# Patient Record
Sex: FEMALE | Race: BLACK OR AFRICAN AMERICAN | NOT HISPANIC OR LATINO | Employment: OTHER | URBAN - METROPOLITAN AREA
[De-identification: names, ages, dates, MRNs, and addresses within clinical notes are randomized per-mention and may not be internally consistent; named-entity substitution may affect disease eponyms.]

---

## 2017-01-03 ENCOUNTER — HOSPITAL ENCOUNTER (OUTPATIENT)
Dept: RADIOLOGY | Facility: HOSPITAL | Age: 62
Discharge: HOME | End: 2017-01-03
Attending: INTERNAL MEDICINE
Payer: COMMERCIAL

## 2017-01-03 VITALS
HEIGHT: 63 IN | BODY MASS INDEX: 38.27 KG/M2 | OXYGEN SATURATION: 98 % | DIASTOLIC BLOOD PRESSURE: 88 MMHG | RESPIRATION RATE: 14 BRPM | WEIGHT: 216 LBS | HEART RATE: 70 BPM | TEMPERATURE: 99 F | SYSTOLIC BLOOD PRESSURE: 135 MMHG

## 2017-01-03 DIAGNOSIS — K74.3 CHOLESTATIC CIRRHOSIS: ICD-10-CM

## 2017-01-03 DIAGNOSIS — R18.8 OTHER ASCITES: ICD-10-CM

## 2017-01-03 DIAGNOSIS — K74.60 HEPATIC CIRRHOSIS, UNSPECIFIED HEPATIC CIRRHOSIS TYPE: ICD-10-CM

## 2017-01-03 PROCEDURE — A7048 VACUUM DRAIN BOTTLE/TUBE KIT: HCPCS

## 2017-01-03 PROCEDURE — C1729 CATH, DRAINAGE: HCPCS

## 2017-01-03 NOTE — DISCHARGE INSTRUCTIONS

## 2017-01-03 NOTE — PLAN OF CARE
Problem: Patient Care Overview  Goal: Plan of Care Review  Outcome: Outcome(s) achieved Date Met:  01/03/17  Patient d/c home in stable condition via wheelchair with ride. Verbalized understanding of d/c instructions. Patient voiced no complaints at this time. Patient stood at side of bed, walked steps with no new motor deficits. Neurologically intact.

## 2017-01-03 NOTE — INTERVAL H&P NOTE
Most recent H&P has been reviewed and updated by radiology  Risks and benefits were explained to the patient prior to the procedure.  Written and informed consent was obtained.

## 2017-01-03 NOTE — IP AVS SNAPSHOT
[facility]  [id number] [place] [address] [address] [place] [address]           Patient Discharge Instructions     Our goal is to set you up for success. This packet includes information on your condition, medications, and your home care. It will help you to care for yourself so you don't get sicker and need to go back to the hospital.     Please ask your nurse if you have any questions.        There are many details to remember when preparing to leave the hospital. Here is what you will need to do:    1. Take your medicine. If you are prescribed medications, review your Medication List in the following pages. You may have new medications to  at the pharmacy and others that you'll need to stop taking. Review the instructions for how and when to take your medications. Talk with your doctor or nurses if you are unsure of what to do.     2. Go to your follow-up appointments. Specific follow-up information is listed in the following pages. Your may be contacted by a transition nurse or clinical provider about future appointments. Be sure we have all of the phone numbers to reach you, if needed. Please contact your provider's office if you are unable to make an appointment.     3. Watch for warning signs. Your doctor or nurse will give you detailed warning signs to watch for and when to call for assistance. These instructions may also include educational information about your condition. If you experience any of warning signs to your health, call your doctor.               [facility]  Unless otherwise directed by your provider, please contact [facility], our nurse care line that is available for 24/7 assistance.     [phone] (toll-free)    Registered nurses in the [facility] provide clinical advisement, health education, appointment booking, and other advisory services.                    ** Verify the list of medication(s) below is accurate and up to date. Carry this with you  in case of emergency. If your medications have changed, please notify your healthcare provider.             Medication List      TAKE these medications        Additional Info                      carvedilol 3.125 MG tablet   Commonly known as:  COREG   Quantity:  60 tablet   Refills:  11   Dose:  3.125 mg    Instructions:  Take 1 tablet (3.125 mg total) by mouth 2 (two) times daily.     Begin Date    AM    Noon    PM    Bedtime       furosemide 20 MG tablet   Commonly known as:  LASIX   Quantity:  30 tablet   Refills:  3   Dose:  60 mg    Instructions:  Take 3 tablets (60 mg total) by mouth once daily.     Begin Date    AM    Noon    PM    Bedtime       hydrocodone-acetaminophen 7.5-325mg 7.5-325 mg per tablet   Commonly known as:  NORCO   Quantity:  30 tablet   Refills:  0   Dose:  1 tablet    Instructions:  Take 1 tablet by mouth every 6 (six) hours as needed for Pain.     Begin Date    AM    Noon    PM    Bedtime       insulin glargine 100 unit/mL injection   Commonly known as:  LANTUS   Refills:  0   Dose:  25 Units   Indications:  TYPE 2 DIABETES MELLITUS    Instructions:  Inject 25 Units into the skin continuous prn.     Begin Date    AM    Noon    PM    Bedtime       insulin lispro 100 unit/mL injection   Commonly known as:  HUMALOG   Refills:  0    Instructions:  Inject into the skin as needed for High Blood Sugar (per sliding scale).     Begin Date    AM    Noon    PM    Bedtime       levothyroxine 100 MCG tablet   Commonly known as:  SYNTHROID   Refills:  0   Dose:  100 mcg    Instructions:  Take 100 mcg by mouth once daily.     Begin Date    AM    Noon    PM    Bedtime       magnesium oxide 400 mg tablet   Commonly known as:  MAG-OX   Quantity:  60 tablet   Refills:  3   Dose:  400 mg    Instructions:  Take 1 tablet (400 mg total) by mouth 2 (two) times daily.     Begin Date    AM    Noon    PM    Bedtime       midodrine 5 MG Tab   Commonly known as:  PROAMATINE   Quantity:  90 tablet   Refills:  11    Dose:  5 mg    Instructions:  Take 1 tablet (5 mg total) by mouth 3 (three) times daily.     Begin Date    AM    Noon    PM    Bedtime       ondansetron 4 MG Tbdl   Commonly known as:  ZOFRAN-ODT   Quantity:  15 tablet   Refills:  0   Dose:  4 mg    Instructions:  Take 1 tablet (4 mg total) by mouth every 8 (eight) hours as needed (prn nausea).     Begin Date    AM    Noon    PM    Bedtime       spironolactone 50 MG tablet   Commonly known as:  ALDACTONE   Quantity:  30 tablet   Refills:  11   Dose:  50 mg    Instructions:  Take 1 tablet (50 mg total) by mouth once daily.     Begin Date    AM    Noon    PM    Bedtime                  Please bring to all follow up appointments:    1. A copy of your discharge instructions.  2. All medicines you are currently taking in their original bottles.  3. Identification and insurance card.    Please arrive 15 minutes ahead of scheduled appointment time.    Please call 24 hours in advance if you must reschedule your appointment and/or time.        Your Scheduled Appointments     Jan 05, 2017 10:00 AM CST   Consult with [facility] INTERVENTIONAL RADIOLOGY   Alexis Jackson - Interventional Rad ([address] )    [address]  [facility] [address]   [phone]                  Discharge Instructions         Discharge Instructions for Paracentesis  Paracentesis is a procedure to remove extra fluid from your belly (abdomen). This fluid buildup in the abdomen is called ascites. The procedure may have been done to take a sample of the fluid. Or, it may have been done to drain the extra fluid from your abdomen and help make you more comfortable.     Ascites is buildup of excess fluid in the abdomen.   Home care  · If you have pain after the procedure, your healthcare provider can prescribe or recommend pain medicines. Take these exactly as directed. If you stopped taking other medicines before the procedure, ask your provider when you can start them again.  · Take it easy for 24  hours after the procedure. Avoid physical activity until your provider says its OK.  · You will have a small bandage over the puncture site. Stitches (sutures), surgical staples, adhesive tapes, adhesive strips, or surgical glue may be used to close the incision. They also help stop bleeding and speed healing. You may take the bandage off in 24 hours.  · Check the puncture site for the signs of infection listed below.  Follow-up care  Make a follow-up appointment with your healthcare provider as directed. During your follow-up visit, your provider will check your healing. Let your provider know how you are feeling. You can also discuss the cause of your ascites and if you need any further treatment.  When to seek medical advice  Call your healthcare provider if you have any of the following after the procedure:  · A fever of 100.4°F (38.0°C) or higher  · Trouble breathing  · Pain that doesn't go away even after taking pain medicine  · Belly pain not caused by having the skin punctured  · Bleeding from the puncture site  · More than a small amount of fluid leaking from the puncture site  · Swollen belly  · Signs of infection at the puncture site. These include increased pain, redness, or swelling, warmth, or bad-smelling drainage.  · Blood in your urine  · Feeling dizzy or lightheaded, or fainting   © 8826-1479 [employer]. [address] [place] [address], [place] [address]. All rights reserved. This information is not intended as a substitute for professional medical care. Always follow your healthcare professional's instructions.            Admission Information     Date & Time Provider Department CSN    1/3/2017  2:00 PM Liliane Baker MD [facility] -  [id number]      Care Providers     Provider Role Specialty Primary office phone    Liliane Baker MD Attending Provider Gastroenterology [phone]      Your Vitals Were     BP Pulse Temp Resp Height Weight    140/89 (BP Location: Left arm,  "Patient Position: Lying, BP Method: Automatic) 71 98.5 °F (36.9 °C) (Oral) 14 5' 3" (1.6 m) 98 kg (216 lb)    Last Period SpO2 BMI          (LMP Unknown) 98% 38.26 kg/m2        Recent Lab Values        9/2/2016 9/7/2016 11/17/2016                     5:53 AM  5:01 AM  1:34 PM         A1C 7.6 (H) 7.4 (H) 5.4         Comment for A1C at  5:53 AM on 9/2/2016:  According to ADA guidelines, hemoglobin A1C <7.0% represents  optimal control in non-pregnant diabetic patients.  Different  metrics may apply to specific populations.   Standards of Medical Care in Diabetes - 2016.  For the purpose of screening for the presence of diabetes:  <5.7%     Consistent with the absence of diabetes  5.7-6.4%  Consistent with increasing risk for diabetes   (prediabetes)  >or=6.5%  Consistent with diabetes  Currently no consensus exists for use of hemoglobin A1C  for diagnosis of diabetes for children.      Comment for A1C at  5:01 AM on 9/7/2016:  According to ADA guidelines, hemoglobin A1C <7.0% represents  optimal control in non-pregnant diabetic patients.  Different  metrics may apply to specific populations.   Standards of Medical Care in Diabetes - 2016.  For the purpose of screening for the presence of diabetes:  <5.7%     Consistent with the absence of diabetes  5.7-6.4%  Consistent with increasing risk for diabetes   (prediabetes)  >or=6.5%  Consistent with diabetes  Currently no consensus exists for use of hemoglobin A1C  for diagnosis of diabetes for children.      Comment for A1C at  1:34 PM on 11/17/2016:  According to ADA guidelines, hemoglobin A1C <7.0% represents  optimal control in non-pregnant diabetic patients.  Different  metrics may apply to specific populations.   Standards of Medical Care in Diabetes - 2016.  For the purpose of screening for the presence of diabetes:  <5.7%     Consistent with the absence of diabetes  5.7-6.4%  Consistent with increasing risk for diabetes   (prediabetes)  >or=6.5%  Consistent with " diabetes  Currently no consensus exists for use of hemoglobin A1C  for diagnosis of diabetes for children.        Allergies as of 1/3/2017        Reactions    Subsys [Fentanyl] Other (See Comments)    After administration pt unresponsive.  HR and respirations decreased.     Versed [Midazolam] Other (See Comments)    After administration pt unresponsive.  HR and respirations decreased.     Codeine Nausea And Vomiting, Nausea Only    Ampicillin Rash      Advance Directives     An advance directive is a document which, in the event you are no longer able to make decisions for yourself, tells your healthcare team what kind of treatment you do or do not want to receive, or who you would like to make those decisions for you.  If you do not currently have an advance directive, [facility] encourages you to create one.  For more information call:  [phone]), [phone] ([phone]),  or log on to [email or web address].        Language Assistance Services     ATTENTION: Language assistance services are available, free of charge. Please call [phone].      ATENCIÓN: Si habla español, tiene a rucker disposición servicios gratuitos de asistencia lingüística. Llame al [phone].     CHÚ Ý: N?u b?n nói Ti?ng Vi?t, có các d?ch v? h? tr? ngôn ng? mi?n phí dành cho b?n. G?i s? [phone].        Chronic Kindey Disease Education             Diabetes Discharge Instructions                                   [facility] Sign-Up     Activating your [facility] account is as easy as 1-2-3!     1) Visit [email or web address], select Sign Up Now, enter this activation code and your date of birth, then select Next.  [email or web address]  Expires: 2/17/2017  2:51 PM      2) Create a username and password to use when you visit [facility] in the future and select a security question in case you lose your password and select Next.    3) Enter your e-mail address and click Sign Up!    Additional Information  If you have  questions, please e-mail [email or web address] or call [phone] to talk to our [facility] staff. Remember, [facility] is NOT to be used for urgent needs. For medical emergencies, dial 911.          [facility] complies with applicable Federal civil rights laws and does not discriminate on the basis of race, color, national origin, age, disability, or sex.

## 2017-01-03 NOTE — H&P (VIEW-ONLY)
HEPATOLOGY FOLLOW UP    Diamond Das is here for follow up of Cirrhosis    HPI  60yo female with cryptogenic cirrhosis and ascites formation that returns to clinic after one month.  She is accompanied by her adult daughter.    Since last visit the patient has undergone transjugular liver biopsy with measurement of portal pressures.  Procedure complicated by sedation intolerance and patient had to be evaluated in the ED initially.  Re-attempted procedure successfully the following day.  Reports reviewed within EMR.  Patient noted to have cirrhosis with portal gradient of 6.      Patient continues to struggle with ascites.  Requires LVP approximately every 2-3 weeks.  No evidence of SBP.  She is on lasix 60mg daily and remains off of spironolactone.  Most recent renal function is normal.  No other symptoms of chronic liver disease.      Cirrhosis HCM:  Hepatitis A vaccination: immune  Hepatitis B vaccination: undergoing vaccination; received 2/3 with last dose 2017  HCC screenin2016; no suspicious lesions   Variceal screening: ERCP during initial w/u without mention of varices; update screening once ascites management addressed   Pneumococcal vaccination:   Influenza vaccination: patient not interested at this time     Outpatient Encounter Prescriptions as of 2016   Medication Sig Dispense Refill    carvedilol (COREG) 3.125 MG tablet Take 1 tablet (3.125 mg total) by mouth 2 (two) times daily. 60 tablet 11    furosemide (LASIX) 20 MG tablet Take 2 tablets (40 mg total) by mouth once daily. (Patient taking differently: Take 60 mg by mouth once daily. ) 30 tablet 3    hydrocodone-acetaminophen 7.5-325mg (NORCO) 7.5-325 mg per tablet Take 1 tablet by mouth every 6 (six) hours as needed for Pain. 30 tablet 0    insulin glargine (LANTUS) 100 unit/mL injection Inject 25 Units into the skin continuous prn.       insulin lispro (HUMALOG) 100 unit/mL injection Inject into the skin as needed for  High Blood Sugar (per sliding scale).       levothyroxine (SYNTHROID) 100 MCG tablet Take 100 mcg by mouth once daily.      magnesium oxide (MAG-OX) 400 mg tablet Take 1 tablet (400 mg total) by mouth 2 (two) times daily. 60 tablet 3    midodrine (PROAMATINE) 5 MG Tab Take 1 tablet (5 mg total) by mouth 3 (three) times daily. 90 tablet 11    ondansetron (ZOFRAN-ODT) 4 MG TbDL Take 1 tablet (4 mg total) by mouth every 8 (eight) hours as needed (prn nausea). 15 tablet 0    [DISCONTINUED] hydrocodone-acetaminophen 7.5-325mg (NORCO) 7.5-325 mg per tablet Take 1 tablet by mouth every 8 (eight) hours as needed for Pain. 15 tablet 0     No facility-administered encounter medications on file as of 12/8/2016.      Allergies   Allergen Reactions    Subsys [Fentanyl] Other (See Comments)     After administration pt unresponsive.  HR and respirations decreased.     Versed [Midazolam] Other (See Comments)     After administration pt unresponsive.  HR and respirations decreased.     Codeine Nausea And Vomiting and Nausea Only    Ampicillin Rash     Past Medical History   Diagnosis Date    Ascites     CHF (congestive heart failure)     Cirrhosis     Diabetes mellitus     Gastroparesis     GERD (gastroesophageal reflux disease)     Hypertension     Liver disease     Thyroid disease      FH: no family history of liver disease or transplant     Review of Systems   Constitutional: Positive for appetite change (decreased appetite) and fatigue. Negative for activity change, chills, fever and unexpected weight change.   HENT: Negative for hearing loss, rhinorrhea and trouble swallowing.    Eyes: Negative for visual disturbance.   Respiratory: Positive for shortness of breath.    Cardiovascular: Positive for leg swelling. Negative for chest pain.   Gastrointestinal: Positive for abdominal distention, abdominal pain and nausea. Negative for blood in stool and vomiting.   Endocrine: Negative for cold intolerance and heat  intolerance.   Genitourinary: Negative for difficulty urinating, frequency and urgency.   Musculoskeletal: Positive for gait problem (some difficulty with ambulation due to volume overload).   Skin: Negative for rash.   Neurological: Negative for weakness and headaches.   Hematological: Negative for adenopathy. Does not bruise/bleed easily.   Psychiatric/Behavioral: Negative for confusion and decreased concentration.     Vitals:    12/08/16 1552   BP: 129/74   Pulse: 79   Resp: 16   Temp: 97.7 °F (36.5 °C)       Physical Exam   Constitutional: She is oriented to person, place, and time. She appears well-developed and well-nourished. No distress.   HENT:   Head: Normocephalic and atraumatic.   Mouth/Throat: Oropharynx is clear and moist.   Eyes: EOM are normal. Pupils are equal, round, and reactive to light. No scleral icterus.   Neck: Normal range of motion. Neck supple. No thyromegaly present.   Cardiovascular: Normal rate, regular rhythm and normal heart sounds.  Exam reveals no gallop and no friction rub.    No murmur heard.  Pulmonary/Chest: Effort normal. No respiratory distress. She has no wheezes. She has no rales.   Decreased breath sounds at bilateral bases   Abdominal: Soft. Bowel sounds are normal. She exhibits distension (significant distention). There is tenderness (diffuse). There is no rebound and no guarding.   Musculoskeletal: Normal range of motion. Edema: 2+ BLE pitting edema.   Lymphadenopathy:     She has no cervical adenopathy.   Neurological: She is alert and oriented to person, place, and time. No cranial nerve deficit.   Skin: Skin is warm and dry. No rash noted.   Psychiatric: She has a normal mood and affect. Her behavior is normal.   Vitals reviewed.      Lab Results   Component Value Date    GLU 76 11/19/2016    BUN 10 11/19/2016    CREATININE 1.0 11/19/2016    CALCIUM 7.7 (L) 11/19/2016     (L) 11/19/2016    K 3.5 11/19/2016     11/19/2016    PROT 5.5 (L) 11/19/2016    CO2  24 11/19/2016    ANIONGAP 6 (L) 11/19/2016    WBC 8.46 11/19/2016    RBC 3.73 (L) 11/19/2016    HGB 10.6 (L) 11/19/2016    HCT 32.0 (L) 11/19/2016    MCV 86 11/19/2016    MCH 28.4 11/19/2016    MCHC 33.1 11/19/2016       Diagnostics: reviewed biopsy and portal pressure reports     Assessment and Plan:  Patient Active Problem List   Diagnosis    Ascites    Cirrhosis of liver with ascites    Type 2 diabetes mellitus with hypoglycemia    Bilateral lower extremity edema    Morbid obesity due to excess calories    Hypothyroidism due to acquired atrophy of thyroid    Protein-calorie malnutrition, moderate    CKD stage 3 due to type 2 diabetes mellitus    Decompensated hepatic cirrhosis    Medication reaction     60yo female with cholestatic liver disease with confirmed cirrhosis on recent biopsy.  Patient continues to have volume overload issues without ability to significant titrate diuretics without hypotension or renal insufficiency.  Most recent labs improved on low dose diuretics.      Ascites:  Although ascites has not been exactly consistent with portal hypertension, given the presence of cirrhosis on 2 biopsies; this is the most likely etiology for ascites formation.  She has had multiple samples sent for cytology which have been negative for malignancy.  No positive cultures to suggest infection, including AFB.  Will send quantiferon gold and obtain cross-sectional imaging with MRI.  If these test show no evidence of malignancy or TB; then would proceed with TIPS for management of ascites.  Portal pressures may have been underrepresentation of the degree of portal hypertension present and will discuss with IR and plan to have patient seen in IR clinic prior to performing procedure.  Discussed case with Dr. Dumont given the complexity and in agreement that based on clinical picture; cirrhosis with portal hypertension is likely etiology of ascites. Discussed risk and benefits of the procedure with  the patient and her daughter along with expected complications.  Also provided patient education literature.  They are comfortable proceeding at this time.     Doppler liver U/S also ordered to ensure patency of vascular prior to any proposed intervention.      Decompensated cirrhosis:   No other complications of liver disease present.  No indication for other liver related medications.      Patient requires work excuse as she has had to use FMLA and sick leave during illness.  Will provide this documentation to her employer and fax number provided.      [phone] (fax - Dr. Sol)  [facility]

## 2017-01-03 NOTE — PROGRESS NOTES
Procedure complete. 6.5 L of light marian fluid removed. Patient tolerated well. VS remain stable. Denies complaints. Bandaid placed to healthy puncture site, CDI, WNL

## 2017-01-03 NOTE — IP AVS SNAPSHOT
[facility]  [id number] [place] [address] [address] [place] [id number]           I have received a copy of my After Visit Summary and discharge instructions from [facility].    INSTRUCTIONS RECEIVED AND UNDERSTOOD BY:                     Patient/Patient Representative: ________________________________________________________________     Date/Time: ________________________________________________________________                     Instructions Given By: ________________________________________________________________     Date/Time: ________________________________________________________________

## 2017-01-03 NOTE — DISCHARGE SUMMARY
Procedure was performed Gus Zaman PA-C.  Sterile technique was performed in the right abdomen, lidocaine was used as a local anesthetic.  Removed 6.5 of light marian fluid.  Pt tolerated the procedure well without immediate complications.  Please see radiologist report for details. F/u with PCP and/or ordering physician.

## 2017-01-05 ENCOUNTER — INITIAL CONSULT (OUTPATIENT)
Dept: INTERVENTIONAL RADIOLOGY/VASCULAR | Facility: CLINIC | Age: 62
End: 2017-01-05
Payer: COMMERCIAL

## 2017-01-05 VITALS
HEART RATE: 67 BPM | WEIGHT: 198 LBS | SYSTOLIC BLOOD PRESSURE: 143 MMHG | DIASTOLIC BLOOD PRESSURE: 80 MMHG | HEIGHT: 63 IN | BODY MASS INDEX: 35.08 KG/M2

## 2017-01-05 DIAGNOSIS — K74.60 CIRRHOSIS OF LIVER WITHOUT ASCITES, UNSPECIFIED HEPATIC CIRRHOSIS TYPE: Primary | ICD-10-CM

## 2017-01-05 DIAGNOSIS — K76.6 PORTAL HYPERTENSION: Primary | ICD-10-CM

## 2017-01-05 DIAGNOSIS — R18.8 CIRRHOSIS OF LIVER WITH ASCITES, UNSPECIFIED HEPATIC CIRRHOSIS TYPE: ICD-10-CM

## 2017-01-05 DIAGNOSIS — K74.60 CIRRHOSIS OF LIVER WITH ASCITES, UNSPECIFIED HEPATIC CIRRHOSIS TYPE: ICD-10-CM

## 2017-01-05 PROCEDURE — 1159F MED LIST DOCD IN RCRD: CPT | Mod: S$GLB,,, | Performed by: NURSE PRACTITIONER

## 2017-01-05 PROCEDURE — 99213 OFFICE O/P EST LOW 20 MIN: CPT | Mod: S$GLB,,, | Performed by: NURSE PRACTITIONER

## 2017-01-05 PROCEDURE — 99999 PR PBB SHADOW E&M-EST. PATIENT-LVL III: CPT | Mod: PBBFAC,,,

## 2017-01-05 NOTE — PROGRESS NOTES
Subjective:       Patient ID: Diamond Das is a 61 y.o. female.    Chief Complaint: Portal Hypertension    HPI Comments: Patient in today for initial consult for a TIPS insertion for portal hypertension and ascites. She has no complaints today. She is here today with her daughter and brother.     Review of Systems   Constitutional: Positive for fatigue (occasional ). Negative for activity change, appetite change, chills, fever and unexpected weight change.   HENT: Negative.    Eyes: Negative.    Respiratory: Positive for apnea (sleep apnea, but patient does not use her CPAP machine at home). Negative for cough, chest tightness and shortness of breath.    Cardiovascular: Positive for leg swelling. Negative for chest pain and palpitations.   Gastrointestinal: Positive for constipation (Relieved by Lactulose ) and nausea (relieved by Zofran ). Negative for abdominal distention, abdominal pain, blood in stool, diarrhea and vomiting.   Endocrine:        Insulin dependent Diabetic (on a sliding scale)    Genitourinary: Negative for difficulty urinating, dysuria, frequency and urgency.   Musculoskeletal: Positive for gait problem (ambulates with a walker ). Negative for back pain, neck pain and neck stiffness.   Neurological: Positive for light-headedness (occasional in the morning ) and numbness (diabetic neuropathy (fingers and toes) ). Negative for dizziness, seizures, syncope, weakness and headaches.   Hematological: Does not bruise/bleed easily.       Objective:      Physical Exam   Constitutional: She is oriented to person, place, and time. She appears well-developed and well-nourished.   Eyes: EOM are normal. Pupils are equal, round, and reactive to light. No scleral icterus.   Neck: Normal range of motion. Neck supple.   Cardiovascular: Normal rate, regular rhythm, normal heart sounds and intact distal pulses.  Exam reveals no gallop and no friction rub.    No murmur heard.  Pulmonary/Chest: Effort normal and  breath sounds normal. She has no wheezes. She has no rales.   Abdominal: Soft. Bowel sounds are normal. She exhibits no distension and no mass. There is no tenderness. There is no rebound and no guarding.   Lymphadenopathy:     She has no cervical adenopathy.   Neurological: She is alert and oriented to person, place, and time.   Skin: Skin is warm and dry.   Psychiatric: She has a normal mood and affect. Her behavior is normal. Judgment and thought content normal.   Vitals reviewed.      Assessment:       1. Portal hypertension    2. Cirrhosis of liver with ascites, unspecified hepatic cirrhosis type        Plan:     TIPS procedure discussed in detail with the patient including risks, benefits, potential complications, usual pre and post procedure course, as well as the need for overnight hospital stay. Informed consent signed. Verbal and written pre procedure instructions provided. Although patient will have general anesthesia for this procedure, I instructed her to bring her CPAP machine and supplies with her in the event it is needed during her hospital stay. Clinic contact information provided. Procedure scheduled on 1/17/17 @11:00 AM, pt to arrive @9:30AM. All verbalized understanding of all instructions given.

## 2017-01-16 DIAGNOSIS — N18.30 CKD STAGE 3 DUE TO TYPE 2 DIABETES MELLITUS: Primary | ICD-10-CM

## 2017-01-16 DIAGNOSIS — K74.60 CIRRHOSIS OF LIVER WITHOUT ASCITES, UNSPECIFIED HEPATIC CIRRHOSIS TYPE: ICD-10-CM

## 2017-01-16 DIAGNOSIS — K76.6 PORTAL HYPERTENSION: Primary | ICD-10-CM

## 2017-01-16 DIAGNOSIS — K74.60 CIRRHOSIS OF LIVER WITH ASCITES, UNSPECIFIED HEPATIC CIRRHOSIS TYPE: Primary | ICD-10-CM

## 2017-01-16 DIAGNOSIS — R18.8 CIRRHOSIS OF LIVER WITH ASCITES, UNSPECIFIED HEPATIC CIRRHOSIS TYPE: Primary | ICD-10-CM

## 2017-01-16 DIAGNOSIS — E11.22 CKD STAGE 3 DUE TO TYPE 2 DIABETES MELLITUS: Primary | ICD-10-CM

## 2017-01-16 RX ORDER — VITAMIN E 268 MG
400 CAPSULE ORAL EVERY MORNING
COMMUNITY
End: 2020-10-09 | Stop reason: CLARIF

## 2017-01-17 ENCOUNTER — ANESTHESIA EVENT (OUTPATIENT)
Dept: ENDOSCOPY | Facility: HOSPITAL | Age: 62
End: 2017-01-17
Payer: COMMERCIAL

## 2017-01-17 ENCOUNTER — SURGERY (OUTPATIENT)
Age: 62
End: 2017-01-17

## 2017-01-17 ENCOUNTER — ANESTHESIA (OUTPATIENT)
Dept: ENDOSCOPY | Facility: HOSPITAL | Age: 62
End: 2017-01-17
Payer: COMMERCIAL

## 2017-01-17 ENCOUNTER — HOSPITAL ENCOUNTER (OUTPATIENT)
Facility: HOSPITAL | Age: 62
LOS: 1 days | Discharge: HOME | End: 2017-01-18
Attending: INTERNAL MEDICINE | Admitting: INTERNAL MEDICINE
Payer: COMMERCIAL

## 2017-01-17 DIAGNOSIS — R18.8 CIRRHOSIS OF LIVER WITH ASCITES, UNSPECIFIED HEPATIC CIRRHOSIS TYPE: ICD-10-CM

## 2017-01-17 DIAGNOSIS — K76.6 PORTAL HYPERTENSION: ICD-10-CM

## 2017-01-17 DIAGNOSIS — K74.60 CIRRHOSIS OF LIVER WITH ASCITES, UNSPECIFIED HEPATIC CIRRHOSIS TYPE: ICD-10-CM

## 2017-01-17 LAB
APPEARANCE FLD: CLEAR
BODY FLD TYPE: NORMAL
COLOR FLD: YELLOW
GRAM STN SPEC: NORMAL
LYMPHOCYTES NFR FLD MANUAL: 79 %
MONOS+MACROS NFR FLD MANUAL: 18 %
NEUTROPHILS NFR FLD MANUAL: 3 %
POCT GLUCOSE: 85 MG/DL (ref 70–110)
POCT GLUCOSE: 92 MG/DL (ref 70–110)
WBC # FLD: 275 /CU MM

## 2017-01-17 PROCEDURE — 25000003 PHARM REV CODE 250: Performed by: STUDENT IN AN ORGANIZED HEALTH CARE EDUCATION/TRAINING PROGRAM

## 2017-01-17 PROCEDURE — 12000002 HC ACUTE/MED SURGE SEMI-PRIVATE ROOM

## 2017-01-17 PROCEDURE — 99223 1ST HOSP IP/OBS HIGH 75: CPT | Mod: ,,, | Performed by: INTERNAL MEDICINE

## 2017-01-17 PROCEDURE — 63600175 PHARM REV CODE 636 W HCPCS: Performed by: NURSE ANESTHETIST, CERTIFIED REGISTERED

## 2017-01-17 PROCEDURE — 71000033 HC RECOVERY, INTIAL HOUR

## 2017-01-17 PROCEDURE — 63600175 PHARM REV CODE 636 W HCPCS

## 2017-01-17 PROCEDURE — 25000003 PHARM REV CODE 250: Performed by: NURSE PRACTITIONER

## 2017-01-17 PROCEDURE — 87075 CULTR BACTERIA EXCEPT BLOOD: CPT

## 2017-01-17 PROCEDURE — 63600175 PHARM REV CODE 636 W HCPCS: Performed by: NURSE PRACTITIONER

## 2017-01-17 PROCEDURE — 25500020 PHARM REV CODE 255: Performed by: INTERNAL MEDICINE

## 2017-01-17 PROCEDURE — D9220A PRA ANESTHESIA: Mod: ANES,,, | Performed by: ANESTHESIOLOGY

## 2017-01-17 PROCEDURE — 25000003 PHARM REV CODE 250: Performed by: NURSE ANESTHETIST, CERTIFIED REGISTERED

## 2017-01-17 PROCEDURE — D9220A PRA ANESTHESIA: Mod: CRNA,,, | Performed by: NURSE ANESTHETIST, CERTIFIED REGISTERED

## 2017-01-17 PROCEDURE — 63600175 PHARM REV CODE 636 W HCPCS: Performed by: ANESTHESIOLOGY

## 2017-01-17 PROCEDURE — 71000039 HC RECOVERY, EACH ADD'L HOUR

## 2017-01-17 PROCEDURE — 37000008 HC ANESTHESIA 1ST 15 MINUTES

## 2017-01-17 PROCEDURE — 37000009 HC ANESTHESIA EA ADD 15 MINS

## 2017-01-17 PROCEDURE — G0378 HOSPITAL OBSERVATION PER HR: HCPCS

## 2017-01-17 PROCEDURE — 87205 SMEAR GRAM STAIN: CPT

## 2017-01-17 PROCEDURE — 87070 CULTURE OTHR SPECIMN AEROBIC: CPT

## 2017-01-17 PROCEDURE — 89051 BODY FLUID CELL COUNT: CPT

## 2017-01-17 PROCEDURE — 63600175 PHARM REV CODE 636 W HCPCS: Performed by: STUDENT IN AN ORGANIZED HEALTH CARE EDUCATION/TRAINING PROGRAM

## 2017-01-17 PROCEDURE — 27000221 HC OXYGEN, UP TO 24 HOURS

## 2017-01-17 RX ORDER — HYDROMORPHONE HYDROCHLORIDE 1 MG/ML
INJECTION, SOLUTION INTRAMUSCULAR; INTRAVENOUS; SUBCUTANEOUS
Status: COMPLETED
Start: 2017-01-17 | End: 2017-01-17

## 2017-01-17 RX ORDER — LIDOCAINE HCL/PF 100 MG/5ML
SYRINGE (ML) INTRAVENOUS
Status: DISCONTINUED | OUTPATIENT
Start: 2017-01-17 | End: 2017-01-17

## 2017-01-17 RX ORDER — PROPOFOL 10 MG/ML
VIAL (ML) INTRAVENOUS
Status: DISCONTINUED | OUTPATIENT
Start: 2017-01-17 | End: 2017-01-17

## 2017-01-17 RX ORDER — PHENYLEPHRINE HYDROCHLORIDE 10 MG/ML
INJECTION INTRAVENOUS
Status: DISCONTINUED | OUTPATIENT
Start: 2017-01-17 | End: 2017-01-17

## 2017-01-17 RX ORDER — INSULIN ASPART 100 [IU]/ML
0-5 INJECTION, SOLUTION INTRAVENOUS; SUBCUTANEOUS
Status: DISCONTINUED | OUTPATIENT
Start: 2017-01-17 | End: 2017-01-18 | Stop reason: HOSPADM

## 2017-01-17 RX ORDER — SODIUM CHLORIDE 0.9 % (FLUSH) 0.9 %
3 SYRINGE (ML) INJECTION
Status: DISCONTINUED | OUTPATIENT
Start: 2017-01-17 | End: 2017-01-17 | Stop reason: HOSPADM

## 2017-01-17 RX ORDER — HYDROMORPHONE HYDROCHLORIDE 1 MG/ML
0.2 INJECTION, SOLUTION INTRAMUSCULAR; INTRAVENOUS; SUBCUTANEOUS EVERY 5 MIN PRN
Status: DISCONTINUED | OUTPATIENT
Start: 2017-01-17 | End: 2017-01-17

## 2017-01-17 RX ORDER — IBUPROFEN 200 MG
24 TABLET ORAL
Status: DISCONTINUED | OUTPATIENT
Start: 2017-01-17 | End: 2017-01-18 | Stop reason: HOSPADM

## 2017-01-17 RX ORDER — GENTAMICIN SULFATE 80 MG/100ML
80 INJECTION, SOLUTION INTRAVENOUS
Status: COMPLETED | OUTPATIENT
Start: 2017-01-17 | End: 2017-01-17

## 2017-01-17 RX ORDER — LEVOTHYROXINE SODIUM 100 UG/1
100 TABLET ORAL EVERY MORNING
Status: DISCONTINUED | OUTPATIENT
Start: 2017-01-18 | End: 2017-01-18 | Stop reason: HOSPADM

## 2017-01-17 RX ORDER — SPIRONOLACTONE 25 MG/1
50 TABLET ORAL EVERY MORNING
Status: DISCONTINUED | OUTPATIENT
Start: 2017-01-18 | End: 2017-01-18 | Stop reason: HOSPADM

## 2017-01-17 RX ORDER — FENTANYL CITRATE 50 UG/ML
25 INJECTION, SOLUTION INTRAMUSCULAR; INTRAVENOUS EVERY 5 MIN PRN
Status: DISCONTINUED | OUTPATIENT
Start: 2017-01-17 | End: 2017-01-17

## 2017-01-17 RX ORDER — HEPARIN SODIUM 5000 [USP'U]/ML
5000 INJECTION, SOLUTION INTRAVENOUS; SUBCUTANEOUS EVERY 8 HOURS
Status: DISCONTINUED | OUTPATIENT
Start: 2017-01-17 | End: 2017-01-18 | Stop reason: HOSPADM

## 2017-01-17 RX ORDER — ONDANSETRON 4 MG/1
4 TABLET, ORALLY DISINTEGRATING ORAL EVERY 8 HOURS PRN
Status: DISCONTINUED | OUTPATIENT
Start: 2017-01-17 | End: 2017-01-18 | Stop reason: HOSPADM

## 2017-01-17 RX ORDER — FENTANYL CITRATE 50 UG/ML
INJECTION, SOLUTION INTRAMUSCULAR; INTRAVENOUS
Status: DISCONTINUED | OUTPATIENT
Start: 2017-01-17 | End: 2017-01-17

## 2017-01-17 RX ORDER — SODIUM CHLORIDE 9 MG/ML
500 INJECTION, SOLUTION INTRAVENOUS CONTINUOUS
Status: DISCONTINUED | OUTPATIENT
Start: 2017-01-17 | End: 2017-01-17

## 2017-01-17 RX ORDER — GLUCAGON 1 MG
1 KIT INJECTION
Status: DISCONTINUED | OUTPATIENT
Start: 2017-01-17 | End: 2017-01-18 | Stop reason: HOSPADM

## 2017-01-17 RX ORDER — CARVEDILOL 3.12 MG/1
3.12 TABLET ORAL 2 TIMES DAILY
Status: DISCONTINUED | OUTPATIENT
Start: 2017-01-17 | End: 2017-01-18 | Stop reason: HOSPADM

## 2017-01-17 RX ORDER — ROCURONIUM BROMIDE 10 MG/ML
INJECTION, SOLUTION INTRAVENOUS
Status: DISCONTINUED | OUTPATIENT
Start: 2017-01-17 | End: 2017-01-17

## 2017-01-17 RX ORDER — ONDANSETRON 2 MG/ML
INJECTION INTRAMUSCULAR; INTRAVENOUS
Status: DISCONTINUED | OUTPATIENT
Start: 2017-01-17 | End: 2017-01-17

## 2017-01-17 RX ORDER — OXYCODONE HYDROCHLORIDE 5 MG/1
5 TABLET ORAL EVERY 6 HOURS PRN
Status: DISCONTINUED | OUTPATIENT
Start: 2017-01-17 | End: 2017-01-18 | Stop reason: HOSPADM

## 2017-01-17 RX ORDER — SUCCINYLCHOLINE CHLORIDE 20 MG/ML
INJECTION INTRAMUSCULAR; INTRAVENOUS
Status: DISCONTINUED | OUTPATIENT
Start: 2017-01-17 | End: 2017-01-17

## 2017-01-17 RX ORDER — VITAMIN E 268 MG
400 CAPSULE ORAL EVERY MORNING
Status: DISCONTINUED | OUTPATIENT
Start: 2017-01-18 | End: 2017-01-18 | Stop reason: HOSPADM

## 2017-01-17 RX ORDER — IBUPROFEN 200 MG
16 TABLET ORAL
Status: DISCONTINUED | OUTPATIENT
Start: 2017-01-17 | End: 2017-01-18 | Stop reason: HOSPADM

## 2017-01-17 RX ADMIN — FENTANYL CITRATE 100 MCG: 50 INJECTION, SOLUTION INTRAMUSCULAR; INTRAVENOUS at 12:01

## 2017-01-17 RX ADMIN — IOHEXOL 120 ML: 300 INJECTION, SOLUTION INTRAVENOUS at 03:01

## 2017-01-17 RX ADMIN — PHENYLEPHRINE HYDROCHLORIDE 100 MCG: 10 INJECTION INTRAVENOUS at 12:01

## 2017-01-17 RX ADMIN — CARVEDILOL 3.12 MG: 3.12 TABLET, FILM COATED ORAL at 08:01

## 2017-01-17 RX ADMIN — HYDROMORPHONE HYDROCHLORIDE 0.2 MG: 1 INJECTION, SOLUTION INTRAMUSCULAR; INTRAVENOUS; SUBCUTANEOUS at 03:01

## 2017-01-17 RX ADMIN — ONDANSETRON 4 MG: 4 TABLET, ORALLY DISINTEGRATING ORAL at 07:01

## 2017-01-17 RX ADMIN — LIDOCAINE HYDROCHLORIDE 50 MG: 20 INJECTION, SOLUTION INTRAVENOUS at 12:01

## 2017-01-17 RX ADMIN — ONDANSETRON 4 MG: 2 INJECTION INTRAMUSCULAR; INTRAVENOUS at 02:01

## 2017-01-17 RX ADMIN — HEPARIN SODIUM 5000 UNITS: 5000 INJECTION, SOLUTION INTRAVENOUS; SUBCUTANEOUS at 08:01

## 2017-01-17 RX ADMIN — SODIUM CHLORIDE 75 ML: 0.9 INJECTION, SOLUTION INTRAVENOUS at 10:01

## 2017-01-17 RX ADMIN — Medication 1000 MG: at 12:01

## 2017-01-17 RX ADMIN — PROMETHAZINE HYDROCHLORIDE 6.25 MG: 25 INJECTION INTRAMUSCULAR; INTRAVENOUS at 08:01

## 2017-01-17 RX ADMIN — HYDROMORPHONE HYDROCHLORIDE 0.2 MG: 1 INJECTION, SOLUTION INTRAMUSCULAR; INTRAVENOUS; SUBCUTANEOUS at 04:01

## 2017-01-17 RX ADMIN — GENTAMICIN SULFATE 80 MG: 80 INJECTION, SOLUTION INTRAVENOUS at 12:01

## 2017-01-17 RX ADMIN — PHENYLEPHRINE HYDROCHLORIDE 100 MCG: 10 INJECTION INTRAVENOUS at 02:01

## 2017-01-17 RX ADMIN — PROPOFOL 80 MG: 10 INJECTION, EMULSION INTRAVENOUS at 12:01

## 2017-01-17 RX ADMIN — PHENYLEPHRINE HYDROCHLORIDE 100 MCG: 10 INJECTION INTRAVENOUS at 01:01

## 2017-01-17 RX ADMIN — ROCURONIUM BROMIDE 5 MG: 10 INJECTION, SOLUTION INTRAVENOUS at 12:01

## 2017-01-17 RX ADMIN — OXYCODONE HYDROCHLORIDE 5 MG: 5 TABLET ORAL at 09:01

## 2017-01-17 RX ADMIN — SUCCINYLCHOLINE CHLORIDE 120 MG: 20 INJECTION, SOLUTION INTRAMUSCULAR; INTRAVENOUS at 12:01

## 2017-01-17 NOTE — TRANSFER OF CARE
"Anesthesia Transfer of Care Note    Patient: Diamond Das    Procedure(s) Performed: Procedure(s) (LRB):  TIPS (N/A)    Patient location: PACU    Anesthesia Type: general    Transport from OR: Transported from OR on 6-10 L/min O2 by face mask with adequate spontaneous ventilation    Post pain: adequate analgesia    Post assessment: no apparent anesthetic complications and tolerated procedure well    Post vital signs: stable    Level of consciousness: awake, alert and oriented    Nausea/Vomiting: no nausea/vomiting    Complications: none          Last vitals:   Visit Vitals    BP (!) 174/95 (BP Location: Left arm, Patient Position: Lying, BP Method: Automatic)    Pulse 73    Temp 36.5 °C (97.7 °F) (Oral)    Resp 18    Ht 5' 3" (1.6 m)    Wt 89.8 kg (198 lb)    LMP  (LMP Unknown)    SpO2 100%    Breastfeeding No    BMI 35.07 kg/m2     "

## 2017-01-17 NOTE — PROCEDURES
Radiology Post-Procedure Note    Pre Op Diagnosis: Ascites    Post Op Diagnosis: Ascites    Procedure: Transjugular intrahepatic portosystemic shunt (TIPS)    Performed by: Dr. Cuevas, Dr. Jasmine, Dr. Sterling    Written Informed Consent Obtained: Yes    Specimen Removed: NO    Estimated Blood Loss: Minimal    Findings: Under general anesthesia, via right internal jugular approach using ultrasound and fluoroscopic surveillance, a transhepatic portosystemic shunt was created via a right hepatic and right portal vein.  Postprocedural angiography indicates hepato-pedal flow in the right portal vein and a shunt pressure gradient of 5 cm H2O.  The catheter was removed and hemostasis achieved without hematoma.     There were no complications.  Please see Imaging report for further details.    Hayden Sterling MD  Department of Radiology  [phone]

## 2017-01-17 NOTE — PROGRESS NOTES
TIPS    Pre TIPS Pressures:    IVC: 12  Free: 14  Wedge: 20  Portal Vein: 28    Post TIPS Pressures:   Direct portal : 24  Mid shunt: 23  Hepatic Vein IVC: 18  Right atrium: 17    Direct portal 2: 26  Mid shunt 2: 27  Hepatic vein IVC 2: 22  Hepatic vein IVC End 2: 23  Right atrium 2: 21

## 2017-01-17 NOTE — IP AVS SNAPSHOT
[facility]  [address]  [facility] [address]  Phone: [phone]           Patient Discharge Instructions     Our goal is to set you up for success. This packet includes information on your condition, medications, and your home care. It will help you to care for yourself so you don't get sicker and need to go back to the hospital.     Please ask your nurse if you have any questions.        There are many details to remember when preparing to leave the hospital. Here is what you will need to do:    1. Take your medicine. If you are prescribed medications, review your Medication List in the following pages. You may have new medications to  at the pharmacy and others that you'll need to stop taking. Review the instructions for how and when to take your medications. Talk with your doctor or nurses if you are unsure of what to do.     2. Go to your follow-up appointments. Specific follow-up information is listed in the following pages. Your may be contacted by a transition nurse or clinical provider about future appointments. Be sure we have all of the phone numbers to reach you, if needed. Please contact your provider's office if you are unable to make an appointment.     3. Watch for warning signs. Your doctor or nurse will give you detailed warning signs to watch for and when to call for assistance. These instructions may also include educational information about your condition. If you experience any of warning signs to your health, call your doctor.               [facility]  Unless otherwise directed by your provider, please contact [facility], our nurse care line that is available for 24/7 assistance.     [phone] (toll-free)    Registered nurses in the [facility] provide clinical advisement, health education, appointment booking, and other advisory services.                    ** Verify the list of medication(s) below is accurate and up  to date. Carry this with you in case of emergency. If your medications have changed, please notify your healthcare provider.             Medication List      ASK your doctor about these medications        Additional Info                      carvedilol 3.125 MG tablet   Commonly known as:  COREG   Quantity:  60 tablet   Refills:  11   Dose:  3.125 mg    Last time this was given:  3.125 mg on 1/17/2017  8:48 PM   Instructions:  Take 1 tablet (3.125 mg total) by mouth 2 (two) times daily.     Begin Date    AM    Noon    PM    Bedtime       furosemide 20 MG tablet   Commonly known as:  LASIX   Quantity:  30 tablet   Refills:  3   Dose:  60 mg    Instructions:  Take 3 tablets (60 mg total) by mouth once daily.     Begin Date    AM    Noon    PM    Bedtime       hydrocodone-acetaminophen 7.5-325mg 7.5-325 mg per tablet   Commonly known as:  NORCO   Quantity:  30 tablet   Refills:  0   Dose:  1 tablet    Instructions:  Take 1 tablet by mouth every 6 (six) hours as needed for Pain.     Begin Date    AM    Noon    PM    Bedtime       insulin glargine 100 unit/mL injection   Commonly known as:  LANTUS   Refills:  0   Indications:  type 2 diabetes mellitus    Instructions:  Inject into the skin as needed.     Begin Date    AM    Noon    PM    Bedtime       levothyroxine 100 MCG tablet   Commonly known as:  SYNTHROID   Refills:  0   Dose:  100 mcg    Last time this was given:  100 mcg on 1/18/2017  9:59 AM   Instructions:  Take 100 mcg by mouth every morning.     Begin Date    AM    Noon    PM    Bedtime       midodrine 5 MG Tab   Commonly known as:  PROAMATINE   Quantity:  90 tablet   Refills:  11   Dose:  5 mg    Instructions:  Take 1 tablet (5 mg total) by mouth 3 (three) times daily.     Begin Date    AM    Noon    PM    Bedtime       ondansetron 4 MG Tbdl   Commonly known as:  ZOFRAN-ODT   Quantity:  15 tablet   Refills:  0   Dose:  4 mg    Last time this was given:  4 mg on 1/17/2017  7:00 PM   Instructions:  Take 1  tablet (4 mg total) by mouth every 8 (eight) hours as needed (prn nausea).     Begin Date    AM    Noon    PM    Bedtime       spironolactone 50 MG tablet   Commonly known as:  ALDACTONE   Quantity:  30 tablet   Refills:  11   Dose:  50 mg    Instructions:  Take 1 tablet (50 mg total) by mouth once daily.     Begin Date    AM    Noon    PM    Bedtime       vitamin E 400 UNIT capsule   Refills:  0   Dose:  400 Units    Instructions:  Take 400 Units by mouth every morning.     Begin Date    AM    Noon    PM    Bedtime                  Please bring to all follow up appointments:    1. A copy of your discharge instructions.  2. All medicines you are currently taking in their original bottles.  3. Identification and insurance card.    Please arrive 15 minutes ahead of scheduled appointment time.    Please call 24 hours in advance if you must reschedule your appointment and/or time.        Follow-up Information     Schedule an appointment as soon as possible for a visit with Liliane Baker MD.    Specialties:  Gastroenterology, Hepatology    Why:  f/u for TIPS, portal hypertension with cirrhosis    Contact information:    [address] [place] [address]  [phone]          Discharge Instructions     Future Orders    Call MD for:  increased confusion or weakness     Call MD for:  persistent dizziness, light-headedness, or visual disturbances     Call MD for:  persistent nausea and vomiting or diarrhea     Call MD for:  redness, tenderness, or signs of infection (pain, swelling, redness, odor or green/yellow discharge around incision site)     Call MD for:  temperature >100.4     Diet general     Questions:    Total calories:      Fat restriction, if any:      Protein restriction, if any:      Na restriction, if any:      Fluid restriction:      Additional restrictions:          Discharge Instructions       Please have close follow-up with hepatology. Please come to ED if       Primary Diagnosis     Your primary  "diagnosis was:  Cirrhosis Of Liver With Ascites      Admission Information     Date & Time Provider Department CSN    1/17/2017  9:25 AM Billie Mann MD [facility] [id number]      Care Providers     Provider Role Specialty Primary office phone    Billie Mann MD Attending Provider Hospitalist [phone]    Louise Surgeon Surgeon  -- Number not on file    Billie Mann MD Team Attending  Hospitalist [phone]      Your Vitals Were     BP Pulse Temp Resp Height Weight    101/55 (BP Location: Left arm, Patient Position: Lying, BP Method: Automatic) 75 98.6 °F (37 °C) (Oral) 18 5' 3" (1.6 m) 89.8 kg (198 lb)    Last Period SpO2 BMI          (LMP Unknown) 95% 35.07 kg/m2        Recent Lab Values        9/2/2016 9/7/2016 11/17/2016 1/18/2017                  5:53 AM  5:01 AM  1:34 PM  4:20 AM        A1C 7.6 (H) 7.4 (H) 5.4 5.7        Comment for A1C at  5:53 AM on 9/2/2016:  According to ADA guidelines, hemoglobin A1C <7.0% represents  optimal control in non-pregnant diabetic patients.  Different  metrics may apply to specific populations.   Standards of Medical Care in Diabetes - 2016.  For the purpose of screening for the presence of diabetes:  <5.7%     Consistent with the absence of diabetes  5.7-6.4%  Consistent with increasing risk for diabetes   (prediabetes)  >or=6.5%  Consistent with diabetes  Currently no consensus exists for use of hemoglobin A1C  for diagnosis of diabetes for children.      Comment for A1C at  5:01 AM on 9/7/2016:  According to ADA guidelines, hemoglobin A1C <7.0% represents  optimal control in non-pregnant diabetic patients.  Different  metrics may apply to specific populations.   Standards of Medical Care in Diabetes - 2016.  For the purpose of screening for the presence of diabetes:  <5.7%     Consistent with the absence of diabetes  5.7-6.4%  Consistent with increasing risk for diabetes   (prediabetes)  >or=6.5%  Consistent with " diabetes  Currently no consensus exists for use of hemoglobin A1C  for diagnosis of diabetes for children.      Comment for A1C at  1:34 PM on 11/17/2016:  According to ADA guidelines, hemoglobin A1C <7.0% represents  optimal control in non-pregnant diabetic patients.  Different  metrics may apply to specific populations.   Standards of Medical Care in Diabetes - 2016.  For the purpose of screening for the presence of diabetes:  <5.7%     Consistent with the absence of diabetes  5.7-6.4%  Consistent with increasing risk for diabetes   (prediabetes)  >or=6.5%  Consistent with diabetes  Currently no consensus exists for use of hemoglobin A1C  for diagnosis of diabetes for children.      Comment for A1C at  4:20 AM on 1/18/2017:  According to ADA guidelines, hemoglobin A1C <7.0% represents  optimal control in non-pregnant diabetic patients.  Different  metrics may apply to specific populations.   Standards of Medical Care in Diabetes - 2016.  For the purpose of screening for the presence of diabetes:  <5.7%     Consistent with the absence of diabetes  5.7-6.4%  Consistent with increasing risk for diabetes   (prediabetes)  >or=6.5%  Consistent with diabetes  Currently no consensus exists for use of hemoglobin A1C  for diagnosis of diabetes for children.        Pending Labs     Order Current Status    Aerobic culture Preliminary result    Culture, Anaerobe Preliminary result      Allergies as of 1/18/2017        Reactions    Subsys [Fentanyl] Other (See Comments)    After administration pt unresponsive.  HR and respirations decreased.     Versed [Midazolam] Other (See Comments)    After administration pt unresponsive.  HR and respirations decreased.     Ampicillin Rash    Codeine Nausea And Vomiting      Advance Directives     An advance directive is a document which, in the event you are no longer able to make decisions for yourself, tells your healthcare team what kind of treatment you do or do not want to receive, or  who you would like to make those decisions for you.  If you do not currently have an advance directive, [facility] encourages you to create one.  For more information call:  [phone]), [phone] ([phone]),  or log on to [email or web address].        Language Assistance Services     ATTENTION: Language assistance services are available, free of charge. Please call [phone].      ATENCIÓN: Si habla español, tiene a rucker disposición servicios gratuitos de asistencia lingüística. Llame al [phone].     CHÚ Ý: N?u b?n nói Ti?ng Vi?t, có các d?ch v? h? tr? ngôn ng? mi?n phí dành cho b?n. G?i s? [phone].        Chronic Kindey Disease Education             Diabetes Discharge Instructions                                   [facility] Sign-Up     Activating your [facility] account is as easy as 1-2-3!     1) Visit [email or web address], select Sign Up Now, enter this activation code and your date of birth, then select Next.  [email or web address]  Expires: 2/17/2017  2:51 PM      2) Create a username and password to use when you visit [facility] in the future and select a security question in case you lose your password and select Next.    3) Enter your e-mail address and click Sign Up!    Additional Information  If you have questions, please e-mail [email or web address] or call [phone] to talk to our [facility] staff. Remember, [facility] is NOT to be used for urgent needs. For medical emergencies, dial 911.          [facility] complies with applicable Federal civil rights laws and does not discriminate on the basis of race, color, national origin, age, disability, or sex.

## 2017-01-17 NOTE — NURSING TRANSFER
Nursing Transfer Note      1/17/2017     Transfer From: PACU    Transfer via stretcher    Transfer with NO    Transported by PCT    Medicines sent: NO    Chart send with patient: Yes    Notified: daughter    Patient reassessed at: [id number]

## 2017-01-17 NOTE — PRE-PROCEDURE INSTRUCTIONS
Spoke with Patient.  NPO, medication, and pre-op instructions reviewed.  Denies previous problems with Anesthesia.  Stated that her morning glucose readings have been .  Has not used Lantus Insulin in the past few months.  Her last blood pressure was 130/72.  Verbalized understanding of instructions.

## 2017-01-17 NOTE — IP AVS SNAPSHOT
[address]  [place] [address]  Phone: [phone]           I have received a copy of my After Visit Summary and discharge instructions from [facility].    INSTRUCTIONS RECEIVED AND UNDERSTOOD BY:                     Patient/Patient Representative: ________________________________________________________________     Date/Time: ________________________________________________________________                     Instructions Given By: ________________________________________________________________     Date/Time: ________________________________________________________________

## 2017-01-17 NOTE — PROGRESS NOTES
Pt to Interventional Radiology room 189 via stretcher for TIPS procedure. Consents and timeout complete.  Pt transferred to procedure table in the supine position. Pt remains in the care of Anesthesia team.

## 2017-01-17 NOTE — PROCEDURES
Radiology Post-Procedure Note    Pre Op Diagnosis: Ascites  Post Op Diagnosis: Same    Procedure: Paracentesis    Procedure performed by: Dr. Sterling, Dr. Cuevas    Written Informed Consent Obtained: Yes  Specimen Removed: YES, ascitic fluid  Estimated Blood Loss: Minimal    Findings:   Successful paracentesis.      Patient tolerated procedure well.    Hayden Sterling MD  Department of Radiology  [phone]

## 2017-01-17 NOTE — PROGRESS NOTES
Pt transported to PACU 9 via stretcher in direct care of anesthesia staff. Chart sent with patient

## 2017-01-17 NOTE — H&P
Radiology History & Physical      SUBJECTIVE:     Chief Complaint: cirrhosis    History of Present Illness:  Diamond Das is a 61 y.o. female with cirrhosis and recurrent ascites who presents for TIPS shunt and paracentesis.  Past Medical History   Diagnosis Date    Ascites     CHF (congestive heart failure)     Cirrhosis     Diabetes mellitus     Gastroparesis     GERD (gastroesophageal reflux disease)     Hypertension     Liver disease     Thyroid disease      Past Surgical History   Procedure Laterality Date    Cholecystectomy      Ercp      Upper gastrointestinal endoscopy      Liver biopsy         Home Meds:   Prior to Admission medications    Medication Sig Start Date End Date Taking? Authorizing Provider   carvedilol (COREG) 3.125 MG tablet Take 1 tablet (3.125 mg total) by mouth 2 (two) times daily. 9/9/16 9/9/17 Yes Raulito Salter MD   furosemide (LASIX) 20 MG tablet Take 3 tablets (60 mg total) by mouth once daily.  Patient taking differently: Take 60 mg by mouth every morning.  12/9/16  Yes Liliane Baker MD   hydrocodone-acetaminophen 7.5-325mg (NORCO) 7.5-325 mg per tablet Take 1 tablet by mouth every 6 (six) hours as needed for Pain. 11/19/16  Yes Evert Zhao MD   levothyroxine (SYNTHROID) 100 MCG tablet Take 100 mcg by mouth every morning.    Yes Historical Provider, MD   midodrine (PROAMATINE) 5 MG Tab Take 1 tablet (5 mg total) by mouth 3 (three) times daily.  Patient taking differently: Take 5 mg by mouth 3 (three) times daily as needed.  9/9/16 9/9/17 Yes Raulito Salter MD   spironolactone (ALDACTONE) 50 MG tablet Take 1 tablet (50 mg total) by mouth once daily.  Patient taking differently: Take 50 mg by mouth every morning.  12/9/16 12/9/17 Yes Liliane Baker MD   vitamin E 400 UNIT capsule Take 400 Units by mouth every morning.   Yes Historical Provider, MD   insulin glargine (LANTUS) 100 unit/mL injection Inject into the skin as needed.     Historical Provider, MD    ondansetron (ZOFRAN-ODT) 4 MG TbDL Take 1 tablet (4 mg total) by mouth every 8 (eight) hours as needed (prn nausea). 9/30/16   Solitario Huang Jr., MD     Anticoagulants/Antiplatelets: no anticoagulation    Allergies:   Review of patient's allergies indicates:   Allergen Reactions    Subsys [fentanyl] Other (See Comments)     After administration pt unresponsive.  HR and respirations decreased.     Versed [midazolam] Other (See Comments)     After administration pt unresponsive.  HR and respirations decreased.     Ampicillin Rash    Codeine Nausea And Vomiting     Sedation History: no adverse reactions    Review of Systems:   Hematological: no known coagulopathies  Respiratory: no shortness of breath  Cardiovascular: no chest pain  Gastrointestinal: no abdominal pain  Genito-Urinary: no dysuria  Musculoskeletal: negative  Neurological: no TIA or stroke symptoms         OBJECTIVE:     Vital Signs (Most Recent)  Temp: 98.2 °F (36.8 °C) (01/17/17 1010)  Pulse: 72 (01/17/17 1010)  Resp: 18 (01/17/17 1010)  BP: (!) 147/76 (01/17/17 1010)  SpO2: 99 % (01/17/17 1010)    Physical Exam:  ASA: 3  Mallampati: 3    General: no acute distress  Mental Status: alert and oriented to person, place and time  HEENT: normocephalic, atraumatic  Chest: unlabored breathing  Heart: regular heart rate  Abdomen: nondistended  Extremity: moves all extremities    Laboratory  Lab Results   Component Value Date    INR 1.0 01/17/2017       Lab Results   Component Value Date    WBC 6.01 01/17/2017    HGB 13.1 01/17/2017    HCT 39.6 01/17/2017    MCV 90 01/17/2017     01/17/2017      Lab Results   Component Value Date     01/17/2017     01/17/2017    K 4.4 01/17/2017     01/17/2017    CO2 30 (H) 01/17/2017    BUN 13 01/17/2017    CREATININE 1.3 01/17/2017    CALCIUM 8.8 01/17/2017    MG 1.3 (L) 11/19/2016    ALT 6 (L) 01/17/2017    AST 27 01/17/2017    ALBUMIN 2.5 (L) 01/17/2017    BILITOT 0.4 01/17/2017    BILIDIR 0.2  01/17/2017       ASSESSMENT/PLAN:     Sedation Plan: general anesthesia  Patient will undergo TIPS shunt placement and paracentesis.    Israel Slater  Radiology PGY-3

## 2017-01-17 NOTE — PROGRESS NOTES
TIPS complete, dressing applied to right neck puncture site, CDI. No acute events. See prior note for pressures.

## 2017-01-17 NOTE — ANESTHESIA PREPROCEDURE EVALUATION
01/17/2017  Diamond Das is a 61 y.o., female.    Pre-operative evaluation for Procedure(s) (LRB):  TIPS (N/A)    Diamond Das is a 61 y.o. female     Patient Active Problem List   Diagnosis    Ascites    Cirrhosis of liver with ascites    Type 2 diabetes mellitus with hypoglycemia    Bilateral lower extremity edema    Morbid obesity due to excess calories    Hypothyroidism due to acquired atrophy of thyroid    Protein-calorie malnutrition, moderate    CKD stage 3 due to type 2 diabetes mellitus    Decompensated hepatic cirrhosis    Medication reaction    Cirrhosis    Portal hypertension       Review of patient's allergies indicates:   Allergen Reactions    Subsys [fentanyl] Other (See Comments)     After administration pt unresponsive.  HR and respirations decreased.     Versed [midazolam] Other (See Comments)     After administration pt unresponsive.  HR and respirations decreased.     Ampicillin Rash    Codeine Nausea And Vomiting       No current facility-administered medications on file prior to encounter.      Current Outpatient Prescriptions on File Prior to Encounter   Medication Sig Dispense Refill    carvedilol (COREG) 3.125 MG tablet Take 1 tablet (3.125 mg total) by mouth 2 (two) times daily. 60 tablet 11    furosemide (LASIX) 20 MG tablet Take 3 tablets (60 mg total) by mouth once daily. (Patient taking differently: Take 60 mg by mouth every morning. ) 30 tablet 3    hydrocodone-acetaminophen 7.5-325mg (NORCO) 7.5-325 mg per tablet Take 1 tablet by mouth every 6 (six) hours as needed for Pain. 30 tablet 0    levothyroxine (SYNTHROID) 100 MCG tablet Take 100 mcg by mouth every morning.       midodrine (PROAMATINE) 5 MG Tab Take 1 tablet (5 mg total) by mouth 3 (three) times daily. (Patient taking differently: Take 5 mg by mouth 3 (three) times daily as needed. ) 90  tablet 11    spironolactone (ALDACTONE) 50 MG tablet Take 1 tablet (50 mg total) by mouth once daily. (Patient taking differently: Take 50 mg by mouth every morning. ) 30 tablet 11    insulin glargine (LANTUS) 100 unit/mL injection Inject into the skin as needed.       ondansetron (ZOFRAN-ODT) 4 MG TbDL Take 1 tablet (4 mg total) by mouth every 8 (eight) hours as needed (prn nausea). 15 tablet 0       Past Surgical History   Procedure Laterality Date    Cholecystectomy      Ercp      Upper gastrointestinal endoscopy      Liver biopsy         Social History     Social History    Marital status:      Spouse name: N/A    Number of children: N/A    Years of education: N/A     Occupational History    Not on file.     Social History Main Topics    Smoking status: Never Smoker    Smokeless tobacco: Not on file    Alcohol use No    Drug use: No    Sexual activity: No     Other Topics Concern    Not on file     Social History Narrative         Vital Signs Range (Last 24H):  Temp:  [36.8 °C (98.2 °F)]   Pulse:  [72]   Resp:  [18]   BP: (147)/(76)   SpO2:  [99 %]       CBC:   Recent Labs      17   WBC  6.01   RBC  4.40   HGB  13.1   HCT  39.6   PLT  250   MCV  90   MCH  29.8   MCHC  33.1       CMP:   Recent Labs      17   NA  143   K  4.4   CL  109   CO2  30*   BUN  13   CREATININE  1.3   GLU  100   CALCIUM  8.8   ALBUMIN  2.5*   PROT  7.4   ALKPHOS  104   ALT  6*   AST  27   BILITOT  0.4       INR  Recent Labs      17   INR  1.0           Diagnostic Studies:      [facility] Echo:      [facility] Anesthesia Evaluation    I have reviewed the Patient Summary Reports.     I have reviewed the Medications.     Review of Systems  Anesthesia Hx:  Hx of Anesthetic complications Pt states she was oversedated with versed and fentanyl     Social:  No Alcohol Use, Non-Smoker    Cardiovascular:   Hypertension ECG has been reviewed. Systolic and diastolic function normal     Renal/:   Chronic Renal Disease, CRI    Hepatic/GI:   GERD, well controlled Liver Disease,    Endocrine:   Diabetes, well controlled, using insulin Uses insulin prn - last A1C 5.4       Physical Exam  General:  Well nourished    Airway/Jaw/Neck:  Airway Findings: Mouth Opening: Normal Tongue: Normal  Mallampati: II  TM Distance: 4 - 6 cm  Jaw/Neck Findings:  Neck ROM: Normal ROM      Dental:  Dental Findings: Periodontal disease, Severe   Chest/Lungs:  Chest/Lungs Findings: Clear to auscultation, Normal Respiratory Rate     Heart/Vascular:  Heart Findings: Rate: Normal  Rhythm: Regular Rhythm        Mental Status:  Mental Status Findings:  Cooperative, Alert and Oriented         Anesthesia Plan  Type of Anesthesia, risks & benefits discussed:  Anesthesia Type:  general  Patient's Preference:   Intra-op Monitoring Plan:   Intra-op Monitoring Plan Comments:   Post Op Pain Control Plan:   Post Op Pain Control Plan Comments:   Induction:    Beta Blocker:  Patient is on a Beta-Blocker and has received one dose within the past 24 hours (No further documentation required).       Informed Consent: Patient understands risks and agrees with Anesthesia plan.  Questions answered. Anesthesia consent signed with patient.  ASA Score: 3     Day of Surgery Review of History & Physical:    H&P update referred to the surgeon.         Ready For Surgery From Anesthesia Perspective.

## 2017-01-17 NOTE — ANESTHESIA RELEASE NOTE
"Anesthesia Release from PACU Note    Patient: Diamond Das    Procedure(s) Performed: Procedure(s) (LRB):  TIPS (N/A)    Anesthesia type: general    Post pain: Adequate analgesia    Post assessment: no apparent anesthetic complications, tolerated procedure well and no evidence of recall    Last Vitals:   Visit Vitals    BP (!) 157/75 (BP Location: Left arm, Patient Position: Lying, BP Method: Automatic)    Pulse 72    Temp 36.5 °C (97.7 °F) (Oral)    Resp 17    Ht 5' 3" (1.6 m)    Wt 89.8 kg (198 lb)    LMP  (LMP Unknown)    SpO2 99%    Breastfeeding No    BMI 35.07 kg/m2       Post vital signs: stable    Level of consciousness: awake, alert  and oriented    Nausea/Vomiting: no nausea/no vomiting    Complications: none    Airway Patency: patent    Respiratory: unassisted    Cardiovascular: stable and blood pressure at baseline    Hydration: euvolemic  "

## 2017-01-18 VITALS
OXYGEN SATURATION: 95 % | TEMPERATURE: 99 F | RESPIRATION RATE: 18 BRPM | DIASTOLIC BLOOD PRESSURE: 55 MMHG | HEART RATE: 75 BPM | SYSTOLIC BLOOD PRESSURE: 101 MMHG | BODY MASS INDEX: 35.08 KG/M2 | HEIGHT: 63 IN | WEIGHT: 198 LBS

## 2017-01-18 PROBLEM — E88.09 HYPOALBUMINEMIA: Status: ACTIVE | Noted: 2017-01-18

## 2017-01-18 LAB
ALBUMIN SERPL BCP-MCNC: 1.7 G/DL
ALP SERPL-CCNC: 89 U/L
ALT SERPL W/O P-5'-P-CCNC: 11 U/L
ANION GAP SERPL CALC-SCNC: 6 MMOL/L
AST SERPL-CCNC: 46 U/L
BASOPHILS # BLD AUTO: 0 K/UL
BASOPHILS NFR BLD: 0 %
BILIRUB SERPL-MCNC: 0.4 MG/DL
BUN SERPL-MCNC: 11 MG/DL
CALCIUM SERPL-MCNC: 7.8 MG/DL
CHLORIDE SERPL-SCNC: 110 MMOL/L
CO2 SERPL-SCNC: 25 MMOL/L
CREAT SERPL-MCNC: 1 MG/DL
DIFFERENTIAL METHOD: ABNORMAL
EOSINOPHIL # BLD AUTO: 0.1 K/UL
EOSINOPHIL NFR BLD: 1.5 %
ERYTHROCYTE [DISTWIDTH] IN BLOOD BY AUTOMATED COUNT: 15.9 %
EST. GFR  (AFRICAN AMERICAN): >60 ML/MIN/1.73 M^2
EST. GFR  (NON AFRICAN AMERICAN): >60 ML/MIN/1.73 M^2
ESTIMATED AVG GLUCOSE: 117 MG/DL
GLUCOSE SERPL-MCNC: 122 MG/DL
HBA1C MFR BLD HPLC: 5.7 %
HCT VFR BLD AUTO: 33.4 %
HGB BLD-MCNC: 11.3 G/DL
INR PPP: 1.1
LYMPHOCYTES # BLD AUTO: 2.3 K/UL
LYMPHOCYTES NFR BLD: 48.7 %
MAGNESIUM SERPL-MCNC: 1.4 MG/DL
MCH RBC QN AUTO: 29.5 PG
MCHC RBC AUTO-ENTMCNC: 33.8 %
MCV RBC AUTO: 87 FL
MONOCYTES # BLD AUTO: 0.4 K/UL
MONOCYTES NFR BLD: 7.6 %
NEUTROPHILS # BLD AUTO: 2 K/UL
NEUTROPHILS NFR BLD: 42.2 %
PHOSPHATE SERPL-MCNC: 3.6 MG/DL
PLATELET # BLD AUTO: 222 K/UL
PMV BLD AUTO: 11.2 FL
POCT GLUCOSE: 107 MG/DL (ref 70–110)
POCT GLUCOSE: 110 MG/DL (ref 70–110)
POCT GLUCOSE: 128 MG/DL (ref 70–110)
POTASSIUM SERPL-SCNC: 4.5 MMOL/L
PROT SERPL-MCNC: 5.1 G/DL
PROTHROMBIN TIME: 11.5 SEC
RBC # BLD AUTO: 3.83 M/UL
SODIUM SERPL-SCNC: 141 MMOL/L
WBC # BLD AUTO: 4.76 K/UL

## 2017-01-18 PROCEDURE — G0378 HOSPITAL OBSERVATION PER HR: HCPCS

## 2017-01-18 PROCEDURE — 97161 PT EVAL LOW COMPLEX 20 MIN: CPT

## 2017-01-18 PROCEDURE — 85610 PROTHROMBIN TIME: CPT

## 2017-01-18 PROCEDURE — 25000003 PHARM REV CODE 250: Performed by: STUDENT IN AN ORGANIZED HEALTH CARE EDUCATION/TRAINING PROGRAM

## 2017-01-18 PROCEDURE — G8979 MOBILITY GOAL STATUS: HCPCS | Mod: CI

## 2017-01-18 PROCEDURE — G8978 MOBILITY CURRENT STATUS: HCPCS | Mod: CJ

## 2017-01-18 PROCEDURE — 83735 ASSAY OF MAGNESIUM: CPT

## 2017-01-18 PROCEDURE — 85025 COMPLETE CBC W/AUTO DIFF WBC: CPT

## 2017-01-18 PROCEDURE — 84100 ASSAY OF PHOSPHORUS: CPT

## 2017-01-18 PROCEDURE — 83036 HEMOGLOBIN GLYCOSYLATED A1C: CPT

## 2017-01-18 PROCEDURE — 80053 COMPREHEN METABOLIC PANEL: CPT

## 2017-01-18 PROCEDURE — 99217 PR OBSERVATION CARE DISCHARGE: CPT | Mod: ,,, | Performed by: INTERNAL MEDICINE

## 2017-01-18 PROCEDURE — 36415 COLL VENOUS BLD VENIPUNCTURE: CPT

## 2017-01-18 PROCEDURE — G8980 MOBILITY D/C STATUS: HCPCS | Mod: CJ

## 2017-01-18 RX ORDER — LANOLIN ALCOHOL/MO/W.PET/CERES
400 CREAM (GRAM) TOPICAL DAILY
Status: DISCONTINUED | OUTPATIENT
Start: 2017-01-18 | End: 2017-01-18 | Stop reason: HOSPADM

## 2017-01-18 RX ORDER — ONDANSETRON HYDROCHLORIDE 8 MG/1
8 TABLET, FILM COATED ORAL EVERY 8 HOURS PRN
Qty: 30 TABLET | Refills: 0 | Status: SHIPPED | OUTPATIENT
Start: 2017-01-18 | End: 2017-01-28

## 2017-01-18 RX ADMIN — HEPARIN SODIUM 5000 UNITS: 5000 INJECTION, SOLUTION INTRAVENOUS; SUBCUTANEOUS at 05:01

## 2017-01-18 RX ADMIN — LEVOTHYROXINE SODIUM 100 MCG: 100 TABLET ORAL at 09:01

## 2017-01-18 RX ADMIN — MAGNESIUM OXIDE TAB 400 MG (241.3 MG ELEMENTAL MG) 400 MG: 400 (241.3 MG) TAB at 11:01

## 2017-01-18 NOTE — SUBJECTIVE & OBJECTIVE
Past Medical History   Diagnosis Date    Ascites     CHF (congestive heart failure)     Cirrhosis     Diabetes mellitus     Gastroparesis     GERD (gastroesophageal reflux disease)     Hypertension     Liver disease     Thyroid disease        Past Surgical History   Procedure Laterality Date    Cholecystectomy      Ercp      Upper gastrointestinal endoscopy      Liver biopsy         Review of patient's allergies indicates:   Allergen Reactions    Subsys [fentanyl] Other (See Comments)     After administration pt unresponsive.  HR and respirations decreased.     Versed [midazolam] Other (See Comments)     After administration pt unresponsive.  HR and respirations decreased.     Ampicillin Rash    Codeine Nausea And Vomiting       No current facility-administered medications on file prior to encounter.      Current Outpatient Prescriptions on File Prior to Encounter   Medication Sig    carvedilol (COREG) 3.125 MG tablet Take 1 tablet (3.125 mg total) by mouth 2 (two) times daily.    furosemide (LASIX) 20 MG tablet Take 3 tablets (60 mg total) by mouth once daily. (Patient taking differently: Take 60 mg by mouth every morning. )    hydrocodone-acetaminophen 7.5-325mg (NORCO) 7.5-325 mg per tablet Take 1 tablet by mouth every 6 (six) hours as needed for Pain.    levothyroxine (SYNTHROID) 100 MCG tablet Take 100 mcg by mouth every morning.     midodrine (PROAMATINE) 5 MG Tab Take 1 tablet (5 mg total) by mouth 3 (three) times daily. (Patient taking differently: Take 5 mg by mouth 3 (three) times daily as needed. )    spironolactone (ALDACTONE) 50 MG tablet Take 1 tablet (50 mg total) by mouth once daily. (Patient taking differently: Take 50 mg by mouth every morning. )    insulin glargine (LANTUS) 100 unit/mL injection Inject into the skin as needed.     ondansetron (ZOFRAN-ODT) 4 MG TbDL Take 1 tablet (4 mg total) by mouth every 8 (eight) hours as needed (prn nausea).     Family History     None         Social History Main Topics    Smoking status: Never Smoker    Smokeless tobacco: Not on file    Alcohol use No    Drug use: No    Sexual activity: No     Review of Systems   Constitutional: Negative for activity change and fatigue.   HENT: Negative for congestion.    Respiratory: Positive for shortness of breath. Negative for chest tightness and wheezing.    Gastrointestinal: Positive for abdominal distention, abdominal pain and constipation. Negative for blood in stool, diarrhea, nausea and vomiting.   Genitourinary: Negative for dysuria.   Musculoskeletal: Negative for arthralgias.   Neurological: Negative for dizziness and numbness.   Hematological: Negative for adenopathy.     Objective:     Vital Signs (Most Recent):  Temp: 97.9 °F (36.6 °C) (01/17/17 1958)  Pulse: 81 (01/17/17 1958)  Resp: 18 (01/17/17 1958)  BP: (!) 198/87 (01/17/17 1958)  SpO2: 98 % (01/17/17 1958) Vital Signs (24h Range):  Temp:  [97.7 °F (36.5 °C)-98.2 °F (36.8 °C)] 97.9 °F (36.6 °C)  Pulse:  [65-81] 81  Resp:  [12-18] 18  SpO2:  [95 %-100 %] 98 %  BP: (129-198)/(65-95) 198/87     Weight: 89.8 kg (198 lb)  Body mass index is 35.07 kg/(m^2).    Physical Exam   Constitutional: She is oriented to person, place, and time. She appears well-developed and well-nourished. No distress.   Very pleasant, morbidly obese female. AAOX4, tolerated procedure without complication and in no acute distress   HENT:   Head: Normocephalic and atraumatic.   Eyes: EOM are normal. Pupils are equal, round, and reactive to light.   Neck: Normal range of motion. Neck supple. No JVD present.   Cardiovascular: Normal rate, regular rhythm, normal heart sounds and intact distal pulses.    No murmur heard.  Pulmonary/Chest: Effort normal.   Abdominal: Soft. Bowel sounds are normal. She exhibits no distension. There is no tenderness. There is no guarding.   Very large abdomen; soft    - Area of TIPS on RUQ covered with clear and intact bandage with minimally  soaked yellow gauze.    Musculoskeletal: She exhibits edema.   2+ bilateral LE edema    Lymphadenopathy:     She has no cervical adenopathy.   Neurological: She is alert and oriented to person, place, and time.   Skin: Skin is warm. No rash noted. She is not diaphoretic. No erythema.        Significant Labs:   Bilirubin:   Recent Labs  Lab 01/17/17 0913   BILIDIR 0.2   BILITOT 0.4     CBC:   Recent Labs  Lab 01/17/17 0913   WBC 6.01   HGB 13.1   HCT 39.6        CMP:   Recent Labs  Lab 01/17/17  0913      K 4.4      CO2 30*      BUN 13   CREATININE 1.3   CALCIUM 8.8   PROT 7.4   ALBUMIN 2.5*   BILITOT 0.4   ALKPHOS 104   AST 27   ALT 6*   ANIONGAP 4*   EGFRNONAA 45.0*

## 2017-01-18 NOTE — PROGRESS NOTES
Orthostatic VS obtained.  Pt denies any s/s weakness or dizziness from lying to standing.  See doc flowsheets for VS documentation.

## 2017-01-18 NOTE — PT/OT/SLP EVAL
"Physical Therapy  Evaluation    Diamond Das   MRN: [id number]   Admitting Diagnosis: Cirrhosis of liver with ascites    PT Received On: 01/18/17  PT Start Time: 0843     PT Stop Time: 0856    PT Total Time (min): 13 min       Billable Minutes:  Evaluation  13 minutes    Diagnosis: Cirrhosis of liver with ascites    Past Medical History   Diagnosis Date    Ascites     CHF (congestive heart failure)     Cirrhosis     Diabetes mellitus     Gastroparesis     GERD (gastroesophageal reflux disease)     Hypertension     Liver disease     Thyroid disease       Past Surgical History   Procedure Laterality Date    Cholecystectomy      Ercp      Upper gastrointestinal endoscopy      Liver biopsy         Referring physician: Johnathan Mann  Date referred to PT: 1/17/17    General Precautions: Standard, fall  Orthopedic Precautions: N/A   Braces: N/A       Do you have any cultural, spiritual, [employer]alism conflicts, given your current situation?: None stated     Patient History:  Lives With: child(yaw), adult  Living Arrangements: house  Home Accessibility: stairs to enter home  Number of Stairs to Enter Home: 4  Stair Railings at Home: outside, present at both sides  Transportation Available: family or friend will provide  Living Environment Comment: Per patient, pt lives with daughter and son-in-law in [facility] with 3 ARJUN and B handrail. Bathroom has walk-in shower with shower chair. PTA, Pt states "sometimes I walked by myself. Sometimes I needed the walker." as well as (I) with ADLs. Pt states daughter works but son-in-law home with patient during the day. Pt does not drive and retired. Pt was currently receiving OPPT 3x/week.    Equipment Currently Used at Home: walker, rolling, shower chair, hospital bed    Previous Level of Function:  Ambulation Skills: other (see comments) (Pt states "sometimes I walked by myself. Sometimes I needed the walker.")  Transfer Skills: independent  ADL Skills: independent  Work/Leisure " Activity: independent    Subjective:  Communicated with RN prior to session.  Pt pleasant and agreeable to therapy  Chief Complaint: weakness  Patient goals: return home     Pain Rating:  (No pain; complaints of soreness in neck region)   Pain Rating Post-Intervention:  (See comment above)    Objective:   Patient found with:  (resting supine )     Cognitive Exam:  Oriented to: Person, Place, Time and Situation    Follows Commands/attention: Follows two-step commands  Communication: clear/fluent  Safety awareness/insight to disability: intact    Physical Exam:  Postural examination/scapula alignment: Rounded shoulder and Head forward    Skin integrity: Visible skin intact  Edema: Moderate in BLE and abdominal distension     Sensation:   Intact    Lower Extremity Range of Motion:  Right Lower Extremity: WFL  Left Lower Extremity: WFL    Lower Extremity Strength:  Right Lower Extremity: WFL  Left Lower Extremity: WFL     Fine motor coordination:  Intact    Gross motor coordination: WFL    Functional Mobility:  Bed Mobility:  Supine to Sit: Modified Independent  Sit to Supine: Modified Independent    Transfers:  Sit <> Stand Assistance: Supervision  Sit <> Stand Assistive Device: No Assistive Device    Gait:   Gait Distance: 120'   Assistance 1: Stand by Assistance  Gait Assistive Device: No device  Gait Pattern: reciprocal  Gait Deviation(s): decreased kodak, decreased step length, decreased stride length, increased time in double stance    Stairs:  Pt ascended/descend 4 stair(s) with No Assistive Device with left with Contact Guard Assistance.     Balance:   Static Sit: GOOD: Takes MODERATE challenges from all directions  Dynamic Sit: GOOD-: Maintains balance through MODERATE excursions of active trunk movement,     Static Stand: FAIR+: Takes MINIMAL challenges from all directions  Dynamic stand: FAIR+: Needs CLOSE SUPERVISION during gait and is able to right self with minor LOB    Therapeutic Activities and  Exercises:  Pt educated on role of PT and POC/goals for therapy as well as safety with mobility. Pt verbalized understanding. Pt expressed no further concerns/questions.     AM-PAC 6 CLICK MOBILITY  How much help from another person does this patient currently need?   1 = Unable, Total/Dependent Assistance  2 = A lot, Maximum/Moderate Assistance  3 = A little, Minimum/Contact Guard/Supervision  4 = None, Modified [place]/Independent    Turning over in bed (including adjusting bedclothes, sheets and blankets)?: 4  Sitting down on and standing up from a chair with arms (e.g., wheelchair, bedside commode, etc.): 4  Moving from lying on back to sitting on the side of the bed?: 4  Moving to and from a bed to a chair (including a wheelchair)?: 3  Need to walk in hospital room?: 3  Climbing 3-5 steps with a railing?: 3  Total Score: 21     AM-PAC Raw Score CMS G-Code Modifier Level of Impairment Assistance   6 % Total / Unable   7 - 9 CM 80 - 100% Maximal Assist   10 - 14 CL 60 - 80% Moderate Assist   15 - 19 CK 40 - 60% Moderate Assist   20 - 22 CJ 20 - 40% Minimal Assist   23 CI 1-20% SBA / CGA   24 CH 0% Independent/ Mod I     Patient left seated EOB with all lines intact and call button in reach.    Assessment:   Diamond Das is a 61 y.o. female with a medical diagnosis of Cirrhosis of liver with ascites. PT evaluation complete and goals established. Upon initial PT evaluation, pt presents with weakness, decreased endurance, impaired functional mobility, and gait instability. Pt completed bed mobility with mod (I), transfers with supervision, ambulated 120' with SBA, and stair training with L handrail and CGA. Pt would benefit from skilled PT services to address these deficits and improve return to PLOF. Anticipate d/c to HHPT. DME of bedside commode.     Rehab identified problem list/impairments: Rehab identified problem list/impairments: weakness, impaired endurance, impaired functional mobilty, gait  instability, impaired balance, decreased lower extremity function, edema    Rehab potential is good.    Activity tolerance: Good    Discharge recommendations: Discharge Facility/Level Of Care Needs: home with home health     Barriers to discharge: Barriers to Discharge: Inaccessible home environment (4 ARJUN)    Equipment recommendations: Equipment Needed After Discharge: bedside commode     GOALS:   Physical Therapy Goals        Problem: Physical Therapy Goal    Goal Priority Disciplines Outcome Goal Variances Interventions   Physical Therapy Goal     PT/OT, PT Ongoing (interventions implemented as appropriate)     Description:  Goals to be met by: 17     Patient will increase functional independence with mobility by performin. Gait  x 200 feet with Supervision   2. Ascend/descend 4 stair with bilateral Handrails Stand-by Assistance   3. Lower extremity exercise program x 10 reps per handout, with independence                PLAN:    Patient to be seen 3 x/week to address the above listed problems via gait training, therapeutic exercises, therapeutic activities  Plan of Care expires: 17  Plan of Care reviewed with: patient          Ping Bains, PT  2017

## 2017-01-18 NOTE — H&P
"[facility] Hospital Medicine  History & Physical    Patient Name: Diamond Das  MRN: [id number]  Admission Date: 1/17/2017  Attending Physician: Billie Mann MD   Primary Care Provider: Gavin Baker MD    [facility] Medicine Team: [facility] 2 Leeroy Fox MD     Patient information was obtained from patient.     Subjective:     Principal Problem:Cirrhosis of liver with ascites    Chief Complaint:  S/p TIPS    HPI: Diamond Das is a 61 year-old female with a medical history significant for portal hypertension, liver cirrhosis with ascites, hypothyroidism, type II DM with CKD stage III, who presents to our service for observation following transjugular intrahepatic portosystemic shunt (TIPS) for treatment of refractory ascites. Per patient, for the past year, she has had recurrent need for therapeutic paracentesis (on average once per month, sometimes more). She takes furosemide 60 mg PO daily with moderate relief of lower extremity edema. Patient is seen by Dr. Liliane Baker. Per chart review, patient's cirrhosis etiology appears to be unclear, as hepatitis panel has been negative with immunity against HepA. She is due to receive 2/3 of HepB immunization this March. Otherwise, patient does not or has not had significant alcohol history and  HCC screening 9/2016 was negative and not suspicious for lesions. Recent liver biopsy on 10/2016 was significant for cirrhosis. Cytologies have been negative for malignancy without positive cultures suggestive for infection, including AFB. According to hepatology, although ascites was not consistent with portal hypertension, given the present of cirrhosis on two biopsies, this was the most likely etiology for ascites formation. Patient does endorse dyspnea on exertion after she walks "2 laps around the track." Although patient states she has congestive heart failure, recent 2d echo performed on 09/2016 is no demonstrable for systolic or " diastolic dysfunction. Patient tolerated TIPS without complication, with very mild abdominal cramping on left lower abdomen.                 Past Medical History   Diagnosis Date    Ascites     CHF (congestive heart failure)     Cirrhosis     Diabetes mellitus     Gastroparesis     GERD (gastroesophageal reflux disease)     Hypertension     Liver disease     Thyroid disease        Past Surgical History   Procedure Laterality Date    Cholecystectomy      Ercp      Upper gastrointestinal endoscopy      Liver biopsy         Review of patient's allergies indicates:   Allergen Reactions    Subsys [fentanyl] Other (See Comments)     After administration pt unresponsive.  HR and respirations decreased.     Versed [midazolam] Other (See Comments)     After administration pt unresponsive.  HR and respirations decreased.     Ampicillin Rash    Codeine Nausea And Vomiting       No current facility-administered medications on file prior to encounter.      Current Outpatient Prescriptions on File Prior to Encounter   Medication Sig    carvedilol (COREG) 3.125 MG tablet Take 1 tablet (3.125 mg total) by mouth 2 (two) times daily.    furosemide (LASIX) 20 MG tablet Take 3 tablets (60 mg total) by mouth once daily. (Patient taking differently: Take 60 mg by mouth every morning. )    hydrocodone-acetaminophen 7.5-325mg (NORCO) 7.5-325 mg per tablet Take 1 tablet by mouth every 6 (six) hours as needed for Pain.    levothyroxine (SYNTHROID) 100 MCG tablet Take 100 mcg by mouth every morning.     midodrine (PROAMATINE) 5 MG Tab Take 1 tablet (5 mg total) by mouth 3 (three) times daily. (Patient taking differently: Take 5 mg by mouth 3 (three) times daily as needed. )    spironolactone (ALDACTONE) 50 MG tablet Take 1 tablet (50 mg total) by mouth once daily. (Patient taking differently: Take 50 mg by mouth every morning. )    insulin glargine (LANTUS) 100 unit/mL injection Inject into the skin as needed.      ondansetron (ZOFRAN-ODT) 4 MG TbDL Take 1 tablet (4 mg total) by mouth every 8 (eight) hours as needed (prn nausea).     Family History     None        Social History Main Topics    Smoking status: Never Smoker    Smokeless tobacco: Not on file    Alcohol use No    Drug use: No    Sexual activity: No     Review of Systems   Constitutional: Negative for activity change and fatigue.   HENT: Negative for congestion.    Respiratory: Positive for shortness of breath. Negative for chest tightness and wheezing.    Gastrointestinal: Positive for abdominal distention, abdominal pain and constipation. Negative for blood in stool, diarrhea, nausea and vomiting.   Genitourinary: Negative for dysuria.   Musculoskeletal: Negative for arthralgias.   Neurological: Negative for dizziness and numbness.   Hematological: Negative for adenopathy.     Objective:     Vital Signs (Most Recent):  Temp: 97.9 °F (36.6 °C) (01/17/17 1958)  Pulse: 81 (01/17/17 1958)  Resp: 18 (01/17/17 1958)  BP: (!) 198/87 (01/17/17 1958)  SpO2: 98 % (01/17/17 1958) Vital Signs (24h Range):  Temp:  [97.7 °F (36.5 °C)-98.2 °F (36.8 °C)] 97.9 °F (36.6 °C)  Pulse:  [65-81] 81  Resp:  [12-18] 18  SpO2:  [95 %-100 %] 98 %  BP: (129-198)/(65-95) 198/87     Weight: 89.8 kg (198 lb)  Body mass index is 35.07 kg/(m^2).    Physical Exam   Constitutional: She is oriented to person, place, and time. She appears well-developed and well-nourished. No distress.   Very pleasant, morbidly obese female. AAOX4, tolerated procedure without complication and in no acute distress   HENT:   Head: Normocephalic and atraumatic.   Eyes: EOM are normal. Pupils are equal, round, and reactive to light.   Neck: Normal range of motion. Neck supple. No JVD present.   Cardiovascular: Normal rate, regular rhythm, normal heart sounds and intact distal pulses.    No murmur heard.  Pulmonary/Chest: Effort normal.   Abdominal: Soft. Bowel sounds are normal. She exhibits no distension. There is  no tenderness. There is no guarding.   Very large abdomen; soft    - Area of TIPS on RUQ covered with clear and intact bandage with minimally soaked yellow gauze.    Musculoskeletal: She exhibits edema.   2+ bilateral LE edema    Lymphadenopathy:     She has no cervical adenopathy.   Neurological: She is alert and oriented to person, place, and time.   Skin: Skin is warm. No rash noted. She is not diaphoretic. No erythema.        Significant Labs:   Bilirubin:   Recent Labs  Lab 01/17/17 0913   BILIDIR 0.2   BILITOT 0.4     CBC:   Recent Labs  Lab 01/17/17 0913   WBC 6.01   HGB 13.1   HCT 39.6        CMP:   Recent Labs  Lab 01/17/17 0913      K 4.4      CO2 30*      BUN 13   CREATININE 1.3   CALCIUM 8.8   PROT 7.4   ALBUMIN 2.5*   BILITOT 0.4   ALKPHOS 104   AST 27   ALT 6*   ANIONGAP 4*   EGFRNONAA 45.0*         Assessment/Plan:     * Cirrhosis of liver with ascites  - Tolerated TIPS procedure without complications.  - Monitor overnight for bleeding, arrhythmias, infection.  - F/u on ascites fluid pathology to rule out SBP.  - If patient remains stable, can be discharged tomorrow with close follow-up with hepatology.      Type 2 diabetes mellitus with hypoglycemia  - On lantus 100U daily sub q.  - Low dose SSI here with diabetic diet.        Bilateral lower extremity edema  - Continue furosemide 60 mg PO daily.  - Daily CMP, monitor for electrolyte imbalances.    Hypothyroidism due to acquired atrophy of thyroid  - Continue levothyroxine 100 mcg PO daily.     CKD stage 3 due to type 2 diabetes mellitus  - Daily CMP; avoid nephrotoxic drugs.      Portal hypertension  - Carvedilol 3.125 mg PO BID.  - With associated cirrhosis and refractory ascites, s/p TIPS.       VTE Risk Mitigation         Ordered     heparin (porcine) injection 5,000 Units  Every 8 hours     Route:  Subcutaneous        01/17/17 1649     Place sequential compression device  Until discontinued      01/17/17 1649      Medium Risk of VTE  Once      01/17/17 1649        Diet: CLD  Code: Full    Disposition: If stable tomorrow, can be discharged with close follow-up with hepatology.     Leeroy Fox MD  PGY-1 Internal Medicine  [phone]    Department of Hospital Medicine   [facility]

## 2017-01-18 NOTE — ASSESSMENT & PLAN NOTE
- Tolerated TIPS procedure without complications.  - Monitor overnight for bleeding, arrhythmias, infection.  - F/u on ascites fluid pathology to rule out SBP.  - If patient remains stable, can be discharged tomorrow with close follow-up with hepatology.

## 2017-01-18 NOTE — PLAN OF CARE
Problem: Physical Therapy Goal  Goal: Physical Therapy Goal  Goals to be met by: 17     Patient will increase functional independence with mobility by performin. Gait x 200 feet with Supervision   2. Ascend/descend 4 stair with bilateral Handrails Stand-by Assistance   3. Lower extremity exercise program x 10 reps per handout, with independence  Outcome: Ongoing (interventions implemented as appropriate)  PT evaluation complete and goals established. Upon initial PT evaluation, pt presents with weakness, decreased endurance, impaired functional mobility, and gait instability. Pt completed bed mobility with mod (I), transfers with supervision, ambulated 120' with SBA, and stair training with L handrail and CGA. Pt would benefit from skilled PT services to address these deficits and improve return to PLOF. Anticipate d/c to PT. DME of bedside commode.      Ping Bains DPT, PT  2017

## 2017-01-18 NOTE — PLAN OF CARE
Problem: Patient Care Overview  Goal: Plan of Care Review  Outcome: Outcome(s) achieved Date Met:  01/18/17  Pt being discharged home per MD orders.  VSS, pt afebrile and no c/o pain at this time.  Reviewed discharge instructions, follow-up's and meds with pt, VU.  PIV removed, gauze/tape placed to site, CDI.  All personal belongings packed and with pt at bedside.  Ride to be provided by family upon discharge and will transport per W/C.

## 2017-01-19 NOTE — SUBJECTIVE & OBJECTIVE
Interval History:  No acute events overnight. Patient states she was able to have several cups of apple juice and a carton of milk yesterday evening without any abdominal pain. She has not had a bowel movement since she has been here. Otherwise, denies nausea, vomiting, fevers, or chills. Site of TIPS without and noticeable drainage.      Review of Systems   Constitutional: Negative for activity change and fatigue.   HENT: Negative for congestion.    Respiratory: Negative for chest tightness, shortness of breath and wheezing.    Gastrointestinal: Positive for constipation. Negative for abdominal distention, abdominal pain, blood in stool, diarrhea, nausea and vomiting.   Genitourinary: Negative for dysuria.   Musculoskeletal: Negative for arthralgias.   Neurological: Negative for dizziness and numbness.   Hematological: Negative for adenopathy.     Objective:     Vital Signs (Most Recent):  Temp: 98.6 °F (37 °C) (01/18/17 1500)  Pulse: 75 (01/18/17 1500)  Resp: 18 (01/18/17 1500)  BP: (!) 101/55 (01/18/17 1500)  SpO2: 95 % (01/18/17 1500) Vital Signs (24h Range):  Temp:  [97.6 °F (36.4 °C)-98.6 °F (37 °C)] 98.6 °F (37 °C)  Pulse:  [68-77] 75  Resp:  [17-18] 18  SpO2:  [92 %-95 %] 95 %  BP: ()/(50-62) 101/55     Weight: 89.8 kg (198 lb)  Body mass index is 35.07 kg/(m^2).    Intake/Output Summary (Last 24 hours) at 01/18/17 2121  Last data filed at 01/18/17 0443   Gross per 24 hour   Intake              240 ml   Output              250 ml   Net              -10 ml      Physical Exam   Constitutional: She is oriented to person, place, and time. She appears well-developed and well-nourished. No distress.   Very pleasant, morbidly obese female. AAOX4. In no acute distress, comfortable on visit his morning   HENT:   Head: Normocephalic and atraumatic.   Eyes: EOM are normal. Pupils are equal, round, and reactive to light.   Neck: Normal range of motion. Neck supple. No JVD present.   Cardiovascular: Normal rate,  regular rhythm, normal heart sounds and intact distal pulses.    No murmur heard.  Pulmonary/Chest: Effort normal.   Abdominal: Soft. Bowel sounds are normal. She exhibits no distension. There is no tenderness. There is no guarding.   Very large abdomen; soft    - Area of TIPS on RUQ covered with clear and intact bandage with minimally soaked yellow gauze.    Musculoskeletal: She exhibits edema.   2+ bilateral LE edema    Lymphadenopathy:     She has no cervical adenopathy.   Neurological: She is alert and oriented to person, place, and time.   Skin: Skin is warm. No rash noted. She is not diaphoretic. No erythema.       Significant Labs:   Bilirubin:   Recent Labs  Lab 01/17/17  0913 01/18/17 0420   BILIDIR 0.2  --    BILITOT 0.4 0.4     CBC:   Recent Labs  Lab 01/17/17 0913 01/18/17 0420   WBC 6.01 4.76   HGB 13.1 11.3*   HCT 39.6 33.4*    222     CMP:   Recent Labs  Lab 01/17/17 0913 01/18/17 0420    141   K 4.4 4.5    110   CO2 30* 25    122*   BUN 13 11   CREATININE 1.3 1.0   CALCIUM 8.8 7.8*   PROT 7.4 5.1*   ALBUMIN 2.5* 1.7*   BILITOT 0.4 0.4   ALKPHOS 104 89   AST 27 46*   ALT 6* 11   ANIONGAP 4* 6*   EGFRNONAA 45.0* >60.0     Coagulation:   Recent Labs  Lab 01/18/17 0420   INR 1.1

## 2017-01-19 NOTE — PT/OT/SLP DISCHARGE
Physical Therapy Discharge Summary    Diamond Das  MRN: [id number]   Cirrhosis of liver with ascites   Patient Discharged from acute Physical Therapy on 17.  Please refer to prior PT noted date on 17 for functional status.     Assessment:   Patient appropriate for care in another setting.  GOALS:   Physical Therapy Goals     Not on file      Multidisciplinary Problems (Resolved)        Problem: Physical Therapy Goal    Goal Priority Disciplines Outcome Goal Variances Interventions   Physical Therapy Goal   (Resolved)     PT/OT, PT Outcome(s) achieved     Description:  Goals to be met by: 17     Patient will increase functional independence with mobility by performin. Gait  x 200 feet with Supervision   2. Ascend/descend 4 stair with bilateral Handrails Stand-by Assistance   3. Lower extremity exercise program x 10 reps per handout, with independence              Reasons for Discontinuation of Therapy Services  Transfer to alternate level of care.      Plan:  Patient Discharged to: Home with Home Health Service.    Ping Bains DPT, PT  2017

## 2017-01-19 NOTE — ASSESSMENT & PLAN NOTE
- Continue furosemide 60 mg PO daily.  - With continued baseline b/l pitting LE edema this morning.   - Daily CMP, monitor for electrolyte imbalances.

## 2017-01-19 NOTE — PROGRESS NOTES
"[facility] Hospital Medicine  Progress Note    Patient Name: Diamond Das  MRN: [id number]  Patient Class: OP- Observation   Admission Date: 1/17/2017  Length of Stay: 1 days  Attending Physician: No att. providers found  Primary Care Provider: Gavin Baker MD    [place] [facility] Medicine Team: [facility] 2 Leeroy Fox MD    Subjective:     Principal Problem:Cirrhosis of liver with ascites    HPI:  Diamond Das is a 61 year-old female with a medical history significant for portal hypertension, liver cirrhosis with ascites, hypothyroidism, type II DM with CKD stage III, who presents to our service for observation following transjugular intrahepatic portosystemic shunt (TIPS) for treatment of refractory ascites. Per patient, for the past year, she has had recurrent need for therapeutic paracentesis (on average once per month, sometimes more). She takes furosemide 60 mg PO daily with moderate relief of lower extremity edema. Patient is seen by Dr. Liliane Baker. Per chart review, patient's cirrhosis etiology appears to be unclear, as hepatitis panel has been negative with immunity against HepA. She is due to receive 2/3 of HepB immunization this March. Otherwise, patient does not or has not had significant alcohol history and  HCC screening 9/2016 was negative and not suspicious for lesions. Recent liver biopsy on 10/2016 was significant for cirrhosis. Cytologies have been negative for malignancy without positive cultures suggestive for infection, including AFB. According to hepatology, although ascites was not consistent with portal hypertension, given the present of cirrhosis on two biopsies, this was the most likely etiology for ascites formation. Patient does endorse dyspnea on exertion after she walks "2 laps around the track." Although patient states she has congestive heart failure, recent 2d echo performed on 09/2016 is no demonstrable for systolic or diastolic dysfunction. Patient " tolerated TIPS without complication, with very mild abdominal cramping on left lower abdomen.                     Interval History:  No acute events overnight. Patient states she was able to have several cups of apple juice and a carton of milk yesterday evening without any abdominal pain. She has not had a bowel movement since she has been here. Otherwise, denies nausea, vomiting, fevers, or chills. Site of TIPS without and noticeable drainage.      Review of Systems   Constitutional: Negative for activity change and fatigue.   HENT: Negative for congestion.    Respiratory: Negative for chest tightness, shortness of breath and wheezing.    Gastrointestinal: Positive for constipation. Negative for abdominal distention, abdominal pain, blood in stool, diarrhea, nausea and vomiting.   Genitourinary: Negative for dysuria.   Musculoskeletal: Negative for arthralgias.   Neurological: Negative for dizziness and numbness.   Hematological: Negative for adenopathy.     Objective:     Vital Signs (Most Recent):  Temp: 98.6 °F (37 °C) (01/18/17 1500)  Pulse: 75 (01/18/17 1500)  Resp: 18 (01/18/17 1500)  BP: (!) 101/55 (01/18/17 1500)  SpO2: 95 % (01/18/17 1500) Vital Signs (24h Range):  Temp:  [97.6 °F (36.4 °C)-98.6 °F (37 °C)] 98.6 °F (37 °C)  Pulse:  [68-77] 75  Resp:  [17-18] 18  SpO2:  [92 %-95 %] 95 %  BP: ()/(50-62) 101/55     Weight: 89.8 kg (198 lb)  Body mass index is 35.07 kg/(m^2).    Intake/Output Summary (Last 24 hours) at 01/18/17 2121  Last data filed at 01/18/17 0443   Gross per 24 hour   Intake              240 ml   Output              250 ml   Net              -10 ml      Physical Exam   Constitutional: She is oriented to person, place, and time. She appears well-developed and well-nourished. No distress.   Very pleasant, morbidly obese female. AAOX4. In no acute distress, comfortable on visit his morning   HENT:   Head: Normocephalic and atraumatic.   Eyes: EOM are normal. Pupils are equal, round, and  reactive to light.   Neck: Normal range of motion. Neck supple. No JVD present.   Cardiovascular: Normal rate, regular rhythm, normal heart sounds and intact distal pulses.    No murmur heard.  Pulmonary/Chest: Effort normal.   Abdominal: Soft. Bowel sounds are normal. She exhibits no distension. There is no tenderness. There is no guarding.   Very large abdomen; soft    - Area of TIPS on RUQ covered with clear and intact bandage with minimally soaked yellow gauze.    Musculoskeletal: She exhibits edema.   2+ bilateral LE edema    Lymphadenopathy:     She has no cervical adenopathy.   Neurological: She is alert and oriented to person, place, and time.   Skin: Skin is warm. No rash noted. She is not diaphoretic. No erythema.       Significant Labs:   Bilirubin:   Recent Labs  Lab 01/17/17 0913 01/18/17 0420   BILIDIR 0.2  --    BILITOT 0.4 0.4     CBC:   Recent Labs  Lab 01/17/17 0913 01/18/17 0420   WBC 6.01 4.76   HGB 13.1 11.3*   HCT 39.6 33.4*    222     CMP:   Recent Labs  Lab 01/17/17 0913 01/18/17 0420    141   K 4.4 4.5    110   CO2 30* 25    122*   BUN 13 11   CREATININE 1.3 1.0   CALCIUM 8.8 7.8*   PROT 7.4 5.1*   ALBUMIN 2.5* 1.7*   BILITOT 0.4 0.4   ALKPHOS 104 89   AST 27 46*   ALT 6* 11   ANIONGAP 4* 6*   EGFRNONAA 45.0* >60.0     Coagulation:   Recent Labs  Lab 01/18/17 0420   INR 1.1           Assessment/Plan:      * Cirrhosis of liver with ascites  - Ascites fluid pathology not suspicious for SBP  - Patient s/p TIPS without complications and has tolerated clear liquid diet without issue; should be stable for discharge.       - Ascites fluid pathology not suspicious for SBP  - Patient s/p TIPS without complications and has tolerated clear liquid diet without issue; should be stable for discharge.       Type 2 diabetes mellitus with hypoglycemia  - On lantus 100U daily sub q at home  - Low dose SSI here; BG have been well-controlled.        - On lantus 100U daily sub q  at home  - Low dose SSI here; BG have been well-controlled.        Bilateral lower extremity edema  - Continue furosemide 60 mg PO daily.  - With continued baseline b/l pitting LE edema this morning.   - Daily CMP, monitor for electrolyte imbalances.    - Continue furosemide 60 mg PO daily.  - With continued baseline b/l pitting LE edema this morning.   - Daily CMP, monitor for electrolyte imbalances.    Morbid obesity due to excess calories        Hypothyroidism due to acquired atrophy of thyroid  - Continue levothyroxine 100 mcg PO daily.     - Continue levothyroxine 100 mcg PO daily.     CKD stage 3 due to type 2 diabetes mellitus  - Daily CMP; avoid nephrotoxic drugs.      - Daily CMP; avoid nephrotoxic drugs.      Portal hypertension  - Carvedilol 3.125 mg PO BID.  - With associated cirrhosis and refractory ascites, s/p TIPS.       - Carvedilol 3.125 mg PO BID.  - With associated cirrhosis and refractory ascites, s/p TIPS.       Hypoalbuminemia  - Due to liver cirrhosis, exacerbated by recent paracentesis.    VTE Risk Mitigation         Ordered     Medium Risk of VTE  Once      01/17/17 0429          Code: Full  Diet: CLD    Disposition: S/p TIPS. Stable for discharge, without abdominal pain and mild nausea this afternoon. Without signs of infection or hemorrhage. Abdomen is soft. Will follow-up with hepatology.    Leeroy Fox MD  PGY-1 Internal Medicine  [phone]    Department of Hospital Medicine   [facility]

## 2017-01-19 NOTE — ASSESSMENT & PLAN NOTE
- Ascites fluid pathology not suspicious for SBP  - Patient s/p TIPS without complications and has tolerated clear liquid diet without issue; should be stable for discharge.

## 2017-01-19 NOTE — PROGRESS NOTES
"[facility] Hospital Medicine  Progress Note    Patient Name: Diamond Das  MRN: [id number]  Patient Class: OP- Observation   Admission Date: 1/17/2017  Length of Stay: 1 days  Attending Physician: No att. providers found  Primary Care Provider: Gavin Baker MD    [facility] MED 2 Leeroy Fox MD    Subjective:     Principal Problem:Cirrhosis of liver with ascites    HPI:  Diamond Das is a 61 year-old female with a medical history significant for portal hypertension, liver cirrhosis with ascites, hypothyroidism, type II DM with CKD stage III, who presents to our service for observation following transjugular intrahepatic portosystemic shunt (TIPS) for treatment of refractory ascites. Per patient, for the past year, she has had recurrent need for therapeutic paracentesis (on average once per month, sometimes more). She takes furosemide 60 mg PO daily with moderate relief of lower extremity edema. Patient is seen by Dr. Liliane Baker. Per chart review, patient's cirrhosis etiology appears to be unclear, as hepatitis panel has been negative with immunity against HepA. She is due to receive 2/3 of HepB immunization this March. Otherwise, patient does not or has not had significant alcohol history and  HCC screening 9/2016 was negative and not suspicious for lesions. Recent liver biopsy on 10/2016 was significant for cirrhosis. Cytologies have been negative for malignancy without positive cultures suggestive for infection, including AFB. According to hepatology, although ascites was not consistent with portal hypertension, given the present of cirrhosis on two biopsies, this was the most likely etiology for ascites formation. Patient does endorse dyspnea on exertion after she walks "2 laps around the track." Although patient states she has congestive heart failure, recent 2d echo performed on 09/2016 is no demonstrable for systolic or diastolic dysfunction. Patient " tolerated TIPS without complication, with very mild abdominal cramping on left lower abdomen.                     No new subjective & objective note has been filed under this hospital service since the last note was generated.    Assessment/Plan:      Hypoalbuminemia  - Due to liver cirrhosis, exacerbated by recent paracentesis.    VTE Risk Mitigation         Ordered     Medium Risk of VTE  Once      01/17/17 1649        Code: Full  Diet: CLD    Disposition: S/p TIPS. Stable for discharge, without abdominal pain and mild nausea this afternoon. Without signs of infection or hemorrhage. Abdomen is soft. Will follow-up with hepatology.    Leeroy Fox MD  PGY-1 Internal Medicine  [phone]    Department of Hospital Medicine   [facility]

## 2017-01-19 NOTE — DISCHARGE SUMMARY
DISCHARGE SUMMARY  Hospital Medicine    Team: [facility] HOSP MED 2    Patient Name: Diamond Das  YOB: 1955    Admit Date: 1/17/2017    Discharge Date: 01/18/2017    Discharge Attending Physician: SHEMAR SORIA MD    Diagnoses:  Active Hospital Problems    Diagnosis  POA    *Cirrhosis of liver with ascites [K74.60]  Yes    Hypoalbuminemia [E88.09]  Yes    Portal hypertension [K76.6]  Yes    Type 2 diabetes mellitus with hypoglycemia [E11.649]  Yes    Bilateral lower extremity edema [R60.0]  Yes    Morbid obesity due to excess calories [E66.01]  Yes    CKD stage 3 due to type 2 diabetes mellitus [E11.22, N18.3]  Yes    Hypothyroidism due to acquired atrophy of thyroid [E03.4]  Yes      Resolved Hospital Problems    Diagnosis Date Resolved POA   No resolved problems to display.       Discharged Condition: admit problems have stabilized      HOSPITAL COURSE:    Initial Presentation:  Diamond Dsa is a 61 year-old female with a medical history significant for portal hypertension, liver cirrhosis with ascites, hypothyroidism, type II DM with CKD stage III, who presents to our service for observation following transjugular intrahepatic portosystemic shunt (TIPS) for treatment of refractory ascites. Per patient, for the past year, she has had recurrent need for therapeutic paracentesis (on average once per month, sometimes more). She takes furosemide 60 mg PO daily with moderate relief of lower extremity edema. Patient is seen by Dr. Liliane Baker. Per chart review, patient's cirrhosis etiology appears to be unclear, as hepatitis panel has been negative with immunity against HepA. She is due to receive 2/3 of HepB immunization this March. Otherwise, patient does not or has not had significant alcohol history and  HCC screening 9/2016 was negative and not suspicious for lesions. Recent liver biopsy on 10/2016 was significant for cirrhosis. Cytologies have been negative for malignancy  "without positive cultures suggestive for infection, including AFB. According to hepatology, although ascites was not consistent with portal hypertension, given the present of cirrhosis on two biopsies, this was the most likely etiology for ascites formation. Patient does endorse dyspnea on exertion after she walks "2 laps around the track." Although patient states she has congestive heart failure, recent 2d echo performed on 09/2016 is no demonstrable for systolic or diastolic dysfunction. Patient tolerated TIPS without complication, with very mild abdominal cramping on left lower abdomen. The folowing day, 1/18, patient was able to tolerate clear liquid diet without complication. She remained hemodynamically stable without evidence of infection or bleeding following procedure. She was discharged in stable condition with follow-up with hepatology.      Other Medical Problems Addressed in the Hospital:  Type 2 diabetes mellitus with hypoglycemia  - On lantus 100U daily sub q at home  - Low dose SSI here; BG have been well-controlled.     Bilateral lower extremity edema  - Continue furosemide 60 mg PO daily.  - With continued baseline b/l pitting LE edema this morning.   - Daily CMP, monitor for electrolyte imbalances.     Morbid obesity due to excess calories     Hypothyroidism due to acquired atrophy of thyroid  - Continue levothyroxine 100 mcg PO daily.      CKD stage 3 due to type 2 diabetes mellitus  - Daily CMP; avoid nephrotoxic drugs.     Portal hypertension  - Carvedilol 3.125 mg PO BID.  - With associated cirrhosis and refractory ascites, s/p TIPS.      Hypoalbuminemia  - Due to liver cirrhosis, exacerbated by recent paracentesis.        Special Treatments/Procedures:  - TIPS    Disposition:  Home       Future Scheduled Appointments:  No future appointments.    Follow-up Plans from This Hospitalization:  - Patient instructed to make appointment with hepatology for s/p TIPs visit. Zofran sent to her pharmacy " to control nausea.     Last CBC/BMP/HgbA1c (if applicable):  Recent Results (from the past 336 hour(s))   CBC auto differential    Collection Time: 01/18/17  4:20 AM   Result Value Ref Range    WBC 4.76 3.90 - 12.70 K/uL    Hemoglobin 11.3 (L) 12.0 - 16.0 g/dL    Hematocrit 33.4 (L) 37.0 - 48.5 %    Platelets 222 150 - 350 K/uL   CBC auto differential    Collection Time: 01/17/17  9:13 AM   Result Value Ref Range    WBC 6.01 3.90 - 12.70 K/uL    Hemoglobin 13.1 12.0 - 16.0 g/dL    Hematocrit 39.6 37.0 - 48.5 %    Platelets 250 150 - 350 K/uL     No results found for this or any previous visit (from the past 336 hour(s)).  Lab Results   Component Value Date    HGBA1C 5.7 01/18/2017       Discharge Medication List:     Medication List      START taking these medications          ondansetron 8 MG tablet   Commonly known as:  ZOFRAN   Take 1 tablet (8 mg total) by mouth every 8 (eight) hours as needed for Nausea.         CHANGE how you take these medications          furosemide 20 MG tablet   Commonly known as:  LASIX   Take 3 tablets (60 mg total) by mouth once daily.   What changed:  when to take this       midodrine 5 MG Tab   Commonly known as:  PROAMATINE   Take 1 tablet (5 mg total) by mouth 3 (three) times daily.   What changed:    - when to take this  - reasons to take this       spironolactone 50 MG tablet   Commonly known as:  ALDACTONE   Take 1 tablet (50 mg total) by mouth once daily.   What changed:  when to take this         CONTINUE taking these medications          carvedilol 3.125 MG tablet   Commonly known as:  COREG   Take 1 tablet (3.125 mg total) by mouth 2 (two) times daily.       insulin glargine 100 unit/mL injection   Commonly known as:  LANTUS       levothyroxine 100 MCG tablet   Commonly known as:  SYNTHROID       ondansetron 4 MG Tbdl   Commonly known as:  ZOFRAN-ODT   Take 1 tablet (4 mg total) by mouth every 8 (eight) hours as needed (prn nausea).       vitamin E 400 UNIT capsule             Where to Get Your Medications      These medications were sent to [facility] - [place], [place] - [address]  [address] [address] [address]     Phone:  [phone]     ondansetron 8 MG tablet             Discharge Procedure Orders  Diet general     Call MD for:  temperature >100.4     Call MD for:  persistent nausea and vomiting or diarrhea     Call MD for:  persistent dizziness, light-headedness, or visual disturbances     Call MD for:  redness, tenderness, or signs of infection (pain, swelling, redness, odor or green/yellow discharge around incision site)     Call MD for:  increased confusion or weakness         Signing Physician:    Leeroy Fox MD  PGY-1 Internal Medicine  [phone]

## 2017-01-20 LAB — BACTERIA SPEC AEROBE CULT: NO GROWTH

## 2017-01-23 ENCOUNTER — TELEPHONE (OUTPATIENT)
Dept: HEPATOLOGY | Facility: CLINIC | Age: 62
End: 2017-01-23

## 2017-01-23 DIAGNOSIS — K74.60 CIRRHOSIS OF LIVER WITH ASCITES, UNSPECIFIED HEPATIC CIRRHOSIS TYPE: Primary | ICD-10-CM

## 2017-01-23 DIAGNOSIS — R18.8 OTHER ASCITES: Primary | ICD-10-CM

## 2017-01-23 DIAGNOSIS — R18.8 CIRRHOSIS OF LIVER WITH ASCITES, UNSPECIFIED HEPATIC CIRRHOSIS TYPE: Primary | ICD-10-CM

## 2017-01-23 DIAGNOSIS — K76.6 PORTAL HYPERTENSION: ICD-10-CM

## 2017-01-23 NOTE — TELEPHONE ENCOUNTER
MA spoke with patient, follow up appt and u/s schedule 02/01/2017 beginning at 11am. NPO 8hr prior to appt. Pt verbalized understanding.

## 2017-01-23 NOTE — TELEPHONE ENCOUNTER
----- Message from Liliane Baker MD sent at 1/23/2017 11:43 AM CST -----  Please schedule patient to see me in 1 week with ultrasound for same day.    Thanks,  Liliane

## 2017-01-24 LAB — BACTERIA SPEC ANAEROBE CULT: NORMAL

## 2017-01-26 DIAGNOSIS — K74.60 CIRRHOSIS OF LIVER WITH ASCITES, UNSPECIFIED HEPATIC CIRRHOSIS TYPE: ICD-10-CM

## 2017-01-26 DIAGNOSIS — R18.8 CIRRHOSIS OF LIVER WITH ASCITES, UNSPECIFIED HEPATIC CIRRHOSIS TYPE: ICD-10-CM

## 2017-01-26 DIAGNOSIS — K74.3 CHOLESTATIC CIRRHOSIS: ICD-10-CM

## 2017-01-26 DIAGNOSIS — K74.69 CRYPTOGENIC CIRRHOSIS: ICD-10-CM

## 2017-01-26 DIAGNOSIS — Z95.828 S/P TIPS (TRANSJUGULAR INTRAHEPATIC PORTOSYSTEMIC SHUNT): ICD-10-CM

## 2017-01-26 DIAGNOSIS — K76.6 PORTAL HYPERTENSION: Primary | ICD-10-CM

## 2017-02-01 ENCOUNTER — OFFICE VISIT (OUTPATIENT)
Dept: HEPATOLOGY | Facility: CLINIC | Age: 62
End: 2017-02-01
Payer: COMMERCIAL

## 2017-02-01 ENCOUNTER — HOSPITAL ENCOUNTER (OUTPATIENT)
Dept: RADIOLOGY | Facility: HOSPITAL | Age: 62
Discharge: HOME | End: 2017-02-01
Attending: INTERNAL MEDICINE
Payer: COMMERCIAL

## 2017-02-01 VITALS
HEART RATE: 68 BPM | HEIGHT: 63 IN | RESPIRATION RATE: 18 BRPM | TEMPERATURE: 97 F | OXYGEN SATURATION: 98 % | SYSTOLIC BLOOD PRESSURE: 148 MMHG | BODY MASS INDEX: 36.09 KG/M2 | WEIGHT: 203.69 LBS | DIASTOLIC BLOOD PRESSURE: 69 MMHG

## 2017-02-01 DIAGNOSIS — R18.8 OTHER ASCITES: ICD-10-CM

## 2017-02-01 DIAGNOSIS — K59.01 SLOW TRANSIT CONSTIPATION: ICD-10-CM

## 2017-02-01 DIAGNOSIS — K74.69 CRYPTOGENIC CIRRHOSIS: Primary | ICD-10-CM

## 2017-02-01 PROCEDURE — 99214 OFFICE O/P EST MOD 30 MIN: CPT | Mod: S$GLB,,, | Performed by: INTERNAL MEDICINE

## 2017-02-01 PROCEDURE — 93975 VASCULAR STUDY: CPT | Mod: TC

## 2017-02-01 PROCEDURE — 93975 VASCULAR STUDY: CPT | Mod: 26,,, | Performed by: RADIOLOGY

## 2017-02-01 PROCEDURE — 76705 ECHO EXAM OF ABDOMEN: CPT | Mod: 26,,, | Performed by: RADIOLOGY

## 2017-02-01 PROCEDURE — 99999 PR PBB SHADOW E&M-EST. PATIENT-LVL III: CPT | Mod: PBBFAC,,, | Performed by: INTERNAL MEDICINE

## 2017-02-01 RX ORDER — POLYETHYLENE GLYCOL 3350 17 G/17G
17 POWDER, FOR SOLUTION ORAL DAILY
Qty: 510 G | Refills: 11 | Status: SHIPPED | OUTPATIENT
Start: 2017-02-01 | End: 2017-03-03

## 2017-02-01 NOTE — PROGRESS NOTES
"HEPATOLOGY FOLLOW UP    Diamodn Das is here for follow up of TIPS placement    HPI  62yo female with cryptogenic cirrhosis and ascites formation that returns to clinic after two months.  She is accompanied by her adult daughter.    Since last office visit, patient completed echo, cross-sectional imaging and doppler ultrasound for consideration of TIPS.  Underwent procedure on 1/17/17.  Procedure note and discharge summary reviewed.  Patient with no immediate complications.  She reports significant improvement of volume overload with ongoing mild bilateral BLE edema.  Patient continues to take lasix 60mg daily.  She reports that ambulation is improved and overall weight loss with current weight of 203 and previously 215 lbs in 11/2016 and 12/2016.  The patient denies hepatic encephalopathy.  Feels much improved and is considering return to work as a  based on clinical improvement.      The patient does report issues with constipation over the last couple of months.  She is taking an OTC laxative or stool softener but is unsure of the name.  She reports after taking this medication, bowels are loose and she requires imodium.  Reports colonoscopy last year externally but is unsure of the recommended surveillance interval.  Notes that "it was okay".  She has a history of constipation but currently is having only one bowel movement per week without medication.  No other associated GI symptoms.     Cirrhosis HCM:  Hepatitis A vaccination: immune  Hepatitis B vaccination: undergoing vaccination; received 2/3 with last dose March 2017  HCC screening: abdominal MRI 12/22/2016 - no focal lesions   Variceal screening: on beta-blocker with no prior history of variceal bleeding  Pneumococcal vaccination: 2016  Influenza vaccination: patient not interested at this time   Colonoscopy: external, need documentation    Outpatient Encounter Prescriptions as of 2/1/2017   Medication Sig Dispense Refill    " carvedilol (COREG) 3.125 MG tablet Take 1 tablet (3.125 mg total) by mouth 2 (two) times daily. 60 tablet 11    furosemide (LASIX) 20 MG tablet Take 3 tablets (60 mg total) by mouth once daily. (Patient taking differently: Take 60 mg by mouth every morning. ) 30 tablet 3    insulin glargine (LANTUS) 100 unit/mL injection Inject 30 Units into the skin every morning.       levothyroxine (SYNTHROID) 100 MCG tablet Take 100 mcg by mouth every morning.       ondansetron (ZOFRAN-ODT) 4 MG TbDL Take 1 tablet (4 mg total) by mouth every 8 (eight) hours as needed (prn nausea). 15 tablet 0    spironolactone (ALDACTONE) 50 MG tablet Take 1 tablet (50 mg total) by mouth once daily. (Patient taking differently: Take 50 mg by mouth every morning. ) 30 tablet 11    vitamin E 400 UNIT capsule Take 400 Units by mouth every morning.      polyethylene glycol (GLYCOLAX) 17 gram/dose powder Take 17 g by mouth once daily. 510 g 11     No facility-administered encounter medications on file as of 2/1/2017.      Review of patient's allergies indicates:   Allergen Reactions    Subsys [fentanyl] Other (See Comments)     After administration pt unresponsive.  HR and respirations decreased.     Versed [midazolam] Other (See Comments)     After administration pt unresponsive.  HR and respirations decreased.     Ampicillin Rash    Codeine Nausea And Vomiting and Nausea Only     Past Medical History   Diagnosis Date    Ascites     CHF (congestive heart failure)     Cirrhosis     Diabetes mellitus     Gastroparesis     GERD (gastroesophageal reflux disease)     Hypertension     Liver disease     Thyroid disease      FH: no family history of liver disease or transplant     Review of Systems   Constitutional: Negative for activity change, appetite change, chills, fatigue, fever and unexpected weight change.   HENT: Negative for hearing loss, rhinorrhea and trouble swallowing.    Eyes: Negative for visual disturbance.    Respiratory: Negative for shortness of breath.    Cardiovascular: Positive for leg swelling (improved). Negative for chest pain.   Gastrointestinal: Positive for constipation. Negative for abdominal distention, abdominal pain, blood in stool, nausea and vomiting.   Endocrine: Negative for cold intolerance and heat intolerance.   Genitourinary: Negative for difficulty urinating, frequency and urgency.   Skin: Negative for rash.   Neurological: Negative for weakness and headaches.   Hematological: Negative for adenopathy. Does not bruise/bleed easily.   Psychiatric/Behavioral: Negative for confusion and decreased concentration.     Vitals:    02/01/17 1331   BP: (!) 148/69   Pulse: 68   Resp: 18   Temp: 97 °F (36.1 °C)       Physical Exam   Constitutional: She is oriented to person, place, and time. She appears well-developed and well-nourished. No distress.   Patient ambulating without assistive device   HENT:   Head: Normocephalic and atraumatic.   Mouth/Throat: Oropharynx is clear and moist.   Eyes: EOM are normal. Pupils are equal, round, and reactive to light. No scleral icterus.   Neck: Normal range of motion. Neck supple. No thyromegaly present.   Cardiovascular: Normal rate, regular rhythm and normal heart sounds.  Exam reveals no gallop and no friction rub.    No murmur heard.  Pulmonary/Chest: Effort normal. No respiratory distress. She has no wheezes. She has no rales.   Decreased breath sounds at bilateral bases   Abdominal: Soft. Bowel sounds are normal. She exhibits no distension. There is no tenderness. There is no rebound and no guarding.   Musculoskeletal: Normal range of motion. Edema: 2+ BLE pitting edema.   Lymphadenopathy:     She has no cervical adenopathy.   Neurological: She is alert and oriented to person, place, and time. No cranial nerve deficit.   Skin: Skin is warm and dry. No rash noted.   Psychiatric: She has a normal mood and affect. Her behavior is normal.   Vitals reviewed.      Lab  Results   Component Value Date     (H) 01/18/2017    BUN 11 01/18/2017    CREATININE 1.0 01/18/2017    CALCIUM 7.8 (L) 01/18/2017     01/18/2017    K 4.5 01/18/2017     01/18/2017    PROT 5.1 (L) 01/18/2017    CO2 25 01/18/2017    ANIONGAP 6 (L) 01/18/2017    WBC 6.34 02/01/2017    RBC 4.25 02/01/2017    HGB 12.5 02/01/2017    HCT 39.0 02/01/2017    MCV 92 02/01/2017    MCH 29.4 02/01/2017    MCHC 32.1 02/01/2017       Diagnostics: TIPS procedure report reviewed  US with doppler concerning for TIPS dysfunction    Assessment and Plan:  Patient Active Problem List   Diagnosis    Ascites    Cirrhosis of liver with ascites    Type 2 diabetes mellitus with hypoglycemia    Bilateral lower extremity edema    Morbid obesity due to excess calories    Hypothyroidism due to acquired atrophy of thyroid    Protein-calorie malnutrition, moderate    CKD stage 3 due to type 2 diabetes mellitus    Decompensated hepatic cirrhosis    Medication reaction    Cirrhosis    Portal hypertension    Hypoalbuminemia     62yo female with cholestatic liver disease with confirmed cirrhosis on biopsy and imaging.  Patient underwent recent TIPS placement with improvement in volume status but baseline US with doppler obtained today and concern for TIPS dysfunction.  Will discuss with radiology if revision is appropriate at this time or should we monitor until patient reports clinical change.  She is currently using furosemide 60mg daily and we will continue diuretic therapy in the setting of continued volume overload.      Cirrhosis: clinical improvement following TIPS with concern for dysfunction, will discuss with radiology for recommendation of revision.  HCM is currently up to date and due for third hepatitis B vaccine in March 2017.  Continue furosemide 60mg daily and patient will continue to monitor weights and follow low sodium diet.    Discussed adverse effects associated with TIPS dysfunction as well as  encephalopathy.  Patient expressed understanding to contact clinic if she develops clinical decline.     Patient is considering returning to work as a .  Given low MELD with improvement in volume status following TIPS, it is reasonable from a medical perspective for the patient to return to work if she continues to do well.      Constipation:  Prescribed miralax with instructions for titrating dosing for at least one bowel movement daily.  If no improvement, reasonable to follow up with local gastroenterologist     RTC in 3 months unless TIPS revision is recommended in the interim

## 2017-02-01 NOTE — PATIENT INSTRUCTIONS
Your TIPS appears to be working well.  We will check labs today to follow your kidney function and electrolytes.    Continue furosemide 60mg daily for now.  Follow your weights closely.    You are cleared to return to work.    Return to clinic in 3 months with liver doppler for same day.    Contact the clinic if you develop confusion, increasing fluid or GI bleeding as discussed.

## 2017-02-01 NOTE — MR AVS SNAPSHOT
[facility]  [address]  [place] [address]  Phone: [phone]  Fax: [phone]                  Diamond Das   2017 2:00 PM   Office Visit    Description:  Female : 1955   Provider:  Liliane Baker MD   Department:  [facility]           Reason for Visit     Follow-up           Diagnoses this Visit        Comments    Cryptogenic cirrhosis    -  Primary     Slow transit constipation                To Do List           Future Appointments        Provider Department Dept Phone    2017 2:00 PM MD [facility] [phone]      Goals (5 Years of Data)     None      Follow-Up and Disposition     Return in about 3 months (around 2017).       These Medications        Disp Refills Start End    polyethylene glycol (GLYCOLAX) 17 gram/dose powder 510 g 11 2017 3/3/2017    Take 17 g by mouth once daily. - Oral    Pharmacy: [facility] [address] [place] [address] [id number] [place] [address] #: [phone]         [facility] On Call     [facility] On Call Nurse Care Line -  Assistance  Registered nurses in the [facility] On Call Center provide clinical advisement, health education, appointment booking, and other advisory services.  Call for this free service at [phone].             Medications           Message regarding Medications     Verify the changes and/or additions to your medication regime listed below are the same as discussed with your clinician today.  If any of these changes or additions are incorrect, please notify your healthcare provider.        START taking these NEW medications        Refills    polyethylene glycol (GLYCOLAX) 17 gram/dose powder 11    Sig: Take 17 g by mouth once daily.    Class: Normal    Route: Oral           Verify that the below list of medications is an accurate representation of the medications you are currently taking.  If none reported, the list may be blank. If incorrect, please contact  "your healthcare provider. Carry this list with you in case of emergency.           Current Medications     carvedilol (COREG) 3.125 MG tablet Take 1 tablet (3.125 mg total) by mouth 2 (two) times daily.    furosemide (LASIX) 20 MG tablet Take 3 tablets (60 mg total) by mouth once daily.    insulin glargine (LANTUS) 100 unit/mL injection Inject 30 Units into the skin every morning.     levothyroxine (SYNTHROID) 100 MCG tablet Take 100 mcg by mouth every morning.     midodrine (PROAMATINE) 5 MG Tab Take 1 tablet (5 mg total) by mouth 3 (three) times daily.    ondansetron (ZOFRAN-ODT) 4 MG TbDL Take 1 tablet (4 mg total) by mouth every 8 (eight) hours as needed (prn nausea).    spironolactone (ALDACTONE) 50 MG tablet Take 1 tablet (50 mg total) by mouth once daily.    vitamin E 400 UNIT capsule Take 400 Units by mouth every morning.    polyethylene glycol (GLYCOLAX) 17 gram/dose powder Take 17 g by mouth once daily.           Clinical Reference Information           Vital Signs - Last Recorded  Most recent update: 2/1/2017  1:31 PM by Kuldip Aragon MA    BP Pulse Temp Resp Ht Wt    (!) 148/69 (BP Location: Left arm, Patient Position: Sitting) 68 97 °F (36.1 °C) (Oral) 18 5' 3" (1.6 m) 92.4 kg (203 lb 11.3 oz)    LMP SpO2 BMI          (LMP Unknown) 98% 36.08 kg/m2        Blood Pressure          Most Recent Value    BP  (!)  148/69      Allergies as of 2/1/2017     Subsys [Fentanyl]    Versed [Midazolam]    Ampicillin    Codeine      Immunizations Administered on Date of Encounter - 2/1/2017     None      Orders Placed During Today's Visit     Future Labs/Procedures Expected by Expires    CBC auto differential  2/1/2017 4/2/2018    Comprehensive metabolic panel  2/1/2017 4/2/2018    Protime-INR  2/1/2017 4/2/2018      [facility] Sign-Up     Activating your [facility] account is as easy as 1-2-3!     1) Visit [email or web address], select Sign Up Now, enter this activation code and your date of birth, then select " Next.  [email or web address]  Expires: 2/17/2017  2:51 PM      2) Create a username and password to use when you visit [facility] in the future and select a security question in case you lose your password and select Next.    3) Enter your e-mail address and click Sign Up!    Additional Information  If you have questions, please e-mail [email or web address] or call [phone] to talk to our [facility] staff. Remember, [facility] is NOT to be used for urgent needs. For medical emergencies, dial 911.         Instructions    Your TIPS appears to be working well.  We will check labs today to follow your kidney function and electrolytes.    Continue furosemide 60mg daily for now.  Follow your weights closely.    You are cleared to return to work.    Return to clinic in 3 months with liver doppler for same day.    Contact the clinic if you develop confusion, increasing fluid or GI bleeding as discussed.

## 2017-02-02 ENCOUNTER — TELEPHONE (OUTPATIENT)
Dept: HEPATOLOGY | Facility: CLINIC | Age: 62
End: 2017-02-02

## 2017-02-02 NOTE — TELEPHONE ENCOUNTER
----- Message from Liliane Baker MD sent at 2/2/2017 10:50 AM CST -----  Patient with MELD 8.  Labs overall stable.  Small rise in LFTs following TIPS and this is not uncommon.  US shows evidence of possible dysfunction and I have communicated with radiology.  They are discussing case to determine if revision or monitoring is appropriate.  Please mail labs results to patient.

## 2017-03-27 DIAGNOSIS — R18.8 CIRRHOSIS OF LIVER WITH ASCITES, UNSPECIFIED HEPATIC CIRRHOSIS TYPE: Primary | ICD-10-CM

## 2017-03-27 DIAGNOSIS — K74.69 CRYPTOGENIC CIRRHOSIS: ICD-10-CM

## 2017-03-27 DIAGNOSIS — K76.6 PORTAL HYPERTENSION: ICD-10-CM

## 2017-03-27 DIAGNOSIS — K74.60 CIRRHOSIS OF LIVER WITH ASCITES, UNSPECIFIED HEPATIC CIRRHOSIS TYPE: Primary | ICD-10-CM

## 2017-03-30 ENCOUNTER — LAB VISIT (OUTPATIENT)
Dept: LAB | Facility: HOSPITAL | Age: 62
End: 2017-03-30
Attending: INTERNAL MEDICINE
Payer: COMMERCIAL

## 2017-03-30 ENCOUNTER — OFFICE VISIT (OUTPATIENT)
Dept: HEPATOLOGY | Facility: CLINIC | Age: 62
End: 2017-03-30
Payer: COMMERCIAL

## 2017-03-30 ENCOUNTER — CLINICAL SUPPORT (OUTPATIENT)
Dept: INFECTIOUS DISEASES | Facility: CLINIC | Age: 62
End: 2017-03-30
Payer: COMMERCIAL

## 2017-03-30 VITALS
RESPIRATION RATE: 20 BRPM | TEMPERATURE: 98 F | WEIGHT: 205.69 LBS | OXYGEN SATURATION: 95 % | HEIGHT: 63 IN | DIASTOLIC BLOOD PRESSURE: 85 MMHG | HEART RATE: 69 BPM | SYSTOLIC BLOOD PRESSURE: 180 MMHG | BODY MASS INDEX: 36.45 KG/M2

## 2017-03-30 DIAGNOSIS — K74.3 CHOLESTATIC CIRRHOSIS: ICD-10-CM

## 2017-03-30 DIAGNOSIS — K76.6 PORTAL HYPERTENSION: ICD-10-CM

## 2017-03-30 DIAGNOSIS — R18.8 CIRRHOSIS OF LIVER WITH ASCITES, UNSPECIFIED HEPATIC CIRRHOSIS TYPE: ICD-10-CM

## 2017-03-30 DIAGNOSIS — R06.00 DYSPNEA, UNSPECIFIED TYPE: ICD-10-CM

## 2017-03-30 DIAGNOSIS — K74.60 CIRRHOSIS OF LIVER WITH ASCITES, UNSPECIFIED HEPATIC CIRRHOSIS TYPE: ICD-10-CM

## 2017-03-30 DIAGNOSIS — K74.69 CRYPTOGENIC CIRRHOSIS: Primary | ICD-10-CM

## 2017-03-30 DIAGNOSIS — K74.69 CRYPTOGENIC CIRRHOSIS: ICD-10-CM

## 2017-03-30 DIAGNOSIS — R18.8 OTHER ASCITES: ICD-10-CM

## 2017-03-30 DIAGNOSIS — K74.60 CIRRHOSIS OF LIVER WITHOUT ASCITES, UNSPECIFIED HEPATIC CIRRHOSIS TYPE: ICD-10-CM

## 2017-03-30 LAB
AFP SERPL-MCNC: 1.1 NG/ML
ALBUMIN SERPL BCP-MCNC: 2.5 G/DL
ALP SERPL-CCNC: 690 U/L
ALT SERPL W/O P-5'-P-CCNC: 64 U/L
ANION GAP SERPL CALC-SCNC: 7 MMOL/L
AST SERPL-CCNC: 150 U/L
BASOPHILS # BLD AUTO: 0.03 K/UL
BASOPHILS NFR BLD: 0.5 %
BILIRUB SERPL-MCNC: 1.1 MG/DL
BUN SERPL-MCNC: 14 MG/DL
CALCIUM SERPL-MCNC: 8.6 MG/DL
CHLORIDE SERPL-SCNC: 108 MMOL/L
CO2 SERPL-SCNC: 26 MMOL/L
CREAT SERPL-MCNC: 1 MG/DL
DIFFERENTIAL METHOD: ABNORMAL
EOSINOPHIL # BLD AUTO: 0.2 K/UL
EOSINOPHIL NFR BLD: 2.8 %
ERYTHROCYTE [DISTWIDTH] IN BLOOD BY AUTOMATED COUNT: 17.9 %
EST. GFR  (AFRICAN AMERICAN): >60 ML/MIN/1.73 M^2
EST. GFR  (NON AFRICAN AMERICAN): >60 ML/MIN/1.73 M^2
GLUCOSE SERPL-MCNC: 140 MG/DL
HCT VFR BLD AUTO: 33.5 %
HGB BLD-MCNC: 11.1 G/DL
INR PPP: 1.1
LYMPHOCYTES # BLD AUTO: 3.4 K/UL
LYMPHOCYTES NFR BLD: 53.1 %
MAGNESIUM SERPL-MCNC: 1.6 MG/DL
MCH RBC QN AUTO: 30.2 PG
MCHC RBC AUTO-ENTMCNC: 33.1 %
MCV RBC AUTO: 91 FL
MONOCYTES # BLD AUTO: 0.7 K/UL
MONOCYTES NFR BLD: 10.2 %
NEUTROPHILS # BLD AUTO: 2.2 K/UL
NEUTROPHILS NFR BLD: 33.2 %
PLATELET # BLD AUTO: 233 K/UL
PMV BLD AUTO: 10.8 FL
POTASSIUM SERPL-SCNC: 3.9 MMOL/L
PROT SERPL-MCNC: 8 G/DL
PROTHROMBIN TIME: 11.8 SEC
RBC # BLD AUTO: 3.67 M/UL
SODIUM SERPL-SCNC: 141 MMOL/L
WBC # BLD AUTO: 6.46 K/UL

## 2017-03-30 PROCEDURE — 83735 ASSAY OF MAGNESIUM: CPT

## 2017-03-30 PROCEDURE — 90746 HEPB VACCINE 3 DOSE ADULT IM: CPT | Mod: S$GLB,,, | Performed by: INTERNAL MEDICINE

## 2017-03-30 PROCEDURE — 85610 PROTHROMBIN TIME: CPT

## 2017-03-30 PROCEDURE — 99999 PR PBB SHADOW E&M-EST. PATIENT-LVL I: CPT | Mod: PBBFAC,,,

## 2017-03-30 PROCEDURE — 85025 COMPLETE CBC W/AUTO DIFF WBC: CPT

## 2017-03-30 PROCEDURE — 80053 COMPREHEN METABOLIC PANEL: CPT

## 2017-03-30 PROCEDURE — 90471 IMMUNIZATION ADMIN: CPT | Mod: S$GLB,,, | Performed by: INTERNAL MEDICINE

## 2017-03-30 PROCEDURE — 82105 ALPHA-FETOPROTEIN SERUM: CPT

## 2017-03-30 PROCEDURE — 1160F RVW MEDS BY RX/DR IN RCRD: CPT | Mod: S$GLB,,, | Performed by: INTERNAL MEDICINE

## 2017-03-30 PROCEDURE — 99214 OFFICE O/P EST MOD 30 MIN: CPT | Mod: S$GLB,,, | Performed by: INTERNAL MEDICINE

## 2017-03-30 PROCEDURE — 36415 COLL VENOUS BLD VENIPUNCTURE: CPT

## 2017-03-30 PROCEDURE — 99999 PR PBB SHADOW E&M-EST. PATIENT-LVL IV: CPT | Mod: PBBFAC,,, | Performed by: INTERNAL MEDICINE

## 2017-03-30 RX ORDER — FUROSEMIDE 80 MG/1
80 TABLET ORAL DAILY
Qty: 30 TABLET | Refills: 11 | Status: SHIPPED | OUTPATIENT
Start: 2017-03-30 | End: 2017-12-07 | Stop reason: SDUPTHER

## 2017-03-30 NOTE — LETTER
April 3, 2017        Gavin Baker MD  [address] [facility] [address]             [facility] - Hepatology  [address]  [place] [address]  Phone: [phone]  Fax: [phone]   Patient: Diamond Das   MR Number: [id number]   YOB: 1955   Date of Visit: 3/30/2017       Dear Dr. Baker:    Thank you for referring Diamond Das to me for evaluation. Attached you will find relevant portions of my assessment and plan of care.    If you have questions, please do not hesitate to call me. I look forward to following Diamond Das along with you.    Sincerely,      Liliane Baker MD            CC  No Recipients    Enclosure

## 2017-03-30 NOTE — MR AVS SNAPSHOT
[facility]  [address]  [facility] [address]  Phone: [phone]  Fax: [phone]                  Diamond Das   3/30/2017 9:00 AM   Office Visit    Description:  Female : 1955   Provider:  Liliane Baker MD   Department:  [facility]           Reason for Visit     Cirrhosis           Diagnoses this Visit        Comments    Cryptogenic cirrhosis    -  Primary     Other ascites         Cholestatic cirrhosis         Dyspnea, unspecified type                To Do List           Future Appointments        Provider Department Dept Phone    3/30/2017 10:00 AM [facility] [id number] [phone]      Goals (5 Years of Data)     None       These Medications        Disp Refills Start End    furosemide (LASIX) 80 MG tablet 30 tablet 11 3/30/2017 3/30/2018    Take 1 tablet (80 mg total) by mouth once daily. - Oral    Pharmacy: [facility] [address] [place] [address] [id number] [place] [address] Ph #: [phone]         [facility]     [facility] Nurse Care Line -  Assistance  Unless otherwise directed by your provider, please contact [facility], our nurse care line that is available for  assistance.     Registered nurses in the [facility] provide: appointment scheduling, clinical advisement, health education, and other advisory services.  Call: [phone] (toll free)               Medications           Message regarding Medications     Verify the changes and/or additions to your medication regime listed below are the same as discussed with your clinician today.  If any of these changes or additions are incorrect, please notify your healthcare provider.        START taking these NEW medications        Refills    furosemide (LASIX) 80 MG tablet 11    Sig: Take 1 tablet (80 mg total) by mouth once daily.    Class: Normal    Route: Oral           Verify that the below list of medications is an accurate representation  "of the medications you are currently taking.  If none reported, the list may be blank. If incorrect, please contact your healthcare provider. Carry this list with you in case of emergency.           Current Medications     carvedilol (COREG) 3.125 MG tablet Take 1 tablet (3.125 mg total) by mouth 2 (two) times daily.    levothyroxine (SYNTHROID) 100 MCG tablet Take 100 mcg by mouth every morning.     ondansetron (ZOFRAN-ODT) 4 MG TbDL Take 1 tablet (4 mg total) by mouth every 8 (eight) hours as needed (prn nausea).    spironolactone (ALDACTONE) 50 MG tablet Take 1 tablet (50 mg total) by mouth once daily.    vitamin E 400 UNIT capsule Take 400 Units by mouth every morning.    furosemide (LASIX) 80 MG tablet Take 1 tablet (80 mg total) by mouth once daily.           Clinical Reference Information           Your Vitals Were     BP Pulse Temp Resp Height Weight    180/85 (BP Location: Right arm, Patient Position: Sitting, BP Method: Automatic) 69 98.1 °F (36.7 °C) (Oral) 20 5' 3" (1.6 m) 93.3 kg (205 lb 11 oz)    Last Period SpO2 BMI          (LMP Unknown) 95% 36.44 kg/m2        Blood Pressure          Most Recent Value    BP  (!)  180/85      Allergies as of 3/30/2017     Subsys [Fentanyl]    Versed [Midazolam]    Ampicillin    Codeine      Immunizations Administered on Date of Encounter - 3/30/2017     Name Date Dose VIS Date Route    Hepatitis B, Adult  Incomplete 1 mL 7/20/2016 Intramuscular      Orders Placed During Today's Visit      Normal Orders This Visit    Hepatitis B Vaccine (Adult) (IM)     Future Labs/Procedures Expected by Expires    US Liver with Doppler  3/30/2017 3/30/2018    X-Ray Chest PA And Lateral  3/30/2017 3/30/2018      [facility] Sign-Up     Activating your [facility] account is as easy as 1-2-3!     1) Visit [email or web address], select Sign Up Now, enter this activation code and your date of birth, then select Next.  [email or web address]  Expires: 5/14/2017  9:28 AM      2) Create a username and " password to use when you visit [facility] in the future and select a security question in case you lose your password and select Next.    3) Enter your e-mail address and click Sign Up!    Additional Information  If you have questions, please e-mail [email or web address] or call [phone] to talk to our [facility] staff. Remember, [facility] is NOT to be used for urgent needs. For medical emergencies, dial 911.         Instructions    Please schedule liver ultrasound and chest x-ray in [place] within 1 week.      Hepatitis B vaccine in ID clinic today.      Increase lasix to  80mg daily.      Return to clinic in 2 months        Language Assistance Services     ATTENTION: Language assistance services are available, free of charge. Please call [phone].      ATENCIÓN: Si habla renee, tiene a rucker disposición servicios gratuitos de asistencia lingüística. Llame al [phone].     CHÚ Ý: N?u b?n nói Ti?ng Vi?t, có các d?ch v? h? tr? ngôn ng? mi?n phí dành cho b?n. G?i s? [phone].         [facility] complies with applicable Federal civil rights laws and does not discriminate on the basis of race, color, national origin, age, disability, or sex.

## 2017-03-30 NOTE — PROGRESS NOTES
HEPATOLOGY FOLLOW UP    Diamond Das is here for scheduled follow-up.      HPI  62yo female with cryptogenic cirrhosis and ascites formation that returns to clinic after two months.  She is alone.    The patient reports that since her last office visit, she has experienced orthopnea and lower abdominal discomfort.  This has been present for approximately 3 weeks.  She has gained 5-7 lbs by home scale.  The patient continues to use lasix 60mg daily and reports compliance with medication.  Although spironolactone noted in medication list, patient reports that she is not using this medication based on prior intolerance.  The patient has no other symptoms of chronic liver disease such as jaundice, GI bleeding or pruritus.      Despite these symptoms, patient reports improved mobility and quality of life since TIPS.  Walking daily and able to do ADLs, household chores.  She did require from job as  in 2/2017.     Patient notes increased belching and flatulence recently.      Cirrhosis HCM:  Hepatitis A vaccination: immune  Hepatitis B vaccination: completed series 3/2017  HCC screening: abdominal MRI 12/22/2016 - no focal lesions   Variceal screening: on beta-blocker with no prior history of variceal bleeding  Pneumococcal vaccination: 2016  Influenza vaccination: patient not interested at this time   Colonoscopy: external, need documentation    Outpatient Encounter Prescriptions as of 2/1/2017   Medication Sig Dispense Refill    carvedilol (COREG) 3.125 MG tablet Take 1 tablet (3.125 mg total) by mouth 2 (two) times daily. 60 tablet 11    furosemide (LASIX) 20 MG tablet Take 3 tablets (60 mg total) by mouth once daily. (Patient taking differently: Take 60 mg by mouth every morning. ) 30 tablet 3    insulin glargine (LANTUS) 100 unit/mL injection Inject 30 Units into the skin every morning.       levothyroxine (SYNTHROID) 100 MCG tablet Take 100 mcg by mouth every morning.       ondansetron  (ZOFRAN-ODT) 4 MG TbDL Take 1 tablet (4 mg total) by mouth every 8 (eight) hours as needed (prn nausea). 15 tablet 0    vitamin E 400 UNIT capsule Take 400 Units by mouth every morning.      polyethylene glycol (GLYCOLAX) 17 gram/dose powder Take 17 g by mouth once daily. 510 g 11     No facility-administered encounter medications on file as of 2/1/2017.      Review of patient's allergies indicates:   Allergen Reactions    Subsys [fentanyl] Other (See Comments)     After administration pt unresponsive.  HR and respirations decreased.     Versed [midazolam] Other (See Comments)     After administration pt unresponsive.  HR and respirations decreased.     Ampicillin Rash    Codeine Nausea And Vomiting and Nausea Only     Past Medical History:   Diagnosis Date    Ascites     CHF (congestive heart failure)     Cirrhosis     Diabetes mellitus     Gastroparesis     GERD (gastroesophageal reflux disease)     Hypertension     Liver disease     Thyroid disease      FH: no family history of liver disease or transplant     Review of Systems   Constitutional: Negative for activity change, appetite change, chills, fatigue, fever and unexpected weight change.   HENT: Negative for hearing loss, rhinorrhea and trouble swallowing.    Eyes: Negative for visual disturbance.   Respiratory: Negative for shortness of breath.    Cardiovascular: Positive for leg swelling (stable). Negative for chest pain.   Gastrointestinal: Negative for abdominal distention, abdominal pain, blood in stool, constipation, nausea and vomiting.        Flatulence, belching   Endocrine: Negative for cold intolerance and heat intolerance.   Genitourinary: Negative for difficulty urinating, frequency and urgency.   Skin: Negative for rash.   Neurological: Negative for weakness and headaches.   Hematological: Negative for adenopathy. Does not bruise/bleed easily.   Psychiatric/Behavioral: Negative for confusion and decreased concentration.      Vitals:    03/30/17 0906   BP: (!) 180/85   Pulse: 69   Resp: 20   Temp: 98.1 °F (36.7 °C)       Physical Exam   Constitutional: She is oriented to person, place, and time. She appears well-developed and well-nourished. No distress.   Patient ambulating without assistive device   HENT:   Head: Normocephalic and atraumatic.   Mouth/Throat: Oropharynx is clear and moist.   Eyes: EOM are normal. Pupils are equal, round, and reactive to light. No scleral icterus.   Neck: Normal range of motion. Neck supple. No thyromegaly present.   Cardiovascular: Normal rate, regular rhythm and normal heart sounds.  Exam reveals no gallop and no friction rub.    No murmur heard.  Pulmonary/Chest: Effort normal. No respiratory distress. She has no wheezes. She has no rales.   Decreased breath sounds at bilateral bases   Abdominal: Soft. Bowel sounds are normal. She exhibits no distension. There is no tenderness. There is no rebound and no guarding.   Musculoskeletal: Normal range of motion. Edema: 2+ BLE pitting edema.   Lymphadenopathy:     She has no cervical adenopathy.   Neurological: She is alert and oriented to person, place, and time. No cranial nerve deficit.   Skin: Skin is warm and dry. No rash noted.   Psychiatric: She has a normal mood and affect. Her behavior is normal.   Vitals reviewed.      Lab Results   Component Value Date     (H) 03/30/2017    BUN 14 03/30/2017    CREATININE 1.0 03/30/2017    CALCIUM 8.6 (L) 03/30/2017     03/30/2017    K 3.9 03/30/2017     03/30/2017    PROT 8.0 03/30/2017    CO2 26 03/30/2017    ANIONGAP 7 (L) 03/30/2017    WBC 6.46 03/30/2017    RBC 3.67 (L) 03/30/2017    HGB 11.1 (L) 03/30/2017    HCT 33.5 (L) 03/30/2017    MCV 91 03/30/2017    MCH 30.2 03/30/2017    MCHC 33.1 03/30/2017       Diagnostics: TIPS procedure report reviewed  US with doppler concerning for TIPS dysfunction    Assessment and Plan:  Patient Active Problem List   Diagnosis    Ascites    Cirrhosis  of liver with ascites    Type 2 diabetes mellitus with hypoglycemia    Bilateral lower extremity edema    Morbid obesity due to excess calories    Hypothyroidism due to acquired atrophy of thyroid    Protein-calorie malnutrition, moderate    CKD stage 3 due to type 2 diabetes mellitus    Decompensated hepatic cirrhosis    Medication reaction    Cirrhosis    Portal hypertension    Hypoalbuminemia     62yo female with cholestatic liver disease with confirmed cirrhosis on biopsy and imaging.  Patient underwent recent TIPS placement with improvement in volume status but baseline US with doppler obtained was concerning for dysfunction.  Based on current symptoms, concern that patient may require revision of TIPS.    CXR same day as US as patient with dry cough associated with orthopnea.     Cirrhosis: repeat US to evaluate TIPS patency.  If dysfunction remains, then referral to IR for revision.  Increased furosemide to 80mg daily and patient to monitor daily weights.  If there is no evidence of TIPS malfunction, then TTE and cardiac evaluation would be appropriate.  Patient currently denying dyspnea at rest or presence of chest pain.      HCM:  Hepatitis B vaccination series completed today other HCM as documented above      RTC in 2 months

## 2017-03-30 NOTE — PATIENT INSTRUCTIONS
Please schedule liver ultrasound and chest x-ray in [place] within 1 week.      Hepatitis B vaccine in ID clinic today.      Increase lasix to  80mg daily.      Return to clinic in 2 months

## 2017-04-04 ENCOUNTER — TELEPHONE (OUTPATIENT)
Dept: HEPATOLOGY | Facility: CLINIC | Age: 62
End: 2017-04-04

## 2017-04-04 NOTE — TELEPHONE ENCOUNTER
MA called pt. No answer. Left her a message reminding her to keep her u/s apt this Thursday 4/6. EMS

## 2017-04-06 ENCOUNTER — HOSPITAL ENCOUNTER (OUTPATIENT)
Dept: RADIOLOGY | Facility: HOSPITAL | Age: 62
Discharge: HOME | End: 2017-04-06
Attending: INTERNAL MEDICINE
Payer: COMMERCIAL

## 2017-04-06 DIAGNOSIS — K74.69 CRYPTOGENIC CIRRHOSIS: ICD-10-CM

## 2017-04-06 DIAGNOSIS — R06.00 DYSPNEA, UNSPECIFIED TYPE: ICD-10-CM

## 2017-04-06 DIAGNOSIS — R18.8 OTHER ASCITES: ICD-10-CM

## 2017-04-06 PROCEDURE — 71020 XR CHEST PA AND LATERAL: CPT | Mod: TC

## 2017-04-06 PROCEDURE — 93975 VASCULAR STUDY: CPT | Mod: TC

## 2017-04-12 ENCOUNTER — TELEPHONE (OUTPATIENT)
Dept: HEPATOLOGY | Facility: CLINIC | Age: 62
End: 2017-04-12

## 2017-04-12 NOTE — TELEPHONE ENCOUNTER
----- Message from Ariela Larios sent at 4/12/2017  9:46 AM CDT -----  Contact: pt  Said she missed a call from our office but does not know name, please call her @ # [phone].

## 2017-05-02 ENCOUNTER — TELEPHONE (OUTPATIENT)
Dept: HEPATOLOGY | Facility: CLINIC | Age: 62
End: 2017-05-02

## 2017-05-02 ENCOUNTER — HOSPITAL ENCOUNTER (OUTPATIENT)
Dept: RADIOLOGY | Facility: HOSPITAL | Age: 62
Discharge: HOME | End: 2017-05-02
Attending: PODIATRIST
Payer: COMMERCIAL

## 2017-05-02 ENCOUNTER — OFFICE VISIT (OUTPATIENT)
Dept: PODIATRY | Facility: CLINIC | Age: 62
End: 2017-05-02
Payer: COMMERCIAL

## 2017-05-02 VITALS
WEIGHT: 206.38 LBS | HEIGHT: 63 IN | HEART RATE: 70 BPM | BODY MASS INDEX: 36.57 KG/M2 | SYSTOLIC BLOOD PRESSURE: 188 MMHG | DIASTOLIC BLOOD PRESSURE: 97 MMHG

## 2017-05-02 DIAGNOSIS — R05.3 PERSISTENT DRY COUGH: Primary | ICD-10-CM

## 2017-05-02 DIAGNOSIS — L60.2 NAIL DISORDER (ONYCHOGRYPHOSIS): Primary | ICD-10-CM

## 2017-05-02 DIAGNOSIS — R06.01 ORTHOPNEA: ICD-10-CM

## 2017-05-02 DIAGNOSIS — S99.921A RIGHT FOOT INJURY, INITIAL ENCOUNTER: ICD-10-CM

## 2017-05-02 DIAGNOSIS — S99.921A RIGHT FOOT INJURY, INITIAL ENCOUNTER: Primary | ICD-10-CM

## 2017-05-02 PROCEDURE — 1160F RVW MEDS BY RX/DR IN RCRD: CPT | Mod: S$GLB,,, | Performed by: PODIATRIST

## 2017-05-02 PROCEDURE — 99999 PR PBB SHADOW E&M-EST. PATIENT-LVL III: CPT | Mod: PBBFAC,,, | Performed by: PODIATRIST

## 2017-05-02 PROCEDURE — 99203 OFFICE O/P NEW LOW 30 MIN: CPT | Mod: S$GLB,,, | Performed by: PODIATRIST

## 2017-05-02 PROCEDURE — 73630 X-RAY EXAM OF FOOT: CPT | Mod: TC,PO,RT

## 2017-05-02 PROCEDURE — 73630 X-RAY EXAM OF FOOT: CPT | Mod: 26,RT,, | Performed by: RADIOLOGY

## 2017-05-02 NOTE — PROGRESS NOTES
Called patient to discuss liver US and CXR.  There is a new nonocculsive thrombus in left portal vein that is likely associated with TIPS procedure given absence prior to procedure.  TIPS otherwise patent and only small ascites present.  Therefore liver disease does not clearly explain ongoing dry cough and orthopnea.  Will repeat echo now that TIPS in place, previously normal from 9/2016.  Also pulmonary consult for cough for a couple of months.  Patient agreeable to plan and reports that she is clinically stable.

## 2017-05-02 NOTE — PROGRESS NOTES
[facility]  PODIATRIC MEDICINE AND SURGERY  PROGRESS NOTE  5/2/2017    PODIATRY NOTE  PCP: Dr. Gavin Baker MD    CHIEF COMPLAINT   Chief Complaint   Patient presents with    Foot Problem     Right 3rd digit dark in color around distal aspect    Diabetic Foot Exam     Last visit with PCP Dr. Baker 01/2017       HPI  Diamond Das is a 62 y.o. female who has a past medical history of Ascites; CHF (congestive heart failure); Cirrhosis; Diabetes mellitus; Gastroparesis; GERD (gastroesophageal reflux disease); Hypertension; Liver disease; and Thyroid disease.   Diamond presents to clinic today complaining of darkening of right third digit.    Patient describes pain as:   Location:third digit right foot   Quality: non painful   Severity:0/10  Duration:over past 2 weeks  Modifying Factors (Aggravating): none  Modifying Factors (Alleviating): none    Pt states she noticed toenail darkening. She denies any trauma. xrays ordered today negative for fracture. She denies any pain to the site. No further pedal complaints.    Patient denies other pedal complaints at this time.      PMH  Past Medical History:   Diagnosis Date    Ascites     CHF (congestive heart failure)     Cirrhosis     Diabetes mellitus     Gastroparesis     GERD (gastroesophageal reflux disease)     Hypertension     Liver disease     Thyroid disease        PROBLEM LIST  Patient Active Problem List    Diagnosis Date Noted    Hypoalbuminemia 01/18/2017    Portal hypertension 01/17/2017    Cirrhosis 01/03/2017    Medication reaction 11/17/2016    Decompensated hepatic cirrhosis 09/06/2016    Ascites 09/02/2016    Cirrhosis of liver with ascites 09/02/2016    Type 2 diabetes mellitus with hypoglycemia 09/02/2016    Bilateral lower extremity edema 09/02/2016    Morbid obesity due to excess calories 09/02/2016    Hypothyroidism due to acquired atrophy of thyroid 09/02/2016    Protein-calorie malnutrition, moderate  09/02/2016    CKD stage 3 due to type 2 diabetes mellitus 09/02/2016       MEDS  Current Outpatient Prescriptions on File Prior to Visit   Medication Sig Dispense Refill    carvedilol (COREG) 3.125 MG tablet Take 1 tablet (3.125 mg total) by mouth 2 (two) times daily. 60 tablet 11    furosemide (LASIX) 80 MG tablet Take 1 tablet (80 mg total) by mouth once daily. 30 tablet 11    levothyroxine (SYNTHROID) 100 MCG tablet Take 100 mcg by mouth every morning.       ondansetron (ZOFRAN-ODT) 4 MG TbDL Take 1 tablet (4 mg total) by mouth every 8 (eight) hours as needed (prn nausea). 15 tablet 0    spironolactone (ALDACTONE) 50 MG tablet Take 1 tablet (50 mg total) by mouth once daily. (Patient taking differently: Take 50 mg by mouth every morning. ) 30 tablet 11    vitamin E 400 UNIT capsule Take 400 Units by mouth every morning.       No current facility-administered medications on file prior to visit.        Medication List with Changes/Refills   Current Medications    CARVEDILOL (COREG) 3.125 MG TABLET    Take 1 tablet (3.125 mg total) by mouth 2 (two) times daily.    FUROSEMIDE (LASIX) 80 MG TABLET    Take 1 tablet (80 mg total) by mouth once daily.    LEVOTHYROXINE (SYNTHROID) 100 MCG TABLET    Take 100 mcg by mouth every morning.     ONDANSETRON (ZOFRAN-ODT) 4 MG TBDL    Take 1 tablet (4 mg total) by mouth every 8 (eight) hours as needed (prn nausea).    SPIRONOLACTONE (ALDACTONE) 50 MG TABLET    Take 1 tablet (50 mg total) by mouth once daily.    VITAMIN E 400 UNIT CAPSULE    Take 400 Units by mouth every morning.       PSH     Past Surgical History:   Procedure Laterality Date    CHOLECYSTECTOMY      ERCP      LIVER BIOPSY      UPPER GASTROINTESTINAL ENDOSCOPY          ALL  Review of patient's allergies indicates:   Allergen Reactions    Subsys [fentanyl] Other (See Comments)     After administration pt unresponsive.  HR and respirations decreased.     Versed [midazolam] Other (See Comments)     After  "administration pt unresponsive.  HR and respirations decreased.     Ampicillin Rash    Codeine Nausea And Vomiting and Nausea Only       SOC     Social History   Substance Use Topics    Smoking status: Never Smoker    Smokeless tobacco: None    Alcohol use No         FAMILY HX  History reviewed. No pertinent family history.         REVIEW OF SYSTEMS  General: Denies any fever or chills  Chest: Denies shortness of breath, wheezing, coughing, or sputum production  Heart: Denies chest pain, cold extremities, orthopenia, or reduced exercise tolerance  As noted above and per history of current illness above, otherwise negative in the remainder of the 14 systems.     PHYSICAL EXAM  Vitals:    05/02/17 1120   BP: (!) 188/97   Pulse: 70   Weight: 93.6 kg (206 lb 5.6 oz)   Height: 5' 3" (1.6 m)   PainSc: 0-No pain       General: This patient is well-developed, well-nourished and appears stated age, well-oriented to person, place and time, and cooperative and pleasant on today's visit      LOWER EXTREMITY  Vascular exam:   · Dorsalis pedis and posterior tibial pulses palpable 2/4 bilaterally.   · Capillary refill time immediate to the toes.   · Feet are warm to the touch. Skin temperature warm to warm from proximally to distally   · There are no varicosities, telangiectasias noted to bilateral foot and ankle regions.   · There are no ecchymoses noted to bilateral foot and ankle regions.   · There is gross lower extremity edema.    Dermatologic exam:   · Skin moist with healthy texture and turgor.  · There are no open ulcerations, lacerations, or fissures to bilateral foot and ankle regions. There are no signs of infection as there is no erythema, no proximal-extending lymphangiitis, no fluctuance, or crepitus noted on palpation to bilateral foot and ankle regions.   · There is no interdigital maceration.   · There are hyperkeratotic lesions noted to feet. Nails are mycotic and elongated.  · RIGHT third digit without " evidence of ecchymosis or darkening, right third NAIL thickened, mycotic, discolored    Neurologic exam:  · Epicritic sensation is intact as the patient is able to sense light touch to bilateral foot and ankle regions.   · Achilles and patellar deep tendon reflexes intact  · Babinski reflex absent    Musculoskeletal/Orthopedic exam:   · No symptomatic structural abnormalities noted  · Muscle strength AT/EHL/EDL/PT: 5/5; Achilles/Gastroc/Soleus: 5/5; PB/PL: 5/5 Muscle tone is normal.  · Ankle joint ROM  B/L supple DF/PF, non-crepitus  · STJ ROM supple inv/ev, non crepitus       IMAGING   Reviewed by me and I agree with radiologist findings, 3 views of foot/ankle, reveal:  No fracture noted, mild degenerative changes throughout foot, heel spur noted, digital contractures 2-4 right foot      ASSESSMENT  Nail disorder (onychogryphosis) - Right Foot      PLAN    1. Patient was educated about clinical and imaging findings, and verbalizes understanding of above.  2. Treatment plan: Discussed fungal nail and treatment OTC recommended- Vicks Vapor Rub  After verbal consent obtained, the involved nail was trimmed with  and then filed with electric dremmel. No blood loss. Pt notes relief. There were no offending agents or signs of infection noted  Pt informed to monitor area, if darkening develops RTC  follow up/evaluation as scheduled       Future Appointments  Date Time Provider Department Center   5/24/2017 9:40 AM Liliane Baker MD [facility]       Report Electronically Signed By:  Eli Hairston DPM   Podiatric Medicine & Surgery  [facility]  5/2/2017

## 2017-05-02 NOTE — TELEPHONE ENCOUNTER
----- Message from Liliane Baker MD sent at 5/2/2017  3:15 PM CDT -----  Please schedule patient for echo and pulmonary consult for same day.

## 2017-05-02 NOTE — TELEPHONE ENCOUNTER
Next available in Pulmonary is 05/23.   2d echo schedule same day.   Dr. Baker will be out on 05/24, so follow up with Dr. Baker schedule on 05/23 with Pulmonary and 2d echo appt.     MA called pt, message given. Pt verbalized understanding. Appt letter mailed to pt.

## 2017-05-03 ENCOUNTER — TELEPHONE (OUTPATIENT)
Dept: HEPATOLOGY | Facility: CLINIC | Age: 62
End: 2017-05-03

## 2017-05-03 NOTE — TELEPHONE ENCOUNTER
MA attempted to call patient to inform her that we have to reschedule her 5/23/17 to see Dr. Samuel ARCHER will not be in clinic that day. Left her VM to please call us back DANIELITO PATINO

## 2017-05-16 ENCOUNTER — TELEPHONE (OUTPATIENT)
Dept: HEPATOLOGY | Facility: CLINIC | Age: 62
End: 2017-05-16

## 2017-05-16 NOTE — TELEPHONE ENCOUNTER
----- Message from Lissett Garcia sent at 5/16/2017  9:41 AM CDT -----  Contact: pt   ..Patient calling to speak with coordinator about an extended day for [employer] pay, please call

## 2017-05-16 NOTE — TELEPHONE ENCOUNTER
----- Message from Susan Osei sent at 5/16/2017  3:54 PM CDT -----  Patient calling to speak with someone about a letter for her job. Please call

## 2017-05-16 NOTE — TELEPHONE ENCOUNTER
MA attempted to call patient back, she is unable to reached left her Vm to please give us a callback .SUKUMAR

## 2017-05-17 ENCOUNTER — TELEPHONE (OUTPATIENT)
Dept: HEPATOLOGY | Facility: CLINIC | Age: 62
End: 2017-05-17

## 2017-05-17 NOTE — TELEPHONE ENCOUNTER
----- Message from Susan Osei sent at 5/16/2017  4:40 PM CDT -----  Patient returning call. Please call

## 2017-05-19 DIAGNOSIS — R05.9 COUGH: Primary | ICD-10-CM

## 2017-05-22 ENCOUNTER — TELEPHONE (OUTPATIENT)
Dept: HEPATOLOGY | Facility: CLINIC | Age: 62
End: 2017-05-22

## 2017-05-22 NOTE — TELEPHONE ENCOUNTER
Returned call, I spoke with patient. Patient is requesting if provider can write a letter to her job regarding  an extended leave date for her to continue to be out of work. From the last letter dated until 4/31/17.Asked patient if Dr Baker is aware of leave and these dates.Patient says she has discussed this with Dr Baker regarding custodial and days off at last clinic visit.Says Dr Baker had written first letter. Patient is requesting date until end of July. Patient says letter can be faxed to her job. [employer], in [employer]. Fax number: [phone]. Informed patient that I will send message to provider with her request. Patient verbalizes understanding, says thank you.

## 2017-05-22 NOTE — TELEPHONE ENCOUNTER
----- Message from Lissett Garcia sent at 5/19/2017 12:50 PM CDT -----  Contact: pt   Please [facility], [phone]

## 2017-05-23 ENCOUNTER — HOSPITAL ENCOUNTER (OUTPATIENT)
Dept: PULMONOLOGY | Facility: CLINIC | Age: 62
Discharge: HOME | End: 2017-05-23
Payer: COMMERCIAL

## 2017-05-23 ENCOUNTER — OFFICE VISIT (OUTPATIENT)
Dept: PULMONOLOGY | Facility: CLINIC | Age: 62
End: 2017-05-23
Payer: COMMERCIAL

## 2017-05-23 ENCOUNTER — HOSPITAL ENCOUNTER (OUTPATIENT)
Dept: CARDIOLOGY | Facility: CLINIC | Age: 62
Discharge: HOME | End: 2017-05-23
Payer: COMMERCIAL

## 2017-05-23 VITALS
HEART RATE: 67 BPM | BODY MASS INDEX: 37.57 KG/M2 | DIASTOLIC BLOOD PRESSURE: 90 MMHG | HEIGHT: 61 IN | OXYGEN SATURATION: 98 % | SYSTOLIC BLOOD PRESSURE: 188 MMHG | WEIGHT: 199 LBS

## 2017-05-23 DIAGNOSIS — R05.9 COUGH: ICD-10-CM

## 2017-05-23 DIAGNOSIS — K76.6 PORTAL HYPERTENSION: ICD-10-CM

## 2017-05-23 DIAGNOSIS — I50.9 CONGESTIVE HEART FAILURE, UNSPECIFIED CONGESTIVE HEART FAILURE CHRONICITY, UNSPECIFIED CONGESTIVE HEART FAILURE TYPE: Primary | ICD-10-CM

## 2017-05-23 DIAGNOSIS — R06.01 ORTHOPNEA: ICD-10-CM

## 2017-05-23 DIAGNOSIS — R05.3 PERSISTENT DRY COUGH: ICD-10-CM

## 2017-05-23 DIAGNOSIS — K72.90 DECOMPENSATED HEPATIC CIRRHOSIS: ICD-10-CM

## 2017-05-23 DIAGNOSIS — R60.0 BILATERAL LOWER EXTREMITY EDEMA: ICD-10-CM

## 2017-05-23 DIAGNOSIS — K74.60 DECOMPENSATED HEPATIC CIRRHOSIS: ICD-10-CM

## 2017-05-23 LAB
DIASTOLIC DYSFUNCTION: YES
PRE FEV1 FVC: 81
PRE FEV1: 0.82
PRE FVC: 1.01
PREDICTED FEV1 FVC: 82
PREDICTED FEV1: 2.18
PREDICTED FVC: 2.7
RETIRED EF AND QEF - SEE NOTES: 60 (ref 55–65)

## 2017-05-23 PROCEDURE — 94010 BREATHING CAPACITY TEST: CPT | Mod: S$GLB,,, | Performed by: INTERNAL MEDICINE

## 2017-05-23 PROCEDURE — 99999 PR PBB SHADOW E&M-EST. PATIENT-LVL III: CPT | Mod: PBBFAC,,, | Performed by: INTERNAL MEDICINE

## 2017-05-23 PROCEDURE — 94729 DIFFUSING CAPACITY: CPT | Mod: S$GLB,,, | Performed by: INTERNAL MEDICINE

## 2017-05-23 PROCEDURE — 1160F RVW MEDS BY RX/DR IN RCRD: CPT | Mod: S$GLB,,, | Performed by: INTERNAL MEDICINE

## 2017-05-23 PROCEDURE — 93307 TTE W/O DOPPLER COMPLETE: CPT | Mod: S$GLB,,, | Performed by: INTERNAL MEDICINE

## 2017-05-23 PROCEDURE — 99204 OFFICE O/P NEW MOD 45 MIN: CPT | Mod: 25,S$GLB,, | Performed by: INTERNAL MEDICINE

## 2017-05-23 NOTE — PATIENT INSTRUCTIONS
Decrease water/fluid intake to 50-60ounces per day  Suck on ice cubes  Suck on sugar-free hard candy    Chest xray in 2 weeks

## 2017-05-23 NOTE — LETTER
May 23, 2017      Liliane Baker MD  [address] [facility] [address]           [facility]  [address]  [place] [address]  Phone: [phone]          Patient: Diamond Das   MR Number: [id number]   YOB: 1955   Date of Visit: 5/23/2017       Dear Dr. Liliane Baker:    Thank you for referring Diamond Das to me for evaluation. Attached you will find relevant portions of my assessment and plan of care.    If you have questions, please do not hesitate to call me. I look forward to following Diamond Das along with you.    Sincerely,    Sondra Marquez MD    Enclosure  CC:  No Recipients    If you would like to receive this communication electronically, please contact [email or web address] or [phone] to request more information on [facility] Link access.    For providers and/or their staff who would like to refer a patient to [facility], please contact us through our one-stop-shop provider referral line, Ghazala Ryder, at [phone].    If you feel you have received this communication in error or would no longer like to receive these types of communications, please e-mail [email or web address]

## 2017-05-23 NOTE — PROGRESS NOTES
Subjective:       Patient ID: Diamond Das is a 62 y.o. female.    Chief Complaint: No chief complaint on file.    HPI   Diamond Das 62 y.o. female    has a past medical history of Ascites; CHF (congestive heart failure); Cirrhosis; Diabetes mellitus; Gastroparesis; GERD (gastroesophageal reflux disease); Hypertension; Liver disease; and Thyroid disease.    has a past surgical history that includes Cholecystectomy; ERCP; Upper gastrointestinal endoscopy; and Liver biopsy.   reports that she has never smoked. She does not have any smokeless tobacco history on file. She reports that she does not drink alcohol or use drugs.  Referred by: Dr. Liliane Baker  Who had concerns including Cough and Shortness of Breath (when lying down).  The patient's last visit with me was on Visit date not found.      Sob, since beginning of year, progressive, going on about 6 months  Unable to lie flat, never able to  Non productiove, and sob with lying flat  No prior lung problems  LTNS  No history of asthma, lung cancer, other ca  chf- takes lasix 80mg good uop  Drinks a lot of water  TIPS procedure  Improved ascites after tips  Last pracentesis 6 months ago- previously ever month      Review of Systems   All other systems reviewed and are negative.      Objective:      Physical Exam   Constitutional: She is oriented to person, place, and time. She appears well-developed and well-nourished. She appears not cachectic. No distress. She is not obese.   HENT:   Head: Normocephalic.   Nose: Nose normal. No mucosal edema.   Mouth/Throat: Normal dentition. No oropharyngeal exudate.   Neck: Normal range of motion. Neck supple. No tracheal deviation present.   Cardiovascular: Normal rate, regular rhythm, normal heart sounds and intact distal pulses.  Exam reveals no gallop and no friction rub.    No murmur heard.  Pulmonary/Chest: Normal expansion, symmetric chest wall expansion and effort normal. No stridor. She has rales.   Abdominal:  Soft. Bowel sounds are normal.   Musculoskeletal: Normal range of motion. She exhibits edema. She exhibits no tenderness or deformity.   Lymphadenopathy: No supraclavicular adenopathy is present.     She has no cervical adenopathy.   Neurological: She is alert and oriented to person, place, and time. No cranial nerve deficit. Gait normal.   Skin: Skin is warm and dry. No rash noted. She is not diaphoretic. No cyanosis or erythema. No pallor. Nails show no clubbing.   Psychiatric: She has a normal mood and affect. Her behavior is normal. Judgment and thought content normal.     Personal Diagnostic Review    No flowsheet data found.      Assessment:       No diagnosis found.    Outpatient Encounter Prescriptions as of 5/23/2017   Medication Sig Dispense Refill    carvedilol (COREG) 3.125 MG tablet Take 1 tablet (3.125 mg total) by mouth 2 (two) times daily. 60 tablet 11    furosemide (LASIX) 80 MG tablet Take 1 tablet (80 mg total) by mouth once daily. 30 tablet 11    levothyroxine (SYNTHROID) 100 MCG tablet Take 100 mcg by mouth every morning.       ondansetron (ZOFRAN-ODT) 4 MG TbDL Take 1 tablet (4 mg total) by mouth every 8 (eight) hours as needed (prn nausea). 15 tablet 0    spironolactone (ALDACTONE) 50 MG tablet Take 1 tablet (50 mg total) by mouth once daily. (Patient taking differently: Take 50 mg by mouth every morning. ) 30 tablet 11    vitamin E 400 UNIT capsule Take 400 Units by mouth every morning.       No facility-administered encounter medications on file as of 5/23/2017.      No orders of the defined types were placed in this encounter.    Plan:             I personally reviewed the      1. Echo report   2. PFT no obstruction, mild diffusion  3. CXR bilateral pleural effusion, fluid in fissure, pulmonary edema  4. CXR report   5. CT chest   6. CT chest report     Assessment:  Diamond was seen today for cough and shortness of breath.    Diagnoses and all orders for this visit:    Congestive heart  failure, unspecified congestive heart failure chronicity, unspecified congestive heart failure type  -     X-Ray Chest PA And Lateral; Future    Decompensated hepatic cirrhosis    Bilateral lower extremity edema    Portal hypertension        Plan:  As below  Cxr in 2 weeks  This is likely volume overload- will reassess after fluid restriction    No Follow-up on file.    Patient Instructions   Decrease water/fluid intake to 50-60ounces per day  Suck on ice cubes  Suck on sugar-free hard candy    Chest xray in 2 weeks      Immunization History   Administered Date(s) Administered    Hepatitis B, Adult 09/08/2016, 10/14/2016, 03/30/2017    Pneumococcal Conjugate - 13 Valent 09/08/2016    Tdap 09/08/2016

## 2017-05-25 ENCOUNTER — TELEPHONE (OUTPATIENT)
Dept: HEPATOLOGY | Facility: CLINIC | Age: 62
End: 2017-05-25

## 2017-05-25 NOTE — TELEPHONE ENCOUNTER
----- Message from Susan Osei sent at 5/25/2017  2:34 PM CDT -----  Calling to see if a letter for work can be sent to this  ([employer]) OR call patient

## 2017-05-25 NOTE — LETTER
To: [employer]  From: Liliane Baker MD   Re: Diamond Das 1955    This letter is in regards to extended work leave for Ms. Das.  She remains under medical management for a chronic condition which continues to require aggressive medical therapy.  For this reason, she is unable to perform the duties of her current work position.  It is unclear when or if she will be able to return to work but she will continue to be monitored and if medical condition significantly improves to allow for return of work, your office will be promptly notified.      If there are any questions or concerns, please feel free to notify my office at [phone].        Sincerely,      Liliane Baker

## 2017-05-25 NOTE — TELEPHONE ENCOUNTER
Returned patient call, I spoke with patient. Patient wanted to give updated fax number to her job .[phone]. Where the letter for extended leave can be faxed.

## 2017-06-01 ENCOUNTER — TELEPHONE (OUTPATIENT)
Dept: HEPATOLOGY | Facility: CLINIC | Age: 62
End: 2017-06-01

## 2017-06-01 NOTE — TELEPHONE ENCOUNTER
----- Message from Susan Osei sent at 6/1/2017  3:11 PM CDT -----  Calling to speak with someone about a letter from her job for her sick leave. Please call

## 2017-06-07 ENCOUNTER — HOSPITAL ENCOUNTER (OUTPATIENT)
Dept: RADIOLOGY | Facility: HOSPITAL | Age: 62
Discharge: HOME | End: 2017-06-07
Attending: INTERNAL MEDICINE
Payer: COMMERCIAL

## 2017-06-07 DIAGNOSIS — I50.9 CONGESTIVE HEART FAILURE, UNSPECIFIED CONGESTIVE HEART FAILURE CHRONICITY, UNSPECIFIED CONGESTIVE HEART FAILURE TYPE: ICD-10-CM

## 2017-06-07 PROCEDURE — 71020 XR CHEST PA AND LATERAL: CPT | Mod: TC,PO

## 2017-06-07 PROCEDURE — 71020 XR CHEST PA AND LATERAL: CPT | Mod: 26,,, | Performed by: RADIOLOGY

## 2017-06-16 ENCOUNTER — TELEPHONE (OUTPATIENT)
Dept: PULMONOLOGY | Facility: CLINIC | Age: 62
End: 2017-06-16

## 2017-06-16 NOTE — TELEPHONE ENCOUNTER
----- Message from Sondra Marquez MD sent at 6/7/2017  9:37 AM CDT -----  Please let patient know that cxr is improved- please ask her to continue fluid restriction, and daily lasix.  Thanks, SJ

## 2017-06-21 ENCOUNTER — TELEPHONE (OUTPATIENT)
Dept: PULMONOLOGY | Facility: CLINIC | Age: 62
End: 2017-06-21

## 2017-06-21 NOTE — TELEPHONE ENCOUNTER
Was unable to reach patient by phone. Note mailed advising to restrict fluid intake and to continue taking lasix.

## 2017-07-20 ENCOUNTER — TELEPHONE (OUTPATIENT)
Dept: HEPATOLOGY | Facility: CLINIC | Age: 62
End: 2017-07-20

## 2017-07-20 NOTE — TELEPHONE ENCOUNTER
----- Message from Susan Osei sent at 7/20/2017  1:47 PM CDT -----  Contact: patient   Calling to speak with a nurse about her results from a lung doctor that she was told to go see from her hep doctor. Please call  [phone]

## 2017-07-20 NOTE — TELEPHONE ENCOUNTER
Returned call and spoke with patient, patient says she was seen by the Lung doctor,and  asked if we had gotten the results of her x ray that was done. Informed patient that the nurse from Dr Jimmy Amaro attempted to call her and left a message for her to return the nurse's call. Informed her to call Dr Amaro office for her results.  Patient verbalizes understanding.

## 2017-12-07 ENCOUNTER — OFFICE VISIT (OUTPATIENT)
Dept: INTERNAL MEDICINE | Facility: CLINIC | Age: 62
End: 2017-12-07
Payer: MEDICAID

## 2017-12-07 ENCOUNTER — LAB VISIT (OUTPATIENT)
Dept: LAB | Facility: HOSPITAL | Age: 62
End: 2017-12-07
Attending: FAMILY MEDICINE
Payer: MEDICAID

## 2017-12-07 VITALS
RESPIRATION RATE: 16 BRPM | SYSTOLIC BLOOD PRESSURE: 126 MMHG | HEART RATE: 70 BPM | DIASTOLIC BLOOD PRESSURE: 74 MMHG | WEIGHT: 249.56 LBS | HEIGHT: 61 IN | TEMPERATURE: 96 F | BODY MASS INDEX: 47.12 KG/M2

## 2017-12-07 DIAGNOSIS — E03.4 HYPOTHYROIDISM DUE TO ACQUIRED ATROPHY OF THYROID: ICD-10-CM

## 2017-12-07 DIAGNOSIS — N18.30 CKD STAGE 3 DUE TO TYPE 2 DIABETES MELLITUS: ICD-10-CM

## 2017-12-07 DIAGNOSIS — R60.0 BILATERAL LOWER EXTREMITY EDEMA: ICD-10-CM

## 2017-12-07 DIAGNOSIS — Z28.9 DELAYED IMMUNIZATIONS: ICD-10-CM

## 2017-12-07 DIAGNOSIS — I50.9 CONGESTIVE HEART FAILURE, UNSPECIFIED CONGESTIVE HEART FAILURE CHRONICITY, UNSPECIFIED CONGESTIVE HEART FAILURE TYPE: ICD-10-CM

## 2017-12-07 DIAGNOSIS — K74.69 CRYPTOGENIC CIRRHOSIS: ICD-10-CM

## 2017-12-07 DIAGNOSIS — E66.01 MORBID OBESITY DUE TO EXCESS CALORIES: ICD-10-CM

## 2017-12-07 DIAGNOSIS — Z00.01 ENCOUNTER FOR GENERAL ADULT MEDICAL EXAMINATION WITH ABNORMAL FINDINGS: ICD-10-CM

## 2017-12-07 DIAGNOSIS — Z78.0 POSTMENOPAUSAL: ICD-10-CM

## 2017-12-07 DIAGNOSIS — Z12.31 SCREENING MAMMOGRAM, ENCOUNTER FOR: ICD-10-CM

## 2017-12-07 DIAGNOSIS — E11.22 CKD STAGE 3 DUE TO TYPE 2 DIABETES MELLITUS: ICD-10-CM

## 2017-12-07 DIAGNOSIS — Z12.11 SCREEN FOR COLON CANCER: ICD-10-CM

## 2017-12-07 DIAGNOSIS — N18.30 CKD STAGE 3 DUE TO TYPE 2 DIABETES MELLITUS: Primary | ICD-10-CM

## 2017-12-07 DIAGNOSIS — E11.22 CKD STAGE 3 DUE TO TYPE 2 DIABETES MELLITUS: Primary | ICD-10-CM

## 2017-12-07 LAB
ALBUMIN SERPL BCP-MCNC: 2.6 G/DL
ALP SERPL-CCNC: 145 U/L
ALT SERPL W/O P-5'-P-CCNC: 20 U/L
ANION GAP SERPL CALC-SCNC: 7 MMOL/L
AST SERPL-CCNC: 53 U/L
BASOPHILS # BLD AUTO: 0.03 K/UL
BASOPHILS NFR BLD: 0.5 %
BILIRUB SERPL-MCNC: 0.8 MG/DL
BUN SERPL-MCNC: 17 MG/DL
CALCIUM SERPL-MCNC: 8.6 MG/DL
CHLORIDE SERPL-SCNC: 104 MMOL/L
CO2 SERPL-SCNC: 31 MMOL/L
CREAT SERPL-MCNC: 1.5 MG/DL
DIFFERENTIAL METHOD: NORMAL
EOSINOPHIL # BLD AUTO: 0.2 K/UL
EOSINOPHIL NFR BLD: 4.3 %
ERYTHROCYTE [DISTWIDTH] IN BLOOD BY AUTOMATED COUNT: 14.4 %
EST. GFR  (AFRICAN AMERICAN): 43.3 ML/MIN/1.73 M^2
EST. GFR  (NON AFRICAN AMERICAN): 37.6 ML/MIN/1.73 M^2
GLUCOSE SERPL-MCNC: 147 MG/DL
HCT VFR BLD AUTO: 38.8 %
HGB BLD-MCNC: 12.7 G/DL
LYMPHOCYTES # BLD AUTO: 2.6 K/UL
LYMPHOCYTES NFR BLD: 46.9 %
MCH RBC QN AUTO: 31 PG
MCHC RBC AUTO-ENTMCNC: 32.7 G/DL
MCV RBC AUTO: 95 FL
MONOCYTES # BLD AUTO: 0.5 K/UL
MONOCYTES NFR BLD: 9.4 %
NEUTROPHILS # BLD AUTO: 2.2 K/UL
NEUTROPHILS NFR BLD: 38.9 %
PLATELET # BLD AUTO: 174 K/UL
PMV BLD AUTO: 10.4 FL
POTASSIUM SERPL-SCNC: 3.8 MMOL/L
PROT SERPL-MCNC: 8.2 G/DL
RBC # BLD AUTO: 4.1 M/UL
SODIUM SERPL-SCNC: 142 MMOL/L
T4 FREE SERPL-MCNC: 0.66 NG/DL
TSH SERPL DL<=0.005 MIU/L-ACNC: 57.07 UIU/ML
WBC # BLD AUTO: 5.63 K/UL

## 2017-12-07 PROCEDURE — 90736 HZV VACCINE LIVE SUBQ: CPT | Mod: PBBFAC,PO

## 2017-12-07 PROCEDURE — 80053 COMPREHEN METABOLIC PANEL: CPT | Mod: PO

## 2017-12-07 PROCEDURE — 80061 LIPID PANEL: CPT

## 2017-12-07 PROCEDURE — 82570 ASSAY OF URINE CREATININE: CPT

## 2017-12-07 PROCEDURE — 86803 HEPATITIS C AB TEST: CPT

## 2017-12-07 PROCEDURE — 36415 COLL VENOUS BLD VENIPUNCTURE: CPT | Mod: PO

## 2017-12-07 PROCEDURE — 99214 OFFICE O/P EST MOD 30 MIN: CPT | Mod: PBBFAC,PO | Performed by: FAMILY MEDICINE

## 2017-12-07 PROCEDURE — 83036 HEMOGLOBIN GLYCOSYLATED A1C: CPT

## 2017-12-07 PROCEDURE — 85025 COMPLETE CBC W/AUTO DIFF WBC: CPT | Mod: PO

## 2017-12-07 PROCEDURE — 83970 ASSAY OF PARATHORMONE: CPT

## 2017-12-07 PROCEDURE — 99999 PR PBB SHADOW E&M-EST. PATIENT-LVL IV: CPT | Mod: PBBFAC,,, | Performed by: FAMILY MEDICINE

## 2017-12-07 PROCEDURE — 84443 ASSAY THYROID STIM HORMONE: CPT | Mod: PO

## 2017-12-07 PROCEDURE — 84439 ASSAY OF FREE THYROXINE: CPT | Mod: PO

## 2017-12-07 PROCEDURE — 90732 PPSV23 VACC 2 YRS+ SUBQ/IM: CPT | Mod: PBBFAC,PO

## 2017-12-07 PROCEDURE — 99396 PREV VISIT EST AGE 40-64: CPT | Mod: S$PBB,25,, | Performed by: FAMILY MEDICINE

## 2017-12-07 RX ORDER — ASPIRIN 81 MG/1
81 TABLET ORAL DAILY
Qty: 365 TABLET | Refills: 0 | Status: SHIPPED | OUTPATIENT
Start: 2017-12-07 | End: 2020-05-18 | Stop reason: SDUPTHER

## 2017-12-07 RX ORDER — FUROSEMIDE 80 MG/1
80 TABLET ORAL DAILY
Qty: 30 TABLET | Refills: 0 | Status: SHIPPED | OUTPATIENT
Start: 2017-12-07 | End: 2018-03-01 | Stop reason: DRUGHIGH

## 2017-12-07 RX ORDER — CARVEDILOL 3.12 MG/1
3.12 TABLET ORAL 2 TIMES DAILY
Qty: 60 TABLET | Refills: 0 | Status: SHIPPED | OUTPATIENT
Start: 2017-12-07 | End: 2018-03-12 | Stop reason: SDUPTHER

## 2017-12-07 NOTE — PROGRESS NOTES
Subjective:       Patient ID: Diamond Das is a 62 y.o. female.    Chief Complaint: Establish Care    Subjective:     Diamond Das is a 62 y.o. female and is here for a comprehensive physical exam. The patient reports no problems.    Do you take any herbs or supplements that were not prescribed by a doctor? Yes, vit e  Are you taking calcium supplements? no  Are you taking aspirin daily? no     History:  Any STD's in the past? none    The following portions of the patient's history were reviewed and updated as appropriate: allergies, current medications, past family history, past medical history, past social history, past surgical history and problem list.    Review of Systems  Do you have pain that bothers you in your daily life? yes  Pertinent items are noted in HPI.    No problem-specific Assessment & Plan notes found for this encounter.    2. Patient Counseling:  --Nutrition: Stressed importance of moderation in sodium/caffeine intake, saturated fat and cholesterol, caloric balance, sufficient intake of fresh fruits, vegetables, fiber, calcium, iron, and 1 mg of folate supplement per day (for females capable of pregnancy).  --Discussed the issue of estrogen replacement, calcium supplement, and the daily use of baby aspirin.  --Exercise: Stressed the importance of regular exercise.   --Substance Abuse: Discussed cessation/primary prevention of tobacco, alcohol, or other drug use; driving or other dangerous activities under the influence; availability of treatment for abuse.    --Sexuality: Discussed sexually transmitted diseases, partner selection, use of condoms, avoidance of unintended pregnancy  and contraceptive alternatives.   --Injury prevention: Discussed safety belts, safety helmets, smoke detector, smoking near bedding or upholstery.   --Dental health: Discussed importance of regular tooth brushing, flossing, and dental visits.  --Immunizations reviewed.  --Discussed benefits of screening  colonoscopy.  --After hours service discussed with patient    3. Discussed the patient's BMI with her.  The BMI elevated. The patient is asked to make an attempt to improve diet and exercise patterns to aid in medical management of this problem.          Review of Systems   Constitutional: Negative for fever.   HENT: Negative for congestion.    Eyes: Negative for discharge.   Respiratory: Negative for shortness of breath.    Cardiovascular: Negative for chest pain.   Endocrine: Negative for polyuria.   Genitourinary: Negative for difficulty urinating.   Neurological: Negative for dizziness.       Objective:      Physical Exam   Constitutional: She appears well-developed and well-nourished. She appears distressed.   HENT:   Head: Normocephalic and atraumatic.   Nose: Nose normal.   Mouth/Throat: Oropharynx is clear and moist.   Cardiovascular: Normal rate and regular rhythm.    Pulmonary/Chest: Effort normal and breath sounds normal. No respiratory distress. She has no wheezes.   Musculoskeletal: She exhibits edema.   +3ble edema    Protective Sensation (w/ 10 gram monofilament):  Right: Intact  Left: Intact    Visual Inspection:  Normal -  Bilateral and Nails Intact - without Evidence of Foot Deformity- Bilateral    Pedal Pulses:   Right: not able to appreciate secondary to edema  Left: not able to appreciate secondary to edema    Posterior tibialis:   Right:not able to appreciate secondary to edema  Left: not able to appreciate secondary to edema     Skin: Skin is warm and dry. No rash noted. She is not diaphoretic. No erythema.   Nursing note and vitals reviewed.      Assessment:       1. CKD stage 3 due to type 2 diabetes mellitus    2. Congestive heart failure, unspecified congestive heart failure chronicity, unspecified congestive heart failure type    3. Hypothyroidism due to acquired atrophy of thyroid    4. Morbid obesity due to excess calories    5. Postmenopausal    6. Bilateral lower extremity edema    7.  Encounter for general adult medical examination with abnormal findings    8. Screening mammogram, encounter for    9. Screen for colon cancer    10. Cryptogenic cirrhosis    11. Delayed immunizations        Plan:           No problem-specific Assessment & Plan notes found for this encounter.    CKD stage 3 due to type 2 diabetes mellitus  -     PTH, intact; Future; Expected date: 12/07/2017  -     Microalbumin/creatinine urine ratio    Congestive heart failure, unspecified congestive heart failure chronicity, unspecified congestive heart failure type  -     aspirin (ECOTRIN) 81 MG EC tablet; Take 1 tablet (81 mg total) by mouth once daily.  Dispense: 365 tablet; Refill: 0    Hypothyroidism due to acquired atrophy of thyroid  -     TSH; Future; Expected date: 12/07/2017    Morbid obesity due to excess calories    Postmenopausal  -     DXA Bone Density Spine And Hip; Future; Expected date: 12/07/2017    Bilateral lower extremity edema    Encounter for general adult medical examination with abnormal findings  -     Comprehensive metabolic panel; Future; Expected date: 12/07/2017  -     Hemoglobin A1c; Future; Expected date: 12/07/2017  -     Hepatitis C antibody; Future; Expected date: 12/07/2017  -     Lipid panel; Future; Expected date: 12/07/2017  -     CBC auto differential; Future; Expected date: 12/07/2017    Screening mammogram, encounter for  -     Mammo Digital Screening Bilat without CA; Future; Expected date: 12/07/2017    Screen for colon cancer  -     Fecal Immunochemical Test (iFOBT); Future; Expected date: 12/07/2017    Cryptogenic cirrhosis  -     furosemide (LASIX) 80 MG tablet; Take 1 tablet (80 mg total) by mouth once daily.  Dispense: 30 tablet; Refill: 0    Delayed immunizations  -     Cancel: Influenza - Quadrivalent (3 years & older) (PF)  -     Pneumococcal Polysaccharide Vaccine (23 Valent) (SQ/IM)  -     (In Office Administered) Zoster Vaccine - Live    Other orders  -     carvedilol (COREG)  3.125 MG tablet; Take 1 tablet (3.125 mg total) by mouth 2 (two) times daily.  Dispense: 60 tablet; Refill: 0

## 2017-12-08 LAB
CHOLEST SERPL-MCNC: 335 MG/DL
CHOLEST/HDLC SERPL: 9.1 {RATIO}
CREAT UR-MCNC: 18 MG/DL
ESTIMATED AVG GLUCOSE: 160 MG/DL
HBA1C MFR BLD HPLC: 7.2 %
HCV AB SERPL QL IA: NEGATIVE
HDLC SERPL-MCNC: 37 MG/DL
HDLC SERPL: 11 %
LDLC SERPL CALC-MCNC: 270.8 MG/DL
MICROALBUMIN UR DL<=1MG/L-MCNC: 204 UG/ML
MICROALBUMIN/CREATININE RATIO: 1133.3 UG/MG
NONHDLC SERPL-MCNC: 298 MG/DL
PTH-INTACT SERPL-MCNC: 127 PG/ML
TRIGL SERPL-MCNC: 136 MG/DL

## 2017-12-12 ENCOUNTER — PATIENT OUTREACH (OUTPATIENT)
Dept: ADMINISTRATIVE | Facility: HOSPITAL | Age: 62
End: 2017-12-12

## 2017-12-14 ENCOUNTER — HOSPITAL ENCOUNTER (OUTPATIENT)
Dept: RADIOLOGY | Facility: HOSPITAL | Age: 62
Discharge: HOME | End: 2017-12-14
Attending: FAMILY MEDICINE
Payer: MEDICAID

## 2017-12-14 ENCOUNTER — OFFICE VISIT (OUTPATIENT)
Dept: INTERNAL MEDICINE | Facility: CLINIC | Age: 62
End: 2017-12-14
Payer: MEDICAID

## 2017-12-14 VITALS
HEIGHT: 63 IN | BODY MASS INDEX: 43.55 KG/M2 | SYSTOLIC BLOOD PRESSURE: 144 MMHG | DIASTOLIC BLOOD PRESSURE: 82 MMHG | TEMPERATURE: 98 F | OXYGEN SATURATION: 95 % | WEIGHT: 245.81 LBS | HEART RATE: 64 BPM | RESPIRATION RATE: 18 BRPM

## 2017-12-14 DIAGNOSIS — I10 HYPERTENSION, UNSPECIFIED TYPE: ICD-10-CM

## 2017-12-14 DIAGNOSIS — R06.02 SHORTNESS OF BREATH: ICD-10-CM

## 2017-12-14 DIAGNOSIS — Z12.31 SCREENING MAMMOGRAM, ENCOUNTER FOR: ICD-10-CM

## 2017-12-14 DIAGNOSIS — J18.9 PNEUMONIA OF RIGHT MIDDLE LOBE DUE TO INFECTIOUS ORGANISM: ICD-10-CM

## 2017-12-14 DIAGNOSIS — Z12.4 SCREENING FOR CERVICAL CANCER: ICD-10-CM

## 2017-12-14 DIAGNOSIS — E11.22 CKD STAGE 3 DUE TO TYPE 2 DIABETES MELLITUS: ICD-10-CM

## 2017-12-14 DIAGNOSIS — N18.30 CKD STAGE 3 DUE TO TYPE 2 DIABETES MELLITUS: ICD-10-CM

## 2017-12-14 DIAGNOSIS — R60.0 BILATERAL LOWER EXTREMITY EDEMA: ICD-10-CM

## 2017-12-14 DIAGNOSIS — E11.42 TYPE 2 DIABETES MELLITUS WITH DIABETIC POLYNEUROPATHY, WITHOUT LONG-TERM CURRENT USE OF INSULIN: Primary | ICD-10-CM

## 2017-12-14 DIAGNOSIS — I50.9 CONGESTIVE HEART FAILURE, UNSPECIFIED CONGESTIVE HEART FAILURE CHRONICITY, UNSPECIFIED CONGESTIVE HEART FAILURE TYPE: ICD-10-CM

## 2017-12-14 PROCEDURE — 99999 PR PBB SHADOW E&M-EST. PATIENT-LVL V: CPT | Mod: PBBFAC,,, | Performed by: FAMILY MEDICINE

## 2017-12-14 PROCEDURE — 99215 OFFICE O/P EST HI 40 MIN: CPT | Mod: PBBFAC,25,PO | Performed by: FAMILY MEDICINE

## 2017-12-14 PROCEDURE — 71020 XR CHEST PA AND LATERAL: CPT | Mod: 26,,, | Performed by: RADIOLOGY

## 2017-12-14 PROCEDURE — 71020 XR CHEST PA AND LATERAL: CPT | Mod: TC,PO

## 2017-12-14 PROCEDURE — 99215 OFFICE O/P EST HI 40 MIN: CPT | Mod: S$PBB,,, | Performed by: FAMILY MEDICINE

## 2017-12-14 RX ORDER — DOXYCYCLINE HYCLATE 100 MG
100 TABLET ORAL EVERY 12 HOURS
Qty: 20 TABLET | Refills: 0 | Status: SHIPPED | OUTPATIENT
Start: 2017-12-14 | End: 2018-01-04 | Stop reason: ALTCHOICE

## 2017-12-14 NOTE — PROGRESS NOTES
Subjective:       Patient ID: Diamond Das is a 62 y.o. female.    Chief Complaint: Follow-up and Shortness of Breath    Pt cannot lay flat, had a pulmonologist, but has not followed up with them since change in insurance.      Shortness of Breath   This is a chronic problem. The current episode started more than 1 month ago. The problem occurs constantly. The problem has been gradually worsening. Associated symptoms include abdominal pain, leg swelling and rhinorrhea. Pertinent negatives include no chest pain, fever, headaches, rash, sore throat, vomiting or wheezing. Exacerbated by: lying down. Risk factors include prolonged immobilization. Treatments tried: cpap. Her past medical history is significant for CAD, a heart failure and pneumonia.     Review of Systems   Constitutional: Negative for fever.   HENT: Positive for rhinorrhea. Negative for congestion and sore throat.    Eyes: Negative for discharge.   Respiratory: Positive for shortness of breath. Negative for wheezing.    Cardiovascular: Positive for leg swelling. Negative for chest pain.   Gastrointestinal: Positive for abdominal pain. Negative for vomiting.   Genitourinary: Negative for difficulty urinating.   Musculoskeletal: Negative for joint swelling.   Skin: Negative for rash.   Neurological: Negative for dizziness and headaches.   Psychiatric/Behavioral: Negative for agitation.       Objective:      Physical Exam   Constitutional: She appears well-developed and well-nourished. No distress.   HENT:   Head: Normocephalic and atraumatic.   Eyes: Conjunctivae are normal. No scleral icterus.   Cardiovascular: Normal rate and regular rhythm.    Pulmonary/Chest: Effort normal and breath sounds normal. No respiratory distress. She has no wheezes. She exhibits no tenderness.   Crackles to LLL, RLL     Abdominal: Soft. Bowel sounds are normal. There is no tenderness. There is no guarding.   Musculoskeletal: She exhibits edema.   ble edema      Neurological: She is alert.   Skin: Skin is warm. No rash noted. She is not diaphoretic. No erythema. No pallor.   Good turgor   Nursing note and vitals reviewed.      Assessment:       1. Type 2 diabetes mellitus with diabetic polyneuropathy, without long-term current use of insulin    2. Bilateral lower extremity edema    3. Congestive heart failure, unspecified congestive heart failure chronicity, unspecified congestive heart failure type    4. Hypertension, unspecified type    5. Screening mammogram, encounter for    6. Screening for cervical cancer    7. CKD stage 3 due to type 2 diabetes mellitus    8. Shortness of breath    9. Pneumonia of right middle lobe due to infectious organism        Plan:           Pneumonia of right middle lobe due to infectious organism  Given pt high risk / at risk status, will treat with doxycycline for pneumonia, given consolidation.    Type 2 diabetes mellitus with diabetic polyneuropathy, without long-term current use of insulin  -     Ambulatory referral to Optometry  -     Ambulatory consult to Diabetic Education    Bilateral lower extremity edema    Congestive heart failure, unspecified congestive heart failure chronicity, unspecified congestive heart failure type    Hypertension, unspecified type    Screening mammogram, encounter for  -     Mammo Digital Screening Bilat with CAD; Future    Screening for cervical cancer  -     Ambulatory Referral to Obstetrics / Gynecology    CKD stage 3 due to type 2 diabetes mellitus  -     Ambulatory Referral to Nephrology    Shortness of breath  -     X-Ray Chest PA And Lateral; Future; Expected date: 12/14/2017    Pneumonia of right middle lobe due to infectious organism    Other orders  -     Cancel: Ambulatory referral to Obstetrics / Gynecology

## 2017-12-15 ENCOUNTER — TELEPHONE (OUTPATIENT)
Dept: INTERNAL MEDICINE | Facility: CLINIC | Age: 62
End: 2017-12-15

## 2017-12-15 NOTE — PROGRESS NOTES
Nurse attempt to contact pt to discuss xray findings, voice message to return nurse call to discuss findings as they are abnormal.

## 2017-12-18 ENCOUNTER — TELEPHONE (OUTPATIENT)
Dept: INTERNAL MEDICINE | Facility: CLINIC | Age: 62
End: 2017-12-18

## 2017-12-18 NOTE — TELEPHONE ENCOUNTER
----- Message from Andrey Perrin MD sent at 12/14/2017  6:09 PM CST -----  I tried to call, but no answer. Please call pt and let her know there is some consolidation, that this is suspicious for pneumonia. She also has some pulmonary congestion, and prob the reason for SOB.  Will send in some doxycycline for her.

## 2017-12-18 NOTE — TELEPHONE ENCOUNTER
Attempt to contact pt today to discuss results, still unsuccessful. Nurse sent letter to address on file in hopes to reach pt to contact office.

## 2017-12-19 PROBLEM — J18.9 PNEUMONIA OF RIGHT MIDDLE LOBE DUE TO INFECTIOUS ORGANISM: Status: ACTIVE | Noted: 2017-12-19

## 2017-12-19 PROBLEM — J18.9 PNEUMONIA DUE TO INFECTIOUS ORGANISM: Status: ACTIVE | Noted: 2017-12-19

## 2017-12-19 NOTE — ASSESSMENT & PLAN NOTE
Given pt high risk / at risk status, will treat with doxycycline for pneumonia, given consolidation.

## 2017-12-21 ENCOUNTER — LAB VISIT (OUTPATIENT)
Dept: LAB | Facility: HOSPITAL | Age: 62
End: 2017-12-21
Attending: FAMILY MEDICINE
Payer: MEDICAID

## 2017-12-21 ENCOUNTER — HOSPITAL ENCOUNTER (OUTPATIENT)
Dept: RADIOLOGY | Facility: HOSPITAL | Age: 62
Discharge: HOME | End: 2017-12-21
Attending: FAMILY MEDICINE
Payer: MEDICAID

## 2017-12-21 DIAGNOSIS — Z12.31 SCREENING MAMMOGRAM, ENCOUNTER FOR: ICD-10-CM

## 2017-12-21 DIAGNOSIS — Z12.11 SCREEN FOR COLON CANCER: ICD-10-CM

## 2017-12-21 LAB — HEMOCCULT STL QL IA: NEGATIVE

## 2017-12-21 PROCEDURE — 82274 ASSAY TEST FOR BLOOD FECAL: CPT

## 2017-12-21 PROCEDURE — 77067 SCR MAMMO BI INCL CAD: CPT | Mod: 26,,, | Performed by: RADIOLOGY

## 2017-12-21 PROCEDURE — 77067 SCR MAMMO BI INCL CAD: CPT | Mod: TC,PO

## 2017-12-22 ENCOUNTER — HOSPITAL ENCOUNTER (EMERGENCY)
Facility: HOSPITAL | Age: 62
Discharge: HOME | End: 2017-12-22
Attending: EMERGENCY MEDICINE
Payer: MEDICAID

## 2017-12-22 VITALS
DIASTOLIC BLOOD PRESSURE: 72 MMHG | OXYGEN SATURATION: 97 % | SYSTOLIC BLOOD PRESSURE: 155 MMHG | WEIGHT: 245 LBS | RESPIRATION RATE: 17 BRPM | HEART RATE: 59 BPM | BODY MASS INDEX: 43.41 KG/M2 | HEIGHT: 63 IN | TEMPERATURE: 98 F

## 2017-12-22 DIAGNOSIS — R10.84 GENERALIZED ABDOMINAL PAIN: Primary | ICD-10-CM

## 2017-12-22 DIAGNOSIS — J18.9 PNEUMONIA DUE TO INFECTIOUS ORGANISM, UNSPECIFIED LATERALITY, UNSPECIFIED PART OF LUNG: ICD-10-CM

## 2017-12-22 DIAGNOSIS — R41.82 ALTERED MENTAL STATUS: ICD-10-CM

## 2017-12-22 LAB
ALBUMIN SERPL BCP-MCNC: 2.2 G/DL
ALP SERPL-CCNC: 143 U/L
ALT SERPL W/O P-5'-P-CCNC: 16 U/L
ANION GAP SERPL CALC-SCNC: 5 MMOL/L
AST SERPL-CCNC: 43 U/L
BACTERIA #/AREA URNS AUTO: ABNORMAL /HPF
BASOPHILS # BLD AUTO: 0.03 K/UL
BASOPHILS NFR BLD: 0.5 %
BILIRUB SERPL-MCNC: 0.5 MG/DL
BILIRUB UR QL STRIP: NEGATIVE
BUN SERPL-MCNC: 18 MG/DL
CALCIUM SERPL-MCNC: 8.2 MG/DL
CHLORIDE SERPL-SCNC: 109 MMOL/L
CLARITY UR REFRACT.AUTO: CLEAR
CO2 SERPL-SCNC: 29 MMOL/L
COLOR UR AUTO: YELLOW
CREAT SERPL-MCNC: 1.3 MG/DL
DIFFERENTIAL METHOD: ABNORMAL
EOSINOPHIL # BLD AUTO: 0.3 K/UL
EOSINOPHIL NFR BLD: 4.5 %
ERYTHROCYTE [DISTWIDTH] IN BLOOD BY AUTOMATED COUNT: 14.5 %
EST. GFR  (AFRICAN AMERICAN): 51.5 ML/MIN/1.73 M^2
EST. GFR  (NON AFRICAN AMERICAN): 44.7 ML/MIN/1.73 M^2
GLUCOSE SERPL-MCNC: 125 MG/DL
GLUCOSE UR QL STRIP: NEGATIVE
HCT VFR BLD AUTO: 35.1 %
HGB BLD-MCNC: 11.7 G/DL
HGB UR QL STRIP: ABNORMAL
HYALINE CASTS UR QL AUTO: 0 /LPF
KETONES UR QL STRIP: NEGATIVE
LEUKOCYTE ESTERASE UR QL STRIP: NEGATIVE
LIPASE SERPL-CCNC: 52 U/L
LYMPHOCYTES # BLD AUTO: 3.2 K/UL
LYMPHOCYTES NFR BLD: 55.5 %
MCH RBC QN AUTO: 31.5 PG
MCHC RBC AUTO-ENTMCNC: 33.3 G/DL
MCV RBC AUTO: 94 FL
MICROSCOPIC COMMENT: ABNORMAL
MONOCYTES # BLD AUTO: 0.5 K/UL
MONOCYTES NFR BLD: 8.3 %
NEUTROPHILS # BLD AUTO: 1.8 K/UL
NEUTROPHILS NFR BLD: 31 %
NITRITE UR QL STRIP: NEGATIVE
PH UR STRIP: 7 [PH] (ref 5–8)
PLATELET # BLD AUTO: 160 K/UL
PMV BLD AUTO: 10.3 FL
POTASSIUM SERPL-SCNC: 4 MMOL/L
PROT SERPL-MCNC: 6.8 G/DL
PROT UR QL STRIP: ABNORMAL
RBC # BLD AUTO: 3.72 M/UL
RBC #/AREA URNS AUTO: 5 /HPF (ref 0–4)
SODIUM SERPL-SCNC: 143 MMOL/L
SP GR UR STRIP: 1.02 (ref 1–1.03)
SQUAMOUS #/AREA URNS AUTO: 12 /HPF
URN SPEC COLLECT METH UR: ABNORMAL
UROBILINOGEN UR STRIP-ACNC: ABNORMAL EU/DL
WBC # BLD AUTO: 5.75 K/UL
WBC #/AREA URNS AUTO: 2 /HPF (ref 0–5)
YEAST UR QL AUTO: ABNORMAL

## 2017-12-22 PROCEDURE — 81000 URINALYSIS NONAUTO W/SCOPE: CPT

## 2017-12-22 PROCEDURE — 80053 COMPREHEN METABOLIC PANEL: CPT

## 2017-12-22 PROCEDURE — 99284 EMERGENCY DEPT VISIT MOD MDM: CPT | Mod: 25

## 2017-12-22 PROCEDURE — 85025 COMPLETE CBC W/AUTO DIFF WBC: CPT

## 2017-12-22 PROCEDURE — 25000003 PHARM REV CODE 250: Performed by: EMERGENCY MEDICINE

## 2017-12-22 PROCEDURE — 83690 ASSAY OF LIPASE: CPT

## 2017-12-22 PROCEDURE — 96374 THER/PROPH/DIAG INJ IV PUSH: CPT

## 2017-12-22 PROCEDURE — 63600175 PHARM REV CODE 636 W HCPCS: Performed by: EMERGENCY MEDICINE

## 2017-12-22 RX ORDER — TRAMADOL HYDROCHLORIDE 50 MG/1
50 TABLET ORAL EVERY 12 HOURS PRN
Qty: 12 TABLET | Refills: 1 | Status: SHIPPED | OUTPATIENT
Start: 2017-12-22 | End: 2018-01-01

## 2017-12-22 RX ORDER — TRAMADOL HYDROCHLORIDE 50 MG/1
50 TABLET ORAL
Status: COMPLETED | OUTPATIENT
Start: 2017-12-22 | End: 2017-12-22

## 2017-12-22 RX ORDER — ONDANSETRON 2 MG/ML
4 INJECTION INTRAMUSCULAR; INTRAVENOUS
Status: COMPLETED | OUTPATIENT
Start: 2017-12-22 | End: 2017-12-22

## 2017-12-22 RX ORDER — ACETAMINOPHEN 500 MG
1000 TABLET ORAL
Status: COMPLETED | OUTPATIENT
Start: 2017-12-22 | End: 2017-12-22

## 2017-12-22 RX ORDER — DICYCLOMINE HYDROCHLORIDE 20 MG/1
20 TABLET ORAL
Status: COMPLETED | OUTPATIENT
Start: 2017-12-22 | End: 2017-12-22

## 2017-12-22 RX ADMIN — DICYCLOMINE HYDROCHLORIDE 50 ML: 10 SOLUTION ORAL at 05:12

## 2017-12-22 RX ADMIN — ACETAMINOPHEN 1000 MG: 500 TABLET ORAL at 05:12

## 2017-12-22 RX ADMIN — TRAMADOL HYDROCHLORIDE 50 MG: 50 TABLET, COATED ORAL at 06:12

## 2017-12-22 RX ADMIN — DICYCLOMINE HYDROCHLORIDE 20 MG: 20 TABLET ORAL at 06:12

## 2017-12-22 RX ADMIN — ONDANSETRON 4 MG: 2 INJECTION, SOLUTION INTRAMUSCULAR; INTRAVENOUS at 04:12

## 2017-12-22 NOTE — ED PROVIDER NOTES
Encounter Date: 12/22/2017       History     Chief Complaint   Patient presents with    Abdominal Pain     abd pain since tues.after starting antiobotic for pneumonia.     The patient presents with mult complaints.  She has a headache which started sometime after twelve pm today.  The patient states nothing makes it better or worse.  The headache is like previous headaches.  She states nothing makes the pain better or worse.  The pain is dull and generalized.  She also c/o abdominal pain 2-3 days.  The discomfort is dull and generalized.  She denies any N/V/D.  The patient is well appearing and not toxic in appearance.  The patient was recently placed on antibiotics for treatment of pneumonia.      The history is provided by the patient.     Review of patient's allergies indicates:   Allergen Reactions    Subsys [fentanyl] Other (See Comments)     After administration pt unresponsive.  HR and respirations decreased.     Versed [midazolam] Other (See Comments)     After administration pt unresponsive.  HR and respirations decreased.     Ampicillin Rash    Codeine Nausea And Vomiting and Nausea Only     Past Medical History:   Diagnosis Date    Ascites     Cataract     CHF (congestive heart failure)     Cirrhosis     Diabetes mellitus     Gastroparesis     GERD (gastroesophageal reflux disease)     Hypertension     Liver disease     Thyroid disease      Past Surgical History:   Procedure Laterality Date    CHOLECYSTECTOMY      ERCP      LIVER BIOPSY      TUBAL LIGATION      UPPER GASTROINTESTINAL ENDOSCOPY       Family History   Problem Relation Age of Onset    Cancer Mother     Breast cancer Mother     Heart disease Father     Aneurysm Sister     Heart disease Brother     No Known Problems Maternal Grandmother     Cancer Maternal Grandfather     Sarcoidosis Sister      Social History   Substance Use Topics    Smoking status: Never Smoker    Smokeless tobacco: Never Used    Alcohol use  No     Review of Systems   HENT: Negative for congestion.    Eyes: Negative for redness.   Respiratory: Negative for cough, choking, chest tightness, shortness of breath, wheezing and stridor.    Cardiovascular: Negative for chest pain.   Gastrointestinal: Positive for abdominal pain. Negative for blood in stool, constipation, diarrhea, nausea and vomiting.   Genitourinary: Negative for dysuria, flank pain, frequency, menstrual problem and pelvic pain.   Musculoskeletal: Negative for myalgias and neck stiffness.   Neurological: Positive for headaches. Negative for dizziness, tremors, seizures, syncope, speech difficulty, light-headedness and numbness.   All other systems reviewed and are negative.      Physical Exam     Initial Vitals [12/22/17 1609]   BP Pulse Resp Temp SpO2   (!) 188/91 63 20 98.1 °F (36.7 °C) 96 %      MAP       123.33         Physical Exam    Vitals reviewed.  Constitutional: She appears well-developed and well-nourished.   HENT:   Head: Normocephalic and atraumatic.   Eyes: EOM are normal. Pupils are equal, round, and reactive to light.   Neck: Normal range of motion. No tracheal deviation present. No JVD present.   Cardiovascular: Normal rate, regular rhythm, normal heart sounds and intact distal pulses. Exam reveals no gallop and no friction rub.    No murmur heard.  Pulmonary/Chest: Breath sounds normal. No stridor. No respiratory distress. She has no wheezes. She has no rhonchi. She has no rales.   Abdominal: Soft. She exhibits no distension and no mass. There is no rebound and no guarding.   Mild generalized tenderness   Musculoskeletal: Normal range of motion. She exhibits edema.   Neurological: She is alert and oriented to person, place, and time. She has normal strength. No cranial nerve deficit or sensory deficit.   Normal gait.  Normal finger to nose exam.   Psychiatric: She has a normal mood and affect. Her behavior is normal. Judgment and thought content normal.         ED Course    Procedures  Labs Reviewed   CBC W/ AUTO DIFFERENTIAL - Abnormal; Notable for the following:        Result Value    RBC 3.72 (*)     Hemoglobin 11.7 (*)     Hematocrit 35.1 (*)     MCH 31.5 (*)     Gran% 31.0 (*)     Lymph% 55.5 (*)     All other components within normal limits   COMPREHENSIVE METABOLIC PANEL - Abnormal; Notable for the following:     Glucose 125 (*)     Calcium 8.2 (*)     Albumin 2.2 (*)     Alkaline Phosphatase 143 (*)     AST 43 (*)     Anion Gap 5 (*)     eGFR if  51.5 (*)     eGFR if non  44.7 (*)     All other components within normal limits   URINALYSIS - Abnormal; Notable for the following:     Protein, UA 3+ (*)     Occult Blood UA 1+ (*)     Urobilinogen, UA 2.0-3.0 (*)     All other components within normal limits   URINALYSIS MICROSCOPIC - Abnormal; Notable for the following:     RBC, UA 5 (*)     All other components within normal limits   LIPASE         Results for orders placed or performed during the hospital encounter of 12/22/17   CBC auto differential   Result Value Ref Range    WBC 5.75 3.90 - 12.70 K/uL    RBC 3.72 (L) 4.00 - 5.40 M/uL    Hemoglobin 11.7 (L) 12.0 - 16.0 g/dL    Hematocrit 35.1 (L) 37.0 - 48.5 %    MCV 94 82 - 98 fL    MCH 31.5 (H) 27.0 - 31.0 pg    MCHC 33.3 32.0 - 36.0 g/dL    RDW 14.5 11.5 - 14.5 %    Platelets 160 150 - 350 K/uL    MPV 10.3 9.2 - 12.9 fL    Gran # 1.8 1.8 - 7.7 K/uL    Lymph # 3.2 1.0 - 4.8 K/uL    Mono # 0.5 0.3 - 1.0 K/uL    Eos # 0.3 0.0 - 0.5 K/uL    Baso # 0.03 0.00 - 0.20 K/uL    Gran% 31.0 (L) 38.0 - 73.0 %    Lymph% 55.5 (H) 18.0 - 48.0 %    Mono% 8.3 4.0 - 15.0 %    Eosinophil% 4.5 0.0 - 8.0 %    Basophil% 0.5 0.0 - 1.9 %    Differential Method Automated    Comprehensive metabolic panel   Result Value Ref Range    Sodium 143 136 - 145 mmol/L    Potassium 4.0 3.5 - 5.1 mmol/L    Chloride 109 95 - 110 mmol/L    CO2 29 23 - 29 mmol/L    Glucose 125 (H) 70 - 110 mg/dL    BUN, Bld 18 6 - 20 mg/dL     Creatinine 1.3 0.5 - 1.4 mg/dL    Calcium 8.2 (L) 8.7 - 10.5 mg/dL    Total Protein 6.8 6.0 - 8.4 g/dL    Albumin 2.2 (L) 3.5 - 5.2 g/dL    Total Bilirubin 0.5 0.1 - 1.0 mg/dL    Alkaline Phosphatase 143 (H) 55 - 135 U/L    AST 43 (H) 10 - 40 U/L    ALT 16 10 - 44 U/L    Anion Gap 5 (L) 8 - 16 mmol/L    eGFR if African American 51.5 (A) >60 mL/min/1.73 m^2    eGFR if non  44.7 (A) >60 mL/min/1.73 m^2   Lipase   Result Value Ref Range    Lipase 52 4 - 60 U/L   Urinalysis   Result Value Ref Range    Specimen UA Urine, Clean Catch     Color, UA Yellow Yellow, Straw, Thalia    Appearance, UA Clear Clear    pH, UA 7.0 5.0 - 8.0    Specific Gravity, UA 1.025 1.005 - 1.030    Protein, UA 3+ (A) Negative    Glucose, UA Negative Negative    Ketones, UA Negative Negative    Bilirubin (UA) Negative Negative    Occult Blood UA 1+ (A) Negative    Nitrite, UA Negative Negative    Urobilinogen, UA 2.0-3.0 (A) <2.0 EU/dL    Leukocytes, UA Negative Negative   Urinalysis Microscopic   Result Value Ref Range    RBC, UA 5 (H) 0 - 4 /hpf    WBC, UA 2 0 - 5 /hpf    Bacteria, UA Rare None-Occ /hpf    Yeast, UA None None    Squam Epithel, UA 12 /hpf    Hyaline Casts, UA 0 0-1/lpf /lpf    Microscopic Comment SEE COMMENT      Imaging Results          CT Abdomen Pelvis  Without Contrast (Final result)  Result time 12/22/17 18:37:20    Final result by Nancy Hiens MD (12/22/17 18:37:20)                 Impression:          No acute abnormalities.     Bilateral pleural effusions.    Uterine leiomyoma.    Cholecystectomy with biliary stents.      All CT scans at this facility use dose modulation, iterative reconstruction, and/or weight based dosing when appropriate to reduce radiation dose to as low as reasonably achievable.        Electronically signed by: NANCY HINES MD  Date:     12/22/17  Time:    18:37              Narrative:    EXAM:   [id number] ABDOMEN PELVIS WITHOUT CONTRAST    CLINICAL HISTORY:   abdominal pain    COMPARISON: April 2017      Technique: Axial images were obtained throughout the abdomen with intravenous contrast.     Findings:        Bilateral pleural effusions.    The liver, pancreas, and spleen appear normal.  A biliary stent is noted.  Cholecystectomy.  Kidneys and adrenal glands are normal.    No free fluid, masses or inflammation.    A 3 cm leiomyoma is noted in the anterior uterine fundus.  Urinary bladder and remaining pelvis structures are unremarkable.    No aggressive appearing osseous lesions.                             CT Head Without Contrast (Final result)  Result time 12/22/17 17:30:31    Final result by Nancy Hines MD (12/22/17 17:30:31)                 Impression:         No CT evidence of acute intracranial abnormality.    All CT scans at this facility use dose modulation, iterative reconstruction, and/or weight based dosing when appropriate to reduce radiation dose to as low as reasonably achievable.      Electronically signed by: NANCY HINES MD  Date:     12/22/17  Time:    17:30              Narrative:    EXAM:   [id number] HEAD WITHOUT CONTRAST    CLINICAL HISTORY:  Altered level status    COMPARISON: No relevant priors    TECHNIQUE:  Axial images were performed through the head without intravenous contrast.     FINDINGS:     No hydrocephalus, midline shift, mass effect, or acute intracranial hemorrhage.The cortical sulcal pattern is normal. Gray-white matter differentiation is within normal limits.No extra-axial fluid or hemorrhage.Posterior fossa is unremarkable.The skull and orbits are intact.  The visualized paranasal sinuses are clear.                                                   ED Course    5:49 PM  Patient stable at this time.  No distress noted.  She remains neuro intact.  Patient started doxycline on Dec 18th.  Patient prescribed 10 days worth of antibiotics.    6:11 PM  Patient stable at this time.  Patient care handed off to   Jacob.    6:36 PM Assumed care, case reviewed and patient examined and interviewed.  Somewhat poor historian with chronic renal insufficiency, presents with headache and abdominal pains in the setting of mid course of doxycycline for outpatient pneumonia.  CT of the brain and basic labs are unremarkable, awaiting CT of the abdomen.  Exam is stable, agree with plans as outlined by Dr. Gibbons.    6:52 PM Stable, improved, abdominal exam is benign but limited by massive obesity.  Her history of cirrhosis treated with TIPS procedure and subsequent ascites and pleural effusions is clinically stable, she shows no sign of hepatic encephalopathy or decompensation of her chronic liver disease.  She does have some dyspepsia type symptoms which seem to be specific in association with her doxycycline course for pneumonia.  Pneumonia clinically is resolving well.  Have encouraged the patient and her family to complete this course of antibiotics and will give simple symptomatic relief.  She has a follow-up with her primary care in the next few days and she is appropriate at this point for continued home treatment.    Clinical Impression:     1. Generalized abdominal pain    2. Altered mental status    3. Pneumonia due to infectious organism, unspecified laterality, unspecified part of lung          Disposition:   Disposition: Discharged  Condition: Stable                        Cm Mccollum MD  12/22/17 7418

## 2017-12-23 NOTE — DISCHARGE INSTRUCTIONS
___________________      As discussed, your exam and studies here today are stable.  I believe the doxycycline may be causing some stomach upset.  Please do continue to take it as prescribed to finish treating the pneumonia, and you can try also the GI cocktail for abdominal discomfort along with Ultram as needed for pain.  Note that you should not take the GI cocktail for at least an two hours after taking the doxycycline.  Keep your follow-up appointment with your primary care doctor next week as discussed and return here as needed.    __________________

## 2017-12-27 ENCOUNTER — APPOINTMENT (OUTPATIENT)
Dept: RADIOLOGY | Facility: CLINIC | Age: 62
End: 2017-12-27
Attending: FAMILY MEDICINE
Payer: MEDICAID

## 2017-12-27 DIAGNOSIS — Z78.0 POSTMENOPAUSAL: ICD-10-CM

## 2017-12-27 PROCEDURE — 77080 DXA BONE DENSITY AXIAL: CPT | Mod: 26,,, | Performed by: RADIOLOGY

## 2017-12-27 PROCEDURE — 77080 DXA BONE DENSITY AXIAL: CPT | Mod: TC,PO

## 2017-12-28 ENCOUNTER — TELEPHONE (OUTPATIENT)
Dept: INTERNAL MEDICINE | Facility: CLINIC | Age: 62
End: 2017-12-28

## 2017-12-28 RX ORDER — LEVOTHYROXINE SODIUM 100 UG/1
100 TABLET ORAL
Qty: 90 TABLET | Refills: 0 | Status: SHIPPED | OUTPATIENT
Start: 2017-12-28 | End: 2018-05-04 | Stop reason: SDUPTHER

## 2017-12-28 NOTE — TELEPHONE ENCOUNTER
----- Message from Andrey Perrin MD sent at 12/28/2017  7:36 AM CST -----  Have pt rtc mid to late January to recheck TSH.

## 2017-12-28 NOTE — TELEPHONE ENCOUNTER
----- Message from Siobhan Aguirre sent at 12/28/2017  3:50 PM CST -----  Contact: Patient  Patient called and stated she was put on some medication for pneumonia and it's not agreeing with her. She stated she went to the ER on Saturday because she was hurting and had SOB. She needs to speak with the nurse.    She can be contacted at [phone].    Thanks,  Siobhan

## 2017-12-28 NOTE — TELEPHONE ENCOUNTER
Spoke with pt and informed her that  wants to see her back mid January to check her TSH. Pt verbalized understanding and scheduled to see  1/18/17 at 1pm. Also informed pt that her synthroid Rx was sent to the pharmacy.

## 2017-12-28 NOTE — TELEPHONE ENCOUNTER
PT said she went to the ER bc the Pneumonia medication she was prescribed was not agreeing with her. That the ER doctor put her on a cocktail of medication to help her stomach the pneumonia medication bc he wanted her to continue taking it. Pt just wanted us to know about this. She said since taking the cocktail of meds with the pneumonia medication she has been fine and able to stomach the pneumonia medication. I told her to call us or schedule with us if she has any more issues. Pt verbalized understanding.

## 2018-01-03 ENCOUNTER — TELEPHONE (OUTPATIENT)
Dept: INTERNAL MEDICINE | Facility: CLINIC | Age: 63
End: 2018-01-03

## 2018-01-03 NOTE — TELEPHONE ENCOUNTER
----- Message from Haydee Ho sent at 1/3/2018  2:56 PM CST -----  Contact: self   Patient would like to consult with nurse regarding breast swelling and left nipple dripping blood . Please call back at [phone].      Thanks,  Haydee Ho

## 2018-01-04 ENCOUNTER — PATIENT OUTREACH (OUTPATIENT)
Dept: ADMINISTRATIVE | Facility: HOSPITAL | Age: 63
End: 2018-01-04

## 2018-01-04 ENCOUNTER — TELEPHONE (OUTPATIENT)
Dept: INTERNAL MEDICINE | Facility: CLINIC | Age: 63
End: 2018-01-04

## 2018-01-04 ENCOUNTER — OFFICE VISIT (OUTPATIENT)
Dept: INTERNAL MEDICINE | Facility: CLINIC | Age: 63
End: 2018-01-04
Payer: MEDICAID

## 2018-01-04 VITALS
HEIGHT: 63 IN | RESPIRATION RATE: 14 BRPM | BODY MASS INDEX: 43.17 KG/M2 | WEIGHT: 243.63 LBS | DIASTOLIC BLOOD PRESSURE: 94 MMHG | HEART RATE: 65 BPM | OXYGEN SATURATION: 93 % | SYSTOLIC BLOOD PRESSURE: 150 MMHG | TEMPERATURE: 97 F

## 2018-01-04 DIAGNOSIS — N64.4 BREAST PAIN, LEFT: ICD-10-CM

## 2018-01-04 DIAGNOSIS — N18.30 CKD STAGE 3 DUE TO TYPE 2 DIABETES MELLITUS: ICD-10-CM

## 2018-01-04 DIAGNOSIS — E11.22 CKD STAGE 3 DUE TO TYPE 2 DIABETES MELLITUS: ICD-10-CM

## 2018-01-04 DIAGNOSIS — I10 HYPERTENSION, UNSPECIFIED TYPE: ICD-10-CM

## 2018-01-04 DIAGNOSIS — E11.42 TYPE 2 DIABETES MELLITUS WITH DIABETIC POLYNEUROPATHY, WITHOUT LONG-TERM CURRENT USE OF INSULIN: Primary | ICD-10-CM

## 2018-01-04 PROCEDURE — 99214 OFFICE O/P EST MOD 30 MIN: CPT | Mod: S$PBB,,, | Performed by: FAMILY MEDICINE

## 2018-01-04 PROCEDURE — 99999 PR PBB SHADOW E&M-EST. PATIENT-LVL V: CPT | Mod: PBBFAC,,, | Performed by: FAMILY MEDICINE

## 2018-01-04 PROCEDURE — 99215 OFFICE O/P EST HI 40 MIN: CPT | Mod: PBBFAC,PO | Performed by: FAMILY MEDICINE

## 2018-01-04 NOTE — TELEPHONE ENCOUNTER
----- Message from Thuy Radford sent at 1/4/2018  7:31 AM CST -----  Contact: pt   .Pt was returning nurse call   [phone] (evdc)

## 2018-01-04 NOTE — TELEPHONE ENCOUNTER
Called to check on patient, who call stating her breast was swollen and bleeding.Patient states when to pharmacy to  her medicine and pharmacist told her she can try a cold pack. Patient states she tried it and it help and is feeling better. Patient was offered an appointment to see doctor today at 10.

## 2018-01-04 NOTE — PROGRESS NOTES
Subjective:       Patient ID: Diamond Das is a 62 y.o. female.    Chief Complaint: Breast Pain (left)    Left breast pain    O: benito 1 week ago, s/p mammogram  L: Left breast, and nipple  D: gradually improving  C: dull  Pain does not radiate  No mitigating / exac factors  She states that breast became hardened after mammogram and she noticed a small amount of blood from the nipple after mammo          Review of Systems   Respiratory: Positive for shortness of breath.    Cardiovascular: Negative for chest pain.   Gastrointestinal: Negative for abdominal pain.       Objective:      Physical Exam   Constitutional: She appears well-developed and well-nourished. No distress.   HENT:   Head: Normocephalic and atraumatic.   Nose: Nose normal.   Mouth/Throat: Oropharynx is clear and moist.   Pulmonary/Chest: Effort normal and breath sounds normal. No respiratory distress. She has no wheezes. Right breast exhibits no inverted nipple, no nipple discharge and no skin change. Left breast exhibits no inverted nipple, no mass, no nipple discharge, no skin change and no tenderness. Breasts are symmetrical. There is no breast swelling.   Genitourinary: No breast tenderness, discharge or bleeding.   Musculoskeletal: She exhibits edema.   Skin: Skin is warm and dry. No rash noted. She is not diaphoretic. No erythema.   Nursing note and vitals reviewed.      Assessment:       1. Type 2 diabetes mellitus with diabetic polyneuropathy, without long-term current use of insulin    2. CKD stage 3 due to type 2 diabetes mellitus    3. Hypertension, unspecified type    4. Breast pain, left        Plan:     Problem List Items Addressed This Visit        Cardiac/Vascular    Hypertension    Current Assessment & Plan     Pt did not take meds this am         Relevant Orders    Ambulatory consult to Cardiology       Renal/    Breast pain, left    Current Assessment & Plan     Breast PE non-remarkable.  Pt states that the pain has gradually  resolved. She has f/u in 2 -3 weeks.   If no improvement, consider further w/u.  Screening mammo WNL, and all her issues occurred after trauma from mammo.            Endocrine    Diabetes mellitus - Primary    Relevant Orders    Ambulatory Referral to Optometry    Ambulatory consult to Cardiology    CKD stage 3 due to type 2 diabetes mellitus    Relevant Orders    Ambulatory Referral to Nephrology    Ambulatory consult to Cardiology

## 2018-01-04 NOTE — TELEPHONE ENCOUNTER
----- Message from Emiliano Avelar sent at 1/3/2018 12:04 PM CST -----  Contact: self [phone]  Would like to consult with nurse regarding breast issues.  Please call back at [phone].  Md Elliott

## 2018-01-09 ENCOUNTER — OFFICE VISIT (OUTPATIENT)
Dept: OPHTHALMOLOGY | Facility: CLINIC | Age: 63
End: 2018-01-09
Payer: MEDICAID

## 2018-01-09 DIAGNOSIS — Z13.5 GLAUCOMA SCREENING: ICD-10-CM

## 2018-01-09 DIAGNOSIS — H52.7 REFRACTIVE ERROR: ICD-10-CM

## 2018-01-09 DIAGNOSIS — E11.9 DIABETES MELLITUS TYPE 2 WITHOUT RETINOPATHY: Primary | ICD-10-CM

## 2018-01-09 DIAGNOSIS — Z96.1 PSEUDOPHAKIA OF BOTH EYES: ICD-10-CM

## 2018-01-09 PROCEDURE — 99999 PR PBB SHADOW E&M-EST. PATIENT-LVL I: CPT | Mod: PBBFAC,,, | Performed by: OPTOMETRIST

## 2018-01-09 PROCEDURE — 99211 OFF/OP EST MAY X REQ PHY/QHP: CPT | Mod: PBBFAC,PO | Performed by: OPTOMETRIST

## 2018-01-09 PROCEDURE — 92004 COMPRE OPH EXAM NEW PT 1/>: CPT | Mod: S$PBB,,, | Performed by: OPTOMETRIST

## 2018-01-09 PROCEDURE — 92015 DETERMINE REFRACTIVE STATE: CPT | Mod: ,,, | Performed by: OPTOMETRIST

## 2018-01-09 NOTE — PROGRESS NOTES
HPI     New Patient  Pseudophakia, OU (2012) [facility]  Diabetic/NIDDM  Screening for glaucoma  RE  Uses OTC Readers +3.00  Did not bring to exam    Last edited by Sav Radford MA on 1/9/2018  8:46 AM. (History)            Assessment /Plan     For exam results, see Encounter Report.    Diabetes mellitus type 2 without retinopathy    Pseudophakia of both eyes    Glaucoma screening    Refractive error      No diabetic retinopathy OU.  Stable PIOL OU.  OH OK OU.  Spec Rx given.  RTC one year.

## 2018-01-09 NOTE — LETTER
January 9, 2018      Andrey Perrin MD  [id number] [address] [address]           [facility]  [address] [facility] [address]  Phone: [phone]  Fax: [phone]          Patient: Diamond Das   MR Number: [id number]   YOB: 1955   Date of Visit: 1/9/2018       Dear Dr. Andrey Perrin:    Thank you for referring Diamond Das to me for evaluation. Attached you will find relevant portions of my assessment and plan of care.    If you have questions, please do not hesitate to call me. I look forward to following Diamond Das along with you.    Sincerely,    LILY Stratton, OD    Enclosure  CC:  No Recipients    If you would like to receive this communication electronically, please contact [email or web address] or [phone] to request more information on [employer] Link access.    For providers and/or their staff who would like to refer a patient to [facility], please contact us through our one-stop-shop provider referral line, [facility], at [phone].    If you feel you have received this communication in error or would no longer like to receive these types of communications, please e-mail [email or web address]

## 2018-01-22 ENCOUNTER — TELEPHONE (OUTPATIENT)
Dept: NEPHROLOGY | Facility: CLINIC | Age: 63
End: 2018-01-22

## 2018-01-22 NOTE — TELEPHONE ENCOUNTER
Called pt., no answer, left voice message asking pt. To return call to schedule consult with Nephrology per PCP

## 2018-01-29 ENCOUNTER — APPOINTMENT (OUTPATIENT)
Dept: LAB | Facility: HOSPITAL | Age: 63
End: 2018-01-29
Attending: FAMILY MEDICINE
Payer: MEDICAID

## 2018-01-29 ENCOUNTER — OFFICE VISIT (OUTPATIENT)
Dept: INTERNAL MEDICINE | Facility: CLINIC | Age: 63
End: 2018-01-29
Payer: MEDICAID

## 2018-01-29 ENCOUNTER — HOSPITAL ENCOUNTER (OUTPATIENT)
Dept: RADIOLOGY | Facility: HOSPITAL | Age: 63
Discharge: HOME | End: 2018-01-29
Attending: FAMILY MEDICINE
Payer: MEDICAID

## 2018-01-29 VITALS
WEIGHT: 223.13 LBS | OXYGEN SATURATION: 93 % | HEIGHT: 62 IN | HEART RATE: 68 BPM | SYSTOLIC BLOOD PRESSURE: 154 MMHG | DIASTOLIC BLOOD PRESSURE: 88 MMHG | BODY MASS INDEX: 41.06 KG/M2 | TEMPERATURE: 97 F | RESPIRATION RATE: 20 BRPM

## 2018-01-29 DIAGNOSIS — R60.9 CHRONIC EDEMA: ICD-10-CM

## 2018-01-29 DIAGNOSIS — I10 HYPERTENSION, UNSPECIFIED TYPE: ICD-10-CM

## 2018-01-29 DIAGNOSIS — Z12.4 SCREENING FOR MALIGNANT NEOPLASM OF CERVIX: ICD-10-CM

## 2018-01-29 DIAGNOSIS — I50.9 CONGESTIVE HEART FAILURE, UNSPECIFIED CONGESTIVE HEART FAILURE CHRONICITY, UNSPECIFIED CONGESTIVE HEART FAILURE TYPE: Primary | ICD-10-CM

## 2018-01-29 PROCEDURE — 99214 OFFICE O/P EST MOD 30 MIN: CPT | Mod: PBBFAC,25,PO | Performed by: FAMILY MEDICINE

## 2018-01-29 PROCEDURE — 88175 CYTOPATH C/V AUTO FLUID REDO: CPT

## 2018-01-29 PROCEDURE — 93925 LOWER EXTREMITY STUDY: CPT | Mod: TC,PO

## 2018-01-29 PROCEDURE — 99214 OFFICE O/P EST MOD 30 MIN: CPT | Mod: S$PBB,,, | Performed by: FAMILY MEDICINE

## 2018-01-29 PROCEDURE — 93925 LOWER EXTREMITY STUDY: CPT | Mod: 26,,, | Performed by: RADIOLOGY

## 2018-01-29 PROCEDURE — 3008F BODY MASS INDEX DOCD: CPT | Mod: ,,, | Performed by: FAMILY MEDICINE

## 2018-01-29 PROCEDURE — 99999 PR PBB SHADOW E&M-EST. PATIENT-LVL IV: CPT | Mod: PBBFAC,,, | Performed by: FAMILY MEDICINE

## 2018-01-29 RX ORDER — AMLODIPINE BESYLATE 10 MG/1
10 TABLET ORAL DAILY
Qty: 30 TABLET | Refills: 0 | Status: SHIPPED | OUTPATIENT
Start: 2018-01-29 | End: 2018-03-12 | Stop reason: SDUPTHER

## 2018-01-30 ENCOUNTER — TELEPHONE (OUTPATIENT)
Dept: INTERNAL MEDICINE | Facility: CLINIC | Age: 63
End: 2018-01-30

## 2018-01-30 NOTE — TELEPHONE ENCOUNTER
----- Message from Brenda Lorenzo sent at 1/30/2018 12:10 PM CST -----  Contact: Pt   Pt returned a phone call..[phone] (gezn)

## 2018-01-31 ENCOUNTER — CLINICAL SUPPORT (OUTPATIENT)
Dept: DIABETES | Facility: CLINIC | Age: 63
End: 2018-01-31
Payer: MEDICAID

## 2018-01-31 VITALS — BODY MASS INDEX: 40.08 KG/M2 | HEIGHT: 62 IN | WEIGHT: 217.81 LBS

## 2018-01-31 DIAGNOSIS — E11.42 DIABETIC POLYNEUROPATHY ASSOCIATED WITH TYPE 2 DIABETES MELLITUS: Primary | ICD-10-CM

## 2018-01-31 PROCEDURE — 99213 OFFICE O/P EST LOW 20 MIN: CPT | Mod: PBBFAC,PO | Performed by: DIETITIAN, REGISTERED

## 2018-01-31 PROCEDURE — 99999 PR PBB SHADOW E&M-EST. PATIENT-LVL III: CPT | Mod: PBBFAC,,, | Performed by: DIETITIAN, REGISTERED

## 2018-01-31 PROCEDURE — G0108 DIAB MANAGE TRN  PER INDIV: HCPCS | Mod: PBBFAC,PO | Performed by: DIETITIAN, REGISTERED

## 2018-01-31 NOTE — PROGRESS NOTES
Diabetes Education  Author: Nolvia Mcneil RD, CDE  Date: 1/31/2018    Diabetes Education Visit  Diabetes Education Record Assessment/Progress: Comprehensive/Group    Diabetes Type  Diabetes Type : Type II    Diabetes History  Diabetes Diagnosis: >10 years    Nutrition  Meal Planning:  (8820-0238 cals/d - irregular meal patterns. Eats 2x/d, rare snacks. Excess carb, fat or sodium from irregular meal patterns, refined starch and dining out, beverages.)  Meal Plan 24 Hour Recall - Breakfast: none  Meal Plan 24 Hour Recall - Lunch: 1pm bkd chix, rice, salad OR grilled cheese sandwich OR sugar smacks cereal   Meal Plan 24 Hour Recall - Dinner: leftover  Meal Plan 24 Hour Recall - Snack: aurelio: green tea, water, orange soda    Monitoring   Self Monitoring : Per recall, has meter/supplies but not testing at home.  Blood Glucose Logs: No    Exercise   Exercise Type:  (none regular)    Current Diabetes Treatment   Current Treatment: Diet, Exercise    Social History  Preferred Learning Method: Face to Face  Primary Support: Self, Daughter  Smoking Status: Never a Smoker  Alcohol Use: Rarely     Barriers to Change  Barriers to Change: None  Learning Challenges : None    Readiness to Learn   Readiness to Learn : Eager    Cultural Influences  Cultural Influences: No    Diabetes Education Assessment/Progress  Diabetes Disease Process (diabetes disease process and treatment options): Discussion, Individual Session, Demonstrates Understanding/Competency(verbalizes/demonstrates)  Nutrition (Incorporating nutritional management into one's lifestyle): Discussion, Individual Session, Demonstrates Understanding/Competency (verbalizes/demonstrates), Written Materials Provided  Physical Activity (incorporating physical activity into one's lifestyle): Discussion, Individual Session, Demonstrates Understanding/Competency (verbalizes/demonstrates)  Medications (states correct name, dose, onset, peak, duration, side effects & timing of  meds): Discussion, Individual Session, Demonstrates Understanding/Competency(verbalizes/demonstrates), Written Materials Provided  Monitoring (monitoring blood glucose/other parameters & using results): Discussion, Individual Session, Demonstrates Understanding/Competency (verbalizes/demonstrates), Written Materials Provided  Acute Complications (preventing, detecting, and treating acute complications): Discussion, Individual Session, Demonstrates Understanding/Competency (verbalizes/demonstrates), Written Materials Provided  Chronic Complications (preventing, detecting, and treating chronic complications): Discussion, Individual Session, Demonstrates Understanding/Competency (verbalizes/demonstrates)  Clinical (diabetes and other pertinent medical history): Discussion, Individual Session, Demonstrates Understanding/Competency (verbalizes/demonstrates)  Cognitive (knowledge of self-management skills, functional health literacy): Discussion, Individual Session, Demonstrates Understanding/Competency (verbalizes/demonstrates)  Psychosocial (emotional response to diabetes): Discussion, Individual Session, Demonstrates Understanding/Competency (verbalizes/demonstrates)  Diabetes Distress and Support Systems: Discussion, Individual Session, Demonstrates Understanding/Competency (verbalizes/demonstrates)  Behavioral (readiness for change, lifestyle practices, self-care behaviors): Discussion, Individual Session, Demonstrates Understanding/Competency (verbalizes/demonstrates)    Goals  Patient has selected/evaluated goals during today's session: Yes, selected  Healthy Eating: Set (use meal plan - carb portions)  Start Date: 01/31/18  Target Date: 03/14/18  Monitoring: Set (test blood sugar fasting and 2 hours after supper)  Start Date: 01/31/18  Target Date: 03/14/18    Diabetes Self-Management Support Plan  Stress Management: family  Review Status: Patient has selected and agrees to support plan.    Diabetes Care  Plan/Intervention  Education Plan/Intervention: Individual Follow-Up DSMT    Diabetes Meal Plan  Restrictions: Low Fat, Low Sodium  Calories: 1400  Carbohydrate Per Meal: 30-45g  Carbohydrate Per Snack : 7-15g    Education Units of Time   Time Spent: 60 min      Health Maintenance Topics with due status: Not Due       Topic Last Completion Date    TETANUS VACCINE 09/08/2016    Pneumococcal PPSV23 (Medium Risk) 12/07/2017    Foot Exam 12/07/2017    Lipid Panel 12/07/2017    Hemoglobin A1c 12/07/2017    Urine Microalbumin 12/07/2017    Fecal Occult Blood Test (FOBT)/[employer] 12/21/2017    Mammogram 12/29/2017    Eye Exam 01/09/2018     Health Maintenance Due   Topic Date Due    Pap Smear with HPV Cotest  06/11/2015    Influenza Vaccine  08/01/2017

## 2018-01-31 NOTE — PROGRESS NOTES
Subjective:       Patient ID: Diamond Das is a 62 y.o. female.    Chief Complaint: Follow-up (BP )    Pt looks extremely well today. She is not in her usual wheelchair, but instead walking around the clinic.      Hypertension   This is a chronic problem. The current episode started more than 1 month ago. The problem has been gradually improving since onset. The problem is uncontrolled. Associated symptoms include malaise/fatigue, orthopnea, peripheral edema and shortness of breath. Pertinent negatives include no anxiety, blurred vision, chest pain, headaches or palpitations. Risk factors for coronary artery disease include obesity, post-menopausal state and sedentary lifestyle. Past treatments include beta blockers and diuretics. The current treatment provides moderate improvement. There are no compliance problems.      Review of Systems   Constitutional: Positive for malaise/fatigue.   Eyes: Negative for blurred vision.   Respiratory: Positive for shortness of breath.    Cardiovascular: Positive for orthopnea. Negative for chest pain and palpitations.   Neurological: Negative for headaches.       Objective:      Physical Exam   Constitutional: She is oriented to person, place, and time. She appears well-developed and well-nourished. She appears distressed.   HENT:   Head: Normocephalic and atraumatic.   Nose: Nose normal.   Mouth/Throat: Oropharynx is clear and moist.   Pulmonary/Chest: Breath sounds normal. No respiratory distress. She has no wheezes.   Pt unable to take deep breaths on exam. Unable to appreciate BS fully.  decr insp flow   Genitourinary: Vagina normal. There is no rash, tenderness or lesion on the right labia. There is no rash, tenderness or lesion on the left labia. Cervix exhibits no motion tenderness, no discharge and no friability. Right adnexum displays no tenderness and no fullness. Left adnexum displays no tenderness and no fullness. No erythema, tenderness or bleeding in the vagina.  No foreign body in the vagina. No signs of injury around the vagina. No vaginal discharge found.   Musculoskeletal: She exhibits edema.   Sig ble edema.  Loss of hair to BLE   Neurological: She is alert and oriented to person, place, and time.   Skin: Skin is warm and dry. No rash noted. She is not diaphoretic. No erythema.   Psychiatric: She has a normal mood and affect.   Nursing note and vitals reviewed.      Assessment:       1. Congestive heart failure, unspecified congestive heart failure chronicity, unspecified congestive heart failure type    2. Chronic edema    3. Screening for malignant neoplasm of cervix    4. Hypertension, unspecified type        Plan:     Problem List Items Addressed This Visit        Cardiac/Vascular    CHF (congestive heart failure) - Primary    Hypertension    Relevant Medications    amLODIPine (NORVASC) 10 MG tablet       Renal/    Screening for malignant neoplasm of cervix    Relevant Orders    Liquid-based pap smear, screening       Other    Chronic edema    Relevant Orders    US Lower Extremity Arteries Bilateral (Completed)

## 2018-01-31 NOTE — LETTER
January 31, 2018        Andrey Perrin MD  [address] [address] [address]             [facility]  [address] [facility] [address]  Phone: [phone]  Fax: [phone]   Patient: Diamond Das   MR Number: [id number]   YOB: 1955   Date of Visit: 1/31/2018       Dear Dr. Perrin:    Thank you for referring Diamond Das to me for evaluation. Below are the relevant portions of my assessment and plan of care.    If you have questions, please do not hesitate to call me. I look forward to following Diamond along with you.    Sincerely,      Nolvia Mcneil, VANE, CDE           CC  No Recipients

## 2018-02-01 NOTE — PROGRESS NOTES
Results have been reviewed . All labs are within normal range.   If you have any questions please feel free to contact me.  Next pap in 3 yrs

## 2018-02-09 ENCOUNTER — LAB VISIT (OUTPATIENT)
Dept: LAB | Facility: HOSPITAL | Age: 63
End: 2018-02-09
Attending: INTERNAL MEDICINE
Payer: MEDICAID

## 2018-02-09 ENCOUNTER — OFFICE VISIT (OUTPATIENT)
Dept: CARDIOLOGY | Facility: CLINIC | Age: 63
End: 2018-02-09
Payer: MEDICAID

## 2018-02-09 ENCOUNTER — TELEPHONE (OUTPATIENT)
Dept: CARDIOLOGY | Facility: CLINIC | Age: 63
End: 2018-02-09

## 2018-02-09 VITALS
HEIGHT: 62 IN | BODY MASS INDEX: 42.8 KG/M2 | WEIGHT: 232.56 LBS | SYSTOLIC BLOOD PRESSURE: 108 MMHG | DIASTOLIC BLOOD PRESSURE: 76 MMHG | HEART RATE: 68 BPM

## 2018-02-09 DIAGNOSIS — I50.32 CHRONIC DIASTOLIC CONGESTIVE HEART FAILURE: ICD-10-CM

## 2018-02-09 DIAGNOSIS — K74.60 CIRRHOSIS OF LIVER WITH ASCITES, UNSPECIFIED HEPATIC CIRRHOSIS TYPE: ICD-10-CM

## 2018-02-09 DIAGNOSIS — R07.2 PRECORDIAL PAIN: ICD-10-CM

## 2018-02-09 DIAGNOSIS — I50.31 ACUTE DIASTOLIC CONGESTIVE HEART FAILURE: Primary | ICD-10-CM

## 2018-02-09 DIAGNOSIS — N18.30 CKD STAGE 3 DUE TO TYPE 2 DIABETES MELLITUS: ICD-10-CM

## 2018-02-09 DIAGNOSIS — I10 ESSENTIAL HYPERTENSION: ICD-10-CM

## 2018-02-09 DIAGNOSIS — R07.89 CHEST PAIN, ATYPICAL: Primary | ICD-10-CM

## 2018-02-09 DIAGNOSIS — E11.22 CKD STAGE 3 DUE TO TYPE 2 DIABETES MELLITUS: ICD-10-CM

## 2018-02-09 DIAGNOSIS — R18.8 CIRRHOSIS OF LIVER WITH ASCITES, UNSPECIFIED HEPATIC CIRRHOSIS TYPE: ICD-10-CM

## 2018-02-09 LAB — BNP SERPL-MCNC: 805 PG/ML

## 2018-02-09 PROCEDURE — 3008F BODY MASS INDEX DOCD: CPT | Mod: ,,, | Performed by: INTERNAL MEDICINE

## 2018-02-09 PROCEDURE — 93010 ELECTROCARDIOGRAM REPORT: CPT | Mod: ,,, | Performed by: INTERNAL MEDICINE

## 2018-02-09 PROCEDURE — 93005 ELECTROCARDIOGRAM TRACING: CPT | Mod: PBBFAC,PO | Performed by: INTERNAL MEDICINE

## 2018-02-09 PROCEDURE — 83880 ASSAY OF NATRIURETIC PEPTIDE: CPT | Mod: PO

## 2018-02-09 PROCEDURE — 99999 PR PBB SHADOW E&M-EST. PATIENT-LVL III: CPT | Mod: PBBFAC,,, | Performed by: INTERNAL MEDICINE

## 2018-02-09 PROCEDURE — 36415 COLL VENOUS BLD VENIPUNCTURE: CPT | Mod: PO

## 2018-02-09 PROCEDURE — 99204 OFFICE O/P NEW MOD 45 MIN: CPT | Mod: S$PBB,,, | Performed by: INTERNAL MEDICINE

## 2018-02-09 PROCEDURE — 99213 OFFICE O/P EST LOW 20 MIN: CPT | Mod: PBBFAC,PO,25 | Performed by: INTERNAL MEDICINE

## 2018-02-09 RX ORDER — FUROSEMIDE 40 MG/1
40 TABLET ORAL NIGHTLY
Qty: 30 TABLET | Refills: 11 | Status: SHIPPED | OUTPATIENT
Start: 2018-02-09 | End: 2018-02-28 | Stop reason: ALTCHOICE

## 2018-02-09 NOTE — PROGRESS NOTES
Subjective:   Patient ID:  Diamond Das is a 62 y.o. female who presents for evaluation of Establish Care      63 yo female, came in for care establish.  PMH CHF, HTN, HLD, IDDM, cryptogenic cirrhosis, s/p TIPS, ascites resolved. No smoking/drinking  Last echo in  normal EF and RVF, grade II DD.  EKG today NSR poor R progression on anterior leads  Chronic weakness and fatigue. Had PNA in   Pain on lower chest bilateral for few months, worse with exercise, resolved after resting. Deep breathing made the pain worse. No pain at sleep. Associated dyspnea, N/V, palpitation and dizziness. No radiation. ASA and tylenol not really helped the pain.        Past Medical History:   Diagnosis Date    Ascites     Cataract     CHF (congestive heart failure)     Cirrhosis     Diabetes mellitus     Gastroparesis     GERD (gastroesophageal reflux disease)     Hypertension     Liver disease     Thyroid disease        Past Surgical History:   Procedure Laterality Date    CHOLECYSTECTOMY      ERCP      LIVER BIOPSY      TUBAL LIGATION      UPPER GASTROINTESTINAL ENDOSCOPY         Social History   Substance Use Topics    Smoking status: Never Smoker    Smokeless tobacco: Never Used    Alcohol use No       Family History   Problem Relation Age of Onset    Cancer Mother     Breast cancer Mother     Heart disease Father     Aneurysm Sister     Heart disease Brother     No Known Problems Maternal Grandmother     Cancer Maternal Grandfather     Sarcoidosis Sister        Review of Systems   Constitution: Positive for weakness and malaise/fatigue. Negative for decreased appetite, diaphoresis, fever and night sweats.   HENT: Negative for nosebleeds.    Eyes: Negative for blurred vision and double vision.   Cardiovascular: Positive for chest pain and dyspnea on exertion. Negative for claudication, irregular heartbeat, leg swelling, near-syncope, orthopnea, palpitations, paroxysmal nocturnal dyspnea and  syncope.   Respiratory: Negative for cough, shortness of breath, sleep disturbances due to breathing, snoring, sputum production and wheezing.    Endocrine: Negative for cold intolerance and polyuria.   Hematologic/Lymphatic: Does not bruise/bleed easily.   Skin: Negative for rash.   Musculoskeletal: Positive for arthritis. Negative for back pain, falls, joint pain, joint swelling and neck pain.   Gastrointestinal: Negative for abdominal pain, heartburn, nausea and vomiting.   Genitourinary: Negative for dysuria, frequency and hematuria.   Neurological: Negative for difficulty with concentration, dizziness, focal weakness, headaches, light-headedness, numbness and seizures.   Psychiatric/Behavioral: Negative for depression, memory loss and substance abuse. The patient does not have insomnia.    Allergic/Immunologic: Negative for HIV exposure and hives.       Objective:   Physical Exam   Constitutional: She is oriented to person, place, and time. She appears well-nourished.   HENT:   Head: Normocephalic.   Eyes: Pupils are equal, round, and reactive to light.   Neck: Normal carotid pulses and no JVD present. Carotid bruit is not present. No thyromegaly present.   Cardiovascular: Normal rate, regular rhythm, normal heart sounds and normal pulses.   No extrasystoles are present. PMI is not displaced.  Exam reveals no gallop and no S3.    No murmur heard.  Pulmonary/Chest: Breath sounds normal. No stridor. No respiratory distress.   + tenderness on left chest wall  Rales on b/l bases   Abdominal: Soft. Bowel sounds are normal. There is no tenderness. There is no rebound.   Musculoskeletal: Normal range of motion. She exhibits edema.   2+ BLE   Neurological: She is alert and oriented to person, place, and time.   Skin: Skin is intact. No rash noted.   Psychiatric: Her behavior is normal.       Lab Results   Component Value Date    CHOL 335 (H) 12/07/2017     Lab Results   Component Value Date    HDL 37 (L) 12/07/2017      Lab Results   Component Value Date    LDLCALC 270.8 (H) 12/07/2017     Lab Results   Component Value Date    TRIG 136 12/07/2017     Lab Results   Component Value Date    CHOLHDL 11.0 (L) 12/07/2017       Chemistry        Component Value Date/Time     12/22/2017 1638    K 4.0 12/22/2017 1638     12/22/2017 1638    CO2 29 12/22/2017 1638    BUN 18 12/22/2017 1638    CREATININE 1.3 12/22/2017 1638     (H) 12/22/2017 1638        Component Value Date/Time    CALCIUM 8.2 (L) 12/22/2017 1638    ALKPHOS 143 (H) 12/22/2017 1638    AST 43 (H) 12/22/2017 1638    ALT 16 12/22/2017 1638    BILITOT 0.5 12/22/2017 1638    ESTGFRAFRICA 51.5 (A) 12/22/2017 1638    EGFRNONAA 44.7 (A) 12/22/2017 1638          Lab Results   Component Value Date    TSH 57.066 (H) 12/07/2017     Lab Results   Component Value Date    INR 1.1 03/30/2017    INR 1.1 02/01/2017    INR 1.1 01/18/2017     Lab Results   Component Value Date    WBC 5.75 12/22/2017    HGB 11.7 (L) 12/22/2017    HCT 35.1 (L) 12/22/2017    MCV 94 12/22/2017     12/22/2017     BNP  @LABRCNTIP(BNP,BNPTRIAGEBLO)@  CrCl cannot be calculated (Patient's most recent lab result is older than the maximum 7 days allowed.).     Assessment:      1. Chest pain, atypical    2. Chronic diastolic congestive heart failure    3. Essential hypertension    4. CKD stage 3 due to type 2 diabetes mellitus    5. Cirrhosis of liver with ascites, unspecified hepatic cirrhosis type      Atypical CP. With some reproducible component., Multiple CV risks, including DM, HTN, HLD and cirrhosis   due to cirrhosis, can not tolerate Statin and Repatha.  Fluid overloaded deu to CHF and cirrhosis    Plan:   BNP and nuclear stress test  Continue Lasix 80 mg daily, Coreg, amlodipine and ASA  Thyroid function control.  DASh and RTC in 6 months        INCREASE LASIX FORM 80 MG DAILY TO 80 MG IN AM AND 40 MG IN PM.

## 2018-02-09 NOTE — TELEPHONE ENCOUNTER
PLEASE CALL PT THAT INCREASE LASIX FROM 80 MG DAILY TO 80 MG AT 6 AM AND 40 MG AT 2 PM DUE TO WORSENING CHF.    REPEAT BMP AND BNP IN 2 WEEKS

## 2018-02-09 NOTE — LETTER
February 9, 2018      Andrey Perrin MD  [address]  [place] [address]           [facility]  [address]  [place] [address]  Phone: [phone]          Patient: Diamond Das   MR Number: [id number]   YOB: 1955   Date of Visit: 2/9/2018       Dear Dr. Andrey Perrin:    Thank you for referring Diamond Das to me for evaluation. Attached you will find relevant portions of my assessment and plan of care.    If you have questions, please do not hesitate to call me. I look forward to following Diamond Das along with you.    Sincerely,    Vineet Shaw MD    Enclosure  CC:  No Recipients    If you would like to receive this communication electronically, please contact [email or web address] or [phone] to request more information on [employer] access.    For providers and/or their staff who would like to refer a patient to [facility], please contact us through our one-stop-shop provider referral line, Ghazala Ryder, at [phone].    If you feel you have received this communication in error or would no longer like to receive these types of communications, please e-mail [email or web address]

## 2018-02-13 NOTE — TELEPHONE ENCOUNTER
Tried calling pt twice today and emergency contact.  Emergency contact number is no longer valid.  Pt's phone rolls straight to  and I have left messages.  I will mail the pt a letter to contact the office ASAP and to review good contact information.

## 2018-02-15 ENCOUNTER — PATIENT OUTREACH (OUTPATIENT)
Dept: ADMINISTRATIVE | Facility: HOSPITAL | Age: 63
End: 2018-02-15

## 2018-02-23 ENCOUNTER — TELEPHONE (OUTPATIENT)
Dept: PULMONOLOGY | Facility: CLINIC | Age: 63
End: 2018-02-23

## 2018-02-23 DIAGNOSIS — R06.02 SOB (SHORTNESS OF BREATH): Primary | ICD-10-CM

## 2018-02-28 ENCOUNTER — OFFICE VISIT (OUTPATIENT)
Dept: PULMONOLOGY | Facility: CLINIC | Age: 63
End: 2018-02-28
Payer: MEDICAID

## 2018-02-28 ENCOUNTER — HOSPITAL ENCOUNTER (OUTPATIENT)
Dept: RADIOLOGY | Facility: HOSPITAL | Age: 63
Discharge: HOME | End: 2018-02-28
Attending: INTERNAL MEDICINE
Payer: MEDICAID

## 2018-02-28 VITALS
DIASTOLIC BLOOD PRESSURE: 58 MMHG | OXYGEN SATURATION: 92 % | WEIGHT: 205.25 LBS | HEART RATE: 67 BPM | SYSTOLIC BLOOD PRESSURE: 124 MMHG | BODY MASS INDEX: 38.75 KG/M2 | HEIGHT: 61 IN

## 2018-02-28 DIAGNOSIS — J90 PLEURAL EFFUSION: ICD-10-CM

## 2018-02-28 DIAGNOSIS — R06.02 SOB (SHORTNESS OF BREATH): ICD-10-CM

## 2018-02-28 DIAGNOSIS — I50.1 PULMONARY EDEMA CARDIAC CAUSE: ICD-10-CM

## 2018-02-28 DIAGNOSIS — I50.32 CHRONIC DIASTOLIC HEART FAILURE: ICD-10-CM

## 2018-02-28 DIAGNOSIS — R06.02 SOB (SHORTNESS OF BREATH): Primary | ICD-10-CM

## 2018-02-28 DIAGNOSIS — R94.2 DIFFUSION CAPACITY OF LUNG (DL), DECREASED: ICD-10-CM

## 2018-02-28 DIAGNOSIS — G47.33 OSA ON CPAP: ICD-10-CM

## 2018-02-28 DIAGNOSIS — R09.02 EXERCISE HYPOXEMIA: ICD-10-CM

## 2018-02-28 DIAGNOSIS — R79.89 ELEVATED BRAIN NATRIURETIC PEPTIDE (BNP) LEVEL: ICD-10-CM

## 2018-02-28 DIAGNOSIS — J98.4 RESTRICTIVE LUNG DISEASE: ICD-10-CM

## 2018-02-28 PROCEDURE — 99214 OFFICE O/P EST MOD 30 MIN: CPT | Mod: S$PBB,,, | Performed by: INTERNAL MEDICINE

## 2018-02-28 PROCEDURE — 99213 OFFICE O/P EST LOW 20 MIN: CPT | Mod: PBBFAC,PO | Performed by: INTERNAL MEDICINE

## 2018-02-28 PROCEDURE — 99999 PR PBB SHADOW E&M-EST. PATIENT-LVL III: CPT | Mod: PBBFAC,,, | Performed by: INTERNAL MEDICINE

## 2018-02-28 PROCEDURE — 71046 X-RAY EXAM CHEST 2 VIEWS: CPT | Mod: 26,,, | Performed by: RADIOLOGY

## 2018-02-28 PROCEDURE — 71046 X-RAY EXAM CHEST 2 VIEWS: CPT | Mod: TC,FY,PO

## 2018-02-28 RX ORDER — INSULIN GLARGINE 100 [IU]/ML
20 INJECTION, SOLUTION SUBCUTANEOUS DAILY
COMMUNITY
End: 2018-03-19 | Stop reason: SDUPTHER

## 2018-02-28 RX ORDER — SIMVASTATIN 40 MG/1
40 TABLET, FILM COATED ORAL DAILY
COMMUNITY
Start: 2018-02-22 | End: 2019-03-27 | Stop reason: SDUPTHER

## 2018-02-28 RX ORDER — TRAMADOL HYDROCHLORIDE 50 MG/1
50 TABLET ORAL DAILY PRN
COMMUNITY
End: 2018-07-20 | Stop reason: ALTCHOICE

## 2018-02-28 RX ORDER — SPIRONOLACTONE 50 MG/1
100 TABLET, FILM COATED ORAL
COMMUNITY
Start: 2018-02-22 | End: 2018-03-01 | Stop reason: SDUPTHER

## 2018-02-28 NOTE — PROGRESS NOTES
Pulmonary Outpatient Follow Up Visit     Subjective:    This patient is new to me.   Patient ID: Diamond Das is a 63 y.o. female.    Chief Complaint: Shortness of Breath      HPI  63-year-old female patient presenting for evaluation of shortness of breath.  She was evaluated by pulmonary in 2017 by a colleague.  She has diastolic heart failure, cryptogenic liver cirrhosis status post paracentesis and TIPS in 2017, chronic lower extremity edema.  2 weeks ago she started complaining of worsening shortness of breath she was evaluated by cardiology Dr. Mane increased her Lasix however the patient ended up in [facility] for 5 days.  She received intravenous diuretics and she was discharged on Lasix 80 mg twice a day in addition to Aldactone 100 mg daily.  Also she had hypoxemia on exertion and she was discharged on oxygen on exertion.  The patient today states that her shortness of breath has improved she has a dry cough complains of orthopnea.  She has obstructive sleep apnea and she has been on CPAP for more than 5 years her study was done at [facility].  Review of Systems   Constitutional: Positive for weight gain. Negative for fever and chills.   HENT: Negative for trouble swallowing, voice change and congestion.         History of cataract.   Eyes: Negative for redness.   Respiratory: Positive for cough, shortness of breath and orthopnea.    Cardiovascular: Positive for leg swelling.        History of diastolic CHF.   Genitourinary:        CKD 3    Endocrine: History of diabetes.  Hypothyroid.    Musculoskeletal: Positive for arthralgias and back pain.   Skin: Negative for rash.   Gastrointestinal: Positive for abdominal distention and acid reflux.        History of liver cirrhosis, ascites status post paracentesis in the past.  Status post TIPS.  Gastroesophageal reflux disease.  Gastroparesis.   Neurological: Positive for headaches.  Negative for syncope.   Hematological: No excessive bruising.   Psychiatric/Behavioral: Negative for confusion. The patient is not nervous/anxious.         KRISTAL         Outpatient Encounter Prescriptions as of 2/28/2018   Medication Sig Dispense Refill    amLODIPine (NORVASC) 10 MG tablet Take 1 tablet (10 mg total) by mouth once daily. 30 tablet 0    aspirin (ECOTRIN) 81 MG EC tablet Take 1 tablet (81 mg total) by mouth once daily. 365 tablet 0    carvedilol (COREG) 3.125 MG tablet Take 1 tablet (3.125 mg total) by mouth 2 (two) times daily. 60 tablet 0    furosemide (LASIX) 40 MG tablet Take 1 tablet (40 mg total) by mouth every evening. 30 tablet 11    furosemide (LASIX) 80 MG tablet Take 1 tablet (80 mg total) by mouth once daily. 30 tablet 0    levothyroxine (SYNTHROID) 100 MCG tablet Take 1 tablet (100 mcg total) by mouth before breakfast. 90 tablet 0    ondansetron (ZOFRAN-ODT) 4 MG TbDL Take 1 tablet (4 mg total) by mouth every 8 (eight) hours as needed (prn nausea). 15 tablet 0    vitamin E 400 UNIT capsule Take 400 Units by mouth every morning.       No facility-administered encounter medications on file as of 2/28/2018.        Objective:          Physical Exam   Constitutional: She appears well-developed and well-nourished.   HENT:   Head: Normocephalic.   Neck: Neck supple.   Cardiovascular: Normal rate and regular rhythm.    Pulmonary/Chest: Normal expansion. No respiratory distress. She has decreased breath sounds. She has no wheezes. She has no rhonchi. She has rales.         She exhibits no tenderness.   Musculoskeletal: She exhibits edema.        Right shoulder: She exhibits swelling.        Legs:      Laboratory    Lab Results   Component Value Date    WBC 5.75 12/22/2017    HGB 11.7 (L) 12/22/2017    HCT 35.1 (L) 12/22/2017    MCV 94 12/22/2017     12/22/2017     BMP  Lab Results   Component Value Date     12/22/2017    K 4.0 12/22/2017     12/22/2017    CO2 29 12/22/2017     BUN 18 12/22/2017    CREATININE 1.3 12/22/2017    CALCIUM 8.2 (L) 12/22/2017    ANIONGAP 5 (L) 12/22/2017    ESTGFRAFRICA 51.5 (A) 12/22/2017    EGFRNONAA 44.7 (A) 12/22/2017       @LABRCNTIP(BNP,BNPTRIAGEBLO)@  Lab Results   Component Value Date    TSH 57.066 (H) 12/07/2017     ABG  @LABRCNTIP(PH,PO2,PCO2,HCO3,BE)@    Diagnostic Results:  ECG: Reviewed  X-Ray: Reviewed  Echo: Reviewed   Studies and results personnaly reviewed.  EKG showed normal sinus rhythm.  Echo showed grade 2 left ventricular dysfunction.  Chest x-ray pulmonary congestion.cardiomegaly. Left side small effusion   Spirometry from May 2017 and DLCO were reviewed.Normal airflow. Severe restriction. Diffusion capacity is mildly decreased.  Assessment/Plan:   SOB (shortness of breath)  -     X-Ray Chest PA And Lateral; Future; Expected date: 03/14/2018  -     Complete PFT with bronchodilator; Future; Expected date: 03/14/2018  -     Six Minute Walk Test to qualify for Home Oxygen; Future    Pulmonary edema cardiac cause  -     X-Ray Chest PA And Lateral; Future; Expected date: 03/14/2018    Elevated brain natriuretic peptide (BNP) level  -     X-Ray Chest PA And Lateral; Future; Expected date: 03/14/2018    KRISTAL on CPAP    Diffusion capacity of lung (dl), decreased  -     Complete PFT with bronchodilator; Future; Expected date: 03/14/2018    Restrictive lung disease  -     Complete PFT with bronchodilator; Future; Expected date: 03/14/2018    Chronic diastolic heart failure  -     X-Ray Chest PA And Lateral; Future; Expected date: 03/14/2018  -     Six Minute Walk Test to qualify for Home Oxygen; Future    Exercise hypoxemia  -     X-Ray Chest PA And Lateral; Future; Expected date: 03/14/2018  -     Complete PFT with bronchodilator; Future; Expected date: 03/14/2018  -     Six Minute Walk Test to qualify for Home Oxygen; Future    Pleural effusion  -     X-Ray Chest PA And Lateral; Future; Expected date: 03/14/2018      At the moment the  patient appears stable, continue diuresis with Lasix 80 mg twice a day, Aldactone 100 mg daily, she has a stress test ordered by cardiology and a follow-up with cardiology.  We will repeat a chest x-ray in 2 weeks.  PFT and 6 minute walking test ordered.  The patient states that her sleep study was done more than 5 years ago, we will get old records and might proceed to repeat polysomnography after next visit.    Follow-up in about 2 weeks (around 3/14/2018).    This note was prepared using voice recognition system and is likely to have sound alike errors that may have been overlooked even after proof reading.  Please call me with any questions    Discussed diagnosis, its evaluation, treatment and usual course. All questions answered.    Thank you for the courtesy of participating in the care of this patient    Angelica Hunter MD

## 2018-03-01 ENCOUNTER — LAB VISIT (OUTPATIENT)
Dept: LAB | Facility: HOSPITAL | Age: 63
End: 2018-03-01
Attending: FAMILY MEDICINE
Payer: MEDICAID

## 2018-03-01 ENCOUNTER — OFFICE VISIT (OUTPATIENT)
Dept: INTERNAL MEDICINE | Facility: CLINIC | Age: 63
End: 2018-03-01
Payer: MEDICAID

## 2018-03-01 VITALS
SYSTOLIC BLOOD PRESSURE: 118 MMHG | DIASTOLIC BLOOD PRESSURE: 60 MMHG | HEART RATE: 61 BPM | HEIGHT: 61 IN | WEIGHT: 206.38 LBS | TEMPERATURE: 97 F | OXYGEN SATURATION: 96 % | RESPIRATION RATE: 18 BRPM | BODY MASS INDEX: 38.96 KG/M2

## 2018-03-01 DIAGNOSIS — I50.31 ACUTE DIASTOLIC CONGESTIVE HEART FAILURE: ICD-10-CM

## 2018-03-01 DIAGNOSIS — E11.22 CKD STAGE 3 DUE TO TYPE 2 DIABETES MELLITUS: ICD-10-CM

## 2018-03-01 DIAGNOSIS — I50.31 ACUTE DIASTOLIC CONGESTIVE HEART FAILURE: Primary | ICD-10-CM

## 2018-03-01 DIAGNOSIS — N18.30 CKD STAGE 3 DUE TO TYPE 1 DIABETES MELLITUS: ICD-10-CM

## 2018-03-01 DIAGNOSIS — N18.30 CKD STAGE 3 DUE TO TYPE 2 DIABETES MELLITUS: ICD-10-CM

## 2018-03-01 DIAGNOSIS — R60.9 CHRONIC EDEMA: ICD-10-CM

## 2018-03-01 DIAGNOSIS — E10.22 CKD STAGE 3 DUE TO TYPE 1 DIABETES MELLITUS: ICD-10-CM

## 2018-03-01 PROBLEM — I50.9 CHF (CONGESTIVE HEART FAILURE): Status: RESOLVED | Noted: 2017-12-07 | Resolved: 2018-03-01

## 2018-03-01 LAB
ALBUMIN SERPL BCP-MCNC: 2.6 G/DL
ALP SERPL-CCNC: 122 U/L
ALT SERPL W/O P-5'-P-CCNC: 15 U/L
ANION GAP SERPL CALC-SCNC: 8 MMOL/L
AST SERPL-CCNC: 32 U/L
BILIRUB SERPL-MCNC: 0.5 MG/DL
BUN SERPL-MCNC: 19 MG/DL
CALCIUM SERPL-MCNC: 9.3 MG/DL
CHLORIDE SERPL-SCNC: 99 MMOL/L
CO2 SERPL-SCNC: 36 MMOL/L
CREAT SERPL-MCNC: 1.9 MG/DL
EST. GFR  (AFRICAN AMERICAN): 32.3 ML/MIN/1.73 M^2
EST. GFR  (NON AFRICAN AMERICAN): 28.1 ML/MIN/1.73 M^2
GLUCOSE SERPL-MCNC: 157 MG/DL
POTASSIUM SERPL-SCNC: 4.2 MMOL/L
PROT SERPL-MCNC: 8.2 G/DL
SODIUM SERPL-SCNC: 143 MMOL/L

## 2018-03-01 PROCEDURE — 36415 COLL VENOUS BLD VENIPUNCTURE: CPT | Mod: PO

## 2018-03-01 PROCEDURE — 99213 OFFICE O/P EST LOW 20 MIN: CPT | Mod: PBBFAC,PO | Performed by: FAMILY MEDICINE

## 2018-03-01 PROCEDURE — 99214 OFFICE O/P EST MOD 30 MIN: CPT | Mod: S$PBB,,, | Performed by: FAMILY MEDICINE

## 2018-03-01 PROCEDURE — 99999 PR PBB SHADOW E&M-EST. PATIENT-LVL III: CPT | Mod: PBBFAC,,, | Performed by: FAMILY MEDICINE

## 2018-03-01 PROCEDURE — 80053 COMPREHEN METABOLIC PANEL: CPT | Mod: PO

## 2018-03-01 RX ORDER — FUROSEMIDE 80 MG/1
80 TABLET ORAL 2 TIMES DAILY
Qty: 180 TABLET | Refills: 0 | Status: SHIPPED | OUTPATIENT
Start: 2018-03-01 | End: 2018-06-19 | Stop reason: SDUPTHER

## 2018-03-01 RX ORDER — SPIRONOLACTONE 50 MG/1
50 TABLET, FILM COATED ORAL 2 TIMES DAILY
Qty: 180 TABLET | Refills: 0 | Status: SHIPPED | OUTPATIENT
Start: 2018-03-01 | End: 2018-06-19

## 2018-03-01 NOTE — PROGRESS NOTES
Subjective:       Patient ID: Diamond Das is a 63 y.o. female.    Chief Complaint: Follow-up (BP )    Seen in hosp at [facility] for fluid overload. Increased lasix to 80 bid and aldactone 100 qd      Hypertension   This is a chronic problem. The current episode started more than 1 year ago. The problem has been resolved since onset. The problem is controlled. Associated symptoms include peripheral edema. Pertinent negatives include no anxiety, chest pain, headaches, orthopnea or shortness of breath. There are no associated agents to hypertension. Risk factors for coronary artery disease include diabetes mellitus, obesity, post-menopausal state and sedentary lifestyle. The current treatment provides significant improvement. There are no compliance problems.      Review of Systems   Respiratory: Negative for shortness of breath.    Cardiovascular: Negative for chest pain and orthopnea.   Gastrointestinal: Negative for abdominal pain.   Neurological: Negative for headaches.       Objective:      Physical Exam   Constitutional: She is oriented to person, place, and time. She appears well-developed and well-nourished. No distress.   HENT:   Head: Normocephalic and atraumatic.   Right Ear: External ear normal.   Left Ear: External ear normal.   Nose: Nose normal.   Pulmonary/Chest: Effort normal and breath sounds normal. No respiratory distress. She has no wheezes.   Musculoskeletal: She exhibits edema.   BLE edema. Stockings in place   Neurological: She is alert and oriented to person, place, and time.   Skin: Skin is warm and dry. No rash noted. She is not diaphoretic. No erythema.   Nursing note and vitals reviewed.      Assessment:       1. Acute diastolic congestive heart failure    2. Chronic edema    3. CKD stage 3 due to type 2 diabetes mellitus        Plan:     Problem List Items Addressed This Visit        Cardiac/Vascular    CHF (congestive heart failure) - Primary    Relevant Medications    furosemide (LASIX)  80 MG tablet    Other Relevant Orders    Comprehensive metabolic panel       Endocrine    CKD stage 3 due to type 2 diabetes mellitus    Relevant Orders    Comprehensive metabolic panel       Other    Chronic edema    Relevant Medications    furosemide (LASIX) 80 MG tablet

## 2018-03-02 ENCOUNTER — TELEPHONE (OUTPATIENT)
Dept: INTERNAL MEDICINE | Facility: CLINIC | Age: 63
End: 2018-03-02

## 2018-03-02 PROBLEM — E10.22 CKD STAGE 3 DUE TO TYPE 1 DIABETES MELLITUS: Status: ACTIVE | Noted: 2018-03-02

## 2018-03-02 PROBLEM — N18.30 CKD STAGE 3 DUE TO TYPE 1 DIABETES MELLITUS: Status: ACTIVE | Noted: 2018-03-02

## 2018-03-02 NOTE — TELEPHONE ENCOUNTER
----- Message from Elif Holland sent at 3/2/2018  1:33 PM CST -----  Contact: patient  Returning your call. Please call patient ASAP @ [phone]. Thanks, shanell

## 2018-03-02 NOTE — TELEPHONE ENCOUNTER
Patient notiifed Please call pt and schedule appt to review abnormal lab findings.   Please schedule an appt for there to be seen in [place] 2 weeks to repeat creatinine, and discuss her meds.   The new medication she is on is affecting her kidneys as I suspected. Elevated creatinine, and decreased GFR, this may stabilize, but we need to be diligent and ensure that she isnt worsening.   Ask her to decrease the lasix to 1.5 tabs per day. (60mg total)  Continue the aldactone.   Can we schedule her with nephrology telemed here?

## 2018-03-02 NOTE — PROGRESS NOTES
Please call pt and schedule appt to review abnormal lab findings.  Please schedule an appt for there to be seen in [place] 2 weeks to repeat creatinine, and discuss her meds.  The new medication she is on is affecting her kidneys as I suspected. Elevated creatinine, and decreased GFR, this may stabilize, but we need to be diligent and ensure that she isnt worsening.  Ask her to decrease the lasix to 1.5 tabs per day. (60mg total)  Continue the aldactone.  Can we schedule her with nephrology telemed here?

## 2018-03-05 ENCOUNTER — PATIENT OUTREACH (OUTPATIENT)
Dept: ADMINISTRATIVE | Facility: HOSPITAL | Age: 63
End: 2018-03-05

## 2018-03-12 ENCOUNTER — TELEPHONE (OUTPATIENT)
Dept: DIABETES | Facility: CLINIC | Age: 63
End: 2018-03-12

## 2018-03-12 DIAGNOSIS — I10 HYPERTENSION, UNSPECIFIED TYPE: ICD-10-CM

## 2018-03-12 PROBLEM — Z00.01 ENCOUNTER FOR GENERAL ADULT MEDICAL EXAMINATION WITH ABNORMAL FINDINGS: Status: RESOLVED | Noted: 2017-12-07 | Resolved: 2018-03-12

## 2018-03-12 RX ORDER — CARVEDILOL 3.12 MG/1
TABLET ORAL
Qty: 60 TABLET | Refills: 0 | Status: SHIPPED | OUTPATIENT
Start: 2018-03-12 | End: 2018-04-27 | Stop reason: SDUPTHER

## 2018-03-12 RX ORDER — AMLODIPINE BESYLATE 10 MG/1
TABLET ORAL
Qty: 30 TABLET | Refills: 0 | Status: SHIPPED | OUTPATIENT
Start: 2018-03-12 | End: 2018-04-27 | Stop reason: SDUPTHER

## 2018-03-14 ENCOUNTER — NUTRITION (OUTPATIENT)
Dept: DIABETES | Facility: CLINIC | Age: 63
End: 2018-03-14
Payer: MEDICAID

## 2018-03-14 VITALS — WEIGHT: 190.94 LBS | BODY MASS INDEX: 36.05 KG/M2 | HEIGHT: 61 IN

## 2018-03-14 DIAGNOSIS — E11.42 DIABETIC POLYNEUROPATHY ASSOCIATED WITH TYPE 2 DIABETES MELLITUS: Primary | ICD-10-CM

## 2018-03-14 PROCEDURE — G0108 DIAB MANAGE TRN  PER INDIV: HCPCS | Mod: PBBFAC,PO | Performed by: DIETITIAN, REGISTERED

## 2018-03-14 PROCEDURE — 99213 OFFICE O/P EST LOW 20 MIN: CPT | Mod: PBBFAC,PO | Performed by: DIETITIAN, REGISTERED

## 2018-03-14 PROCEDURE — 99999 PR PBB SHADOW E&M-EST. PATIENT-LVL III: CPT | Mod: PBBFAC,,, | Performed by: DIETITIAN, REGISTERED

## 2018-03-14 NOTE — PROGRESS NOTES
Diabetes Education  Author: Nolvia Mcneil RD, CDE  Date: 3/14/2018    Diabetes Education Visit  Diabetes Education Record Assessment/Progress: Post Program/Follow-up    Diabetes Type  Diabetes Type : Type II     Nutrition  Meal Planning:  (Excess carb from sugar sweeteden tea. Avoiding salt, high sodium seasonings.)  Meal Plan 24 Hour Recall - Breakfast: oats, fruit - panfilo  Meal Plan 24 Hour Recall - Lunch: pasta, meatball, salad  Meal Plan 24 Hour Recall - Dinner: potato soup  Meal Plan 24 Hour Recall - Snack: aurelio: tea (sugar)    Monitoring   Self Monitoring : Per records, [facility] bg , 211; pm 122, 206-290 (excusions from irregular insulin use, sweet tea)  Blood Glucose Logs: Yes  In the last month, how often have you had a low blood sugar reaction?: never  Can you tell when your blood sugar is too high?: no    Exercise   Exercise Type: walking  Intensity: Low  Frequency: Daily  Duration: 15 min    Current Diabetes Treatment   Current Treatment: Diet, Exercise, Insulin (lantus 30 units daily - inconsistent dosing.)    Social History  Preferred Learning Method: Face to Face  Primary Support: Self    Barriers to Change  Barriers to Change: None  Learning Challenges : None    Readiness to Learn   Readiness to Learn : Eager    Cultural Influences  Cultural Influences: No    Diabetes Education Assessment/Progress  Diabetes Disease Process (diabetes disease process and treatment options): Discussion, Individual Session, Demonstrates Understanding/Competency(verbalizes/demonstrates)  Nutrition (Incorporating nutritional management into one's lifestyle): Discussion, Individual Session, Demonstrates Understanding/Competency (verbalizes/demonstrates), Written Materials Provided  Physical Activity (incorporating physical activity into one's lifestyle): Discussion, Individual Session, Demonstrates Understanding/Competency (verbalizes/demonstrates)  Medications (states correct name, dose, onset, peak, duration, side  effects & timing of meds): Discussion, Individual Session, Demonstrates Understanding/Competency(verbalizes/demonstrates), Written Materials Provided  Monitoring (monitoring blood glucose/other parameters & using results): Discussion, Individual Session, Demonstrates Understanding/Competency (verbalizes/demonstrates), Written Materials Provided  Acute Complications (preventing, detecting, and treating acute complications): Discussion, Individual Session, Demonstrates Understanding/Competency (verbalizes/demonstrates)  Chronic Complications (preventing, detecting, and treating chronic complications): Discussion, Individual Session, Demonstrates Understanding/Competency (verbalizes/demonstrates)  Clinical (diabetes, other pertinent medical history, and relevant comorbidities reviewed during visit): Discussion, Individual Session, Demonstrates Understanding/Competency (verbalizes/demonstrates)  Cognitive (knowledge of self-management skills, functional health literacy): Discussion, Individual Session, Demonstrates Understanding/Competency (verbalizes/demonstrates)  Psychosocial (emotional response to diabetes): Discussion, Individual Session, Demonstrates Understanding/Competency (verbalizes/demonstrates)  Diabetes Distress and Support Systems: Discussion, Individual Session, Demonstrates Understanding/Competency (verbalizes/demonstrates)  Behavioral (readiness for change, lifestyle practices, self-care behaviors): Discussion, Individual Session, Demonstrates Understanding/Competency (verbalizes/demonstrates)    Goals  Patient has selected/evaluated goals during today's session: Yes, selected  Healthy Eating: In Progress (use meal plan - carb portions, avoid sweet tea)  Start Date: 03/14/18  Target Date: 03/21/18  Monitoring: % Met (test blood sugar fasting and 2 hours after supper)  Met Percentage : 50%  Start Date: 03/14/18  Target Date: 03/21/18  Medications:  (take insulin daily as direct)  Start Date:  03/14/18  Target Date: 03/21/18    Diabetes Self-Management Support Plan  Exercise/Nutrition: join a gym  Review Status: Patient has selected and agrees to support plan.    Diabetes Care Plan/Intervention  Education Plan/Intervention: Individual Follow-Up DSMT (Pt will improve consistency insulin dose to 20 units daily, stop sugar sweetened tea and call me 1 wk for bg review. )    Diabetes Meal Plan  Restrictions: Low Fat, Low Sodium  Calories: 1400  Carbohydrate Per Meal: 30-45g  Carbohydrate Per Snack : 7-15g    Education Units of Time   Time Spent: 30 min    Health Maintenance was reviewed today with patient. Discussed with patient importance of routine eye exams, foot exams/foot care, blood work (i.e.: A1c, microalbumin, and lipid), dental visits, yearly flu vaccine, and pneumonia vaccine as indicated by PCP. Patient verbalized understanding.     Health Maintenance Topics with due status: Not Due       Topic Last Completion Date    TETANUS VACCINE 09/08/2016    Pneumococcal PPSV23 (Medium Risk) 12/07/2017    Foot Exam 12/07/2017    Lipid Panel 12/07/2017    Hemoglobin A1c 12/07/2017    Urine Microalbumin 12/07/2017    Fecal Occult Blood Test (FOBT)/[employer] 12/21/2017    Mammogram 12/29/2017    Eye Exam 01/09/2018    Pap Smear with HPV Cotest 01/29/2018     There are no preventive care reminders to display for this patient.

## 2018-03-14 NOTE — LETTER
March 14, 2018      Andrey Perrin MD  [address] [address] [address]         Patient: Diamond Das   MR Number: [id number]   YOB: 1955   Date of Visit: 3/14/2018       Dear Dr. Perrin:    Thank you for referring Diamond for diabetes self-management education and support. She has completed all components of our Diabetes Management Program and her Self-Management Support Plan. Below is a summary of her clinical outcomes and goal progress.    Patient Outcomes:    A1c Status:   Lab Results   Component Value Date    HGBA1C 7.2 (H) 12/07/2017    HGBA1C 5.7 01/18/2017     Goals  Patient has selected/evaluated goals during today's session: Yes, selected  Healthy Eating: In Progress (use meal plan - carb portions, avoid sweet tea)  Start Date: 03/14/18  Target Date: 03/21/18  Monitoring: % Met (test blood sugar fasting and 2 hours after supper)  Met Percentage : 50%  Start Date: 03/14/18  Target Date: 03/21/18  Medications:  (take insulin daily as direct)  Start Date: 03/14/18  Target Date: 03/21/18    Diabetes Self-Management Support Plan  Exercise/Nutrition: join a gym  Review Status: Patient has selected and agrees to support plan.    Follow up:   · Diamond to follow diabetes support plan indicated above  · Diamond to attend medical appointments as scheduled  · Diamond to update you on her DM education progress as needed      If you have questions, please do not hesitate to call me. I look forward to providing additional education and support as needed.    Sincerely,    Nolvia Mcneil, VANE, CDE

## 2018-03-19 ENCOUNTER — OFFICE VISIT (OUTPATIENT)
Dept: INTERNAL MEDICINE | Facility: CLINIC | Age: 63
End: 2018-03-19
Payer: MEDICAID

## 2018-03-19 ENCOUNTER — LAB VISIT (OUTPATIENT)
Dept: LAB | Facility: HOSPITAL | Age: 63
End: 2018-03-19
Attending: FAMILY MEDICINE
Payer: MEDICAID

## 2018-03-19 VITALS
DIASTOLIC BLOOD PRESSURE: 62 MMHG | HEIGHT: 61 IN | SYSTOLIC BLOOD PRESSURE: 118 MMHG | OXYGEN SATURATION: 98 % | WEIGHT: 191.38 LBS | BODY MASS INDEX: 36.13 KG/M2 | TEMPERATURE: 97 F | RESPIRATION RATE: 18 BRPM | HEART RATE: 62 BPM

## 2018-03-19 DIAGNOSIS — E11.22 CKD STAGE 3 DUE TO TYPE 2 DIABETES MELLITUS: ICD-10-CM

## 2018-03-19 DIAGNOSIS — Z79.899 ENCOUNTER FOR LONG-TERM (CURRENT) USE OF MEDICATIONS: ICD-10-CM

## 2018-03-19 DIAGNOSIS — N18.30 CKD STAGE 3 DUE TO TYPE 2 DIABETES MELLITUS: ICD-10-CM

## 2018-03-19 DIAGNOSIS — E11.59 TYPE 2 DIABETES MELLITUS WITH OTHER CIRCULATORY COMPLICATION, WITHOUT LONG-TERM CURRENT USE OF INSULIN: Primary | ICD-10-CM

## 2018-03-19 DIAGNOSIS — I50.31 ACUTE DIASTOLIC CONGESTIVE HEART FAILURE: ICD-10-CM

## 2018-03-19 DIAGNOSIS — R60.9 EDEMA, UNSPECIFIED TYPE: ICD-10-CM

## 2018-03-19 PROBLEM — E78.5 HYPERLIPIDEMIA: Status: ACTIVE | Noted: 2018-03-19

## 2018-03-19 PROBLEM — Z28.9 DELAYED IMMUNIZATIONS: Status: RESOLVED | Noted: 2017-12-07 | Resolved: 2018-03-19

## 2018-03-19 PROBLEM — Z12.4 SCREENING FOR MALIGNANT NEOPLASM OF CERVIX: Status: RESOLVED | Noted: 2017-12-14 | Resolved: 2018-03-19

## 2018-03-19 LAB
ALBUMIN SERPL BCP-MCNC: 2.7 G/DL
ALP SERPL-CCNC: 183 U/L
ALT SERPL W/O P-5'-P-CCNC: 28 U/L
ANION GAP SERPL CALC-SCNC: 12 MMOL/L
ANION GAP SERPL CALC-SCNC: 12 MMOL/L
AST SERPL-CCNC: 43 U/L
BILIRUB SERPL-MCNC: 0.6 MG/DL
BNP SERPL-MCNC: 399 PG/ML
BUN SERPL-MCNC: 38 MG/DL
BUN SERPL-MCNC: 38 MG/DL
CALCIUM SERPL-MCNC: 9.6 MG/DL
CALCIUM SERPL-MCNC: 9.6 MG/DL
CHLORIDE SERPL-SCNC: 107 MMOL/L
CHLORIDE SERPL-SCNC: 107 MMOL/L
CO2 SERPL-SCNC: 25 MMOL/L
CO2 SERPL-SCNC: 25 MMOL/L
CREAT SERPL-MCNC: 1.9 MG/DL
CREAT SERPL-MCNC: 1.9 MG/DL
EST. GFR  (AFRICAN AMERICAN): 32.3 ML/MIN/1.73 M^2
EST. GFR  (AFRICAN AMERICAN): 32.3 ML/MIN/1.73 M^2
EST. GFR  (NON AFRICAN AMERICAN): 28.1 ML/MIN/1.73 M^2
EST. GFR  (NON AFRICAN AMERICAN): 28.1 ML/MIN/1.73 M^2
GLUCOSE SERPL-MCNC: 149 MG/DL
GLUCOSE SERPL-MCNC: 149 MG/DL
POTASSIUM SERPL-SCNC: 5.2 MMOL/L
POTASSIUM SERPL-SCNC: 5.2 MMOL/L
PROT SERPL-MCNC: 8.6 G/DL
SODIUM SERPL-SCNC: 144 MMOL/L
SODIUM SERPL-SCNC: 144 MMOL/L

## 2018-03-19 PROCEDURE — 99999 PR PBB SHADOW E&M-EST. PATIENT-LVL IV: CPT | Mod: PBBFAC,,, | Performed by: FAMILY MEDICINE

## 2018-03-19 PROCEDURE — 83880 ASSAY OF NATRIURETIC PEPTIDE: CPT | Mod: PO

## 2018-03-19 PROCEDURE — 99213 OFFICE O/P EST LOW 20 MIN: CPT | Mod: S$PBB,,, | Performed by: FAMILY MEDICINE

## 2018-03-19 PROCEDURE — 36415 COLL VENOUS BLD VENIPUNCTURE: CPT | Mod: PO

## 2018-03-19 PROCEDURE — 80053 COMPREHEN METABOLIC PANEL: CPT | Mod: PO

## 2018-03-19 PROCEDURE — 99214 OFFICE O/P EST MOD 30 MIN: CPT | Mod: PBBFAC,PO | Performed by: FAMILY MEDICINE

## 2018-03-19 RX ORDER — INSULIN GLARGINE 100 [IU]/ML
20 INJECTION, SOLUTION SUBCUTANEOUS DAILY
Qty: 15 ML | Refills: 3 | Status: SHIPPED | OUTPATIENT
Start: 2018-03-19 | End: 2018-06-19 | Stop reason: SDUPTHER

## 2018-03-19 NOTE — PROGRESS NOTES
Subjective:       Patient ID: Diamond Das is a 63 y.o. female.    Chief Complaint: Follow-up (kidney function)    Pt here for f/u of elevated creatinine and to f/u from medication change and edema.  Chronic Edema mitigated by increase in lasix.  Concern for worsenign renal fx.      Review of Systems   Respiratory: Negative for shortness of breath.    Cardiovascular: Negative for chest pain.   Gastrointestinal: Negative for abdominal pain.       Objective:      Physical Exam   Constitutional: She appears well-developed and well-nourished. No distress.   HENT:   Head: Normocephalic and atraumatic.   Cardiovascular: Normal rate and regular rhythm.    Pulmonary/Chest: Effort normal and breath sounds normal. No respiratory distress. She has no wheezes.   Abdominal: Soft. She exhibits no distension.   Musculoskeletal: She exhibits no tenderness.   Trace edema to ble   Skin: Skin is warm and dry. No rash noted. She is not diaphoretic. No erythema.   Psychiatric: She has a normal mood and affect.   Nursing note and vitals reviewed.      Assessment:       1. Type 2 diabetes mellitus with other circulatory complication, without long-term current use of insulin    2. Encounter for long-term (current) use of medications    3. CKD stage 3 due to type 2 diabetes mellitus    4. Edema, unspecified type        Plan:     Problem List Items Addressed This Visit        Psychiatric    Encounter for long-term (current) use of medications    Relevant Orders    Comprehensive metabolic panel (Completed)       Endocrine    Type 2 diabetes mellitus with circulatory disorder - Primary    Relevant Medications    insulin glargine (LANTUS SOLOSTAR) 100 unit/mL (3 mL) InPn pen    CKD stage 3 due to type 2 diabetes mellitus    Relevant Orders    Comprehensive metabolic panel (Completed)       Other    Edema    Current Assessment & Plan     Edema has been greatly resolved. Trace at best to ble.

## 2018-03-19 NOTE — LETTER
03/19/2018               [facility]  [address] [address] [address]  Phone: [phone]   03/19/2018    Patient: Diamond Das   YOB: 1955   Date of Visit: 3/19/2018       To Whom it May Concern:    Diamond Das was seen in my clinic on 3/19/2018. She no longer needs massage services.    If you have any questions or concerns, please don't hesitate to call.    Sincerely,   Dr. Andrey Perrin, SHELIA Gonzales LPN

## 2018-03-20 ENCOUNTER — TELEPHONE (OUTPATIENT)
Dept: CARDIOLOGY | Facility: CLINIC | Age: 63
End: 2018-03-20

## 2018-03-20 NOTE — TELEPHONE ENCOUNTER
----- Message from Vineet Shaw MD sent at 3/19/2018  6:05 PM CDT -----  Lab showed CHF improved. Continue current Rx

## 2018-03-20 NOTE — TELEPHONE ENCOUNTER
Attempted to call pt without success to give improved CHF results.  No answer.  Left VM to return call.    ----- Message from Vineet Shaw MD sent at 3/19/2018  6:05 PM CDT -----  Lab showed CHF improved. Continue current Rx

## 2018-03-27 ENCOUNTER — PROCEDURE VISIT (OUTPATIENT)
Dept: PULMONOLOGY | Facility: CLINIC | Age: 63
End: 2018-03-27
Payer: MEDICAID

## 2018-03-27 ENCOUNTER — HOSPITAL ENCOUNTER (OUTPATIENT)
Dept: RADIOLOGY | Facility: HOSPITAL | Age: 63
Discharge: HOME | End: 2018-03-27
Attending: INTERNAL MEDICINE
Payer: MEDICAID

## 2018-03-27 ENCOUNTER — OFFICE VISIT (OUTPATIENT)
Dept: PULMONOLOGY | Facility: CLINIC | Age: 63
End: 2018-03-27
Payer: MEDICAID

## 2018-03-27 VITALS
WEIGHT: 199.94 LBS | OXYGEN SATURATION: 98 % | SYSTOLIC BLOOD PRESSURE: 150 MMHG | BODY MASS INDEX: 37.76 KG/M2 | DIASTOLIC BLOOD PRESSURE: 70 MMHG | HEART RATE: 64 BPM | HEIGHT: 61 IN | RESPIRATION RATE: 21 BRPM | BODY MASS INDEX: 37.75 KG/M2 | WEIGHT: 200 LBS | HEIGHT: 61 IN

## 2018-03-27 DIAGNOSIS — R94.2 DIFFUSION CAPACITY OF LUNG (DL), DECREASED: ICD-10-CM

## 2018-03-27 DIAGNOSIS — R09.02 EXERCISE HYPOXEMIA: ICD-10-CM

## 2018-03-27 DIAGNOSIS — J98.4 RESTRICTIVE LUNG DISEASE: ICD-10-CM

## 2018-03-27 DIAGNOSIS — I50.32 CHRONIC DIASTOLIC HEART FAILURE: ICD-10-CM

## 2018-03-27 DIAGNOSIS — R79.89 ELEVATED BRAIN NATRIURETIC PEPTIDE (BNP) LEVEL: ICD-10-CM

## 2018-03-27 DIAGNOSIS — J90 PLEURAL EFFUSION: ICD-10-CM

## 2018-03-27 DIAGNOSIS — R06.02 SOB (SHORTNESS OF BREATH): ICD-10-CM

## 2018-03-27 DIAGNOSIS — J98.4 RESTRICTIVE LUNG DISEASE: Primary | ICD-10-CM

## 2018-03-27 DIAGNOSIS — I50.1 PULMONARY EDEMA CARDIAC CAUSE: ICD-10-CM

## 2018-03-27 DIAGNOSIS — G47.33 OSA ON CPAP: ICD-10-CM

## 2018-03-27 LAB
POST FEF 25 75: 2.43 L/S (ref 1.2–2.45)
POST FET 100: 11.52 S
POST FEV1 FVC: 85 %
POST FEV1: 1.59 L (ref 1.53–2.08)
POST FIF 50: 3.17 L/S
POST FVC: 1.86 L (ref 1.98–2.63)
POST PEF: 5.36 L/S (ref 4.15–5.97)
PRE DLCO: 0.64 ML/MMHG/MIN (ref 18.48–26.77)
PRE ERV: 0.74 L
PRE FEF 25 75: 1.97 L/S (ref 1.2–2.45)
PRE FET 100: 11.65 S
PRE FEV1 FVC: 82 %
PRE FEV1: 1.51 L (ref 1.53–2.08)
PRE FIF 50: 3.54 L/S
PRE FRC PL: 1.37 L (ref 1.48–2.43)
PRE FVC: 1.85 L (ref 1.98–2.63)
PRE KROGHS K: 0.37 1/MIN
PRE PEF: 5.59 L/S (ref 4.15–5.97)
PRE RV: 0.66 L (ref 1.26–1.96)
PRE SVC: 1.96 L
PRE TLC: 2.62 L (ref 3.9–4.67)
PREDICTED DLCO: 22.63 ML/MMHG/MIN (ref 18.48–26.77)
PREDICTED FEV1 FVC: 77.85 % (ref 72.95–82.74)
PREDICTED FEV1: 1.81 L (ref 1.53–2.08)
PREDICTED FRC N2: 1.96 L (ref 1.48–2.43)
PREDICTED FRC PL: 1.96 L (ref 1.48–2.43)
PREDICTED FVC: 2.31 L (ref 1.98–2.63)
PREDICTED RV: 1.61 L (ref 1.26–1.96)
PREDICTED SVC: 2.66 L
PREDICTED TLC: 4.28 L (ref 3.9–4.67)
PROVOCATION PROTOCOL: ABNORMAL

## 2018-03-27 PROCEDURE — 71046 X-RAY EXAM CHEST 2 VIEWS: CPT | Mod: TC,FY,PO

## 2018-03-27 PROCEDURE — 94060 EVALUATION OF WHEEZING: CPT | Mod: PBBFAC,PO,59

## 2018-03-27 PROCEDURE — 94060 EVALUATION OF WHEEZING: CPT | Mod: 26,59,S$PBB, | Performed by: INTERNAL MEDICINE

## 2018-03-27 PROCEDURE — 71046 X-RAY EXAM CHEST 2 VIEWS: CPT | Mod: 26,,, | Performed by: RADIOLOGY

## 2018-03-27 PROCEDURE — 99999 PR PBB SHADOW E&M-EST. PATIENT-LVL IV: CPT | Mod: PBBFAC,,, | Performed by: INTERNAL MEDICINE

## 2018-03-27 PROCEDURE — 94618 PULMONARY STRESS TESTING: CPT | Mod: PBBFAC,PO

## 2018-03-27 PROCEDURE — 94726 PLETHYSMOGRAPHY LUNG VOLUMES: CPT | Mod: PBBFAC,PO

## 2018-03-27 PROCEDURE — 94618 PULMONARY STRESS TESTING: CPT | Mod: 26,S$PBB,, | Performed by: INTERNAL MEDICINE

## 2018-03-27 PROCEDURE — 99214 OFFICE O/P EST MOD 30 MIN: CPT | Mod: PBBFAC,25,PO | Performed by: INTERNAL MEDICINE

## 2018-03-27 PROCEDURE — 94726 PLETHYSMOGRAPHY LUNG VOLUMES: CPT | Mod: 26,S$PBB,, | Performed by: INTERNAL MEDICINE

## 2018-03-27 PROCEDURE — 94729 DIFFUSING CAPACITY: CPT | Mod: 26,S$PBB,, | Performed by: INTERNAL MEDICINE

## 2018-03-27 PROCEDURE — 99214 OFFICE O/P EST MOD 30 MIN: CPT | Mod: 25,S$PBB,, | Performed by: INTERNAL MEDICINE

## 2018-03-27 PROCEDURE — 94729 DIFFUSING CAPACITY: CPT | Mod: PBBFAC,PO

## 2018-03-27 NOTE — PROGRESS NOTES
Pulmonary Outpatient Follow Up Visit     Subjective:       Patient ID: Diamond Das is a 63 y.o. female.    Chief Complaint: Shortness of Breath and Pulmonary edema      HPI  63-year-old female patient presenting for 1 month follow-up.  She initially presented for shortness of breath and lower extremity edema which have significantly improved and gradually since she was discharged from the hospital in February 2018 after admission at our [facility] for acute hypoxemia and acute pulmonary edema.    Today she does not feel short of breath, does not have cough or lower extremity edema has significantly decreased.  Does have obstructive sleep apnea she states her sleep study was done more than 5 years ago and she is compliant with her CPAP.  She wishes to have a repeat sleep test.    She has diastolic heart failure, cryptogenic liver cirrhosis status post paracentesis and TIPS in 2017, chronic lower extremity edema.  February 2018 she started complaining of worsening shortness of breath she was evaluated by cardiology Dr. Mane increased her Lasix however the patient ended up in our [facility] for 5 days.  She received intravenous diuretics and she was discharged on Lasix 80 mg twice a day in addition to Aldactone 100 mg daily.  Also she had hypoxemia on exertion and she was discharged on oxygen on exertion.      Review of Systems   Constitutional: Positive for weight gain. Negative for fever and chills.   HENT: Negative for trouble swallowing, voice change and congestion.         History of cataract.   Eyes: Negative for redness.   Respiratory: Positive for shortness of breath and orthopnea.    Cardiovascular: Negative for chest pain.        History of diastolic CHF.   Genitourinary:        CKD 3    Endocrine: History of diabetes.  Hypothyroid.    Musculoskeletal: Positive for arthralgias and back pain.   Skin: Negative for rash.   Gastrointestinal:  Positive for abdominal distention and acid reflux.        History of liver cirrhosis, ascites status post paracentesis in the past.  Status post TIPS.  Gastroesophageal reflux disease.  Gastroparesis.   Neurological: Positive for headaches. Negative for syncope.   Hematological: No excessive bruising.   Psychiatric/Behavioral: Negative for confusion. The patient is not nervous/anxious.         KRISTAL         Outpatient Encounter Prescriptions as of 3/27/2018   Medication Sig Dispense Refill    amLODIPine (NORVASC) 10 MG tablet TAKE ONE TABLET BY MOUTH ONCE DAILY 30 tablet 0    aspirin (ECOTRIN) 81 MG EC tablet Take 1 tablet (81 mg total) by mouth once daily. 365 tablet 0    carvedilol (COREG) 3.125 MG tablet TAKE ONE TABLET BY MOUTH TWICE DAILY 60 tablet 0    furosemide (LASIX) 80 MG tablet Take 1 tablet (80 mg total) by mouth 2 (two) times daily. (Patient taking differently: Take 80 mg by mouth 2 (two) times daily. ) 180 tablet 0    insulin glargine (LANTUS SOLOSTAR) 100 unit/mL (3 mL) InPn pen Inject 20 Units into the skin once daily. 15 mL 3    levothyroxine (SYNTHROID) 100 MCG tablet Take 1 tablet (100 mcg total) by mouth before breakfast. 90 tablet 0    simvastatin (ZOCOR) 40 MG tablet Take 40 mg by mouth.      spironolactone (ALDACTONE) 50 MG tablet Take 1 tablet (50 mg total) by mouth 2 (two) times daily. 180 tablet 0    vitamin E 400 UNIT capsule Take 400 Units by mouth every morning.      ondansetron (ZOFRAN-ODT) 4 MG TbDL Take 1 tablet (4 mg total) by mouth every 8 (eight) hours as needed (prn nausea). 15 tablet 0    traMADol (ULTRAM) 50 mg tablet Take 50 mg by mouth.       No facility-administered encounter medications on file as of 3/27/2018.        Objective:          Physical Exam   Constitutional: She is oriented to person, place, and time. She appears well-developed and well-nourished.   HENT:   Head: Normocephalic.   Neck: Neck supple.   Cardiovascular: Normal rate and regular rhythm.     Pulmonary/Chest: Normal expansion and effort normal. She has decreased breath sounds.   Abdominal: Soft. She exhibits no distension.   Musculoskeletal: She exhibits no edema or tenderness.   Lymphadenopathy:     She has no axillary adenopathy.   Neurological: She is alert and oriented to person, place, and time.   Skin: Skin is warm.   Psychiatric: She has a normal mood and affect.       Laboratory    Lab Results   Component Value Date    WBC 5.75 12/22/2017    HGB 11.7 (L) 12/22/2017    HCT 35.1 (L) 12/22/2017    MCV 94 12/22/2017     12/22/2017     BMP  Lab Results   Component Value Date     03/19/2018     03/19/2018    K 5.2 (H) 03/19/2018    K 5.2 (H) 03/19/2018     03/19/2018     03/19/2018    CO2 25 03/19/2018    CO2 25 03/19/2018    BUN 38 (H) 03/19/2018    BUN 38 (H) 03/19/2018    CREATININE 1.9 (H) 03/19/2018    CREATININE 1.9 (H) 03/19/2018    CALCIUM 9.6 03/19/2018    CALCIUM 9.6 03/19/2018    ANIONGAP 12 03/19/2018    ANIONGAP 12 03/19/2018    ESTGFRAFRICA 32.3 (A) 03/19/2018    ESTGFRAFRICA 32.3 (A) 03/19/2018    EGFRNONAA 28.1 (A) 03/19/2018    EGFRNONAA 28.1 (A) 03/19/2018     BNP  @LABRCNTIP(BNP,BNPTRIAGEBLO)@  Lab Results   Component Value Date    TSH 57.066 (H) 12/07/2017     ABG  @LABRCNTIP(PH,PO2,PCO2,HCO3,BE)@    Diagnostic Results:  Tests were personally reviewed  Chest x-ray done today showed significant improvement of pulmonary edema/pleural effusions.    PFT showed mild restriction with significant DLCO reduction, lung function significantly better when compared to spirometry done in May 2017    6 minute walking test showed that the patient had the lowest sat of 93% mild hypoxemia, no need for oxygen and she did not have a reduction in her exercise capacity    Assessment/Plan:   Restrictive lung disease    Pulmonary edema cardiac cause    Chronic diastolic heart failure    KRISTAL on CPAP  -     CPAP Titration (Must have dx of KRISTAL from previous sleep study.);  Future    BMI 37.0-37.9, adult    Continue  Lasix 80 mg twice a day, Aldactone 100 mg daily    Follow-up with cardiology    I will likely  put order to dc O2 by next visit  If she remains stable. Advised patient not to use O2 currently.     General weight loss/lifestyle modification strategies discussed (elicit support from others; identify saboteurs; non-food rewards).  Diet interventions: low calorie (1000 kCal/d) deficit diet      Follow-up in about 2 months (around 5/27/2018).    This note was prepared using voice recognition system and is likely to have sound alike errors that may have been overlooked even after proof reading.  Please call me with any questions    Discussed diagnosis, its evaluation, treatment and usual course. All questions answered.    Thank you for the courtesy of participating in the care of this patient    Angelica Hunter MD

## 2018-03-27 NOTE — PATIENT INSTRUCTIONS
General weight loss/lifestyle modification strategies discussed (elicit support from others; identify saboteurs; non-food rewards).  Diet interventions: low calorie (1000 kCal/d) deficit diet

## 2018-03-27 NOTE — PROCEDURES
"[facility]  Six Minute Walk     SUMMARY     Ordering Provider: Dr Hunter   Interpreting Provider: Dr Hunter  Performing nurse/tech/RT: IMER Diaz  Diagnosis:  (SOB)  Height: 5' 1" (154.9 cm)  Weight: 90.7 kg (200 lb)  BMI (Calculated): 37.9   Patient Race:             Phase Oxygen Assessment Supplemental O2 Heart   Rate Blood Pressure Salo Dyspnea Scale Rating   Resting 98 % Room Air 67 bpm 150/75 0   Exercise        Minute        1 98 % Room Air 82 bpm     2 97 % Room Air 84 bpm     3 93 % Room Air 84 bpm     4 99 % Room Air 88 bpm     5 98 % Room Air 89 bpm     6  98 % Room Air 92 bpm 158/59 7-8   Recovery        Minute        1 97 % Room Air 87 bpm     2 99 % Room Air 72 bpm     3 98 % Room Air 69 bpm     4 99 % Room Air 65 bpm 136/58 3     Six Minute Walk Summary  6MWT Status: completed without stopping  Patient Reported: No complaints     Interpretation:  Did the patient stop or pause?: No         Total Time Walked (Calculated): 360 seconds  Final Partial Lap Distance (feet): 0 feet  Total Distance Meters (Calculated): 365.76 meters  Predicted Distance Meters (Calculated): 433.51 meters  Percentage of Predicted (Calculated): 84.37  Peak VO2 (Calculated): 14.95  Mets: 4.27  Has The Patient Had a Previous Six Minute Walk Test?: No       Previous 6MWT Results  Has The Patient Had a Previous Six Minute Walk Test?: No    "

## 2018-04-19 ENCOUNTER — TELEPHONE (OUTPATIENT)
Dept: INTERNAL MEDICINE | Facility: CLINIC | Age: 63
End: 2018-04-19

## 2018-04-19 NOTE — TELEPHONE ENCOUNTER
----- Message from Dee Rosas sent at 4/19/2018  8:18 AM CDT -----  Contact: pt  She's returning a missed call, please advise [phone] (home)

## 2018-04-19 NOTE — TELEPHONE ENCOUNTER
----- Message from Paz Lowe sent at 4/19/2018 12:42 PM CDT -----  Pt at [phone]//states she is suppose to have an appt today with Dr Perrin and will not able to come in//she is having pain all over her body//please call to discuss//thanks/caitlyn

## 2018-04-20 ENCOUNTER — HOSPITAL ENCOUNTER (OUTPATIENT)
Dept: SLEEP MEDICINE | Facility: HOSPITAL | Age: 63
Discharge: HOME | End: 2018-04-20
Attending: INTERNAL MEDICINE
Payer: MEDICAID

## 2018-04-20 DIAGNOSIS — G47.01 INSOMNIA SECONDARY TO CHRONIC PAIN: ICD-10-CM

## 2018-04-20 DIAGNOSIS — F51.04 PSYCHOPHYSIOLOGICAL INSOMNIA: ICD-10-CM

## 2018-04-20 DIAGNOSIS — G47.33 OSA ON CPAP: Primary | ICD-10-CM

## 2018-04-20 DIAGNOSIS — Z72.821 INADEQUATE SLEEP HYGIENE: ICD-10-CM

## 2018-04-20 DIAGNOSIS — G89.29 INSOMNIA SECONDARY TO CHRONIC PAIN: ICD-10-CM

## 2018-04-20 PROCEDURE — 95810 POLYSOM 6/> YRS 4/> PARAM: CPT

## 2018-04-20 PROCEDURE — 95810 POLYSOM 6/> YRS 4/> PARAM: CPT | Mod: 26,,, | Performed by: PSYCHOLOGIST

## 2018-04-25 ENCOUNTER — TELEPHONE (OUTPATIENT)
Dept: PULMONOLOGY | Facility: CLINIC | Age: 63
End: 2018-04-25

## 2018-04-25 DIAGNOSIS — G47.33 OSA (OBSTRUCTIVE SLEEP APNEA): Primary | ICD-10-CM

## 2018-04-25 DIAGNOSIS — I10 HYPERTENSION, UNSPECIFIED TYPE: ICD-10-CM

## 2018-04-26 RX ORDER — AMLODIPINE BESYLATE 10 MG/1
TABLET ORAL
Qty: 30 TABLET | Refills: 0 | OUTPATIENT
Start: 2018-04-26

## 2018-04-26 NOTE — TELEPHONE ENCOUNTER
Spoke with pt and informed her that  wants to see her BC her BP was not controlled at her pulmonary appt before he refills her BP med. Pt said her lower back has been killing her too. Pt scheduled to see  tomorrow at 3:20.

## 2018-04-27 ENCOUNTER — OFFICE VISIT (OUTPATIENT)
Dept: INTERNAL MEDICINE | Facility: CLINIC | Age: 63
End: 2018-04-27
Payer: MEDICAID

## 2018-04-27 ENCOUNTER — TELEPHONE (OUTPATIENT)
Dept: INTERNAL MEDICINE | Facility: CLINIC | Age: 63
End: 2018-04-27

## 2018-04-27 VITALS
HEART RATE: 67 BPM | DIASTOLIC BLOOD PRESSURE: 58 MMHG | HEIGHT: 61 IN | SYSTOLIC BLOOD PRESSURE: 110 MMHG | WEIGHT: 209 LBS | OXYGEN SATURATION: 98 % | TEMPERATURE: 97 F | RESPIRATION RATE: 16 BRPM | BODY MASS INDEX: 39.46 KG/M2

## 2018-04-27 DIAGNOSIS — E11.59 TYPE 2 DIABETES MELLITUS WITH OTHER CIRCULATORY COMPLICATION, WITHOUT LONG-TERM CURRENT USE OF INSULIN: ICD-10-CM

## 2018-04-27 DIAGNOSIS — I10 HYPERTENSION, UNSPECIFIED TYPE: ICD-10-CM

## 2018-04-27 DIAGNOSIS — S39.012A STRAIN OF LUMBAR REGION, INITIAL ENCOUNTER: Primary | ICD-10-CM

## 2018-04-27 PROCEDURE — 99214 OFFICE O/P EST MOD 30 MIN: CPT | Mod: PBBFAC,PO | Performed by: FAMILY MEDICINE

## 2018-04-27 PROCEDURE — 99214 OFFICE O/P EST MOD 30 MIN: CPT | Mod: S$PBB,,, | Performed by: FAMILY MEDICINE

## 2018-04-27 PROCEDURE — 99999 PR PBB SHADOW E&M-EST. PATIENT-LVL IV: CPT | Mod: PBBFAC,,, | Performed by: FAMILY MEDICINE

## 2018-04-27 RX ORDER — AMLODIPINE BESYLATE 10 MG/1
10 TABLET ORAL DAILY
Qty: 90 TABLET | Refills: 1 | Status: SHIPPED | OUTPATIENT
Start: 2018-04-27 | End: 2018-11-26 | Stop reason: SDUPTHER

## 2018-04-27 RX ORDER — TIZANIDINE HYDROCHLORIDE 4 MG/1
1 CAPSULE, GELATIN COATED ORAL NIGHTLY
Qty: 30 CAPSULE | Refills: 0 | Status: SHIPPED | OUTPATIENT
Start: 2018-04-27 | End: 2018-06-09 | Stop reason: SDUPTHER

## 2018-04-27 RX ORDER — CARVEDILOL 3.12 MG/1
3.12 TABLET ORAL 2 TIMES DAILY
Qty: 180 TABLET | Refills: 1 | Status: SHIPPED | OUTPATIENT
Start: 2018-04-27 | End: 2019-01-17 | Stop reason: SDUPTHER

## 2018-04-27 NOTE — PROGRESS NOTES
Subjective:       Patient ID: Diamond Das is a 63 y.o. female.    Chief Complaint: Follow-up (bp and back pain)    Back Pain   This is a new problem. The current episode started more than 1 month ago. The problem occurs constantly. The problem has been gradually worsening since onset. The quality of the pain is described as aching and cramping. The pain does not radiate. The pain is moderate. The pain is worse during the day. The symptoms are aggravated by bending, position and standing. Stiffness is present in the morning. Pertinent negatives include no abdominal pain, chest pain or weakness.     Review of Systems   Respiratory: Negative for shortness of breath.    Cardiovascular: Negative for chest pain.   Gastrointestinal: Negative for abdominal pain.   Musculoskeletal: Positive for back pain.   Neurological: Negative for dizziness and weakness.       Objective:      Physical Exam   Constitutional: She appears well-developed and well-nourished. No distress.   HENT:   Head: Normocephalic and atraumatic.   Pulmonary/Chest: Effort normal and breath sounds normal. No respiratory distress. She has no wheezes.   Musculoskeletal: She exhibits no edema or deformity.   TTP at right lumbosacral region.  No fasciculations noted   Neurological: She is alert.   Skin: Skin is warm and dry. No rash noted. She is not diaphoretic. No erythema.   Nursing note and vitals reviewed.      Assessment:       1. Strain of lumbar region, initial encounter    2. Hypertension, unspecified type    3. Type 2 diabetes mellitus with other circulatory complication, without long-term current use of insulin        Plan:     Problem List Items Addressed This Visit        Cardiac/Vascular    Hypertension    Relevant Medications    amLODIPine (NORVASC) 10 MG tablet    carvedilol (COREG) 3.125 MG tablet       Endocrine    Type 2 diabetes mellitus with circulatory disorder    Relevant Medications    amLODIPine (NORVASC) 10 MG tablet    carvedilol  (COREG) 3.125 MG tablet       Orthopedic    Lumbar strain - Primary    Relevant Medications    tiZANidine 4 mg Cap    Other Relevant Orders    Ambulatory consult to Physical Therapy

## 2018-04-27 NOTE — TELEPHONE ENCOUNTER
Pt wants to schedule to see Dr. Contreras via telemed in [facility]. This will be a new pt to Dr. Contreras, can we do new pt's via telemed? I didn't see a new pt option

## 2018-04-30 DIAGNOSIS — N18.30 CKD (CHRONIC KIDNEY DISEASE) STAGE 3, GFR 30-59 ML/MIN: Primary | ICD-10-CM

## 2018-05-04 DIAGNOSIS — E03.4 HYPOTHYROIDISM DUE TO ACQUIRED ATROPHY OF THYROID: Primary | ICD-10-CM

## 2018-05-04 RX ORDER — LEVOTHYROXINE SODIUM 100 UG/1
TABLET ORAL
Qty: 90 TABLET | Refills: 0 | Status: SHIPPED | OUTPATIENT
Start: 2018-05-04 | End: 2018-05-24 | Stop reason: SDUPTHER

## 2018-05-04 NOTE — TELEPHONE ENCOUNTER
Please add TSH to her lab appt next week. Last TSH was out of range. Please review with her that she should be taking her sythroid on an empty stomach first thing in morning by itself and she should wait 30 mins before eating or taking other medications.

## 2018-05-08 ENCOUNTER — TELEPHONE (OUTPATIENT)
Dept: INTERNAL MEDICINE | Facility: CLINIC | Age: 63
End: 2018-05-08

## 2018-05-08 NOTE — TELEPHONE ENCOUNTER
----- Message from Brenda Lorenzo sent at 5/8/2018  3:16 PM CDT -----  Contact: Pt   Pt returned a phone call..[phone] (fsly)

## 2018-05-08 NOTE — TELEPHONE ENCOUNTER
Spoke with pt and she said that someone called her about physical therapy. I told her I did not see any phone notes from us. She said she was coming tomorrow to see  and if she did not figure out who called her by then she will be seeing us anyway tomorrow

## 2018-05-10 ENCOUNTER — LAB VISIT (OUTPATIENT)
Dept: LAB | Facility: HOSPITAL | Age: 63
End: 2018-05-10
Attending: INTERNAL MEDICINE
Payer: MEDICAID

## 2018-05-10 ENCOUNTER — OFFICE VISIT (OUTPATIENT)
Dept: INTERNAL MEDICINE | Facility: CLINIC | Age: 63
End: 2018-05-10
Payer: MEDICAID

## 2018-05-10 VITALS
BODY MASS INDEX: 39.5 KG/M2 | DIASTOLIC BLOOD PRESSURE: 60 MMHG | HEART RATE: 59 BPM | RESPIRATION RATE: 16 BRPM | SYSTOLIC BLOOD PRESSURE: 130 MMHG | HEIGHT: 61 IN | OXYGEN SATURATION: 98 % | TEMPERATURE: 97 F | WEIGHT: 209.19 LBS

## 2018-05-10 DIAGNOSIS — N18.30 CKD (CHRONIC KIDNEY DISEASE) STAGE 3, GFR 30-59 ML/MIN: ICD-10-CM

## 2018-05-10 DIAGNOSIS — E03.4 HYPOTHYROIDISM DUE TO ACQUIRED ATROPHY OF THYROID: ICD-10-CM

## 2018-05-10 DIAGNOSIS — R10.84 GENERALIZED ABDOMINAL PAIN: Primary | ICD-10-CM

## 2018-05-10 LAB
BACTERIA #/AREA URNS AUTO: ABNORMAL /HPF
BILIRUB UR QL STRIP: NEGATIVE
CLARITY UR REFRACT.AUTO: CLEAR
COLOR UR AUTO: YELLOW
CREAT UR-MCNC: 109.3 MG/DL
GLUCOSE UR QL STRIP: ABNORMAL
HGB UR QL STRIP: ABNORMAL
KETONES UR QL STRIP: NEGATIVE
LEUKOCYTE ESTERASE UR QL STRIP: NEGATIVE
MICROSCOPIC COMMENT: ABNORMAL
NITRITE UR QL STRIP: NEGATIVE
PH UR STRIP: 6 [PH] (ref 5–8)
PROT UR QL STRIP: ABNORMAL
PROT UR-MCNC: 30 MG/DL
PROT/CREAT RATIO, UR: 0.27
RBC #/AREA URNS AUTO: 1 /HPF (ref 0–4)
SP GR UR STRIP: 1.02 (ref 1–1.03)
SQUAMOUS #/AREA URNS AUTO: 1 /HPF
URN SPEC COLLECT METH UR: ABNORMAL
UROBILINOGEN UR STRIP-ACNC: NEGATIVE EU/DL
WBC #/AREA URNS AUTO: 1 /HPF (ref 0–5)
YEAST UR QL AUTO: ABNORMAL

## 2018-05-10 PROCEDURE — 81000 URINALYSIS NONAUTO W/SCOPE: CPT | Mod: PO

## 2018-05-10 PROCEDURE — 99999 PR PBB SHADOW E&M-EST. PATIENT-LVL III: CPT | Mod: PBBFAC,,, | Performed by: FAMILY MEDICINE

## 2018-05-10 PROCEDURE — 84156 ASSAY OF PROTEIN URINE: CPT | Mod: PO

## 2018-05-10 PROCEDURE — 99213 OFFICE O/P EST LOW 20 MIN: CPT | Mod: PBBFAC,PO | Performed by: FAMILY MEDICINE

## 2018-05-10 PROCEDURE — 99214 OFFICE O/P EST MOD 30 MIN: CPT | Mod: S$PBB,,, | Performed by: FAMILY MEDICINE

## 2018-05-10 RX ORDER — DICYCLOMINE HYDROCHLORIDE 20 MG/1
20 TABLET ORAL 4 TIMES DAILY
Qty: 60 TABLET | Refills: 0 | Status: SHIPPED | OUTPATIENT
Start: 2018-05-10 | End: 2018-06-15 | Stop reason: SDUPTHER

## 2018-05-10 RX ORDER — PANTOPRAZOLE SODIUM 20 MG/1
20 TABLET, DELAYED RELEASE ORAL DAILY
Qty: 30 TABLET | Refills: 0 | Status: SHIPPED | OUTPATIENT
Start: 2018-05-10 | End: 2018-06-09 | Stop reason: SDUPTHER

## 2018-05-10 RX ORDER — SYRING-NEEDL,DISP,INSUL,0.3 ML 29 G X1/2"
SYRINGE, EMPTY DISPOSABLE MISCELLANEOUS
Refills: 0 | COMMUNITY
Start: 2018-05-10 | End: 2018-06-19

## 2018-05-10 NOTE — PROGRESS NOTES
Subjective:       Patient ID: Diamond Das is a 63 y.o. female.    Chief Complaint: Follow-up (thyroid)    Abdominal Pain   This is a new problem. The current episode started more than 1 month ago. The onset quality is sudden. The problem occurs daily. The problem has been waxing and waning. The pain is located in the generalized abdominal region. The pain is severe. The quality of the pain is aching, dull, sharp and cramping. The abdominal pain does not radiate. Associated symptoms include constipation, nausea and vomiting. Pertinent negatives include no fever. The pain is aggravated by movement and certain positions. The pain is relieved by bowel movements. She has tried nothing for the symptoms. The treatment provided no relief.     Review of Systems   Constitutional: Negative for fever.   Respiratory: Negative for shortness of breath.    Cardiovascular: Negative for chest pain.   Gastrointestinal: Positive for abdominal pain, constipation, nausea and vomiting.       Objective:      Physical Exam   Constitutional: She appears well-developed and well-nourished. She appears distressed.   HENT:   Head: Normocephalic and atraumatic.   Pulmonary/Chest: Effort normal and breath sounds normal. No respiratory distress. She has no wheezes.   Abdominal: Soft. Bowel sounds are normal. She exhibits no distension. There is generalized tenderness. There is no guarding, no tenderness at McBurney's point and negative Flannery's sign.   Skin: Skin is warm and dry. No rash noted. She is not diaphoretic. No erythema.   Nursing note and vitals reviewed.      Assessment:       1. Generalized abdominal pain    2. Hypothyroidism due to acquired atrophy of thyroid        Plan:     Problem List Items Addressed This Visit        Endocrine    Hypothyroidism due to acquired atrophy of thyroid    Current Assessment & Plan     [facility] for [facility] today            GI    Generalized abdominal pain - Primary    Relevant Medications    dicyclomine  (BENTYL) 20 mg tablet    magnesium citrate solution    pantoprazole (PROTONIX) 20 MG tablet

## 2018-05-12 ENCOUNTER — HOSPITAL ENCOUNTER (EMERGENCY)
Facility: HOSPITAL | Age: 63
Discharge: HOME | End: 2018-05-13
Attending: EMERGENCY MEDICINE
Payer: MEDICAID

## 2018-05-12 DIAGNOSIS — R10.2 SUPRAPUBIC PAIN: ICD-10-CM

## 2018-05-12 DIAGNOSIS — K74.69 CRYPTOGENIC CIRRHOSIS: ICD-10-CM

## 2018-05-12 DIAGNOSIS — N30.00 ACUTE CYSTITIS WITHOUT HEMATURIA: ICD-10-CM

## 2018-05-12 DIAGNOSIS — Z79.4 TYPE 2 DIABETES MELLITUS WITH HYPERGLYCEMIA, WITH LONG-TERM CURRENT USE OF INSULIN: ICD-10-CM

## 2018-05-12 DIAGNOSIS — N18.9 CHRONIC KIDNEY DISEASE, UNSPECIFIED CKD STAGE: ICD-10-CM

## 2018-05-12 DIAGNOSIS — R10.13 EPIGASTRIC PAIN: Primary | ICD-10-CM

## 2018-05-12 DIAGNOSIS — E11.65 TYPE 2 DIABETES MELLITUS WITH HYPERGLYCEMIA, WITH LONG-TERM CURRENT USE OF INSULIN: ICD-10-CM

## 2018-05-12 DIAGNOSIS — Z95.828 S/P TIPS (TRANSJUGULAR INTRAHEPATIC PORTOSYSTEMIC SHUNT): ICD-10-CM

## 2018-05-12 DIAGNOSIS — Z86.39 HISTORY OF DIABETIC GASTROPARESIS: ICD-10-CM

## 2018-05-12 LAB
ALBUMIN SERPL BCP-MCNC: 2.8 G/DL
ALP SERPL-CCNC: 237 U/L
ALT SERPL W/O P-5'-P-CCNC: 30 U/L
AMYLASE SERPL-CCNC: 111 U/L
ANION GAP SERPL CALC-SCNC: 9 MMOL/L
AST SERPL-CCNC: 48 U/L
BACTERIA #/AREA URNS AUTO: ABNORMAL /HPF
BASOPHILS # BLD AUTO: 0.04 K/UL
BASOPHILS NFR BLD: 0.6 %
BILIRUB SERPL-MCNC: 0.5 MG/DL
BILIRUB UR QL STRIP: NEGATIVE
BUN SERPL-MCNC: 31 MG/DL
CALCIUM SERPL-MCNC: 9.2 MG/DL
CHLORIDE SERPL-SCNC: 106 MMOL/L
CLARITY UR REFRACT.AUTO: CLEAR
CO2 SERPL-SCNC: 26 MMOL/L
COLOR UR AUTO: YELLOW
CREAT SERPL-MCNC: 2 MG/DL
DIFFERENTIAL METHOD: ABNORMAL
EOSINOPHIL # BLD AUTO: 0.3 K/UL
EOSINOPHIL NFR BLD: 4.8 %
ERYTHROCYTE [DISTWIDTH] IN BLOOD BY AUTOMATED COUNT: 14.3 %
EST. GFR  (AFRICAN AMERICAN): 30.4 ML/MIN/1.73 M^2
EST. GFR  (NON AFRICAN AMERICAN): 26.4 ML/MIN/1.73 M^2
GLUCOSE SERPL-MCNC: 287 MG/DL (ref 70–110)
GLUCOSE SERPL-MCNC: 305 MG/DL
GLUCOSE UR QL STRIP: ABNORMAL
HCT VFR BLD AUTO: 36.7 %
HGB BLD-MCNC: 12.5 G/DL
HGB UR QL STRIP: ABNORMAL
HYALINE CASTS UR QL AUTO: 0 /LPF
KETONES UR QL STRIP: NEGATIVE
LEUKOCYTE ESTERASE UR QL STRIP: NEGATIVE
LIPASE SERPL-CCNC: 47 U/L
LYMPHOCYTES # BLD AUTO: 3 K/UL
LYMPHOCYTES NFR BLD: 48.1 %
MCH RBC QN AUTO: 30.7 PG
MCHC RBC AUTO-ENTMCNC: 34.1 G/DL
MCV RBC AUTO: 90 FL
MICROSCOPIC COMMENT: ABNORMAL
MONOCYTES # BLD AUTO: 0.7 K/UL
MONOCYTES NFR BLD: 11.5 %
NEUTROPHILS # BLD AUTO: 2.2 K/UL
NEUTROPHILS NFR BLD: 34.8 %
NITRITE UR QL STRIP: NEGATIVE
PH UR STRIP: 6 [PH] (ref 5–8)
PLATELET # BLD AUTO: 188 K/UL
PMV BLD AUTO: 10.6 FL
POCT GLUCOSE: 287 MG/DL (ref 70–110)
POTASSIUM SERPL-SCNC: 3.8 MMOL/L
PROT SERPL-MCNC: 7.3 G/DL
PROT UR QL STRIP: ABNORMAL
RBC # BLD AUTO: 4.07 M/UL
RBC #/AREA URNS AUTO: 1 /HPF (ref 0–4)
SODIUM SERPL-SCNC: 141 MMOL/L
SP GR UR STRIP: 1.01 (ref 1–1.03)
SQUAMOUS #/AREA URNS AUTO: 6 /HPF
URN SPEC COLLECT METH UR: ABNORMAL
UROBILINOGEN UR STRIP-ACNC: NEGATIVE EU/DL
WBC # BLD AUTO: 6.28 K/UL
WBC #/AREA URNS AUTO: 9 /HPF (ref 0–5)
YEAST UR QL AUTO: ABNORMAL

## 2018-05-12 PROCEDURE — 82150 ASSAY OF AMYLASE: CPT

## 2018-05-12 PROCEDURE — 80053 COMPREHEN METABOLIC PANEL: CPT

## 2018-05-12 PROCEDURE — 85025 COMPLETE CBC W/AUTO DIFF WBC: CPT

## 2018-05-12 PROCEDURE — 99900035 HC TECH TIME PER 15 MIN (STAT)

## 2018-05-12 PROCEDURE — 81000 URINALYSIS NONAUTO W/SCOPE: CPT

## 2018-05-12 PROCEDURE — 93005 ELECTROCARDIOGRAM TRACING: CPT

## 2018-05-12 PROCEDURE — 96374 THER/PROPH/DIAG INJ IV PUSH: CPT

## 2018-05-12 PROCEDURE — 93010 ELECTROCARDIOGRAM REPORT: CPT | Mod: ,,, | Performed by: INTERNAL MEDICINE

## 2018-05-12 PROCEDURE — 83690 ASSAY OF LIPASE: CPT

## 2018-05-12 PROCEDURE — 99284 EMERGENCY DEPT VISIT MOD MDM: CPT | Mod: 25

## 2018-05-12 PROCEDURE — 96361 HYDRATE IV INFUSION ADD-ON: CPT

## 2018-05-13 VITALS
WEIGHT: 205 LBS | RESPIRATION RATE: 13 BRPM | DIASTOLIC BLOOD PRESSURE: 67 MMHG | TEMPERATURE: 99 F | HEIGHT: 61 IN | BODY MASS INDEX: 38.71 KG/M2 | SYSTOLIC BLOOD PRESSURE: 152 MMHG | OXYGEN SATURATION: 98 % | HEART RATE: 61 BPM

## 2018-05-13 PROCEDURE — 25000003 PHARM REV CODE 250: Performed by: EMERGENCY MEDICINE

## 2018-05-13 PROCEDURE — 63600175 PHARM REV CODE 636 W HCPCS: Performed by: EMERGENCY MEDICINE

## 2018-05-13 RX ORDER — ONDANSETRON 4 MG/1
4 TABLET, ORALLY DISINTEGRATING ORAL EVERY 6 HOURS PRN
Qty: 12 TABLET | Refills: 0 | Status: SHIPPED | OUTPATIENT
Start: 2018-05-13 | End: 2019-01-24 | Stop reason: SDUPTHER

## 2018-05-13 RX ORDER — SULFAMETHOXAZOLE AND TRIMETHOPRIM 800; 160 MG/1; MG/1
1 TABLET ORAL 2 TIMES DAILY
Qty: 6 TABLET | Refills: 0 | Status: SHIPPED | OUTPATIENT
Start: 2018-05-13 | End: 2018-05-17

## 2018-05-13 RX ORDER — METOCLOPRAMIDE HYDROCHLORIDE 5 MG/ML
10 INJECTION INTRAMUSCULAR; INTRAVENOUS
Status: COMPLETED | OUTPATIENT
Start: 2018-05-13 | End: 2018-05-13

## 2018-05-13 RX ADMIN — SODIUM CHLORIDE 1000 ML: 0.9 INJECTION, SOLUTION INTRAVENOUS at 12:05

## 2018-05-13 RX ADMIN — METOCLOPRAMIDE 10 MG: 5 INJECTION, SOLUTION INTRAMUSCULAR; INTRAVENOUS at 12:05

## 2018-05-13 NOTE — ED NOTES
Pt c/o lower abdominal pain and epigastric pain since 2 Thursdays ago. Saw PCP on 5/10/2018. Sent home with bentyl, mag citrate, and protonix, did not take mag citrate yet. Last BM today with hard consistency. Hx of gastroparesis and DM. Denies vomiting since Thursday, has not taken Zofran in several days.     Level of Consciousness: Patient is awake, alert, oriented to person, place, time, and situation.    Appearance: Pt resting comfortably in stretcher, no acute distress at this time. Clothing appropriately placed and clean. Hygiene is appropriate.   Skin: Skin is warm, dry, and intact. Skin turgor is normal/elastic. Mucous membranes moist. Skin color is normal for ethnicity. No skin breakdown noted.  Musculoskeletal: Moves all extremities well. Full active ROM. No deformities noted. Denies any weakness. Gait steady, ambulates without use of assistive devices.   Respiratory: Airway open and patent. Respirations equal and unlabored. Breath sounds clear to auscultation. Denies any SOB.   Cardiac: Regular rate and rhythm. Radial and pedal pulses present and normal. Capillary refill is within normal limits. Denies chest pain.    GI: Abdomen soft,Obese. Bowel sounds present and normal in all quadrants. Tenderness to RLQ, RUQ, and LLQ. Abdomen symmetric with no distention noted. Denies any V/D. Reports nausea. Reports constipation. Reports epigastric and bilateral lower abdominal pain.   Neurological: Symmetrical expressions noted to face. No obvious neurological deficits noted.   Psychosocial: Speech spontaneous, clear, and coherent. Appropriate to situation. Family at bedside. Pt is calm and cooperative.     Pt informed of plan of care, verbalizes understanding, and denies any other questions, complaints, or concerns at this time. Bed in locked in lowest position, siderails up x2, call light within reach.  Will continue to monitor.

## 2018-05-13 NOTE — ED NOTES
Pain has improved and is at a tolerable level per patient. Denies any nausea at this time. Aware of plan of care and discharge instructions. NAD. resp e/u. No further questions or complaints at this time.

## 2018-05-13 NOTE — ED PROVIDER NOTES
Encounter Date: 5/12/2018       History     Chief Complaint   Patient presents with    Abdominal Pain     Lower suprapubic abdominal pain and epigastric pain since 2 Thursdays ago. Reports nausea. Denies diarrhea or vomitings. Denies dysuria.      Patient currently presents with complaint of abdominal pain.  Onset of this event was first noted earlier this week but has gradually worsened.  This is localized to the epigastric and suprapubic regions.  This discomfort is described as aching in nature.  There are associated changes in bowel habits with ongoing constipation.  There has not been associated emesis despite nausea.  There are not associated urinary complaints.  This is a recurring problem with a known history of DM-related gastroparesis.  Patient denies fever.            Review of patient's allergies indicates:   Allergen Reactions    Subsys [fentanyl] Other (See Comments)     After administration pt unresponsive.  HR and respirations decreased.     Versed [midazolam] Other (See Comments)     After administration pt unresponsive.  HR and respirations decreased.     Ampicillin Rash    Codeine Nausea And Vomiting and Nausea Only     Past Medical History:   Diagnosis Date    Ascites     Cataract     CHF (congestive heart failure)     Cirrhosis     Diabetes mellitus     Gastroparesis     GERD (gastroesophageal reflux disease)     Hypertension     Liver disease     Thyroid disease      Past Surgical History:   Procedure Laterality Date    CHOLECYSTECTOMY      ERCP      LIVER BIOPSY      TUBAL LIGATION      UPPER GASTROINTESTINAL ENDOSCOPY       Family History   Problem Relation Age of Onset    Cancer Mother     Breast cancer Mother     Heart disease Father     Aneurysm Sister     Heart disease Brother     No Known Problems Maternal Grandmother     Cancer Maternal Grandfather     Sarcoidosis Sister      Social History   Substance Use Topics    Smoking status: Never Smoker    Smokeless  tobacco: Never Used    Alcohol use No     Review of Systems   Constitutional: Negative for chills and fever.   HENT: Negative for congestion and rhinorrhea.    Respiratory: Negative for cough, chest tightness, shortness of breath and wheezing.    Cardiovascular: Negative for chest pain, palpitations and leg swelling.   Gastrointestinal: Positive for abdominal pain and nausea. Negative for constipation, diarrhea and vomiting.   Genitourinary: Negative for dysuria, frequency, urgency, vaginal bleeding and vaginal discharge.   Skin: Negative for color change and rash.   Allergic/Immunologic: Negative for immunocompromised state.   Neurological: Negative for dizziness, weakness and numbness.   Hematological: Negative for adenopathy. Does not bruise/bleed easily.   All other systems reviewed and are negative.      Physical Exam     Initial Vitals [05/12/18 2155]   BP Pulse Resp Temp SpO2   (!) 179/77 65 18 98.6 °F (37 °C) 98 %      MAP       111         Physical Exam    Nursing note and vitals reviewed.  Constitutional: She appears well-developed and well-nourished. She is not diaphoretic. No distress.   HENT:   Head: Normocephalic and atraumatic.   Right Ear: External ear normal.   Left Ear: External ear normal.   Nose: Nose normal.   Mouth/Throat: Oropharynx is clear and moist.   Eyes: Conjunctivae and EOM are normal. Pupils are equal, round, and reactive to light. No scleral icterus.   Neck: Neck supple. No tracheal deviation present. No JVD present.   Cardiovascular: Normal rate, regular rhythm, normal heart sounds and intact distal pulses. Exam reveals no gallop and no friction rub.    No murmur heard.  Pulmonary/Chest: Breath sounds normal. No respiratory distress. She has no wheezes. She has no rhonchi. She has no rales.   Abdominal: Soft. Bowel sounds are normal. She exhibits no distension. There is tenderness (suprapubic).   Musculoskeletal: Normal range of motion. She exhibits no edema.   Neurological: She is  alert and oriented to person, place, and time. She has normal strength. No cranial nerve deficit or sensory deficit.   Skin: Skin is warm and dry. No rash noted.   Psychiatric: She has a normal mood and affect. Her behavior is normal.       ED Course   Procedures  Labs Reviewed   URINALYSIS - Abnormal; Notable for the following:        Result Value    Protein, UA 2+ (*)     Glucose, UA 3+ (*)     Occult Blood UA Trace (*)     All other components within normal limits   AMYLASE - Abnormal; Notable for the following:     Amylase 111 (*)     All other components within normal limits   CBC W/ AUTO DIFFERENTIAL - Abnormal; Notable for the following:     Hematocrit 36.7 (*)     Gran% 34.8 (*)     Lymph% 48.1 (*)     All other components within normal limits   COMPREHENSIVE METABOLIC PANEL - Abnormal; Notable for the following:     Glucose 305 (*)     BUN, Bld 31 (*)     Creatinine 2.0 (*)     Albumin 2.8 (*)     Alkaline Phosphatase 237 (*)     AST 48 (*)     eGFR if  30.4 (*)     eGFR if non  26.4 (*)     All other components within normal limits   URINALYSIS MICROSCOPIC - Abnormal; Notable for the following:     WBC, UA 9 (*)     Bacteria, UA Moderate (*)     All other components within normal limits   POCT GLUCOSE MONITORING CONTINUOUS - Abnormal; Notable for the following:     POC Glucose 287 (*)     All other components within normal limits   POCT GLUCOSE - Abnormal; Notable for the following:     POCT Glucose 287 (*)     All other components within normal limits   LIPASE     EKG Readings: (Independently Interpreted)   Initial Reading: No STEMI. Rhythm: Normal Sinus Rhythm. Heart Rate: 65. Ectopy: No Ectopy. Conduction: Normal.          Medical Decision Making:   ED Management:  All findings were reviewed with the patient/family in detail along with the diagnoses.  Patient reports resolution of her epigastric pain and nausea.  Despite an elevated BG I see no indication of DKA or HNS. I  see no indication of an emergent process beyond that addressed during our encounter but have duly counseled the patient/family regarding the need for prompt follow-up as well as the indications that should prompt immediate return to the emergency room should new or worrisome developments occur.  The patient/family communicates understanding of all this information and all remaining questions and concerns were addressed at this time.                          Clinical Impression:   The primary encounter diagnosis was Epigastric pain. Diagnoses of Suprapubic pain, History of diabetic gastroparesis, Acute cystitis without hematuria, Cryptogenic cirrhosis, S/P TIPS (transjugular intrahepatic portosystemic shunt), Chronic kidney disease, unspecified CKD stage, and Type 2 diabetes mellitus with hyperglycemia, with long-term current use of insulin were also pertinent to this visit.                           Binu Perrin MD  05/13/18 0203

## 2018-05-13 NOTE — ED NOTES
Pt ambulatory to and from restroom, assisted by daughter. Reports pain is the same as when she arrived to hospital. Updated on plan of care. Will continue to monitor.

## 2018-05-14 ENCOUNTER — PES CALL (OUTPATIENT)
Dept: ADMINISTRATIVE | Facility: CLINIC | Age: 63
End: 2018-05-14

## 2018-05-17 ENCOUNTER — CLINICAL SUPPORT (OUTPATIENT)
Dept: NEPHROLOGY | Facility: CLINIC | Age: 63
End: 2018-05-17
Payer: MEDICAID

## 2018-05-17 ENCOUNTER — OFFICE VISIT (OUTPATIENT)
Dept: NEPHROLOGY | Facility: CLINIC | Age: 63
End: 2018-05-17
Payer: MEDICAID

## 2018-05-17 VITALS
DIASTOLIC BLOOD PRESSURE: 74 MMHG | HEART RATE: 61 BPM | TEMPERATURE: 97 F | HEIGHT: 61 IN | BODY MASS INDEX: 39.46 KG/M2 | WEIGHT: 209 LBS | SYSTOLIC BLOOD PRESSURE: 156 MMHG | RESPIRATION RATE: 18 BRPM

## 2018-05-17 DIAGNOSIS — N18.30 CKD (CHRONIC KIDNEY DISEASE) STAGE 3, GFR 30-59 ML/MIN: Primary | ICD-10-CM

## 2018-05-17 DIAGNOSIS — R80.9 PROTEINURIA, UNSPECIFIED TYPE: ICD-10-CM

## 2018-05-17 PROCEDURE — 99999 PR PBB SHADOW E&M-EST. PATIENT-LVL V: CPT | Mod: PBBFAC,,,

## 2018-05-17 PROCEDURE — 99215 OFFICE O/P EST HI 40 MIN: CPT | Mod: PBBFAC,PO

## 2018-05-17 PROCEDURE — 99203 OFFICE O/P NEW LOW 30 MIN: CPT | Mod: GT,S$PBB,, | Performed by: INTERNAL MEDICINE

## 2018-05-17 RX ORDER — ACETAMINOPHEN 500 MG
500 TABLET ORAL EVERY 6 HOURS PRN
COMMUNITY
End: 2018-09-20

## 2018-05-17 RX ORDER — NAPROXEN SODIUM 220 MG
220 TABLET ORAL
COMMUNITY
End: 2018-05-24

## 2018-05-17 NOTE — PROGRESS NOTES
[facility] Tele-Consultation from Nephrology                Consultation started: 5/17/2018 at 2.30 PM   The chief complaint leading to consultation is: renal insufficiency  This consultation was requested by: Dr. Andrey Perrin  The patient location is: [facility]   The patient arrived at: 2.13 PM      Subjective:     History of Present Illness:  63 y.o. year old female with history of hypothyroidism, liver cirrhosis, HTN, GERD, DM2, gastroparesis, CHF, obesity, sleep apnea presents to the renal clinic for evaluation of renal insufficiency.   A consultation has been requested by the patient's PMD, Dr.Jason Perrin. Patient today presents to the clinic complaining of SOB with exercise, intermittent abdominal pain and nausea, hand/foot paresthesia. She denies any headaches, congenital hearing loss, chest pain, hemoptysis, diarrhea, vomiting, hematuria, dysuria, LE swelling, rashes, nasal congestion. Patient reports occasional NSAID use in the form of Advil. Patient denies any history of renal disease in her family. Patient has a longstanding history of DM2 that was diagnosed at least 5 yrs ago. She is not aware of any associated diabetic retinopathy. Blood glucose is currently under good control with last HgA1c at 7.2 (12/7/17). She also reports long-standing history of hypertension that was diagnosed more than 20 years ago. BP is currently under poor control at 156/74. In addition, patient reports gastroparesis (with intermittent abdominal pain and nausea, currently on dicyclomine), cryptogenic liver cirrhosis (followed by hepatology), CHF (disastolic as per ECHO from 5/23/17) and sleep apnea (using CPAP at home). She denies any history of nephrolithiasis. Laboratory review revealed that the patient's renal function has been worsening with creatinine increasing from 1 on 3/30/17 to 2 on 5/12/18.         Past Medical History:   Past Medical History:   Diagnosis Date    Ascites     Cataract     CHF (congestive  heart failure)     Cirrhosis     Diabetes mellitus     Gastroparesis     GERD (gastroesophageal reflux disease)     Hypertension     Liver disease     Thyroid disease        Past Surgical History:   Past Surgical History:   Procedure Laterality Date    CHOLECYSTECTOMY      ERCP      LIVER BIOPSY      TUBAL LIGATION      UPPER GASTROINTESTINAL ENDOSCOPY         Family History:   Family History   Problem Relation Age of Onset    Cancer Mother     Breast cancer Mother     Heart disease Father     Aneurysm Sister     Heart disease Brother     No Known Problems Maternal Grandmother     Cancer Maternal Grandfather     Sarcoidosis Sister         Social History:   Social History   Substance Use Topics    Smoking status: Never Smoker    Smokeless tobacco: Never Used    Alcohol use No        Medications:   Current Outpatient Prescriptions:     amLODIPine (NORVASC) 10 MG tablet, Take 1 tablet (10 mg total) by mouth once daily., Disp: 90 tablet, Rfl: 1    aspirin (ECOTRIN) 81 MG EC tablet, Take 1 tablet (81 mg total) by mouth once daily., Disp: 365 tablet, Rfl: 0    carvedilol (COREG) 3.125 MG tablet, Take 1 tablet (3.125 mg total) by mouth 2 (two) times daily., Disp: 180 tablet, Rfl: 1    dicyclomine (BENTYL) 20 mg tablet, Take 1 tablet (20 mg total) by mouth 4 (four) times daily., Disp: 60 tablet, Rfl: 0    furosemide (LASIX) 80 MG tablet, Take 1 tablet (80 mg total) by mouth 2 (two) times daily. (Patient taking differently: Take 80 mg by mouth 2 (two) times daily. ), Disp: 180 tablet, Rfl: 0    insulin glargine (LANTUS SOLOSTAR) 100 unit/mL (3 mL) InPn pen, Inject 20 Units into the skin once daily., Disp: 15 mL, Rfl: 3    levothyroxine (SYNTHROID) 100 MCG tablet, TAKE ONE TABLET BY MOUTH ONCE DAILY BEFORE BREAKFAST, Disp: 90 tablet, Rfl: 0    magnesium citrate solution, Use as directed, Disp: , Rfl: 0    ondansetron (ZOFRAN-ODT) 4 MG TbDL, Take 1 tablet (4 mg total) by mouth every 6 (six)  hours as needed., Disp: 12 tablet, Rfl: 0    pantoprazole (PROTONIX) 20 MG tablet, Take 1 tablet (20 mg total) by mouth once daily., Disp: 30 tablet, Rfl: 0    simvastatin (ZOCOR) 40 MG tablet, Take 40 mg by mouth once daily. , Disp: , Rfl:     spironolactone (ALDACTONE) 50 MG tablet, Take 1 tablet (50 mg total) by mouth 2 (two) times daily., Disp: 180 tablet, Rfl: 0    sulfamethoxazole-trimethoprim 800-160mg (BACTRIM DS) 800-160 mg Tab, Take 1 tablet by mouth 2 (two) times daily., Disp: 6 tablet, Rfl: 0    traMADol (ULTRAM) 50 mg tablet, Take 50 mg by mouth daily as needed. , Disp: , Rfl:     vitamin E 400 UNIT capsule, Take 400 Units by mouth every morning., Disp: , Rfl:     Allergies:   Review of patient's allergies indicates:   Allergen Reactions    Subsys [fentanyl] Other (See Comments)     After administration pt unresponsive.  HR and respirations decreased.     Versed [midazolam] Other (See Comments)     After administration pt unresponsive.  HR and respirations decreased.     Ampicillin Rash    Codeine Nausea And Vomiting and Nausea Only       Review Of Systems:     1. GENERAL: patient denies any fever, weight changes, generalized weakness, dizziness.  2. HEENT: patient denies headaches, visual disturbances, swallowing problems, sinus pain, nasal congestion.  3. CARDIOVASCULAR: patient denies chest pain, palpitations.  4. PULMONARY: patient reports SOB with exercise, no coughing, hemoptysis, wheezing.  5. GASTROINTESTINAL: patient reports intermittent abdominal pain and nausea, no vomiting, diarrhea.  6. GENITOURINARY: patient denies dysuria, hematuria, hesitancy, frequency.  7. EXTREMITIES: patient denies LE edema, LE cramping.  8. DERMATOLOGY: patient denies rashes, ulcers.  9. NEURO: patient denies tremors, extremity weakness, she reports extremity numbness/tingling.  10. MUSCULOSKELETAL: patient denies joint pain, joint swelling.  11. HEMATOLOGY: patient denies rectal or gum bleeding.  12:  PSYCH: patient denies anxiety, depression.      Objective:     Vitals:     BP: 156/64, HR: 61, afebrile      Physical Exam:  General: Pleasant lady presents to clinic with non-labored breathing.  HENT: PERRL, no nasal discharge, no icterus, no oral exudates, moist mucosal membranes, no LAD  Pulmonary/Chest: CTA bilaterally, no wheezing, breathing is nonlabored.  Cardiovascular: Heart: RRR S1/S2, no rubs, good peripheral pulses.  Abdominal:  soft, nontender, not distended, bowel sounds are present, no abdominal hernia.  Skin: no rashes, skin is warm and dry.  Neurological:  A & O x 3, no obvious focal signs.  Extrem: mild + 1 pitting LE edema    Lab Results   Component Value Date    CREATININE 2.0 (H) 05/12/2018    BUN 31 (H) 05/12/2018     05/12/2018    K 3.8 05/12/2018     05/12/2018    CO2 26 05/12/2018     Lab Results   Component Value Date    .0 (H) 12/07/2017    CALCIUM 9.2 05/12/2018    PHOS 3.8 05/10/2018     Lab Results   Component Value Date    ALBUMIN 2.8 (L) 05/12/2018     Lab Results   Component Value Date    WBC 6.28 05/12/2018    HGB 12.5 05/12/2018    HCT 36.7 (L) 05/12/2018    MCV 90 05/12/2018     05/12/2018     Urinalysis from 5/12/18: + 2 protein, 1 RBC.    Abdominal US from 9/9/16: normal kidneys.     Assessment - Diagnosis - Goals:     Impression: Diamond Das 63 y.o. female with history of hypothyroidism, liver cirrhosis, HTN, GERD, DM2, gastroparesis, CHF, obesity, sleep apnea presents to the renal clinic for evaluation of renal insufficiency.     1. Renal insufficiency: Patient presents with mild renal insufficiency, consistent with CKD stage 3. Cause of her renal insufficiency is not entirely clear, but DM2, HTN and cardiorenal syndrome are in the differential. Patient's renal function will be monitored closely and she will return to the clinic in 1 month for follow up. I will order a renal panel, urinalysis, protein creatinine ratio, PTH prior to her return  visit. Patient was advised to avoid NSAID pain medications such as advil, aleve, motrin, ibuprofen, naprosyn, meloxicam, diclofenac, mobic and to hydrate with 60-65 ounces of water per day.     2. Electrolytes: Within normal limits.    3. Acid base status: No acute issues.    4. Volume: Mild LE edema. Continue Lasix/aldactone.    5. Hypertension: BP elevated. Since this is her first visit to renal clinic, no medication changes will be done today. Will re-address this upon return visit.      6. Medications: Reviewed. Agree with current medical regimen.     7. DM2: good blood glucose control. Continue insulin.    8. CHF: compensated at present.    Thank you very much for this consult. Please see my note in Epic for recommendations.    Diagnosis: CKD 3    Additional Work up Recommended: renal panel, urinalysis, protein/creatinine ratio, PTH level.     Consultation ended: 5/17/2018 at  2.10 PM    Total time spent with patient: > 30 Minutes    More than 50% of total time was spent counseling patient on chronic kidney disease causes and treatment.     Consulting clinician was informed of the encounter and consult note.

## 2018-05-17 NOTE — PATIENT INSTRUCTIONS
Avoid NSAID pain medications such as advil, aleve, motrin, ibuprofen, naprosyn, meloxicam, diclofenac, mobic.     Hydrate with 60-80 ounces of water per day.

## 2018-05-23 ENCOUNTER — TELEPHONE (OUTPATIENT)
Dept: INTERNAL MEDICINE | Facility: CLINIC | Age: 63
End: 2018-05-23

## 2018-05-23 NOTE — TELEPHONE ENCOUNTER
----- Message from Ksenia Crocker sent at 5/23/2018 10:20 AM CDT -----  Contact: Pt   States she's rtn nurses call and can be reached at [phone]//thanks/dbw

## 2018-05-24 ENCOUNTER — OFFICE VISIT (OUTPATIENT)
Dept: INTERNAL MEDICINE | Facility: CLINIC | Age: 63
End: 2018-05-24
Payer: MEDICAID

## 2018-05-24 ENCOUNTER — LAB VISIT (OUTPATIENT)
Dept: LAB | Facility: HOSPITAL | Age: 63
End: 2018-05-24
Attending: FAMILY MEDICINE
Payer: MEDICAID

## 2018-05-24 VITALS
TEMPERATURE: 96 F | DIASTOLIC BLOOD PRESSURE: 66 MMHG | SYSTOLIC BLOOD PRESSURE: 138 MMHG | HEART RATE: 72 BPM | HEIGHT: 61 IN | BODY MASS INDEX: 40.04 KG/M2 | RESPIRATION RATE: 18 BRPM | WEIGHT: 212.06 LBS

## 2018-05-24 DIAGNOSIS — E11.59 TYPE 2 DIABETES MELLITUS WITH OTHER CIRCULATORY COMPLICATION, WITHOUT LONG-TERM CURRENT USE OF INSULIN: ICD-10-CM

## 2018-05-24 DIAGNOSIS — E03.4 HYPOTHYROIDISM DUE TO ACQUIRED ATROPHY OF THYROID: ICD-10-CM

## 2018-05-24 DIAGNOSIS — E11.59 TYPE 2 DIABETES MELLITUS WITH OTHER CIRCULATORY COMPLICATION, WITHOUT LONG-TERM CURRENT USE OF INSULIN: Primary | ICD-10-CM

## 2018-05-24 DIAGNOSIS — E11.43 DIABETIC GASTROPARESIS ASSOCIATED WITH TYPE 2 DIABETES MELLITUS: ICD-10-CM

## 2018-05-24 DIAGNOSIS — K31.84 DIABETIC GASTROPARESIS ASSOCIATED WITH TYPE 2 DIABETES MELLITUS: ICD-10-CM

## 2018-05-24 DIAGNOSIS — N18.4 CKD (CHRONIC KIDNEY DISEASE) STAGE 4, GFR 15-29 ML/MIN: ICD-10-CM

## 2018-05-24 PROBLEM — N18.30 CKD (CHRONIC KIDNEY DISEASE) STAGE 3, GFR 30-59 ML/MIN: Status: RESOLVED | Noted: 2018-05-17 | Resolved: 2018-05-24

## 2018-05-24 LAB
ESTIMATED AVG GLUCOSE: 183 MG/DL
HBA1C MFR BLD HPLC: 8 %

## 2018-05-24 PROCEDURE — 99999 PR PBB SHADOW E&M-EST. PATIENT-LVL III: CPT | Mod: PBBFAC,,, | Performed by: FAMILY MEDICINE

## 2018-05-24 PROCEDURE — 83036 HEMOGLOBIN GLYCOSYLATED A1C: CPT

## 2018-05-24 PROCEDURE — 99214 OFFICE O/P EST MOD 30 MIN: CPT | Mod: S$PBB,,, | Performed by: FAMILY MEDICINE

## 2018-05-24 PROCEDURE — 99213 OFFICE O/P EST LOW 20 MIN: CPT | Mod: PBBFAC,PO | Performed by: FAMILY MEDICINE

## 2018-05-24 PROCEDURE — 36415 COLL VENOUS BLD VENIPUNCTURE: CPT | Mod: PO

## 2018-05-24 RX ORDER — LEVOTHYROXINE SODIUM 100 UG/1
100 TABLET ORAL
Qty: 180 TABLET | Refills: 0 | Status: SHIPPED | OUTPATIENT
Start: 2018-05-24 | End: 2018-06-19 | Stop reason: SDUPTHER

## 2018-05-24 NOTE — PROGRESS NOTES
Subjective:       Patient ID: Diamond Das is a 63 y.o. female.    Chief Complaint: Follow-up (abd pain)    Resolved abdominal pain:  O: a few weeks ago  L: abdomen  D: resolved.  C: aching, cramping  [place]: bentyl  Exac: DM        Review of Systems   Respiratory: Negative for shortness of breath.    Cardiovascular: Negative for chest pain.   Gastrointestinal: Negative for abdominal pain.       Objective:      Physical Exam   Constitutional: She appears well-developed and well-nourished. She appears distressed.   HENT:   Head: Normocephalic and atraumatic.   Pulmonary/Chest: Effort normal and breath sounds normal. No respiratory distress. She has no wheezes.   Abdominal: Soft. She exhibits no distension. There is no tenderness. There is no guarding.   Musculoskeletal: She exhibits edema.   Skin: Skin is warm and dry. No rash noted. She is not diaphoretic. No erythema.   Nursing note and vitals reviewed.      Assessment:       1. Type 2 diabetes mellitus with other circulatory complication, without long-term current use of insulin    2. CKD (chronic kidney disease) stage 4, GFR 15-29 ml/min    3. Diabetic gastroparesis associated with type 2 diabetes mellitus    4. Hypothyroidism due to acquired atrophy of thyroid        Plan:     Problem List Items Addressed This Visit        Renal/    CKD (chronic kidney disease) stage 4, GFR 15-29 ml/min    Overview      Ref Range & Units 2wk ago   Glucose 70 - 110 mg/dL 425     Sodium 136 - 145 mmol/L 140    Potassium 3.5 - 5.1 mmol/L 3.8    Chloride 95 - 110 mmol/L 101    CO2 23 - 29 mmol/L 30     BUN, Bld 8 - 23 mg/dL 26     Calcium 8.7 - 10.5 mg/dL 8.9    Creatinine 0.5 - 1.4 mg/dL 2.1     Albumin 3.5 - 5.2 g/dL 2.5     Phosphorus 2.7 - 4.5 mg/dL 3.8    eGFR if African American >60 mL/min/1.73 m^2 28.7     eGFR if non African American >60 mL/min/1.73 m^2 24.9     Comment: Calculation used to obtain the estimated glomerular filtration   rate (eGFR) is the CKD-EPI equation.     Anion Gap 8 - 16 mmol/L 9    Resulting Agency  [facility]      Specimen Collected: 05/10/18 11:16 Last Resulted: 05/10/18 11:46                   Current Assessment & Plan     Exp importance of diabetes to pt and control.  Offered victoza. She wishes to change diet first as her diet is extremely poor at present.  Given handout on glycemic diet  Ref to diabetic education.            Endocrine    Type 2 diabetes mellitus with circulatory disorder - Primary    Relevant Orders    Hemoglobin A1c (Completed)    Hypothyroidism due to acquired atrophy of thyroid    Relevant Medications    levothyroxine (SYNTHROID) 100 MCG tablet    Diabetic gastroparesis associated with type 2 diabetes mellitus    Current Assessment & Plan     Exp to pt the circumstance of abd pain and underlying cause of DM2..  Consider reglan in future. Pt asymptomatic today.

## 2018-05-24 NOTE — ASSESSMENT & PLAN NOTE
Exp importance of diabetes to pt and control.  Offered victoza. She wishes to change diet first as her diet is extremely poor at present.  Given handout on glycemic diet  Ref to diabetic education.

## 2018-05-24 NOTE — ASSESSMENT & PLAN NOTE
Exp to pt the circumstance of abd pain and underlying cause of DM2..  Consider reglan in future. Pt asymptomatic today.

## 2018-06-05 ENCOUNTER — PATIENT OUTREACH (OUTPATIENT)
Dept: ADMINISTRATIVE | Facility: HOSPITAL | Age: 63
End: 2018-06-05

## 2018-06-07 ENCOUNTER — HOSPITAL ENCOUNTER (EMERGENCY)
Facility: HOSPITAL | Age: 63
Discharge: ANOTHER HOSPITAL | End: 2018-06-07
Attending: EMERGENCY MEDICINE
Payer: MEDICAID

## 2018-06-07 VITALS
RESPIRATION RATE: 20 BRPM | SYSTOLIC BLOOD PRESSURE: 177 MMHG | DIASTOLIC BLOOD PRESSURE: 77 MMHG | WEIGHT: 204 LBS | TEMPERATURE: 99 F | OXYGEN SATURATION: 97 % | HEART RATE: 74 BPM | HEIGHT: 61 IN | BODY MASS INDEX: 38.51 KG/M2

## 2018-06-07 DIAGNOSIS — R11.0 NAUSEA: ICD-10-CM

## 2018-06-07 DIAGNOSIS — K74.60 HEPATIC CIRRHOSIS, UNSPECIFIED HEPATIC CIRRHOSIS TYPE, UNSPECIFIED WHETHER ASCITES PRESENT: ICD-10-CM

## 2018-06-07 DIAGNOSIS — K76.82 HEPATIC ENCEPHALOPATHY: Primary | ICD-10-CM

## 2018-06-07 LAB
ALBUMIN SERPL BCP-MCNC: 3.1 G/DL
ALP SERPL-CCNC: 170 U/L
ALT SERPL W/O P-5'-P-CCNC: 21 U/L
AMMONIA PLAS-SCNC: 160 UMOL/L
AMYLASE SERPL-CCNC: 142 U/L
ANION GAP SERPL CALC-SCNC: 13 MMOL/L
AST SERPL-CCNC: 38 U/L
B-OH-BUTYR BLD STRIP-SCNC: 0.3 MMOL/L
BACTERIA #/AREA URNS AUTO: ABNORMAL /HPF
BASOPHILS # BLD AUTO: 0.03 K/UL
BASOPHILS NFR BLD: 0.4 %
BILIRUB SERPL-MCNC: 0.6 MG/DL
BILIRUB UR QL STRIP: NEGATIVE
BUN SERPL-MCNC: 28 MG/DL
CALCIUM SERPL-MCNC: 9.9 MG/DL
CHLORIDE SERPL-SCNC: 105 MMOL/L
CLARITY UR REFRACT.AUTO: ABNORMAL
CO2 SERPL-SCNC: 26 MMOL/L
COLOR UR AUTO: YELLOW
CREAT SERPL-MCNC: 2.3 MG/DL
DIFFERENTIAL METHOD: NORMAL
EOSINOPHIL # BLD AUTO: 0.1 K/UL
EOSINOPHIL NFR BLD: 1.5 %
ERYTHROCYTE [DISTWIDTH] IN BLOOD BY AUTOMATED COUNT: 13.5 %
EST. GFR  (AFRICAN AMERICAN): 25.7 ML/MIN/1.73 M^2
EST. GFR  (NON AFRICAN AMERICAN): 22.3 ML/MIN/1.73 M^2
GLUCOSE SERPL-MCNC: 201 MG/DL (ref 70–110)
GLUCOSE SERPL-MCNC: 231 MG/DL
GLUCOSE UR QL STRIP: NEGATIVE
HCT VFR BLD AUTO: 42.1 %
HGB BLD-MCNC: 14.5 G/DL
HGB UR QL STRIP: NEGATIVE
HYALINE CASTS UR QL AUTO: 0 /LPF
KETONES UR QL STRIP: NEGATIVE
LEUKOCYTE ESTERASE UR QL STRIP: NEGATIVE
LIPASE SERPL-CCNC: 34 U/L
LYMPHOCYTES # BLD AUTO: 2.8 K/UL
LYMPHOCYTES NFR BLD: 34.9 %
MCH RBC QN AUTO: 30.7 PG
MCHC RBC AUTO-ENTMCNC: 34.4 G/DL
MCV RBC AUTO: 89 FL
MICROSCOPIC COMMENT: ABNORMAL
MONOCYTES # BLD AUTO: 0.5 K/UL
MONOCYTES NFR BLD: 6.4 %
NEUTROPHILS # BLD AUTO: 4.6 K/UL
NEUTROPHILS NFR BLD: 56.7 %
NITRITE UR QL STRIP: NEGATIVE
PH UR STRIP: 7 [PH] (ref 5–8)
PLATELET # BLD AUTO: 273 K/UL
PMV BLD AUTO: 10.6 FL
POCT GLUCOSE: 201 MG/DL (ref 70–110)
POTASSIUM SERPL-SCNC: 4.6 MMOL/L
PROT SERPL-MCNC: 8.5 G/DL
PROT UR QL STRIP: ABNORMAL
RBC # BLD AUTO: 4.72 M/UL
RBC #/AREA URNS AUTO: 2 /HPF (ref 0–4)
SODIUM SERPL-SCNC: 144 MMOL/L
SP GR UR STRIP: 1.01 (ref 1–1.03)
SQUAMOUS #/AREA URNS AUTO: 3 /HPF
URN SPEC COLLECT METH UR: ABNORMAL
UROBILINOGEN UR STRIP-ACNC: NEGATIVE EU/DL
WBC # BLD AUTO: 8.07 K/UL
WBC #/AREA URNS AUTO: 3 /HPF (ref 0–5)

## 2018-06-07 PROCEDURE — 82150 ASSAY OF AMYLASE: CPT

## 2018-06-07 PROCEDURE — 82140 ASSAY OF AMMONIA: CPT

## 2018-06-07 PROCEDURE — 25000003 PHARM REV CODE 250: Performed by: EMERGENCY MEDICINE

## 2018-06-07 PROCEDURE — 96374 THER/PROPH/DIAG INJ IV PUSH: CPT

## 2018-06-07 PROCEDURE — 80053 COMPREHEN METABOLIC PANEL: CPT

## 2018-06-07 PROCEDURE — 63600175 PHARM REV CODE 636 W HCPCS: Performed by: EMERGENCY MEDICINE

## 2018-06-07 PROCEDURE — 81000 URINALYSIS NONAUTO W/SCOPE: CPT

## 2018-06-07 PROCEDURE — 82010 KETONE BODYS QUAN: CPT

## 2018-06-07 PROCEDURE — 82962 GLUCOSE BLOOD TEST: CPT

## 2018-06-07 PROCEDURE — 83690 ASSAY OF LIPASE: CPT

## 2018-06-07 PROCEDURE — 99285 EMERGENCY DEPT VISIT HI MDM: CPT | Mod: 25

## 2018-06-07 PROCEDURE — 85025 COMPLETE CBC W/AUTO DIFF WBC: CPT

## 2018-06-07 PROCEDURE — 96361 HYDRATE IV INFUSION ADD-ON: CPT

## 2018-06-07 RX ORDER — TIZANIDINE HYDROCHLORIDE 4 MG/1
CAPSULE, GELATIN COATED ORAL DAILY PRN
COMMUNITY
End: 2018-07-20 | Stop reason: ALTCHOICE

## 2018-06-07 RX ORDER — SULFAMETHOXAZOLE AND TRIMETHOPRIM 800; 160 MG/1; MG/1
1 TABLET ORAL
COMMUNITY
End: 2018-06-19 | Stop reason: ALTCHOICE

## 2018-06-07 RX ORDER — LACTULOSE 10 G/15ML
30 SOLUTION ORAL
Status: COMPLETED | OUTPATIENT
Start: 2018-06-07 | End: 2018-06-07

## 2018-06-07 RX ORDER — METOCLOPRAMIDE HYDROCHLORIDE 5 MG/ML
10 INJECTION INTRAMUSCULAR; INTRAVENOUS
Status: COMPLETED | OUTPATIENT
Start: 2018-06-07 | End: 2018-06-07

## 2018-06-07 RX ADMIN — SODIUM CHLORIDE 1000 ML: 0.9 INJECTION, SOLUTION INTRAVENOUS at 12:06

## 2018-06-07 RX ADMIN — METOCLOPRAMIDE 10 MG: 5 INJECTION, SOLUTION INTRAMUSCULAR; INTRAVENOUS at 12:06

## 2018-06-07 RX ADMIN — LACTULOSE 30 G: 20 SOLUTION ORAL at 01:06

## 2018-06-07 NOTE — ED PROVIDER NOTES
Encounter Date: 6/7/2018       History     Chief Complaint   Patient presents with    Altered Mental Status     since this am. per daughter     The history is provided by the patient.   Abdominal Pain   The current episode started today. The problem has not changed since onset.The abdominal pain is generalized. The abdominal pain does not radiate. The other symptoms of the illness include nausea and vomiting. The other symptoms of the illness do not include fever, shortness of breath, diarrhea or dysuria.   Symptoms associated with the illness do not include back pain. Significant associated medical issues include diabetes.   Altered Mental Status   This is a recurrent problem. The current episode started 3 to 5 hours ago. The problem occurs constantly. The problem has not changed since onset.Associated symptoms include abdominal pain. Pertinent negatives include no chest pain and no shortness of breath. Relieved by: lactulose.     Review of patient's allergies indicates:   Allergen Reactions    Subsys [fentanyl] Other (See Comments)     After administration pt unresponsive.  HR and respirations decreased.     Versed [midazolam] Other (See Comments)     After administration pt unresponsive.  HR and respirations decreased.     Ampicillin Rash    Codeine Nausea And Vomiting and Nausea Only     Past Medical History:   Diagnosis Date    Ascites     Cataract     CHF (congestive heart failure)     Cirrhosis     Diabetes mellitus     Gastroparesis     GERD (gastroesophageal reflux disease)     Hypertension     Liver disease     Thyroid disease      Past Surgical History:   Procedure Laterality Date    CHOLECYSTECTOMY      ERCP      LIVER BIOPSY      TUBAL LIGATION      UPPER GASTROINTESTINAL ENDOSCOPY       Family History   Problem Relation Age of Onset    Cancer Mother     Breast cancer Mother     Heart disease Father     Aneurysm Sister     Heart disease Brother     No Known Problems Maternal  Grandmother     Cancer Maternal Grandfather     Sarcoidosis Sister      Social History   Substance Use Topics    Smoking status: Never Smoker    Smokeless tobacco: Never Used    Alcohol use No     Review of Systems   Constitutional: Negative for fever.   HENT: Negative for sore throat.    Respiratory: Negative for shortness of breath.    Cardiovascular: Negative for chest pain.   Gastrointestinal: Positive for abdominal pain, nausea and vomiting. Negative for diarrhea.   Genitourinary: Negative for dysuria.   Musculoskeletal: Negative for back pain.   Skin: Negative for rash.   Neurological: Negative for weakness.   Hematological: Does not bruise/bleed easily.       Physical Exam     Initial Vitals [06/07/18 1134]   BP Pulse Resp Temp SpO2   (!) 134/111 72 20 98.7 °F (37.1 °C) 100 %      MAP       118.67         Physical Exam    Nursing note and vitals reviewed.  Constitutional: She appears well-developed and well-nourished. She appears distressed.   HENT:   Head: Normocephalic and atraumatic.   Mouth/Throat: Oropharynx is clear and moist.   Eyes: Conjunctivae and EOM are normal. Pupils are equal, round, and reactive to light.   Neck: Normal range of motion. Neck supple.   Cardiovascular: Normal rate, regular rhythm and normal heart sounds. Exam reveals no gallop and no friction rub.    No murmur heard.  Pulmonary/Chest: Breath sounds normal. No respiratory distress. She has no wheezes. She has no rhonchi. She has no rales.   Abdominal: Soft. Bowel sounds are normal. She exhibits no distension and no mass. There is no tenderness. There is no rebound and no guarding.   Musculoskeletal: Normal range of motion. She exhibits no edema or tenderness.   Neurological: She is alert. She has normal strength. She is disoriented.   Skin: Skin is warm and dry. No rash noted.   Psychiatric: She has a normal mood and affect. Thought content normal.         ED Course   Procedures  Labs Reviewed   URINALYSIS - Abnormal; Notable  for the following:        Result Value    Appearance, UA Hazy (*)     Protein, UA 2+ (*)     All other components within normal limits   AMMONIA - Abnormal; Notable for the following:     Ammonia 160 (*)     All other components within normal limits   COMPREHENSIVE METABOLIC PANEL - Abnormal; Notable for the following:     Glucose 231 (*)     BUN, Bld 28 (*)     Creatinine 2.3 (*)     Total Protein 8.5 (*)     Albumin 3.1 (*)     Alkaline Phosphatase 170 (*)     eGFR if  25.7 (*)     eGFR if non  22.3 (*)     All other components within normal limits   AMYLASE - Abnormal; Notable for the following:     Amylase 142 (*)     All other components within normal limits   URINALYSIS MICROSCOPIC - Abnormal; Notable for the following:     Bacteria, UA Few (*)     All other components within normal limits   POCT GLUCOSE - Abnormal; Notable for the following:     POCT Glucose 201 (*)     All other components within normal limits   POCT GLUCOSE MONITORING CONTINUOUS - Abnormal; Notable for the following:     POC Glucose 201 (*)     All other components within normal limits   CBC W/ AUTO DIFFERENTIAL   BETA - HYDROXYBUTYRATE, SERUM   LIPASE     EKG Readings: (Independently Interpreted)   Rhythm: Normal Sinus Rhythm. Heart Rate: 71. Ectopy: No Ectopy. Conduction: Normal. ST Segments: Normal ST Segments. T Waves: Normal. Clinical Impression: Normal Sinus Rhythm       X-Ray Chest AP Portable   Final Result      No acute findings.         Electronically signed by: Pedro Montalvo MD   Date:    06/07/2018   Time:    12:53      X-Ray Abdomen Flat And Erect   Final Result      No acute findings.         Electronically signed by: Pedro Montalvo MD   Date:    06/07/2018   Time:    12:49      CT Head Without Contrast   Final Result      No acute abnormality.      All CT scans at this facility use dose modulation, iterative reconstruction, and/or weight based dosing when appropriate to reduce radiation dose  "to as low as reasonable achievable.         Electronically signed by: Martell Jasmine MD   Date:    06/07/2018   Time:    12:02            ED Vital Signs:  Vitals:    06/07/18 1134 06/07/18 1213   BP: (!) 134/111 (!) 165/98   Pulse: 72 69   Resp: 20 (!) 22   Temp: 98.7 °F (37.1 °C)    TempSrc: Oral    SpO2: 100% 100%   Weight: 92.5 kg (204 lb)    Height: 5' 1" (1.549 m)          Abnormal Lab Results:  Labs Reviewed   URINALYSIS - Abnormal; Notable for the following:        Result Value    Appearance, UA Hazy (*)     Protein, UA 2+ (*)     All other components within normal limits   AMMONIA - Abnormal; Notable for the following:     Ammonia 160 (*)     All other components within normal limits   COMPREHENSIVE METABOLIC PANEL - Abnormal; Notable for the following:     Glucose 231 (*)     BUN, Bld 28 (*)     Creatinine 2.3 (*)     Total Protein 8.5 (*)     Albumin 3.1 (*)     Alkaline Phosphatase 170 (*)     eGFR if  25.7 (*)     eGFR if non  22.3 (*)     All other components within normal limits   AMYLASE - Abnormal; Notable for the following:     Amylase 142 (*)     All other components within normal limits   URINALYSIS MICROSCOPIC - Abnormal; Notable for the following:     Bacteria, UA Few (*)     All other components within normal limits   POCT GLUCOSE - Abnormal; Notable for the following:     POCT Glucose 201 (*)     All other components within normal limits   POCT GLUCOSE MONITORING CONTINUOUS - Abnormal; Notable for the following:     POC Glucose 201 (*)     All other components within normal limits   CBC W/ AUTO DIFFERENTIAL   BETA - HYDROXYBUTYRATE, SERUM   LIPASE          All Lab Results:  Results for orders placed or performed during the hospital encounter of 06/07/18   CBC auto differential   Result Value Ref Range    WBC 8.07 3.90 - 12.70 K/uL    RBC 4.72 4.00 - 5.40 M/uL    Hemoglobin 14.5 12.0 - 16.0 g/dL    Hematocrit 42.1 37.0 - 48.5 %    MCV 89 82 - 98 fL    MCH 30.7 " 27.0 - 31.0 pg    MCHC 34.4 32.0 - 36.0 g/dL    RDW 13.5 11.5 - 14.5 %    Platelets 273 150 - 350 K/uL    MPV 10.6 9.2 - 12.9 fL    Gran # (ANC) 4.6 1.8 - 7.7 K/uL    Lymph # 2.8 1.0 - 4.8 K/uL    Mono # 0.5 0.3 - 1.0 K/uL    Eos # 0.1 0.0 - 0.5 K/uL    Baso # 0.03 0.00 - 0.20 K/uL    Gran% 56.7 38.0 - 73.0 %    Lymph% 34.9 18.0 - 48.0 %    Mono% 6.4 4.0 - 15.0 %    Eosinophil% 1.5 0.0 - 8.0 %    Basophil% 0.4 0.0 - 1.9 %    Differential Method Automated    Urinalysis   Result Value Ref Range    Specimen UA Urine, Clean Catch     Color, UA Yellow Yellow, Straw, Thalia    Appearance, UA Hazy (A) Clear    pH, UA 7.0 5.0 - 8.0    Specific Gravity, UA 1.010 1.005 - 1.030    Protein, UA 2+ (A) Negative    Glucose, UA Negative Negative    Ketones, UA Negative Negative    Bilirubin (UA) Negative Negative    Occult Blood UA Negative Negative    Nitrite, UA Negative Negative    Urobilinogen, UA Negative <2.0 EU/dL    Leukocytes, UA Negative Negative   Beta - Hydroxybutyrate, Serum   Result Value Ref Range    Beta-Hydroxybutyrate 0.3 0.0 - 0.5 mmol/L   Ammonia   Result Value Ref Range    Ammonia 160 (H) 10 - 50 umol/L   Comprehensive metabolic panel   Result Value Ref Range    Sodium 144 136 - 145 mmol/L    Potassium 4.6 3.5 - 5.1 mmol/L    Chloride 105 95 - 110 mmol/L    CO2 26 23 - 29 mmol/L    Glucose 231 (H) 70 - 110 mg/dL    BUN, Bld 28 (H) 8 - 23 mg/dL    Creatinine 2.3 (H) 0.5 - 1.4 mg/dL    Calcium 9.9 8.7 - 10.5 mg/dL    Total Protein 8.5 (H) 6.0 - 8.4 g/dL    Albumin 3.1 (L) 3.5 - 5.2 g/dL    Total Bilirubin 0.6 0.1 - 1.0 mg/dL    Alkaline Phosphatase 170 (H) 55 - 135 U/L    AST 38 10 - 40 U/L    ALT 21 10 - 44 U/L    Anion Gap 13 8 - 16 mmol/L    eGFR if African American 25.7 (A) >60 mL/min/1.73 m^2    eGFR if non  22.3 (A) >60 mL/min/1.73 m^2   Lipase   Result Value Ref Range    Lipase 34 4 - 60 U/L   Amylase   Result Value Ref Range    Amylase 142 (H) 20 - 110 U/L   Urinalysis Microscopic   Result  Value Ref Range    RBC, UA 2 0 - 4 /hpf    WBC, UA 3 0 - 5 /hpf    Bacteria, UA Few (A) None-Occ /hpf    Squam Epithel, UA 3 /hpf    Hyaline Casts, UA 0 0-1/lpf /lpf    Microscopic Comment SEE COMMENT    POCT glucose   Result Value Ref Range    POCT Glucose 201 (H) 70 - 110 mg/dL   POCT glucose   Result Value Ref Range    POC Glucose 201 (A) 70 - 110 MG/DL           Imaging Results:  Imaging Results          X-Ray Chest AP Portable (Final result)  Result time 06/07/18 12:53:55    Final result by Edgardo Montalvo Jr., MD (06/07/18 12:53:55)                 Impression:      No acute findings.      Electronically signed by: Edgardo Montalvo MD  Date:    06/07/2018  Time:    12:53             Narrative:    EXAMINATION:  XR CHEST AP PORTABLE    CLINICAL HISTORY:  weakness;    COMPARISON:  No comparison studies are available.    FINDINGS:  Heart size is mildly enlarged.  Somewhat shallow degree of inspiration.  Azygous lobe configuration as a developmental variant..  Lungs appear clear of active disease.  No infiltrates or effusions.  No suspicious mass. No significant change.                               X-Ray Abdomen Flat And Erect (Final result)  Result time 06/07/18 12:49:44    Final result by Edgardo Montalvo Jr., MD (06/07/18 12:49:44)                 Impression:      No acute findings.      Electronically signed by: Edgardo Montalvo MD  Date:    06/07/2018  Time:    12:49             Narrative:    EXAMINATION:  XR ABDOMEN FLAT AND ERECT    CLINICAL HISTORY:  Nausea    COMPARISON:  No comparison studies available.    FINDINGS:  No free air.  No dilated large or small bowel loops.  Nothing to indicate intestinal obstruction.  No obvious soft tissue mass or unusual calcification.    Cholecystectomy surgical clips.  Stent present in the right upper abdomen, likely tipps type shunt.  Mild to moderate amount of scattered colonic fecal material.  Lung bases appear clear.                               CT Head Without  Contrast (Final result)  Result time 06/07/18 12:02:49    Final result by Martell Jasmine MD (06/07/18 12:02:49)                 Impression:      No acute abnormality.    All CT scans at this facility use dose modulation, iterative reconstruction, and/or weight based dosing when appropriate to reduce radiation dose to as low as reasonable achievable.      Electronically signed by: Martell Jasmine MD  Date:    06/07/2018  Time:    12:02             Narrative:    EXAMINATION:  CT HEAD WITHOUT CONTRAST    CLINICAL HISTORY:  weakness;    TECHNIQUE:  Low dose axial CT images obtained throughout the head without intravenous contrast. Sagittal and coronal reconstructions were performed.    All CT scans at this facility use dose modulation, iterative reconstruction, and/or weight based dosing when appropriate to reduce radiation dose to as low as reasonable achievable.    COMPARISON:  12/22/2017    FINDINGS:  Intracranial compartment:    The brain parenchyma appears normal. No parenchymal mass, hemorrhage, edema or major vascular distribution infarct.    Ventricles and sulci are normal in size for age without evidence of hydrocephalus.    No extra-axial blood or fluid collections.    Skull/extracranial contents (limited evaluation): No fracture. Mastoid air cells and paranasal sinuses are essentially clear.                                   The Emergency Provider reviewed the vital signs and test results, which are outlined above.    ED Discussions:  1:12 PM: Re-evaluated pt. I have discussed test results, shared treatment plan, and the need for admission with patient and family at bedside. Pt and family express understanding at this time and agree with all information. All questions answered. Pt and family have no further questions or concerns at this time. Pt is ready for admit.   Family states she normally gets admitted at [facility], and is requesting admission there.       All historical, clinical, radiographic, and  laboratory findings were reviewed with the patient/family in detail along with the indications for transfer to an outside facility (rather than admission to our facility in [place]) secondary to patient preference and a need for admission and lactulose given the diagnosis of hepatic encephalopathy.  All remaining questions and concerns were addressed at that time and the patient/family communicates understanding and agrees to proceed accordingly.  Similarly all pertinent details of the encounter were discussed with Dr. Alberto at [facility] via [facility] who agrees to accept the patient in transfer based on the needs/patient preferences outlined above.  Patient will be [place] by [place] [facility] secondary to a need for ongoing cardiac monitoring and IV fluids en route.  Abram Baldwin MD  1:32 PM                           Clinical Impression:       ICD-10-CM ICD-9-CM   1. Hepatic encephalopathy K72.90 572.2   2. Nausea R11.0 787.02   3. Hepatic cirrhosis, unspecified hepatic cirrhosis type, unspecified whether ascites present K74.60 571.5           Disposition:   Disposition: Transferred  Condition: Fair                        Abram Baldwin MD  06/07/18 7824

## 2018-06-09 DIAGNOSIS — S39.012A STRAIN OF LUMBAR REGION, INITIAL ENCOUNTER: ICD-10-CM

## 2018-06-09 DIAGNOSIS — R10.84 GENERALIZED ABDOMINAL PAIN: ICD-10-CM

## 2018-06-11 RX ORDER — TIZANIDINE 4 MG/1
TABLET ORAL
Qty: 30 TABLET | Refills: 0 | Status: SHIPPED | OUTPATIENT
Start: 2018-06-11 | End: 2018-06-19 | Stop reason: SDUPTHER

## 2018-06-11 RX ORDER — PANTOPRAZOLE SODIUM 20 MG/1
TABLET, DELAYED RELEASE ORAL
Qty: 30 TABLET | Refills: 0 | Status: SHIPPED | OUTPATIENT
Start: 2018-06-11 | End: 2018-06-19 | Stop reason: SDUPTHER

## 2018-06-12 ENCOUNTER — TELEPHONE (OUTPATIENT)
Dept: HEPATOLOGY | Facility: CLINIC | Age: 63
End: 2018-06-12

## 2018-06-12 NOTE — TELEPHONE ENCOUNTER
MA called patient back, schedule her follow up visit she accepted 6/21/18 mailed appt reminder to patient.

## 2018-06-12 NOTE — TELEPHONE ENCOUNTER
----- Message from Dayami Rodriguez sent at 6/12/2018  4:44 PM CDT -----  Contact: pt  Patient Requesting Sooner Appointment.     Reason for sooner appt.: f/u visit  When is the first available appointment? n/a  Communication Preference: [phone]  Additional Information: n/a

## 2018-06-14 ENCOUNTER — NUTRITION (OUTPATIENT)
Dept: DIABETES | Facility: CLINIC | Age: 63
End: 2018-06-14
Payer: MEDICAID

## 2018-06-14 VITALS — BODY MASS INDEX: 38.96 KG/M2 | HEIGHT: 61 IN | WEIGHT: 206.38 LBS

## 2018-06-14 DIAGNOSIS — E11.42 DIABETIC POLYNEUROPATHY ASSOCIATED WITH TYPE 2 DIABETES MELLITUS: Primary | ICD-10-CM

## 2018-06-14 PROCEDURE — G0108 DIAB MANAGE TRN  PER INDIV: HCPCS | Mod: PBBFAC,PO | Performed by: DIETITIAN, REGISTERED

## 2018-06-14 PROCEDURE — 99213 OFFICE O/P EST LOW 20 MIN: CPT | Mod: PBBFAC,PO | Performed by: DIETITIAN, REGISTERED

## 2018-06-14 PROCEDURE — 99999 PR PBB SHADOW E&M-EST. PATIENT-LVL III: CPT | Mod: PBBFAC,,, | Performed by: DIETITIAN, REGISTERED

## 2018-06-14 NOTE — PROGRESS NOTES
Diabetes Education  Author: Nolvia Mcneil RD, CDE  Date: 6/14/2018    Diabetes Education Visit  Diabetes Education Record Assessment/Progress: Comprehensive/Group    Diabetes Type  Diabetes Type : Type II      Nutrition  Meal Planning:  (4325-3944 cals/d. Excess carb from juices, sugar sweetened tea and portions due to irregular meal patterns. )  Meal Plan 24 Hour Recall - Breakfast: none OR cereal   Meal Plan 24 Hour Recall - Lunch: none  Meal Plan 24 Hour Recall - Dinner: red beans, rice, sausage   Meal Plan 24 Hour Recall - Snack: aurelio: tea (sugar), juices    Monitoring   Self Monitoring : Per records, fst bg 73, 120-382, 545; pm 121, 175-359 (excusions from irregular food patterns, juices and sugar-sweetened beverages).  Blood Glucose Logs: Yes  In the last month, how often have you had a low blood sugar reaction?: never    Exercise   Exercise Type:  (recent hospital dc - looking to start PT)    Current Diabetes Treatment   Current Treatment:  (lantus 20 units daily )    Social History  Preferred Learning Method: Face to Face  Primary Support: Self      Barriers to Change  Barriers to Change: None  Learning Challenges : None    Readiness to Learn   Readiness to Learn : Eager    Cultural Influences  Cultural Influences: No    Diabetes Education Assessment/Progress  Diabetes Disease Process (diabetes disease process and treatment options): Discussion, Individual Session, Demonstrates Understanding/Competency(verbalizes/demonstrates)  Nutrition (Incorporating nutritional management into one's lifestyle): Discussion, Individual Session, Demonstrates Understanding/Competency (verbalizes/demonstrates), Written Materials Provided  Physical Activity (incorporating physical activity into one's lifestyle): Discussion, Individual Session, Demonstrates Understanding/Competency (verbalizes/demonstrates)  Medications (states correct name, dose, onset, peak, duration, side effects & timing of meds): Discussion,  Individual Session, Demonstrates Understanding/Competency(verbalizes/demonstrates), Written Materials Provided  Monitoring (monitoring blood glucose/other parameters & using results): Discussion, Individual Session, Demonstrates Understanding/Competency (verbalizes/demonstrates), Written Materials Provided  Acute Complications (preventing, detecting, and treating acute complications): Discussion, Individual Session, Demonstrates Understanding/Competency (verbalizes/demonstrates), Written Materials Provided  Chronic Complications (preventing, detecting, and treating chronic complications): Discussion, Individual Session, Demonstrates Understanding/Competency (verbalizes/demonstrates)  Clinical (diabetes, other pertinent medical history, and relevant comorbidities reviewed during visit): Discussion, Individual Session, Demonstrates Understanding/Competency (verbalizes/demonstrates)  Cognitive (knowledge of self-management skills, functional health literacy): Discussion, Individual Session, Demonstrates Understanding/Competency (verbalizes/demonstrates)  Psychosocial (emotional response to diabetes): Discussion, Individual Session, Demonstrates Understanding/Competency (verbalizes/demonstrates)  Diabetes Distress and Support Systems: Discussion, Individual Session, Demonstrates Understanding/Competency (verbalizes/demonstrates)  Behavioral (readiness for change, lifestyle practices, self-care behaviors): Discussion, Individual Session, Demonstrates Understanding/Competency (verbalizes/demonstrates)    Goals  Patient has selected/evaluated goals during today's session: Yes, evaluated  Healthy Eating: Set (use meal plan - 3meals/d (MR shake to assist), avoid juices and sugar-sweetened beverages, carb portions)  Met Percentage : 0%  Start Date: 06/14/18  Target Date: 06/27/18  Monitoring: Set (test blood sugar fasting and 2 hours after supper)  Met Percentage : 50%  Start Date: 06/14/18  Target Date:  06/27/18  Medications: Set (take insulin daily as direct and fu with diabetes ABELINO for medication mgmt)  Met Percentage : 75%  Start Date: 06/14/18  Target Date: 06/27/18    Diabetes Self-Management Support Plan  Stress Management: family  Review Status: Patient has selected and agrees to support plan.    Diabetes Care Plan/Intervention  Education Plan/Intervention: Individual Follow-Up DSMT (Due to hyperglycemia, instructed to increase Lantus 30 units daily and fu w/ diabetes ABELINO for medication mgmt support. She may need meal-time insulin to assist stability. )    Diabetes Meal Plan  Restrictions: Low Fat, Low Sodium, Low Potassium  Calories: 1400  Carbohydrate Per Meal: 30-45g  Carbohydrate Per Snack : 7-15g    Education Units of Time   Time Spent: 30 min    Health Maintenance was reviewed today with patient. Discussed with patient importance of routine eye exams, foot exams/foot care, blood work (i.e.: A1c, microalbumin, and lipid), dental visits, yearly flu vaccine, and pneumonia vaccine as indicated by PCP. Patient verbalized understanding.     Health Maintenance Topics with due status: Not Due       Topic Last Completion Date    TETANUS VACCINE 09/08/2016    Pneumococcal PPSV23 (Medium Risk) 12/07/2017    Foot Exam 12/07/2017    Lipid Panel 12/07/2017    Fecal Occult Blood Test (FOBT)/[employer] 12/21/2017    Mammogram 12/29/2017    Eye Exam 01/09/2018    Pap Smear with HPV Cotest 01/29/2018    Influenza Vaccine 03/01/2018    Hemoglobin A1c 05/24/2018     There are no preventive care reminders to display for this patient.

## 2018-06-14 NOTE — LETTER
June 14, 2018        Andrey Perrin MD  [address] [address] [address]             [facility] - Diabetes [facility]  [address] [facility] [address]  Phone: [phone]  Fax: [phone]   Patient: Diamond Das   MR Number: [id number]   YOB: 1955   Date of Visit: 6/14/2018       Dear Dr. Perrin:    Thank you for referring Diamond Das to me for evaluation. Below are the relevant portions of my assessment and plan of care.    If you have questions, please do not hesitate to call me. I look forward to following Diamond along with you.    Sincerely,      Nolvia Mcneil, VANE, CDE           CC  No Recipients

## 2018-06-15 DIAGNOSIS — R10.84 GENERALIZED ABDOMINAL PAIN: ICD-10-CM

## 2018-06-15 RX ORDER — DICYCLOMINE HYDROCHLORIDE 20 MG/1
TABLET ORAL
Qty: 60 TABLET | Refills: 0 | Status: SHIPPED | OUTPATIENT
Start: 2018-06-15 | End: 2018-07-08 | Stop reason: SDUPTHER

## 2018-06-19 ENCOUNTER — LAB VISIT (OUTPATIENT)
Dept: LAB | Facility: HOSPITAL | Age: 63
End: 2018-06-19
Attending: INTERNAL MEDICINE
Payer: MEDICAID

## 2018-06-19 ENCOUNTER — OFFICE VISIT (OUTPATIENT)
Dept: INTERNAL MEDICINE | Facility: CLINIC | Age: 63
End: 2018-06-19
Payer: MEDICAID

## 2018-06-19 ENCOUNTER — TELEPHONE (OUTPATIENT)
Dept: INTERNAL MEDICINE | Facility: CLINIC | Age: 63
End: 2018-06-19

## 2018-06-19 VITALS
OXYGEN SATURATION: 96 % | BODY MASS INDEX: 39.38 KG/M2 | HEART RATE: 63 BPM | DIASTOLIC BLOOD PRESSURE: 58 MMHG | HEIGHT: 61 IN | WEIGHT: 208.56 LBS | RESPIRATION RATE: 16 BRPM | TEMPERATURE: 96 F | SYSTOLIC BLOOD PRESSURE: 110 MMHG

## 2018-06-19 DIAGNOSIS — I50.32 CHRONIC DIASTOLIC CONGESTIVE HEART FAILURE: ICD-10-CM

## 2018-06-19 DIAGNOSIS — N18.30 CKD (CHRONIC KIDNEY DISEASE) STAGE 3, GFR 30-59 ML/MIN: ICD-10-CM

## 2018-06-19 DIAGNOSIS — R60.9 CHRONIC EDEMA: ICD-10-CM

## 2018-06-19 DIAGNOSIS — E11.59 TYPE 2 DIABETES MELLITUS WITH OTHER CIRCULATORY COMPLICATION, WITHOUT LONG-TERM CURRENT USE OF INSULIN: Primary | ICD-10-CM

## 2018-06-19 DIAGNOSIS — E03.4 HYPOTHYROIDISM DUE TO ACQUIRED ATROPHY OF THYROID: ICD-10-CM

## 2018-06-19 DIAGNOSIS — R10.84 GENERALIZED ABDOMINAL PAIN: ICD-10-CM

## 2018-06-19 DIAGNOSIS — K76.82 ENCEPHALOPATHY, HEPATIC: ICD-10-CM

## 2018-06-19 PROBLEM — G93.40 ENCEPHALOPATHY ACUTE: Status: ACTIVE | Noted: 2018-06-19

## 2018-06-19 LAB
ALBUMIN SERPL BCP-MCNC: 2.8 G/DL
ANION GAP SERPL CALC-SCNC: 7 MMOL/L
BUN SERPL-MCNC: 34 MG/DL
CALCIUM SERPL-MCNC: 9.6 MG/DL
CHLORIDE SERPL-SCNC: 111 MMOL/L
CO2 SERPL-SCNC: 29 MMOL/L
CREAT SERPL-MCNC: 2 MG/DL
EST. GFR  (AFRICAN AMERICAN): 30.4 ML/MIN/1.73 M^2
EST. GFR  (NON AFRICAN AMERICAN): 26.4 ML/MIN/1.73 M^2
GLUCOSE SERPL-MCNC: 184 MG/DL
PHOSPHATE SERPL-MCNC: 4.1 MG/DL
POTASSIUM SERPL-SCNC: 4.8 MMOL/L
SODIUM SERPL-SCNC: 147 MMOL/L

## 2018-06-19 PROCEDURE — 99215 OFFICE O/P EST HI 40 MIN: CPT | Mod: S$PBB,,, | Performed by: FAMILY MEDICINE

## 2018-06-19 PROCEDURE — 36415 COLL VENOUS BLD VENIPUNCTURE: CPT | Mod: PO

## 2018-06-19 PROCEDURE — 80069 RENAL FUNCTION PANEL: CPT | Mod: PO

## 2018-06-19 PROCEDURE — 83970 ASSAY OF PARATHORMONE: CPT

## 2018-06-19 PROCEDURE — 99213 OFFICE O/P EST LOW 20 MIN: CPT | Mod: PBBFAC,PO | Performed by: FAMILY MEDICINE

## 2018-06-19 PROCEDURE — 99999 PR PBB SHADOW E&M-EST. PATIENT-LVL III: CPT | Mod: PBBFAC,,, | Performed by: FAMILY MEDICINE

## 2018-06-19 RX ORDER — PANTOPRAZOLE SODIUM 20 MG/1
TABLET, DELAYED RELEASE ORAL
Qty: 30 TABLET | Refills: 0 | Status: SHIPPED | OUTPATIENT
Start: 2018-06-19 | End: 2018-07-09 | Stop reason: ALTCHOICE

## 2018-06-19 RX ORDER — FUROSEMIDE 80 MG/1
80 TABLET ORAL DAILY
Qty: 90 TABLET | Refills: 0 | Status: SHIPPED | OUTPATIENT
Start: 2018-06-19 | End: 2019-03-01

## 2018-06-19 RX ORDER — LEVOTHYROXINE SODIUM 125 UG/1
125 TABLET ORAL
Qty: 45 TABLET | Refills: 0 | Status: SHIPPED | OUTPATIENT
Start: 2018-06-19 | End: 2018-07-30 | Stop reason: ALTCHOICE

## 2018-06-19 RX ORDER — LACTULOSE 10 G/15ML
SOLUTION ORAL 3 TIMES DAILY
COMMUNITY
End: 2018-07-20 | Stop reason: SDUPTHER

## 2018-06-19 RX ORDER — INSULIN GLARGINE 100 [IU]/ML
30 INJECTION, SOLUTION SUBCUTANEOUS DAILY
Qty: 15 ML | Refills: 3 | Status: SHIPPED | OUTPATIENT
Start: 2018-06-19 | End: 2018-09-20 | Stop reason: SDUPTHER

## 2018-06-19 NOTE — ASSESSMENT & PLAN NOTE
Explained to patient and family the risk of multiorgan failure in her given her CKD stage 4, CHF, hepatic failure.  I sat with patient to discuss advanced directives, and make a plan with her and her daughter in planning for any future events, given that pt is of sound mind while having this discussion.  Advanced directive / living will was signed based on long discussion and explanation of advanced dir / living will.      Pt seen for greater than 45 min face to face. Greater than 50% of the time in the room was spent on counseling and coordination of care.

## 2018-06-19 NOTE — TELEPHONE ENCOUNTER
----- Message from Annika Hardin sent at 6/19/2018  8:51 AM CDT -----  Contact: Pt  Please give pt a call at ..[phone] (home) she was returning the nurse call.

## 2018-06-19 NOTE — PROGRESS NOTES
Subjective:       Patient ID: Diamond Das is a 63 y.o. female.    Chief Complaint: Follow-up (DM)    Recently seen at [facility] for hepatic encephalopathy, started on lactulose.   Pt doing much improved today.  Had a long discussion of her multi organ failure.    Reviewed old hosp records - Noted to have hepatic enc in hospital.   Seen by Dr. Gomez, started on lactulose.  Hosp stay from 6/7/18 to 6/10          Diabetes   She presents for her follow-up diabetic visit. She has type 2 diabetes mellitus. Pertinent negatives for hypoglycemia include no confusion or dizziness. Pertinent negatives for diabetes include no blurred vision, no chest pain and no fatigue.     Review of Systems   Constitutional: Negative for fatigue.   Eyes: Negative for blurred vision.   Cardiovascular: Negative for chest pain.   Neurological: Negative for dizziness.   Psychiatric/Behavioral: Negative for confusion.       Objective:      Physical Exam   Constitutional: She appears well-developed. No distress.   HENT:   Head: Normocephalic and atraumatic.   Eyes: Conjunctivae are normal. No scleral icterus.   Pulmonary/Chest: Effort normal and breath sounds normal. No respiratory distress. She has no wheezes.   Abdominal: Soft. Bowel sounds are normal. There is no tenderness. There is no guarding.   Neurological: She is alert.   Skin: Skin is warm. No rash noted. She is not diaphoretic. No erythema. No pallor.   Good turgor   Nursing note and vitals reviewed.      Assessment:       1. Type 2 diabetes mellitus with other circulatory complication, without long-term current use of insulin    2. Chronic diastolic congestive heart failure    3. Chronic edema    4. Hypothyroidism due to acquired atrophy of thyroid    5. Generalized abdominal pain    6. Encephalopathy, hepatic        Plan:     Problem List Items Addressed This Visit        Cardiac/Vascular    Chronic diastolic congestive heart failure    Relevant Medications    furosemide (LASIX) 80 MG  tablet       Endocrine    Type 2 diabetes mellitus with circulatory disorder - Primary    Relevant Medications    insulin glargine (LANTUS SOLOSTAR) 100 unit/mL (3 mL) InPn pen    Hypothyroidism due to acquired atrophy of thyroid    Current Assessment & Plan     chk tsh in 6 weeks.         Relevant Medications    levothyroxine (SYNTHROID) 125 MCG tablet       GI    Generalized abdominal pain    Relevant Medications    pantoprazole (PROTONIX) 20 MG tablet    Encephalopathy, hepatic    Overview     Pt has history of cirrhosis, seen at [facility] on 6/[facility].  Currently on lactulose.         Current Assessment & Plan     Explained to patient and family the risk of multiorgan failure in her given her CKD stage 4, CHF, hepatic failure.  I sat with patient to discuss advanced directives, and make a plan with her and her daughter in planning for any future events, given that pt is of sound mind while having this discussion.  Advanced directive / living will was signed based on long discussion and explanation of advanced dir / living will.      Pt seen for greater than 45 min face to face. Greater than 50% of the time in the room was spent on counseling and coordination of care.              Other    Chronic edema    Relevant Medications    furosemide (LASIX) 80 MG tablet

## 2018-06-20 LAB — PTH-INTACT SERPL-MCNC: 127 PG/ML

## 2018-06-21 ENCOUNTER — OFFICE VISIT (OUTPATIENT)
Dept: HEPATOLOGY | Facility: CLINIC | Age: 63
End: 2018-06-21
Payer: MEDICAID

## 2018-06-21 VITALS
TEMPERATURE: 97 F | DIASTOLIC BLOOD PRESSURE: 60 MMHG | HEIGHT: 61 IN | SYSTOLIC BLOOD PRESSURE: 126 MMHG | BODY MASS INDEX: 38.67 KG/M2 | WEIGHT: 204.81 LBS | HEART RATE: 61 BPM | RESPIRATION RATE: 18 BRPM | OXYGEN SATURATION: 96 %

## 2018-06-21 DIAGNOSIS — K76.82 HEPATIC ENCEPHALOPATHY: Primary | ICD-10-CM

## 2018-06-21 PROCEDURE — 99215 OFFICE O/P EST HI 40 MIN: CPT | Mod: S$PBB,,, | Performed by: INTERNAL MEDICINE

## 2018-06-21 PROCEDURE — 99999 PR PBB SHADOW E&M-EST. PATIENT-LVL IV: CPT | Mod: PBBFAC,,, | Performed by: INTERNAL MEDICINE

## 2018-06-21 PROCEDURE — 99214 OFFICE O/P EST MOD 30 MIN: CPT | Mod: PBBFAC | Performed by: INTERNAL MEDICINE

## 2018-06-21 NOTE — PATIENT INSTRUCTIONS
Stop lasix.  Measure weight daily.  If you gain more than 5 lbs in a week, please notify my clinic.    Decrease lactulose for the goal of 2-4 bowel movements daily.    We will send rifaximin script for patient assistance.    Schedule US within 3 weeks for TIPS assessment.  Schedule labs for same day.    Return to clinic in 4-6 weeks.

## 2018-06-21 NOTE — PROGRESS NOTES
HEPATOLOGY FOLLOW UP    Diamond Das is here for scheduled follow-up.      HPI  60yo female with cryptogenic cirrhosis and ascites formation that returns to clinic after two months.  She is accompanied by her daughter and grandson.    The patient was last seen in clinic on 3/2017.  She had previously undergone TIPS placement for management of volume overload for evidence of chronic liver disease of unclear etiology.  Patient had significant improvement in volume control with TIPS but continued on maintenance diuretic therapy.      The patient states that she was doing well until early 2018.  She initially had issues with abdominal pain and constipation.  Reports that abdominal pain was also felt to be related to gastroparesis with inadequate control of blood glucose.    The patient has also been recently hospitalized for first episode of HE in June 2018.  Reports being initiated on lactulose.  Also prescribed rifaximin but unable to obtain due to cost.    Cirrhosis HCM:  Hepatitis A vaccination: immune  Hepatitis B vaccination: completed series 3/2017  HCC screening: abdominal MRI 12/22/2016 - no focal lesions   Variceal screening: on beta-blocker with no prior history of variceal bleeding  Pneumococcal vaccination: 2016  Influenza vaccination: patient not interested at this time   Colonoscopy: external, need documentation    Outpatient Encounter Prescriptions as of 2/1/2017   Medication Sig Dispense Refill    carvedilol (COREG) 3.125 MG tablet Take 1 tablet (3.125 mg total) by mouth 2 (two) times daily. 60 tablet 11    furosemide (LASIX) 20 MG tablet Take 3 tablets (60 mg total) by mouth once daily. (Patient taking differently: Take 60 mg by mouth every morning. ) 30 tablet 3    insulin glargine (LANTUS) 100 unit/mL injection Inject 30 Units into the skin every morning.       levothyroxine (SYNTHROID) 100 MCG tablet Take 100 mcg by mouth every morning.       ondansetron (ZOFRAN-ODT) 4 MG TbDL Take 1 tablet  (4 mg total) by mouth every 8 (eight) hours as needed (prn nausea). 15 tablet 0    vitamin E 400 UNIT capsule Take 400 Units by mouth every morning.      polyethylene glycol (GLYCOLAX) 17 gram/dose powder Take 17 g by mouth once daily. 510 g 11     No facility-administered encounter medications on file as of 2/1/2017.      Review of patient's allergies indicates:   Allergen Reactions    Subsys [fentanyl] Other (See Comments)     After administration pt unresponsive.  HR and respirations decreased.     Versed [midazolam] Other (See Comments)     After administration pt unresponsive.  HR and respirations decreased.     Ampicillin Rash    Codeine Nausea And Vomiting and Nausea Only     Past Medical History:   Diagnosis Date    Ascites     Cataract     CHF (congestive heart failure)     Cirrhosis     Diabetes mellitus     Gastroparesis     GERD (gastroesophageal reflux disease)     Hypertension     Liver disease     Thyroid disease      FH: no family history of liver disease or transplant     Review of Systems   Constitutional: Negative for activity change, appetite change, chills, fatigue, fever and unexpected weight change.   HENT: Negative for hearing loss, rhinorrhea and trouble swallowing.    Eyes: Negative for visual disturbance.   Respiratory: Negative for shortness of breath.    Cardiovascular: Negative for chest pain and leg swelling (stable).   Gastrointestinal: Positive for abdominal pain and constipation. Negative for abdominal distention, blood in stool, nausea and vomiting.   Endocrine: Negative for cold intolerance and heat intolerance.   Genitourinary: Negative for difficulty urinating, frequency and urgency.   Skin: Negative for rash.   Neurological: Negative for weakness and headaches.   Hematological: Negative for adenopathy. Does not bruise/bleed easily.   Psychiatric/Behavioral: Positive for confusion. Negative for decreased concentration.     Vitals:    06/21/18 1548   BP: 126/60    Pulse: 61   Resp: 18   Temp: 97.3 °F (36.3 °C)       Physical Exam   Constitutional: She is oriented to person, place, and time. She appears well-developed and well-nourished. No distress.   Patient ambulating without assistive device   HENT:   Head: Normocephalic and atraumatic.   Mouth/Throat: Oropharynx is clear and moist.   Eyes: EOM are normal. Pupils are equal, round, and reactive to light. No scleral icterus.   Neck: Normal range of motion. Neck supple. No thyromegaly present.   Cardiovascular: Normal rate, regular rhythm and normal heart sounds.  Exam reveals no gallop and no friction rub.    No murmur heard.  Pulmonary/Chest: Effort normal. No respiratory distress. She has no wheezes. She has no rales.   Decreased breath sounds at bilateral bases   Abdominal: Soft. Bowel sounds are normal. She exhibits no distension. There is no tenderness. There is no rebound and no guarding.   Musculoskeletal: Normal range of motion. She exhibits edema.   Lymphadenopathy:     She has no cervical adenopathy.   Neurological: She is alert and oriented to person, place, and time. No cranial nerve deficit.   Skin: Skin is warm and dry. No rash noted.   Psychiatric: She has a normal mood and affect. Her behavior is normal.   Vitals reviewed.      Lab Results   Component Value Date     (H) 06/19/2018    BUN 34 (H) 06/19/2018    CREATININE 2.0 (H) 06/19/2018    CALCIUM 9.6 06/19/2018     (H) 06/19/2018    K 4.8 06/19/2018     (H) 06/19/2018    PROT 8.5 (H) 06/07/2018    CO2 29 06/19/2018    ANIONGAP 7 (L) 06/19/2018    WBC 8.07 06/07/2018    RBC 4.72 06/07/2018    HGB 14.5 06/07/2018    HCT 42.1 06/07/2018    MCV 89 06/07/2018    MCH 30.7 06/07/2018    MCHC 34.4 06/07/2018       Diagnostics: TIPS procedure report reviewed  US with doppler concerning for TIPS dysfunction    Assessment and Plan:  Patient Active Problem List   Diagnosis    Ascites    Cirrhosis of liver with ascites    Type 2 diabetes mellitus  with circulatory disorder    Bilateral lower extremity edema    Morbid obesity due to excess calories    Hypothyroidism due to acquired atrophy of thyroid    Protein-calorie malnutrition, moderate    Decompensated hepatic cirrhosis    Medication reaction    Cirrhosis    Portal hypertension    Hypoalbuminemia    Cough    Chronic diastolic congestive heart failure    Postmenopausal    Screening mammogram, encounter for    Screen for colon cancer    Hypertension    Shortness of breath    Pneumonia of right middle lobe due to infectious organism    Breast pain, left    Chronic edema    Chest pain, atypical    Hyperlipidemia    Encounter for long-term (current) use of medications    Edema    KRISTAL on CPAP    Psychophysiological insomnia    Insomnia secondary to chronic pain    Inadequate sleep hygiene    Lumbar strain    Generalized abdominal pain    CKD (chronic kidney disease) stage 4, GFR 15-29 ml/min    Diabetic gastroparesis associated with type 2 diabetes mellitus    Encephalopathy, hepatic     60yo female with cholestatic liver disease with confirmed cirrhosis on biopsy and imaging.  Patient underwent recent TIPS placement with improvement in volume status but now complicated by HE.     Cirrhosis: repeat US to evaluate TIPS patency.  Discussed titration of lactulose for 2-4 BMs daily, currently with diarrhea associated with medication.  Rifaximin ordered through [facility] pharmacy for patient assistance.      Volume status:  Hold lasix, with worsening CKD and evidence of dehydration by elevated BUN.  Patient to check daily weights off medication.      HCM:  As listed above     RTC in 4-6 weeks

## 2018-07-05 ENCOUNTER — TELEPHONE (OUTPATIENT)
Dept: RADIOLOGY | Facility: HOSPITAL | Age: 63
End: 2018-07-05

## 2018-07-05 ENCOUNTER — LAB VISIT (OUTPATIENT)
Dept: LAB | Facility: HOSPITAL | Age: 63
End: 2018-07-05
Attending: INTERNAL MEDICINE
Payer: MEDICAID

## 2018-07-05 DIAGNOSIS — K76.82 HEPATIC ENCEPHALOPATHY: ICD-10-CM

## 2018-07-05 LAB
AFP SERPL-MCNC: 1.3 NG/ML
ALBUMIN SERPL BCP-MCNC: 2.8 G/DL
ALP SERPL-CCNC: 110 U/L
ALT SERPL W/O P-5'-P-CCNC: 24 U/L
ANION GAP SERPL CALC-SCNC: 3 MMOL/L
AST SERPL-CCNC: 42 U/L
BASOPHILS # BLD AUTO: 0.03 K/UL
BASOPHILS NFR BLD: 0.5 %
BILIRUB SERPL-MCNC: 0.4 MG/DL
BUN SERPL-MCNC: 22 MG/DL
CALCIUM SERPL-MCNC: 8.9 MG/DL
CHLORIDE SERPL-SCNC: 115 MMOL/L
CO2 SERPL-SCNC: 26 MMOL/L
CREAT SERPL-MCNC: 1.5 MG/DL
DIFFERENTIAL METHOD: ABNORMAL
EOSINOPHIL # BLD AUTO: 0.3 K/UL
EOSINOPHIL NFR BLD: 5.4 %
ERYTHROCYTE [DISTWIDTH] IN BLOOD BY AUTOMATED COUNT: 13.7 %
EST. GFR  (AFRICAN AMERICAN): 43 ML/MIN/1.73 M^2
EST. GFR  (NON AFRICAN AMERICAN): 37.3 ML/MIN/1.73 M^2
GLUCOSE SERPL-MCNC: 149 MG/DL
HCT VFR BLD AUTO: 34.4 %
HGB BLD-MCNC: 11.4 G/DL
INR PPP: 1.1
LYMPHOCYTES # BLD AUTO: 2.9 K/UL
LYMPHOCYTES NFR BLD: 51.1 %
MCH RBC QN AUTO: 30.6 PG
MCHC RBC AUTO-ENTMCNC: 33.1 G/DL
MCV RBC AUTO: 93 FL
MONOCYTES # BLD AUTO: 0.6 K/UL
MONOCYTES NFR BLD: 9.7 %
NEUTROPHILS # BLD AUTO: 1.9 K/UL
NEUTROPHILS NFR BLD: 33.1 %
PLATELET # BLD AUTO: 191 K/UL
PMV BLD AUTO: 9.4 FL
POTASSIUM SERPL-SCNC: 4.1 MMOL/L
PROT SERPL-MCNC: 7 G/DL
PROTHROMBIN TIME: 11.7 SEC
RBC # BLD AUTO: 3.72 M/UL
SODIUM SERPL-SCNC: 144 MMOL/L
WBC # BLD AUTO: 5.69 K/UL

## 2018-07-05 PROCEDURE — 36415 COLL VENOUS BLD VENIPUNCTURE: CPT | Mod: PO

## 2018-07-05 PROCEDURE — 85610 PROTHROMBIN TIME: CPT | Mod: PO

## 2018-07-05 PROCEDURE — 85025 COMPLETE CBC W/AUTO DIFF WBC: CPT | Mod: PO

## 2018-07-05 PROCEDURE — 82105 ALPHA-FETOPROTEIN SERUM: CPT

## 2018-07-05 PROCEDURE — 80053 COMPREHEN METABOLIC PANEL: CPT | Mod: PO

## 2018-07-06 ENCOUNTER — HOSPITAL ENCOUNTER (OUTPATIENT)
Dept: RADIOLOGY | Facility: HOSPITAL | Age: 63
Discharge: HOME | End: 2018-07-06
Attending: INTERNAL MEDICINE
Payer: MEDICAID

## 2018-07-06 DIAGNOSIS — K76.82 HEPATIC ENCEPHALOPATHY: ICD-10-CM

## 2018-07-06 PROCEDURE — 76705 ECHO EXAM OF ABDOMEN: CPT | Mod: 26,XS,, | Performed by: RADIOLOGY

## 2018-07-06 PROCEDURE — 93975 VASCULAR STUDY: CPT | Mod: 26,,, | Performed by: RADIOLOGY

## 2018-07-06 PROCEDURE — 93975 VASCULAR STUDY: CPT | Mod: TC,PO

## 2018-07-08 DIAGNOSIS — R10.84 GENERALIZED ABDOMINAL PAIN: ICD-10-CM

## 2018-07-08 RX ORDER — DICYCLOMINE HYDROCHLORIDE 20 MG/1
TABLET ORAL
Qty: 60 TABLET | Refills: 0 | Status: SHIPPED | OUTPATIENT
Start: 2018-07-08 | End: 2018-07-20 | Stop reason: ALTCHOICE

## 2018-07-09 RX ORDER — PANTOPRAZOLE SODIUM 20 MG/1
TABLET, DELAYED RELEASE ORAL
Qty: 90 TABLET | Refills: 0 | Status: SHIPPED | OUTPATIENT
Start: 2018-07-09 | End: 2018-07-23

## 2018-07-11 ENCOUNTER — TELEPHONE (OUTPATIENT)
Dept: HEPATOLOGY | Facility: CLINIC | Age: 63
End: 2018-07-11

## 2018-07-11 NOTE — TELEPHONE ENCOUNTER
----- Message from Liliane Baker MD sent at 7/10/2018  4:55 PM CDT -----  MELD 11, please inform patient that kidney function is improved.  Continue to hold lasix if daily weights are not elevated.  If fluid pills are required, we can restart at furosemide 20mg daily

## 2018-07-13 ENCOUNTER — TELEPHONE (OUTPATIENT)
Dept: HEPATOLOGY | Facility: CLINIC | Age: 63
End: 2018-07-13

## 2018-07-13 NOTE — TELEPHONE ENCOUNTER
----- Message from Liliane Baker MD sent at 7/12/2018  6:44 PM CDT -----  Please mail results to patient.  Will discuss at clinic appointment next week.  No evidence of ascites and no concerning findings.

## 2018-07-20 ENCOUNTER — OFFICE VISIT (OUTPATIENT)
Dept: HEPATOLOGY | Facility: CLINIC | Age: 63
End: 2018-07-20
Payer: MEDICAID

## 2018-07-20 VITALS
HEIGHT: 61 IN | BODY MASS INDEX: 41.12 KG/M2 | SYSTOLIC BLOOD PRESSURE: 134 MMHG | TEMPERATURE: 99 F | RESPIRATION RATE: 16 BRPM | HEART RATE: 72 BPM | OXYGEN SATURATION: 96 % | WEIGHT: 217.81 LBS | DIASTOLIC BLOOD PRESSURE: 64 MMHG

## 2018-07-20 DIAGNOSIS — K74.69 CRYPTOGENIC CIRRHOSIS: Primary | ICD-10-CM

## 2018-07-20 PROCEDURE — 99213 OFFICE O/P EST LOW 20 MIN: CPT | Mod: PBBFAC | Performed by: INTERNAL MEDICINE

## 2018-07-20 PROCEDURE — 99999 PR PBB SHADOW E&M-EST. PATIENT-LVL III: CPT | Mod: PBBFAC,,, | Performed by: INTERNAL MEDICINE

## 2018-07-20 PROCEDURE — 99214 OFFICE O/P EST MOD 30 MIN: CPT | Mod: S$PBB,,, | Performed by: INTERNAL MEDICINE

## 2018-07-20 RX ORDER — LACTULOSE 10 G/15ML
15 SOLUTION ORAL 3 TIMES DAILY
Qty: 946 ML | Refills: 11 | Status: ON HOLD | OUTPATIENT
Start: 2018-07-20 | End: 2019-01-04 | Stop reason: HOSPADM

## 2018-07-20 NOTE — PROGRESS NOTES
HEPATOLOGY FOLLOW UP    Diamond Das is here for scheduled follow-up.      HPI  62yo female with cryptogenic cirrhosis and ascites formation that returns to clinic after one month.  She is accompanied by her daughter and grandson.    The patient was last seen in clinic on 6/2018.  She had previously undergone TIPS placement for management of volume overload for evidence of chronic liver disease of unclear etiology.  Patient had significant improvement in volume control with TIPS but continued on maintenance diuretic therapy.  She did well until early 2018 when she developed issues of abdominal pain and constipation.  Was felt to be related to gastroparesis.  During that time, also with worsening renal function and HE.  Hospitalized for HE and initiated on lactulose but could not afford rifaximin after discharge.    The patient reports that she is much improved.  She is taking lactulose every other day.  She has been able to obtain xifaxin.    Diuretic decreased to lasix 80mg every other day.  Volume remains adequately managed.  Noted to have concern for early TIPS dysfunction on recent US along with PVT.      Cirrhosis HCM:  Hepatitis A vaccination: immune  Hepatitis B vaccination: completed series 3/2017  HCC screening: US 6/2018 - no focal lesions   Variceal screening: on beta-blocker with no prior history of variceal bleeding  Pneumococcal vaccination: 2016  Influenza vaccination: patient not interested at this time   Colonoscopy: external, need documentation    Outpatient Encounter Prescriptions as of 2/1/2017   Medication Sig Dispense Refill    carvedilol (COREG) 3.125 MG tablet Take 1 tablet (3.125 mg total) by mouth 2 (two) times daily. 60 tablet 11    furosemide (LASIX) 20 MG tablet Take 3 tablets (60 mg total) by mouth once daily. (Patient taking differently: Take 60 mg by mouth every morning. ) 30 tablet 3    insulin glargine (LANTUS) 100 unit/mL injection Inject 30 Units into the skin every  morning.       levothyroxine (SYNTHROID) 100 MCG tablet Take 100 mcg by mouth every morning.       ondansetron (ZOFRAN-ODT) 4 MG TbDL Take 1 tablet (4 mg total) by mouth every 8 (eight) hours as needed (prn nausea). 15 tablet 0    vitamin E 400 UNIT capsule Take 400 Units by mouth every morning.      polyethylene glycol (GLYCOLAX) 17 gram/dose powder Take 17 g by mouth once daily. 510 g 11     No facility-administered encounter medications on file as of 2/1/2017.      Review of patient's allergies indicates:   Allergen Reactions    Subsys [fentanyl] Other (See Comments)     After administration pt unresponsive.  HR and respirations decreased.     Versed [midazolam] Other (See Comments)     After administration pt unresponsive.  HR and respirations decreased.     Ampicillin Rash    Codeine Nausea And Vomiting and Nausea Only     Past Medical History:   Diagnosis Date    Ascites     Cataract     CHF (congestive heart failure)     Cirrhosis     Diabetes mellitus     Gastroparesis     GERD (gastroesophageal reflux disease)     Hypertension     Liver disease     Thyroid disease      FH: no family history of liver disease or transplant     Review of Systems   Constitutional: Negative for activity change, appetite change, chills, fatigue, fever and unexpected weight change.   HENT: Negative for hearing loss, rhinorrhea and trouble swallowing.    Eyes: Negative for visual disturbance.   Respiratory: Negative for shortness of breath.    Cardiovascular: Negative for chest pain and leg swelling (stable).   Gastrointestinal: Positive for abdominal pain and constipation. Negative for abdominal distention, blood in stool, nausea and vomiting.   Endocrine: Negative for cold intolerance and heat intolerance.   Genitourinary: Negative for difficulty urinating, frequency and urgency.   Skin: Negative for rash.   Neurological: Negative for weakness and headaches.   Hematological: Negative for adenopathy. Does not  bruise/bleed easily.   Psychiatric/Behavioral: Positive for confusion. Negative for decreased concentration.     Vitals:    07/20/18 1655   BP: 134/64   Pulse: 72   Resp: 16   Temp: 98.8 °F (37.1 °C)       Physical Exam   Constitutional: She is oriented to person, place, and time. She appears well-developed and well-nourished. No distress.   Patient ambulating without assistive device   HENT:   Head: Normocephalic and atraumatic.   Mouth/Throat: Oropharynx is clear and moist.   Eyes: EOM are normal. Pupils are equal, round, and reactive to light. No scleral icterus.   Neck: Normal range of motion. Neck supple. No thyromegaly present.   Cardiovascular: Normal rate, regular rhythm and normal heart sounds.  Exam reveals no gallop and no friction rub.    No murmur heard.  Pulmonary/Chest: Effort normal. No respiratory distress. She has no wheezes. She has no rales.   Decreased breath sounds at bilateral bases   Abdominal: Soft. Bowel sounds are normal. She exhibits no distension. There is no tenderness. There is no rebound and no guarding.   Musculoskeletal: Normal range of motion. She exhibits edema.   Lymphadenopathy:     She has no cervical adenopathy.   Neurological: She is alert and oriented to person, place, and time. No cranial nerve deficit.   Skin: Skin is warm and dry. No rash noted.   Psychiatric: She has a normal mood and affect. Her behavior is normal.   Vitals reviewed.      Lab Results   Component Value Date     (H) 07/05/2018    BUN 22 07/05/2018    CREATININE 1.5 (H) 07/05/2018    CALCIUM 8.9 07/05/2018     07/05/2018    K 4.1 07/05/2018     (H) 07/05/2018    PROT 7.0 07/05/2018    CO2 26 07/05/2018    ANIONGAP 3 (L) 07/05/2018    WBC 5.69 07/05/2018    RBC 3.72 (L) 07/05/2018    HGB 11.4 (L) 07/05/2018    HCT 34.4 (L) 07/05/2018    MCV 93 07/05/2018    MCH 30.6 07/05/2018    MCHC 33.1 07/05/2018     MELD-Na score: 11 at 7/5/2018 10:33 AM  MELD score: 11 at 7/5/2018 10:33  AM  Calculated from:  Serum Creatinine: 1.5 mg/dL at 7/5/2018 10:33 AM  Serum Sodium: 144 mmol/L (Rounded to 137) at 7/5/2018 10:33 AM  Total Bilirubin: 0.4 mg/dL (Rounded to 1) at 7/5/2018 10:33 AM  INR(ratio): 1.1 at 7/5/2018 10:33 AM  Age: 63 years      Diagnostics: TIPS procedure report reviewed  US with doppler concerning for TIPS dysfunction    Assessment and Plan:  Patient Active Problem List   Diagnosis    Ascites    Cirrhosis of liver with ascites    Type 2 diabetes mellitus with circulatory disorder    Bilateral lower extremity edema    Morbid obesity due to excess calories    Hypothyroidism due to acquired atrophy of thyroid    Protein-calorie malnutrition, moderate    Decompensated hepatic cirrhosis    Medication reaction    Cirrhosis    Portal hypertension    Hypoalbuminemia    Cough    Chronic diastolic congestive heart failure    Postmenopausal    Screening mammogram, encounter for    Screen for colon cancer    Hypertension    Shortness of breath    Pneumonia of right middle lobe due to infectious organism    Breast pain, left    Chronic edema    Chest pain, atypical    Hyperlipidemia    Encounter for long-term (current) use of medications    Edema    KRISTAL on CPAP    Psychophysiological insomnia    Insomnia secondary to chronic pain    Inadequate sleep hygiene    Lumbar strain    Generalized abdominal pain    CKD (chronic kidney disease) stage 4, GFR 15-29 ml/min    Diabetic gastroparesis associated with type 2 diabetes mellitus    Encephalopathy, hepatic     60yo female with cholestatic liver disease with confirmed cirrhosis on biopsy and imaging.  Patient underwent TIPS placement with improvement in volume status but now complicated by HE.     Cirrhosis: Recent US suggestive of TIPS dysfunction.  Given the patient's stabilized volume status and recent HE, does not require TIPS revision at this time.  We will continue to monitor with serial imaging.      Volume  status:  Patient is using lasix every other day with adequate volume control, no change at this time.  Recent labs with improved renal function.      HE:  Continue lactulose and rifaximin    PVT:  Patient noted to have PVT on imaging.  No indication for anticoagulation at this time.      Transplant candidacy:  MELD 11, no indication for transplant evaluation at this time.  Patient expresses desire to work and no contraindications from a liver perspective currently.      HCM:  As listed above     RTC in 3 months

## 2018-07-20 NOTE — PATIENT INSTRUCTIONS
Your kidney function is improving.  You may continue furosemide 80 every other day.    Your TIPS has some mild dysfunction but is appropriate for fluid control at this time.    We will continue to monitor the blood clot.  No need for blood thinner at this time.    Labs in one month.    Return to clinic in 3 months with ultrasound

## 2018-07-20 NOTE — PROGRESS NOTES
HEPATOLOGY FOLLOW UP    Diamond Das is here for scheduled follow-up.      HPI  60yo female with cryptogenic cirrhosis and ascites formation that returns to clinic after two months.  She is accompanied by her daughter and grandson.    The patient was last seen in clinic on 3/2017.  She had previously undergone TIPS placement for management of volume overload for evidence of chronic liver disease of unclear etiology.  Patient had significant improvement in volume control with TIPS but continued on maintenance diuretic therapy.      The patient states that she was doing well until early 2018.  She initially had issues with abdominal pain and constipation.  Reports that abdominal pain was also felt to be related to gastroparesis with inadequate control of blood glucose.    The patient has also been recently hospitalized for first episode of HE in June 2018.  Reports being initiated on lactulose.  Also prescribed rifaximin but unable to obtain due to cost.    Cirrhosis HCM:  Hepatitis A vaccination: immune  Hepatitis B vaccination: completed series 3/2017  HCC screening: abdominal MRI 12/22/2016 - no focal lesions   Variceal screening: on beta-blocker with no prior history of variceal bleeding  Pneumococcal vaccination: 2016  Influenza vaccination: patient not interested at this time   Colonoscopy: external, need documentation    Outpatient Encounter Prescriptions as of 2/1/2017   Medication Sig Dispense Refill    carvedilol (COREG) 3.125 MG tablet Take 1 tablet (3.125 mg total) by mouth 2 (two) times daily. 60 tablet 11    furosemide (LASIX) 20 MG tablet Take 3 tablets (60 mg total) by mouth once daily. (Patient taking differently: Take 60 mg by mouth every morning. ) 30 tablet 3    insulin glargine (LANTUS) 100 unit/mL injection Inject 30 Units into the skin every morning.       levothyroxine (SYNTHROID) 100 MCG tablet Take 100 mcg by mouth every morning.       ondansetron (ZOFRAN-ODT) 4 MG TbDL Take 1 tablet  (4 mg total) by mouth every 8 (eight) hours as needed (prn nausea). 15 tablet 0    vitamin E 400 UNIT capsule Take 400 Units by mouth every morning.      polyethylene glycol (GLYCOLAX) 17 gram/dose powder Take 17 g by mouth once daily. 510 g 11     No facility-administered encounter medications on file as of 2/1/2017.      Review of patient's allergies indicates:   Allergen Reactions    Subsys [fentanyl] Other (See Comments)     After administration pt unresponsive.  HR and respirations decreased.     Versed [midazolam] Other (See Comments)     After administration pt unresponsive.  HR and respirations decreased.     Ampicillin Rash    Codeine Nausea And Vomiting and Nausea Only     Past Medical History:   Diagnosis Date    Ascites     Cataract     CHF (congestive heart failure)     Cirrhosis     Diabetes mellitus     Gastroparesis     GERD (gastroesophageal reflux disease)     Hypertension     Liver disease     Thyroid disease      FH: no family history of liver disease or transplant     Review of Systems   Constitutional: Negative for activity change, appetite change, chills, fatigue, fever and unexpected weight change.   HENT: Negative for hearing loss, rhinorrhea and trouble swallowing.    Eyes: Negative for visual disturbance.   Respiratory: Negative for shortness of breath.    Cardiovascular: Negative for chest pain and leg swelling (stable).   Gastrointestinal: Positive for abdominal pain and constipation. Negative for abdominal distention, blood in stool, nausea and vomiting.   Endocrine: Negative for cold intolerance and heat intolerance.   Genitourinary: Negative for difficulty urinating, frequency and urgency.   Skin: Negative for rash.   Neurological: Negative for weakness and headaches.   Hematological: Negative for adenopathy. Does not bruise/bleed easily.   Psychiatric/Behavioral: Positive for confusion. Negative for decreased concentration.     There were no vitals filed for this  visit.    Physical Exam   Constitutional: She is oriented to person, place, and time. She appears well-developed and well-nourished. No distress.   Patient ambulating without assistive device   HENT:   Head: Normocephalic and atraumatic.   Mouth/Throat: Oropharynx is clear and moist.   Eyes: EOM are normal. Pupils are equal, round, and reactive to light. No scleral icterus.   Neck: Normal range of motion. Neck supple. No thyromegaly present.   Cardiovascular: Normal rate, regular rhythm and normal heart sounds.  Exam reveals no gallop and no friction rub.    No murmur heard.  Pulmonary/Chest: Effort normal. No respiratory distress. She has no wheezes. She has no rales.   Decreased breath sounds at bilateral bases   Abdominal: Soft. Bowel sounds are normal. She exhibits no distension. There is no tenderness. There is no rebound and no guarding.   Musculoskeletal: Normal range of motion. She exhibits edema.   Lymphadenopathy:     She has no cervical adenopathy.   Neurological: She is alert and oriented to person, place, and time. No cranial nerve deficit.   Skin: Skin is warm and dry. No rash noted.   Psychiatric: She has a normal mood and affect. Her behavior is normal.   Vitals reviewed.      Lab Results   Component Value Date     (H) 07/05/2018    BUN 22 07/05/2018    CREATININE 1.5 (H) 07/05/2018    CALCIUM 8.9 07/05/2018     07/05/2018    K 4.1 07/05/2018     (H) 07/05/2018    PROT 7.0 07/05/2018    CO2 26 07/05/2018    ANIONGAP 3 (L) 07/05/2018    WBC 5.69 07/05/2018    RBC 3.72 (L) 07/05/2018    HGB 11.4 (L) 07/05/2018    HCT 34.4 (L) 07/05/2018    MCV 93 07/05/2018    MCH 30.6 07/05/2018    MCHC 33.1 07/05/2018       Diagnostics: TIPS procedure report reviewed  US with doppler concerning for TIPS dysfunction    Assessment and Plan:  Patient Active Problem List   Diagnosis    Ascites    Cirrhosis of liver with ascites    Type 2 diabetes mellitus with circulatory disorder    Bilateral  lower extremity edema    Morbid obesity due to excess calories    Hypothyroidism due to acquired atrophy of thyroid    Protein-calorie malnutrition, moderate    Decompensated hepatic cirrhosis    Medication reaction    Cirrhosis    Portal hypertension    Hypoalbuminemia    Cough    Chronic diastolic congestive heart failure    Postmenopausal    Screening mammogram, encounter for    Screen for colon cancer    Hypertension    Shortness of breath    Pneumonia of right middle lobe due to infectious organism    Breast pain, left    Chronic edema    Chest pain, atypical    Hyperlipidemia    Encounter for long-term (current) use of medications    Edema    KRISTAL on CPAP    Psychophysiological insomnia    Insomnia secondary to chronic pain    Inadequate sleep hygiene    Lumbar strain    Generalized abdominal pain    CKD (chronic kidney disease) stage 4, GFR 15-29 ml/min    Diabetic gastroparesis associated with type 2 diabetes mellitus    Encephalopathy, hepatic     62yo female with cholestatic liver disease with confirmed cirrhosis on biopsy and imaging.  Patient underwent recent TIPS placement with improvement in volume status but now complicated by HE.     Cirrhosis: repeat US to evaluate TIPS patency.  Discussed titration of lactulose for 2-4 BMs daily, currently with diarrhea associated with medication.  Rifaximin ordered through [facility] pharmacy for patient assistance.      Volume status:  Hold lasix, with worsening CKD and evidence of dehydration by elevated BUN.  Patient to check daily weights off medication.      HCM:  As listed above     RTC in 4-6 weeks

## 2018-07-21 DIAGNOSIS — R10.84 GENERALIZED ABDOMINAL PAIN: ICD-10-CM

## 2018-07-23 RX ORDER — PANTOPRAZOLE SODIUM 20 MG/1
TABLET, DELAYED RELEASE ORAL
Qty: 90 TABLET | Refills: 0 | Status: SHIPPED | OUTPATIENT
Start: 2018-07-23 | End: 2019-01-20 | Stop reason: SDUPTHER

## 2018-07-27 ENCOUNTER — TELEPHONE (OUTPATIENT)
Dept: HEPATOLOGY | Facility: CLINIC | Age: 63
End: 2018-07-27

## 2018-07-27 NOTE — TELEPHONE ENCOUNTER
----- Message from Liliane Baker MD sent at 7/26/2018  5:45 PM CDT -----  Please fax lab orders so patient may obtain labs locally in 1 month.    CB

## 2018-07-27 NOTE — TELEPHONE ENCOUNTER
Liliane Baker MD  P [facility] Non-Clinical Staff      Please fax lab orders so patient may obtain labs locally in 1 month.     CB      Call placed to the patient and message relayed to the patient from Dr Baker to get Labs done monthly - locally for the next 6 months Aug- Jan. We will mail out the Orders to you.    You need to return to see Dr Baker in 3 months in October 2018. You will need to do Labs, U/S and Office Visit.  Orders are in the mail.    Patient voiced understanding.

## 2018-07-30 ENCOUNTER — OFFICE VISIT (OUTPATIENT)
Dept: INTERNAL MEDICINE | Facility: CLINIC | Age: 63
End: 2018-07-30
Payer: MEDICAID

## 2018-07-30 ENCOUNTER — LAB VISIT (OUTPATIENT)
Dept: LAB | Facility: HOSPITAL | Age: 63
End: 2018-07-30
Attending: FAMILY MEDICINE
Payer: MEDICAID

## 2018-07-30 VITALS
WEIGHT: 211.44 LBS | OXYGEN SATURATION: 98 % | TEMPERATURE: 98 F | SYSTOLIC BLOOD PRESSURE: 138 MMHG | DIASTOLIC BLOOD PRESSURE: 70 MMHG | RESPIRATION RATE: 16 BRPM | HEART RATE: 65 BPM | HEIGHT: 61 IN | BODY MASS INDEX: 39.92 KG/M2

## 2018-07-30 DIAGNOSIS — E03.4 HYPOTHYROIDISM DUE TO ACQUIRED ATROPHY OF THYROID: ICD-10-CM

## 2018-07-30 DIAGNOSIS — E03.4 HYPOTHYROIDISM DUE TO ACQUIRED ATROPHY OF THYROID: Primary | ICD-10-CM

## 2018-07-30 DIAGNOSIS — I10 ESSENTIAL HYPERTENSION: ICD-10-CM

## 2018-07-30 LAB
T4 FREE SERPL-MCNC: 1.04 NG/DL
TSH SERPL DL<=0.005 MIU/L-ACNC: 7.17 UIU/ML

## 2018-07-30 PROCEDURE — 99213 OFFICE O/P EST LOW 20 MIN: CPT | Mod: PBBFAC,PO | Performed by: FAMILY MEDICINE

## 2018-07-30 PROCEDURE — 36415 COLL VENOUS BLD VENIPUNCTURE: CPT | Mod: PO

## 2018-07-30 PROCEDURE — 84443 ASSAY THYROID STIM HORMONE: CPT | Mod: PO

## 2018-07-30 PROCEDURE — 99213 OFFICE O/P EST LOW 20 MIN: CPT | Mod: S$PBB,,, | Performed by: FAMILY MEDICINE

## 2018-07-30 PROCEDURE — 84439 ASSAY OF FREE THYROXINE: CPT | Mod: PO

## 2018-07-30 PROCEDURE — 99999 PR PBB SHADOW E&M-EST. PATIENT-LVL III: CPT | Mod: PBBFAC,,, | Performed by: FAMILY MEDICINE

## 2018-07-30 RX ORDER — LEVOTHYROXINE SODIUM 137 UG/1
137 TABLET ORAL
Qty: 45 TABLET | Refills: 0 | Status: SHIPPED | OUTPATIENT
Start: 2018-07-30 | End: 2018-10-05 | Stop reason: SDUPTHER

## 2018-07-30 NOTE — PROGRESS NOTES
Subjective:       Patient ID: Diamond Das is a 63 y.o. female.    Chief Complaint: Follow-up (6 weeks)    Hypertension   This is a chronic problem. The current episode started more than 1 year ago. The problem has been gradually worsening since onset. The problem is uncontrolled. Pertinent negatives include no anxiety, chest pain, headaches, peripheral edema or shortness of breath.     Review of Systems   Respiratory: Negative for shortness of breath.    Cardiovascular: Negative for chest pain.   Gastrointestinal: Negative for abdominal pain.   Neurological: Negative for headaches.       Objective:      Physical Exam   Constitutional: She appears well-developed and well-nourished. No distress.   HENT:   Head: Normocephalic and atraumatic.   Pulmonary/Chest: Effort normal and breath sounds normal. No respiratory distress. She has no wheezes.   Skin: Skin is warm and dry. No rash noted. She is not diaphoretic. No erythema.   Nursing note and vitals reviewed.      Assessment:       1. Hypothyroidism due to acquired atrophy of thyroid    2. Essential hypertension        Plan:     Problem List Items Addressed This Visit        Cardiac/Vascular    Hypertension    Current Assessment & Plan     pts BP is under control. Cont meds.            Endocrine    Hypothyroidism due to acquired atrophy of thyroid - Primary    Relevant Orders    TSH (Completed)

## 2018-07-31 ENCOUNTER — OFFICE VISIT (OUTPATIENT)
Dept: PULMONOLOGY | Facility: CLINIC | Age: 63
End: 2018-07-31
Payer: MEDICAID

## 2018-07-31 ENCOUNTER — TELEPHONE (OUTPATIENT)
Dept: INTERNAL MEDICINE | Facility: CLINIC | Age: 63
End: 2018-07-31

## 2018-07-31 VITALS
BODY MASS INDEX: 39.33 KG/M2 | WEIGHT: 208.31 LBS | HEIGHT: 61 IN | DIASTOLIC BLOOD PRESSURE: 74 MMHG | HEART RATE: 61 BPM | RESPIRATION RATE: 18 BRPM | SYSTOLIC BLOOD PRESSURE: 160 MMHG | OXYGEN SATURATION: 93 %

## 2018-07-31 DIAGNOSIS — R94.2 DIFFUSION CAPACITY OF LUNG (DL), DECREASED: ICD-10-CM

## 2018-07-31 DIAGNOSIS — J98.4 RESTRICTIVE LUNG DISEASE: ICD-10-CM

## 2018-07-31 DIAGNOSIS — G47.33 OSA ON CPAP: Primary | ICD-10-CM

## 2018-07-31 DIAGNOSIS — I50.32 CHRONIC DIASTOLIC HEART FAILURE: ICD-10-CM

## 2018-07-31 DIAGNOSIS — K74.60 HEPATIC CIRRHOSIS, UNSPECIFIED HEPATIC CIRRHOSIS TYPE, UNSPECIFIED WHETHER ASCITES PRESENT: ICD-10-CM

## 2018-07-31 DIAGNOSIS — G47.33 OSA (OBSTRUCTIVE SLEEP APNEA): Primary | ICD-10-CM

## 2018-07-31 DIAGNOSIS — K76.6 PORTAL HYPERTENSION: ICD-10-CM

## 2018-07-31 DIAGNOSIS — G47.34 NOCTURNAL HYPOXEMIA: ICD-10-CM

## 2018-07-31 PROCEDURE — 99213 OFFICE O/P EST LOW 20 MIN: CPT | Mod: PBBFAC,PO | Performed by: INTERNAL MEDICINE

## 2018-07-31 PROCEDURE — 99214 OFFICE O/P EST MOD 30 MIN: CPT | Mod: S$PBB,,, | Performed by: INTERNAL MEDICINE

## 2018-07-31 PROCEDURE — 99999 PR PBB SHADOW E&M-EST. PATIENT-LVL III: CPT | Mod: PBBFAC,,, | Performed by: INTERNAL MEDICINE

## 2018-07-31 NOTE — TELEPHONE ENCOUNTER
----- Message from Emiliano Avelar sent at 7/31/2018  8:10 AM CDT -----  Contact: self [phone]  Returning call, please call back at [phone]. Md Elliott

## 2018-08-01 NOTE — PROGRESS NOTES
Pulmonary Outpatient Follow Up Visit     Subjective:       Patient ID: Diamond Das is a 63 y.o. female.    Chief Complaint: Sleep Apnea and Shortness of Breath      HPI     63 y o f pt , initially seen for hypoxemic resp failure and hospital admission , precipitated by CHF exacerbation , was on O2 24 hrs then now on O2 during sleep, 6MWT showed no Need for O2 on exertion.   Still c/o sob on exertion, orthopnea.     On CPAP for KRISTAL , awaiting new machine after her recent PSG.     Review of Systems   Constitutional: Positive for weight gain. Negative for fever and chills.   HENT: Negative for trouble swallowing, voice change and congestion.         History of cataract.   Eyes: Negative for redness.   Respiratory: Positive for apnea, snoring, shortness of breath and orthopnea.    Cardiovascular: Negative for chest pain.        History of diastolic CHF.   Genitourinary:        CKD 3    Endocrine: History of diabetes.  Hypothyroid.    Musculoskeletal: Positive for arthralgias and back pain.   Skin: Negative for rash.   Gastrointestinal: Positive for abdominal distention and acid reflux.        History of liver cirrhosis, ascites status post paracentesis in the past.  Status post TIPS.  Gastroesophageal reflux disease.  Gastroparesis.   Neurological: Positive for headaches. Negative for syncope.   Hematological: No excessive bruising.   Psychiatric/Behavioral: Negative for confusion. The patient is not nervous/anxious.         KRISTAL         Outpatient Encounter Prescriptions as of 7/31/2018   Medication Sig Dispense Refill    acetaminophen (TYLENOL) 500 MG tablet Take 500 mg by mouth every 6 (six) hours as needed for Pain.      amLODIPine (NORVASC) 10 MG tablet Take 1 tablet (10 mg total) by mouth once daily. 90 tablet 1    aspirin (ECOTRIN) 81 MG EC tablet Take 1 tablet (81 mg total) by mouth once daily. 365 tablet 0    carvedilol (COREG) 3.125 MG tablet Take 1 tablet  "(3.125 mg total) by mouth 2 (two) times daily. 180 tablet 1    furosemide (LASIX) 80 MG tablet Take 1 tablet (80 mg total) by mouth once daily. 90 tablet 0    insulin glargine (LANTUS SOLOSTAR) 100 unit/mL (3 mL) InPn pen Inject 30 Units into the skin once daily. 15 mL 3    lactulose (CHRONULAC) 20 gram/30 mL Soln Take 22.5 mLs (15 g total) by mouth 3 (three) times daily. 946 mL 11    levothyroxine (SYNTHROID) 137 MCG Tab tablet Take 1 tablet (137 mcg total) by mouth before breakfast. 45 tablet 0    ondansetron (ZOFRAN-ODT) 4 MG TbDL Take 1 tablet (4 mg total) by mouth every 6 (six) hours as needed. 12 tablet 0    pantoprazole (PROTONIX) 20 MG tablet TAKE 2 TABLETS BY MOUTH ONCE DAILY 90 tablet 0    rifAXIMin (XIFAXAN) 550 mg Tab Take 1 tablet (550 mg total) by mouth 2 (two) times daily. 60 tablet 11    simvastatin (ZOCOR) 40 MG tablet Take 40 mg by mouth once daily.       vitamin E 400 UNIT capsule Take 400 Units by mouth every morning.       No facility-administered encounter medications on file as of 7/31/2018.        Objective:     Vital Signs (Most Recent)  Vital Signs  Pulse: 61  Resp: 18  SpO2: (!) 93 %  BP: (!) 160/74 (pt have'nt taken medication)  Height and Weight  Height: 5' 1" (154.9 cm)  Weight: 94.5 kg (208 lb 5.4 oz)  BSA (Calculated - sq m): 2.02 sq meters  BMI (Calculated): 39.4  Weight in (lb) to have BMI = 25: 132]  Wt Readings from Last 3 Encounters:   07/31/18 94.5 kg (208 lb 5.4 oz)   07/30/18 95.9 kg (211 lb 6.7 oz)   07/20/18 98.8 kg (217 lb 13 oz)     Temp Readings from Last 3 Encounters:   07/30/18 98.1 °F (36.7 °C) (Oral)   07/20/18 98.8 °F (37.1 °C) (Oral)   06/21/18 97.3 °F (36.3 °C) (Oral)     Height: 5' 1" (154.9 cm)          Physical Exam   Constitutional: She is oriented to person, place, and time. She appears well-developed and well-nourished.   HENT:   Head: Normocephalic.   Neck: Neck supple.   Cardiovascular: Normal rate and regular rhythm.    Pulmonary/Chest: Normal " expansion and effort normal. She has decreased breath sounds.   Abdominal: Soft. She exhibits no distension.   Musculoskeletal: She exhibits no edema or tenderness.   Lymphadenopathy:     She has no axillary adenopathy.   Neurological: She is alert and oriented to person, place, and time.   Skin: Skin is warm.   Psychiatric: She has a normal mood and affect.     Laboratory    Lab Results   Component Value Date    WBC 5.69 07/05/2018    HGB 11.4 (L) 07/05/2018    HCT 34.4 (L) 07/05/2018    MCV 93 07/05/2018     07/05/2018     BMP  Lab Results   Component Value Date     07/05/2018    K 4.1 07/05/2018     (H) 07/05/2018    CO2 26 07/05/2018    BUN 22 07/05/2018    CREATININE 1.5 (H) 07/05/2018    CALCIUM 8.9 07/05/2018    ANIONGAP 3 (L) 07/05/2018    ESTGFRAFRICA 43.0 (A) 07/05/2018    EGFRNONAA 37.3 (A) 07/05/2018     BNP  @LABRCNTIP(BNP,BNPTRIAGEBLO)@  Lab Results   Component Value Date    TSH 7.167 (H) 07/30/2018     ABG  @LABRCNTIP(PH,PO2,PCO2,HCO3,BE)@    Diagnostic Results:  CXR 6/18 personally reviewed No acute findings     Doppler liver US :    Impression       1. Findings in keeping with cirrhosis with known left portal vein thrombosis which now appears more occlusive.  2. TIPS shunt catheter appears patent but demonstrates low internal velocities suggesting possible TIPS malfunction.  Velocities are similar to prior.   PSG 4/18   RESPIRATORY: The overall apnea-hypopnea index (AHI) was 13.3 events /hr asleep. Ms Das had 27 hypopneas, 21  obstructive sleep apneas, 0 central sleep apneas, and 0 mixed sleep apneas. Persistent loud snoring was noted. Analysis of  continuous oxygen saturations showed a mean SpO2 value of 93.6 % for the study, with a minimum oxygen saturation during  sleep of 54.0 %, and a waking baseline SpO2 = 96 %. Oxygen saturations were ? 88 % for 23.7 minutes of the time spent  asleep.      PFT 31/18:    The Forced Vital Capacity (FVC) is normal.  FEV1 is normal.  No  significant improvement after bronchodilator.  The absence of an acute response to aerosolized bronchodilator does not necessarily mean that the subject will not  respond to a clinical trial of aerosolized or oral bronchodilators.  FEV1 over FVC is 82%  Lung Volumes are consistent with mild restrictive disease.  Diffusion capacity is severely reduced - unadjusted for hemoglobin (DLCO < 40%).  Mild restriction, severe DLCO reduction.      Echo 5/17 :      1 - Normal left ventricular systolic function (EF 60-65%). Mild concentric hypertrophy.     2 - Impaired LV relaxation, elevated LAP (grade 2 diastolic dysfunction).     3 - Normal right ventricular systolic function .     4 - Severe left atrial enlargement.     Assessment/Plan:   KRISTAL on CPAP    Restrictive lung disease    Chronic diastolic heart failure    Diffusion capacity of lung (dl), decreased    Portal hypertension    Hepatic cirrhosis, unspecified hepatic cirrhosis type, unspecified whether ascites present    Nocturnal hypoxemia      Continue O2 during sleep     Auto CPAP re order in     Diuresis     Follow-up in about 2 months (around 9/30/2018).    This note was prepared using voice recognition system and is likely to have sound alike errors that may have been overlooked even after proof reading.  Please call me with any questions    Discussed diagnosis, its evaluation, treatment and usual course. All questions answered.    Thank you for the courtesy of participating in the care of this patient    Angelica Hunter MD

## 2018-08-08 ENCOUNTER — NUTRITION (OUTPATIENT)
Dept: DIABETES | Facility: CLINIC | Age: 63
End: 2018-08-08
Payer: MEDICAID

## 2018-08-08 VITALS — HEIGHT: 61 IN | BODY MASS INDEX: 39.63 KG/M2 | WEIGHT: 209.88 LBS

## 2018-08-08 DIAGNOSIS — E11.42 DIABETIC POLYNEUROPATHY ASSOCIATED WITH TYPE 2 DIABETES MELLITUS: Primary | ICD-10-CM

## 2018-08-08 PROCEDURE — 99999 PR PBB SHADOW E&M-EST. PATIENT-LVL III: CPT | Mod: PBBFAC,,, | Performed by: DIETITIAN, REGISTERED

## 2018-08-08 PROCEDURE — G0108 DIAB MANAGE TRN  PER INDIV: HCPCS | Mod: PBBFAC,PO | Performed by: DIETITIAN, REGISTERED

## 2018-08-08 PROCEDURE — 99213 OFFICE O/P EST LOW 20 MIN: CPT | Mod: PBBFAC,PO | Performed by: DIETITIAN, REGISTERED

## 2018-08-08 NOTE — LETTER
August 8, 2018        Andrey Perrin MD  [id number] [address] [place] [address]             [facility] Diabetes [facility]  [address] [facility] [address]  Phone: [phone]  Fax: [phone]   Patient: Diamond Das   MR Number: [id number]   YOB: 1955   Date of Visit: 8/8/2018       Dear Dr. Perrin:    Thank you for referring Diamond Das to me for evaluation. Below are the relevant portions of my assessment and plan of care.    If you have questions, please do not hesitate to call me. I look forward to following Diamond along with you.    Sincerely,      Nolvia Mcneil, VANE, CDE           CC  No Recipients

## 2018-08-08 NOTE — PROGRESS NOTES
Diabetes Education  Author: Nolvia Mcneil RD, CDE  Date: 8/8/2018    Diabetes Education Visit  Diabetes Education Record Assessment/Progress: Comprehensive/Group    Diabetes Type  Diabetes Type : Type II     Nutrition  Meal Planning:  (Intake reduced ~1600-1800cals/d. Pt has stopped sugar sweetened tea and juices; occs regular gatorade. She has increased meals ~3/d and using MR shake to support meal spacing. Limited preference for non-starchy vegetables. )  Meal Plan 24 Hour Recall - Breakfast: fried egg, toast w/ straw jelly - hot shelby  Meal Plan 24 Hour Recall - Lunch: chix (no skin, bkd), Brussel spouts OR red beans, rice, bkd chix - gatorade regular   Meal Plan 24 Hour Recall - Dinner: bologne sandwich - sf tea  Meal Plan 24 Hour Recall - Snack: fruit (apple, orange); aurelio: water, sf aurelio     Monitoring   Self Monitoring : Per recall, fst bg 104-142.   In the last month, how often have you had a low blood sugar reaction?: never    Exercise   Exercise Type:  (Daily chair exercises with intermittent duration)    Current Diabetes Treatment   Current Treatment: Diet, Exercise, Insulin (lantus 30 units daily)    Social History  Preferred Learning Method: Face to Face  Primary Support: Self  Smoking Status: Never a Smoker  Alcohol Use: Never    PHQ-2 Total Score: 1       DDS-2 Score  ( > 3 = SIGNIFICANT DISTRESS): 3  DDS Score  ( > 3 = SIGNIFICANT DISTRESS): 2.06  Emotional Welch Score: 2.8  Physician-Related Distress: 1  Regimen-Related Distress: 2.8  Interpersonal Distress: 1    Barriers to Change  Barriers to Change: None  Learning Challenges : None    Readiness to Learn   Readiness to Learn : Eager    Cultural Influences  Cultural Influences: No    Diabetes Education Assessment/Progress  Diabetes Disease Process (diabetes disease process and treatment options): Discussion, Individual Session, Demonstrates Understanding/Competency(verbalizes/demonstrates)  Nutrition (Incorporating nutritional management into  one's lifestyle): Discussion, Individual Session, Demonstrates Understanding/Competency (verbalizes/demonstrates)  Physical Activity (incorporating physical activity into one's lifestyle): Discussion, Individual Session, Demonstrates Understanding/Competency (verbalizes/demonstrates)  Medications (states correct name, dose, onset, peak, duration, side effects & timing of meds): Discussion, Individual Session, Demonstrates Understanding/Competency(verbalizes/demonstrates), Written Materials Provided  Monitoring (monitoring blood glucose/other parameters & using results): Discussion, Individual Session, Demonstrates Understanding/Competency (verbalizes/demonstrates)  Acute Complications (preventing, detecting, and treating acute complications): Discussion, Individual Session, Demonstrates Understanding/Competency (verbalizes/demonstrates)  Chronic Complications (preventing, detecting, and treating chronic complications): Discussion, Individual Session, Demonstrates Understanding/Competency (verbalizes/demonstrates)  Clinical (diabetes, other pertinent medical history, and relevant comorbidities reviewed during visit): Discussion, Individual Session, Demonstrates Understanding/Competency (verbalizes/demonstrates)  Cognitive (knowledge of self-management skills, functional health literacy): Discussion, Individual Session, Demonstrates Understanding/Competency (verbalizes/demonstrates)  Psychosocial (emotional response to diabetes): Discussion, Individual Session, Demonstrates Understanding/Competency (verbalizes/demonstrates)  Diabetes Distress and Support Systems: Discussion, Individual Session, Demonstrates Understanding/Competency (verbalizes/demonstrates)  Behavioral (readiness for change, lifestyle practices, self-care behaviors): Discussion, Individual Session, Demonstrates Understanding/Competency (verbalizes/demonstrates)    Goals  Patient has selected/evaluated goals during today's session: Yes,  evaluated  Healthy Eating: Set (use meal plan - 3meals/d (MR shake to assist), avoid juices and sugar-sweetened beverages, carb portions)  Met Percentage : 75%  Start Date: 08/08/18  Target Date: 09/25/18  Physical Activity: Set (150min/wk - chair based exercises)  Start Date: 08/08/18  Target Date: 09/25/18  Monitoring: Set (test blood sugar fasting and 2 hours after supper)  Met Percentage : 100%  Start Date: 08/08/18  Target Date: 09/25/18  Medications: Set (take insulin daily as direct and fu with diabetes ABELINO for medication mgmt)  Met Percentage : 50%  Start Date: 08/08/18  Target Date: 09/25/18    Diabetes Self-Management Support Plan  Stress Management: family, friends  Review Status: Patient has selected and agrees to support plan.         Diabetes Meal Plan  Restrictions: Low Fat, Low Sodium, Low Potassium (Will coord A1C update and earlier ABELINO appt time to re-eval diabetes medications. Instructed pt to bring meter/BG records to clinic.)  Calories: 1400  Carbohydrate Per Meal: 30-45g  Carbohydrate Per Snack : 7-15g    Education Units of Time   Time Spent: 30 min    Health Maintenance was reviewed today with patient. Discussed with patient importance of routine eye exams, foot exams/foot care, blood work (i.e.: A1c, microalbumin, and lipid), dental visits, yearly flu vaccine, and pneumonia vaccine as indicated by PCP. Patient verbalized understanding.     Health Maintenance Topics with due status: Not Due       Topic Last Completion Date    TETANUS VACCINE 09/08/2016    Pneumococcal PPSV23 (Medium Risk) 12/07/2017    Foot Exam 12/07/2017    Lipid Panel 12/07/2017    Fecal Occult Blood Test (FOBT)/FitKit 12/21/2017    Mammogram 12/29/2017    Eye Exam 01/09/2018    Pap Smear with HPV Cotest 01/29/2018    Urine Microalbumin 05/10/2018    Hemoglobin A1c 05/24/2018     Health Maintenance Due   Topic Date Due    Influenza Vaccine  08/01/2018

## 2018-08-13 ENCOUNTER — PATIENT OUTREACH (OUTPATIENT)
Dept: ADMINISTRATIVE | Facility: HOSPITAL | Age: 63
End: 2018-08-13

## 2018-08-27 ENCOUNTER — LAB VISIT (OUTPATIENT)
Dept: LAB | Facility: HOSPITAL | Age: 63
End: 2018-08-27
Attending: FAMILY MEDICINE
Payer: MEDICAID

## 2018-08-27 ENCOUNTER — OFFICE VISIT (OUTPATIENT)
Dept: INTERNAL MEDICINE | Facility: CLINIC | Age: 63
End: 2018-08-27
Payer: MEDICAID

## 2018-08-27 VITALS
TEMPERATURE: 97 F | DIASTOLIC BLOOD PRESSURE: 60 MMHG | SYSTOLIC BLOOD PRESSURE: 125 MMHG | BODY MASS INDEX: 39.67 KG/M2 | HEIGHT: 61 IN | HEART RATE: 68 BPM | OXYGEN SATURATION: 99 % | RESPIRATION RATE: 18 BRPM | WEIGHT: 210.13 LBS

## 2018-08-27 DIAGNOSIS — K74.60 CIRRHOSIS OF LIVER WITH ASCITES, UNSPECIFIED HEPATIC CIRRHOSIS TYPE: Primary | ICD-10-CM

## 2018-08-27 DIAGNOSIS — R18.8 CIRRHOSIS OF LIVER WITH ASCITES, UNSPECIFIED HEPATIC CIRRHOSIS TYPE: Primary | ICD-10-CM

## 2018-08-27 DIAGNOSIS — E11.59 TYPE 2 DIABETES MELLITUS WITH OTHER CIRCULATORY COMPLICATION, WITHOUT LONG-TERM CURRENT USE OF INSULIN: ICD-10-CM

## 2018-08-27 DIAGNOSIS — R18.8 CIRRHOSIS OF LIVER WITH ASCITES, UNSPECIFIED HEPATIC CIRRHOSIS TYPE: ICD-10-CM

## 2018-08-27 DIAGNOSIS — K74.60 CIRRHOSIS OF LIVER WITH ASCITES, UNSPECIFIED HEPATIC CIRRHOSIS TYPE: ICD-10-CM

## 2018-08-27 LAB
ALBUMIN SERPL BCP-MCNC: 2.7 G/DL
ALP SERPL-CCNC: 115 U/L
ALT SERPL W/O P-5'-P-CCNC: 25 U/L
ANION GAP SERPL CALC-SCNC: 6 MMOL/L
AST SERPL-CCNC: 40 U/L
BASOPHILS # BLD AUTO: 0.04 K/UL
BASOPHILS NFR BLD: 0.7 %
BILIRUB SERPL-MCNC: 0.6 MG/DL
BUN SERPL-MCNC: 17 MG/DL
CALCIUM SERPL-MCNC: 9.1 MG/DL
CHLORIDE SERPL-SCNC: 108 MMOL/L
CO2 SERPL-SCNC: 29 MMOL/L
CREAT SERPL-MCNC: 1.8 MG/DL
DIFFERENTIAL METHOD: ABNORMAL
EOSINOPHIL # BLD AUTO: 0.3 K/UL
EOSINOPHIL NFR BLD: 5.2 %
ERYTHROCYTE [DISTWIDTH] IN BLOOD BY AUTOMATED COUNT: 14.3 %
EST. GFR  (AFRICAN AMERICAN): 34.5 ML/MIN/1.73 M^2
EST. GFR  (NON AFRICAN AMERICAN): 29.9 ML/MIN/1.73 M^2
ESTIMATED AVG GLUCOSE: 180 MG/DL
GLUCOSE SERPL-MCNC: 187 MG/DL
HBA1C MFR BLD HPLC: 7.9 %
HCT VFR BLD AUTO: 36.6 %
HGB BLD-MCNC: 12.4 G/DL
INR PPP: 1.1
LYMPHOCYTES # BLD AUTO: 3.1 K/UL
LYMPHOCYTES NFR BLD: 50.3 %
MCH RBC QN AUTO: 29.8 PG
MCHC RBC AUTO-ENTMCNC: 33.9 G/DL
MCV RBC AUTO: 88 FL
MONOCYTES # BLD AUTO: 0.5 K/UL
MONOCYTES NFR BLD: 7.8 %
NEUTROPHILS # BLD AUTO: 2.2 K/UL
NEUTROPHILS NFR BLD: 36 %
PLATELET # BLD AUTO: 211 K/UL
PMV BLD AUTO: 9.7 FL
POTASSIUM SERPL-SCNC: 4.5 MMOL/L
PROT SERPL-MCNC: 7 G/DL
PROTHROMBIN TIME: 11.5 SEC
RBC # BLD AUTO: 4.16 M/UL
SODIUM SERPL-SCNC: 143 MMOL/L
WBC # BLD AUTO: 6.12 K/UL

## 2018-08-27 PROCEDURE — 99213 OFFICE O/P EST LOW 20 MIN: CPT | Mod: PBBFAC,PO | Performed by: FAMILY MEDICINE

## 2018-08-27 PROCEDURE — 83036 HEMOGLOBIN GLYCOSYLATED A1C: CPT

## 2018-08-27 PROCEDURE — 99214 OFFICE O/P EST MOD 30 MIN: CPT | Mod: S$PBB,,, | Performed by: FAMILY MEDICINE

## 2018-08-27 PROCEDURE — 80053 COMPREHEN METABOLIC PANEL: CPT | Mod: PO

## 2018-08-27 PROCEDURE — 36415 COLL VENOUS BLD VENIPUNCTURE: CPT | Mod: PO

## 2018-08-27 PROCEDURE — 85025 COMPLETE CBC W/AUTO DIFF WBC: CPT | Mod: PO

## 2018-08-27 PROCEDURE — 99999 PR PBB SHADOW E&M-EST. PATIENT-LVL III: CPT | Mod: PBBFAC,,, | Performed by: FAMILY MEDICINE

## 2018-08-27 PROCEDURE — 85610 PROTHROMBIN TIME: CPT | Mod: PO

## 2018-08-27 NOTE — PROGRESS NOTES
Subjective:       Patient ID: Diamond Das is a 63 y.o. female.    Chief Complaint: No chief complaint on file.    Diabetes   She presents for her follow-up diabetic visit. She has type 2 diabetes mellitus. Her disease course has been stable. Pertinent negatives for hypoglycemia include no confusion, dizziness or headaches. Pertinent negatives for diabetes include no blurred vision, no chest pain, no fatigue, no visual change and no weakness. Pertinent negatives for hypoglycemia complications include no blackouts. Symptoms are stable.     Review of Systems   Constitutional: Negative for fatigue.   Eyes: Negative for blurred vision.   Cardiovascular: Negative for chest pain.   Neurological: Negative for dizziness, weakness and headaches.   Psychiatric/Behavioral: Negative for confusion.       Objective:      Physical Exam   Constitutional: She appears well-developed and well-nourished. She appears distressed.   HENT:   Head: Normocephalic and atraumatic.   Cardiovascular: Normal rate, regular rhythm, normal heart sounds and intact distal pulses.   Pulmonary/Chest: Effort normal and breath sounds normal. No respiratory distress. She has no wheezes.   Skin: Skin is warm and dry. No rash noted. She is not diaphoretic. No erythema.   Nursing note and vitals reviewed.      Assessment:       1. Cirrhosis of liver with ascites, unspecified hepatic cirrhosis type    2. Type 2 diabetes mellitus with other circulatory complication, without long-term current use of insulin        Plan:     Problem List Items Addressed This Visit        Endocrine    Type 2 diabetes mellitus with circulatory disorder    Relevant Orders    Hemoglobin A1c       GI    Cirrhosis of liver with ascites - Primary    Relevant Orders    CBC auto differential    Comprehensive metabolic panel    Protime-INR

## 2018-09-06 ENCOUNTER — TELEPHONE (OUTPATIENT)
Dept: NEPHROLOGY | Facility: CLINIC | Age: 63
End: 2018-09-06

## 2018-09-06 ENCOUNTER — OFFICE VISIT (OUTPATIENT)
Dept: NEPHROLOGY | Facility: CLINIC | Age: 63
End: 2018-09-06
Payer: MEDICAID

## 2018-09-06 ENCOUNTER — CLINICAL SUPPORT (OUTPATIENT)
Dept: NEPHROLOGY | Facility: CLINIC | Age: 63
End: 2018-09-06
Payer: MEDICAID

## 2018-09-06 VITALS
RESPIRATION RATE: 18 BRPM | SYSTOLIC BLOOD PRESSURE: 148 MMHG | WEIGHT: 207.88 LBS | HEART RATE: 65 BPM | HEIGHT: 61 IN | TEMPERATURE: 97 F | BODY MASS INDEX: 39.25 KG/M2 | DIASTOLIC BLOOD PRESSURE: 68 MMHG | OXYGEN SATURATION: 99 %

## 2018-09-06 DIAGNOSIS — E11.29 PROTEINURIA DUE TO TYPE 2 DIABETES MELLITUS: ICD-10-CM

## 2018-09-06 DIAGNOSIS — N25.81 HYPERPARATHYROIDISM, SECONDARY RENAL: ICD-10-CM

## 2018-09-06 DIAGNOSIS — R80.9 PROTEINURIA DUE TO TYPE 2 DIABETES MELLITUS: ICD-10-CM

## 2018-09-06 DIAGNOSIS — N18.30 CKD (CHRONIC KIDNEY DISEASE) STAGE 3, GFR 30-59 ML/MIN: Primary | ICD-10-CM

## 2018-09-06 PROCEDURE — 99214 OFFICE O/P EST MOD 30 MIN: CPT | Mod: GT,S$PBB,, | Performed by: INTERNAL MEDICINE

## 2018-09-06 PROCEDURE — 99999 PR PBB SHADOW E&M-EST. PATIENT-LVL III: ICD-10-PCS | Mod: PBBFAC,,,

## 2018-09-06 PROCEDURE — 99999 PR PBB SHADOW E&M-EST. PATIENT-LVL III: CPT | Mod: PBBFAC,,,

## 2018-09-06 PROCEDURE — 99213 OFFICE O/P EST LOW 20 MIN: CPT | Mod: PBBFAC,PO

## 2018-09-06 NOTE — PROGRESS NOTES
[facility] Tele-Consultation from Nephrology                Consultation started: 9/6/2018 at  1.45 PM   The chief complaint leading to consultation is: renal insufficiency  This consultation was requested by: Dr. Andrey Perrin  The patient location is: [facility]   The patient arrived at: 1.18 PM      Subjective:     History of Present Illness:  63 y.o. year old female with history of hypothyroidism, liver cirrhosis, HTN, GERD, DM2, gastroparesis, CHF, obesity, sleep apnea presents to the renal clinic for evaluation of renal insufficiency.     Patient today has no complaints except for mild LE edema.               Past Medical History:   Past Medical History:   Diagnosis Date    Ascites     Cataract     CHF (congestive heart failure)     Cirrhosis     Diabetes mellitus     Gastroparesis     GERD (gastroesophageal reflux disease)     Hypertension     Liver disease     Thyroid disease        Past Surgical History:   Past Surgical History:   Procedure Laterality Date    CHOLECYSTECTOMY      ERCP      LIVER BIOPSY      TUBAL LIGATION      UPPER GASTROINTESTINAL ENDOSCOPY         Family History:   Family History   Problem Relation Age of Onset    Cancer Mother     Breast cancer Mother     Heart disease Father     Aneurysm Sister     Heart disease Brother     No Known Problems Maternal Grandmother     Cancer Maternal Grandfather     Sarcoidosis Sister         Social History:   Social History     Tobacco Use    Smoking status: Never Smoker    Smokeless tobacco: Never Used   Substance Use Topics    Alcohol use: No        Medications:   Current Outpatient Medications:     acetaminophen (TYLENOL) 500 MG tablet, Take 500 mg by mouth every 6 (six) hours as needed for Pain., Disp: , Rfl:     amLODIPine (NORVASC) 10 MG tablet, Take 1 tablet (10 mg total) by mouth once daily., Disp: 90 tablet, Rfl: 1    aspirin (ECOTRIN) 81 MG EC tablet, Take 1 tablet (81 mg total) by mouth once daily., Disp: 365  tablet, Rfl: 0    carvedilol (COREG) 3.125 MG tablet, Take 1 tablet (3.125 mg total) by mouth 2 (two) times daily., Disp: 180 tablet, Rfl: 1    furosemide (LASIX) 80 MG tablet, Take 1 tablet (80 mg total) by mouth once daily., Disp: 90 tablet, Rfl: 0    insulin glargine (LANTUS SOLOSTAR) 100 unit/mL (3 mL) InPn pen, Inject 30 Units into the skin once daily., Disp: 15 mL, Rfl: 3    lactulose (CHRONULAC) 20 gram/30 mL Soln, Take 22.5 mLs (15 g total) by mouth 3 (three) times daily., Disp: 946 mL, Rfl: 11    levothyroxine (SYNTHROID) 137 MCG Tab tablet, Take 1 tablet (137 mcg total) by mouth before breakfast., Disp: 45 tablet, Rfl: 0    ondansetron (ZOFRAN-ODT) 4 MG TbDL, Take 1 tablet (4 mg total) by mouth every 6 (six) hours as needed., Disp: 12 tablet, Rfl: 0    pantoprazole (PROTONIX) 20 MG tablet, TAKE 2 TABLETS BY MOUTH ONCE DAILY, Disp: 90 tablet, Rfl: 0    rifAXIMin (XIFAXAN) 550 mg Tab, Take 1 tablet (550 mg total) by mouth 2 (two) times daily., Disp: 60 tablet, Rfl: 11    simvastatin (ZOCOR) 40 MG tablet, Take 40 mg by mouth once daily. , Disp: , Rfl:     vitamin E 400 UNIT capsule, Take 400 Units by mouth every morning., Disp: , Rfl:     Allergies:   Review of patient's allergies indicates:   Allergen Reactions    Subsys [fentanyl] Other (See Comments)     After administration pt unresponsive.  HR and respirations decreased.     Versed [midazolam] Other (See Comments)     After administration pt unresponsive.  HR and respirations decreased.     Ampicillin Rash    Codeine Nausea And Vomiting and Nausea Only       Review Of Systems:     1. GENERAL: patient denies any fever, weight changes, generalized weakness, dizziness.  2. HEENT: patient denies headaches, visual disturbances, swallowing problems, sinus pain, nasal congestion.  3. CARDIOVASCULAR: patient denies chest pain, palpitations.  4. PULMONARY: patient denies SOB, no coughing, hemoptysis, wheezing.  5. GASTROINTESTINAL: patient denies  abdominal pain and nausea, no vomiting, diarrhea.  6. GENITOURINARY: patient denies dysuria, hematuria, hesitancy, frequency.  7. EXTREMITIES: patient reports mild LE edema, LE cramping.  8. DERMATOLOGY: patient denies rashes, ulcers.  9. NEURO: patient denies tremors, extremity weakness, she reports intermittent extremity numbness/tingling.  10. MUSCULOSKELETAL: patient denies joint pain, joint swelling.  11. HEMATOLOGY: patient denies rectal or gum bleeding.  12: PSYCH: patient denies anxiety, depression.      Objective:     Vitals:     BP: 148/68, HR: 65, weight: 94.3 kg, pOx: 99%, afebrile      Physical Exam:  General: Pleasant lady presents to clinic with non-labored breathing.  HENT: PERRL, no nasal discharge, no icterus, no oral exudates, moist mucosal membranes, no LAD  Pulmonary/Chest: CTA bilaterally, no wheezing, breathing is nonlabored.  Cardiovascular: Heart: RRR S1/S2, no rubs, good peripheral pulses.  Abdominal:  soft, nontender, not distended, bowel sounds are present, no abdominal hernia.  Skin: no rashes, skin is warm and dry.  Neurological:  A & O x 3, no obvious focal signs.  Extrem: mild + 1 pitting LE edema    Lab Results   Component Value Date    CREATININE 1.8 (H) 08/27/2018    BUN 17 08/27/2018     08/27/2018    K 4.5 08/27/2018     08/27/2018    CO2 29 08/27/2018     Lab Results   Component Value Date    .0 (H) 06/19/2018    CALCIUM 9.1 08/27/2018    PHOS 4.1 06/19/2018     Lab Results   Component Value Date    ALBUMIN 2.7 (L) 08/27/2018     Lab Results   Component Value Date    WBC 6.12 08/27/2018    HGB 12.4 08/27/2018    HCT 36.6 (L) 08/27/2018    MCV 88 08/27/2018     08/27/2018     Urinalysis from 6/7/18: + 2 protein.     Abdominal US from 9/9/16: normal kidneys.     Assessment - Diagnosis - Goals:     Impression: Diamond Das 63 y.o. female with history of hypothyroidism, liver cirrhosis, HTN, GERD, DM2, gastroparesis, CHF, obesity, sleep apnea presents to  the renal clinic for evaluation of renal insufficiency.     1. Renal insufficiency: Patient presents with mild renal insufficiency, consistent with CKD stage 3. Last creatinine was 1.8. Cause of her renal insufficiency is not entirely clear, but DM2, HTN and cardiorenal syndrome are in the differential. Patient presents with +2 protein on urinalysis which makes diabetic nephropathy likely. Will add lisinopril 2.5 mg daily to combat proteinuria.   Patient's renal function will be monitored closely and she will return to the clinic in 4 months for follow up. Patient was advised to avoid NSAID pain medications such as advil, aleve, motrin, ibuprofen, naprosyn, meloxicam, diclofenac, mobic and to hydrate with 60-65 ounces of water per day.     2. Electrolytes: Within normal limits.    3. Acid base status: No acute issues.    4. Volume: Mild LE edema. Continue Lasix.    5. Hypertension: BP elevated. Will add lisinopril for better BP control.     6. Medications: Reviewed. Lisinopril was added.     7. DM2: good blood glucose control. Continue insulin.    8. CHF: compensated at present.    Thank you very much for this consult. Please see my note in Epic for recommendations.    Diagnosis: CKD 3    Additional Work up Recommended: renal panel, urinalysis, protein/creatinine ratio, PTH level.     Consultation ended: 9/6/2018 at   2 PM    Total time spent with patient: < 30 minutes    More than 50% of total time was spent counseling patient on chronic kidney disease causes and treatment.     Consulting clinician was informed of the encounter and consult note.

## 2018-09-07 ENCOUNTER — OFFICE VISIT (OUTPATIENT)
Dept: DIABETES | Facility: CLINIC | Age: 63
End: 2018-09-07
Payer: MEDICAID

## 2018-09-07 ENCOUNTER — LAB VISIT (OUTPATIENT)
Dept: LAB | Facility: HOSPITAL | Age: 63
End: 2018-09-07
Attending: PHYSICIAN ASSISTANT
Payer: MEDICAID

## 2018-09-07 ENCOUNTER — CLINICAL SUPPORT (OUTPATIENT)
Dept: DIABETES | Facility: CLINIC | Age: 63
End: 2018-09-07
Payer: MEDICAID

## 2018-09-07 VITALS
DIASTOLIC BLOOD PRESSURE: 58 MMHG | SYSTOLIC BLOOD PRESSURE: 104 MMHG | HEIGHT: 61 IN | BODY MASS INDEX: 38.88 KG/M2 | WEIGHT: 205.94 LBS

## 2018-09-07 DIAGNOSIS — E11.59 TYPE 2 DIABETES MELLITUS WITH OTHER CIRCULATORY COMPLICATION, WITHOUT LONG-TERM CURRENT USE OF INSULIN: ICD-10-CM

## 2018-09-07 DIAGNOSIS — E11.59 TYPE 2 DIABETES MELLITUS WITH OTHER CIRCULATORY COMPLICATION, WITHOUT LONG-TERM CURRENT USE OF INSULIN: Primary | ICD-10-CM

## 2018-09-07 LAB
C PEPTIDE SERPL-MCNC: 3.53 NG/ML
GLUCOSE SERPL-MCNC: 358 MG/DL (ref 70–110)

## 2018-09-07 PROCEDURE — 95250 CONT GLUC MNTR PHYS/QHP EQP: CPT | Mod: PBBFAC,PO | Performed by: PHYSICIAN ASSISTANT

## 2018-09-07 PROCEDURE — 99999 PR PBB SHADOW E&M-EST. PATIENT-LVL III: CPT | Mod: PBBFAC,,, | Performed by: PHYSICIAN ASSISTANT

## 2018-09-07 PROCEDURE — 99213 OFFICE O/P EST LOW 20 MIN: CPT | Mod: PBBFAC,PO | Performed by: PHYSICIAN ASSISTANT

## 2018-09-07 PROCEDURE — 36415 COLL VENOUS BLD VENIPUNCTURE: CPT | Mod: PO

## 2018-09-07 PROCEDURE — 84681 ASSAY OF C-PEPTIDE: CPT

## 2018-09-07 PROCEDURE — 82948 REAGENT STRIP/BLOOD GLUCOSE: CPT | Mod: PBBFAC,PO | Performed by: PHYSICIAN ASSISTANT

## 2018-09-07 PROCEDURE — 99215 OFFICE O/P EST HI 40 MIN: CPT | Mod: S$PBB,,, | Performed by: PHYSICIAN ASSISTANT

## 2018-09-07 NOTE — PROGRESS NOTES
"Patient is here in clinic today for placement of continuous glucose monitoring system. Patient was provided a Freestyle Luis sensor.     A detailed explanation of CGMS was provided. Patient informed that this is a blind procedure and they will not actually see the blood sugar tracing in real time. Reviewed with the patient the Freestyle Luis Pro Sensor education handout, "What you need to know"  to review self-care during the study to avoid sensor loosening or removal ie...bathing, swimming, dressing, and exercising.      Instructed patient to check blood sugar using home glucometer and to record the following on provided patient log sheets: blood sugar taken at home, meals and snacks, activity, and diabetes medications taken and dosage      Sensor was inserted on back right upper arm.  [id number]  "

## 2018-09-07 NOTE — LETTER
September 9, 2018      Nolvia Mcneil RD, CDE  [address] [place] [address]           [facility]  [address]  [place] [address]  Phone: [phone]  Fax: [phone]          Patient: Diamond Das   MR Number: [id number]   YOB: 1955   Date of Visit: 9/7/2018       Dear Nolvia Mcneil:    Thank you for referring Diamond Das to me for evaluation. Attached you will find relevant portions of my assessment and plan of care.    If you have questions, please do not hesitate to call me. I look forward to following Diamond Das along with you.    Sincerely,    Russell Aguilar Jr., SARAH    Enclosure  CC:  No Recipients    If you would like to receive this communication electronically, please contact [email or web address] or [phone] to request more information on [employer] Link access.    For providers and/or their staff who would like to refer a patient to [facility], please contact us through our one-stop-shop provider referral line, [facility], at [phone].    If you feel you have received this communication in error or would no longer like to receive these types of communications, please e-mail [email or web address]

## 2018-09-09 NOTE — PROGRESS NOTES
PCP: Andrey Perrin MD    Subjective:    Patient ID: Diamond Das is a 63 y.o. female.    PCP: Andrey Perrin MD      Diamond Das is a pleasant 63 y.o. female presenting for her initial evaluation with me establish care and follow up on diabetes mellitus. She has had diabetes for 8 or more years. Since his last visit she has had moderate improvement in her glycemia. She has not been checking her SMBG. Her current concerns are glycemic control.  She is to be enrolled in diabetes education classes.     Her comorbid conditions are, Diabetes Type 2, Hypertension, Hyperlipidemia, Obesity, Hypothyroidism, Cirrhosis, CHF,      She has the following diabetic complications, with diabetic chronic kidney disease and with other circulatory complications    She denies any hospital admissions, emergency room visits, hypoglycemia, syncope, diaphoresis, chest pain, or dyspnea.    She has lost 2 pounds since last visit. Her BMI is 38.91 kg/m²    Her blood sugar in the clinic today was:   Lab Results   Component Value Date    POCGLU 358 (A) 09/07/2018       We discussed the American diabetes Association recommendations:  hemoglobin A1c below 7.0%; all diabetics should be on statins unless contraindicated; one aspirin daily unless contraindicated; fasting blood sugar between 80 and 130 mg/dL; postprandial blood sugar below 180 mg/dl; prevention of hypoglycemia, may adjust goals to higher levels if persistent; ACE or ARB therapy if not contraindicated; and maintain in an ideal body weight with BMI below 25.    Diamond is compliant most of the time with DM medications.     Diamond is noncompliant some of the time with lifestyle modifications to include activity and meal planning.       Current Outpatient Medications:     acetaminophen (TYLENOL) 500 MG tablet, Take 500 mg by mouth every 6 (six) hours as needed for Pain., Disp: , Rfl:     amLODIPine (NORVASC) 10 MG tablet, Take 1 tablet (10 mg total) by mouth once daily., Disp:  90 tablet, Rfl: 1    carvedilol (COREG) 3.125 MG tablet, Take 1 tablet (3.125 mg total) by mouth 2 (two) times daily., Disp: 180 tablet, Rfl: 1    furosemide (LASIX) 80 MG tablet, Take 1 tablet (80 mg total) by mouth once daily. (Patient taking differently: Take 80 mg by mouth daily as needed. ), Disp: 90 tablet, Rfl: 0    insulin glargine (LANTUS SOLOSTAR) 100 unit/mL (3 mL) InPn pen, Inject 30 Units into the skin once daily., Disp: 15 mL, Rfl: 3    lactulose (CHRONULAC) 20 gram/30 mL Soln, Take 22.5 mLs (15 g total) by mouth 3 (three) times daily., Disp: 946 mL, Rfl: 11    levothyroxine (SYNTHROID) 137 MCG Tab tablet, Take 1 tablet (137 mcg total) by mouth before breakfast., Disp: 45 tablet, Rfl: 0    ondansetron (ZOFRAN-ODT) 4 MG TbDL, Take 1 tablet (4 mg total) by mouth every 6 (six) hours as needed., Disp: 12 tablet, Rfl: 0    pantoprazole (PROTONIX) 20 MG tablet, TAKE 2 TABLETS BY MOUTH ONCE DAILY, Disp: 90 tablet, Rfl: 0    rifAXIMin (XIFAXAN) 550 mg Tab, Take 1 tablet (550 mg total) by mouth 2 (two) times daily., Disp: 60 tablet, Rfl: 11    vitamin E 400 UNIT capsule, Take 400 Units by mouth every morning., Disp: , Rfl:     aspirin (ECOTRIN) 81 MG EC tablet, Take 1 tablet (81 mg total) by mouth once daily., Disp: 365 tablet, Rfl: 0    simvastatin (ZOCOR) 40 MG tablet, Take 40 mg by mouth once daily. , Disp: , Rfl:     Past Medical History:   Diagnosis Date    Ascites     Cataract     CHF (congestive heart failure)     Cirrhosis     Diabetes mellitus     Gastroparesis     GERD (gastroesophageal reflux disease)     Hypertension     Liver disease     Thyroid disease        Family History   Problem Relation Age of Onset    Cancer Mother     Breast cancer Mother     Heart disease Father     Aneurysm Sister     Heart disease Brother     No Known Problems Maternal Grandmother     Cancer Maternal Grandfather     Sarcoidosis Sister          Social History     Socioeconomic History     Marital status:      Spouse name: Not on file    Number of children: Not on file    Years of education: Not on file    Highest education level: Not on file   Social Needs    Financial resource strain: Not on file    Food insecurity - worry: Not on file    Food insecurity - inability: Not on file    Transportation needs - medical: Not on file    Transportation needs - non-medical: Not on file   Occupational History    Not on file   Tobacco Use    Smoking status: Never Smoker    Smokeless tobacco: Never Used   Substance and Sexual Activity    Alcohol use: No    Drug use: No    Sexual activity: No   Other Topics Concern    Not on file   Social History Narrative    Not on file         STANDARDS OF CARE:  Eye doctor: Dr. Naheed soler, last exam 1/9/2018.  Dental exam: Recommend regular exams; denies gums bleeding.  Podiatry doctor:  ACE/ARB: No  Statin: Yes    ACTIVITY LEVEL: She exercises rarely.  BLOOD GLUCOSE TESTING: Self-monitoring with   SOCIAL HISTORY: single. Lives alone. retired no tobacco use    Health Maintenance   Topic Date Due    Influenza Vaccine  08/01/2018    Foot Exam  12/07/2018    Lipid Panel  12/07/2018    Fecal Occult Blood Test (FOBT)/[employer]  12/21/2018    Mammogram  12/29/2018    Eye Exam  01/09/2019    Hemoglobin A1c  02/27/2019    Urine Microalbumin  05/10/2019    Pap Smear with HPV Cotest  01/29/2021    Pneumococcal PPSV23 (Medium Risk) (2) 12/07/2022    TETANUS VACCINE  09/08/2026    Hepatitis C Screening  Completed    Zoster Vaccine  Completed       Diabetes Management Status    Statin: Taking  ACE/ARB: Not taking    Screening or Prevention Patient's value Goal Complete/Controlled?   HgA1C Testing and Control   Lab Results   Component Value Date    HGBA1C 7.9 (H) 08/27/2018      Annually/Less than 8% Yes   Lipid profile : 12/07/2017 Annually Yes   LDL control Lab Results   Component Value Date    LDLCALC 270.8 (H) 12/07/2017    Annually/Less than 100 mg/dl   No   Nephropathy screening Lab Results   Component Value Date    LABMICR 204.0 12/07/2017     Lab Results   Component Value Date    PROTEINUA 2+ (A) 06/07/2018    Annually Yes   Blood pressure BP Readings from Last 1 Encounters:   09/07/18 (!) 104/58    Less than 140/90 Yes   Dilated retinal exam : 01/09/2018 Annually Yes   Foot exam   : 12/07/2017 Annually Yes     The following results were reviewed with patient.    Lab Results   Component Value Date    WBC 6.12 08/27/2018    HGB 12.4 08/27/2018    HCT 36.6 (L) 08/27/2018     08/27/2018    CHOL 335 (H) 12/07/2017    TRIG 136 12/07/2017    HDL 37 (L) 12/07/2017    LDLCALC 270.8 (H) 12/07/2017    ALT 25 08/27/2018    AST 40 08/27/2018     08/27/2018    K 4.5 08/27/2018     08/27/2018    CREATININE 1.8 (H) 08/27/2018    ESTGFRAFRICA 34.5 (A) 08/27/2018    EGFRNONAA 29.9 (A) 08/27/2018    BUN 17 08/27/2018    CO2 29 08/27/2018    TSH 7.167 (H) 07/30/2018    INR 1.1 08/27/2018     (H) 08/27/2018    UTPCR 0.27 (H) 05/10/2018       Lab Results   Component Value Date    HGBA1C 7.9 (H) 08/27/2018    HGBA1C 8.0 (H) 05/24/2018    HGBA1C 7.2 (H) 12/07/2017       Lab Results   Component Value Date    CPEPTIDE 3.53 09/07/2018     Lab Results   Component Value Date    FREET4 1.04 07/30/2018     Lab Results   Component Value Date    TSH 7.167 (H) 07/30/2018     Lab Results   Component Value Date    IRON 59 10/11/2016    TIBC 210 (L) 10/11/2016    FERRITIN 452 (H) 10/11/2016     Lab Results   Component Value Date    .0 (H) 06/19/2018    CALCIUM 9.1 08/27/2018    PHOS 4.1 06/19/2018       Review of patient's allergies indicates:   Allergen Reactions    Subsys [fentanyl] Other (See Comments)     After administration pt unresponsive.  HR and respirations decreased.     Versed [midazolam] Other (See Comments)     After administration pt unresponsive.  HR and respirations decreased.     Ampicillin Rash    Codeine Nausea And Vomiting and Nausea Only        Past Medical History:   Diagnosis Date    Ascites     Cataract     CHF (congestive heart failure)     Cirrhosis     Diabetes mellitus     Gastroparesis     GERD (gastroesophageal reflux disease)     Hypertension     Liver disease     Thyroid disease        Review of Systems   Constitutional: Positive for fatigue. Negative for activity change, appetite change, chills, diaphoresis, fever and unexpected weight change.   HENT: Negative.  Negative for congestion, dental problem, drooling, ear discharge, ear pain, facial swelling, hearing loss, mouth sores, nosebleeds, postnasal drip, rhinorrhea, sinus pressure, sneezing, sore throat, tinnitus, trouble swallowing and voice change.    Eyes: Negative.  Negative for photophobia, pain, discharge, redness, itching and visual disturbance.   Respiratory: Negative.  Negative for apnea, cough, choking, chest tightness, shortness of breath, wheezing and stridor.    Cardiovascular: Positive for leg swelling. Negative for chest pain and palpitations.   Gastrointestinal: Negative.  Negative for abdominal distention, abdominal pain, anal bleeding, blood in stool, constipation, diarrhea, nausea, rectal pain and vomiting.   Endocrine: Positive for polyuria. Negative for cold intolerance, heat intolerance, polydipsia and polyphagia.   Genitourinary: Negative.  Negative for decreased urine volume, difficulty urinating, dyspareunia, dysuria, enuresis, flank pain, frequency, genital sores, hematuria, menstrual problem, pelvic pain, urgency, vaginal bleeding, vaginal discharge and vaginal pain.   Musculoskeletal: Negative.  Negative for arthralgias, back pain, gait problem, joint swelling, myalgias, neck pain and neck stiffness.   Skin: Negative.  Negative for color change, pallor, rash and wound.   Allergic/Immunologic: Negative.  Negative for environmental allergies, food allergies and immunocompromised state.   Neurological: Negative.  Negative for dizziness, tremors,  "seizures, syncope, facial asymmetry, speech difficulty, weakness, light-headedness, numbness and headaches.   Hematological: Negative.  Negative for adenopathy. Does not bruise/bleed easily.   Psychiatric/Behavioral: Negative.  Negative for agitation, behavioral problems, confusion, decreased concentration, dysphoric mood, hallucinations, self-injury, sleep disturbance and suicidal ideas. The patient is not nervous/anxious and is not hyperactive.           Objective:   Last 3 sets of Vitals:  Vitals - 1 value per visit 8/27/2018 9/6/2018 9/7/2018   SYSTOLIC 125 148 104   DIASTOLIC 60 68 58   PULSE 68 65 -   TEMPERATURE 96.9 96.5 -   RESPIRATIONS 18 18 -   SPO2 99 99 -   Weight (lb) 210.1 207.89 205.91   Weight (kg) 95.3 94.3 93.4   HEIGHT 5' 1" 5' 1" 5' 1"   BODY MASS INDEX 39.7 39.28 38.91   VISIT REPORT - - -   Pain Score  0 0 6   Some recent data might be hidden          Physical Exam   Constitutional: She is oriented to person, place, and time. She appears well-developed and well-nourished. She is cooperative.  Non-toxic appearance. She does not have a sickly appearance. She does not appear ill. No distress. She is not intubated.   HENT:   Head: Normocephalic and atraumatic. Not macrocephalic and not microcephalic. Head is without raccoon's eyes, without Covarrubias's sign, without abrasion, without contusion, without laceration, without right periorbital erythema and without left periorbital erythema. Hair is normal.   Right Ear: Hearing, tympanic membrane, external ear and ear canal normal. No lacerations. No drainage, swelling or tenderness. No foreign bodies. No mastoid tenderness. Tympanic membrane is not injected, not scarred, not perforated, not erythematous, not retracted and not bulging. Tympanic membrane mobility is normal. No middle ear effusion. No hemotympanum. No decreased hearing is noted.   Left Ear: Hearing, tympanic membrane, external ear and ear canal normal. No lacerations. No drainage, swelling " or tenderness. No foreign bodies. No mastoid tenderness. Tympanic membrane is not injected, not scarred, not perforated, not erythematous, not retracted and not bulging. Tympanic membrane mobility is normal.  No middle ear effusion. No hemotympanum. No decreased hearing is noted.   Nose: Nose normal. No mucosal edema, rhinorrhea, nose lacerations, sinus tenderness, nasal deformity, septal deviation or nasal septal hematoma. No epistaxis.  No foreign bodies. Right sinus exhibits no maxillary sinus tenderness and no frontal sinus tenderness. Left sinus exhibits no maxillary sinus tenderness and no frontal sinus tenderness.   Mouth/Throat: Oropharynx is clear and moist. No oropharyngeal exudate.   Eyes: Conjunctivae and EOM are normal. Pupils are equal, round, and reactive to light. Right eye exhibits no chemosis, no discharge and no exudate. No foreign body present in the right eye. Left eye exhibits no chemosis, no discharge, no exudate and no hordeolum. No foreign body present in the left eye. Right conjunctiva is not injected. Right conjunctiva has no hemorrhage. Left conjunctiva is not injected. Left conjunctiva has no hemorrhage. No scleral icterus. Right eye exhibits normal extraocular motion and no nystagmus. Left eye exhibits normal extraocular motion and no nystagmus. Right pupil is round and reactive. Left pupil is round and reactive. Pupils are equal.   Neck: Trachea normal, normal range of motion and full passive range of motion without pain. Neck supple. Normal carotid pulses, no hepatojugular reflux and no JVD present. No tracheal tenderness, no spinous process tenderness and no muscular tenderness present. Carotid bruit is not present. No neck rigidity. No tracheal deviation, no edema, no erythema and normal range of motion present. No thyroid mass and no thyromegaly present.   Cardiovascular: Normal rate, regular rhythm, normal heart sounds and intact distal pulses.  No extrasystoles are present. PMI  is not displaced. Exam reveals no gallop, no friction rub and no decreased pulses.   No murmur heard.  Pulses:       Dorsalis pedis pulses are 2+ on the right side, and 2+ on the left side.        Posterior tibial pulses are 2+ on the right side, and 2+ on the left side.   Pulmonary/Chest: Effort normal and breath sounds normal. No accessory muscle usage or stridor. No apnea, no tachypnea and no bradypnea. She is not intubated. No respiratory distress. She has no decreased breath sounds. She has no wheezes. She has no rhonchi. She has no rales. Chest wall is not dull to percussion. She exhibits no mass, no tenderness, no bony tenderness, no laceration, no crepitus, no edema, no deformity, no swelling and no retraction.   Abdominal: Soft. Normal appearance and bowel sounds are normal. She exhibits no shifting dullness, no distension, no pulsatile liver, no fluid wave, no abdominal bruit, no ascites, no pulsatile midline mass and no mass. There is no hepatosplenomegaly, splenomegaly or hepatomegaly. There is no tenderness. There is no rigidity, no rebound, no guarding, no CVA tenderness, no tenderness at McBurney's point and negative Flannery's sign.   Musculoskeletal: Normal range of motion. She exhibits no edema or tenderness.        Right foot: There is normal range of motion and no deformity.        Left foot: There is normal range of motion and no deformity.   Feet:   Right Foot:   Protective Sensation: 5 sites tested. 5 sites sensed.   Skin Integrity: Negative for ulcer, blister, skin breakdown, erythema, warmth, callus or dry skin.   Left Foot:   Protective Sensation: 5 sites tested. 5 sites sensed.   Skin Integrity: Negative for ulcer, blister, skin breakdown, erythema, warmth, callus or dry skin.   Lymphadenopathy:        Head (right side): No submental, no submandibular, no tonsillar, no preauricular, no posterior auricular and no occipital adenopathy present.        Head (left side): No submental, no  submandibular, no tonsillar, no preauricular, no posterior auricular and no occipital adenopathy present.     She has no cervical adenopathy.        Right cervical: No superficial cervical, no deep cervical and no posterior cervical adenopathy present.       Left cervical: No superficial cervical, no deep cervical and no posterior cervical adenopathy present.     She has no axillary adenopathy.   Neurological: She is alert and oriented to person, place, and time. She has normal reflexes. She is not disoriented. She displays no atrophy, no tremor and normal reflexes. No cranial nerve deficit or sensory deficit. She exhibits normal muscle tone. She displays no seizure activity. Coordination and gait normal.   Reflex Scores:       Bicep reflexes are 2+ on the right side and 2+ on the left side.       Brachioradialis reflexes are 2+ on the right side and 2+ on the left side.       Patellar reflexes are 2+ on the right side and 2+ on the left side.  Skin: Skin is warm and dry. No abrasion, no bruising, no burn, no ecchymosis, no laceration, no lesion, no petechiae, no purpura and no rash noted. Rash is not macular, not papular, not maculopapular, not nodular, not pustular, not vesicular and not urticarial. She is not diaphoretic. No cyanosis or erythema. No pallor. Nails show no clubbing.   Psychiatric: She has a normal mood and affect. Her behavior is normal. Judgment and thought content normal. Her mood appears not anxious. Her affect is not angry, not blunt and not labile. Her speech is not rapid and/or pressured, not delayed, not tangential and not slurred. She is not agitated, not aggressive, not hyperactive, not slowed, not withdrawn, not actively hallucinating and not combative. Thought content is not paranoid and not delusional. Cognition and memory are not impaired. She does not express impulsivity or inappropriate judgment. She does not exhibit a depressed mood. She expresses no homicidal and no suicidal  ideation. She expresses no suicidal plans and no homicidal plans. She is communicative. She exhibits normal recent memory and normal remote memory. She is attentive.   Nursing note and vitals reviewed.          Assessment:     EDUCATIONAL ASSESSMENT:  1. Impediments in learning class environment - NONE.  2. Needs improvement in self-care management skills.    1. Type 2 diabetes mellitus with other circulatory complication, without long-term current use of insulin          Plan:   Diamond Das is seen today for   1. Type 2 diabetes mellitus with other circulatory complication, without long-term current use of insulin      We have discussed the etiology and treatment options associated with the diagnosis as well as alternatives. Also pertinent to this visit in decision making was hypertension, hyperlipidemia, CHF, Cirrhosis and compliance.  She has elected the following treatments.     Type 2 diabetes mellitus with other circulatory complication, without long-term current use of insulin  -     POCT glucose  -     C-peptide; Future; Expected date: 09/07/2018  -     GLUCOSE MONITORING CONTINUOUS MIN 72 HOURS; Future  - Continue with D&E, reduce portion size, and restrict carbohydrates (no more that 45 grams ) per meal.  - In the absence of any long-term clinical trial data in fit older populations and in those with life expectancy of >10 years, an A1C goal of <7.5 percent (58.5 mmol/mol) should be considered in medication-treated patients. To achieve this goal, fasting and preprandial glucoses should be between 140 and 150 mg/dL (7.8 to 8.3 mmol/L).      1.) Patient was instructed to monitor blood glucose four times daily, fasting, post meal and ac meals or at bedtime. Reminded to bring BG records or meter to each visit for review.  2.) Reviewed pathophysiology of diabetes, complications related to the disease, importance of annual dilated eye exam and self daily foot examination.  3.) Continue medications as  prescribed Basal insulin only with Lantus. [facility] MyChart or Phone review in 1 week with BG records for adjustment of medication.  4.) Refer patient to Dietician/CDE for ongoing diabetes education, meal planning, carbohydrate counting, and diabetes support.  5.) Discussed activity, benefits, methods, and precautions. Recommended patient start/continue some form of exercise and increase as tolerated to 60 minutes per day to facilitate weight loss and aid in control of BGs. Also reminded patient of WHO recommendation of 10,000 steps daily as a goal.   6.) A1C, TSH, Lipid Panel, CMP with eGFR and Micro/Creatinine.  7.) Return to clinic in 2 weeks for follow up. Advised patient to call clinic with any questions or concerns.    A total of 60 minutes was spent in face to face time, of which 50 % was spent in counseling patient on disease process, complications, treatment, and side effects of medications.    The patient was explained the above plan and given opportunity to ask questions.  She understands, chooses and consents to this plan and accepts all the risks, which include but are not limited to the risks mentioned above.   She understands the alternative of having no testing, interventions or treatments at this time. She left content and without further questions.     Disclaimer:  This note is prepared using voice recognition software and as such is likely to have errors and has not been proof read. Please contact me for questions.

## 2018-09-12 ENCOUNTER — NURSE TRIAGE (OUTPATIENT)
Dept: ADMINISTRATIVE | Facility: CLINIC | Age: 63
End: 2018-09-12

## 2018-09-12 ENCOUNTER — TELEPHONE (OUTPATIENT)
Dept: NEPHROLOGY | Facility: CLINIC | Age: 63
End: 2018-09-12

## 2018-09-12 DIAGNOSIS — E03.4 HYPOTHYROIDISM DUE TO ACQUIRED ATROPHY OF THYROID: ICD-10-CM

## 2018-09-12 RX ORDER — LEVOTHYROXINE SODIUM 137 UG/1
TABLET ORAL
Qty: 45 TABLET | Refills: 0 | Status: SHIPPED | OUTPATIENT
Start: 2018-09-12 | End: 2018-11-27 | Stop reason: SDUPTHER

## 2018-09-12 NOTE — TELEPHONE ENCOUNTER
Spoke with patient and she wanted home delivery of her diabetic medications. Told her that she needed to get in touch with her insurance company so they could tell her which companies that she could order through. Patient said she would call her insurance company and find out.

## 2018-09-12 NOTE — TELEPHONE ENCOUNTER
----- Message from Ksenia Crocker sent at 9/12/2018  2:36 PM CDT -----  Contact: Pt   States she's calling rg wanting to know where her medicine that was called in for her was called in to / the pharm pt wanted it called in to is listed and can be reached at [phone]//chayito/adrianne /had an appt via telephone/ comp with Dr    [facility] - [place], [place] - [address]  [address] [address] [facility] [address]  Phone: [phone] Fax: [phone]

## 2018-09-12 NOTE — TELEPHONE ENCOUNTER
Pt had questions about getting prescriptions delivered to her home. Advised to contact her insurance and find out which pharmacy they use and then advised to contact PCP and add that as her preferred pharmacy. Please contact patient to further advise    Reason for Disposition   Caller has medication question only, adult not sick, and triager answers question    Protocols used: ST MEDICATION QUESTION CALL-A-AH

## 2018-09-20 ENCOUNTER — LAB VISIT (OUTPATIENT)
Dept: LAB | Facility: HOSPITAL | Age: 63
End: 2018-09-20
Attending: INTERNAL MEDICINE
Payer: MEDICAID

## 2018-09-20 ENCOUNTER — OFFICE VISIT (OUTPATIENT)
Dept: DIABETES | Facility: CLINIC | Age: 63
End: 2018-09-20
Payer: MEDICAID

## 2018-09-20 ENCOUNTER — CLINICAL SUPPORT (OUTPATIENT)
Dept: DIABETES | Facility: CLINIC | Age: 63
End: 2018-09-20
Payer: MEDICAID

## 2018-09-20 VITALS
WEIGHT: 220 LBS | HEIGHT: 61 IN | BODY MASS INDEX: 41.54 KG/M2 | DIASTOLIC BLOOD PRESSURE: 80 MMHG | SYSTOLIC BLOOD PRESSURE: 154 MMHG

## 2018-09-20 DIAGNOSIS — K74.60 CIRRHOSIS: ICD-10-CM

## 2018-09-20 DIAGNOSIS — K74.60 CIRRHOSIS: Primary | ICD-10-CM

## 2018-09-20 DIAGNOSIS — E11.59 TYPE 2 DIABETES MELLITUS WITH OTHER CIRCULATORY COMPLICATION, WITHOUT LONG-TERM CURRENT USE OF INSULIN: Primary | ICD-10-CM

## 2018-09-20 LAB
ALBUMIN SERPL BCP-MCNC: 2.6 G/DL
ALP SERPL-CCNC: 132 U/L
ALT SERPL W/O P-5'-P-CCNC: 28 U/L
ANION GAP SERPL CALC-SCNC: 8 MMOL/L
AST SERPL-CCNC: 42 U/L
BASOPHILS # BLD AUTO: 0.02 K/UL
BASOPHILS NFR BLD: 0.3 %
BILIRUB SERPL-MCNC: 0.5 MG/DL
BUN SERPL-MCNC: 18 MG/DL
CALCIUM SERPL-MCNC: 9 MG/DL
CHLORIDE SERPL-SCNC: 108 MMOL/L
CO2 SERPL-SCNC: 29 MMOL/L
CREAT SERPL-MCNC: 1.6 MG/DL
DIFFERENTIAL METHOD: ABNORMAL
EOSINOPHIL # BLD AUTO: 0.3 K/UL
EOSINOPHIL NFR BLD: 3.7 %
ERYTHROCYTE [DISTWIDTH] IN BLOOD BY AUTOMATED COUNT: 14.6 %
EST. GFR  (AFRICAN AMERICAN): 39.8 ML/MIN/1.73 M^2
EST. GFR  (NON AFRICAN AMERICAN): 34.5 ML/MIN/1.73 M^2
GLUCOSE SERPL-MCNC: 128 MG/DL (ref 70–110)
GLUCOSE SERPL-MCNC: 136 MG/DL
HCT VFR BLD AUTO: 36.8 %
HGB BLD-MCNC: 12.4 G/DL
INR PPP: 1.1
LYMPHOCYTES # BLD AUTO: 3.6 K/UL
LYMPHOCYTES NFR BLD: 51.7 %
MCH RBC QN AUTO: 30 PG
MCHC RBC AUTO-ENTMCNC: 33.7 G/DL
MCV RBC AUTO: 89 FL
MONOCYTES # BLD AUTO: 0.6 K/UL
MONOCYTES NFR BLD: 8.6 %
NEUTROPHILS # BLD AUTO: 2.5 K/UL
NEUTROPHILS NFR BLD: 35.7 %
PLATELET # BLD AUTO: 235 K/UL
PLATELET # BLD AUTO: 235 K/UL
PMV BLD AUTO: 9.5 FL
PMV BLD AUTO: 9.5 FL
POTASSIUM SERPL-SCNC: 4.1 MMOL/L
PROT SERPL-MCNC: 7.1 G/DL
PROTHROMBIN TIME: 11.3 SEC
RBC # BLD AUTO: 4.14 M/UL
SODIUM SERPL-SCNC: 145 MMOL/L
WBC # BLD AUTO: 7 K/UL

## 2018-09-20 PROCEDURE — 85610 PROTHROMBIN TIME: CPT | Mod: PO

## 2018-09-20 PROCEDURE — 99214 OFFICE O/P EST MOD 30 MIN: CPT | Mod: S$PBB,,, | Performed by: PHYSICIAN ASSISTANT

## 2018-09-20 PROCEDURE — 99213 OFFICE O/P EST LOW 20 MIN: CPT | Mod: PBBFAC,PN | Performed by: PHYSICIAN ASSISTANT

## 2018-09-20 PROCEDURE — 36415 COLL VENOUS BLD VENIPUNCTURE: CPT | Mod: PO

## 2018-09-20 PROCEDURE — 95251 CONT GLUC MNTR ANALYSIS I&R: CPT | Mod: ,,, | Performed by: PHYSICIAN ASSISTANT

## 2018-09-20 PROCEDURE — 80053 COMPREHEN METABOLIC PANEL: CPT | Mod: PO

## 2018-09-20 PROCEDURE — 82948 REAGENT STRIP/BLOOD GLUCOSE: CPT | Mod: PBBFAC,PN | Performed by: PHYSICIAN ASSISTANT

## 2018-09-20 PROCEDURE — 85025 COMPLETE CBC W/AUTO DIFF WBC: CPT | Mod: PO

## 2018-09-20 PROCEDURE — 99999 PR PBB SHADOW E&M-EST. PATIENT-LVL III: CPT | Mod: PBBFAC,,, | Performed by: PHYSICIAN ASSISTANT

## 2018-09-20 RX ORDER — INSULIN LISPRO 100 [IU]/ML
INJECTION, SOLUTION INTRAVENOUS; SUBCUTANEOUS
Qty: 15 ML | Refills: 11 | Status: SHIPPED | OUTPATIENT
Start: 2018-09-20 | End: 2018-10-11 | Stop reason: CLARIF

## 2018-09-20 RX ORDER — INSULIN GLARGINE 100 [IU]/ML
30 INJECTION, SOLUTION SUBCUTANEOUS DAILY
Qty: 15 ML | Refills: 3 | Status: SHIPPED | OUTPATIENT
Start: 2018-09-20 | End: 2018-09-26 | Stop reason: CLARIF

## 2018-09-20 NOTE — PROGRESS NOTES
Patient returned to clinic today to haveGlucose Sensor removed.     The CGMS Sensor was scanned and downloaded. All reports were imported into the patient's electronic medical record.     Endocrine PA will complete data interpretation and make recommendations at visit today.

## 2018-09-20 NOTE — PATIENT INSTRUCTIONS
" I have reviewed your results and they are still quite high. I would like you to start "Treating to Target". The treatment will be Insulin and your target will be the Fasting and 2 hour post meal blood sugar. It will work in this manner;    1. Goal for Fasting blood sugar is  mg/dl. I realize that you will need time to adjust to the new levels and presently you may feel too low if you are too aggressive now. So go slow and aim to lower your blood sugar to below 200 then 150 then 100 over several months.    2. Goal for 2 hour post meal blood sugar is below 180 mg/dl, here the same rules apply as in #1.    3. You will check your fasting blood sugar daily, if not where we would like it to be over a 3 day period then that evening we will increase the Lantus dose by 5 units. Then repeat the process over the next 3 days. Remember this is a slow process and take our time getting to goal. But, each week should be better than prior weeks. Blood sugars below 70 are unacceptable and should raise a "RED FLAG" where we may have to reduce our dose of insulin.    4. You will check your post meal glucose daily as well. However, each day you will check a different meal, (ie. Monday-breakfast; Tuesday- lunch; Wednesday- supper, then repeat). If your post meal glucose is not where we would like, increase pre-meal insulin by 2 units next time. A word of CAUTION: mealtime insulin is dependant on the size and concentration of your meal content. If not consuming a large meal do not take large dose of insulin. Use the reasonable person rule.     5. If you have any questions please do not hesitate to call.      Intensive insulin Therapy with correction factor:    You are on Intensive insulin therapy with Basal and Bolus insulin. Lantus, Levamir or NPH is your Basal insulin and will help maintain your fasting and between meal sugar. Your fast acting or rescue insulin is either Humalog, Novalog or Regular insulin and will control your " post meal sugar.     You will Take 25 units of Lantus at 9 pm each night. This will be adjusted up or down depending on your fasting blood sugar before breakfast.    Humalog will follow this pre meal schedule; Correction factor as noted below and is based on 15-45 grams of carbohydrates per meal.    If blood sugar is below 70 eat first then check your blood sugar 2 hours later and make correction.  If blood pre-meal sugar is  70 -150 take 4 units of Humalog;  If blood pre-meal sugar is 151-200 take +1 units of Humalog;  If blood pre-meal sugar is 201-250 take +3 units of Humalog;  If blood pre-meal sugar is 251-300 take +5 units of Humalog;  If blood pre-meal sugar is 301-350 take +7 units of Humalog;  If blood pre-meal sugar is 351-400+ take +10 units of Humalog;  Also increase water intake and call for appointment.

## 2018-09-20 NOTE — PROGRESS NOTES
"Continuous glucose monitoring report:     The patient's CGM was downloaded and was reviewed.  The report was technically satisfactory and there were 6 days of data reviewed. Target range was  mg/dl Hypoglycemia was below 70 and hyperglycemic was above 180 mg/dl. Patient's daily glucose summary showed a range of hypoglycemic from 0% to 33% with 2 of 6 days above 10%. Her daily above target range was from 17% to 100% 5 of 6 days above 25%.  There was not a food intake diary or exercise diary submitted with this report.     Statistics:  Patient's average glucose was 210 mg/dL, Sensor usage was 6 of 6 days.  She was in time above range (TAR) 59% of the time and above 250 mg/dl 38%, time in range(TIR) 32% of the time, and time below range (TBR) 9% of the time with 6% below 54 mg/dl.  The target range for this patient was  mg/dL, and for the purpose of overnight this would be interpreted as 10 PM to 6 AM.     Pattern insight summary:  Nighttime lows, 1 were found and the most significant pattern found them between 4 AM and 6 AM     Daytime lows, 2 were found and most significant pattern showed them to be between 6 AM- 1130 AM and 2 PM- 730 PM.     Nighttime highs omnipresent was found and the most significant pattern found them between 10 PM and 6 AM.     Daytime highs,omnipresent were found and most significant pattern showed them to be between 6 AM- 1130 AM and 12 PM- 10 PM.      Interpretation:  #1 Her hypoglycemia pattern was suspected to secondary to erratic diet, timing of insulin and .  #2 Her daytime highs pattern was suspected to secondary to erratic diet and missing bolus insulin.  #3 Recommendations were to treat-to-target as noted below.  #4 Will have her follow up in 3 months.     I have reviewed your results and they are still quite high. I would like you to start "Treating to Target". The treatment will be Insulin and your target will be the Fasting and 2 hour post meal blood sugar. It will work " "in this manner;    1. Goal for Fasting blood sugar is  mg/dl. I realize that you will need time to adjust to the new levels and presently you may feel too low if you are too aggressive now. So go slow and aim to lower your blood sugar to below 200 then 150 then 100 over several months.    2. Goal for 2 hour post meal blood sugar is below 180 mg/dl, here the same rules apply as in #1.    3. You will check your fasting blood sugar daily, if not where we would like it to be over a 3 day period then that evening we will increase the Lantus dose by 5 units. Then repeat the process over the next 3 days. Remember this is a slow process and take our time getting to goal. But, each week should be better than prior weeks. Blood sugars below 70 are unacceptable and should raise a "RED FLAG" where we may have to reduce our dose of insulin.    4. You will check your post meal glucose daily as well. However, each day you will check a different meal, (ie. Monday-breakfast; Tuesday- lunch; Wednesday- supper, then repeat). If your post meal glucose is not where we would like, increase pre-meal insulin by 2 units next time. A word of CAUTION: mealtime insulin is dependant on the size and concentration of your meal content. If not consuming a large meal do not take large dose of insulin. Use the reasonable person rule.     5. If you have any questions please do not hesitate to call.      Intensive insulin Therapy with correction factor:    You are on Intensive insulin therapy with Basal and Bolus insulin. Lantus, Levamir or NPH is your Basal insulin and will help maintain your fasting and between meal sugar. Your fast acting or rescue insulin is either Humalog, Novalog or Regular insulin and will control your post meal sugar.     You will Take 25 units of Lantus at 9 pm each night. This will be adjusted up or down depending on your fasting blood sugar before breakfast.    Humalog will follow this pre meal schedule; Correction " factor as noted below and is based on 15-45 grams of carbohydrates per meal.    If blood sugar is below 70 eat first then check your blood sugar 2 hours later and make correction.  If blood pre-meal sugar is  70 -150 take 4 units of Humalog;  If blood pre-meal sugar is 151-200 take +1 units of Humalog;  If blood pre-meal sugar is 201-250 take +3 units of Humalog;  If blood pre-meal sugar is 251-300 take +5 units of Humalog;  If blood pre-meal sugar is 301-350 take +7 units of Humalog;  If blood pre-meal sugar is 351-400+ take +10 units of Humalog;  Also increase water intake and call for appointment.    A total of 30 minutes was spent in face to face time, of which 50 % was spent in counseling patient on disease process, complications, treatment, and side effects of medications.    The patient was explained the above plan and given opportunity to ask questions. He understands, chooses and consents to this plan and accepts all the risks, which include but are not limited to the risks mentioned above. He understands the alternative of having no testing, interventions or treatments at this time. He left content and without further questions.       Russell Aguilar PA-C

## 2018-09-21 ENCOUNTER — TELEPHONE (OUTPATIENT)
Dept: DIABETES | Facility: CLINIC | Age: 63
End: 2018-09-21

## 2018-09-21 NOTE — TELEPHONE ENCOUNTER
----- Message from Juan Landis sent at 9/21/2018 10:59 AM CDT -----  Contact: pt   Pt is requesting a call back from the nurse in regards to pt getting an authorization for her med HUMALOG and LANTUS.  [phone] (home)

## 2018-09-24 ENCOUNTER — TELEPHONE (OUTPATIENT)
Dept: HEPATOLOGY | Facility: CLINIC | Age: 63
End: 2018-09-24

## 2018-09-24 ENCOUNTER — TELEPHONE (OUTPATIENT)
Dept: DIABETES | Facility: CLINIC | Age: 63
End: 2018-09-24

## 2018-09-24 NOTE — TELEPHONE ENCOUNTER
----- Message from Liliane Baker MD sent at 9/21/2018  6:15 PM CDT -----  Please inform patient that kidney and liver function remain stable.  No changes in fluid pills.  Repeat labs with return appointment

## 2018-09-24 NOTE — TELEPHONE ENCOUNTER
----- Message from Kishor Negro sent at 9/24/2018  2:32 PM CDT -----  Contact: pt   Pt calling to check on status of refills for both lantis and hemalog           ..[phone] ([place])          ..  [address] [address] [employer]  [address] [facility] [address]  Phone: [phone] Fax: [phone]

## 2018-09-25 ENCOUNTER — TELEPHONE (OUTPATIENT)
Dept: NEPHROLOGY | Facility: CLINIC | Age: 63
End: 2018-09-25

## 2018-09-25 ENCOUNTER — OFFICE VISIT (OUTPATIENT)
Dept: PULMONOLOGY | Facility: CLINIC | Age: 63
End: 2018-09-25
Payer: MEDICAID

## 2018-09-25 VITALS
BODY MASS INDEX: 41.54 KG/M2 | WEIGHT: 220 LBS | OXYGEN SATURATION: 98 % | SYSTOLIC BLOOD PRESSURE: 120 MMHG | DIASTOLIC BLOOD PRESSURE: 72 MMHG | HEIGHT: 61 IN | RESPIRATION RATE: 19 BRPM

## 2018-09-25 DIAGNOSIS — J98.4 RESTRICTIVE LUNG DISEASE: ICD-10-CM

## 2018-09-25 DIAGNOSIS — G47.34 NOCTURNAL HYPOXEMIA: ICD-10-CM

## 2018-09-25 DIAGNOSIS — I50.32 CHRONIC DIASTOLIC HEART FAILURE: ICD-10-CM

## 2018-09-25 DIAGNOSIS — G47.33 OSA ON CPAP: Primary | ICD-10-CM

## 2018-09-25 DIAGNOSIS — K76.6 PORTAL HYPERTENSION: ICD-10-CM

## 2018-09-25 PROCEDURE — 99999 PR PBB SHADOW E&M-EST. PATIENT-LVL III: CPT | Mod: PBBFAC,,, | Performed by: INTERNAL MEDICINE

## 2018-09-25 PROCEDURE — 99214 OFFICE O/P EST MOD 30 MIN: CPT | Mod: S$PBB,,, | Performed by: INTERNAL MEDICINE

## 2018-09-25 PROCEDURE — 99213 OFFICE O/P EST LOW 20 MIN: CPT | Mod: PBBFAC,PO | Performed by: INTERNAL MEDICINE

## 2018-09-25 RX ORDER — INSULIN GLARGINE 100 [IU]/ML
25 INJECTION, SOLUTION SUBCUTANEOUS DAILY
COMMUNITY
End: 2018-09-26 | Stop reason: SDUPTHER

## 2018-09-25 NOTE — PROGRESS NOTES
Pulmonary Outpatient Follow Up Visit     Subjective:       Patient ID: Diamond Das is a 63 y.o. female.    Chief Complaint: Apnea      HPI  63-year-old female patient presenting for follow-up.  Initially seen for hypoxemic respiratory failure after hospital admission precipitated by CHF exacerbation currently using oxygen during sleep with CPAP, 6 min walking test showed no need for oxygen on exertion.      She has suboptimal CPAP usage.  Seventeen days out of 30 days for the last 30 days.  With 13% above 4 hr.    Complains of shortness of breath on exertion.  Review of Systems   Constitutional: Positive for weight gain. Negative for fever and chills.   HENT: Negative for trouble swallowing, voice change and congestion.         History of cataract.   Eyes: Negative for redness.   Respiratory: Positive for apnea, snoring, shortness of breath and orthopnea.    Cardiovascular: Negative for chest pain.        History of diastolic CHF.   Genitourinary:        CKD 3    Endocrine: History of diabetes.  Hypothyroid.    Musculoskeletal: Positive for arthralgias and back pain.   Skin: Negative for rash.   Gastrointestinal: Positive for abdominal distention and acid reflux.        History of liver cirrhosis, ascites status post paracentesis in the past.  Status post TIPS.  Gastroesophageal reflux disease.  Gastroparesis.   Neurological: Positive for headaches. Negative for syncope.   Hematological: No excessive bruising.   Psychiatric/Behavioral: Negative for confusion. The patient is not nervous/anxious.         KRISTAL         Outpatient Encounter Medications as of 9/25/2018   Medication Sig Dispense Refill    amLODIPine (NORVASC) 10 MG tablet Take 1 tablet (10 mg total) by mouth once daily. 90 tablet 1    aspirin (ECOTRIN) 81 MG EC tablet Take 1 tablet (81 mg total) by mouth once daily. 365 tablet 0    carvedilol (COREG) 3.125 MG tablet Take 1 tablet (3.125 mg total) by mouth 2  "(two) times daily. 180 tablet 1    furosemide (LASIX) 80 MG tablet Take 1 tablet (80 mg total) by mouth once daily. (Patient taking differently: Take 80 mg by mouth daily as needed. ) 90 tablet 0    insulin glargine (LANTUS SOLOSTAR) 100 unit/mL (3 mL) InPn pen Inject 30 Units into the skin once daily. 15 mL 3    insulin lispro (HUMALOG KWIKPEN) 100 unit/mL InPn pen Titrate up to 14 units subcutaneously three times a day 10-15 min before meals as directed in after visit summary. 15 mL 11    lactulose (CHRONULAC) 20 gram/30 mL Soln Take 22.5 mLs (15 g total) by mouth 3 (three) times daily. 946 mL 11    levothyroxine (SYNTHROID) 137 MCG Tab tablet TAKE 1 TABLET BY MOUTH BEFORE BREAKFAST 45 tablet 0    ondansetron (ZOFRAN-ODT) 4 MG TbDL Take 1 tablet (4 mg total) by mouth every 6 (six) hours as needed. 12 tablet 0    pantoprazole (PROTONIX) 20 MG tablet TAKE 2 TABLETS BY MOUTH ONCE DAILY 90 tablet 0    rifAXIMin (XIFAXAN) 550 mg Tab Take 1 tablet (550 mg total) by mouth 2 (two) times daily. 60 tablet 11    simvastatin (ZOCOR) 40 MG tablet Take 40 mg by mouth once daily.       vitamin E 400 UNIT capsule Take 400 Units by mouth every morning.      insulin glargine (BASAGLAR KWIKPEN U-100 INSULIN) 100 unit/mL (3 mL) InPn pen Inject 25 Units into the skin once daily.      [DISCONTINUED] acetaminophen (TYLENOL) 500 MG tablet Take 500 mg by mouth every 6 (six) hours as needed for Pain.      [DISCONTINUED] insulin glargine (LANTUS SOLOSTAR) 100 unit/mL (3 mL) InPn pen Inject 30 Units into the skin once daily. 15 mL 3     No facility-administered encounter medications on file as of 9/25/2018.        Objective:     Vital Signs (Most Recent)  Vital Signs  Resp: 19  SpO2: 98 %  BP: 120/72  Height and Weight  Height: 5' 1" (154.9 cm)  Weight: 99.8 kg (220 lb 0.3 oz)  BSA (Calculated - sq m): 2.07 sq meters  BMI (Calculated): 41.7  Weight in (lb) to have BMI = 25: 132]  Wt Readings from Last 3 Encounters:   09/25/18 " "99.8 kg (220 lb 0.3 oz)   09/20/18 99.8 kg (220 lb 0.3 oz)   09/07/18 93.4 kg (205 lb 14.6 oz)     Temp Readings from Last 3 Encounters:   09/06/18 96.5 °F (35.8 °C) (Tympanic)   08/27/18 96.9 °F (36.1 °C) (Tympanic)   07/30/18 98.1 °F (36.7 °C) (Oral)     Height: 5' 1" (154.9 cm)          Physical Exam   Constitutional: She is oriented to person, place, and time. She appears well-developed and well-nourished.   HENT:   Head: Normocephalic.   Neck: Neck supple.   Cardiovascular: Normal rate and regular rhythm.   Pulmonary/Chest: Normal expansion and effort normal. She has decreased breath sounds.   Abdominal: Soft. She exhibits no distension.   Musculoskeletal: She exhibits no edema or tenderness.   Lymphadenopathy:     She has no axillary adenopathy.   Neurological: She is alert and oriented to person, place, and time.   Skin: Skin is warm.   Psychiatric: She has a normal mood and affect.       Laboratory    Lab Results   Component Value Date    WBC 7.00 09/20/2018    HGB 12.4 09/20/2018    HCT 36.8 (L) 09/20/2018    MCV 89 09/20/2018     09/20/2018     09/20/2018     BMP  Lab Results   Component Value Date     09/20/2018    K 4.1 09/20/2018     09/20/2018    CO2 29 09/20/2018    BUN 18 09/20/2018    CREATININE 1.6 (H) 09/20/2018    CALCIUM 9.0 09/20/2018    ANIONGAP 8 09/20/2018    ESTGFRAFRICA 39.8 (A) 09/20/2018    EGFRNONAA 34.5 (A) 09/20/2018     BNP  @LABRCNTIP(BNP,BNPTRIAGEBLO)@  Lab Results   Component Value Date    TSH 7.167 (H) 07/30/2018     ABG  @LABRCNTIP(PH,PO2,PCO2,HCO3,BE)@    Diagnostic Results:  CXR 6/18 personally reviewed No acute findings      Doppler liver US :     Impression         1. Findings in keeping with cirrhosis with known left portal vein thrombosis which now appears more occlusive.  2. TIPS shunt catheter appears patent but demonstrates low internal velocities suggesting possible TIPS malfunction.  Velocities are similar to prior.   PSG 4/18   RESPIRATORY: " The overall apnea-hypopnea index (AHI) was 13.3 events /hr asleep. Ms Das had 27 hypopneas, 21  obstructive sleep apneas, 0 central sleep apneas, and 0 mixed sleep apneas. Persistent loud snoring was noted. Analysis of  continuous oxygen saturations showed a mean SpO2 value of 93.6 % for the study, with a minimum oxygen saturation during  sleep of 54.0 %, and a waking baseline SpO2 = 96 %. Oxygen saturations were ? 88 % for 23.7 minutes of the time spent  asleep.        PFT 31/18:     The Forced Vital Capacity (FVC) is normal.  FEV1 is normal.  No significant improvement after bronchodilator.  The absence of an acute response to aerosolized bronchodilator does not necessarily mean that the subject will not  respond to a clinical trial of aerosolized or oral bronchodilators.  FEV1 over FVC is 82%  Lung Volumes are consistent with mild restrictive disease.  Diffusion capacity is severely reduced - unadjusted for hemoglobin (DLCO < 40%).  Mild restriction, severe DLCO reduction.        Echo 5/17 :      1 - Normal left ventricular systolic function (EF 60-65%). Mild concentric hypertrophy.     2 - Impaired LV relaxation, elevated LAP (grade 2 diastolic dysfunction).     3 - Normal right ventricular systolic function .     4 - Severe left atrial enlargement.           Assessment/Plan:   KRISTAL on CPAP    Restrictive lung disease    Chronic diastolic heart failure    Portal hypertension    Nocturnal hypoxemia    BMI 40.0-44.9, adult    Instructed patient and encouraged her to be more compliant with CPAP and use it ideally continuously during sleep with a minimum of 7 days out of 10 and 4 hr a night.    Continue oxygen with CPAP during sleep.    General weight loss/lifestyle modification strategies discussed (elicit support from others; identify saboteurs; non-food rewards).  Diet interventions: low calorie (1000 kCal/d) deficit diet    Diuresis with Lasix 80 mg daily p.o.    Follow-up in about 6 months (around  3/25/2019).    This note was prepared using voice recognition system and is likely to have sound alike errors that may have been overlooked even after proof reading.  Please call me with any questions    Discussed diagnosis, its evaluation, treatment and usual course. All questions answered.    Thank you for the courtesy of participating in the care of this patient    Angelica Hunter MD

## 2018-09-25 NOTE — TELEPHONE ENCOUNTER
Patient is here about the Lisinopril that was to be called in to her pharmacy. Please advise and sent to [facility] in [place]

## 2018-09-26 DIAGNOSIS — I10 HYPERTENSION, UNSPECIFIED TYPE: Primary | ICD-10-CM

## 2018-09-26 RX ORDER — INSULIN GLARGINE 100 [IU]/ML
30 INJECTION, SOLUTION SUBCUTANEOUS DAILY
Qty: 9 ML | Refills: 6 | Status: SHIPPED | OUTPATIENT
Start: 2018-09-26 | End: 2018-10-28

## 2018-09-26 RX ORDER — LISINOPRIL 5 MG/1
5 TABLET ORAL DAILY
Qty: 90 TABLET | Refills: 6 | Status: SHIPPED | OUTPATIENT
Start: 2018-09-26 | End: 2019-10-16 | Stop reason: SDUPTHER

## 2018-10-05 ENCOUNTER — TELEPHONE (OUTPATIENT)
Dept: INTERNAL MEDICINE | Facility: CLINIC | Age: 63
End: 2018-10-05

## 2018-10-05 DIAGNOSIS — E03.4 HYPOTHYROIDISM DUE TO ACQUIRED ATROPHY OF THYROID: ICD-10-CM

## 2018-10-05 RX ORDER — LEVOTHYROXINE SODIUM 137 UG/1
TABLET ORAL
Qty: 45 TABLET | Refills: 0 | Status: SHIPPED | OUTPATIENT
Start: 2018-10-05 | End: 2018-11-26 | Stop reason: SDUPTHER

## 2018-10-05 NOTE — TELEPHONE ENCOUNTER
Called and spoke with patient,informing her there is nothing list in her med card for memory loss. Ask patient if she can contact her pharmacy to have them sent a refill request. Patient states she had been through that with her pharmacy and they did not have a name of the medicine. Advise patient with out a name of medicine we are unable to fill it. Patient express understanding

## 2018-10-05 NOTE — TELEPHONE ENCOUNTER
----- Message from Ivelisse Castano sent at 10/5/2018 10:53 AM CDT -----  Contact: Pt  Pt stated she was on the phone with nurse and somehow phone was disconnected. Pt was retuning the call.

## 2018-10-11 RX ORDER — INSULIN LISPRO 100 [IU]/ML
INJECTION, SOLUTION INTRAVENOUS; SUBCUTANEOUS
Qty: 15 ML | Refills: 6 | Status: SHIPPED | OUTPATIENT
Start: 2018-10-11 | End: 2020-02-10 | Stop reason: SDUPTHER

## 2018-10-28 ENCOUNTER — HOSPITAL ENCOUNTER (EMERGENCY)
Facility: HOSPITAL | Age: 63
Discharge: HOME | End: 2018-10-28
Attending: EMERGENCY MEDICINE
Payer: MEDICAID

## 2018-10-28 VITALS
OXYGEN SATURATION: 99 % | BODY MASS INDEX: 41.29 KG/M2 | RESPIRATION RATE: 16 BRPM | TEMPERATURE: 99 F | SYSTOLIC BLOOD PRESSURE: 145 MMHG | DIASTOLIC BLOOD PRESSURE: 63 MMHG | HEIGHT: 61 IN | WEIGHT: 218.69 LBS | HEART RATE: 64 BPM

## 2018-10-28 DIAGNOSIS — R11.0 NAUSEA: Primary | ICD-10-CM

## 2018-10-28 DIAGNOSIS — N39.0 URINARY TRACT INFECTION WITHOUT HEMATURIA, SITE UNSPECIFIED: ICD-10-CM

## 2018-10-28 LAB
ALBUMIN SERPL BCP-MCNC: 2.8 G/DL
ALP SERPL-CCNC: 115 U/L
ALT SERPL W/O P-5'-P-CCNC: 27 U/L
ANION GAP SERPL CALC-SCNC: 9 MMOL/L
AST SERPL-CCNC: 60 U/L
BACTERIA #/AREA URNS AUTO: ABNORMAL /HPF
BASOPHILS # BLD AUTO: 0.03 K/UL
BASOPHILS NFR BLD: 0.4 %
BILIRUB SERPL-MCNC: 0.4 MG/DL
BILIRUB UR QL STRIP: NEGATIVE
BUN SERPL-MCNC: 20 MG/DL
CALCIUM SERPL-MCNC: 9.4 MG/DL
CHLORIDE SERPL-SCNC: 108 MMOL/L
CLARITY UR REFRACT.AUTO: CLEAR
CO2 SERPL-SCNC: 26 MMOL/L
COLOR UR AUTO: YELLOW
CREAT SERPL-MCNC: 1.8 MG/DL
DIFFERENTIAL METHOD: ABNORMAL
EOSINOPHIL # BLD AUTO: 0.3 K/UL
EOSINOPHIL NFR BLD: 3.6 %
ERYTHROCYTE [DISTWIDTH] IN BLOOD BY AUTOMATED COUNT: 14.4 %
EST. GFR  (AFRICAN AMERICAN): 34.5 ML/MIN/1.73 M^2
EST. GFR  (NON AFRICAN AMERICAN): 29.9 ML/MIN/1.73 M^2
GLUCOSE SERPL-MCNC: 113 MG/DL
GLUCOSE UR QL STRIP: NEGATIVE
HCT VFR BLD AUTO: 40.3 %
HGB BLD-MCNC: 13.8 G/DL
HGB UR QL STRIP: ABNORMAL
HYALINE CASTS UR QL AUTO: 1 /LPF
KETONES UR QL STRIP: NEGATIVE
LEUKOCYTE ESTERASE UR QL STRIP: ABNORMAL
LIPASE SERPL-CCNC: 27 U/L
LYMPHOCYTES # BLD AUTO: 3.6 K/UL
LYMPHOCYTES NFR BLD: 52.2 %
MCH RBC QN AUTO: 29.9 PG
MCHC RBC AUTO-ENTMCNC: 34.2 G/DL
MCV RBC AUTO: 87 FL
MICROSCOPIC COMMENT: ABNORMAL
MONOCYTES # BLD AUTO: 0.6 K/UL
MONOCYTES NFR BLD: 8 %
NEUTROPHILS # BLD AUTO: 2.5 K/UL
NEUTROPHILS NFR BLD: 35.7 %
NITRITE UR QL STRIP: NEGATIVE
PH UR STRIP: 6 [PH] (ref 5–8)
PLATELET # BLD AUTO: 260 K/UL
PMV BLD AUTO: 9.4 FL
POTASSIUM SERPL-SCNC: 4.3 MMOL/L
PROT SERPL-MCNC: 7.6 G/DL
PROT UR QL STRIP: ABNORMAL
RBC # BLD AUTO: 4.62 M/UL
RBC #/AREA URNS AUTO: 0 /HPF (ref 0–4)
SODIUM SERPL-SCNC: 143 MMOL/L
SP GR UR STRIP: 1.02 (ref 1–1.03)
TROPONIN I SERPL DL<=0.01 NG/ML-MCNC: 0.02 NG/ML
URN SPEC COLLECT METH UR: ABNORMAL
UROBILINOGEN UR STRIP-ACNC: NEGATIVE EU/DL
WBC # BLD AUTO: 6.9 K/UL
WBC #/AREA URNS AUTO: 25 /HPF (ref 0–5)
WBC CLUMPS UR QL AUTO: ABNORMAL
YEAST UR QL AUTO: ABNORMAL

## 2018-10-28 PROCEDURE — 93010 ELECTROCARDIOGRAM REPORT: CPT | Mod: ,,, | Performed by: NUCLEAR MEDICINE

## 2018-10-28 PROCEDURE — 84484 ASSAY OF TROPONIN QUANT: CPT

## 2018-10-28 PROCEDURE — 99900035 HC TECH TIME PER 15 MIN (STAT)

## 2018-10-28 PROCEDURE — 99284 EMERGENCY DEPT VISIT MOD MDM: CPT

## 2018-10-28 PROCEDURE — 83690 ASSAY OF LIPASE: CPT

## 2018-10-28 PROCEDURE — 80053 COMPREHEN METABOLIC PANEL: CPT

## 2018-10-28 PROCEDURE — 81000 URINALYSIS NONAUTO W/SCOPE: CPT

## 2018-10-28 PROCEDURE — 25000003 PHARM REV CODE 250: Performed by: EMERGENCY MEDICINE

## 2018-10-28 PROCEDURE — 93005 ELECTROCARDIOGRAM TRACING: CPT

## 2018-10-28 PROCEDURE — 85025 COMPLETE CBC W/AUTO DIFF WBC: CPT

## 2018-10-28 RX ORDER — ONDANSETRON 4 MG/1
4 TABLET, ORALLY DISINTEGRATING ORAL
Status: COMPLETED | OUTPATIENT
Start: 2018-10-28 | End: 2018-10-28

## 2018-10-28 RX ORDER — LEVOFLOXACIN 500 MG/1
500 TABLET, FILM COATED ORAL DAILY
Qty: 10 TABLET | Refills: 0 | Status: SHIPPED | OUTPATIENT
Start: 2018-10-28 | End: 2018-11-07

## 2018-10-28 RX ORDER — CIPROFLOXACIN 500 MG/1
500 TABLET ORAL
Status: COMPLETED | OUTPATIENT
Start: 2018-10-28 | End: 2018-10-28

## 2018-10-28 RX ORDER — ONDANSETRON 4 MG/1
4 TABLET, FILM COATED ORAL EVERY 8 HOURS PRN
Qty: 12 TABLET | Refills: 0 | Status: SHIPPED | OUTPATIENT
Start: 2018-10-28 | End: 2019-02-13 | Stop reason: SDUPTHER

## 2018-10-28 RX ORDER — TIZANIDINE 4 MG/1
4 TABLET ORAL NIGHTLY
COMMUNITY
End: 2019-01-17

## 2018-10-28 RX ADMIN — ONDANSETRON 4 MG: 4 TABLET, ORALLY DISINTEGRATING ORAL at 06:10

## 2018-10-28 RX ADMIN — CIPROFLOXACIN 500 MG: 500 TABLET, FILM COATED ORAL at 07:10

## 2018-10-28 NOTE — ED PROVIDER NOTES
Encounter Date: 10/28/2018       History     Chief Complaint   Patient presents with    Nausea     patient states that she was nauseated after eating today; denies vomiting     The history is provided by the patient.   Nausea   This is a new problem. The current episode started 6 to 12 hours ago. The problem occurs constantly. The problem has not changed since onset.Associated symptoms include abdominal pain. Pertinent negatives include no chest pain, no headaches and no shortness of breath. The symptoms are aggravated by eating. Nothing relieves the symptoms. Treatments tried: Phenergan. The treatment provided mild relief.     Review of patient's allergies indicates:   Allergen Reactions    Subsys [fentanyl] Other (See Comments)     After administration pt unresponsive.  HR and respirations decreased.     Versed [midazolam] Other (See Comments)     After administration pt unresponsive.  HR and respirations decreased.     Ampicillin Rash    Codeine Nausea And Vomiting and Nausea Only     Past Medical History:   Diagnosis Date    Ascites     Cataract     CHF (congestive heart failure)     Cirrhosis     Diabetes mellitus     Gastroparesis     GERD (gastroesophageal reflux disease)     Hypertension     Liver disease     Thyroid disease      Past Surgical History:   Procedure Laterality Date    CHOLECYSTECTOMY      ERCP      LIVER BIOPSY      TIPS N/A 1/17/2017    Performed by [facility] at [facility]    TRANSJUGULAR LIVER BIOPSY N/A 11/17/2016    Performed by [facility] Provider at [facility]    TUBAL LIGATION      UPPER GASTROINTESTINAL ENDOSCOPY       Family History   Problem Relation Age of Onset    Cancer Mother     Breast cancer Mother     Heart disease Father     Aneurysm Sister     Heart disease Brother     No Known Problems Maternal Grandmother     Cancer Maternal Grandfather     Sarcoidosis Sister      Social History     Tobacco Use    Smoking status: Never Smoker     Smokeless tobacco: Never Used   Substance Use Topics    Alcohol use: No     Frequency: Never    Drug use: No     Review of Systems   Respiratory: Negative for shortness of breath.    Cardiovascular: Negative for chest pain.   Gastrointestinal: Positive for abdominal pain and nausea.   Neurological: Negative for headaches.   All other systems reviewed and are negative.      Physical Exam     Initial Vitals [10/28/18 1803]   BP Pulse Resp Temp SpO2   (!) 150/71 65 20 98 °F (36.7 °C) 98 %      MAP       --         Physical Exam    Nursing note and vitals reviewed.  Constitutional: She appears well-developed and well-nourished. No distress.   HENT:   Head: Normocephalic and atraumatic.   Mouth/Throat: Oropharynx is clear and moist.   Eyes: Conjunctivae and EOM are normal. Pupils are equal, round, and reactive to light.   Neck: Normal range of motion. Neck supple.   Cardiovascular: Normal rate, regular rhythm and normal heart sounds.   Pulmonary/Chest: Breath sounds normal. No respiratory distress. She has no wheezes. She has no rales.   Abdominal: Soft. Bowel sounds are normal. She exhibits no distension. There is no tenderness. There is no rebound and no guarding.   Musculoskeletal: Normal range of motion.   Neurological: She is alert and oriented to person, place, and time. She has normal strength.   Skin: Skin is warm and dry.   Psychiatric: She has a normal mood and affect. Thought content normal.         ED Course   Procedures  Labs Reviewed   URINALYSIS - Abnormal; Notable for the following components:       Result Value    Protein, UA 2+ (*)     Occult Blood UA Trace (*)     Leukocytes, UA Trace (*)     All other components within normal limits   CBC W/ AUTO DIFFERENTIAL - Abnormal; Notable for the following components:    Gran% 35.7 (*)     Lymph% 52.2 (*)     All other components within normal limits   COMPREHENSIVE METABOLIC PANEL - Abnormal; Notable for the following components:    Glucose 113 (*)      Creatinine 1.8 (*)     Albumin 2.8 (*)     AST 60 (*)     eGFR if  34.5 (*)     eGFR if non  29.9 (*)     All other components within normal limits   URINALYSIS MICROSCOPIC - Abnormal; Notable for the following components:    WBC, UA 25 (*)     Bacteria, UA Many (*)     All other components within normal limits   TROPONIN I   LIPASE           Results for orders placed or performed during the hospital encounter of 10/28/18   Urinalysis   Result Value Ref Range    Specimen UA Urine, Clean Catch     Color, UA Yellow Yellow, Straw, Thalia    Appearance, UA Clear Clear    pH, UA 6.0 5.0 - 8.0    Specific Gravity, UA 1.020 1.005 - 1.030    Protein, UA 2+ (A) Negative    Glucose, UA Negative Negative    Ketones, UA Negative Negative    Bilirubin (UA) Negative Negative    Occult Blood UA Trace (A) Negative    Nitrite, UA Negative Negative    Urobilinogen, UA Negative <2.0 EU/dL    Leukocytes, UA Trace (A) Negative   CBC auto differential   Result Value Ref Range    WBC 6.90 3.90 - 12.70 K/uL    RBC 4.62 4.00 - 5.40 M/uL    Hemoglobin 13.8 12.0 - 16.0 g/dL    Hematocrit 40.3 37.0 - 48.5 %    MCV 87 82 - 98 fL    MCH 29.9 27.0 - 31.0 pg    MCHC 34.2 32.0 - 36.0 g/dL    RDW 14.4 11.5 - 14.5 %    Platelets 260 150 - 350 K/uL    MPV 9.4 9.2 - 12.9 fL    Gran # (ANC) 2.5 1.8 - 7.7 K/uL    Lymph # 3.6 1.0 - 4.8 K/uL    Mono # 0.6 0.3 - 1.0 K/uL    Eos # 0.3 0.0 - 0.5 K/uL    Baso # 0.03 0.00 - 0.20 K/uL    Gran% 35.7 (L) 38.0 - 73.0 %    Lymph% 52.2 (H) 18.0 - 48.0 %    Mono% 8.0 4.0 - 15.0 %    Eosinophil% 3.6 0.0 - 8.0 %    Basophil% 0.4 0.0 - 1.9 %    Differential Method Automated    Comprehensive metabolic panel   Result Value Ref Range    Sodium 143 136 - 145 mmol/L    Potassium 4.3 3.5 - 5.1 mmol/L    Chloride 108 95 - 110 mmol/L    CO2 26 23 - 29 mmol/L    Glucose 113 (H) 70 - 110 mg/dL    BUN, Bld 20 8 - 23 mg/dL    Creatinine 1.8 (H) 0.5 - 1.4 mg/dL    Calcium 9.4 8.7 - 10.5 mg/dL    Total  Protein 7.6 6.0 - 8.4 g/dL    Albumin 2.8 (L) 3.5 - 5.2 g/dL    Total Bilirubin 0.4 0.1 - 1.0 mg/dL    Alkaline Phosphatase 115 55 - 135 U/L    AST 60 (H) 10 - 40 U/L    ALT 27 10 - 44 U/L    Anion Gap 9 8 - 16 mmol/L    eGFR if  34.5 (A) >60 mL/min/1.73 m^2    eGFR if non African American 29.9 (A) >60 mL/min/1.73 m^2   Troponin I   Result Value Ref Range    Troponin I 0.019 0.000 - 0.026 ng/mL   Lipase   Result Value Ref Range    Lipase 27 4 - 60 U/L   Urinalysis Microscopic   Result Value Ref Range    RBC, UA 0 0 - 4 /hpf    WBC, UA 25 (H) 0 - 5 /hpf    WBC Clumps, UA Rare None-Rare    Bacteria, UA Many (A) None-Occ /hpf    Yeast, UA None None    Hyaline Casts, UA 1 0-1/lpf /lpf    Microscopic Comment SEE COMMENT      7:36 PM - Counseling: Spoke with the patient and discussed todays findings, in addition to providing specific details for the plan of care and counseling regarding the diagnosis and prognosis. Questions are answered at this time.                            Clinical Impression:   The primary encounter diagnosis was Nausea. A diagnosis of Urinary tract infection without hematuria, site unspecified was also pertinent to this visit.      Disposition:   Disposition: Discharged  Condition: Stable                        Bill Gilmore MD  10/28/18 1938

## 2018-10-29 NOTE — ED NOTES
MD aware of pt's vital signs & states OK for discharge home at this time. Pt reports she has not yet taken her home BP night time meds & will do so as soon as she arrives at her residence. Pt is stable, in NAD, RR even & unlabored, reports no further nausea. Pt denies any further needs, questions, concerns or complaints. Will d/c per order.

## 2018-10-31 ENCOUNTER — TELEPHONE (OUTPATIENT)
Dept: RADIOLOGY | Facility: HOSPITAL | Age: 63
End: 2018-10-31

## 2018-10-31 ENCOUNTER — LAB VISIT (OUTPATIENT)
Dept: LAB | Facility: HOSPITAL | Age: 63
End: 2018-10-31
Attending: INTERNAL MEDICINE
Payer: MEDICAID

## 2018-10-31 DIAGNOSIS — K74.60 CIRRHOSIS: Primary | ICD-10-CM

## 2018-10-31 DIAGNOSIS — K74.60 CIRRHOSIS: ICD-10-CM

## 2018-10-31 LAB
ALBUMIN SERPL BCP-MCNC: 2.4 G/DL
ALP SERPL-CCNC: 102 U/L
ALT SERPL W/O P-5'-P-CCNC: 24 U/L
ANION GAP SERPL CALC-SCNC: 7 MMOL/L
AST SERPL-CCNC: 52 U/L
BASOPHILS # BLD AUTO: 0.03 K/UL
BASOPHILS NFR BLD: 0.4 %
BILIRUB SERPL-MCNC: 0.4 MG/DL
BUN SERPL-MCNC: 17 MG/DL
CALCIUM SERPL-MCNC: 9.2 MG/DL
CHLORIDE SERPL-SCNC: 108 MMOL/L
CO2 SERPL-SCNC: 30 MMOL/L
CREAT SERPL-MCNC: 2.4 MG/DL
DIFFERENTIAL METHOD: ABNORMAL
EOSINOPHIL # BLD AUTO: 0.4 K/UL
EOSINOPHIL NFR BLD: 6.1 %
ERYTHROCYTE [DISTWIDTH] IN BLOOD BY AUTOMATED COUNT: 14.6 %
EST. GFR  (AFRICAN AMERICAN): 24.4 ML/MIN/1.73 M^2
EST. GFR  (NON AFRICAN AMERICAN): 21.2 ML/MIN/1.73 M^2
GLUCOSE SERPL-MCNC: 151 MG/DL
HCT VFR BLD AUTO: 37.1 %
HGB BLD-MCNC: 12.3 G/DL
INR PPP: 1.2
LYMPHOCYTES # BLD AUTO: 3.5 K/UL
LYMPHOCYTES NFR BLD: 49.3 %
MCH RBC QN AUTO: 29.9 PG
MCHC RBC AUTO-ENTMCNC: 33.2 G/DL
MCV RBC AUTO: 90 FL
MONOCYTES # BLD AUTO: 0.8 K/UL
MONOCYTES NFR BLD: 10.7 %
NEUTROPHILS # BLD AUTO: 2.4 K/UL
NEUTROPHILS NFR BLD: 33.4 %
PLATELET # BLD AUTO: 228 K/UL
PMV BLD AUTO: 9 FL
POTASSIUM SERPL-SCNC: 5 MMOL/L
PROT SERPL-MCNC: 6.5 G/DL
PROTHROMBIN TIME: 11.9 SEC
RBC # BLD AUTO: 4.12 M/UL
SODIUM SERPL-SCNC: 145 MMOL/L
WBC # BLD AUTO: 7.04 K/UL

## 2018-10-31 PROCEDURE — 80053 COMPREHEN METABOLIC PANEL: CPT | Mod: PO

## 2018-10-31 PROCEDURE — 85025 COMPLETE CBC W/AUTO DIFF WBC: CPT | Mod: PO

## 2018-10-31 PROCEDURE — 36415 COLL VENOUS BLD VENIPUNCTURE: CPT | Mod: PO

## 2018-10-31 PROCEDURE — 85610 PROTHROMBIN TIME: CPT | Mod: PO

## 2018-11-01 ENCOUNTER — HOSPITAL ENCOUNTER (OUTPATIENT)
Dept: RADIOLOGY | Facility: HOSPITAL | Age: 63
Discharge: HOME | End: 2018-11-01
Attending: INTERNAL MEDICINE
Payer: MEDICAID

## 2018-11-01 DIAGNOSIS — K74.60 CIRRHOSIS: ICD-10-CM

## 2018-11-01 PROCEDURE — 76700 US EXAM ABDOM COMPLETE: CPT | Mod: TC,PO

## 2018-11-01 PROCEDURE — 76700 US EXAM ABDOM COMPLETE: CPT | Mod: 26,,, | Performed by: RADIOLOGY

## 2018-11-02 ENCOUNTER — TELEPHONE (OUTPATIENT)
Dept: HEPATOLOGY | Facility: CLINIC | Age: 63
End: 2018-11-02

## 2018-11-02 DIAGNOSIS — K74.3 CHOLESTATIC CIRRHOSIS: Primary | ICD-10-CM

## 2018-11-02 NOTE — TELEPHONE ENCOUNTER
----- Message from Liliane Baker MD sent at 11/2/2018 11:28 AM CDT -----  Ordered    ----- Message -----  From: Seven Jones Jr., MA  Sent: 11/2/2018  11:17 AM  To: Liliane Baker MD     Good morning Dr. Baker can u plzs put orders in for pt. Thnxs.........  ----- Message -----  From: Liliane Baekr MD  Sent: 11/2/2018  11:02 AM  To: [facility] Non-Clinical Staff    Kidney function elevated.  Needs to hold lasix and repeat labs in 1 week.

## 2018-11-02 NOTE — TELEPHONE ENCOUNTER
Good morning Dr. Baker can u plzs put orders in for pt. Thnxs.........  ----- Message -----  From: Liliane Baker MD  Sent: 11/2/2018  11:02 AM  To: [facility] Non-Clinical Staff     Kidney function elevated.  Needs to hold lasix and repeat labs in 1 week.           Spoke w/pt gave lab results & scheduled 1 week lab  appt.   //////////////////

## 2018-11-02 NOTE — TELEPHONE ENCOUNTER
Good morning Dr. Baker can u plzs put orders in for pt. Thnxs.........  ----- Message -----  From: Liliane Baker MD  Sent: 11/2/2018  11:02 AM  To: [facility] Non-Clinical Staff     Kidney function elevated.  Needs to hold lasix and repeat labs in 1 week.        Called & lft msg. To call back for lab appt. /////////

## 2018-11-02 NOTE — TELEPHONE ENCOUNTER
----- Message from Liliane Baker MD sent at 11/2/2018  8:11 AM CDT -----  Please mail results, no concerning findings.  The ultrasound suggests that the TIPS may have some dysfunction but if fluid remains well controlled, no further studies required at this time and we will perform liver ultrasound with doppler with return visit.

## 2018-11-02 NOTE — TELEPHONE ENCOUNTER
----- Message from Liliane Baker MD sent at 11/2/2018 11:28 AM CDT -----  Ordered    ----- Message -----  From: Seven Jones Jr., MA  Sent: 11/2/2018  11:17 AM  To: Liliane Baker MD     Good morning Dr. Baker can u plzs put orders in for pt. Thnxs.........  ----- Message -----  From: Liliane Baker MD  Sent: 11/2/2018  11:02 AM  To: [facility] Hep[facility] Non-Clinical Staff    Kidney function elevated.  Needs to hold lasix and repeat labs in 1 week.

## 2018-11-02 NOTE — TELEPHONE ENCOUNTER
----- Message from Liliane Baker MD sent at 11/2/2018  8:11 AM CDT -----  Please mail results, no concerning findings.  The ultrasound suggests that the TIPS may have some dysfunction but if fluid remains well controlled, no further studies required at this time and we will perform liver ultrasound with doppler with return visit.     Mailed results as directed............

## 2018-11-07 ENCOUNTER — PATIENT OUTREACH (OUTPATIENT)
Dept: ADMINISTRATIVE | Facility: HOSPITAL | Age: 63
End: 2018-11-07

## 2018-11-09 ENCOUNTER — LAB VISIT (OUTPATIENT)
Dept: LAB | Facility: HOSPITAL | Age: 63
End: 2018-11-09
Attending: INTERNAL MEDICINE
Payer: MEDICAID

## 2018-11-09 DIAGNOSIS — K74.3 CHOLESTATIC CIRRHOSIS: ICD-10-CM

## 2018-11-09 LAB
ANION GAP SERPL CALC-SCNC: 7 MMOL/L
BUN SERPL-MCNC: 27 MG/DL
CALCIUM SERPL-MCNC: 9.3 MG/DL
CHLORIDE SERPL-SCNC: 111 MMOL/L
CO2 SERPL-SCNC: 27 MMOL/L
CREAT SERPL-MCNC: 1.9 MG/DL
EST. GFR  (AFRICAN AMERICAN): 32.3 ML/MIN/1.73 M^2
EST. GFR  (NON AFRICAN AMERICAN): 28.1 ML/MIN/1.73 M^2
GLUCOSE SERPL-MCNC: 96 MG/DL
POTASSIUM SERPL-SCNC: 4.4 MMOL/L
SODIUM SERPL-SCNC: 145 MMOL/L

## 2018-11-09 PROCEDURE — 80048 BASIC METABOLIC PNL TOTAL CA: CPT | Mod: PO

## 2018-11-09 PROCEDURE — 36415 COLL VENOUS BLD VENIPUNCTURE: CPT | Mod: PO

## 2018-11-13 ENCOUNTER — TELEPHONE (OUTPATIENT)
Dept: HEPATOLOGY | Facility: CLINIC | Age: 63
End: 2018-11-13

## 2018-11-13 DIAGNOSIS — K83.1 CHOLESTATIC LIVER DISEASE: Primary | ICD-10-CM

## 2018-11-13 NOTE — TELEPHONE ENCOUNTER
----- Message from Liliane Baker MD sent at 11/13/2018  4:15 PM CST -----  Continue to hold lasix.  Repeat labs in 1-2 weeks     Left message to continue hold lasix & to call back to schedule next lab appt...............

## 2018-11-13 NOTE — TELEPHONE ENCOUNTER
----- Message from Liliane Baker MD sent at 11/13/2018  4:15 PM CST -----  Continue to hold lasix.  Repeat labs in 1-2 weeks

## 2018-11-14 ENCOUNTER — TELEPHONE (OUTPATIENT)
Dept: HEPATOLOGY | Facility: CLINIC | Age: 63
End: 2018-11-14

## 2018-11-14 NOTE — TELEPHONE ENCOUNTER
----- Message from Susanne Willis sent at 11/14/2018 12:26 PM CST -----  Patient Returning Call from [facility]    Who Left Message for Patient: Seven JEAN  Communication Preference: [phone]  Additional Information:

## 2018-11-14 NOTE — TELEPHONE ENCOUNTER
----- Message from Seven Jones Jr., MA sent at 11/13/2018  4:25 PM CST -----      ----- Message -----  From: Liliane Baker MD  Sent: 11/13/2018   4:15 PM  To: [facility] Hep[facility] Non-Clinical Staff    Continue to hold lasix.  Repeat labs in 1-2 weeks

## 2018-11-14 NOTE — TELEPHONE ENCOUNTER
----- Message -----  From: Liliane Baker MD  Sent: 11/13/2018   4:15 PM  To: [facility] Hep[facility] Non-Clinical Staff     Continue to hold lasix.  Repeat labs in 1-2 weeks     Spoke w/pt gave directions on holding lasix & lab appt on 11/20 in [facility]................

## 2018-11-14 NOTE — TELEPHONE ENCOUNTER
----- Message from Gris Aguilar RN sent at 11/13/2018  4:53 PM CST -----  Contact: Patient      ----- Message -----  From: Stacie Braun  Sent: 11/13/2018   4:50 PM  [facility]    Needs Advice    Reason for call: Patient returning a call from Seven Jones        Communication Preference: [phone]    Additional Information:

## 2018-11-14 NOTE — TELEPHONE ENCOUNTER
----- Message from Gris Aguilar RN sent at 11/13/2018  4:53 PM CST -----  Contact: Patient        ----- Message -----  From: Stacie Braun  Sent: 11/13/2018   4:50 PM  To: [facility] Clinical Staff     Needs Advice     Reason for call: Patient returning a call from Seven Jones        Communication Preference: [phone]     Additional Information:        Called & left message to call back to schedule lab appt...............

## 2018-11-14 NOTE — TELEPHONE ENCOUNTER
----- Message from Susanne Willis sent at 11/14/2018 12:26 PM CST -----  Patient Returning Call from [facility]     Who Left Message for Patient: Seven JEAN  Communication Preference: [phone]  Additional Information:     Left msg with pt to call back.............

## 2018-11-20 ENCOUNTER — LAB VISIT (OUTPATIENT)
Dept: LAB | Facility: HOSPITAL | Age: 63
End: 2018-11-20
Attending: INTERNAL MEDICINE
Payer: MEDICAID

## 2018-11-20 ENCOUNTER — TELEPHONE (OUTPATIENT)
Dept: HEPATOLOGY | Facility: CLINIC | Age: 63
End: 2018-11-20

## 2018-11-20 DIAGNOSIS — K74.60 CIRRHOSIS: Primary | ICD-10-CM

## 2018-11-20 DIAGNOSIS — K83.1 CHOLESTATIC LIVER DISEASE: ICD-10-CM

## 2018-11-20 DIAGNOSIS — K74.60 CIRRHOSIS: ICD-10-CM

## 2018-11-20 LAB
ALBUMIN SERPL BCP-MCNC: 2.7 G/DL
ALP SERPL-CCNC: 118 U/L
ALT SERPL W/O P-5'-P-CCNC: 25 U/L
ANION GAP SERPL CALC-SCNC: 8 MMOL/L
AST SERPL-CCNC: 47 U/L
BASOPHILS # BLD AUTO: 0.03 K/UL
BASOPHILS NFR BLD: 0.5 %
BILIRUB SERPL-MCNC: 0.5 MG/DL
BUN SERPL-MCNC: 22 MG/DL
CALCIUM SERPL-MCNC: 8.8 MG/DL
CHLORIDE SERPL-SCNC: 110 MMOL/L
CO2 SERPL-SCNC: 26 MMOL/L
CREAT SERPL-MCNC: 1.6 MG/DL
DIFFERENTIAL METHOD: ABNORMAL
EOSINOPHIL # BLD AUTO: 0.2 K/UL
EOSINOPHIL NFR BLD: 3.6 %
ERYTHROCYTE [DISTWIDTH] IN BLOOD BY AUTOMATED COUNT: 14.7 %
EST. GFR  (AFRICAN AMERICAN): 39.8 ML/MIN/1.73 M^2
EST. GFR  (NON AFRICAN AMERICAN): 34.5 ML/MIN/1.73 M^2
GLUCOSE SERPL-MCNC: 159 MG/DL
HCT VFR BLD AUTO: 36.7 %
HGB BLD-MCNC: 12.1 G/DL
LYMPHOCYTES # BLD AUTO: 2.9 K/UL
LYMPHOCYTES NFR BLD: 44.9 %
MCH RBC QN AUTO: 29.9 PG
MCHC RBC AUTO-ENTMCNC: 33 G/DL
MCV RBC AUTO: 91 FL
MONOCYTES # BLD AUTO: 0.5 K/UL
MONOCYTES NFR BLD: 8 %
NEUTROPHILS # BLD AUTO: 2.7 K/UL
NEUTROPHILS NFR BLD: 42.8 %
PLATELET # BLD AUTO: 224 K/UL
PMV BLD AUTO: 9.6 FL
POTASSIUM SERPL-SCNC: 4.6 MMOL/L
PROT SERPL-MCNC: 7 G/DL
RBC # BLD AUTO: 4.05 M/UL
SODIUM SERPL-SCNC: 144 MMOL/L
WBC # BLD AUTO: 6.35 K/UL

## 2018-11-20 PROCEDURE — 36415 COLL VENOUS BLD VENIPUNCTURE: CPT | Mod: PO

## 2018-11-20 PROCEDURE — 80053 COMPREHEN METABOLIC PANEL: CPT | Mod: PO

## 2018-11-20 PROCEDURE — 85025 COMPLETE CBC W/AUTO DIFF WBC: CPT | Mod: PO

## 2018-11-20 NOTE — TELEPHONE ENCOUNTER
----- Message from Liliane Baker MD sent at 11/20/2018  1:03 PM CST -----  Called patient to discuss results.  Renal function has improved.  States that ascites is manageable.  Recommend restarting furosemide at 40mg daily.  Repeat labs in 2 weeks.

## 2018-11-26 DIAGNOSIS — I10 HYPERTENSION, UNSPECIFIED TYPE: ICD-10-CM

## 2018-11-26 DIAGNOSIS — E11.59 TYPE 2 DIABETES MELLITUS WITH OTHER CIRCULATORY COMPLICATION, WITHOUT LONG-TERM CURRENT USE OF INSULIN: ICD-10-CM

## 2018-11-26 DIAGNOSIS — E03.4 HYPOTHYROIDISM DUE TO ACQUIRED ATROPHY OF THYROID: ICD-10-CM

## 2018-11-27 RX ORDER — AMLODIPINE BESYLATE 10 MG/1
TABLET ORAL
Qty: 30 TABLET | Refills: 0 | Status: SHIPPED | OUTPATIENT
Start: 2018-11-27 | End: 2018-12-30 | Stop reason: SDUPTHER

## 2018-11-27 RX ORDER — LEVOTHYROXINE SODIUM 137 UG/1
TABLET ORAL
Qty: 30 TABLET | Refills: 0 | Status: SHIPPED | OUTPATIENT
Start: 2018-11-27 | End: 2019-06-28

## 2018-12-04 ENCOUNTER — TELEPHONE (OUTPATIENT)
Dept: HEPATOLOGY | Facility: CLINIC | Age: 63
End: 2018-12-04

## 2018-12-04 ENCOUNTER — LAB VISIT (OUTPATIENT)
Dept: LAB | Facility: HOSPITAL | Age: 63
End: 2018-12-04
Attending: INTERNAL MEDICINE
Payer: MEDICAID

## 2018-12-04 DIAGNOSIS — K74.69 CRYPTOGENIC CIRRHOSIS: ICD-10-CM

## 2018-12-04 DIAGNOSIS — K76.82 HEPATIC ENCEPHALOPATHY: ICD-10-CM

## 2018-12-04 DIAGNOSIS — K74.60 CIRRHOSIS: ICD-10-CM

## 2018-12-04 DIAGNOSIS — K74.69 CRYPTOGENIC CIRRHOSIS: Primary | ICD-10-CM

## 2018-12-04 LAB
ANION GAP SERPL CALC-SCNC: 7 MMOL/L
BUN SERPL-MCNC: 21 MG/DL
CALCIUM SERPL-MCNC: 9.1 MG/DL
CHLORIDE SERPL-SCNC: 110 MMOL/L
CO2 SERPL-SCNC: 28 MMOL/L
CREAT SERPL-MCNC: 1.6 MG/DL
EST. GFR  (AFRICAN AMERICAN): 39.8 ML/MIN/1.73 M^2
EST. GFR  (NON AFRICAN AMERICAN): 34.5 ML/MIN/1.73 M^2
GLUCOSE SERPL-MCNC: 118 MG/DL
POTASSIUM SERPL-SCNC: 4.5 MMOL/L
SODIUM SERPL-SCNC: 145 MMOL/L

## 2018-12-04 PROCEDURE — 80048 BASIC METABOLIC PNL TOTAL CA: CPT | Mod: PO

## 2018-12-04 PROCEDURE — 36415 COLL VENOUS BLD VENIPUNCTURE: CPT | Mod: PO

## 2018-12-04 NOTE — TELEPHONE ENCOUNTER
Creation Time: 12/04/2018 2:55 PM         ----- Message from Liliane Baker MD sent at 12/4/2018  1:59 PM CST -----  Renal function remains stable, continue lasix 40mg daily.  Patient is due for hepatology follow-up and should be scheduled within 2 months.  May repeat labs with appointment                Spoke w/pt & scheduled F/U & lab appts., & directions on Rxs / lab results..........

## 2018-12-04 NOTE — TELEPHONE ENCOUNTER
----- Message from Liliane Baker MD sent at 12/4/2018  1:59 PM CST -----  Renal function remains stable, continue lasix 40mg daily.  Patient is due for hepatology follow-up and should be scheduled within 2 months.  May repeat labs with appointment

## 2018-12-04 NOTE — TELEPHONE ENCOUNTER
----- Message from Liliane Baker MD sent at 12/4/2018  1:59 PM CST -----  Renal function remains stable, continue lasix 40mg daily.  Patient is due for hepatology follow-up and should be scheduled within 2 months.  May repeat labs with appointment     Called  & left message to call back for lab results & Rxs instructions..............

## 2018-12-05 ENCOUNTER — PATIENT OUTREACH (OUTPATIENT)
Dept: ADMINISTRATIVE | Facility: HOSPITAL | Age: 63
End: 2018-12-05

## 2018-12-06 ENCOUNTER — LAB VISIT (OUTPATIENT)
Dept: LAB | Facility: HOSPITAL | Age: 63
End: 2018-12-06
Attending: FAMILY MEDICINE
Payer: MEDICAID

## 2018-12-06 ENCOUNTER — OFFICE VISIT (OUTPATIENT)
Dept: INTERNAL MEDICINE | Facility: CLINIC | Age: 63
End: 2018-12-06
Payer: MEDICAID

## 2018-12-06 VITALS
RESPIRATION RATE: 16 BRPM | SYSTOLIC BLOOD PRESSURE: 172 MMHG | WEIGHT: 222 LBS | OXYGEN SATURATION: 99 % | DIASTOLIC BLOOD PRESSURE: 82 MMHG | TEMPERATURE: 96 F | HEART RATE: 66 BPM | HEIGHT: 61 IN | BODY MASS INDEX: 41.91 KG/M2

## 2018-12-06 DIAGNOSIS — E11.43 DIABETIC GASTROPARESIS ASSOCIATED WITH TYPE 2 DIABETES MELLITUS: ICD-10-CM

## 2018-12-06 DIAGNOSIS — Z12.31 SCREENING MAMMOGRAM, ENCOUNTER FOR: ICD-10-CM

## 2018-12-06 DIAGNOSIS — K31.84 DIABETIC GASTROPARESIS ASSOCIATED WITH TYPE 2 DIABETES MELLITUS: ICD-10-CM

## 2018-12-06 DIAGNOSIS — E11.59 TYPE 2 DIABETES MELLITUS WITH OTHER CIRCULATORY COMPLICATION, WITHOUT LONG-TERM CURRENT USE OF INSULIN: Primary | ICD-10-CM

## 2018-12-06 DIAGNOSIS — E11.59 TYPE 2 DIABETES MELLITUS WITH OTHER CIRCULATORY COMPLICATION, WITHOUT LONG-TERM CURRENT USE OF INSULIN: ICD-10-CM

## 2018-12-06 DIAGNOSIS — Z12.11 SCREEN FOR COLON CANCER: ICD-10-CM

## 2018-12-06 LAB
ALBUMIN SERPL BCP-MCNC: 2.7 G/DL
ALP SERPL-CCNC: 136 U/L
ALT SERPL W/O P-5'-P-CCNC: 34 U/L
ANION GAP SERPL CALC-SCNC: 8 MMOL/L
AST SERPL-CCNC: 52 U/L
BILIRUB SERPL-MCNC: 0.4 MG/DL
BUN SERPL-MCNC: 24 MG/DL
CALCIUM SERPL-MCNC: 9.5 MG/DL
CHLORIDE SERPL-SCNC: 110 MMOL/L
CHOLEST SERPL-MCNC: 205 MG/DL
CHOLEST/HDLC SERPL: 5 {RATIO}
CO2 SERPL-SCNC: 28 MMOL/L
CREAT SERPL-MCNC: 1.8 MG/DL
EST. GFR  (AFRICAN AMERICAN): 34.5 ML/MIN/1.73 M^2
EST. GFR  (NON AFRICAN AMERICAN): 29.9 ML/MIN/1.73 M^2
ESTIMATED AVG GLUCOSE: 157 MG/DL
GLUCOSE SERPL-MCNC: 144 MG/DL
HBA1C MFR BLD HPLC: 7.1 %
HDLC SERPL-MCNC: 41 MG/DL
HDLC SERPL: 20 %
LDLC SERPL CALC-MCNC: 145.8 MG/DL
NONHDLC SERPL-MCNC: 164 MG/DL
POTASSIUM SERPL-SCNC: 4.7 MMOL/L
PROT SERPL-MCNC: 7.3 G/DL
SODIUM SERPL-SCNC: 146 MMOL/L
TRIGL SERPL-MCNC: 91 MG/DL

## 2018-12-06 PROCEDURE — 36415 COLL VENOUS BLD VENIPUNCTURE: CPT | Mod: PO

## 2018-12-06 PROCEDURE — 99999 PR PBB SHADOW E&M-EST. PATIENT-LVL IV: CPT | Mod: PBBFAC,,, | Performed by: FAMILY MEDICINE

## 2018-12-06 PROCEDURE — 99214 OFFICE O/P EST MOD 30 MIN: CPT | Mod: PBBFAC,PO | Performed by: FAMILY MEDICINE

## 2018-12-06 PROCEDURE — 80053 COMPREHEN METABOLIC PANEL: CPT | Mod: PO

## 2018-12-06 PROCEDURE — 80061 LIPID PANEL: CPT

## 2018-12-06 PROCEDURE — 99214 OFFICE O/P EST MOD 30 MIN: CPT | Mod: S$PBB,,, | Performed by: FAMILY MEDICINE

## 2018-12-06 PROCEDURE — 83036 HEMOGLOBIN GLYCOSYLATED A1C: CPT

## 2018-12-06 RX ORDER — DOXAZOSIN 2 MG/1
2 TABLET ORAL NIGHTLY
Qty: 60 TABLET | Refills: 0 | Status: SHIPPED | OUTPATIENT
Start: 2018-12-06 | End: 2019-02-08 | Stop reason: SDUPTHER

## 2018-12-07 ENCOUNTER — TELEPHONE (OUTPATIENT)
Dept: INTERNAL MEDICINE | Facility: CLINIC | Age: 63
End: 2018-12-07

## 2018-12-07 NOTE — PROGRESS NOTES
Some lab values are out of range, but I feel that no further workup is required at this time.  Please feel free to contact my office with any questions.  a1c has improved to 7.1, keep up the good work.  Chol only mildly elevated.  Her kidney function continues to be an issue with her creatinine being a little more elevated, her GFR is fairly stable though -cont seeing nephrology.      Andrey Perrin MD

## 2018-12-07 NOTE — TELEPHONE ENCOUNTER
Tried to contact patient with lab results, no answer. Left a message to contact office at [phone]

## 2018-12-07 NOTE — TELEPHONE ENCOUNTER
----- Message from Andrey Perrin MD sent at 12/7/2018  7:30 AM CST -----  Some lab values are out of range, but I feel that no further workup is required at this time.  Please feel free to contact my office with any questions.  a1c has improved to 7.1, keep up the good work.  Chol only mildly elevated.  Her kidney function continues to be an issue with her creatinine being a little more elevated, her GFR is fairly stable though -cont seeing nephrology.      Andrey Perrin MD

## 2018-12-10 NOTE — TELEPHONE ENCOUNTER
Call patient with result, express understanding . Informed patient if she has any question to contact our office

## 2018-12-20 ENCOUNTER — TELEPHONE (OUTPATIENT)
Dept: HEPATOLOGY | Facility: CLINIC | Age: 63
End: 2018-12-20

## 2018-12-20 ENCOUNTER — LAB VISIT (OUTPATIENT)
Dept: LAB | Facility: HOSPITAL | Age: 63
End: 2018-12-20
Attending: INTERNAL MEDICINE
Payer: MEDICAID

## 2018-12-20 ENCOUNTER — OFFICE VISIT (OUTPATIENT)
Dept: DIABETES | Facility: CLINIC | Age: 63
End: 2018-12-20
Payer: MEDICAID

## 2018-12-20 VITALS
HEIGHT: 61 IN | SYSTOLIC BLOOD PRESSURE: 126 MMHG | DIASTOLIC BLOOD PRESSURE: 72 MMHG | BODY MASS INDEX: 42.71 KG/M2 | WEIGHT: 226.19 LBS

## 2018-12-20 DIAGNOSIS — K74.60 CIRRHOSIS: Primary | ICD-10-CM

## 2018-12-20 DIAGNOSIS — K31.84 DIABETIC GASTROPARESIS ASSOCIATED WITH TYPE 2 DIABETES MELLITUS: ICD-10-CM

## 2018-12-20 DIAGNOSIS — E03.4 HYPOTHYROIDISM DUE TO ACQUIRED ATROPHY OF THYROID: ICD-10-CM

## 2018-12-20 DIAGNOSIS — K74.60 CIRRHOSIS: ICD-10-CM

## 2018-12-20 DIAGNOSIS — E11.43 DIABETIC GASTROPARESIS ASSOCIATED WITH TYPE 2 DIABETES MELLITUS: ICD-10-CM

## 2018-12-20 DIAGNOSIS — E11.59 TYPE 2 DIABETES MELLITUS WITH OTHER CIRCULATORY COMPLICATION, WITHOUT LONG-TERM CURRENT USE OF INSULIN: Primary | ICD-10-CM

## 2018-12-20 LAB
ALBUMIN SERPL BCP-MCNC: 2.6 G/DL
ALP SERPL-CCNC: 158 U/L
ALT SERPL W/O P-5'-P-CCNC: 23 U/L
ANION GAP SERPL CALC-SCNC: 7 MMOL/L
AST SERPL-CCNC: 31 U/L
BASOPHILS # BLD AUTO: 0.02 K/UL
BASOPHILS NFR BLD: 0.3 %
BILIRUB SERPL-MCNC: 0.3 MG/DL
BUN SERPL-MCNC: 23 MG/DL
CALCIUM SERPL-MCNC: 9 MG/DL
CHLORIDE SERPL-SCNC: 111 MMOL/L
CO2 SERPL-SCNC: 26 MMOL/L
CREAT SERPL-MCNC: 1.9 MG/DL
DIFFERENTIAL METHOD: ABNORMAL
EOSINOPHIL # BLD AUTO: 0.3 K/UL
EOSINOPHIL NFR BLD: 4.4 %
ERYTHROCYTE [DISTWIDTH] IN BLOOD BY AUTOMATED COUNT: 14.1 %
EST. GFR  (AFRICAN AMERICAN): 32.3 ML/MIN/1.73 M^2
EST. GFR  (NON AFRICAN AMERICAN): 28.1 ML/MIN/1.73 M^2
GLUCOSE SERPL-MCNC: 240 MG/DL (ref 70–110)
GLUCOSE SERPL-MCNC: 241 MG/DL
HCT VFR BLD AUTO: 36.4 %
HGB BLD-MCNC: 12.3 G/DL
INR PPP: 1.1
LYMPHOCYTES # BLD AUTO: 3.1 K/UL
LYMPHOCYTES NFR BLD: 53.1 %
MCH RBC QN AUTO: 30 PG
MCHC RBC AUTO-ENTMCNC: 33.8 G/DL
MCV RBC AUTO: 89 FL
MONOCYTES # BLD AUTO: 0.5 K/UL
MONOCYTES NFR BLD: 8 %
NEUTROPHILS # BLD AUTO: 2 K/UL
NEUTROPHILS NFR BLD: 34 %
PLATELET # BLD AUTO: 199 K/UL
PLATELET # BLD AUTO: 199 K/UL
PMV BLD AUTO: 9.8 FL
PMV BLD AUTO: 9.8 FL
POTASSIUM SERPL-SCNC: 4 MMOL/L
PROT SERPL-MCNC: 7 G/DL
PROTHROMBIN TIME: 10.9 SEC
RBC # BLD AUTO: 4.1 M/UL
SODIUM SERPL-SCNC: 144 MMOL/L
WBC # BLD AUTO: 5.89 K/UL

## 2018-12-20 PROCEDURE — 99214 OFFICE O/P EST MOD 30 MIN: CPT | Mod: S$PBB,,, | Performed by: PHYSICIAN ASSISTANT

## 2018-12-20 PROCEDURE — 99214 OFFICE O/P EST MOD 30 MIN: CPT | Mod: PBBFAC,PN | Performed by: PHYSICIAN ASSISTANT

## 2018-12-20 PROCEDURE — 99999 PR PBB SHADOW E&M-EST. PATIENT-LVL IV: CPT | Mod: PBBFAC,,, | Performed by: PHYSICIAN ASSISTANT

## 2018-12-20 PROCEDURE — 36415 COLL VENOUS BLD VENIPUNCTURE: CPT | Mod: PO

## 2018-12-20 PROCEDURE — 82962 GLUCOSE BLOOD TEST: CPT | Mod: PBBFAC,PN | Performed by: PHYSICIAN ASSISTANT

## 2018-12-20 PROCEDURE — 85610 PROTHROMBIN TIME: CPT | Mod: PO

## 2018-12-20 PROCEDURE — 80053 COMPREHEN METABOLIC PANEL: CPT | Mod: PO

## 2018-12-20 PROCEDURE — 85025 COMPLETE CBC W/AUTO DIFF WBC: CPT | Mod: PO

## 2018-12-20 RX ORDER — INSULIN GLARGINE 100 [IU]/ML
25 INJECTION, SOLUTION SUBCUTANEOUS NIGHTLY
COMMUNITY
End: 2019-01-17

## 2018-12-20 NOTE — PATIENT INSTRUCTIONS
" I have reviewed your results and they are still quite high. I would like you to start "Treating to Target". The treatment will be Insulin and your target will be the Fasting and 2 hour post meal blood sugar. It will work in this manner;    1. Goal for Fasting blood sugar is  mg/dl. I realize that you will need time to adjust to the new levels and presently you may feel too low if you are too aggressive now. So go slow and aim to lower your blood sugar to below 200 then 150 then 100 over several months.    2. Goal for 2 hour post meal blood sugar is below 180 mg/dl, here the same rules apply as in #1.    3. You will check your fasting blood sugar daily, if not where we would like it to be over a 3 day period then that evening we will increase the Lantus dose by 5 units. Then repeat the process over the next 3 days. Remember this is a slow process and take our time getting to goal. But, each week should be better than prior weeks. Blood sugars below 70 are unacceptable and should raise a "RED FLAG" where we may have to reduce our dose of insulin.    4. You will check your post meal glucose daily as well. However, each day you will check a different meal, (ie. Monday-breakfast; Tuesday- lunch; Wednesday- supper, then repeat). If your post meal glucose is not where we would like, increase pre-meal insulin by 2 units next time. A word of CAUTION: mealtime insulin is dependant on the size and concentration of your meal content. If not consuming a large meal do not take large dose of insulin. Use the reasonable person rule.     5. If you have any questions please do not hesitate to call.    Intensive insulin Therapy with correction factor:    You are on Intensive insulin therapy with Basal and Bolus insulin. Lantus is your Basal insulin and will help maintain your fasting and between meal sugar. Your fast acting or rescue insulin is Admelog insulin and will control your post meal sugar.     You will Take 30 units of " "Lantus at 9 pm each night. This will be adjusted up or down depending on your fasting blood sugar before breakfast.    Admelog will follow this pre meal schedule; Correction factor of "1 units per 50 mg/dl" and is based on 15-45 grams of carbohydrates per meal.    If blood sugar is below 70 eat first then check your blood sugar 2 hours later and make correction.  If blood pre-meal sugar is  70 -150 take 10 units of Admelog;  If blood pre-meal sugar is 151-200 take +1 units of Admelog;  If blood pre-meal sugar is 201-250 take +2 units of Admelog;  If blood pre-meal sugar is 251-300 take +3 units of Admelog;  If blood pre-meal sugar is 301-350 take +4 units of Admelog;  If blood pre-meal sugar is 351-400+ take +5 units of Admelog;  Also increase water intake and call for appointment.      "

## 2018-12-20 NOTE — TELEPHONE ENCOUNTER
----- Message from Liliane Baker MD sent at 12/20/2018 11:31 AM CST -----  Small rise in creatinine to 1.9.  No change in diuretics but repeat labs in 2 weeks.

## 2018-12-20 NOTE — PROGRESS NOTES
PCP: Andrey Perrin MD    Subjective:    Patient ID: Diamond Das is a 63 y.o. female.    PCP: Andrey Perrin MD      Diamond Das is a pleasant 63 y.o. female presenting for her follow up on diabetes mellitus. She has had diabetes for 8 or more years. Her last visit was 9/20/2018 Since her last visit she has had moderate improvement in her glycemia. She has not been checking her SMBG on a regular basis. Her current concerns are glycemic control.      She denies any hospital admissions, emergency room visits, hypoglycemia, syncope, diaphoresis, chest pain, or dyspnea.    She has gained 4 pounds since last visit. Her BMI is 42.74 kg/m²    Her blood sugar in the clinic today was:   Lab Results   Component Value Date    POCGLU 240 (A) 12/20/2018       We discussed the American diabetes Association recommendations:  hemoglobin A1c below 7.0%; all diabetics should be on statins unless contraindicated; one aspirin daily unless contraindicated; fasting blood sugar between 80 and 130 mg/dL; postprandial blood sugar below 180 mg/dl; prevention of hypoglycemia, may adjust goals to higher levels if persistent; ACE or ARB therapy if not contraindicated; and maintain in an ideal body weight with BMI below 25.    Diamond is compliant most of the time with DM medications.     Diamond is noncompliant some of the time with lifestyle modifications to include activity and meal planning.       Current Outpatient Medications:     amLODIPine (NORVASC) 10 MG tablet, TAKE 1 TABLET BY MOUTH ONCE DAILY, Disp: 30 tablet, Rfl: 0    carvedilol (COREG) 3.125 MG tablet, Take 1 tablet (3.125 mg total) by mouth 2 (two) times daily., Disp: 180 tablet, Rfl: 1    doxazosin (CARDURA) 2 MG tablet, Take 1 tablet (2 mg total) by mouth every evening., Disp: 60 tablet, Rfl: 0    furosemide (LASIX) 80 MG tablet, Take 1 tablet (80 mg total) by mouth once daily. (Patient taking differently: Take 40 mg by mouth daily as needed. ), Disp: 90 tablet,  Rfl: 0    insulin (LANTUS SOLOSTAR U-100 INSULIN) glargine 100 units/mL (3mL) SubQ pen, Inject 25 Units into the skin every evening., Disp: , Rfl:     insulin lispro (ADMELOG SOLOSTAR U-100 INSULIN) 100 unit/mL InPn pen, Titrate up to 14 units subcutaneously three times a day 10-15 min before meals as directed in after visit summary, Disp: 15 mL, Rfl: 6    lactulose (CHRONULAC) 20 gram/30 mL Soln, Take 22.5 mLs (15 g total) by mouth 3 (three) times daily., Disp: 946 mL, Rfl: 11    levothyroxine (SYNTHROID) 137 MCG Tab tablet, TAKE 1 TABLET BY MOUTH BEFORE BREAKFAST, Disp: 30 tablet, Rfl: 0    lisinopril (PRINIVIL,ZESTRIL) 5 MG tablet, Take 1 tablet (5 mg total) by mouth once daily., Disp: 90 tablet, Rfl: 6    ondansetron (ZOFRAN) 4 MG tablet, Take 1 tablet (4 mg total) by mouth every 8 (eight) hours as needed., Disp: 12 tablet, Rfl: 0    ondansetron (ZOFRAN-ODT) 4 MG TbDL, Take 1 tablet (4 mg total) by mouth every 6 (six) hours as needed., Disp: 12 tablet, Rfl: 0    pantoprazole (PROTONIX) 20 MG tablet, TAKE 2 TABLETS BY MOUTH ONCE DAILY, Disp: 90 tablet, Rfl: 0    rifAXIMin (XIFAXAN) 550 mg Tab, Take 1 tablet (550 mg total) by mouth 2 (two) times daily., Disp: 60 tablet, Rfl: 11    simvastatin (ZOCOR) 40 MG tablet, Take 40 mg by mouth once daily. , Disp: , Rfl:     tiZANidine (ZANAFLEX) 4 MG tablet, Take 4 mg by mouth every evening., Disp: , Rfl:     vitamin E 400 UNIT capsule, Take 400 Units by mouth every morning., Disp: , Rfl:     aspirin (ECOTRIN) 81 MG EC tablet, Take 1 tablet (81 mg total) by mouth once daily., Disp: 365 tablet, Rfl: 0    Past Medical History:   Diagnosis Date    Ascites     Cataract     CHF (congestive heart failure)     Cirrhosis     Diabetes mellitus     Gastroparesis     GERD (gastroesophageal reflux disease)     Hypertension     Liver disease     Thyroid disease        Family History   Problem Relation Age of Onset    Cancer Mother     Breast cancer Mother      Heart disease Father     Aneurysm Sister     Heart disease Brother     No Known Problems Maternal Grandmother     Cancer Maternal Grandfather     Sarcoidosis Sister          Social History     Socioeconomic History    Marital status:      Spouse name: Not on file    Number of children: Not on file    Years of education: Not on file    Highest education level: Not on file   Social Needs    Financial resource strain: Not on file    Food insecurity - worry: Not on file    Food insecurity - inability: Not on file    Transportation needs - medical: Not on file    Transportation needs - non-medical: Not on file   Occupational History    Not on file   Tobacco Use    Smoking status: Never Smoker    Smokeless tobacco: Never Used   Substance and Sexual Activity    Alcohol use: No     Frequency: Never    Drug use: No    Sexual activity: No   Other Topics Concern    Not on file   Social History Narrative    Not on file         STANDARDS OF CARE:  Eye doctor: Dr. Naheed soler, last exam 1/9/2018.  Dental exam: Recommend regular exams; denies gums bleeding.  Podiatry doctor:  ACE/ARB: No  Statin: Yes    ACTIVITY LEVEL: She exercises rarely.  BLOOD GLUCOSE TESTING: Self-monitoring with   SOCIAL HISTORY: single. Lives alone. retired no tobacco use    Health Maintenance   Topic Date Due    Influenza Vaccine  08/01/2018    Mammogram  12/29/2018    Eye Exam  01/09/2019    Fecal Occult Blood Test (FOBT)/[employer]  12/21/2018    Hemoglobin A1c  06/06/2019    Foot Exam  09/07/2019    Lipid Panel  12/06/2019    Urine Microalbumin  12/06/2019    Pap Smear with HPV Cotest  01/29/2021    TETANUS VACCINE  09/08/2026    Hepatitis C Screening  Completed    Zoster Vaccine  Completed    Pneumococcal Vaccine (Medium Risk)  Completed       Diabetes Management Status    Statin: Taking  ACE/ARB: Not taking    Screening or Prevention Patient's value Goal Complete/Controlled?   HgA1C Testing and Control   Lab  Results   Component Value Date    HGBA1C 7.1 (H) 12/06/2018      Annually/Less than 8% Yes   Lipid profile : 12/06/2018 Annually Yes   LDL control Lab Results   Component Value Date    LDLCALC 145.8 12/06/2018    Annually/Less than 100 mg/dl  No   Nephropathy screening Lab Results   Component Value Date    LABMICR 1635.0 12/06/2018     Lab Results   Component Value Date    PROTEINUA 2+ (A) 10/28/2018    Annually Yes   Blood pressure BP Readings from Last 1 Encounters:   12/20/18 126/72    Less than 140/90 Yes   Dilated retinal exam : 01/09/2018 Annually Yes   Foot exam   : 09/07/2018 Annually Yes     The following results were reviewed with patient.    Lab Results   Component Value Date    WBC 6.35 11/20/2018    HGB 12.1 11/20/2018    HCT 36.7 (L) 11/20/2018     11/20/2018    CHOL 205 (H) 12/06/2018    TRIG 91 12/06/2018    HDL 41 12/06/2018    LDLCALC 145.8 12/06/2018    ALT 34 12/06/2018    AST 52 (H) 12/06/2018     (H) 12/06/2018    K 4.7 12/06/2018     12/06/2018    CREATININE 1.8 (H) 12/06/2018    ESTGFRAFRICA 34.5 (A) 12/06/2018    EGFRNONAA 29.9 (A) 12/06/2018    BUN 24 (H) 12/06/2018    CO2 28 12/06/2018    TSH 7.167 (H) 07/30/2018    INR 1.2 10/31/2018     (H) 12/06/2018    UTPCR 0.27 (H) 05/10/2018       Lab Results   Component Value Date    HGBA1C 7.1 (H) 12/06/2018    HGBA1C 7.9 (H) 08/27/2018    HGBA1C 8.0 (H) 05/24/2018       Lab Results   Component Value Date    CPEPTIDE 3.53 09/07/2018     Lab Results   Component Value Date    FREET4 1.04 07/30/2018     Lab Results   Component Value Date    TSH 7.167 (H) 07/30/2018     Lab Results   Component Value Date    IRON 59 10/11/2016    TIBC 210 (L) 10/11/2016    FERRITIN 452 (H) 10/11/2016     Lab Results   Component Value Date    .0 (H) 06/19/2018    CALCIUM 9.5 12/06/2018    PHOS 4.1 06/19/2018       Review of patient's allergies indicates:   Allergen Reactions    Subsys [fentanyl] Other (See Comments)     After  administration pt unresponsive.  HR and respirations decreased.     Versed [midazolam] Other (See Comments)     After administration pt unresponsive.  HR and respirations decreased.     Ampicillin Rash    Codeine Nausea And Vomiting and Nausea Only       Past Medical History:   Diagnosis Date    Ascites     Cataract     CHF (congestive heart failure)     Cirrhosis     Diabetes mellitus     Gastroparesis     GERD (gastroesophageal reflux disease)     Hypertension     Liver disease     Thyroid disease        Review of Systems   Constitutional: Positive for fatigue. Negative for activity change, appetite change, chills, diaphoresis, fever and unexpected weight change.   HENT: Negative.  Negative for congestion, dental problem, drooling, ear discharge, ear pain, facial swelling, hearing loss, mouth sores, nosebleeds, postnasal drip, rhinorrhea, sinus pressure, sneezing, sore throat, tinnitus, trouble swallowing and voice change.    Eyes: Negative.  Negative for photophobia, pain, discharge, redness, itching and visual disturbance.   Respiratory: Negative.  Negative for apnea, cough, choking, chest tightness, shortness of breath, wheezing and stridor.    Cardiovascular: Positive for leg swelling. Negative for chest pain and palpitations.   Gastrointestinal: Negative.  Negative for abdominal distention, abdominal pain, anal bleeding, blood in stool, constipation, diarrhea, nausea, rectal pain and vomiting.   Endocrine: Positive for polyuria. Negative for cold intolerance, heat intolerance, polydipsia and polyphagia.   Genitourinary: Negative.  Negative for decreased urine volume, difficulty urinating, dyspareunia, dysuria, enuresis, flank pain, frequency, genital sores, hematuria, menstrual problem, pelvic pain, urgency, vaginal bleeding, vaginal discharge and vaginal pain.   Musculoskeletal: Negative.  Negative for arthralgias, back pain, gait problem, joint swelling, myalgias, neck pain and neck stiffness.  "  Skin: Negative.  Negative for color change, pallor, rash and wound.   Allergic/Immunologic: Negative.  Negative for environmental allergies, food allergies and immunocompromised state.   Neurological: Negative.  Negative for dizziness, tremors, seizures, syncope, facial asymmetry, speech difficulty, weakness, light-headedness, numbness and headaches.   Hematological: Negative.  Negative for adenopathy. Does not bruise/bleed easily.   Psychiatric/Behavioral: Negative.  Negative for agitation, behavioral problems, confusion, decreased concentration, dysphoric mood, hallucinations, self-injury, sleep disturbance and suicidal ideas. The patient is not nervous/anxious and is not hyperactive.           Objective:   Last 3 sets of Vitals:  Vitals - 1 value per visit 10/28/2018 12/6/2018 12/20/2018   SYSTOLIC 145 172 126   DIASTOLIC 63 82 72   PULSE 64 66 -   TEMPERATURE 98.6 96.3 -   RESPIRATIONS 16 16 -   SPO2 99 99 -   Weight (lb) 218.7 222 226.19   Weight (kg) 99.2 100.7 102.6   HEIGHT 5' 1" 5' 1" 5' 1"   BODY MASS INDEX 41.32 41.95 42.74   VISIT REPORT - - -   Pain Score  - 8 0   Some recent data might be hidden          Physical Exam   Constitutional: She is oriented to person, place, and time. She appears well-developed and well-nourished. She is cooperative.  Non-toxic appearance. She does not have a sickly appearance. She does not appear ill. No distress. She is not intubated.   HENT:   Head: Normocephalic and atraumatic. Not macrocephalic and not microcephalic. Head is without raccoon's eyes, without Covarrubias's sign, without abrasion, without contusion, without laceration, without right periorbital erythema and without left periorbital erythema. Hair is normal.   Right Ear: Hearing, tympanic membrane, external ear and ear canal normal. No lacerations. No drainage, swelling or tenderness. No foreign bodies. No mastoid tenderness. Tympanic membrane is not injected, not scarred, not perforated, not erythematous, not " retracted and not bulging. Tympanic membrane mobility is normal. No middle ear effusion. No hemotympanum. No decreased hearing is noted.   Left Ear: Hearing, tympanic membrane, external ear and ear canal normal. No lacerations. No drainage, swelling or tenderness. No foreign bodies. No mastoid tenderness. Tympanic membrane is not injected, not scarred, not perforated, not erythematous, not retracted and not bulging. Tympanic membrane mobility is normal.  No middle ear effusion. No hemotympanum. No decreased hearing is noted.   Nose: Nose normal. No mucosal edema, rhinorrhea, nose lacerations, sinus tenderness, nasal deformity, septal deviation or nasal septal hematoma. No epistaxis.  No foreign bodies. Right sinus exhibits no maxillary sinus tenderness and no frontal sinus tenderness. Left sinus exhibits no maxillary sinus tenderness and no frontal sinus tenderness.   Mouth/Throat: Oropharynx is clear and moist. No oropharyngeal exudate.   Eyes: Conjunctivae and EOM are normal. Pupils are equal, round, and reactive to light. Right eye exhibits no chemosis, no discharge and no exudate. No foreign body present in the right eye. Left eye exhibits no chemosis, no discharge, no exudate and no hordeolum. No foreign body present in the left eye. Right conjunctiva is not injected. Right conjunctiva has no hemorrhage. Left conjunctiva is not injected. Left conjunctiva has no hemorrhage. No scleral icterus. Right eye exhibits normal extraocular motion and no nystagmus. Left eye exhibits normal extraocular motion and no nystagmus. Right pupil is round and reactive. Left pupil is round and reactive. Pupils are equal.   Neck: Trachea normal, normal range of motion and full passive range of motion without pain. Neck supple. Normal carotid pulses, no hepatojugular reflux and no JVD present. No tracheal tenderness, no spinous process tenderness and no muscular tenderness present. Carotid bruit is not present. No neck rigidity. No  tracheal deviation, no edema, no erythema and normal range of motion present. No thyroid mass and no thyromegaly present.   Cardiovascular: Normal rate, regular rhythm, normal heart sounds and intact distal pulses.  No extrasystoles are present. PMI is not displaced. Exam reveals no gallop, no friction rub and no decreased pulses.   No murmur heard.  Pulses:       Dorsalis pedis pulses are 2+ on the right side, and 2+ on the left side.        Posterior tibial pulses are 2+ on the right side, and 2+ on the left side.   Pulmonary/Chest: Effort normal and breath sounds normal. No accessory muscle usage or stridor. No apnea, no tachypnea and no bradypnea. She is not intubated. No respiratory distress. She has no decreased breath sounds. She has no wheezes. She has no rhonchi. She has no rales. Chest wall is not dull to percussion. She exhibits no mass, no tenderness, no bony tenderness, no laceration, no crepitus, no edema, no deformity, no swelling and no retraction.   Abdominal: Soft. Normal appearance and bowel sounds are normal. She exhibits no shifting dullness, no distension, no pulsatile liver, no fluid wave, no abdominal bruit, no ascites, no pulsatile midline mass and no mass. There is no hepatosplenomegaly, splenomegaly or hepatomegaly. There is no tenderness. There is no rigidity, no rebound, no guarding, no CVA tenderness, no tenderness at McBurney's point and negative Flannery's sign.   Musculoskeletal: Normal range of motion. She exhibits no edema or tenderness.        Right foot: There is normal range of motion and no deformity.        Left foot: There is normal range of motion and no deformity.   Feet:   Right Foot:   Protective Sensation: 5 sites tested. 5 sites sensed.   Skin Integrity: Negative for ulcer, blister, skin breakdown, erythema, warmth, callus or dry skin.   Left Foot:   Protective Sensation: 5 sites tested. 5 sites sensed.   Skin Integrity: Negative for ulcer, blister, skin breakdown,  erythema, warmth, callus or dry skin.   Lymphadenopathy:        Head (right side): No submental, no submandibular, no tonsillar, no preauricular, no posterior auricular and no occipital adenopathy present.        Head (left side): No submental, no submandibular, no tonsillar, no preauricular, no posterior auricular and no occipital adenopathy present.     She has no cervical adenopathy.        Right cervical: No superficial cervical, no deep cervical and no posterior cervical adenopathy present.       Left cervical: No superficial cervical, no deep cervical and no posterior cervical adenopathy present.     She has no axillary adenopathy.   Neurological: She is alert and oriented to person, place, and time. She has normal reflexes. She is not disoriented. She displays no atrophy, no tremor and normal reflexes. No cranial nerve deficit or sensory deficit. She exhibits normal muscle tone. She displays no seizure activity. Coordination and gait normal.   Reflex Scores:       Bicep reflexes are 2+ on the right side and 2+ on the left side.       Brachioradialis reflexes are 2+ on the right side and 2+ on the left side.       Patellar reflexes are 2+ on the right side and 2+ on the left side.  Skin: Skin is warm and dry. No abrasion, no bruising, no burn, no ecchymosis, no laceration, no lesion, no petechiae, no purpura and no rash noted. Rash is not macular, not papular, not maculopapular, not nodular, not pustular, not vesicular and not urticarial. She is not diaphoretic. No cyanosis or erythema. No pallor. Nails show no clubbing.   Psychiatric: She has a normal mood and affect. Her behavior is normal. Judgment and thought content normal. Her mood appears not anxious. Her affect is not angry, not blunt and not labile. Her speech is not rapid and/or pressured, not delayed, not tangential and not slurred. She is not agitated, not aggressive, not hyperactive, not slowed, not withdrawn, not actively hallucinating and not  combative. Thought content is not paranoid and not delusional. Cognition and memory are not impaired. She does not express impulsivity or inappropriate judgment. She does not exhibit a depressed mood. She expresses no homicidal and no suicidal ideation. She expresses no suicidal plans and no homicidal plans. She is communicative. She exhibits normal recent memory and normal remote memory. She is attentive.   Nursing note and vitals reviewed.          Assessment:     EDUCATIONAL ASSESSMENT:  1. Impediments in learning class environment - NONE.  2. Needs improvement in self-care management skills.    1. Type 2 diabetes mellitus with other circulatory complication, without long-term current use of insulin    2. Diabetic gastroparesis associated with type 2 diabetes mellitus    3. Hypothyroidism due to acquired atrophy of thyroid          Plan:     Diamond Das is seen today for   1. Type 2 diabetes mellitus with other circulatory complication, without long-term current use of insulin    2. Diabetic gastroparesis associated with type 2 diabetes mellitus    3. Hypothyroidism due to acquired atrophy of thyroid      We have discussed the etiology and treatment options associated with the diagnosis as well as alternatives. She has elected the following treatments.      - Continue with D&E, reduce portion size, and restrict carbohydrates (no more that 45 grams ) per meal.   - Improving A1c. Continue current treatment plan   - F/U in 12 weeks      Type 2 diabetes mellitus with other circulatory complication, without long-term current use of insulin  -     POCT Glucose, Hand-Held Device    Diabetic gastroparesis associated with type 2 diabetes mellitus  -     POCT Glucose, Hand-Held Device    Hypothyroidism due to acquired atrophy of thyroid  -     POCT Glucose, Hand-Held Device        1.) Patient was instructed to monitor blood glucose four times daily, fasting, post meal and ac meals or at bedtime. Reminded to bring BG  "records or meter to each visit for review.  2.) Reviewed pathophysiology of diabetes, complications related to the disease, importance of annual dilated eye exam and self daily foot examination.  3.) Continue medications as prescribed Basal insulin only with Lantus. [facility] MyChart or Phone review in 1 week with BG records for adjustment of medication.  4.) Refer patient to Dietician/CDE for ongoing diabetes education, meal planning, carbohydrate counting, and diabetes support.  5.) Discussed activity, benefits, methods, and precautions. Recommended patient start/continue some form of exercise and increase as tolerated to 60 minutes per day to facilitate weight loss and aid in control of BGs. Also reminded patient of WHO recommendation of 10,000 steps daily as a goal.   6.) A1C, TSH, Lipid Panel, CMP with eGFR and Micro/Creatinine.  7.) Return to clinic in 12 weeks for follow up. Advised patient to call clinic with any questions or concerns.    I have reviewed your results and they are still quite high. I would like you to start "Treating to Target". The treatment will be Insulin and your target will be the Fasting and 2 hour post meal blood sugar. It will work in this manner;    1. Goal for Fasting blood sugar is  mg/dl. I realize that you will need time to adjust to the new levels and presently you may feel too low if you are too aggressive now. So go slow and aim to lower your blood sugar to below 200 then 150 then 100 over several months.    2. Goal for 2 hour post meal blood sugar is below 180 mg/dl, here the same rules apply as in #1.    3. You will check your fasting blood sugar daily, if not where we would like it to be over a 3 day period then that evening we will increase the Lantus dose by 5 units. Then repeat the process over the next 3 days. Remember this is a slow process and take our time getting to goal. But, each week should be better than prior weeks. Blood sugars below 70 are unacceptable " "and should raise a "RED FLAG" where we may have to reduce our dose of insulin.    4. You will check your post meal glucose daily as well. However, each day you will check a different meal, (ie. Monday-breakfast; Tuesday- lunch; Wednesday- supper, then repeat). If your post meal glucose is not where we would like, increase pre-meal insulin by 2 units next time. A word of CAUTION: mealtime insulin is dependant on the size and concentration of your meal content. If not consuming a large meal do not take large dose of insulin. Use the reasonable person rule.     5. If you have any questions please do not hesitate to call.    Intensive insulin Therapy with correction factor:    You are on Intensive insulin therapy with Basal and Bolus insulin. Lantus is your Basal insulin and will help maintain your fasting and between meal sugar. Your fast acting or rescue insulin is Admelog insulin and will control your post meal sugar.     You will Take 30 units of Lantus at 9 pm each night. This will be adjusted up or down depending on your fasting blood sugar before breakfast.    Admelog will follow this pre meal schedule; Correction factor of "1 units per 50 mg/dl" and is based on 15-45 grams of carbohydrates per meal.    If blood sugar is below 70 eat first then check your blood sugar 2 hours later and make correction.  If blood pre-meal sugar is  70 -150 take 10 units of Admelog;  If blood pre-meal sugar is 151-200 take +1 units of Admelog;  If blood pre-meal sugar is 201-250 take +2 units of Admelog;  If blood pre-meal sugar is 251-300 take +3 units of Admelog;  If blood pre-meal sugar is 301-350 take +4 units of Admelog;  If blood pre-meal sugar is 351-400+ take +5 units of Admelog;  Also increase water intake and call for appointment.    A total of 60 minutes was spent in face to face time, of which 50 % was spent in counseling patient on disease process, complications, treatment, and side effects of medications.    The " patient was explained the above plan and given opportunity to ask questions.  She understands, chooses and consents to this plan and accepts all the risks, which include but are not limited to the risks mentioned above.   She understands the alternative of having no testing, interventions or treatments at this time. She left content and without further questions.     Disclaimer:  This note is prepared using voice recognition software and as such is likely to have errors and has not been proof read. Please contact me for questions.

## 2018-12-24 ENCOUNTER — DOCUMENTATION ONLY (OUTPATIENT)
Dept: ENDOSCOPY | Facility: HOSPITAL | Age: 63
End: 2018-12-24

## 2018-12-26 ENCOUNTER — TELEPHONE (OUTPATIENT)
Dept: HEPATOLOGY | Facility: CLINIC | Age: 63
End: 2018-12-26

## 2018-12-26 NOTE — TELEPHONE ENCOUNTER
MA called patient inform her of her results below, she understood schedule her labs 1/3/19. Mailed appt reminder to patient.

## 2018-12-30 DIAGNOSIS — I10 HYPERTENSION, UNSPECIFIED TYPE: ICD-10-CM

## 2018-12-30 DIAGNOSIS — E11.59 TYPE 2 DIABETES MELLITUS WITH OTHER CIRCULATORY COMPLICATION, WITHOUT LONG-TERM CURRENT USE OF INSULIN: ICD-10-CM

## 2018-12-31 RX ORDER — AMLODIPINE BESYLATE 10 MG/1
TABLET ORAL
Qty: 30 TABLET | Refills: 0 | Status: SHIPPED | OUTPATIENT
Start: 2018-12-31 | End: 2019-01-17

## 2019-01-02 ENCOUNTER — HOSPITAL ENCOUNTER (INPATIENT)
Facility: HOSPITAL | Age: 64
LOS: 2 days | Discharge: HOME | DRG: 442 | End: 2019-01-04
Attending: EMERGENCY MEDICINE | Admitting: HOSPITALIST
Payer: MEDICAID

## 2019-01-02 DIAGNOSIS — K76.82 HEPATIC ENCEPHALOPATHY: Primary | ICD-10-CM

## 2019-01-02 DIAGNOSIS — R79.89 INCREASED AMMONIA LEVEL: ICD-10-CM

## 2019-01-02 DIAGNOSIS — R73.9 HYPERGLYCEMIA: ICD-10-CM

## 2019-01-02 LAB
ALBUMIN SERPL BCP-MCNC: 3.1 G/DL
ALP SERPL-CCNC: 156 U/L
ALT SERPL W/O P-5'-P-CCNC: 24 U/L
AMMONIA PLAS-SCNC: 118 UMOL/L
ANION GAP SERPL CALC-SCNC: 11 MMOL/L
AST SERPL-CCNC: 39 U/L
BACTERIA #/AREA URNS AUTO: NORMAL /HPF
BASOPHILS # BLD AUTO: 0.03 K/UL
BASOPHILS NFR BLD: 0.4 %
BILIRUB SERPL-MCNC: 0.6 MG/DL
BILIRUB UR QL STRIP: NEGATIVE
BUN SERPL-MCNC: 22 MG/DL
CALCIUM SERPL-MCNC: 9.5 MG/DL
CHLORIDE SERPL-SCNC: 107 MMOL/L
CLARITY UR REFRACT.AUTO: CLEAR
CO2 SERPL-SCNC: 25 MMOL/L
COLOR UR AUTO: YELLOW
CREAT SERPL-MCNC: 2 MG/DL
DIFFERENTIAL METHOD: ABNORMAL
EOSINOPHIL # BLD AUTO: 0.3 K/UL
EOSINOPHIL NFR BLD: 3.8 %
ERYTHROCYTE [DISTWIDTH] IN BLOOD BY AUTOMATED COUNT: 14.3 %
EST. GFR  (AFRICAN AMERICAN): 30.4 ML/MIN/1.73 M^2
EST. GFR  (NON AFRICAN AMERICAN): 26.4 ML/MIN/1.73 M^2
GLUCOSE SERPL-MCNC: 236 MG/DL
GLUCOSE UR QL STRIP: NEGATIVE
HCT VFR BLD AUTO: 35.7 %
HGB BLD-MCNC: 12.2 G/DL
HGB UR QL STRIP: ABNORMAL
HYALINE CASTS UR QL AUTO: 0 /LPF
INR PPP: 1.1
KETONES UR QL STRIP: NEGATIVE
LEUKOCYTE ESTERASE UR QL STRIP: NEGATIVE
LIPASE SERPL-CCNC: 75 U/L
LYMPHOCYTES # BLD AUTO: 4 K/UL
LYMPHOCYTES NFR BLD: 53.5 %
MAGNESIUM SERPL-MCNC: 2.1 MG/DL
MCH RBC QN AUTO: 30.4 PG
MCHC RBC AUTO-ENTMCNC: 34.2 G/DL
MCV RBC AUTO: 89 FL
MICROSCOPIC COMMENT: NORMAL
MONOCYTES # BLD AUTO: 0.7 K/UL
MONOCYTES NFR BLD: 8.6 %
NEUTROPHILS # BLD AUTO: 2.5 K/UL
NEUTROPHILS NFR BLD: 33.6 %
NITRITE UR QL STRIP: NEGATIVE
PH UR STRIP: 8 [PH] (ref 5–8)
PHOSPHATE SERPL-MCNC: 4 MG/DL
PLATELET # BLD AUTO: 226 K/UL
PMV BLD AUTO: 10 FL
POCT GLUCOSE: 236 MG/DL (ref 70–110)
POTASSIUM SERPL-SCNC: 4 MMOL/L
PROT SERPL-MCNC: 7.7 G/DL
PROT UR QL STRIP: ABNORMAL
PROTHROMBIN TIME: 10.9 SEC
RBC # BLD AUTO: 4.01 M/UL
RBC #/AREA URNS AUTO: 2 /HPF (ref 0–4)
SODIUM SERPL-SCNC: 143 MMOL/L
SP GR UR STRIP: 1.01 (ref 1–1.03)
URN SPEC COLLECT METH UR: ABNORMAL
UROBILINOGEN UR STRIP-ACNC: NEGATIVE EU/DL
WBC # BLD AUTO: 7.55 K/UL
WBC #/AREA URNS AUTO: 0 /HPF (ref 0–5)

## 2019-01-02 PROCEDURE — 11000001 HC ACUTE MED/SURG PRIVATE ROOM

## 2019-01-02 PROCEDURE — 99285 EMERGENCY DEPT VISIT HI MDM: CPT | Mod: ER

## 2019-01-02 PROCEDURE — 84100 ASSAY OF PHOSPHORUS: CPT | Mod: ER

## 2019-01-02 PROCEDURE — 85025 COMPLETE CBC W/AUTO DIFF WBC: CPT | Mod: ER

## 2019-01-02 PROCEDURE — 82140 ASSAY OF AMMONIA: CPT | Mod: ER

## 2019-01-02 PROCEDURE — 85610 PROTHROMBIN TIME: CPT | Mod: ER

## 2019-01-02 PROCEDURE — 25000003 PHARM REV CODE 250: Mod: ER | Performed by: EMERGENCY MEDICINE

## 2019-01-02 PROCEDURE — 80053 COMPREHEN METABOLIC PANEL: CPT | Mod: ER

## 2019-01-02 PROCEDURE — 81000 URINALYSIS NONAUTO W/SCOPE: CPT | Mod: ER

## 2019-01-02 PROCEDURE — 83735 ASSAY OF MAGNESIUM: CPT | Mod: ER

## 2019-01-02 PROCEDURE — 83690 ASSAY OF LIPASE: CPT | Mod: ER

## 2019-01-02 RX ORDER — LACTULOSE 10 G/15ML
10 SOLUTION ORAL
Status: COMPLETED | OUTPATIENT
Start: 2019-01-02 | End: 2019-01-02

## 2019-01-02 RX ADMIN — LACTULOSE 10 G: 20 SOLUTION ORAL at 11:01

## 2019-01-03 ENCOUNTER — PATIENT OUTREACH (OUTPATIENT)
Dept: ADMINISTRATIVE | Facility: HOSPITAL | Age: 64
End: 2019-01-03

## 2019-01-03 PROBLEM — N17.9 ACUTE ON CHRONIC KIDNEY FAILURE: Status: ACTIVE | Noted: 2019-01-03

## 2019-01-03 PROBLEM — K59.00 CONSTIPATION: Status: ACTIVE | Noted: 2019-01-03

## 2019-01-03 PROBLEM — N18.9 ACUTE ON CHRONIC KIDNEY FAILURE: Status: ACTIVE | Noted: 2019-01-03

## 2019-01-03 LAB
ALBUMIN SERPL BCP-MCNC: 2.7 G/DL
ALP SERPL-CCNC: 124 U/L
ALT SERPL W/O P-5'-P-CCNC: 17 U/L
ANION GAP SERPL CALC-SCNC: 10 MMOL/L
AST SERPL-CCNC: 34 U/L
BACTERIA #/AREA URNS HPF: NORMAL /HPF
BASOPHILS # BLD AUTO: 0.02 K/UL
BASOPHILS NFR BLD: 0.4 %
BILIRUB SERPL-MCNC: 0.7 MG/DL
BILIRUB UR QL STRIP: NEGATIVE
BUN SERPL-MCNC: 20 MG/DL
CALCIUM SERPL-MCNC: 9.1 MG/DL
CHLORIDE SERPL-SCNC: 107 MMOL/L
CLARITY UR: CLEAR
CO2 SERPL-SCNC: 27 MMOL/L
COLOR UR: YELLOW
CREAT SERPL-MCNC: 1.8 MG/DL
DIFFERENTIAL METHOD: ABNORMAL
EOSINOPHIL # BLD AUTO: 0.2 K/UL
EOSINOPHIL NFR BLD: 4.1 %
ERYTHROCYTE [DISTWIDTH] IN BLOOD BY AUTOMATED COUNT: 13.7 %
EST. GFR  (AFRICAN AMERICAN): 35 ML/MIN/1.73 M^2
EST. GFR  (NON AFRICAN AMERICAN): 30 ML/MIN/1.73 M^2
GLUCOSE SERPL-MCNC: 218 MG/DL
GLUCOSE UR QL STRIP: NEGATIVE
HCT VFR BLD AUTO: 36.4 %
HGB BLD-MCNC: 12.2 G/DL
HGB UR QL STRIP: ABNORMAL
HYALINE CASTS #/AREA URNS LPF: 0 /LPF
KETONES UR QL STRIP: NEGATIVE
LEUKOCYTE ESTERASE UR QL STRIP: NEGATIVE
LYMPHOCYTES # BLD AUTO: 2.9 K/UL
LYMPHOCYTES NFR BLD: 52 %
MAGNESIUM SERPL-MCNC: 1.8 MG/DL
MCH RBC QN AUTO: 30.3 PG
MCHC RBC AUTO-ENTMCNC: 33.5 G/DL
MCV RBC AUTO: 91 FL
MICROSCOPIC COMMENT: NORMAL
MONOCYTES # BLD AUTO: 0.4 K/UL
MONOCYTES NFR BLD: 7.8 %
NEUTROPHILS # BLD AUTO: 2 K/UL
NEUTROPHILS NFR BLD: 35.7 %
NITRITE UR QL STRIP: NEGATIVE
PH UR STRIP: 7 [PH] (ref 5–8)
PHOSPHATE SERPL-MCNC: 4.1 MG/DL
PLATELET # BLD AUTO: 200 K/UL
PMV BLD AUTO: 10 FL
POCT GLUCOSE: 220 MG/DL (ref 70–110)
POCT GLUCOSE: 223 MG/DL (ref 70–110)
POCT GLUCOSE: 234 MG/DL (ref 70–110)
POCT GLUCOSE: 285 MG/DL (ref 70–110)
POTASSIUM SERPL-SCNC: 3.6 MMOL/L
PROT SERPL-MCNC: 6.7 G/DL
PROT UR QL STRIP: ABNORMAL
RBC # BLD AUTO: 4.02 M/UL
RBC #/AREA URNS HPF: 2 /HPF (ref 0–4)
SODIUM SERPL-SCNC: 144 MMOL/L
SP GR UR STRIP: 1.02 (ref 1–1.03)
TSH SERPL DL<=0.005 MIU/L-ACNC: 2.27 UIU/ML
URN SPEC COLLECT METH UR: ABNORMAL
UROBILINOGEN UR STRIP-ACNC: 1 EU/DL
WBC # BLD AUTO: 5.64 K/UL
WBC #/AREA URNS HPF: 2 /HPF (ref 0–5)

## 2019-01-03 PROCEDURE — 25000003 PHARM REV CODE 250: Performed by: INTERNAL MEDICINE

## 2019-01-03 PROCEDURE — 36415 COLL VENOUS BLD VENIPUNCTURE: CPT

## 2019-01-03 PROCEDURE — 11000001 HC ACUTE MED/SURG PRIVATE ROOM

## 2019-01-03 PROCEDURE — 83735 ASSAY OF MAGNESIUM: CPT

## 2019-01-03 PROCEDURE — 81000 URINALYSIS NONAUTO W/SCOPE: CPT

## 2019-01-03 PROCEDURE — 80053 COMPREHEN METABOLIC PANEL: CPT

## 2019-01-03 PROCEDURE — 96372 THER/PROPH/DIAG INJ SC/IM: CPT

## 2019-01-03 PROCEDURE — 25000003 PHARM REV CODE 250: Performed by: HOSPITALIST

## 2019-01-03 PROCEDURE — 63600175 PHARM REV CODE 636 W HCPCS: Performed by: HOSPITALIST

## 2019-01-03 PROCEDURE — 84100 ASSAY OF PHOSPHORUS: CPT

## 2019-01-03 PROCEDURE — 85025 COMPLETE CBC W/AUTO DIFF WBC: CPT

## 2019-01-03 PROCEDURE — S5571 INSULIN DISPOS PEN 3 ML: HCPCS | Performed by: HOSPITALIST

## 2019-01-03 PROCEDURE — 84443 ASSAY THYROID STIM HORMONE: CPT

## 2019-01-03 RX ORDER — LACTULOSE 10 G/15ML
15 SOLUTION ORAL 3 TIMES DAILY
Status: DISCONTINUED | OUTPATIENT
Start: 2019-01-03 | End: 2019-01-03

## 2019-01-03 RX ORDER — ENOXAPARIN SODIUM 100 MG/ML
40 INJECTION SUBCUTANEOUS EVERY 24 HOURS
Status: DISCONTINUED | OUTPATIENT
Start: 2019-01-03 | End: 2019-01-04 | Stop reason: HOSPADM

## 2019-01-03 RX ORDER — ONDANSETRON 2 MG/ML
4 INJECTION INTRAMUSCULAR; INTRAVENOUS EVERY 8 HOURS PRN
Status: DISCONTINUED | OUTPATIENT
Start: 2019-01-03 | End: 2019-01-04 | Stop reason: HOSPADM

## 2019-01-03 RX ORDER — LACTULOSE 10 G/15ML
20 SOLUTION ORAL 3 TIMES DAILY
Status: DISCONTINUED | OUTPATIENT
Start: 2019-01-03 | End: 2019-01-04 | Stop reason: HOSPADM

## 2019-01-03 RX ORDER — LACTULOSE 10 G/15ML
20 SOLUTION ORAL EVERY 4 HOURS PRN
Status: DISCONTINUED | OUTPATIENT
Start: 2019-01-03 | End: 2019-01-04 | Stop reason: HOSPADM

## 2019-01-03 RX ORDER — GLUCAGON 1 MG
1 KIT INJECTION
Status: DISCONTINUED | OUTPATIENT
Start: 2019-01-03 | End: 2019-01-04 | Stop reason: HOSPADM

## 2019-01-03 RX ORDER — SIMVASTATIN 20 MG/1
40 TABLET, FILM COATED ORAL NIGHTLY
Status: DISCONTINUED | OUTPATIENT
Start: 2019-01-03 | End: 2019-01-04 | Stop reason: HOSPADM

## 2019-01-03 RX ORDER — DOXAZOSIN 2 MG/1
2 TABLET ORAL NIGHTLY
Status: DISCONTINUED | OUTPATIENT
Start: 2019-01-03 | End: 2019-01-04 | Stop reason: HOSPADM

## 2019-01-03 RX ORDER — LISINOPRIL 5 MG/1
5 TABLET ORAL DAILY
Status: DISCONTINUED | OUTPATIENT
Start: 2019-01-03 | End: 2019-01-03

## 2019-01-03 RX ORDER — PANTOPRAZOLE SODIUM 40 MG/1
40 TABLET, DELAYED RELEASE ORAL DAILY
Status: DISCONTINUED | OUTPATIENT
Start: 2019-01-03 | End: 2019-01-04 | Stop reason: HOSPADM

## 2019-01-03 RX ORDER — INSULIN ASPART 100 [IU]/ML
1-10 INJECTION, SOLUTION INTRAVENOUS; SUBCUTANEOUS
Status: DISCONTINUED | OUTPATIENT
Start: 2019-01-03 | End: 2019-01-04 | Stop reason: HOSPADM

## 2019-01-03 RX ORDER — AMLODIPINE BESYLATE 10 MG/1
10 TABLET ORAL DAILY
Status: DISCONTINUED | OUTPATIENT
Start: 2019-01-03 | End: 2019-01-04 | Stop reason: HOSPADM

## 2019-01-03 RX ORDER — IBUPROFEN 200 MG
24 TABLET ORAL
Status: DISCONTINUED | OUTPATIENT
Start: 2019-01-03 | End: 2019-01-04 | Stop reason: HOSPADM

## 2019-01-03 RX ORDER — IBUPROFEN 200 MG
16 TABLET ORAL
Status: DISCONTINUED | OUTPATIENT
Start: 2019-01-03 | End: 2019-01-04 | Stop reason: HOSPADM

## 2019-01-03 RX ORDER — SODIUM CHLORIDE 0.9 % (FLUSH) 0.9 %
5 SYRINGE (ML) INJECTION
Status: DISCONTINUED | OUTPATIENT
Start: 2019-01-03 | End: 2019-01-04 | Stop reason: HOSPADM

## 2019-01-03 RX ORDER — LACTULOSE 10 G/15ML
20 SOLUTION ORAL 3 TIMES DAILY
Status: DISCONTINUED | OUTPATIENT
Start: 2019-01-03 | End: 2019-01-03

## 2019-01-03 RX ADMIN — DOXAZOSIN MESYLATE 2 MG: 2 TABLET ORAL at 03:01

## 2019-01-03 RX ADMIN — AMLODIPINE BESYLATE 10 MG: 10 TABLET ORAL at 08:01

## 2019-01-03 RX ADMIN — LACTULOSE 20 G: 20 SOLUTION ORAL at 08:01

## 2019-01-03 RX ADMIN — PANTOPRAZOLE SODIUM 40 MG: 40 TABLET, DELAYED RELEASE ORAL at 08:01

## 2019-01-03 RX ADMIN — INSULIN ASPART 4 UNITS: 100 INJECTION, SOLUTION INTRAVENOUS; SUBCUTANEOUS at 05:01

## 2019-01-03 RX ADMIN — RIFAXIMIN 550 MG: 550 TABLET ORAL at 08:01

## 2019-01-03 RX ADMIN — PSYLLIUM HUSK 6 G: 6 GRANULE ORAL at 03:01

## 2019-01-03 RX ADMIN — INSULIN ASPART 4 UNITS: 100 INJECTION, SOLUTION INTRAVENOUS; SUBCUTANEOUS at 11:01

## 2019-01-03 RX ADMIN — INSULIN DETEMIR 10 UNITS: 100 INJECTION, SOLUTION SUBCUTANEOUS at 08:01

## 2019-01-03 RX ADMIN — INSULIN ASPART 3 UNITS: 100 INJECTION, SOLUTION INTRAVENOUS; SUBCUTANEOUS at 10:01

## 2019-01-03 RX ADMIN — LACTULOSE 20 G: 20 SOLUTION ORAL at 03:01

## 2019-01-03 RX ADMIN — ENOXAPARIN SODIUM 40 MG: 100 INJECTION SUBCUTANEOUS at 05:01

## 2019-01-03 RX ADMIN — DOXAZOSIN MESYLATE 2 MG: 2 TABLET ORAL at 08:01

## 2019-01-03 RX ADMIN — SIMVASTATIN 40 MG: 20 TABLET, FILM COATED ORAL at 03:01

## 2019-01-03 RX ADMIN — SIMVASTATIN 40 MG: 20 TABLET, FILM COATED ORAL at 08:01

## 2019-01-03 RX ADMIN — INSULIN ASPART 4 UNITS: 100 INJECTION, SOLUTION INTRAVENOUS; SUBCUTANEOUS at 06:01

## 2019-01-03 RX ADMIN — LEVOTHYROXINE SODIUM 137 MCG: 25 TABLET ORAL at 06:01

## 2019-01-03 RX ADMIN — PSYLLIUM HUSK 6 G: 6 GRANULE ORAL at 08:01

## 2019-01-03 NOTE — ED NOTES
Pt and daughter aware of transfer to [facility] for admission to hospital. Consent signed. Awaiting bed assignment for transfer and report.

## 2019-01-03 NOTE — PLAN OF CARE
Met with patient and family. Patient denies any post hospital needs or services at this time.  Patient works and is usually independent. Patient lives with her daughter Paco and her family. Patient uses a CPAP with oxygen at night. Her DME company is [facility]. Transitional Care Folder, Discharge Planning Begins on Admission pamphlet, [email or web address] Bedside Delivery pamphlet, Advance Directive information given to patient along with the contact information given.Instructed patient or family to call with any questions or concerns.    Discussed accessing the [employer] via the [employer] Instant Activation. Patient agreed. Confirmed telephone number. Activation link sent.          [facility] - [place], [place] - [address]  [address]  [place] [address]  Phone: [phone] Fax: [phone]    Andrey Perrin MD  Payor: MEDICAID / Plan: [facility]) / Product Type: Managed Medicaid /           01/03/19 1013   Discharge Assessment   Assessment Type Discharge Planning Assessment   Confirmed/corrected address and phone number on facesheet? Yes   Assessment information obtained from? Patient;Caregiver;Medical Record   Expected Length of Stay (days) (tbd)   Communicated expected length of stay with patient/caregiver yes   Prior to hospitilization cognitive status: Alert/Oriented   Prior to hospitalization functional status: Assistive Equipment   Current cognitive status: Alert/Oriented   Current Functional Status: Needs Assistance   Lives With child(yaw), adult   Able to Return to Prior Arrangements yes   Is patient able to care for self after discharge? Yes   Who are your caregiver(s) and their phone number(s)? Paco Razo ( daughter ) [phone]   Patient's perception of discharge disposition home or selfcare   Readmission Within the Last 30 Days no previous admission in last 30 days   Patient currently being followed by outpatient case management? No    Patient currently receives any other outside agency services? No   Equipment Currently Used at Home oxygen;CPAP;glucometer;other (see comments)  (with [facility])   Do you have any problems affording any of your prescribed medications? No   Is the patient taking medications as prescribed? yes   Does the patient have transportation home? Yes   Transportation Anticipated family or friend will provide   Does the patient receive services at the [facility]? No   Discharge Plan A Home;Home with family   Discharge Plan B Home;Home with family;Home Health   Patient/Family in Agreement with Plan yes

## 2019-01-03 NOTE — H&P
"[facility] Hospital Medicine  History & Physical    Patient Name: Diamond Das  MRN: [id number]  Admission Date: 1/2/2019  Attending Physician: Jimmy Boogie MD   Primary Care Provider: Andrey Perrin MD         Patient information was obtained from patient, relative(s) and ER records.     Subjective:     Principal Problem:Hepatic encephalopathy    Chief Complaint:   Chief Complaint   Patient presents with    Abdominal Pain     Pt's daughter states "She's been having stomach pain for 2-3 days and she has liver issues".        HPI: 63F h/o T2DM, liver cirrhosis, HTN, and GERD presents with confusion x 2 days.  Daughter reports she noticed patient had some slurring of speech 2 days ago.  Then yesterday she noticed patient using a bottle as a remote control yesterday and .   Daughter reports patient started having abdominal pain 3 days ago.  Patient took some percocet for abdominal pain.   Daughter reports she knows percocet can cause constipation and was concerned patient was not having as regular bowel movements as she should be having to prevent HE.  Patient report she does not remember the last time she had a bowel movement.  Daughter does not know either.   Daughter reports last time patient had acute HE, she was disoriented.  She reports this time patient has confusion but is oriented x 3 and able to answer questions and follow commands appropriately.  In ER, CT brain negative for acute intracranial abnormalities.  KUB show nonspecific bowel gas patterns.  Ammonia level 116.  Patient given 15g lactulose.      Past Medical History:   Diagnosis Date    Ascites     Cataract     CHF (congestive heart failure)     Cirrhosis     Diabetes mellitus     Gastroparesis     GERD (gastroesophageal reflux disease)     Hypertension     Liver disease     Renal disorder     Thyroid disease        Past Surgical History:   Procedure Laterality Date    CHOLECYSTECTOMY      ERCP      LIVER BIOPSY "      TIPS N/A 1/17/2017    Performed by [facility] at [facility]    TRANSJUGULAR LIVER BIOPSY N/A 11/17/2016    Performed by [facility] Provider at [facility] OR [facility]    TUBAL LIGATION      UPPER GASTROINTESTINAL ENDOSCOPY         Review of patient's allergies indicates:   Allergen Reactions    Subsys [fentanyl] Other (See Comments)     After administration pt unresponsive.  HR and respirations decreased.     Versed [midazolam] Other (See Comments)     After administration pt unresponsive.  HR and respirations decreased.     Ampicillin Rash    Codeine Nausea And Vomiting and Nausea Only       No current facility-administered medications on file prior to encounter.      Current Outpatient Medications on File Prior to Encounter   Medication Sig    amLODIPine (NORVASC) 10 MG tablet TAKE 1 TABLET BY MOUTH ONCE DAILY    aspirin (ECOTRIN) 81 MG EC tablet Take 1 tablet (81 mg total) by mouth once daily.    carvedilol (COREG) 3.125 MG tablet Take 1 tablet (3.125 mg total) by mouth 2 (two) times daily.    doxazosin (CARDURA) 2 MG tablet Take 1 tablet (2 mg total) by mouth every evening.    furosemide (LASIX) 80 MG tablet Take 1 tablet (80 mg total) by mouth once daily. (Patient taking differently: Take 40 mg by mouth daily as needed. )    insulin (LANTUS SOLOSTAR U-100 INSULIN) glargine 100 units/mL (3mL) SubQ pen Inject 25 Units into the skin every evening.    insulin lispro (ADMELOG SOLOSTAR U-100 INSULIN) 100 unit/mL InPn pen Titrate up to 14 units subcutaneously three times a day 10-15 min before meals as directed in after visit summary    lactulose (CHRONULAC) 20 gram/30 mL Soln Take 22.5 mLs (15 g total) by mouth 3 (three) times daily.    levothyroxine (SYNTHROID) 137 MCG Tab tablet TAKE 1 TABLET BY MOUTH BEFORE BREAKFAST    lisinopril (PRINIVIL,ZESTRIL) 5 MG tablet Take 1 tablet (5 mg total) by mouth once daily.    ondansetron (ZOFRAN) 4 MG tablet Take 1 tablet (4 mg total) by mouth every 8  (eight) hours as needed.    ondansetron (ZOFRAN-ODT) 4 MG TbDL Take 1 tablet (4 mg total) by mouth every 6 (six) hours as needed.    pantoprazole (PROTONIX) 20 MG tablet TAKE 2 TABLETS BY MOUTH ONCE DAILY    rifAXIMin (XIFAXAN) 550 mg Tab Take 1 tablet (550 mg total) by mouth 2 (two) times daily.    simvastatin (ZOCOR) 40 MG tablet Take 40 mg by mouth once daily.     tiZANidine (ZANAFLEX) 4 MG tablet Take 4 mg by mouth every evening.    vitamin E 400 UNIT capsule Take 400 Units by mouth every morning.     Family History     Problem Relation (Age of Onset)    Aneurysm Sister    Breast cancer Mother    Cancer Mother, Maternal Grandfather    Heart disease Father, Brother    No Known Problems Maternal Grandmother    Sarcoidosis Sister        Tobacco Use    Smoking status: Never Smoker    Smokeless tobacco: Never Used   Substance and Sexual Activity    Alcohol use: No     Frequency: Never    Drug use: No    Sexual activity: No     Review of Systems   Unable to perform ROS: Mental status change   Gastrointestinal: Positive for abdominal pain and constipation.     Objective:     Vital Signs (Most Recent):  Temp: 98.5 °F (36.9 °C) (01/03/19 0239)  Pulse: 72 (01/03/19 0239)  Resp: 18 (01/03/19 0239)  BP: (!) 147/65 (01/03/19 0239)  SpO2: 98 % (01/03/19 0239) Vital Signs (24h Range):  Temp:  [98.4 °F (36.9 °C)-99.1 °F (37.3 °C)] 98.5 °F (36.9 °C)  Pulse:  [69-75] 72  Resp:  [18-28] 18  SpO2:  [98 %-100 %] 98 %  BP: (138-190)/(65-86) 147/65     Weight: 104.3 kg (229 lb 15 oz)  Body mass index is 43.45 kg/m².    Physical Exam   Constitutional: She is oriented to person, place, and time. She appears well-developed and well-nourished. No distress.   HENT:   Head: Normocephalic and atraumatic.   Mouth/Throat: Oropharynx is clear and moist.   Eyes: Conjunctivae and EOM are normal. Pupils are equal, round, and reactive to light. No scleral icterus.   Neck: Normal range of motion. Neck supple. No JVD present. No  thyromegaly present.   Cardiovascular: Normal rate, regular rhythm and intact distal pulses. Exam reveals no gallop and no friction rub.   No murmur heard.  Pulmonary/Chest: Effort normal and breath sounds normal. No respiratory distress. She has no wheezes. She has no rales. She exhibits no tenderness.   Abdominal: Soft. Bowel sounds are normal. She exhibits no distension and no mass. There is no tenderness. There is no guarding.   Musculoskeletal: Normal range of motion. She exhibits no tenderness.   Neurological: She is alert and oriented to person, place, and time. No cranial nerve deficit.   Skin: Skin is warm and dry. Capillary refill takes less than 2 seconds. No rash noted. She is not diaphoretic. No erythema.   Psychiatric: She has a normal mood and affect. Her speech is normal. Thought content normal. She is slowed. She exhibits abnormal recent memory. She is inattentive.   Nursing note and vitals reviewed.        CRANIAL NERVES     CN III, IV, VI   Pupils are equal, round, and reactive to light.  Extraocular motions are normal.        Significant Labs:   CBC:   Recent Labs   Lab 01/02/19  2233   WBC 7.55   HGB 12.2   HCT 35.7*        CMP:   Recent Labs   Lab 01/02/19  2233      K 4.0      CO2 25   *   BUN 22   CREATININE 2.0*   CALCIUM 9.5   PROT 7.7   ALBUMIN 3.1*   BILITOT 0.6   ALKPHOS 156*   AST 39   ALT 24   ANIONGAP 11   EGFRNONAA 26.4*     Magnesium:   Recent Labs   Lab 01/02/19  2233   MG 2.1     Troponin: No results for input(s): TROPONINI in the last 48 hours.  Urine Studies:   Recent Labs   Lab 01/02/19  2222   COLORU Yellow   APPEARANCEUA Clear   PHUR 8.0   SPECGRAV 1.015   PROTEINUA 2+*   GLUCUA Negative   KETONESU Negative   BILIRUBINUA Negative   OCCULTUA Trace*   NITRITE Negative   UROBILINOGEN Negative   LEUKOCYTESUR Negative   RBCUA 2   WBCUA 0   BACTERIA Rare   HYALINECASTS 0     All pertinent labs within the past 24 hours have been reviewed.    Significant  Imaging: I have reviewed all pertinent imaging results/findings within the past 24 hours.   Imaging Results          CT Head Without Contrast (Preliminary result)  Result time 01/03/19 00:32:43    ED Interpretation by Sidney Valenzuela Jr., MD (01/03/19 00:32:43, [facility], Emergency Medicine)    Per statrad  Impression: no acute findings                             X-Ray Abdomen Flat And Erect (Final result)  Result time 01/02/19 23:30:26    Final result by Donald Caro MD (01/02/19 23:30:26)                 Impression:      No acute findings.      Electronically signed by: Jd Caro MD  Date:    01/02/2019  Time:    23:30             Narrative:    EXAMINATION:  XR ABDOMEN FLAT AND ERECT    CLINICAL HISTORY:  Abdominal Pain;    TECHNIQUE:  Flat and erect AP views of the abdomen were performed.    COMPARISON:  06/07/2018    FINDINGS:  The bowel gas pattern is nonspecific.  There are clips from previous cholecystectomy.  A radiopaque stent superimposed over the liver.  No abnormal mass or suspicious calcifications are demonstrated.                                  Assessment/Plan:     * Hepatic encephalopathy    - [place] grade 1 HE, minimal HE  - start lactulose 20g tid scheduled and q4h prn to goal 2-3 BM per day  - continue rifaximin  - neuro checks q4h  - monitor I/O       Acute on chronic kidney failure    - baseline Cr 1.6  - current Cr 2.0  - will hold lasix and lisinopril  - monitor daily labs       Constipation    - start fiber packets tid with meals, in addition to lactulose for acute HE       Hypertension    - continue amlodipine, doxazosin  - hold coreg due to decompensated liver cirrhosis  - hold lisinopril and lasix due to ROD       Hypothyroidism due to acquired atrophy of thyroid    - check TSH  - continue synthroid qam     Type 2 diabetes mellitus with circulatory disorder    - hold home meds  - diabetic diet  - POC glucose qac/qhs, SSI PRN, levemir 10 units qhs          VTE Risk Mitigation (From admission, onward)        Ordered     enoxaparin injection 40 mg  Daily      01/03/19 0313     Place sequential compression device  Until discontinued      01/03/19 0313     IP VTE HIGH RISK PATIENT  Once      01/03/19 0313     Place ALLYSON hose  Until discontinued      01/03/19 0300             Jimmy Boogie MD  Department of Hospital Medicine   [facility]

## 2019-01-03 NOTE — ASSESSMENT & PLAN NOTE
- continue amlodipine, doxazosin  - hold coreg due to decompensated liver cirrhosis  - hold lisinopril and lasix due to ROD

## 2019-01-03 NOTE — PLAN OF CARE
Problem: Adult Inpatient Plan of Care  Goal: Plan of Care Review  Outcome: Ongoing (interventions implemented as appropriate)  Patient reports 3 bowel movements during the shift. Denies pain. Blood glucose monitoring in place. Safety measures maintained. No falls/adverse events. Chart check complete. Will continue to monitor.

## 2019-01-03 NOTE — ASSESSMENT & PLAN NOTE
- [place] grade 1 HE, minimal HE  - start lactulose 20g tid scheduled and q4h prn to goal 2-3 BM per day  - continue rifaximin  - neuro checks q4h  - monitor I/O

## 2019-01-03 NOTE — ED NOTES
Armband checked, patient verified. Verified by spelling and stated name on armband along with .   LOC: The patient is awake, alert and aware of environment with an appropriate affect, the patient is oriented x 3 and speaking appropriately.  APPEARANCE: Patient resting comfortably and in no acute distress, patient is clean and well groomed  SKIN: The skin is warm and dry, color consistent with ethnicity, patient has normal skin turgor and moist mucus membranes, no breakdown or bruising noted.   MUSCULOSKELETAL: Patient moving all extremities to command  RESPIRATORY: Airway is open and patent, respirations are regular, even and non labored.  CARDIAC: Patient has a normal rate, no periphreal edema noted, capillary refill < 3 seconds.  ABDOMEN: Soft and non tender to palpation.  Pt states pain to upper abd for a few days. Denies nausea or vomiting.  Daughter says pt has been confused more lately. Pt seen in  for confusion and started medication but only for a month.

## 2019-01-03 NOTE — ED PROVIDER NOTES
"Encounter Date: 1/2/2019       History     Chief Complaint   Patient presents with    Abdominal Pain     Pt's daughter states "She's been having stomach pain for 2-3 days and she has liver issues".     The history is provided by the patient.   Abdominal Pain   The current episode started several days ago (daughter states last time she was like this, her ammonia level was elevated.  daughter states today pt was attempting to change the channel of the tv with her water bottle.  no focal deficits). The onset of the illness was gradual. The problem has been gradually worsening. The abdominal pain is generalized. The abdominal pain does not radiate. Pain scale: mild. The abdominal pain is relieved by nothing. The other symptoms of the illness do not include fever, fatigue, jaundice, melena, shortness of breath, nausea, vomiting, diarrhea, dysuria, hematemesis, hematochezia, vaginal discharge or vaginal bleeding.   The patient states that she believes she is currently not pregnant. The patient has not had a change in bowel habit. Symptoms associated with the illness do not include chills, anorexia, diaphoresis, heartburn, constipation, urgency, hematuria, frequency or back pain. Significant associated medical issues include diabetes and liver disease. Significant associated medical issues do not include PUD, inflammatory bowel disease, sickle cell disease, substance abuse or HIV.     Review of patient's allergies indicates:   Allergen Reactions    Subsys [fentanyl] Other (See Comments)     After administration pt unresponsive.  HR and respirations decreased.     Versed [midazolam] Other (See Comments)     After administration pt unresponsive.  HR and respirations decreased.     Ampicillin Rash    Codeine Nausea And Vomiting and Nausea Only     Past Medical History:   Diagnosis Date    Ascites     Cataract     CHF (congestive heart failure)     Cirrhosis     Diabetes mellitus     Gastroparesis     GERD " (gastroesophageal reflux disease)     Hypertension     Liver disease     Renal disorder     Thyroid disease      Past Surgical History:   Procedure Laterality Date    CHOLECYSTECTOMY      ERCP      LIVER BIOPSY      TIPS N/A 1/17/2017    Performed by Louise Surgeon at [facility]    TRANSJUGULAR LIVER BIOPSY N/A 11/17/2016    Performed by [facility] Provider at [facility] OR 2ND FLR    TUBAL LIGATION      UPPER GASTROINTESTINAL ENDOSCOPY       Family History   Problem Relation Age of Onset    Cancer Mother     Breast cancer Mother     Heart disease Father     Aneurysm Sister     Heart disease Brother     No Known Problems Maternal Grandmother     Cancer Maternal Grandfather     Sarcoidosis Sister      Social History     Tobacco Use    Smoking status: Never Smoker    Smokeless tobacco: Never Used   Substance Use Topics    Alcohol use: No     Frequency: Never    Drug use: No     Review of Systems   Constitutional: Negative for chills, diaphoresis, fatigue and fever.   HENT: Negative for sore throat.    Respiratory: Negative for shortness of breath.    Cardiovascular: Negative for chest pain.   Gastrointestinal: Positive for abdominal pain. Negative for anorexia, constipation, diarrhea, heartburn, hematemesis, hematochezia, jaundice, melena, nausea and vomiting.   Genitourinary: Negative for dysuria, frequency, hematuria, urgency, vaginal bleeding and vaginal discharge.   Musculoskeletal: Negative for back pain.   Skin: Negative for rash.   Neurological: Negative for weakness.   Hematological: Does not bruise/bleed easily.   All other systems reviewed and are negative.      Physical Exam     Initial Vitals [01/02/19 2157]   BP Pulse Resp Temp SpO2   (!) 176/80 73 18 99.1 °F (37.3 °C) 100 %      MAP       --         Physical Exam    Nursing note and vitals reviewed.  Constitutional: She appears well-developed and well-nourished.   HENT:   Head: Normocephalic and atraumatic.   Mouth/Throat: No  oropharyngeal exudate.   Eyes: Conjunctivae and EOM are normal. Pupils are equal, round, and reactive to light.   Neck: Normal range of motion. Neck supple. No thyromegaly present.   Cardiovascular: Normal rate, regular rhythm, normal heart sounds and intact distal pulses. Exam reveals no gallop and no friction rub.    No murmur heard.  Pulmonary/Chest: Breath sounds normal. No respiratory distress. She has no wheezes. She has no rhonchi. She exhibits no tenderness.   Abdominal: Soft. Bowel sounds are normal. She exhibits no distension. There is no tenderness. There is no rebound and no guarding.   Musculoskeletal: Normal range of motion. She exhibits no edema or tenderness.   Lymphadenopathy:     She has no cervical adenopathy.   Neurological: She is alert and oriented to person, place, and time. She has normal strength. No cranial nerve deficit or sensory deficit. GCS eye subscore is 4. GCS verbal subscore is 5. GCS motor subscore is 6.   Skin: Skin is warm and dry. No rash noted.   Psychiatric: She has a normal mood and affect. Her speech is normal and behavior is normal. Judgment and thought content normal. Cognition and memory are impaired.         ED Course   Procedures  Labs Reviewed   CBC W/ AUTO DIFFERENTIAL - Abnormal; Notable for the following components:       Result Value    Hematocrit 35.7 (*)     Gran% 33.6 (*)     Lymph% 53.5 (*)     All other components within normal limits   COMPREHENSIVE METABOLIC PANEL - Abnormal; Notable for the following components:    Glucose 236 (*)     Creatinine 2.0 (*)     Albumin 3.1 (*)     Alkaline Phosphatase 156 (*)     eGFR if  30.4 (*)     eGFR if non  26.4 (*)     All other components within normal limits   LIPASE - Abnormal; Notable for the following components:    Lipase 75 (*)     All other components within normal limits   URINALYSIS, REFLEX TO URINE CULTURE - Abnormal; Notable for the following components:    Protein, UA 2+ (*)      Occult Blood UA Trace (*)     All other components within normal limits    Narrative:     Preferred Collection Type->Urine, Clean Catch   AMMONIA - Abnormal; Notable for the following components:    Ammonia 118 (*)     All other components within normal limits   POCT GLUCOSE - Abnormal; Notable for the following components:    POCT Glucose 236 (*)     All other components within normal limits   MAGNESIUM   PHOSPHORUS   URINALYSIS MICROSCOPIC    Narrative:     Preferred Collection Type->Urine, Clean Catch   PROTIME-INR   PROTIME-INR          Imaging Results          CT Head Without Contrast (Preliminary result)  Result time 01/03/19 00:32:43    ED Interpretation by Sidney Valenzuela Jr., MD (01/03/19 00:32:43, [facility], Emergency Medicine)    Per statrad  Impression: no acute findings                             X-Ray Abdomen Flat And Erect (Final result)  Result time 01/02/19 23:30:26    Final result by Donald Caro MD (01/02/19 23:30:26)                 Impression:      No acute findings.      Electronically signed by: Jd Caro MD  Date:    01/02/2019  Time:    23:30             Narrative:    EXAMINATION:  XR ABDOMEN FLAT AND ERECT    CLINICAL HISTORY:  Abdominal Pain;    TECHNIQUE:  Flat and erect AP views of the abdomen were performed.    COMPARISON:  06/07/2018    FINDINGS:  The bowel gas pattern is nonspecific.  There are clips from previous cholecystectomy.  A radiopaque stent superimposed over the liver.  No abnormal mass or suspicious calcifications are demonstrated.                                    Vitals:    01/02/19 2157 01/02/19 2206 01/02/19 2220 01/02/19 2302   BP: (!) 176/80 (!) 190/75 (!) 169/70 138/65   Pulse: 73 75 69 71   Resp: 18 18 (!) 28 19   Temp: 99.1 °F (37.3 °C)      TempSrc: Oral      SpO2: 100% 100% 100% 100%   Weight: 102 kg (224 lb 13.9 oz)      Height:        01/02/19 2328 01/03/19 0014   BP: (!) 171/86    Pulse: 75    Resp:     Temp:     TempSrc:    "  SpO2: 99%    Weight:     Height:  5' 1" (1.549 m)       Results for orders placed or performed during the hospital encounter of 01/02/19   CBC W/ AUTO DIFFERENTIAL   Result Value Ref Range    WBC 7.55 3.90 - 12.70 K/uL    RBC 4.01 4.00 - 5.40 M/uL    Hemoglobin 12.2 12.0 - 16.0 g/dL    Hematocrit 35.7 (L) 37.0 - 48.5 %    MCV 89 82 - 98 fL    MCH 30.4 27.0 - 31.0 pg    MCHC 34.2 32.0 - 36.0 g/dL    RDW 14.3 11.5 - 14.5 %    Platelets 226 150 - 350 K/uL    MPV 10.0 9.2 - 12.9 fL    Gran # (ANC) 2.5 1.8 - 7.7 K/uL    Lymph # 4.0 1.0 - 4.8 K/uL    Mono # 0.7 0.3 - 1.0 K/uL    Eos # 0.3 0.0 - 0.5 K/uL    Baso # 0.03 0.00 - 0.20 K/uL    Gran% 33.6 (L) 38.0 - 73.0 %    Lymph% 53.5 (H) 18.0 - 48.0 %    Mono% 8.6 4.0 - 15.0 %    Eosinophil% 3.8 0.0 - 8.0 %    Basophil% 0.4 0.0 - 1.9 %    Differential Method Automated    Comp. Metabolic Panel   Result Value Ref Range    Sodium 143 136 - 145 mmol/L    Potassium 4.0 3.5 - 5.1 mmol/L    Chloride 107 95 - 110 mmol/L    CO2 25 23 - 29 mmol/L    Glucose 236 (H) 70 - 110 mg/dL    BUN, Bld 22 8 - 23 mg/dL    Creatinine 2.0 (H) 0.5 - 1.4 mg/dL    Calcium 9.5 8.7 - 10.5 mg/dL    Total Protein 7.7 6.0 - 8.4 g/dL    Albumin 3.1 (L) 3.5 - 5.2 g/dL    Total Bilirubin 0.6 0.1 - 1.0 mg/dL    Alkaline Phosphatase 156 (H) 55 - 135 U/L    AST 39 10 - 40 U/L    ALT 24 10 - 44 U/L    Anion Gap 11 8 - 16 mmol/L    eGFR if African American 30.4 (A) >60 mL/min/1.73 m^2    eGFR if non  26.4 (A) >60 mL/min/1.73 m^2   Lipase   Result Value Ref Range    Lipase 75 (H) 4 - 60 U/L   Urinalysis, Reflex to Urine Culture Urine, Clean Catch   Result Value Ref Range    Specimen UA Urine, Catheterized     Color, UA Yellow Yellow, Straw, Thalia    Appearance, UA Clear Clear    pH, UA 8.0 5.0 - 8.0    Specific Gravity, UA 1.015 1.005 - 1.030    Protein, UA 2+ (A) Negative    Glucose, UA Negative Negative    Ketones, UA Negative Negative    Bilirubin (UA) Negative Negative    Occult Blood UA Trace " (A) Negative    Nitrite, UA Negative Negative    Urobilinogen, UA Negative <2.0 EU/dL    Leukocytes, UA Negative Negative   Ammonia   Result Value Ref Range    Ammonia 118 (H) 10 - 50 umol/L   Magnesium   Result Value Ref Range    Magnesium 2.1 1.6 - 2.6 mg/dL   Phosphorus   Result Value Ref Range    Phosphorus 4.0 2.7 - 4.5 mg/dL   Urinalysis Microscopic   Result Value Ref Range    RBC, UA 2 0 - 4 /hpf    WBC, UA 0 0 - 5 /hpf    Bacteria, UA Rare None-Occ /hpf    Hyaline Casts, UA 0 0-1/lpf /lpf    Microscopic Comment SEE COMMENT    Protime-INR   Result Value Ref Range    Prothrombin Time 10.9 9.0 - 12.5 sec    INR 1.1 0.8 - 1.2   POCT glucose   Result Value Ref Range    POCT Glucose 236 (H) 70 - 110 mg/dL         Imaging Results          CT Head Without Contrast (Preliminary result)  Result time 01/03/19 00:32:43    ED Interpretation by Sidney Valenzuela Jr., MD (01/03/19 00:32:43, [facility], Emergency Medicine)    Per statrad  Impression: no acute findings                             X-Ray Abdomen Flat And Erect (Final result)  Result time 01/02/19 23:30:26    Final result by Donald Caro MD (01/02/19 23:30:26)                 Impression:      No acute findings.      Electronically signed by: Jd Caro MD  Date:    01/02/2019  Time:    23:30             Narrative:    EXAMINATION:  XR ABDOMEN FLAT AND ERECT    CLINICAL HISTORY:  Abdominal Pain;    TECHNIQUE:  Flat and erect AP views of the abdomen were performed.    COMPARISON:  06/07/2018    FINDINGS:  The bowel gas pattern is nonspecific.  There are clips from previous cholecystectomy.  A radiopaque stent superimposed over the liver.  No abnormal mass or suspicious calcifications are demonstrated.                                Medications   lactulose 20 gram/30 mL solution Soln 10 g (10 g Oral Given 1/2/19 2328)         11:09 PM  - Re-evaluation:  The patient is resting comfortably and is in no acute distress. Discussed test results  and notified of pending labs. Answered questions at this time.     11:54 PM - CONSULT: Dr. Valenzuela discussed the case with Dr. Boogie. Agrees with current management. Recommends admit and will see pt in hospital room.  Admitting Service: hosp med   Admitting Physician: Dr. Boogie   Admit to in tele    11:54 PM - Re-evaluation:  Discussed test results, shared treatment plan, and the need for admission with patient and family. They understand and agree to the plan as discussed. Answered questions at this time.       All historical, clinical, radiographic, and laboratory findings were reviewed with the patient/family in detail along with the indications for transport to the facility in [place] in order to receive further evaluation and treatment for hepatic encephalopathy.  All remaining questions and concerns were addressed at this time and the patient/family communicates understanding and agrees to proceed accordingly.  Similarly, all pertinent details of the encounter were discussed with Dr. Boogie who agrees to receive the patient at [facility] for further care as outlined above.  The patient will be transferred by [facility] secondary to a need for ongoing cardiac monitoring en route.  Sidney Valenzuela MD  11:54 PM    Pre-hypertension/Hypertension: The pt has been informed that they may have pre-hypertension or hypertension based on a blood pressure reading in the ED. I recommend that the pt call the PCP listed on their discharge instructions or a physician of their choice this week to arrange f/u for further evaluation of possible pre-hypertension or hypertension.     Diamond SHAW Thiago was given a handout which discussed their disease process, precautions, and instructions for follow-up and therapy.           Medication List      ASK your doctor about these medications    amLODIPine 10 MG tablet  Commonly known as:  NORVASC  TAKE 1 TABLET BY MOUTH ONCE DAILY     aspirin 81 MG EC  tablet  Commonly known as:  ECOTRIN  Take 1 tablet (81 mg total) by mouth once daily.     carvedilol 3.125 MG tablet  Commonly known as:  COREG  Take 1 tablet (3.125 mg total) by mouth 2 (two) times daily.     doxazosin 2 MG tablet  Commonly known as:  CARDURA  Take 1 tablet (2 mg total) by mouth every evening.     furosemide 80 MG tablet  Commonly known as:  LASIX  Take 1 tablet (80 mg total) by mouth once daily.     insulin lispro 100 unit/mL pen  Commonly known as:  ADMELOG SOLOSTAR U-100 INSULIN  Titrate up to 14 units subcutaneously three times a day 10-15 min before meals as directed in after visit summary     lactulose 20 gram/30 mL Soln  Commonly known as:  CHRONULAC  Take 22.5 mLs (15 g total) by mouth 3 (three) times daily.     LANTUS SOLOSTAR U-100 INSULIN glargine 100 units/mL (3mL) SubQ pen  Generic drug:  insulin     levothyroxine 137 MCG Tab tablet  Commonly known as:  SYNTHROID  TAKE 1 TABLET BY MOUTH BEFORE BREAKFAST     lisinopril 5 MG tablet  Commonly known as:  PRINIVIL,ZESTRIL  Take 1 tablet (5 mg total) by mouth once daily.     ondansetron 4 MG tablet  Commonly known as:  ZOFRAN  Take 1 tablet (4 mg total) by mouth every 8 (eight) hours as needed.     ondansetron 4 MG Tbdl  Commonly known as:  ZOFRAN-ODT  Take 1 tablet (4 mg total) by mouth every 6 (six) hours as needed.     pantoprazole 20 MG tablet  Commonly known as:  PROTONIX  TAKE 2 TABLETS BY MOUTH ONCE DAILY     rifAXIMin 550 mg Tab  Commonly known as:  XIFAXAN  Take 1 tablet (550 mg total) by mouth 2 (two) times daily.     simvastatin 40 MG tablet  Commonly known as:  ZOCOR     tiZANidine 4 MG tablet  Commonly known as:  ZANAFLEX     vitamin E 400 UNIT capsule           Current Discharge Medication List            ED Diagnosis  1. Hepatic encephalopathy    2. Hyperglycemia    3. Increased ammonia level                             Clinical Impression:   The primary encounter diagnosis was Hepatic encephalopathy. Diagnoses of  Hyperglycemia and Increased ammonia level were also pertinent to this visit.      Disposition:   Disposition: Admitted  Condition: Stable                        Sidney Valenzuela Jr., MD  01/03/19 0033

## 2019-01-03 NOTE — SUBJECTIVE & OBJECTIVE
Past Medical History:   Diagnosis Date    Ascites     Cataract     CHF (congestive heart failure)     Cirrhosis     Diabetes mellitus     Gastroparesis     GERD (gastroesophageal reflux disease)     Hypertension     Liver disease     Renal disorder     Thyroid disease        Past Surgical History:   Procedure Laterality Date    CHOLECYSTECTOMY      ERCP      LIVER BIOPSY      TIPS N/A 1/17/2017    Performed by Louise Surgeon at [facility]    TRANSJUGULAR LIVER BIOPSY N/A 11/17/2016    Performed by [facility] Diagnostic Provider at [facility] OR 2ND FLR    TUBAL LIGATION      UPPER GASTROINTESTINAL ENDOSCOPY         Review of patient's allergies indicates:   Allergen Reactions    Subsys [fentanyl] Other (See Comments)     After administration pt unresponsive.  HR and respirations decreased.     Versed [midazolam] Other (See Comments)     After administration pt unresponsive.  HR and respirations decreased.     Ampicillin Rash    Codeine Nausea And Vomiting and Nausea Only       No current facility-administered medications on file prior to encounter.      Current Outpatient Medications on File Prior to Encounter   Medication Sig    amLODIPine (NORVASC) 10 MG tablet TAKE 1 TABLET BY MOUTH ONCE DAILY    aspirin (ECOTRIN) 81 MG EC tablet Take 1 tablet (81 mg total) by mouth once daily.    carvedilol (COREG) 3.125 MG tablet Take 1 tablet (3.125 mg total) by mouth 2 (two) times daily.    doxazosin (CARDURA) 2 MG tablet Take 1 tablet (2 mg total) by mouth every evening.    furosemide (LASIX) 80 MG tablet Take 1 tablet (80 mg total) by mouth once daily. (Patient taking differently: Take 40 mg by mouth daily as needed. )    insulin (LANTUS SOLOSTAR U-100 INSULIN) glargine 100 units/mL (3mL) SubQ pen Inject 25 Units into the skin every evening.    insulin lispro (ADMELOG SOLOSTAR U-100 INSULIN) 100 unit/mL InPn pen Titrate up to 14 units subcutaneously three times a day 10-15 min before meals as directed in after  visit summary    lactulose (CHRONULAC) 20 gram/30 mL Soln Take 22.5 mLs (15 g total) by mouth 3 (three) times daily.    levothyroxine (SYNTHROID) 137 MCG Tab tablet TAKE 1 TABLET BY MOUTH BEFORE BREAKFAST    lisinopril (PRINIVIL,ZESTRIL) 5 MG tablet Take 1 tablet (5 mg total) by mouth once daily.    ondansetron (ZOFRAN) 4 MG tablet Take 1 tablet (4 mg total) by mouth every 8 (eight) hours as needed.    ondansetron (ZOFRAN-ODT) 4 MG TbDL Take 1 tablet (4 mg total) by mouth every 6 (six) hours as needed.    pantoprazole (PROTONIX) 20 MG tablet TAKE 2 TABLETS BY MOUTH ONCE DAILY    rifAXIMin (XIFAXAN) 550 mg Tab Take 1 tablet (550 mg total) by mouth 2 (two) times daily.    simvastatin (ZOCOR) 40 MG tablet Take 40 mg by mouth once daily.     tiZANidine (ZANAFLEX) 4 MG tablet Take 4 mg by mouth every evening.    vitamin E 400 UNIT capsule Take 400 Units by mouth every morning.     Family History     Problem Relation (Age of Onset)    Aneurysm Sister    Breast cancer Mother    Cancer Mother, Maternal Grandfather    Heart disease Father, Brother    No Known Problems Maternal Grandmother    Sarcoidosis Sister        Tobacco Use    Smoking status: Never Smoker    Smokeless tobacco: Never Used   Substance and Sexual Activity    Alcohol use: No     Frequency: Never    Drug use: No    Sexual activity: No     Review of Systems   Unable to perform ROS: Mental status change   Gastrointestinal: Positive for abdominal pain and constipation.     Objective:     Vital Signs (Most Recent):  Temp: 98.5 °F (36.9 °C) (01/03/19 0239)  Pulse: 72 (01/03/19 0239)  Resp: 18 (01/03/19 0239)  BP: (!) 147/65 (01/03/19 0239)  SpO2: 98 % (01/03/19 0239) Vital Signs (24h Range):  Temp:  [98.4 °F (36.9 °C)-99.1 °F (37.3 °C)] 98.5 °F (36.9 °C)  Pulse:  [69-75] 72  Resp:  [18-28] 18  SpO2:  [98 %-100 %] 98 %  BP: (138-190)/(65-86) 147/65     Weight: 104.3 kg (229 lb 15 oz)  Body mass index is 43.45 kg/m².    Physical Exam    Constitutional: She is oriented to person, place, and time. She appears well-developed and well-nourished. No distress.   HENT:   Head: Normocephalic and atraumatic.   Mouth/Throat: Oropharynx is clear and moist.   Eyes: Conjunctivae and EOM are normal. Pupils are equal, round, and reactive to light. No scleral icterus.   Neck: Normal range of motion. Neck supple. No JVD present. No thyromegaly present.   Cardiovascular: Normal rate, regular rhythm and intact distal pulses. Exam reveals no gallop and no friction rub.   No murmur heard.  Pulmonary/Chest: Effort normal and breath sounds normal. No respiratory distress. She has no wheezes. She has no rales. She exhibits no tenderness.   Abdominal: Soft. Bowel sounds are normal. She exhibits no distension and no mass. There is no tenderness. There is no guarding.   Musculoskeletal: Normal range of motion. She exhibits no tenderness.   Neurological: She is alert and oriented to person, place, and time. No cranial nerve deficit.   Skin: Skin is warm and dry. Capillary refill takes less than 2 seconds. No rash noted. She is not diaphoretic. No erythema.   Psychiatric: She has a normal mood and affect. Her speech is normal. Thought content normal. She is slowed. She exhibits abnormal recent memory. She is inattentive.   Nursing note and vitals reviewed.        CRANIAL NERVES     CN III, IV, VI   Pupils are equal, round, and reactive to light.  Extraocular motions are normal.        Significant Labs:   CBC:   Recent Labs   Lab 01/02/19  2233   WBC 7.55   HGB 12.2   HCT 35.7*        CMP:   Recent Labs   Lab 01/02/19  2233      K 4.0      CO2 25   *   BUN 22   CREATININE 2.0*   CALCIUM 9.5   PROT 7.7   ALBUMIN 3.1*   BILITOT 0.6   ALKPHOS 156*   AST 39   ALT 24   ANIONGAP 11   EGFRNONAA 26.4*     Magnesium:   Recent Labs   Lab 01/02/19  2233   MG 2.1     Troponin: No results for input(s): TROPONINI in the last 48 hours.  Urine Studies:   Recent Labs    Lab 01/02/19  2222   COLORU Yellow   APPEARANCEUA Clear   PHUR 8.0   SPECGRAV 1.015   PROTEINUA 2+*   GLUCUA Negative   KETONESU Negative   BILIRUBINUA Negative   OCCULTUA Trace*   NITRITE Negative   UROBILINOGEN Negative   LEUKOCYTESUR Negative   RBCUA 2   WBCUA 0   BACTERIA Rare   HYALINECASTS 0     All pertinent labs within the past 24 hours have been reviewed.    Significant Imaging: I have reviewed all pertinent imaging results/findings within the past 24 hours.   Imaging Results          CT Head Without Contrast (Preliminary result)  Result time 01/03/19 00:32:43    ED Interpretation by Sidney Valenzuela Jr., MD (01/03/19 00:32:43, [facility], Emergency Medicine)    Per statrad  Impression: no acute findings                             X-Ray Abdomen Flat And Erect (Final result)  Result time 01/02/19 23:30:26    Final result by Donald Caro MD (01/02/19 23:30:26)                 Impression:      No acute findings.      Electronically signed by: Jd Caro MD  Date:    01/02/2019  Time:    23:30             Narrative:    EXAMINATION:  XR ABDOMEN FLAT AND ERECT    CLINICAL HISTORY:  Abdominal Pain;    TECHNIQUE:  Flat and erect AP views of the abdomen were performed.    COMPARISON:  06/07/2018    FINDINGS:  The bowel gas pattern is nonspecific.  There are clips from previous cholecystectomy.  A radiopaque stent superimposed over the liver.  No abnormal mass or suspicious calcifications are demonstrated.

## 2019-01-03 NOTE — PROGRESS NOTES
Pt was seen and examined at bedside . 62 y/o aaf admitted with a dx of hepatic encephalopathy  2/2 to uncompensated liver cirrhosis . The CT head did not show any acute finding . She was started on lactulose with and improvement of her sx . There is no sign of active bleeding . The CXR ad UA did not show any source of infection . Pt only take lactulose PRN at home . Cont current tx .

## 2019-01-03 NOTE — NURSING
Patient assessed for diabetes educational needs following chart review  Daughter at bedside for info  She reports was diagnosed over 5 years ago and has received diabetes education in the past  She is followed by the [facility] team at [facility]. She has a home glucose monitor and is consistent with administering her diabetes med's  she does not identify any diabetes educational needs at this time

## 2019-01-04 VITALS
SYSTOLIC BLOOD PRESSURE: 124 MMHG | HEIGHT: 61 IN | DIASTOLIC BLOOD PRESSURE: 61 MMHG | BODY MASS INDEX: 43.41 KG/M2 | HEART RATE: 76 BPM | OXYGEN SATURATION: 97 % | RESPIRATION RATE: 20 BRPM | TEMPERATURE: 99 F | WEIGHT: 229.94 LBS

## 2019-01-04 PROBLEM — K76.82 HEPATIC ENCEPHALOPATHY: Status: RESOLVED | Noted: 2018-06-19 | Resolved: 2019-01-04

## 2019-01-04 PROBLEM — K59.00 CONSTIPATION: Status: RESOLVED | Noted: 2019-01-03 | Resolved: 2019-01-04

## 2019-01-04 LAB
ALBUMIN SERPL BCP-MCNC: 2.3 G/DL
ALP SERPL-CCNC: 120 U/L
ALT SERPL W/O P-5'-P-CCNC: 18 U/L
AMMONIA PLAS-SCNC: 134 UMOL/L
ANION GAP SERPL CALC-SCNC: 8 MMOL/L
AST SERPL-CCNC: 34 U/L
BASOPHILS # BLD AUTO: 0.02 K/UL
BASOPHILS NFR BLD: 0.4 %
BILIRUB SERPL-MCNC: 0.4 MG/DL
BUN SERPL-MCNC: 21 MG/DL
CALCIUM SERPL-MCNC: 8.5 MG/DL
CHLORIDE SERPL-SCNC: 109 MMOL/L
CO2 SERPL-SCNC: 24 MMOL/L
CREAT SERPL-MCNC: 2.1 MG/DL
DIFFERENTIAL METHOD: ABNORMAL
EOSINOPHIL # BLD AUTO: 0.3 K/UL
EOSINOPHIL NFR BLD: 4.9 %
ERYTHROCYTE [DISTWIDTH] IN BLOOD BY AUTOMATED COUNT: 14.7 %
EST. GFR  (AFRICAN AMERICAN): 29 ML/MIN/1.73 M^2
EST. GFR  (NON AFRICAN AMERICAN): 25 ML/MIN/1.73 M^2
GLUCOSE SERPL-MCNC: 224 MG/DL
HCT VFR BLD AUTO: 33.3 %
HGB BLD-MCNC: 10.8 G/DL
LYMPHOCYTES # BLD AUTO: 3 K/UL
LYMPHOCYTES NFR BLD: 56.2 %
MAGNESIUM SERPL-MCNC: 1.8 MG/DL
MCH RBC QN AUTO: 29.5 PG
MCHC RBC AUTO-ENTMCNC: 32.4 G/DL
MCV RBC AUTO: 91 FL
MONOCYTES # BLD AUTO: 0.5 K/UL
MONOCYTES NFR BLD: 9.9 %
NEUTROPHILS # BLD AUTO: 1.5 K/UL
NEUTROPHILS NFR BLD: 28.6 %
PHOSPHATE SERPL-MCNC: 4.7 MG/DL
PLATELET # BLD AUTO: 175 K/UL
PMV BLD AUTO: 10.1 FL
POCT GLUCOSE: 219 MG/DL (ref 70–110)
POCT GLUCOSE: 224 MG/DL (ref 70–110)
POTASSIUM SERPL-SCNC: 4 MMOL/L
PROT SERPL-MCNC: 5.8 G/DL
RBC # BLD AUTO: 3.66 M/UL
SODIUM SERPL-SCNC: 141 MMOL/L
WBC # BLD AUTO: 5.34 K/UL

## 2019-01-04 PROCEDURE — 85025 COMPLETE CBC W/AUTO DIFF WBC: CPT

## 2019-01-04 PROCEDURE — 83735 ASSAY OF MAGNESIUM: CPT

## 2019-01-04 PROCEDURE — 36415 COLL VENOUS BLD VENIPUNCTURE: CPT

## 2019-01-04 PROCEDURE — 82140 ASSAY OF AMMONIA: CPT

## 2019-01-04 PROCEDURE — 25000003 PHARM REV CODE 250: Performed by: HOSPITALIST

## 2019-01-04 PROCEDURE — 80053 COMPREHEN METABOLIC PANEL: CPT

## 2019-01-04 PROCEDURE — 84100 ASSAY OF PHOSPHORUS: CPT

## 2019-01-04 PROCEDURE — 25000003 PHARM REV CODE 250: Performed by: INTERNAL MEDICINE

## 2019-01-04 RX ORDER — LACTULOSE 10 G/15ML
20 SOLUTION ORAL 3 TIMES DAILY PRN
Qty: 900 ML | Refills: 0 | Status: SHIPPED | OUTPATIENT
Start: 2019-01-04 | End: 2019-11-14

## 2019-01-04 RX ADMIN — PSYLLIUM HUSK 6 G: 6 GRANULE ORAL at 11:01

## 2019-01-04 RX ADMIN — PANTOPRAZOLE SODIUM 40 MG: 40 TABLET, DELAYED RELEASE ORAL at 11:01

## 2019-01-04 RX ADMIN — INSULIN ASPART 4 UNITS: 100 INJECTION, SOLUTION INTRAVENOUS; SUBCUTANEOUS at 12:01

## 2019-01-04 RX ADMIN — LEVOTHYROXINE SODIUM 137 MCG: 25 TABLET ORAL at 05:01

## 2019-01-04 RX ADMIN — LACTULOSE 20 G: 20 SOLUTION ORAL at 11:01

## 2019-01-04 RX ADMIN — RIFAXIMIN 550 MG: 550 TABLET ORAL at 11:01

## 2019-01-04 RX ADMIN — INSULIN ASPART 4 UNITS: 100 INJECTION, SOLUTION INTRAVENOUS; SUBCUTANEOUS at 05:01

## 2019-01-04 RX ADMIN — AMLODIPINE BESYLATE 10 MG: 10 TABLET ORAL at 11:01

## 2019-01-04 NOTE — PLAN OF CARE
Contacted [facility] at [phone] . They are currently working on the urgent PA for patient's rifaximin.      @ [phone] Contacted [facility] again. PA for Rifaximin has not been approved . Patient is able to discharge home and return Monday to  the medicine once PA obtained ( if okay with discharging provider) Discussed with Cedrick Caballero NP. Patient is to discharge home today. [facility] will contact the patient or her daughter once rx approved. Patient's daughter to  medicine once approved. Discussed with patient , her daughter, primary nurse and discharging provider.

## 2019-01-04 NOTE — DISCHARGE SUMMARY
[facility] Hospital Medicine  Discharge Summary      Patient Name: Diamond Das  MRN: [id number]  Admission Date: 1/2/2019  Hospital Length of Stay: 2 days  Discharge Date and Time:  01/04/2019 3:32 PM  Attending Physician: No att. providers found   Discharging Provider: Cedrick Caballero NP  Primary Care Provider: Andrey Perrin MD      HPI:   63F h/o T2DM, liver cirrhosis, HTN, and GERD presents with confusion x 2 days.  Daughter reports she noticed patient had some slurring of speech 2 days ago.  Then yesterday she noticed patient using a bottle as a remote control yesterday and .   Daughter reports patient started having abdominal pain 3 days ago.  Patient took some percocet for abdominal pain.   Daughter reports she knows percocet can cause constipation and was concerned patient was not having as regular bowel movements as she should be having to prevent HE.  Patient report she does not remember the last time she had a bowel movement.  Daughter does not know either.   Daughter reports last time patient had acute HE, she was disoriented.  She reports this time patient has confusion but is oriented x 3 and able to answer questions and follow commands appropriately.  In ER, CT brain negative for acute intracranial abnormalities.  KUB show nonspecific bowel gas patterns.  Ammonia level 116.  Patient given 15g lactulose.      * No surgery found *      Hospital Course:   1/4/19 No acute issues overnight. The patients repeat ammonia level this morning was higher at 134, however the patients confusion and slurred speech was completely resolved. The patient was seen and examined today and deemed stable for discharge. The patient reports that she has not been consistently taking the lactulose. The patient was instructed to to take the lactulose TID PRN for a goal of 2 - 4 bowel movements per day. The patient also reports that she was unable to get the rifaximin filled at her pharmacy due to needing  prior authorization. The prescription was sent to the [facility] who will obtain the prior auth. The patient will follow up with her PCP and with GI.      Consults:     No new Assessment & Plan notes have been filed under this hospital service since the last note was generated.  Service: Hospital Medicine    Final Active Diagnoses:    Diagnosis Date Noted POA    Acute on chronic kidney failure [N17.9, N18.9] 01/03/2019 Yes    Hypertension [I10] 12/14/2017 Yes    Type 2 diabetes mellitus with circulatory disorder [E11.59] 09/02/2016 Yes    Hypothyroidism due to acquired atrophy of thyroid [E03.4] 09/02/2016 Yes      Problems Resolved During this Admission:    Diagnosis Date Noted Date Resolved POA    PRINCIPAL PROBLEM:  Hepatic encephalopathy [K72.90] 06/19/2018 01/04/2019 Yes    Constipation [K59.00] 01/03/2019 01/04/2019 Yes       Discharged Condition: stable    Disposition: Home or Self Care    Follow Up:  Follow-up Information     Andrey Perrin MD. Schedule an appointment as soon as possible for a visit in 3 days.    Specialty:  Family Medicine  Contact information:  [address] [address] [address]  [phone]             Liliane Baker MD. Schedule an appointment as soon as possible for a visit in 2 weeks.    Specialties:  Transplant, Hepatology  Contact information:  [address] [facility] [id number] [phone]                 Patient Instructions:   No discharge procedures on file.    Significant Diagnostic Studies:   Imaging Results          CT Head Without Contrast (Final result)  Result time 01/03/19 07:25:20    Final result by Martell Jasmine MD (01/03/19 07:25:20)                 Impression:      Chronic microvascular ischemic changes.      Electronically signed by: Martell Jasmine MD  Date:    01/03/2019  Time:    07:25             Narrative:    EXAMINATION:  CT HEAD WITHOUT CONTRAST    CLINICAL HISTORY:  hepatic encephalopathy;    TECHNIQUE:  Low dose axial CT images  obtained throughout the head without intravenous contrast. Sagittal and coronal reconstructions were performed.  All CT scans at this facility use dose modulation, iterative reconstruction and/or weight based dosing when appropriate to reduce radiation dose to as low as reasonably achievable.    COMPARISON:  None.    FINDINGS:  Intracranial compartment:    The brain parenchyma demonstrates areas of decreased attenuation with mild to moderate periventricular white matter consistent with chronic microvascular ischemic changes..  No parenchymal mass, hemorrhage, edema or major vascular distribution infarct.  Vascular calcifications are noted.  Probable small benign choroid cyst on the right.    Mild prominence of the sulci and ventricles are consistent with age-related involutional changes.    No extra-axial blood or fluid collections.    Skull/extracranial contents (limited evaluation): No fracture.  Mild sinus mucosal thickening.  Mastoid air cells and paranasal sinuses are essentially clear.                               X-Ray Abdomen Flat And Erect (Final result)  Result time 01/02/19 23:30:26    Final result by Donald Caro MD (01/02/19 23:30:26)                 Impression:      No acute findings.      Electronically signed by: Jd Caro MD  Date:    01/02/2019  Time:    23:30             Narrative:    EXAMINATION:  XR ABDOMEN FLAT AND ERECT    CLINICAL HISTORY:  Abdominal Pain;    TECHNIQUE:  Flat and erect AP views of the abdomen were performed.    COMPARISON:  06/07/2018    FINDINGS:  The bowel gas pattern is nonspecific.  There are clips from previous cholecystectomy.  A radiopaque stent superimposed over the liver.  No abnormal mass or suspicious calcifications are demonstrated.                               RADIOLOGY REPORT (Final result)  Result time 01/03/19 14:54:28                   Pending Diagnostic Studies:     None         Medications:  Reconciled Home Medications:      Medication List       CHANGE how you take these medications    furosemide 80 MG tablet  Commonly known as:  LASIX  Take 1 tablet (80 mg total) by mouth once daily.  What changed:    · how much to take  · when to take this  · reasons to take this     lactulose 20 gram/30 mL Soln  Commonly known as:  CHRONULAC  Take 30 mLs (20 g total) by mouth 3 (three) times daily as needed (To goal bowel movement 2-3 bowel movements per day).  What changed:    · how much to take  · when to take this  · reasons to take this        CONTINUE taking these medications    amLODIPine 10 MG tablet  Commonly known as:  NORVASC  TAKE 1 TABLET BY MOUTH ONCE DAILY     aspirin 81 MG EC tablet  Commonly known as:  ECOTRIN  Take 1 tablet (81 mg total) by mouth once daily.     carvedilol 3.125 MG tablet  Commonly known as:  COREG  Take 1 tablet (3.125 mg total) by mouth 2 (two) times daily.     doxazosin 2 MG tablet  Commonly known as:  CARDURA  Take 1 tablet (2 mg total) by mouth every evening.     insulin lispro 100 unit/mL pen  Commonly known as:  ADMELOG SOLOSTAR U-100 INSULIN  Titrate up to 14 units subcutaneously three times a day 10-15 min before meals as directed in after visit summary     LANTUS SOLOSTAR U-100 INSULIN glargine 100 units/mL (3mL) SubQ pen  Generic drug:  insulin  Inject 25 Units into the skin every evening.     levothyroxine 137 MCG Tab tablet  Commonly known as:  SYNTHROID  TAKE 1 TABLET BY MOUTH BEFORE BREAKFAST     lisinopril 5 MG tablet  Commonly known as:  PRINIVIL,ZESTRIL  Take 1 tablet (5 mg total) by mouth once daily.     ondansetron 4 MG tablet  Commonly known as:  ZOFRAN  Take 1 tablet (4 mg total) by mouth every 8 (eight) hours as needed.     ondansetron 4 MG Tbdl  Commonly known as:  ZOFRAN-ODT  Take 1 tablet (4 mg total) by mouth every 6 (six) hours as needed.     pantoprazole 20 MG tablet  Commonly known as:  PROTONIX  TAKE 2 TABLETS BY MOUTH ONCE DAILY     rifAXIMin 550 mg Tab  Commonly known as:  XIFAXAN  Take 1 tablet (550 mg  total) by mouth 2 (two) times daily.     simvastatin 40 MG tablet  Commonly known as:  ZOCOR  Take 40 mg by mouth once daily.     tiZANidine 4 MG tablet  Commonly known as:  ZANAFLEX  Take 4 mg by mouth every evening.     vitamin E 400 UNIT capsule  Take 400 Units by mouth every morning.            Indwelling Lines/Drains at time of discharge:   Lines/Drains/Airways          None          Time spent on the discharge of patient: > 30 minutes  Patient was seen and examined on the date of discharge and determined to be suitable for discharge.         Cedrick Caballero NP  Department of Hospital Medicine  [facility]

## 2019-01-04 NOTE — PHYSICIAN QUERY
PT Name: Diamond Das  MR #: [id number]     PHYSICIAN QUERY -  ACUITY OF CONDITION CLARIFICATION      CDS Coty Hutton RN, BSN        Contact Information:  [phone]    [email or web address]         This form is a permanent document in the medical record.     Query Date: January 4, 2019    By submitting this query, we are merely seeking further clarification of documentation to reflect the severity of illness of your patient. Please utilize your independent clinical judgment when addressing the question(s) below.    The Medical record reflects the following:     Indicators   Supporting Clinical Findings Location in Medical Record   X   Documentation of condition Hepatic encephalopathy     H&P   X   Lab Value(s) Ammonia level   118 Lab     Radiology Findings     X   Treatment                                 Medication - start lactulose 20g tid scheduled and q4h prn to goal 2-3 BM per day  - continue rifaximin H&P   X   Other confusion x 2 days.  Daughter reports she noticed patient had some slurring of speech 2 days ago.  Then yesterday she noticed patient using a bottle as a remote control yesterday and .   Daughter reports patient started having abdominal pain 3 days ago.         63F h/o T2DM, liver cirrhosis, HTN, and GERD      H&P     Provider, please specify the acuity/chronicity of  Hepatic encephalopathy     [ x  ] Acute  Or subacute Hepatic encephalopathy without coma   [   ] Chronic hepatic encephalopathy without coma   [   ] Other, please specify    [   ]  Clinically Undetermined       Please document in your progress notes daily for the duration of treatment until resolved, and include in your discharge summary.

## 2019-01-04 NOTE — NURSING
Dc instructions reviewed with patient. Verbalized understanding. IV removed, pt tolerated well. VSS. Wheeled out with pct, NADN.

## 2019-01-04 NOTE — HOSPITAL COURSE
1/4/19 No acute issues overnight. The patients repeat ammonia level this morning was higher at 134, however the patients confusion and slurred speech was completely resolved. The patient was seen and examined today and deemed stable for discharge. The patient reports that she has not been consistently taking the lactulose. The patient was instructed to to take the lactulose TID PRN for a goal of 2 - 4 bowel movements per day. The patient also reports that she was unable to get the rifaximin filled at her pharmacy due to needing prior authorization. The prescription was sent to the [facility] who will obtain the prior auth. The patient will follow up with her PCP and with GI.

## 2019-01-04 NOTE — PHYSICIAN QUERY
PT Name: Diamond Das  MR #: [id number]  Physician Query Form - CKD Clarification     CDS Coty Hutton RN, BSN        Contact Information:  [phone]    [email or web address]         This form is a permanent document in the medical record.     Query Date: January 4, 2019    By submitting this query, we are merely seeking further clarification of documentation. Please utilize your independent clinical judgment when addressing the question(s) below.    The Medical record contains the following:     Indicators   Supporting Clinical Findings   Location in Medical Record   X   CKD or Chronic Kidney (Renal) Failure / Disease Acute on chronic kidney failure H&P   X   BUN/Creatinine                          GFR BUN:  22-->20-->21  Cr:  2.0-->1.8-->2.1  GFR:  30.4-->35-->29 Labs 1/2 -- 1/4    Dehydration      Nausea / Vomiting      Dialysis / CRRT      Medication     X   Treatment will hold lasix and lisinopril H&P   X   Other Chronic Conditions Hepatic encephalopathy  - [place] grade 1 HE, minimal HE     T2DM, liver cirrhosis, HTN, and GERD  H&P    Other       Provider, please further specify the stage of CKD.     [employer] Definitions     Stage Description eGFR (mL/min)   [ x  ]    III Moderately reduced kidney function 30-59   [   ]    IV Severely reduced kidney function 15-29   [   ] Other (please specify): ____________    [   ]  Clinically Undetermined          Please document in your progress notes daily for the duration of treatment until resolved and include in your discharge summary.

## 2019-01-07 ENCOUNTER — PATIENT OUTREACH (OUTPATIENT)
Dept: ADMINISTRATIVE | Facility: CLINIC | Age: 64
End: 2019-01-07

## 2019-01-07 NOTE — PLAN OF CARE
01/07/19 1606   Final Note   Assessment Type Final Discharge Note   Anticipated Discharge Disposition Home   Right Care Referral Info   Post Acute Recommendation No Care

## 2019-01-07 NOTE — PATIENT INSTRUCTIONS
Lactulose oral solution  What is this medicine?  LACTULOSE (LAK tyoo lose) is a laxative derived from lactose. It helps to treat chronic constipation and to treat or prevent hepatic encephalopathy or coma. These are brain disorders that result from liver disease.  How should I use this medicine?  Take this medicine mouth. Follow the directions on the prescription label. Shake well before using. Use a specially marked spoon or container to measure your medicine. Ask your pharmacist if you do not have one. Household spoons are not accurate. Take your doses at regular intervals. Do not take your medicine more often than directed.  You may be directed to take this medicine rectally. If so, you must follow specific directions from your doctor or healthcare professional. Please contact him or her.  Talk to your pediatrician regarding the use of this medicine in children. Special care may be needed.  What side effects may I notice from receiving this medicine?  Side effects that you should report to your doctor or health care professional as soon as possible:  · diarrhea  Side effects that usually do not require medical attention (report to your doctor or health care professional if they continue or are bothersome):  · belching, flatulence  · nausea or vomiting  · stomach pain or discomfort  What may interact with this medicine?  · antacids  · neomycin  · other laxatives  What if I miss a dose?  If you miss a dose, take it as soon as you can. If it is almost time for your next dose, take only that dose. Do not take double or extra doses.  Where should I keep my medicine?  Keep out of the reach of children.  This medicine may darken in color under normal storage conditions. This is because of the sugar solution and does not affect the way the medicine works. If the solution becomes extremely dark in color, contact your health care professional before use.  Store at room temperature between 15 and 30 degrees C (59 and 86  degrees F). Do not freeze. Keep container tightly closed. Throw away any unused medicine after the expiration date.  What should I tell my health care provider before I take this medicine?  They need to know if you have any of these conditions:  · need a galactose-free diet  · scheduled for surgery  · an unusual or allergic reaction to lactulose, other sugars, medicines, foods, dyes or preservatives  · pregnant or trying to get pregnant  · breast-feeding  What should I watch for while using this medicine?  This medicine may not produce any result for 24 to 48 hours. Do not take this medicine for longer than directed by your doctor or health care professional.  Drink plenty of water with each dose of this medicine.  NOTE:This sheet is a summary. It may not cover all possible information. If you have questions about this medicine, talk to your doctor, pharmacist, or health care provider. Copyright© 2017 [employer]

## 2019-01-14 ENCOUNTER — TELEPHONE (OUTPATIENT)
Dept: PHARMACY | Facility: CLINIC | Age: 64
End: 2019-01-14

## 2019-01-14 NOTE — TELEPHONE ENCOUNTER
[place] Afternoon.    The prior authorization for Ms. Das' Xifaxan prescription has been approved on 1/7/2019 and the authorization is good through 1/7/2020.    The patient has been notified and is aware of the medication approval.    Thanks.    Jacklyn Flores CPhT.  Patient Care Advocate  [facility]  [email or web address]

## 2019-01-17 ENCOUNTER — HOSPITAL ENCOUNTER (OUTPATIENT)
Dept: RADIOLOGY | Facility: HOSPITAL | Age: 64
Discharge: HOME | End: 2019-01-17
Attending: FAMILY MEDICINE
Payer: MEDICAID

## 2019-01-17 ENCOUNTER — OFFICE VISIT (OUTPATIENT)
Dept: INTERNAL MEDICINE | Facility: CLINIC | Age: 64
End: 2019-01-17
Payer: MEDICAID

## 2019-01-17 VITALS
HEART RATE: 63 BPM | OXYGEN SATURATION: 97 % | WEIGHT: 228.38 LBS | DIASTOLIC BLOOD PRESSURE: 84 MMHG | HEIGHT: 61 IN | BODY MASS INDEX: 43.12 KG/M2 | TEMPERATURE: 98 F | SYSTOLIC BLOOD PRESSURE: 134 MMHG | RESPIRATION RATE: 18 BRPM

## 2019-01-17 DIAGNOSIS — K72.90 DECOMPENSATED HEPATIC CIRRHOSIS: Primary | ICD-10-CM

## 2019-01-17 DIAGNOSIS — E11.59 TYPE 2 DIABETES MELLITUS WITH OTHER CIRCULATORY COMPLICATION, WITHOUT LONG-TERM CURRENT USE OF INSULIN: ICD-10-CM

## 2019-01-17 DIAGNOSIS — Z12.31 SCREENING MAMMOGRAM, ENCOUNTER FOR: ICD-10-CM

## 2019-01-17 DIAGNOSIS — I10 HYPERTENSION, UNSPECIFIED TYPE: ICD-10-CM

## 2019-01-17 DIAGNOSIS — S39.012A STRAIN OF LUMBAR REGION, INITIAL ENCOUNTER: ICD-10-CM

## 2019-01-17 DIAGNOSIS — K74.60 DECOMPENSATED HEPATIC CIRRHOSIS: Primary | ICD-10-CM

## 2019-01-17 DIAGNOSIS — Z12.11 SCREEN FOR COLON CANCER: ICD-10-CM

## 2019-01-17 PROCEDURE — 99495 TRANSJ CARE MGMT MOD F2F 14D: CPT | Mod: S$PBB,,, | Performed by: FAMILY MEDICINE

## 2019-01-17 PROCEDURE — 99495 TCM SERVICES (MODERATE COMPLEXITY): ICD-10-PCS | Mod: S$PBB,,, | Performed by: FAMILY MEDICINE

## 2019-01-17 PROCEDURE — 77067 SCR MAMMO BI INCL CAD: CPT | Mod: 26,,, | Performed by: RADIOLOGY

## 2019-01-17 PROCEDURE — 77067 SCR MAMMO BI INCL CAD: CPT | Mod: TC,PO,ER

## 2019-01-17 PROCEDURE — 99213 OFFICE O/P EST LOW 20 MIN: CPT | Mod: PBBFAC,PO | Performed by: FAMILY MEDICINE

## 2019-01-17 PROCEDURE — 99999 PR PBB SHADOW E&M-EST. PATIENT-LVL III: CPT | Mod: PBBFAC,,, | Performed by: FAMILY MEDICINE

## 2019-01-17 PROCEDURE — 99495 TRANSJ CARE MGMT MOD F2F 14D: CPT | Mod: PBBFAC,PO | Performed by: FAMILY MEDICINE

## 2019-01-17 PROCEDURE — 77067 MAMMO DIGITAL SCREENING BILAT WITH CAD: ICD-10-PCS | Mod: 26,,, | Performed by: RADIOLOGY

## 2019-01-17 PROCEDURE — 99999 PR PBB SHADOW E&M-EST. PATIENT-LVL III: ICD-10-PCS | Mod: PBBFAC,,, | Performed by: FAMILY MEDICINE

## 2019-01-17 RX ORDER — CARVEDILOL 3.12 MG/1
TABLET ORAL
Qty: 180 TABLET | Refills: 0 | Status: SHIPPED | OUTPATIENT
Start: 2019-01-17 | End: 2019-04-17 | Stop reason: SDUPTHER

## 2019-01-17 RX ORDER — AMLODIPINE BESYLATE 10 MG/1
TABLET ORAL
Qty: 90 TABLET | Refills: 0 | Status: SHIPPED | OUTPATIENT
Start: 2019-01-17 | End: 2019-04-17 | Stop reason: SDUPTHER

## 2019-01-17 RX ORDER — TIZANIDINE 4 MG/1
TABLET ORAL
Qty: 90 TABLET | Refills: 0 | Status: SHIPPED | OUTPATIENT
Start: 2019-01-17 | End: 2019-05-10 | Stop reason: SDUPTHER

## 2019-01-17 RX ORDER — INSULIN GLARGINE 100 [IU]/ML
30 INJECTION, SOLUTION SUBCUTANEOUS DAILY
COMMUNITY
End: 2019-05-23 | Stop reason: CLARIF

## 2019-01-17 NOTE — PROGRESS NOTES
Transitional Care Note  Subjective:       Patient ID: Diamond Das is a 63 y.o. female.  Chief Complaint: Hospital Follow Up    Family and/or Caretaker present at visit?  No.  Diagnostic tests reviewed/disposition: I have reviewed all completed as well as pending diagnostic tests at the time of discharge.  Disease/illness education: hepatic encephalopathy  Home health/community services discussion/referrals: Patient does not have home health established from hospital visit.  They do not need home health.  If needed, we will set up home health for the patient.   Establishment or re-establishment of referral orders for community resources: No other necessary community resources.   Discussion with other health care providers: No discussion with other health care providers necessary.   Pt was admitted on 01/02/2019 for hepatic encephalopathy, ammonia levels were elevated, decreased by discharge date of 01/04/2019.  Pt on lactulose now and Rifaximin. Doing well since then.      Cirrhosis:  O: chronic  L:  D: constant, improved at present  C:  [place]: lactulose, rifaximin  Exac: multifactorial with cirrhosis, Diabetes        Review of Systems   Constitutional: Negative for fever.   HENT: Negative for congestion.    Eyes: Negative for discharge.   Respiratory: Positive for shortness of breath.    Cardiovascular: Negative for chest pain.   Gastrointestinal: Negative for abdominal pain.   Genitourinary: Negative for difficulty urinating.   Musculoskeletal: Negative for joint swelling.   Neurological: Negative for dizziness.   Psychiatric/Behavioral: Negative for agitation.       Objective:      Physical Exam   Constitutional: She is oriented to person, place, and time. She appears well-developed and well-nourished. No distress.   HENT:   Head: Normocephalic and atraumatic.   Eyes: Conjunctivae are normal. No scleral icterus.   Cardiovascular: Normal rate and regular rhythm.   Pulmonary/Chest: Effort normal and breath sounds  normal. No respiratory distress. She has no wheezes.   Abdominal: Soft. Bowel sounds are normal. There is no tenderness. There is no guarding.   Neurological: She is alert and oriented to person, place, and time.   Skin: Skin is warm. No rash noted. She is not diaphoretic. No erythema. No pallor.   Good turgor   Psychiatric: She has a normal mood and affect. Her behavior is normal. Judgment and thought content normal.   Nursing note and vitals reviewed.      Assessment:       1. Decompensated hepatic cirrhosis    2. Screening mammogram, encounter for    3. Screen for colon cancer    4. Type 2 diabetes mellitus with other circulatory complication, without long-term current use of insulin        Plan:     Problem List Items Addressed This Visit        Renal/    Screening mammogram, encounter for    Relevant Orders    Mammo Digital Diagnostic Bilateral With CAD       Endocrine    Type 2 diabetes mellitus with circulatory disorder    Relevant Medications    insulin (BASAGLAR KWIKPEN U-100 INSULIN) glargine 100 units/mL (3mL) SubQ pen    Other Relevant Orders    Ambulatory Referral to Optometry       GI    Decompensated hepatic cirrhosis - Primary    Current Assessment & Plan     Doing well currently, will rechk ammonia levels and CMP.  Pt is alert, cooperative. No s/s of active encephalopathy.         Relevant Orders    Ammonia    Comprehensive metabolic panel    CBC auto differential    Screen for colon cancer    Relevant Orders    Case request GI: COLONOSCOPY (Completed)

## 2019-01-17 NOTE — PROGRESS NOTES
Subjective:       Patient ID: Diamond Das is a 63 y.o. female.    Chief Complaint: Hospital Follow Up    HPI  Review of Systems    Objective:      Physical Exam    Assessment:       No diagnosis found.    Plan:     Problem List Items Addressed This Visit     None

## 2019-01-17 NOTE — ASSESSMENT & PLAN NOTE
Doing well currently, will rechk ammonia levels and CMP.  Pt is alert, cooperative. No s/s of active encephalopathy.

## 2019-01-19 NOTE — PROGRESS NOTES
Results have been reviewed . All labs are within normal range.   If you have any questions please feel free to contact me.  Impression:  No mammographic evidence of malignancy.     BI-RADS Category 1: Negative     Recommendation:  Routine screening mammogram in 1 year is recommended.

## 2019-01-20 DIAGNOSIS — R10.84 GENERALIZED ABDOMINAL PAIN: ICD-10-CM

## 2019-01-20 RX ORDER — PANTOPRAZOLE SODIUM 20 MG/1
TABLET, DELAYED RELEASE ORAL
Qty: 90 TABLET | Refills: 0 | Status: SHIPPED | OUTPATIENT
Start: 2019-01-20 | End: 2019-03-12 | Stop reason: SDUPTHER

## 2019-01-24 ENCOUNTER — OFFICE VISIT (OUTPATIENT)
Dept: INTERNAL MEDICINE | Facility: CLINIC | Age: 64
End: 2019-01-24
Payer: MEDICAID

## 2019-01-24 ENCOUNTER — HOSPITAL ENCOUNTER (OUTPATIENT)
Dept: RADIOLOGY | Facility: HOSPITAL | Age: 64
Discharge: HOME | End: 2019-01-24
Attending: NURSE PRACTITIONER
Payer: MEDICAID

## 2019-01-24 ENCOUNTER — HOSPITAL ENCOUNTER (EMERGENCY)
Facility: HOSPITAL | Age: 64
Discharge: HOME | End: 2019-01-24
Attending: EMERGENCY MEDICINE
Payer: MEDICAID

## 2019-01-24 ENCOUNTER — TELEPHONE (OUTPATIENT)
Dept: ENDOSCOPY | Facility: HOSPITAL | Age: 64
End: 2019-01-24

## 2019-01-24 ENCOUNTER — TELEPHONE (OUTPATIENT)
Dept: INTERNAL MEDICINE | Facility: CLINIC | Age: 64
End: 2019-01-24

## 2019-01-24 VITALS
RESPIRATION RATE: 16 BRPM | TEMPERATURE: 96 F | DIASTOLIC BLOOD PRESSURE: 67 MMHG | HEART RATE: 60 BPM | WEIGHT: 220.25 LBS | BODY MASS INDEX: 41.58 KG/M2 | OXYGEN SATURATION: 98 % | HEIGHT: 61 IN | SYSTOLIC BLOOD PRESSURE: 144 MMHG

## 2019-01-24 VITALS
DIASTOLIC BLOOD PRESSURE: 74 MMHG | WEIGHT: 217.06 LBS | TEMPERATURE: 99 F | BODY MASS INDEX: 40.98 KG/M2 | HEIGHT: 61 IN | OXYGEN SATURATION: 99 % | SYSTOLIC BLOOD PRESSURE: 137 MMHG | RESPIRATION RATE: 20 BRPM | HEART RATE: 62 BPM

## 2019-01-24 DIAGNOSIS — M25.512 ACUTE PAIN OF LEFT SHOULDER: ICD-10-CM
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
## 2019-01-24 DIAGNOSIS — M25.512 ACUTE PAIN OF LEFT SHOULDER: Primary | ICD-10-CM

## 2019-01-24 PROCEDURE — 99999 PR PBB SHADOW E&M-EST. PATIENT-LVL V: ICD-10-PCS | Mod: PBBFAC,,, | Performed by: NURSE PRACTITIONER

## 2019-01-24 PROCEDURE — 73030 X-RAY EXAM OF SHOULDER: CPT | Mod: 26,LT,, | Performed by: RADIOLOGY

## 2019-01-24 PROCEDURE — 99281 EMR DPT VST MAYX REQ PHY/QHP: CPT | Mod: ER,27,25

## 2019-01-24 PROCEDURE — 99213 OFFICE O/P EST LOW 20 MIN: CPT | Mod: S$PBB,,, | Performed by: NURSE PRACTITIONER

## 2019-01-24 PROCEDURE — 99213 PR OFFICE/OUTPT VISIT, EST, LEVL III, 20-29 MIN: ICD-10-PCS | Mod: S$PBB,,, | Performed by: NURSE PRACTITIONER

## 2019-01-24 PROCEDURE — 73030 X-RAY EXAM OF SHOULDER: CPT | Mod: TC,PO,LT

## 2019-01-24 PROCEDURE — 73030 XR SHOULDER COMPLETE 2 OR MORE VIEWS LEFT: ICD-10-PCS | Mod: 26,LT,, | Performed by: RADIOLOGY

## 2019-01-24 PROCEDURE — 99999 PR PBB SHADOW E&M-EST. PATIENT-LVL V: CPT | Mod: PBBFAC,,, | Performed by: NURSE PRACTITIONER

## 2019-01-24 PROCEDURE — 99215 OFFICE O/P EST HI 40 MIN: CPT | Mod: PBBFAC,25,PO | Performed by: NURSE PRACTITIONER

## 2019-01-24 RX ORDER — TRAMADOL HYDROCHLORIDE 50 MG/1
50 TABLET ORAL EVERY 6 HOURS
Qty: 20 TABLET | Refills: 0 | Status: SHIPPED | OUTPATIENT
Start: 2019-01-24 | End: 2019-08-26

## 2019-01-24 NOTE — TELEPHONE ENCOUNTER
Spoke with pt and was able to schedule her to be seen by Autumn Kingston NP. Pt confirmed appointment.

## 2019-01-24 NOTE — TELEPHONE ENCOUNTER
----- Message from Tova De Souza sent at 1/24/2019 11:16 AM CST -----  Contact: self/[phone]  Would luke to consult with nurse regarding a work in appt. Patient states she is having arm and should pain. Please call back at [phone]. Thanks/ar

## 2019-01-24 NOTE — PROGRESS NOTES
Subjective:       Patient ID: Diamond Das is a 63 y.o. female.    Chief Complaint: Shoulder Pain ( x 5 days)  pt reports to clinic with chief complaint of left shoulder pain.  Onset 5 days ago.  Pt denies remote injury or trauma reports awakening to pain.  Pain occurs at rest and worsens with movement.  Pt has tried tylenol without relief.  Reports taking previously prescribed zanaflex which afforded relief, but medication causes drowsiness.   Shoulder Pain    The pain is present in the left shoulder. This is a new problem. The current episode started in the past 7 days. There has been no history of extremity trauma. The problem occurs constantly. The problem has been unchanged. The quality of the pain is described as aching. The pain is at a severity of 9/10. Associated symptoms include a limited range of motion and stiffness. Pertinent negatives include no inability to bear weight, numbness or tingling. The symptoms are aggravated by activity. She has tried acetaminophen for the symptoms. The treatment provided no relief. Family history includes arthritis.     Review of Systems   Constitutional: Negative.    Respiratory: Negative.    Cardiovascular: Negative.    Musculoskeletal: Positive for arthralgias, myalgias and stiffness.   Neurological: Negative for tingling and numbness.       Objective:      Physical Exam   Constitutional: She is oriented to person, place, and time. She appears well-developed and well-nourished.   HENT:   Head: Normocephalic.   Eyes: EOM are normal.   Neck: Neck supple.   Cardiovascular: Normal rate and normal heart sounds.   Musculoskeletal:        Left shoulder: She exhibits decreased range of motion and tenderness (ac joint and trapezius tenderness on palpation ). She exhibits no effusion, no crepitus and no spasm.   Neurological: She is alert and oriented to person, place, and time.   Vitals reviewed.      Assessment:       1. Acute pain of left shoulder        Plan:   Acute pain  of left shoulder  -     X-Ray Shoulder 2 or More Views Left; Future; Expected date: 01/24/2019  -     traMADol (ULTRAM) 50 mg tablet; Take 1 tablet (50 mg total) by mouth every 6 (six) hours.  Dispense: 20 tablet; Refill: 0  -given hx of cirrhosis use tylenol SPARINGLY.  Do not take ultram and zanaflex simultaneously. 6 hours in between doses.  -RICE; if no improvement follow up    No Follow-up on file.

## 2019-01-24 NOTE — PATIENT INSTRUCTIONS
Shoulder Pain with Uncertain Cause  Shoulder pain can have many causes. Pain often comes from the structures that surround the shoulder joint. These are the joint capsule, ligaments, tendons, muscles, and bursa. Pain can also come from cartilage in the joint. Cartilage can become worn out or injured. Its important to know whats causing your pain so the healthcare provider can use the correct treatment. But sometimes its difficult to find the exact cause of shoulder pain. You may need to see a specialist (orthopedist). You may also need special tests such as a CT scan or MRI. The provider may need to use special tools to look inside the joint (arthroscopy).  Shoulder pain can be treated with a sling or a device that keeps your shoulder from moving. You can take an anti-inflammatory medicine such as ibuprofen to ease pain. You may need to do special shoulder exercises. Follow up with a specialist if the pain is severe or doesnt go away after a few weeks.  Home care  Follow these tips when caring for yourself at home:  · If a sling was given to you, leave it in place for the time advised by your healthcare provider. If you arent sure how long to wear it, ask for advice. If the sling becomes loose, adjust it so that your forearm is level with the ground. Your shoulder should feel well supported.  · Put an ice pack on the injured area for 20 minutes every 1 to 2 hours the first day. You can make your own ice pack by putting ice cubes in a plastic bag. Wrap the bag in a thin towel. Continue with ice packs 3 to 4 times a day for the next 2 days. Then use the pack as needed to ease pain and swelling.  · You may use acetaminophen or ibuprofen to control pain, unless another pain medicine was prescribed. If you have chronic liver or kidney disease, talk with your healthcare provider before using these medicines. Also talk with your provider if youve ever had a stomach ulcer or GI bleeding.  · Shoulder pain may seem  worse at night, when there is less to distract you from the pain. If you sleep on your side, try to keep weight off your painful shoulder. Propping pillows behind you may stop you from rolling over onto that shoulder during sleep.   · Shoulder and elbow joints can become stiff if left in a sling for too long. You should start range of motion exercises about 7 to 10 days after the injury. Talk with your provider to find out what type of exercises to do and how soon to start.  · You can take the sling off to shower or bathe.  Follow-up care  Follow up with your healthcare provider if you dont start to get better in the next 5 days.  When to seek medical advice  Call your healthcare provider right away if any of these occur:  · Pain or swelling gets worse or continues for more than a few days  · Your hand or fingers become cold, blue, numb, or tingly  · Large amount of bruising on your shoulder or upper arm  · Difficulty moving your hand or fingers  · Weakness in your hand or fingers  · Your shoulder becomes stiff  · It feels like your shoulder is popping out  · You are less able to do your daily activities  Date Last Reviewed: 10/1/2016  © 2465-9243 [employer]. [address] [place] [address], [place] [address]. All rights reserved. This information is not intended as a substitute for professional medical care. Always follow your healthcare professional's instructions.

## 2019-01-25 NOTE — ED NOTES
"Patient verbally verified and Spelled Full Name and Date of Birth. LOC: The patient is awake, alert and aware of environment but drowsy with an appropriate affect, the patient is oriented x 3 and speaking appropriately..  Pt states she took a 4mg Zanaflex at 1430 (first time taking & was told to take 1/2 pill but took a whole one instead), has been sleeping most of afternoon.   She fell while getting off the toilet hitting her head on table edge, no lOC.  She says she was suddenly "very weak & my legs just gave out out on me".  Denies pain anywhere  APPEARANCE: Patient resting comfortably and in no acute distress, patient is clean and well groomed, patient's clothing is properly fastened.  HEENT: Brief WNL  SKIN: Brief WNL.   MUSCULOSKELETAL: Brief WNL  RESPIRATORY: Brief WNL, resp unlabored  CARDIAC: Brief WNL, denies chest pain  GASTRO: Brief WNL  : Brief WNL  Peripheral Vasc: Brief WNL  NEURO: Brief WNL, alert, speech clear, answers all questions appropriately  PSYCH: Brief WNL  "

## 2019-01-29 ENCOUNTER — TELEPHONE (OUTPATIENT)
Dept: ORTHOPEDICS | Facility: CLINIC | Age: 64
End: 2019-01-29

## 2019-01-29 DIAGNOSIS — M25.512 ACUTE PAIN OF LEFT SHOULDER: Primary | ICD-10-CM

## 2019-01-29 NOTE — TELEPHONE ENCOUNTER
Called the patient about their ortho referral for their left shoulder. I got the patient scheduled to see Melonie Hong on 2/11/19 at 3:00. Patient verbalized understanding. Patient was thankful for the call.ZEKE

## 2019-01-30 NOTE — ED PROVIDER NOTES
Encounter Date: 1/24/2019       History     Chief Complaint   Patient presents with    Fall     Patient reports she took Tizanidine 4mg and became off balance. she fell going to bathroom. hit head on left side. Daughter reports patient could not get her feet staright so she wants her checked     Patient currently presents status post same level fall.  This occurred just PTA at home.  Patient notes she did catch herself on the way down but bumped the left side of her face on the way down.  There was not LOC noted.  AMS is not noted.  Patient is not currently taking anticoagulant or antiplatelet medications.  Patient denies associated neck pain.            Review of patient's allergies indicates:   Allergen Reactions    Subsys [fentanyl] Other (See Comments)     After administration pt unresponsive.  HR and respirations decreased.     Versed [midazolam] Other (See Comments)     After administration pt unresponsive.  HR and respirations decreased.     Ampicillin Rash    Codeine Nausea And Vomiting and Nausea Only     Past Medical History:   Diagnosis Date    Ascites     Cataract     CHF (congestive heart failure)     Cirrhosis     Diabetes mellitus     Gastroparesis     GERD (gastroesophageal reflux disease)     Hypertension     Liver disease     Renal disorder     Thyroid disease      Past Surgical History:   Procedure Laterality Date    CHOLECYSTECTOMY      ERCP      LIVER BIOPSY      TIPS N/A 1/17/2017    Performed by [facility]    TRANSJUGULAR LIVER BIOPSY N/A 11/17/2016    Performed [facility]    TUBAL LIGATION      UPPER GASTROINTESTINAL ENDOSCOPY       Family History   Problem Relation Age of Onset    Cancer Mother     Breast cancer Mother     Heart disease Father     Aneurysm Sister     Heart disease Brother     No Known Problems Maternal Grandmother     Cancer Maternal Grandfather     Sarcoidosis Sister      Social History      Tobacco Use    Smoking status: Never Smoker    Smokeless tobacco: Never Used   Substance Use Topics    Alcohol use: No     Frequency: Never    Drug use: No     Review of Systems   Constitutional: Negative for chills and fever.   HENT: Negative for congestion and rhinorrhea.    Respiratory: Negative for cough, chest tightness, shortness of breath and wheezing.    Cardiovascular: Negative for chest pain, palpitations and leg swelling.   Gastrointestinal: Negative for abdominal pain, constipation, diarrhea, nausea and vomiting.   Genitourinary: Negative for dysuria, frequency, urgency, vaginal bleeding and vaginal discharge.   Skin: Negative for color change and rash.   Allergic/Immunologic: Negative for immunocompromised state.   Neurological: Negative for dizziness, weakness and numbness.   Hematological: Negative for adenopathy. Does not bruise/bleed easily.   All other systems reviewed and are negative.      Physical Exam     Initial Vitals [01/24/19 2227]   BP Pulse Resp Temp SpO2   (!) 149/68 (!) 54 18 98.7 °F (37.1 °C) 98 %      MAP       --         Physical Exam    Nursing note and vitals reviewed.  Constitutional: She appears well-developed and well-nourished. She is not diaphoretic. No distress.   HENT:   Head: Normocephalic and atraumatic. Head is without raccoon's eyes, without Covarrubias's sign, without contusion and without laceration.   Right Ear: External ear normal.   Left Ear: External ear normal.   Nose: Nose normal.   Mouth/Throat: Oropharynx is clear and moist.   Eyes: Conjunctivae and EOM are normal. Pupils are equal, round, and reactive to light. No scleral icterus.   Neck: Neck supple. No tracheal deviation present. No JVD present.   Cardiovascular: Normal rate, regular rhythm, normal heart sounds and intact distal pulses. Exam reveals no gallop and no friction rub.    No murmur heard.  Pulmonary/Chest: Breath sounds normal. No respiratory distress. She has no wheezes. She has no rhonchi.  She has no rales.   Abdominal: Soft. Bowel sounds are normal. She exhibits no distension. There is no tenderness.   Musculoskeletal: Normal range of motion. She exhibits no edema.   Neurological: She is alert and oriented to person, place, and time. She has normal strength. No cranial nerve deficit or sensory deficit. GCS score is 15. GCS eye subscore is 4. GCS verbal subscore is 5. GCS motor subscore is 6.   Skin: Skin is warm and dry. No rash noted.   Psychiatric: She has a normal mood and affect. Her behavior is normal.       ED Course   Procedures  Labs Reviewed - No data to display       Imaging Results    None          Medical Decision Making:   ED Management:  All findings were reviewed with the patient/family in detail along with the diagnosis of fall.  Patient continues to be without complaints and there are no signs of head trauma or risk factors for CHI.  It appears that impact was to the side of the face wihtin minimal force and therefore CT H will be deferred.  I see no indication of an emergent process beyond that addressed during our encounter but have duly counseled the patient/family regarding the need for prompt follow-up as well as the indications that should prompt immediate return to the emergency room should new or worrisome developments occur.  The patient/family communicates understanding of all this information and all remaining questions and concerns were addressed at this time.                          Clinical Impression:   The encounter diagnosis was Fall from ground level.                             Binu Perrin MD  01/30/19 0113

## 2019-02-04 ENCOUNTER — OFFICE VISIT (OUTPATIENT)
Dept: HEPATOLOGY | Facility: CLINIC | Age: 64
End: 2019-02-04
Payer: MEDICAID

## 2019-02-04 ENCOUNTER — LAB VISIT (OUTPATIENT)
Dept: LAB | Facility: HOSPITAL | Age: 64
End: 2019-02-04
Payer: MEDICAID

## 2019-02-04 VITALS
OXYGEN SATURATION: 96 % | DIASTOLIC BLOOD PRESSURE: 53 MMHG | BODY MASS INDEX: 41.88 KG/M2 | SYSTOLIC BLOOD PRESSURE: 106 MMHG | HEIGHT: 61 IN | RESPIRATION RATE: 18 BRPM | HEART RATE: 64 BPM | WEIGHT: 221.81 LBS

## 2019-02-04 DIAGNOSIS — K74.69 CRYPTOGENIC CIRRHOSIS: ICD-10-CM

## 2019-02-04 DIAGNOSIS — K74.3 CHOLESTATIC CIRRHOSIS: Primary | ICD-10-CM

## 2019-02-04 DIAGNOSIS — K76.82 HEPATIC ENCEPHALOPATHY: ICD-10-CM

## 2019-02-04 LAB
ALBUMIN SERPL BCP-MCNC: 2.4 G/DL
ALP SERPL-CCNC: 151 U/L
ALT SERPL W/O P-5'-P-CCNC: 39 U/L
ANION GAP SERPL CALC-SCNC: 8 MMOL/L
AST SERPL-CCNC: 49 U/L
BASOPHILS # BLD AUTO: 0.04 K/UL
BASOPHILS NFR BLD: 0.6 %
BILIRUB SERPL-MCNC: 0.4 MG/DL
BUN SERPL-MCNC: 23 MG/DL
CALCIUM SERPL-MCNC: 9.2 MG/DL
CHLORIDE SERPL-SCNC: 104 MMOL/L
CO2 SERPL-SCNC: 28 MMOL/L
CREAT SERPL-MCNC: 2 MG/DL
DIFFERENTIAL METHOD: NORMAL
EOSINOPHIL # BLD AUTO: 0.3 K/UL
EOSINOPHIL NFR BLD: 4.1 %
ERYTHROCYTE [DISTWIDTH] IN BLOOD BY AUTOMATED COUNT: 13 %
EST. GFR  (AFRICAN AMERICAN): 30.4 ML/MIN/1.73 M^2
EST. GFR  (NON AFRICAN AMERICAN): 26.4 ML/MIN/1.73 M^2
GLUCOSE SERPL-MCNC: 274 MG/DL
HCT VFR BLD AUTO: 37.5 %
HGB BLD-MCNC: 12.3 G/DL
IMM GRANULOCYTES # BLD AUTO: 0.01 K/UL
IMM GRANULOCYTES NFR BLD AUTO: 0.2 %
INR PPP: 1
LYMPHOCYTES # BLD AUTO: 3.1 K/UL
LYMPHOCYTES NFR BLD: 47.5 %
MCH RBC QN AUTO: 30.1 PG
MCHC RBC AUTO-ENTMCNC: 32.8 G/DL
MCV RBC AUTO: 92 FL
MONOCYTES # BLD AUTO: 0.6 K/UL
MONOCYTES NFR BLD: 8.3 %
NEUTROPHILS # BLD AUTO: 2.6 K/UL
NEUTROPHILS NFR BLD: 39.3 %
NRBC BLD-RTO: 0 /100 WBC
PLATELET # BLD AUTO: 202 K/UL
PMV BLD AUTO: 11.1 FL
POTASSIUM SERPL-SCNC: 4.5 MMOL/L
PROT SERPL-MCNC: 6.8 G/DL
PROTHROMBIN TIME: 10.7 SEC
RBC # BLD AUTO: 4.08 M/UL
SODIUM SERPL-SCNC: 140 MMOL/L
WBC # BLD AUTO: 6.59 K/UL

## 2019-02-04 PROCEDURE — 99215 PR OFFICE/OUTPT VISIT, EST, LEVL V, 40-54 MIN: ICD-10-PCS | Mod: S$PBB,,, | Performed by: INTERNAL MEDICINE

## 2019-02-04 PROCEDURE — 99215 OFFICE O/P EST HI 40 MIN: CPT | Mod: PBBFAC | Performed by: INTERNAL MEDICINE

## 2019-02-04 PROCEDURE — 99215 OFFICE O/P EST HI 40 MIN: CPT | Mod: S$PBB,,, | Performed by: INTERNAL MEDICINE

## 2019-02-04 PROCEDURE — 85610 PROTHROMBIN TIME: CPT

## 2019-02-04 PROCEDURE — 99999 PR PBB SHADOW E&M-EST. PATIENT-LVL V: CPT | Mod: PBBFAC,,, | Performed by: INTERNAL MEDICINE

## 2019-02-04 PROCEDURE — 80053 COMPREHEN METABOLIC PANEL: CPT

## 2019-02-04 PROCEDURE — 85025 COMPLETE CBC W/AUTO DIFF WBC: CPT

## 2019-02-04 PROCEDURE — 36415 COLL VENOUS BLD VENIPUNCTURE: CPT

## 2019-02-04 PROCEDURE — 99999 PR PBB SHADOW E&M-EST. PATIENT-LVL V: ICD-10-PCS | Mod: PBBFAC,,, | Performed by: INTERNAL MEDICINE

## 2019-02-04 NOTE — PATIENT INSTRUCTIONS
You may use tylenol up to 2000mg daily.      Continue lasix 40mg daily.  Kidney function is stable.    Your liver function and labs are stable.    Continue lactulose and rifaximin for confusion.    No change in TIPS.    Return to clinic in 4 months with labs and ultrasound

## 2019-02-04 NOTE — PROGRESS NOTES
HEPATOLOGY FOLLOW UP    Diamond Das is here for scheduled follow-up.      HPI  60yo female with cryptogenic cirrhosis and ascites formation that returns to clinic for routine follow-up after 6 months.  She is alone.     The patient was last seen in clinic on 7/20/2018.  She has previously undergone TIPS for management of volume overload with evidence of chronic liver disease of unclear etiology.  Patient had significant improvement in volume control with TIPS but continues on maintenance diuretic therapy.  The patient has had issues of HE since TIPS but relatively mild.  She has had an interval hospitalization for HE without precipitating cause identified.  She is now on rifaximin in addition to lactulose with 2 bowel movements daily; previously unable to afford.      Furosemide is 40mg daily, volume remains adequately controlled.  Creatinine remains stable at 2.0.    Elevated blood glucose and following with PCP.  New left shoulder pain with x-ray showing spur and degenerative changes.  Prescribed tramadol, denies ibuprofen use.  Scheduled for orthopedic evaluation.       Cirrhosis HCM:  Hepatitis A vaccination: immune  Hepatitis B vaccination: completed series 3/2017  HCC screening: US 11/2018 - no focal lesions   Variceal screening: on beta-blocker with no prior history of variceal bleeding  Pneumococcal vaccination: 2016  Influenza vaccination: patient not interested at this time   Colonoscopy: external, need documentation    Outpatient Encounter Prescriptions as of 2/1/2017   Medication Sig Dispense Refill    carvedilol (COREG) 3.125 MG tablet Take 1 tablet (3.125 mg total) by mouth 2 (two) times daily. 60 tablet 11    furosemide (LASIX) 20 MG tablet Take 3 tablets (60 mg total) by mouth once daily. (Patient taking differently: Take 60 mg by mouth every morning. ) 30 tablet 3    insulin glargine (LANTUS) 100 unit/mL injection Inject 30 Units into the skin every morning.       levothyroxine (SYNTHROID)  100 MCG tablet Take 100 mcg by mouth every morning.       ondansetron (ZOFRAN-ODT) 4 MG TbDL Take 1 tablet (4 mg total) by mouth every 8 (eight) hours as needed (prn nausea). 15 tablet 0    vitamin E 400 UNIT capsule Take 400 Units by mouth every morning.      polyethylene glycol (GLYCOLAX) 17 gram/dose powder Take 17 g by mouth once daily. 510 g 11     No facility-administered encounter medications on file as of 2/1/2017.      Review of patient's allergies indicates:   Allergen Reactions    Subsys [fentanyl] Other (See Comments)     After administration pt unresponsive.  HR and respirations decreased.     Versed [midazolam] Other (See Comments)     After administration pt unresponsive.  HR and respirations decreased.     Ampicillin Rash    Codeine Nausea And Vomiting and Nausea Only     Past Medical History:   Diagnosis Date    Ascites     Cataract     CHF (congestive heart failure)     Cirrhosis     Diabetes mellitus     Gastroparesis     GERD (gastroesophageal reflux disease)     Hypertension     Liver disease     Renal disorder     Thyroid disease      FH: no family history of liver disease or transplant     Review of Systems   Constitutional: Negative for activity change, appetite change, chills, fatigue, fever and unexpected weight change.   HENT: Negative for hearing loss, rhinorrhea and trouble swallowing.    Eyes: Negative for visual disturbance.   Respiratory: Negative for shortness of breath.    Cardiovascular: Negative for chest pain and leg swelling (stable).   Gastrointestinal: Negative for abdominal distention, abdominal pain, blood in stool, constipation, nausea and vomiting.   Endocrine: Negative for cold intolerance and heat intolerance.   Genitourinary: Negative for difficulty urinating, frequency and urgency.   Musculoskeletal: Positive for gait problem (L shoulder pain).   Skin: Negative for rash.   Neurological: Negative for weakness and headaches.   Hematological: Negative  for adenopathy. Does not bruise/bleed easily.   Psychiatric/Behavioral: Positive for confusion. Negative for decreased concentration.     Vitals:    02/04/19 1304   BP: (!) 106/53   Pulse: 64   Resp: 18       Physical Exam   Constitutional: She is oriented to person, place, and time. She appears well-developed and well-nourished. No distress.   Patient ambulating without assistive device   HENT:   Head: Normocephalic and atraumatic.   Mouth/Throat: Oropharynx is clear and moist. No oropharyngeal exudate.   Eyes: EOM are normal. Pupils are equal, round, and reactive to light. No scleral icterus.   Neck: Normal range of motion. Neck supple. No thyromegaly present.   Cardiovascular: Normal rate, regular rhythm and normal heart sounds. Exam reveals no gallop and no friction rub.   No murmur heard.  Pulmonary/Chest: Effort normal. No respiratory distress. She has no wheezes. She has no rales.   Decreased breath sounds at bilateral bases   Abdominal: Soft. Bowel sounds are normal. She exhibits no distension. There is no tenderness. There is no rebound and no guarding.   Musculoskeletal: Normal range of motion. She exhibits no edema.   Lymphadenopathy:     She has no cervical adenopathy.   Neurological: She is alert and oriented to person, place, and time. No cranial nerve deficit.   Skin: Skin is warm and dry. No rash noted.   Psychiatric: She has a normal mood and affect. Her behavior is normal.   Vitals reviewed.      Lab Results   Component Value Date     (H) 02/04/2019    BUN 23 02/04/2019    CREATININE 2.0 (H) 02/04/2019    CALCIUM 9.2 02/04/2019     02/04/2019    K 4.5 02/04/2019     02/04/2019    PROT 6.8 02/04/2019    CO2 28 02/04/2019    ANIONGAP 8 02/04/2019    WBC 6.59 02/04/2019    RBC 4.08 02/04/2019    HGB 12.3 02/04/2019    HCT 37.5 02/04/2019    MCV 92 02/04/2019    MCH 30.1 02/04/2019    MCHC 32.8 02/04/2019     MELD-Na score: 13 at 2/4/2019 12:20 PM  MELD score: 13 at 2/4/2019 12:20  PM  Calculated from:  Serum Creatinine: 2 mg/dL at 2/4/2019 12:20 PM  Serum Sodium: 140 mmol/L (Rounded to 137 mmol/L) at 2/4/2019 12:20 PM  Total Bilirubin: 0.4 mg/dL (Rounded to 1 mg/dL) at 2/4/2019 12:20 PM  INR(ratio): 1 at 2/4/2019 12:20 PM  Age: 63 years      Diagnostics: TIPS procedure report reviewed  US with doppler concerning for TIPS dysfunction    Assessment and Plan:  Patient Active Problem List   Diagnosis    Ascites    Cirrhosis of liver with ascites    Type 2 diabetes mellitus with circulatory disorder    Bilateral lower extremity edema    Morbid obesity due to excess calories    Hypothyroidism due to acquired atrophy of thyroid    Protein-calorie malnutrition, moderate    Decompensated hepatic cirrhosis    Medication reaction    Cirrhosis    Portal hypertension    Hypoalbuminemia    Cough    Chronic diastolic congestive heart failure    Postmenopausal    Screening mammogram, encounter for    Screen for colon cancer    Hypertension    Shortness of breath    Pneumonia of right middle lobe due to infectious organism    Breast pain, left    Chronic edema    Chest pain, atypical    Hyperlipidemia    Encounter for long-term (current) use of medications    Edema    KRISTAL on CPAP    Psychophysiological insomnia    Insomnia secondary to chronic pain    Inadequate sleep hygiene    Lumbar strain    Generalized abdominal pain    CKD (chronic kidney disease) stage 4, GFR 15-29 ml/min    Diabetic gastroparesis associated with type 2 diabetes mellitus    Acute on chronic kidney failure     60yo female with cholestatic liver disease with confirmed cirrhosis on biopsy and imaging.  Patient underwent TIPS placement with improvement in volume status but now complicated by HE.     Cirrhosis: Recent US suggestive of TIPS dysfunction.  Given the patient's stabilized volume status and recent HE, does not require TIPS revision at this time.  We will continue to monitor with serial imaging.   Will be due with next office visit.      Volume status:  Continue lasix 40mg daily, renal function stable.      HE:  Continue lactulose and rifaximin    PVT:  Patient noted to have PVT on prior imaging but no doppler performed on most recent US.  No anticoagulation at this time.        Transplant candidacy:  MELD 13, no indication for transplant evaluation at this time.      HCM:  As listed above     RTC in 4-5 months with US for same day

## 2019-02-05 ENCOUNTER — TELEPHONE (OUTPATIENT)
Dept: HEPATOLOGY | Facility: CLINIC | Age: 64
End: 2019-02-05

## 2019-02-05 NOTE — TELEPHONE ENCOUNTER
----- Message from Liliane Baker MD sent at 2/5/2019  8:15 AM CST -----  Labs reviewed in clinic.  Please mail results.  
Lab results mailed to patient.  
lives with parents, grandfather

## 2019-02-08 DIAGNOSIS — E11.59 TYPE 2 DIABETES MELLITUS WITH OTHER CIRCULATORY COMPLICATION, WITHOUT LONG-TERM CURRENT USE OF INSULIN: ICD-10-CM

## 2019-02-08 RX ORDER — DOXAZOSIN 2 MG/1
TABLET ORAL
Qty: 60 TABLET | Refills: 0 | Status: SHIPPED | OUTPATIENT
Start: 2019-02-08 | End: 2019-06-20 | Stop reason: SDUPTHER

## 2019-02-11 ENCOUNTER — OFFICE VISIT (OUTPATIENT)
Dept: ORTHOPEDICS | Facility: CLINIC | Age: 64
End: 2019-02-11
Payer: MEDICAID

## 2019-02-11 DIAGNOSIS — M19.011 DJD OF RIGHT AC (ACROMIOCLAVICULAR) JOINT: ICD-10-CM

## 2019-02-11 DIAGNOSIS — M25.512 ACUTE PAIN OF LEFT SHOULDER: Primary | ICD-10-CM

## 2019-02-11 DIAGNOSIS — M75.41 IMPINGEMENT SYNDROME OF RIGHT SHOULDER: ICD-10-CM

## 2019-02-11 PROCEDURE — 99213 OFFICE O/P EST LOW 20 MIN: CPT | Mod: PBBFAC,PN | Performed by: PHYSICIAN ASSISTANT

## 2019-02-11 PROCEDURE — 99202 OFFICE O/P NEW SF 15 MIN: CPT | Mod: S$PBB,,, | Performed by: PHYSICIAN ASSISTANT

## 2019-02-11 PROCEDURE — 99999 PR PBB SHADOW E&M-EST. PATIENT-LVL III: CPT | Mod: PBBFAC,,, | Performed by: PHYSICIAN ASSISTANT

## 2019-02-11 PROCEDURE — 99202 PR OFFICE/OUTPT VISIT, NEW, LEVL II, 15-29 MIN: ICD-10-PCS | Mod: S$PBB,,, | Performed by: PHYSICIAN ASSISTANT

## 2019-02-11 PROCEDURE — 99999 PR PBB SHADOW E&M-EST. PATIENT-LVL III: ICD-10-PCS | Mod: PBBFAC,,, | Performed by: PHYSICIAN ASSISTANT

## 2019-02-11 NOTE — LETTER
February 11, 2019      Autumn Kingsotn NP  [address]  1st Floor  [facility] [address]           [facility]  [address]  [place] [address]  Phone: [phone]  Fax: [phone]          Patient: Diamond Das   MR Number: [id number]   YOB: 1955   Date of Visit: 2/11/2019       Dear Autumn Kingston:    Thank you for referring Diamond Das to me for evaluation. Attached you will find relevant portions of my assessment and plan of care.    If you have questions, please do not hesitate to call me. I look forward to following Diamond Das along with you.    Sincerely,    Melonie Hong PA-C    Enclosure  CC:  No Recipients    If you would like to receive this communication electronically, please contact [email or web address] or [phone] to request more information on [employer] access.    For providers and/or their staff who would like to refer a patient to [facility], please contact us through our one-stop-shop provider referral line, [facility], at [phone].    If you feel you have received this communication in error or would no longer like to receive these types of communications, please e-mail [email or web address]

## 2019-02-11 NOTE — PROGRESS NOTES
Subjective:      Patient ID: Diamond Das is a 63 y.o. female.    Chief Complaint: Pain and Injury of the Left Shoulder      HPI: Diamond Das  is a 63 y.o. female who c/o Pain and Injury of the Left Shoulder   for duration of a couple of weeks.  She was seen in the ER on 01/24 9 T that would she had a fall and complained of left shoulder play.  They advised her to follow up with Orthopedics, so she comes in today for further evaluation.  Pain level presently is 0/10 in severity.  She has not had any pain since the end of January.  Quality is none. Alleviating factors include hot compresses tizanidine.  Aggravating factors include nothing.  Functionally, she tells me she is essentially back to normal.    Past Medical History:   Diagnosis Date    Ascites     Cataract     CHF (congestive heart failure)     Cirrhosis     Diabetes mellitus     Gastroparesis     GERD (gastroesophageal reflux disease)     Hypertension     Liver disease     Renal disorder     Thyroid disease      Past Surgical History:   Procedure Laterality Date    CHOLECYSTECTOMY      ERCP      LIVER BIOPSY      TIPS N/A 1/17/2017    Performed by [facility] Surgeon at [facility]    TRANSJUGULAR LIVER BIOPSY N/A 11/17/2016    Performed by [facility] at [facility] OR [facility]    TUBAL LIGATION      UPPER GASTROINTESTINAL ENDOSCOPY       Family History   Problem Relation Age of Onset    Cancer Mother     Breast cancer Mother     Heart disease Father     Aneurysm Sister     Heart disease Brother     No Known Problems Maternal Grandmother     Cancer Maternal Grandfather     Sarcoidosis Sister      Social History     Socioeconomic History    Marital status:      Spouse name: Not on file    Number of children: Not on file    Years of education: Not on file    Highest education level: Not on file   Social Needs    Financial resource strain: Not on file    Food insecurity - worry: Not on file    Food insecurity -  inability: Not on file    Transportation needs - medical: Not on file    Transportation needs - non-medical: Not on file   Occupational History    Not on file   Tobacco Use    Smoking status: Never Smoker    Smokeless tobacco: Never Used   Substance and Sexual Activity    Alcohol use: No     Frequency: Never    Drug use: No    Sexual activity: No   Other Topics Concern    Not on file   Social History Narrative    Not on file     Medication List with Changes/Refills   Current Medications    AMLODIPINE (NORVASC) 10 MG TABLET    TAKE 1 TABLET BY MOUTH ONCE DAILY    ASPIRIN (ECOTRIN) 81 MG EC TABLET    Take 1 tablet (81 mg total) by mouth once daily.    CARVEDILOL (COREG) 3.125 MG TABLET    TAKE 1 TABLET BY MOUTH TWICE DAILY    DOXAZOSIN (CARDURA) 2 MG TABLET    TAKE 1 TABLET BY MOUTH IN THE EVENING    FUROSEMIDE (LASIX) 80 MG TABLET    Take 1 tablet (80 mg total) by mouth once daily.    INSULIN (BASAGLAR KWIKPEN U-100 INSULIN) GLARGINE 100 UNITS/ML (3ML) SUBQ PEN    Inject 30 Units into the skin once daily.    INSULIN LISPRO (ADMELOG SOLOSTAR U-100 INSULIN) 100 UNIT/ML INPN PEN    Titrate up to 14 units subcutaneously three times a day 10-15 min before meals as directed in after visit summary    LACTULOSE (CHRONULAC) 20 GRAM/30 ML SOLN    Take 30 mLs (20 g total) by mouth 3 (three) times daily as needed (To goal bowel movement 2-3 bowel movements per day).    LEVOTHYROXINE (SYNTHROID) 137 MCG TAB TABLET    TAKE 1 TABLET BY MOUTH BEFORE BREAKFAST    LISINOPRIL (PRINIVIL,ZESTRIL) 5 MG TABLET    Take 1 tablet (5 mg total) by mouth once daily.    ONDANSETRON (ZOFRAN) 4 MG TABLET    Take 1 tablet (4 mg total) by mouth every 8 (eight) hours as needed.    PANTOPRAZOLE (PROTONIX) 20 MG TABLET    TAKE 2 TABLETS BY MOUTH ONCE DAILY    RIFAXIMIN (XIFAXAN) 550 MG TAB    Take 1 tablet (550 mg total) by mouth 2 (two) times daily.    SIMVASTATIN (ZOCOR) 40 MG TABLET    Take 40 mg by mouth once daily.     TIZANIDINE  (ZANAFLEX) 4 MG TABLET    TAKE 1 TABLET BY MOUTH IN THE EVENING    TRAMADOL (ULTRAM) 50 MG TABLET    Take 1 tablet (50 mg total) by mouth every 6 (six) hours.    VITAMIN E 400 UNIT CAPSULE    Take 400 Units by mouth every morning.     Review of patient's allergies indicates:   Allergen Reactions    Subsys [fentanyl] Other (See Comments)     After administration pt unresponsive.  HR and respirations decreased.     Versed [midazolam] Other (See Comments)     After administration pt unresponsive.  HR and respirations decreased.     Ampicillin Rash    Codeine Nausea And Vomiting and Nausea Only       Review of Systems   Constitution: Negative for fever.   Cardiovascular: Negative for chest pain.   Respiratory: Negative for cough and shortness of breath.    Skin: Negative for rash.   Musculoskeletal: Negative for joint pain, joint swelling and stiffness.   Gastrointestinal: Negative for heartburn.   Neurological: Negative for headaches and numbness.         Objective:        General    Nursing note and vitals reviewed.  Constitutional: She is oriented to person, place, and time. She appears well-developed and well-nourished.   HENT:   Head: Normocephalic and atraumatic.   Eyes: EOM are normal.   Cardiovascular: Normal rate and regular rhythm.    Pulmonary/Chest: Effort normal and breath sounds normal.   Abdominal: Soft.   Neurological: She is alert and oriented to person, place, and time.   Psychiatric: She has a normal mood and affect. Her behavior is normal.         Right Shoulder Exam     Inspection/Observation   Swelling: absent  Bruising: absent  Scars: absent  Deformity: absent  Scapular Dyskinesia: negative    Range of Motion   Active abduction: normal   Passive abduction: normal   Extension: normal   Forward Flexion: normal   Forward Elevation: normal  Adduction: normal  External Rotation 0 degrees: normal   Internal rotation 0 degrees: normal     Left Shoulder Exam     Inspection/Observation   Swelling:  absent  Bruising: absent  Scars: absent  Deformity: absent  Scapular Dyskinesia: negative    Range of Motion   Active abduction: normal   Passive abduction: normal   Extension: normal   Forward Flexion: normal   Forward Elevation: normal  Adduction: normal  External Rotation 0 degrees: normal   Internal rotation 0 degrees: normal     Tests & Signs   Drop arm: negative  Vyas test: negative  Impingement: negative  Active Compression test (Kleberg's Sign): negative  Speed's Test: negative    Other   Sensation: normal     Comments:  Mild TTP ac joint  O/w normal shoulder exam        Muscle Strength   Right Upper Extremity   Shoulder Abduction: 5/5   Shoulder Internal Rotation: 5/5   Shoulder External Rotation: 5/5   Subscapularis: 5/5/5   Biceps: 5/5/5   Left Upper Extremity  Shoulder Abduction: 5/5   Shoulder Internal Rotation: 5/5   Shoulder External Rotation: 5/5   Subscapularis: 5/5/5   Biceps: 5/5/5     Vascular Exam       Left Pulses      Radial:                    2+      Capillary Refill  Left Hand: normal capillary refill            Xray:   Left shoulder from 01/24/19 images and report were reviewed today.  I agree with the radiologist's interpretation.  Acromioclavicular degenerative changes present including undersurface spurring.  There is mild glenohumeral joint degenerative change more noted inferiorly.  Degenerative findings of the superolateral humeral head present.  Lung apices clear.  Correlate with MRI left shoulder as clinically indicated.    Assessment:       Encounter Diagnoses   Name Primary?    Acute pain of left shoulder Yes    DJD of right AC (acromioclavicular) joint     Impingement syndrome of right shoulder           Plan:       Diamond was seen today for pain and injury.    Diagnoses and all orders for this visit:    Acute pain of left shoulder    DJD of right AC (acromioclavicular) joint    Impingement syndrome of right shoulder        Ms. Fields is a new patient with a new problem as  above today.  At this point, she is doing very well. Functionally she is not having any limitations.  She may continue activities as tolerated follow-up Orthopedic Department and a p.r.n. basis.  I have explained that she has degenerative changes at the AC joint as well as a large spur on the undersurface of the acromion. However, neither seem to be causing her much trouble today.  The patient worsen the future, we will be happy to see her back within the dept.  She verbalizes understanding and agrees.    Follow-up if symptoms worsen or fail to improve.          The patient understands, chooses and consents to this plan and accepts all   the risks which include but are not limited to the risks mentioned above.     Disclaimer: This note was prepared using a voice recognition system and is likely to have sound alike errors within the text.

## 2019-02-13 RX ORDER — ONDANSETRON 4 MG/1
4 TABLET, FILM COATED ORAL EVERY 8 HOURS PRN
Qty: 30 TABLET | Refills: 0 | Status: SHIPPED | OUTPATIENT
Start: 2019-02-13 | End: 2019-04-08 | Stop reason: SDUPTHER

## 2019-02-13 NOTE — TELEPHONE ENCOUNTER
----- Message from Siobhan Aguirre sent at 2/13/2019  2:16 PM CST -----  Contact: Patient  Type:  RX Refill Request    Who Called: Diamond  Refill or New Rx: Refill  RX Name and Strength: Ondansetron 4 mg  How is the patient currently taking it? (ex. 1XDay): 1 tablet every 8 hours as needed  Is this a 30 day or 90 day RX: 30 day  Preferred Pharmacy with phone number:    [address] - [address] [address]  [address] [address]  [place] [address]  Phone: [phone] Fax: [phone]    Local or Mail Order: local  Ordering Provider: Andrey Perrin  Would the patient rather a call back or a response via [facility]?  Call back  Best Call Back Number: [phone]  Additional Information: n/a    Thanks,  Siobhan

## 2019-02-18 DIAGNOSIS — S39.012A STRAIN OF LUMBAR REGION, INITIAL ENCOUNTER: ICD-10-CM

## 2019-02-18 RX ORDER — TIZANIDINE 4 MG/1
4 TABLET ORAL NIGHTLY
Qty: 90 TABLET | Refills: 0 | Status: CANCELLED | OUTPATIENT
Start: 2019-02-18

## 2019-02-21 ENCOUNTER — OFFICE VISIT (OUTPATIENT)
Dept: INTERNAL MEDICINE | Facility: CLINIC | Age: 64
End: 2019-02-21
Payer: MEDICAID

## 2019-02-21 ENCOUNTER — HOSPITAL ENCOUNTER (OUTPATIENT)
Dept: CARDIOLOGY | Facility: CLINIC | Age: 64
Discharge: HOME | End: 2019-02-21
Payer: MEDICAID

## 2019-02-21 ENCOUNTER — HOSPITAL ENCOUNTER (OUTPATIENT)
Dept: RADIOLOGY | Facility: HOSPITAL | Age: 64
Discharge: HOME | End: 2019-02-21
Attending: FAMILY MEDICINE
Payer: MEDICAID

## 2019-02-21 ENCOUNTER — HOSPITAL ENCOUNTER (EMERGENCY)
Facility: HOSPITAL | Age: 64
Discharge: HOME | End: 2019-02-21
Attending: EMERGENCY MEDICINE
Payer: MEDICAID

## 2019-02-21 VITALS
HEART RATE: 55 BPM | OXYGEN SATURATION: 100 % | HEIGHT: 61 IN | BODY MASS INDEX: 42.33 KG/M2 | SYSTOLIC BLOOD PRESSURE: 98 MMHG | DIASTOLIC BLOOD PRESSURE: 53 MMHG | WEIGHT: 224.19 LBS | TEMPERATURE: 97 F | RESPIRATION RATE: 19 BRPM

## 2019-02-21 VITALS
OXYGEN SATURATION: 100 % | BODY MASS INDEX: 42.33 KG/M2 | HEART RATE: 51 BPM | RESPIRATION RATE: 22 BRPM | HEIGHT: 61 IN | WEIGHT: 224.19 LBS | DIASTOLIC BLOOD PRESSURE: 61 MMHG | SYSTOLIC BLOOD PRESSURE: 127 MMHG | TEMPERATURE: 98 F

## 2019-02-21 DIAGNOSIS — R18.8 CIRRHOSIS OF LIVER WITH ASCITES, UNSPECIFIED HEPATIC CIRRHOSIS TYPE: ICD-10-CM

## 2019-02-21 DIAGNOSIS — I95.9 HYPOTENSION, UNSPECIFIED HYPOTENSION TYPE: ICD-10-CM

## 2019-02-21 DIAGNOSIS — K74.60 CIRRHOSIS OF LIVER WITH ASCITES, UNSPECIFIED HEPATIC CIRRHOSIS TYPE: ICD-10-CM

## 2019-02-21 DIAGNOSIS — N18.4 CKD (CHRONIC KIDNEY DISEASE) STAGE 4, GFR 15-29 ML/MIN: Primary | ICD-10-CM

## 2019-02-21 DIAGNOSIS — R11.0 NAUSEA: ICD-10-CM

## 2019-02-21 DIAGNOSIS — K76.82 HEPATIC ENCEPHALOPATHY: ICD-10-CM

## 2019-02-21 DIAGNOSIS — E72.20 HYPERAMMONEMIA: Primary | ICD-10-CM

## 2019-02-21 DIAGNOSIS — E11.59 TYPE 2 DIABETES MELLITUS WITH OTHER CIRCULATORY COMPLICATION, WITHOUT LONG-TERM CURRENT USE OF INSULIN: ICD-10-CM

## 2019-02-21 DIAGNOSIS — I50.32 CHRONIC DIASTOLIC CONGESTIVE HEART FAILURE: ICD-10-CM

## 2019-02-21 DIAGNOSIS — E72.20 HYPERAMMONEMIA: ICD-10-CM

## 2019-02-21 DIAGNOSIS — Z12.11 SCREEN FOR COLON CANCER: ICD-10-CM

## 2019-02-21 DIAGNOSIS — R18.8 OTHER ASCITES: ICD-10-CM

## 2019-02-21 DIAGNOSIS — K31.84 DIABETIC GASTROPARESIS ASSOCIATED WITH TYPE 2 DIABETES MELLITUS: ICD-10-CM

## 2019-02-21 DIAGNOSIS — E11.43 DIABETIC GASTROPARESIS ASSOCIATED WITH TYPE 2 DIABETES MELLITUS: ICD-10-CM

## 2019-02-21 PROBLEM — J18.9 PNEUMONIA OF RIGHT MIDDLE LOBE DUE TO INFECTIOUS ORGANISM: Status: RESOLVED | Noted: 2017-12-19 | Resolved: 2019-02-21

## 2019-02-21 LAB
BACTERIA #/AREA URNS AUTO: ABNORMAL /HPF
BILIRUB UR QL STRIP: NEGATIVE
CLARITY UR REFRACT.AUTO: CLEAR
COLOR UR AUTO: YELLOW
GLUCOSE UR QL STRIP: NEGATIVE
HGB UR QL STRIP: NEGATIVE
HYALINE CASTS UR QL AUTO: 0 /LPF
KETONES UR QL STRIP: NEGATIVE
LEUKOCYTE ESTERASE UR QL STRIP: NEGATIVE
MICROSCOPIC COMMENT: ABNORMAL
NITRITE UR QL STRIP: NEGATIVE
PH UR STRIP: 6 [PH] (ref 5–8)
PROT UR QL STRIP: ABNORMAL
RBC #/AREA URNS AUTO: 3 /HPF (ref 0–4)
SP GR UR STRIP: 1.01 (ref 1–1.03)
SQUAMOUS #/AREA URNS AUTO: 4 /HPF
URN SPEC COLLECT METH UR: ABNORMAL
UROBILINOGEN UR STRIP-ACNC: NEGATIVE EU/DL
WBC #/AREA URNS AUTO: 2 /HPF (ref 0–5)

## 2019-02-21 PROCEDURE — 93010 EKG 12-LEAD: ICD-10-PCS | Mod: ,,, | Performed by: INTERNAL MEDICINE

## 2019-02-21 PROCEDURE — 74150 CT ABDOMEN W/O CONTRAST: CPT | Mod: 26,,, | Performed by: RADIOLOGY

## 2019-02-21 PROCEDURE — 93010 ELECTROCARDIOGRAM REPORT: CPT | Mod: ,,, | Performed by: INTERNAL MEDICINE

## 2019-02-21 PROCEDURE — 99215 PR OFFICE/OUTPT VISIT, EST, LEVL V, 40-54 MIN: ICD-10-PCS | Mod: S$PBB,,, | Performed by: FAMILY MEDICINE

## 2019-02-21 PROCEDURE — 76700 US ABDOMEN COMPLETE: ICD-10-PCS | Mod: 26,,, | Performed by: RADIOLOGY

## 2019-02-21 PROCEDURE — 74150 CT ABDOMEN WITHOUT CONTRAST: ICD-10-PCS | Mod: 26,,, | Performed by: RADIOLOGY

## 2019-02-21 PROCEDURE — 99214 OFFICE O/P EST MOD 30 MIN: CPT | Mod: PBBFAC,PO,25 | Performed by: FAMILY MEDICINE

## 2019-02-21 PROCEDURE — 74150 CT ABDOMEN W/O CONTRAST: CPT | Mod: TC,PO

## 2019-02-21 PROCEDURE — 99283 EMERGENCY DEPT VISIT LOW MDM: CPT | Mod: 27,ER

## 2019-02-21 PROCEDURE — 99215 OFFICE O/P EST HI 40 MIN: CPT | Mod: S$PBB,,, | Performed by: FAMILY MEDICINE

## 2019-02-21 PROCEDURE — 99999 PR PBB SHADOW E&M-EST. PATIENT-LVL IV: ICD-10-PCS | Mod: PBBFAC,,, | Performed by: FAMILY MEDICINE

## 2019-02-21 PROCEDURE — 76700 US EXAM ABDOM COMPLETE: CPT | Mod: TC,PO

## 2019-02-21 PROCEDURE — 25000003 PHARM REV CODE 250: Mod: ER | Performed by: EMERGENCY MEDICINE

## 2019-02-21 PROCEDURE — 99999 PR PBB SHADOW E&M-EST. PATIENT-LVL IV: CPT | Mod: PBBFAC,,, | Performed by: FAMILY MEDICINE

## 2019-02-21 PROCEDURE — 76700 US EXAM ABDOM COMPLETE: CPT | Mod: 26,,, | Performed by: RADIOLOGY

## 2019-02-21 PROCEDURE — 81000 URINALYSIS NONAUTO W/SCOPE: CPT | Mod: ER

## 2019-02-21 PROCEDURE — 93005 ELECTROCARDIOGRAM TRACING: CPT | Mod: PBBFAC | Performed by: FAMILY MEDICINE

## 2019-02-21 RX ORDER — LACTULOSE 10 G/15ML
30 SOLUTION ORAL
Status: COMPLETED | OUTPATIENT
Start: 2019-02-21 | End: 2019-02-21

## 2019-02-21 RX ADMIN — LACTULOSE 30 G: 20 SOLUTION ORAL at 02:02

## 2019-02-21 NOTE — ED NOTES
Patient reports she has nausea and an elevated ammonia level. GCS 15, AAOX4 she reports constant nausea and while talking to doctor she reports not feeling well and back pain for 2 weeks. Abd round, obese, soft and non tender to palpation. Last BM yesterday. She reports not taking her lactulose today and she reports she has decreased her dosage due to loose stools. Patient is also out of some o her medications.  Bilateral breath sounds clear, no cough or cold symptoms. MD evaluating patient will continue to monitor.

## 2019-02-21 NOTE — ASSESSMENT & PLAN NOTE
Considering low dose Reglan in this pt given renal dysfxn. She would like to wait until she sees me next time to start.  Cardiac consult, 2/2 CHF, cirrhsois. Would like input on pt on reglan given possible QT prolongation while taking reglan.    At the moment she prefers to go to ED for IV pain meds, 2/2 pain.    Will have her f/u 10d

## 2019-02-21 NOTE — ASSESSMENT & PLAN NOTE
2/2 cirrhosis.  AOx3, no tremors.  abd u/s no acute changes from baseline suggestive of cause.  Pending CT.  Inc lactulose.

## 2019-02-21 NOTE — ED PROVIDER NOTES
Encounter Date: 2/21/2019       History     Chief Complaint   Patient presents with    Abnormal labs     Patient to Er with the report of abnormal ammonia level and nausea. Last BM yesterday     Patient is a 63-year-old female with a past medical history of hypertension, diabetes, chronic kidney disease, CHF, hypothyroid, cirrhosis, cholecystectomy, tips procedure, hepatic encephalopathy, who presents today from the primary care physician's office.  Patient presented to the primary care physician's office today for complaints of generalized abdominal discomfort and nausea.  Patient had an ultrasound and CT scan performed at primary care physician's office.  Neither showed any acute findings.  Patient denies vomiting, diarrhea, constipation.  She states that she has Zofran at home that normally keeps her from vomiting. She last took it last night.  Patient had labs drawn as well.  Patient's ammonia was elevated at 180.  Patient did he get admitted for hepatic encephalopathy in the past, however patient states that she was very confused at that time.  Today patient is awake alert and oriented and answering all questions.  Patient drove to the primary care physician's office herself.  Patient states that she is recently cut back on her lactulose because it has been making her go to the bathroom too much.  She has been out of her rifaximin for weeks.  Denies any blood in the stool, fever/chills, headache, dysuria, flank pain, and all other symptoms at this time.  Patient lives with her daughter.  The daughter reportedly on the way up here.          Review of patient's allergies indicates:   Allergen Reactions    Subsys [fentanyl] Other (See Comments)     After administration pt unresponsive.  HR and respirations decreased.     Versed [midazolam] Other (See Comments)     After administration pt unresponsive.  HR and respirations decreased.     Ampicillin Rash    Codeine Nausea And Vomiting and Nausea Only     Past  Medical History:   Diagnosis Date    Ascites     Cataract     CHF (congestive heart failure)     Cirrhosis     Diabetes mellitus     Diabetes mellitus, type 2     Gastroparesis     GERD (gastroesophageal reflux disease)     Hypertension     Liver disease     Pneumonia of right middle lobe due to infectious organism 12/19/2017    Renal disorder     Thyroid disease      Past Surgical History:   Procedure Laterality Date    CHOLECYSTECTOMY      ERCP      LIVER BIOPSY      TIPS N/A 1/17/2017    Performed by [facility] Surgeon at [facility]    TRANSJUGULAR LIVER BIOPSY N/A 11/17/2016    Performed by [facility] Provider at [facility] OR [facility]    TUBAL LIGATION      UPPER GASTROINTESTINAL ENDOSCOPY       Family History   Problem Relation Age of Onset    Cancer Mother     Breast cancer Mother     Heart disease Father     Aneurysm Sister     Heart disease Brother     No Known Problems Maternal Grandmother     Cancer Maternal Grandfather     Sarcoidosis Sister      Social History     Tobacco Use    Smoking status: Never Smoker    Smokeless tobacco: Never Used   Substance Use Topics    Alcohol use: No     Frequency: Never    Drug use: No     Review of Systems   Constitutional: Negative for chills, diaphoresis and fever.   HENT: Negative for congestion, rhinorrhea and sore throat.    Eyes: Negative for pain, redness and visual disturbance.   Respiratory: Negative for cough and shortness of breath.    Cardiovascular: Negative for chest pain, palpitations and leg swelling.   Gastrointestinal: Positive for abdominal pain (generalized) and nausea. Negative for abdominal distention, blood in stool, constipation, diarrhea and vomiting.   Genitourinary: Negative for dysuria and hematuria.   Musculoskeletal: Negative for arthralgias and joint swelling.   Skin: Negative for rash and wound.   Neurological: Negative for dizziness, tremors, seizures, syncope, speech difficulty, weakness, light-headedness and  headaches.   Psychiatric/Behavioral: Negative for agitation, behavioral problems, confusion, decreased concentration, dysphoric mood, hallucinations and sleep disturbance.   All other systems reviewed and are negative.      Physical Exam     Initial Vitals [02/21/19 1416]   BP Pulse Resp Temp SpO2   (!) 146/63 (!) 50 18 97.6 °F (36.4 °C) 100 %      MAP       --         Physical Exam    Nursing note and vitals reviewed.  Constitutional: She appears well-developed and well-nourished. No distress.   HENT:   Head: Normocephalic and atraumatic.   Mouth/Throat: Oropharynx is clear and moist.   Eyes: Conjunctivae and EOM are normal. Pupils are equal, round, and reactive to light.   Neck: Neck supple. No tracheal deviation present.   Cardiovascular: Normal rate, regular rhythm, normal heart sounds and intact distal pulses.   Pulmonary/Chest: Breath sounds normal. No respiratory distress.   Abdominal: Soft. She exhibits no distension. There is tenderness (mild generalized). There is no rebound and no guarding.   Musculoskeletal: Normal range of motion. She exhibits no edema or tenderness.   Neurological: She is alert and oriented to person, place, and time. GCS score is 15. GCS eye subscore is 4. GCS verbal subscore is 5. GCS motor subscore is 6.   No focal deficits; no asterixis   Skin: Skin is warm. No rash noted. No erythema.   Psychiatric: She has a normal mood and affect. Her behavior is normal.         ED Course   Procedures  Labs Reviewed   URINALYSIS, REFLEX TO URINE CULTURE - Abnormal; Notable for the following components:       Result Value    Protein, UA 1+ (*)     All other components within normal limits    Narrative:     Preferred Collection Type->Urine, Clean Catch   URINALYSIS MICROSCOPIC - Abnormal; Notable for the following components:    Bacteria, UA Few (*)     All other components within normal limits    Narrative:     Preferred Collection Type->Urine, Clean Catch           Results for orders placed or  performed during the hospital encounter of 02/21/19   Urinalysis, Reflex to Urine Culture Urine, Clean Catch   Result Value Ref Range    Specimen UA Urine, Clean Catch     Color, UA Yellow Yellow, Straw, Thalia    Appearance, UA Clear Clear    pH, UA 6.0 5.0 - 8.0    Specific Gravity, UA 1.010 1.005 - 1.030    Protein, UA 1+ (A) Negative    Glucose, UA Negative Negative    Ketones, UA Negative Negative    Bilirubin (UA) Negative Negative    Occult Blood UA Negative Negative    Nitrite, UA Negative Negative    Urobilinogen, UA Negative <2.0 EU/dL    Leukocytes, UA Negative Negative   Urinalysis Microscopic   Result Value Ref Range    RBC, UA 3 0 - 4 /hpf    WBC, UA 2 0 - 5 /hpf    Bacteria, UA Few (A) None-Occ /hpf    Squam Epithel, UA 4 /hpf    Hyaline Casts, UA 0 0-1/lpf /lpf    Microscopic Comment SEE COMMENT           Narrative     EXAMINATION:  US ABDOMEN COMPLETE    CLINICAL HISTORY:  Unspecified cirrhosis of liver    TECHNIQUE:  Complete abdominal ultrasound (including pancreas, aorta, liver, gallbladder, common bile duct, IVC, kidneys, and spleen) was performed.    COMPARISON:  11/01/2018    FINDINGS:  Pancreas: The visualized portions of pancreas appear normal.    Aorta: No aneurysm.    Liver: Liver measures 15.9 cm in demonstrates similar coarsened echotexture.  No focal lesion demonstrated.  Main portal vein patent and normal in directional flow.  Tips stent remains patent.    Gallbladder: The gallbladder is surgically absent.    Biliary system: 3.7 mm common bile duct.  No intrahepatic ductal dilatation.    Inferior vena cava: Normal in appearance.    Right kidney: 9 cm. No hydronephrosis.    Left kidney: 10 cm. No hydronephrosis.    Spleen: 10 cm.  Normal in size with homogeneous echotexture.    Miscellaneous: No ascites.      Impression       No detrimental change.         EXAMINATION:  CT ABDOMEN WITHOUT CONTRAST    CLINICAL HISTORY:  Abd swelling, ascites suspected; Unspecified cirrhosis of  liver    TECHNIQUE:  Low dose axial images, sagittal and coronal reformations were obtained from the lung bases to the iliac crest.  IV contrast was not administered 30 mL of oral Omnipaque 350 was administered.    COMPARISON:  05/12/2018    FINDINGS:  Note that sensitivity and specificity for detection and characterization of most vascular and visceral pathology is decreased without the use of intravenous contrast.  Interpretation is offered within these confines.    Visualized Chest: Lung bases are clear    Liver: TIPS catheter remains in place.  No definite liver lesion visualized noting poor sensitivity secondary to noncontrast technique.    Gallbladder and biliary: Status post cholecystectomy.    Spleen: Within normal limits.    Pancreas: Within normal limits.    Adrenals: Within normal limits.    Kidneys: Within normal limits.  No urolithiasis demonstrated.    Bowel: There is a possible small sliding-type hiatal hernia present.  Remaining visualized segments of bowel appear within normal limits noting that the bowel is not imaged in its entirety as the pelvis was excluded from the examination.    Peritoneum: No ascites or pneumoperitoneum.    Adenopathy: None.    Vasculature: Within normal limits.    Bones: No acute findings.    Miscellaneous: None.      Impression       As above.  No acute findings.     Medications   lactulose 20 gram/30 mL solution Soln 30 g (30 g Oral Given 2/21/19 0944)          Medical Decision Making:   Patient presenting to the emergency department after a near full workup at primary care physician's office earlier today.  Urinalysis added on with complaint of generalized abdominal pain no evidence of urinary tract infection.  Patient does have elevated ammonia levels, but does not have any clinical evidence of hepatic encephalopathy at this time.  Ammonia levels neither sensitive nor specific for hepatic encephalopathy.  Patient is awake alert oriented without any confusion.  Patient  was able to drive herself here without any difficulty.  Patient does report that she is cut back her lactulose dose and has been out of her rifaximin for weeks.  Patient states that she has rifaximin called in.  Patient's daughter is at bedside stating that it is at the pharmacy and they just need to go pick it up.  Daughter states that they can go pick it up today.  Lactulose given here in the emergency department.  Rifaximin not given here in the emergency department because it is not available here.  Patient advised to take lactulose and rifaximin as prescribed and to follow back up with primary care physician in 10 days as already planned.  ER return precautions provided for any evidence of hepatic encephalopathy.  Patient and family voiced understanding and comfortable with discharge at this point.                      Clinical Impression:   Diagnosis:  Hyperammonemia.  Nausea  Disposition:  Discharged home stable condition  Prescriptions:  Advised that she  her rifaximin prescription from pharmacy and to take both her rifaximin and lactulose as prescribed  Follow-up Instructions:  Follow up in 10 days with primary care physician as scheduled.  Return to the emergency department for any new or worsening symptoms especially any signs of altered mental status or confusion                             Martell Gastelum MD  02/21/19 7168

## 2019-02-21 NOTE — PROGRESS NOTES
Subjective:       Patient ID: Diamond Das is a 63 y.o. female.    Chief Complaint: Abdominal Pain    Last BM 1 day ago - Large stool per pt.  Has had history of constipation.    Note from Dr. Baker on 2/4/19:  62yo female with cholestatic liver disease with confirmed cirrhosis on biopsy and imaging.  Patient underwent TIPS placement with improvement in volume status but now complicated by HE.       Cirrhosis: Recent US suggestive of TIPS dysfunction.  Given the patient's stabilized volume status and recent HE, does not require TIPS revision at this time.  They planned to monitor with serial imaging.  According to note, pt was stable at that time, walking according to PE.    Today she is lethargic in wheelchair.       Abdominal Pain   This is a recurrent problem. Episode onset: 2 weeks ago. The onset quality is sudden. The problem occurs constantly. The most recent episode lasted 2 weeks. The problem has been gradually worsening. The pain is located in the generalized abdominal region (most tenderness in epigastric region). The pain is severe. The quality of the pain is aching and cramping. Associated symptoms include constipation, headaches and nausea. Pertinent negatives include no diarrhea, fever or vomiting. Nothing aggravates the pain. Relieved by: zofran. She has tried nothing for the symptoms.     Review of Systems   Constitutional: Negative for fever.   HENT: Negative for congestion and trouble swallowing.    Eyes: Negative for pain.   Respiratory: Positive for shortness of breath. Negative for wheezing.    Cardiovascular: Negative for chest pain, palpitations and leg swelling.   Gastrointestinal: Positive for abdominal pain, constipation and nausea. Negative for diarrhea and vomiting.   Genitourinary: Negative for difficulty urinating.   Musculoskeletal: Negative for joint swelling.   Neurological: Positive for dizziness, weakness and headaches. Negative for seizures.   Psychiatric/Behavioral: Negative  for agitation.       Objective:      Physical Exam   Constitutional: She is oriented to person, place, and time. She appears well-developed and well-nourished. She appears distressed.   HENT:   Head: Normocephalic and atraumatic.   Eyes: Conjunctivae are normal. No scleral icterus.   Cardiovascular: Normal rate and regular rhythm.   Pulmonary/Chest: Effort normal and breath sounds normal. No respiratory distress. She has no wheezes.   Some noted decreased BS at bases of lungs   Abdominal: Soft. She exhibits distension. There is tenderness in the epigastric area. There is guarding.   Musculoskeletal: She exhibits no edema or tenderness.   Neurological: She is alert and oriented to person, place, and time.   Skin: Skin is warm. No rash noted. She is not diaphoretic. No erythema. No pallor.   Good turgor   Nursing note and vitals reviewed.      Assessment:       1. CKD (chronic kidney disease) stage 4, GFR 15-29 ml/min    2. Hepatic encephalopathy    3. Type 2 diabetes mellitus with other circulatory complication, without long-term current use of insulin    4. Cirrhosis of liver with ascites, unspecified hepatic cirrhosis type    5. Other ascites    6. Chronic diastolic congestive heart failure    7. Screen for colon cancer    8. Hypotension, unspecified hypotension type    9. Hyperammonemia    10. Diabetic gastroparesis associated with type 2 diabetes mellitus        Plan:     Problem List Items Addressed This Visit        Cardiac/Vascular    Chronic diastolic congestive heart failure    Relevant Orders    Ambulatory referral to Cardiology    Hypotension    Relevant Orders    EKG 12-lead       Renal/    CKD (chronic kidney disease) stage 4, GFR 15-29 ml/min - Primary    Overview      Ref Range & Units 2wk ago   Glucose 70 - 110 mg/dL 425     Sodium 136 - 145 mmol/L 140    Potassium 3.5 - 5.1 mmol/L 3.8    Chloride 95 - 110 mmol/L 101    CO2 23 - 29 mmol/L 30     BUN, Bld 8 - 23 mg/dL 26     Calcium 8.7 - 10.5 mg/dL  8.9    Creatinine 0.5 - 1.4 mg/dL 2.1     Albumin 3.5 - 5.2 g/dL 2.5     Phosphorus 2.7 - 4.5 mg/dL 3.8    eGFR if African American >60 mL/min/1.73 m^2 28.7     eGFR if non African American >60 mL/min/1.73 m^2 24.9     Comment: Calculation used to obtain the estimated glomerular filtration   rate (eGFR) is the CKD-EPI equation.    Anion Gap 8 - 16 mmol/L 9    Resulting Agency  [facility]      Specimen Collected: 05/10/18 11:16 Last Resulted: 05/10/18 11:46                   Relevant Orders    Ambulatory referral to Cardiology       Endocrine    Type 2 diabetes mellitus with circulatory disorder    Relevant Orders    Ambulatory referral to Cardiology    Diabetic gastroparesis associated with type 2 diabetes mellitus    Current Assessment & Plan     Considering low dose Reglan in this pt given renal dysfxn. She would like to wait until she sees me next time to start.  Cardiac consult, 2/2 CHF, cirrhsois. Would like input on pt on reglan given possible QT prolongation while taking reglan.    At the moment she prefers to go to ED for IV pain meds, 2/2 pain.    Will have her f/u 10d         Hyperammonemia    Current Assessment & Plan     2/2 cirrhosis.  AOx3, no tremors.  abd u/s no acute changes from baseline suggestive of cause.  Pending CT.  Inc lactulose.              GI    Ascites    Relevant Orders    Ambulatory referral to Cardiology    Cirrhosis of liver with ascites    Relevant Orders    Ambulatory referral to Cardiology    Ammonia (Completed)    CBC auto differential (Completed)    Comprehensive metabolic panel (Completed)    CT Abdomen Without Contrast    Lipase (Completed)    US Abdomen Complete (Completed)    Screen for colon cancer    Relevant Orders    Fecal Immunochemical Test (iFOBT)    Case request GI: COLONOSCOPY (Completed)      Other Visit Diagnoses     Hepatic encephalopathy        Relevant Medications    rifAXIMin (XIFAXAN) 550 mg Tab

## 2019-02-21 NOTE — ED NOTES
UA collected, patient medicated per MD order, patient tolerated all procedures well. Warm blankets given for comfort, call light given to patient HOB elevated. Patient in NAD Sinus Rey remains on cardiac monitor, patient also tolerating po's. Will continue to monitor.

## 2019-02-22 ENCOUNTER — TELEPHONE (OUTPATIENT)
Dept: INTERNAL MEDICINE | Facility: CLINIC | Age: 64
End: 2019-02-22

## 2019-02-22 NOTE — TELEPHONE ENCOUNTER
----- Message from Miguel A Garcia sent at 2/22/2019  4:05 PM CST -----  Contact: pt  Type:  Patient Returning Call    Who Called: Pt  Who Left Message for Patient: Unknown  Does the patient know what this is regarding?: Yes, in regards to her emergency room visit on yesterday.  Would the patient rather a call back or a response via [facility]? Call back  Best Call Back Number: [phone]  Additional Information: n/a

## 2019-02-27 ENCOUNTER — APPOINTMENT (OUTPATIENT)
Dept: LAB | Facility: HOSPITAL | Age: 64
End: 2019-02-27
Attending: FAMILY MEDICINE
Payer: MEDICAID

## 2019-03-01 ENCOUNTER — OFFICE VISIT (OUTPATIENT)
Dept: CARDIOLOGY | Facility: CLINIC | Age: 64
End: 2019-03-01
Payer: MEDICAID

## 2019-03-01 VITALS
BODY MASS INDEX: 41.54 KG/M2 | SYSTOLIC BLOOD PRESSURE: 150 MMHG | WEIGHT: 220 LBS | DIASTOLIC BLOOD PRESSURE: 74 MMHG | HEIGHT: 61 IN | HEART RATE: 64 BPM

## 2019-03-01 DIAGNOSIS — K74.60 CIRRHOSIS OF LIVER WITH ASCITES, UNSPECIFIED HEPATIC CIRRHOSIS TYPE: ICD-10-CM

## 2019-03-01 DIAGNOSIS — I50.32 CHRONIC DIASTOLIC CONGESTIVE HEART FAILURE: Primary | ICD-10-CM

## 2019-03-01 DIAGNOSIS — E78.49 OTHER HYPERLIPIDEMIA: ICD-10-CM

## 2019-03-01 DIAGNOSIS — E11.59 TYPE 2 DIABETES MELLITUS WITH OTHER CIRCULATORY COMPLICATION, WITHOUT LONG-TERM CURRENT USE OF INSULIN: ICD-10-CM

## 2019-03-01 DIAGNOSIS — I10 ESSENTIAL HYPERTENSION: ICD-10-CM

## 2019-03-01 DIAGNOSIS — R18.8 CIRRHOSIS OF LIVER WITH ASCITES, UNSPECIFIED HEPATIC CIRRHOSIS TYPE: ICD-10-CM

## 2019-03-01 PROCEDURE — 99214 PR OFFICE/OUTPT VISIT, EST, LEVL IV, 30-39 MIN: ICD-10-PCS | Mod: S$PBB,,, | Performed by: INTERNAL MEDICINE

## 2019-03-01 PROCEDURE — 99214 OFFICE O/P EST MOD 30 MIN: CPT | Mod: S$PBB,,, | Performed by: INTERNAL MEDICINE

## 2019-03-01 PROCEDURE — 99213 OFFICE O/P EST LOW 20 MIN: CPT | Mod: PBBFAC,PN | Performed by: INTERNAL MEDICINE

## 2019-03-01 PROCEDURE — 99999 PR PBB SHADOW E&M-EST. PATIENT-LVL III: CPT | Mod: PBBFAC,,, | Performed by: INTERNAL MEDICINE

## 2019-03-01 PROCEDURE — 99999 PR PBB SHADOW E&M-EST. PATIENT-LVL III: ICD-10-PCS | Mod: PBBFAC,,, | Performed by: INTERNAL MEDICINE

## 2019-03-01 RX ORDER — FUROSEMIDE 40 MG/1
40 TABLET ORAL DAILY
COMMUNITY
End: 2019-06-03 | Stop reason: SDUPTHER

## 2019-03-01 NOTE — LETTER
March 1, 2019      Andrey Perrin MD  [id number] [address] [address]           [facility]  [address] [facility] [address]  Phone: [phone]  Fax: [phone]          Patient: Diamond Das   MR Number: [id number]   YOB: 1955   Date of Visit: 3/1/2019       Dear Dr. Andrey Perrin:    Thank you for referring Diamond Das to me for evaluation. Attached you will find relevant portions of my assessment and plan of care.    If you have questions, please do not hesitate to call me. I look forward to following Diamond Das along with you.    Sincerely,    Marcos Ramos MD    Enclosure  CC:  No Recipients    If you would like to receive this communication electronically, please contact [email or web address] or [phone] to request more information on [facility] access.    For providers and/or their staff who would like to refer a patient to [facility], please contact us through our one-stop-shop provider referral line, Ghazala Ryder, at [phone].    If you feel you have received this communication in error or would no longer like to receive these types of communications, please e-mail [email or web address]

## 2019-03-01 NOTE — PATIENT INSTRUCTIONS
Low-Salt Diet  This diet removes foods that are high in salt. It also limits the amount of salt you use when cooking. It is most often used for people with high blood pressure, edema (fluid retention), and kidney, liver, or heart disease.  Table salt contains the mineral sodium. Your body needs sodium to work normally. But too much sodium can make your health problems worse. Your healthcare provider is recommending a low-salt (also called low-sodium) diet for you. Your total daily allowance of salt is 1,500 to 2,300 milligrams (mg). It is less than 1 teaspoon of table salt. This means you can have only about 500 to 700 mg of sodium at each meal. People with certain health problems should limit salt intake to the lower end of the recommended range.    When you cook, dont add much salt. If you can cook without using salt, even better. Dont add salt to your food at the table.  When shopping, read food labels. Salt is often called sodium on the label. Choose foods that are salt-free, low salt, or very low salt. Note that foods with reduced salt may not lower your salt intake enough.    Beans, potatoes, and pasta  Ok: Dry beans, split peas, lentils, potatoes, rice, macaroni, pasta, spaghetti without added salt  Avoid: Potato chips, tortilla chips, and similar products  Breads and cereals  Ok: Low-sodium breads, rolls, cereals, and cakes; low-salt crackers, matzo crackers  Avoid: Salted crackers, pretzels, popcorn, Japanese toast, pancakes, muffins  Dairy  Ok: Milk, chocolate milk, hot chocolate mix, low-salt cheeses, and yogurt  Avoid: Processed cheese and cheese spreads; Roquefort, Camembert, and cottage cheese; buttermilk, instant breakfast drink  Desserts  Ok: Ice cream, frozen yogurt, juice bars, gelatin, cookies and pies, sugar, honey, jelly, hard candy  Avoid: Most pies, cakes and cookies prepared or processed with salt; instant pudding  Drinks  Ok: Tea, coffee, fizzy (carbonated) drinks, juices  Avoid: Flavored  coffees, electrolyte replacement drinks, sports drinks  Meats  Ok: All fresh meat, fish, poultry, low-salt tuna, eggs, egg substitute  Avoid: Smoked, pickled, brine-cured, or salted meats and fish. This includes vasquez, chipped beef, corned beef, hot dogs, deli meats, ham, kosher meats, salt pork, sausage, canned tuna, salted codfish, smoked salmon, herring, sardines, or anchovies.  Seasonings and spices  Ok: Most seasonings are okay. Good substitutes for salt include: fresh herb blends, hot sauce, lemon, garlic, mendoza, vinegar, dry mustard, parsley, cilantro, horseradish, tomato paste, regular margarine, mayonnaise, unsalted butter, cream cheese, vegetable oil, cream, low-salt salad dressing and gravy.  Avoid: Regular ketchup, relishes, pickles, soy sauce, teriyaki sauce, Worcestershire sauce, BBQ sauce, tartar sauce, meat tenderizer, chili sauce, regular gravy, regular salad dressing, salted butter  Soups  Ok: Low-salt soups and broths made with allowed foods  Avoid: Bouillon cubes, soups with smoked or salted meats, regular soup and broth  Vegetables  Ok: Most vegetables are okay; also low-salt tomato and vegetable juices  Avoid: Sauerkraut and other brine-soaked vegetables; pickles and other pickled vegetables; tomato juice, olives  Date Last Reviewed: 8/1/2016 © 2000-2017 [employer]. [address] [place] [id number]. All rights reserved. This information is not intended as a substitute for professional medical care. Always follow your healthcare professional's instructions.          Established High Blood Pressure    High blood pressure (hypertension) is a chronic disease. Often, healthcare providers dont know what causes it. But it can be caused by certain health conditions and medicines.  If you have high blood pressure, you may not have any symptoms. If you do have symptoms, they may include headache, dizziness, changes in your vision, chest pain, and shortness of breath. But even  without symptoms, high blood pressure thats not treated raises your risk for heart attack and stroke. High blood pressure is a serious health risk and shouldnt be ignored.  A blood pressure reading is made up of two numbers: a higher number over a lower number. The top number is the systolic pressure. The bottom number is the diastolic pressure. A normal blood pressure is a systolic pressure of  less than 120 over a diastolic pressure of less than 80. You will see your blood pressure readings written together. For example, a person with a systolic pressure of 188 and a diastolic pressure of 78 will have 118/78 written in the medical record.  High blood pressure is when either the top number is 140 or higher, or the bottom number is 90 or higher. This must be the result when taking your blood pressure a number of times. The blood pressures between normal and high are called prehypertension.  Home care  If you have high blood pressure, you should do what is listed below to lower your blood pressure. If you are taking medicines for high blood pressure, these methods may reduce or end your need for medicines in the future.  · Begin a weight-loss program if you are overweight.  · Cut back on how much salt you get in your diet. Heres how to do this:  ¨ Dont eat foods that have a lot of salt. These include olives, pickles, smoked meats, and salted potato chips.  ¨ Dont add salt to your food at the table.  ¨ Use only small amounts of salt when cooking.  · Start an exercise program. Talk with your healthcare provider about the type of exercise program that would be best for you. It doesn't have to be hard. Even brisk walking for 20 minutes 3 times a week is a good form of exercise.  · Dont take medicines that stimulate the heart. This includes many over-the-counter cold and sinus decongestant pills and sprays, as well as diet pills. Check the warnings about hypertension on the label. Before buying any over-the-counter  medicines or supplements, always ask the pharmacist about the product's potential interaction with your high blood pressure and your high blood pressure medicines.  · Stimulants such as amphetamine or cocaine could be deadly for someone with high blood pressure. Never take these.  · Limit how much caffeine you get in your diet. Switch to caffeine-free products.  · Stop smoking. If you are a long-time smoker, this can be hard. Talk to your healthcare provider about medicines and nicotine replacement options to help you. Also, enroll in a stop-smoking program to make it more likely that you will quit for good.  · Learn how to handle stress. This is an important part of any program to lower blood pressure. Learn about relaxation methods like meditation, yoga, or biofeedback.  · If your provider prescribed medicines, take them exactly as directed. Missing doses may cause your blood pressure get out of control.  · If you miss a dose or doses, check with your healthcare provider or pharmacist about what to do.  · Consider buying an automatic blood pressure machine. Ask your provider for a recommendation. You can get one of these at most pharmacies.     The American Heart Association recommends the following guidelines for home blood pressure monitoring:  · Don't smoke or drink coffee for 30 minutes before taking your blood pressure.  · Go to the bathroom before the test.  · Relax for 5 minutes before taking the measurement.  · Sit with your back supported (don't sit on a couch or soft chair); keep your feet on the floor uncrossed. Place your arm on a solid flat surface (like a table) with the upper part of the arm at heart level. Place the middle of the cuff directly above the eye of the elbow. Check the monitor's instruction manual for an illustration.  · Take multiple readings. When you measure, take 2 to 3 readings one minute apart and record all of the results.  · Take your blood pressure at the same time every day,  or as your healthcare provider recommends.  · Record the date, time, and blood pressure reading.  · Take the record with you to your next medical appointment. If your blood pressure monitor has a built-in memory, simply take the monitor with you to your next appointment.  · Call your provider if you have several high readings. Don't be frightened by a single high blood pressure reading, but if you get several high readings, check in with your healthcare provider.  · Note: When blood pressure reaches a systolic (top number) of 180 or higher OR diastolic (bottom number) of 110 or higher, seek emergency medical treatment.  Follow-up care  You will need to see your healthcare provider regularly. This is to check your blood pressure and to make changes to your medicines. Make a follow-up appointment as directed. Bring the record of your home blood pressure readings to the appointment.  When to seek medical advice  Call your healthcare provider right away if any of these occur:  · Blood pressure reaches a systolic (upper number) of 180 or higher OR a diastolic (bottom number) of 110 or higher  · Chest pain or shortness of breath  · Severe headache  · Throbbing or rushing sound in the ears  · Nosebleed  · Sudden severe pain in your belly (abdomen)  · Extreme drowsiness, confusion, or fainting  · Dizziness or spinning sensation (vertigo)  · Weakness of an arm or leg or one side of the face  · You have problems speaking or seeing   Date Last Reviewed: 12/1/2016  © 7508-4253 [employer]. [address] [place] [address], [place] [address]. All rights reserved. This information is not intended as a substitute for professional medical care. Always follow your healthcare professional's instructions.

## 2019-03-01 NOTE — PROGRESS NOTES
Subjective:   Patient ID:  Diamond Das is a 64 y.o. female who presents for cardiac consult of Hypertension and Congestive Heart Failure      HPI  The patient came in today for cardiac consult of Hypertension and Congestive Heart Failure    Referring Physician: Andrey Perrin MD   Reason for consult: HTN, CHF    Diamond Das is a 64 y.o. female with medical conditions diastolic CHF, HTN, HLD, IDDM, cryptogenic cirrhosis, s/p TIPS, ascites resolved presents for CV follow up.     Pt of Dr. Shaw, last seen 2/9/18  No smoking/drinking  Last echo in  normal EF and RVF, grade II DD.  EKG today NSR poor R progression on anterior leads  Chronic weakness and fatigue. Had PNA in   Pain on lower chest bilateral for few months, worse with exercise, resolved after resting. Deep breathing made the pain worse. No pain at sleep. Associated dyspnea, N/V, palpitation and dizziness. No radiation. ASA and tylenol not really helped the pain.    3/1/19  She has been having more ascites and concern for cardiac etiologies. She was also started on Reglan, concern for prolonged Qtc, recent Qtc 475 ms. Has been feeling better with reglan and lactulose. Is dyspneic, chronically on oxygen at night and also has portable oxygen when she has to do a lot walking. Takes lasix 40 mg once/day now, was on 80mg daily. Active at home, dresses and bathes her self. Cant walk or stand to too long.     Patient feels no chest pain, no leg swelling, no PND, no palpitation, no dizziness, no syncope, no CNS symptoms.    Patient has dec exercise tolerance.    Patient is compliant with medications.    2D ECHO 05/23/2017   CONCLUSIONS     1 - Normal left ventricular systolic function (EF 60-65%). Mild concentric hypertrophy.     2 - Impaired LV relaxation, elevated LAP (grade 2 diastolic dysfunction).     3 - Normal right ventricular systolic function .     4 - Severe left atrial enlargement.     2/21/19 ECG  Vent. Rate : 050 BPM      Atrial Rate : 050 BPM     P-R Int : 146 ms          QRS Dur : 086 ms      QT Int : 522 ms       P-R-T Axes : 045 -03 090 degrees     QTc Int : 475 ms    Sinus bradycardia  Cannot rule out Anterior infarct (cited on or before 21-FEB-2019)  Abnormal ECG        Past Medical History:   Diagnosis Date    Ascites     Cataract     CHF (congestive heart failure)     Cirrhosis     Diabetes mellitus     Diabetes mellitus, type 2     Gastroparesis     GERD (gastroesophageal reflux disease)     Hyperlipidemia     Hypertension     Liver disease     Pneumonia of right middle lobe due to infectious organism 12/19/2017    Renal disorder     Sleep apnea     Thyroid disease        Past Surgical History:   Procedure Laterality Date    CHOLECYSTECTOMY      ERCP      LIVER BIOPSY      TIPS N/A 1/17/2017    Performed by [facility] [place]    TRANSJUGULAR LIVER BIOPSY N/A 11/17/2016    Performed by [facility] at [facility] OR [facility]    TUBAL LIGATION      UPPER GASTROINTESTINAL ENDOSCOPY         Social History     Tobacco Use    Smoking status: Never Smoker    Smokeless tobacco: Never Used   Substance Use Topics    Alcohol use: No     Frequency: Never    Drug use: No       Family History   Problem Relation Age of Onset    Cancer Mother     Breast cancer Mother     Heart disease Father     Aneurysm Sister     Heart disease Brother     No Known Problems Maternal Grandmother     Cancer Maternal Grandfather     Sarcoidosis Sister        Patient's Medications   New Prescriptions    No medications on file   Previous Medications    AMLODIPINE (NORVASC) 10 MG TABLET    TAKE 1 TABLET BY MOUTH ONCE DAILY    ASPIRIN (ECOTRIN) 81 MG EC TABLET    Take 1 tablet (81 mg total) by mouth once daily.    CARVEDILOL (COREG) 3.125 MG TABLET    TAKE 1 TABLET BY MOUTH TWICE DAILY    DOXAZOSIN (CARDURA) 2 MG TABLET    TAKE 1 TABLET BY MOUTH IN THE EVENING    FUROSEMIDE (LASIX) 40 MG TABLET    Take 40 mg by mouth  once daily.    INSULIN (BASAGLAR KWIKPEN U-100 INSULIN) GLARGINE 100 UNITS/ML (3ML) SUBQ PEN    Inject 30 Units into the skin once daily.    INSULIN LISPRO (ADMELOG SOLOSTAR U-100 INSULIN) 100 UNIT/ML INPN PEN    Titrate up to 14 units subcutaneously three times a day 10-15 min before meals as directed in after visit summary    LACTULOSE (CHRONULAC) 20 GRAM/30 ML SOLN    Take 30 mLs (20 g total) by mouth 3 (three) times daily as needed (To goal bowel movement 2-3 bowel movements per day).    LEVOTHYROXINE (SYNTHROID) 137 MCG TAB TABLET    TAKE 1 TABLET BY MOUTH BEFORE BREAKFAST    LISINOPRIL (PRINIVIL,ZESTRIL) 5 MG TABLET    Take 1 tablet (5 mg total) by mouth once daily.    ONDANSETRON (ZOFRAN) 4 MG TABLET    Take 1 tablet (4 mg total) by mouth every 8 (eight) hours as needed.    PANTOPRAZOLE (PROTONIX) 20 MG TABLET    TAKE 2 TABLETS BY MOUTH ONCE DAILY    RIFAXIMIN (XIFAXAN) 550 MG TAB    Take 1 tablet (550 mg total) by mouth 2 (two) times daily.    SIMVASTATIN (ZOCOR) 40 MG TABLET    Take 40 mg by mouth once daily.     TIZANIDINE (ZANAFLEX) 4 MG TABLET    TAKE 1 TABLET BY MOUTH IN THE EVENING    TRAMADOL (ULTRAM) 50 MG TABLET    Take 1 tablet (50 mg total) by mouth every 6 (six) hours.    VITAMIN E 400 UNIT CAPSULE    Take 400 Units by mouth every morning.   Modified Medications    No medications on file   Discontinued Medications    FUROSEMIDE (LASIX) 80 MG TABLET    Take 1 tablet (80 mg total) by mouth once daily.       Review of Systems   Constitutional: Negative.    HENT: Negative.    Eyes: Negative.    Respiratory: Positive for shortness of breath.    Cardiovascular: Negative.  Negative for chest pain and palpitations.   Gastrointestinal: Positive for nausea.   Genitourinary: Negative.    Musculoskeletal: Negative.    Skin: Negative.    Neurological: Negative.    Endo/Heme/Allergies: Negative.    Psychiatric/Behavioral: Negative.    All 12 systems otherwise negative.      Wt Readings from Last 3 Encounters:  "  03/01/19 99.8 kg (220 lb 0.3 oz)   02/21/19 101.7 kg (224 lb 3.3 oz)   02/21/19 101.7 kg (224 lb 3.3 oz)     Temp Readings from Last 3 Encounters:   02/21/19 97.5 °F (36.4 °C) (Oral)   02/21/19 97.3 °F (36.3 °C) (Tympanic)   01/24/19 98.7 °F (37.1 °C) (Oral)     BP Readings from Last 3 Encounters:   03/01/19 (!) 150/74   02/21/19 127/61   02/21/19 (!) 98/53     Pulse Readings from Last 3 Encounters:   03/01/19 64   02/21/19 (!) 51   02/21/19 (!) 55       BP (!) 150/74 (BP Method: Large (Manual))   Pulse 64   Ht 5' 1" (1.549 m)   Wt 99.8 kg (220 lb 0.3 oz)   LMP  (LMP Unknown)   BMI 41.57 kg/m²     Objective:   Physical Exam   Constitutional: She is oriented to person, place, and time. She appears well-developed and well-nourished. No distress.   HENT:   Head: Normocephalic and atraumatic.   Nose: Nose normal.   Mouth/Throat: Oropharynx is clear and moist.   Eyes: Conjunctivae and EOM are normal. No scleral icterus.   Neck: Normal range of motion. Neck supple. No JVD present. No thyromegaly present.   Cardiovascular: Normal rate, regular rhythm, S1 normal and S2 normal. Exam reveals no gallop, no S3, no S4 and no friction rub.   No murmur heard.  Pulmonary/Chest: Effort normal and breath sounds normal. No stridor. No respiratory distress. She has no wheezes. She has no rales. She exhibits no tenderness.   Abdominal: Soft. Bowel sounds are normal. She exhibits no distension and no mass. There is no tenderness. There is no rebound.   Genitourinary:   Genitourinary Comments: Deferred   Musculoskeletal: Normal range of motion. She exhibits no edema, tenderness or deformity.   Lymphadenopathy:     She has no cervical adenopathy.   Neurological: She is alert and oriented to person, place, and time. She exhibits normal muscle tone. Coordination normal.   Skin: Skin is warm and dry. No rash noted. She is not diaphoretic. No erythema. No pallor.   Psychiatric: She has a normal mood and affect. Her behavior is normal. " Judgment and thought content normal.   Nursing note and vitals reviewed.      Lab Results   Component Value Date     02/21/2019    K 4.4 02/21/2019     02/21/2019    CO2 28 02/21/2019    BUN 26 (H) 02/21/2019    CREATININE 2.2 (H) 02/21/2019    GLU 77 02/21/2019    HGBA1C 7.1 (H) 12/06/2018    MG 1.8 01/04/2019    AST 44 (H) 02/21/2019    ALT 31 02/21/2019    ALBUMIN 2.9 (L) 02/21/2019    PROT 7.4 02/21/2019    BILITOT 0.5 02/21/2019    WBC 7.29 02/21/2019    HGB 13.4 02/21/2019    HCT 40.1 02/21/2019    MCV 89 02/21/2019     02/21/2019    INR 1.0 02/04/2019    TSH 2.271 01/03/2019    CHOL 205 (H) 12/06/2018    HDL 41 12/06/2018    LDLCALC 145.8 12/06/2018    TRIG 91 12/06/2018     (H) 03/19/2018     Assessment:      1. Chronic diastolic congestive heart failure    2. Essential hypertension    3. Other hyperlipidemia    4. Type 2 diabetes mellitus with other circulatory complication, without long-term current use of insulin    5. Cirrhosis of liver with ascites, unspecified hepatic cirrhosis type        Plan:   1. Chronic diastolic HF  - pt euvolemic  - cont lasix, daily weights, low salt diet  - check 2D ECHO  - recent ECG WNL qtc     2. HTN  - cont meds    3. HLD  - cont statin    4. DM2  - cont meds per PCP    5. Cirrhosis  - cont tx per PCP/Hepatology  - not transplant candidate currently       Thank you for allowing me to participate in this patient's care. Please do not hesitate to contact me with any questions or concerns. Consult note has been forwarded to the referral physician.

## 2019-03-11 DIAGNOSIS — M25.512 ACUTE PAIN OF LEFT SHOULDER: ICD-10-CM

## 2019-03-11 RX ORDER — TRAMADOL HYDROCHLORIDE 50 MG/1
TABLET ORAL
Qty: 20 TABLET | Refills: 0 | OUTPATIENT
Start: 2019-03-11

## 2019-03-12 ENCOUNTER — TELEPHONE (OUTPATIENT)
Dept: ENDOSCOPY | Facility: HOSPITAL | Age: 64
End: 2019-03-12

## 2019-03-12 DIAGNOSIS — R10.84 GENERALIZED ABDOMINAL PAIN: ICD-10-CM

## 2019-03-12 RX ORDER — FUROSEMIDE 40 MG/1
TABLET ORAL
Qty: 30 TABLET | Refills: 11 | Status: SHIPPED | OUTPATIENT
Start: 2019-03-12 | End: 2019-10-16 | Stop reason: SDUPTHER

## 2019-03-12 RX ORDER — PANTOPRAZOLE SODIUM 20 MG/1
40 TABLET, DELAYED RELEASE ORAL DAILY
Qty: 90 TABLET | Refills: 0 | Status: SHIPPED | OUTPATIENT
Start: 2019-03-12 | End: 2019-04-17 | Stop reason: SDUPTHER

## 2019-03-21 ENCOUNTER — OFFICE VISIT (OUTPATIENT)
Dept: DIABETES | Facility: CLINIC | Age: 64
End: 2019-03-21
Payer: MEDICAID

## 2019-03-21 VITALS
SYSTOLIC BLOOD PRESSURE: 138 MMHG | HEIGHT: 61 IN | WEIGHT: 234.13 LBS | BODY MASS INDEX: 44.2 KG/M2 | DIASTOLIC BLOOD PRESSURE: 68 MMHG

## 2019-03-21 DIAGNOSIS — E11.59 TYPE 2 DIABETES MELLITUS WITH OTHER CIRCULATORY COMPLICATION, WITHOUT LONG-TERM CURRENT USE OF INSULIN: Primary | ICD-10-CM

## 2019-03-21 DIAGNOSIS — E11.43 DIABETIC GASTROPARESIS ASSOCIATED WITH TYPE 2 DIABETES MELLITUS: ICD-10-CM

## 2019-03-21 DIAGNOSIS — K31.84 DIABETIC GASTROPARESIS ASSOCIATED WITH TYPE 2 DIABETES MELLITUS: ICD-10-CM

## 2019-03-21 LAB — GLUCOSE SERPL-MCNC: 256 MG/DL (ref 70–110)

## 2019-03-21 PROCEDURE — 99214 OFFICE O/P EST MOD 30 MIN: CPT | Mod: S$PBB,,, | Performed by: PHYSICIAN ASSISTANT

## 2019-03-21 PROCEDURE — 82962 GLUCOSE BLOOD TEST: CPT | Mod: PBBFAC,PN | Performed by: PHYSICIAN ASSISTANT

## 2019-03-21 PROCEDURE — 99999 PR PBB SHADOW E&M-EST. PATIENT-LVL IV: ICD-10-PCS | Mod: PBBFAC,,, | Performed by: PHYSICIAN ASSISTANT

## 2019-03-21 PROCEDURE — 99214 OFFICE O/P EST MOD 30 MIN: CPT | Mod: PBBFAC,PN | Performed by: PHYSICIAN ASSISTANT

## 2019-03-21 PROCEDURE — 99214 PR OFFICE/OUTPT VISIT, EST, LEVL IV, 30-39 MIN: ICD-10-PCS | Mod: S$PBB,,, | Performed by: PHYSICIAN ASSISTANT

## 2019-03-21 PROCEDURE — 99999 PR PBB SHADOW E&M-EST. PATIENT-LVL IV: CPT | Mod: PBBFAC,,, | Performed by: PHYSICIAN ASSISTANT

## 2019-03-21 NOTE — PROGRESS NOTES
PCP: Andrey Perrin MD    Subjective:    Patient ID: Diamond Das is a 64 y.o. female.    PCP: Andrey Perrin MD      Diamond Das is a pleasant 64 y.o. female presenting for her follow up on diabetes mellitus. She has had diabetes for 8 or more years. Her last visit was 12/20/2018. Since that time she has been in her usual state of health without significant hyperglycemia or hypoglycemia. She has been checking her blood glucose regularly twice daily, fasting and before supper. Also, since her last visit she has had moderate improvement in her glycemia with A1c of 7.1%. She has been checking her SMBG on a regular basis. Her current concerns are glycemic control.  However, she has comorbid condition of stage 3 chronic kidney disease and also liver cirrhosis with ascites.  She was seen in the emergency department 1 month ago for increased ammonia levels and was treated and released.  She has not had any encephalopathy, GI bleeding, or hospitalization since her last visit.    I advised the patient that we need to strive for better glycemic control with her self-monitoring blood glucose.  It is currently running well over 250 on an average and she informs me that she starts to feel nervous and jittery like she has to low when her blood sugars below 250.  We had a prolonged discussion on bring it down slowly so that she does not experience these under law affects.  I advised her she needs to bring it down to 201st and then down to 180.  Today we will intensify her basal bolus therapy.    She denies any hospital admissions, emergency room visits, hypoglycemia, syncope, diaphoresis, chest pain, or dyspnea.    She has gained 4 pounds since last visit. Her BMI is 44.24 kg/m²    Her blood sugar in the clinic today was:   Lab Results   Component Value Date    POCGLU 240 (A) 12/20/2018       We discussed the American diabetes Association recommendations:  hemoglobin A1c below 7.0%; all diabetics should be on  statins unless contraindicated; one aspirin daily unless contraindicated; fasting blood sugar between 80 and 130 mg/dL; postprandial blood sugar below 180 mg/dl; prevention of hypoglycemia, may adjust goals to higher levels if persistent; ACE or ARB therapy if not contraindicated; and maintain in an ideal body weight with BMI below 25.    Diamond is compliant most of the time with DM medications.     Diamond is noncompliant some of the time with lifestyle modifications to include activity and meal planning.       Current Outpatient Medications:     amLODIPine (NORVASC) 10 MG tablet, TAKE 1 TABLET BY MOUTH ONCE DAILY, Disp: 90 tablet, Rfl: 0    aspirin (ECOTRIN) 81 MG EC tablet, Take 1 tablet (81 mg total) by mouth once daily., Disp: 365 tablet, Rfl: 0    carvedilol (COREG) 3.125 MG tablet, TAKE 1 TABLET BY MOUTH TWICE DAILY, Disp: 180 tablet, Rfl: 0    doxazosin (CARDURA) 2 MG tablet, TAKE 1 TABLET BY MOUTH IN THE EVENING, Disp: 60 tablet, Rfl: 0    furosemide (LASIX) 40 MG tablet, Take 40 mg by mouth once daily., Disp: , Rfl:     furosemide (LASIX) 40 MG tablet, TAKE 1 TABLET BY MOUTH EVERY EVENING, Disp: 30 tablet, Rfl: 11    insulin (BASAGLAR KWIKPEN U-100 INSULIN) glargine 100 units/mL (3mL) SubQ pen, Inject 30 Units into the skin once daily., Disp: , Rfl:     insulin lispro (ADMELOG SOLOSTAR U-100 INSULIN) 100 unit/mL InPn pen, Titrate up to 14 units subcutaneously three times a day 10-15 min before meals as directed in after visit summary, Disp: 15 mL, Rfl: 6    lactulose (CHRONULAC) 20 gram/30 mL Soln, Take 30 mLs (20 g total) by mouth 3 (three) times daily as needed (To goal bowel movement 2-3 bowel movements per day)., Disp: 900 mL, Rfl: 0    levothyroxine (SYNTHROID) 137 MCG Tab tablet, TAKE 1 TABLET BY MOUTH BEFORE BREAKFAST, Disp: 30 tablet, Rfl: 0    lisinopril (PRINIVIL,ZESTRIL) 5 MG tablet, Take 1 tablet (5 mg total) by mouth once daily., Disp: 90 tablet, Rfl: 6    ondansetron (ZOFRAN) 4 MG  tablet, Take 1 tablet (4 mg total) by mouth every 8 (eight) hours as needed., Disp: 30 tablet, Rfl: 0    pantoprazole (PROTONIX) 20 MG tablet, Take 2 tablets (40 mg total) by mouth once daily., Disp: 90 tablet, Rfl: 0    rifAXIMin (XIFAXAN) 550 mg Tab, Take 1 tablet (550 mg total) by mouth 2 (two) times daily., Disp: 60 tablet, Rfl: 1    simvastatin (ZOCOR) 40 MG tablet, Take 40 mg by mouth once daily. , Disp: , Rfl:     tiZANidine (ZANAFLEX) 4 MG tablet, TAKE 1 TABLET BY MOUTH IN THE EVENING, Disp: 90 tablet, Rfl: 0    traMADol (ULTRAM) 50 mg tablet, Take 1 tablet (50 mg total) by mouth every 6 (six) hours., Disp: 20 tablet, Rfl: 0    vitamin E 400 UNIT capsule, Take 400 Units by mouth every morning., Disp: , Rfl:     Past Medical History:   Diagnosis Date    Ascites     Cataract     CHF (congestive heart failure)     Cirrhosis     Diabetes mellitus     Diabetes mellitus, type 2     Gastroparesis     GERD (gastroesophageal reflux disease)     Hyperlipidemia     Hypertension     Liver disease     Pneumonia of right middle lobe due to infectious organism 12/19/2017    Renal disorder     Sleep apnea     Thyroid disease        Family History   Problem Relation Age of Onset    Cancer Mother     Breast cancer Mother     Heart disease Father     Aneurysm Sister     Heart disease Brother     No Known Problems Maternal Grandmother     Cancer Maternal Grandfather     Sarcoidosis Sister          Social History     Socioeconomic History    Marital status:      Spouse name: Not on file    Number of children: Not on file    Years of education: Not on file    Highest education level: Not on file   Social Needs    Financial resource strain: Not on file    Food insecurity - worry: Not on file    Food insecurity - inability: Not on file    Transportation needs - medical: Not on file    Transportation needs - non-medical: Not on file   Occupational History    Not on file   Tobacco Use     Smoking status: Never Smoker    Smokeless tobacco: Never Used   Substance and Sexual Activity    Alcohol use: No     Frequency: Never    Drug use: No    Sexual activity: No   Other Topics Concern    Not on file   Social History Narrative    Not on file         STANDARDS OF CARE:  Eye doctor: Dr. Naheed soler, last exam 1/9/2018.  Dental exam: Recommend regular exams; denies gums bleeding.  Podiatry doctor:  ACE/ARB: No  Statin: Yes    Meal plan reveals patient for breakfast is having hot chocolate, bread and butter, cream a week or oatmeal, an apple juice.  She goes back to bed approximately 2:00 a.m. in the day and will awaken 2-3 hours later and have her 2nd meal of the day and it is usually some leftovers like spaghetti and she will also have a blue start Glucerna shake.  She does not have a late night meal but does snack.  She is usually be back in bed about 10:00 p.m..  ACTIVITY LEVEL: She exercises rarely.  BLOOD GLUCOSE TESTING: Self-monitoring with   SOCIAL HISTORY: single. Lives alone. retired no tobacco use    Health Maintenance   Topic Date Due    Influenza Vaccine  08/01/2018    Eye Exam  01/09/2019    Hemoglobin A1c  06/06/2019    Lipid Panel  12/06/2019    Urine Microalbumin  12/06/2019    Foot Exam  12/20/2019    Mammogram  01/17/2020    Fecal Occult Blood Test (FOBT)/[employer]  02/27/2020    Pap Smear with HPV Cotest  01/29/2021    Pneumococcal Vaccine (Highest Risk) (3 of 3 - PPSV23) 12/07/2022    TETANUS VACCINE  09/08/2026    Hepatitis C Screening  Completed    Zoster Vaccine  Completed       Diabetes Management Status    Statin: Taking  ACE/ARB: Not taking    Screening or Prevention Patient's value Goal Complete/Controlled?   HgA1C Testing and Control   Lab Results   Component Value Date    HGBA1C 7.1 (H) 12/06/2018      Annually/Less than 8% Yes   Lipid profile : 12/06/2018 Annually Yes   LDL control Lab Results   Component Value Date    LDLCALC 145.8 12/06/2018    Annually/Less  than 100 mg/dl  No   Nephropathy screening Lab Results   Component Value Date    LABMICR 1635.0 12/06/2018     Lab Results   Component Value Date    PROTEINUA 1+ (A) 02/21/2019    Annually Yes   Blood pressure BP Readings from Last 1 Encounters:   03/21/19 138/68    Less than 140/90 Yes   Dilated retinal exam : 01/09/2018 Annually Yes   Foot exam   : 12/20/2018 Annually Yes     The following results were reviewed with patient.    Lab Results   Component Value Date    WBC 7.29 02/21/2019    HGB 13.4 02/21/2019    HCT 40.1 02/21/2019     02/21/2019    CHOL 205 (H) 12/06/2018    TRIG 91 12/06/2018    HDL 41 12/06/2018    LDLCALC 145.8 12/06/2018    ALT 31 02/21/2019    AST 44 (H) 02/21/2019     02/21/2019    K 4.4 02/21/2019     02/21/2019    CREATININE 2.2 (H) 02/21/2019    ESTGFRAFRICA 27.1 (A) 02/21/2019    EGFRNONAA 23.5 (A) 02/21/2019    BUN 26 (H) 02/21/2019    CO2 28 02/21/2019    TSH 2.271 01/03/2019    INR 1.0 02/04/2019    GLU 77 02/21/2019    UTPCR 0.27 (H) 05/10/2018       Lab Results   Component Value Date    HGBA1C 7.1 (H) 12/06/2018    HGBA1C 7.9 (H) 08/27/2018    HGBA1C 8.0 (H) 05/24/2018       Lab Results   Component Value Date    CPEPTIDE 3.53 09/07/2018     Lab Results   Component Value Date    FREET4 1.04 07/30/2018     Lab Results   Component Value Date    TSH 2.271 01/03/2019     Lab Results   Component Value Date    IRON 59 10/11/2016    TIBC 210 (L) 10/11/2016    FERRITIN 452 (H) 10/11/2016     Lab Results   Component Value Date    .0 (H) 06/19/2018    CALCIUM 9.9 02/21/2019    PHOS 4.7 (H) 01/04/2019       Review of patient's allergies indicates:   Allergen Reactions    Subsys [fentanyl] Other (See Comments)     After administration pt unresponsive.  HR and respirations decreased.     Versed [midazolam] Other (See Comments)     After administration pt unresponsive.  HR and respirations decreased.     Ampicillin Rash    Codeine Nausea And Vomiting and Nausea Only        Past Medical History:   Diagnosis Date    Ascites     Cataract     CHF (congestive heart failure)     Cirrhosis     Diabetes mellitus     Diabetes mellitus, type 2     Gastroparesis     GERD (gastroesophageal reflux disease)     Hyperlipidemia     Hypertension     Liver disease     Pneumonia of right middle lobe due to infectious organism 12/19/2017    Renal disorder     Sleep apnea     Thyroid disease        Review of Systems   Constitutional: Positive for fatigue. Negative for activity change, appetite change, chills, diaphoresis, fever and unexpected weight change.   HENT: Negative.  Negative for congestion, dental problem, drooling, ear discharge, ear pain, facial swelling, hearing loss, mouth sores, nosebleeds, postnasal drip, rhinorrhea, sinus pressure, sneezing, sore throat, tinnitus, trouble swallowing and voice change.    Eyes: Negative.  Negative for photophobia, pain, discharge, redness, itching and visual disturbance.   Respiratory: Negative.  Negative for apnea, cough, choking, chest tightness, shortness of breath, wheezing and stridor.    Cardiovascular: Positive for leg swelling. Negative for chest pain and palpitations.   Gastrointestinal: Negative.  Negative for abdominal distention, abdominal pain, anal bleeding, blood in stool, constipation, diarrhea, nausea, rectal pain and vomiting.   Endocrine: Positive for polyuria. Negative for cold intolerance, heat intolerance, polydipsia and polyphagia.   Genitourinary: Negative.  Negative for decreased urine volume, difficulty urinating, dyspareunia, dysuria, enuresis, flank pain, frequency, genital sores, hematuria, menstrual problem, pelvic pain, urgency, vaginal bleeding, vaginal discharge and vaginal pain.   Musculoskeletal: Negative.  Negative for arthralgias, back pain, gait problem, joint swelling, myalgias, neck pain and neck stiffness.   Skin: Negative.  Negative for color change, pallor, rash and wound.  "  Allergic/Immunologic: Negative.  Negative for environmental allergies, food allergies and immunocompromised state.   Neurological: Negative.  Negative for dizziness, tremors, seizures, syncope, facial asymmetry, speech difficulty, weakness, light-headedness, numbness and headaches.   Hematological: Negative.  Negative for adenopathy. Does not bruise/bleed easily.   Psychiatric/Behavioral: Negative.  Negative for agitation, behavioral problems, confusion, decreased concentration, dysphoric mood, hallucinations, self-injury, sleep disturbance and suicidal ideas. The patient is not nervous/anxious and is not hyperactive.           Objective:   Last 3 sets of Vitals:  Vitals - 1 value per visit 2/21/2019 3/1/2019 3/21/2019   SYSTOLIC 127 150 138   DIASTOLIC 61 74 68   PULSE 51 64 -   TEMPERATURE 97.5 - -   RESPIRATIONS 22 - -   SPO2 100 - -   Weight (lb) 224.21 220.02 234.13   Weight (kg) 101.7 99.8 106.2   HEIGHT 5' 1" 5' 1" 5' 1"   BODY MASS INDEX 42.36 41.57 44.24   VISIT REPORT - - -   Pain Score  - 0 0   Some recent data might be hidden          Physical Exam   Constitutional: She is oriented to person, place, and time. She appears well-developed and well-nourished.   HENT:   Head: Normocephalic and atraumatic.   Right Ear: External ear normal.   Left Ear: External ear normal.   Nose: Nose normal.   Mouth/Throat: Oropharynx is clear and moist. No oropharyngeal exudate.   Eyes: Conjunctivae and EOM are normal. Pupils are equal, round, and reactive to light. Right eye exhibits no discharge. Left eye exhibits no discharge. No scleral icterus.   Neck: Normal range of motion. Neck supple. No JVD present. No tracheal deviation present. No thyromegaly present.   Cardiovascular: Normal rate, regular rhythm, normal heart sounds and intact distal pulses. Exam reveals no gallop and no friction rub.   No murmur heard.  Pulmonary/Chest: Effort normal and breath sounds normal. No stridor. No respiratory distress. She has no " wheezes. She has no rales. She exhibits no tenderness.   Abdominal: Soft. Bowel sounds are normal. She exhibits no distension and no mass. There is no tenderness. There is no rebound and no guarding.   Musculoskeletal: Normal range of motion. She exhibits no edema or tenderness.   Lymphadenopathy:     She has no cervical adenopathy.   Neurological: She is alert and oriented to person, place, and time. She has normal reflexes. She displays normal reflexes. No cranial nerve deficit. She exhibits normal muscle tone. Coordination normal.   Skin: Skin is warm and dry. No rash noted. No erythema. No pallor.   Psychiatric: She has a normal mood and affect. Her behavior is normal. Judgment and thought content normal.   Nursing note and vitals reviewed.          Assessment:     EDUCATIONAL ASSESSMENT:  1. Impediments in learning class environment - NONE.  2. Needs improvement in self-care management skills.    1. Type 2 diabetes mellitus with other circulatory complication, without long-term current use of insulin    2. Diabetic gastroparesis associated with type 2 diabetes mellitus          Plan:     Diamond Das is seen today for   1. Type 2 diabetes mellitus with other circulatory complication, without long-term current use of insulin    2. Diabetic gastroparesis associated with type 2 diabetes mellitus      We have discussed the etiology and treatment options associated with the diagnosis as well as alternatives. She has elected the following treatments.      - Continue with D&E, reduce portion size, and restrict carbohydrates (no more that 45 grams ) per meal.   - Improving A1c. Continue current treatment plan   - appointment with dietician/CDE   - F/U in 6 weeks      Type 2 diabetes mellitus with other circulatory complication, without long-term current use of insulin  -     POCT Glucose, Hand-Held Device    Diabetic gastroparesis associated with type 2 diabetes mellitus  -     POCT Glucose, Hand-Held  "Device        1.) Patient was instructed to monitor blood glucose four times daily, fasting, post meal and ac meals or at bedtime. Reminded to bring BG records or meter to each visit for review.  2.) Reviewed pathophysiology of diabetes, complications related to the disease, importance of annual dilated eye exam and self daily foot examination.  3.) Continue medications as prescribed Basal insulin only with Lantus. [facility] MyChart or Phone review in 1 week with BG records for adjustment of medication.  4.) Refer patient to Dietician/CDE for ongoing diabetes education, meal planning, carbohydrate counting, and diabetes support.  5.) Discussed activity, benefits, methods, and precautions. Recommended patient start/continue some form of exercise and increase as tolerated to 60 minutes per day to facilitate weight loss and aid in control of BGs. Also reminded patient of WHO recommendation of 10,000 steps daily as a goal.   6.) A1C, TSH, Lipid Panel, CMP with eGFR and Micro/Creatinine.  7.) Return to clinic in 6 weeks for follow up. Advised patient to call clinic with any questions or concerns.    I have reviewed your results and they are still quite high. I would like you to start "Treating to Target". The treatment will be Insulin and your target will be the Fasting and 2 hour post meal blood sugar. It will work in this manner;    1. Goal for Fasting blood sugar is  mg/dl. I realize that you will need time to adjust to the new levels and presently you may feel too low if you are too aggressive now. So go slow and aim to lower your blood sugar to below 200 then 150 then 100 over several months.    2. Goal for 2 hour post meal blood sugar is below 180 mg/dl, here the same rules apply as in #1.    3. You will check your fasting blood sugar daily, if not where we would like it to be over a 3 day period then that evening we will increase the Lantus dose by 5 units. Then repeat the process over the next 3 days. " "Remember this is a slow process and take our time getting to goal. But, each week should be better than prior weeks. Blood sugars below 70 are unacceptable and should raise a "RED FLAG" where we may have to reduce our dose of insulin.    4. You will check your post meal glucose daily as well. However, each day you will check a different meal, (ie. Monday-breakfast; Tuesday- lunch; Wednesday- supper, then repeat). If your post meal glucose is not where we would like, increase pre-meal insulin by 2 units next time. A word of CAUTION: mealtime insulin is dependant on the size and concentration of your meal content. If not consuming a large meal do not take large dose of insulin. Use the reasonable person rule.     5. If you have any questions please do not hesitate to call.    Intensive insulin Therapy with correction factor:    You are on Intensive insulin therapy with Basal and Bolus insulin. Lantus is your Basal insulin and will help maintain your fasting and between meal sugar. Your fast acting or rescue insulin is Admelog insulin and will control your post meal sugar.     You will Take 35 units of Basaglar at 9 pm each night. This will be adjusted up or down depending on your fasting blood sugar before breakfast.    Admelog will follow this pre meal schedule; Correction factor of "1 units per 50 mg/dl" and is based on 15-45 grams of carbohydrates per meal.    If blood sugar is below 70 eat first then check your blood sugar 2 hours later and make correction.  If blood pre-meal sugar is  70 -150 take 12 units of Admelog;  If blood pre-meal sugar is 151-200 take +1 units of Admelog;  If blood pre-meal sugar is 201-250 take +2 units of Admelog;  If blood pre-meal sugar is 251-300 take +3 units of Admelog;  If blood pre-meal sugar is 301-350 take +4 units of Admelog;  If blood pre-meal sugar is 351-400+ take +5 units of Admelog;  Also increase water intake and call for appointment.    A total of 30 minutes was spent in " face to face time, of which 50 % was spent in counseling patient on disease process, complications, treatment, and side effects of medications.    The patient was explained the above plan and given opportunity to ask questions.  She understands, chooses and consents to this plan and accepts all the risks, which include but are not limited to the risks mentioned above.   She understands the alternative of having no testing, interventions or treatments at this time. She left content and without further questions.     Disclaimer:  This note is prepared using voice recognition software and as such is likely to have errors and has not been proof read. Please contact me for questions.

## 2019-03-21 NOTE — PATIENT INSTRUCTIONS
"I have reviewed your results and they are still quite high. I would like you to start "Treating to Target". The treatment will be Insulin and your target will be the Fasting and 2 hour post meal blood sugar. It will work in this manner;    1. Goal for Fasting blood sugar is  mg/dl. I realize that you will need time to adjust to the new levels and presently you may feel too low if you are too aggressive now. So go slow and aim to lower your blood sugar to below 200 then 150 then 100 over several months.    2. Goal for 2 hour post meal blood sugar is below 180 mg/dl, here the same rules apply as in #1.    3. You will check your fasting blood sugar daily, if not where we would like it to be over a 3 day period then that evening we will increase the Lantus dose by 5 units. Then repeat the process over the next 3 days. Remember this is a slow process and take our time getting to goal. But, each week should be better than prior weeks. Blood sugars below 70 are unacceptable and should raise a "RED FLAG" where we may have to reduce our dose of insulin.    4. You will check your post meal glucose daily as well. However, each day you will check a different meal, (ie. Monday-breakfast; Tuesday- lunch; Wednesday- supper, then repeat). If your post meal glucose is not where we would like, increase pre-meal insulin by 2 units next time. A word of CAUTION: mealtime insulin is dependant on the size and concentration of your meal content. If not consuming a large meal do not take large dose of insulin. Use the reasonable person rule.     5. If you have any questions please do not hesitate to call.    Intensive insulin Therapy with correction factor:    You are on Intensive insulin therapy with Basal and Bolus insulin. Lantus is your Basal insulin and will help maintain your fasting and between meal sugar. Your fast acting or rescue insulin is Admelog insulin and will control your post meal sugar.     You will Take 35 units of " "Basaglar at 9 pm each night. This will be adjusted up or down depending on your fasting blood sugar before breakfast.    Admelog will follow this pre meal schedule; Correction factor of "1 units per 50 mg/dl" and is based on 15-45 grams of carbohydrates per meal.    If blood sugar is below 70 eat first then check your blood sugar 2 hours later and make correction.  If blood pre-meal sugar is  70 -150 take 12 units of Admelog;  If blood pre-meal sugar is 151-200 take +1 units of Admelog;  If blood pre-meal sugar is 201-250 take +2 units of Admelog;  If blood pre-meal sugar is 251-300 take +3 units of Admelog;  If blood pre-meal sugar is 301-350 take +4 units of Admelog;  If blood pre-meal sugar is 351-400+ take +5 units of Admelog;  Also increase water intake and call for appointment.    "

## 2019-03-26 ENCOUNTER — OFFICE VISIT (OUTPATIENT)
Dept: SLEEP MEDICINE | Facility: CLINIC | Age: 64
End: 2019-03-26
Payer: MEDICAID

## 2019-03-26 VITALS
SYSTOLIC BLOOD PRESSURE: 122 MMHG | OXYGEN SATURATION: 97 % | RESPIRATION RATE: 20 BRPM | WEIGHT: 230.63 LBS | DIASTOLIC BLOOD PRESSURE: 66 MMHG | HEART RATE: 69 BPM | HEIGHT: 61 IN | BODY MASS INDEX: 43.54 KG/M2

## 2019-03-26 DIAGNOSIS — G47.33 OSA ON CPAP: ICD-10-CM

## 2019-03-26 DIAGNOSIS — G89.29 INSOMNIA SECONDARY TO CHRONIC PAIN: Primary | ICD-10-CM

## 2019-03-26 DIAGNOSIS — I50.32 CHRONIC DIASTOLIC CONGESTIVE HEART FAILURE: ICD-10-CM

## 2019-03-26 DIAGNOSIS — E66.01 MORBID OBESITY WITH BMI OF 40.0-44.9, ADULT: ICD-10-CM

## 2019-03-26 DIAGNOSIS — Z72.821 INADEQUATE SLEEP HYGIENE: ICD-10-CM

## 2019-03-26 DIAGNOSIS — G47.01 INSOMNIA SECONDARY TO CHRONIC PAIN: Primary | ICD-10-CM

## 2019-03-26 PROCEDURE — 99214 OFFICE O/P EST MOD 30 MIN: CPT | Mod: S$PBB,,, | Performed by: NURSE PRACTITIONER

## 2019-03-26 PROCEDURE — 99999 PR PBB SHADOW E&M-EST. PATIENT-LVL IV: ICD-10-PCS | Mod: PBBFAC,,, | Performed by: NURSE PRACTITIONER

## 2019-03-26 PROCEDURE — 99999 PR PBB SHADOW E&M-EST. PATIENT-LVL IV: CPT | Mod: PBBFAC,,, | Performed by: NURSE PRACTITIONER

## 2019-03-26 PROCEDURE — 99214 PR OFFICE/OUTPT VISIT, EST, LEVL IV, 30-39 MIN: ICD-10-PCS | Mod: S$PBB,,, | Performed by: NURSE PRACTITIONER

## 2019-03-26 PROCEDURE — 99214 OFFICE O/P EST MOD 30 MIN: CPT | Mod: PBBFAC,PN | Performed by: NURSE PRACTITIONER

## 2019-03-26 NOTE — PROGRESS NOTES
"Subjective:      Patient ID: Diamond Das is a 64 y.o. female.    Chief Complaint: Sleep Apnea    HPI  History of hypoxemic respiratory failure after hospital admission precipitated by CHF exacerbation currently using oxygen during sleep with CPAP. She has not been compliant with CPAP with oxygen bled in. She is here to follow up with download.  Poor sleep habits and pain effecting her sleep. She worked night shift. Now that she is retired, she still sleeps a lot during the day. In bedroom sitting in recliner. Advised to wear CPAP anytime she is sleeping.   She is up for a good bit of the night.     Patient Active Problem List   Diagnosis    Ascites    Cirrhosis of liver with ascites    Type 2 diabetes mellitus with circulatory disorder    Bilateral lower extremity edema    Morbid obesity due to excess calories    Hypothyroidism due to acquired atrophy of thyroid    Protein-calorie malnutrition, moderate    Decompensated hepatic cirrhosis    Medication reaction    Cirrhosis    Portal hypertension    Hypoalbuminemia    Cough    Chronic diastolic congestive heart failure    Postmenopausal    Screening mammogram, encounter for    Screen for colon cancer    Hypertension    Shortness of breath    Breast pain, left    Chronic edema    Chest pain, atypical    Hyperlipidemia    Encounter for long-term (current) use of medications    Edema    KRISTAL on CPAP    Psychophysiological insomnia    Insomnia secondary to chronic pain    Inadequate sleep hygiene    Lumbar strain    Generalized abdominal pain    CKD (chronic kidney disease) stage 4, GFR 15-29 ml/min    Diabetic gastroparesis associated with type 2 diabetes mellitus    Acute on chronic kidney failure    Hypotension    Hyperammonemia       /66   Pulse 69   Resp 20   Ht 5' 1" (1.549 m)   Wt 104.6 kg (230 lb 9.6 oz)   LMP  (LMP Unknown)   SpO2 97%   BMI 43.57 kg/m²   Body mass index is 43.57 kg/m².    Review of Systems "   Musculoskeletal: Positive for arthralgias.   Psychiatric/Behavioral: Positive for sleep disturbance.   All other systems reviewed and are negative.    Objective:      Physical Exam   Constitutional: She is oriented to person, place, and time. She appears well-developed and well-nourished.   Obese   HENT:   Head: Normocephalic and atraumatic.   Neck: Normal range of motion. Neck supple.   Cardiovascular: Normal rate and regular rhythm.   Pulmonary/Chest: Effort normal and breath sounds normal.   Abdominal: Soft. Bowel sounds are normal.   Musculoskeletal: Normal range of motion.   Neurological: She is alert and oriented to person, place, and time.   Skin: Skin is warm and dry.   Psychiatric: She has a normal mood and affect.     Personal Diagnostic Review    Compliance Information  Diamond Das  Device: DreamStation Auto CPAP (500X110) ([id number])  2/19/2019 - 3/20/2019  YOB: 1955  Mask:  Compliance Summary  2/19/2019 - 3/20/2019 (30 days)  Days with Device Usage 14 days  Days without Device Usage 16 days  Percent Days with Device Usage 46.7%  Cumulative Usage 3 days 23 hrs. 49 mins. 50 secs.  Maximum Usage (1 Day) 23 hrs. 59 mins. 58 secs.  Average Usage (All Days) 3 hrs. 11 mins. 39 secs.  Average Usage (Days Used) 6 hrs. 50 mins. 42 secs.  Minimum Usage (1 Day) 39 mins. 40 secs.  Percent of Days with Usage >= 4 Hours 26.7%  Percent of Days with Usage < 4 Hours 73.3%  Date Range  Average AHI 2.0  Auto-CPAP Summary  Auto-CPAP Mean Pressure 8.7 cmH2O  Auto-CPAP Peak Average Pressure 13.9 cmH2O  Average Device Pressure <= 90% of Time 11.4 cmH2O  Average Time in Large Leak Per Day 23 mins    Assessment:       1. Insomnia secondary to chronic pain    2. KRISTAL on CPAP    3. Chronic diastolic congestive heart failure    4. Inadequate sleep hygiene    5. Morbid obesity with BMI of 40.0-44.9, adult        Outpatient Encounter Medications as of 3/26/2019   Medication Sig Dispense Refill     amLODIPine (NORVASC) 10 MG tablet TAKE 1 TABLET BY MOUTH ONCE DAILY 90 tablet 0    aspirin (ECOTRIN) 81 MG EC tablet Take 1 tablet (81 mg total) by mouth once daily. 365 tablet 0    carvedilol (COREG) 3.125 MG tablet TAKE 1 TABLET BY MOUTH TWICE DAILY 180 tablet 0    doxazosin (CARDURA) 2 MG tablet TAKE 1 TABLET BY MOUTH IN THE EVENING 60 tablet 0    furosemide (LASIX) 40 MG tablet Take 40 mg by mouth once daily.      furosemide (LASIX) 40 MG tablet TAKE 1 TABLET BY MOUTH EVERY EVENING 30 tablet 11    insulin (BASAGLAR KWIKPEN U-100 INSULIN) glargine 100 units/mL (3mL) SubQ pen Inject 30 Units into the skin once daily.      insulin lispro (ADMELOG SOLOSTAR U-100 INSULIN) 100 unit/mL InPn pen Titrate up to 14 units subcutaneously three times a day 10-15 min before meals as directed in after visit summary 15 mL 6    lactulose (CHRONULAC) 20 gram/30 mL Soln Take 30 mLs (20 g total) by mouth 3 (three) times daily as needed (To goal bowel movement 2-3 bowel movements per day). 900 mL 0    levothyroxine (SYNTHROID) 137 MCG Tab tablet TAKE 1 TABLET BY MOUTH BEFORE BREAKFAST 30 tablet 0    lisinopril (PRINIVIL,ZESTRIL) 5 MG tablet Take 1 tablet (5 mg total) by mouth once daily. 90 tablet 6    ondansetron (ZOFRAN) 4 MG tablet Take 1 tablet (4 mg total) by mouth every 8 (eight) hours as needed. 30 tablet 0    pantoprazole (PROTONIX) 20 MG tablet Take 2 tablets (40 mg total) by mouth once daily. 90 tablet 0    rifAXIMin (XIFAXAN) 550 mg Tab Take 1 tablet (550 mg total) by mouth 2 (two) times daily. 60 tablet 1    tiZANidine (ZANAFLEX) 4 MG tablet TAKE 1 TABLET BY MOUTH IN THE EVENING 90 tablet 0    traMADol (ULTRAM) 50 mg tablet Take 1 tablet (50 mg total) by mouth every 6 (six) hours. 20 tablet 0    vitamin E 400 UNIT capsule Take 400 Units by mouth every morning.      simvastatin (ZOCOR) 40 MG tablet Take 40 mg by mouth once daily.        No facility-administered encounter medications on file as of  3/26/2019.      Orders Placed This Encounter   Procedures    CPAP/BIPAP SUPPLIES     Order Specific Question:   Type of mask:     Answer:   FFM     Order Specific Question:   Headgear?     Answer:   Yes     Order Specific Question:   Tubing?     Answer:   Yes     Order Specific Question:   Humidifier chamber?     Answer:   Yes     Order Specific Question:   Chin strap?     Answer:   Yes     Order Specific Question:   Filters?     Answer:   Yes     Order Specific Question:   Cushions?     Answer:   Yes     Order Specific Question:   Length of need (1-99 months):     Answer:   99     Plan:        Problem List Items Addressed This Visit        Neuro    Insomnia secondary to chronic pain - Primary       Cardiac/Vascular    Chronic diastolic congestive heart failure       Other    KRISTAL on CPAP    Overview     CPAP with oxygen bled in         Current Assessment & Plan     Increase compliance. Wear nightly. Average AHI 2.0 when on CPAP           Relevant Orders    CPAP/BIPAP SUPPLIES    Inadequate sleep hygiene      Other Visit Diagnoses     Morbid obesity with BMI of 40.0-44.9, adult          Discussed sleep scheduling and sleep hygiene. Wear PAP even if just a nap. Weight loss and exercise to improve overall health.  Monitor weight for fluid retention.   Follow up in 4 months for CPAP download

## 2019-03-27 RX ORDER — SIMVASTATIN 40 MG/1
40 TABLET, FILM COATED ORAL DAILY
Qty: 90 TABLET | Refills: 0 | Status: SHIPPED | OUTPATIENT
Start: 2019-03-27 | End: 2019-07-16 | Stop reason: SDUPTHER

## 2019-04-01 ENCOUNTER — TELEPHONE (OUTPATIENT)
Dept: PULMONOLOGY | Facility: CLINIC | Age: 64
End: 2019-04-01

## 2019-04-03 ENCOUNTER — PATIENT OUTREACH (OUTPATIENT)
Dept: ADMINISTRATIVE | Facility: HOSPITAL | Age: 64
End: 2019-04-03

## 2019-04-08 RX ORDER — ONDANSETRON 4 MG/1
TABLET, FILM COATED ORAL
Qty: 30 TABLET | Refills: 0 | Status: SHIPPED | OUTPATIENT
Start: 2019-04-08 | End: 2019-05-22 | Stop reason: SDUPTHER

## 2019-04-09 ENCOUNTER — OFFICE VISIT (OUTPATIENT)
Dept: OPHTHALMOLOGY | Facility: CLINIC | Age: 64
End: 2019-04-09
Payer: MEDICAID

## 2019-04-09 DIAGNOSIS — Z96.1 PSEUDOPHAKIA OF BOTH EYES: ICD-10-CM

## 2019-04-09 DIAGNOSIS — H52.7 REFRACTIVE ERROR: ICD-10-CM

## 2019-04-09 DIAGNOSIS — Z13.5 GLAUCOMA SCREENING: ICD-10-CM

## 2019-04-09 DIAGNOSIS — E11.3299 BACKGROUND DIABETIC RETINOPATHY: Primary | ICD-10-CM

## 2019-04-09 PROCEDURE — 92015 PR REFRACTION: ICD-10-PCS | Mod: ,,, | Performed by: OPTOMETRIST

## 2019-04-09 PROCEDURE — 99999 PR PBB SHADOW E&M-EST. PATIENT-LVL II: CPT | Mod: PBBFAC,,, | Performed by: OPTOMETRIST

## 2019-04-09 PROCEDURE — 99212 OFFICE O/P EST SF 10 MIN: CPT | Mod: PBBFAC,PN | Performed by: OPTOMETRIST

## 2019-04-09 PROCEDURE — 92014 COMPRE OPH EXAM EST PT 1/>: CPT | Mod: S$PBB,,, | Performed by: OPTOMETRIST

## 2019-04-09 PROCEDURE — 99999 PR PBB SHADOW E&M-EST. PATIENT-LVL II: ICD-10-PCS | Mod: PBBFAC,,, | Performed by: OPTOMETRIST

## 2019-04-09 PROCEDURE — 92014 PR EYE EXAM, EST PATIENT,COMPREHESV: ICD-10-PCS | Mod: S$PBB,,, | Performed by: OPTOMETRIST

## 2019-04-09 PROCEDURE — 92015 DETERMINE REFRACTIVE STATE: CPT | Mod: ,,, | Performed by: OPTOMETRIST

## 2019-04-09 NOTE — LETTER
April 9, 2019      Andrey Perrin MD  [id number] [address] [address]           [facility] - Ophthalmology  [address] [facility] [address]  Phone: [phone]  Fax: [phone]          Patient: Diamond Das   MR Number: [id number]   YOB: 1955   Date of Visit: 4/9/2019       Dear Dr. Andrey Perrin:    Thank you for referring Diamond Das to me for evaluation. Attached you will find relevant portions of my assessment and plan of care.    If you have questions, please do not hesitate to call me. I look forward to following Diamond Das along with you.    Sincerely,    LILY Stratton, OD    Enclosure  CC:  No Recipients    If you would like to receive this communication electronically, please contact [email or web address] or [phone] to request more information on [employer] access.    For providers and/or their staff who would like to refer a patient to [facility], please contact us through our one-stop-shop provider referral line, [facility], at [phone].    If you feel you have received this communication in error or would no longer like to receive these types of communications, please e-mail [email or web address]

## 2019-04-09 NOTE — PROGRESS NOTES
HPI     Last [facility] exam 01/09/2018  Diabetic/IDDM   04/09/2019  Pseudophakia, OU  Uses OTC Readers +3.00  Decreased distance vision     Last edited by Sav Radford MA on 4/9/2019  8:21 AM. (History)            Assessment /Plan     For exam results, see Encounter Report.    Diabetes mellitus type 2 without retinopathy    Pseudophakia of both eyes    Glaucoma screening    Refractive error      Mild BDR  OU will follow.  Stable PIOL OU.  OH OK OU.  Spec Rx given.  RTC one year.

## 2019-04-17 ENCOUNTER — TELEPHONE (OUTPATIENT)
Dept: INTERNAL MEDICINE | Facility: CLINIC | Age: 64
End: 2019-04-17

## 2019-04-17 ENCOUNTER — LAB VISIT (OUTPATIENT)
Dept: LAB | Facility: HOSPITAL | Age: 64
End: 2019-04-17
Attending: FAMILY MEDICINE
Payer: MEDICAID

## 2019-04-17 ENCOUNTER — OFFICE VISIT (OUTPATIENT)
Dept: INTERNAL MEDICINE | Facility: CLINIC | Age: 64
End: 2019-04-17
Payer: MEDICAID

## 2019-04-17 VITALS
SYSTOLIC BLOOD PRESSURE: 131 MMHG | WEIGHT: 227.75 LBS | HEART RATE: 56 BPM | OXYGEN SATURATION: 98 % | HEIGHT: 61 IN | DIASTOLIC BLOOD PRESSURE: 60 MMHG | BODY MASS INDEX: 43 KG/M2 | TEMPERATURE: 97 F | RESPIRATION RATE: 16 BRPM

## 2019-04-17 DIAGNOSIS — E11.59 TYPE 2 DIABETES MELLITUS WITH OTHER CIRCULATORY COMPLICATION, WITHOUT LONG-TERM CURRENT USE OF INSULIN: Primary | ICD-10-CM

## 2019-04-17 DIAGNOSIS — I10 HYPERTENSION, UNSPECIFIED TYPE: ICD-10-CM

## 2019-04-17 DIAGNOSIS — K21.9 GASTROESOPHAGEAL REFLUX DISEASE WITHOUT ESOPHAGITIS: ICD-10-CM

## 2019-04-17 DIAGNOSIS — E11.59 TYPE 2 DIABETES MELLITUS WITH OTHER CIRCULATORY COMPLICATION, WITHOUT LONG-TERM CURRENT USE OF INSULIN: ICD-10-CM

## 2019-04-17 PROCEDURE — 99999 PR PBB SHADOW E&M-EST. PATIENT-LVL III: CPT | Mod: PBBFAC,,, | Performed by: FAMILY MEDICINE

## 2019-04-17 PROCEDURE — 83036 HEMOGLOBIN GLYCOSYLATED A1C: CPT

## 2019-04-17 PROCEDURE — 36415 COLL VENOUS BLD VENIPUNCTURE: CPT | Mod: PO

## 2019-04-17 PROCEDURE — 99214 OFFICE O/P EST MOD 30 MIN: CPT | Mod: S$PBB,,, | Performed by: FAMILY MEDICINE

## 2019-04-17 PROCEDURE — 99999 PR PBB SHADOW E&M-EST. PATIENT-LVL III: ICD-10-PCS | Mod: PBBFAC,,, | Performed by: FAMILY MEDICINE

## 2019-04-17 PROCEDURE — 99214 PR OFFICE/OUTPT VISIT, EST, LEVL IV, 30-39 MIN: ICD-10-PCS | Mod: S$PBB,,, | Performed by: FAMILY MEDICINE

## 2019-04-17 PROCEDURE — 99213 OFFICE O/P EST LOW 20 MIN: CPT | Mod: PBBFAC,PO | Performed by: FAMILY MEDICINE

## 2019-04-17 RX ORDER — AMLODIPINE BESYLATE 10 MG/1
10 TABLET ORAL DAILY
Qty: 90 TABLET | Refills: 1 | Status: SHIPPED | OUTPATIENT
Start: 2019-04-17 | End: 2019-07-16 | Stop reason: SDUPTHER

## 2019-04-17 RX ORDER — CARVEDILOL 3.12 MG/1
3.12 TABLET ORAL 2 TIMES DAILY
Qty: 180 TABLET | Refills: 0 | Status: SHIPPED | OUTPATIENT
Start: 2019-04-17 | End: 2019-07-16 | Stop reason: SDUPTHER

## 2019-04-17 RX ORDER — PANTOPRAZOLE SODIUM 20 MG/1
40 TABLET, DELAYED RELEASE ORAL DAILY
Qty: 90 TABLET | Refills: 0 | Status: SHIPPED | OUTPATIENT
Start: 2019-04-17 | End: 2019-06-20

## 2019-04-17 NOTE — TELEPHONE ENCOUNTER
----- Message from Susan Carrizales sent at 4/17/2019  9:55 AM CDT -----  Patient needs call back to r/s appointment..[phone]

## 2019-04-17 NOTE — PROGRESS NOTES
Subjective:       Patient ID: Diamond Das is a 64 y.o. female.    Chief Complaint: Follow-up (3 month)    Diabetes   She presents for her follow-up diabetic visit. She has type 2 diabetes mellitus. Her disease course has been stable. Pertinent negatives for hypoglycemia include no confusion, dizziness or headaches. Pertinent negatives for diabetes include no blurred vision, no chest pain, no fatigue and no foot paresthesias. Pertinent negatives for hypoglycemia complications include no blackouts and no hospitalization. Symptoms are stable. Risk factors for coronary artery disease include diabetes mellitus, hypertension and obesity.     Review of Systems   Constitutional: Negative for fatigue.   Eyes: Negative for blurred vision.   Cardiovascular: Negative for chest pain.   Neurological: Negative for dizziness and headaches.   Psychiatric/Behavioral: Negative for confusion.       Objective:      Physical Exam   Constitutional: She appears well-developed and well-nourished. She appears distressed.   HENT:   Head: Normocephalic and atraumatic.   Nose: Nose normal.   Mouth/Throat: Oropharynx is clear and moist.   Pulmonary/Chest: Effort normal and breath sounds normal. No respiratory distress. She has no wheezes.   Musculoskeletal: She exhibits no edema.   Skin: Skin is warm and dry. No rash noted. She is not diaphoretic. No erythema.   Nursing note and vitals reviewed.      Assessment:       1. Type 2 diabetes mellitus with other circulatory complication, without long-term current use of insulin    2. Hypertension, unspecified type    3. Gastroesophageal reflux disease without esophagitis        Plan:     Problem List Items Addressed This Visit        Cardiac/Vascular    Hypertension    Relevant Medications    amLODIPine (NORVASC) 10 MG tablet    carvedilol (COREG) 3.125 MG tablet       Endocrine    Type 2 diabetes mellitus with circulatory disorder - Primary    Relevant Medications    amLODIPine (NORVASC) 10 MG  tablet    carvedilol (COREG) 3.125 MG tablet    glucagon, human recombinant, (GLUCAGON EMERGENCY KIT, HUMAN,) 1 mg SolR    Other Relevant Orders    Hemoglobin A1c    Ambulatory referral to Podiatry       GI    Gastroesophageal reflux disease without esophagitis    Relevant Medications    pantoprazole (PROTONIX) 20 MG tablet

## 2019-04-18 ENCOUNTER — TELEPHONE (OUTPATIENT)
Dept: INTERNAL MEDICINE | Facility: CLINIC | Age: 64
End: 2019-04-18

## 2019-04-18 LAB
ESTIMATED AVG GLUCOSE: 192 MG/DL (ref 68–131)
HBA1C MFR BLD HPLC: 8.3 % (ref 4–5.6)

## 2019-04-18 NOTE — PROGRESS NOTES
Please call pt with abnormal results. Pt does not need appt at this time, unless they have questions or wish to further discuss.  Let Mrs Fields know that she has fallen off the wagon again. A1c is creeping back up. Over 8 again.  Is she taking the meds?  This is her worst A1c in over a year.

## 2019-04-18 NOTE — TELEPHONE ENCOUNTER
Pt notified and verbalized understanding.   Pt states she has been taking her meds as prescribed. States her diet has been ok. She actually hasn't been eating much.

## 2019-04-18 NOTE — TELEPHONE ENCOUNTER
----- Message from Andrey Perrin MD sent at 4/18/2019  7:45 AM CDT -----  Please call pt with abnormal results. Pt does not need appt at this time, unless they have questions or wish to further discuss.  Let Mrs Fields know that she has fallen off the wagon again. A1c is creeping back up. Over 8 again.  Is she taking the meds?  This is her worst A1c in over a year.

## 2019-04-24 ENCOUNTER — TELEPHONE (OUTPATIENT)
Dept: PODIATRY | Facility: CLINIC | Age: 64
End: 2019-04-24

## 2019-05-07 ENCOUNTER — OFFICE VISIT (OUTPATIENT)
Dept: PODIATRY | Facility: CLINIC | Age: 64
End: 2019-05-07
Payer: MEDICAID

## 2019-05-07 VITALS
HEIGHT: 61 IN | WEIGHT: 230.63 LBS | BODY MASS INDEX: 43.54 KG/M2 | DIASTOLIC BLOOD PRESSURE: 76 MMHG | HEART RATE: 57 BPM | SYSTOLIC BLOOD PRESSURE: 141 MMHG

## 2019-05-07 DIAGNOSIS — E66.01 MORBID OBESITY DUE TO EXCESS CALORIES: ICD-10-CM

## 2019-05-07 DIAGNOSIS — B35.1 ONYCHOMYCOSIS: ICD-10-CM

## 2019-05-07 DIAGNOSIS — L85.3 XEROSIS CUTIS: ICD-10-CM

## 2019-05-07 PROCEDURE — 99213 OFFICE O/P EST LOW 20 MIN: CPT | Mod: S$PBB,,, | Performed by: PODIATRIST

## 2019-05-07 PROCEDURE — 99999 PR PBB SHADOW E&M-EST. PATIENT-LVL III: ICD-10-PCS | Mod: PBBFAC,,, | Performed by: PODIATRIST

## 2019-05-07 PROCEDURE — 99213 OFFICE O/P EST LOW 20 MIN: CPT | Mod: PBBFAC | Performed by: PODIATRIST

## 2019-05-07 PROCEDURE — 99999 PR PBB SHADOW E&M-EST. PATIENT-LVL III: CPT | Mod: PBBFAC,,, | Performed by: PODIATRIST

## 2019-05-07 PROCEDURE — 99213 PR OFFICE/OUTPT VISIT, EST, LEVL III, 20-29 MIN: ICD-10-PCS | Mod: S$PBB,,, | Performed by: PODIATRIST

## 2019-05-07 NOTE — PROGRESS NOTES
Subjective:       Patient ID: Diamond Das is a 64 y.o. female.    Chief Complaint: Diabetic Foot Exam (PCP: Dr. Perrin last seen on 4/17/19; pt reports no pain at present. )    HPI: Diamond Das presents to the office today, under referral by their Primary Care Provider, Andrey Perrin MD, for her annual diabetic foot assessment and risk evalution Patient is a DMII. Patient states venous insufficiency and lymphedema. This patient last saw his/her primary care provider on 4/17/2019.     Hemoglobin A1C   Date Value Ref Range Status   04/17/2019 8.3 (H) 4.0 - 5.6 % Final     Comment:     ADA Screening Guidelines:  5.7-6.4%  Consistent with prediabetes  >or=6.5%  Consistent with diabetes  High levels of fetal hemoglobin interfere with the HbA1C  assay. Heterozygous hemoglobin variants (HbS, HgC, etc)do  not significantly interfere with this assay.   However, presence of multiple variants may affect accuracy.     12/06/2018 7.1 (H) 4.0 - 5.6 % Final     Comment:     ADA Screening Guidelines:  5.7-6.4%  Consistent with prediabetes  >or=6.5%  Consistent with diabetes  High levels of fetal hemoglobin interfere with the HbA1C  assay. Heterozygous hemoglobin variants (HbS, HgC, etc)do  not significantly interfere with this assay.   However, presence of multiple variants may affect accuracy.     08/27/2018 7.9 (H) 4.0 - 5.6 % Final     Comment:     ADA Screening Guidelines:  5.7-6.4%  Consistent with prediabetes  >or=6.5%  Consistent with diabetes  High levels of fetal hemoglobin interfere with the HbA1C  assay. Heterozygous hemoglobin variants (HbS, HgC, etc)do  not significantly interfere with this assay.   However, presence of multiple variants may affect accuracy.     .    Review of patient's allergies indicates:   Allergen Reactions    Subsys [fentanyl] Other (See Comments)     After administration pt unresponsive.  HR and respirations decreased.     Versed [midazolam] Other (See Comments)     After  administration pt unresponsive.  HR and respirations decreased.     Ampicillin Rash    Codeine Nausea And Vomiting and Nausea Only       Past Medical History:   Diagnosis Date    Ascites     Cataract     CHF (congestive heart failure)     Cirrhosis     Diabetes mellitus     Diabetes mellitus, type 2     Gastroparesis     GERD (gastroesophageal reflux disease)     Hyperlipidemia     Hypertension     Liver disease     Pneumonia of right middle lobe due to infectious organism 12/19/2017    Renal disorder     Sleep apnea     Thyroid disease        Family History   Problem Relation Age of Onset    Cancer Mother     Breast cancer Mother     Heart disease Father     Aneurysm Sister     Heart disease Brother     No Known Problems Maternal Grandmother     Cancer Maternal Grandfather     Sarcoidosis Sister        Social History     Socioeconomic History    Marital status:      Spouse name: Not on file    Number of children: Not on file    Years of education: Not on file    Highest education level: Not on file   Occupational History    Not on file   Social Needs    Financial resource strain: Not on file    Food insecurity:     Worry: Not on file     Inability: Not on file    Transportation needs:     Medical: Not on file     Non-medical: Not on file   Tobacco Use    Smoking status: Never Smoker    Smokeless tobacco: Never Used   Substance and Sexual Activity    Alcohol use: No     Frequency: Never    Drug use: No    Sexual activity: Never   Lifestyle    Physical activity:     Days per week: Not on file     Minutes per session: Not on file    Stress: Not on file   Relationships    Social connections:     Talks on phone: Not on file     Gets together: Not on file     Attends Yarsani service: Not on file     Active member of club or organization: Not on file     Attends meetings of clubs or organizations: Not on file     Relationship status: Not on file   Other Topics Concern     "Not on file   Social History Narrative    Not on file       Past Surgical History:   Procedure Laterality Date    CHOLECYSTECTOMY      ERCP      LIVER BIOPSY      TIPS N/A 1/17/2017    Performed by [facility] at [facility]    TRANSJUGULAR LIVER BIOPSY N/A 11/17/2016    Performed by [facility] Diagnostic Provider at [facility] OR 2ND FLR    TUBAL LIGATION      UPPER GASTROINTESTINAL ENDOSCOPY         Review of Systems   Constitutional: Negative for chills, fatigue and fever.   HENT: Negative for hearing loss.    Eyes: Negative for photophobia and visual disturbance.   Respiratory: Negative for cough, chest tightness, shortness of breath and wheezing.    Cardiovascular: Positive for leg swelling. Negative for chest pain and palpitations.   Gastrointestinal: Negative for constipation, diarrhea, nausea and vomiting.   Endocrine: Negative for cold intolerance and heat intolerance.   Genitourinary: Negative for flank pain.   Musculoskeletal: Positive for back pain and gait problem. Negative for neck pain and neck stiffness.   Skin: Negative for wound.   Neurological: Negative for light-headedness and headaches.   Psychiatric/Behavioral: Negative for sleep disturbance.         Objective:   BP (!) 141/76 (BP Location: Right arm, Patient Position: Sitting)   Pulse (!) 57   Ht 5' 1" (1.549 m)   Wt 104.6 kg (230 lb 9.6 oz)   LMP  (LMP Unknown)   BMI 43.57 kg/m²     LOWER EXTREMITY PHYSICAL EXAMINATION  ORTHOPEDIC: Manual Muscle Testing is 5/5 in all planes on the left and right, without pains, with and without resistance. No pains to palpation of the medial or lateral ankle ligaments. No discomfort to palpation of the posterior tibial tendon, peroneal tendon, Achilles tendon or the anterior ankle tendons.  Pes planus foot type is noted. No pain upon range of motion of the midfoot or hindfoot joints. No crepitus upon range of motion and midfoot or hindfoot joints. No ankle pathology is noted. Equinus contracture is " appreciated.  Gait pattern is non-antalgic.    VASCULAR: Pulses are palpable to the B/L lower extremity. The right dorsalis pedis pulse is 2/4 and the posterior tibial pulse is 2/4. The left dorsalis pedis pulse is 2/4 and the posterior tibial pulse is 2/4. Hair growth is noted on the dorsal foot and digits. Proximal to distal, warm to warm. Capillary refill time is WNL at less than 3s.  Edema is noted to the lower extremity, nonpitting, moderate.    NEUROLOGY: Proprioception is intact. Sensation to light touch is intact. Protective sensation is intact via 5.07 Ida Primo monofilament. Straight leg raise is negative. Negative Tinel's Sign and negative Valleix Sign. No neurological sensations with compression of the area of Zheng's Nerve in the area of the Abductor Hallucis muscle belly. Upon palpation of the interspaces, there are no neurological sensations stated that radiate proximal or distal. Upon compression of the metatarsal heads from medial to lateral, no neurological sensations or symptoms are stated. Deep tendon reflexes to the lower extremity are WNL.     DERMATOLOGY: Skin is supple, dry and intact. No ecchymosis is noted. No hypertrophic skin formation. No erythema or cellulitis is noted. No tinea pedis is noted. No calluses.  No ulcerations.  On the left foot and the right foot, nails 1-5 are suggestive of onychomycotic changes. These nail plates are painful with shoe gear and ambulation, as per patient. These nail plates are painful to palpation and manipulation, are thickened, are dystrophic, chaulky in appearance and malodorous with substantial subungual debris. These nail plates are yellow to brown in appearance.  Moderate xerosis is noted to the lower extremity.    Assessment:     1. Uncontrolled type 2 diabetes mellitus with complication, with long-term current use of insulin    2. Morbid obesity due to excess calories    3. Onychomycosis    4. Xerosis cutis        Plan:     Uncontrolled  type 2 diabetes mellitus with complication, with long-term current use of insulin  I counseled the patient on his/her Diabetic Mellitus regarding today's clinical examination and objection findings. We did also discuss recent medication changes, pertinent labs and imaging evaluations and other medical consultation notes and progress notes. Greater than 50% of this visit was spent on counseling and coordination of care. Greater than 20 minutes of this appt. was spent on education about the diabetic foot, in relation to PVD and/or neuropathy, and the prevention of limb loss.     Shoe gear is inspected and wear and proper fit/type. Patient is reminded of the importance of good nutrition and blood sugar control to help prevent podiatric complications of diabetes. Patient instructed on proper foot hygeine. We discussed wearing proper shoe gear, daily foot inspections, never walking without protective shoe gear, never putting sharp instruments to feet.  Patient  will continue to monitor the areas daily, inspect feet, wear protective shoe gear when ambulatory, moisturizer to maintain skin integrity.     Patient's DMI/DMII is managed by Primary Care Provider and/or Endocrinology Advanced Practice Provider. As per the most recent glycohemoglobin level, this patient is not at goal.    Morbid obesity due to excess calories  Patient is counseled and reminded concerning the importance of good nutrition and healthy eating habits, which may include blood sugar control, to prevent and/or help podiatric foot and ankle complications.    Onychomycosis  Discussed the various treatment options concerning onychomycosis, these being over-the-counter topicals (efficacy is low in regards to cure), prescription strength topicals (better efficacy as compared to OTC variants, but only slightly so, and potentially costly), laser therapy (which is not covered by insurance companies), oral medications (patient is advised that there is a  potential, though less than 5%, for the potential of adverse health and side effects; namely liver pathology) and doing nothing (frequent debridements) as onychomycosis is a cosmetic ailment, and has no potential for long-term deleterious effects on the health.    Xerosis cutis  Patient will purchase OTC Urea 40% and use BID or TID.     Protective Sensation (w/ 10 gram monofilament):  Right: Intact  Left: Intact    Visual Inspection:  Dry Skin -  Bilateral and Onychomycosis -  Bilateral    Pedal Pulses:   Right: Present  Left: Present    Posterior tibialis:   Right:Present  Left: Present            Future Appointments   Date Time Provider Department Center   5/16/2019 11:00 AM Russell Aguilar Jr., SARAH [facility]   6/5/2019  3:00 PM Marnie Elmore MD [facility] GASTRO [facility]   6/14/2019 12:15 PM CARDIOVASCULAR, [facility]   7/16/2019 10:40 AM Andrey Perrin MD [facility] IM [facility]   7/30/2019 12:40 PM Elizabeth Lejeune, NP [facility] SLEEP [facility]   8/26/2019 10:40 AM Marcos Ramos MD [facility] CARDIO [facility]

## 2019-05-07 NOTE — LETTER
May 7, 2019      Andrey Perrin MD  [id number] [address] [address]           [facility]  [address] [place] [address]  Phone: [phone]  Fax: [phone]          Patient: Diamond Das   MR Number: [id number]   YOB: 1955   Date of Visit: 5/7/2019       Dear Dr. Andrey Perrin:    Thank you for referring Diamond Das to me for evaluation. Attached you will find relevant portions of my assessment and plan of care.    If you have questions, please do not hesitate to call me. I look forward to following Diamond Das along with you.    Sincerely,    Omid Duran DPM    Enclosure  CC:  No Recipients    If you would like to receive this communication electronically, please contact [email or web address] or [phone] to request more information on [employer] Link access.    For providers and/or their staff who would like to refer a patient to [facility], please contact us through our one-stop-shop provider referral line, Ghazala Ryder, at [phone].    If you feel you have received this communication in error or would no longer like to receive these types of communications, please e-mail [email or web address]

## 2019-05-07 NOTE — PROGRESS NOTES
"Subjective:       Patient ID: Diamond Das is a 64 y.o. female.    Chief Complaint: Diabetic Foot Exam (PCP: Dr. Perrin last seen on 4/17/19; pt reports no pain at present. )      HPI: Diamond Das presents to the office today, under referral by their Primary Care Provider, Andrey Perrin MD, for {Blank single:57995::"his","her"} annual diabetic foot assessment and risk evalution Patient is a DMII. Patient states {Blank multiple:28074::"neuropathy","peripheral arterial disease","venous insufficiency","lymphedema","impaired vision","kidney pathology","renal replacement therapy","no complications due to the disease state","***"}. This patient last saw his/her primary care provider on ***.     Hemoglobin A1C   Date Value Ref Range Status   04/17/2019 8.3 (H) 4.0 - 5.6 % Final     Comment:     ADA Screening Guidelines:  5.7-6.4%  Consistent with prediabetes  >or=6.5%  Consistent with diabetes  High levels of fetal hemoglobin interfere with the HbA1C  assay. Heterozygous hemoglobin variants (HbS, HgC, etc)do  not significantly interfere with this assay.   However, presence of multiple variants may affect accuracy.     12/06/2018 7.1 (H) 4.0 - 5.6 % Final     Comment:     ADA Screening Guidelines:  5.7-6.4%  Consistent with prediabetes  >or=6.5%  Consistent with diabetes  High levels of fetal hemoglobin interfere with the HbA1C  assay. Heterozygous hemoglobin variants (HbS, HgC, etc)do  not significantly interfere with this assay.   However, presence of multiple variants may affect accuracy.     08/27/2018 7.9 (H) 4.0 - 5.6 % Final     Comment:     ADA Screening Guidelines:  5.7-6.4%  Consistent with prediabetes  >or=6.5%  Consistent with diabetes  High levels of fetal hemoglobin interfere with the HbA1C  assay. Heterozygous hemoglobin variants (HbS, HgC, etc)do  not significantly interfere with this assay.   However, presence of multiple variants may affect accuracy.     .    Review of patient's allergies " indicates:   Allergen Reactions    Subsys [fentanyl] Other (See Comments)     After administration pt unresponsive.  HR and respirations decreased.     Versed [midazolam] Other (See Comments)     After administration pt unresponsive.  HR and respirations decreased.     Ampicillin Rash    Codeine Nausea And Vomiting and Nausea Only       Past Medical History:   Diagnosis Date    Ascites     Cataract     CHF (congestive heart failure)     Cirrhosis     Diabetes mellitus     Diabetes mellitus, type 2     Gastroparesis     GERD (gastroesophageal reflux disease)     Hyperlipidemia     Hypertension     Liver disease     Pneumonia of right middle lobe due to infectious organism 12/19/2017    Renal disorder     Sleep apnea     Thyroid disease        Family History   Problem Relation Age of Onset    Cancer Mother     Breast cancer Mother     Heart disease Father     Aneurysm Sister     Heart disease Brother     No Known Problems Maternal Grandmother     Cancer Maternal Grandfather     Sarcoidosis Sister        Social History     Socioeconomic History    Marital status:      Spouse name: Not on file    Number of children: Not on file    Years of education: Not on file    Highest education level: Not on file   Occupational History    Not on file   Social Needs    Financial resource strain: Not on file    Food insecurity:     Worry: Not on file     Inability: Not on file    Transportation needs:     Medical: Not on file     Non-medical: Not on file   Tobacco Use    Smoking status: Never Smoker    Smokeless tobacco: Never Used   Substance and Sexual Activity    Alcohol use: No     Frequency: Never    Drug use: No    Sexual activity: Never   Lifestyle    Physical activity:     Days per week: Not on file     Minutes per session: Not on file    Stress: Not on file   Relationships    Social connections:     Talks on phone: Not on file     Gets together: Not on file     Attends  "Yazidism service: Not on file     Active member of club or organization: Not on file     Attends meetings of clubs or organizations: Not on file     Relationship status: Not on file   Other Topics Concern    Not on file   Social History Narrative    Not on file       Past Surgical History:   Procedure Laterality Date    CHOLECYSTECTOMY      ERCP      LIVER BIOPSY      TIPS N/A 1/17/2017    Performed by [facility]    TRANSJUGULAR LIVER BIOPSY N/A 11/17/2016    Performed by [facility]    TUBAL LIGATION      UPPER GASTROINTESTINAL ENDOSCOPY         Review of Systems      Objective:   BP (!) 141/76 (BP Location: Right arm, Patient Position: Sitting)   Pulse (!) 57   Ht 5' 1" (1.549 m)   Wt 104.6 kg (230 lb 9.6 oz)   LMP  (LMP Unknown)   BMI 43.57 kg/m²     LOWER EXTREMITY PHYSICAL EXAMINATION    ORTHOPEDIC: *** type is noted. Equinus contracture is noted.    VASCULAR: Capillary refill time is {Blank single:19197::"less than 3s","greater than 3s","sluggish"}. Edema is {Blank multiple:72941::"brawny","non-pitting","1+ pitting","2+ pitting","3+ pitting","trace","moderate","severe"}. The left dorsalis pedis pulse is ***/4 and the right dorsalis pedis pulse is ***/4. The left posterior tibial pulse is ***/4 and the right posterior tibial pulse is ***/4. Varicosities are ***. Spider veins and telangiectasias are ***. Hair growth is {Blank single:19197::"present","sparse to absent","decreased","absent"}. Skin appearance is {Blank multiple:07146::"WNL","thin and shiny","w/ subcutaneous tissue atrophy","mottled"}. Proximal to distal ***. Elevation palor is ***. Dependent rubor is ***.    NEUROLOGY: Proprioception is *** intact. Sensation to light touch is *** intact. Sensation to pin prick is ***. Vibratory sensation is *** to the left and right lower extremity. Examination with 5.07 [place] Primo monofilament reveals that protective sensation is *** intact to the " "left and right plantar surfaces of the foot and digits, as the patient has *** sensation/detection at greater than 4 distinct points of contact.     DERMATOLOGY: Skin is {Blank multiple:63102::"supple","moist","dry","intact","mottled","xerotic","hyperpigmented"}.     Physical Exam    Assessment:     1. Morbid obesity due to excess calories        Plan:     Morbid obesity due to excess calories         Protective Sensation (w/ 10 gram monofilament):  Right: {Vqupmf-Ajanoysve-Csmalt:16570::"Intact"}  Left: {Shplcu-Uyzrupbci-Ecccbq:56627::"Intact"}    Visual Inspection:  {OHS AMB FOOT EXAM VISUAL INSPECTION:09681}    Pedal Pulses:   Right: {Trepiml-Gqpckspekw-Okzehg:06690::"Present"}  Left: {Jcssgjt-Ibijbhnfrc-Xfdmhc:43691::"Present"}    Posterior tibialis:   Right:{Uoqoxvc-Sdqtvjuzvh-Zlcoxa:61304::"Present"}  Left: {Tgylevs-Gdtbzswpqr-Rollqd:40717::"Present"}        ***        Future Appointments   Date Time Provider Department Center   5/7/2019  1:15 PM Omid Duran DPM [facility] POD [facility]   5/16/2019 11:00 AM Russell Aguilar Jr., PA-C 7BVC KEYLA [facility]   6/5/2019  3:00 PM Marnie Elmore MD [facility] GASTRO [facility]   6/14/2019 12:15 PM CARDIOVASCULAR, IBVC [facility] EKG [facility]   7/16/2019 10:40 AM Andrey Perrin MD IB[facility] IM [facility]   7/30/2019 12:40 PM Elizabeth Lejeune, NP HG[facility] SLEEP [facility]   8/26/2019 10:40 AM Marcos Ramos MD HG[facility] CARDIO [facility]     "

## 2019-05-10 DIAGNOSIS — S39.012A STRAIN OF LUMBAR REGION, INITIAL ENCOUNTER: ICD-10-CM

## 2019-05-10 DIAGNOSIS — K76.82 HEPATIC ENCEPHALOPATHY: ICD-10-CM

## 2019-05-10 RX ORDER — TIZANIDINE 4 MG/1
TABLET ORAL
Qty: 90 TABLET | Refills: 0 | Status: SHIPPED | OUTPATIENT
Start: 2019-05-10 | End: 2019-09-20

## 2019-05-10 RX ORDER — RIFAXIMIN 550 MG/1
TABLET ORAL
Qty: 60 TABLET | Refills: 1 | Status: SHIPPED | OUTPATIENT
Start: 2019-05-10 | End: 2019-08-19 | Stop reason: SDUPTHER

## 2019-05-16 ENCOUNTER — OFFICE VISIT (OUTPATIENT)
Dept: DIABETES | Facility: CLINIC | Age: 64
End: 2019-05-16
Payer: MEDICAID

## 2019-05-16 VITALS
HEIGHT: 61 IN | WEIGHT: 227.94 LBS | DIASTOLIC BLOOD PRESSURE: 66 MMHG | BODY MASS INDEX: 43.03 KG/M2 | SYSTOLIC BLOOD PRESSURE: 124 MMHG

## 2019-05-16 LAB — GLUCOSE SERPL-MCNC: 75 MG/DL (ref 70–110)

## 2019-05-16 PROCEDURE — 99214 OFFICE O/P EST MOD 30 MIN: CPT | Mod: S$PBB,,, | Performed by: PHYSICIAN ASSISTANT

## 2019-05-16 PROCEDURE — 99213 OFFICE O/P EST LOW 20 MIN: CPT | Mod: PBBFAC,PN | Performed by: PHYSICIAN ASSISTANT

## 2019-05-16 PROCEDURE — 82962 GLUCOSE BLOOD TEST: CPT | Mod: PBBFAC,PN | Performed by: PHYSICIAN ASSISTANT

## 2019-05-16 PROCEDURE — 99214 PR OFFICE/OUTPT VISIT, EST, LEVL IV, 30-39 MIN: ICD-10-PCS | Mod: S$PBB,,, | Performed by: PHYSICIAN ASSISTANT

## 2019-05-16 PROCEDURE — 99999 PR PBB SHADOW E&M-EST. PATIENT-LVL III: CPT | Mod: PBBFAC,,, | Performed by: PHYSICIAN ASSISTANT

## 2019-05-16 PROCEDURE — 99999 PR PBB SHADOW E&M-EST. PATIENT-LVL III: ICD-10-PCS | Mod: PBBFAC,,, | Performed by: PHYSICIAN ASSISTANT

## 2019-05-16 NOTE — PROGRESS NOTES
PCP: Andrey Perrin MD    Subjective:    Patient ID: Diamond Das is a 64 y.o. female.    PCP: Andrey Perrin MD      Diamond Das is a pleasant 64 y.o. female presenting for her follow up on diabetes mellitus. She has had diabetes for 8 or more years. Her last visit was 03/21/2019. Since that time she has been in her usual state of health without significant hyperglycemia or hypoglycemia. She has been checking her blood glucose regularly twice daily, fasting and before supper. Also, since her last visit she has had moderate improvement in her glycemia with A1c of 8.3%. She has not been checking her SMBG on a regular basis. Her current concerns are glycemic control.      She did bring her glucometer but was unable to download however her blood sugar average was 164 over last 7 days and last 30 days was 168 with 11 readings, and she has been monitoring 0.3 times per day.    She denies any hospital admissions, emergency room visits, hypoglycemia, syncope, diaphoresis, chest pain, or dyspnea.    She has gained 4 pounds since last visit. Her BMI is 43.07 kg/m²    Her blood sugar in the clinic today was:   Lab Results   Component Value Date    POCGLU 256 (A) 03/21/2019         Diamond is compliant most of the time with DM medications.     Diamond is noncompliant some of the time with lifestyle modifications to include activity and meal planning.     Diabetes Management Status    Statin: Taking  ACE/ARB: Not taking    Screening or Prevention Patient's value Goal Complete/Controlled?   HgA1C Testing and Control   Lab Results   Component Value Date    HGBA1C 8.3 (H) 04/17/2019      Annually/Less than 8% Yes   Lipid profile : 12/06/2018 Annually Yes   LDL control Lab Results   Component Value Date    LDLCALC 145.8 12/06/2018    Annually/Less than 100 mg/dl  No   Nephropathy screening Lab Results   Component Value Date    LABMICR 1635.0 12/06/2018     Lab Results   Component Value Date    PROTEINUA 1+ (A) 02/21/2019  "   Annually Yes   Blood pressure BP Readings from Last 1 Encounters:   05/16/19 124/66    Less than 140/90 Yes   Dilated retinal exam : 04/09/2019 Annually Yes   Foot exam   : 05/07/2019 Annually Yes         Lab Results   Component Value Date    HGBA1C 8.3 (H) 04/17/2019    HGBA1C 7.1 (H) 12/06/2018    HGBA1C 7.9 (H) 08/27/2018         Review of Systems   Constitutional: Negative for activity change and unexpected weight change.   Eyes: Negative for visual disturbance.   Respiratory: Negative for chest tightness and shortness of breath.    Cardiovascular: Negative for chest pain.   Gastrointestinal: Negative for constipation and diarrhea.   Endocrine: Negative for cold intolerance, heat intolerance, polydipsia, polyphagia and polyuria.   Genitourinary: Negative for frequency.   Skin: Negative for wound.   Neurological: Negative for numbness.   Psychiatric/Behavioral: Negative for confusion.          Objective:   /66   Ht 5' 1" (1.549 m)   Wt 103.4 kg (227 lb 15.3 oz)   LMP  (LMP Unknown)   BMI 43.07 kg/m²        Physical Exam   Constitutional: She is oriented to person, place, and time. She appears well-developed and well-nourished. No distress.   Neck: Normal range of motion. Neck supple. No tracheal deviation present. No thyromegaly present.   Cardiovascular: Normal rate, regular rhythm and normal heart sounds.   Pulmonary/Chest: Effort normal and breath sounds normal.   Abdominal: Soft. Bowel sounds are normal.   Musculoskeletal: She exhibits no edema.   Lymphadenopathy:     She has no cervical adenopathy.   Neurological: She is alert and oriented to person, place, and time.   Skin: She is not diaphoretic.   Psychiatric: She has a normal mood and affect.   Nursing note and vitals reviewed.          Assessment:     1. Diabetes mellitus type 2, uncontrolled, with complications        Plan:   Diamond Das is seen today for   1. Diabetes mellitus type 2, uncontrolled, with complications  " "         - Continue with D&E, reduce portion size, and restrict carbohydrates (no more that 45 grams ) per meal.   - Improving A1c. Continue current treatment plan   - appointment with dietician/CDE   - F/U in 12 weeks      Diabetes mellitus type 2, uncontrolled, with complications  -     POCT Glucose, Hand-Held Device        I have reviewed your results and they are still quite high. I would like you to start "Treating to Target". The treatment will be Insulin and your target will be the Fasting and 2 hour post meal blood sugar. It will work in this manner;    1. Goal for Fasting blood sugar is  mg/dl. I realize that you will need time to adjust to the new levels and presently you may feel too low if you are too aggressive now. So go slow and aim to lower your blood sugar to below 200 then 150 then 100 over several months.    2. Goal for 2 hour post meal blood sugar is below 180 mg/dl, here the same rules apply as in #1.    3. You will check your fasting blood sugar daily, if not where we would like it to be over a 3 day period then that evening we will increase the Lantus dose by 5 units. Then repeat the process over the next 3 days. Remember this is a slow process and take our time getting to goal. But, each week should be better than prior weeks. Blood sugars below 70 are unacceptable and should raise a "RED FLAG" where we may have to reduce our dose of insulin.    4. You will check your post meal glucose daily as well. However, each day you will check a different meal, (ie. Monday-breakfast; Tuesday- lunch; Wednesday- supper, then repeat). If your post meal glucose is not where we would like, increase pre-meal insulin by 2 units next time. A word of CAUTION: mealtime insulin is dependant on the size and concentration of your meal content. If not consuming a large meal do not take large dose of insulin. Use the reasonable person rule.     5. If you have any questions please do not hesitate to " "call.    Intensive insulin Therapy with correction factor:    You are on Intensive insulin therapy with Basal and Bolus insulin. Lantus is your Basal insulin and will help maintain your fasting and between meal sugar. Your fast acting or rescue insulin is Admelog insulin and will control your post meal sugar.     You will Take 35 units of Basaglar at 9 pm each night. This will be adjusted up or down depending on your fasting blood sugar before breakfast.    Admelog will follow this pre meal schedule; Correction factor of "1 units per 50 mg/dl" and is based on 15-45 grams of carbohydrates per meal.    If blood sugar is below 70 eat first then check your blood sugar 2 hours later and make correction.  If blood pre-meal sugar is  70 -150 take 12 units of Admelog;  If blood pre-meal sugar is 151-200 take +1 units of Admelog;  If blood pre-meal sugar is 201-250 take +2 units of Admelog;  If blood pre-meal sugar is 251-300 take +3 units of Admelog;  If blood pre-meal sugar is 301-350 take +4 units of Admelog;  If blood pre-meal sugar is 351-400+ take +5 units of Admelog;  Also increase water intake and call for appointment.    A total of 30 minutes was spent in face to face time, of which 50 % was spent in counseling patient on disease process, complications, treatment, and side effects of medications.    The patient was explained the above plan and given opportunity to ask questions.  She understands, chooses and consents to this plan and accepts all the risks, which include but are not limited to the risks mentioned above.   She understands the alternative of having no testing, interventions or treatments at this time. She left content and without further questions.     Disclaimer:  This note is prepared using voice recognition software and as such is likely to have errors and has not been proof read. Please contact me for questions.                       "

## 2019-05-21 ENCOUNTER — TELEPHONE (OUTPATIENT)
Dept: INTERNAL MEDICINE | Facility: CLINIC | Age: 64
End: 2019-05-21

## 2019-05-21 NOTE — TELEPHONE ENCOUNTER
Clarified with [employer] that a PA was not received. [employer] informed me that a fax will be sent over.

## 2019-05-21 NOTE — TELEPHONE ENCOUNTER
----- Message from Arlene Sierra sent at 5/21/2019  9:37 AM CDT -----  Contact: [facility] [employer]  Type:  Needs Medical Advice    Who Called: Ms Dahl  Symptoms (please be specific):   How long has patient had these symptoms:    Pharmacy name and phone #:    Would the patient rather a call back or a response via [facility]? call  Best Call Back Number: [phone] ref# [id number]  Additional Information: Ms Dahl is checking on status of a prior auth on Xifaxan.

## 2019-05-22 RX ORDER — ONDANSETRON 4 MG/1
TABLET, FILM COATED ORAL
Qty: 30 TABLET | Refills: 0 | Status: SHIPPED | OUTPATIENT
Start: 2019-05-22 | End: 2020-01-04 | Stop reason: SDUPTHER

## 2019-05-22 NOTE — TELEPHONE ENCOUNTER
Spoke with pt about PA for medication. Advised pt that PA has been done today. Waiting to here something back from insurance company. Pt requesting Zofran

## 2019-05-22 NOTE — TELEPHONE ENCOUNTER
----- Message from Amita Montoya sent at 5/22/2019  3:27 PM CDT -----  Contact: self  Pt is calling in regards to authorization for medication, Xifaxan 550 mg. Pt is also calling in regards to insuline, insurance will no longer cover insulin. Please call pt back at [phone].    Pt uses:    [facility] [address] [place] [address], [place] - [address] [address]  [id number]  [facility] [address]  Phone: [phone] Fax: [phone]        Thanks,   Amita Montoya

## 2019-05-23 RX ORDER — INSULIN GLARGINE 100 [IU]/ML
40 INJECTION, SOLUTION SUBCUTANEOUS NIGHTLY
Qty: 15 ML | Refills: 11 | Status: SHIPPED | OUTPATIENT
Start: 2019-05-23 | End: 2019-06-25 | Stop reason: SDUPTHER

## 2019-05-30 ENCOUNTER — NUTRITION (OUTPATIENT)
Dept: DIABETES | Facility: CLINIC | Age: 64
End: 2019-05-30
Payer: MEDICAID

## 2019-05-30 VITALS — WEIGHT: 224.88 LBS | HEIGHT: 61 IN | BODY MASS INDEX: 42.46 KG/M2

## 2019-05-30 DIAGNOSIS — E11.42 DIABETIC POLYNEUROPATHY ASSOCIATED WITH TYPE 2 DIABETES MELLITUS: Primary | ICD-10-CM

## 2019-05-30 PROCEDURE — 99999 PR PBB SHADOW E&M-EST. PATIENT-LVL III: CPT | Mod: PBBFAC,,, | Performed by: DIETITIAN, REGISTERED

## 2019-05-30 PROCEDURE — G0108 DIAB MANAGE TRN  PER INDIV: HCPCS | Mod: PBBFAC,PN | Performed by: DIETITIAN, REGISTERED

## 2019-05-30 PROCEDURE — 99213 OFFICE O/P EST LOW 20 MIN: CPT | Mod: PBBFAC,PN | Performed by: DIETITIAN, REGISTERED

## 2019-05-30 PROCEDURE — 99999 PR PBB SHADOW E&M-EST. PATIENT-LVL III: ICD-10-PCS | Mod: PBBFAC,,, | Performed by: DIETITIAN, REGISTERED

## 2019-05-30 NOTE — LETTER
May 30, 2019        Andrey Perrin MD  [address] [address] [address]             [facility]  [address] [facility] [address]  Phone: [phone]  Fax: [phone]   Patient: Diamond Das   MR Number: [id number]   YOB: 1955   Date of Visit: 5/30/2019       Dear Dr. Perrin:    Thank you for referring Diamond Das to me for evaluation. Below are the relevant portions of my assessment and plan of care.     If you have questions, please do not hesitate to call me. I look forward to following Diamond along with you.    Sincerely,      Nolvia Mcneil, RD, CDE           CC  Russell Aguilar Jr., SARAH

## 2019-05-30 NOTE — PROGRESS NOTES
Diabetes Education  Author: Nolvia Mcneil RD, CDE  Date: 5/30/2019    Diabetes Care Management Summary  Diabetes Education Record Assessment/Progress: Comprehensive/Group  Current Diabetes Risk Level: Moderate     Last A1c:   Lab Results   Component Value Date    HGBA1C 8.3 (H) 04/17/2019     Last visit with Diabetes Educator: : 08/08/2018     Diabetes Type  Diabetes Type : Type II    Diabetes History  Diabetes Diagnosis: 5-10 years  Current Treatment: Diet, Exercise, Insulin(admelog ac 10units am only, ran out Lantus ~2wks ago. )  Reviewed Problem List with Patient: Yes    Health Maintenance was reviewed today with patient. Discussed with patient importance of routine eye exams, foot exams/foot care, blood work (i.e.: A1c, microalbumin, and lipid), dental visits, yearly flu vaccine, and pneumonia vaccine as indicated by PCP. Patient verbalized understanding.     Health Maintenance Topics with due status: Not Due       Topic Last Completion Date    TETANUS VACCINE 09/08/2016    Pneumococcal Vaccine (Highest Risk) 12/07/2017    Pap Smear with HPV Cotest 01/29/2018    Influenza Vaccine 03/01/2018    Lipid Panel 12/06/2018    Urine Microalbumin 12/06/2018    Mammogram 01/17/2019    Fecal Occult Blood Test (FOBT)/[employer] 02/27/2019    Eye Exam 04/09/2019    Hemoglobin A1c 04/17/2019    Foot Exam 05/07/2019     There are no preventive care reminders to display for this patient.    Nutrition  Meal Planning: (Intake ~2000 cals/d - excess carb (sugary aurelio). Higher fat and sodium from canned soup, lunch meat. )  Meal Plan 24 Hour Recall - Breakfast: none - water   Meal Plan 24 Hour Recall - Lunch: bologne sandwich (white) OR canned soup - melissa aid   Meal Plan 24 Hour Recall - Dinner: scrm egg sandwich (white) - melissa aid   Meal Plan 24 Hour Recall - Snack: fruit (apple, orange); aurelio: water, sf aurelio     Monitoring   Self Monitoring : Per glucometer, Avg BG 7d 339, 28d 219. Since out of Lantus, BG running 200-400.   Blood  Glucose Logs: Yes  Can you tell when your blood sugar is too high?: yes(polyphagia)    Exercise   Frequency: Never    Current Diabetes Treatment   Current Treatment: Diet, Exercise, Insulin(admelog ac 10units am only, ran out Lantus ~2wks ago. )    Social History  Preferred Learning Method: Face to Face  Primary Support: Self  Smoking Status: Never a Smoker  Alcohol Use: Never    PHQ-2 Total Score: 0       DDS-2 Score  ( > 3 = SIGNIFICANT DISTRESS): 2               Barriers to Change  Barriers to Change: None  Learning Challenges : None    Readiness to Learn   Readiness to Learn : Eager    Cultural Influences  Cultural Influences: No    Diabetes Education Assessment/Progress  Diabetes Disease Process (diabetes disease process and treatment options): Discussion, Individual Session, Demonstrates Understanding/Competency(verbalizes/demonstrates), Written Materials Provided  Nutrition (Incorporating nutritional management into one's lifestyle): Discussion, Individual Session, Demonstrates Understanding/Competency (verbalizes/demonstrates), Written Materials Provided  Physical Activity (incorporating physical activity into one's lifestyle): Discussion, Individual Session, Demonstrates Understanding/Competency (verbalizes/demonstrates), Written Materials Provided  Medications (states correct name, dose, onset, peak, duration, side effects & timing of meds): Discussion, Individual Session, Demonstrates Understanding/Competency(verbalizes/demonstrates), Written Materials Provided  Monitoring (monitoring blood glucose/other parameters & using results): Discussion, Individual Session, Demonstrates Understanding/Competency (verbalizes/demonstrates), Written Materials Provided  Acute Complications (preventing, detecting, and treating acute complications): Discussion, Individual Session, Demonstrates Understanding/Competency (verbalizes/demonstrates), Written Materials Provided  Chronic Complications (preventing, detecting, and  treating chronic complications): Discussion, Individual Session, Demonstrates Understanding/Competency (verbalizes/demonstrates), Written Materials Provided  Clinical (diabetes, other pertinent medical history, and relevant comorbidities reviewed during visit): Discussion, Individual Session, Demonstrates Understanding/Competency (verbalizes/demonstrates)  Cognitive (knowledge of self-management skills, functional health literacy): Discussion, Individual Session, Demonstrates Understanding/Competency (verbalizes/demonstrates)  Psychosocial (emotional response to diabetes): Discussion, Individual Session, Demonstrates Understanding/Competency (verbalizes/demonstrates)  Diabetes Distress and Support Systems: Discussion, Individual Session, Demonstrates Understanding/Competency (verbalizes/demonstrates)  Behavioral (readiness for change, lifestyle practices, self-care behaviors): Discussion, Individual Session, Demonstrates Understanding/Competency (verbalizes/demonstrates)    Goals  Patient has selected/evaluated goals during today's session: Yes, selected  Healthy Eating: Set(use meal plan - avoid juices and sugar-sweetened beverages, carb portions)  Start Date: 05/30/19  Target Date: 06/27/19  Physical Activity: Set(150min/wk - chair based exercises)  Start Date: 05/30/19  Target Date: 06/27/19  Monitoring: Set(test blood sugar 4x/d - fasting, ac, bed; bring meter to clinic)  Start Date: 05/30/19  Target Date: 06/27/19  Medications: Set(take insulin daily as direct and fu with diabetes ABELINO for medication mgmt)  Start Date: 05/30/19  Target Date: 06/27/19     Diabetes Care Plan/Intervention  Education Plan/Intervention: Individual Follow-Up DSMT(Due to hyperglycemia and being out of Lantus, instructed to take Admelog ac 10units three times daily. Will message Mr Wells to assist with basal insulin Rx since insurance not covering Lantus. )    Diabetes Meal Plan  Restrictions: Low Fat, Low Sodium, Low Potassium(Will coord  earlier ABELINO appt time to re-eval diabetes medications.  )  Calories: 1400  Carbohydrate Per Meal: 30-45g  Carbohydrate Per Snack : 7-15g    Today's Self-Management Care Plan was developed with the patient's input and is based on barriers identified during today's assessment.    The long and short-term goals in the care plan were written with the patient/caregiver's input. The patient has agreed to work toward these goals to improve her overall diabetes control.      The patient received a copy of today's self-management plan and verbalized understanding of the care plan, goals, and all of today's instructions.      The patient was encouraged to communicate with her physician and care team regarding her condition(s) and treatment.  I provided the patient with my contact information today and encouraged her to contact me via phone or patient portal as needed.     Education Units of Time   Time Spent: 30 min

## 2019-06-03 ENCOUNTER — OFFICE VISIT (OUTPATIENT)
Dept: GASTROENTEROLOGY | Facility: CLINIC | Age: 64
End: 2019-06-03
Payer: MEDICAID

## 2019-06-03 VITALS
DIASTOLIC BLOOD PRESSURE: 70 MMHG | HEART RATE: 58 BPM | HEIGHT: 61 IN | SYSTOLIC BLOOD PRESSURE: 142 MMHG | BODY MASS INDEX: 42.46 KG/M2 | WEIGHT: 224.88 LBS

## 2019-06-03 DIAGNOSIS — R19.7 DIARRHEA, UNSPECIFIED TYPE: Primary | ICD-10-CM

## 2019-06-03 DIAGNOSIS — K74.60 HEPATIC CIRRHOSIS, UNSPECIFIED HEPATIC CIRRHOSIS TYPE, UNSPECIFIED WHETHER ASCITES PRESENT: ICD-10-CM

## 2019-06-03 PROCEDURE — 99215 OFFICE O/P EST HI 40 MIN: CPT | Mod: PBBFAC | Performed by: INTERNAL MEDICINE

## 2019-06-03 PROCEDURE — 99999 PR PBB SHADOW E&M-EST. PATIENT-LVL V: ICD-10-PCS | Mod: PBBFAC,,, | Performed by: INTERNAL MEDICINE

## 2019-06-03 PROCEDURE — 99999 PR PBB SHADOW E&M-EST. PATIENT-LVL V: CPT | Mod: PBBFAC,,, | Performed by: INTERNAL MEDICINE

## 2019-06-03 PROCEDURE — 99213 OFFICE O/P EST LOW 20 MIN: CPT | Mod: S$PBB,,, | Performed by: INTERNAL MEDICINE

## 2019-06-03 PROCEDURE — 99213 PR OFFICE/OUTPT VISIT, EST, LEVL III, 20-29 MIN: ICD-10-PCS | Mod: S$PBB,,, | Performed by: INTERNAL MEDICINE

## 2019-06-03 NOTE — PROGRESS NOTES
"Clinic Consult:  [facility] Consultation Note    Reason for Consult:  The primary encounter diagnosis was Diarrhea, unspecified type. A diagnosis of Hepatic cirrhosis, unspecified hepatic cirrhosis type, unspecified whether ascites present was also pertinent to this visit.    PCP: Andrey Perrin   [address] [facility] [address] [facility] [address]    HPI:  This is a 64 y.o. female here for evaluation of nausea, vomiting, diarrhea.   History is difficult to obtain from patient, she seems confused by details of her symptoms and says "I'm sorry, I just feel so unprepared for this visit".  She states she had a rough night last night but can't give details as to why.  Of note, pt has h/o cirrhosis with TIPS but has not followed up with hepatology in over 2 years.   She does endorse diarrhea which has been going for what sounds like several months.  Denies weight loss, hematochezia, melena.  Does endorse nausea and vomiting.      GI history:  CRC Screening: unsure of last one,   Family history: negative     ROS:  CONSTITUTIONAL: Denies weight change,  fatigue, fevers, chills, night sweats.  EYES: No changes in vision.   ENT: No oral lesions or sore throat.  HEMATOLOGICAL/Lymph: Denies bleeding tendency, bruising tendency. No swellings or enlarged lymph nodes.  CARDIOVASCULAR: Denies chest pain, shortness of breath, orthopnea and edema.  RESPIRATORY: Denies cough, hemoptysis, dyspnea, and wheezing.  GI: See HPI.  : Denies dysuria and hematuria  MUSCULOSKELETAL: Denies joint pain or swelling, back pain and muscle pain.  SKIN: Denies rashes.  NEUROLOGIC: Denies headaches, seizures and numbness.  PSYCHIATRIC: Denies depression or anxiety.  ENDOCRINE: Denies heat or cold intolerance and excessive thirst or urination.    Medical History:   Past Medical History:   Diagnosis Date    Ascites     Cataract     CHF (congestive heart failure)     Cirrhosis     Diabetes mellitus     Diabetes mellitus, type 2     " Gastroparesis     GERD (gastroesophageal reflux disease)     Hyperlipidemia     Hypertension     Liver disease     Pneumonia of right middle lobe due to infectious organism 12/19/2017    Renal disorder     Sleep apnea     Thyroid disease        Surgical History:  Past Surgical History:   Procedure Laterality Date    CHOLECYSTECTOMY      ERCP      LIVER BIOPSY      TIPS N/A 1/17/2017    [facility]formed [facility]    TRANSJUGULAR LIVER BIOPSY N/A 11/17/2016    Performed by [facility] at [facility] OR 2ND FLR    TUBAL LIGATION      UPPER GASTROINTESTINAL ENDOSCOPY         Family History:   Family History   Problem Relation Age of Onset    Cancer Mother     Breast cancer Mother     Heart disease Father     Aneurysm Sister     Heart disease Brother     No Known Problems Maternal Grandmother     Cancer Maternal Grandfather     Sarcoidosis Sister        Social History:   Social History     Tobacco Use    Smoking status: Never Smoker    Smokeless tobacco: Never Used   Substance Use Topics    Alcohol use: No     Frequency: Never    Drug use: No       Allergies: Reviewed    Home Medications:   Medication List with Changes/Refills   Current Medications    AMLODIPINE (NORVASC) 10 MG TABLET    Take 1 tablet (10 mg total) by mouth once daily.    ASPIRIN (ECOTRIN) 81 MG EC TABLET    Take 1 tablet (81 mg total) by mouth once daily.    CARVEDILOL (COREG) 3.125 MG TABLET    Take 1 tablet (3.125 mg total) by mouth 2 (two) times daily.    DOXAZOSIN (CARDURA) 2 MG TABLET    TAKE 1 TABLET BY MOUTH IN THE EVENING    FUROSEMIDE (LASIX) 40 MG TABLET    TAKE 1 TABLET BY MOUTH EVERY EVENING    GLUCAGON, HUMAN RECOMBINANT, (GLUCAGON EMERGENCY KIT, HUMAN,) 1 MG SOLR    Inject 1 mg into the muscle as needed.    INSULIN (LANTUS SOLOSTAR U-100 INSULIN) GLARGINE 100 UNITS/ML (3ML) SUBQ PEN    Inject 40 Units into the skin every evening. titrate up to 30 units nightly as directed on instruction sheet     "INSULIN LISPRO (ADMELOG SOLOSTAR U-100 INSULIN) 100 UNIT/ML INPN PEN    Titrate up to 14 units subcutaneously three times a day 10-15 min before meals as directed in after visit summary    LACTULOSE (CHRONULAC) 20 GRAM/30 ML SOLN    Take 30 mLs (20 g total) by mouth 3 (three) times daily as needed (To goal bowel movement 2-3 bowel movements per day).    LEVOTHYROXINE (SYNTHROID) 137 MCG TAB TABLET    TAKE 1 TABLET BY MOUTH BEFORE BREAKFAST    LISINOPRIL (PRINIVIL,ZESTRIL) 5 MG TABLET    Take 1 tablet (5 mg total) by mouth once daily.    ONDANSETRON (ZOFRAN) 4 MG TABLET    TAKE 1 TABLET BY MOUTH EVERY 8 HOURS AS NEEDED    PANTOPRAZOLE (PROTONIX) 20 MG TABLET    Take 2 tablets (40 mg total) by mouth once daily.    SIMVASTATIN (ZOCOR) 40 MG TABLET    Take 1 tablet (40 mg total) by mouth once daily.    TIZANIDINE (ZANAFLEX) 4 MG TABLET    TAKE 1 TABLET BY MOUTH IN THE EVENING    TRAMADOL (ULTRAM) 50 MG TABLET    Take 1 tablet (50 mg total) by mouth every 6 (six) hours.    VITAMIN E 400 UNIT CAPSULE    Take 400 Units by mouth every morning.    XIFAXAN 550 MG TAB    TAKE 1 TABLET BY MOUTH TWICE DAILY   Discontinued Medications    FUROSEMIDE (LASIX) 40 MG TABLET    Take 40 mg by mouth once daily.         Physical Exam:  Vital Signs:  BP (!) 142/70   Pulse (!) 58   Ht 5' 1" (1.549 m)   Wt 102 kg (224 lb 13.9 oz)   LMP  (LMP Unknown)   BMI 42.49 kg/m²   Body mass index is 42.49 kg/m².      GENERAL: No acute distress, A&Ox3  EYES: Anicteric, no pallor noted.  ENT: OP clear  NECK: Supple, no masses, no thyromegally.  CHEST: Equal breath sounds bilaterally, no wheezing.  CARDIOVASCULAR: Regular rate and rhythm. Murmurs, rubs and gallops absent.  ABDOMEN: soft, non-tender, non-distended, normal bowel sounds, no hepatosplenomegaly   EXTREMITIES: No clubbing, cyanosis or edema.  SKIN: Without lesion or erythema.  LYMPH: No cervical, axillary lymphadenopathy palpable.   NEUROLOGICAL: Grossly normal, no asterixis " present.    Labs: Pertinent labs reviewed.    Assessment and Plan:  Diarrhea, unspecified type  -     Clostridium difficile EIA; Future; Expected date: 06/03/2019  -     Stool culture; Future; Expected date: 06/03/2019  -     Cancel: Case request GI: COLONOSCOPY  -     Case request GI: COLONOSCOPY    Hepatic cirrhosis, unspecified hepatic cirrhosis type, unspecified whether ascites present  - will send back to her primary hepatologist in [place] or have her seen in liver clinic here in .        Thank you so much for allowing me to participate in the care of Diamond Elmore MD

## 2019-06-14 ENCOUNTER — TELEPHONE (OUTPATIENT)
Dept: DIABETES | Facility: CLINIC | Age: 64
End: 2019-06-14

## 2019-06-14 ENCOUNTER — HOSPITAL ENCOUNTER (OUTPATIENT)
Dept: CARDIOLOGY | Facility: CLINIC | Age: 64
Discharge: HOME | End: 2019-06-14
Attending: INTERNAL MEDICINE
Payer: MEDICAID

## 2019-06-14 DIAGNOSIS — I50.32 CHRONIC DIASTOLIC CONGESTIVE HEART FAILURE: ICD-10-CM

## 2019-06-14 DIAGNOSIS — K74.60 CIRRHOSIS OF LIVER WITH ASCITES, UNSPECIFIED HEPATIC CIRRHOSIS TYPE: ICD-10-CM

## 2019-06-14 DIAGNOSIS — R18.8 CIRRHOSIS OF LIVER WITH ASCITES, UNSPECIFIED HEPATIC CIRRHOSIS TYPE: ICD-10-CM

## 2019-06-14 LAB
DIASTOLIC DYSFUNCTION: YES
GLOBAL PERICARDIAL EFFUSION: ABNORMAL
MITRAL VALVE REGURGITATION: ABNORMAL
RETIRED EF AND QEF - SEE NOTES: 60 (ref 55–65)
TRICUSPID VALVE REGURGITATION: ABNORMAL

## 2019-06-14 PROCEDURE — 93306 TTE W/DOPPLER COMPLETE: CPT | Mod: PBBFAC,PO | Performed by: INTERNAL MEDICINE

## 2019-06-14 PROCEDURE — 93306 2D ECHO WITH COLOR FLOW DOPPLER: ICD-10-PCS | Mod: 26,S$PBB,, | Performed by: INTERNAL MEDICINE

## 2019-06-14 NOTE — TELEPHONE ENCOUNTER
----- Message from Abbie Ahmadi sent at 6/14/2019 12:47 PM CDT -----  Contact: self  Patient requesting a call back regarding insulin. She would like to know if there has been a decision about it. Please call patient back at [phone]

## 2019-06-17 ENCOUNTER — TELEPHONE (OUTPATIENT)
Dept: DIABETES | Facility: CLINIC | Age: 64
End: 2019-06-17

## 2019-06-17 ENCOUNTER — TELEPHONE (OUTPATIENT)
Dept: CARDIOLOGY | Facility: CLINIC | Age: 64
End: 2019-06-17

## 2019-06-17 NOTE — TELEPHONE ENCOUNTER
Called to give patient results of echo--left voice message to call our office back at [phone].

## 2019-06-17 NOTE — TELEPHONE ENCOUNTER
----- Message from Marcos Ramos MD sent at 6/14/2019  7:17 PM CDT -----  Please call patient regarding normal result of overall heart function with mild stiffness, small amount of fluid around the heart which we will continue to monitor. If he/she has any questions please let me know or have him/her schedule an appt to see me soon to discuss. Thank you

## 2019-06-17 NOTE — TELEPHONE ENCOUNTER
----- Message from Yuly Barton LPN sent at 6/14/2019  2:48 PM CDT -----  Contact: Patient      ----- Message -----  From: Siobhan Aguirre  Sent: 6/14/2019  12:53 PM  To: Marychuy Balderas Staff    Patient called and stated that Russell Aguilar is her diabetic provider; he is out on vacation. She is out of her Basaglar KwikPen and her insurance isn't going to cover it any longer. She needs another long acting insulin called in.    [facility] - [place], [place] - [address]  [address] [facility] [address]  Phone: [phone] Fax: [phone]    She can be contacted at [phone].    Thanks,  Siobhan

## 2019-06-20 ENCOUNTER — TELEPHONE (OUTPATIENT)
Dept: CARDIOLOGY | Facility: CLINIC | Age: 64
End: 2019-06-20

## 2019-06-20 DIAGNOSIS — R10.84 GENERALIZED ABDOMINAL PAIN: ICD-10-CM

## 2019-06-20 DIAGNOSIS — E11.59 TYPE 2 DIABETES MELLITUS WITH OTHER CIRCULATORY COMPLICATION, WITHOUT LONG-TERM CURRENT USE OF INSULIN: ICD-10-CM

## 2019-06-20 DIAGNOSIS — E03.4 HYPOTHYROIDISM DUE TO ACQUIRED ATROPHY OF THYROID: ICD-10-CM

## 2019-06-20 RX ORDER — LEVOTHYROXINE SODIUM 100 UG/1
TABLET ORAL
Qty: 30 TABLET | Refills: 5 | OUTPATIENT
Start: 2019-06-20

## 2019-06-20 RX ORDER — DOXAZOSIN 2 MG/1
TABLET ORAL
Qty: 90 TABLET | Refills: 0 | Status: SHIPPED | OUTPATIENT
Start: 2019-06-20 | End: 2019-07-16 | Stop reason: SDUPTHER

## 2019-06-20 RX ORDER — PANTOPRAZOLE SODIUM 20 MG/1
TABLET, DELAYED RELEASE ORAL
Qty: 90 TABLET | Refills: 0 | Status: SHIPPED | OUTPATIENT
Start: 2019-06-20 | End: 2019-08-16 | Stop reason: SDUPTHER

## 2019-06-20 NOTE — TELEPHONE ENCOUNTER
Called to patient to give results of echo--left voice message to call our office back at [phone].

## 2019-06-25 RX ORDER — INSULIN GLARGINE 100 [IU]/ML
40 INJECTION, SOLUTION SUBCUTANEOUS NIGHTLY
Qty: 15 ML | Refills: 0 | Status: SHIPPED | OUTPATIENT
Start: 2019-06-25 | End: 2019-10-05 | Stop reason: SDUPTHER

## 2019-06-25 NOTE — TELEPHONE ENCOUNTER
----- Message from Karen Garcia sent at 6/25/2019 11:46 AM CDT -----  Contact: pt daughter Ms Antonio  Type:  RX Refill Request    Who Called: pt   Refill or New Rx: new   RX Name and Strength:   How is the patient currently taking it? (ex. 1XDay):  Is this a 30 day or 90 day RX:  Preferred Pharmacy with phone number: listed below   Local or Mail Order: local   Ordering Provider:  Would the patient rather a call back or a response via [facility]? Call back  Best Call Back Number: [phone]  Additional Information:  Stated she calling about insulin for the pt was told the doctor was going to give a new prescription, pt is feeling ill       [facility] - [place] - [address] [address]  [address]  [facility] [address]  Phone: [phone] Fax: [phone]

## 2019-06-26 ENCOUNTER — TELEPHONE (OUTPATIENT)
Dept: CARDIOLOGY | Facility: CLINIC | Age: 64
End: 2019-06-26

## 2019-06-26 NOTE — TELEPHONE ENCOUNTER
Called to patient to give results of echo--left message with patient's daughter Paco to give our office a call back

## 2019-06-28 ENCOUNTER — TELEPHONE (OUTPATIENT)
Dept: CARDIOLOGY | Facility: CLINIC | Age: 64
End: 2019-06-28

## 2019-06-28 DIAGNOSIS — E03.4 HYPOTHYROIDISM DUE TO ACQUIRED ATROPHY OF THYROID: ICD-10-CM

## 2019-06-28 RX ORDER — LEVOTHYROXINE SODIUM 137 UG/1
TABLET ORAL
Qty: 90 TABLET | Refills: 0 | Status: SHIPPED | OUTPATIENT
Start: 2019-06-28 | End: 2019-11-13 | Stop reason: SDUPTHER

## 2019-06-28 NOTE — TELEPHONE ENCOUNTER
Spoke with patient's daughter, gave results of Echo. Answered all questions. Verbalized understanding.

## 2019-07-16 ENCOUNTER — LAB VISIT (OUTPATIENT)
Dept: LAB | Facility: HOSPITAL | Age: 64
End: 2019-07-16
Attending: FAMILY MEDICINE
Payer: MEDICAID

## 2019-07-16 ENCOUNTER — OFFICE VISIT (OUTPATIENT)
Dept: INTERNAL MEDICINE | Facility: CLINIC | Age: 64
End: 2019-07-16
Payer: MEDICAID

## 2019-07-16 VITALS
TEMPERATURE: 96 F | WEIGHT: 232.56 LBS | RESPIRATION RATE: 16 BRPM | OXYGEN SATURATION: 100 % | DIASTOLIC BLOOD PRESSURE: 88 MMHG | SYSTOLIC BLOOD PRESSURE: 136 MMHG | HEIGHT: 61 IN | BODY MASS INDEX: 43.91 KG/M2 | HEART RATE: 56 BPM

## 2019-07-16 DIAGNOSIS — E11.59 TYPE 2 DIABETES MELLITUS WITH OTHER CIRCULATORY COMPLICATION, WITHOUT LONG-TERM CURRENT USE OF INSULIN: ICD-10-CM

## 2019-07-16 DIAGNOSIS — I10 HYPERTENSION, UNSPECIFIED TYPE: ICD-10-CM

## 2019-07-16 DIAGNOSIS — E72.20 HYPERAMMONEMIA: ICD-10-CM

## 2019-07-16 DIAGNOSIS — E11.59 TYPE 2 DIABETES MELLITUS WITH OTHER CIRCULATORY COMPLICATION, WITHOUT LONG-TERM CURRENT USE OF INSULIN: Primary | ICD-10-CM

## 2019-07-16 DIAGNOSIS — I10 ESSENTIAL HYPERTENSION: ICD-10-CM

## 2019-07-16 DIAGNOSIS — N18.4 CKD (CHRONIC KIDNEY DISEASE) STAGE 4, GFR 15-29 ML/MIN: ICD-10-CM

## 2019-07-16 PROBLEM — I95.9 HYPOTENSION: Status: RESOLVED | Noted: 2019-02-21 | Resolved: 2019-07-16

## 2019-07-16 PROBLEM — N64.4 BREAST PAIN, LEFT: Status: RESOLVED | Noted: 2018-01-04 | Resolved: 2019-07-16

## 2019-07-16 LAB
ALBUMIN SERPL BCP-MCNC: 2.7 G/DL (ref 3.5–5.2)
ALP SERPL-CCNC: 101 U/L (ref 55–135)
ALT SERPL W/O P-5'-P-CCNC: 28 U/L (ref 10–44)
AMMONIA PLAS-SCNC: 79 UMOL/L (ref 10–50)
ANION GAP SERPL CALC-SCNC: 7 MMOL/L (ref 8–16)
AST SERPL-CCNC: 39 U/L (ref 10–40)
BILIRUB SERPL-MCNC: 0.5 MG/DL (ref 0.1–1)
BUN SERPL-MCNC: 23 MG/DL (ref 8–23)
CALCIUM SERPL-MCNC: 8.5 MG/DL (ref 8.7–10.5)
CHLORIDE SERPL-SCNC: 109 MMOL/L (ref 95–110)
CO2 SERPL-SCNC: 29 MMOL/L (ref 23–29)
CREAT SERPL-MCNC: 1.7 MG/DL (ref 0.5–1.4)
EST. GFR  (AFRICAN AMERICAN): 36.7 ML/MIN/1.73 M^2
EST. GFR  (NON AFRICAN AMERICAN): 31.9 ML/MIN/1.73 M^2
ESTIMATED AVG GLUCOSE: 174 MG/DL (ref 68–131)
GLUCOSE SERPL-MCNC: 61 MG/DL (ref 70–110)
HBA1C MFR BLD HPLC: 7.7 % (ref 4–5.6)
POTASSIUM SERPL-SCNC: 3.7 MMOL/L (ref 3.5–5.1)
PROT SERPL-MCNC: 6.6 G/DL (ref 6–8.4)
SODIUM SERPL-SCNC: 145 MMOL/L (ref 136–145)

## 2019-07-16 PROCEDURE — 82140 ASSAY OF AMMONIA: CPT | Mod: PO

## 2019-07-16 PROCEDURE — 36415 COLL VENOUS BLD VENIPUNCTURE: CPT | Mod: PO

## 2019-07-16 PROCEDURE — 83036 HEMOGLOBIN GLYCOSYLATED A1C: CPT

## 2019-07-16 PROCEDURE — 99214 OFFICE O/P EST MOD 30 MIN: CPT | Mod: S$PBB,,, | Performed by: FAMILY MEDICINE

## 2019-07-16 PROCEDURE — 99214 OFFICE O/P EST MOD 30 MIN: CPT | Mod: PBBFAC,PO | Performed by: FAMILY MEDICINE

## 2019-07-16 PROCEDURE — 99999 PR PBB SHADOW E&M-EST. PATIENT-LVL IV: ICD-10-PCS | Mod: PBBFAC,,, | Performed by: FAMILY MEDICINE

## 2019-07-16 PROCEDURE — 99214 PR OFFICE/OUTPT VISIT, EST, LEVL IV, 30-39 MIN: ICD-10-PCS | Mod: S$PBB,,, | Performed by: FAMILY MEDICINE

## 2019-07-16 PROCEDURE — 99999 PR PBB SHADOW E&M-EST. PATIENT-LVL IV: CPT | Mod: PBBFAC,,, | Performed by: FAMILY MEDICINE

## 2019-07-16 PROCEDURE — 80053 COMPREHEN METABOLIC PANEL: CPT | Mod: PO

## 2019-07-16 RX ORDER — CARVEDILOL 3.12 MG/1
3.12 TABLET ORAL 2 TIMES DAILY
Qty: 180 TABLET | Refills: 0 | Status: SHIPPED | OUTPATIENT
Start: 2019-07-16 | End: 2019-11-18

## 2019-07-16 RX ORDER — AMLODIPINE BESYLATE 10 MG/1
10 TABLET ORAL DAILY
Qty: 90 TABLET | Refills: 0 | Status: SHIPPED | OUTPATIENT
Start: 2019-07-16 | End: 2019-11-18

## 2019-07-16 RX ORDER — DOXAZOSIN 2 MG/1
2 TABLET ORAL NIGHTLY
Qty: 90 TABLET | Refills: 0 | Status: ON HOLD | OUTPATIENT
Start: 2019-07-16 | End: 2020-01-20 | Stop reason: ALTCHOICE

## 2019-07-16 RX ORDER — SIMVASTATIN 40 MG/1
40 TABLET, FILM COATED ORAL DAILY
Qty: 90 TABLET | Refills: 0 | Status: SHIPPED | OUTPATIENT
Start: 2019-07-16 | End: 2019-10-16 | Stop reason: SDUPTHER

## 2019-07-16 NOTE — PROGRESS NOTES
Subjective:       Patient ID: Diamond Das is a 64 y.o. female.    Chief Complaint: Follow-up (3 month)    Diabetes   She presents for her follow-up diabetic visit. She has type 2 diabetes mellitus. Her disease course has been stable. Pertinent negatives for hypoglycemia include no confusion, dizziness, headaches or hunger. Pertinent negatives for diabetes include no blurred vision, no chest pain, no fatigue and no weakness. Pertinent negatives for hypoglycemia complications include no blackouts and no hospitalization. Symptoms are stable. Risk factors for coronary artery disease include diabetes mellitus, hypertension and obesity. She is compliant with treatment all of the time. Her weight is stable. An ACE inhibitor/angiotensin II receptor blocker is being taken.     Review of Systems   Constitutional: Negative for fatigue.   Eyes: Negative for blurred vision.   Cardiovascular: Negative for chest pain.   Neurological: Negative for dizziness, weakness and headaches.   Psychiatric/Behavioral: Negative for confusion.       Objective:      Physical Exam   Constitutional: She appears well-developed and well-nourished. No distress.   HENT:   Head: Normocephalic and atraumatic.   Pulmonary/Chest: Effort normal and breath sounds normal. No respiratory distress. She has no wheezes.   Abdominal: Soft. She exhibits no distension. There is no tenderness. There is no guarding.   Skin: Skin is warm and dry. No rash noted. She is not diaphoretic. No erythema.   Nursing note and vitals reviewed.      Assessment:       1. Type 2 diabetes mellitus with other circulatory complication, without long-term current use of insulin    2. Essential hypertension    3. Hypertension, unspecified type    4. Hyperammonemia    5. CKD (chronic kidney disease) stage 4, GFR 15-29 ml/min        Plan:     Problem List Items Addressed This Visit        Cardiac/Vascular    Hypertension    Relevant Medications    carvedilol (COREG) 3.125 MG tablet     amLODIPine (NORVASC) 10 MG tablet       Renal/    CKD (chronic kidney disease) stage 4, GFR 15-29 ml/min    Overview      Ref Range & Units 2wk ago   Glucose 70 - 110 mg/dL 425     Sodium 136 - 145 mmol/L 140    Potassium 3.5 - 5.1 mmol/L 3.8    Chloride 95 - 110 mmol/L 101    CO2 23 - 29 mmol/L 30     BUN, Bld 8 - 23 mg/dL 26     Calcium 8.7 - 10.5 mg/dL 8.9    Creatinine 0.5 - 1.4 mg/dL 2.1     Albumin 3.5 - 5.2 g/dL 2.5     Phosphorus 2.7 - 4.5 mg/dL 3.8    eGFR if African American >60 mL/min/1.73 m^2 28.7     eGFR if non African American >60 mL/min/1.73 m^2 24.9     Comment: Calculation used to obtain the estimated glomerular filtration   rate (eGFR) is the CKD-EPI equation.    Anion Gap 8 - 16 mmol/L 9    Resulting Agency  [facility]      Specimen Collected: 05/10/18 11:16 Last Resulted: 05/10/18 11:46                      Endocrine    Type 2 diabetes mellitus with circulatory disorder - Primary    Relevant Medications    simvastatin (ZOCOR) 40 MG tablet    carvedilol (COREG) 3.125 MG tablet    doxazosin (CARDURA) 2 MG tablet    amLODIPine (NORVASC) 10 MG tablet    Other Relevant Orders    Hemoglobin A1c    Comprehensive metabolic panel    Hyperammonemia    Relevant Orders    Ammonia

## 2019-07-17 ENCOUNTER — PATIENT OUTREACH (OUTPATIENT)
Dept: ADMINISTRATIVE | Facility: HOSPITAL | Age: 64
End: 2019-07-17

## 2019-07-17 NOTE — PROGRESS NOTES
Please call pt with abnormal results. Pt does not need appt at this time, unless they have questions or wish to further discuss.  a1c still elevated, although improved. Keep up the good work

## 2019-07-18 ENCOUNTER — TELEPHONE (OUTPATIENT)
Dept: INTERNAL MEDICINE | Facility: CLINIC | Age: 64
End: 2019-07-18

## 2019-07-18 NOTE — TELEPHONE ENCOUNTER
----- Message from Ivelisse Castano sent at 7/18/2019  3:32 PM CDT -----  Contact: Pt  Pt states she had an unexpected missed call from nurse, pt asked that nurse please return her call at ([phone])

## 2019-07-30 ENCOUNTER — TELEPHONE (OUTPATIENT)
Dept: ENDOSCOPY | Facility: HOSPITAL | Age: 64
End: 2019-07-30

## 2019-08-16 DIAGNOSIS — R10.84 GENERALIZED ABDOMINAL PAIN: ICD-10-CM

## 2019-08-16 RX ORDER — PANTOPRAZOLE SODIUM 20 MG/1
TABLET, DELAYED RELEASE ORAL
Qty: 90 TABLET | Refills: 0 | Status: SHIPPED | OUTPATIENT
Start: 2019-08-16 | End: 2019-09-23 | Stop reason: SDUPTHER

## 2019-08-19 DIAGNOSIS — K76.82 HEPATIC ENCEPHALOPATHY: ICD-10-CM

## 2019-08-19 RX ORDER — RIFAXIMIN 550 MG/1
TABLET ORAL
Qty: 60 TABLET | Refills: 1 | Status: SHIPPED | OUTPATIENT
Start: 2019-08-19 | End: 2019-12-10 | Stop reason: SDUPTHER

## 2019-08-22 NOTE — PRE-PROCEDURE INSTRUCTIONS
Reviewed with pt and her dtr the Miralax prep. 2nd dose to begin at 0330. Clear liq diet 9/3. Someone from [facility] will call afternoon prior to procedure with arrival time.

## 2019-08-26 ENCOUNTER — TELEPHONE (OUTPATIENT)
Dept: HEPATOLOGY | Facility: CLINIC | Age: 64
End: 2019-08-26

## 2019-08-26 ENCOUNTER — OFFICE VISIT (OUTPATIENT)
Dept: CARDIOLOGY | Facility: CLINIC | Age: 64
End: 2019-08-26
Payer: MEDICAID

## 2019-08-26 VITALS
DIASTOLIC BLOOD PRESSURE: 68 MMHG | BODY MASS INDEX: 44.42 KG/M2 | SYSTOLIC BLOOD PRESSURE: 140 MMHG | HEART RATE: 77 BPM | HEIGHT: 61 IN | WEIGHT: 235.25 LBS

## 2019-08-26 DIAGNOSIS — R18.8 CIRRHOSIS OF LIVER WITH ASCITES, UNSPECIFIED HEPATIC CIRRHOSIS TYPE: ICD-10-CM

## 2019-08-26 DIAGNOSIS — I50.32 CHRONIC DIASTOLIC CONGESTIVE HEART FAILURE: Primary | ICD-10-CM

## 2019-08-26 DIAGNOSIS — E11.59 TYPE 2 DIABETES MELLITUS WITH OTHER CIRCULATORY COMPLICATION, WITHOUT LONG-TERM CURRENT USE OF INSULIN: ICD-10-CM

## 2019-08-26 DIAGNOSIS — E03.4 HYPOTHYROIDISM DUE TO ACQUIRED ATROPHY OF THYROID: ICD-10-CM

## 2019-08-26 DIAGNOSIS — E66.01 MORBID OBESITY DUE TO EXCESS CALORIES: ICD-10-CM

## 2019-08-26 DIAGNOSIS — E78.49 OTHER HYPERLIPIDEMIA: ICD-10-CM

## 2019-08-26 DIAGNOSIS — I31.39 PERICARDIAL EFFUSION: ICD-10-CM

## 2019-08-26 DIAGNOSIS — K74.60 CIRRHOSIS OF LIVER WITH ASCITES, UNSPECIFIED HEPATIC CIRRHOSIS TYPE: ICD-10-CM

## 2019-08-26 DIAGNOSIS — I10 ESSENTIAL HYPERTENSION: ICD-10-CM

## 2019-08-26 PROCEDURE — 99999 PR PBB SHADOW E&M-EST. PATIENT-LVL III: CPT | Mod: PBBFAC,,, | Performed by: INTERNAL MEDICINE

## 2019-08-26 PROCEDURE — 99214 PR OFFICE/OUTPT VISIT, EST, LEVL IV, 30-39 MIN: ICD-10-PCS | Mod: S$PBB,,, | Performed by: INTERNAL MEDICINE

## 2019-08-26 PROCEDURE — 99999 PR PBB SHADOW E&M-EST. PATIENT-LVL III: ICD-10-PCS | Mod: PBBFAC,,, | Performed by: INTERNAL MEDICINE

## 2019-08-26 PROCEDURE — 99214 OFFICE O/P EST MOD 30 MIN: CPT | Mod: S$PBB,,, | Performed by: INTERNAL MEDICINE

## 2019-08-26 PROCEDURE — 99213 OFFICE O/P EST LOW 20 MIN: CPT | Mod: PBBFAC | Performed by: INTERNAL MEDICINE

## 2019-08-26 NOTE — TELEPHONE ENCOUNTER
----- Message from Estephanie Corado sent at 8/26/2019 12:32 PM CDT -----  Contact: Pt  Schedule an appt    Contact: [phone]

## 2019-08-26 NOTE — PROGRESS NOTES
Subjective:   Patient ID:  Diamond Das is a 64 y.o. female who presents for cardiac consult of Congestive Heart Failure (6 mo f/u) and Hypertension      HPI  The patient came in today for cardiac consult of Congestive Heart Failure (6 mo f/u) and Hypertension    Referring Physician: Andrey Perrin MD   Reason for consult: HTN, CHF    Diamond Das is a 64 y.o. female with medical conditions diastolic CHF, pericardial effusion, HTN, HLD, IDDM, cryptogenic cirrhosis, s/p TIPS, ascites resolved presents for CV follow up.     Pt of Dr. Shaw, last seen 2/9/18  No smoking/drinking  Last echo in  normal EF and RVF, grade II DD.  EKG today NSR poor R progression on anterior leads  Chronic weakness and fatigue. Had PNA in   Pain on lower chest bilateral for few months, worse with exercise, resolved after resting. Deep breathing made the pain worse. No pain at sleep. Associated dyspnea, N/V, palpitation and dizziness. No radiation. ASA and tylenol not really helped the pain.    3/1/19  She has been having more ascites and concern for cardiac etiologies. She was also started on Reglan, concern for prolonged Qtc, recent Qtc 475 ms. Has been feeling better with reglan and lactulose. Is dyspneic, chronically on oxygen at night and also has portable oxygen when she has to do a lot walking. Takes lasix 40 mg once/day now, was on 80mg daily. Active at home, dresses and bathes her self. Cant walk or stand to too long.     8/26/19  2D ECHO with grade 1 DD, normal Bi V function, Small pericardial effusion without tamponade. She has mild SOB at times with edema but improved lately. She will see hepatology as well. No Chest pain.   Has been taking lasix extra doses PRN and improved symptoms.     Patient feels no chest pain, no PND, no palpitation, no dizziness, no syncope, no CNS symptoms.    Patient has dec exercise tolerance.    Patient is compliant with medications.    2D ECHO 06/14/2019     CONCLUSIONS     1  - Normal left ventricular systolic function (EF 60-65%).     2 - Impaired LV relaxation, normal LAP (grade 1 diastolic dysfunction).     3 - Normal right ventricular systolic function .     4 - No wall motion abnormalities.     5 - Concentric hypertrophy.     6 - Trivial mitral regurgitation.     7 - Trivial tricuspid regurgitation.     8 - Small pericardial effusion.     9 - No echo evidence of tamponade.       2/21/19 ECG  Vent. Rate : 050 BPM     Atrial Rate : 050 BPM     P-R Int : 146 ms          QRS Dur : 086 ms      QT Int : 522 ms       P-R-T Axes : 045 -03 090 degrees     QTc Int : 475 ms    Sinus bradycardia  Cannot rule out Anterior infarct (cited on or before 21-FEB-2019)  Abnormal ECG        Past Medical History:   Diagnosis Date    Ascites     Breast pain, left 1/4/2018    Cataract     CHF (congestive heart failure)     Cirrhosis     Cough     Diabetes mellitus     Diabetes mellitus, type 2     Gastroparesis     GERD (gastroesophageal reflux disease)     Hyperlipidemia     Hypertension     Hypotension 2/21/2019    Liver disease     Pneumonia of right middle lobe due to infectious organism 12/19/2017    Renal disorder     Sleep apnea     Thyroid disease        Past Surgical History:   Procedure Laterality Date    CHOLECYSTECTOMY      ERCP      LIVER BIOPSY      TIPS N/A 1/17/2017    Performed by [facility] at [facility]    TRANSJUGULAR LIVER BIOPSY N/A 11/17/2016    Performed by [facility] Provider at [facility] OR [facility]    TUBAL LIGATION      UPPER GASTROINTESTINAL ENDOSCOPY         Social History     Tobacco Use    Smoking status: Never Smoker    Smokeless tobacco: Never Used   Substance Use Topics    Alcohol use: No     Frequency: Never    Drug use: No       Family History   Problem Relation Age of Onset    Cancer Mother     Breast cancer Mother     Heart disease Father     Aneurysm Sister     Heart disease Brother     No Known Problems Maternal Grandmother      Cancer Maternal Grandfather     Sarcoidosis Sister        Patient's Medications   New Prescriptions    No medications on file   Previous Medications    AMLODIPINE (NORVASC) 10 MG TABLET    Take 1 tablet (10 mg total) by mouth once daily.    ASPIRIN (ECOTRIN) 81 MG EC TABLET    Take 1 tablet (81 mg total) by mouth once daily.    CARVEDILOL (COREG) 3.125 MG TABLET    Take 1 tablet (3.125 mg total) by mouth 2 (two) times daily.    DOXAZOSIN (CARDURA) 2 MG TABLET    Take 1 tablet (2 mg total) by mouth every evening.    FUROSEMIDE (LASIX) 40 MG TABLET    TAKE 1 TABLET BY MOUTH EVERY EVENING    GLUCAGON, HUMAN RECOMBINANT, (GLUCAGON EMERGENCY KIT, HUMAN,) 1 MG SOLR    Inject 1 mg into the muscle as needed.    INSULIN (LANTUS SOLOSTAR U-100 INSULIN) GLARGINE 100 UNITS/ML (3ML) SUBQ PEN    Inject 40 Units into the skin every evening. titrate up to 30 units nightly as directed on instruction sheet    INSULIN LISPRO (ADMELOG SOLOSTAR U-100 INSULIN) 100 UNIT/ML INPN PEN    Titrate up to 14 units subcutaneously three times a day 10-15 min before meals as directed in after visit summary    LACTULOSE (CHRONULAC) 20 GRAM/30 ML SOLN    Take 30 mLs (20 g total) by mouth 3 (three) times daily as needed (To goal bowel movement 2-3 bowel movements per day).    LEVOTHYROXINE (SYNTHROID) 137 MCG TAB TABLET    TAKE 1 TABLET BY MOUTH BEFORE BREAKFAST    LISINOPRIL (PRINIVIL,ZESTRIL) 5 MG TABLET    Take 1 tablet (5 mg total) by mouth once daily.    ONDANSETRON (ZOFRAN) 4 MG TABLET    TAKE 1 TABLET BY MOUTH EVERY 8 HOURS AS NEEDED    PANTOPRAZOLE (PROTONIX) 20 MG TABLET    TAKE 2 TABLETS BY MOUTH ONCE DAILY    SIMVASTATIN (ZOCOR) 40 MG TABLET    Take 1 tablet (40 mg total) by mouth once daily.    TIZANIDINE (ZANAFLEX) 4 MG TABLET    TAKE 1 TABLET BY MOUTH IN THE EVENING    VITAMIN E 400 UNIT CAPSULE    Take 400 Units by mouth every morning.    XIFAXAN 550 MG TAB    TAKE 1 TABLET BY MOUTH TWICE DAILY   Modified Medications    No medications  "on file   Discontinued Medications    TRAMADOL (ULTRAM) 50 MG TABLET    Take 1 tablet (50 mg total) by mouth every 6 (six) hours.       Review of Systems   Constitutional: Negative.    HENT: Negative.    Eyes: Negative.    Respiratory: Positive for shortness of breath.    Cardiovascular: Positive for leg swelling. Negative for chest pain and palpitations.   Gastrointestinal: Negative for nausea.   Genitourinary: Negative.    Musculoskeletal: Negative.    Skin: Negative.    Neurological: Negative.    Endo/Heme/Allergies: Negative.    Psychiatric/Behavioral: Negative.    All 12 systems otherwise negative.      Wt Readings from Last 3 Encounters:   08/26/19 106.7 kg (235 lb 3.7 oz)   07/16/19 105.5 kg (232 lb 9.4 oz)   06/03/19 102 kg (224 lb 13.9 oz)     Temp Readings from Last 3 Encounters:   07/16/19 96.2 °F (35.7 °C) (Tympanic)   04/17/19 96.9 °F (36.1 °C) (Tympanic)   02/21/19 97.5 °F (36.4 °C) (Oral)     BP Readings from Last 3 Encounters:   08/26/19 (!) 140/68   07/16/19 136/88   06/03/19 (!) 142/70     Pulse Readings from Last 3 Encounters:   08/26/19 77   07/16/19 (!) 56   06/03/19 (!) 58       BP (!) 140/68 (BP Method: Small (Manual))   Pulse 77   Ht 5' 1" (1.549 m)   Wt 106.7 kg (235 lb 3.7 oz)   LMP  (LMP Unknown)   BMI 44.45 kg/m²     Objective:   Physical Exam   Constitutional: She is oriented to person, place, and time. She appears well-developed and well-nourished. No distress.   HENT:   Head: Normocephalic and atraumatic.   Nose: Nose normal.   Mouth/Throat: Oropharynx is clear and moist.   Eyes: Conjunctivae and EOM are normal. No scleral icterus.   Neck: Normal range of motion. Neck supple. No JVD present. No thyromegaly present.   Cardiovascular: Normal rate, regular rhythm, S1 normal and S2 normal. Exam reveals no gallop, no S3, no S4 and no friction rub.   Murmur heard.  Pulmonary/Chest: Effort normal and breath sounds normal. No stridor. No respiratory distress. She has no wheezes. She has " no rales. She exhibits no tenderness.   Abdominal: Soft. Bowel sounds are normal. She exhibits no distension and no mass. There is no tenderness. There is no rebound.   Genitourinary:   Genitourinary Comments: Deferred   Musculoskeletal: Normal range of motion. She exhibits edema (mild). She exhibits no tenderness or deformity.   Lymphadenopathy:     She has no cervical adenopathy.   Neurological: She is alert and oriented to person, place, and time. She exhibits normal muscle tone. Coordination normal.   Skin: Skin is warm and dry. No rash noted. She is not diaphoretic. No erythema. No pallor.   Psychiatric: She has a normal mood and affect. Her behavior is normal. Judgment and thought content normal.   Nursing note and vitals reviewed.      Lab Results   Component Value Date     07/16/2019    K 3.7 07/16/2019     07/16/2019    CO2 29 07/16/2019    BUN 23 07/16/2019    CREATININE 1.7 (H) 07/16/2019    GLU 61 (L) 07/16/2019    HGBA1C 7.7 (H) 07/16/2019    MG 1.8 01/04/2019    AST 39 07/16/2019    ALT 28 07/16/2019    ALBUMIN 2.7 (L) 07/16/2019    PROT 6.6 07/16/2019    BILITOT 0.5 07/16/2019    WBC 7.29 02/21/2019    HGB 13.4 02/21/2019    HCT 40.1 02/21/2019    MCV 89 02/21/2019     02/21/2019    INR 1.0 02/04/2019    TSH 2.271 01/03/2019    CHOL 205 (H) 12/06/2018    HDL 41 12/06/2018    LDLCALC 145.8 12/06/2018    TRIG 91 12/06/2018     (H) 03/19/2018     Assessment:      1. Chronic diastolic congestive heart failure    2. Essential hypertension    3. Other hyperlipidemia    4. Pericardial effusion    5. Type 2 diabetes mellitus with other circulatory complication, without long-term current use of insulin    6. Hypothyroidism due to acquired atrophy of thyroid    7. Morbid obesity due to excess calories    8. Cirrhosis of liver with ascites, unspecified hepatic cirrhosis type        Plan:   1. Chronic diastolic HF  - pt euvolemic  - lasix, daily weights, low salt diet  - recent ECG WNL  qtc   - mild dyspnea, stress test if unable to improve with diuretics to r/o ischemia     2. HTN  - cont meds    3. HLD  - cont statin    4. DM2  - cont meds per PCP    5. Cirrhosis  - cont tx per PCP/Hepatology  - not transplant candidate currently     6. KRISTAL  - needs CPAP, was not using it before     7. Obesity  - rec weight loss with diet/exercise     8. Pericardial effusion  - small, sec to cirrhosis/CHF  - no tamponade clinically       Thank you for allowing me to participate in this patient's care. Please do not hesitate to contact me with any questions or concerns. Consult note has been forwarded to the referral physician.

## 2019-08-29 ENCOUNTER — TELEPHONE (OUTPATIENT)
Dept: INTERNAL MEDICINE | Facility: CLINIC | Age: 64
End: 2019-08-29

## 2019-08-29 NOTE — TELEPHONE ENCOUNTER
----- Message from An Pabon sent at 8/29/2019  1:31 PM CDT -----  ..Type:  Patient Returning Call    Who Called:L[address] Tech   Who Left Message for Patient:  Does the patient know what this is regarding?: f/u on orders   Would the patient rather a call back or a response via [facility]? Call back   Best Call Back Number: [phone] [facility]  or [phone] Fax   Additional Information: Lois Gonzalez [employer] is requesting a call from nurse to f/u on order and use of oxygen.

## 2019-09-03 ENCOUNTER — ANESTHESIA EVENT (OUTPATIENT)
Dept: ENDOSCOPY | Facility: HOSPITAL | Age: 64
End: 2019-09-03
Payer: MEDICAID

## 2019-09-03 NOTE — PRE-PROCEDURE INSTRUCTIONS
Left voicemail for patient confirming arrival time as 0700 & be sure to take second dose of prep at 0330

## 2019-09-03 NOTE — ANESTHESIA PREPROCEDURE EVALUATION
09/03/2019  Diamond Das is a 64 y.o., female.    Anesthesia Evaluation    I have reviewed the Patient Summary Reports.    I have reviewed the Nursing Notes.   I have reviewed the Medications.     Review of Systems  Anesthesia Hx:  No problems with previous Anesthesia  Denies Family Hx of Anesthesia complications.   Denies Personal Hx of Anesthesia complications.   Social:  No Alcohol Use, Non-Smoker    Hematology/Oncology:  Hematology Normal        Cardiovascular:   Hypertension CHF ECG has been reviewed. Echo: LA, diastolic dysfunction with preserved EF 60-65%.  No hospital admits for CHF for several years.   Pulmonary:   Shortness of breath Sleep Apnea, CPAP SOB with minimal activity, on O2 at night.   Very deconditioned.   Renal/:   Chronic Renal Disease, CRI Stage 4.    Hepatic/GI:   Liver Disease, Cirrohsis. H/O ascites, pericardial effusion.  S/P TIPS 3 years ago and no problems since.   Musculoskeletal:  Spine Disorders: lumbar    Neurological:  Neurology Normal    Endocrine:   Diabetes, type 2    Psych:  Psychiatric Normal           Physical Exam  General:  Obesity    Airway/Jaw/Neck:  Airway Findings: Mouth Opening: Normal Tongue: Normal  General Airway Assessment: Adult  Mallampati: II  TM Distance: Normal, at least 6 cm  Jaw/Neck Findings:  Neck ROM: Normal ROM  Neck Findings:     Eyes/Ears/Nose:  Eyes/Ears/Nose Findings:    Dental:  Dental Findings: In tact    Chest/Lungs:  Chest/Lungs Findings: Clear to auscultation, Normal Respiratory Rate     Heart/Vascular:  Heart Findings: Rate: Normal  Rhythm: Regular Rhythm  Sounds: Normal  Heart murmur: negative Vascular Findings: Normal    Abdomen:  Abdomen Findings: Normal    Musculoskeletal:  Musculoskeletal Findings: Normal   Skin:  Skin Findings: Normal    Mental Status:  Mental Status Findings:  Alert and Oriented         Anesthesia Plan  Type  of Anesthesia, risks & benefits discussed:  Anesthesia Type:  general  Patient's Preference:   Intra-op Monitoring Plan:   Intra-op Monitoring Plan Comments:   Post Op Pain Control Plan: per primary service following discharge from PACU  Post Op Pain Control Plan Comments:   Induction:   IV  Beta Blocker:  Patient is on a Beta-Blocker and has received one dose within the past 24 hours (No further documentation required).       Informed Consent: Patient understands risks and agrees with Anesthesia plan.  Questions answered. Anesthesia consent signed with patient.  ASA Score: 3     Day of Surgery Review of History & Physical:    H&P update referred to the surgeon.         Ready For Surgery From Anesthesia Perspective.

## 2019-09-04 ENCOUNTER — HOSPITAL ENCOUNTER (OUTPATIENT)
Facility: HOSPITAL | Age: 64
Discharge: HOME | End: 2019-09-04
Attending: INTERNAL MEDICINE | Admitting: INTERNAL MEDICINE
Payer: MEDICAID

## 2019-09-04 ENCOUNTER — ANESTHESIA (OUTPATIENT)
Dept: ENDOSCOPY | Facility: HOSPITAL | Age: 64
End: 2019-09-04
Payer: MEDICAID

## 2019-09-04 VITALS
RESPIRATION RATE: 16 BRPM | HEIGHT: 61 IN | OXYGEN SATURATION: 99 % | TEMPERATURE: 98 F | HEART RATE: 69 BPM | DIASTOLIC BLOOD PRESSURE: 77 MMHG | WEIGHT: 225.63 LBS | SYSTOLIC BLOOD PRESSURE: 172 MMHG | BODY MASS INDEX: 42.6 KG/M2

## 2019-09-04 DIAGNOSIS — R19.7 DIARRHEA: Primary | ICD-10-CM

## 2019-09-04 DIAGNOSIS — Z12.11 COLON CANCER SCREENING: Primary | ICD-10-CM

## 2019-09-04 LAB — POCT GLUCOSE: 126 MG/DL (ref 70–110)

## 2019-09-04 PROCEDURE — 37000009 HC ANESTHESIA EA ADD 15 MINS: Performed by: INTERNAL MEDICINE

## 2019-09-04 PROCEDURE — 00812 ANES LWR INTST SCR COLSC: CPT | Performed by: INTERNAL MEDICINE

## 2019-09-04 PROCEDURE — D9220A PRA ANESTHESIA: Mod: CRNA,,, | Performed by: NURSE ANESTHETIST, CERTIFIED REGISTERED

## 2019-09-04 PROCEDURE — G0121 COLON CA SCRN NOT HI RSK IND: HCPCS | Performed by: INTERNAL MEDICINE

## 2019-09-04 PROCEDURE — D9220A PRA ANESTHESIA: ICD-10-PCS | Mod: ANES,,, | Performed by: ANESTHESIOLOGY

## 2019-09-04 PROCEDURE — D9220A PRA ANESTHESIA: ICD-10-PCS | Mod: CRNA,,, | Performed by: NURSE ANESTHETIST, CERTIFIED REGISTERED

## 2019-09-04 PROCEDURE — 37000008 HC ANESTHESIA 1ST 15 MINUTES: Performed by: INTERNAL MEDICINE

## 2019-09-04 PROCEDURE — 63600175 PHARM REV CODE 636 W HCPCS: Performed by: NURSE ANESTHETIST, CERTIFIED REGISTERED

## 2019-09-04 PROCEDURE — 45378 PR COLONOSCOPY,DIAGNOSTIC: ICD-10-PCS | Mod: 52,,, | Performed by: INTERNAL MEDICINE

## 2019-09-04 PROCEDURE — D9220A PRA ANESTHESIA: Mod: ANES,,, | Performed by: ANESTHESIOLOGY

## 2019-09-04 PROCEDURE — 45378 DIAGNOSTIC COLONOSCOPY: CPT | Mod: 52,,, | Performed by: INTERNAL MEDICINE

## 2019-09-04 PROCEDURE — 82962 GLUCOSE BLOOD TEST: CPT | Performed by: INTERNAL MEDICINE

## 2019-09-04 PROCEDURE — 63600175 PHARM REV CODE 636 W HCPCS: Performed by: ANESTHESIOLOGY

## 2019-09-04 RX ORDER — PROPOFOL 10 MG/ML
VIAL (ML) INTRAVENOUS
Status: DISCONTINUED | OUTPATIENT
Start: 2019-09-04 | End: 2019-09-04

## 2019-09-04 RX ORDER — LIDOCAINE HYDROCHLORIDE 10 MG/ML
1 INJECTION, SOLUTION EPIDURAL; INFILTRATION; INTRACAUDAL; PERINEURAL ONCE
Status: DISCONTINUED | OUTPATIENT
Start: 2019-09-04 | End: 2019-09-04 | Stop reason: HOSPADM

## 2019-09-04 RX ORDER — ONDANSETRON 2 MG/ML
4 INJECTION INTRAMUSCULAR; INTRAVENOUS DAILY PRN
Status: DISCONTINUED | OUTPATIENT
Start: 2019-09-04 | End: 2019-09-04 | Stop reason: HOSPADM

## 2019-09-04 RX ORDER — LIDOCAINE HCL/PF 100 MG/5ML
SYRINGE (ML) INTRAVENOUS
Status: DISCONTINUED | OUTPATIENT
Start: 2019-09-04 | End: 2019-09-04

## 2019-09-04 RX ORDER — SODIUM CHLORIDE, SODIUM LACTATE, POTASSIUM CHLORIDE, CALCIUM CHLORIDE 600; 310; 30; 20 MG/100ML; MG/100ML; MG/100ML; MG/100ML
INJECTION, SOLUTION INTRAVENOUS CONTINUOUS
Status: DISCONTINUED | OUTPATIENT
Start: 2019-09-04 | End: 2019-09-04 | Stop reason: HOSPADM

## 2019-09-04 RX ADMIN — SODIUM CHLORIDE, SODIUM LACTATE, POTASSIUM CHLORIDE, AND CALCIUM CHLORIDE: 600; 310; 30; 20 INJECTION, SOLUTION INTRAVENOUS at 09:09

## 2019-09-04 RX ADMIN — LIDOCAINE HYDROCHLORIDE 100 MG: 20 INJECTION, SOLUTION INTRAVENOUS at 09:09

## 2019-09-04 RX ADMIN — LIDOCAINE HYDROCHLORIDE 30 MG: 20 INJECTION, SOLUTION INTRAVENOUS at 09:09

## 2019-09-04 RX ADMIN — PROPOFOL 80 MG: 10 INJECTION, EMULSION INTRAVENOUS at 09:09

## 2019-09-04 NOTE — PLAN OF CARE
Reviewed and completed all discharge orders. Printed AVS and educated patient and family member of its entirety, including physician's orders, follow-up appt, medications, when to call, and when to report to the emergency room. Reviewed prescriptions, pharmacy information, and made sure there were no conflicts preventing the patient from obtaining the newly prescribed medications. I encouraged questions, answered them thoroughly, and evaluated my instructions via teach-back method. Patient has met all hospital discharge criteria at this point.

## 2019-09-04 NOTE — OR NURSING
Anesthesia continues at bedside. Pt alert, awake. O2 sat % on 5L NC. Pt coughing and oral secretions suctioned w/o difficulty. No distress noted.

## 2019-09-04 NOTE — OR NURSING
Colonoscopy aborted. Pt desaturated. Pt noted vomiting following ventilation with ambu bag. Anesthesia, Dr Moura, at bedside.

## 2019-09-04 NOTE — DISCHARGE INSTRUCTIONS
What to expect during recovery    Pain  · You will experience some level of pain after surgery.  Pain medication should help with the pain, but may not be able to eliminate it entirely.  Pain will decrease with time, and most pain will be gone by 4 to 6 weeks after surgery.  · Ice packs may help with pain and can reduce swelling.  · Your prescription pain medication may contain acetaminophen (Tylenol).  If so, you should not take additional acetaminophen (Tylenol) at the same time as your pain medication.   · Do not drive, operate machinery or power tools, or sign legal papers for 24 hours or as long as you are on your postoperative pain medication.   · Prescription pain medication should be taken only as directed.  We are not able to replace pain medication that has been lost or stolen.    Nausea/vomiting  · You may experience nausea or vomiting as a result of anesthesia or pain medication.  · If you experience severe nausea or you are unable to keep fluids down, contact your doctor.    Bleeding  · A small amount of clear or reddish drainage from the incision is normal after surgery.  · For mild bleeding from the incision, apply pressure for five minutes.  · If bleeding is severe or does not stop with pressure, contact your doctor.    Signs of infection  · Notify your doctor if you have the following signs of infection:  · Fever over 101 degrees  · Worsening redness around incsion  · Thick drainage from incision  · Foul smell from incision  · You may experience low fever/chills, this is normal after surgery.    Other post-operative symptoms  · It is safe to take over-the-counter medications for constipation, heartburn, sleep, or itching if needed.    · You may experience light-headedness, dizziness, and sleepiness following surgery. Please do not stay alone. A responsible adult should be with you for this 24 hour period.     Activity  · Try to rest and avoid strenuous activity, but also get out of bed regularly  unless your doctor has ordered strict bedrest.  · Several times every hour while you are awake, pump and flex your feet 5-6 times and do foot circles. This will help prevent blood clots.  · Several times every hour while you are awake, take 2 to 3 deep breaths and cough. If you had stomach surgery, hold a pillow or rolled towel firmly against your stomach before you cough. This will help with any pain the cough might cause.  · Do not smoke after surgery, it decreases your ability to heal and increases the risk of infection and pneumonia.    Nozin: Nasal   · Nozin reduces nasal germs to help decrease the risk of infection after surgery.  · Continue Nozin provided at discharge twice daily for 7 days or until the incision is healed.    · Place 4 drops to cotton swab tip and swab both nostril rims 6 times in each direction.  · See pamphlet for more information.     Diet  · Drink lots of fluids after surgery, unless otherwise instructed.  · You might not have much appetite at first.  Progress slowly to a normal diet unless given other specific diet instructions by your doctor.  Begin with liquids, then soup and crackers, working up to solid foods.  · Do not drink alcoholic beverages including beer for 24 hours or as long as you are on post-operative pain medication.    Follow-up after surgery  · You can contact your doctor through the patient portal using the [employer] [facility] or at [email or web address].  · You can also contact your doctor at any time by calling [phone] for the [facility] on [facility], or [phone] for the [facility] on [facility].  · A nurse will be calling you sometime after surgery. Do not be alarmed. This is our way of finding out how you are doing.

## 2019-09-04 NOTE — ANESTHESIA POSTPROCEDURE EVALUATION
Anesthesia Post Evaluation    Patient: Diamond Das    Procedure(s) Performed: Procedure(s) (LRB):  COLONOSCOPY (N/A)    Final Anesthesia Type: general  Patient location during evaluation: PACU  Patient participation: Yes- Able to Participate  Level of consciousness: awake and alert and oriented  Post-procedure vital signs: reviewed and stable  Pain management: adequate  Airway patency: patent  PONV status at discharge: No PONV  Anesthetic complications: yes  Perioperative Events: other periop events (see comments)  Lu-operative Events Comments: Pt. vomited during procedure. Procedure stopped.  No further problems.        Cardiovascular status: blood pressure returned to baseline, stable and hemodynamically stable  Respiratory status: unassisted  Hydration status: euvolemic  Follow-up not needed.          Vitals Value Taken Time   /79 9/4/2019 10:22 AM   Temp 36.6 °C (97.8 °F) 9/4/2019  7:15 AM   Pulse 71 9/4/2019 10:22 AM   Resp 14 9/4/2019 10:22 AM   SpO2 99 % 9/4/2019 10:22 AM   Vitals shown include unvalidated device data.      No case tracking events are documented in the log.      Pain/Frantz Score: No data recorded

## 2019-09-04 NOTE — H&P
PRE PROCEDURE H&P    Patient Name: Diamond Das  MRN: [id number]  : 1955  Date of Procedure:  2019  Referring Physician: Marnie Elmore MD  Primary Physician: Andrey Perrin MD  Procedure Physician: Marnie Elmore MD       Planned Procedure: Colonoscopy  Diagnosis: screening for colon cancer  Chief Complaint: Same as above    HPI: Patient is an 64 y.o. female is here for the above.     Last colonoscopy: unknown  Family history: negative   Anticoagulation: none     Past Medical History:   Past Medical History:   Diagnosis Date    Ascites     Breast pain, left 2018    Cataract     CHF (congestive heart failure)     Cirrhosis     Cough     Diabetes mellitus     Diabetes mellitus, type 2     Gastroparesis     GERD (gastroesophageal reflux disease)     Hyperlipidemia     Hypertension     Hypotension 2019    Liver disease     Pneumonia of right middle lobe due to infectious organism 2017    Renal disorder     Sleep apnea     Thyroid disease         Past Surgical History:  Past Surgical History:   Procedure Laterality Date    CHOLECYSTECTOMY      ERCP      LIVER BIOPSY      TIPS N/A 2017    Performed by [facility]    TRANSJUGULAR LIVER BIOPSY N/A 2016    Performed by [facility] OR 2ND FLR    TUBAL LIGATION      UPPER GASTROINTESTINAL ENDOSCOPY          Home Medications:  Prior to Admission medications    Medication Sig Start Date End Date Taking? Authorizing Provider   amLODIPine (NORVASC) 10 MG tablet Take 1 tablet (10 mg total) by mouth once daily. 19  Yes Andrey Perrin MD   aspirin (ECOTRIN) 81 MG EC tablet Take 1 tablet (81 mg total) by mouth once daily. 17 Yes Andrey Perrin MD   carvedilol (COREG) 3.125 MG tablet Take 1 tablet (3.125 mg total) by mouth 2 (two) times daily. 19  Yes Andrey Perrin MD   doxazosin (CARDURA) 2 MG tablet Take 1 tablet (2 mg total) by mouth every  evening. 7/16/19  Yes Andrey Perrin MD   furosemide (LASIX) 40 MG tablet TAKE 1 TABLET BY MOUTH EVERY EVENING 3/12/19  Yes Vineet Shaw MD   insulin (LANTUS SOLOSTAR U-100 INSULIN) glargine 100 units/mL (3mL) SubQ pen Inject 40 Units into the skin every evening. titrate up to 30 units nightly as directed on instruction sheet 6/25/19 6/24/20 Yes Eufemia Hay NP   insulin lispro (ADMELOG SOLOSTAR U-100 INSULIN) 100 unit/mL InPn pen Titrate up to 14 units subcutaneously three times a day 10-15 min before meals as directed in after visit summary 10/11/18  Yes Russell Aguilar Jr., SARAH   lactulose (CHRONULAC) 20 gram/30 mL Soln Take 30 mLs (20 g total) by mouth 3 (three) times daily as needed (To goal bowel movement 2-3 bowel movements per day). 1/4/19 11/14/19 Yes Cedrick Caballero NP   levothyroxine (SYNTHROID) 137 MCG Tab tablet TAKE 1 TABLET BY MOUTH BEFORE BREAKFAST 6/28/19  Yes Andrey Perrin MD   lisinopril (PRINIVIL,ZESTRIL) 5 MG tablet Take 1 tablet (5 mg total) by mouth once daily. 9/26/18 9/26/19 Yes Cesar Contreras MD   pantoprazole (PROTONIX) 20 MG tablet TAKE 2 TABLETS BY MOUTH ONCE DAILY 8/16/19  Yes Andrey Perrin MD   simvastatin (ZOCOR) 40 MG tablet Take 1 tablet (40 mg total) by mouth once daily. 7/16/19 10/14/19 Yes Andrey Perrin MD   vitamin E 400 UNIT capsule Take 400 Units by mouth every morning.   Yes Julia Henson MD   XIFAXAN 550 mg Tab TAKE 1 TABLET BY MOUTH TWICE DAILY 8/19/19  Yes Andrey Perrin MD   glucagon, human recombinant, (GLUCAGON EMERGENCY KIT, HUMAN,) 1 mg SolR Inject 1 mg into the muscle as needed. 4/17/19 4/16/20  Andrey Prerin MD   ondansetron (ZOFRAN) 4 MG tablet TAKE 1 TABLET BY MOUTH EVERY 8 HOURS AS NEEDED 5/22/19   Andrey Perrin MD   tiZANidine (ZANAFLEX) 4 MG tablet TAKE 1 TABLET BY MOUTH IN THE EVENING 5/10/19   Andrey Perrin MD        Allergies:  Review of patient's allergies indicates:   Allergen Reactions    Subsys [fentanyl] Other  (See Comments)     After administration pt unresponsive.  HR and respirations decreased.     Versed [midazolam] Other (See Comments)     After administration pt unresponsive.  HR and respirations decreased.     Ampicillin Rash    Codeine Nausea And Vomiting and Nausea Only        Social History:   Social History     Socioeconomic History    Marital status:      Spouse name: Not on file    Number of children: Not on file    Years of education: Not on file    Highest education level: Not on file   Occupational History    Not on file   Social Needs    Financial resource strain: Not on file    Food insecurity:     Worry: Not on file     Inability: Not on file    Transportation needs:     Medical: Not on file     Non-medical: Not on file   Tobacco Use    Smoking status: Never Smoker    Smokeless tobacco: Never Used   Substance and Sexual Activity    Alcohol use: No     Frequency: Never    Drug use: No    Sexual activity: Never   Lifestyle    Physical activity:     Days per week: Not on file     Minutes per session: Not on file    Stress: Not on file   Relationships    Social connections:     Talks on phone: Not on file     Gets together: Not on file     Attends [employer] service: Not on file     Active member of club or organization: Not on file     Attends meetings of clubs or organizations: Not on file     Relationship status: Not on file   Other Topics Concern    Not on file   Social History Narrative    Not on file       Family History:  Family History   Problem Relation Age of Onset    Cancer Mother     Breast cancer Mother     Heart disease Father     Aneurysm Sister     Heart disease Brother     No Known Problems Maternal Grandmother     Cancer Maternal Grandfather     Sarcoidosis Sister        ROS: No acute cardiac events, no acute respiratory complaints.     Physical Exam (all patients):    BP (!) 175/77 (BP Location: Right arm, Patient Position: Sitting)   Pulse 67   Temp  "97.8 °F (36.6 °C) (Oral)   Resp 16   Ht 5' 1" (1.549 m)   Wt 102.3 kg (225 lb 10.3 oz)   LMP  (LMP Unknown)   SpO2 99%   Breastfeeding? No   BMI 42.63 kg/m²   Lungs: Clear to auscultation bilaterally, respirations unlabored  Heart: Regular rate and rhythm, S1 and S2 normal, no obvious murmurs  Abdomen:         Soft, non-tender, bowel sounds normal, no masses, no organomegaly    Lab Results   Component Value Date    WBC 7.29 02/21/2019    MCV 89 02/21/2019    RDW 13.5 02/21/2019     02/21/2019    INR 1.0 02/04/2019    GLU 61 (L) 07/16/2019    HGBA1C 7.7 (H) 07/16/2019    BUN 23 07/16/2019     07/16/2019    K 3.7 07/16/2019     07/16/2019        SEDATION PLAN: per anesthesia      History reviewed, vital signs satisfactory, cardiopulmonary status satisfactory, sedation options, risks and plans have been discussed with the patient  All their questions were answered and the patient agrees to the sedation procedures as planned and the patient is deemed an appropriate candidate for the sedation as planned.    Procedure explained to patient, informed consent obtained and placed in chart.    Marnie Elmore  9/4/2019  9:33 AM   "

## 2019-09-04 NOTE — TRANSFER OF CARE
"Anesthesia Transfer of Care Note    Patient: Diamond Das    Procedure(s) Performed: Procedure(s) (LRB):  COLONOSCOPY (N/A)    Patient location: PACU    Anesthesia Type: MAC    Transport from OR: Transported from OR on room air with adequate spontaneous ventilation    Post pain: adequate analgesia    Post vital signs: stable    Level of consciousness: awake, alert and oriented    Nausea/Vomiting: no nausea/vomiting    Complications: respiratory complications, other (see comments), see q note in record    Transfer of care protocol was followed      Last vitals:   Visit Vitals  BP (!) 175/77 (BP Location: Right arm, Patient Position: Sitting)   Pulse 67   Temp 36.6 °C (97.8 °F) (Oral)   Resp 16   Ht 5' 1" (1.549 m)   Wt 102.3 kg (225 lb 10.3 oz)   LMP  (LMP Unknown)   SpO2 99%   Breastfeeding? No   BMI 42.63 kg/m²     "

## 2019-09-04 NOTE — DISCHARGE SUMMARY
Endoscopy Discharge Summary      Admit Date: 9/4/2019    Discharge Date and Time:  9/4/2019 10:07 AM    Attending Physician: Marnie Elmore MD     Discharge Physician: Marnie Elmore MD     Principal Admitting Diagnoses: Diarrhea         Discharge Diagnosis: The encounter diagnosis was Diarrhea.     Discharged Condition: Good    Indication for Admission: Diarrhea     Hospital Course: Patient was admitted for an inpatient procedure and tolerated the procedure well with no complications.    Significant Diagnostic Studies: Colonoscopy , aborted.     Pathology (if any):  Specimen (12h ago, onward)    None          Estimated Blood Loss: 0 ml.    Discussed with: patient.    Disposition: Home.    Follow Up/Patient Instructions:   Current Discharge Medication List      CONTINUE these medications which have NOT CHANGED    Details   amLODIPine (NORVASC) 10 MG tablet Take 1 tablet (10 mg total) by mouth once daily.  Qty: 90 tablet, Refills: 0    Comments: Please consider 90 day supplies to promote better adherence  Associated Diagnoses: Hypertension, unspecified type; Type 2 diabetes mellitus with other circulatory complication, without long-term current use of insulin      aspirin (ECOTRIN) 81 MG EC tablet Take 1 tablet (81 mg total) by mouth once daily.  Qty: 365 tablet, Refills: 0    Associated Diagnoses: Congestive heart failure, unspecified congestive heart failure chronicity, unspecified congestive heart failure type      carvedilol (COREG) 3.125 MG tablet Take 1 tablet (3.125 mg total) by mouth 2 (two) times daily.  Qty: 180 tablet, Refills: 0    Associated Diagnoses: Hypertension, unspecified type; Type 2 diabetes mellitus with other circulatory complication, without long-term current use of insulin      doxazosin (CARDURA) 2 MG tablet Take 1 tablet (2 mg total) by mouth every evening.  Qty: 90 tablet, Refills: 0    Comments: Please consider 90 day supplies to promote better adherence  Associated Diagnoses: Type 2  diabetes mellitus with other circulatory complication, without long-term current use of insulin      furosemide (LASIX) 40 MG tablet TAKE 1 TABLET BY MOUTH EVERY EVENING  Qty: 30 tablet, Refills: 11    Comments: Please consider 90 day supplies to promote better adherence      insulin (LANTUS SOLOSTAR U-100 INSULIN) glargine 100 units/mL (3mL) SubQ pen Inject 40 Units into the skin every evening. titrate up to 30 units nightly as directed on instruction sheet  Qty: 15 mL, Refills: 0    Comments: 1 box is 15 ml      insulin lispro (ADMELOG SOLOSTAR U-100 INSULIN) 100 unit/mL InPn pen Titrate up to 14 units subcutaneously three times a day 10-15 min before meals as directed in after visit summary  Qty: 15 mL, Refills: 6      lactulose (CHRONULAC) 20 gram/30 mL Soln Take 30 mLs (20 g total) by mouth 3 (three) times daily as needed (To goal bowel movement 2-3 bowel movements per day).  Qty: 900 mL, Refills: 0      levothyroxine (SYNTHROID) 137 MCG Tab tablet TAKE 1 TABLET BY MOUTH BEFORE BREAKFAST  Qty: 90 tablet, Refills: 0    Comments: Please consider 90 day supplies to promote better adherence  Associated Diagnoses: Hypothyroidism due to acquired atrophy of thyroid      lisinopril (PRINIVIL,ZESTRIL) 5 MG tablet Take 1 tablet (5 mg total) by mouth once daily.  Qty: 90 tablet, Refills: 6    Associated Diagnoses: Hypertension, unspecified type      pantoprazole (PROTONIX) 20 MG tablet TAKE 2 TABLETS BY MOUTH ONCE DAILY  Qty: 90 tablet, Refills: 0    Comments: Please consider 90 day supplies to promote better adherence  Associated Diagnoses: Generalized abdominal pain      simvastatin (ZOCOR) 40 MG tablet Take 1 tablet (40 mg total) by mouth once daily.  Qty: 90 tablet, Refills: 0    Associated Diagnoses: Type 2 diabetes mellitus with other circulatory complication, without long-term current use of insulin      vitamin E 400 UNIT capsule Take 400 Units by mouth every morning.      XIFAXAN 550 mg Tab TAKE 1 TABLET BY MOUTH  TWICE DAILY  Qty: 60 tablet, Refills: 1    Comments: Please consider 90 day supplies to promote better adherence  Associated Diagnoses: Hepatic encephalopathy      glucagon, human recombinant, (GLUCAGON EMERGENCY KIT, HUMAN,) 1 mg SolR Inject 1 mg into the muscle as needed.  Qty: 1 each, Refills: 5    Associated Diagnoses: Type 2 diabetes mellitus with other circulatory complication, without long-term current use of insulin      ondansetron (ZOFRAN) 4 MG tablet TAKE 1 TABLET BY MOUTH EVERY 8 HOURS AS NEEDED  Qty: 30 tablet, Refills: 0    Comments: Please consider 90 day supplies to promote better adherence      tiZANidine (ZANAFLEX) 4 MG tablet TAKE 1 TABLET BY MOUTH IN THE EVENING  Qty: 90 tablet, Refills: 0    Associated Diagnoses: Strain of lumbar region, initial encounter             Discharge Procedure Orders   Diet general     Call MD for:  temperature >100.4     Call MD for:  persistent nausea and vomiting     Call MD for:  severe uncontrolled pain     Call MD for:  difficulty breathing, headache or visual disturbances     Activity as tolerated

## 2019-09-04 NOTE — [employer] PATIENT INSTRUCTIONS
Discharge Summary/Instructions after an Endoscopic Procedure  Patient Name: Diamond Das  Patient MRN: [id number]  Patient YOB: 1955 Wednesday, September 04, 2019  Marnie Elmore MD  RESTRICTIONS:  During your procedure today, you received medications for sedation.  These   medications may affect your judgment, balance and coordination.  Therefore,   for 24 hours, you have the following restrictions:   - DO NOT drive a car, operate machinery, make legal/financial decisions,   sign important papers or drink alcohol.    ACTIVITY:  Today: no heavy lifting, straining or running due to procedural   sedation/anesthesia.  The following day: return to full activity including work.  DIET:  Eat and drink normally unless instructed otherwise.     TREATMENT FOR COMMON SIDE EFFECTS:  - Mild abdominal pain, nausea, belching, bloating or excessive gas:  rest,   eat lightly and use a heating pad.  - Sore Throat: treat with throat lozenges and/or gargle with warm salt   water.  - Because air was used during the procedure, expelling large amounts of air   from your rectum or belching is normal.  - If a bowel prep was taken, you may not have a bowel movement for 1-3 days.    This is normal.  SYMPTOMS TO WATCH FOR AND REPORT TO YOUR PHYSICIAN:  1. Abdominal pain or bloating, other than gas cramps.  2. Chest pain.  3. Back pain.  4. Signs of infection such as: chills or fever occurring within 24 hours   after the procedure.  5. Rectal bleeding, which would show as bright red, maroon, or black stools.   (A tablespoon of blood from the rectum is not serious, especially if   hemorrhoids are present.)  6. Vomiting.  7. Weakness or dizziness.  GO DIRECTLY TO THE NEAREST EMERGENCY ROOM IF YOU HAVE ANY OF THE FOLLOWING:      Difficulty breathing              Chills and/or fever over 101 F   Persistent vomiting and/or vomiting blood   Severe abdominal pain   Severe chest pain   Black, tarry stools   Bleeding- more than one  tablespoon   Any other symptom or condition that you feel may need urgent attention  Your doctor recommends these additional instructions:  If any biopsies were taken, your doctors clinic will contact you in 1 to 2   weeks with any results.  - Patient has a contact number available for emergencies.  The signs and   symptoms of potential delayed complications were discussed with the   patient.  Return to normal activities tomorrow.  Written discharge   instructions were provided to the patient.   - Discharge patient to home (via wheelchair).   - Repeat colonoscopy in 1 month for screening purposes.   - Put patient on a clear liquid diet starting today.   - Continue present medications.  For questions, problems or results please call your physician Marnie Elmore MD at Work:  [phone]  If you have any questions about the above instructions, call the GI   department at [phone] or call the endoscopy unit at [phone]   from 7am until 3 pm.  [facility] [place], EMERGENCY ROOM PHONE NUMBER:   [phone]  IF A COMPLICATION OR EMERGENCY SITUATION ARISES AND YOU ARE UNABLE TO REACH   YOUR PHYSICIAN - GO DIRECTLY TO THE EMERGENCY ROOM.  I have read or have had read to me these discharge instructions for my   procedure and have received a written copy.  I understand these   instructions and will follow-up with my physician if I have any questions.     __________________________________       _____________________________________  Nurse Signature                                          Patient/Designated   Responsible Party Signature  MD Marnie Mejia MD  9/4/2019 10:05:56 AM  This report has been verified and signed electronically.  [employer]

## 2019-09-05 ENCOUNTER — TELEPHONE (OUTPATIENT)
Dept: ENDOSCOPY | Facility: HOSPITAL | Age: 64
End: 2019-09-05

## 2019-09-17 ENCOUNTER — TELEPHONE (OUTPATIENT)
Dept: RADIOLOGY | Facility: HOSPITAL | Age: 64
End: 2019-09-17

## 2019-09-18 ENCOUNTER — HOSPITAL ENCOUNTER (OUTPATIENT)
Dept: RADIOLOGY | Facility: HOSPITAL | Age: 64
Discharge: HOME | End: 2019-09-18
Attending: INTERNAL MEDICINE
Payer: MEDICAID

## 2019-09-18 DIAGNOSIS — K74.3 CHOLESTATIC CIRRHOSIS: ICD-10-CM

## 2019-09-18 PROCEDURE — 76705 ECHO EXAM OF ABDOMEN: CPT | Mod: TC

## 2019-09-18 PROCEDURE — 93975 VASCULAR STUDY: CPT | Mod: 26,,, | Performed by: RADIOLOGY

## 2019-09-18 PROCEDURE — 76705 US LIVER WITH DOPPLER: ICD-10-PCS | Mod: 26,59,, | Performed by: RADIOLOGY

## 2019-09-18 PROCEDURE — 93975 VASCULAR STUDY: CPT | Mod: TC

## 2019-09-18 PROCEDURE — 76705 ECHO EXAM OF ABDOMEN: CPT | Mod: 26,59,, | Performed by: RADIOLOGY

## 2019-09-18 PROCEDURE — 93975 US LIVER WITH DOPPLER: ICD-10-PCS | Mod: 26,,, | Performed by: RADIOLOGY

## 2019-09-20 ENCOUNTER — OFFICE VISIT (OUTPATIENT)
Dept: HEPATOLOGY | Facility: CLINIC | Age: 64
End: 2019-09-20
Payer: MEDICAID

## 2019-09-20 VITALS
WEIGHT: 233.44 LBS | OXYGEN SATURATION: 97 % | HEIGHT: 61 IN | HEART RATE: 80 BPM | BODY MASS INDEX: 44.07 KG/M2 | DIASTOLIC BLOOD PRESSURE: 62 MMHG | SYSTOLIC BLOOD PRESSURE: 137 MMHG | RESPIRATION RATE: 18 BRPM

## 2019-09-20 DIAGNOSIS — K74.3 CHOLESTATIC CIRRHOSIS: Primary | ICD-10-CM

## 2019-09-20 PROCEDURE — 99215 OFFICE O/P EST HI 40 MIN: CPT | Mod: PBBFAC | Performed by: INTERNAL MEDICINE

## 2019-09-20 PROCEDURE — 99999 PR PBB SHADOW E&M-EST. PATIENT-LVL V: CPT | Mod: PBBFAC,,, | Performed by: INTERNAL MEDICINE

## 2019-09-20 PROCEDURE — 99214 OFFICE O/P EST MOD 30 MIN: CPT | Mod: S$PBB,,, | Performed by: INTERNAL MEDICINE

## 2019-09-20 PROCEDURE — 99214 PR OFFICE/OUTPT VISIT, EST, LEVL IV, 30-39 MIN: ICD-10-PCS | Mod: S$PBB,,, | Performed by: INTERNAL MEDICINE

## 2019-09-20 PROCEDURE — 99999 PR PBB SHADOW E&M-EST. PATIENT-LVL V: ICD-10-PCS | Mod: PBBFAC,,, | Performed by: INTERNAL MEDICINE

## 2019-09-20 NOTE — PATIENT INSTRUCTIONS
Your liver function is stable and TIPS is functioning.    No changes in lasix dosing.    Recommend seeing heart and lung doctor regarding shortness of breath.  This is important.    Labs and ultrasound with return appointment in 6 months

## 2019-09-20 NOTE — PROGRESS NOTES
HEPATOLOGY FOLLOW UP    Diamond Das is here for scheduled follow-up.       HPI  60yo female with cryptogenic cirrhosis and ascites formation that returns to clinic for routine follow-up after 6 months.  She is alone.     The patient was last seen in clinic on 2/4/2019.  She has previously undergone TIPS for management of volume overload with evidence of chronic liver disease of unclear etiology.  Patient had significant improvement in volume control with TIPS but continues on maintenance diuretic therapy.  The patient has had issues of HE since TIPS but relatively mild. She had had one hospitalization associated with HE but well controlled on lactulose and rifaximin.      Furosemide is 40mg daily, volume remains adequately controlled.  Creatinine remains stable at 1.7.   She has achieved good control of blood glucose by report.   Reports dyspnea with exertion for a couple of months.      Cirrhosis HCM:  Hepatitis A vaccination: immune  Hepatitis B vaccination: completed series 3/2017  HCC screening: US 9/18/2019 - no focal lesions   Variceal screening: on beta-blocker with no prior history of variceal bleeding  Pneumococcal vaccination: 2016, 2017  Influenza vaccination: patient not interested at this time   Colonoscopy: external, need documentation    Outpatient Encounter Prescriptions as of 2/1/2017   Medication Sig Dispense Refill    carvedilol (COREG) 3.125 MG tablet Take 1 tablet (3.125 mg total) by mouth 2 (two) times daily. 60 tablet 11    furosemide (LASIX) 20 MG tablet Take 3 tablets (60 mg total) by mouth once daily. (Patient taking differently: Take 60 mg by mouth every morning. ) 30 tablet 3    insulin glargine (LANTUS) 100 unit/mL injection Inject 30 Units into the skin every morning.       levothyroxine (SYNTHROID) 100 MCG tablet Take 100 mcg by mouth every morning.       ondansetron (ZOFRAN-ODT) 4 MG TbDL Take 1 tablet (4 mg total) by mouth every 8 (eight) hours as needed (prn nausea). 15  tablet 0    vitamin E 400 UNIT capsule Take 400 Units by mouth every morning.      polyethylene glycol (GLYCOLAX) 17 gram/dose powder Take 17 g by mouth once daily. 510 g 11     No facility-administered encounter medications on file as of 2/1/2017.      Review of patient's allergies indicates:   Allergen Reactions    Subsys [fentanyl] Other (See Comments)     After administration pt unresponsive.  HR and respirations decreased.     Versed [midazolam] Other (See Comments)     After administration pt unresponsive.  HR and respirations decreased.     Ampicillin Rash    Codeine Nausea And Vomiting and Nausea Only     Past Medical History:   Diagnosis Date    Ascites     Breast pain, left 1/4/2018    Cataract     CHF (congestive heart failure)     Cirrhosis     Cough     Diabetes mellitus     Diabetes mellitus, type 2     Gastroparesis     GERD (gastroesophageal reflux disease)     Hyperlipidemia     Hypertension     Hypotension 2/21/2019    Liver disease     Pneumonia of right middle lobe due to infectious organism 12/19/2017    Renal disorder     Sleep apnea     Thyroid disease      FH: no family history of liver disease or transplant     Review of Systems   Constitutional: Negative for activity change, appetite change, chills, fatigue, fever and unexpected weight change.   HENT: Negative for hearing loss, rhinorrhea and trouble swallowing.    Eyes: Negative for visual disturbance.   Respiratory: Positive for shortness of breath.    Cardiovascular: Negative for chest pain and leg swelling (stable).   Gastrointestinal: Negative for abdominal distention, abdominal pain, blood in stool, constipation, nausea and vomiting.   Endocrine: Negative for cold intolerance and heat intolerance.   Genitourinary: Negative for difficulty urinating, frequency and urgency.   Musculoskeletal: Negative for gait problem.   Skin: Negative for rash.   Neurological: Negative for weakness and headaches.   Hematological:  Negative for adenopathy. Does not bruise/bleed easily.   Psychiatric/Behavioral: Negative for confusion and decreased concentration.     Vitals:    09/20/19 1000   BP: 137/62   Pulse: 80   Resp: 18       Physical Exam   Constitutional: She is oriented to person, place, and time. She appears well-developed and well-nourished. No distress.   Patient ambulating without assistive device   HENT:   Head: Normocephalic and atraumatic.   Mouth/Throat: Oropharynx is clear and moist. No oropharyngeal exudate.   Eyes: Pupils are equal, round, and reactive to light. EOM are normal. No scleral icterus.   Neck: Normal range of motion. Neck supple. No thyromegaly present.   Cardiovascular: Normal rate, regular rhythm and normal heart sounds. Exam reveals no gallop and no friction rub.   No murmur heard.  Pulmonary/Chest: Effort normal. No respiratory distress. She has no wheezes. She has no rales.   Decreased breath sounds at bilateral bases   Abdominal: Soft. Bowel sounds are normal. She exhibits no distension. There is no tenderness. There is no rebound and no guarding.   Musculoskeletal: Normal range of motion. She exhibits no edema.   Lymphadenopathy:     She has no cervical adenopathy.   Neurological: She is alert and oriented to person, place, and time. No cranial nerve deficit.   Skin: Skin is warm and dry. No rash noted.   Psychiatric: She has a normal mood and affect. Her behavior is normal.   Vitals reviewed.      Lab Results   Component Value Date    GLU 61 (L) 07/16/2019    BUN 23 07/16/2019    CREATININE 1.7 (H) 07/16/2019    CALCIUM 8.5 (L) 07/16/2019     07/16/2019    K 3.7 07/16/2019     07/16/2019    PROT 6.6 07/16/2019    CO2 29 07/16/2019    ANIONGAP 7 (L) 07/16/2019    WBC 6.67 09/18/2019    RBC 4.12 09/18/2019    HGB 12.1 09/18/2019    HCT 38.2 09/18/2019    MCV 93 09/18/2019    MCH 29.4 09/18/2019    MCHC 31.7 (L) 09/18/2019     MELD-Na score: 13 at 2/4/2019 12:20 PM  MELD score: 13 at 2/4/2019  12:20 PM  Calculated from:  Serum Creatinine: 2.0 mg/dL at 2/4/2019 12:20 PM  Serum Sodium: 140 mmol/L (Rounded to 137 mmol/L) at 2/4/2019 12:20 PM  Total Bilirubin: 0.4 mg/dL (Rounded to 1 mg/dL) at 2/4/2019 12:20 PM  INR(ratio): 1.0 at 2/4/2019 12:20 PM  Age: 63 years     Diagnostics:   US from 2/2019 reviewed    Assessment and Plan:  Patient Active Problem List   Diagnosis    Ascites    Cirrhosis of liver with ascites    Type 2 diabetes mellitus with circulatory disorder    Bilateral lower extremity edema    Morbid obesity due to excess calories    Hypothyroidism due to acquired atrophy of thyroid    Protein-calorie malnutrition, moderate    Decompensated hepatic cirrhosis    Medication reaction    Cirrhosis    Portal hypertension    Hypoalbuminemia    Chronic diastolic congestive heart failure    Postmenopausal    Screening mammogram, encounter for    Screen for colon cancer    Hypertension    Shortness of breath    Chronic edema    Chest pain, atypical    Hyperlipidemia    Encounter for long-term (current) use of medications    Edema    KRISTAL on CPAP    Psychophysiological insomnia    Insomnia secondary to chronic pain    Inadequate sleep hygiene    Lumbar strain    Generalized abdominal pain    CKD (chronic kidney disease) stage 4, GFR 15-29 ml/min    Diabetic gastroparesis associated with type 2 diabetes mellitus    Acute on chronic kidney failure    Hyperammonemia    Gastroesophageal reflux disease without esophagitis    Diabetes mellitus type 2, uncontrolled, with complications    Pericardial effusion    Diarrhea     62yo female with cholestatic liver disease with confirmed cirrhosis on biopsy and imaging.  Patient underwent TIPS placement with improvement in volume status but complicated by HE. She is clinically stable at this time.      Cirrhosis: Recent US suggestive of TIPS dysfunction.  Given the patient's stabilized volume status and recent HE, does not require TIPS  revision at this time.  We will continue to monitor with serial imaging.      Volume status:  Continue lasix 40mg daily, renal function stable.      HE:  Continue lactulose and rifaximin    PVT:  Noted previously on US but improved at this time.  No indication for anticoagulation       Transplant candidacy:  MELD 13, no indication for transplant evaluation at this time.      NICHOLE:  Given risk factors, patient at risk for cardiopulmonary etiology for NICHOLE.  She will f/u with cardiology and pulmonary for assessment.      HCM:  As listed above     RTC in 6 months with US for same day

## 2019-09-20 NOTE — Clinical Note
Good morning, I saw this mutual patient about a week ago.  She is having NICHOLE and based on her risk factors there is a concern for cardiac etiology.  I discussed her following up with you and establishing with pulmonary.  She is currently scheduled to see you 12/11 but I wanted to make you aware since she may benefit from a stress test.Thanks,Liliane

## 2019-09-23 ENCOUNTER — NUTRITION (OUTPATIENT)
Dept: DIABETES | Facility: CLINIC | Age: 64
End: 2019-09-23
Payer: MEDICAID

## 2019-09-23 VITALS — WEIGHT: 234.81 LBS | BODY MASS INDEX: 44.33 KG/M2 | HEIGHT: 61 IN

## 2019-09-23 DIAGNOSIS — R10.84 GENERALIZED ABDOMINAL PAIN: ICD-10-CM

## 2019-09-23 DIAGNOSIS — E11.42 DIABETIC POLYNEUROPATHY ASSOCIATED WITH TYPE 2 DIABETES MELLITUS: Primary | ICD-10-CM

## 2019-09-23 PROCEDURE — G0108 DIAB MANAGE TRN  PER INDIV: HCPCS | Mod: PBBFAC | Performed by: DIETITIAN, REGISTERED

## 2019-09-23 PROCEDURE — 99213 OFFICE O/P EST LOW 20 MIN: CPT | Mod: PBBFAC | Performed by: DIETITIAN, REGISTERED

## 2019-09-23 PROCEDURE — 99999 PR PBB SHADOW E&M-EST. PATIENT-LVL III: CPT | Mod: PBBFAC,,, | Performed by: DIETITIAN, REGISTERED

## 2019-09-23 PROCEDURE — 99999 PR PBB SHADOW E&M-EST. PATIENT-LVL III: ICD-10-PCS | Mod: PBBFAC,,, | Performed by: DIETITIAN, REGISTERED

## 2019-09-23 RX ORDER — PANTOPRAZOLE SODIUM 20 MG/1
40 TABLET, DELAYED RELEASE ORAL DAILY
Qty: 90 TABLET | Refills: 0 | Status: SHIPPED | OUTPATIENT
Start: 2019-09-23 | End: 2020-02-10 | Stop reason: SDUPTHER

## 2019-09-23 NOTE — LETTER
September 23, 2019        Andrey Perrin MD  [id number] [address] [address]             [facility]  [address] [facility] [address]  Phone: [phone]  Fax: [phone]   Patient: Diamond Das   MR Number: [id number]   YOB: 1955   Date of Visit: 9/23/2019       Dear Dr. Perrin:    Thank you for referring Diamond Das to me for evaluation. Below are the relevant portions of my assessment and plan of care.     If you have questions, please do not hesitate to call me. I look forward to following Diamond along with you.    Sincerely,      Nolvia Mcneil, VANE, CDE           CC  No Recipients

## 2019-09-23 NOTE — PROGRESS NOTES
Diabetes Education  Author: Nolvia Mcneil RD, CDE  Date: 9/23/2019    Diabetes Care Management Summary  Diabetes Education Record Assessment/Progress: Initial  Current Diabetes Risk Level: Moderate     Last A1c:   Lab Results   Component Value Date    HGBA1C 7.7 (H) 07/16/2019     Last visit with Diabetes Educator: 5/30/19 [place]    Diabetes Type  Diabetes Type : Type II    Diabetes History  Diabetes Diagnosis: 5-10 years  Current Treatment: Diet, Exercise, Insulin(lantus 40units, admelog ac 14 units ac)  Reviewed Problem List with Patient: Yes   Pt currently taking lantus 35units, admelog ac 10units    Health Maintenance was reviewed today with patient. Discussed with patient importance of routine eye exams, foot exams/foot care, blood work (i.e.: A1c, microalbumin, and lipid), dental visits, yearly flu vaccine, and pneumonia vaccine as indicated by PCP. Patient verbalized understanding.     Health Maintenance Topics with due status: Not Due       Topic Last Completion Date    TETANUS VACCINE 09/08/2016    Pneumococcal Vaccine (Highest Risk) 12/07/2017    Pap Smear with HPV Cotest 01/29/2018    Lipid Panel 12/06/2018    Urine Microalbumin 12/06/2018    Mammogram 01/17/2019    Fecal Occult Blood Test (FOBT)/[facility] 02/27/2019    Eye Exam 04/09/2019    Foot Exam 05/07/2019    Hemoglobin A1c 07/16/2019     There are no preventive care reminders to display for this patient.    Nutrition  Meal Planning: (2334-5111 cals/d. Excess carb from starch portions, irregular meals. Pt working to reduce fat, sodium.)  Meal Plan 24 Hour Recall - Breakfast: mostly none OR oat/grits- water   Meal Plan 24 Hour Recall - Lunch: yesterday bkd chix, pasta w/ cream sauce, broccoli   Meal Plan 24 Hour Recall - Dinner: mostly none  Meal Plan 24 Hour Recall - Snack: fruit (apple, orange); aurelio: water, regular aurelio     Monitoring   Self Monitoring : Per recall, fst -175, ppd 220s.   Blood Glucose Logs: No  In the last month, how  often have you had a low blood sugar reaction?: never    Exercise   Frequency: Never    Current Diabetes Treatment   Current Treatment: Diet, Exercise, Insulin(lantus 40units, admelog ac 14 units ac)    Social History  Preferred Learning Method: Face to Face  Primary Support: Self  Smoking Status: Never a Smoker  Alcohol Use: Never     DDS-2 Score  ( > 3 = SIGNIFICANT DISTRESS): 1       Barriers to Change  Barriers to Change: None  Learning Challenges : None    Readiness to Learn   Readiness to Learn : Acceptance    Cultural Influences  Cultural Influences: No    Diabetes Education Assessment/Progress  Diabetes Disease Process (diabetes disease process and treatment options): Discussion, Individual Session, Demonstrates Understanding/Competency(verbalizes/demonstrates), Written Materials Provided  Nutrition (Incorporating nutritional management into one's lifestyle): Discussion, Individual Session, Demonstrates Understanding/Competency (verbalizes/demonstrates), Written Materials Provided  Physical Activity (incorporating physical activity into one's lifestyle): Discussion, Individual Session, Demonstrates Understanding/Competency (verbalizes/demonstrates), Written Materials Provided  Medications (states correct name, dose, onset, peak, duration, side effects & timing of meds): Discussion, Individual Session, Demonstrates Understanding/Competency(verbalizes/demonstrates), Written Materials Provided  Monitoring (monitoring blood glucose/other parameters & using results): Discussion, Individual Session, Demonstrates Understanding/Competency (verbalizes/demonstrates), Written Materials Provided  Acute Complications (preventing, detecting, and treating acute complications): Discussion, Individual Session, Demonstrates Understanding/Competency (verbalizes/demonstrates), Written Materials Provided  Chronic Complications (preventing, detecting, and treating chronic complications): Demonstrates Understanding/Competency  (verbalizes/demonstrates)  Clinical (diabetes, other pertinent medical history, and relevant comorbidities reviewed during visit): Demonstrates Understanding/Competency (verbalizes/demonstrates)  Cognitive (knowledge of self-management skills, functional health literacy): Demonstrates Understanding/Competency (verbalizes/demonstrates)  Psychosocial (emotional response to diabetes): Discussion, Individual Session, Demonstrates Understanding/Competency (verbalizes/demonstrates)  Diabetes Distress and Support Systems: Discussion, Individual Session, Demonstrates Understanding/Competency (verbalizes/demonstrates)  Behavioral (readiness for change, lifestyle practices, self-care behaviors): Discussion, Individual Session, Demonstrates Understanding/Competency (verbalizes/demonstrates)    Goals  Patient has selected/evaluated goals during today's session: Yes, selected  Healthy Eating: Set(use meal plan - avoid juices and sugar-sweetened beverages, carb portions)  Met Percentage : 0%  Start Date: 09/23/19  Target Date: 10/21/19  Physical Activity: Set(150min/wk - chair based exercises, indoor walking)  Met Percentage : 25%  Start Date: 09/23/19  Target Date: 10/21/19  Monitoring: Set(test blood sugar 4x/d - fasting, ac, bed; bring meter to clinic)  Met Percentage : 25%  Start Date: 09/23/19  Target Date: 10/21/19  Medications: Set(take insulin daily as direct and fu with diabetes ABELINO for medication mgmt)  Met Percentage : 50%  Start Date: 09/23/19  Target Date: 10/21/19     Diabetes Care Plan/Intervention  Education Plan/Intervention: Individual Follow-Up DSMT(Will coord fu w/ diabetes ABELINO. Provided written update of medication doses. ) Emphasis on meal plan and medication timing, consistency.     Diabetes Meal Plan  Restrictions: Low Fat, Low Sodium, Low Potassium(Will coord earlier ABELINO appt time to re-eval diabetes medications.  )  Calories: 1400  Carbohydrate Per Meal: 30-45g  Carbohydrate Per Snack : 7-15g    Today's  Self-Management Care Plan was developed with the patient's input and is based on barriers identified during today's assessment.    The long and short-term goals in the care plan were written with the patient/caregiver's input. The patient has agreed to work toward these goals to improve her overall diabetes control.      The patient received a copy of today's self-management plan and verbalized understanding of the care plan, goals, and all of today's instructions.      The patient was encouraged to communicate with her physician and care team regarding her condition(s) and treatment.  I provided the patient with my contact information today and encouraged her to contact me via phone or patient portal as needed.     Education Units of Time   Time Spent: 30 min

## 2019-09-25 ENCOUNTER — TELEPHONE (OUTPATIENT)
Dept: PULMONOLOGY | Facility: CLINIC | Age: 64
End: 2019-09-25

## 2019-09-25 NOTE — TELEPHONE ENCOUNTER
Tried returning pt call to schedule appt. No answer and she had a vm that has not been set up yet.

## 2019-09-25 NOTE — TELEPHONE ENCOUNTER
----- Message from Susan Carrizales sent at 9/25/2019  1:48 PM CDT -----  ..Type:  Sooner Apoointment Request    Caller is requesting a sooner appointment.  Caller declined first available appointment listed below.  Caller will not accept being placed on the waitlist and is requesting a message be sent to doctor.  Name of Caller:patient  When is the first available appointment?01/08  Symptoms: shortness of breath  Would the patient rather a call back or a response via [facility]?  Best Call Back Number:..[phone] (home)     Additional Information:

## 2019-10-07 ENCOUNTER — TELEPHONE (OUTPATIENT)
Dept: DIABETES | Facility: CLINIC | Age: 64
End: 2019-10-07

## 2019-10-07 RX ORDER — INSULIN GLARGINE 100 [IU]/ML
INJECTION, SOLUTION SUBCUTANEOUS
Qty: 15 ML | Refills: 6 | Status: SHIPPED | OUTPATIENT
Start: 2019-10-07 | End: 2020-02-10 | Stop reason: SDUPTHER

## 2019-10-07 NOTE — TELEPHONE ENCOUNTER
----- Message from Vidya Das sent at 10/7/2019  1:03 PM CDT -----  Contact: pt  The pt request a call pt have her 10/09/2019 appt time changed, no additional info given and can be reached at [phone]///[id number]

## 2019-10-09 ENCOUNTER — OFFICE VISIT (OUTPATIENT)
Dept: DIABETES | Facility: CLINIC | Age: 64
End: 2019-10-09
Payer: MEDICAID

## 2019-10-09 ENCOUNTER — LAB VISIT (OUTPATIENT)
Dept: LAB | Facility: HOSPITAL | Age: 64
End: 2019-10-09
Attending: PHYSICIAN ASSISTANT
Payer: MEDICAID

## 2019-10-09 VITALS
BODY MASS INDEX: 43.95 KG/M2 | DIASTOLIC BLOOD PRESSURE: 62 MMHG | SYSTOLIC BLOOD PRESSURE: 124 MMHG | HEIGHT: 61 IN | WEIGHT: 232.81 LBS

## 2019-10-09 LAB — GLUCOSE SERPL-MCNC: 91 MG/DL (ref 70–110)

## 2019-10-09 PROCEDURE — 99213 OFFICE O/P EST LOW 20 MIN: CPT | Mod: PBBFAC | Performed by: PHYSICIAN ASSISTANT

## 2019-10-09 PROCEDURE — 99214 PR OFFICE/OUTPT VISIT, EST, LEVL IV, 30-39 MIN: ICD-10-PCS | Mod: S$PBB,,, | Performed by: PHYSICIAN ASSISTANT

## 2019-10-09 PROCEDURE — 82962 GLUCOSE BLOOD TEST: CPT | Mod: PBBFAC | Performed by: PHYSICIAN ASSISTANT

## 2019-10-09 PROCEDURE — 99999 PR PBB SHADOW E&M-EST. PATIENT-LVL III: CPT | Mod: PBBFAC,,, | Performed by: PHYSICIAN ASSISTANT

## 2019-10-09 PROCEDURE — 83036 HEMOGLOBIN GLYCOSYLATED A1C: CPT

## 2019-10-09 PROCEDURE — 99214 OFFICE O/P EST MOD 30 MIN: CPT | Mod: S$PBB,,, | Performed by: PHYSICIAN ASSISTANT

## 2019-10-09 PROCEDURE — 99999 PR PBB SHADOW E&M-EST. PATIENT-LVL III: ICD-10-PCS | Mod: PBBFAC,,, | Performed by: PHYSICIAN ASSISTANT

## 2019-10-09 PROCEDURE — 36415 COLL VENOUS BLD VENIPUNCTURE: CPT

## 2019-10-09 NOTE — PROGRESS NOTES
PCP: Andrey Perrin MD    Subjective:    Patient ID: Diamond Das is a 64 y.o. female.    PCP: Andrey Perrin MD      Diamond Das is a pleasant 64 y.o. female presenting to follow up on Type 2 diabetes mellitus.  Also, pertinent to this visit in decision making is hypertension, hyperlipidemia, and adherence.  She has had diabetes for 8 or more years.  Her last visit in Diabetes Management was 5/16/2019 .   Since that time she has been in her usual state of health without significant hyperglycemia or hypoglycemia. She has been checking her blood glucose regularly four times daily, before meals and HS.  Since that time she has had moderate improvement in her glycemia with A1c of 7.7%.   She is currently on MDI with Admelog, and Basaglar.  Her current concerns are glycemic control. Also c/o shortness of breath for last 2 months and a cough. Can not sleep flat has to sleep in chair. In the process of finding a pulmonologist.    She denies any hospital admissions, emergency room visits, hypoglycemia, syncope, diaphoresis, chest pain, or dyspnea.    She has gained 4 pounds since last visit. Her BMI is 43.99 kg/m²    Her blood sugar in the clinic today was:   Lab Results   Component Value Date    POCGLU 91 10/09/2019     Diamond is compliant most of the time with DM medications.     Diamond is noncompliant some of the time with lifestyle modifications to include activity and meal planning.     Diabetes Management Status    Statin: Taking  ACE/ARB: Not taking    Screening or Prevention Patient's value Goal Complete/Controlled?   HgA1C Testing and Control   Lab Results   Component Value Date    HGBA1C 7.7 (H) 07/16/2019      Annually/Less than 8% Yes   Lipid profile : 12/06/2018 Annually Yes   LDL control Lab Results   Component Value Date    LDLCALC 145.8 12/06/2018    Annually/Less than 100 mg/dl  No   Nephropathy screening Lab Results   Component Value Date    LABMICR 1635.0 12/06/2018     Lab Results   Component  "Value Date    PROTEINUA 1+ (A) 02/21/2019    Annually Yes   Blood pressure BP Readings from Last 1 Encounters:   10/09/19 124/62    Less than 140/90 Yes   Dilated retinal exam : 04/09/2019 Annually Yes   Foot exam   : 05/07/2019 Annually Yes         Lab Results   Component Value Date    HGBA1C 7.7 (H) 07/16/2019    HGBA1C 8.3 (H) 04/17/2019    HGBA1C 7.1 (H) 12/06/2018         Review of Systems   Constitutional: Positive for fatigue. Negative for activity change and unexpected weight change.   Eyes: Negative for visual disturbance.   Respiratory: Positive for cough, chest tightness and shortness of breath.    Cardiovascular: Negative for chest pain.   Gastrointestinal: Negative for constipation and diarrhea.   Endocrine: Negative for cold intolerance, heat intolerance, polydipsia, polyphagia and polyuria.   Genitourinary: Negative for frequency.   Skin: Negative for wound.   Neurological: Negative for numbness.   Psychiatric/Behavioral: Negative for confusion.          Objective:   /62   Ht 5' 1" (1.549 m)   Wt 105.6 kg (232 lb 12.9 oz)   LMP  (LMP Unknown)   BMI 43.99 kg/m²        Physical Exam   Constitutional: She is oriented to person, place, and time. She appears well-developed and well-nourished. No distress.   Neck: Normal range of motion. Neck supple. No tracheal deviation present. No thyromegaly present.   Cardiovascular: Normal rate, regular rhythm and normal heart sounds.   Pulmonary/Chest: Effort normal. She has decreased breath sounds in the right lower field and the left lower field. She has wheezes in the right lower field. She has rales in the right lower field and the left lower field.   Abdominal: Soft. Bowel sounds are normal.   Musculoskeletal: She exhibits edema.   Lymphadenopathy:     She has no cervical adenopathy.   Neurological: She is alert and oriented to person, place, and time.   Skin: She is not diaphoretic.   Psychiatric: She has a normal mood and affect.   Nursing note and " "vitals reviewed.          Assessment:     1. Diabetes mellitus type 2, uncontrolled, with complications        Plan:   Diamond Das is seen today for   1. Diabetes mellitus type 2, uncontrolled, with complications           - Continue with D&E, reduce portion size, and restrict carbohydrates (no more that 45 grams ) per meal.   - Improving A1c. Continue current treatment plan   - appointment with dietician/CDE   - F/U in 12 weeks for diabetes   - Suggest patient be seen in the ER for CHF.  Then have routine follow-up with her cardiologist.      Diabetes mellitus type 2, uncontrolled, with complications  -     POCT Glucose, Hand-Held Device        I have reviewed your results and they are still quite high. I would like you to start "Treating to Target". The treatment will be Insulin and your target will be the Fasting and 2 hour post meal blood sugar. It will work in this manner;    1. Goal for Fasting blood sugar is  mg/dl. I realize that you will need time to adjust to the new levels and presently you may feel too low if you are too aggressive now. So go slow and aim to lower your blood sugar to below 200 then 150 then 100 over several months.    2. Goal for 2 hour post meal blood sugar is below 180 mg/dl, here the same rules apply as in #1.    3. You will check your fasting blood sugar daily, if not where we would like it to be over a 3 day period then that evening we will increase the Lantus dose by 5 units. Then repeat the process over the next 3 days. Remember this is a slow process and take our time getting to goal. But, each week should be better than prior weeks. Blood sugars below 70 are unacceptable and should raise a "RED FLAG" where we may have to reduce our dose of insulin.    4. You will check your post meal glucose daily as well. However, each day you will check a different meal, (ie. Monday-breakfast; Tuesday- lunch; Wednesday- supper, then repeat). If your post meal glucose is not where we " "would like, increase pre-meal insulin by 2 units next time. A word of CAUTION: mealtime insulin is dependant on the size and concentration of your meal content. If not consuming a large meal do not take large dose of insulin. Use the reasonable person rule.     5. If you have any questions please do not hesitate to call.    Intensive insulin Therapy with correction factor:    You are on Intensive insulin therapy with Basal and Bolus insulin. Lantus is your Basal insulin and will help maintain your fasting and between meal sugar. Your fast acting or rescue insulin is Admelog insulin and will control your post meal sugar.     You will Take 40 units of Basaglar at 9 pm each night. This will be adjusted up or down depending on your fasting blood sugar before breakfast.    Admelog will follow this pre meal schedule; Correction factor of "1 units per 50 mg/dl" and is based on 15-45 grams of carbohydrates per meal.    If blood sugar is below 70 eat first then check your blood sugar 2 hours later and make correction.  If blood pre-meal sugar is  70 -150 take 12 units of Admelog;  If blood pre-meal sugar is 151-200 take +1 units of Admelog;  If blood pre-meal sugar is 201-250 take +2 units of Admelog;  If blood pre-meal sugar is 251-300 take +3 units of Admelog;  If blood pre-meal sugar is 301-350 take +4 units of Admelog;  If blood pre-meal sugar is 351-400+ take +5 units of Admelog;  Also increase water intake and call for appointment.    A total of 30 minutes was spent in face to face time, of which 50 % was spent in counseling patient on disease process, complications, treatment, and side effects of medications.    The patient was explained the above plan and given opportunity to ask questions.  She understands, chooses and consents to this plan and accepts all the risks, which include but are not limited to the risks mentioned above.   She understands the alternative of having no testing, interventions or treatments at " this time. She left content and without further questions.     Disclaimer:  This note is prepared using voice recognition software and as such is likely to have errors and has not been proof read. Please contact me for questions.

## 2019-10-10 ENCOUNTER — TELEPHONE (OUTPATIENT)
Dept: DIABETES | Facility: CLINIC | Age: 64
End: 2019-10-10

## 2019-10-10 LAB
ESTIMATED AVG GLUCOSE: 157 MG/DL (ref 68–131)
HBA1C MFR BLD HPLC: 7.1 % (ref 4–5.6)

## 2019-10-10 NOTE — TELEPHONE ENCOUNTER
----- Message from Federica Nicolas sent at 10/10/2019  1:08 PM CDT -----  Contact: pt  Type:  Patient Returning Call    Who Called:Patient  Who Left Message for Patient:nurse  Does the patient know what this is regarding?:na  Would the patient rather a call back or a response via [employer]? Call back  Best Call Back Number:[phone]  Additional Information: na

## 2019-10-16 ENCOUNTER — TELEPHONE (OUTPATIENT)
Dept: INTERNAL MEDICINE | Facility: CLINIC | Age: 64
End: 2019-10-16

## 2019-10-16 ENCOUNTER — OFFICE VISIT (OUTPATIENT)
Dept: CARDIOLOGY | Facility: CLINIC | Age: 64
End: 2019-10-16
Payer: MEDICAID

## 2019-10-16 ENCOUNTER — OFFICE VISIT (OUTPATIENT)
Dept: INTERNAL MEDICINE | Facility: CLINIC | Age: 64
End: 2019-10-16
Payer: MEDICAID

## 2019-10-16 ENCOUNTER — LAB VISIT (OUTPATIENT)
Dept: LAB | Facility: HOSPITAL | Age: 64
End: 2019-10-16
Attending: FAMILY MEDICINE
Payer: MEDICAID

## 2019-10-16 VITALS
DIASTOLIC BLOOD PRESSURE: 58 MMHG | SYSTOLIC BLOOD PRESSURE: 133 MMHG | OXYGEN SATURATION: 98 % | TEMPERATURE: 96 F | WEIGHT: 242.75 LBS | BODY MASS INDEX: 45.83 KG/M2 | HEART RATE: 68 BPM | HEIGHT: 61 IN | RESPIRATION RATE: 19 BRPM

## 2019-10-16 VITALS
DIASTOLIC BLOOD PRESSURE: 70 MMHG | SYSTOLIC BLOOD PRESSURE: 138 MMHG | HEART RATE: 66 BPM | WEIGHT: 241.19 LBS | BODY MASS INDEX: 45.57 KG/M2

## 2019-10-16 DIAGNOSIS — N18.4 CKD (CHRONIC KIDNEY DISEASE) STAGE 4, GFR 15-29 ML/MIN: ICD-10-CM

## 2019-10-16 DIAGNOSIS — G47.33 OSA ON CPAP: ICD-10-CM

## 2019-10-16 DIAGNOSIS — K74.60 HEPATIC CIRRHOSIS, UNSPECIFIED HEPATIC CIRRHOSIS TYPE, UNSPECIFIED WHETHER ASCITES PRESENT: ICD-10-CM

## 2019-10-16 DIAGNOSIS — I10 ESSENTIAL HYPERTENSION: ICD-10-CM

## 2019-10-16 DIAGNOSIS — I50.32 CHRONIC DIASTOLIC CONGESTIVE HEART FAILURE: ICD-10-CM

## 2019-10-16 DIAGNOSIS — E78.49 OTHER HYPERLIPIDEMIA: ICD-10-CM

## 2019-10-16 DIAGNOSIS — E11.59 TYPE 2 DIABETES MELLITUS WITH OTHER CIRCULATORY COMPLICATION, WITHOUT LONG-TERM CURRENT USE OF INSULIN: ICD-10-CM

## 2019-10-16 DIAGNOSIS — I10 HYPERTENSION, UNSPECIFIED TYPE: ICD-10-CM

## 2019-10-16 DIAGNOSIS — I50.32 CHRONIC DIASTOLIC CONGESTIVE HEART FAILURE: Primary | ICD-10-CM

## 2019-10-16 DIAGNOSIS — E66.01 MORBID OBESITY DUE TO EXCESS CALORIES: ICD-10-CM

## 2019-10-16 DIAGNOSIS — R60.9 CHRONIC EDEMA: ICD-10-CM

## 2019-10-16 DIAGNOSIS — I31.39 PERICARDIAL EFFUSION: ICD-10-CM

## 2019-10-16 LAB
ALBUMIN SERPL BCP-MCNC: 2.3 G/DL (ref 3.5–5.2)
ALP SERPL-CCNC: 115 U/L (ref 55–135)
ALT SERPL W/O P-5'-P-CCNC: 21 U/L (ref 10–44)
ANION GAP SERPL CALC-SCNC: 9 MMOL/L (ref 8–16)
AST SERPL-CCNC: 40 U/L (ref 10–40)
BILIRUB SERPL-MCNC: 0.3 MG/DL (ref 0.1–1)
BNP SERPL-MCNC: 453 PG/ML (ref 0–99)
BUN SERPL-MCNC: 20 MG/DL (ref 8–23)
CALCIUM SERPL-MCNC: 8.4 MG/DL (ref 8.7–10.5)
CHLORIDE SERPL-SCNC: 113 MMOL/L (ref 95–110)
CO2 SERPL-SCNC: 24 MMOL/L (ref 23–29)
CREAT SERPL-MCNC: 1.6 MG/DL (ref 0.5–1.4)
EST. GFR  (AFRICAN AMERICAN): 39.5 ML/MIN/1.73 M^2
EST. GFR  (NON AFRICAN AMERICAN): 34.3 ML/MIN/1.73 M^2
ESTIMATED AVG GLUCOSE: 154 MG/DL (ref 68–131)
GLUCOSE SERPL-MCNC: 118 MG/DL (ref 70–110)
HBA1C MFR BLD HPLC: 7 % (ref 4–5.6)
POTASSIUM SERPL-SCNC: 4.2 MMOL/L (ref 3.5–5.1)
PROT SERPL-MCNC: 6 G/DL (ref 6–8.4)
SODIUM SERPL-SCNC: 146 MMOL/L (ref 136–145)

## 2019-10-16 PROCEDURE — 36415 COLL VENOUS BLD VENIPUNCTURE: CPT | Mod: PO

## 2019-10-16 PROCEDURE — 99999 PR PBB SHADOW E&M-EST. PATIENT-LVL III: CPT | Mod: PBBFAC,,, | Performed by: INTERNAL MEDICINE

## 2019-10-16 PROCEDURE — 99213 OFFICE O/P EST LOW 20 MIN: CPT | Mod: PBBFAC,27,PO | Performed by: FAMILY MEDICINE

## 2019-10-16 PROCEDURE — 99214 OFFICE O/P EST MOD 30 MIN: CPT | Mod: S$PBB,,, | Performed by: FAMILY MEDICINE

## 2019-10-16 PROCEDURE — 99214 OFFICE O/P EST MOD 30 MIN: CPT | Mod: S$PBB,,, | Performed by: INTERNAL MEDICINE

## 2019-10-16 PROCEDURE — 99213 OFFICE O/P EST LOW 20 MIN: CPT | Mod: PBBFAC | Performed by: INTERNAL MEDICINE

## 2019-10-16 PROCEDURE — 99999 PR PBB SHADOW E&M-EST. PATIENT-LVL III: ICD-10-PCS | Mod: PBBFAC,,, | Performed by: INTERNAL MEDICINE

## 2019-10-16 PROCEDURE — 83880 ASSAY OF NATRIURETIC PEPTIDE: CPT | Mod: PO

## 2019-10-16 PROCEDURE — 80053 COMPREHEN METABOLIC PANEL: CPT | Mod: PO

## 2019-10-16 PROCEDURE — 83036 HEMOGLOBIN GLYCOSYLATED A1C: CPT

## 2019-10-16 PROCEDURE — 99999 PR PBB SHADOW E&M-EST. PATIENT-LVL III: ICD-10-PCS | Mod: PBBFAC,,, | Performed by: FAMILY MEDICINE

## 2019-10-16 PROCEDURE — 99214 PR OFFICE/OUTPT VISIT, EST, LEVL IV, 30-39 MIN: ICD-10-PCS | Mod: S$PBB,,, | Performed by: FAMILY MEDICINE

## 2019-10-16 PROCEDURE — 99214 PR OFFICE/OUTPT VISIT, EST, LEVL IV, 30-39 MIN: ICD-10-PCS | Mod: S$PBB,,, | Performed by: INTERNAL MEDICINE

## 2019-10-16 PROCEDURE — 99999 PR PBB SHADOW E&M-EST. PATIENT-LVL III: CPT | Mod: PBBFAC,,, | Performed by: FAMILY MEDICINE

## 2019-10-16 RX ORDER — LISINOPRIL 5 MG/1
5 TABLET ORAL DAILY
Qty: 90 TABLET | Refills: 1 | Status: SHIPPED | OUTPATIENT
Start: 2019-10-16 | End: 2019-11-18 | Stop reason: SDUPTHER

## 2019-10-16 RX ORDER — TIZANIDINE 4 MG/1
4 TABLET ORAL EVERY 6 HOURS PRN
Qty: 90 TABLET | Refills: 0 | Status: SHIPPED | OUTPATIENT
Start: 2019-10-16 | End: 2019-11-08

## 2019-10-16 RX ORDER — FUROSEMIDE 40 MG/1
40 TABLET ORAL NIGHTLY
Qty: 90 TABLET | Refills: 1 | Status: SHIPPED | OUTPATIENT
Start: 2019-10-16 | End: 2020-01-06

## 2019-10-16 RX ORDER — SIMVASTATIN 40 MG/1
40 TABLET, FILM COATED ORAL DAILY
Qty: 90 TABLET | Refills: 0 | Status: ON HOLD | OUTPATIENT
Start: 2019-10-16 | End: 2020-01-20 | Stop reason: ALTCHOICE

## 2019-10-16 NOTE — TELEPHONE ENCOUNTER
Good morning.  would like pt to be seen today by  at 1pm at the [facility] location. This has been communicated between both doctors. Pt is in route to 's office.

## 2019-10-16 NOTE — ASSESSMENT & PLAN NOTE
[facility] 1110a[facility] Discussed recent case and admission at [facility].

## 2019-10-16 NOTE — PROGRESS NOTES
Subjective:   Patient ID:  Diamond Das is a 64 y.o. female who presents for cardiac consult of Shortness of Breath (f/u)      Shortness of Breath   Associated symptoms include leg swelling. Pertinent negatives include no chest pain.     The patient came in today for cardiac consult of Shortness of Breath (f/u)    Referring Physician: Andrey Perrin MD   Reason for consult: HTN, CHF    Diamond Das is a 64 y.o. female with medical conditions diastolic CHF, pericardial effusion, HTN, HLD, IDDM, cryptogenic cirrhosis, s/p TIPS, ascites resolved presents for CV follow up.     Pt of Dr. Shaw, last seen 2/9/18  No smoking/drinking  Last echo in  normal EF and RVF, grade II DD.  EKG today NSR poor R progression on anterior leads  Chronic weakness and fatigue. Had PNA in   Pain on lower chest bilateral for few months, worse with exercise, resolved after resting. Deep breathing made the pain worse. No pain at sleep. Associated dyspnea, N/V, palpitation and dizziness. No radiation. ASA and tylenol not really helped the pain.    3/1/19  She has been having more ascites and concern for cardiac etiologies. She was also started on Reglan, concern for prolonged Qtc, recent Qtc 475 ms. Has been feeling better with reglan and lactulose. Is dyspneic, chronically on oxygen at night and also has portable oxygen when she has to do a lot walking. Takes lasix 40 mg once/day now, was on 80mg daily. Active at home, dresses and bathes her self. Cant walk or stand to too long.     8/26/19  2D ECHO with grade 1 DD, normal Bi V function, Small pericardial effusion without tamponade. She has mild SOB at times with edema but improved lately. She will see hepatology as well. No Chest pain.   Has been taking lasix extra doses PRN and improved symptoms.     10/16/19  [facility] follow up  - Non toxic appearing elderly female here for worsening NICHOLE and shortness of breath. Known h/o CHF, f/b cardiologist in [facility]  system. Work up is c/w acute on chronic chf exacerbation. She, unfortunately, does not follow her weights so not clear if significant change recently. CXR with likely signs of volume overload. She was given diuresis and had [place] score of 0. Felt improved and prefers discharge with close OP f/u with her cardiologist. She was ambulated and did not significantly desat and tolerated it well.    No she is on lasix, feels better. BNP today is 453, needs to double lasix next 3-4 days for further diuresis.       Patient feels no chest pain, no PND, no palpitation, no dizziness, no syncope, no CNS symptoms.    Patient has dec exercise tolerance.    Patient is compliant with medications.    2D ECHO 06/14/2019     CONCLUSIONS     1 - Normal left ventricular systolic function (EF 60-65%).     2 - Impaired LV relaxation, normal LAP (grade 1 diastolic dysfunction).     3 - Normal right ventricular systolic function .     4 - No wall motion abnormalities.     5 - Concentric hypertrophy.     6 - Trivial mitral regurgitation.     7 - Trivial tricuspid regurgitation.     8 - Small pericardial effusion.     9 - No echo evidence of tamponade.       2/21/19 ECG  Vent. Rate : 050 BPM     Atrial Rate : 050 BPM     P-R Int : 146 ms          QRS Dur : 086 ms      QT Int : 522 ms       P-R-T Axes : 045 -03 090 degrees     QTc Int : 475 ms    Sinus bradycardia  Cannot rule out Anterior infarct (cited on or before 21-FEB-2019)  Abnormal ECG        Past Medical History:   Diagnosis Date    Ascites     Breast pain, left 1/4/2018    Cataract     CHF (congestive heart failure)     Cirrhosis     Cough     Diabetes mellitus     Diabetes mellitus, type 2     Gastroparesis     GERD (gastroesophageal reflux disease)     Hyperlipidemia     Hypertension     Hypotension 2/21/2019    Liver disease     Pneumonia of right middle lobe due to infectious organism 12/19/2017    Renal disorder     Sleep apnea     Thyroid disease        Past  Surgical History:   Procedure Laterality Date    CHOLECYSTECTOMY      COLONOSCOPY N/A 9/4/2019    Procedure: COLONOSCOPY;  Surgeon: Marnie Elmore MD;  [facility]: [facility];  Service: Endoscopy;  Laterality: N/A;    ERCP      LIVER BIOPSY      TUBAL LIGATION      UPPER GASTROINTESTINAL ENDOSCOPY         Social History     Tobacco Use    Smoking status: Never Smoker    Smokeless tobacco: Never Used   Substance Use Topics    Alcohol use: No     Frequency: Never    Drug use: No       Family History   Problem Relation Age of Onset    Cancer Mother     Breast cancer Mother     Heart disease Father     Aneurysm Sister     Heart disease Brother     No Known Problems Maternal Grandmother     Cancer Maternal Grandfather     Sarcoidosis Sister        Patient's Medications   New Prescriptions    No medications on file   Previous Medications    AMLODIPINE (NORVASC) 10 MG TABLET    Take 1 tablet (10 mg total) by mouth once daily.    ASPIRIN (ECOTRIN) 81 MG EC TABLET    Take 1 tablet (81 mg total) by mouth once daily.    CARVEDILOL (COREG) 3.125 MG TABLET    Take 1 tablet (3.125 mg total) by mouth 2 (two) times daily.    DOXAZOSIN (CARDURA) 2 MG TABLET    Take 1 tablet (2 mg total) by mouth every evening.    FUROSEMIDE (LASIX) 40 MG TABLET    TAKE 1 TABLET BY MOUTH EVERY EVENING    GLUCAGON, HUMAN RECOMBINANT, (GLUCAGON EMERGENCY KIT, HUMAN,) 1 MG SOLR    Inject 1 mg into the muscle as needed.    INSULIN LISPRO (ADMELOG SOLOSTAR U-100 INSULIN) 100 UNIT/ML INPN PEN    Titrate up to 14 units subcutaneously three times a day 10-15 min before meals as directed in after visit summary    LACTULOSE (CHRONULAC) 20 GRAM/30 ML SOLN    Take 30 mLs (20 g total) by mouth 3 (three) times daily as needed (To goal bowel movement 2-3 bowel movements per day).    LANTUS SOLOSTAR U-100 INSULIN GLARGINE 100 UNITS/ML (3ML) SUBQ PEN    INJECT 40 UNITS INTO THE SKIN EVERY EVENING.  TITRATE UP TO 30 UNITS NIGHTLY AS DIRECTED ON  INSTRUCTION SHEET    LEVOTHYROXINE (SYNTHROID) 137 MCG TAB TABLET    TAKE 1 TABLET BY MOUTH BEFORE BREAKFAST    LISINOPRIL (PRINIVIL,ZESTRIL) 5 MG TABLET    Take 1 tablet (5 mg total) by mouth once daily.    ONDANSETRON (ZOFRAN) 4 MG TABLET    TAKE 1 TABLET BY MOUTH EVERY 8 HOURS AS NEEDED    PANTOPRAZOLE (PROTONIX) 20 MG TABLET    Take 2 tablets (40 mg total) by mouth once daily.    SIMVASTATIN (ZOCOR) 40 MG TABLET    Take 1 tablet (40 mg total) by mouth once daily.    TIZANIDINE (ZANAFLEX) 4 MG TABLET    Take 1 tablet (4 mg total) by mouth every 6 (six) hours as needed.    VITAMIN E 400 UNIT CAPSULE    Take 400 Units by mouth every morning.    XIFAXAN 550 MG TAB    TAKE 1 TABLET BY MOUTH TWICE DAILY   Modified Medications    No medications on file   Discontinued Medications    No medications on file       Review of Systems   Constitutional: Negative.    HENT: Negative.    Eyes: Negative.    Respiratory: Positive for shortness of breath.    Cardiovascular: Positive for leg swelling. Negative for chest pain and palpitations.   Gastrointestinal: Negative for nausea.   Genitourinary: Negative.    Musculoskeletal: Negative.    Skin: Negative.    Neurological: Negative.    Endo/Heme/Allergies: Negative.    Psychiatric/Behavioral: Negative.    All 12 systems otherwise negative.      Wt Readings from Last 3 Encounters:   10/16/19 109.4 kg (241 lb 2.9 oz)   10/16/19 110.1 kg (242 lb 11.6 oz)   10/09/19 105.6 kg (232 lb 12.9 oz)     Temp Readings from Last 3 Encounters:   10/16/19 96.4 °F (35.8 °C) (Tympanic)   09/04/19 98.1 °F (36.7 °C) (Temporal)   07/16/19 96.2 °F (35.7 °C) (Tympanic)     BP Readings from Last 3 Encounters:   10/16/19 138/70   10/16/19 (!) 133/58   10/09/19 124/62     Pulse Readings from Last 3 Encounters:   10/16/19 66   10/16/19 68   09/20/19 80       /70 (BP Location: Left arm, Patient Position: Sitting, BP Method: Medium (Manual))   Pulse 66   Wt 109.4 kg (241 lb 2.9 oz)   LMP  (LMP Unknown)    BMI 45.57 kg/m²     Objective:   Physical Exam   Constitutional: She is oriented to person, place, and time. She appears well-developed and well-nourished. No distress.   HENT:   Head: Normocephalic and atraumatic.   Nose: Nose normal.   Mouth/Throat: Oropharynx is clear and moist.   Eyes: Conjunctivae and EOM are normal. No scleral icterus.   Neck: Normal range of motion. Neck supple. No JVD present. No thyromegaly present.   Cardiovascular: Normal rate, regular rhythm, S1 normal and S2 normal. Exam reveals no gallop, no S3, no S4 and no friction rub.   Murmur heard.  Pulmonary/Chest: Effort normal and breath sounds normal. No stridor. No respiratory distress. She has no wheezes. She has no rales. She exhibits no tenderness.   Abdominal: Soft. Bowel sounds are normal. She exhibits no distension and no mass. There is no tenderness. There is no rebound.   Genitourinary:   Genitourinary Comments: Deferred   Musculoskeletal: Normal range of motion. She exhibits edema (mild). She exhibits no tenderness or deformity.   Lymphadenopathy:     She has no cervical adenopathy.   Neurological: She is alert and oriented to person, place, and time. She exhibits normal muscle tone. Coordination normal.   Skin: Skin is warm and dry. No rash noted. She is not diaphoretic. No erythema. No pallor.   Psychiatric: She has a normal mood and affect. Her behavior is normal. Judgment and thought content normal.   Nursing note and vitals reviewed.      Lab Results   Component Value Date     (H) 10/16/2019    K 4.2 10/16/2019     (H) 10/16/2019    CO2 24 10/16/2019    BUN 20 10/16/2019    CREATININE 1.6 (H) 10/16/2019     (H) 10/16/2019    HGBA1C 7.1 (H) 10/09/2019    MG 1.8 01/04/2019    AST 40 10/16/2019    ALT 21 10/16/2019    ALBUMIN 2.3 (L) 10/16/2019    PROT 6.0 10/16/2019    BILITOT 0.3 10/16/2019    WBC 6.67 09/18/2019    HGB 12.1 09/18/2019    HCT 38.2 09/18/2019    MCV 93 09/18/2019     09/18/2019    INR  1.1 09/18/2019    TSH 2.271 01/03/2019    CHOL 205 (H) 12/06/2018    HDL 41 12/06/2018    LDLCALC 145.8 12/06/2018    TRIG 91 12/06/2018     (H) 10/16/2019     Assessment:      1. Chronic diastolic congestive heart failure    2. Essential hypertension    3. Other hyperlipidemia    4. Pericardial effusion    5. CKD (chronic kidney disease) stage 4, GFR 15-29 ml/min    6. Type 2 diabetes mellitus with other circulatory complication, without long-term current use of insulin    7. Morbid obesity due to excess calories    8. Hepatic cirrhosis, unspecified hepatic cirrhosis type, unspecified whether ascites present    9. KRISTAL on CPAP    10. Chronic edema        Plan:   1. Chronic diastolic HF, recent exac  - double lasix to 80 mg daily for next 4 days, labs on Monday, repeat BNP  - lasix, daily weights, low salt diet  - recent ECG WNL qtc   - stress once euvolemic    2. HTN  - cont meds    3. HLD  - cont statin    4. DM2  - cont meds per PCP    5. Cirrhosis  - cont tx per PCP/Hepatology  - not transplant candidate currently     6. KRISTAL  - needs CPAP, was not using it before     7. Obesity  - rec weight loss with diet/exercise     8. Pericardial effusion  - small, sec to cirrhosis/CHF  - no tamponade clinically       Thank you for allowing me to participate in this patient's care. Please do not hesitate to contact me with any questions or concerns. Consult note has been forwarded to the referral physician.

## 2019-10-16 NOTE — PROGRESS NOTES
Subjective:       Patient ID: Diamond Das is a 64 y.o. female.    Chief Complaint: Diabetes (3 month)    Note by Angie Roberts NP, Danny CABRERA, and Junior Mcclure MD reviewed.  Pt. Treated at [facility] ED on 10/9/2019 for SOB increasing over 3 months. Her physical exam was positive for cough, SOB, Leg swelling, and abdominal pain. EKG was Normal, BNP abnormal 255, Troponin normal. She was treated with a furosemide injection 80 mg and a GI cocktail.  Diagnosed with acute on chronic congestive heart failure and discharged to follow up with Dr. Perrin and Dr. Ramos.                     Diabetes   She presents for her follow-up diabetic visit. She has type 2 diabetes mellitus. Her disease course has been improving. Pertinent negatives for hypoglycemia include no confusion, dizziness, headaches, nervousness/anxiousness, speech difficulty, sweats or tremors. Pertinent negatives for diabetes include no blurred vision, no chest pain, no fatigue, no foot paresthesias, no foot ulcerations, no polydipsia, no polyphagia, no polyuria and no visual change. Pertinent negatives for hypoglycemia complications include no hospitalization. Risk factors for coronary artery disease include diabetes mellitus, dyslipidemia and obesity. Current diabetic treatment includes diet and insulin injections. She is compliant with treatment all of the time. Her weight is fluctuating minimally. She has had a previous visit with a dietitian. Her home blood glucose trend is decreasing steadily. Her breakfast blood glucose is taken between 9-10 am. Her breakfast blood glucose range is generally  mg/dl. Her dinner blood glucose is taken between 3-4 pm. Her dinner blood glucose range is generally >200 mg/dl.     Review of Systems   Constitutional: Negative for fatigue.   Eyes: Negative for blurred vision.   Respiratory: Negative.  Negative for chest tightness and shortness of breath.    Cardiovascular: Negative for chest pain.   Gastrointestinal:  Negative.  Negative for abdominal pain.   Endocrine: Negative for polydipsia, polyphagia and polyuria.   Genitourinary: Negative.  Negative for dysuria.   Musculoskeletal: Negative for joint swelling.   Skin: Negative.  Negative for rash.   Neurological: Negative for dizziness, tremors, speech difficulty and headaches.   Psychiatric/Behavioral: Negative for confusion. The patient is not nervous/anxious.        Objective:      Physical Exam   Constitutional: She appears well-developed and well-nourished. No distress.   HENT:   Head: Normocephalic and atraumatic.   Cardiovascular: Normal rate, regular rhythm, normal heart sounds and intact distal pulses.   No murmur heard.  Pulmonary/Chest: Effort normal and breath sounds normal. She has no wheezes.   Abdominal: Soft. Bowel sounds are normal. She exhibits no distension. There is no tenderness.   Musculoskeletal: She exhibits edema.   Skin: Skin is warm and dry.   Psychiatric: She has a normal mood and affect. Her behavior is normal. Judgment and thought content normal.       Assessment:       1. Chronic diastolic congestive heart failure    2. Type 2 diabetes mellitus with other circulatory complication, without long-term current use of insulin    3. Hypertension, unspecified type        Plan:     Problem List Items Addressed This Visit        Cardiac/Vascular    Chronic diastolic congestive heart failure - Primary    Current Assessment & Plan     Spoke with Dr. Ramos at 1110a, [facility] to see pt. Discussed recent case and admission at [facility].         Relevant Orders    Brain natriuretic peptide (Completed)    Hypertension    Relevant Medications    lisinopril (PRINIVIL,ZESTRIL) 5 MG tablet       Endocrine    Type 2 diabetes mellitus with circulatory disorder    Relevant Medications    simvastatin (ZOCOR) 40 MG tablet    Other Relevant Orders    Hemoglobin A1c (Completed)    Comprehensive metabolic panel (Completed)

## 2019-10-17 NOTE — PROGRESS NOTES
Some lab values are out of range, but I feel that no further workup is required at this time.  Please feel free to contact my office with any questions.  Labs are out of range but remain stable. Keep up the Good Work!!    Andrey Perrin MD

## 2019-10-22 ENCOUNTER — NUTRITION (OUTPATIENT)
Dept: DIABETES | Facility: CLINIC | Age: 64
End: 2019-10-22
Payer: MEDICAID

## 2019-10-22 VITALS — BODY MASS INDEX: 44.04 KG/M2 | WEIGHT: 233.25 LBS | HEIGHT: 61 IN

## 2019-10-22 DIAGNOSIS — E11.42 DIABETIC POLYNEUROPATHY ASSOCIATED WITH TYPE 2 DIABETES MELLITUS: Primary | ICD-10-CM

## 2019-10-22 PROCEDURE — G0108 DIAB MANAGE TRN  PER INDIV: HCPCS | Mod: PBBFAC | Performed by: DIETITIAN, REGISTERED

## 2019-10-22 PROCEDURE — 99999 PR PBB SHADOW E&M-EST. PATIENT-LVL II: ICD-10-PCS | Mod: PBBFAC,,, | Performed by: DIETITIAN, REGISTERED

## 2019-10-22 PROCEDURE — 99212 OFFICE O/P EST SF 10 MIN: CPT | Mod: PBBFAC | Performed by: DIETITIAN, REGISTERED

## 2019-10-22 PROCEDURE — 99999 PR PBB SHADOW E&M-EST. PATIENT-LVL II: CPT | Mod: PBBFAC,,, | Performed by: DIETITIAN, REGISTERED

## 2019-10-22 NOTE — LETTER
October 22, 2019    Andrey Perrin MD  [id number] [address] [address]       Patient: Diamond Das   MR Number: [id number]   YOB: 1955   Date of Visit: 10/22/2019     Dear Dr. Perrin:    Thank you for referring Diamond for diabetes self-management education and support. She has completed all components of our Diabetes Management Program and her Self-Management Support Plan. Below is a summary of her clinical outcomes and goal progress.    Patient Outcomes:    A1c Status:   Lab Results   Component Value Date    HGBA1C 7.0 (H) 10/16/2019    HGBA1C 7.1 (H) 10/09/2019       HGBA1C                                           8.3                              4/17/2019    Goals  Patient has selected/evaluated goals during today's session: Yes, evaluated  Healthy Eating: % Met(use meal plan - avoid juices and sugar-sweetened beverages, carb portions)  Met Percentage : 75%  Physical Activity: % Met(150min/wk - chair based exercises, indoor walking)  Met Percentage : 50%  Monitoring: % Met(test blood sugar 4x/d - fasting, ac, bed; bring meter to clinic)  Met Percentage : 50%  Medications: % Met(take insulin daily as direct and fu with diabetes ABELINO for medication mgmt)  Met Percentage : 100%    Diabetes Self-Management Support Plan  Exercise/Nutrition: other  Other exercise/nutrition: walking in mall/stores  Review Status: Patient has selected and agrees to support plan.    Follow up:   · Diamond to follow diabetes support plan indicated above  · Diamond to attend medical appointments as scheduled  · Diamond to update you on her DM education progress as needed      If you have questions, please do not hesitate to call me. I look forward to providing additional education and support 3mos, as needed or as referred.    Sincerely,    Nolvia Mcneil RD, CDE     
Alert and oriented to person, place, time/situation. normal mood and affect. no apparent risk to self or others.

## 2019-10-22 NOTE — PROGRESS NOTES
Diabetes Education  Author: Nolvia Mcneil RD, CDE  Date: 10/22/2019    Diabetes Care Management Summary  Diabetes Education Record Assessment/Progress: Post Program/Follow-up  Current Diabetes Risk Level: Moderate     Last A1c:   Lab Results   Component Value Date    HGBA1C 7.0 (H) 10/16/2019     Last visit with Diabetes Educator: Initial 9/23/19 DAYDAYSolomon w/ A1C 7.7 (7/16/19)    Diabetes Type  Diabetes Type : Type II    Diabetes History  Diabetes Diagnosis: 5-10 years  Current Treatment: Diet, Exercise, Insulin(lantus 40units, admelog ac 10-14 units ac)  Reviewed Problem List with Patient: Yes    Health Maintenance was reviewed today with patient. Discussed with patient importance of routine eye exams, foot exams/foot care, blood work (i.e.: A1c, microalbumin, and lipid), dental visits, yearly flu vaccine, and pneumonia vaccine as indicated by PCP. Patient verbalized understanding.     Health Maintenance Topics with due status: Not Due       Topic Last Completion Date    TETANUS VACCINE 09/08/2016    Pneumococcal Vaccine (Highest Risk) 12/07/2017    Pap Smear with HPV Cotest 01/29/2018    Lipid Panel 12/06/2018    Mammogram 01/17/2019    Fecal Occult Blood Test (FOBT)/[employer] 02/27/2019    Eye Exam 04/09/2019    Foot Exam 05/07/2019    Hemoglobin A1c 10/16/2019     Health Maintenance Due   Topic Date Due    Urine Microalbumin  12/06/2019     Results for GIACOMO BAEZA (MRN [id number]) as of 10/22/2019 12:53   Ref. Range 10/16/2019 11:41   eGFR if African American Latest Ref Range: >60 mL/min/1.73 m^2 39.5 (A)   Noted improvement in GFR as A1C is improving.       Nutrition  Meal Planning: (7299-3944 cals/d. Pt working to reduce starch portions. She continues with irregular meal patterns. No excess fat, sodium noted from food freq, 24hr recall. )  Meal Plan 24 Hour Recall - Breakfast: none  Meal Plan 24 Hour Recall - Lunch: grilled/bkd chix w/ creamed corn, green peas or green beans (rinsing canned  items)  Meal Plan 24 Hour Recall - Dinner: pb sandwich OR crackers (unsalted), cheese  Meal Plan 24 Hour Recall - Snack: grapes; aurelio: tea, lemon, honey, water    Monitoring   Self Monitoring : Per glucometer, 28d avg  with range 64, 76, mostly 100-170, 184, 215, 442.  Blood Glucose Logs: No  In the last month, how often have you had a low blood sugar reaction?: once    Exercise   Exercise Type: walking  Intensity: Low  Frequency: (2-3)  Duration: 30 min    Current Diabetes Treatment   Current Treatment: Diet, Exercise, Insulin(lantus 40units, admelog ac 10-14 units ac)    Social History  Preferred Learning Method: Face to Face  Primary Support: Self  Smoking Status: Never a Smoker  Alcohol Use: Never     DDS-2 Score  ( > 3 = SIGNIFICANT DISTRESS): 1       Barriers to Change  Barriers to Change: None  Learning Challenges : None    Readiness to Learn   Readiness to Learn : Eager    Cultural Influences  Cultural Influences: No    Diabetes Education Assessment/Progress  Diabetes Disease Process (diabetes disease process and treatment options): Demonstrates Understanding/Competency(verbalizes/demonstrates)  Nutrition (Incorporating nutritional management into one's lifestyle): Discussion, Individual Session, Demonstrates Understanding/Competency (verbalizes/demonstrates)  Physical Activity (incorporating physical activity into one's lifestyle): Discussion, Individual Session, Demonstrates Understanding/Competency (verbalizes/demonstrates)  Medications (states correct name, dose, onset, peak, duration, side effects & timing of meds): Discussion, Individual Session, Demonstrates Understanding/Competency(verbalizes/demonstrates), Written Materials Provided  Monitoring (monitoring blood glucose/other parameters & using results): Discussion, Individual Session, Demonstrates Understanding/Competency (verbalizes/demonstrates)  Acute Complications (preventing, detecting, and treating acute complications): Discussion,  Individual Session, Demonstrates Understanding/Competency (verbalizes/demonstrates)  Chronic Complications (preventing, detecting, and treating chronic complications): Demonstrates Understanding/Competency (verbalizes/demonstrates)  Clinical (diabetes, other pertinent medical history, and relevant comorbidities reviewed during visit): Demonstrates Understanding/Competency (verbalizes/demonstrates)  Cognitive (knowledge of self-management skills, functional health literacy): Demonstrates Understanding/Competency (verbalizes/demonstrates)  Psychosocial (emotional response to diabetes): Discussion, Individual Session, Demonstrates Understanding/Competency (verbalizes/demonstrates)  Diabetes Distress and Support Systems: Discussion, Individual Session, Demonstrates Understanding/Competency (verbalizes/demonstrates)  Behavioral (readiness for change, lifestyle practices, self-care behaviors): Discussion, Individual Session, Demonstrates Understanding/Competency (verbalizes/demonstrates)    Goals  Patient has selected/evaluated goals during today's session: Yes, evaluated  Healthy Eating: % Met(use meal plan - avoid juices and sugar-sweetened beverages, carb portions)  Met Percentage : 75%  Physical Activity: % Met(150min/wk - chair based exercises, indoor walking)  Met Percentage : 50%  Monitoring: % Met(test blood sugar 4x/d - fasting, ac, bed; bring meter to clinic)  Met Percentage : 50%  Medications: % Met(take insulin daily as direct and fu with diabetes ABELINO for medication mgmt)  Met Percentage : 100%    Diabetes Self-Management Support Plan  Exercise/Nutrition: other  Other exercise/nutrition: walking in mall/stores  Review Status: Patient has selected and agrees to support plan.    Diabetes Care Plan/Intervention  Education Plan/Intervention: Individual Follow-Up DSMT(Maintain visits w/ Liliana for medical mgmt.) Encouraged overall progress.     Diabetes Meal Plan  Restrictions: Low Fat, Low Sodium, Low  Potassium  Calories: 1400  Carbohydrate Per Meal: 30-45g  Carbohydrate Per Snack : 7-15g   Protein: ~60 grams/d    Today's Self-Management Care Plan was developed with the patient's input and is based on barriers identified during today's assessment.    The long and short-term goals in the care plan were written with the patient/caregiver's input. The patient has agreed to work toward these goals to improve her overall diabetes control.      The patient received a copy of today's self-management plan and verbalized understanding of the care plan, goals, and all of today's instructions.      The patient was encouraged to communicate with her physician and care team regarding her condition(s) and treatment.  I provided the patient with my contact information today and encouraged her to contact me via phone or patient portal as needed.     Education Units of Time   Time Spent: 30 min

## 2019-10-25 ENCOUNTER — LAB VISIT (OUTPATIENT)
Dept: LAB | Facility: HOSPITAL | Age: 64
End: 2019-10-25
Attending: INTERNAL MEDICINE
Payer: MEDICAID

## 2019-10-25 DIAGNOSIS — N18.4 CKD (CHRONIC KIDNEY DISEASE) STAGE 4, GFR 15-29 ML/MIN: ICD-10-CM

## 2019-10-25 DIAGNOSIS — I50.32 CHRONIC DIASTOLIC CONGESTIVE HEART FAILURE: ICD-10-CM

## 2019-10-25 LAB
ANION GAP SERPL CALC-SCNC: 3 MMOL/L (ref 8–16)
BNP SERPL-MCNC: 291 PG/ML (ref 0–99)
BUN SERPL-MCNC: 17 MG/DL (ref 8–23)
CALCIUM SERPL-MCNC: 8.3 MG/DL (ref 8.7–10.5)
CHLORIDE SERPL-SCNC: 109 MMOL/L (ref 95–110)
CO2 SERPL-SCNC: 32 MMOL/L (ref 23–29)
CREAT SERPL-MCNC: 1.8 MG/DL (ref 0.5–1.4)
EST. GFR  (AFRICAN AMERICAN): 34.3 ML/MIN/1.73 M^2
EST. GFR  (NON AFRICAN AMERICAN): 29.7 ML/MIN/1.73 M^2
GLUCOSE SERPL-MCNC: 57 MG/DL (ref 70–110)
MAGNESIUM SERPL-MCNC: 1.9 MG/DL (ref 1.6–2.6)
POTASSIUM SERPL-SCNC: 4.3 MMOL/L (ref 3.5–5.1)
SODIUM SERPL-SCNC: 144 MMOL/L (ref 136–145)

## 2019-10-25 PROCEDURE — 83735 ASSAY OF MAGNESIUM: CPT

## 2019-10-25 PROCEDURE — 80048 BASIC METABOLIC PNL TOTAL CA: CPT

## 2019-10-25 PROCEDURE — 36415 COLL VENOUS BLD VENIPUNCTURE: CPT

## 2019-10-25 PROCEDURE — 83880 ASSAY OF NATRIURETIC PEPTIDE: CPT

## 2019-11-04 ENCOUNTER — TELEPHONE (OUTPATIENT)
Dept: CARDIOLOGY | Facility: CLINIC | Age: 64
End: 2019-11-04

## 2019-11-13 DIAGNOSIS — E03.4 HYPOTHYROIDISM DUE TO ACQUIRED ATROPHY OF THYROID: ICD-10-CM

## 2019-11-13 RX ORDER — LEVOTHYROXINE SODIUM 137 UG/1
TABLET ORAL
Qty: 90 TABLET | Refills: 0 | Status: SHIPPED | OUTPATIENT
Start: 2019-11-13 | End: 2020-02-10 | Stop reason: SDUPTHER

## 2019-11-18 ENCOUNTER — LAB VISIT (OUTPATIENT)
Dept: LAB | Facility: HOSPITAL | Age: 64
End: 2019-11-18
Attending: INTERNAL MEDICINE
Payer: MEDICAID

## 2019-11-18 ENCOUNTER — OFFICE VISIT (OUTPATIENT)
Dept: CARDIOLOGY | Facility: CLINIC | Age: 64
End: 2019-11-18
Payer: MEDICAID

## 2019-11-18 VITALS
HEIGHT: 61 IN | BODY MASS INDEX: 43.33 KG/M2 | DIASTOLIC BLOOD PRESSURE: 76 MMHG | WEIGHT: 229.5 LBS | SYSTOLIC BLOOD PRESSURE: 138 MMHG | HEART RATE: 64 BPM

## 2019-11-18 DIAGNOSIS — I10 ESSENTIAL HYPERTENSION: ICD-10-CM

## 2019-11-18 DIAGNOSIS — I10 HYPERTENSION, UNSPECIFIED TYPE: ICD-10-CM

## 2019-11-18 DIAGNOSIS — E78.49 OTHER HYPERLIPIDEMIA: ICD-10-CM

## 2019-11-18 DIAGNOSIS — N18.4 CKD (CHRONIC KIDNEY DISEASE) STAGE 4, GFR 15-29 ML/MIN: ICD-10-CM

## 2019-11-18 DIAGNOSIS — R06.09 DOE (DYSPNEA ON EXERTION): ICD-10-CM

## 2019-11-18 DIAGNOSIS — I50.32 CHRONIC DIASTOLIC CONGESTIVE HEART FAILURE: Primary | ICD-10-CM

## 2019-11-18 DIAGNOSIS — I31.39 PERICARDIAL EFFUSION: ICD-10-CM

## 2019-11-18 DIAGNOSIS — I50.32 CHRONIC DIASTOLIC CONGESTIVE HEART FAILURE: ICD-10-CM

## 2019-11-18 LAB
ANION GAP SERPL CALC-SCNC: 7 MMOL/L (ref 8–16)
BUN SERPL-MCNC: 46 MG/DL (ref 8–23)
CALCIUM SERPL-MCNC: 8.5 MG/DL (ref 8.7–10.5)
CHLORIDE SERPL-SCNC: 106 MMOL/L (ref 95–110)
CO2 SERPL-SCNC: 30 MMOL/L (ref 23–29)
CREAT SERPL-MCNC: 2.6 MG/DL (ref 0.5–1.4)
EST. GFR  (AFRICAN AMERICAN): 22 ML/MIN/1.73 M^2
EST. GFR  (NON AFRICAN AMERICAN): 19.1 ML/MIN/1.73 M^2
GLUCOSE SERPL-MCNC: 139 MG/DL (ref 70–110)
MAGNESIUM SERPL-MCNC: 2.1 MG/DL (ref 1.6–2.6)
POTASSIUM SERPL-SCNC: 4.8 MMOL/L (ref 3.5–5.1)
SODIUM SERPL-SCNC: 143 MMOL/L (ref 136–145)

## 2019-11-18 PROCEDURE — 99999 PR PBB SHADOW E&M-EST. PATIENT-LVL III: ICD-10-PCS | Mod: PBBFAC,,, | Performed by: INTERNAL MEDICINE

## 2019-11-18 PROCEDURE — 99213 OFFICE O/P EST LOW 20 MIN: CPT | Mod: PBBFAC | Performed by: INTERNAL MEDICINE

## 2019-11-18 PROCEDURE — 99999 PR PBB SHADOW E&M-EST. PATIENT-LVL III: CPT | Mod: PBBFAC,,, | Performed by: INTERNAL MEDICINE

## 2019-11-18 PROCEDURE — 99214 PR OFFICE/OUTPT VISIT, EST, LEVL IV, 30-39 MIN: ICD-10-PCS | Mod: S$PBB,,, | Performed by: INTERNAL MEDICINE

## 2019-11-18 PROCEDURE — 36415 COLL VENOUS BLD VENIPUNCTURE: CPT

## 2019-11-18 PROCEDURE — 83735 ASSAY OF MAGNESIUM: CPT

## 2019-11-18 PROCEDURE — 80048 BASIC METABOLIC PNL TOTAL CA: CPT

## 2019-11-18 PROCEDURE — 99214 OFFICE O/P EST MOD 30 MIN: CPT | Mod: S$PBB,,, | Performed by: INTERNAL MEDICINE

## 2019-11-18 PROCEDURE — 83880 ASSAY OF NATRIURETIC PEPTIDE: CPT

## 2019-11-18 RX ORDER — LISINOPRIL 5 MG/1
5 TABLET ORAL DAILY
Qty: 90 TABLET | Refills: 1 | Status: ON HOLD | OUTPATIENT
Start: 2019-11-18 | End: 2020-01-20 | Stop reason: ALTCHOICE

## 2019-11-18 NOTE — PROGRESS NOTES
Subjective:   Patient ID:  Diamond Das is a 64 y.o. female who presents for cardiac consult of Follow-up (4 wk)      Shortness of Breath   Associated symptoms include leg swelling. Pertinent negatives include no chest pain.   Follow-up   Pertinent negatives include no chest pain or nausea.     The patient came in today for cardiac consult of Follow-up (4 wk)    Referring Physician: Andrey Perrin MD   Reason for consult: HTN, CHF    Diamond Das is a 64 y.o. female with medical conditions diastolic CHF, pericardial effusion, HTN, HLD, IDDM, cryptogenic cirrhosis, s/p TIPS, ascites resolved presents for CV follow up.     Pt of Dr. Shaw, last seen 2/9/18  No smoking/drinking  Last echo in  normal EF and RVF, grade II DD.  EKG today NSR poor R progression on anterior leads  Chronic weakness and fatigue. Had PNA in   Pain on lower chest bilateral for few months, worse with exercise, resolved after resting. Deep breathing made the pain worse. No pain at sleep. Associated dyspnea, N/V, palpitation and dizziness. No radiation. ASA and tylenol not really helped the pain.    3/1/19  She has been having more ascites and concern for cardiac etiologies. She was also started on Reglan, concern for prolonged Qtc, recent Qtc 475 ms. Has been feeling better with reglan and lactulose. Is dyspneic, chronically on oxygen at night and also has portable oxygen when she has to do a lot walking. Takes lasix 40 mg once/day now, was on 80mg daily. Active at home, dresses and bathes her self. Cant walk or stand to too long.     8/26/19  2D ECHO with grade 1 DD, normal Bi V function, Small pericardial effusion without tamponade. She has mild SOB at times with edema but improved lately. She will see hepatology as well. No Chest pain.   Has been taking lasix extra doses PRN and improved symptoms.     10/16/19  [facility] follow up  - Non toxic appearing elderly female here for worsening NICHOLE and shortness of breath.  Known h/o CHF, f/b cardiologist in [facility]. Work up is c/w acute on chronic chf exacerbation. She, unfortunately, does not follow her weights so not clear if significant change recently. CXR with likely signs of volume overload. She was given diuresis and had Lummi score of 0. Felt improved and prefers discharge with close OP f/u with her cardiologist. She was ambulated and did not significantly desat and tolerated it well.  No she is on lasix, feels better. BNP today is 453, needs to double lasix next 3-4 days for further diuresis.     11/18/19   Increased diuretics last visit. She felt better then and had weight loss as well. Still has NICHOLE but improved. She went to [facility] ago - presented to the ED for fall secondary to hypoglycemic episode. Pt has had 3 previous episodes of hypoglycemia requiring hospitalizations in the past with sxs of lightheadedness. CT head was unremarkable, CXR revealed improvement in CHF compared to previous study. Due to timeline of fall with insulin administration and Hx of cryptogenic cirrhosis, it is likely that pt could not compensate after receiving her insulin dosing.    BP meds were stopped, will get labs and restart lisinopril.       Patient feels no chest pain, no PND, no palpitation, no dizziness, no syncope, no CNS symptoms.    Patient has dec exercise tolerance.    Patient is compliant with medications.    2D ECHO 06/14/2019     CONCLUSIONS     1 - Normal left ventricular systolic function (EF 60-65%).     2 - Impaired LV relaxation, normal LAP (grade 1 diastolic dysfunction).     3 - Normal right ventricular systolic function .     4 - No wall motion abnormalities.     5 - Concentric hypertrophy.     6 - Trivial mitral regurgitation.     7 - Trivial tricuspid regurgitation.     8 - Small pericardial effusion.     9 - No echo evidence of tamponade.       2/21/19 ECG  Vent. Rate : 050 BPM     Atrial Rate : 050 BPM     P-R Int : 146 ms          QRS Dur : 086 ms       QT Int : 522 ms       P-R-T Axes : 045 -03 090 degrees     QTc Int : 475 ms    Sinus bradycardia  Cannot rule out Anterior infarct (cited on or before 21-FEB-2019)  Abnormal ECG        Past Medical History:   Diagnosis Date    Ascites     Breast pain, left 1/4/2018    Cataract     CHF (congestive heart failure)     Cirrhosis     Cough     Diabetes mellitus     Diabetes mellitus, type 2     Gastroparesis     GERD (gastroesophageal reflux disease)     Hyperlipidemia     Hypertension     Hypotension 2/21/2019    Liver disease     Pneumonia of right middle lobe due to infectious organism 12/19/2017    Renal disorder     Sleep apnea     Thyroid disease        Past Surgical History:   Procedure Laterality Date    CHOLECYSTECTOMY      COLONOSCOPY N/A 9/4/2019    Procedure: COLONOSCOPY;  Surgeon: Marnie Elmore MD;  Location: [facility];  Service: Endoscopy;  Laterality: N/A;    ERCP      LIVER BIOPSY      TUBAL LIGATION      UPPER GASTROINTESTINAL ENDOSCOPY         Social History     Tobacco Use    Smoking status: Never Smoker    Smokeless tobacco: Never Used   Substance Use Topics    Alcohol use: No     Frequency: Never    Drug use: No       Family History   Problem Relation Age of Onset    Cancer Mother     Breast cancer Mother     Heart disease Father     Aneurysm Sister     Heart disease Brother     No Known Problems Maternal Grandmother     Cancer Maternal Grandfather     Sarcoidosis Sister        Patient's Medications   New Prescriptions    No medications on file   Previous Medications    ASPIRIN (ECOTRIN) 81 MG EC TABLET    Take 1 tablet (81 mg total) by mouth once daily.    DOXAZOSIN (CARDURA) 2 MG TABLET    Take 1 tablet (2 mg total) by mouth every evening.    FUROSEMIDE (LASIX) 40 MG TABLET    Take 1 tablet (40 mg total) by mouth every evening. Take extra dose for leg swelling,weight gain 3 lbs, worsening breathing    GLUCAGON, HUMAN RECOMBINANT, (GLUCAGON EMERGENCY KIT,  HUMAN,) 1 MG SOLR    Inject 1 mg into the muscle as needed.    INSULIN LISPRO (ADMELOG SOLOSTAR U-100 INSULIN) 100 UNIT/ML INPN PEN    Titrate up to 14 units subcutaneously three times a day 10-15 min before meals as directed in after visit summary    LANTUS SOLOSTAR U-100 INSULIN GLARGINE 100 UNITS/ML (3ML) SUBQ PEN    INJECT 40 UNITS INTO THE SKIN EVERY EVENING.  TITRATE UP TO 30 UNITS NIGHTLY AS DIRECTED ON INSTRUCTION SHEET    LEVOTHYROXINE (SYNTHROID) 137 MCG TAB TABLET    TAKE 1 TABLET BY MOUTH BEFORE BREAKFAST    ONDANSETRON (ZOFRAN) 4 MG TABLET    TAKE 1 TABLET BY MOUTH EVERY 8 HOURS AS NEEDED    PANTOPRAZOLE (PROTONIX) 20 MG TABLET    Take 2 tablets (40 mg total) by mouth once daily.    SIMVASTATIN (ZOCOR) 40 MG TABLET    Take 1 tablet (40 mg total) by mouth once daily.    VITAMIN E 400 UNIT CAPSULE    Take 400 Units by mouth every morning.    XIFAXAN 550 MG TAB    TAKE 1 TABLET BY MOUTH TWICE DAILY   Modified Medications    Modified Medication Previous Medication    LISINOPRIL (PRINIVIL,ZESTRIL) 5 MG TABLET lisinopril (PRINIVIL,ZESTRIL) 5 MG tablet       Take 1 tablet (5 mg total) by mouth once daily.    Take 1 tablet (5 mg total) by mouth once daily.   Discontinued Medications    AMLODIPINE (NORVASC) 10 MG TABLET    Take 1 tablet (10 mg total) by mouth once daily.    CARVEDILOL (COREG) 3.125 MG TABLET    Take 1 tablet (3.125 mg total) by mouth 2 (two) times daily.       Review of Systems   Constitutional: Negative.    HENT: Negative.    Eyes: Negative.    Respiratory: Positive for shortness of breath.    Cardiovascular: Positive for leg swelling. Negative for chest pain and palpitations.   Gastrointestinal: Negative for nausea.   Genitourinary: Negative.    Musculoskeletal: Negative.    Skin: Negative.    Neurological: Negative.    Endo/Heme/Allergies: Negative.    Psychiatric/Behavioral: Negative.    All 12 systems otherwise negative.      Wt Readings from Last 3 Encounters:   11/18/19 104.1 kg (229 lb 8  "oz)   10/22/19 105.8 kg (233 lb 4 oz)   10/16/19 109.4 kg (241 lb 2.9 oz)     Temp Readings from Last 3 Encounters:   10/16/19 96.4 °F (35.8 °C) (Tympanic)   09/04/19 98.1 °F (36.7 °C) (Temporal)   07/16/19 96.2 °F (35.7 °C) (Tympanic)     BP Readings from Last 3 Encounters:   11/18/19 138/76   10/16/19 138/70   10/16/19 (!) 133/58     Pulse Readings from Last 3 Encounters:   11/18/19 64   10/16/19 66   10/16/19 68       /76 (BP Location: Left arm)   Pulse 64   Ht 5' 1" (1.549 m)   Wt 104.1 kg (229 lb 8 oz)   LMP  (LMP Unknown)   BMI 43.36 kg/m²     Objective:   Physical Exam   Constitutional: She is oriented to person, place, and time. She appears well-developed and well-nourished. No distress.   HENT:   Head: Normocephalic and atraumatic.   Nose: Nose normal.   Mouth/Throat: Oropharynx is clear and moist.   Eyes: Conjunctivae and EOM are normal. No scleral icterus.   Neck: Normal range of motion. Neck supple. No JVD present. No thyromegaly present.   Cardiovascular: Normal rate, regular rhythm, S1 normal and S2 normal. Exam reveals no gallop, no S3, no S4 and no friction rub.   Murmur heard.  Pulmonary/Chest: Effort normal and breath sounds normal. No stridor. No respiratory distress. She has no wheezes. She has no rales. She exhibits no tenderness.   Abdominal: Soft. Bowel sounds are normal. She exhibits no distension and no mass. There is no tenderness. There is no rebound.   Genitourinary:   Genitourinary Comments: Deferred   Musculoskeletal: Normal range of motion. She exhibits edema (mild). She exhibits no tenderness or deformity.   Lymphadenopathy:     She has no cervical adenopathy.   Neurological: She is alert and oriented to person, place, and time. She exhibits normal muscle tone. Coordination normal.   Skin: Skin is warm and dry. No rash noted. She is not diaphoretic. No erythema. No pallor.   Psychiatric: She has a normal mood and affect. Her behavior is normal. Judgment and thought content " normal.   Nursing note and vitals reviewed.      Lab Results   Component Value Date     10/25/2019    K 4.3 10/25/2019     10/25/2019    CO2 32 (H) 10/25/2019    BUN 17 10/25/2019    CREATININE 1.8 (H) 10/25/2019    GLU 57 (L) 10/25/2019    HGBA1C 7.0 (H) 10/16/2019    MG 1.9 10/25/2019    AST 40 10/16/2019    ALT 21 10/16/2019    ALBUMIN 2.3 (L) 10/16/2019    PROT 6.0 10/16/2019    BILITOT 0.3 10/16/2019    WBC 6.67 09/18/2019    HGB 12.1 09/18/2019    HCT 38.2 09/18/2019    MCV 93 09/18/2019     09/18/2019    INR 1.1 09/18/2019    TSH 2.271 01/03/2019    CHOL 205 (H) 12/06/2018    HDL 41 12/06/2018    LDLCALC 145.8 12/06/2018    TRIG 91 12/06/2018     (H) 10/25/2019     Assessment:      1. Chronic diastolic congestive heart failure    2. Essential hypertension    3. Other hyperlipidemia    4. Pericardial effusion    5. CKD (chronic kidney disease) stage 4, GFR 15-29 ml/min    6. Hypertension, unspecified type    7. NICHOLE (dyspnea on exertion)        Plan:   1. Chronic diastolic HF, recent exac  - lasix, daily weights, low salt diet  - recent ECG WNL qtc   - repeat BNP, Mg, BMP    2. HTN  - cont meds - only Lisinopril and lasix     3. HLD  - cont statin    4. DM2  - cont meds per PCP  - sugars stable now 90s-110    5. Cirrhosis  - cont tx per PCP/Hepatology  - not transplant candidate currently     6. KRISTAL  - needs CPAP, was not using it before     7. Obesity  - rec weight loss with diet/exercise     8. Pericardial effusion  - small, sec to cirrhosis/CHF  - no tamponade clinically       Thank you for allowing me to participate in this patient's care. Please do not hesitate to contact me with any questions or concerns. Consult note has been forwarded to the referral physician.

## 2019-11-19 ENCOUNTER — TELEPHONE (OUTPATIENT)
Dept: CARDIOLOGY | Facility: CLINIC | Age: 64
End: 2019-11-19

## 2019-11-19 LAB — BNP SERPL-MCNC: 793 PG/ML (ref 0–99)

## 2019-11-19 NOTE — TELEPHONE ENCOUNTER
----- Message from Marcso Ramos MD sent at 11/19/2019  9:57 AM CST -----  Please call the patient regarding her abnormal result. Worse BNP due to extra fluid, need to double lasix for 3 days and monitor BP and weights. Kidney function is worse too, stop taking Lisinopril that we ordered yesterday. Follow up next week in clinic to repeat labs/vitals if she feels worse needs to go to ER for IV diuretics and labs and possible admission.

## 2019-11-19 NOTE — TELEPHONE ENCOUNTER
Spoke with patient regarding her abnormal result. Worse BNP due to extra fluid, need to double lasix for 3 days and monitor BP and weights. Kidney function is worse too, stop taking Lisinopril that we ordered yesterday. Follow up next week in clinic to repeat labs/vitals if she feels worse needs to go to ER for IV diuretics and labs and possible admission. Patient states understanding.

## 2019-12-10 DIAGNOSIS — K76.82 HEPATIC ENCEPHALOPATHY: ICD-10-CM

## 2019-12-10 RX ORDER — RIFAXIMIN 550 MG/1
TABLET ORAL
Qty: 60 TABLET | Refills: 1 | Status: ON HOLD | OUTPATIENT
Start: 2019-12-10 | End: 2020-01-20 | Stop reason: ALTCHOICE

## 2019-12-10 NOTE — TELEPHONE ENCOUNTER
----- Message from Byron Arango sent at 12/10/2019  3:23 PM CST -----  Contact: Pt  Please call Diamond regarding her medication at [phone] (home).

## 2019-12-27 DIAGNOSIS — E11.9 TYPE 2 DIABETES MELLITUS WITHOUT COMPLICATION: ICD-10-CM

## 2020-01-03 DIAGNOSIS — I50.9 CHRONIC CONGESTIVE HEART FAILURE, UNSPECIFIED HEART FAILURE TYPE: Primary | ICD-10-CM

## 2020-01-03 DIAGNOSIS — R09.02 EXERCISE HYPOXEMIA: ICD-10-CM

## 2020-01-04 RX ORDER — ONDANSETRON 4 MG/1
TABLET, FILM COATED ORAL
Qty: 30 TABLET | Refills: 0 | Status: SHIPPED | OUTPATIENT
Start: 2020-01-04 | End: 2020-04-27

## 2020-01-06 ENCOUNTER — OFFICE VISIT (OUTPATIENT)
Dept: CARDIOLOGY | Facility: CLINIC | Age: 65
End: 2020-01-06
Payer: MEDICAID

## 2020-01-06 ENCOUNTER — CLINICAL SUPPORT (OUTPATIENT)
Dept: CARDIOLOGY | Facility: CLINIC | Age: 65
End: 2020-01-06
Payer: MEDICAID

## 2020-01-06 VITALS
BODY MASS INDEX: 47.07 KG/M2 | HEART RATE: 77 BPM | WEIGHT: 249.13 LBS | SYSTOLIC BLOOD PRESSURE: 140 MMHG | DIASTOLIC BLOOD PRESSURE: 70 MMHG

## 2020-01-06 DIAGNOSIS — E78.49 OTHER HYPERLIPIDEMIA: ICD-10-CM

## 2020-01-06 DIAGNOSIS — R60.0 BILATERAL LOWER EXTREMITY EDEMA: ICD-10-CM

## 2020-01-06 DIAGNOSIS — I50.32 CHRONIC DIASTOLIC CONGESTIVE HEART FAILURE: Primary | ICD-10-CM

## 2020-01-06 DIAGNOSIS — G47.33 OSA ON CPAP: ICD-10-CM

## 2020-01-06 DIAGNOSIS — I50.9 CHRONIC CONGESTIVE HEART FAILURE, UNSPECIFIED HEART FAILURE TYPE: ICD-10-CM

## 2020-01-06 DIAGNOSIS — E11.59 TYPE 2 DIABETES MELLITUS WITH OTHER CIRCULATORY COMPLICATION, WITHOUT LONG-TERM CURRENT USE OF INSULIN: ICD-10-CM

## 2020-01-06 DIAGNOSIS — Z78.0 POSTMENOPAUSAL: ICD-10-CM

## 2020-01-06 DIAGNOSIS — E03.4 HYPOTHYROIDISM DUE TO ACQUIRED ATROPHY OF THYROID: ICD-10-CM

## 2020-01-06 DIAGNOSIS — N18.4 CKD (CHRONIC KIDNEY DISEASE) STAGE 4, GFR 15-29 ML/MIN: ICD-10-CM

## 2020-01-06 DIAGNOSIS — I10 ESSENTIAL HYPERTENSION: ICD-10-CM

## 2020-01-06 PROCEDURE — 93010 ELECTROCARDIOGRAM REPORT: CPT | Mod: S$PBB,,, | Performed by: INTERNAL MEDICINE

## 2020-01-06 PROCEDURE — 99213 OFFICE O/P EST LOW 20 MIN: CPT | Mod: PBBFAC,25 | Performed by: INTERNAL MEDICINE

## 2020-01-06 PROCEDURE — 99999 PR PBB SHADOW E&M-EST. PATIENT-LVL III: CPT | Mod: PBBFAC,,, | Performed by: INTERNAL MEDICINE

## 2020-01-06 PROCEDURE — 93005 ELECTROCARDIOGRAM TRACING: CPT | Mod: PBBFAC | Performed by: INTERNAL MEDICINE

## 2020-01-06 PROCEDURE — 99214 PR OFFICE/OUTPT VISIT, EST, LEVL IV, 30-39 MIN: ICD-10-PCS | Mod: S$PBB,25,, | Performed by: INTERNAL MEDICINE

## 2020-01-06 PROCEDURE — 99999 PR PBB SHADOW E&M-EST. PATIENT-LVL III: ICD-10-PCS | Mod: PBBFAC,,, | Performed by: INTERNAL MEDICINE

## 2020-01-06 PROCEDURE — 93010 EKG 12-LEAD: ICD-10-PCS | Mod: S$PBB,,, | Performed by: INTERNAL MEDICINE

## 2020-01-06 PROCEDURE — 99214 OFFICE O/P EST MOD 30 MIN: CPT | Mod: S$PBB,25,, | Performed by: INTERNAL MEDICINE

## 2020-01-06 RX ORDER — FUROSEMIDE 40 MG/1
40 TABLET ORAL DAILY
Qty: 90 TABLET | Refills: 1 | Status: SHIPPED | OUTPATIENT
Start: 2020-01-06 | End: 2020-02-10 | Stop reason: SDUPTHER

## 2020-01-06 NOTE — PROGRESS NOTES
Subjective:   Patient ID:  Diamond Das is a 64 y.o. female who presents for cardiac consult of Shortness of Breath      Follow-up   Pertinent negatives include no chest pain or nausea.   Shortness of Breath   Associated symptoms include leg swelling. Pertinent negatives include no chest pain.     The patient came in today for cardiac consult of Shortness of Breath    Referring Physician: Andrey Perrin MD   Reason for consult: HTN, CHF    Diamond Das is a 64 y.o. female with medical conditions diastolic CHF, pericardial effusion, HTN, HLD, IDDM, cryptogenic cirrhosis, s/p TIPS, ascites resolved presents for CV follow up.     Pt of Dr. Shaw, last seen 2/9/18  No smoking/drinking  Last echo in  normal EF and RVF, grade II DD.  EKG today NSR poor R progression on anterior leads  Chronic weakness and fatigue. Had PNA in   Pain on lower chest bilateral for few months, worse with exercise, resolved after resting. Deep breathing made the pain worse. No pain at sleep. Associated dyspnea, N/V, palpitation and dizziness. No radiation. ASA and tylenol not really helped the pain.    3/1/19  She has been having more ascites and concern for cardiac etiologies. She was also started on Reglan, concern for prolonged Qtc, recent Qtc 475 ms. Has been feeling better with reglan and lactulose. Is dyspneic, chronically on oxygen at night and also has portable oxygen when she has to do a lot walking. Takes lasix 40 mg once/day now, was on 80mg daily. Active at home, dresses and bathes her self. Cant walk or stand to too long.     8/26/19  2D ECHO with grade 1 DD, normal Bi V function, Small pericardial effusion without tamponade. She has mild SOB at times with edema but improved lately. She will see hepatology as well. No Chest pain.   Has been taking lasix extra doses PRN and improved symptoms.     10/16/19  [facility] ER follow up  - Non toxic appearing elderly female here for worsening NICHOLE and shortness of  breath. Known h/o CHF, f/b cardiologist in [facility]. Work up is c/w acute on chronic chf exacerbation. She, unfortunately, does not follow her weights so not clear if significant change recently. CXR with likely signs of volume overload. She was given diuresis and had [place] score of 0. Felt improved and prefers discharge with close OP f/u with her cardiologist. She was ambulated and did not significantly desat and tolerated it well.  No she is on lasix, feels better. BNP today is 453, needs to double lasix next 3-4 days for further diuresis.     11/18/19   Increased diuretics last visit. She felt better then and had weight loss as well. Still has NICHOLE but improved. She went to [facility] 2 weeks ago - presented to the ED for fall secondary to hypoglycemic episode. Pt has had 3 previous episodes of hypoglycemia requiring hospitalizations in the past with sxs of lightheadedness. CT head was unremarkable, CXR revealed improvement in CHF compared to previous study. Due to timeline of fall with insulin administration and Hx of cryptogenic cirrhosis, it is likely that pt could not compensate after receiving her insulin dosing.  BP meds were stopped, will get labs and restart lisinopril.     1/6/20  Breathing has been stable with LE edema. Last visit she doubled lasix for 3 days with min improvement. Has not had much relief lately.   ECG - NSR, poor RWP, lateral TWI      Patient feels no chest pain, no PND, no palpitation, no dizziness, no syncope, no CNS symptoms.    Patient has dec exercise tolerance.    Patient is compliant with medications.    2D ECHO 06/14/2019     CONCLUSIONS     1 - Normal left ventricular systolic function (EF 60-65%).     2 - Impaired LV relaxation, normal LAP (grade 1 diastolic dysfunction).     3 - Normal right ventricular systolic function .     4 - No wall motion abnormalities.     5 - Concentric hypertrophy.     6 - Trivial mitral regurgitation.     7 - Trivial tricuspid regurgitation.     8  - Small pericardial effusion.     9 - No echo evidence of tamponade.             Past Medical History:   Diagnosis Date    Ascites     Breast pain, left 1/4/2018    Cataract     CHF (congestive heart failure)     Cirrhosis     Cough     Diabetes mellitus     Diabetes mellitus, type 2     Gastroparesis     GERD (gastroesophageal reflux disease)     Hyperlipidemia     Hypertension     Hypotension 2/21/2019    Liver disease     Pneumonia of right middle lobe due to infectious organism 12/19/2017    Renal disorder     Sleep apnea     Thyroid disease        Past Surgical History:   Procedure Laterality Date    CHOLECYSTECTOMY      COLONOSCOPY N/A 9/4/2019    Procedure: COLONOSCOPY;  Surgeon: Marnie Elmore MD;  Location: [facility];  Service: Endoscopy;  Laterality: N/A;    ERCP      LIVER BIOPSY      TUBAL LIGATION      UPPER GASTROINTESTINAL ENDOSCOPY         Social History     Tobacco Use    Smoking status: Never Smoker    Smokeless tobacco: Never Used   Substance Use Topics    Alcohol use: No     Frequency: Never    Drug use: No       Family History   Problem Relation Age of Onset    Cancer Mother     Breast cancer Mother     Heart disease Father     Aneurysm Sister     Heart disease Brother     No Known Problems Maternal Grandmother     Cancer Maternal Grandfather     Sarcoidosis Sister        Patient's Medications   New Prescriptions    No medications on file   Previous Medications    ASPIRIN (ECOTRIN) 81 MG EC TABLET    Take 1 tablet (81 mg total) by mouth once daily.    DOXAZOSIN (CARDURA) 2 MG TABLET    Take 1 tablet (2 mg total) by mouth every evening.    GLUCAGON, HUMAN RECOMBINANT, (GLUCAGON EMERGENCY KIT, HUMAN,) 1 MG SOLR    Inject 1 mg into the muscle as needed.    INSULIN LISPRO (ADMELOG SOLOSTAR U-100 INSULIN) 100 UNIT/ML INPN PEN    Titrate up to 14 units subcutaneously three times a day 10-15 min before meals as directed in after visit summary    MONI GAGE  U-100 INSULIN GLARGINE 100 UNITS/ML (3ML) SUBQ PEN    INJECT 40 UNITS INTO THE SKIN EVERY EVENING.  TITRATE UP TO 30 UNITS NIGHTLY AS DIRECTED ON INSTRUCTION SHEET    LEVOTHYROXINE (SYNTHROID) 137 MCG TAB TABLET    TAKE 1 TABLET BY MOUTH BEFORE BREAKFAST    LISINOPRIL (PRINIVIL,ZESTRIL) 5 MG TABLET    Take 1 tablet (5 mg total) by mouth once daily.    ONDANSETRON (ZOFRAN) 4 MG TABLET    TAKE 1 TABLET BY MOUTH EVERY 8 HOURS AS NEEDED    PANTOPRAZOLE (PROTONIX) 20 MG TABLET    Take 2 tablets (40 mg total) by mouth once daily.    SIMVASTATIN (ZOCOR) 40 MG TABLET    Take 1 tablet (40 mg total) by mouth once daily.    VITAMIN E 400 UNIT CAPSULE    Take 400 Units by mouth every morning.    XIFAXAN 550 MG TAB    TAKE 1 TABLET BY MOUTH TWICE DAILY   Modified Medications    Modified Medication Previous Medication    FUROSEMIDE (LASIX) 40 MG TABLET furosemide (LASIX) 40 MG tablet       Take 1 tablet (40 mg total) by mouth once daily. Take 80 mg next 3 days    Take 1 tablet (40 mg total) by mouth every evening. Take extra dose for leg swelling,weight gain 3 lbs, worsening breathing   Discontinued Medications    No medications on file       Review of Systems   Constitutional: Negative.    HENT: Negative.    Eyes: Negative.    Respiratory: Positive for shortness of breath.    Cardiovascular: Positive for leg swelling. Negative for chest pain and palpitations.   Gastrointestinal: Negative for nausea.   Genitourinary: Negative.    Musculoskeletal: Negative.    Skin: Negative.    Neurological: Negative.    Endo/Heme/Allergies: Negative.    Psychiatric/Behavioral: Negative.    All 12 systems otherwise negative.      Wt Readings from Last 3 Encounters:   01/06/20 113 kg (249 lb 1.9 oz)   11/18/19 104.1 kg (229 lb 8 oz)   10/22/19 105.8 kg (233 lb 4 oz)     Temp Readings from Last 3 Encounters:   10/16/19 96.4 °F (35.8 °C) (Tympanic)   09/04/19 98.1 °F (36.7 °C) (Temporal)   07/16/19 96.2 °F (35.7 °C) (Tympanic)     BP Readings from  Last 3 Encounters:   01/06/20 (!) 140/70   11/18/19 138/76   10/16/19 138/70     Pulse Readings from Last 3 Encounters:   01/06/20 77   11/18/19 64   10/16/19 66       BP (!) 140/70 (BP Location: Left arm, Patient Position: Sitting, BP Method: Medium (Manual))   Pulse 77   Wt 113 kg (249 lb 1.9 oz)   LMP  (LMP Unknown)   BMI 47.07 kg/m²     Objective:   Physical Exam   Constitutional: She is oriented to person, place, and time. She appears well-developed and well-nourished. No distress.   HENT:   Head: Normocephalic and atraumatic.   Nose: Nose normal.   Mouth/Throat: Oropharynx is clear and moist.   Eyes: Conjunctivae and EOM are normal. No scleral icterus.   Neck: Normal range of motion. Neck supple. No JVD present. No thyromegaly present.   Cardiovascular: Normal rate, regular rhythm, S1 normal and S2 normal. Exam reveals no gallop, no S3, no S4 and no friction rub.   Murmur heard.  Pulmonary/Chest: Effort normal and breath sounds normal. No stridor. No respiratory distress. She has no wheezes. She has no rales. She exhibits no tenderness.   Abdominal: Soft. Bowel sounds are normal. She exhibits no distension and no mass. There is no tenderness. There is no rebound.   Genitourinary:   Genitourinary Comments: Deferred   Musculoskeletal: Normal range of motion. She exhibits edema (mild). She exhibits no tenderness or deformity.   Lymphadenopathy:     She has no cervical adenopathy.   Neurological: She is alert and oriented to person, place, and time. She exhibits normal muscle tone. Coordination normal.   Skin: Skin is warm and dry. No rash noted. She is not diaphoretic. No erythema. No pallor.   Psychiatric: She has a normal mood and affect. Her behavior is normal. Judgment and thought content normal.   Nursing note and vitals reviewed.      Lab Results   Component Value Date     11/18/2019    K 4.8 11/18/2019     11/18/2019    CO2 30 (H) 11/18/2019    BUN 46 (H) 11/18/2019    CREATININE 2.6 (H)  11/18/2019     (H) 11/18/2019    HGBA1C 7.0 (H) 10/16/2019    MG 2.1 11/18/2019    AST 40 10/16/2019    ALT 21 10/16/2019    ALBUMIN 2.3 (L) 10/16/2019    PROT 6.0 10/16/2019    BILITOT 0.3 10/16/2019    WBC 6.67 09/18/2019    HGB 12.1 09/18/2019    HCT 38.2 09/18/2019    MCV 93 09/18/2019     09/18/2019    INR 1.1 09/18/2019    TSH 2.271 01/03/2019    CHOL 205 (H) 12/06/2018    HDL 41 12/06/2018    LDLCALC 145.8 12/06/2018    TRIG 91 12/06/2018     (H) 11/18/2019     Assessment:      1. Chronic diastolic congestive heart failure    2. Essential hypertension    3. Other hyperlipidemia    4. Postmenopausal    5. CKD (chronic kidney disease) stage 4, GFR 15-29 ml/min    6. Type 2 diabetes mellitus with other circulatory complication, without long-term current use of insulin    7. Hypothyroidism due to acquired atrophy of thyroid    8. Bilateral lower extremity edema    9. KRISTAL on CPAP        Plan:   1. Chronic diastolic HF  - lasix, daily weights, low salt diet - take 80 mg lasix next 3 days  - recent ECG WNL qtc   - repeat BNP, Mg, BMP - Friday  - daily compression stockings    2. HTN  - cont meds - only Lisinopril and lasix     3. HLD  - cont statin    4. DM2  - cont meds per PCP  - sugars stable now 90s-110    5. Cirrhosis  - cont tx per PCP/Hepatology  - not transplant candidate currently     6. KRISTAL  - needs CPAP, was not using it before     7. Obesity  - rec weight loss with diet/exercise     8. Pericardial effusion  - small, sec to cirrhosis/CHF  - no tamponade clinically       Thank you for allowing me to participate in this patient's care. Please do not hesitate to contact me with any questions or concerns. Consult note has been forwarded to the referral physician.

## 2020-01-07 DIAGNOSIS — E11.59 TYPE 2 DIABETES MELLITUS WITH OTHER CIRCULATORY COMPLICATION, WITHOUT LONG-TERM CURRENT USE OF INSULIN: Primary | ICD-10-CM

## 2020-01-08 ENCOUNTER — TELEPHONE (OUTPATIENT)
Dept: PULMONOLOGY | Facility: CLINIC | Age: 65
End: 2020-01-08

## 2020-01-08 ENCOUNTER — TELEPHONE (OUTPATIENT)
Dept: HEPATOLOGY | Facility: CLINIC | Age: 65
End: 2020-01-08

## 2020-01-08 NOTE — TELEPHONE ENCOUNTER
Contacted patient and attempted to schedule follow up with our clinic. Patient stated that she would like to see Doctor Samuel and her only, I informed patient that she is no longer with [facility] and the patient stated she only want her to see her and follow her care. I informed patient again that Doctor Samuel is no longer with [facility] and we have other physicians that can and are willing to follow her care but she disagreed and stated thank you and hung up.

## 2020-01-08 NOTE — TELEPHONE ENCOUNTER
----- Message from Susan Carrizales sent at 1/8/2020  2:02 PM CST -----  ..Type:  Sooner Apoointment Request    Caller is requesting a sooner appointment.  Caller declined first available appointment listed below.  Caller will not accept being placed on the waitlist and is requesting a message be sent to doctor.  Name of Caller:patient   When is the first available appointment?02/07  Symptoms:shortness of breath  Would the patient rather a call back or a response via [facility]?   Best Call Back Number:.. [phone]    Additional Information:

## 2020-01-10 ENCOUNTER — LAB VISIT (OUTPATIENT)
Dept: LAB | Facility: HOSPITAL | Age: 65
End: 2020-01-10
Attending: INTERNAL MEDICINE
Payer: MEDICAID

## 2020-01-10 DIAGNOSIS — I50.32 CHRONIC DIASTOLIC CONGESTIVE HEART FAILURE: ICD-10-CM

## 2020-01-10 LAB
ANION GAP SERPL CALC-SCNC: 7 MMOL/L (ref 8–16)
BNP SERPL-MCNC: 1010 PG/ML (ref 0–99)
BUN SERPL-MCNC: 28 MG/DL (ref 8–23)
CALCIUM SERPL-MCNC: 8.5 MG/DL (ref 8.7–10.5)
CHLORIDE SERPL-SCNC: 110 MMOL/L (ref 95–110)
CO2 SERPL-SCNC: 27 MMOL/L (ref 23–29)
CREAT SERPL-MCNC: 1.8 MG/DL (ref 0.5–1.4)
EST. GFR  (AFRICAN AMERICAN): 34.3 ML/MIN/1.73 M^2
EST. GFR  (NON AFRICAN AMERICAN): 29.7 ML/MIN/1.73 M^2
GLUCOSE SERPL-MCNC: 136 MG/DL (ref 70–110)
MAGNESIUM SERPL-MCNC: 2 MG/DL (ref 1.6–2.6)
POTASSIUM SERPL-SCNC: 4 MMOL/L (ref 3.5–5.1)
SODIUM SERPL-SCNC: 144 MMOL/L (ref 136–145)

## 2020-01-10 PROCEDURE — 36415 COLL VENOUS BLD VENIPUNCTURE: CPT | Mod: PO

## 2020-01-10 PROCEDURE — 83880 ASSAY OF NATRIURETIC PEPTIDE: CPT | Mod: PO

## 2020-01-10 PROCEDURE — 80048 BASIC METABOLIC PNL TOTAL CA: CPT | Mod: PO

## 2020-01-10 PROCEDURE — 83735 ASSAY OF MAGNESIUM: CPT | Mod: PO

## 2020-01-10 RX ORDER — METOLAZONE 2.5 MG/1
2.5 TABLET ORAL EVERY OTHER DAY
Qty: 30 TABLET | Refills: 3 | Status: ON HOLD | OUTPATIENT
Start: 2020-01-10 | End: 2020-01-20 | Stop reason: ALTCHOICE

## 2020-01-13 ENCOUNTER — HOSPITAL ENCOUNTER (OUTPATIENT)
Dept: RADIOLOGY | Facility: HOSPITAL | Age: 65
Discharge: HOME | End: 2020-01-13
Attending: INTERNAL MEDICINE
Payer: MEDICAID

## 2020-01-13 ENCOUNTER — TELEPHONE (OUTPATIENT)
Dept: CARDIOLOGY | Facility: CLINIC | Age: 65
End: 2020-01-13

## 2020-01-13 ENCOUNTER — OFFICE VISIT (OUTPATIENT)
Dept: PULMONOLOGY | Facility: CLINIC | Age: 65
End: 2020-01-13
Payer: MEDICAID

## 2020-01-13 ENCOUNTER — CLINICAL SUPPORT (OUTPATIENT)
Dept: PULMONOLOGY | Facility: CLINIC | Age: 65
End: 2020-01-13
Payer: MEDICAID

## 2020-01-13 VITALS
DIASTOLIC BLOOD PRESSURE: 74 MMHG | HEART RATE: 72 BPM | WEIGHT: 245.06 LBS | SYSTOLIC BLOOD PRESSURE: 142 MMHG | RESPIRATION RATE: 18 BRPM | OXYGEN SATURATION: 94 % | HEIGHT: 61 IN | BODY MASS INDEX: 46.27 KG/M2

## 2020-01-13 VITALS — WEIGHT: 246.5 LBS | BODY MASS INDEX: 46.54 KG/M2 | HEIGHT: 61 IN

## 2020-01-13 DIAGNOSIS — G47.34 NOCTURNAL HYPOXEMIA: ICD-10-CM

## 2020-01-13 DIAGNOSIS — G47.33 OSA ON CPAP: ICD-10-CM

## 2020-01-13 DIAGNOSIS — I50.32 CHRONIC DIASTOLIC HEART FAILURE: ICD-10-CM

## 2020-01-13 DIAGNOSIS — K74.60 HEPATIC CIRRHOSIS, UNSPECIFIED HEPATIC CIRRHOSIS TYPE, UNSPECIFIED WHETHER ASCITES PRESENT: ICD-10-CM

## 2020-01-13 DIAGNOSIS — Z23 NEED FOR INFLUENZA VACCINATION: ICD-10-CM

## 2020-01-13 DIAGNOSIS — R79.89 ELEVATED BRAIN NATRIURETIC PEPTIDE (BNP) LEVEL: ICD-10-CM

## 2020-01-13 DIAGNOSIS — I50.32 CHRONIC DIASTOLIC HEART FAILURE: Primary | ICD-10-CM

## 2020-01-13 PROCEDURE — 99211 OFF/OP EST MAY X REQ PHY/QHP: CPT | Mod: PBBFAC,25,27

## 2020-01-13 PROCEDURE — 71046 XR CHEST PA AND LATERAL: ICD-10-PCS | Mod: 26,,, | Performed by: RADIOLOGY

## 2020-01-13 PROCEDURE — 94618 PULMONARY STRESS TESTING: CPT | Mod: 26,S$PBB,, | Performed by: INTERNAL MEDICINE

## 2020-01-13 PROCEDURE — 99999 PR PBB SHADOW E&M-EST. PATIENT-LVL I: CPT | Mod: PBBFAC,,,

## 2020-01-13 PROCEDURE — 99215 PR OFFICE/OUTPT VISIT, EST, LEVL V, 40-54 MIN: ICD-10-PCS | Mod: 25,S$PBB,, | Performed by: INTERNAL MEDICINE

## 2020-01-13 PROCEDURE — 99999 PR PBB SHADOW E&M-EST. PATIENT-LVL I: ICD-10-PCS | Mod: PBBFAC,,,

## 2020-01-13 PROCEDURE — 94618 PULMONARY STRESS TESTING: CPT | Mod: PBBFAC

## 2020-01-13 PROCEDURE — 99999 PR PBB SHADOW E&M-EST. PATIENT-LVL III: CPT | Mod: PBBFAC,,, | Performed by: INTERNAL MEDICINE

## 2020-01-13 PROCEDURE — 99213 OFFICE O/P EST LOW 20 MIN: CPT | Mod: PBBFAC | Performed by: INTERNAL MEDICINE

## 2020-01-13 PROCEDURE — 94618 PULMONARY STRESS TESTING: ICD-10-PCS | Mod: 26,S$PBB,, | Performed by: INTERNAL MEDICINE

## 2020-01-13 PROCEDURE — 71046 X-RAY EXAM CHEST 2 VIEWS: CPT | Mod: 26,,, | Performed by: RADIOLOGY

## 2020-01-13 PROCEDURE — 99999 PR PBB SHADOW E&M-EST. PATIENT-LVL III: ICD-10-PCS | Mod: PBBFAC,,, | Performed by: INTERNAL MEDICINE

## 2020-01-13 PROCEDURE — 90471 IMMUNIZATION ADMIN: CPT | Mod: PBBFAC,VFC

## 2020-01-13 PROCEDURE — 71046 X-RAY EXAM CHEST 2 VIEWS: CPT | Mod: TC

## 2020-01-13 PROCEDURE — 99215 OFFICE O/P EST HI 40 MIN: CPT | Mod: 25,S$PBB,, | Performed by: INTERNAL MEDICINE

## 2020-01-13 NOTE — H&P (VIEW-ONLY)
Pulmonary Outpatient Follow Up Visit     Subjective:       Patient ID: Diamond Das is a 64 y.o. female.    Chief Complaint: Congestive Heart Failure      HPI          Initially February 2018 seen for hypoxemic respiratory failure after hospital admission precipitated by CHF exacerbation currently using oxygen during sleep with CPAP, 6 min walking test showed no need for oxygen on exertion in 2018.       On CPAP on O2 during sleep suboptimal CPAP usage.    She has obstructive sleep apnea and she has been on CPAP for more than 5 years her study was done at [facility].    Known with diastolic heart failure, cryptogenic liver cirrhosis status post paracentesis and TIPS in 2017, chronic lower extremity edema.      Currently on 80 mg Lasix daily, metolazone 2.5 mg daily.  Following with Dr. Ramos cardiology.  Worsening shortness of breath since Thanksgiving.    Gained about 20 lb since last visit in September 2018 with me.    Requesting portable O2 concentrator.  Feeling short of breath on exertion worsening.  O2 sat today 93% on room air.      Review of Systems   Constitutional: Positive for weight gain. Negative for fever and chills.   HENT: Negative for trouble swallowing, voice change and congestion.         History of cataract.   Eyes: Negative for redness.   Respiratory: Positive for apnea, snoring, cough, shortness of breath, orthopnea and dyspnea on extertion.    Cardiovascular: Positive for leg swelling. Negative for chest pain.        History of diastolic CHF.   Genitourinary:        CKD 3    Endocrine: History of diabetes.  Hypothyroid.    Musculoskeletal: Positive for arthralgias and back pain.   Skin: Negative for rash.   Gastrointestinal: Positive for abdominal distention and acid reflux.        History of liver cirrhosis, ascites status post paracentesis in the past.  Status post TIPS.  Gastroesophageal reflux disease.  Gastroparesis.    Neurological: Positive for headaches. Negative for syncope.   Hematological: No excessive bruising.   Psychiatric/Behavioral: Positive for sleep disturbance. Negative for confusion. The patient is not nervous/anxious.         KRISTAL       Outpatient Encounter Medications as of 1/13/2020   Medication Sig Dispense Refill    aspirin (ECOTRIN) 81 MG EC tablet Take 1 tablet (81 mg total) by mouth once daily. 365 tablet 0    doxazosin (CARDURA) 2 MG tablet Take 1 tablet (2 mg total) by mouth every evening. 90 tablet 0    furosemide (LASIX) 40 MG tablet Take 1 tablet (40 mg total) by mouth once daily. Take 80 mg next 3 days 90 tablet 1    glucagon, human recombinant, (GLUCAGON EMERGENCY KIT, HUMAN,) 1 mg SolR Inject 1 mg into the muscle as needed. 1 each 5    insulin lispro (ADMELOG SOLOSTAR U-100 INSULIN) 100 unit/mL InPn pen Titrate up to 14 units subcutaneously three times a day 10-15 min before meals as directed in after visit summary 15 mL 6    LANTUS SOLOSTAR U-100 INSULIN glargine 100 units/mL (3mL) SubQ pen INJECT 40 UNITS INTO THE SKIN EVERY EVENING.  TITRATE UP TO 30 UNITS NIGHTLY AS DIRECTED ON INSTRUCTION SHEET 15 mL 6    levothyroxine (SYNTHROID) 137 MCG Tab tablet TAKE 1 TABLET BY MOUTH BEFORE BREAKFAST 90 tablet 0    lisinopril (PRINIVIL,ZESTRIL) 5 MG tablet Take 1 tablet (5 mg total) by mouth once daily. 90 tablet 1    metOLazone (ZAROXOLYN) 2.5 MG tablet Take 1 tablet (2.5 mg total) by mouth every other day. Take 30 min before lasix 30 tablet 3    ondansetron (ZOFRAN) 4 MG tablet TAKE 1 TABLET BY MOUTH EVERY 8 HOURS AS NEEDED 30 tablet 0    pantoprazole (PROTONIX) 20 MG tablet Take 2 tablets (40 mg total) by mouth once daily. 90 tablet 0    simvastatin (ZOCOR) 40 MG tablet Take 1 tablet (40 mg total) by mouth once daily. 90 tablet 0    vitamin E 400 UNIT capsule Take 400 Units by mouth every morning.      XIFAXAN 550 mg Tab TAKE 1 TABLET BY MOUTH TWICE DAILY 60 tablet 1     No  "facility-administered encounter medications on file as of 1/13/2020.        Objective:     Vital Signs (Most Recent)  Vital Signs  Pulse: 72  Resp: 18  SpO2: (!) 94 %  BP: (!) 142/74  Height and Weight  Height: 5' 1" (154.9 cm)  Weight: 111.1 kg (245 lb 0.7 oz)  BSA (Calculated - sq m): 2.19 sq meters  BMI (Calculated): 46.3  Weight in (lb) to have BMI = 25: 132]  Wt Readings from Last 2 Encounters:   01/13/20 111.1 kg (245 lb 0.7 oz)   01/06/20 113 kg (249 lb 1.9 oz)       Physical Exam   Constitutional: She is oriented to person, place, and time. She appears well-developed and well-nourished.   HENT:   Head: Normocephalic.   Neck: Neck supple.   Cardiovascular: Normal rate and regular rhythm.   Pulmonary/Chest: Normal expansion and effort normal. No respiratory distress. She has decreased breath sounds. She has no rhonchi. She has rales.   Abdominal: Soft. She exhibits no distension.   Musculoskeletal: She exhibits edema. She exhibits no tenderness.   Bilateral lower extremity pitting edema   Lymphadenopathy:     She has no axillary adenopathy.   Neurological: She is alert and oriented to person, place, and time.   Skin: Skin is warm.   Psychiatric: She has a normal mood and affect.       Laboratory  Lab Results   Component Value Date    WBC 6.67 09/18/2019    RBC 4.12 09/18/2019    HGB 12.1 09/18/2019    HCT 38.2 09/18/2019    MCV 93 09/18/2019    MCH 29.4 09/18/2019    MCHC 31.7 (L) 09/18/2019    RDW 14.5 09/18/2019     09/18/2019    MPV 10.8 09/18/2019    GRAN 2.5 09/18/2019    GRAN 38.1 09/18/2019    LYMPH 3.4 09/18/2019    LYMPH 50.2 (H) 09/18/2019    MONO 0.5 09/18/2019    MONO 7.3 09/18/2019    EOS 0.3 09/18/2019    BASO 0.04 09/18/2019    EOSINOPHIL 3.7 09/18/2019    BASOPHIL 0.6 09/18/2019       BMP  Lab Results   Component Value Date     01/10/2020    K 4.0 01/10/2020     01/10/2020    CO2 27 01/10/2020    BUN 28 (H) 01/10/2020    CREATININE 1.8 (H) 01/10/2020    CALCIUM 8.5 (L) " 01/10/2020    ANIONGAP 7 (L) 01/10/2020    ESTGFRAFRICA 34.3 (A) 01/10/2020    EGFRNONAA 29.7 (A) 01/10/2020    AST 40 10/16/2019    ALT 21 10/16/2019    PROT 6.0 10/16/2019       Lab Results   Component Value Date    BNP 1,010 (H) 01/10/2020     (H) 11/18/2019     (H) 10/25/2019     (H) 10/16/2019     (H) 03/19/2018     (H) 02/09/2018       Lab Results   Component Value Date    TSH 2.271 01/03/2019       No results found for: SEDRATE    No results found for: CRP  No results found for: IGE     No results found for: ASPERGILLUS  No results found for: AFUMIGATUSCL     Lab Results   Component Value Date    ACE 67 (H) 10/11/2016        Diagnostic Results:  I have personally reviewed today the following studies:    CXR 6/18 personally reviewed No acute findings      Doppler liver US :     Impression         1. Findings in keeping with cirrhosis with known left portal vein thrombosis which now appears more occlusive.  2. TIPS shunt catheter appears patent but demonstrates low internal velocities suggesting possible TIPS malfunction.  Velocities are similar to prior.   PSG 4/18   RESPIRATORY: The overall apnea-hypopnea index (AHI) was 13.3 events /hr asleep. Ms Das had 27 hypopneas, 21  obstructive sleep apneas, 0 central sleep apneas, and 0 mixed sleep apneas. Persistent loud snoring was noted. Analysis of  continuous oxygen saturations showed a mean SpO2 value of 93.6 % for the study, with a minimum oxygen saturation during  sleep of 54.0 %, and a waking baseline SpO2 = 96 %. Oxygen saturations were ? 88 % for 23.7 minutes of the time spent  asleep.        PFT 31/18:     The Forced Vital Capacity (FVC) is normal.  FEV1 is normal.  No significant improvement after bronchodilator.  The absence of an acute response to aerosolized bronchodilator does not necessarily mean that the subject will not  respond to a clinical trial of aerosolized or oral bronchodilators.  FEV1 over FVC is  82%  Lung Volumes are consistent with mild restrictive disease.  Diffusion capacity is severely reduced - unadjusted for hemoglobin (DLCO < 40%).  Mild restriction, severe DLCO reduction.       Echo June 2019      1 - Normal left ventricular systolic function (EF 60-65%).     2 - Impaired LV relaxation, normal LAP (grade 1 diastolic dysfunction).     3 - Normal right ventricular systolic function .     4 - No wall motion abnormalities.     5 - Concentric hypertrophy.     6 - Trivial mitral regurgitation.     7 - Trivial tricuspid regurgitation.     8 - Small pericardial effusion.     9 - No echo evidence of tamponade.    Echo 5/17 :      1 - Normal left ventricular systolic function (EF 60-65%). Mild concentric hypertrophy.     2 - Impaired LV relaxation, elevated LAP (grade 2 diastolic dysfunction).     3 - Normal right ventricular systolic function .     4 - Severe left atrial enlargement.      Compliance Summary  2/19/2019 - 3/20/2019 (30 days)  Days with Device Usage 14 days  Days without Device Usage 16 days  Percent Days with Device Usage 46.7%  Cumulative Usage 3 days 23 hrs. 49 mins. 50 secs.  Maximum Usage (1 Day) 23 hrs. 59 mins. 58 secs.  Average Usage (All Days) 3 hrs. 11 mins. 39 secs.  Average Usage (Days Used) 6 hrs. 50 mins. 42 secs.  Minimum Usage (1 Day) 39 mins. 40 secs.  Percent of Days with Usage >= 4 Hours 26.7%  Percent of Days with Usage < 4 Hours 73.3%  Date Range  Average AHI 2.0  Auto-CPAP Summary  Auto-CPAP Mean Pressure 8.7 cmH2O  Auto-CPAP Peak Average Pressure 13.9 cmH2O  Average Device Pressure <= 90% of Time 11.4 cmH2O  Average Time in Large Leak Per Day 23 mins.    Assessment/Plan:   Chronic diastolic heart failure  -     X-Ray Chest PA And Lateral; Future; Expected date: 01/13/2020  -     Stress test, pulmonary; Future    Elevated brain natriuretic peptide (BNP) level  -     X-Ray Chest PA And Lateral; Future; Expected date: 01/13/2020    KRISTAL on CPAP    Nocturnal  hypoxemia    Hepatic cirrhosis, unspecified hepatic cirrhosis type, unspecified whether ascites present    Need for influenza vaccination  -     Influenza - Quadrivalent (PF)      Maximize cardiac therapy.    Heart healthy diet  Limit fluid intake 50-60 oz   Daily weights and to notify clinic if weight increases by more than 3 lbs in 1 day or 5 lbs in 1 week.     Follow-up with cards.    Check chest x-ray today to rule out pleural effusion.    Repeat 6 min walking test to assess for call if occasion for O2 on exertion.  Patient requesting portable O2 concentrator.    Up-to-date on pneumococcal vaccines.      Influenza vaccination today.      Follow up in about 3 months (around 4/13/2020).    This note was prepared using voice recognition system and is likely to have sound alike errors that may have been overlooked even after proof reading.  Please call me with any questions    Discussed diagnosis, its evaluation, treatment and usual course. All questions answered.      Angelica Hunter MD

## 2020-01-13 NOTE — PROCEDURES
"[facility]  Six Minute Walk     SUMMARY     Ordering Provider: Dr. Hunter   Interpreting Provider: Dr. Hunter  Performing nurse/tech/RT: CHECO Hicks  Diagnosis: (Chronic diastolic heart failure )  Height: 5' 1" (154.9 cm)  Weight: 111.8 kg (246 lb 7.6 oz)  BMI (Calculated): 46.6   Patient Race:             Phase Oxygen Assessment Supplemental O2 Heart   Rate Blood Pressure Salo Dyspnea Scale Rating   Resting 92 % Room Air 83 bpm 171/73 5-6   Exercise        Minute        1 90 % Room Air 103 bpm     2 90 % Room Air 99 bpm     3 85 % Room Air 97 bpm     4 96 % 2 L/M 103 bpm     5 95 % 2 L/M 107 bpm     6  95 % 2 L/M 109 bpm 185/70 5-6   Recovery        Minute        1 97 % 2 L/M 95 bpm     2 97 % 2 L/M 92 bpm     3 98 % 2 L/M 89 bpm     4 98 % 2 L/M 89 bpm 162/80 3     Six Minute Walk Summary  6MWT Status: completed with stops  Patient Reported: Dyspnea     Interpretation:  Did the patient stop or pause?: Yes  How many times did the patient stop or pause?: 1  Stop Time 1: 100  Restart Time 1: 212  Pause Time 1: 112 seconds                             Total Time Walked (Calculated): 248 seconds  Final Partial Lap Distance (feet): 50 feet  Total Distance Meters (Calculated): 76.2 meters  Predicted Distance Meters (Calculated): 379.46 meters  Percentage of Predicted (Calculated): 20.08  Peak VO2 (Calculated): 6.27  Mets: 1.79  Has The Patient Had a Previous Six Minute Walk Test?: Yes       Previous 6MWT Results  Has The Patient Had a Previous Six Minute Walk Test?: Yes  Date of Previous Test: 03/27/18  Total Time Walked: 360 seconds  Total Distance (meters): 365.76  Predicted Distance (meters): 433.51 meters  Percentage of Predicted: 84.37  Percent Change (Calculated): 0.79    "

## 2020-01-13 NOTE — TELEPHONE ENCOUNTER
----- Message from Marcos Ramos MD sent at 1/10/2020 12:04 PM CST -----  Please call the patient regarding her abnormal result. BNP is elevated due to extra fluid, double lasix next 3 days, I added metolazone start this as well, take 30 min every other day prior to lasix dose. Follow up soon if symptoms do not improve or go to ER if not having any fluid/weight loss.

## 2020-01-13 NOTE — PROGRESS NOTES
Pulmonary Outpatient Follow Up Visit     Subjective:       Patient ID: Diamond Das is a 64 y.o. female.    Chief Complaint: Congestive Heart Failure      HPI          Initially February 2018 seen for hypoxemic respiratory failure after hospital admission precipitated by CHF exacerbation currently using oxygen during sleep with CPAP, 6 min walking test showed no need for oxygen on exertion in 2018.       On CPAP on O2 during sleep suboptimal CPAP usage.    She has obstructive sleep apnea and she has been on CPAP for more than 5 years her study was done at [facility].    Known with diastolic heart failure, cryptogenic liver cirrhosis status post paracentesis and TIPS in 2017, chronic lower extremity edema.      Currently on 80 mg Lasix daily, metolazone 2.5 mg daily.  Following with Dr. Ramos cardiology.  Worsening shortness of breath since Thanksgiving.    Gained about 20 lb since last visit in September 2018 with me.    Requesting portable O2 concentrator.  Feeling short of breath on exertion worsening.  O2 sat today 93% on room air.      Review of Systems   Constitutional: Positive for weight gain. Negative for fever and chills.   HENT: Negative for trouble swallowing, voice change and congestion.         History of cataract.   Eyes: Negative for redness.   Respiratory: Positive for apnea, snoring, cough, shortness of breath, orthopnea and dyspnea on extertion.    Cardiovascular: Positive for leg swelling. Negative for chest pain.        History of diastolic CHF.   Genitourinary:        CKD 3    Endocrine: History of diabetes.  Hypothyroid.    Musculoskeletal: Positive for arthralgias and back pain.   Skin: Negative for rash.   Gastrointestinal: Positive for abdominal distention and acid reflux.        History of liver cirrhosis, ascites status post paracentesis in the past.  Status post TIPS.  Gastroesophageal reflux disease.  Gastroparesis.    Neurological: Positive for headaches. Negative for syncope.   Hematological: No excessive bruising.   Psychiatric/Behavioral: Positive for sleep disturbance. Negative for confusion. The patient is not nervous/anxious.         KRISTAL       Outpatient Encounter Medications as of 1/13/2020   Medication Sig Dispense Refill    aspirin (ECOTRIN) 81 MG EC tablet Take 1 tablet (81 mg total) by mouth once daily. 365 tablet 0    doxazosin (CARDURA) 2 MG tablet Take 1 tablet (2 mg total) by mouth every evening. 90 tablet 0    furosemide (LASIX) 40 MG tablet Take 1 tablet (40 mg total) by mouth once daily. Take 80 mg next 3 days 90 tablet 1    glucagon, human recombinant, (GLUCAGON EMERGENCY KIT, HUMAN,) 1 mg SolR Inject 1 mg into the muscle as needed. 1 each 5    insulin lispro (ADMELOG SOLOSTAR U-100 INSULIN) 100 unit/mL InPn pen Titrate up to 14 units subcutaneously three times a day 10-15 min before meals as directed in after visit summary 15 mL 6    LANTUS SOLOSTAR U-100 INSULIN glargine 100 units/mL (3mL) SubQ pen INJECT 40 UNITS INTO THE SKIN EVERY EVENING.  TITRATE UP TO 30 UNITS NIGHTLY AS DIRECTED ON INSTRUCTION SHEET 15 mL 6    levothyroxine (SYNTHROID) 137 MCG Tab tablet TAKE 1 TABLET BY MOUTH BEFORE BREAKFAST 90 tablet 0    lisinopril (PRINIVIL,ZESTRIL) 5 MG tablet Take 1 tablet (5 mg total) by mouth once daily. 90 tablet 1    metOLazone (ZAROXOLYN) 2.5 MG tablet Take 1 tablet (2.5 mg total) by mouth every other day. Take 30 min before lasix 30 tablet 3    ondansetron (ZOFRAN) 4 MG tablet TAKE 1 TABLET BY MOUTH EVERY 8 HOURS AS NEEDED 30 tablet 0    pantoprazole (PROTONIX) 20 MG tablet Take 2 tablets (40 mg total) by mouth once daily. 90 tablet 0    simvastatin (ZOCOR) 40 MG tablet Take 1 tablet (40 mg total) by mouth once daily. 90 tablet 0    vitamin E 400 UNIT capsule Take 400 Units by mouth every morning.      XIFAXAN 550 mg Tab TAKE 1 TABLET BY MOUTH TWICE DAILY 60 tablet 1     No  "facility-administered encounter medications on file as of 1/13/2020.        Objective:     Vital Signs (Most Recent)  Vital Signs  Pulse: 72  Resp: 18  SpO2: (!) 94 %  BP: (!) 142/74  Height and Weight  Height: 5' 1" (154.9 cm)  Weight: 111.1 kg (245 lb 0.7 oz)  BSA (Calculated - sq m): 2.19 sq meters  BMI (Calculated): 46.3  Weight in (lb) to have BMI = 25: 132]  Wt Readings from Last 2 Encounters:   01/13/20 111.1 kg (245 lb 0.7 oz)   01/06/20 113 kg (249 lb 1.9 oz)       Physical Exam   Constitutional: She is oriented to person, place, and time. She appears well-developed and well-nourished.   HENT:   Head: Normocephalic.   Neck: Neck supple.   Cardiovascular: Normal rate and regular rhythm.   Pulmonary/Chest: Normal expansion and effort normal. No respiratory distress. She has decreased breath sounds. She has no rhonchi. She has rales.   Abdominal: Soft. She exhibits no distension.   Musculoskeletal: She exhibits edema. She exhibits no tenderness.   Bilateral lower extremity pitting edema   Lymphadenopathy:     She has no axillary adenopathy.   Neurological: She is alert and oriented to person, place, and time.   Skin: Skin is warm.   Psychiatric: She has a normal mood and affect.       Laboratory  Lab Results   Component Value Date    WBC 6.67 09/18/2019    RBC 4.12 09/18/2019    HGB 12.1 09/18/2019    HCT 38.2 09/18/2019    MCV 93 09/18/2019    MCH 29.4 09/18/2019    MCHC 31.7 (L) 09/18/2019    RDW 14.5 09/18/2019     09/18/2019    MPV 10.8 09/18/2019    GRAN 2.5 09/18/2019    GRAN 38.1 09/18/2019    LYMPH 3.4 09/18/2019    LYMPH 50.2 (H) 09/18/2019    MONO 0.5 09/18/2019    MONO 7.3 09/18/2019    EOS 0.3 09/18/2019    BASO 0.04 09/18/2019    EOSINOPHIL 3.7 09/18/2019    BASOPHIL 0.6 09/18/2019       BMP  Lab Results   Component Value Date     01/10/2020    K 4.0 01/10/2020     01/10/2020    CO2 27 01/10/2020    BUN 28 (H) 01/10/2020    CREATININE 1.8 (H) 01/10/2020    CALCIUM 8.5 (L) " 01/10/2020    ANIONGAP 7 (L) 01/10/2020    ESTGFRAFRICA 34.3 (A) 01/10/2020    EGFRNONAA 29.7 (A) 01/10/2020    AST 40 10/16/2019    ALT 21 10/16/2019    PROT 6.0 10/16/2019       Lab Results   Component Value Date    BNP 1,010 (H) 01/10/2020     (H) 11/18/2019     (H) 10/25/2019     (H) 10/16/2019     (H) 03/19/2018     (H) 02/09/2018       Lab Results   Component Value Date    TSH 2.271 01/03/2019       No results found for: SEDRATE    No results found for: CRP  No results found for: IGE     No results found for: ASPERGILLUS  No results found for: AFUMIGATUSCL     Lab Results   Component Value Date    ACE 67 (H) 10/11/2016        Diagnostic Results:  I have personally reviewed today the following studies:    CXR 6/18 personally reviewed No acute findings      Doppler liver US :     Impression         1. Findings in keeping with cirrhosis with known left portal vein thrombosis which now appears more occlusive.  2. TIPS shunt catheter appears patent but demonstrates low internal velocities suggesting possible TIPS malfunction.  Velocities are similar to prior.   PSG 4/18   RESPIRATORY: The overall apnea-hypopnea index (AHI) was 13.3 events /hr asleep. Ms Das had 27 hypopneas, 21  obstructive sleep apneas, 0 central sleep apneas, and 0 mixed sleep apneas. Persistent loud snoring was noted. Analysis of  continuous oxygen saturations showed a mean SpO2 value of 93.6 % for the study, with a minimum oxygen saturation during  sleep of 54.0 %, and a waking baseline SpO2 = 96 %. Oxygen saturations were ? 88 % for 23.7 minutes of the time spent  asleep.        PFT 31/18:     The Forced Vital Capacity (FVC) is normal.  FEV1 is normal.  No significant improvement after bronchodilator.  The absence of an acute response to aerosolized bronchodilator does not necessarily mean that the subject will not  respond to a clinical trial of aerosolized or oral bronchodilators.  FEV1 over FVC is  82%  Lung Volumes are consistent with mild restrictive disease.  Diffusion capacity is severely reduced - unadjusted for hemoglobin (DLCO < 40%).  Mild restriction, severe DLCO reduction.       Echo June 2019      1 - Normal left ventricular systolic function (EF 60-65%).     2 - Impaired LV relaxation, normal LAP (grade 1 diastolic dysfunction).     3 - Normal right ventricular systolic function .     4 - No wall motion abnormalities.     5 - Concentric hypertrophy.     6 - Trivial mitral regurgitation.     7 - Trivial tricuspid regurgitation.     8 - Small pericardial effusion.     9 - No echo evidence of tamponade.    Echo 5/17 :      1 - Normal left ventricular systolic function (EF 60-65%). Mild concentric hypertrophy.     2 - Impaired LV relaxation, elevated LAP (grade 2 diastolic dysfunction).     3 - Normal right ventricular systolic function .     4 - Severe left atrial enlargement.      Compliance Summary  2/19/2019 - 3/20/2019 (30 days)  Days with Device Usage 14 days  Days without Device Usage 16 days  Percent Days with Device Usage 46.7%  Cumulative Usage 3 days 23 hrs. 49 mins. 50 secs.  Maximum Usage (1 Day) 23 hrs. 59 mins. 58 secs.  Average Usage (All Days) 3 hrs. 11 mins. 39 secs.  Average Usage (Days Used) 6 hrs. 50 mins. 42 secs.  Minimum Usage (1 Day) 39 mins. 40 secs.  Percent of Days with Usage >= 4 Hours 26.7%  Percent of Days with Usage < 4 Hours 73.3%  Date Range  Average AHI 2.0  Auto-CPAP Summary  Auto-CPAP Mean Pressure 8.7 cmH2O  Auto-CPAP Peak Average Pressure 13.9 cmH2O  Average Device Pressure <= 90% of Time 11.4 cmH2O  Average Time in Large Leak Per Day 23 mins.    Assessment/Plan:   Chronic diastolic heart failure  -     X-Ray Chest PA And Lateral; Future; Expected date: 01/13/2020  -     Stress test, pulmonary; Future    Elevated brain natriuretic peptide (BNP) level  -     X-Ray Chest PA And Lateral; Future; Expected date: 01/13/2020    KRISTAL on CPAP    Nocturnal  hypoxemia    Hepatic cirrhosis, unspecified hepatic cirrhosis type, unspecified whether ascites present    Need for influenza vaccination  -     Influenza - Quadrivalent (PF)      Maximize cardiac therapy.    Heart healthy diet  Limit fluid intake 50-60 oz   Daily weights and to notify clinic if weight increases by more than 3 lbs in 1 day or 5 lbs in 1 week.     Follow-up with cards.    Check chest x-ray today to rule out pleural effusion.    Repeat 6 min walking test to assess for call if occasion for O2 on exertion.  Patient requesting portable O2 concentrator.    Up-to-date on pneumococcal vaccines.      Influenza vaccination today.      Follow up in about 3 months (around 4/13/2020).    This note was prepared using voice recognition system and is likely to have sound alike errors that may have been overlooked even after proof reading.  Please call me with any questions    Discussed diagnosis, its evaluation, treatment and usual course. All questions answered.      Angelica Hunter MD

## 2020-01-13 NOTE — PATIENT INSTRUCTIONS
Heart healthy diet  Limit fluid intake 50-60 oz   Daily weights and to notify clinic if weight increases by more than 3 lbs in 1 day or 5 lbs in 1 week.

## 2020-01-14 ENCOUNTER — PATIENT OUTREACH (OUTPATIENT)
Dept: ADMINISTRATIVE | Facility: HOSPITAL | Age: 65
End: 2020-01-14

## 2020-01-14 DIAGNOSIS — Z12.31 ENCOUNTER FOR SCREENING MAMMOGRAM FOR MALIGNANT NEOPLASM OF BREAST: Primary | ICD-10-CM

## 2020-01-15 ENCOUNTER — TELEPHONE (OUTPATIENT)
Dept: PULMONOLOGY | Facility: CLINIC | Age: 65
End: 2020-01-15

## 2020-01-15 DIAGNOSIS — J90 PLEURAL EFFUSION, LEFT: Primary | ICD-10-CM

## 2020-01-15 RX ORDER — LIDOCAINE HYDROCHLORIDE 10 MG/ML
1 INJECTION, SOLUTION EPIDURAL; INFILTRATION; INTRACAUDAL; PERINEURAL ONCE
Status: CANCELLED | OUTPATIENT
Start: 2020-01-15 | End: 2020-01-15

## 2020-01-15 NOTE — TELEPHONE ENCOUNTER
Called patient daughter informed about chest x-ray results.  Thoracentesis to be scheduled next Tuesday.

## 2020-01-20 ENCOUNTER — HOSPITAL ENCOUNTER (OUTPATIENT)
Facility: HOSPITAL | Age: 65
Discharge: HOME | End: 2020-01-20
Attending: INTERNAL MEDICINE | Admitting: INTERNAL MEDICINE
Payer: MEDICAID

## 2020-01-20 VITALS
HEIGHT: 61 IN | HEART RATE: 55 BPM | DIASTOLIC BLOOD PRESSURE: 77 MMHG | BODY MASS INDEX: 46.07 KG/M2 | OXYGEN SATURATION: 97 % | WEIGHT: 244 LBS | RESPIRATION RATE: 18 BRPM | TEMPERATURE: 98 F | SYSTOLIC BLOOD PRESSURE: 170 MMHG

## 2020-01-20 DIAGNOSIS — J90 PLEURAL EFFUSION, LEFT: ICD-10-CM

## 2020-01-20 LAB
APPEARANCE FLD: NORMAL
BODY FLD TYPE: NORMAL
COLOR FLD: COLORLESS
KOH PREP SPEC: NORMAL
LYMPHOCYTES NFR FLD MANUAL: 89 %
MONOS+MACROS NFR FLD MANUAL: 8 %
NEUTROPHILS NFR FLD MANUAL: 3 %
PATH INTERP FLD-IMP: NORMAL
POCT GLUCOSE: 122 MG/DL (ref 70–110)
WBC # FLD: 1437 /CU MM

## 2020-01-20 PROCEDURE — 89051 BODY FLUID CELL COUNT: CPT

## 2020-01-20 PROCEDURE — 88112 PR  CYTOPATH, CELL ENHANCE TECH: ICD-10-PCS | Mod: 26,,, | Performed by: PATHOLOGY

## 2020-01-20 PROCEDURE — 87070 CULTURE OTHR SPECIMN AEROBIC: CPT

## 2020-01-20 PROCEDURE — 32555 ASPIRATE PLEURA W/ IMAGING: CPT | Mod: LT,,, | Performed by: INTERNAL MEDICINE

## 2020-01-20 PROCEDURE — 88112 CYTOPATH CELL ENHANCE TECH: CPT | Mod: 26,,, | Performed by: PATHOLOGY

## 2020-01-20 PROCEDURE — 88305 TISSUE EXAM BY PATHOLOGIST: CPT | Performed by: PATHOLOGY

## 2020-01-20 PROCEDURE — 87102 FUNGUS ISOLATION CULTURE: CPT

## 2020-01-20 PROCEDURE — 87116 MYCOBACTERIA CULTURE: CPT

## 2020-01-20 PROCEDURE — 87205 SMEAR GRAM STAIN: CPT

## 2020-01-20 PROCEDURE — 88112 CYTOPATH CELL ENHANCE TECH: CPT | Performed by: PATHOLOGY

## 2020-01-20 PROCEDURE — 88305 TISSUE EXAM BY PATHOLOGIST: CPT | Mod: 26,,, | Performed by: PATHOLOGY

## 2020-01-20 PROCEDURE — 32555 PR THORACEN W/IMAG GUIDANCE: ICD-10-PCS | Mod: LT,,, | Performed by: INTERNAL MEDICINE

## 2020-01-20 PROCEDURE — 83615 LACTATE (LD) (LDH) ENZYME: CPT

## 2020-01-20 PROCEDURE — 82945 GLUCOSE OTHER FLUID: CPT

## 2020-01-20 PROCEDURE — 88305 TISSUE EXAM BY PATHOLOGIST: ICD-10-PCS | Mod: 26,,, | Performed by: PATHOLOGY

## 2020-01-20 PROCEDURE — 32555 ASPIRATE PLEURA W/ IMAGING: CPT | Mod: LT | Performed by: INTERNAL MEDICINE

## 2020-01-20 PROCEDURE — 87210 SMEAR WET MOUNT SALINE/INK: CPT

## 2020-01-20 PROCEDURE — 87206 SMEAR FLUORESCENT/ACID STAI: CPT

## 2020-01-20 PROCEDURE — 84157 ASSAY OF PROTEIN OTHER: CPT

## 2020-01-20 RX ORDER — LIDOCAINE HYDROCHLORIDE 10 MG/ML
1 INJECTION, SOLUTION EPIDURAL; INFILTRATION; INTRACAUDAL; PERINEURAL ONCE
Status: DISCONTINUED | OUTPATIENT
Start: 2020-01-20 | End: 2020-01-20 | Stop reason: HOSPADM

## 2020-01-20 NOTE — DISCHARGE INSTRUCTIONS
Discharge Instructions for Thoracentesis  Thoracentesis is a procedure that removes extra fluid (pleural effusion) from the pleural space. This space is between the outside surface of the lungs (pleura) and the chest wall. The procedure may be done to take a sample of the fluid for testing to help find the cause. Or it may be done to drain the extra fluid if you are having trouble breathing.  Home care  · You may have some pain after the procedure. Your doctor can prescribe or recommend pain medicine for you to take at home, if needed. Take these exactly as directed. If you stopped taking other medicines before the procedure, ask your doctor when you can start them again.  · Take it easy for 48 hours after the procedure. Don't do anything active until your doctor says its OK.  · Don't do strenuous activities, such as lifting, until your doctor says its OK.  · You will have a small bandage over the puncture site. You may remove the bandage in 24 hours.  · Check the puncture site for the signs of infection listed below.  Follow-up  Make a follow-up appointment with your doctor as directed. During your follow-up visit, your doctor will check your healing. Be sure to let your doctor know how you are feeling.  When to call your doctor  Call your doctor right away if you have any of the following:  · Coughing up blood  · Chest pain. If chest pain suddenly gets worse, it may be an emergency.  · Shortness of breath  · Fever of 100.4°F (38°C) or higher, or as directed by your healthcare provider  · Pain that doesn't get better after taking pain medicine  · Signs of infection at the puncture site. These include increased pain, redness, swelling, or warmth.  · Fluid draining from the puncture site   Date Last Reviewed: 10/1/2016  © 9188-8891 [employer]. [address] [place] [address], [place] [address]. All rights reserved. This information is not intended as a substitute for professional medical care. Always follow  your healthcare professional's instructions.

## 2020-01-20 NOTE — OP NOTE
Date of Procedure: 01/20/2020     Procedure: Thoracentesis    Indications: Therapeutic    Pre-Operative Diagnosis:  Left pleural effusion    Post-Operative Diagnosis: same    Anesthesia: local    Technical Procedures Used:  Over the needle catheter with ultrasound-guided    Description of the Findings of the Procedure:  Small pleural effusion on the left noted    Consent: Informed consent was obtained. Risks of the procedure were discussed including: infection, bleeding, pain, pneumothorax.    Under sterile conditions the patient was positioned. Chloraprep solution and sterile drapes were utilized. 5 cc 1% plain lidocaine was used to anesthetize between the rib space after localized under ultrasound. Fluid was obtained after catheter inserted without  difficulty and suction applied with minimal blood loss.  A dressing was applied to the wound and wound care instructions were provided.     185 ml of cloudy pleural fluid was obtained. A sample was sent to Pathology for cytogenetics,and cell counts, as well as for infection analysis.    Plan:    A follow up chest x-ray was ordered. Yes  Tylenol 650 mg. for pain.    Significant Surgical Tasks Conducted by the Assistant(s), if Applicable:    Complications: None;patienttoleratedtheprocedurewell.    Estimated Blood Loss (EBL): None    Attestation: I performed the procedure.     Angelica Hunter M.D

## 2020-01-20 NOTE — DISCHARGE SUMMARY
[facility] -   Pulmonology  Discharge Summary      Patient Name: Diamond Das  MRN: [id number]  Admission Date: 1/20/2020  Hospital Length of Stay: 0 days  Discharge Date and Time:  01/20/2020 10:46 AM  Attending Physician: Angelica Hunter MD   Discharging Provider: Angelica Hunter MD  Primary Care Provider: Andrey Perrin MD    HPI:  LEFT PLEURAL EFFUSION  Procedure(s) (LRB):  Thoracentesis (Left)    Indwelling Lines/Drains at Time of Discharge:   Lines/Drains/Airways     None                 Hospital Course:  STATUS POST LEFT PLEURAL EFFUSION THORACENTESIS.       Significant Imaging:  ULTRASOUND SHOWED SMALL LEFT PLEURAL EFFUSION STATUS POST THORACENTESIS.  IMPROVED EFFUSION.    Pending Diagnostic Studies:     Procedure Component Value Units Date/Time    Cytology, Pulmonary [[id number]] Collected:  01/20/20 1044    Order Status:  Sent Lab Status:  In process Updated:  01/20/20 1044    Glucose, Body Fluid (Reference [facility] or [facility]) [facility]; Pleural Fluid, Left [[id number]] Collected:  01/20/20 1043    Order Status:  Sent Lab Status:  In process Updated:  01/20/20 1044    Specimen:  Body Fluid     LDH, Body Fluid ([facility] or [facility]) [facility]; Pleural Fluid, Left [[id number]] Collected:  01/20/20 1043    Order Status:  Sent Lab Status:  In process Updated:  01/20/20 1044    Specimen:  Body Fluid     Protein, Body Fluid ([facility] or [facility]) [facility]; Pleural Fluid, Left [[id number]] Collected:  01/20/20 1043    Order Status:  Sent Lab Status:  In process Updated:  01/20/20 1044    Specimen:  Body Fluid     US Chest Mediastinum [[id number]]     Order Status:  Sent Lab Status:  No result     WBC & Diff,Body Fluid Pleural Fluid, Left [[id number]] Collected:  01/20/20 1043    Order Status:  Sent Lab Status:  In process Updated:  01/20/20 1044    Specimen:  Other (Specify)     X-Ray Chest AP Portable [[id number]] Resulted:  01/20/20 1045    Order Status:  Sent Lab Status:  In process  Updated:  01/20/20 1043        Final Active Diagnoses:    Diagnosis Date Noted POA    Pleural effusion, left [J90] 01/20/2020 Yes      Problems Resolved During this Admission:       Discharged Condition: good    Disposition:   HOME  Follow Up:   MAY 2020  Patient Instructions:   No discharge procedures on file.  Medications:  Reconciled Home Medications:      Medication List      CHANGE how you take these medications    Lantus Solostar U-100 Insulin glargine 100 units/mL (3mL) SubQ pen  Generic drug:  insulin  INJECT 40 UNITS INTO THE SKIN EVERY EVENING.  TITRATE UP TO 30 UNITS NIGHTLY AS DIRECTED ON INSTRUCTION SHEET  What changed:  See the new instructions.        CONTINUE taking these medications    aspirin 81 MG EC tablet  Commonly known as:  ECOTRIN  Take 1 tablet (81 mg total) by mouth once daily.     furosemide 40 MG tablet  Commonly known as:  LASIX  Take 1 tablet (40 mg total) by mouth once daily. Take 80 mg next 3 days     glucagon (human recombinant) 1 mg Solr  Commonly known as:  Glucagon Emergency Kit (human)  Inject 1 mg into the muscle as needed.     insulin lispro 100 unit/mL pen  Commonly known as:  Admelog SoloStar U-100 Insulin  Titrate up to 14 units subcutaneously three times a day 10-15 min before meals as directed in after visit summary     levothyroxine 137 MCG Tab tablet  Commonly known as:  SYNTHROID  TAKE 1 TABLET BY MOUTH BEFORE BREAKFAST     ondansetron 4 MG tablet  Commonly known as:  ZOFRAN  TAKE 1 TABLET BY MOUTH EVERY 8 HOURS AS NEEDED     pantoprazole 20 MG tablet  Commonly known as:  PROTONIX  Take 2 tablets (40 mg total) by mouth once daily.     vitamin E 400 UNIT capsule  Take 400 Units by mouth every morning.            Angelica Hunter MD  Pulmonology  [facility] -

## 2020-01-20 NOTE — BRIEF OP NOTE
[facility] -   Thoracentesis  Procedure Note    SUMMARY     Date of Procedure: 01/20/2020     Procedure: Thoracentesis    Indications: Therapeutic    Pre-Operative Diagnosis:  Left pleural effusion    Post-Operative Diagnosis: same    Anesthesia: local    Technical Procedures Used:  Over the needle catheter with ultrasound-guided    Description of the Findings of the Procedure:  Small pleural effusion on the left noted    Consent: Informed consent was obtained. Risks of the procedure were discussed including: infection, bleeding, pain, pneumothorax.    Under sterile conditions the patient was positioned. Chloraprep solution and sterile drapes were utilized. 5 cc 1% plain lidocaine was used to anesthetize between the rib space after localized under ultrasound. Fluid was obtained after catheter inserted without  difficulty and suction applied with minimal blood loss.  A dressing was applied to the wound and wound care instructions were provided.     185 ml of cloudy pleural fluid was obtained. A sample was sent to Pathology for cytogenetics,and cell counts, as well as for infection analysis.    Plan:    A follow up chest x-ray was ordered. Yes  Tylenol 650 mg. for pain.    Significant Surgical Tasks Conducted by the Assistant(s), if Applicable:    Complications: None;patienttoleratedtheprocedurewell.    Estimated Blood Loss (EBL): None    Attestation: I performed the procedure.     Angelica Hunter M.D

## 2020-01-23 LAB
FINAL PATHOLOGIC DIAGNOSIS: NORMAL
GLUCOSE FLD-MCNC: 162 MG/DL
LDH FLD L TO P-CCNC: 69 U/L
PROT FLD-MCNC: 0.8 G/DL
SPECIMEN SOURCE: NORMAL

## 2020-01-24 LAB
BACTERIA FLD AEROBE CULT: NO GROWTH
GRAM STN SPEC: NORMAL
GRAM STN SPEC: NORMAL

## 2020-02-10 ENCOUNTER — LAB VISIT (OUTPATIENT)
Dept: LAB | Facility: HOSPITAL | Age: 65
End: 2020-02-10
Attending: FAMILY MEDICINE
Payer: MEDICAID

## 2020-02-10 ENCOUNTER — OFFICE VISIT (OUTPATIENT)
Dept: INTERNAL MEDICINE | Facility: CLINIC | Age: 65
End: 2020-02-10
Payer: MEDICAID

## 2020-02-10 VITALS
WEIGHT: 247.13 LBS | HEART RATE: 80 BPM | DIASTOLIC BLOOD PRESSURE: 84 MMHG | SYSTOLIC BLOOD PRESSURE: 146 MMHG | BODY MASS INDEX: 46.66 KG/M2 | TEMPERATURE: 99 F | HEIGHT: 61 IN

## 2020-02-10 DIAGNOSIS — K72.90 DECOMPENSATED HEPATIC CIRRHOSIS: ICD-10-CM

## 2020-02-10 DIAGNOSIS — R74.8 ELEVATED LIPASE: ICD-10-CM

## 2020-02-10 DIAGNOSIS — R18.8 CIRRHOSIS OF LIVER WITH ASCITES, UNSPECIFIED HEPATIC CIRRHOSIS TYPE: ICD-10-CM

## 2020-02-10 DIAGNOSIS — N18.4 CKD (CHRONIC KIDNEY DISEASE) STAGE 4, GFR 15-29 ML/MIN: ICD-10-CM

## 2020-02-10 DIAGNOSIS — I50.32 CHRONIC DIASTOLIC CONGESTIVE HEART FAILURE: ICD-10-CM

## 2020-02-10 DIAGNOSIS — R10.84 GENERALIZED ABDOMINAL PAIN: ICD-10-CM

## 2020-02-10 DIAGNOSIS — R10.13 EPIGASTRIC PAIN: ICD-10-CM

## 2020-02-10 DIAGNOSIS — Z28.39 IMMUNIZATION DEFICIENCY: ICD-10-CM

## 2020-02-10 DIAGNOSIS — K74.60 CIRRHOSIS OF LIVER WITH ASCITES, UNSPECIFIED HEPATIC CIRRHOSIS TYPE: ICD-10-CM

## 2020-02-10 DIAGNOSIS — K74.60 DECOMPENSATED HEPATIC CIRRHOSIS: ICD-10-CM

## 2020-02-10 DIAGNOSIS — E03.4 HYPOTHYROIDISM DUE TO ACQUIRED ATROPHY OF THYROID: ICD-10-CM

## 2020-02-10 DIAGNOSIS — I10 ESSENTIAL HYPERTENSION: ICD-10-CM

## 2020-02-10 LAB
ALBUMIN SERPL BCP-MCNC: 1.7 G/DL (ref 3.5–5.2)
ALP SERPL-CCNC: 139 U/L (ref 55–135)
ALT SERPL W/O P-5'-P-CCNC: 24 U/L (ref 10–44)
ANION GAP SERPL CALC-SCNC: 5 MMOL/L (ref 8–16)
AST SERPL-CCNC: 51 U/L (ref 10–40)
BILIRUB SERPL-MCNC: 0.4 MG/DL (ref 0.1–1)
BUN SERPL-MCNC: 28 MG/DL (ref 8–23)
CALCIUM SERPL-MCNC: 8.3 MG/DL (ref 8.7–10.5)
CHLORIDE SERPL-SCNC: 109 MMOL/L (ref 95–110)
CO2 SERPL-SCNC: 28 MMOL/L (ref 23–29)
CREAT SERPL-MCNC: 2 MG/DL (ref 0.5–1.4)
EST. GFR  (AFRICAN AMERICAN): 30.2 ML/MIN/1.73 M^2
EST. GFR  (NON AFRICAN AMERICAN): 26.2 ML/MIN/1.73 M^2
GLUCOSE SERPL-MCNC: 157 MG/DL (ref 70–110)
LIPASE SERPL-CCNC: 430 U/L (ref 4–60)
POTASSIUM SERPL-SCNC: 4.4 MMOL/L (ref 3.5–5.1)
PROT SERPL-MCNC: 6.1 G/DL (ref 6–8.4)
SODIUM SERPL-SCNC: 142 MMOL/L (ref 136–145)

## 2020-02-10 PROCEDURE — 36415 COLL VENOUS BLD VENIPUNCTURE: CPT | Mod: PO

## 2020-02-10 PROCEDURE — 99215 PR OFFICE/OUTPT VISIT, EST, LEVL V, 40-54 MIN: ICD-10-PCS | Mod: S$PBB,,, | Performed by: FAMILY MEDICINE

## 2020-02-10 PROCEDURE — 99215 OFFICE O/P EST HI 40 MIN: CPT | Mod: S$PBB,,, | Performed by: FAMILY MEDICINE

## 2020-02-10 PROCEDURE — 90750 HZV VACC RECOMBINANT IM: CPT | Mod: PBBFAC,PO

## 2020-02-10 PROCEDURE — 83690 ASSAY OF LIPASE: CPT

## 2020-02-10 PROCEDURE — 83880 ASSAY OF NATRIURETIC PEPTIDE: CPT

## 2020-02-10 PROCEDURE — 83036 HEMOGLOBIN GLYCOSYLATED A1C: CPT

## 2020-02-10 PROCEDURE — 80053 COMPREHEN METABOLIC PANEL: CPT

## 2020-02-10 PROCEDURE — 99999 PR PBB SHADOW E&M-EST. PATIENT-LVL IV: CPT | Mod: PBBFAC,,, | Performed by: FAMILY MEDICINE

## 2020-02-10 PROCEDURE — 99999 PR PBB SHADOW E&M-EST. PATIENT-LVL IV: ICD-10-PCS | Mod: PBBFAC,,, | Performed by: FAMILY MEDICINE

## 2020-02-10 PROCEDURE — 99214 OFFICE O/P EST MOD 30 MIN: CPT | Mod: PBBFAC,PO,25 | Performed by: FAMILY MEDICINE

## 2020-02-10 RX ORDER — DOXAZOSIN 2 MG/1
2 TABLET ORAL NIGHTLY
Qty: 90 TABLET | Refills: 0 | Status: SHIPPED | OUTPATIENT
Start: 2020-02-10 | End: 2020-02-17 | Stop reason: SDUPTHER

## 2020-02-10 RX ORDER — PANTOPRAZOLE SODIUM 20 MG/1
40 TABLET, DELAYED RELEASE ORAL DAILY
Qty: 90 TABLET | Refills: 0 | Status: SHIPPED | OUTPATIENT
Start: 2020-02-10 | End: 2020-03-13

## 2020-02-10 RX ORDER — AMLODIPINE BESYLATE 10 MG/1
10 TABLET ORAL DAILY
Qty: 90 TABLET | Refills: 3 | Status: SHIPPED | OUTPATIENT
Start: 2020-02-10 | End: 2020-10-09 | Stop reason: CLARIF

## 2020-02-10 RX ORDER — FUROSEMIDE 40 MG/1
40 TABLET ORAL DAILY
Qty: 90 TABLET | Refills: 1 | Status: ON HOLD | OUTPATIENT
Start: 2020-02-10 | End: 2020-10-14 | Stop reason: HOSPADM

## 2020-02-10 RX ORDER — INSULIN GLARGINE 100 [IU]/ML
INJECTION, SOLUTION SUBCUTANEOUS
Qty: 15 ML | Refills: 6 | Status: SHIPPED | OUTPATIENT
Start: 2020-02-10 | End: 2020-05-18 | Stop reason: SDUPTHER

## 2020-02-10 RX ORDER — INSULIN LISPRO 100 [IU]/ML
INJECTION, SOLUTION INTRAVENOUS; SUBCUTANEOUS
Qty: 15 ML | Refills: 6 | Status: SHIPPED | OUTPATIENT
Start: 2020-02-10 | End: 2020-05-18 | Stop reason: SDUPTHER

## 2020-02-10 RX ORDER — LEVOTHYROXINE SODIUM 137 UG/1
137 TABLET ORAL
Qty: 90 TABLET | Refills: 1 | Status: SHIPPED | OUTPATIENT
Start: 2020-02-10 | End: 2020-11-28 | Stop reason: SDUPTHER

## 2020-02-10 NOTE — PROGRESS NOTES
Subjective:       Patient ID: Diamond Das is a 64 y.o. female.    Chief Complaint: Follow-up (pt states that her levels have been good at home - is having some SOB but is following up with Pulm about that)    Diabetes   She presents for her follow-up diabetic visit. She has type 2 diabetes mellitus. Her disease course has been stable. Pertinent negatives for hypoglycemia include no confusion, dizziness, headaches or hunger. Pertinent negatives for diabetes include no blurred vision, no chest pain, no foot paresthesias, no visual change and no weakness. Pertinent negatives for hypoglycemia complications include no blackouts and no hospitalization. Symptoms are stable. There are no known risk factors for coronary artery disease. When asked about current treatments, none were reported. She is compliant with treatment all of the time. She is following a diabetic diet. When asked about meal planning, she reported none. She has had a previous visit with a dietitian. She participates in exercise daily. An ACE inhibitor/angiotensin II receptor blocker is being taken.     Review of Systems   Constitutional: Negative for fever.   HENT: Negative for congestion.    Eyes: Negative for blurred vision and discharge.   Respiratory: Positive for shortness of breath.    Cardiovascular: Negative for chest pain.   Gastrointestinal: Positive for abdominal pain.   Genitourinary: Negative for difficulty urinating.   Musculoskeletal: Negative for joint swelling.   Skin: Negative for rash.   Neurological: Negative for dizziness, weakness and headaches.   Psychiatric/Behavioral: Negative for agitation and confusion.       Objective:      Physical Exam   Constitutional: She appears well-developed and well-nourished. She appears distressed.   HENT:   Head: Normocephalic and atraumatic.   Eyes: Conjunctivae are normal. No scleral icterus.   Cardiovascular: Normal rate and regular rhythm.   Pulmonary/Chest: Effort normal and breath sounds  normal. No respiratory distress. She has no wheezes.   Abdominal: Soft. Bowel sounds are normal. She exhibits distension. There is tenderness in the epigastric area. There is no guarding.   Musculoskeletal: She exhibits edema.   Neurological: She is alert.   Skin: Skin is warm and dry. No rash noted. She is not diaphoretic. No erythema. No pallor.   Good turgor   Nursing note and vitals reviewed.      Assessment:       1. Diabetes mellitus type 2, uncontrolled, with complications    2. Immunization deficiency    3. Generalized abdominal pain    4. Hypothyroidism due to acquired atrophy of thyroid    5. Chronic diastolic congestive heart failure    6. CKD (chronic kidney disease) stage 4, GFR 15-29 ml/min    7. Decompensated hepatic cirrhosis    8. Cirrhosis of liver with ascites, unspecified hepatic cirrhosis type    9. Essential hypertension    10. Epigastric pain    11. Elevated lipase        Plan:     Problem List Items Addressed This Visit        Cardiac/Vascular    Chronic diastolic congestive heart failure    Relevant Medications    furosemide (LASIX) 40 MG tablet    Hypertension    Relevant Medications    amLODIPine (NORVASC) 10 MG tablet    doxazosin (CARDURA) 2 MG tablet       Renal/    CKD (chronic kidney disease) stage 4, GFR 15-29 ml/min    Overview      Ref Range & Units 2wk ago   Glucose 70 - 110 mg/dL 425     Sodium 136 - 145 mmol/L 140    Potassium 3.5 - 5.1 mmol/L 3.8    Chloride 95 - 110 mmol/L 101    CO2 23 - 29 mmol/L 30     BUN, Bld 8 - 23 mg/dL 26     Calcium 8.7 - 10.5 mg/dL 8.9    Creatinine 0.5 - 1.4 mg/dL 2.1     Albumin 3.5 - 5.2 g/dL 2.5     Phosphorus 2.7 - 4.5 mg/dL 3.8    eGFR if African American >60 mL/min/1.73 m^2 28.7     eGFR if non African American >60 mL/min/1.73 m^2 24.9     Comment: Calculation used to obtain the estimated glomerular filtration   rate (eGFR) is the CKD-EPI equation.    Anion Gap 8 - 16 mmol/L 9    [facility]  [facility]       Specimen Collected: 05/10/18 11:16 Last Resulted: 05/10/18 11:46                   Relevant Orders    Brain natriuretic peptide (Completed)       ID    Immunization deficiency    Relevant Orders    (In Office Administered) Zoster Recombinant Vaccine (Completed)       Endocrine    Hypothyroidism due to acquired atrophy of thyroid    Relevant Medications    levothyroxine (SYNTHROID) 137 MCG Tab tablet    Diabetes mellitus type 2, uncontrolled, with complications - Primary    Relevant Medications    insulin (LANTUS SOLOSTAR U-100 INSULIN) glargine 100 units/mL (3mL) SubQ pen    insulin lispro (ADMELOG SOLOSTAR U-100 INSULIN) 100 unit/mL pen    liraglutide 0.6 mg/0.1 mL, 18 mg/3 mL, subq PNIJ (VICTOZA 2-SHAYY) 0.6 mg/0.1 mL (18 mg/3 mL) PnIj    Other Relevant Orders    Comprehensive metabolic panel (Completed)    Hemoglobin A1c (Completed)       GI    Cirrhosis of liver with ascites    Relevant Orders    Ambulatory referral/consult to Gastroenterology    Lipase (Completed)    CT Abdomen Without Contrast    Decompensated hepatic cirrhosis    Relevant Orders    Ambulatory referral/consult to Gastroenterology    Generalized abdominal pain    Relevant Medications    pantoprazole (PROTONIX) 20 MG tablet    Epigastric pain    Current Assessment & Plan     MEDICAL DECISION MAKING: Moderate to high complexity.  Overall, the multiple problems listed are of moderate to high severity that may impact quality of life and activities of daily living. Side effects of medications, treatment plan as well as options and alternatives reviewed and discussed with patient. There was counseling of patient concerning these issues.           Relevant Orders    CT Abdomen Without Contrast       Other    Elevated lipase    Relevant Orders    CT Abdomen Without Contrast

## 2020-02-11 PROBLEM — R74.8 ELEVATED LIPASE: Status: ACTIVE | Noted: 2020-02-11

## 2020-02-11 PROBLEM — R10.13 EPIGASTRIC PAIN: Status: ACTIVE | Noted: 2020-02-11

## 2020-02-11 LAB
BNP SERPL-MCNC: 863 PG/ML (ref 0–99)
ESTIMATED AVG GLUCOSE: 151 MG/DL (ref 68–131)
HBA1C MFR BLD HPLC: 6.9 % (ref 4–5.6)

## 2020-02-11 NOTE — PROGRESS NOTES
Please call pt with abnormal results. Pt does not need appt at this time, unless they have questions or wish to further discuss.  BNP still very high - likely from fluid overload - cont lasix - Keep appt with Dr. Ramos  A1c stable.  Liver enzymes are elevated as well as lipase - concerned for possible pancreas issues.  Ask her to hold victoza for now, as I would like to order a CT of her abdomen - she can do this is plaquemine.

## 2020-02-11 NOTE — ASSESSMENT & PLAN NOTE
MEDICAL DECISION MAKING: Moderate to high complexity.  Overall, the multiple problems listed are of moderate to high severity that may impact quality of life and activities of daily living. Side effects of medications, treatment plan as well as options and alternatives reviewed and discussed with patient. There was counseling of patient concerning these issues.

## 2020-02-17 ENCOUNTER — OFFICE VISIT (OUTPATIENT)
Dept: CARDIOLOGY | Facility: CLINIC | Age: 65
End: 2020-02-17
Payer: MEDICAID

## 2020-02-17 ENCOUNTER — OFFICE VISIT (OUTPATIENT)
Dept: INTERNAL MEDICINE | Facility: CLINIC | Age: 65
End: 2020-02-17
Payer: MEDICAID

## 2020-02-17 ENCOUNTER — TELEPHONE (OUTPATIENT)
Dept: INTERNAL MEDICINE | Facility: CLINIC | Age: 65
End: 2020-02-17

## 2020-02-17 VITALS
BODY MASS INDEX: 48.62 KG/M2 | OXYGEN SATURATION: 94 % | HEART RATE: 83 BPM | SYSTOLIC BLOOD PRESSURE: 142 MMHG | DIASTOLIC BLOOD PRESSURE: 72 MMHG | HEIGHT: 61 IN | WEIGHT: 257.5 LBS

## 2020-02-17 VITALS
DIASTOLIC BLOOD PRESSURE: 86 MMHG | WEIGHT: 256.81 LBS | BODY MASS INDEX: 48.49 KG/M2 | SYSTOLIC BLOOD PRESSURE: 124 MMHG | TEMPERATURE: 99 F | HEART RATE: 76 BPM | HEIGHT: 61 IN

## 2020-02-17 DIAGNOSIS — S39.012A STRAIN OF LUMBAR REGION, INITIAL ENCOUNTER: ICD-10-CM

## 2020-02-17 DIAGNOSIS — K74.60 CIRRHOSIS OF LIVER WITH ASCITES, UNSPECIFIED HEPATIC CIRRHOSIS TYPE: ICD-10-CM

## 2020-02-17 DIAGNOSIS — E78.49 OTHER HYPERLIPIDEMIA: ICD-10-CM

## 2020-02-17 DIAGNOSIS — E03.4 HYPOTHYROIDISM DUE TO ACQUIRED ATROPHY OF THYROID: ICD-10-CM

## 2020-02-17 DIAGNOSIS — E11.59 TYPE 2 DIABETES MELLITUS WITH OTHER CIRCULATORY COMPLICATION, WITHOUT LONG-TERM CURRENT USE OF INSULIN: Primary | ICD-10-CM

## 2020-02-17 DIAGNOSIS — R18.8 CIRRHOSIS OF LIVER WITH ASCITES, UNSPECIFIED HEPATIC CIRRHOSIS TYPE: ICD-10-CM

## 2020-02-17 DIAGNOSIS — I50.32 CHRONIC DIASTOLIC CONGESTIVE HEART FAILURE: Primary | ICD-10-CM

## 2020-02-17 DIAGNOSIS — K74.60 HEPATIC CIRRHOSIS, UNSPECIFIED HEPATIC CIRRHOSIS TYPE, UNSPECIFIED WHETHER ASCITES PRESENT: ICD-10-CM

## 2020-02-17 DIAGNOSIS — Z78.0 POSTMENOPAUSAL: ICD-10-CM

## 2020-02-17 DIAGNOSIS — N18.4 CKD (CHRONIC KIDNEY DISEASE) STAGE 4, GFR 15-29 ML/MIN: ICD-10-CM

## 2020-02-17 DIAGNOSIS — E66.01 MORBID OBESITY DUE TO EXCESS CALORIES: ICD-10-CM

## 2020-02-17 DIAGNOSIS — I10 ESSENTIAL HYPERTENSION: ICD-10-CM

## 2020-02-17 DIAGNOSIS — G47.33 OSA ON CPAP: ICD-10-CM

## 2020-02-17 DIAGNOSIS — S39.012S LUMBAR STRAIN, SEQUELA: ICD-10-CM

## 2020-02-17 DIAGNOSIS — R60.0 BILATERAL LOWER EXTREMITY EDEMA: ICD-10-CM

## 2020-02-17 PROCEDURE — 99213 OFFICE O/P EST LOW 20 MIN: CPT | Mod: PBBFAC | Performed by: INTERNAL MEDICINE

## 2020-02-17 PROCEDURE — 99999 PR PBB SHADOW E&M-EST. PATIENT-LVL III: CPT | Mod: PBBFAC,,, | Performed by: INTERNAL MEDICINE

## 2020-02-17 PROCEDURE — 99999 PR PBB SHADOW E&M-EST. PATIENT-LVL III: ICD-10-PCS | Mod: PBBFAC,,, | Performed by: INTERNAL MEDICINE

## 2020-02-17 PROCEDURE — 99214 PR OFFICE/OUTPT VISIT, EST, LEVL IV, 30-39 MIN: ICD-10-PCS | Mod: S$PBB,,, | Performed by: INTERNAL MEDICINE

## 2020-02-17 PROCEDURE — 99214 PR OFFICE/OUTPT VISIT, EST, LEVL IV, 30-39 MIN: ICD-10-PCS | Mod: S$PBB,,, | Performed by: FAMILY MEDICINE

## 2020-02-17 PROCEDURE — 99999 PR PBB SHADOW E&M-EST. PATIENT-LVL III: ICD-10-PCS | Mod: PBBFAC,,, | Performed by: FAMILY MEDICINE

## 2020-02-17 PROCEDURE — 99999 PR PBB SHADOW E&M-EST. PATIENT-LVL III: CPT | Mod: PBBFAC,,, | Performed by: FAMILY MEDICINE

## 2020-02-17 PROCEDURE — 99213 OFFICE O/P EST LOW 20 MIN: CPT | Mod: PBBFAC,27,PO | Performed by: FAMILY MEDICINE

## 2020-02-17 PROCEDURE — 99214 OFFICE O/P EST MOD 30 MIN: CPT | Mod: S$PBB,,, | Performed by: FAMILY MEDICINE

## 2020-02-17 PROCEDURE — 99214 OFFICE O/P EST MOD 30 MIN: CPT | Mod: S$PBB,,, | Performed by: INTERNAL MEDICINE

## 2020-02-17 RX ORDER — DOXAZOSIN 2 MG/1
2 TABLET ORAL NIGHTLY
Qty: 90 TABLET | Refills: 2 | Status: SHIPPED | OUTPATIENT
Start: 2020-02-17 | End: 2020-05-18 | Stop reason: SDUPTHER

## 2020-02-17 RX ORDER — METOLAZONE 2.5 MG/1
2.5 TABLET ORAL EVERY OTHER DAY
Qty: 15 TABLET | Refills: 3 | Status: SHIPPED | OUTPATIENT
Start: 2020-02-17 | End: 2020-05-18 | Stop reason: SDUPTHER

## 2020-02-17 RX ORDER — TIZANIDINE 4 MG/1
4 TABLET ORAL EVERY 8 HOURS
Qty: 60 TABLET | Refills: 0 | Status: SHIPPED | OUTPATIENT
Start: 2020-02-17 | End: 2020-05-25

## 2020-02-17 NOTE — PROGRESS NOTES
Subjective:   Patient ID:  Diamond Das is a 64 y.o. female who presents for cardiac consult of Shortness of Breath and Leg Swelling      Shortness of Breath   Associated symptoms include leg swelling. Pertinent negatives include no chest pain.   Follow-up   Pertinent negatives include no chest pain or nausea.     The patient came in today for cardiac consult of Shortness of Breath and Leg Swelling    Referring Physician: Andrey Perrin MD   Reason for consult: HTN, CHF    Diamond Das is a 64 y.o. female with medical conditions diastolic CHF, pericardial effusion, HTN, HLD, IDDM, cryptogenic cirrhosis, s/p TIPS, ascites resolved presents for CV follow up.     Pt of Dr. Shaw, last seen 2/9/18  No smoking/drinking  Last echo in  normal EF and RVF, grade II DD.  EKG today NSR poor R progression on anterior leads  Chronic weakness and fatigue. Had PNA in   Pain on lower chest bilateral for few months, worse with exercise, resolved after resting. Deep breathing made the pain worse. No pain at sleep. Associated dyspnea, N/V, palpitation and dizziness. No radiation. ASA and tylenol not really helped the pain.    3/1/19  She has been having more ascites and concern for cardiac etiologies. She was also started on Reglan, concern for prolonged Qtc, recent Qtc 475 ms. Has been feeling better with reglan and lactulose. Is dyspneic, chronically on oxygen at night and also has portable oxygen when she has to do a lot walking. Takes lasix 40 mg once/day now, was on 80mg daily. Active at home, dresses and bathes her self. Cant walk or stand to too long.     8/26/19  2D ECHO with grade 1 DD, normal Bi V function, Small pericardial effusion without tamponade. She has mild SOB at times with edema but improved lately. She will see hepatology as well. No Chest pain.   Has been taking lasix extra doses PRN and improved symptoms.     10/16/19  [facility] ER follow up  - Non toxic appearing elderly female here for  worsening NICHOLE and shortness of breath. Known h/o CHF, f/b cardiologist in [facility]. Work up is c/w acute on chronic chf exacerbation. She, unfortunately, does not follow her weights so not clear if significant change recently. CXR with likely signs of volume overload. She was given diuresis and had Inyo score of 0. Felt improved and prefers discharge with close OP f/u with her cardiologist. She was ambulated and did not significantly desat and tolerated it well.  No she is on lasix, feels better. BNP today is 453, needs to double lasix next 3-4 days for further diuresis.     11/18/19   Increased diuretics last visit. She felt better then and had weight loss as well. Still has NICHOLE but improved. She went to [facility] 2 weeks ago - presented to the ED for fall secondary to hypoglycemic episode. Pt has had 3 previous episodes of hypoglycemia requiring hospitalizations in the past with sxs of lightheadedness. CT head was unremarkable, CXR revealed improvement in CHF compared to previous study. Due to timeline of fall with insulin administration and Hx of cryptogenic cirrhosis, it is likely that pt could not compensate after receiving her insulin dosing.  BP meds were stopped, will get labs and restart lisinopril.     1/6/20  Breathing has been stable with LE edema. Last visit she doubled lasix for 3 days with min improvement. Has not had much relief lately.   ECG - NSR, poor RWP, lateral TWI    2/17/20  BNP was 863. She had recent thoracentesis as well. She has also been referred to hepatology as concern for TIPS not working?. She remains SOB will add metolazone. Discussed if kidneys and liver are worse will be difficult to manage volume status.     Patient feels no chest pain, no PND, no palpitation, no dizziness, no syncope, no CNS symptoms.    Patient has dec exercise tolerance.    Patient is compliant with medications.    2D ECHO 06/14/2019     CONCLUSIONS     1 - Normal left ventricular systolic function (EF  60-65%).     2 - Impaired LV relaxation, normal LAP (grade 1 diastolic dysfunction).     3 - Normal right ventricular systolic function .     4 - No wall motion abnormalities.     5 - Concentric hypertrophy.     6 - Trivial mitral regurgitation.     7 - Trivial tricuspid regurgitation.     8 - Small pericardial effusion.     9 - No echo evidence of tamponade.           Past Medical History:   Diagnosis Date    Ascites     Breast pain, left 1/4/2018    Cataract     CHF (congestive heart failure)     Cirrhosis     Cough     Diabetes mellitus     Diabetes mellitus, type 2     Gastroparesis     GERD (gastroesophageal reflux disease)     Hyperlipidemia     Hypertension     Hypotension 2/21/2019    Liver disease     Pneumonia of right middle lobe due to infectious organism 12/19/2017    Renal disorder     Sleep apnea     Thyroid disease        Past Surgical History:   Procedure Laterality Date    CHOLECYSTECTOMY      COLONOSCOPY N/A 9/4/2019    Procedure: COLONOSCOPY;  Surgeon: Marnie Elmore MD;  Location: [facility];  Service: Endoscopy;  Laterality: N/A;    ERCP      LIVER BIOPSY      THORACENTESIS Left 1/20/2020    Procedure: Thoracentesis;  Surgeon: Angelica Hunter MD;  Location: [facility];  Service: Pulmonary;  Laterality: Left;    TUBAL LIGATION      UPPER GASTROINTESTINAL ENDOSCOPY         Social History     Tobacco Use    Smoking status: Never Smoker    Smokeless tobacco: Never Used   Substance Use Topics    Alcohol use: No     Frequency: Never    Drug use: No       Family History   Problem Relation Age of Onset    Cancer Mother     Breast cancer Mother     Heart disease Father     Aneurysm Sister     Heart disease Brother     No Known Problems Maternal Grandmother     Cancer Maternal Grandfather     Sarcoidosis Sister        Patient's Medications   New Prescriptions    METOLAZONE (ZAROXOLYN) 2.5 MG TABLET    Take 1 tablet (2.5 mg total) by mouth every other day.    Previous Medications    AMLODIPINE (NORVASC) 10 MG TABLET    Take 1 tablet (10 mg total) by mouth once daily.    ASPIRIN (ECOTRIN) 81 MG EC TABLET    Take 1 tablet (81 mg total) by mouth once daily.    DOXAZOSIN (CARDURA) 2 MG TABLET    Take 1 tablet (2 mg total) by mouth every evening.    FUROSEMIDE (LASIX) 40 MG TABLET    Take 1 tablet (40 mg total) by mouth once daily. Take 80 mg next 3 days    GLUCAGON, HUMAN RECOMBINANT, (GLUCAGON EMERGENCY KIT, HUMAN,) 1 MG SOLR    Inject 1 mg into the muscle as needed.    INSULIN (LANTUS SOLOSTAR U-100 INSULIN) GLARGINE 100 UNITS/ML (3ML) SUBQ PEN    INJECT 20 UNITS INTO THE SKIN EVERY EVENING.  TITRATE UP TO 30 UNITS NIGHTLY AS DIRECTED ON INSTRUCTION SHEET    INSULIN LISPRO (ADMELOG SOLOSTAR U-100 INSULIN) 100 UNIT/ML PEN    Titrate up to 14 units subcutaneously three times a day 10-15 min before meals as directed in after visit summary    LEVOTHYROXINE (SYNTHROID) 137 MCG TAB TABLET    Take 1 tablet (137 mcg total) by mouth before breakfast.    LIRAGLUTIDE 0.6 MG/0.1 ML, 18 MG/3 ML, SUBQ PNIJ (VICTOZA 2-SHAYY) 0.6 MG/0.1 ML (18 MG/3 ML) PNIJ    Inject 0.6 mg into the skin once daily for 7 days, THEN 1.2 mg once daily for 7 days, THEN 1.2 mg once daily for 7 days, THEN 1.8 mg once daily.    ONDANSETRON (ZOFRAN) 4 MG TABLET    TAKE 1 TABLET BY MOUTH EVERY 8 HOURS AS NEEDED    PANTOPRAZOLE (PROTONIX) 20 MG TABLET    Take 2 tablets (40 mg total) by mouth once daily.    VITAMIN E 400 UNIT CAPSULE    Take 400 Units by mouth every morning.   Modified Medications    No medications on file   Discontinued Medications    No medications on file       Review of Systems   Constitutional: Negative.    HENT: Negative.    Eyes: Negative.    Respiratory: Positive for shortness of breath.    Cardiovascular: Positive for leg swelling. Negative for chest pain and palpitations.   Gastrointestinal: Negative for nausea.   Genitourinary: Negative.    Musculoskeletal: Negative.    Skin: Negative.   "  Neurological: Negative.    Endo/Heme/Allergies: Negative.    Psychiatric/Behavioral: Negative.    All 12 systems otherwise negative.      Wt Readings from Last 3 Encounters:   02/17/20 116.8 kg (257 lb 8 oz)   02/10/20 112.1 kg (247 lb 2.2 oz)   01/20/20 110.7 kg (244 lb)     Temp Readings from Last 3 Encounters:   02/10/20 98.6 °F (37 °C) (Oral)   01/20/20 97.5 °F (36.4 °C) (Temporal)   10/16/19 96.4 °F (35.8 °C) (Tympanic)     BP Readings from Last 3 Encounters:   02/17/20 (!) 142/72   02/10/20 (!) 146/84   01/20/20 (!) 170/77     Pulse Readings from Last 3 Encounters:   02/17/20 83   02/10/20 80   01/20/20 (!) 55       BP (!) 142/72 (BP Location: Left arm, Patient Position: Sitting, BP Method: Medium (Manual))   Pulse 83   Ht 5' 1" (1.549 m)   Wt 116.8 kg (257 lb 8 oz)   LMP  (LMP Unknown)   SpO2 (!) 94%   BMI 48.65 kg/m²     Objective:   Physical Exam   Constitutional: She is oriented to person, place, and time. She appears well-developed and well-nourished. No distress.   HENT:   Head: Normocephalic and atraumatic.   Nose: Nose normal.   Mouth/Throat: Oropharynx is clear and moist.   Eyes: Conjunctivae and EOM are normal. No scleral icterus.   Neck: Normal range of motion. Neck supple. No JVD present. No thyromegaly present.   Cardiovascular: Normal rate, regular rhythm, S1 normal and S2 normal. Exam reveals no gallop, no S3, no S4 and no friction rub.   Murmur heard.  Pulmonary/Chest: Effort normal and breath sounds normal. No stridor. No respiratory distress. She has no wheezes. She has no rales. She exhibits no tenderness.   Abdominal: Soft. Bowel sounds are normal. She exhibits no distension and no mass. There is no tenderness. There is no rebound.   Genitourinary:   Genitourinary Comments: Deferred   Musculoskeletal: Normal range of motion. She exhibits edema. She exhibits no tenderness or deformity.   Lymphadenopathy:     She has no cervical adenopathy.   Neurological: She is alert and oriented to " person, place, and time. She exhibits normal muscle tone. Coordination normal.   Skin: Skin is warm and dry. No rash noted. She is not diaphoretic. No erythema. No pallor.   Psychiatric: She has a normal mood and affect. Her behavior is normal. Judgment and thought content normal.   Nursing note and vitals reviewed.      Lab Results   Component Value Date     02/10/2020    K 4.4 02/10/2020     02/10/2020    CO2 28 02/10/2020    BUN 28 (H) 02/10/2020    CREATININE 2.0 (H) 02/10/2020     (H) 02/10/2020    HGBA1C 6.9 (H) 02/10/2020    MG 2.0 01/10/2020    AST 51 (H) 02/10/2020    ALT 24 02/10/2020    ALBUMIN 1.7 (L) 02/10/2020    PROT 6.1 02/10/2020    BILITOT 0.4 02/10/2020    WBC 6.67 09/18/2019    HGB 12.1 09/18/2019    HCT 38.2 09/18/2019    MCV 93 09/18/2019     09/18/2019    INR 1.1 09/18/2019    TSH 2.271 01/03/2019    CHOL 205 (H) 12/06/2018    HDL 41 12/06/2018    LDLCALC 145.8 12/06/2018    TRIG 91 12/06/2018     (H) 02/10/2020     Assessment:      1. Chronic diastolic congestive heart failure    2. Essential hypertension    3. Other hyperlipidemia    4. Postmenopausal    5. CKD (chronic kidney disease) stage 4, GFR 15-29 ml/min    6. Diabetes mellitus type 2, uncontrolled, with complications    7. Hypothyroidism due to acquired atrophy of thyroid    8. Morbid obesity due to excess calories    9. Hepatic cirrhosis, unspecified hepatic cirrhosis type, unspecified whether ascites present    10. Cirrhosis of liver with ascites, unspecified hepatic cirrhosis type    11. Bilateral lower extremity edema    12. KRISTAL on CPAP        Plan:   1. Chronic diastolic HF - recent BNP elevatded  - lasix, daily weights, low salt diet - take 80 mg lasix next 3 days + add metolazone  - recent ECG WNL qtc   - daily compression stockings    2. HTN  - cont meds - only Lisinopril and lasix     3. HLD  - cont statin    4. DM2  - cont meds per PCP  - sugars stable now 90s-110    5. Cirrhosis s/p TIPS  -  cont tx per PCP/Hepatology  - not transplant candidate currently     6. KRISTAL  - needs CPAP, was not using it before     7. Obesity  - rec weight loss with diet/exercise     8. Pericardial effusion  - small, sec to cirrhosis/CHF  - no tamponade clinically     If symptoms do not improve with lasix/metolazone discussed needs ER eval for IV diuresis/admission and close monitoring as kidney function/liver function is poor.     Thank you for allowing me to participate in this patient's care. Please do not hesitate to contact me with any questions or concerns. Consult note has been forwarded to the referral physician.

## 2020-02-17 NOTE — PROGRESS NOTES
Subjective:       Patient ID: Diamond Das is a 64 y.o. female.    Chief Complaint: Follow-up (Pt not getting better - still having issues with fluid)    Hypertension   This is a chronic problem. The current episode started more than 1 year ago. The problem has been resolved since onset. The problem is controlled. Pertinent negatives include no anxiety, blurred vision, chest pain, headaches or shortness of breath. Agents associated with hypertension include thyroid hormones. Risk factors for coronary artery disease include diabetes mellitus, obesity, post-menopausal state and dyslipidemia. Past treatments include calcium channel blockers, diuretics and lifestyle changes. The current treatment provides significant improvement. There are no compliance problems.      Review of Systems   Eyes: Negative for blurred vision.   Respiratory: Negative for shortness of breath.    Cardiovascular: Negative for chest pain.   Gastrointestinal: Positive for abdominal pain.   Neurological: Negative for headaches.       Objective:      Physical Exam   Constitutional: She appears well-developed and well-nourished. No distress.   HENT:   Head: Normocephalic and atraumatic.   Pulmonary/Chest: Effort normal and breath sounds normal. No respiratory distress. She has no wheezes.   Abdominal: Soft. She exhibits distension. There is tenderness. There is no guarding.   Musculoskeletal: She exhibits edema.   Skin: Skin is warm and dry. No rash noted. She is not diaphoretic. No erythema.   Nursing note and vitals reviewed.      Assessment:       1. Type 2 diabetes mellitus with other circulatory complication, without long-term current use of insulin    2. Essential hypertension    3. Strain of lumbar region, initial encounter    4. Lumbar strain, sequela        Plan:     Problem List Items Addressed This Visit        Cardiac/Vascular    Hypertension    Current Assessment & Plan     Cont meds         Relevant Medications    doxazosin  (CARDURA) 2 MG tablet       Endocrine    Type 2 diabetes mellitus with circulatory disorder - Primary    Current Assessment & Plan     Hold Victoza until abd scan has been completed, in case pt has pancreatitis            Orthopedic    RESOLVED: Lumbar strain       Other    Lumbar strain, sequela    Relevant Medications    tiZANidine (ZANAFLEX) 4 MG tablet

## 2020-02-18 ENCOUNTER — HOSPITAL ENCOUNTER (OUTPATIENT)
Dept: RADIOLOGY | Facility: HOSPITAL | Age: 65
Discharge: HOME | End: 2020-02-18
Attending: FAMILY MEDICINE | Admitting: INTERNAL MEDICINE
Payer: MEDICAID

## 2020-02-18 DIAGNOSIS — R18.8 CIRRHOSIS OF LIVER WITH ASCITES, UNSPECIFIED HEPATIC CIRRHOSIS TYPE: ICD-10-CM

## 2020-02-18 DIAGNOSIS — R10.13 EPIGASTRIC PAIN: ICD-10-CM

## 2020-02-18 DIAGNOSIS — K74.60 CIRRHOSIS OF LIVER WITH ASCITES, UNSPECIFIED HEPATIC CIRRHOSIS TYPE: ICD-10-CM

## 2020-02-18 DIAGNOSIS — R74.8 ELEVATED LIPASE: ICD-10-CM

## 2020-02-18 PROCEDURE — 74150 CT ABDOMEN WITHOUT CONTRAST: ICD-10-PCS | Mod: 26,,, | Performed by: RADIOLOGY

## 2020-02-18 PROCEDURE — 74150 CT ABDOMEN W/O CONTRAST: CPT | Mod: TC,PO

## 2020-02-18 PROCEDURE — 25500020 PHARM REV CODE 255: Mod: PO | Performed by: FAMILY MEDICINE

## 2020-02-18 PROCEDURE — 74150 CT ABDOMEN W/O CONTRAST: CPT | Mod: 26,,, | Performed by: RADIOLOGY

## 2020-02-18 RX ADMIN — IOHEXOL 15 ML: 350 INJECTION, SOLUTION INTRAVENOUS at 03:02

## 2020-02-18 NOTE — PROGRESS NOTES
Please call pt with abnormal results. Pt does not need appt at this time, unless they have questions or wish to further discuss.  No pancreatitis on CT.  Some pleural effusions which she has had chronically.  Keep appt with Dr. Ramos, ensure that she is taking lasix in order to get fluid off.

## 2020-02-20 LAB — FUNGUS SPEC CULT: NORMAL

## 2020-02-28 ENCOUNTER — PATIENT OUTREACH (OUTPATIENT)
Dept: ADMINISTRATIVE | Facility: OTHER | Age: 65
End: 2020-02-28

## 2020-03-10 ENCOUNTER — TELEPHONE (OUTPATIENT)
Dept: INTERNAL MEDICINE | Facility: CLINIC | Age: 65
End: 2020-03-10

## 2020-03-10 NOTE — TELEPHONE ENCOUNTER
----- Message from Federica Nicolas sent at 3/10/2020  2:29 PM CDT -----  Contact: pt  Type:  Sooner Apoointment Request    Caller is requesting a sooner appointment.  Caller declined first available appointment listed below.  Caller will not accept being placed on the waitlist and is requesting a message be sent to doctor.  Name of Caller:Patient  When is the first available appointment?4/30  Symptoms:serve leg pain  Would the patient rather a call back or a response via [facility]? Call back  Best Call Back Number:[phone]  Additional Information: pt medicaid, no available appt.

## 2020-03-10 NOTE — TELEPHONE ENCOUNTER
Everyone is gone today who has auth to do this. I will forward to them to do tomorrow as I am off.

## 2020-03-12 ENCOUNTER — TELEPHONE (OUTPATIENT)
Dept: INTERNAL MEDICINE | Facility: CLINIC | Age: 65
End: 2020-03-12

## 2020-03-12 NOTE — TELEPHONE ENCOUNTER
S/w patient - she states she wasn't feeling well the other day but is feeling a lot better now and doesn't believe she needs an appt at this time. Will call back if that changes.

## 2020-03-12 NOTE — TELEPHONE ENCOUNTER
----- Message from Emiliano Avelar sent at 3/12/2020  4:36 PM CDT -----  Contact: self [phone]  .Type:  Patient Returning Call    Who Called:Diamond Das  Who Left Message for Patient:  Does the patient know what this is regarding?:no  Would the patient rather a call back or a response via [facility]? Call back  Best Call Back Number:[phone]  Additional Information: pt states she missed call

## 2020-03-13 DIAGNOSIS — R10.84 GENERALIZED ABDOMINAL PAIN: ICD-10-CM

## 2020-03-13 RX ORDER — PANTOPRAZOLE SODIUM 20 MG/1
TABLET, DELAYED RELEASE ORAL
Qty: 180 TABLET | Refills: 1 | Status: SHIPPED | OUTPATIENT
Start: 2020-03-13 | End: 2020-05-18 | Stop reason: SDUPTHER

## 2020-03-23 LAB
ACID FAST MOD KINY STN SPEC: NORMAL
MYCOBACTERIUM SPEC QL CULT: NORMAL

## 2020-03-24 DIAGNOSIS — K76.82 HEPATIC ENCEPHALOPATHY: ICD-10-CM

## 2020-03-24 RX ORDER — RIFAXIMIN 550 MG/1
TABLET ORAL
Qty: 90 TABLET | Refills: 1 | Status: SHIPPED | OUTPATIENT
Start: 2020-03-24 | End: 2020-09-28 | Stop reason: SDUPTHER

## 2020-03-24 NOTE — TELEPHONE ENCOUNTER
----- Message from Andrey Perrin MD sent at 3/24/2020  8:51 AM CDT -----  Call pt to see how she is doing. Pt able to make a telemed visit if she needs anything

## 2020-04-03 ENCOUNTER — PATIENT MESSAGE (OUTPATIENT)
Dept: GASTROENTEROLOGY | Facility: CLINIC | Age: 65
End: 2020-04-03

## 2020-04-06 ENCOUNTER — TELEPHONE (OUTPATIENT)
Dept: GASTROENTEROLOGY | Facility: CLINIC | Age: 65
End: 2020-04-06

## 2020-04-06 NOTE — TELEPHONE ENCOUNTER
Attempted to contact to move patient's appointment sooner and convert to virtual visit.  No answer, no vm set up.  SERGIO Berger

## 2020-04-16 ENCOUNTER — TELEPHONE (OUTPATIENT)
Dept: GASTROENTEROLOGY | Facility: CLINIC | Age: 65
End: 2020-04-16

## 2020-04-24 ENCOUNTER — PATIENT OUTREACH (OUTPATIENT)
Dept: ADMINISTRATIVE | Facility: OTHER | Age: 65
End: 2020-04-24

## 2020-04-27 RX ORDER — ONDANSETRON 4 MG/1
TABLET, FILM COATED ORAL
Qty: 30 TABLET | Refills: 0 | Status: SHIPPED | OUTPATIENT
Start: 2020-04-27 | End: 2022-05-02 | Stop reason: SDUPTHER

## 2020-04-28 ENCOUNTER — OFFICE VISIT (OUTPATIENT)
Dept: GASTROENTEROLOGY | Facility: CLINIC | Age: 65
End: 2020-04-28
Payer: MEDICAID

## 2020-04-28 DIAGNOSIS — Z12.11 COLON CANCER SCREENING: ICD-10-CM

## 2020-04-28 DIAGNOSIS — R18.8 CIRRHOSIS OF LIVER WITH ASCITES, UNSPECIFIED HEPATIC CIRRHOSIS TYPE: ICD-10-CM

## 2020-04-28 DIAGNOSIS — Z95.828 S/P TIPS (TRANSJUGULAR INTRAHEPATIC PORTOSYSTEMIC SHUNT): ICD-10-CM

## 2020-04-28 DIAGNOSIS — K74.60 CIRRHOSIS OF LIVER WITH ASCITES, UNSPECIFIED HEPATIC CIRRHOSIS TYPE: ICD-10-CM

## 2020-04-28 DIAGNOSIS — K74.60 DECOMPENSATED HEPATIC CIRRHOSIS: Primary | ICD-10-CM

## 2020-04-28 DIAGNOSIS — K72.90 DECOMPENSATED HEPATIC CIRRHOSIS: Primary | ICD-10-CM

## 2020-04-28 PROCEDURE — 99213 PR OFFICE/OUTPT VISIT, EST, LEVL III, 20-29 MIN: ICD-10-PCS | Mod: 95,,, | Performed by: INTERNAL MEDICINE

## 2020-04-28 PROCEDURE — 99213 OFFICE O/P EST LOW 20 MIN: CPT | Mod: 95,,, | Performed by: INTERNAL MEDICINE

## 2020-04-28 NOTE — PROGRESS NOTES
"Clinic Consult:  [facility] Consultation Note    Reason for Consult:  Diagnoses of Decompensated hepatic cirrhosis and Cirrhosis of liver with ascites, unspecified hepatic cirrhosis type were pertinent to this visit.    PCP: Andrey Perrin   [address] [address]     The patient location is: [place], [place]  The chief complaint leading to consultation is: re-establish care, "TIPS needs to be checked"   Visit type: audiovisual  Total time spent with patient: 20 min   Each patient to whom he or she provides medical services by telemedicine is:  (1) informed of the relationship between the physician and patient and the respective role of any other health care provider with respect to management of the patient; and (2) notified that he or she may decline to receive medical services by telemedicine and may withdraw from such care at any time.    Notes:       HPI:  This is a 65 y.o. female here for evaluation of cirrhosis, s/p TIPS.  Pt was previously followed by Dr. Baker, hepatologist in [place].  She has not been seen over the past year.  She states that she needs follow up to check her TIPS because Dr. Baker did this every 6 months.      #fluid/ascites:  Says fluid on legs and abdomen is up and down, some good days and some not.  On lasix 40 mg daily.  Metolazone was added by Dr. Ramos in February of this year.      #SOB  Using CPAP at night.  States SOB is better but does get SOB with a lot of walking.  Sees Dr. Ramos in cardiology for heart failure.  Last BNP was 863.  She saw Dr. Ramos after this lab and he added metolazone.      #HE  Denies confusion or sleep reversal.  Is taking rifaximin BID    #varices   No history of variceal bleeding.  Not on a beta blocker. Is s/p TIPS.  Denies melena or hematemesis.      #GI symptoms  Has BM every other day.  Denies nausea, vomiting, diarrhea, melena, hematochezia.    Denies abdominal pain.     Med review: taking lasix 40 mg daily for " volume (not on spironolactone); taking xifaxan bid (not on lactulose)    GI history:  Endoscopy:  ?  Colonoscopy: 9/2019, incomplete due to respiratory compromise  Family history:     ROS:  CONSTITUTIONAL: Denies weight change,  fatigue, fevers, chills, night sweats.  EYES: No changes in vision.   ENT: No oral lesions or sore throat.  HEMATOLOGICAL/Lymph: Denies bleeding tendency, bruising tendency. No swellings or enlarged lymph nodes.  CARDIOVASCULAR: Denies chest pain, shortness of breath, orthopnea and edema.  RESPIRATORY: Denies cough, hemoptysis, dyspnea, and wheezing.  GI: See HPI.  : Denies dysuria and hematuria  MUSCULOSKELETAL: Denies joint pain or swelling, back pain and muscle pain.  SKIN: Denies rashes.  NEUROLOGIC: Denies headaches, seizures and numbness.  PSYCHIATRIC: Denies depression or anxiety.  ENDOCRINE: Denies heat or cold intolerance and excessive thirst or urination.    Medical History:   Past Medical History:   Diagnosis Date    Ascites     Breast pain, left 1/4/2018    Cataract     CHF (congestive heart failure)     Cirrhosis     Cough     Diabetes mellitus     Diabetes mellitus, type 2     Gastroparesis     GERD (gastroesophageal reflux disease)     Hyperlipidemia     Hypertension     Hypotension 2/21/2019    Liver disease     Lumbar strain 4/27/2018    Pneumonia of right middle lobe due to infectious organism 12/19/2017    Renal disorder     Sleep apnea     Thyroid disease        Surgical History:  Past Surgical History:   Procedure Laterality Date    CHOLECYSTECTOMY      COLONOSCOPY N/A 9/4/2019    Procedure: COLONOSCOPY;  Surgeon: Marnie Elmore MD;  Location: [facility];  Service: Endoscopy;  Laterality: N/A;    ERCP      LIVER BIOPSY      THORACENTESIS Left 1/20/2020    Procedure: Thoracentesis;  Surgeon: Angelica Hunter MD;  Location: [facility];  Service: Pulmonary;  Laterality: Left;    TUBAL LIGATION      UPPER GASTROINTESTINAL ENDOSCOPY          Family History:   Family History   Problem Relation Age of Onset    Cancer Mother     Breast cancer Mother     Heart disease Father     Aneurysm Sister     Heart disease Brother     No Known Problems Maternal Grandmother     Cancer Maternal Grandfather     Sarcoidosis Sister        Social History:   Social History     Tobacco Use    Smoking status: Never Smoker    Smokeless tobacco: Never Used   Substance Use Topics    Alcohol use: No     Frequency: Never    Drug use: No       Allergies: Reviewed    Home Medications:   Medication List with Changes/Refills   Current Medications    AMLODIPINE (NORVASC) 10 MG TABLET    Take 1 tablet (10 mg total) by mouth once daily.    ASPIRIN (ECOTRIN) 81 MG EC TABLET    Take 1 tablet (81 mg total) by mouth once daily.    DOXAZOSIN (CARDURA) 2 MG TABLET    Take 1 tablet (2 mg total) by mouth every evening.    FUROSEMIDE (LASIX) 40 MG TABLET    Take 1 tablet (40 mg total) by mouth once daily. Take 80 mg next 3 days    GLUCAGON, HUMAN RECOMBINANT, (GLUCAGON EMERGENCY KIT, HUMAN,) 1 MG SOLR    Inject 1 mg into the muscle as needed.    INSULIN (LANTUS SOLOSTAR U-100 INSULIN) GLARGINE 100 UNITS/ML (3ML) SUBQ PEN    INJECT 20 UNITS INTO THE SKIN EVERY EVENING.&nbsp;&nbsp;TITRATE UP TO 30 UNITS NIGHTLY AS DIRECTED ON INSTRUCTION SHEET    INSULIN LISPRO (ADMELOG SOLOSTAR U-100 INSULIN) 100 UNIT/ML PEN    Titrate up to 14 units subcutaneously three times a day 10-15 min before meals as directed in after visit summary    LEVOTHYROXINE (SYNTHROID) 137 MCG TAB TABLET    Take 1 tablet (137 mcg total) by mouth before breakfast.    LIRAGLUTIDE 0.6 MG/0.1 ML, 18 MG/3 ML, SUBQ PNIJ (VICTOZA 2-SHAYY) 0.6 MG/0.1 ML (18 MG/3 ML) PNIJ    Inject 0.6 mg into the skin once daily for 7 days, THEN 1.2 mg once daily for 7 days, THEN 1.2 mg once daily for 7 days, THEN 1.8 mg once daily.    METOLAZONE (ZAROXOLYN) 2.5 MG TABLET    Take 1 tablet (2.5 mg total) by mouth every other day.     ONDANSETRON (ZOFRAN) 4 MG TABLET    TAKE 1 TABLET BY MOUTH EVERY 8 HOURS AS NEEDED    PANTOPRAZOLE (PROTONIX) 20 MG TABLET    Take 2 tablets by mouth once daily    RIFAXIMIN (XIFAXAN) 550 MG TAB    Take 550 mg by mouth 2 (two) times daily.    VITAMIN E 400 UNIT CAPSULE    Take 400 Units by mouth every morning.    XIFAXAN 550 MG TAB    Take 1 tablet by mouth twice daily         Physical Exam:  Vital Signs:  LMP  (LMP Unknown)   There is no height or weight on file to calculate BMI.      GENERAL: No acute distress, A&Ox3  LE: + edema from video view but unable to quantify pitting (see image captured in video visit)  Labs: Pertinent labs reviewed.    Assessment and Plan:  Cirrhosis of liver with ascites, unspecified hepatic cirrhosis type  Unable to calculate MELD as not all labs available over the past year.    - ascites/LE edema:  S/p TIPS.  On lasix daily.  Continue for now.  Check bmp to assess renal function.  Consider adding spironolactone.    - TIPS: will get doppler ultrasound to evaluate integrity of TIPS.  Last done Sept 2019 and min reversal   - varices: unsure of varices status.  She is s/p TIPS, so in theory should be protected, however would like to get an EGD to confirm.   - HCC: check AFP and get ultrasound, then will need screening every 6 months  - HE: has been admitted in the past for HE but seems controlled on rifaximin.  Not clear why lactulose was stopped.  Consider adding back lactulose      Colon cancer screening   Overdue for screening in average risk person.  Will order colonoscopy to be done once restrictions for COVID-19 are lifted        Thank you so much for allowing me to participate in the care of Diamond Elmore MD

## 2020-04-28 NOTE — LETTER
April 28, 2020      Andrey Perrin MD  [address] [address] [address]           [facility]  [address] [address] [facility] [address]  Phone: [phone]  Fax: [phone]          Patient: Diamond Das   MR Number: [id number]   YOB: 1955   Date of Visit: 4/28/2020       Dear Dr. Andrey Perrin:    Thank you for referring Diamond Das to me for evaluation. Attached you will find relevant portions of my assessment and plan of care.    If you have questions, please do not hesitate to call me. I look forward to following Diamond Das along with you.    Sincerely,    Marnie Elmore MD    Enclosure  CC:  No Recipients    If you would like to receive this communication electronically, please contact [email or web address] or [phone] to request more information on [employer] access.    For providers and/or their staff who would like to refer a patient to [facility], please contact us through our one-stop-shop provider referral line, [facility], at [phone].    If you feel you have received this communication in error or would no longer like to receive these types of communications, please e-mail [email or web address]

## 2020-05-05 ENCOUNTER — TELEPHONE (OUTPATIENT)
Dept: PULMONOLOGY | Facility: CLINIC | Age: 65
End: 2020-05-05

## 2020-05-15 ENCOUNTER — PATIENT OUTREACH (OUTPATIENT)
Dept: ADMINISTRATIVE | Facility: OTHER | Age: 65
End: 2020-05-15

## 2020-05-18 ENCOUNTER — OFFICE VISIT (OUTPATIENT)
Dept: CARDIOLOGY | Facility: CLINIC | Age: 65
End: 2020-05-18
Payer: MEDICAID

## 2020-05-18 ENCOUNTER — TELEPHONE (OUTPATIENT)
Dept: INTERNAL MEDICINE | Facility: CLINIC | Age: 65
End: 2020-05-18

## 2020-05-18 ENCOUNTER — OFFICE VISIT (OUTPATIENT)
Dept: INTERNAL MEDICINE | Facility: CLINIC | Age: 65
End: 2020-05-18
Payer: MEDICAID

## 2020-05-18 DIAGNOSIS — R60.0 BILATERAL LOWER EXTREMITY EDEMA: ICD-10-CM

## 2020-05-18 DIAGNOSIS — I50.32 CHRONIC DIASTOLIC CONGESTIVE HEART FAILURE: Primary | ICD-10-CM

## 2020-05-18 DIAGNOSIS — I31.39 PERICARDIAL EFFUSION: ICD-10-CM

## 2020-05-18 DIAGNOSIS — N18.4 CKD (CHRONIC KIDNEY DISEASE) STAGE 4, GFR 15-29 ML/MIN: ICD-10-CM

## 2020-05-18 DIAGNOSIS — E11.59 TYPE 2 DIABETES MELLITUS WITH OTHER CIRCULATORY COMPLICATION, WITHOUT LONG-TERM CURRENT USE OF INSULIN: ICD-10-CM

## 2020-05-18 DIAGNOSIS — I50.9 CONGESTIVE HEART FAILURE, UNSPECIFIED HF CHRONICITY, UNSPECIFIED HEART FAILURE TYPE: ICD-10-CM

## 2020-05-18 DIAGNOSIS — I10 ESSENTIAL HYPERTENSION: ICD-10-CM

## 2020-05-18 DIAGNOSIS — R10.84 GENERALIZED ABDOMINAL PAIN: ICD-10-CM

## 2020-05-18 DIAGNOSIS — R60.0 LOCALIZED EDEMA: ICD-10-CM

## 2020-05-18 DIAGNOSIS — K74.60 DECOMPENSATED HEPATIC CIRRHOSIS: ICD-10-CM

## 2020-05-18 DIAGNOSIS — G47.33 OSA ON CPAP: ICD-10-CM

## 2020-05-18 DIAGNOSIS — R60.9 CHRONIC EDEMA: ICD-10-CM

## 2020-05-18 DIAGNOSIS — K72.90 DECOMPENSATED HEPATIC CIRRHOSIS: ICD-10-CM

## 2020-05-18 DIAGNOSIS — E78.49 OTHER HYPERLIPIDEMIA: ICD-10-CM

## 2020-05-18 PROCEDURE — 99214 PR OFFICE/OUTPT VISIT, EST, LEVL IV, 30-39 MIN: ICD-10-PCS | Mod: 95,,, | Performed by: INTERNAL MEDICINE

## 2020-05-18 PROCEDURE — 99214 PR OFFICE/OUTPT VISIT, EST, LEVL IV, 30-39 MIN: ICD-10-PCS | Mod: 95,,, | Performed by: FAMILY MEDICINE

## 2020-05-18 PROCEDURE — 99214 OFFICE O/P EST MOD 30 MIN: CPT | Mod: 95,,, | Performed by: INTERNAL MEDICINE

## 2020-05-18 PROCEDURE — 99214 OFFICE O/P EST MOD 30 MIN: CPT | Mod: 95,,, | Performed by: FAMILY MEDICINE

## 2020-05-18 RX ORDER — INSULIN LISPRO 100 [IU]/ML
INJECTION, SOLUTION INTRAVENOUS; SUBCUTANEOUS
Qty: 15 ML | Refills: 6 | Status: ON HOLD | OUTPATIENT
Start: 2020-05-18 | End: 2020-07-24

## 2020-05-18 RX ORDER — LANOLIN ALCOHOL/MO/W.PET/CERES
400 CREAM (GRAM) TOPICAL DAILY
Refills: 0 | COMMUNITY
Start: 2020-05-18 | End: 2020-10-19 | Stop reason: SDUPTHER

## 2020-05-18 RX ORDER — METOLAZONE 5 MG/1
5 TABLET ORAL EVERY OTHER DAY
Qty: 15 TABLET | Refills: 3 | Status: SHIPPED | OUTPATIENT
Start: 2020-05-18 | End: 2020-09-28 | Stop reason: SDUPTHER

## 2020-05-18 RX ORDER — PANTOPRAZOLE SODIUM 20 MG/1
40 TABLET, DELAYED RELEASE ORAL DAILY
Qty: 180 TABLET | Refills: 1 | Status: ON HOLD | OUTPATIENT
Start: 2020-05-18 | End: 2020-10-14 | Stop reason: HOSPADM

## 2020-05-18 RX ORDER — DOXAZOSIN 2 MG/1
2 TABLET ORAL NIGHTLY
Qty: 90 TABLET | Refills: 1 | Status: SHIPPED | OUTPATIENT
Start: 2020-05-18 | End: 2021-03-25 | Stop reason: SDUPTHER

## 2020-05-18 RX ORDER — POTASSIUM CHLORIDE 20 MEQ/1
20 TABLET, EXTENDED RELEASE ORAL DAILY
Qty: 30 TABLET | Refills: 3 | Status: SHIPPED | OUTPATIENT
Start: 2020-05-18 | End: 2020-11-23 | Stop reason: SDUPTHER

## 2020-05-18 RX ORDER — ASPIRIN 81 MG/1
81 TABLET ORAL DAILY
Qty: 365 TABLET | Refills: 0 | Status: SHIPPED | OUTPATIENT
Start: 2020-05-18 | End: 2021-08-23

## 2020-05-18 RX ORDER — INSULIN GLARGINE 100 [IU]/ML
INJECTION, SOLUTION SUBCUTANEOUS
Qty: 15 ML | Refills: 6 | Status: SHIPPED | OUTPATIENT
Start: 2020-05-18 | End: 2021-06-23

## 2020-05-18 NOTE — PROGRESS NOTES
Subjective:       Patient ID: Diamond Das is a 65 y.o. female.    Chief Complaint: No chief complaint on file.    Diabetes   She presents for her follow-up diabetic visit. She has type 2 diabetes mellitus. Her disease course has been stable. Pertinent negatives for hypoglycemia include no confusion or headaches. Pertinent negatives for diabetes include no chest pain, no polydipsia, no polyuria and no weakness. Pertinent negatives for hypoglycemia complications include no blackouts and no hospitalization. Current diabetic treatment includes insulin injections (victoza). She is compliant with treatment all of the time. Her weight is stable.     Review of Systems   Constitutional: Positive for unexpected weight change. Negative for activity change.   HENT: Negative for hearing loss, rhinorrhea and trouble swallowing.    Eyes: Negative for discharge and visual disturbance.   Respiratory: Negative for chest tightness and wheezing.    Cardiovascular: Negative for chest pain and palpitations.   Gastrointestinal: Negative for blood in stool, constipation, diarrhea and vomiting.   Endocrine: Negative for polydipsia and polyuria.   Genitourinary: Negative for difficulty urinating, dysuria, hematuria and menstrual problem.   Musculoskeletal: Negative for arthralgias, joint swelling and neck pain.   Skin: Negative for rash.   Neurological: Negative for weakness and headaches.   Psychiatric/Behavioral: Negative for confusion and dysphoric mood.       Objective:      Physical Exam   Constitutional: She appears well-developed and well-nourished. No distress.   HENT:   Head: Normocephalic and atraumatic.   Pulmonary/Chest: Effort normal. No respiratory distress.   Musculoskeletal: She exhibits edema.   Neurological: She is alert.   Skin: No rash noted. She is not diaphoretic. No erythema.   Psychiatric: She has a normal mood and affect. Her behavior is normal. Judgment and thought content normal.   Nursing note and vitals  reviewed.      Assessment:       1. Diabetes mellitus type 2, uncontrolled, with complications    2. Congestive heart failure    3. Essential hypertension    4. Type 2 diabetes mellitus with other circulatory complication, without long-term current use of insulin    5. Generalized abdominal pain        Plan:     Problem List Items Addressed This Visit        Cardiac/Vascular    Hypertension    Relevant Medications    doxazosin (CARDURA) 2 MG tablet    Congestive heart failure    Relevant Medications    aspirin (ECOTRIN) 81 MG EC tablet       Endocrine    Type 2 diabetes mellitus with circulatory disorder    Relevant Medications    insulin (LANTUS SOLOSTAR U-100 INSULIN) glargine 100 units/mL (3mL) SubQ pen    insulin lispro (ADMELOG SOLOSTAR U-100 INSULIN) 100 unit/mL pen    liraglutide 0.6 mg/0.1 mL, 18 mg/3 mL, subq PNIJ (VICTOZA 2-SHAYY) 0.6 mg/0.1 mL (18 mg/3 mL) PnIj    Diabetes mellitus type 2, uncontrolled, with complications - Primary    Relevant Medications    insulin (LANTUS SOLOSTAR U-100 INSULIN) glargine 100 units/mL (3mL) SubQ pen    insulin lispro (ADMELOG SOLOSTAR U-100 INSULIN) 100 unit/mL pen    liraglutide 0.6 mg/0.1 mL, 18 mg/3 mL, subq PNIJ (VICTOZA 2-SHAYY) 0.6 mg/0.1 mL (18 mg/3 mL) PnIj    Other Relevant Orders    Ambulatory referral/consult to Optometry    Hemoglobin A1C    Comprehensive metabolic panel       GI    Generalized abdominal pain    Relevant Medications    pantoprazole (PROTONIX) 20 MG tablet    Other Relevant Orders    Lipase

## 2020-05-18 NOTE — PROGRESS NOTES
Subjective:   Patient ID:  Diamond Das is a 65 y.o. female who presents for cardiac consult of Follow-up    The patient location is: home  The chief complaint leading to consultation is: CV follow up    Visit type: audiovisual    Face to Face time with patient: 10 min  20 minutes of total time spent on the encounter, which includes face to face time and non-face to face time preparing to see the patient (eg, review of tests), Obtaining and/or reviewing separately obtained history, Documenting clinical information in the electronic or other health record, Independently interpreting results (not separately reported) and communicating results to the patient/family/caregiver, or Care coordination (not separately reported).       Each patient to whom he or she provides medical services by telemedicine is:  (1) informed of the relationship between the physician and patient and the respective role of any other health care provider with respect to management of the patient; and (2) notified that he or she may decline to receive medical services by telemedicine and may withdraw from such care at any time.    Notes:     Shortness of Breath   Associated symptoms include leg swelling. Pertinent negatives include no chest pain.   Follow-up   Pertinent negatives include no chest pain or nausea.     The patient came in today for cardiac consult of Follow-up    Referring Physician: Andrey Perrin MD   Reason for consult: HTN, CHF    Diamond Das is a 65 y.o. female with medical conditions diastolic CHF, pericardial effusion, HTN, HLD, IDDM, cryptogenic cirrhosis, s/p TIPS, ascites resolved presents for CV follow up.     Pt of Dr. Shaw, last seen 2/9/18  No smoking/drinking  Last echo in  normal EF and RVF, grade II DD.  EKG today NSR poor R progression on anterior leads  Chronic weakness and fatigue. Had PNA in   Pain on lower chest bilateral for few months, worse with exercise, resolved after resting.  Deep breathing made the pain worse. No pain at sleep. Associated dyspnea, N/V, palpitation and dizziness. No radiation. ASA and tylenol not really helped the pain.    3/1/19  She has been having more ascites and concern for cardiac etiologies. She was also started on Reglan, concern for prolonged Qtc, recent Qtc 475 ms. Has been feeling better with reglan and lactulose. Is dyspneic, chronically on oxygen at night and also has portable oxygen when she has to do a lot walking. Takes lasix 40 mg once/day now, was on 80mg daily. Active at home, dresses and bathes her self. Cant walk or stand to too long.     8/26/19  2D ECHO with grade 1 DD, normal Bi V function, Small pericardial effusion without tamponade. She has mild SOB at times with edema but improved lately. She will see hepatology as well. No Chest pain.   Has been taking lasix extra doses PRN and improved symptoms.     10/16/19  [facility] ER follow up  - Non toxic appearing elderly female here for worsening NICHOLE and shortness of breath. Known h/o CHF, f/b cardiologist in [facility]. Work up is c/w acute on chronic chf exacerbation. She, unfortunately, does not follow her weights so not clear if significant change recently. CXR with likely signs of volume overload. She was given diuresis and had Traill score of 0. Felt improved and prefers discharge with close OP f/u with her cardiologist. She was ambulated and did not significantly desat and tolerated it well.  No she is on lasix, feels better. BNP today is 453, needs to double lasix next 3-4 days for further diuresis.     11/18/19   Increased diuretics last visit. She felt better then and had weight loss as well. Still has NICHOLE but improved. She went to [facility] 2 weeks ago - presented to the ED for fall secondary to hypoglycemic episode. Pt has had 3 previous episodes of hypoglycemia requiring hospitalizations in the past with sxs of lightheadedness. CT head was unremarkable, CXR revealed improvement in CHF  compared to previous study. Due to timeline of fall with insulin administration and Hx of cryptogenic cirrhosis, it is likely that pt could not compensate after receiving her insulin dosing.  BP meds were stopped, will get labs and restart lisinopril.     1/6/20  Breathing has been stable with LE edema. Last visit she doubled lasix for 3 days with min improvement. Has not had much relief lately.   ECG - NSR, poor RWP, lateral TWI    2/17/20  BNP was 863. She had recent thoracentesis as well. She has also been referred to hepatology as concern for TIPS not working?. She remains SOB will add metolazone. Discussed if kidneys and liver are worse will be difficult to manage volume status.     5/18/20  She has been stable but still has SOB with leg swelling. Symptoms have gotten a bit worse a few weeks ago. She has been taking lasix extra dose some times. Last BNP elevated 863, will need to increase lasix and metolazone and repeat labs this week. Will add K and Mg supplements.    Patient feels no chest pain, no PND, no palpitation, no dizziness, no syncope, no CNS symptoms.    Patient has dec exercise tolerance.    Patient is compliant with medications.    2D ECHO 06/14/2019     CONCLUSIONS     1 - Normal left ventricular systolic function (EF 60-65%).     2 - Impaired LV relaxation, normal LAP (grade 1 diastolic dysfunction).     3 - Normal right ventricular systolic function .     4 - No wall motion abnormalities.     5 - Concentric hypertrophy.     6 - Trivial mitral regurgitation.     7 - Trivial tricuspid regurgitation.     8 - Small pericardial effusion.     9 - No echo evidence of tamponade.           Past Medical History:   Diagnosis Date    Ascites     Breast pain, left 1/4/2018    Cataract     CHF (congestive heart failure)     Cirrhosis     Cough     Diabetes mellitus     Diabetes mellitus, type 2     Gastroparesis     GERD (gastroesophageal reflux disease)     Hyperlipidemia     Hypertension      Hypotension 2/21/2019    Liver disease     Lumbar strain 4/27/2018    Pneumonia of right middle lobe due to infectious organism 12/19/2017    Renal disorder     Sleep apnea     Thyroid disease        Past Surgical History:   Procedure Laterality Date    CHOLECYSTECTOMY      COLONOSCOPY N/A 9/4/2019    Procedure: COLONOSCOPY;  Surgeon: Marnie Elmore MD;  Location[facility] ENDO;  Service: Endoscopy;  Laterality: N/A;    ERCP      LIVER BIOPSY      THORACENTESIS Left 1/20/2020    Procedure: Thoracentesis;  Surgeon: Angelica Hunter MD;  Location: [facility] ENDO;  Service: Pulmonary;  Laterality: Left;    TUBAL LIGATION      UPPER GASTROINTESTINAL ENDOSCOPY         Social History     Tobacco Use    Smoking status: Never Smoker    Smokeless tobacco: Never Used   Substance Use Topics    Alcohol use: No     Frequency: Never    Drug use: No       Family History   Problem Relation Age of Onset    Cancer Mother     Breast cancer Mother     Heart disease Father     Aneurysm Sister     Heart disease Brother     No Known Problems Maternal Grandmother     Cancer Maternal Grandfather     Sarcoidosis Sister        Patient's Medications   New Prescriptions    MAGNESIUM OXIDE (MAG-OX) 400 MG (241.3 MG MAGNESIUM) TABLET    Take 1 tablet (400 mg total) by mouth once daily.    POTASSIUM CHLORIDE SA (K-DUR,KLOR-CON) 20 MEQ TABLET    Take 1 tablet (20 mEq total) by mouth once daily.   Previous Medications    AMLODIPINE (NORVASC) 10 MG TABLET    Take 1 tablet (10 mg total) by mouth once daily.    ASPIRIN (ECOTRIN) 81 MG EC TABLET    Take 1 tablet (81 mg total) by mouth once daily.    DOXAZOSIN (CARDURA) 2 MG TABLET    Take 1 tablet (2 mg total) by mouth every evening.    FUROSEMIDE (LASIX) 40 MG TABLET    Take 1 tablet (40 mg total) by mouth once daily. Take 80 mg next 3 days    GLUCAGON, HUMAN RECOMBINANT, (GLUCAGON EMERGENCY KIT, HUMAN,) 1 MG SOLR    Inject 1 mg into the muscle as needed.    INSULIN (LANTUS SOLOSTAR  U-100 INSULIN) GLARGINE 100 UNITS/ML (3ML) SUBQ PEN    INJECT 20 UNITS INTO THE SKIN EVERY EVENING.  TITRATE UP TO 30 UNITS NIGHTLY AS DIRECTED ON INSTRUCTION SHEET    INSULIN LISPRO (ADMELOG SOLOSTAR U-100 INSULIN) 100 UNIT/ML PEN    Titrate up to 14 units subcutaneously three times a day 10-15 min before meals as directed in after visit summary    LEVOTHYROXINE (SYNTHROID) 137 MCG TAB TABLET    Take 1 tablet (137 mcg total) by mouth before breakfast.    LIRAGLUTIDE 0.6 MG/0.1 ML, 18 MG/3 ML, SUBQ PNIJ (VICTOZA 2-SHAYY) 0.6 MG/0.1 ML (18 MG/3 ML) PNIJ    Inject 0.6 mg into the skin once daily for 7 days, THEN 1.2 mg once daily for 7 days, THEN 1.2 mg once daily for 7 days, THEN 1.8 mg once daily.    ONDANSETRON (ZOFRAN) 4 MG TABLET    TAKE 1 TABLET BY MOUTH EVERY 8 HOURS AS NEEDED    PANTOPRAZOLE (PROTONIX) 20 MG TABLET    Take 2 tablets by mouth once daily    RIFAXIMIN (XIFAXAN) 550 MG TAB    Take 550 mg by mouth 2 (two) times daily.    VITAMIN E 400 UNIT CAPSULE    Take 400 Units by mouth every morning.    XIFAXAN 550 MG TAB    Take 1 tablet by mouth twice daily   Modified Medications    Modified Medication Previous Medication    METOLAZONE (ZAROXOLYN) 5 MG TABLET metOLazone (ZAROXOLYN) 2.5 MG tablet       Take 1 tablet (5 mg total) by mouth every other day.    Take 1 tablet (2.5 mg total) by mouth every other day.   Discontinued Medications    No medications on file       Review of Systems   Constitutional: Negative.    HENT: Negative.    Eyes: Negative.    Respiratory: Positive for shortness of breath.    Cardiovascular: Positive for leg swelling. Negative for chest pain and palpitations.   Gastrointestinal: Negative for nausea.   Genitourinary: Negative.    Musculoskeletal: Negative.    Skin: Negative.    Neurological: Negative.    Endo/Heme/Allergies: Negative.    Psychiatric/Behavioral: Negative.    All 12 systems otherwise negative.      Wt Readings from Last 3 Encounters:   02/17/20 116.5 kg (256 lb 13.4 oz)    02/17/20 116.8 kg (257 lb 8 oz)   02/10/20 112.1 kg (247 lb 2.2 oz)     Temp Readings from Last 3 Encounters:   02/17/20 98.9 °F (37.2 °C) (Oral)   02/10/20 98.6 °F (37 °C) (Oral)   01/20/20 97.5 °F (36.4 °C) (Temporal)     BP Readings from Last 3 Encounters:   02/17/20 124/86   02/17/20 (!) 142/72   02/10/20 (!) 146/84     Pulse Readings from Last 3 Encounters:   02/17/20 76   02/17/20 83   02/10/20 80       LMP  (LMP Unknown)     Objective:   Physical Exam   Constitutional: She is oriented to person, place, and time. She appears well-developed and well-nourished. No distress.   HENT:   Head: Normocephalic and atraumatic.   Mouth/Throat: No oropharyngeal exudate.   Eyes: Right eye exhibits no discharge. Left eye exhibits no discharge. No scleral icterus.   Pulmonary/Chest: Effort normal. No respiratory distress.   Neurological: She is alert and oriented to person, place, and time.   Skin: No rash noted. She is not diaphoretic. No erythema. No pallor.   Psychiatric: She has a normal mood and affect. Her behavior is normal. Judgment and thought content normal.       Lab Results   Component Value Date     02/10/2020    K 4.4 02/10/2020     02/10/2020    CO2 28 02/10/2020    BUN 28 (H) 02/10/2020    CREATININE 2.0 (H) 02/10/2020     (H) 02/10/2020    HGBA1C 6.9 (H) 02/10/2020    MG 2.0 01/10/2020    AST 51 (H) 02/10/2020    ALT 24 02/10/2020    ALBUMIN 1.7 (L) 02/10/2020    PROT 6.1 02/10/2020    BILITOT 0.4 02/10/2020    WBC 6.67 09/18/2019    HGB 12.1 09/18/2019    HCT 38.2 09/18/2019    MCV 93 09/18/2019     09/18/2019    INR 1.1 09/18/2019    TSH 2.271 01/03/2019    CHOL 205 (H) 12/06/2018    HDL 41 12/06/2018    LDLCALC 145.8 12/06/2018    TRIG 91 12/06/2018     (H) 02/10/2020     Assessment:      1. Chronic diastolic congestive heart failure    2. Essential hypertension    3. Other hyperlipidemia    4. Pericardial effusion    5. CKD (chronic kidney disease) stage 4, GFR 15-29  ml/min    6. Type 2 diabetes mellitus with other circulatory complication, without long-term current use of insulin    7. Decompensated hepatic cirrhosis    8. Localized edema    9. KRISTAL on CPAP    10. Chronic edema    11. Bilateral lower extremity edema        Plan:   1. Chronic diastolic HF - last BNP elevated  - increase lasix, daily weights, low salt diet - take 80 mg lasix next 3 days, double metolazone 5 every other day, with K and Mg  - daily compression stockings  - labs ordered - Thurs bnp, bmp, mg    2. HTN  - cont meds     3. HLD  - cont statin    4. DM2  - cont meds per PCP  - sugars stable now 90s-110    5. Cirrhosis s/p TIPS  - cont tx per PCP/Hepatology  - not transplant candidate currently     6. KRISTAL  - needs CPAP, was not using it before     7. Obesity  - rec weight loss with diet/exercise     8. Pericardial effusion  - small, sec to cirrhosis/CHF  - no tamponade clinically     If symptoms do not improve with lasix/metolazone discussed needs ER eval for IV diuresis/admission and close monitoring as kidney function/liver function is poor.     Thank you for allowing me to participate in this patient's care. Please do not hesitate to contact me with any questions or concerns. Consult note has been forwarded to the referral physician.

## 2020-05-21 ENCOUNTER — LAB VISIT (OUTPATIENT)
Dept: LAB | Facility: HOSPITAL | Age: 65
End: 2020-05-21
Attending: INTERNAL MEDICINE

## 2020-05-21 ENCOUNTER — LAB VISIT (OUTPATIENT)
Dept: LAB | Facility: HOSPITAL | Age: 65
End: 2020-05-21
Attending: FAMILY MEDICINE
Payer: MEDICAID

## 2020-05-21 DIAGNOSIS — R10.84 GENERALIZED ABDOMINAL PAIN: ICD-10-CM

## 2020-05-21 DIAGNOSIS — I50.32 CHRONIC DIASTOLIC CONGESTIVE HEART FAILURE: ICD-10-CM

## 2020-05-21 LAB
ALBUMIN SERPL BCP-MCNC: 1.3 G/DL (ref 3.5–5.2)
ALP SERPL-CCNC: 78 U/L (ref 55–135)
ALT SERPL W/O P-5'-P-CCNC: 14 U/L (ref 10–44)
ANION GAP SERPL CALC-SCNC: 7 MMOL/L (ref 8–16)
AST SERPL-CCNC: 31 U/L (ref 10–40)
BILIRUB SERPL-MCNC: 0.3 MG/DL (ref 0.1–1)
BUN SERPL-MCNC: 29 MG/DL (ref 8–23)
CALCIUM SERPL-MCNC: 7.6 MG/DL (ref 8.7–10.5)
CHLORIDE SERPL-SCNC: 107 MMOL/L (ref 95–110)
CO2 SERPL-SCNC: 27 MMOL/L (ref 23–29)
CREAT SERPL-MCNC: 2.4 MG/DL (ref 0.5–1.4)
EST. GFR  (AFRICAN AMERICAN): 24 ML/MIN/1.73 M^2
EST. GFR  (NON AFRICAN AMERICAN): 20.9 ML/MIN/1.73 M^2
GLUCOSE SERPL-MCNC: 175 MG/DL (ref 70–110)
LIPASE SERPL-CCNC: 14 U/L (ref 4–60)
POTASSIUM SERPL-SCNC: 4 MMOL/L (ref 3.5–5.1)
PROT SERPL-MCNC: 5.5 G/DL (ref 6–8.4)
SODIUM SERPL-SCNC: 141 MMOL/L (ref 136–145)

## 2020-05-21 PROCEDURE — 83880 ASSAY OF NATRIURETIC PEPTIDE: CPT | Mod: PO

## 2020-05-21 PROCEDURE — 36415 COLL VENOUS BLD VENIPUNCTURE: CPT

## 2020-05-21 PROCEDURE — 83690 ASSAY OF LIPASE: CPT | Mod: PO

## 2020-05-21 PROCEDURE — 83036 HEMOGLOBIN GLYCOSYLATED A1C: CPT

## 2020-05-21 PROCEDURE — 83735 ASSAY OF MAGNESIUM: CPT | Mod: PO

## 2020-05-21 PROCEDURE — 80048 BASIC METABOLIC PNL TOTAL CA: CPT | Mod: PO

## 2020-05-21 PROCEDURE — 80053 COMPREHEN METABOLIC PANEL: CPT | Mod: PO

## 2020-05-22 LAB
ANION GAP SERPL CALC-SCNC: 9 MMOL/L (ref 8–16)
BNP SERPL-MCNC: 413 PG/ML (ref 0–99)
BUN SERPL-MCNC: 28 MG/DL (ref 8–23)
CALCIUM SERPL-MCNC: 7.7 MG/DL (ref 8.7–10.5)
CHLORIDE SERPL-SCNC: 108 MMOL/L (ref 95–110)
CO2 SERPL-SCNC: 25 MMOL/L (ref 23–29)
CREAT SERPL-MCNC: 2.4 MG/DL (ref 0.5–1.4)
EST. GFR  (AFRICAN AMERICAN): 24 ML/MIN/1.73 M^2
EST. GFR  (NON AFRICAN AMERICAN): 20.9 ML/MIN/1.73 M^2
ESTIMATED AVG GLUCOSE: 148 MG/DL (ref 68–131)
GLUCOSE SERPL-MCNC: 165 MG/DL (ref 70–110)
HBA1C MFR BLD HPLC: 6.8 % (ref 4–5.6)
MAGNESIUM SERPL-MCNC: 1.7 MG/DL (ref 1.6–2.6)
POTASSIUM SERPL-SCNC: 4.3 MMOL/L (ref 3.5–5.1)
SODIUM SERPL-SCNC: 142 MMOL/L (ref 136–145)

## 2020-05-22 NOTE — PROGRESS NOTES
Please call pt with abnormal results. Pt does not need appt at this time, unless they have questions or wish to further discuss.  a1c improving  CMP elevated but stable, cont seeing nephrology to determine if and when she will need further eval for renal issues.  And have a great memorial weekend

## 2020-05-24 DIAGNOSIS — S39.012S LUMBAR STRAIN, SEQUELA: ICD-10-CM

## 2020-05-25 RX ORDER — TIZANIDINE 4 MG/1
TABLET ORAL
Qty: 60 TABLET | Refills: 0 | Status: SHIPPED | OUTPATIENT
Start: 2020-05-25 | End: 2020-09-02 | Stop reason: SDUPTHER

## 2020-06-08 ENCOUNTER — TELEPHONE (OUTPATIENT)
Dept: RADIOLOGY | Facility: HOSPITAL | Age: 65
End: 2020-06-08

## 2020-06-09 ENCOUNTER — HOSPITAL ENCOUNTER (OUTPATIENT)
Dept: RADIOLOGY | Facility: HOSPITAL | Age: 65
Discharge: HOME | End: 2020-06-09
Attending: INTERNAL MEDICINE
Payer: MEDICAID

## 2020-06-09 DIAGNOSIS — Z95.828 S/P TIPS (TRANSJUGULAR INTRAHEPATIC PORTOSYSTEMIC SHUNT): ICD-10-CM

## 2020-06-09 DIAGNOSIS — K74.60 DECOMPENSATED HEPATIC CIRRHOSIS: ICD-10-CM

## 2020-06-09 DIAGNOSIS — K72.90 DECOMPENSATED HEPATIC CIRRHOSIS: ICD-10-CM

## 2020-06-09 PROCEDURE — 76705 US LIVER WITH DOPPLER: ICD-10-PCS | Mod: 26,XS,, | Performed by: RADIOLOGY

## 2020-06-09 PROCEDURE — 93975 VASCULAR STUDY: CPT | Mod: 26,,, | Performed by: RADIOLOGY

## 2020-06-09 PROCEDURE — 76705 ECHO EXAM OF ABDOMEN: CPT | Mod: 59,TC,PO

## 2020-06-09 PROCEDURE — 93975 VASCULAR STUDY: CPT | Mod: TC,PO

## 2020-06-09 PROCEDURE — 76705 ECHO EXAM OF ABDOMEN: CPT | Mod: 26,XS,, | Performed by: RADIOLOGY

## 2020-06-09 PROCEDURE — 93975 US LIVER WITH DOPPLER: ICD-10-PCS | Mod: 26,,, | Performed by: RADIOLOGY

## 2020-06-17 DIAGNOSIS — K74.60 HEPATIC CIRRHOSIS, UNSPECIFIED HEPATIC CIRRHOSIS TYPE, UNSPECIFIED WHETHER ASCITES PRESENT: Primary | ICD-10-CM

## 2020-06-17 DIAGNOSIS — Z95.828 S/P TIPS (TRANSJUGULAR INTRAHEPATIC PORTOSYSTEMIC SHUNT): Primary | ICD-10-CM

## 2020-06-17 NOTE — PROGRESS NOTES
Pt s/p TIPS with worsening ascites in addition to LE edema.  Does have heart failure and fluid retention likely worsened by heart failure.  Ultrasound shows decreased velocities.  Will send to IR for further evaluation and dilation if needed.        Marnie Elmore MD  Gastroenterology

## 2020-06-18 ENCOUNTER — TELEPHONE (OUTPATIENT)
Dept: RADIOLOGY | Facility: HOSPITAL | Age: 65
End: 2020-06-18

## 2020-06-18 NOTE — TELEPHONE ENCOUNTER
Interventional Radiology:  Called patient at both numbers and left a message to call me to discuss TIPS revision and get it scheduled

## 2020-06-19 ENCOUNTER — PATIENT OUTREACH (OUTPATIENT)
Dept: ADMINISTRATIVE | Facility: OTHER | Age: 65
End: 2020-06-19

## 2020-06-19 ENCOUNTER — TELEPHONE (OUTPATIENT)
Dept: GASTROENTEROLOGY | Facility: CLINIC | Age: 65
End: 2020-06-19

## 2020-06-22 ENCOUNTER — HOSPITAL ENCOUNTER (OUTPATIENT)
Dept: RADIOLOGY | Facility: HOSPITAL | Age: 65
Discharge: HOME | End: 2020-06-22
Attending: INTERNAL MEDICINE
Payer: MEDICAID

## 2020-06-22 ENCOUNTER — OFFICE VISIT (OUTPATIENT)
Dept: PULMONOLOGY | Facility: CLINIC | Age: 65
End: 2020-06-22
Payer: MEDICAID

## 2020-06-22 VITALS
SYSTOLIC BLOOD PRESSURE: 132 MMHG | RESPIRATION RATE: 18 BRPM | WEIGHT: 228.06 LBS | TEMPERATURE: 99 F | HEIGHT: 61 IN | BODY MASS INDEX: 43.06 KG/M2 | DIASTOLIC BLOOD PRESSURE: 78 MMHG | OXYGEN SATURATION: 98 % | HEART RATE: 89 BPM

## 2020-06-22 DIAGNOSIS — K74.60 HEPATIC CIRRHOSIS, UNSPECIFIED HEPATIC CIRRHOSIS TYPE, UNSPECIFIED WHETHER ASCITES PRESENT: ICD-10-CM

## 2020-06-22 DIAGNOSIS — G47.33 OSA ON CPAP: ICD-10-CM

## 2020-06-22 DIAGNOSIS — I50.32 CHRONIC DIASTOLIC HEART FAILURE: ICD-10-CM

## 2020-06-22 DIAGNOSIS — R79.89 ELEVATED BRAIN NATRIURETIC PEPTIDE (BNP) LEVEL: ICD-10-CM

## 2020-06-22 DIAGNOSIS — J90 PLEURAL EFFUSION, LEFT: ICD-10-CM

## 2020-06-22 DIAGNOSIS — J90 PLEURAL EFFUSION, LEFT: Primary | ICD-10-CM

## 2020-06-22 DIAGNOSIS — G47.34 NOCTURNAL HYPOXEMIA: Primary | ICD-10-CM

## 2020-06-22 PROCEDURE — 99999 PR PBB SHADOW E&M-EST. PATIENT-LVL IV: ICD-10-PCS | Mod: PBBFAC,,, | Performed by: INTERNAL MEDICINE

## 2020-06-22 PROCEDURE — 99999 PR PBB SHADOW E&M-EST. PATIENT-LVL IV: CPT | Mod: PBBFAC,,, | Performed by: INTERNAL MEDICINE

## 2020-06-22 PROCEDURE — 71046 X-RAY EXAM CHEST 2 VIEWS: CPT | Mod: 26,,, | Performed by: RADIOLOGY

## 2020-06-22 PROCEDURE — 99214 OFFICE O/P EST MOD 30 MIN: CPT | Mod: PBBFAC | Performed by: INTERNAL MEDICINE

## 2020-06-22 PROCEDURE — 99214 OFFICE O/P EST MOD 30 MIN: CPT | Mod: S$PBB,,, | Performed by: INTERNAL MEDICINE

## 2020-06-22 PROCEDURE — 71046 X-RAY EXAM CHEST 2 VIEWS: CPT | Mod: TC

## 2020-06-22 PROCEDURE — 99214 PR OFFICE/OUTPT VISIT, EST, LEVL IV, 30-39 MIN: ICD-10-PCS | Mod: S$PBB,,, | Performed by: INTERNAL MEDICINE

## 2020-06-22 PROCEDURE — 71046 XR CHEST PA AND LATERAL: ICD-10-PCS | Mod: 26,,, | Performed by: RADIOLOGY

## 2020-06-22 NOTE — PROGRESS NOTES
Pulmonary Outpatient Follow Up Visit     Subjective:       Patient ID: Diamond Das is a 65 y.o. female.    Chief Complaint: clement on cpap      HPI      65-year-old female patient presenting for six-month follow-up.    I have last perform the thoracentesis with 185 mL of cloudy fluid drained on the left side in January 2020.  Not malignant.      Initially  evaluated February 2018 seen for hypoxemic respiratory failure after hospital admission precipitated by CHF exacerbation currently using oxygen during sleep with CPAP, 6 min walking test showed no need for oxygen on exertion in 2018.       On CPAP on O2 during sleep suboptimal CPAP usage.  She states that her new CPAP was taken back from her and she is currently using her old hold machine.  Also requesting tubing for home O2.       She has obstructive sleep apnea and she has been on CPAP for more than 5 years her study was done at [facility].     Known with diastolic heart failure, cryptogenic liver cirrhosis status post paracentesis and TIPS in 2017, chronic lower extremity edema.       Currently on 40 mg Lasix daily, metolazone 5 mg daily.  Following with Dr. Ramos cardiology.      Recently evaluated by GI, tips still working however there is a decrease flow.           Review of Systems   Constitutional: Positive for weight gain. Negative for fever and chills.   HENT: Negative for trouble swallowing, voice change and congestion.         History of cataract.   Eyes: Negative for redness.   Respiratory: Positive for apnea, snoring, cough, shortness of breath, orthopnea and dyspnea on extertion.    Cardiovascular: Positive for leg swelling. Negative for chest pain.        History of diastolic CHF.   Genitourinary:        CKD 3    Endocrine: History of diabetes.  Hypothyroid.    Musculoskeletal: Positive for arthralgias and back pain.   Skin: Negative for rash.   Gastrointestinal: Positive for abdominal  distention and acid reflux.        History of liver cirrhosis, ascites status post paracentesis in the past.  Status post TIPS.  Gastroesophageal reflux disease.  Gastroparesis.   Neurological: Positive for headaches. Negative for syncope.   Hematological: No excessive bruising.   Psychiatric/Behavioral: Positive for sleep disturbance. Negative for confusion. The patient is not nervous/anxious.         KRISTAL       Outpatient Encounter Medications as of 6/22/2020   Medication Sig Dispense Refill    amLODIPine (NORVASC) 10 MG tablet Take 1 tablet (10 mg total) by mouth once daily. 90 tablet 3    aspirin (ECOTRIN) 81 MG EC tablet Take 1 tablet (81 mg total) by mouth once daily. 365 tablet 0    doxazosin (CARDURA) 2 MG tablet Take 1 tablet (2 mg total) by mouth every evening. 90 tablet 1    furosemide (LASIX) 40 MG tablet Take 1 tablet (40 mg total) by mouth once daily. Take 80 mg next 3 days 90 tablet 1    insulin (LANTUS SOLOSTAR U-100 INSULIN) glargine 100 units/mL (3mL) SubQ pen INJECT 20 UNITS INTO THE SKIN EVERY EVENING.  TITRATE UP TO 30 UNITS NIGHTLY AS DIRECTED ON INSTRUCTION SHEET 15 mL 6    insulin lispro (ADMELOG SOLOSTAR U-100 INSULIN) 100 unit/mL pen Titrate up to 14 units subcutaneously three times a day 10-15 min before meals as directed in after visit summary 15 mL 6    levothyroxine (SYNTHROID) 137 MCG Tab tablet Take 1 tablet (137 mcg total) by mouth before breakfast. 90 tablet 1    liraglutide 0.6 mg/0.1 mL, 18 mg/3 mL, subq PNIJ (VICTOZA 2-SHAYY) 0.6 mg/0.1 mL (18 mg/3 mL) PnIj Inject 1.8 mg into the skin once daily. 30 mL 0    magnesium oxide (MAG-OX) 400 mg (241.3 mg magnesium) tablet Take 1 tablet (400 mg total) by mouth once daily.  0    metOLazone (ZAROXOLYN) 5 MG tablet Take 1 tablet (5 mg total) by mouth every other day. 15 tablet 3    ondansetron (ZOFRAN) 4 MG tablet TAKE 1 TABLET BY MOUTH EVERY 8 HOURS AS NEEDED 30 tablet 0    pantoprazole (PROTONIX) 20 MG tablet Take 2 tablets (40  "mg total) by mouth once daily. 180 tablet 1    potassium chloride SA (K-DUR,KLOR-CON) 20 MEQ tablet Take 1 tablet (20 mEq total) by mouth once daily. 30 tablet 3    ranitidine (ZANTAC) 150 MG tablet Zantac 150 mg tablet   Take 1 tablet twice a day by oral route.      rifAXIMin (XIFAXAN) 550 mg Tab Take 550 mg by mouth 2 (two) times daily.      tiZANidine (ZANAFLEX) 4 MG tablet TAKE 1 TABLET BY MOUTH EVERY 8 HOURS FOR  20  DAYS 60 tablet 0    vitamin E 400 UNIT capsule Take 400 Units by mouth every morning.      XIFAXAN 550 mg Tab Take 1 tablet by mouth twice daily 90 tablet 1    glucagon, human recombinant, (GLUCAGON EMERGENCY KIT, HUMAN,) 1 mg SolR Inject 1 mg into the muscle as needed. 1 each 5     No facility-administered encounter medications on file as of 6/22/2020.        Objective:     Vital Signs (Most Recent)  Vital Signs  Temp: 98.7 °F (37.1 °C)  Temp src: Oral  Pulse: 89  Resp: 18  SpO2: 98 %  BP: 132/78  Height and Weight  Height: 5' 1" (154.9 cm)  Weight: 103.4 kg (228 lb 1.1 oz)  BSA (Calculated - sq m): 2.11 sq meters  BMI (Calculated): 43.1  Weight in (lb) to have BMI = 25: 132]  Wt Readings from Last 2 Encounters:   06/22/20 103.4 kg (228 lb 1.1 oz)   02/17/20 116.5 kg (256 lb 13.4 oz)       Physical Exam   Constitutional: She is oriented to person, place, and time. She appears well-developed and well-nourished.   HENT:   Head: Normocephalic.   Neck: Neck supple.   Cardiovascular: Normal rate and regular rhythm.   Pulmonary/Chest: Normal expansion and effort normal. No respiratory distress. She has decreased breath sounds. She has no rhonchi. She has rales.   Breath sounds decreased right more than left.   Abdominal: Soft. She exhibits no distension.   Musculoskeletal:         General: Edema present. No tenderness.      Comments: Bilateral lower extremity pitting edema   Lymphadenopathy:     She has no axillary adenopathy.   Neurological: She is alert and oriented to person, place, and time. "   Skin: Skin is warm.   Psychiatric: She has a normal mood and affect.       Laboratory  Lab Results   Component Value Date    WBC 6.67 09/18/2019    RBC 4.12 09/18/2019    HGB 12.1 09/18/2019    HCT 38.2 09/18/2019    MCV 93 09/18/2019    MCH 29.4 09/18/2019    MCHC 31.7 (L) 09/18/2019    RDW 14.5 09/18/2019     09/18/2019    MPV 10.8 09/18/2019    GRAN 2.5 09/18/2019    GRAN 38.1 09/18/2019    LYMPH 3.4 09/18/2019    LYMPH 50.2 (H) 09/18/2019    MONO 0.5 09/18/2019    MONO 7.3 09/18/2019    EOS 0.3 09/18/2019    BASO 0.04 09/18/2019    EOSINOPHIL 3.7 09/18/2019    BASOPHIL 0.6 09/18/2019       BMP  Lab Results   Component Value Date     05/21/2020     05/21/2020    K 4.0 05/21/2020    K 4.3 05/21/2020     05/21/2020     05/21/2020    CO2 27 05/21/2020    CO2 25 05/21/2020    BUN 29 (H) 05/21/2020    BUN 28 (H) 05/21/2020    CREATININE 2.4 (H) 05/21/2020    CREATININE 2.4 (H) 05/21/2020    CALCIUM 7.6 (L) 05/21/2020    CALCIUM 7.7 (L) 05/21/2020    ANIONGAP 7 (L) 05/21/2020    ANIONGAP 9 05/21/2020    ESTGFRAFRICA 24.0 (A) 05/21/2020    ESTGFRAFRICA 24.0 (A) 05/21/2020    EGFRNONAA 20.9 (A) 05/21/2020    EGFRNONAA 20.9 (A) 05/21/2020    AST 31 05/21/2020    ALT 14 05/21/2020    PROT 5.5 (L) 05/21/2020       Lab Results   Component Value Date     (H) 05/21/2020     (H) 02/10/2020    BNP 1,010 (H) 01/10/2020     (H) 11/18/2019     (H) 10/25/2019     (H) 10/16/2019       Lab Results   Component Value Date    TSH 2.271 01/03/2019     No malignancy on pleural effusion fluid.      No results found for: SEDRATE    No results found for: CRP  No results found for: IGE     No results found for: ASPERGILLUS  No results found for: AFUMIGATUSCL     Lab Results   Component Value Date    ACE 67 (H) 10/11/2016        Diagnostic Results:  I have personally reviewed today the following studies:    CXR 6/18 personally reviewed No acute findings      Doppler liver US  :     Impression         1. Findings in keeping with cirrhosis with known left portal vein thrombosis which now appears more occlusive.  2. TIPS shunt catheter appears patent but demonstrates low internal velocities suggesting possible TIPS malfunction.  Velocities are similar to prior.   PSG 4/18   RESPIRATORY: The overall apnea-hypopnea index (AHI) was 13.3 events /hr asleep. Ms Das had 27 hypopneas, 21  obstructive sleep apneas, 0 central sleep apneas, and 0 mixed sleep apneas. Persistent loud snoring was noted. Analysis of  continuous oxygen saturations showed a mean SpO2 value of 93.6 % for the study, with a minimum oxygen saturation during  sleep of 54.0 %, and a waking baseline SpO2 = 96 %. Oxygen saturations were ? 88 % for 23.7 minutes of the time spent  asleep.        PFT 31/18:     The Forced Vital Capacity (FVC) is normal.  FEV1 is normal.  No significant improvement after bronchodilator.  The absence of an acute response to aerosolized bronchodilator does not necessarily mean that the subject will not  respond to a clinical trial of aerosolized or oral bronchodilators.  FEV1 over FVC is 82%  Lung Volumes are consistent with mild restrictive disease.  Diffusion capacity is severely reduced - unadjusted for hemoglobin (DLCO < 40%).  Mild restriction, severe DLCO reduction.        Echo June 2019       1 - Normal left ventricular systolic function (EF 60-65%).     2 - Impaired LV relaxation, normal LAP (grade 1 diastolic dysfunction).     3 - Normal right ventricular systolic function .     4 - No wall motion abnormalities.     5 - Concentric hypertrophy.     6 - Trivial mitral regurgitation.     7 - Trivial tricuspid regurgitation.     8 - Small pericardial effusion.     9 - No echo evidence of tamponade.     Echo 5/17 :      1 - Normal left ventricular systolic function (EF 60-65%). Mild concentric hypertrophy.     2 - Impaired LV relaxation, elevated LAP (grade 2 diastolic dysfunction).     3 -  Normal right ventricular systolic function .     4 - Severe left atrial enlargement.      Compliance Summary  2/19/2019 - 3/20/2019 (30 days)  Days with Device Usage 14 days  Days without Device Usage 16 days  Percent Days with Device Usage 46.7%  Cumulative Usage 3 days 23 hrs. 49 mins. 50 secs.  Maximum Usage (1 Day) 23 hrs. 59 mins. 58 secs.  Average Usage (All Days) 3 hrs. 11 mins. 39 secs.  Average Usage (Days Used) 6 hrs. 50 mins. 42 secs.  Minimum Usage (1 Day) 39 mins. 40 secs.  Percent of Days with Usage >= 4 Hours 26.7%  Percent of Days with Usage < 4 Hours 73.3%  Date Range  Average AHI 2.0  Auto-CPAP Summary  Auto-CPAP Mean Pressure 8.7 cmH2O  Auto-CPAP Peak Average Pressure 13.9 cmH2O  Average Device Pressure <= 90% of Time 11.4 cmH2O  Average Time in Large Leak Per Day 23 mins.    Assessment/Plan:   Pleural effusion, left  -     X-Ray Chest PA And Lateral; Future; Expected date: 06/22/2020  -     Stress test, pulmonary; Future    Chronic diastolic heart failure    Elevated brain natriuretic peptide (BNP) level    KRISTAL on CPAP    Hepatic cirrhosis, unspecified hepatic cirrhosis type, unspecified whether ascites present    BMI 40.0-44.9, adult     Continue diuretics.  Repeat chest x-ray.  Repeat 6 min walking test.    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    Patient suboptimally compliant with CPAP and using her old machine she has lost her new machine due to noncompliance.    Heart healthy diet  Limit fluid intake 50-60 oz   Daily weights and to notify clinic if weight increases by more than 3 lbs in 1 day or 5 lbs in 1 week.     With [facility] personnel regarding providing the patient with her tubing supplies.    Follow up in about 3 months (around 9/22/2020).    This note was prepared using voice recognition system and is likely to have sound alike errors that may have been overlooked even after proof reading.  Please call me with any questions    Discussed  diagnosis, its evaluation, treatment and usual course. All questions answered.      Angelica Hunter MD

## 2020-07-02 ENCOUNTER — TELEPHONE (OUTPATIENT)
Dept: RADIOLOGY | Facility: HOSPITAL | Age: 65
End: 2020-07-02

## 2020-07-02 NOTE — TELEPHONE ENCOUNTER
Interventional Radiology:  Spoke with pt so to schedule TIPS revision.  Pt agreed to schedule procedure on 7/24 at 11am.  Pt told that she will receive a call the day before the procedure to confirm arrival time.  Will need to fast for procedure and expect an overnight stay.  All questions answered, pt verbalized understanding of instructions.

## 2020-07-07 ENCOUNTER — TELEPHONE (OUTPATIENT)
Dept: INTERNAL MEDICINE | Facility: CLINIC | Age: 65
End: 2020-07-07

## 2020-07-07 NOTE — TELEPHONE ENCOUNTER
----- Message from Pamela Zuniga sent at 7/7/2020 12:14 PM CDT -----  Regarding: Patient  Type:  Patient Returning Call    Who Called:PT  Who Left Message for Patient:NURSE  Does the patient know what this is regarding?:NO  Would the patient rather a call back or a response via [facility]? CALL BACK   Best Call Back Number:[phone] ([place])   Additional Information: N/A

## 2020-07-08 ENCOUNTER — TELEPHONE (OUTPATIENT)
Dept: INTERNAL MEDICINE | Facility: CLINIC | Age: 65
End: 2020-07-08

## 2020-07-08 NOTE — TELEPHONE ENCOUNTER
2nd attempt to call to patient regarding message left requesting return call on yesterday. Will send msg to patient via portal.

## 2020-07-08 NOTE — TELEPHONE ENCOUNTER
----- Message from Pamela Zuniga sent at 7/7/2020 12:14 PM CDT -----  Regarding: Patient  Type:  Patient Returning Call    Who Called:PT  Who Left Message for Patient:NURSE  Does the patient know what this is regarding?:NO  Would the patient rather a call back or a response via [facility]? CALL BACK   Best Call Back Number:[phone] ([place])   Additional Information: N/A

## 2020-07-14 ENCOUNTER — TELEPHONE (OUTPATIENT)
Dept: PULMONOLOGY | Facility: CLINIC | Age: 65
End: 2020-07-14

## 2020-07-24 ENCOUNTER — ANESTHESIA EVENT (OUTPATIENT)
Dept: CARDIOLOGY | Facility: HOSPITAL | Age: 65
End: 2020-07-24
Payer: MEDICAID

## 2020-07-24 ENCOUNTER — HOSPITAL ENCOUNTER (OUTPATIENT)
Facility: HOSPITAL | Age: 65
Discharge: HOME | End: 2020-07-24
Attending: RADIOLOGY | Admitting: RADIOLOGY
Payer: MEDICAID

## 2020-07-24 ENCOUNTER — TELEPHONE (OUTPATIENT)
Dept: GASTROENTEROLOGY | Facility: CLINIC | Age: 65
End: 2020-07-24

## 2020-07-24 ENCOUNTER — ANESTHESIA (OUTPATIENT)
Dept: CARDIOLOGY | Facility: HOSPITAL | Age: 65
End: 2020-07-24
Payer: MEDICAID

## 2020-07-24 VITALS
HEIGHT: 61 IN | DIASTOLIC BLOOD PRESSURE: 52 MMHG | OXYGEN SATURATION: 100 % | BODY MASS INDEX: 43.05 KG/M2 | SYSTOLIC BLOOD PRESSURE: 121 MMHG | RESPIRATION RATE: 20 BRPM | WEIGHT: 228 LBS | HEART RATE: 77 BPM | TEMPERATURE: 98 F

## 2020-07-24 DIAGNOSIS — K74.60 HEPATIC CIRRHOSIS, UNSPECIFIED HEPATIC CIRRHOSIS TYPE, UNSPECIFIED WHETHER ASCITES PRESENT: ICD-10-CM

## 2020-07-24 LAB
APTT BLDCRRT: 26.8 SEC (ref 21–32)
BASOPHILS # BLD AUTO: 0.06 K/UL (ref 0–0.2)
BASOPHILS NFR BLD: 0.8 % (ref 0–1.9)
DIFFERENTIAL METHOD: ABNORMAL
EOSINOPHIL # BLD AUTO: 0.4 K/UL (ref 0–0.5)
EOSINOPHIL NFR BLD: 5.4 % (ref 0–8)
ERYTHROCYTE [DISTWIDTH] IN BLOOD BY AUTOMATED COUNT: 13.8 % (ref 11.5–14.5)
HCT VFR BLD AUTO: 36.7 % (ref 37–48.5)
HGB BLD-MCNC: 11.8 G/DL (ref 12–16)
IMM GRANULOCYTES # BLD AUTO: 0.03 K/UL (ref 0–0.04)
IMM GRANULOCYTES NFR BLD AUTO: 0.4 % (ref 0–0.5)
INR PPP: 1 (ref 0.8–1.2)
LYMPHOCYTES # BLD AUTO: 3.9 K/UL (ref 1–4.8)
LYMPHOCYTES NFR BLD: 50.9 % (ref 18–48)
MCH RBC QN AUTO: 28.8 PG (ref 27–31)
MCHC RBC AUTO-ENTMCNC: 32.2 G/DL (ref 32–36)
MCV RBC AUTO: 90 FL (ref 82–98)
MONOCYTES # BLD AUTO: 0.4 K/UL (ref 0.3–1)
MONOCYTES NFR BLD: 5.8 % (ref 4–15)
NEUTROPHILS # BLD AUTO: 2.8 K/UL (ref 1.8–7.7)
NEUTROPHILS NFR BLD: 36.7 % (ref 38–73)
NRBC BLD-RTO: 0 /100 WBC
PLATELET # BLD AUTO: 248 K/UL (ref 150–350)
PMV BLD AUTO: 10 FL (ref 9.2–12.9)
PROTHROMBIN TIME: 10.8 SEC (ref 9–12.5)
RBC # BLD AUTO: 4.1 M/UL (ref 4–5.4)
SARS-COV-2 RDRP RESP QL NAA+PROBE: NEGATIVE
WBC # BLD AUTO: 7.57 K/UL (ref 3.9–12.7)

## 2020-07-24 PROCEDURE — C1894 INTRO/SHEATH, NON-LASER: HCPCS | Performed by: RADIOLOGY

## 2020-07-24 PROCEDURE — C1874 STENT, COATED/COV W/DEL SYS: HCPCS | Performed by: RADIOLOGY

## 2020-07-24 PROCEDURE — 85730 THROMBOPLASTIN TIME PARTIAL: CPT

## 2020-07-24 PROCEDURE — 27201423 OPTIME MED/SURG SUP & DEVICES STERILE SUPPLY: Performed by: RADIOLOGY

## 2020-07-24 PROCEDURE — 85610 PROTHROMBIN TIME: CPT

## 2020-07-24 PROCEDURE — C1725 CATH, TRANSLUMIN NON-LASER: HCPCS | Performed by: RADIOLOGY

## 2020-07-24 PROCEDURE — 25000003 PHARM REV CODE 250: Performed by: NURSE ANESTHETIST, CERTIFIED REGISTERED

## 2020-07-24 PROCEDURE — A4216 STERILE WATER/SALINE, 10 ML: HCPCS | Performed by: NURSE ANESTHETIST, CERTIFIED REGISTERED

## 2020-07-24 PROCEDURE — C1769 GUIDE WIRE: HCPCS | Performed by: RADIOLOGY

## 2020-07-24 PROCEDURE — 85025 COMPLETE CBC W/AUTO DIFF WBC: CPT

## 2020-07-24 PROCEDURE — U0002 COVID-19 LAB TEST NON-CDC: HCPCS

## 2020-07-24 PROCEDURE — C1887 CATHETER, GUIDING: HCPCS | Performed by: RADIOLOGY

## 2020-07-24 PROCEDURE — 25000003 PHARM REV CODE 250: Performed by: RADIOLOGY

## 2020-07-24 PROCEDURE — 63600175 PHARM REV CODE 636 W HCPCS: Performed by: NURSE ANESTHETIST, CERTIFIED REGISTERED

## 2020-07-24 DEVICE — VIATORR TIPS ENDO CX 8-10MMX8CM/2CM 10FR
Type: IMPLANTABLE DEVICE | Site: VEIN | Status: FUNCTIONAL
Brand: GORE VIATORR TIPS ENDOPROSTHESIS

## 2020-07-24 RX ORDER — SODIUM CHLORIDE 9 MG/ML
INJECTION, SOLUTION INTRAVENOUS CONTINUOUS
Status: DISCONTINUED | OUTPATIENT
Start: 2020-07-24 | End: 2020-07-24 | Stop reason: HOSPADM

## 2020-07-24 RX ORDER — ACETAMINOPHEN 325 MG/1
325 TABLET ORAL ONCE
Status: COMPLETED | OUTPATIENT
Start: 2020-07-24 | End: 2020-07-24

## 2020-07-24 RX ORDER — CEFAZOLIN SODIUM 1 G/3ML
INJECTION, POWDER, FOR SOLUTION INTRAMUSCULAR; INTRAVENOUS
Status: DISCONTINUED | OUTPATIENT
Start: 2020-07-24 | End: 2020-07-24

## 2020-07-24 RX ORDER — FENTANYL CITRATE 50 UG/ML
INJECTION, SOLUTION INTRAMUSCULAR; INTRAVENOUS
Status: DISCONTINUED | OUTPATIENT
Start: 2020-07-24 | End: 2020-07-24

## 2020-07-24 RX ORDER — SODIUM CHLORIDE 9 MG/ML
INJECTION, SOLUTION INTRAMUSCULAR; INTRAVENOUS; SUBCUTANEOUS
Status: DISCONTINUED | OUTPATIENT
Start: 2020-07-24 | End: 2020-07-24

## 2020-07-24 RX ORDER — MIDAZOLAM HYDROCHLORIDE 1 MG/ML
INJECTION, SOLUTION INTRAMUSCULAR; INTRAVENOUS
Status: DISCONTINUED | OUTPATIENT
Start: 2020-07-24 | End: 2020-07-24

## 2020-07-24 RX ORDER — HYDRALAZINE HYDROCHLORIDE 20 MG/ML
INJECTION INTRAMUSCULAR; INTRAVENOUS
Status: DISCONTINUED | OUTPATIENT
Start: 2020-07-24 | End: 2020-07-24

## 2020-07-24 RX ADMIN — MIDAZOLAM HYDROCHLORIDE 1 MG: 1 INJECTION, SOLUTION INTRAMUSCULAR; INTRAVENOUS at 01:07

## 2020-07-24 RX ADMIN — CEFAZOLIN 2 G: 1 INJECTION, POWDER, FOR SOLUTION INTRAMUSCULAR; INTRAVENOUS at 02:07

## 2020-07-24 RX ADMIN — Medication 200 ML: at 01:07

## 2020-07-24 RX ADMIN — FENTANYL CITRATE 25 MCG: 50 INJECTION, SOLUTION INTRAMUSCULAR; INTRAVENOUS at 01:07

## 2020-07-24 RX ADMIN — HYDRALAZINE HYDROCHLORIDE 10 MG: 20 INJECTION INTRAMUSCULAR; INTRAVENOUS at 02:07

## 2020-07-24 RX ADMIN — ACETAMINOPHEN 325 MG: 325 TABLET ORAL at 03:07

## 2020-07-24 NOTE — TELEPHONE ENCOUNTER
----- Message from Marnie Elmore MD sent at 6/16/2020  2:19 PM CDT -----  Pt did not get labs done from April.  Please contact pt to get these done and schedule her for a follow up with Ping in mid-late July.  Thanks.

## 2020-07-24 NOTE — ANESTHESIA PREPROCEDURE EVALUATION
07/24/2020  Diamond Das is a 65 y.o., female.    Anesthesia Evaluation    I have reviewed the Patient Summary Reports.    I have reviewed the Nursing Notes.    I have reviewed the Medications.     Review of Systems  Anesthesia Hx:  No problems with previous Anesthesia  History of prior surgery of interest to airway management or planning: Previous anesthesia: General, MAC Denies Family Hx of Anesthesia complications.   Denies Personal Hx of Anesthesia complications.   Social:  Non-Smoker, No Alcohol Use    Hematology/Oncology:  Hematology Normal   Oncology Normal     EENT/Dental:EENT/Dental Normal   Cardiovascular:   Exercise tolerance: good Denies Pacemaker.  Denies Hypertension.  CHF NYHA Classification I ECG has been reviewed.    Pulmonary:   Pneumonia Shortness of breath Sleep Apnea    Renal/:   Chronic Renal Disease    Hepatic/GI:   GERD Liver Disease,    Musculoskeletal:  Musculoskeletal Normal    Neurological:   Neuromuscular Disease,    Endocrine:   Diabetes    Psych:  Psychiatric Normal           Physical Exam  General:  Morbid Obesity    Airway/Jaw/Neck:  Airway Findings: Mouth Opening: Normal Tongue: Normal  Mallampati: I  Improves to II with phonation.  TM Distance: Normal, at least 6 cm     Eyes/Ears/Nose:  EYES/EARS/NOSE FINDINGS: Normal   Dental:  DENTAL FINDINGS: Normal   Chest/Lungs:  Chest/Lungs Findings: Clear to auscultation, Normal Respiratory Rate     Heart/Vascular:  Heart Findings: Rate: Normal  Rhythm: Regular Rhythm  Sounds: Normal     Abdomen:  Abdomen Findings:  Normal, Soft     Musculoskeletal:  Musculoskeletal Findings: Normal   Skin:  Skin Findings: Normal    Mental Status:  Mental Status Findings: Normal       Patient Active Problem List   Diagnosis    Ascites    Cirrhosis of liver with ascites    Type 2 diabetes mellitus with circulatory disorder    Bilateral lower  extremity edema    Morbid obesity due to excess calories    Hypothyroidism due to acquired atrophy of thyroid    Protein-calorie malnutrition, moderate    Decompensated hepatic cirrhosis    Medication reaction    Cirrhosis    Portal hypertension    Hypoalbuminemia    Chronic diastolic congestive heart failure    Postmenopausal    Screening mammogram, encounter for    Screen for colon cancer    Hypertension    Shortness of breath    Chronic edema    Chest pain, atypical    Hyperlipidemia    Encounter for long-term (current) use of medications    Edema    KRISTAL on CPAP    Psychophysiological insomnia    Insomnia secondary to chronic pain    Inadequate sleep hygiene    Generalized abdominal pain    CKD (chronic kidney disease) stage 4, GFR 15-29 ml/min    Diabetic gastroparesis associated with type 2 diabetes mellitus    Acute on chronic kidney failure    Hyperammonemia    Gastroesophageal reflux disease without esophagitis    Diabetes mellitus type 2, uncontrolled, with complications    Pericardial effusion    Diarrhea    Pleural effusion, left    Immunization deficiency    Epigastric pain    Elevated lipase    Lumbar strain, sequela    Congestive heart failure       Anesthesia Plan  Type of Anesthesia, risks & benefits discussed:  Anesthesia Type:  MAC  Patient's Preference:   Intra-op Monitoring Plan: standard ASA monitors  Intra-op Monitoring Plan Comments:   Post Op Pain Control Plan: multimodal analgesia  Post Op Pain Control Plan Comments:   Induction:   IV  Beta Blocker:         Informed Consent: Patient understands risks and agrees with Anesthesia plan.  Questions answered. Anesthesia consent signed with patient.  ASA Score: 3     Day of Surgery Review of History & Physical: I have interviewed and examined the patient. I have reviewed the patient's H&P dated:  There are no significant changes.          Ready For Surgery From Anesthesia Perspective.

## 2020-07-24 NOTE — PLAN OF CARE
1530 DISCHARGE INST. REVIEWED WITH PATIENT AND DAUGHTER AND COPY GIVEN.  1600 SITTING ON SIDE OF BED W/O DIZZINESS/NAUSEA.  1605 IV REMOVED.  1615 DISCHARGED HOME.  TO EXIT VIA W/C

## 2020-07-24 NOTE — PROCEDURES
Radiology Post-Procedure Note    Pre Op Diagnosis: portal htn    Post Op Diagnosis: same    Procedure: Transjugular intrahepatic portosystemic shunt (TIPS)    Performed by: Dr Martell Jasmine    Written Informed Consent Obtained: Yes    Specimen Removed: NO    Estimated Blood Loss: Minimal    Findings: Under deep sedaton, via right internal jugular approach using ultrasound and fluoroscopic surveillance, a transhepatic portosystemic shunt revision was created via a right hepatic and right portal vein.  Postprocedural angiography indicates hepato-pedal flow in the right portal vein and a shunt pressure gradient of 8 mL H2O.  The catheter was removed and hemostasis achieved without hematoma.     There were no complications.  Please see Imaging report for further details.    Martell Jasmine MD  Staff Radiologist  Department of Radiology  Pager: [phone]

## 2020-07-24 NOTE — TRANSFER OF CARE
"Anesthesia Transfer of Care Note    Patient: Diamond Das    Procedure(s) Performed: Procedure(s) (LRB):  TIPS revision (N/A)    Patient location: PACU    Anesthesia Type: MAC    Transport from OR: Transported from OR on room air with adequate spontaneous ventilation    Post pain: adequate analgesia    Post assessment: no apparent anesthetic complications and tolerated procedure well    Post vital signs: stable    Level of consciousness: awake, alert and oriented    Nausea/Vomiting: no nausea/vomiting    Complications: none    Transfer of care protocol was followed      Last vitals:   Visit Vitals  BP (!) 190/87   Pulse 77   Temp 97.5 °F (36.4 °C) (Temporal)   Resp (!) 22   Ht 5' 1" (1.549 m)   Wt 103.4 kg (228 lb)   LMP  (LMP Unknown)   SpO2 99%   Breastfeeding No   BMI 43.08 kg/m²     "

## 2020-07-24 NOTE — ANESTHESIA POSTPROCEDURE EVALUATION
Anesthesia Post Evaluation    Patient: Diamond Das    Procedure(s) Performed: Procedure(s) (LRB):  TIPS revision (N/A)    Final Anesthesia Type: MAC    Patient location during evaluation: PACU  Patient participation: Yes- Able to Participate  Level of consciousness: awake and alert and oriented  Post-procedure vital signs: reviewed and stable  Pain management: adequate  Airway patency: patent    PONV status at discharge: No PONV  Anesthetic complications: no      Cardiovascular status: hypertensive  Respiratory status: room air and spontaneous ventilation  Hydration status: euvolemic  Follow-up not needed.          Vitals Value Taken Time   BP 1/5/82 07/24/20 1422   Temp 98.0 07/24/20 1422   Pulse 72 07/24/20 1422   Resp 16 07/24/20 1422   SpO2 100 07/24/20 1422         No case tracking events are documented in the log.      Pain/Frantz Score: No data recorded

## 2020-07-24 NOTE — H&P
Radiology History & Physical      SUBJECTIVE:     Chief Complaint: portal htn    History of Present Illness:  Diamond Das is a 65 y.o. female who presents for tips revision  Past Medical History:   Diagnosis Date    Ascites     Breast pain, left 1/4/2018    Cataract     CHF (congestive heart failure)     Cirrhosis     Cough     Diabetes mellitus     Diabetes mellitus, type 2     Gastroparesis     GERD (gastroesophageal reflux disease)     Hyperlipidemia     Hypertension     Hypotension 2/21/2019    Liver disease     Lumbar strain 4/27/2018    Pneumonia of right middle lobe due to infectious organism 12/19/2017    Renal disorder     Retinopathy due to secondary diabetes mellitus     Sleep apnea     Thyroid disease      Past Surgical History:   Procedure Laterality Date    CATARACT EXTRACTION      CHOLECYSTECTOMY      COLONOSCOPY N/A 9/4/2019    Procedure: COLONOSCOPY;  Surgeon: Marnie Elmore MD;  Location: [facility] ENDO;  Service: Endoscopy;  Laterality: N/A;    ERCP      LIVER BIOPSY      THORACENTESIS Left 1/20/2020    Procedure: Thoracentesis;  Surgeon: Angelica Hunter MD;  Location: [facility] ENDO;  Service: Pulmonary;  Laterality: Left;    TUBAL LIGATION      UPPER GASTROINTESTINAL ENDOSCOPY         Home Meds:   Prior to Admission medications    Medication Sig Start Date End Date Taking? Authorizing Provider   amLODIPine (NORVASC) 10 MG tablet Take 1 tablet (10 mg total) by mouth once daily. 2/10/20  Yes Andrey Perrin MD   aspirin (ECOTRIN) 81 MG EC tablet Take 1 tablet (81 mg total) by mouth once daily. 5/18/20 5/18/21 Yes Andrey Perrin MD   doxazosin (CARDURA) 2 MG tablet Take 1 tablet (2 mg total) by mouth every evening. 5/18/20 11/14/20 Yes Andrey Perrin MD   furosemide (LASIX) 40 MG tablet Take 1 tablet (40 mg total) by mouth once daily. Take 80 mg next 3 days 2/10/20  Yes Andrey Perrin MD   insulin (LANTUS SOLOSTAR U-100 INSULIN) glargine 100 units/mL (3mL)  SubQ pen INJECT 20 UNITS INTO THE SKIN EVERY EVENING.&nbsp;&nbsp;TITRATE UP TO 30 UNITS NIGHTLY AS DIRECTED ON INSTRUCTION SHEET 5/18/20  Yes Andrey Perrin MD   levothyroxine (SYNTHROID) 137 MCG Tab tablet Take 1 tablet (137 mcg total) by mouth before breakfast. 2/10/20  Yes Andrey Perrin MD   liraglutide 0.6 mg/0.1 mL, 18 mg/3 mL, subq PNIJ (VICTOZA 2-SHAYY) 0.6 mg/0.1 mL (18 mg/3 mL) PnIj Inject 1.8 mg into the skin once daily. 5/18/20 8/26/20 Yes Andrey Perrin MD   magnesium oxide (MAG-OX) 400 mg (241.3 mg magnesium) tablet Take 1 tablet (400 mg total) by mouth once daily. 5/18/20  Yes Marcos Ramos MD   metOLazone (ZAROXOLYN) 5 MG tablet Take 1 tablet (5 mg total) by mouth every other day. 5/18/20 5/18/21 Yes Marcos Ramos MD   ondansetron (ZOFRAN) 4 MG tablet TAKE 1 TABLET BY MOUTH EVERY 8 HOURS AS NEEDED 4/27/20  Yes Andrey Perrin MD   potassium chloride SA (K-DUR,KLOR-CON) 20 MEQ tablet Take 1 tablet (20 mEq total) by mouth once daily. 5/18/20  Yes Marcos Ramos MD   rifAXIMin (XIFAXAN) 550 mg Tab Take 550 mg by mouth 2 (two) times daily.   Yes Historical Provider, MD   tiZANidine (ZANAFLEX) 4 MG tablet TAKE 1 TABLET BY MOUTH EVERY 8 HOURS FOR  20  DAYS 5/25/20  Yes Andrey Perrin MD   vitamin E 400 UNIT capsule Take 400 Units by mouth every morning.   Yes Historical Provider, MD   XIFAXAN 550 mg Tab Take 1 tablet by mouth twice daily 3/24/20  Yes Andrey Perrin MD   insulin lispro (ADMELOG SOLOSTAR U-100 INSULIN) 100 unit/mL pen Titrate up to 14 units subcutaneously three times a day 10-15 min before meals as directed in after visit summary 5/18/20 7/24/20 Yes Andrey Perrin MD   glucagon, human recombinant, (GLUCAGON EMERGENCY KIT, HUMAN,) 1 mg SolR Inject 1 mg into the muscle as needed. 4/17/19 4/16/20  Andrey Perrin MD   pantoprazole (PROTONIX) 20 MG tablet Take 2 tablets (40 mg total) by mouth once daily. 5/18/20   Andrey Perrin MD   ranitidine (ZANTAC) 150 MG tablet Zantac  "150 mg tablet   Take 1 tablet twice a day by oral route.  7/24/20  Historical Provider, MD     Anticoagulants/Antiplatelets: no anticoagulation    Allergies:   Review of patient's allergies indicates:   Allergen Reactions    Subsys [fentanyl] Other (See Comments)     After administration pt unresponsive.  HR and respirations decreased.     Versed [midazolam] Other (See Comments)     After administration pt unresponsive.  HR and respirations decreased.     Ampicillin Rash    Codeine Nausea And Vomiting and Nausea Only     Sedation History:  have not been any systemic reactions  chris symptoms"}         OBJECTIVE:     Vital Signs (Most Recent)  Temp: 97.5 °F (36.4 °C) (07/24/20 1009)  Pulse: 77 (07/24/20 1009)  Resp: (!) 22 (07/24/20 1009)  BP: (!) 190/87 (07/24/20 1010)  SpO2: 99 % (07/24/20 1009)    Physical Exam:  ASA: 2  Mallampati: 2    General: no acute distress  Mental Status: alert and oriented to person, place and time  HEENT: normocephalic, atraumatic  Chest: unlabored breathing  Heart: regular heart rate  Abdomen: nondistended  Extremity: moves all extremities    Laboratory  Lab Results   Component Value Date    INR 1.0 07/24/2020       Lab Results   Component Value Date    WBC 7.57 07/24/2020    HGB 11.8 (L) 07/24/2020    HCT 36.7 (L) 07/24/2020    MCV 90 07/24/2020     07/24/2020      Lab Results   Component Value Date     (H) 05/21/2020     (H) 05/21/2020     05/21/2020     05/21/2020    K 4.0 05/21/2020    K 4.3 05/21/2020     05/21/2020     05/21/2020    CO2 27 05/21/2020    CO2 25 05/21/2020    BUN 29 (H) 05/21/2020    BUN 28 (H) 05/21/2020    CREATININE 2.4 (H) 05/21/2020    CREATININE 2.4 (H) 05/21/2020    CALCIUM 7.6 (L) 05/21/2020    CALCIUM 7.7 (L) 05/21/2020    MG 1.7 05/21/2020    ALT 14 05/21/2020    AST 31 05/21/2020    ALBUMIN 1.3 (L) 05/21/2020    BILITOT 0.3 05/21/2020    BILIDIR 0.2 01/17/2017       ASSESSMENT/PLAN:     Sedation Plan: " moderate  Patient will undergo tips revision.    Martell Jasmine MD  Staff Radiologist  Department of Radiology  Pager: [phone]

## 2020-07-24 NOTE — DISCHARGE SUMMARY
Radiology Discharge Summary      Hospital Course: No complications    Admit Date: 7/24/2020  Discharge Date: 07/24/2020     Instructions Given to Patient: Yes  Diet: regular diet and Resume prior diet  Activity: activity as tolerated and no driving for today    Description of Condition on Discharge: Stable  Vital Signs (Most Recent): Temp: 97.5 °F (36.4 °C) (07/24/20 1009)  Pulse: 77 (07/24/20 1009)  Resp: (!) 22 (07/24/20 1009)  BP: (!) 190/87 (07/24/20 1010)  SpO2: 99 % (07/24/20 1009)    Discharge Disposition: Home    Discharge Diagnosis: portal htn     Follow-up: doppler ultrasound in 2 weeks.     Martell Jasmine MD  Staff Radiologist  Department of Radiology  Pager: [phone]

## 2020-07-24 NOTE — DISCHARGE INSTRUCTIONS
POST PROCEDURE INSTRUCTIONS    · ACTIVITY/ SAFETY: BECAUSE OF THE AFTER EFFECT OF THE SEDATION, WE ADVISE YOU TO REFRAIN FROM THE FOLLOWING ACTIVITIES FOR AT LEAST 12-16 HOURS:    A. DO NOT DRIVE A CAR OR OPERATE MACHINERY. YOUR  REFLEXES AND COORDINATION ARE ALTERED.    B. HAVE STANDBY ASSISTANCE ON STAIRWAYS.    C. DO NOT RETURN TO WORK.    D. DO NOT OPERATE APPLIANCES IF ALONE (I.E.STOVE,IRON,)    E. DO NOT SIGN IMPORTANT PAPERS OR MAKE IMPORTANT DECISIONS.    F.  A RESPONSIBLE PERSON MUST STAY WITH THE PATIENT FOR 12   HOURS POST PROCEDURE.    G. YOU MAY SHOWER TOMORROW AND APPLY BANDAID TO PUNCTURE SITE AFTER SHOWER.  AVOID TUB BATHING, SWIMMING AND HOT TUBS FOR 5 DAYS.      · MEDICATIONS: 1.) RESUME TAKING YOUR USUAL MEDICINES AS SOON AS YOU START TO EAT. TAKE ONLY THE MEDICINES THAT YOUR DOCTORS PRESCRIBED OR APPROVED. 2.) THERE IS ONLY MINOR PAIN AFTER. IF YOUR DOCTOR SAYS IT IS OK FOR YOU TO TAKE ACETAMINOPHEN (TYLENOL), THIS SHOULD EASE ANY DISCOMFORT YOU HAVE. IF YOUR DOCTOR EXPECTS YOU TO HAVE MORE SEVERE PAIN, YOU WILL RECEIVE A PRESCRIPTION FOR A STRONGER PAIN MEDICINE.    · WHEN TO CALL: CALL US RIGHT AWAY IF YOU HAVE : 1. BLEEDING IN YOUR NECK WHERE THE CATHETER WAS INSERTED. 2. ABDOMINAL PAIN.  3. DIZZINESS. 4. FEVER > 101 OR IF SITE DEVELOPS SWELLING, REDNESS, DRAINAGE OR OTHER SIGNS OF INFECTION.

## 2020-07-28 ENCOUNTER — TELEPHONE (OUTPATIENT)
Dept: GASTROENTEROLOGY | Facility: CLINIC | Age: 65
End: 2020-07-28

## 2020-07-30 ENCOUNTER — TELEPHONE (OUTPATIENT)
Dept: GASTROENTEROLOGY | Facility: CLINIC | Age: 65
End: 2020-07-30

## 2020-07-30 NOTE — TELEPHONE ENCOUNTER
----- Message from Salas Mcgowan MA sent at 7/30/2020 11:09 AM CDT -----    ----- Message -----  From: Paz Lowe  Sent: 7/30/2020  10:28 AM CDT  To: Marychuy Balderas Staff    Type:  Needs Medical Advice    Who Called: miguel a Fields   Symptoms (please be specific):   States she had fluid removed from her stomach last week//she still has bloating in her stomach//she needs to know the name of the Dr so she can have it done again//procedure was done at the [facility]   How long has patient had these symptoms:     Pharmacy name and phone #:     Would the patient rather a call back or a response via [facility]?    Call back   Best Call Back Number:  [phone]  Additional Information:  please call to discuss//chayito/caitlyn

## 2020-07-30 NOTE — TELEPHONE ENCOUNTER
Spoke with patient, informed her I spoke with radiology and they stated that patient needed to schedule a follow up appointment. Spoke with patient and scheduled her for an appointment with Ping Caballero on 8/6/20 @1:00pm.

## 2020-08-03 ENCOUNTER — TELEPHONE (OUTPATIENT)
Dept: GASTROENTEROLOGY | Facility: CLINIC | Age: 65
End: 2020-08-03

## 2020-08-03 NOTE — TELEPHONE ENCOUNTER
Returned call to patients daughter. She wanted to be worked in sooner. I informed patient that nothing was available except for the appt she already had. She verbalized understanding and informed me she would keep appointment.

## 2020-08-03 NOTE — TELEPHONE ENCOUNTER
----- Message from Miguel A Aleman sent at 8/3/2020 10:33 AM CDT -----  Regarding: sooner appt  Type:  Sooner Apoointment Request    Caller is requesting a sooner appointment.  Caller declined first available appointment listed below.  Caller will not accept being placed on the waitlist and is requesting a message be sent to doctor.  Name of Caller: Pt   When is the first available appointment? 0806/2020  Symptoms: Cirrhosis  Would the patient rather a call back or a response via [facility]? Call back   Best Call Back Number: [phone]  Additional Information: The patient is requesting to be fit into the schedule for tomorrow 08/04/2020 if possible if not will keep the same date, please advise.

## 2020-08-04 ENCOUNTER — TELEPHONE (OUTPATIENT)
Dept: PULMONOLOGY | Facility: CLINIC | Age: 65
End: 2020-08-04

## 2020-08-04 ENCOUNTER — TELEPHONE (OUTPATIENT)
Dept: RADIOLOGY | Facility: HOSPITAL | Age: 65
End: 2020-08-04

## 2020-08-04 NOTE — TELEPHONE ENCOUNTER
Chronic Disease Management  Called patient to schedule initial Pulmonary Disease Management appointment.   No answer.

## 2020-08-05 ENCOUNTER — PATIENT OUTREACH (OUTPATIENT)
Dept: ADMINISTRATIVE | Facility: OTHER | Age: 65
End: 2020-08-05

## 2020-08-06 ENCOUNTER — OFFICE VISIT (OUTPATIENT)
Dept: GASTROENTEROLOGY | Facility: CLINIC | Age: 65
End: 2020-08-06
Payer: MEDICAID

## 2020-08-06 ENCOUNTER — TELEPHONE (OUTPATIENT)
Dept: GASTROENTEROLOGY | Facility: CLINIC | Age: 65
End: 2020-08-06

## 2020-08-06 VITALS
DIASTOLIC BLOOD PRESSURE: 90 MMHG | HEIGHT: 61 IN | SYSTOLIC BLOOD PRESSURE: 166 MMHG | BODY MASS INDEX: 47.28 KG/M2 | WEIGHT: 250.44 LBS | HEART RATE: 76 BPM

## 2020-08-06 DIAGNOSIS — K72.90 DECOMPENSATED HEPATIC CIRRHOSIS: ICD-10-CM

## 2020-08-06 DIAGNOSIS — K74.60 DECOMPENSATED HEPATIC CIRRHOSIS: ICD-10-CM

## 2020-08-06 DIAGNOSIS — Z95.828 S/P TIPS (TRANSJUGULAR INTRAHEPATIC PORTOSYSTEMIC SHUNT): Primary | ICD-10-CM

## 2020-08-06 DIAGNOSIS — K76.82 HEPATIC ENCEPHALOPATHY: ICD-10-CM

## 2020-08-06 PROCEDURE — 99999 PR PBB SHADOW E&M-EST. PATIENT-LVL IV: CPT | Mod: PBBFAC,,, | Performed by: NURSE PRACTITIONER

## 2020-08-06 PROCEDURE — 99214 OFFICE O/P EST MOD 30 MIN: CPT | Mod: S$PBB,,, | Performed by: NURSE PRACTITIONER

## 2020-08-06 PROCEDURE — 99999 PR PBB SHADOW E&M-EST. PATIENT-LVL IV: ICD-10-PCS | Mod: PBBFAC,,, | Performed by: NURSE PRACTITIONER

## 2020-08-06 PROCEDURE — 99214 PR OFFICE/OUTPT VISIT, EST, LEVL IV, 30-39 MIN: ICD-10-PCS | Mod: S$PBB,,, | Performed by: NURSE PRACTITIONER

## 2020-08-06 PROCEDURE — 99214 OFFICE O/P EST MOD 30 MIN: CPT | Mod: PBBFAC | Performed by: NURSE PRACTITIONER

## 2020-08-06 NOTE — TELEPHONE ENCOUNTER
----- Message from Yuly Goodson NP sent at 8/4/2020 11:04 AM CDT -----  Can you handle this for Dr. Elmore??  ----- Message -----  From: Aylin Black RN  Sent: 8/4/2020  10:18 AM CDT  To: MD Dr. Catarina Lott,  Patient had her TIPS revision 7/24, Dr. Jasmine recommends U/S with doppler week of Aug 10.  Would you like us to order and schedule?    Thanks    ----- Message -----  From: Marnie Elmore MD  Sent: 6/17/2020   8:52 AM CDT  To: SERGIO Hawley,    I have placed a referral for this pt (eval of TIPS and possible dilation).  Please let me know if you need anything else.      Thanks,   Marnie Elmore

## 2020-08-06 NOTE — PROGRESS NOTES
Clinic Follow Up:  [facility] Follow Up Note    Reason for Follow Up:  The primary encounter diagnosis was S/P TIPS (transjugular intrahepatic portosystemic shunt). Diagnoses of Decompensated hepatic cirrhosis and Hepatic encephalopathy were also pertinent to this visit.    PCP: Andrey Perrin       HPI:  This is a 65 y.o. female here for follow up of the above  Pt previously managed at [facility] by Dr. Baker.  She has a hx of MCQUEEN cirrhosis with decompensation with ascites and hepatic encepahlaopty  Asictes has been well managed with previously placed TIPS.  Recnelty had a TIPS revision due to decreased velocity.  Rpeeat US next week is pending.   She states that she has been feeling overall well without any worsening asictes or confusion  Has some BLE that is being managed by her cardiology team.   No previous upper GI bleeding.   Denies any abdominal pain.  No nausea or vomiting.  No change in bowel pattern.  No melena or hematochezia. No weight loss.      Review of Systems   Constitutional: Negative for chills, fever, malaise/fatigue and weight loss.   Respiratory: Negative for cough.    Cardiovascular: Negative for chest pain.   Gastrointestinal:        Per HPI   Musculoskeletal: Negative for myalgias.   Skin: Negative for itching and rash.   Neurological: Negative for headaches.   Psychiatric/Behavioral: The patient is not nervous/anxious.        Medical History:  Past Medical History:   Diagnosis Date    Ascites     Breast pain, left 1/4/2018    Cataract     CHF (congestive heart failure)     Cirrhosis     Cough     Diabetes mellitus     Diabetes mellitus, type 2     Gastroparesis     GERD (gastroesophageal reflux disease)     Hyperlipidemia     Hypertension     Hypotension 2/21/2019    Liver disease     Lumbar strain 4/27/2018    Pneumonia of right middle lobe due to infectious organism 12/19/2017    Renal disorder     Retinopathy due to secondary diabetes mellitus      Sleep apnea     Thyroid disease        Surgical History:   Past Surgical History:   Procedure Laterality Date    CATARACT EXTRACTION      CHOLECYSTECTOMY      COLONOSCOPY N/A 9/4/2019    Procedure: COLONOSCOPY;  Surgeon: Marnie Elmore MD;  Location: [facility] ENDO;  Service: Endoscopy;  Laterality: N/A;    ERCP      FLUOROSCOPY N/A 7/24/2020    Procedure: TIPS revision;  Surgeon: Martell Jasmine MD;  Location: [facility] CATH LAB;  Service: General;  Laterality: N/A;    LIVER BIOPSY      THORACENTESIS Left 1/20/2020    Procedure: Thoracentesis;  Surgeon: Angelica Hunter MD;  Location: [facility] ENDO;  Service: Pulmonary;  Laterality: Left;    TUBAL LIGATION      UPPER GASTROINTESTINAL ENDOSCOPY         Family History:   Family History   Problem Relation Age of Onset    Cancer Mother     Breast cancer Mother     Heart disease Father     Aneurysm Sister     Heart disease Brother     No Known Problems Maternal Grandmother     Cancer Maternal Grandfather     Sarcoidosis Sister        Social History:   Social History     Tobacco Use    Smoking status: Never Smoker    Smokeless tobacco: Never Used   Substance Use Topics    Alcohol use: No     Frequency: Never    Drug use: No       Allergies: Reviewed    Home Medications:  Current Outpatient Medications on File Prior to Visit   Medication Sig Dispense Refill    amLODIPine (NORVASC) 10 MG tablet Take 1 tablet (10 mg total) by mouth once daily. 90 tablet 3    aspirin (ECOTRIN) 81 MG EC tablet Take 1 tablet (81 mg total) by mouth once daily. 365 tablet 0    doxazosin (CARDURA) 2 MG tablet Take 1 tablet (2 mg total) by mouth every evening. 90 tablet 1    furosemide (LASIX) 40 MG tablet Take 1 tablet (40 mg total) by mouth once daily. Take 80 mg next 3 days 90 tablet 1    insulin (LANTUS SOLOSTAR U-100 INSULIN) glargine 100 units/mL (3mL) SubQ pen INJECT 20 UNITS INTO THE SKIN EVERY EVENING.  TITRATE UP TO 30 UNITS NIGHTLY AS DIRECTED ON INSTRUCTION SHEET 15  "mL 6    levothyroxine (SYNTHROID) 137 MCG Tab tablet Take 1 tablet (137 mcg total) by mouth before breakfast. 90 tablet 1    liraglutide 0.6 mg/0.1 mL, 18 mg/3 mL, subq PNIJ (VICTOZA 2-SHAYY) 0.6 mg/0.1 mL (18 mg/3 mL) PnIj Inject 1.8 mg into the skin once daily. 30 mL 0    metOLazone (ZAROXOLYN) 5 MG tablet Take 1 tablet (5 mg total) by mouth every other day. (Patient taking differently: Take 2.5 mg by mouth every other day. ) 15 tablet 3    ondansetron (ZOFRAN) 4 MG tablet TAKE 1 TABLET BY MOUTH EVERY 8 HOURS AS NEEDED 30 tablet 0    pantoprazole (PROTONIX) 20 MG tablet Take 2 tablets (40 mg total) by mouth once daily. 180 tablet 1    potassium chloride SA (K-DUR,KLOR-CON) 20 MEQ tablet Take 1 tablet (20 mEq total) by mouth once daily. 30 tablet 3    rifAXIMin (XIFAXAN) 550 mg Tab Take 550 mg by mouth 2 (two) times daily.      tiZANidine (ZANAFLEX) 4 MG tablet TAKE 1 TABLET BY MOUTH EVERY 8 HOURS FOR  20  DAYS 60 tablet 0    XIFAXAN 550 mg Tab Take 1 tablet by mouth twice daily 90 tablet 1    glucagon, human recombinant, (GLUCAGON EMERGENCY KIT, HUMAN,) 1 mg SolR Inject 1 mg into the muscle as needed. 1 each 5    magnesium oxide (MAG-OX) 400 mg (241.3 mg magnesium) tablet Take 1 tablet (400 mg total) by mouth once daily. (Patient not taking: Reported on 8/6/2020)  0    vitamin E 400 UNIT capsule Take 400 Units by mouth every morning.       No current facility-administered medications on file prior to visit.        Physical Exam:  Vital Signs:  BP (!) 166/90   Pulse 76   Ht 5' 1" (1.549 m)   Wt 113.6 kg (250 lb 7.1 oz)   LMP  (LMP Unknown)   BMI 47.32 kg/m²   Body mass index is 47.32 kg/m².  Physical Exam   Constitutional: She is oriented to person, place, and time. She appears well-developed and well-nourished.   HENT:   Head: Normocephalic.   Eyes: No scleral icterus.   Neck: Normal range of motion.   Cardiovascular: Normal rate.   Pulmonary/Chest: Effort normal.   Abdominal: She exhibits no " distension.   Musculoskeletal: Normal range of motion.         General: Edema (BLE) present.   Neurological: She is alert and oriented to person, place, and time.   Skin: Skin is dry.   Psychiatric: She has a normal mood and affect.   Vitals reviewed.      Labs: Pertinent labs reviewed.  Computed MELD-Na score unavailable. Necessary lab results were not found in the last year.  Computed MELD score unavailable. Necessary lab results were not found in the last year.      Assessment:  1. S/P TIPS (transjugular intrahepatic portosystemic shunt)    2. Decompensated hepatic cirrhosis    3. Hepatic encephalopathy        Recommendations:  Stable without any new complaints  Continue with POC for dopplar US next to re-evaluate the TIPS.   - due for MELD labs now and every 6 months  - continue with HCC surveillance every 6 months.   Due for EGD for screening EV.  Was previously ordered but unable to complete due to COVID 19 restrictions.       Return to Clinic:    6 months with pre-clinic labs and imaging.

## 2020-08-13 ENCOUNTER — HOSPITAL ENCOUNTER (OUTPATIENT)
Dept: RADIOLOGY | Facility: HOSPITAL | Age: 65
Discharge: HOME | End: 2020-08-13
Attending: NURSE PRACTITIONER

## 2020-08-14 ENCOUNTER — HOSPITAL ENCOUNTER (OUTPATIENT)
Dept: RADIOLOGY | Facility: HOSPITAL | Age: 65
Discharge: HOME | End: 2020-08-14
Attending: NURSE PRACTITIONER
Payer: MEDICAID

## 2020-08-14 DIAGNOSIS — Z95.828 S/P TIPS (TRANSJUGULAR INTRAHEPATIC PORTOSYSTEMIC SHUNT): ICD-10-CM

## 2020-08-14 DIAGNOSIS — K74.60 DECOMPENSATED HEPATIC CIRRHOSIS: ICD-10-CM

## 2020-08-14 DIAGNOSIS — K72.90 DECOMPENSATED HEPATIC CIRRHOSIS: ICD-10-CM

## 2020-08-14 PROCEDURE — 93975 VASCULAR STUDY: CPT | Mod: 26,,, | Performed by: RADIOLOGY

## 2020-08-14 PROCEDURE — 93975 VASCULAR STUDY: CPT | Mod: TC,PO

## 2020-08-14 PROCEDURE — 93975 US ABDOMEN LIMITED WITH DOPPLER (XPD): ICD-10-PCS | Mod: 26,,, | Performed by: RADIOLOGY

## 2020-08-27 DIAGNOSIS — K74.60 CIRRHOSIS OF LIVER WITH ASCITES, UNSPECIFIED HEPATIC CIRRHOSIS TYPE: ICD-10-CM

## 2020-08-27 DIAGNOSIS — K74.60 DECOMPENSATED HEPATIC CIRRHOSIS: ICD-10-CM

## 2020-08-27 DIAGNOSIS — K72.90 DECOMPENSATED HEPATIC CIRRHOSIS: ICD-10-CM

## 2020-08-27 DIAGNOSIS — K76.82 HEPATIC ENCEPHALOPATHY: ICD-10-CM

## 2020-08-27 DIAGNOSIS — Z95.828 S/P TIPS (TRANSJUGULAR INTRAHEPATIC PORTOSYSTEMIC SHUNT): Primary | ICD-10-CM

## 2020-08-27 DIAGNOSIS — R18.8 CIRRHOSIS OF LIVER WITH ASCITES, UNSPECIFIED HEPATIC CIRRHOSIS TYPE: ICD-10-CM

## 2020-09-02 DIAGNOSIS — S39.012S LUMBAR STRAIN, SEQUELA: ICD-10-CM

## 2020-09-02 RX ORDER — TIZANIDINE 4 MG/1
TABLET ORAL
Qty: 60 TABLET | Refills: 0 | Status: SHIPPED | OUTPATIENT
Start: 2020-09-02 | End: 2020-09-24 | Stop reason: SDUPTHER

## 2020-09-02 NOTE — TELEPHONE ENCOUNTER
"Spoke to pt. Needs to know if a "form" is needed to get Xifaxan filled through insurance company to help with welch. Stated Dr. Perrin knows what she is talking about. Infomred her that he is out of clinic today but his staff will ask him tomorrow. Voiced understanding.   "

## 2020-09-02 NOTE — TELEPHONE ENCOUNTER
----- Message from Vidya Thiago sent at 9/2/2020 12:40 PM CDT -----  Regarding: refill  Contact: pt  1. What is the name of the medication you are requesting? Xifaxan, Tizanidine  2. What is the dose? N/a  3. How do you take the medication? Orally, topically, etc? N/a  4. How often do you take this medication? N/a  5. Do you need a 30 day or 90 day supply? N/a  6. How many refills are you requesting? n/a  7. What is your preferred pharmacy and location of the pharmacy? [facility]   8. Who can we contact with further questions? The pt at [phone]

## 2020-09-24 ENCOUNTER — HOSPITAL ENCOUNTER (OUTPATIENT)
Dept: RADIOLOGY | Facility: HOSPITAL | Age: 65
Discharge: HOME | End: 2020-09-24
Attending: INTERNAL MEDICINE
Payer: MEDICAID

## 2020-09-24 ENCOUNTER — CLINICAL SUPPORT (OUTPATIENT)
Dept: PULMONOLOGY | Facility: CLINIC | Age: 65
End: 2020-09-24
Payer: MEDICAID

## 2020-09-24 ENCOUNTER — OFFICE VISIT (OUTPATIENT)
Dept: PULMONOLOGY | Facility: CLINIC | Age: 65
End: 2020-09-24
Payer: MEDICAID

## 2020-09-24 VITALS
TEMPERATURE: 98 F | DIASTOLIC BLOOD PRESSURE: 76 MMHG | BODY MASS INDEX: 46.99 KG/M2 | SYSTOLIC BLOOD PRESSURE: 168 MMHG | OXYGEN SATURATION: 96 % | HEIGHT: 61 IN | RESPIRATION RATE: 18 BRPM | HEART RATE: 75 BPM | WEIGHT: 248.88 LBS

## 2020-09-24 VITALS — HEIGHT: 61 IN | BODY MASS INDEX: 47.01 KG/M2 | WEIGHT: 249 LBS

## 2020-09-24 DIAGNOSIS — K74.60 HEPATIC CIRRHOSIS, UNSPECIFIED HEPATIC CIRRHOSIS TYPE, UNSPECIFIED WHETHER ASCITES PRESENT: ICD-10-CM

## 2020-09-24 DIAGNOSIS — Z87.09 HISTORY OF PLEURAL EFFUSION: ICD-10-CM

## 2020-09-24 DIAGNOSIS — I50.32 CHRONIC DIASTOLIC HEART FAILURE: ICD-10-CM

## 2020-09-24 DIAGNOSIS — E11.22 CKD STAGE 4 DUE TO TYPE 2 DIABETES MELLITUS: ICD-10-CM

## 2020-09-24 DIAGNOSIS — J90 PLEURAL EFFUSION, LEFT: ICD-10-CM

## 2020-09-24 DIAGNOSIS — I50.32 CHRONIC DIASTOLIC HEART FAILURE: Primary | ICD-10-CM

## 2020-09-24 DIAGNOSIS — N18.4 CKD STAGE 4 DUE TO TYPE 2 DIABETES MELLITUS: ICD-10-CM

## 2020-09-24 DIAGNOSIS — G47.33 OSA ON CPAP: ICD-10-CM

## 2020-09-24 DIAGNOSIS — G47.34 NOCTURNAL HYPOXEMIA: ICD-10-CM

## 2020-09-24 DIAGNOSIS — S39.012S LUMBAR STRAIN, SEQUELA: ICD-10-CM

## 2020-09-24 PROCEDURE — 99999 PR PBB SHADOW E&M-EST. PATIENT-LVL V: CPT | Mod: PBBFAC,,, | Performed by: INTERNAL MEDICINE

## 2020-09-24 PROCEDURE — 99999 PR PBB SHADOW E&M-EST. PATIENT-LVL V: ICD-10-PCS | Mod: PBBFAC,,, | Performed by: INTERNAL MEDICINE

## 2020-09-24 PROCEDURE — 71046 X-RAY EXAM CHEST 2 VIEWS: CPT | Mod: TC

## 2020-09-24 PROCEDURE — 71046 X-RAY EXAM CHEST 2 VIEWS: CPT | Mod: 26,,, | Performed by: RADIOLOGY

## 2020-09-24 PROCEDURE — 71046 XR CHEST PA AND LATERAL: ICD-10-PCS | Mod: 26,,, | Performed by: RADIOLOGY

## 2020-09-24 PROCEDURE — 94618 PULMONARY STRESS TESTING: CPT | Mod: PBBFAC

## 2020-09-24 PROCEDURE — 94762 N-INVAS EAR/PLS OXIMTRY CONT: CPT | Mod: PBBFAC

## 2020-09-24 PROCEDURE — 94618 PULMONARY STRESS TESTING: CPT | Mod: 26,S$PBB,, | Performed by: INTERNAL MEDICINE

## 2020-09-24 PROCEDURE — 99215 PR OFFICE/OUTPT VISIT, EST, LEVL V, 40-54 MIN: ICD-10-PCS | Mod: S$PBB,,, | Performed by: INTERNAL MEDICINE

## 2020-09-24 PROCEDURE — 94618 PULMONARY STRESS TESTING: ICD-10-PCS | Mod: 26,S$PBB,, | Performed by: INTERNAL MEDICINE

## 2020-09-24 PROCEDURE — 99215 OFFICE O/P EST HI 40 MIN: CPT | Mod: PBBFAC,25 | Performed by: INTERNAL MEDICINE

## 2020-09-24 PROCEDURE — 99215 OFFICE O/P EST HI 40 MIN: CPT | Mod: S$PBB,,, | Performed by: INTERNAL MEDICINE

## 2020-09-24 RX ORDER — TIZANIDINE 4 MG/1
4 TABLET ORAL EVERY 8 HOURS
Qty: 60 TABLET | Refills: 0 | Status: ON HOLD | OUTPATIENT
Start: 2020-09-24 | End: 2020-10-14 | Stop reason: HOSPADM

## 2020-09-24 NOTE — TELEPHONE ENCOUNTER
----- Message from Ping Membreno sent at 9/24/2020  2:24 PM CDT -----  Patient called in regards to speak to an nurse in regards to her medication , please call back at [phone].      Thanks,  Ping Membreno

## 2020-09-24 NOTE — PROCEDURES
"[facility]  Six Minute Walk     SUMMARY     Ordering Provider: Dr Hunter   Interpreting Provider: Dr Hunter  Performing nurse/tech/RT: REESE Mccarty CRT  Diagnosis: (Left pleural effusion)  Height: 5' 1" (154.9 cm)  Weight: 112.9 kg (249 lb 0.2 oz)  BMI (Calculated): 47.1   Patient Race:             Phase Oxygen Assessment Supplemental O2 Heart   Rate Blood Pressure Salo Dyspnea Scale Rating   Resting 97 % Room Air 83 bpm 168/76 3   Exercise        Minute        1 97 % Room Air 102 bpm     2 98 % Room Air 106 bpm     3 98 % Room Air 107 bpm     4 98 % Room Air 105 bpm     5 99 % Room Air 106 bpm     6  98 % Room Air 105 bpm 140/66 5-6   Recovery        Minute        1 100 % Room Air 101 bpm     2 100 % Room Air 87 bpm     3 100 % Room Air 84 bpm     4 100 % Room Air 83 bpm 136/70 2     Six Minute Walk Summary  6MWT Status: completed with stops  Patient Reported: Dyspnea     Interpretation:  Did the patient stop or pause?: Yes  How many times did the patient stop or pause?: 1  Stop Time 1: 125  Restart Time 1: 145  Pause Time 1: 20 seconds                             Total Time Walked (Calculated): 340 seconds  Final Partial Lap Distance (feet): 50 feet  Total Distance Meters (Calculated): 76.2 meters  Predicted Distance Meters (Calculated): 371.04 meters  Percentage of Predicted (Calculated): 20.54  Peak VO2 (Calculated): 6.27  Mets: 1.79  Has The Patient Had a Previous Six Minute Walk Test?: Yes       Previous 6MWT Results  Has The Patient Had a Previous Six Minute Walk Test?: Yes  Date of Previous Test: 01/13/20  Total Time Walked: 248 seconds  Total Distance (meters): 76.2  Predicted Distance (meters): 379.46 meters  Percentage of Predicted: 20.08  Percent Change (Calculated): 0    "

## 2020-09-24 NOTE — TELEPHONE ENCOUNTER
Spoke with patient regarding Tizanidine Rx. States that pharmacy never received it. Wants to know can Rx be sent back to pharmacy. Rx pended for review and approval.

## 2020-09-24 NOTE — PROGRESS NOTES
Pulmonary Outpatient Follow Up Visit     Subjective:       Patient ID: Diamond Das is a 65 y.o. female.    Chief Complaint: Pleural Effusion, Shortness of Breath, and Sleep Apnea      HPI          65-year-old female patient presenting for 3 months follow-up.    I have last perform the thoracentesis with 185 mL of cloudy fluid drained on the left side in January 2020.  Not malignant.      Initially  evaluated February 2018 seen for hypoxemic respiratory failure after hospital admission precipitated by CHF exacerbation currently using oxygen during sleep with CPAP, 6 min walking test showed no need for oxygen on exertion in 2018.       On CPAP on O2 during sleep suboptimal CPAP usage.  Lost her new machine from [facility] due to suboptimal compliance however she did have an old machine that she is using however oxygen is being used via nasal cannula and the CPAP mask on top of it.           She has obstructive sleep apnea and she has been on CPAP for more than 5 years her study was done at [facility].     Known with diastolic heart failure, cryptogenic liver cirrhosis status post paracentesis and TIPS in 2017, chronic lower extremity edema.       Currently on 40 mg Lasix daily, metolazone 5 mg daily.  Following with Dr. Ramos cardiology.      Evaluated by GI, tips still working however there is a decrease flow, IR performed tips evaluation again 07/24/2020              Review of Systems   Constitutional: Positive for weight gain. Negative for fever and chills.   HENT: Negative for trouble swallowing, voice change and congestion.         History of cataract.   Eyes: Negative for redness.   Respiratory: Positive for apnea, snoring, cough, shortness of breath, orthopnea and dyspnea on extertion.    Cardiovascular: Positive for leg swelling. Negative for chest pain.        History of diastolic CHF.   Genitourinary:        CKD 3    Endocrine: History of  diabetes.  Hypothyroid.    Musculoskeletal: Positive for arthralgias and back pain.   Skin: Negative for rash.   Gastrointestinal: Positive for abdominal distention and acid reflux.        History of liver cirrhosis, ascites status post paracentesis in the past.  Status post TIPS.  Gastroesophageal reflux disease.  Gastroparesis.   Neurological: Positive for headaches. Negative for syncope.   Hematological: No excessive bruising.   Psychiatric/Behavioral: Positive for sleep disturbance. Negative for confusion. The patient is not nervous/anxious.         KRISTAL       Outpatient Encounter Medications as of 9/24/2020   Medication Sig Dispense Refill    amLODIPine (NORVASC) 10 MG tablet Take 1 tablet (10 mg total) by mouth once daily. 90 tablet 3    aspirin (ECOTRIN) 81 MG EC tablet Take 1 tablet (81 mg total) by mouth once daily. 365 tablet 0    doxazosin (CARDURA) 2 MG tablet Take 1 tablet (2 mg total) by mouth every evening. 90 tablet 1    furosemide (LASIX) 40 MG tablet Take 1 tablet (40 mg total) by mouth once daily. Take 80 mg next 3 days 90 tablet 1    insulin (LANTUS SOLOSTAR U-100 INSULIN) glargine 100 units/mL (3mL) SubQ pen INJECT 20 UNITS INTO THE SKIN EVERY EVENING.  TITRATE UP TO 30 UNITS NIGHTLY AS DIRECTED ON INSTRUCTION SHEET 15 mL 6    levothyroxine (SYNTHROID) 137 MCG Tab tablet Take 1 tablet (137 mcg total) by mouth before breakfast. 90 tablet 1    magnesium oxide (MAG-OX) 400 mg (241.3 mg magnesium) tablet Take 1 tablet (400 mg total) by mouth once daily.  0    metOLazone (ZAROXOLYN) 5 MG tablet Take 1 tablet (5 mg total) by mouth every other day. (Patient taking differently: Take 2.5 mg by mouth every other day. ) 15 tablet 3    ondansetron (ZOFRAN) 4 MG tablet TAKE 1 TABLET BY MOUTH EVERY 8 HOURS AS NEEDED 30 tablet 0    pantoprazole (PROTONIX) 20 MG tablet Take 2 tablets (40 mg total) by mouth once daily. 180 tablet 1    potassium chloride SA (K-DUR,KLOR-CON) 20 MEQ tablet Take 1 tablet  "(20 mEq total) by mouth once daily. 30 tablet 3    rifAXIMin (XIFAXAN) 550 mg Tab Take 550 mg by mouth 2 (two) times daily.      tiZANidine (ZANAFLEX) 4 MG tablet TAKE 1 TABLET BY MOUTH EVERY 8 HOURS FOR 20 DAYS 60 tablet 0    vitamin E 400 UNIT capsule Take 400 Units by mouth every morning.      XIFAXAN 550 mg Tab Take 1 tablet by mouth twice daily 90 tablet 1    glucagon, human recombinant, (GLUCAGON EMERGENCY KIT, HUMAN,) 1 mg SolR Inject 1 mg into the muscle as needed. 1 each 5    liraglutide 0.6 mg/0.1 mL, 18 mg/3 mL, subq PNIJ (VICTOZA 2-SHAYY) 0.6 mg/0.1 mL (18 mg/3 mL) PnIj Inject 1.8 mg into the skin once daily. 30 mL 0    [DISCONTINUED] tiZANidine (ZANAFLEX) 4 MG tablet TAKE 1 TABLET BY MOUTH EVERY 8 HOURS FOR  20  DAYS 60 tablet 0     No facility-administered encounter medications on file as of 9/24/2020.        Objective:     Vital Signs (Most Recent)  Vital Signs  Temp: 97.5 °F (36.4 °C)  Temp src: Temporal  Pulse: 75  Resp: 18  SpO2: 96 %  BP: (!) 168/76  Height and Weight  Height: 5' 1" (154.9 cm)  Weight: 112.9 kg (248 lb 14.4 oz)  BSA (Calculated - sq m): 2.2 sq meters  BMI (Calculated): 47.1  Weight in (lb) to have BMI = 25: 132]  Wt Readings from Last 2 Encounters:   09/24/20 112.9 kg (248 lb 14.4 oz)   09/24/20 112.9 kg (249 lb 0.2 oz)       Physical Exam   Constitutional: She is oriented to person, place, and time. She appears well-developed and well-nourished.   HENT:   Head: Normocephalic.   Neck: Neck supple.   Cardiovascular: Normal rate and regular rhythm.   Pulmonary/Chest: Normal expansion and effort normal. No respiratory distress. She has decreased breath sounds. She has no rhonchi. She has rales.   Breath sounds decreased right more than left.   Abdominal: Soft. She exhibits no distension.   Musculoskeletal:         General: Edema present. No tenderness.      Comments: Bilateral lower extremity pitting edema   Lymphadenopathy:     She has no axillary adenopathy.   Neurological: " She is alert and oriented to person, place, and time.   Skin: Skin is warm.   Psychiatric: She has a normal mood and affect.       Laboratory  Lab Results   Component Value Date    WBC 6.72 08/14/2020    RBC 3.91 (L) 08/14/2020    HGB 11.4 (L) 08/14/2020    HCT 35.3 (L) 08/14/2020    MCV 90 08/14/2020    MCH 29.2 08/14/2020    MCHC 32.3 08/14/2020    RDW 14.7 (H) 08/14/2020     08/14/2020    MPV 9.5 08/14/2020    GRAN 3.0 08/14/2020    GRAN 44.2 08/14/2020    LYMPH 2.9 08/14/2020    LYMPH 42.6 08/14/2020    MONO 0.5 08/14/2020    MONO 7.6 08/14/2020    EOS 0.3 08/14/2020    BASO 0.04 08/14/2020    EOSINOPHIL 4.9 08/14/2020    BASOPHIL 0.6 08/14/2020       BMP  Lab Results   Component Value Date     08/14/2020    K 3.9 08/14/2020     (H) 08/14/2020    CO2 26 08/14/2020    BUN 27 (H) 08/14/2020    CREATININE 2.3 (H) 08/14/2020    CALCIUM 7.9 (L) 08/14/2020    ANIONGAP 6 (L) 08/14/2020    ESTGFRAFRICA 25.3 (A) 08/14/2020    EGFRNONAA 22.0 (A) 08/14/2020    AST 45 (H) 08/14/2020    ALT 14 08/14/2020    PROT 5.6 (L) 08/14/2020       Lab Results   Component Value Date     (H) 05/21/2020     (H) 02/10/2020    BNP 1,010 (H) 01/10/2020     (H) 11/18/2019     (H) 10/25/2019     (H) 10/16/2019       Lab Results   Component Value Date    TSH 2.271 01/03/2019     No malignancy on pleural effusion fluid.      No results found for: SEDRATE    No results found for: CRP  No results found for: IGE     No results found for: ASPERGILLUS  No results found for: AFUMIGATUSCL     Lab Results   Component Value Date    ACE 67 (H) 10/11/2016        Diagnostic Results:  I have personally reviewed today the following studies:      Chest x-ray June 22, 2020    Cardiomegaly and prominence central pulmonary vasculature suggesting pulmonary venous congestion.     Chronic findings left base suggesting effusion with underlying infiltrate/consolidation not excluded.     6 min walking test  09/24/2020 lowest O2 sat 97% severe reduction of exercise capacity.    Doppler liver US :     Impression         1. Findings in keeping with cirrhosis with known left portal vein thrombosis which now appears more occlusive.  2. TIPS shunt catheter appears patent but demonstrates low internal velocities suggesting possible TIPS malfunction.  Velocities are similar to prior.         PSG 4/18   RESPIRATORY: The overall apnea-hypopnea index (AHI) was 13.3 events /hr asleep. Ms Das had 27 hypopneas, 21  obstructive sleep apneas, 0 central sleep apneas, and 0 mixed sleep apneas. Persistent loud snoring was noted. Analysis of  continuous oxygen saturations showed a mean SpO2 value of 93.6 % for the study, with a minimum oxygen saturation during  sleep of 54.0 %, and a waking baseline SpO2 = 96 %. Oxygen saturations were ? 88 % for 23.7 minutes of the time spent  asleep.        PFT 31/18:     The Forced Vital Capacity (FVC) is normal.  FEV1 is normal.  No significant improvement after bronchodilator.  The absence of an acute response to aerosolized bronchodilator does not necessarily mean that the subject will not  respond to a clinical trial of aerosolized or oral bronchodilators.  FEV1 over FVC is 82%  Lung Volumes are consistent with mild restrictive disease.  Diffusion capacity is severely reduced - unadjusted for hemoglobin (DLCO < 40%).  Mild restriction, severe DLCO reduction.        Echo June 2019       1 - Normal left ventricular systolic function (EF 60-65%).     2 - Impaired LV relaxation, normal LAP (grade 1 diastolic dysfunction).     3 - Normal right ventricular systolic function .     4 - No wall motion abnormalities.     5 - Concentric hypertrophy.     6 - Trivial mitral regurgitation.     7 - Trivial tricuspid regurgitation.     8 - Small pericardial effusion.     9 - No echo evidence of tamponade.     Echo 5/17 :      1 - Normal left ventricular systolic function (EF 60-65%). Mild concentric  hypertrophy.     2 - Impaired LV relaxation, elevated LAP (grade 2 diastolic dysfunction).     3 - Normal right ventricular systolic function .     4 - Severe left atrial enlargement.       Assessment/Plan:   Chronic diastolic heart failure  -     X-Ray Chest PA And Lateral; Future; Expected date: 09/24/2020  -     PULSE OXIMETRY OVERNIGHT; Future    CKD stage 4 due to type 2 diabetes mellitus  -     Ambulatory referral/consult to Nephrology; Future; Expected date: 10/01/2020    History of pleural effusion  -     X-Ray Chest PA And Lateral; Future; Expected date: 09/24/2020    KRISTAL on CPAP  -     PULSE OXIMETRY OVERNIGHT; Future    Nocturnal hypoxemia  -     PULSE OXIMETRY OVERNIGHT; Future    Hepatic cirrhosis, unspecified hepatic cirrhosis type, unspecified whether ascites present             No need for O2 on exertion.  Check overnight oximetry on CPAP.    Continue Lasix and metolazone.    Nephrology evaluation for CKD stage 4.    Follow-up with GI.  Continue Xifaxan and rifaximin.          Continue diuretics. Repeat chest x-ray.    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      Heart healthy diet  Limit fluid intake 50-60 oz   Daily weights and to notify clinic if weight increases by more than 3 lbs in 1 day or 5 lbs in 1 week.     Up-to-date on pneumococcal vaccines.  Yearly influenza vaccination.  Follow up in about 3 months (around 12/24/2020).    This note was prepared using voice recognition system and is likely to have sound alike errors that may have been overlooked even after proof reading.  Please call me with any questions    Discussed diagnosis, its evaluation, treatment and usual course. All questions answered.      Angelica Hunter MD

## 2020-09-25 DIAGNOSIS — K76.82 HEPATIC ENCEPHALOPATHY: ICD-10-CM

## 2020-09-25 NOTE — TELEPHONE ENCOUNTER
"----- Message from Beena Cole sent at 9/25/2020  3:57 PM CDT -----  Regarding: med refill  .Type:  RX Refill Request    Who Called: Tronda   Refill or New Rx: refill   RX Name and Strength:  starts with an "X"  How is the patient currently taking it? (ex. 1XDay):   Is this a 30 day or 90 day RX:   Preferred Pharmacy with phone number:     [facility]note 3[facility] - [place] - [address]  [address] [address]  Phone: [phone] Fax: [phone]    Local or Mail Order: local   Ordering Provider:   Would the patient rather a call back or a response via [facility]? Call back   Best Call Back Number:[phone]  Additional Information:  pt has no medication need refill today, has been trying for 1 weeks       "

## 2020-09-28 ENCOUNTER — OFFICE VISIT (OUTPATIENT)
Dept: CARDIOLOGY | Facility: CLINIC | Age: 65
End: 2020-09-28
Payer: MEDICAID

## 2020-09-28 ENCOUNTER — TELEPHONE (OUTPATIENT)
Dept: INTERNAL MEDICINE | Facility: CLINIC | Age: 65
End: 2020-09-28

## 2020-09-28 ENCOUNTER — OFFICE VISIT (OUTPATIENT)
Dept: INTERNAL MEDICINE | Facility: CLINIC | Age: 65
End: 2020-09-28
Payer: MEDICAID

## 2020-09-28 VITALS
SYSTOLIC BLOOD PRESSURE: 134 MMHG | WEIGHT: 248.25 LBS | DIASTOLIC BLOOD PRESSURE: 82 MMHG | DIASTOLIC BLOOD PRESSURE: 80 MMHG | BODY MASS INDEX: 46.49 KG/M2 | WEIGHT: 246.25 LBS | BODY MASS INDEX: 46.87 KG/M2 | HEIGHT: 61 IN | TEMPERATURE: 99 F | HEART RATE: 73 BPM | RESPIRATION RATE: 18 BRPM | OXYGEN SATURATION: 98 % | SYSTOLIC BLOOD PRESSURE: 160 MMHG | HEIGHT: 61 IN | HEART RATE: 70 BPM

## 2020-09-28 DIAGNOSIS — K76.82 HEPATIC ENCEPHALOPATHY: Primary | ICD-10-CM

## 2020-09-28 DIAGNOSIS — G47.33 OSA ON CPAP: ICD-10-CM

## 2020-09-28 DIAGNOSIS — Z12.31 SCREENING MAMMOGRAM, ENCOUNTER FOR: ICD-10-CM

## 2020-09-28 DIAGNOSIS — I50.32 CHRONIC DIASTOLIC CONGESTIVE HEART FAILURE: ICD-10-CM

## 2020-09-28 DIAGNOSIS — E08.59 DIABETES DUE TO UNDERLYING CONDITION W OTH CIRCULATORY COMP: ICD-10-CM

## 2020-09-28 DIAGNOSIS — Z28.39 IMMUNIZATION DEFICIENCY: ICD-10-CM

## 2020-09-28 DIAGNOSIS — E78.49 OTHER HYPERLIPIDEMIA: ICD-10-CM

## 2020-09-28 DIAGNOSIS — I10 ESSENTIAL HYPERTENSION: ICD-10-CM

## 2020-09-28 DIAGNOSIS — E03.4 HYPOTHYROIDISM DUE TO ACQUIRED ATROPHY OF THYROID: ICD-10-CM

## 2020-09-28 DIAGNOSIS — R60.0 BILATERAL LOWER EXTREMITY EDEMA: ICD-10-CM

## 2020-09-28 DIAGNOSIS — N18.4 CKD (CHRONIC KIDNEY DISEASE) STAGE 4, GFR 15-29 ML/MIN: ICD-10-CM

## 2020-09-28 DIAGNOSIS — I31.39 PERICARDIAL EFFUSION: ICD-10-CM

## 2020-09-28 DIAGNOSIS — J90 PLEURAL EFFUSION, LEFT: ICD-10-CM

## 2020-09-28 DIAGNOSIS — E66.01 MORBID OBESITY DUE TO EXCESS CALORIES: ICD-10-CM

## 2020-09-28 DIAGNOSIS — E11.59 TYPE 2 DIABETES MELLITUS WITH OTHER CIRCULATORY COMPLICATION, WITHOUT LONG-TERM CURRENT USE OF INSULIN: ICD-10-CM

## 2020-09-28 DIAGNOSIS — I50.32 CHRONIC DIASTOLIC CONGESTIVE HEART FAILURE: Primary | ICD-10-CM

## 2020-09-28 PROBLEM — E03.8 TSH (THYROID-STIMULATING HORMONE DEFICIENCY): Status: ACTIVE | Noted: 2020-09-28

## 2020-09-28 PROBLEM — I50.9 CONGESTIVE HEART FAILURE: Status: RESOLVED | Noted: 2020-05-18 | Resolved: 2020-09-28

## 2020-09-28 PROCEDURE — 99999 PR PBB SHADOW E&M-EST. PATIENT-LVL IV: CPT | Mod: PBBFAC,,, | Performed by: INTERNAL MEDICINE

## 2020-09-28 PROCEDURE — 99215 PR OFFICE/OUTPT VISIT, EST, LEVL V, 40-54 MIN: ICD-10-PCS | Mod: S$PBB,,, | Performed by: FAMILY MEDICINE

## 2020-09-28 PROCEDURE — 99999 PR PBB SHADOW E&M-EST. PATIENT-LVL V: ICD-10-PCS | Mod: PBBFAC,,, | Performed by: FAMILY MEDICINE

## 2020-09-28 PROCEDURE — 99214 OFFICE O/P EST MOD 30 MIN: CPT | Mod: S$PBB,,, | Performed by: INTERNAL MEDICINE

## 2020-09-28 PROCEDURE — 99214 PR OFFICE/OUTPT VISIT, EST, LEVL IV, 30-39 MIN: ICD-10-PCS | Mod: S$PBB,,, | Performed by: INTERNAL MEDICINE

## 2020-09-28 PROCEDURE — 99999 PR PBB SHADOW E&M-EST. PATIENT-LVL V: CPT | Mod: PBBFAC,,, | Performed by: FAMILY MEDICINE

## 2020-09-28 PROCEDURE — 99215 OFFICE O/P EST HI 40 MIN: CPT | Mod: S$PBB,,, | Performed by: FAMILY MEDICINE

## 2020-09-28 PROCEDURE — 99214 OFFICE O/P EST MOD 30 MIN: CPT | Mod: PBBFAC,PO | Performed by: INTERNAL MEDICINE

## 2020-09-28 PROCEDURE — 99999 PR PBB SHADOW E&M-EST. PATIENT-LVL IV: ICD-10-PCS | Mod: PBBFAC,,, | Performed by: INTERNAL MEDICINE

## 2020-09-28 PROCEDURE — 99215 OFFICE O/P EST HI 40 MIN: CPT | Mod: PBBFAC,27,PO | Performed by: FAMILY MEDICINE

## 2020-09-28 RX ORDER — METOLAZONE 5 MG/1
5 TABLET ORAL DAILY
Qty: 30 TABLET | Refills: 3 | Status: SHIPPED | OUTPATIENT
Start: 2020-09-28 | End: 2021-05-04 | Stop reason: SDUPTHER

## 2020-09-28 NOTE — PROCEDURES
[facility]  [address] * [place] [address]  Telephone: [phone]  Test date: 20 Start: 20 20:45:50 Diamond Das  Doctor: Duane End: 20 06:29:30 [id number]  Oximetry: Summary Report  Comments: ra  Recording time: 09:43:40 Highest pulse: 93 Highest SpO2: 98%  Excluded samplin:00:12 Lowest pulse: 49 Lowest SpO2: 84%  Total valid samplin:43:28 Mean pulse: 59 Mean SpO2: 93.5%  1 S.D.: 6.0 1 S.D.: 1.3  Time with SpO2<90: 0:00:12, 0.0%  Time with SpO2<80: 0:00:00, 0.0%  Time with SpO2<70: 0:00:00, 0.0%  Time with SpO2<60: 0:00:00, 0.0%  Time with SpO2<89: 0:00:12, 0.0%  Time with SpO2 =>90: 9:43:16, 100.0%  Time with SpO2=>80 & <90: 0:00:12, 0.0%  Time with SpO2=>70 & <80: 0:00:00, 0.0%  Time with SpO2=>60 & <70: 0:00:00, 0.0%  The longest continuous time with saturation <=88 was 00:00:12, which started at  20 06:29:18.  A desaturation event was defined as a decrease of saturation by 4 or more.  No events were excluded due to artifact.  There were 9 desaturation events over 3 minutes duration.  There were 3 desaturation events of less than 3 minutes duration during which:  The mean high was 96.3%. The mean low was 90.7%.  The number of these events that were:  > 0 & <10 seconds: 0 > 0 seconds: 3  =>10 & <20 seconds: 0 =>10 seconds: 3  =>20 & <30 seconds: 1 =>20 seconds: 3  =>30 & <40 seconds: 0 =>30 seconds: 2  =>40 & <50 seconds: 0 =>40 seconds: 2  =>50 & <60 seconds: 1 =>50 seconds: 2  =>60 seconds: 1 =>60 seconds: 1  The mean length of desaturation events that were >=10 sec & <=3 mins was: 50.7 sec.  Desaturation event index (events >=10 sec per sampled hour): 0.3  Desaturation event index (events >= 0 sec per sampled hour): 0.3   [employer] Oximetry version Standard UX1588.  Oximeter: [employer] 920M Plus memory, 4 second resolution.

## 2020-09-28 NOTE — PROGRESS NOTES
Subjective:   Patient ID:  Diamond Das is a 65 y.o. female who presents for cardiac consult of Leg Swelling      Shortness of Breath  Associated symptoms include leg swelling. Pertinent negatives include no chest pain.   Follow-up  Pertinent negatives include no chest pain or nausea.     The patient came in today for cardiac consult of Leg Swelling    Referring Physician: Andrey Perrin MD   Reason for consult: HTN, CHF    Diamond Das is a 65 y.o. female with medical conditions diastolic CHF, pericardial effusion, HTN, HLD, IDDM, cryptogenic cirrhosis, s/p TIPS, ascites presents for CV follow up.     Pt of Dr. Shaw, last seen 2/9/18  No smoking/drinking  Last echo in  normal EF and RVF, grade II DD.  EKG today NSR poor R progression on anterior leads  Chronic weakness and fatigue. Had PNA in   Pain on lower chest bilateral for few months, worse with exercise, resolved after resting. Deep breathing made the pain worse. No pain at sleep. Associated dyspnea, N/V, palpitation and dizziness. No radiation. ASA and tylenol not really helped the pain.    3/1/19  She has been having more ascites and concern for cardiac etiologies. She was also started on Reglan, concern for prolonged Qtc, recent Qtc 475 ms. Has been feeling better with reglan and lactulose. Is dyspneic, chronically on oxygen at night and also has portable oxygen when she has to do a lot walking. Takes lasix 40 mg once/day now, was on 80mg daily. Active at home, dresses and bathes her self. Cant walk or stand to too long.     8/26/19  2D ECHO with grade 1 DD, normal Bi V function, Small pericardial effusion without tamponade. She has mild SOB at times with edema but improved lately. She will see hepatology as well. No Chest pain.   Has been taking lasix extra doses PRN and improved symptoms.     10/16/19  [facility] hosp ER follow up  - Non toxic appearing elderly female here for worsening NICHOLE and shortness of breath. Known h/o CHF, f/b  cardiologist in [facility]. Work up is c/w acute on chronic chf exacerbation. She, unfortunately, does not follow her weights so not clear if significant change recently. CXR with likely signs of volume overload. She was given diuresis and had [place] score of 0. Felt improved and prefers discharge with close OP f/u with her cardiologist. She was ambulated and did not significantly desat and tolerated it well.  No she is on lasix, feels better. BNP today is 453, needs to double lasix next 3-4 days for further diuresis.     11/18/19   Increased diuretics last visit. She felt better then and had weight loss as well. Still has NICHOLE but improved. She went to [facility] 2 weeks ago - presented to the ED for fall secondary to hypoglycemic episode. Pt has had 3 previous episodes of hypoglycemia requiring hospitalizations in the past with sxs of lightheadedness. CT head was unremarkable, CXR revealed improvement in CHF compared to previous study. Due to timeline of fall with insulin administration and Hx of cryptogenic cirrhosis, it is likely that pt could not compensate after receiving her insulin dosing.  BP meds were stopped, will get labs and restart lisinopril.     1/6/20  Breathing has been stable with LE edema. Last visit she doubled lasix for 3 days with min improvement. Has not had much relief lately.   ECG - NSR, poor RWP, lateral TWI    2/17/20  BNP was 863. She had recent thoracentesis as well. She has also been referred to hepatology as concern for TIPS not working?. She remains SOB will add metolazone. Discussed if kidneys and liver are worse will be difficult to manage volume status.     5/18/20  She has been stable but still has SOB with leg swelling. Symptoms have gotten a bit worse a few weeks ago. She has been taking lasix extra dose some times. Last BNP elevated 863, will need to increase lasix and metolazone and repeat labs this week. Will add K and Mg supplements.    9/28/20  BNP was 428 at last visit,  which is improved from 7 months prior. She saw PCP today with worsening NICHOLE and edema. Increased lasix and metolazone last visit.      Ref Range & Units 4mo ago  (5/21/20) 7mo ago  (2/10/20) 8mo ago  (1/10/20) 10mo ago  (11/18/19) 11mo ago  (10/25/19) 11mo ago  (10/16/19)   BNP 0 - 99 pg/mL 413High   863High  CM  1,010High  CM  793High  CM  291High  CM  453High  CM        Patient feels no chest pain, no PND, no palpitation, no dizziness, no syncope, no CNS symptoms.    Patient has dec exercise tolerance.    Patient is compliant with medications.    2D ECHO 06/14/2019     CONCLUSIONS     1 - Normal left ventricular systolic function (EF 60-65%).     2 - Impaired LV relaxation, normal LAP (grade 1 diastolic dysfunction).     3 - Normal right ventricular systolic function .     4 - No wall motion abnormalities.     5 - Concentric hypertrophy.     6 - Trivial mitral regurgitation.     7 - Trivial tricuspid regurgitation.     8 - Small pericardial effusion.     9 - No echo evidence of tamponade.           Past Medical History:   Diagnosis Date    Ascites     Breast pain, left 1/4/2018    Cataract     CHF (congestive heart failure)     Cirrhosis     Cough     Diabetes mellitus     Diabetes mellitus, type 2     Gastroparesis     GERD (gastroesophageal reflux disease)     Hyperlipidemia     Hypertension     Hypotension 2/21/2019    Liver disease     Lumbar strain 4/27/2018    Pneumonia of right middle lobe due to infectious organism 12/19/2017    Renal disorder     Retinopathy due to secondary diabetes mellitus     Sleep apnea     Thyroid disease        Past Surgical History:   Procedure Laterality Date    CATARACT EXTRACTION      CHOLECYSTECTOMY      COLONOSCOPY N/A 9/4/2019    Procedure: COLONOSCOPY;  Surgeon: Marnie Elmore MD;  Location: [facility];  Service: Endoscopy;  Laterality: N/A;    ERCP      FLUOROSCOPY N/A 7/24/2020    Procedure: TIPS revision;  Surgeon: Martell Jasmine MD;   Location: [facility] CATH LAB;  Service: General;  Laterality: N/A;    LIVER BIOPSY      THORACENTESIS Left 1/20/2020    Procedure: Thoracentesis;  Surgeon: Angelica Hunter MD;  Location: [facility] ENDO;  Service: Pulmonary;  Laterality: Left;    TUBAL LIGATION      UPPER GASTROINTESTINAL ENDOSCOPY         Social History     Tobacco Use    Smoking status: Never Smoker    Smokeless tobacco: Never Used   Substance Use Topics    Alcohol use: No     Frequency: Never    Drug use: No       Family History   Problem Relation Age of Onset    Cancer Mother     Breast cancer Mother     Heart disease Father     Aneurysm Sister     Heart disease Brother     No Known Problems Maternal Grandmother     Cancer Maternal Grandfather     Sarcoidosis Sister        Patient's Medications   New Prescriptions    No medications on file   Previous Medications    AMLODIPINE (NORVASC) 10 MG TABLET    Take 1 tablet (10 mg total) by mouth once daily.    ASPIRIN (ECOTRIN) 81 MG EC TABLET    Take 1 tablet (81 mg total) by mouth once daily.    DOXAZOSIN (CARDURA) 2 MG TABLET    Take 1 tablet (2 mg total) by mouth every evening.    FUROSEMIDE (LASIX) 40 MG TABLET    Take 1 tablet (40 mg total) by mouth once daily. Take 80 mg next 3 days    GLUCAGON, HUMAN RECOMBINANT, (GLUCAGON EMERGENCY KIT, HUMAN,) 1 MG SOLR    Inject 1 mg into the muscle as needed.    INSULIN (LANTUS SOLOSTAR U-100 INSULIN) GLARGINE 100 UNITS/ML (3ML) SUBQ PEN    INJECT 20 UNITS INTO THE SKIN EVERY EVENING.  TITRATE UP TO 30 UNITS NIGHTLY AS DIRECTED ON INSTRUCTION SHEET    LEVOTHYROXINE (SYNTHROID) 137 MCG TAB TABLET    Take 1 tablet (137 mcg total) by mouth before breakfast.    LIRAGLUTIDE 0.6 MG/0.1 ML, 18 MG/3 ML, SUBQ PNIJ (VICTOZA 2-SHAYY) 0.6 MG/0.1 ML (18 MG/3 ML) PNIJ    Inject 1.8 mg into the skin once daily.    MAGNESIUM OXIDE (MAG-OX) 400 MG (241.3 MG MAGNESIUM) TABLET    Take 1 tablet (400 mg total) by mouth once daily.    ONDANSETRON (ZOFRAN) 4 MG TABLET     TAKE 1 TABLET BY MOUTH EVERY 8 HOURS AS NEEDED    PANTOPRAZOLE (PROTONIX) 20 MG TABLET    Take 2 tablets (40 mg total) by mouth once daily.    POTASSIUM CHLORIDE SA (K-DUR,KLOR-CON) 20 MEQ TABLET    Take 1 tablet (20 mEq total) by mouth once daily.    RIFAXIMIN (XIFAXAN) 550 MG TAB    Take 550 mg by mouth 2 (two) times daily.    RIFAXIMIN (XIFAXAN) 550 MG TAB    Take 1 tablet (550 mg total) by mouth 2 (two) times daily.    TIZANIDINE (ZANAFLEX) 4 MG TABLET    Take 1 tablet (4 mg total) by mouth every 8 (eight) hours.    VITAMIN E 400 UNIT CAPSULE    Take 400 Units by mouth every morning.   Modified Medications    Modified Medication Previous Medication    METOLAZONE (ZAROXOLYN) 5 MG TABLET metOLazone (ZAROXOLYN) 5 MG tablet       Take 1 tablet (5 mg total) by mouth once daily. Take daily as needed and then every other day for swelling, 30 min before lasix    Take 1 tablet (5 mg total) by mouth every other day.   Discontinued Medications    No medications on file       Review of Systems   Constitutional: Negative.    HENT: Negative.    Eyes: Negative.    Respiratory: Positive for shortness of breath.    Cardiovascular: Positive for leg swelling. Negative for chest pain and palpitations.   Gastrointestinal: Negative for nausea.   Genitourinary: Negative.    Musculoskeletal: Negative.    Skin: Negative.    Neurological: Negative.    Endo/Heme/Allergies: Negative.    Psychiatric/Behavioral: Negative.    All 12 systems otherwise negative.      Wt Readings from Last 3 Encounters:   09/28/20 112.6 kg (248 lb 3.8 oz)   09/28/20 111.7 kg (246 lb 4.1 oz)   09/24/20 112.9 kg (248 lb 14.4 oz)     Temp Readings from Last 3 Encounters:   09/28/20 98.6 °F (37 °C) (Temporal)   09/24/20 97.5 °F (36.4 °C) (Temporal)   07/24/20 97.9 °F (36.6 °C) (Temporal)     BP Readings from Last 3 Encounters:   09/28/20 (!) 160/80   09/28/20 134/82   09/24/20 (!) 168/76     Pulse Readings from Last 3 Encounters:   09/28/20 73   09/28/20 70  "  09/24/20 75       BP (!) 160/80 (BP Location: Right arm, Patient Position: Sitting, BP Method: Medium (Manual))   Pulse 73   Resp 18   Ht 5' 1" (1.549 m)   Wt 112.6 kg (248 lb 3.8 oz)   LMP  (LMP Unknown)   SpO2 98%   BMI 46.90 kg/m²     Objective:   Physical Exam   Constitutional: She is oriented to person, place, and time. She appears well-developed and well-nourished. No distress.   HENT:   Head: Normocephalic and atraumatic.   Nose: Nose normal.   Mouth/Throat: Oropharynx is clear and moist. No oropharyngeal exudate.   Eyes: Conjunctivae and EOM are normal. Right eye exhibits no discharge. Left eye exhibits no discharge. No scleral icterus.   Neck: Normal range of motion. Neck supple. No JVD present. No thyromegaly present.   Cardiovascular: Normal rate, regular rhythm, S1 normal and S2 normal. Exam reveals no gallop, no S3, no S4 and no friction rub.   Murmur heard.  Pulmonary/Chest: Effort normal and breath sounds normal. No stridor. No respiratory distress. She has no wheezes. She has no rales. She exhibits no tenderness.   Abdominal: Soft. Bowel sounds are normal. She exhibits no distension and no mass. There is no abdominal tenderness. There is no rebound.   Genitourinary:    Genitourinary Comments: Deferred     Musculoskeletal: Normal range of motion.         General: Edema present. No tenderness or deformity.   Lymphadenopathy:     She has no cervical adenopathy.   Neurological: She is alert and oriented to person, place, and time. She exhibits normal muscle tone. Coordination normal.   Skin: Skin is warm and dry. No rash noted. She is not diaphoretic. No erythema. No pallor.   Psychiatric: She has a normal mood and affect. Her behavior is normal. Judgment and thought content normal.   Nursing note and vitals reviewed.      Lab Results   Component Value Date     08/14/2020    K 3.9 08/14/2020     (H) 08/14/2020    CO2 26 08/14/2020    BUN 27 (H) 08/14/2020    CREATININE 2.3 (H) " 08/14/2020     (H) 08/14/2020    HGBA1C 6.8 (H) 05/21/2020    MG 1.7 05/21/2020    AST 45 (H) 08/14/2020    ALT 14 08/14/2020    ALBUMIN 1.4 (L) 08/14/2020    PROT 5.6 (L) 08/14/2020    BILITOT 0.2 08/14/2020    WBC 6.72 08/14/2020    HGB 11.4 (L) 08/14/2020    HCT 35.3 (L) 08/14/2020    MCV 90 08/14/2020     08/14/2020    INR 1.1 08/14/2020    TSH 2.271 01/03/2019    CHOL 205 (H) 12/06/2018    HDL 41 12/06/2018    LDLCALC 145.8 12/06/2018    TRIG 91 12/06/2018     (H) 05/21/2020     Assessment:      1. Chronic diastolic congestive heart failure    2. Other hyperlipidemia    3. Essential hypertension    4. Pericardial effusion    5. CKD (chronic kidney disease) stage 4, GFR 15-29 ml/min    6. Type 2 diabetes mellitus with other circulatory complication, without long-term current use of insulin    7. Morbid obesity due to excess calories    8. KRISTAL on CPAP    9. Bilateral lower extremity edema        Plan:   1. Chronic diastolic HF   - increase lasix, daily weights, low salt diet - lasix 80mg daily with metolazone daily and then every other day with extra dose PRN  - daily compression stockings  - BNP, BMP, mg ordered next week  - order ECHO    2. HTN - elevated now, normal earlier   - cont meds     3. HLD  - cont statin    4. DM2  - cont meds per PCP  - sugars stable now 90s-110    5. Cirrhosis s/p TIPS with edema, low albumin  - cont tx per PCP/Hepatology  - not transplant candidate currently     6. KRISTAL  - needs CPAP, was not using it before     7. Obesity  - rec weight loss with diet/exercise     8. Pericardial effusion  - small, sec to cirrhosis/CHF  - no tamponade clinically   - order ECHO    If symptoms do not improve with lasix/metolazone discussed needs ER eval for IV diuresis/admission and close monitoring as kidney function/liver function is poor.     Thank you for allowing me to participate in this patient's care. Please do not hesitate to contact me with any questions or concerns.  Consult note has been forwarded to the referral physician.

## 2020-09-28 NOTE — PATIENT INSTRUCTIONS
Take lasix 80 mg daily with metolazone daily (30 min prior to lasix) and then once you are dryer reduce to metolazone every other day with lasix 40mg daily.

## 2020-09-28 NOTE — PROGRESS NOTES
Subjective:       Patient ID: Diamond Das is a 65 y.o. female.    Chief Complaint: Diabetes (3 month follow up )    History taken from pt and daughter    Diabetes  She presents for her follow-up diabetic visit. She has type 2 diabetes mellitus. Her disease course has been stable. Pertinent negatives for hypoglycemia include no confusion, dizziness, headaches or hunger. Associated symptoms include weakness. Pertinent negatives for diabetes include no chest pain and no visual change. Pertinent negatives for hypoglycemia complications include no blackouts and no hospitalization. Symptoms are stable. Risk factors for coronary artery disease include diabetes mellitus and hypertension. Current diabetic treatment includes diet (victoza). She is compliant with treatment all of the time. Her weight is fluctuating dramatically. She is following a generally unhealthy diet. When asked about meal planning, she reported none. An ACE inhibitor/angiotensin II receptor blocker is not being taken.     Review of Systems   Constitutional: Negative for fever.   HENT: Negative for congestion.    Eyes: Negative for discharge.   Respiratory: Positive for shortness of breath. Negative for chest tightness.    Cardiovascular: Positive for leg swelling. Negative for chest pain and palpitations.   Gastrointestinal: Positive for abdominal pain.   Genitourinary: Negative for difficulty urinating.   Musculoskeletal: Negative for joint swelling.   Neurological: Positive for weakness. Negative for dizziness and headaches.   Psychiatric/Behavioral: Negative for agitation and confusion.       Objective:      Physical Exam  Vitals signs and nursing note reviewed.   Constitutional:       General: She is not in acute distress.     Appearance: Normal appearance. She is well-developed. She is obese. She is not diaphoretic.   HENT:      Head: Normocephalic and atraumatic.      Nose: Nose normal.   Eyes:      General: No scleral icterus.      Conjunctiva/sclera: Conjunctivae normal.   Cardiovascular:      Rate and Rhythm: Normal rate and regular rhythm.   Pulmonary:      Effort: Pulmonary effort is normal. No respiratory distress.      Breath sounds: Normal breath sounds. No wheezing.   Abdominal:      General: Bowel sounds are normal. There is distension.      Palpations: Abdomen is soft.      Tenderness: There is abdominal tenderness. There is no guarding.   Musculoskeletal:         General: Swelling present.      Right lower leg: Edema present.      Left lower leg: Edema present.   Skin:     General: Skin is warm and dry.      Coloration: Skin is not pale.      Findings: No erythema or rash.      Comments: Good turgor   Neurological:      Mental Status: She is alert.   Psychiatric:         Mood and Affect: Mood normal.         Behavior: Behavior normal.         Thought Content: Thought content normal.         Judgment: Judgment normal.         Assessment:       1. Hepatic encephalopathy    2. Immunization deficiency    3. Diabetes due to underlying condition w oth circulatory comp    4. Chronic diastolic congestive heart failure    5. Hypothyroidism due to acquired atrophy of thyroid    6. Screening mammogram, encounter for    7. Pleural effusion, left        Plan:     Problem List Items Addressed This Visit        Pulmonary    Pleural effusion, left    Current Assessment & Plan     Persistent left sided pleural effusion.  Spoke with Dr. Ramos regarding pts case and who agrees to see pt            Cardiac/Vascular    Chronic diastolic congestive heart failure    Relevant Orders    Brain Natriuretic Peptide       Renal/    Screening mammogram, encounter for    Relevant Orders    Mammo Digital Screening Bilat       ID    Immunization deficiency    Relevant Orders    (In Office Administered) Zoster Recombinant Vaccine       Endocrine    Hypothyroidism due to acquired atrophy of thyroid    Relevant Orders    TSH    Diabetes due to underlying condition w  oth circulatory comp    Relevant Orders    Ambulatory referral/consult to Optometry    Lipid Panel    Hemoglobin A1C    Comprehensive metabolic panel    CBC auto differential       GI    Hepatic encephalopathy - Primary    Overview     Pt has history of cirrhosis, seen at [facility] on 6/7/18.  Currently on lactulose.         Relevant Medications    rifAXIMin (XIFAXAN) 550 mg Tab

## 2020-09-28 NOTE — TELEPHONE ENCOUNTER
Great!! Thank yall!!      Hey!  wants to know can you get this patient scheduled with ? Thanks a bunch!

## 2020-09-28 NOTE — ASSESSMENT & PLAN NOTE
Persistent left sided pleural effusion.  Spoke with Dr. Ramos regarding pts case and who agrees to see pt

## 2020-09-29 ENCOUNTER — TELEPHONE (OUTPATIENT)
Dept: INTERNAL MEDICINE | Facility: CLINIC | Age: 65
End: 2020-09-29

## 2020-09-29 ENCOUNTER — HOSPITAL ENCOUNTER (OUTPATIENT)
Dept: RADIOLOGY | Facility: HOSPITAL | Age: 65
Discharge: HOME | End: 2020-09-29
Attending: FAMILY MEDICINE
Payer: MEDICAID

## 2020-09-29 DIAGNOSIS — Z12.31 SCREENING MAMMOGRAM, ENCOUNTER FOR: ICD-10-CM

## 2020-09-29 PROCEDURE — 77067 SCR MAMMO BI INCL CAD: CPT | Mod: 26,,, | Performed by: RADIOLOGY

## 2020-09-29 PROCEDURE — 77067 SCR MAMMO BI INCL CAD: CPT | Mod: TC,PO

## 2020-09-29 PROCEDURE — 77067 MAMMO DIGITAL SCREENING BILAT: ICD-10-PCS | Mod: 26,,, | Performed by: RADIOLOGY

## 2020-09-29 NOTE — TELEPHONE ENCOUNTER
----- Message from Racquel Bourne sent at 9/29/2020  3:58 PM CDT -----  Contact: DIAMOND BAEZA [[id number]]  Type:  Patient Requesting Call    Who Called: Diamond Patiño Call Back Number: [phone]  Additional Information:  [facility] has not  received rx for rifAXIMin (XIFAXAN) 550 mg Tab, please send again

## 2020-10-01 NOTE — PROGRESS NOTES
Results have been reviewed . All labs are within normal range.   If you have any questions please feel free to contact me.    Impression:  Bilateral  There is no mammographic evidence of malignancy.     BI-RADS Category:   Overall: 2 - Benign    Recommendation:  Routine screening mammogram in 1 year is recommended.

## 2020-10-02 ENCOUNTER — PATIENT MESSAGE (OUTPATIENT)
Dept: INTERNAL MEDICINE | Facility: CLINIC | Age: 65
End: 2020-10-02

## 2020-10-02 ENCOUNTER — TELEPHONE (OUTPATIENT)
Dept: INTERNAL MEDICINE | Facility: CLINIC | Age: 65
End: 2020-10-02

## 2020-10-02 NOTE — TELEPHONE ENCOUNTER
----- Message from Cris Rodriguez sent at 10/1/2020  4:40 PM CDT -----  Regarding: missed call  Type:  Patient Returning Call    Who Called:pt  Who Left Message for Patient:staff  Does the patient know what this is regarding?:n/a  Would the patient rather a call back or a response via [facility]? Call back  Best Call Back Number:[phone]  Additional Information: Please call back.Thank

## 2020-10-05 ENCOUNTER — LAB VISIT (OUTPATIENT)
Dept: LAB | Facility: HOSPITAL | Age: 65
End: 2020-10-05
Attending: INTERNAL MEDICINE
Payer: MEDICAID

## 2020-10-05 DIAGNOSIS — I50.32 CHRONIC DIASTOLIC CONGESTIVE HEART FAILURE: ICD-10-CM

## 2020-10-05 LAB
ANION GAP SERPL CALC-SCNC: 6 MMOL/L (ref 8–16)
BNP SERPL-MCNC: 349 PG/ML (ref 0–99)
BUN SERPL-MCNC: 33 MG/DL (ref 8–23)
CALCIUM SERPL-MCNC: 7.9 MG/DL (ref 8.7–10.5)
CHLORIDE SERPL-SCNC: 110 MMOL/L (ref 95–110)
CO2 SERPL-SCNC: 29 MMOL/L (ref 23–29)
CREAT SERPL-MCNC: 2.6 MG/DL (ref 0.5–1.4)
EST. GFR  (AFRICAN AMERICAN): 21.8 ML/MIN/1.73 M^2
EST. GFR  (NON AFRICAN AMERICAN): 18.9 ML/MIN/1.73 M^2
GLUCOSE SERPL-MCNC: 88 MG/DL (ref 70–110)
MAGNESIUM SERPL-MCNC: 1.7 MG/DL (ref 1.6–2.6)
POTASSIUM SERPL-SCNC: 4.1 MMOL/L (ref 3.5–5.1)
SODIUM SERPL-SCNC: 145 MMOL/L (ref 136–145)

## 2020-10-05 PROCEDURE — 83735 ASSAY OF MAGNESIUM: CPT | Mod: PO

## 2020-10-05 PROCEDURE — 80048 BASIC METABOLIC PNL TOTAL CA: CPT | Mod: PO

## 2020-10-05 PROCEDURE — 36415 COLL VENOUS BLD VENIPUNCTURE: CPT | Mod: PO

## 2020-10-05 PROCEDURE — 83880 ASSAY OF NATRIURETIC PEPTIDE: CPT | Mod: PO

## 2020-10-06 ENCOUNTER — PATIENT MESSAGE (OUTPATIENT)
Dept: ADMINISTRATIVE | Facility: HOSPITAL | Age: 65
End: 2020-10-06

## 2020-10-06 ENCOUNTER — TELEPHONE (OUTPATIENT)
Dept: CARDIOLOGY | Facility: CLINIC | Age: 65
End: 2020-10-06

## 2020-10-06 NOTE — TELEPHONE ENCOUNTER
----- Message from Marcos Ramos MD sent at 10/5/2020  4:44 PM CDT -----  Please call the patient regarding her abnormal result. BNP is improved from prior lab 4 months ago due to less fluid but continue taking lasix and metolazone at the higher doses discusssed - lasix 80 mg daily with metolazone 30 min prior to lasix every other day. If symptoms are worse I recommend going to the ER for IV lasix and admission.

## 2020-10-06 NOTE — TELEPHONE ENCOUNTER
"Pt was contacted about BNP results:     "BNP is improved from prior lab 4 months ago due to less fluid but continue taking lasix and metolazone at the higher doses discusssed - lasix 80 mg daily with metolazone 30 min prior to lasix every other day. If symptoms are worse I recommend going to the ER for IV lasix and admission."     Pt daughter verbalized understanding about medication dosage change regarding Lasix & Metolazone with no questions or concerns. Also, Instructed daughter to seek medical attention at the ER if her mother's symptoms worsen.   "

## 2020-10-09 ENCOUNTER — HOSPITAL ENCOUNTER (INPATIENT)
Facility: HOSPITAL | Age: 65
LOS: 5 days | Discharge: HOME | DRG: 291 | End: 2020-10-14
Attending: EMERGENCY MEDICINE | Admitting: EMERGENCY MEDICINE
Payer: MEDICAID

## 2020-10-09 DIAGNOSIS — K75.81 LIVER CIRRHOSIS SECONDARY TO NASH: ICD-10-CM

## 2020-10-09 DIAGNOSIS — R06.02 SOB (SHORTNESS OF BREATH): ICD-10-CM

## 2020-10-09 DIAGNOSIS — I50.9 CHF (CONGESTIVE HEART FAILURE): ICD-10-CM

## 2020-10-09 DIAGNOSIS — K74.60 LIVER CIRRHOSIS SECONDARY TO NASH: ICD-10-CM

## 2020-10-09 DIAGNOSIS — R07.9 CHEST PAIN: ICD-10-CM

## 2020-10-09 DIAGNOSIS — R80.9 NEPHROTIC RANGE PROTEINURIA: ICD-10-CM

## 2020-10-09 DIAGNOSIS — I50.9 CHF EXACERBATION: ICD-10-CM

## 2020-10-09 DIAGNOSIS — R60.1 ANASARCA: Primary | ICD-10-CM

## 2020-10-09 DIAGNOSIS — E11.59 TYPE 2 DIABETES MELLITUS WITH OTHER CIRCULATORY COMPLICATION, WITHOUT LONG-TERM CURRENT USE OF INSULIN: ICD-10-CM

## 2020-10-09 DIAGNOSIS — I50.9 ACUTE ON CHRONIC HEART FAILURE: ICD-10-CM

## 2020-10-09 DIAGNOSIS — K74.60 CIRRHOSIS OF LIVER WITHOUT ASCITES, UNSPECIFIED HEPATIC CIRRHOSIS TYPE: ICD-10-CM

## 2020-10-09 DIAGNOSIS — I50.9 ACUTE ON CHRONIC HEART FAILURE, UNSPECIFIED HEART FAILURE TYPE: ICD-10-CM

## 2020-10-09 LAB
ALBUMIN SERPL BCP-MCNC: 1.4 G/DL (ref 3.5–5.2)
ALP SERPL-CCNC: 107 U/L (ref 55–135)
ALT SERPL W/O P-5'-P-CCNC: 14 U/L (ref 10–44)
ANION GAP SERPL CALC-SCNC: 7 MMOL/L (ref 8–16)
AORTIC ROOT ANNULUS: 3.38 CM
ASCENDING AORTA: 3.08 CM
AST SERPL-CCNC: 54 U/L (ref 10–40)
AV INDEX (PROSTH): 0.65
AV MEAN GRADIENT: 8 MMHG
AV PEAK GRADIENT: 16 MMHG
AV VALVE AREA: 2 CM2
AV VELOCITY RATIO: 0.64
BASOPHILS # BLD AUTO: 0.04 K/UL (ref 0–0.2)
BASOPHILS NFR BLD: 0.7 % (ref 0–1.9)
BILIRUB SERPL-MCNC: 0.1 MG/DL (ref 0.1–1)
BNP SERPL-MCNC: 351 PG/ML (ref 0–99)
BSA FOR ECHO PROCEDURE: 2.19 M2
BUN SERPL-MCNC: 33 MG/DL (ref 8–23)
CALCIUM SERPL-MCNC: 7.6 MG/DL (ref 8.7–10.5)
CHLORIDE SERPL-SCNC: 110 MMOL/L (ref 95–110)
CO2 SERPL-SCNC: 27 MMOL/L (ref 23–29)
CREAT SERPL-MCNC: 2.7 MG/DL (ref 0.5–1.4)
CV ECHO LV RWT: 1 CM
DIFFERENTIAL METHOD: ABNORMAL
DOP CALC AO PEAK VEL: 1.97 M/S
DOP CALC AO VTI: 40.85 CM
DOP CALC LVOT AREA: 3.1 CM2
DOP CALC LVOT DIAMETER: 1.98 CM
DOP CALC LVOT PEAK VEL: 1.27 M/S
DOP CALC LVOT STROKE VOLUME: 81.55 CM3
DOP CALC RVOT PEAK VEL: 0.99 M/S
DOP CALC RVOT VTI: 20.88 CM
DOP CALCLVOT PEAK VEL VTI: 26.5 CM
E WAVE DECELERATION TIME: 272.77 MSEC
E/A RATIO: 0.93
E/E' RATIO: 12 M/S
ECHO LV POSTERIOR WALL: 1.57 CM (ref 0.6–1.1)
EOSINOPHIL # BLD AUTO: 0.3 K/UL (ref 0–0.5)
EOSINOPHIL NFR BLD: 5.5 % (ref 0–8)
ERYTHROCYTE [DISTWIDTH] IN BLOOD BY AUTOMATED COUNT: 13.9 % (ref 11.5–14.5)
EST. GFR  (AFRICAN AMERICAN): 21 ML/MIN/1.73 M^2
EST. GFR  (NON AFRICAN AMERICAN): 18 ML/MIN/1.73 M^2
FRACTIONAL SHORTENING: 28 % (ref 28–44)
GLUCOSE SERPL-MCNC: 165 MG/DL (ref 70–110)
HCT VFR BLD AUTO: 34.3 % (ref 37–48.5)
HGB BLD-MCNC: 10.7 G/DL (ref 12–16)
IMM GRANULOCYTES # BLD AUTO: 0.01 K/UL (ref 0–0.04)
IMM GRANULOCYTES NFR BLD AUTO: 0.2 % (ref 0–0.5)
INR PPP: 1 (ref 0.8–1.2)
INTERVENTRICULAR SEPTUM: 1.83 CM (ref 0.6–1.1)
IVRT: 88.49 MSEC
LA MAJOR: 4.53 CM
LA MINOR: 3.23 CM
LA WIDTH: 3.99 CM
LEFT ATRIUM SIZE: 3.34 CM
LEFT ATRIUM VOLUME INDEX: 20.7 ML/M2
LEFT ATRIUM VOLUME: 42.72 CM3
LEFT INTERNAL DIMENSION IN SYSTOLE: 2.27 CM (ref 2.1–4)
LEFT VENTRICLE DIASTOLIC VOLUME INDEX: 19.08 ML/M2
LEFT VENTRICLE DIASTOLIC VOLUME: 39.35 ML
LEFT VENTRICLE MASS INDEX: 101 G/M2
LEFT VENTRICLE SYSTOLIC VOLUME INDEX: 8.5 ML/M2
LEFT VENTRICLE SYSTOLIC VOLUME: 17.5 ML
LEFT VENTRICULAR INTERNAL DIMENSION IN DIASTOLE: 3.15 CM (ref 3.5–6)
LEFT VENTRICULAR MASS: 208.4 G
LV LATERAL E/E' RATIO: 12.86 M/S
LV SEPTAL E/E' RATIO: 11.25 M/S
LYMPHOCYTES # BLD AUTO: 2.2 K/UL (ref 1–4.8)
LYMPHOCYTES NFR BLD: 38.1 % (ref 18–48)
MCH RBC QN AUTO: 28.7 PG (ref 27–31)
MCHC RBC AUTO-ENTMCNC: 31.2 G/DL (ref 32–36)
MCV RBC AUTO: 92 FL (ref 82–98)
MONOCYTES # BLD AUTO: 0.6 K/UL (ref 0.3–1)
MONOCYTES NFR BLD: 9.7 % (ref 4–15)
MV PEAK A VEL: 0.97 M/S
MV PEAK E VEL: 0.9 M/S
MV STENOSIS PRESSURE HALF TIME: 79.1 MS
MV VALVE AREA P 1/2 METHOD: 2.78 CM2
NEUTROPHILS # BLD AUTO: 2.7 K/UL (ref 1.8–7.7)
NEUTROPHILS NFR BLD: 45.8 % (ref 38–73)
NRBC BLD-RTO: 0 /100 WBC
PISA TR MAX VEL: 2.9 M/S
PLATELET # BLD AUTO: 248 K/UL (ref 150–350)
PMV BLD AUTO: 10 FL (ref 9.2–12.9)
POCT GLUCOSE: 127 MG/DL (ref 70–110)
POTASSIUM SERPL-SCNC: 4.3 MMOL/L (ref 3.5–5.1)
PROT SERPL-MCNC: 5.8 G/DL (ref 6–8.4)
PROTHROMBIN TIME: 10.6 SEC (ref 9–12.5)
PV MEAN GRADIENT: 2 MMHG
RA MAJOR: 4.21 CM
RA PRESSURE: 3 MMHG
RBC # BLD AUTO: 3.73 M/UL (ref 4–5.4)
SARS-COV-2 RDRP RESP QL NAA+PROBE: NEGATIVE
SINUS: 2.74 CM
SODIUM SERPL-SCNC: 144 MMOL/L (ref 136–145)
STJ: 2.63 CM
TDI LATERAL: 0.07 M/S
TDI SEPTAL: 0.08 M/S
TDI: 0.08 M/S
TR MAX PG: 34 MMHG
TRICUSPID ANNULAR PLANE SYSTOLIC EXCURSION: 1.51 CM
TROPONIN I SERPL DL<=0.01 NG/ML-MCNC: 0.02 NG/ML (ref 0–0.03)
TSH SERPL DL<=0.005 MIU/L-ACNC: 3.95 UIU/ML (ref 0.4–4)
TV REST PULMONARY ARTERY PRESSURE: 37 MMHG
WBC # BLD AUTO: 5.78 K/UL (ref 3.9–12.7)

## 2020-10-09 PROCEDURE — C9399 UNCLASSIFIED DRUGS OR BIOLOG: HCPCS | Performed by: NURSE PRACTITIONER

## 2020-10-09 PROCEDURE — 96372 THER/PROPH/DIAG INJ SC/IM: CPT | Mod: 59 | Performed by: EMERGENCY MEDICINE

## 2020-10-09 PROCEDURE — 99900035 HC TECH TIME PER 15 MIN (STAT)

## 2020-10-09 PROCEDURE — 94761 N-INVAS EAR/PLS OXIMETRY MLT: CPT

## 2020-10-09 PROCEDURE — 99222 PR INITIAL HOSPITAL CARE,LEVL II: ICD-10-PCS | Mod: 25,,, | Performed by: INTERNAL MEDICINE

## 2020-10-09 PROCEDURE — G0378 HOSPITAL OBSERVATION PER HR: HCPCS

## 2020-10-09 PROCEDURE — 85610 PROTHROMBIN TIME: CPT

## 2020-10-09 PROCEDURE — 21400001 HC TELEMETRY ROOM

## 2020-10-09 PROCEDURE — 99285 EMERGENCY DEPT VISIT HI MDM: CPT | Mod: 25

## 2020-10-09 PROCEDURE — 97802 MEDICAL NUTRITION INDIV IN: CPT

## 2020-10-09 PROCEDURE — 93010 EKG 12-LEAD: ICD-10-PCS | Mod: ,,, | Performed by: INTERNAL MEDICINE

## 2020-10-09 PROCEDURE — 99222 1ST HOSP IP/OBS MODERATE 55: CPT | Mod: 25,,, | Performed by: INTERNAL MEDICINE

## 2020-10-09 PROCEDURE — 93005 ELECTROCARDIOGRAM TRACING: CPT

## 2020-10-09 PROCEDURE — 84443 ASSAY THYROID STIM HORMONE: CPT

## 2020-10-09 PROCEDURE — 83880 ASSAY OF NATRIURETIC PEPTIDE: CPT

## 2020-10-09 PROCEDURE — 80053 COMPREHEN METABOLIC PANEL: CPT

## 2020-10-09 PROCEDURE — 85025 COMPLETE CBC W/AUTO DIFF WBC: CPT

## 2020-10-09 PROCEDURE — 84484 ASSAY OF TROPONIN QUANT: CPT

## 2020-10-09 PROCEDURE — U0002 COVID-19 LAB TEST NON-CDC: HCPCS

## 2020-10-09 PROCEDURE — 36415 COLL VENOUS BLD VENIPUNCTURE: CPT

## 2020-10-09 PROCEDURE — 96374 THER/PROPH/DIAG INJ IV PUSH: CPT

## 2020-10-09 PROCEDURE — 25000003 PHARM REV CODE 250: Performed by: PHYSICIAN ASSISTANT

## 2020-10-09 PROCEDURE — 96376 TX/PRO/DX INJ SAME DRUG ADON: CPT | Performed by: EMERGENCY MEDICINE

## 2020-10-09 PROCEDURE — 25000003 PHARM REV CODE 250: Performed by: NURSE PRACTITIONER

## 2020-10-09 PROCEDURE — 63600175 PHARM REV CODE 636 W HCPCS: Performed by: INTERNAL MEDICINE

## 2020-10-09 PROCEDURE — 63600175 PHARM REV CODE 636 W HCPCS: Performed by: EMERGENCY MEDICINE

## 2020-10-09 PROCEDURE — 93010 ELECTROCARDIOGRAM REPORT: CPT | Mod: ,,, | Performed by: INTERNAL MEDICINE

## 2020-10-09 PROCEDURE — 25000003 PHARM REV CODE 250: Performed by: EMERGENCY MEDICINE

## 2020-10-09 RX ORDER — FUROSEMIDE 10 MG/ML
60 INJECTION INTRAMUSCULAR; INTRAVENOUS
Status: COMPLETED | OUTPATIENT
Start: 2020-10-09 | End: 2020-10-09

## 2020-10-09 RX ORDER — GLUCAGON 1 MG
1 KIT INJECTION
Status: DISCONTINUED | OUTPATIENT
Start: 2020-10-09 | End: 2020-10-14 | Stop reason: HOSPADM

## 2020-10-09 RX ORDER — IBUPROFEN 200 MG
24 TABLET ORAL
Status: DISCONTINUED | OUTPATIENT
Start: 2020-10-09 | End: 2020-10-14 | Stop reason: HOSPADM

## 2020-10-09 RX ORDER — FAMOTIDINE 20 MG/1
20 TABLET, FILM COATED ORAL 2 TIMES DAILY
Status: DISCONTINUED | OUTPATIENT
Start: 2020-10-09 | End: 2020-10-09

## 2020-10-09 RX ORDER — POLYETHYLENE GLYCOL 3350 17 G/17G
17 POWDER, FOR SOLUTION ORAL DAILY
Status: DISCONTINUED | OUTPATIENT
Start: 2020-10-09 | End: 2020-10-14 | Stop reason: HOSPADM

## 2020-10-09 RX ORDER — METOLAZONE 5 MG/1
5 TABLET ORAL DAILY
Status: DISCONTINUED | OUTPATIENT
Start: 2020-10-09 | End: 2020-10-14 | Stop reason: HOSPADM

## 2020-10-09 RX ORDER — ISOSORBIDE MONONITRATE 30 MG/1
30 TABLET, EXTENDED RELEASE ORAL DAILY
Status: DISCONTINUED | OUTPATIENT
Start: 2020-10-09 | End: 2020-10-14 | Stop reason: HOSPADM

## 2020-10-09 RX ORDER — IBUPROFEN 200 MG
16 TABLET ORAL
Status: DISCONTINUED | OUTPATIENT
Start: 2020-10-09 | End: 2020-10-14 | Stop reason: HOSPADM

## 2020-10-09 RX ORDER — ASPIRIN 81 MG/1
81 TABLET ORAL DAILY
Status: DISCONTINUED | OUTPATIENT
Start: 2020-10-09 | End: 2020-10-14 | Stop reason: HOSPADM

## 2020-10-09 RX ORDER — INSULIN ASPART 100 [IU]/ML
0-5 INJECTION, SOLUTION INTRAVENOUS; SUBCUTANEOUS
Status: DISCONTINUED | OUTPATIENT
Start: 2020-10-09 | End: 2020-10-14 | Stop reason: HOSPADM

## 2020-10-09 RX ORDER — FUROSEMIDE 10 MG/ML
20 INJECTION INTRAMUSCULAR; INTRAVENOUS
Status: COMPLETED | OUTPATIENT
Start: 2020-10-09 | End: 2020-10-09

## 2020-10-09 RX ORDER — AMLODIPINE BESYLATE 10 MG/1
10 TABLET ORAL DAILY
Status: DISCONTINUED | OUTPATIENT
Start: 2020-10-09 | End: 2020-10-14 | Stop reason: HOSPADM

## 2020-10-09 RX ORDER — FUROSEMIDE 10 MG/ML
80 INJECTION INTRAMUSCULAR; INTRAVENOUS
Status: DISCONTINUED | OUTPATIENT
Start: 2020-10-09 | End: 2020-10-13

## 2020-10-09 RX ORDER — FAMOTIDINE 20 MG/1
20 TABLET, FILM COATED ORAL DAILY
Status: DISCONTINUED | OUTPATIENT
Start: 2020-10-09 | End: 2020-10-14 | Stop reason: HOSPADM

## 2020-10-09 RX ORDER — SODIUM CHLORIDE 0.9 % (FLUSH) 0.9 %
10 SYRINGE (ML) INJECTION
Status: DISCONTINUED | OUTPATIENT
Start: 2020-10-09 | End: 2020-10-14 | Stop reason: HOSPADM

## 2020-10-09 RX ORDER — DOXAZOSIN 2 MG/1
2 TABLET ORAL NIGHTLY
Status: DISCONTINUED | OUTPATIENT
Start: 2020-10-09 | End: 2020-10-14 | Stop reason: HOSPADM

## 2020-10-09 RX ORDER — ONDANSETRON 4 MG/1
4 TABLET, ORALLY DISINTEGRATING ORAL EVERY 8 HOURS PRN
Status: DISCONTINUED | OUTPATIENT
Start: 2020-10-09 | End: 2020-10-14 | Stop reason: HOSPADM

## 2020-10-09 RX ORDER — IPRATROPIUM BROMIDE AND ALBUTEROL SULFATE 2.5; .5 MG/3ML; MG/3ML
3 SOLUTION RESPIRATORY (INHALATION) EVERY 4 HOURS PRN
Status: DISCONTINUED | OUTPATIENT
Start: 2020-10-09 | End: 2020-10-14 | Stop reason: HOSPADM

## 2020-10-09 RX ORDER — FUROSEMIDE 10 MG/ML
60 INJECTION INTRAMUSCULAR; INTRAVENOUS
Status: DISCONTINUED | OUTPATIENT
Start: 2020-10-09 | End: 2020-10-09

## 2020-10-09 RX ADMIN — FAMOTIDINE 20 MG: 20 TABLET ORAL at 10:10

## 2020-10-09 RX ADMIN — AMLODIPINE BESYLATE 10 MG: 10 TABLET ORAL at 10:10

## 2020-10-09 RX ADMIN — ASPIRIN 81 MG: 81 TABLET, COATED ORAL at 10:10

## 2020-10-09 RX ADMIN — FUROSEMIDE 80 MG: 10 INJECTION, SOLUTION INTRAMUSCULAR; INTRAVENOUS at 05:10

## 2020-10-09 RX ADMIN — ISOSORBIDE MONONITRATE 30 MG: 30 TABLET, EXTENDED RELEASE ORAL at 10:10

## 2020-10-09 RX ADMIN — DOXAZOSIN MESYLATE 2 MG: 2 TABLET ORAL at 09:10

## 2020-10-09 RX ADMIN — INSULIN DETEMIR 10 UNITS: 100 INJECTION, SOLUTION SUBCUTANEOUS at 09:10

## 2020-10-09 RX ADMIN — LEVOTHYROXINE SODIUM 137 MCG: 25 TABLET ORAL at 10:10

## 2020-10-09 RX ADMIN — METOLAZONE 5 MG: 5 TABLET ORAL at 10:10

## 2020-10-09 RX ADMIN — FUROSEMIDE 60 MG: 10 INJECTION, SOLUTION INTRAMUSCULAR; INTRAVENOUS at 05:10

## 2020-10-09 RX ADMIN — FUROSEMIDE 20 MG: 10 INJECTION, SOLUTION INTRAMUSCULAR; INTRAVENOUS at 05:10

## 2020-10-09 RX ADMIN — POLYETHYLENE GLYCOL 3350 17 G: 17 POWDER, FOR SOLUTION ORAL at 10:10

## 2020-10-09 NOTE — ASSESSMENT & PLAN NOTE
-Continue home Doxazosin 2mg nightly  -Metolazone 5mg daily  -Isosorbide mononitrate 30mg daily  -Amlodipine 10mg daily  -Cardiology following

## 2020-10-09 NOTE — HOSPITAL COURSE
Ms Das is a 64 yo F with Diastolic CHF, Pericardial Effusion, HTN, KRISTAL, GERD, HLP, IDDM, and cryptogenic cirrhosis s/p TIPS, presented to ED with increased SOB which began this morning associated with BLE edema and generalized weakness.  States she is compliant with her medication and diet. BUN/Creat- 33/2.7, AST/ALT-54/14, Troponin-0.017, BNP-351, CXR-negative. In the ED she received Lasix 80mg IV.   10/10- feels better, SOB, leg swelling improving, wanted to know if she can go home but has massive anasarca, up to lower part of chest. Will continue aggressive Diuresis and daily weight charting plus add PT/OT. Bun/Cr 34/2.7/ Alb 1.4.  10/11- appears same, pleasant and comfortable, still has massive anasarca, 3-4 +++ leg edema, all the way up to lower chest, getting IV lasix and Zaroxolyn. Echo showed mild DD with EF 60%. Weight down to 111 kg- 244 lbs, Cr still 2.7. did well with PT/OT.  10/12- looks and feels the same, no significant SOB, but no significant diuresis either or weight change. Cr remains 2.3. she also feels constipated x 2 weeks. D/w Hepatology- her MELD is 16 but she may still be a Liver Tx candidate despite her TIPS and obesity. Hepatology ordered US liver w doppler to assess her TIPS patency.   10/13- appreciate all- pt feels better, swelling reducing a little, she can feels her hands, arms, abd wall less swollen, still does not have any robust urine output. She has 4 gm protein in her urine. Now getting oral bumex 2 mg bid PO. Started on IV albumin by GI. US abd shows patent TIPS. Had a small BM since yesterday.   10/14- looks and feels better, anasarca has much improved misti from her torso and arms and her leg swellings have also improved. She is now down to 234 lbs from 246 lbs on admission. She has been continued on oral Bumex and Zaroxolyn for the same. Dr. RENETTA Hernandez is considering Renal Anasarca sec to her severe Proteinuria- hence has scheduled her for Renal Biopsy on 10/29/2. Pt and her  daughter are agreeable. They will be seen by Hepatology as well for consideration of Liver Transplant, although that seems less likely needed. She is eating drinking well, walking around well. She was seen and examined and deemed stable for discharge home today.

## 2020-10-09 NOTE — SUBJECTIVE & OBJECTIVE
Past Medical History:   Diagnosis Date    Ascites     Breast pain, left 1/4/2018    Cataract     CHF (congestive heart failure)     Cirrhosis     Cough     Diabetes mellitus     Diabetes mellitus, type 2     Gastroparesis     GERD (gastroesophageal reflux disease)     Hyperlipidemia     Hypertension     Hypotension 2/21/2019    Liver disease     Lumbar strain 4/27/2018    Pneumonia of right middle lobe due to infectious organism 12/19/2017    Renal disorder     Retinopathy due to secondary diabetes mellitus     Sleep apnea     Thyroid disease        Past Surgical History:   Procedure Laterality Date    CATARACT EXTRACTION      CHOLECYSTECTOMY      COLONOSCOPY N/A 9/4/2019    Procedure: COLONOSCOPY;  Surgeon: Marnie Elmore MD;  Location: [facility] ENDO;  Service: Endoscopy;  Laterality: N/A;    ERCP      FLUOROSCOPY N/A 7/24/2020    Procedure: TIPS revision;  Surgeon: Martell Jasmine MD;  Location: [facility] CATH LAB;  Service: General;  Laterality: N/A;    LIVER BIOPSY      THORACENTESIS Left 1/20/2020    Procedure: Thoracentesis;  Surgeon: Angelica Hunter MD;  Location: [facility] ENDO;  Service: Pulmonary;  Laterality: Left;    TUBAL LIGATION      UPPER GASTROINTESTINAL ENDOSCOPY         Review of patient's allergies indicates:   Allergen Reactions    Subsys [fentanyl] Other (See Comments)     After administration pt unresponsive.  HR and respirations decreased.     Versed [midazolam] Other (See Comments)     After administration pt unresponsive.  HR and respirations decreased.     Ampicillin Rash    Codeine Nausea And Vomiting and Nausea Only       No current facility-administered medications on file prior to encounter.      Current Outpatient Medications on File Prior to Encounter   Medication Sig    aspirin (ECOTRIN) 81 MG EC tablet Take 1 tablet (81 mg total) by mouth once daily.    doxazosin (CARDURA) 2 MG tablet Take 1 tablet (2 mg total) by mouth every evening.    furosemide  (LASIX) 40 MG tablet Take 1 tablet (40 mg total) by mouth once daily. Take 80 mg next 3 days    insulin (LANTUS SOLOSTAR U-100 INSULIN) glargine 100 units/mL (3mL) SubQ pen INJECT 20 UNITS INTO THE SKIN EVERY EVENING.&nbsp;&nbsp;TITRATE UP TO 30 UNITS NIGHTLY AS DIRECTED ON INSTRUCTION SHEET    levothyroxine (SYNTHROID) 137 MCG Tab tablet Take 1 tablet (137 mcg total) by mouth before breakfast.    metOLazone (ZAROXOLYN) 5 MG tablet Take 1 tablet (5 mg total) by mouth once daily. Take daily as needed and then every other day for swelling, 30 min before lasix    ondansetron (ZOFRAN) 4 MG tablet TAKE 1 TABLET BY MOUTH EVERY 8 HOURS AS NEEDED    pantoprazole (PROTONIX) 20 MG tablet Take 2 tablets (40 mg total) by mouth once daily.    potassium chloride SA (K-DUR,KLOR-CON) 20 MEQ tablet Take 1 tablet (20 mEq total) by mouth once daily.    rifAXIMin (XIFAXAN) 550 mg Tab Take 1 tablet (550 mg total) by mouth 2 (two) times daily.    tiZANidine (ZANAFLEX) 4 MG tablet Take 1 tablet (4 mg total) by mouth every 8 (eight) hours.    [DISCONTINUED] rifAXIMin (XIFAXAN) 550 mg Tab Take 550 mg by mouth 2 (two) times daily.    glucagon, human recombinant, (GLUCAGON EMERGENCY KIT, HUMAN,) 1 mg SolR Inject 1 mg into the muscle as needed.    liraglutide 0.6 mg/0.1 mL, 18 mg/3 mL, subq PNIJ (VICTOZA 2-SHAYY) 0.6 mg/0.1 mL (18 mg/3 mL) PnIj Inject 1.8 mg into the skin once daily.    magnesium oxide (MAG-OX) 400 mg (241.3 mg magnesium) tablet Take 1 tablet (400 mg total) by mouth once daily.    [DISCONTINUED] amLODIPine (NORVASC) 10 MG tablet Take 1 tablet (10 mg total) by mouth once daily.    [DISCONTINUED] tiZANidine (ZANAFLEX) 4 MG tablet TAKE 1 TABLET BY MOUTH EVERY 8 HOURS FOR  20  DAYS    [DISCONTINUED] vitamin E 400 UNIT capsule Take 400 Units by mouth every morning.     Family History     Problem Relation (Age of Onset)    Aneurysm Sister    Breast cancer Mother    Cancer Mother, Maternal Grandfather    Heart disease  Father, Brother    No Known Problems Maternal Grandmother    Sarcoidosis Sister        Tobacco Use    Smoking status: Never Smoker    Smokeless tobacco: Never Used   Substance and Sexual Activity    Alcohol use: No     Frequency: Never    Drug use: No    Sexual activity: Never     Review of Systems   Constitution: Positive for malaise/fatigue.   HENT: Negative.    Eyes: Negative.    Cardiovascular: Positive for dyspnea on exertion and leg swelling.   Respiratory: Positive for shortness of breath.    Endocrine: Negative.    Hematologic/Lymphatic: Negative.    Skin: Negative.    Musculoskeletal: Negative.    Gastrointestinal: Negative.    Genitourinary: Negative.    Neurological: Negative.    Psychiatric/Behavioral: Negative.    Allergic/Immunologic: Negative.      Objective:     Vital Signs (Most Recent):  Temp: 98.7 °F (37.1 °C) (10/09/20 0810)  Pulse: 88 (10/09/20 0810)  Resp: 20 (10/09/20 0810)  BP: (!) 181/88 (10/09/20 0810)  SpO2: 99 % (10/09/20 0810) Vital Signs (24h Range):  Temp:  [98.3 °F (36.8 °C)-98.7 °F (37.1 °C)] 98.7 °F (37.1 °C)  Pulse:  [70-88] 88  Resp:  [16-20] 20  SpO2:  [95 %-100 %] 99 %  BP: (159-181)/(72-88) 181/88     Weight: 111.6 kg (246 lb)  Body mass index is 46.48 kg/m².    SpO2: 99 %  O2 Device (Oxygen Therapy): nasal cannula      Intake/Output Summary (Last 24 hours) at 10/9/2020 1103  Last data filed at 10/9/2020 0627  Gross per 24 hour   Intake --   Output 250 ml   Net -250 ml       Lines/Drains/Airways     Peripheral Intravenous Line                 Peripheral IV - Single Lumen 10/09/20 0443 20 G Right Antecubital less than 1 day                Physical Exam   Constitutional: She is oriented to person, place, and time. She appears well-developed and well-nourished. No distress.   On supplemental o2   HENT:   Head: Normocephalic and atraumatic.   Eyes: Pupils are equal, round, and reactive to light. Right eye exhibits no discharge. Left eye exhibits no discharge.   Neck: Neck  supple. No JVD present.   Cardiovascular: Normal rate, regular rhythm, S1 normal, S2 normal and normal heart sounds.   No murmur heard.  Pulmonary/Chest: Effort normal and breath sounds normal. No respiratory distress. She has no wheezes. She has no rales.   Abdominal: Soft. She exhibits no distension.   Obese   Musculoskeletal:         General: Edema (BLE) present.   Neurological: She is alert and oriented to person, place, and time.   Skin: Skin is warm and dry. She is not diaphoretic. No erythema.   Psychiatric: She has a normal mood and affect. Her behavior is normal. Thought content normal.   Nursing note and vitals reviewed.      Significant Labs:   CMP   Recent Labs   Lab 10/09/20  0442      K 4.3      CO2 27   *   BUN 33*   CREATININE 2.7*   CALCIUM 7.6*   PROT 5.8*   ALBUMIN 1.4*   BILITOT 0.1   ALKPHOS 107   AST 54*   ALT 14   ANIONGAP 7*   ESTGFRAFRICA 21*   EGFRNONAA 18*   , CBC   Recent Labs   Lab 10/09/20  0442   WBC 5.78   HGB 10.7*   HCT 34.3*      , Troponin   Recent Labs   Lab 10/09/20  0442   TROPONINI 0.017    and All pertinent lab results from the last 24 hours have been reviewed.    Significant Imaging: Echocardiogram:   Transthoracic echo (TTE) complete (Cupid Only):   Results for orders placed or performed during the hospital encounter of 10/09/20   Echo Color Flow Doppler? Yes   Result Value Ref Range    BSA 2.19 m2    TDI SEPTAL 0.08 m/s    LV LATERAL E/E' RATIO 12.86 m/s    LV SEPTAL E/E' RATIO 11.25 m/s    LA WIDTH 3.99 cm    TDI LATERAL 0.07 m/s    LVIDd 3.15 (A) 3.5 - 6.0 cm    IVS 1.83 (A) 0.6 - 1.1 cm    Posterior Wall 1.57 (A) 0.6 - 1.1 cm    Ao root annulus 3.38 cm    LVIDs 2.27 2.1 - 4.0 cm    FS 28 28 - 44 %    LA volume 42.72 cm3    Sinus 2.74 cm    STJ 2.63 cm    Ascending aorta 3.08 cm    LV mass 208.40 g    LA size 3.34 cm    TAPSE 1.51 cm    Left Ventricle Relative Wall Thickness 1.00 cm    AV mean gradient 8 mmHg    AV valve area 2.00 cm2    AV  Velocity Ratio 0.64     AV index (prosthetic) 0.65     MV valve area p 1/2 method 2.78 cm2    PV peak gradient 3.94 mmHg    E/A ratio 0.93     Mean e' 0.08 m/s    E wave decelartion time 272.77 msec    IVRT 88.49 msec    LVOT diameter 1.98 cm    LVOT area 3.1 cm2    LVOT peak bart 1.27 m/s    LVOT peak VTI 26.50 cm    Ao peak bart 1.97 m/s    Ao VTI 40.85 cm    RVOT peak bart 0.99 m/s    RVOT peak VTI 20.88 cm    LVOT stroke volume 81.55 cm3    AV peak gradient 16 mmHg    PV mean gradient 2.00 mmHg    E/E' ratio 12.00 m/s    MV Peak E Bart 0.90 m/s    TR Max Bart 2.90 m/s    MV stenosis pressure 1/2 time 79.10 ms    MV Peak A Bart 0.97 m/s    LV Systolic Volume 17.50 mL    LV Systolic Volume Index 8.5 mL/m2    LV Diastolic Volume 39.35 mL    LV Diastolic Volume Index 19.08 mL/m2    LA Volume Index 20.7 mL/m2    LV Mass Index 101 g/m2    RA Major Axis 4.21 cm    Left Atrium Minor Axis 3.23 cm    Left Atrium Major Axis 4.53 cm    Triscuspid Valve Regurgitation Peak Gradient 34 mmHg   , EKG: Reviewed and X-Ray: CXR: X-Ray Chest 1 View (CXR):   Results for orders placed or performed during the hospital encounter of 10/09/20   X-Ray Chest 1 View    Narrative    EXAMINATION:  XR CHEST 1 VIEW    CLINICAL HISTORY:  Shortness of breath    FINDINGS:  Single view of the chest.  Comparison 09/24/2020    Cardiac silhouette remains enlarged.  Again persistent hazy opacity in the left lung base likely representing combination of small left-sided pleural effusion and mild left basilar atelectasis.  Right lung remains clear.  No acute osseous findings demonstrated.      Impression    No acute process seen.      Electronically signed by: Martell Jasmine MD  Date:    10/09/2020  Time:    06:42    and X-Ray Chest PA and Lateral (CXR): No results found for this visit on 10/09/20.

## 2020-10-09 NOTE — ASSESSMENT & PLAN NOTE
-Presents with decompensated diastolic CHF in part due to running out of metolazone and dietary non-compliance  -Continue IV diuresis  -Continue amlodipine, Imdur  -No ACEi/ARB given kidney function  -Strict I's/O's  -Check echo  -Dietary restrictions re-discussed

## 2020-10-09 NOTE — ASSESSMENT & PLAN NOTE
-Hx of   -s/p TIPS procedure   -Albumin of 1.4 and AST 54 noted   -Repeat labs pending   -CMP AM draw

## 2020-10-09 NOTE — CONSULTS
Food & Nutrition  Education    Diet Education: CHF diet  Time Spent: 10 minutes  Learners: Pt      Nutrition Education provided with handouts: Handouts on Limiting Salt and Fluid      Comments: Pt was family with the CHF diet and reports that she usually follows a low salt and low fluid diet. She said that she drinks a few cups of water in the morning and at night, and that she has been eating less salt. She is currently on a salt and fluid restricted diet at the hospital and said that it is similar to how she eats at home. RD encouraged questions, but she did not have any.     All questions and concerns answered.      Please Re-consult as needed    Thanks!

## 2020-10-09 NOTE — ED NOTES
Assumed care of pt at this time. Pt lying in bed comfortably. AAO x 4. Resp even and unlabored with equal chest rise and fall. Skin warm and dry. 20G PIV noted to Right AC. Flushes well, drg CDI. Side rails up x 2. Call light within reach. No distress noted. Pt denies any needs or assist at this time.

## 2020-10-09 NOTE — CONSULTS
[facility]  Cardiology  Consult Note    Patient Name: Diamond Das  MRN: [id number]  Admission Date: 10/9/2020  Hospital Length of Stay: 0 days  Code Status: Full Code   Attending Provider: Mateo Floyd MD   Consulting Provider: Samantha Osei PA-C  Primary Care Physician: Andrey Perrin MD  Principal Problem:Acute on chronic heart failure    Patient information was obtained from patient, past medical records and ER records.     Inpatient consult to Cardiology  Consult performed by: Samantha Osei PA-C  Consult ordered by: Coy Hudson NP        Subjective:     Chief Complaint:  SOB     HPI:   Ms. Das is a 65 year old female patient whose current medical conditions include diastolic CHF, pericardial effusion, HTN, hyperlipidemia, IDDM, and cryptogenic cirrhosis s/p TIPS who presented to [facility] ED yesterday with a complaint of worsening SOB over the past 1-2 days. Associated symptoms included bilateral lower extremity edema and weakness. Patient denied any associated morena chest pain, fever, chills, orthopnea, PND, palpitations, near syncope, or syncope. Initial workup in ED revealed creatinine of 2.6 and BNP > 300 and patient was subsequently admitted for further evaluation and treatment. Cardiology consulted to assist with management. Patient seen and examined today, sitting up in bed. Feeling better since admission, states SOB is improving. Denies any chest pain. She reports compliance with her medications but admits she ran out of her metolazone earlier this week. She has also been eating more salt. Chart reviewed. Troponin negative. Albumin 1.4. Echo pending.       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     Sleep apnea     Thyroid disease        Past Surgical History:   Procedure Laterality Date    CATARACT EXTRACTION      CHOLECYSTECTOMY      COLONOSCOPY N/A 9/4/2019    Procedure: COLONOSCOPY;  Surgeon: Marnie Elmore MD;  Location: [facility] ENDO;  Service: Endoscopy;  Laterality: N/A;    ERCP      FLUOROSCOPY N/A 7/24/2020    Procedure: TIPS revision;  Surgeon: Martell Jasmine MD;  Location: [facility] CATH LAB;  Service: General;  Laterality: N/A;    LIVER BIOPSY      THORACENTESIS Left 1/20/2020    Procedure: Thoracentesis;  Surgeon: Angelica Hunter MD;  Location: [facility] ENDO;  Service: Pulmonary;  Laterality: Left;    TUBAL LIGATION      UPPER GASTROINTESTINAL ENDOSCOPY         Review of patient's allergies indicates:   Allergen Reactions    Subsys [fentanyl] Other (See Comments)     After administration pt unresponsive.  HR and respirations decreased.     Versed [midazolam] Other (See Comments)     After administration pt unresponsive.  HR and respirations decreased.     Ampicillin Rash    Codeine Nausea And Vomiting and Nausea Only       No current facility-administered medications on file prior to encounter.      Current Outpatient Medications on File Prior to Encounter   Medication Sig    aspirin (ECOTRIN) 81 MG EC tablet Take 1 tablet (81 mg total) by mouth once daily.    doxazosin (CARDURA) 2 MG tablet Take 1 tablet (2 mg total) by mouth every evening.    furosemide (LASIX) 40 MG tablet Take 1 tablet (40 mg total) by mouth once daily. Take 80 mg next 3 days    insulin (LANTUS SOLOSTAR U-100 INSULIN) glargine 100 units/mL (3mL) SubQ pen INJECT 20 UNITS INTO THE SKIN EVERY EVENING.&nbsp;&nbsp;TITRATE UP TO 30 UNITS NIGHTLY AS DIRECTED ON INSTRUCTION SHEET    levothyroxine  (SYNTHROID) 137 MCG Tab tablet Take 1 tablet (137 mcg total) by mouth before breakfast.    metOLazone (ZAROXOLYN) 5 MG tablet Take 1 tablet (5 mg total) by mouth once daily. Take daily as needed and then every other day for swelling, 30 min before lasix    ondansetron (ZOFRAN) 4 MG tablet TAKE 1 TABLET BY MOUTH EVERY 8 HOURS AS NEEDED    pantoprazole (PROTONIX) 20 MG tablet Take 2 tablets (40 mg total) by mouth once daily.    potassium chloride SA (K-DUR,KLOR-CON) 20 MEQ tablet Take 1 tablet (20 mEq total) by mouth once daily.    rifAXIMin (XIFAXAN) 550 mg Tab Take 1 tablet (550 mg total) by mouth 2 (two) times daily.    tiZANidine (ZANAFLEX) 4 MG tablet Take 1 tablet (4 mg total) by mouth every 8 (eight) hours.    [DISCONTINUED] rifAXIMin (XIFAXAN) 550 mg Tab Take 550 mg by mouth 2 (two) times daily.    glucagon, human recombinant, (GLUCAGON EMERGENCY KIT, HUMAN,) 1 mg SolR Inject 1 mg into the muscle as needed.    liraglutide 0.6 mg/0.1 mL, 18 mg/3 mL, subq PNIJ (VICTOZA 2-SHAYY) 0.6 mg/0.1 mL (18 mg/3 mL) PnIj Inject 1.8 mg into the skin once daily.    magnesium oxide (MAG-OX) 400 mg (241.3 mg magnesium) tablet Take 1 tablet (400 mg total) by mouth once daily.    [DISCONTINUED] amLODIPine (NORVASC) 10 MG tablet Take 1 tablet (10 mg total) by mouth once daily.    [DISCONTINUED] tiZANidine (ZANAFLEX) 4 MG tablet TAKE 1 TABLET BY MOUTH EVERY 8 HOURS FOR  20  DAYS    [DISCONTINUED] vitamin E 400 UNIT capsule Take 400 Units by mouth every morning.     Family History     Problem Relation (Age of Onset)    Aneurysm Sister    Breast cancer Mother    Cancer Mother, Maternal Grandfather    Heart disease Father, Brother    No Known Problems Maternal Grandmother    Sarcoidosis Sister        Tobacco Use    Smoking status: Never Smoker    Smokeless tobacco: Never Used   Substance and Sexual Activity    Alcohol use: No     Frequency: Never    Drug use: No    Sexual activity: Never     Review of Systems    Constitution: Positive for malaise/fatigue.   HENT: Negative.    Eyes: Negative.    Cardiovascular: Positive for dyspnea on exertion and leg swelling.   Respiratory: Positive for shortness of breath.    Endocrine: Negative.    Hematologic/Lymphatic: Negative.    Skin: Negative.    Musculoskeletal: Negative.    Gastrointestinal: Negative.    Genitourinary: Negative.    Neurological: Negative.    Psychiatric/Behavioral: Negative.    Allergic/Immunologic: Negative.      Objective:     Vital Signs (Most Recent):  Temp: 98.7 °F (37.1 °C) (10/09/20 0810)  Pulse: 88 (10/09/20 0810)  Resp: 20 (10/09/20 0810)  BP: (!) 181/88 (10/09/20 0810)  SpO2: 99 % (10/09/20 0810) Vital Signs (24h Range):  Temp:  [98.3 °F (36.8 °C)-98.7 °F (37.1 °C)] 98.7 °F (37.1 °C)  Pulse:  [70-88] 88  Resp:  [16-20] 20  SpO2:  [95 %-100 %] 99 %  BP: (159-181)/(72-88) 181/88     Weight: 111.6 kg (246 lb)  Body mass index is 46.48 kg/m².    SpO2: 99 %  O2 Device (Oxygen Therapy): nasal cannula      Intake/Output Summary (Last 24 hours) at 10/9/2020 1103  Last data filed at 10/9/2020 0627  Gross per 24 hour   Intake --   Output 250 ml   Net -250 ml       Lines/Drains/Airways     Peripheral Intravenous Line                 Peripheral IV - Single Lumen 10/09/20 0443 20 G Right Antecubital less than 1 day                Physical Exam   Constitutional: She is oriented to person, place, and time. She appears well-developed and well-nourished. No distress.   On supplemental o2   HENT:   Head: Normocephalic and atraumatic.   Eyes: Pupils are equal, round, and reactive to light. Right eye exhibits no discharge. Left eye exhibits no discharge.   Neck: Neck supple. No JVD present.   Cardiovascular: Normal rate, regular rhythm, S1 normal, S2 normal and normal heart sounds.   No murmur heard.  Pulmonary/Chest: Effort normal and breath sounds normal. No respiratory distress. She has no wheezes. She has no rales.   Abdominal: Soft. She exhibits no distension.    Obese   Musculoskeletal:         General: Edema (BLE) present.   Neurological: She is alert and oriented to person, place, and time.   Skin: Skin is warm and dry. She is not diaphoretic. No erythema.   Psychiatric: She has a normal mood and affect. Her behavior is normal. Thought content normal.   Nursing note and vitals reviewed.      Significant Labs:   CMP   Recent Labs   Lab 10/09/20  0442      K 4.3      CO2 27   *   BUN 33*   CREATININE 2.7*   CALCIUM 7.6*   PROT 5.8*   ALBUMIN 1.4*   BILITOT 0.1   ALKPHOS 107   AST 54*   ALT 14   ANIONGAP 7*   ESTGFRAFRICA 21*   EGFRNONAA 18*   , CBC   Recent Labs   Lab 10/09/20  0442   WBC 5.78   HGB 10.7*   HCT 34.3*      , Troponin   Recent Labs   Lab 10/09/20  0442   TROPONINI 0.017    and All pertinent lab results from the last 24 hours have been reviewed.    Significant Imaging: Echocardiogram:   Transthoracic echo (TTE) complete (Cupid Only):   Results for orders placed or performed during the hospital encounter of 10/09/20   Echo Color Flow Doppler? Yes   Result Value Ref Range    BSA 2.19 m2    TDI SEPTAL 0.08 m/s    LV LATERAL E/E' RATIO 12.86 m/s    LV SEPTAL E/E' RATIO 11.25 m/s    LA WIDTH 3.99 cm    TDI LATERAL 0.07 m/s    LVIDd 3.15 (A) 3.5 - 6.0 cm    IVS 1.83 (A) 0.6 - 1.1 cm    Posterior Wall 1.57 (A) 0.6 - 1.1 cm    Ao root annulus 3.38 cm    LVIDs 2.27 2.1 - 4.0 cm    FS 28 28 - 44 %    LA volume 42.72 cm3    Sinus 2.74 cm    STJ 2.63 cm    Ascending aorta 3.08 cm    LV mass 208.40 g    LA size 3.34 cm    TAPSE 1.51 cm    Left Ventricle Relative Wall Thickness 1.00 cm    AV mean gradient 8 mmHg    AV valve area 2.00 cm2    AV Velocity Ratio 0.64     AV index (prosthetic) 0.65     MV valve area p 1/2 method 2.78 cm2    PV peak gradient 3.94 mmHg    E/A ratio 0.93     Mean e' 0.08 m/s    E wave decelartion time 272.77 msec    IVRT 88.49 msec    LVOT diameter 1.98 cm    LVOT area 3.1 cm2    LVOT peak kelin 1.27 m/s    LVOT peak VTI  26.50 cm    Ao peak bart 1.97 m/s    Ao VTI 40.85 cm    RVOT peak bart 0.99 m/s    RVOT peak VTI 20.88 cm    LVOT stroke volume 81.55 cm3    AV peak gradient 16 mmHg    PV mean gradient 2.00 mmHg    E/E' ratio 12.00 m/s    MV Peak E Bart 0.90 m/s    TR Max Bart 2.90 m/s    MV stenosis pressure 1/2 time 79.10 ms    MV Peak A Bart 0.97 m/s    LV Systolic Volume 17.50 mL    LV Systolic Volume Index 8.5 mL/m2    LV Diastolic Volume 39.35 mL    LV Diastolic Volume Index 19.08 mL/m2    LA Volume Index 20.7 mL/m2    LV Mass Index 101 g/m2    RA Major Axis 4.21 cm    Left Atrium Minor Axis 3.23 cm    Left Atrium Major Axis 4.53 cm    Triscuspid Valve Regurgitation Peak Gradient 34 mmHg   , EKG: Reviewed and X-Ray: CXR: X-Ray Chest 1 View (CXR):   Results for orders placed or performed during the hospital encounter of 10/09/20   X-Ray Chest 1 View    Narrative    EXAMINATION:  XR CHEST 1 VIEW    CLINICAL HISTORY:  Shortness of breath    FINDINGS:  Single view of the chest.  Comparison 09/24/2020    Cardiac silhouette remains enlarged.  Again persistent hazy opacity in the left lung base likely representing combination of small left-sided pleural effusion and mild left basilar atelectasis.  Right lung remains clear.  No acute osseous findings demonstrated.      Impression    No acute process seen.      Electronically signed by: Martell Jasmine MD  Date:    10/09/2020  Time:    06:42    and X-Ray Chest PA and Lateral (CXR): No results found for this visit on 10/09/20.    Assessment and Plan:   Patient who presents with decompensated diastolic CHF in part due to dietary non-compliance. Assess response to diuresis. Imdur added for BP control. Check echo.    * Acute on chronic heart failure  -Presents with decompensated diastolic CHF in part due to running out of metolazone and dietary non-compliance  -Continue IV diuresis  -Continue amlodipine, Imdur  -No ACEi/ARB given kidney function  -Strict I's/O's  -Check echo  -Dietary  restrictions re-discussed    CHF exacerbation  -Patient presents with decompensated CHF  -Assess response to diuresis      Hypertension  -Continue amlodipine  -Imdur 30 mg daily added  -Assess response    Cirrhosis  -Contributing to fluid status  -Mgmt as per primary team    Type 2 diabetes mellitus with circulatory disorder  -Mgmt as per primary team        VTE Risk Mitigation (From admission, onward)         Ordered     IP VTE HIGH RISK PATIENT  Once      10/09/20 0912     Place sequential compression device  Until discontinued      10/09/20 0912                Thank you for your consult. I will follow-up with patient. Please contact us if you have any additional questions.    Samantha Osei PA-C  Cardiology   [facility]

## 2020-10-09 NOTE — HPI
Ms Das is a 64 yo F with pmhx of CHf, HTN, sleep apnea, Gerd, HLD presented to ED with increased SOB which began this morning. States she ran out of her metolazone on Monday and that she has not noticed any improvement since addition of the medication. Associated symptoms include: bilateral lower extremity edema and generalized weakness.  States she is compliant with her medication otherwise and recently started following a restricted sodium diet inclusive of frozen chicken and fish. She is scheduled for an ECHO on Oct 16. Pt denies chest pain, headache, palpitations, fever, N/V, abdominal pain, and additional comp[liants. Pt followed outpatient by Dr Ramos (Cardiology) with medications recently adjusted and no significant symptom improvement.  Pt is a full code and daughter is the surrogate decision maker.  ER course: BUN/Creat- 33/2.7, AST/ALT-54/14, Troponin-0.017, BNP-351, CXR-negative. In the ED she received Lasix 80mg IV. ER discussed case with cardiology who reccommended admission HM contacted for pt placement in OBS for further evaluation.

## 2020-10-09 NOTE — ASSESSMENT & PLAN NOTE
-Admit to OBS  -IV Lasix 40mg BID  -Supplemental O2 PRN  -Low Na diet  -Strict I& O  -Daily weights  -Troponins trended with initial result negative   -ECHO pending   -Cardiology following- will await further recommendations

## 2020-10-09 NOTE — H&P
[facility] Hospital Medicine  History & Physical    Patient Name: Diamond Das  MRN: [id number]  Admission Date: 10/9/2020  Attending Physician: Mateo Floyd MD   Primary Care Provider: Andrey Perrin MD         Patient information was obtained from patient and ER records.     Subjective:     Principal Problem:Acute on chronic heart failure    Chief Complaint:   Chief Complaint   Patient presents with    Shortness of Breath     Pt states she gets SOB when she walks, pt reports fluid in her abdomen and bilateral leg swelling        HPI: Ms Das is a 64 yo F with pmhx of CHf, HTN, sleep apnea, Gerd, HLD presented to ED with increased SOB which began this morning. States she ran out of her metolazone on Monday and that she has not noticed any improvement since addition of the medication. Associated symptoms include: bilateral lower extremity edema and generalized weakness.  States she is compliant with her medication otherwise and recently started following a restricted sodium diet inclusive of frozen chicken and fish. She is scheduled for an ECHO on Oct 16. Pt denies chest pain, headache, palpitations, fever, N/V, abdominal pain, and additional comp[liants. Pt followed outpatient by Dr Ramos (Cardiology) with medications recently adjusted and no significant symptom improvement.  Pt is a full code and daughter is the surrogate decision maker.  ER course: BUN/Creat- 33/2.7, AST/ALT-54/14, Troponin-0.017, BNP-351, CXR-negative. In the ED she received Lasix 80mg IV. ER discussed case with cardiology who reccommended admission HM contacted for pt placement in OBS for further evaluation.     Past Medical History:   Diagnosis Date    Ascites     Breast pain, left 1/4/2018    Cataract     CHF (congestive heart failure)     Cirrhosis     Cough     Diabetes mellitus     Diabetes mellitus, type 2     Gastroparesis     GERD (gastroesophageal reflux disease)     Hyperlipidemia      Hypertension     Hypotension 2/21/2019    Liver disease     Lumbar strain 4/27/2018    Pneumonia of right middle lobe due to infectious organism 12/19/2017    Renal disorder     Retinopathy due to secondary diabetes mellitus     Sleep apnea     Thyroid disease        Past Surgical History:   Procedure Laterality Date    CATARACT EXTRACTION      CHOLECYSTECTOMY      COLONOSCOPY N/A 9/4/2019    Procedure: COLONOSCOPY;  Surgeon: Marnie Elmore MD;  Location: [facility] ENDO;  Service: Endoscopy;  Laterality: N/A;    ERCP      FLUOROSCOPY N/A 7/24/2020    Procedure: TIPS revision;  Surgeon: Martell Jasmine MD;  Location: [facility] CATH LAB;  Service: General;  Laterality: N/A;    LIVER BIOPSY      THORACENTESIS Left 1/20/2020    Procedure: Thoracentesis;  Surgeon: Angelica Hunter MD;  Location: [facility] ENDO;  Service: Pulmonary;  Laterality: Left;    TUBAL LIGATION      UPPER GASTROINTESTINAL ENDOSCOPY         Review of patient's allergies indicates:   Allergen Reactions    Subsys [fentanyl] Other (See Comments)     After administration pt unresponsive.  HR and respirations decreased.     Versed [midazolam] Other (See Comments)     After administration pt unresponsive.  HR and respirations decreased.     Ampicillin Rash    Codeine Nausea And Vomiting and Nausea Only       No current facility-administered medications on file prior to encounter.      Current Outpatient Medications on File Prior to Encounter   Medication Sig    aspirin (ECOTRIN) 81 MG EC tablet Take 1 tablet (81 mg total) by mouth once daily.    doxazosin (CARDURA) 2 MG tablet Take 1 tablet (2 mg total) by mouth every evening.    furosemide (LASIX) 40 MG tablet Take 1 tablet (40 mg total) by mouth once daily. Take 80 mg next 3 days    insulin (LANTUS SOLOSTAR U-100 INSULIN) glargine 100 units/mL (3mL) SubQ pen INJECT 20 UNITS INTO THE SKIN EVERY EVENING.&nbsp;&nbsp;TITRATE UP TO 30 UNITS NIGHTLY AS DIRECTED ON INSTRUCTION SHEET     levothyroxine (SYNTHROID) 137 MCG Tab tablet Take 1 tablet (137 mcg total) by mouth before breakfast.    metOLazone (ZAROXOLYN) 5 MG tablet Take 1 tablet (5 mg total) by mouth once daily. Take daily as needed and then every other day for swelling, 30 min before lasix    ondansetron (ZOFRAN) 4 MG tablet TAKE 1 TABLET BY MOUTH EVERY 8 HOURS AS NEEDED    pantoprazole (PROTONIX) 20 MG tablet Take 2 tablets (40 mg total) by mouth once daily.    potassium chloride SA (K-DUR,KLOR-CON) 20 MEQ tablet Take 1 tablet (20 mEq total) by mouth once daily.    rifAXIMin (XIFAXAN) 550 mg Tab Take 1 tablet (550 mg total) by mouth 2 (two) times daily.    tiZANidine (ZANAFLEX) 4 MG tablet Take 1 tablet (4 mg total) by mouth every 8 (eight) hours.    [DISCONTINUED] rifAXIMin (XIFAXAN) 550 mg Tab Take 550 mg by mouth 2 (two) times daily.    glucagon, human recombinant, (GLUCAGON EMERGENCY KIT, HUMAN,) 1 mg SolR Inject 1 mg into the muscle as needed.    liraglutide 0.6 mg/0.1 mL, 18 mg/3 mL, subq PNIJ (VICTOZA 2-SHAYY) 0.6 mg/0.1 mL (18 mg/3 mL) PnIj Inject 1.8 mg into the skin once daily.    magnesium oxide (MAG-OX) 400 mg (241.3 mg magnesium) tablet Take 1 tablet (400 mg total) by mouth once daily.    [DISCONTINUED] amLODIPine (NORVASC) 10 MG tablet Take 1 tablet (10 mg total) by mouth once daily.    [DISCONTINUED] tiZANidine (ZANAFLEX) 4 MG tablet TAKE 1 TABLET BY MOUTH EVERY 8 HOURS FOR  20  DAYS    [DISCONTINUED] vitamin E 400 UNIT capsule Take 400 Units by mouth every morning.     Family History     Problem Relation (Age of Onset)    Aneurysm Sister    Breast cancer Mother    Cancer Mother, Maternal Grandfather    Heart disease Father, Brother    No Known Problems Maternal Grandmother    Sarcoidosis Sister        Tobacco Use    Smoking status: Never Smoker    Smokeless tobacco: Never Used   Substance and Sexual Activity    Alcohol use: No     Frequency: Never    Drug use: No    Sexual activity: Never     Review of  Systems   Constitutional: Positive for activity change. Negative for chills, diaphoresis and fever.   HENT: Negative for congestion, sneezing, sore throat and trouble swallowing.    Eyes: Negative for photophobia, redness, itching and visual disturbance.   Respiratory: Positive for shortness of breath. Negative for choking, chest tightness, wheezing and stridor.    Cardiovascular: Positive for leg swelling. Negative for chest pain.   Gastrointestinal: Negative for abdominal distention, abdominal pain, constipation and diarrhea.   Endocrine: Negative for cold intolerance, polyphagia and polyuria.   Genitourinary: Negative for flank pain, frequency and urgency.   Musculoskeletal: Negative for arthralgias, back pain, joint swelling, neck pain and neck stiffness.   Allergic/Immunologic: Negative for immunocompromised state.   Neurological: Positive for weakness (Generalized weaknes). Negative for dizziness, seizures, facial asymmetry and speech difficulty.   Psychiatric/Behavioral: Negative for agitation, behavioral problems and confusion.     Objective:     Vital Signs (Most Recent):  Temp: 98.3 °F (36.8 °C) (10/09/20 0324)  Pulse: 75 (10/09/20 0739)  Resp: 18 (10/09/20 0701)  BP: (!) 170/79 (10/09/20 0701)  SpO2: 99 % (10/09/20 0701) Vital Signs (24h Range):  Temp:  [98.3 °F (36.8 °C)] 98.3 °F (36.8 °C)  Pulse:  [70-84] 75  Resp:  [16-20] 18  SpO2:  [95 %-100 %] 99 %  BP: (159-172)/(72-79) 170/79     Weight: 111.6 kg (246 lb 0.5 oz)  Body mass index is 46.49 kg/m².    Physical Exam  Constitutional:       Appearance: Normal appearance.   HENT:      Head: Normocephalic and atraumatic.   Eyes:      General: No scleral icterus.     Extraocular Movements: Extraocular movements intact.      Pupils: Pupils are equal, round, and reactive to light.   Neck:      Musculoskeletal: No neck rigidity.      Vascular: No carotid bruit.   Cardiovascular:      Rate and Rhythm: Normal rate and regular rhythm.   Pulmonary:      Effort: No  respiratory distress.      Breath sounds: No stridor. No wheezing.      Comments: Shallow breath sounds in x4 quadrants  Abdominal:      General: There is no distension.      Palpations: Abdomen is soft.      Tenderness: There is no abdominal tenderness.   Musculoskeletal:      Right lower leg: Edema present.      Left lower leg: Edema present.      Comments: +3 bilateral pitting edema   Skin:     General: Skin is warm and dry.      Capillary Refill: Capillary refill takes less than 2 seconds.      Findings: No erythema or rash.   Neurological:      Mental Status: She is alert and oriented to person, place, and time.      Cranial Nerves: No cranial nerve deficit.      Coordination: Coordination normal.      Gait: Gait normal.   Psychiatric:         Mood and Affect: Mood normal.         Behavior: Behavior normal.           CRANIAL NERVES     CN III, IV, VI   Pupils are equal, round, and reactive to light.       Significant Labs:   CBC:   Recent Labs   Lab 10/09/20  0442   WBC 5.78   HGB 10.7*   HCT 34.3*        CMP:   Recent Labs   Lab 10/09/20  0442      K 4.3      CO2 27   *   BUN 33*   CREATININE 2.7*   CALCIUM 7.6*   PROT 5.8*   ALBUMIN 1.4*   BILITOT 0.1   ALKPHOS 107   AST 54*   ALT 14   ANIONGAP 7*   EGFRNONAA 18*     Cardiac Markers:   Recent Labs   Lab 10/09/20  0442   *     Lipase: No results for input(s): LIPASE in the last 48 hours.  Troponin:   Recent Labs   Lab 10/09/20  0442   TROPONINI 0.017     All pertinent labs within the past 24 hours have been reviewed.    Significant Imaging: I have reviewed all pertinent imaging results/findings within the past 24 hours.  I have reviewed and interpreted all pertinent imaging results/findings within the past 24 hours.    Assessment/Plan:     * Acute on chronic heart failure  -Admit to OBS  -IV Lasix 40mg BID  -Supplemental O2 PRN  -Low Na diet  -Strict I& O  -Daily weights  -Troponins trended with initial result negative   -ECHO  pending   -Cardiology following- will await further recommendations        TSH (thyroid-stimulating hormone deficiency)  -Continue home med Synthroid   -TSH pending      KRISTAL on CPAP  -CPAP nightly    Hypertension  -Continue home Doxazosin 2mg nightly  -Metolazone 5mg daily  -Isosorbide mononitrate 30mg daily  -Amlodipine 10mg daily  -Cardiology following      Cirrhosis  -Hx of   -s/p TIPS procedure   -Albumin of 1.4 and AST 54 noted   -Repeat labs pending   -CMP AM draw    Type 2 diabetes mellitus with circulatory disorder  -Accuchecks and SSI   -HgbA1c pending  -Levemir initiated at 10units will adjust per patient needs        VTE Risk Mitigation (From admission, onward)         Ordered     IP VTE HIGH RISK PATIENT  Once      10/09/20 0912     Place sequential compression device  Until discontinued      10/09/20 0912                   Coy Hudson NP  Department of Hospital Medicine   [facility]

## 2020-10-09 NOTE — PROGRESS NOTES
Pharmacist Renal Dose Adjustment Note    Diamond Das is a 65 y.o. female being treated with the medication famotidine     Patient Data:    Vital Signs (Most Recent):  Temp: 98.7 °F (37.1 °C) (10/09/20 0810)  Pulse: 88 (10/09/20 0810)  Resp: 20 (10/09/20 0810)  BP: (!) 181/88 (10/09/20 0810)  SpO2: 99 % (10/09/20 0810)   Vital Signs (72h Range):  Temp:  [98.3 °F (36.8 °C)-98.7 °F (37.1 °C)]   Pulse:  [70-88]   Resp:  [16-20]   BP: (159-181)/(72-88)   SpO2:  [95 %-100 %]      Recent Labs   Lab 10/05/20  1513 10/09/20  0442   CREATININE 2.6* 2.7*     Serum creatinine: 2.7 mg/dL (H) 10/09/20 0442  Estimated creatinine clearance: 24.7 mL/min (A)    Per protocol for CrCl < 50 ml/min, dose will be reduced from 20 mg PO twice daily to 20 mg PO once daily.     Pharmacist's Name: Katherine E Mcardle  Pharmacist's Extension: [phone]

## 2020-10-09 NOTE — ASSESSMENT & PLAN NOTE
-Accuchecks and SSI   -HgbA1c pending  -Levemir initiated at 10units will adjust per patient needs

## 2020-10-09 NOTE — PLAN OF CARE
Swer met with pt at bedside for initial assessment. Pt lives with daughter Paco and was independent with adls prior to admission. Paco will provide transportation at discharge. Pt denied having home health in the past. Pt uses a RW, C-PAP machine and oxygen. Pt does not have a problem obtaining medication and daughter provides transportation to medical appointments. Pt does have an advanced directive at this time. SWer provided a transitional care folder, information on advanced directives, information on pharmacy bedside delivery, and discharge planning begins on admission with contact information for any needs/questions. Pt denied any post discharge needs at this time.    PCP; Andrey Perrin MD  Pharm;   [facility] - [place], [place] - [address] [address]  [address] [address]  Phone: [phone] Fax: [phone]    My Chart; Active  Bedside Delivery; Agreed      10/09/20 1010   Discharge Assessment   Assessment Type Discharge Planning Assessment   Confirmed/corrected address and phone number on facesheet? Yes   Assessment information obtained from? Patient   Communicated expected length of stay with patient/caregiver yes   Prior to hospitilization cognitive status: Alert/Oriented   Prior to hospitalization functional status: Independent   Current cognitive status: Alert/Oriented   Current Functional Status: Independent   Facility Arrived From: home   Lives With child(yaw), adult   Able to Return to Prior Arrangements yes   Is patient able to care for self after discharge? Yes   Who are your caregiver(s) and their phone number(s)? Paco Razo (daughter) [phone]   Patient's perception of discharge disposition home or selfcare   Readmission Within the Last 30 Days no previous admission in last 30 days   Patient currently being followed by outpatient case management? No   Patient currently receives any other outside agency services? No   Equipment Currently Used at Home  walker, rolling;CPAP;oxygen   Do you have any problems affording any of your prescribed medications? No   Is the patient taking medications as prescribed? yes   Does the patient have transportation home? Yes   Transportation Anticipated family or friend will provide   Does the patient receive services at the [facility]? No   Discharge Plan A Home with family   DME Needed Upon Discharge  none   Patient/Family in Agreement with Plan yes

## 2020-10-09 NOTE — HPI
Ms. Das is a 65 year old female patient whose current medical conditions include diastolic CHF, pericardial effusion, HTN, hyperlipidemia, IDDM, and cryptogenic cirrhosis s/p TIPS who presented to [facility] ED yesterday with a complaint of worsening SOB over the past 1-2 days. Associated symptoms included bilateral lower extremity edema and weakness. Patient denied any associated morena chest pain, fever, chills, orthopnea, PND, palpitations, near syncope, or syncope. Initial workup in ED revealed creatinine of 2.6 and BNP > 300 and patient was subsequently admitted for further evaluation and treatment. Cardiology consulted to assist with management. Patient seen and examined today, sitting up in bed. Feeling better since admission, states SOB is improving. Denies any chest pain. She reports compliance with her medications but admits she ran out of her metolazone earlier this week. She has also been eating more salt. Chart reviewed. Troponin negative. Albumin 1.4. Echo pending.

## 2020-10-09 NOTE — ED PROVIDER NOTES
SCRIBE #1 NOTE: I, Shyann Jose, am scribing for, and in the presence of, Antonio Hartman Do, MD. I have scribed the entire note.     SCRIBE #2 NOTE: I, Geovanna Woodard, am scribing for, and in the presence of,  Sidney Valenzuela Jr., MD. I have scribed the remaining portions of the note not scribed by Scribe #1.     History      Chief Complaint   Patient presents with    Shortness of Breath     Pt states she gets SOB when she walks, pt reports fluid in her abdomen and bilateral leg swelling       Review of patient's allergies indicates:   Allergen Reactions    Subsys [fentanyl] Other (See Comments)     After administration pt unresponsive.  HR and respirations decreased.     Versed [midazolam] Other (See Comments)     After administration pt unresponsive.  HR and respirations decreased.     Ampicillin Rash    Codeine Nausea And Vomiting and Nausea Only        HPI   HPI    10/9/2020, 5:54 AM   History obtained from the patient      History of Present Illness: Diamond Das is a 65 y.o. female patient with a PMHx of CHF, DM, HTN, and sleep apnea who presents to the Emergency Department for evaluation of shortness of breath. Symptoms are constant and moderate in severity. Patient reports typically having no issues walking a block before she exerts herself, but states lately she has only been able to walk a few steps before she becomes SOB. Dr. Ramos recently increased her Lasix from 40 mg to 80 mg a day. Additionally, she was started on Metolazone 5 mg on 9/29, to be taken 30 minutes before Lasix dosage. Despite medications, patient denies any improvement in her sxs. Patient notes bilateral leg swelling. Patient denies any fever, N/V, diarrhea, chest pain, palpitations, cough, and all other sxs at this time. Patient has addressed this issue with a nurse of Dr. Ramos, who advised her to present to the ED for possible IV lasix and admission if sxs worsened. No further complaints or concerns at this time.       Arrival  mode: Personal vehicle     PCP: Andrey Perrin MD       Past Medical History:  Past Medical History:   Diagnosis Date    Ascites     Breast pain, left 1/4/2018    Cataract     CHF (congestive heart failure)     Cirrhosis     Cough     Diabetes mellitus     Diabetes mellitus, type 2     Gastroparesis     GERD (gastroesophageal reflux disease)     Hyperlipidemia     Hypertension     Hypotension 2/21/2019    Liver disease     Lumbar strain 4/27/2018    Pneumonia of right middle lobe due to infectious organism 12/19/2017    Renal disorder     Retinopathy due to secondary diabetes mellitus     Sleep apnea     Thyroid disease        Past Surgical History:  Past Surgical History:   Procedure Laterality Date    CATARACT EXTRACTION      CHOLECYSTECTOMY      COLONOSCOPY N/A 9/4/2019    Procedure: COLONOSCOPY;  Surgeon: Marnie Elmore MD;  Location: [facility] ENDO;  Service: Endoscopy;  Laterality: N/A;    ERCP      FLUOROSCOPY N/A 7/24/2020    Procedure: TIPS revision;  Surgeon: Martell Jasmine MD;  Location: [facility] CATH LAB;  Service: General;  Laterality: N/A;    LIVER BIOPSY      THORACENTESIS Left 1/20/2020    Procedure: Thoracentesis;  Surgeon: Angelica Hunter MD;  Location: [facility] ENDO;  Service: Pulmonary;  Laterality: Left;    TUBAL LIGATION      UPPER GASTROINTESTINAL ENDOSCOPY           Family History:  Family History   Problem Relation Age of Onset    Cancer Mother     Breast cancer Mother     Heart disease Father     Aneurysm Sister     Heart disease Brother     No Known Problems Maternal Grandmother     Cancer Maternal Grandfather     Sarcoidosis Sister        Social History:  Social History     Tobacco Use    Smoking status: Never Smoker    Smokeless tobacco: Never Used   Substance and Sexual Activity    Alcohol use: No     Frequency: Never    Drug use: No    Sexual activity: Never       ROS   Review of Systems   Constitutional: Negative for fever.   HENT: Negative for  "sore throat.    Respiratory: Positive for shortness of breath. Negative for cough.    Cardiovascular: Positive for leg swelling. Negative for chest pain and palpitations.   Gastrointestinal: Negative for nausea and vomiting.   Genitourinary: Negative for dysuria.   Musculoskeletal: Negative for back pain.   Skin: Negative for rash.   Neurological: Negative for weakness.   Hematological: Does not bruise/bleed easily.   All other systems reviewed and are negative.    Physical Exam      Initial Vitals [10/09/20 0324]   BP Pulse Resp Temp SpO2   (!) 164/72 72 18 98.3 °F (36.8 °C) 95 %      MAP       --          Physical Exam  Nursing Notes and Vital Signs Reviewed.  Constitutional: Patient is in no acute distress. Patient is obese.  Head: Atraumatic. Normocephalic.  Eyes: PERRL. EOM intact. Conjunctivae are not pale. No scleral icterus.  ENT: Mucous membranes are moist.    Neck: Supple. Full ROM.   Cardiovascular: Regular rate. Regular rhythm. No murmurs, rubs, or gallops. Distal pulses are 2+ and symmetric.  Pulmonary/Chest: No respiratory distress. Talking in full sentences. Clear to auscultation bilaterally. No wheezing or rales.  Abdominal: Soft and non-distended.  There is no tenderness.  No rebound, guarding, or rigidity.   Musculoskeletal: Moves all extremities. No obvious deformities. 2+ pitting edema to the BLE. No calf tenderness.  Skin: Warm and dry.  Neurological:  Alert, awake, and appropriate.  Normal speech.  No acute focal neurological deficits are appreciated.  Psychiatric: Normal affect. Good eye contact. Appropriate in content.    ED Course    Procedures  ED Vital Signs:  Vitals:    10/09/20 0324 10/09/20 0334 10/09/20 0342 10/09/20 0401   BP: (!) 164/72  (!) 161/72 (!) 159/74   Pulse: 72 82 82 71   Resp: 18  18 20   Temp: 98.3 °F (36.8 °C)      TempSrc: Oral      SpO2: 95%  99% 98%   Weight:  111.6 kg (246 lb 0.5 oz)     Height: 5' 1" (1.549 m)       10/09/20 0431 10/09/20 0501 10/09/20 0601 10/09/20 " 0701   BP: (!) 169/79 (!) 172/79 (!) 166/78 (!) 170/79   Pulse: 84 74 75 70   Resp: 18 17 16 18   Temp:       TempSrc:       SpO2: 99% 100% 100% 99%   Weight:       Height:        10/09/20 0739 10/09/20 0810   BP:  (!) 181/88   Pulse: 75 88   Resp:  20   Temp:  98.7 °F (37.1 °C)   TempSrc:     SpO2:  99%   Weight:     Height:         Abnormal Lab Results:  Labs Reviewed   COMPREHENSIVE METABOLIC PANEL - Abnormal; Notable for the following components:       Result Value    Glucose 165 (*)     BUN, Bld 33 (*)     Creatinine 2.7 (*)     Calcium 7.6 (*)     Total Protein 5.8 (*)     Albumin 1.4 (*)     AST 54 (*)     Anion Gap 7 (*)     eGFR if  21 (*)     eGFR if non  18 (*)     All other components within normal limits   CBC W/ AUTO DIFFERENTIAL - Abnormal; Notable for the following components:    RBC 3.73 (*)     Hemoglobin 10.7 (*)     Hematocrit 34.3 (*)     Mean Corpuscular Hemoglobin Conc 31.2 (*)     All other components within normal limits   B-TYPE NATRIURETIC PEPTIDE - Abnormal; Notable for the following components:     (*)     All other components within normal limits   PROTIME-INR   TROPONIN I   SARS-COV-2 RNA AMPLIFICATION, QUAL        All Lab Results:  Results for orders placed or performed during the hospital encounter of 10/09/20   Comprehensive metabolic panel   Result Value Ref Range    Sodium 144 136 - 145 mmol/L    Potassium 4.3 3.5 - 5.1 mmol/L    Chloride 110 95 - 110 mmol/L    CO2 27 23 - 29 mmol/L    Glucose 165 (H) 70 - 110 mg/dL    BUN, Bld 33 (H) 8 - 23 mg/dL    Creatinine 2.7 (H) 0.5 - 1.4 mg/dL    Calcium 7.6 (L) 8.7 - 10.5 mg/dL    Total Protein 5.8 (L) 6.0 - 8.4 g/dL    Albumin 1.4 (L) 3.5 - 5.2 g/dL    Total Bilirubin 0.1 0.1 - 1.0 mg/dL    Alkaline Phosphatase 107 55 - 135 U/L    AST 54 (H) 10 - 40 U/L    ALT 14 10 - 44 U/L    Anion Gap 7 (L) 8 - 16 mmol/L    eGFR if African American 21 (A) >60 mL/min/1.73 m^2    eGFR if non  18 (A)  >60 mL/min/1.73 m^2   CBC auto differential   Result Value Ref Range    WBC 5.78 3.90 - 12.70 K/uL    RBC 3.73 (L) 4.00 - 5.40 M/uL    Hemoglobin 10.7 (L) 12.0 - 16.0 g/dL    Hematocrit 34.3 (L) 37.0 - 48.5 %    Mean Corpuscular Volume 92 82 - 98 fL    Mean Corpuscular Hemoglobin 28.7 27.0 - 31.0 pg    Mean Corpuscular Hemoglobin Conc 31.2 (L) 32.0 - 36.0 g/dL    RDW 13.9 11.5 - 14.5 %    Platelets 248 150 - 350 K/uL    MPV 10.0 9.2 - 12.9 fL    Immature Granulocytes 0.2 0.0 - 0.5 %    Gran # (ANC) 2.7 1.8 - 7.7 K/uL    Immature Grans (Abs) 0.01 0.00 - 0.04 K/uL    Lymph # 2.2 1.0 - 4.8 K/uL    Mono # 0.6 0.3 - 1.0 K/uL    Eos # 0.3 0.0 - 0.5 K/uL    Baso # 0.04 0.00 - 0.20 K/uL    nRBC 0 0 /100 WBC    Gran% 45.8 38.0 - 73.0 %    Lymph% 38.1 18.0 - 48.0 %    Mono% 9.7 4.0 - 15.0 %    Eosinophil% 5.5 0.0 - 8.0 %    Basophil% 0.7 0.0 - 1.9 %    Differential Method Automated    Protime-INR   Result Value Ref Range    Prothrombin Time 10.6 9.0 - 12.5 sec    INR 1.0 0.8 - 1.2   Troponin I   Result Value Ref Range    Troponin I 0.017 0.000 - 0.026 ng/mL   Brain natriuretic peptide   Result Value Ref Range     (H) 0 - 99 pg/mL   COVID-19 Rapid Screening   Result Value Ref Range    SARS-CoV-2 RNA, Amplification, Qual Negative Negative         Imaging Results:  Imaging Results          X-Ray Chest 1 View (Final result)  Result time 10/09/20 06:42:54    Final result by Martell Jasmine MD (10/09/20 06:42:54)                 Impression:      No acute process seen.      Electronically signed by: Martell Jasmine MD  Date:    10/09/2020  Time:    06:42             Narrative:    EXAMINATION:  XR CHEST 1 VIEW    CLINICAL HISTORY:  Shortness of breath    FINDINGS:  Single view of the chest.  Comparison 09/24/2020    Cardiac silhouette remains enlarged.  Again persistent hazy opacity in the left lung base likely representing combination of small left-sided pleural effusion and mild left basilar atelectasis.  Right lung remains  clear.  No acute osseous findings demonstrated.                                    The EKG was ordered, reviewed, and independently interpreted by the ED provider.  Interpretation time: 4:29  Rate: 73 BPM  Rhythm: normal sinus rhythm  Interpretation: Possible Anterolateral infarct. No STEMI.      The Emergency Provider reviewed the vital signs and test results, which are outlined above.    ED Discussion     6:10 AM: Dr. Palumbo transfers care of patient to Dr. Valenzuela pending imaging results.    6:38 AM: Evaluated pt and agree with Dr. Palumbo's assessment. Pt is resting comfortably and is in no acute distress. On re-examination of lungs, do not appreciate any wheezing or crackles. There is 2+ pitting edema to BLE.     7:18 AM: Discussed pt's case with Dr. Ramos (cardiology) who recommends keep for observation to diurese today and then discharge tomorrow if pt is willing to stay.    7:23 AM: Re-evaluated pt. I have discussed test results, shared treatment plan, and the need for admission with patient at bedside. Pt expresses understanding at this time and agrees with all information. All questions answered. Pt has no further questions or concerns at this time. Pt is ready for admit.    7:46 AM: Discussed case with Marleny Morel NP (Hospital Medicine). Dr. Floyd agrees with current care and management of pt and accepts admission.   Admitting Service: hospital medicine  Admitting Physician: Dr. Floyd  Admit to: observation       ED Medication(s):  Medications   isosorbide mononitrate 24 hr tablet 30 mg (has no administration in time range)   furosemide injection 60 mg (60 mg Intravenous Given 10/9/20 0529)   furosemide injection 20 mg (20 mg Intravenous Given 10/9/20 0544)             Medical Decision Making    Medical Decision Making:   Clinical Tests:   Lab Tests: Ordered and Reviewed  Radiological Study: Ordered and Reviewed  Medical Tests: Ordered and Reviewed           Scribe Attestation:   Scribe #1: I performed the  above scribed service and the documentation accurately describes the services I performed. I attest to the accuracy of the note.    Attending:   Physician Attestation Statement for Scribe #1: I, Antonio Hartman Do, MD, personally performed the services described in this documentation, as scribed by Shyann Garcia, in my presence, and it is both accurate and complete.       Scribe Attestation:   Scribe #2: I performed the above scribed service and the documentation accurately describes the services I performed. I attest to the accuracy of the note.    Attending Attestation:           Physician Attestation for Scribe:    Physician Attestation Statement for Scribe #2: I, Sidney Valenzuela Jr., MD, reviewed documentation, as scribed by Geovanna Woodard in my presence, and it is both accurate and complete. I also acknowledge and confirm the content of the note done by Scribe #1.          Clinical Impression       ICD-10-CM ICD-9-CM   1. Chest pain  R07.9 786.50   2. SOB (shortness of breath)  R06.02 786.05   3. CHF exacerbation  I50.9 428.0       Disposition:   Disposition: Placed in Observation  Condition: Castillo Valenzuela Jr., MD  10/09/20 0859

## 2020-10-09 NOTE — HOSPITAL COURSE
10/10/2020-Patient seen and examined today, resting in bed. Feeling better. Less SOB. Still has some BLE edema. Labs reviewed. Albumin 1.2. Creatinine 2.7.    10/11/2020-Patient seen and examined today, lying in bed. Feels better. SOB improved. BNP trending down. Creatinine stable at 2.7.

## 2020-10-09 NOTE — SUBJECTIVE & OBJECTIVE
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 ENDO;  Service: Endoscopy;  Laterality: N/A;    ERCP      FLUOROSCOPY N/A 7/24/2020    Procedure: TIPS revision;  Surgeon: Martell Jasmine MD;  Location: [facility] CATH LAB;  Service: General;  Laterality: N/A;    LIVER BIOPSY      THORACENTESIS Left 1/20/2020    Procedure: Thoracentesis;  Surgeon: Angelica Hunter MD;  Location: [facility] ENDO;  Service: Pulmonary;  Laterality: Left;    TUBAL LIGATION      UPPER GASTROINTESTINAL ENDOSCOPY         Review of patient's allergies indicates:   Allergen Reactions    Subsys [fentanyl] Other (See Comments)     After administration pt unresponsive.  HR and respirations decreased.     Versed [midazolam] Other (See Comments)     After administration pt unresponsive.  HR and respirations decreased.     Ampicillin Rash    Codeine Nausea And Vomiting and Nausea Only       No current facility-administered medications on file prior to encounter.      Current Outpatient Medications on File Prior to Encounter   Medication Sig    aspirin (ECOTRIN) 81 MG EC tablet Take 1 tablet (81 mg total) by mouth once daily.    doxazosin (CARDURA) 2 MG tablet Take 1 tablet (2 mg total) by mouth every evening.    furosemide  (LASIX) 40 MG tablet Take 1 tablet (40 mg total) by mouth once daily. Take 80 mg next 3 days    insulin (LANTUS SOLOSTAR U-100 INSULIN) glargine 100 units/mL (3mL) SubQ pen INJECT 20 UNITS INTO THE SKIN EVERY EVENING.&nbsp;&nbsp;TITRATE UP TO 30 UNITS NIGHTLY AS DIRECTED ON INSTRUCTION SHEET    levothyroxine (SYNTHROID) 137 MCG Tab tablet Take 1 tablet (137 mcg total) by mouth before breakfast.    metOLazone (ZAROXOLYN) 5 MG tablet Take 1 tablet (5 mg total) by mouth once daily. Take daily as needed and then every other day for swelling, 30 min before lasix    ondansetron (ZOFRAN) 4 MG tablet TAKE 1 TABLET BY MOUTH EVERY 8 HOURS AS NEEDED    pantoprazole (PROTONIX) 20 MG tablet Take 2 tablets (40 mg total) by mouth once daily.    potassium chloride SA (K-DUR,KLOR-CON) 20 MEQ tablet Take 1 tablet (20 mEq total) by mouth once daily.    rifAXIMin (XIFAXAN) 550 mg Tab Take 1 tablet (550 mg total) by mouth 2 (two) times daily.    tiZANidine (ZANAFLEX) 4 MG tablet Take 1 tablet (4 mg total) by mouth every 8 (eight) hours.    [DISCONTINUED] rifAXIMin (XIFAXAN) 550 mg Tab Take 550 mg by mouth 2 (two) times daily.    glucagon, human recombinant, (GLUCAGON EMERGENCY KIT, HUMAN,) 1 mg SolR Inject 1 mg into the muscle as needed.    liraglutide 0.6 mg/0.1 mL, 18 mg/3 mL, subq PNIJ (VICTOZA 2-SHAYY) 0.6 mg/0.1 mL (18 mg/3 mL) PnIj Inject 1.8 mg into the skin once daily.    magnesium oxide (MAG-OX) 400 mg (241.3 mg magnesium) tablet Take 1 tablet (400 mg total) by mouth once daily.    [DISCONTINUED] amLODIPine (NORVASC) 10 MG tablet Take 1 tablet (10 mg total) by mouth once daily.    [DISCONTINUED] tiZANidine (ZANAFLEX) 4 MG tablet TAKE 1 TABLET BY MOUTH EVERY 8 HOURS FOR  20  DAYS    [DISCONTINUED] vitamin E 400 UNIT capsule Take 400 Units by mouth every morning.     Family History     Problem Relation (Age of Onset)    Aneurysm Sister    Breast cancer Mother    Cancer Mother, Maternal Grandfather    Heart disease  Father, Brother    No Known Problems Maternal Grandmother    Sarcoidosis Sister        Tobacco Use    Smoking status: Never Smoker    Smokeless tobacco: Never Used   Substance and Sexual Activity    Alcohol use: No     Frequency: Never    Drug use: No    Sexual activity: Never     Review of Systems   Constitutional: Positive for activity change. Negative for chills, diaphoresis and fever.   HENT: Negative for congestion, sneezing, sore throat and trouble swallowing.    Eyes: Negative for photophobia, redness, itching and visual disturbance.   Respiratory: Positive for shortness of breath. Negative for choking, chest tightness, wheezing and stridor.    Cardiovascular: Positive for leg swelling. Negative for chest pain.   Gastrointestinal: Negative for abdominal distention, abdominal pain, constipation and diarrhea.   Endocrine: Negative for cold intolerance, polyphagia and polyuria.   Genitourinary: Negative for flank pain, frequency and urgency.   Musculoskeletal: Negative for arthralgias, back pain, joint swelling, neck pain and neck stiffness.   Allergic/Immunologic: Negative for immunocompromised state.   Neurological: Positive for weakness (Generalized weaknes). Negative for dizziness, seizures, facial asymmetry and speech difficulty.   Psychiatric/Behavioral: Negative for agitation, behavioral problems and confusion.     Objective:     Vital Signs (Most Recent):  Temp: 98.3 °F (36.8 °C) (10/09/20 0324)  Pulse: 75 (10/09/20 0739)  Resp: 18 (10/09/20 0701)  BP: (!) 170/79 (10/09/20 0701)  SpO2: 99 % (10/09/20 0701) Vital Signs (24h Range):  Temp:  [98.3 °F (36.8 °C)] 98.3 °F (36.8 °C)  Pulse:  [70-84] 75  Resp:  [16-20] 18  SpO2:  [95 %-100 %] 99 %  BP: (159-172)/(72-79) 170/79     Weight: 111.6 kg (246 lb 0.5 oz)  Body mass index is 46.49 kg/m².    Physical Exam  Constitutional:       Appearance: Normal appearance.   HENT:      Head: Normocephalic and atraumatic.   Eyes:      General: No scleral icterus.      Extraocular Movements: Extraocular movements intact.      Pupils: Pupils are equal, round, and reactive to light.   Neck:      Musculoskeletal: No neck rigidity.      Vascular: No carotid bruit.   Cardiovascular:      Rate and Rhythm: Normal rate and regular rhythm.   Pulmonary:      Effort: No respiratory distress.      Breath sounds: No stridor. No wheezing.      Comments: Shallow breath sounds in x4 quadrants  Abdominal:      General: There is no distension.      Palpations: Abdomen is soft.      Tenderness: There is no abdominal tenderness.   Musculoskeletal:      Right lower leg: Edema present.      Left lower leg: Edema present.      Comments: +3 bilateral pitting edema   Skin:     General: Skin is warm and dry.      Capillary Refill: Capillary refill takes less than 2 seconds.      Findings: No erythema or rash.   Neurological:      Mental Status: She is alert and oriented to person, place, and time.      Cranial Nerves: No cranial nerve deficit.      Coordination: Coordination normal.      Gait: Gait normal.   Psychiatric:         Mood and Affect: Mood normal.         Behavior: Behavior normal.           CRANIAL NERVES     CN III, IV, VI   Pupils are equal, round, and reactive to light.       Significant Labs:   CBC:   Recent Labs   Lab 10/09/20  0442   WBC 5.78   HGB 10.7*   HCT 34.3*        CMP:   Recent Labs   Lab 10/09/20  0442      K 4.3      CO2 27   *   BUN 33*   CREATININE 2.7*   CALCIUM 7.6*   PROT 5.8*   ALBUMIN 1.4*   BILITOT 0.1   ALKPHOS 107   AST 54*   ALT 14   ANIONGAP 7*   EGFRNONAA 18*     Cardiac Markers:   Recent Labs   Lab 10/09/20  0442   *     Lipase: No results for input(s): LIPASE in the last 48 hours.  Troponin:   Recent Labs   Lab 10/09/20  0442   TROPONINI 0.017     All pertinent labs within the past 24 hours have been reviewed.    Significant Imaging: I have reviewed all pertinent imaging results/findings within the past 24 hours.  I have reviewed  and interpreted all pertinent imaging results/findings within the past 24 hours.

## 2020-10-10 PROBLEM — E03.9 ACQUIRED HYPOTHYROIDISM: Status: ACTIVE | Noted: 2020-09-28

## 2020-10-10 PROBLEM — E11.65 TYPE 2 DIABETES MELLITUS WITH HYPERGLYCEMIA: Status: ACTIVE | Noted: 2020-10-10

## 2020-10-10 LAB
ALBUMIN SERPL BCP-MCNC: 1.2 G/DL (ref 3.5–5.2)
ANION GAP SERPL CALC-SCNC: 8 MMOL/L (ref 8–16)
BUN SERPL-MCNC: 34 MG/DL (ref 8–23)
CALCIUM SERPL-MCNC: 7.5 MG/DL (ref 8.7–10.5)
CHLORIDE SERPL-SCNC: 108 MMOL/L (ref 95–110)
CO2 SERPL-SCNC: 29 MMOL/L (ref 23–29)
CREAT SERPL-MCNC: 2.7 MG/DL (ref 0.5–1.4)
EST. GFR  (AFRICAN AMERICAN): 21 ML/MIN/1.73 M^2
EST. GFR  (NON AFRICAN AMERICAN): 18 ML/MIN/1.73 M^2
ESTIMATED AVG GLUCOSE: 123 MG/DL (ref 68–131)
GLUCOSE SERPL-MCNC: 83 MG/DL (ref 70–110)
HBA1C MFR BLD HPLC: 5.9 % (ref 4–5.6)
MAGNESIUM SERPL-MCNC: 1.6 MG/DL (ref 1.6–2.6)
POCT GLUCOSE: 106 MG/DL (ref 70–110)
POCT GLUCOSE: 114 MG/DL (ref 70–110)
POTASSIUM SERPL-SCNC: 4.4 MMOL/L (ref 3.5–5.1)
SODIUM SERPL-SCNC: 145 MMOL/L (ref 136–145)

## 2020-10-10 PROCEDURE — 21400001 HC TELEMETRY ROOM

## 2020-10-10 PROCEDURE — G0378 HOSPITAL OBSERVATION PER HR: HCPCS

## 2020-10-10 PROCEDURE — 96372 THER/PROPH/DIAG INJ SC/IM: CPT | Mod: 59 | Performed by: EMERGENCY MEDICINE

## 2020-10-10 PROCEDURE — 63600175 PHARM REV CODE 636 W HCPCS: Performed by: INTERNAL MEDICINE

## 2020-10-10 PROCEDURE — 25000003 PHARM REV CODE 250: Performed by: NURSE PRACTITIONER

## 2020-10-10 PROCEDURE — 99232 PR SUBSEQUENT HOSPITAL CARE,LEVL II: ICD-10-PCS | Mod: ,,, | Performed by: INTERNAL MEDICINE

## 2020-10-10 PROCEDURE — 94761 N-INVAS EAR/PLS OXIMETRY MLT: CPT

## 2020-10-10 PROCEDURE — 25000003 PHARM REV CODE 250: Performed by: PHYSICIAN ASSISTANT

## 2020-10-10 PROCEDURE — 25000003 PHARM REV CODE 250: Performed by: EMERGENCY MEDICINE

## 2020-10-10 PROCEDURE — 83036 HEMOGLOBIN GLYCOSYLATED A1C: CPT

## 2020-10-10 PROCEDURE — 83735 ASSAY OF MAGNESIUM: CPT

## 2020-10-10 PROCEDURE — 96376 TX/PRO/DX INJ SAME DRUG ADON: CPT | Performed by: EMERGENCY MEDICINE

## 2020-10-10 PROCEDURE — 27000190 HC CPAP FULL FACE MASK W/VALVE

## 2020-10-10 PROCEDURE — 99900035 HC TECH TIME PER 15 MIN (STAT)

## 2020-10-10 PROCEDURE — 99232 SBSQ HOSP IP/OBS MODERATE 35: CPT | Mod: ,,, | Performed by: INTERNAL MEDICINE

## 2020-10-10 PROCEDURE — 82040 ASSAY OF SERUM ALBUMIN: CPT

## 2020-10-10 PROCEDURE — 36415 COLL VENOUS BLD VENIPUNCTURE: CPT

## 2020-10-10 PROCEDURE — 94660 CPAP INITIATION&MGMT: CPT

## 2020-10-10 PROCEDURE — 80048 BASIC METABOLIC PNL TOTAL CA: CPT

## 2020-10-10 RX ADMIN — ASPIRIN 81 MG: 81 TABLET, COATED ORAL at 07:10

## 2020-10-10 RX ADMIN — FUROSEMIDE 80 MG: 10 INJECTION, SOLUTION INTRAMUSCULAR; INTRAVENOUS at 05:10

## 2020-10-10 RX ADMIN — AMLODIPINE BESYLATE 10 MG: 10 TABLET ORAL at 07:10

## 2020-10-10 RX ADMIN — LEVOTHYROXINE SODIUM 137 MCG: 25 TABLET ORAL at 05:10

## 2020-10-10 RX ADMIN — ISOSORBIDE MONONITRATE 30 MG: 30 TABLET, EXTENDED RELEASE ORAL at 07:10

## 2020-10-10 RX ADMIN — INSULIN DETEMIR 10 UNITS: 100 INJECTION, SOLUTION SUBCUTANEOUS at 09:10

## 2020-10-10 RX ADMIN — DOXAZOSIN MESYLATE 2 MG: 2 TABLET ORAL at 09:10

## 2020-10-10 RX ADMIN — POLYETHYLENE GLYCOL 3350 17 G: 17 POWDER, FOR SOLUTION ORAL at 07:10

## 2020-10-10 RX ADMIN — METOLAZONE 5 MG: 5 TABLET ORAL at 07:10

## 2020-10-10 RX ADMIN — FAMOTIDINE 20 MG: 20 TABLET ORAL at 07:10

## 2020-10-10 NOTE — ASSESSMENT & PLAN NOTE
-Hx of   -s/p TIPS procedure   -Albumin of 1.4 and AST 54 noted   -Repeat labs pending   -CMP AM draw    Continue aggressive diuresis with increase protein intake

## 2020-10-10 NOTE — ASSESSMENT & PLAN NOTE
-Presents with decompensated diastolic CHF in part due to running out of metolazone and dietary non-compliance  -Continue IV diuresis  -Continue amlodipine, Imdur  -No ACEi/ARB given kidney function  -Strict I's/O's  -Check echo  -Dietary restrictions re-discussed    10/10/2020  -Clinically improved  -Continue current CV meds/mgmt  -Counseled on low salt diet  -Follow-up in clinic

## 2020-10-10 NOTE — ASSESSMENT & PLAN NOTE
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

## 2020-10-10 NOTE — SUBJECTIVE & OBJECTIVE
Interval History: feels better, SOB, leg swelling improving, wanted to know if she can go home but has massive anasarca, up to lower part of chest. Will continue aggressive Diuresis and daily weight charting plus add PT/OT.     Review of Systems   Constitutional: Positive for activity change and unexpected weight change. Negative for chills, diaphoresis and fever.   HENT: Negative for congestion, sneezing, sore throat and trouble swallowing.    Eyes: Negative for photophobia, redness, itching and visual disturbance.   Respiratory: Positive for shortness of breath. Negative for choking, chest tightness, wheezing and stridor.    Cardiovascular: Positive for leg swelling. Negative for chest pain.   Gastrointestinal: Positive for abdominal distention. Negative for abdominal pain, constipation and diarrhea.   Endocrine: Negative for cold intolerance, polyphagia and polyuria.   Genitourinary: Negative for flank pain, frequency and urgency.   Musculoskeletal: Negative for arthralgias, back pain, joint swelling, neck pain and neck stiffness.   Allergic/Immunologic: Negative for immunocompromised state.   Neurological: Positive for weakness (Generalized weaknes). Negative for dizziness, seizures, facial asymmetry and speech difficulty.   Psychiatric/Behavioral: Negative for agitation, behavioral problems and confusion.     Objective:     Vital Signs (Most Recent):  Temp: 98.2 °F (36.8 °C) (10/10/20 1543)  Pulse: 75 (10/10/20 1648)  Resp: 18 (10/10/20 1543)  BP: 125/60 (10/10/20 1543)  SpO2: 97 % (10/10/20 1543) Vital Signs (24h Range):  Temp:  [98 °F (36.7 °C)-98.9 °F (37.2 °C)] 98.2 °F (36.8 °C)  Pulse:  [71-80] 75  Resp:  [18-20] 18  SpO2:  [97 %-100 %] 97 %  BP: (121-133)/(57-70) 125/60     Weight: 113.5 kg (250 lb 3.6 oz)  Body mass index is 47.28 kg/m².    Intake/Output Summary (Last 24 hours) at 10/10/2020 1715  Last data filed at 10/10/2020 1600  Gross per 24 hour   Intake 800 ml   Output --   Net 800 ml      Physical  Exam  Vitals signs and nursing note reviewed.   Constitutional:       Appearance: Normal appearance.   HENT:      Head: Normocephalic and atraumatic.   Eyes:      General: No scleral icterus.     Extraocular Movements: Extraocular movements intact.      Pupils: Pupils are equal, round, and reactive to light.   Neck:      Musculoskeletal: No neck rigidity.      Vascular: No carotid bruit.   Cardiovascular:      Rate and Rhythm: Normal rate and regular rhythm.   Pulmonary:      Effort: No respiratory distress.      Breath sounds: No stridor. No wheezing.      Comments: Shallow breath sounds in x4 quadrants  Abdominal:      General: There is no distension.      Palpations: Abdomen is soft.      Tenderness: There is no abdominal tenderness.   Musculoskeletal:      Right lower leg: Edema present.      Left lower leg: Edema present.      Comments: +3 bilateral pitting edema   Skin:     General: Skin is warm and dry.      Capillary Refill: Capillary refill takes less than 2 seconds.      Findings: No erythema or rash.   Neurological:      Mental Status: She is alert and oriented to person, place, and time.      Cranial Nerves: No cranial nerve deficit.      Coordination: Coordination normal.      Gait: Gait normal.   Psychiatric:         Mood and Affect: Mood normal.         Behavior: Behavior normal.       Results for orders placed or performed during the hospital encounter of 10/09/20   Comprehensive metabolic panel   Result Value Ref Range    Sodium 144 136 - 145 mmol/L    Potassium 4.3 3.5 - 5.1 mmol/L    Chloride 110 95 - 110 mmol/L    CO2 27 23 - 29 mmol/L    Glucose 165 (H) 70 - 110 mg/dL    BUN, Bld 33 (H) 8 - 23 mg/dL    Creatinine 2.7 (H) 0.5 - 1.4 mg/dL    Calcium 7.6 (L) 8.7 - 10.5 mg/dL    Total Protein 5.8 (L) 6.0 - 8.4 g/dL    Albumin 1.4 (L) 3.5 - 5.2 g/dL    Total Bilirubin 0.1 0.1 - 1.0 mg/dL    Alkaline Phosphatase 107 55 - 135 U/L    AST 54 (H) 10 - 40 U/L    ALT 14 10 - 44 U/L    Anion Gap 7 (L) 8 - 16  mmol/L    eGFR if African American 21 (A) >60 mL/min/1.73 m^2    eGFR if non African American 18 (A) >60 mL/min/1.73 m^2   CBC auto differential   Result Value Ref Range    WBC 5.78 3.90 - 12.70 K/uL    RBC 3.73 (L) 4.00 - 5.40 M/uL    Hemoglobin 10.7 (L) 12.0 - 16.0 g/dL    Hematocrit 34.3 (L) 37.0 - 48.5 %    Mean Corpuscular Volume 92 82 - 98 fL    Mean Corpuscular Hemoglobin 28.7 27.0 - 31.0 pg    Mean Corpuscular Hemoglobin Conc 31.2 (L) 32.0 - 36.0 g/dL    RDW 13.9 11.5 - 14.5 %    Platelets 248 150 - 350 K/uL    MPV 10.0 9.2 - 12.9 fL    Immature Granulocytes 0.2 0.0 - 0.5 %    Gran # (ANC) 2.7 1.8 - 7.7 K/uL    Immature Grans (Abs) 0.01 0.00 - 0.04 K/uL    Lymph # 2.2 1.0 - 4.8 K/uL    Mono # 0.6 0.3 - 1.0 K/uL    Eos # 0.3 0.0 - 0.5 K/uL    Baso # 0.04 0.00 - 0.20 K/uL    nRBC 0 0 /100 WBC    Gran% 45.8 38.0 - 73.0 %    Lymph% 38.1 18.0 - 48.0 %    Mono% 9.7 4.0 - 15.0 %    Eosinophil% 5.5 0.0 - 8.0 %    Basophil% 0.7 0.0 - 1.9 %    Differential Method Automated    Protime-INR   Result Value Ref Range    Prothrombin Time 10.6 9.0 - 12.5 sec    INR 1.0 0.8 - 1.2   Troponin I   Result Value Ref Range    Troponin I 0.017 0.000 - 0.026 ng/mL   Brain natriuretic peptide   Result Value Ref Range     (H) 0 - 99 pg/mL   COVID-19 Rapid Screening   Result Value Ref Range    SARS-CoV-2 RNA, Amplification, Qual Negative Negative   TSH   Result Value Ref Range    TSH 3.952 0.400 - 4.000 uIU/mL   Basic metabolic panel   Result Value Ref Range    Sodium 145 136 - 145 mmol/L    Potassium 4.4 3.5 - 5.1 mmol/L    Chloride 108 95 - 110 mmol/L    CO2 29 23 - 29 mmol/L    Glucose 83 70 - 110 mg/dL    BUN, Bld 34 (H) 8 - 23 mg/dL    Creatinine 2.7 (H) 0.5 - 1.4 mg/dL    Calcium 7.5 (L) 8.7 - 10.5 mg/dL    Anion Gap 8 8 - 16 mmol/L    eGFR if African American 21 (A) >60 mL/min/1.73 m^2    eGFR if non African American 18 (A) >60 mL/min/1.73 m^2   Hemoglobin A1c   Result Value Ref Range    Hemoglobin A1C 5.9 (H) 4.0 - 5.6 %     Estimated Avg Glucose 123 68 - 131 mg/dL   Magnesium   Result Value Ref Range    Magnesium 1.6 1.6 - 2.6 mg/dL   Albumin   Result Value Ref Range    Albumin 1.2 (L) 3.5 - 5.2 g/dL   Echo Color Flow Doppler? Yes   Result Value Ref Range    BSA 2.19 m2    TDI SEPTAL 0.08 m/s    LV LATERAL E/E' RATIO 12.86 m/s    LV SEPTAL E/E' RATIO 11.25 m/s    LA WIDTH 3.99 cm    TDI LATERAL 0.07 m/s    LVIDd 3.15 (A) 3.5 - 6.0 cm    IVS 1.83 (A) 0.6 - 1.1 cm    Posterior Wall 1.57 (A) 0.6 - 1.1 cm    Ao root annulus 3.38 cm    LVIDs 2.27 2.1 - 4.0 cm    FS 28 28 - 44 %    LA volume 42.72 cm3    Sinus 2.74 cm    STJ 2.63 cm    Ascending aorta 3.08 cm    LV mass 208.40 g    LA size 3.34 cm    TAPSE 1.51 cm    Left Ventricle Relative Wall Thickness 1.00 cm    AV mean gradient 8 mmHg    AV valve area 2.00 cm2    AV Velocity Ratio 0.64     AV index (prosthetic) 0.65     MV valve area p 1/2 method 2.78 cm2    E/A ratio 0.93     Mean e' 0.08 m/s    E wave decelartion time 272.77 msec    IVRT 88.49 msec    LVOT diameter 1.98 cm    LVOT area 3.1 cm2    LVOT peak bart 1.27 m/s    LVOT peak VTI 26.50 cm    Ao peak bart 1.97 m/s    Ao VTI 40.85 cm    RVOT peak bart 0.99 m/s    RVOT peak VTI 20.88 cm    LVOT stroke volume 81.55 cm3    AV peak gradient 16 mmHg    PV mean gradient 2.00 mmHg    E/E' ratio 12.00 m/s    MV Peak E Bart 0.90 m/s    TR Max Bart 2.90 m/s    MV stenosis pressure 1/2 time 79.10 ms    MV Peak A Bart 0.97 m/s    LV Systolic Volume 17.50 mL    LV Systolic Volume Index 8.5 mL/m2    LV Diastolic Volume 39.35 mL    LV Diastolic Volume Index 19.08 mL/m2    LA Volume Index 20.7 mL/m2    LV Mass Index 101 g/m2    RA Major Axis 4.21 cm    Left Atrium Minor Axis 3.23 cm    Left Atrium Major Axis 4.53 cm    Triscuspid Valve Regurgitation Peak Gradient 34 mmHg    Right Atrial Pressure (from IVC) 3 mmHg    TV rest pulmonary artery pressure 37 mmHg   POCT glucose   Result Value Ref Range    POCT Glucose 127 (H) 70 - 110 mg/dL   POCT  glucose   Result Value Ref Range    POCT Glucose 106 70 - 110 mg/dL     Significant Labs: All pertinent labs within the past 24 hours have been reviewed.  Imaging Results          X-Ray Chest 1 View (Final result)  Result time 10/09/20 06:42:54    Final result by Martell Jasmine MD (10/09/20 06:42:54)                 Impression:      No acute process seen.      Electronically signed by: Martell Jasmine MD  Date:    10/09/2020  Time:    06:42             Narrative:    EXAMINATION:  XR CHEST 1 VIEW    CLINICAL HISTORY:  Shortness of breath    FINDINGS:  Single view of the chest.  Comparison 09/24/2020    Cardiac silhouette remains enlarged.  Again persistent hazy opacity in the left lung base likely representing combination of small left-sided pleural effusion and mild left basilar atelectasis.  Right lung remains clear.  No acute osseous findings demonstrated.                              Significant Imaging: I have reviewed all pertinent imaging results/findings within the past 24 hours.

## 2020-10-10 NOTE — PROGRESS NOTES
[facility] Hospital Medicine  Progress Note    Patient Name: Diamond Das  MRN: [id number]  Patient Class: OP- Observation   Admission Date: 10/9/2020  Length of Stay: 0 days  Attending Physician: Mateo Floyd MD  Primary Care Provider: Andrey Perrin MD        Subjective:     Principal Problem:Acute on chronic heart failure        HPI:  Ms Das is a 64 yo F with pmhx of CHf, HTN, sleep apnea, Gerd, HLD presented to ED with increased SOB which began this morning. States she ran out of her metolazone on Monday and that she has not noticed any improvement since addition of the medication. Associated symptoms include: bilateral lower extremity edema and generalized weakness.  States she is compliant with her medication otherwise and recently started following a restricted sodium diet inclusive of frozen chicken and fish. She is scheduled for an ECHO on Oct 16. Pt denies chest pain, headache, palpitations, fever, N/V, abdominal pain, and additional comp[liants. Pt followed outpatient by Dr Ramos (Cardiology) with medications recently adjusted and no significant symptom improvement.  Pt is a full code and daughter is the surrogate decision maker.  ER course: BUN/Creat- 33/2.7, AST/ALT-54/14, Troponin-0.017, BNP-351, CXR-negative. In the ED she received Lasix 80mg IV. ER discussed case with cardiology who reccommended admission HM contacted for pt placement in OBS for further evaluation.     Overview/Hospital Course:  Ms Das is a 64 yo F with Diastolic CHF, Pericardial Effusion, HTN, KRISTAL, GERD, HLP, IDDM, and cryptogenic cirrhosis s/p TIPS, presented to ED with increased SOB which began this morning associated with BLE edema and generalized weakness.  States she is compliant with her medication and diet. BUN/Creat- 33/2.7, AST/ALT-54/14, Troponin-0.017, BNP-351, CXR-negative. In the ED she received Lasix 80mg IV.   10/10- feels better, SOB, leg swelling improving, wanted to know if she  can go home but has massive anasarca, up to lower part of chest. Will continue aggressive Diuresis and daily weight charting plus add PT/OT. Bun/cr 34/2.7/ Alb 1.4.    Interval History: feels better, SOB, leg swelling improving, wanted to know if she can go home but has massive anasarca, up to lower part of chest. Will continue aggressive Diuresis and daily weight charting plus add PT/OT.     Review of Systems   Constitutional: Positive for activity change and unexpected weight change. Negative for chills, diaphoresis and fever.   HENT: Negative for congestion, sneezing, sore throat and trouble swallowing.    Eyes: Negative for photophobia, redness, itching and visual disturbance.   Respiratory: Positive for shortness of breath. Negative for choking, chest tightness, wheezing and stridor.    Cardiovascular: Positive for leg swelling. Negative for chest pain.   Gastrointestinal: Positive for abdominal distention. Negative for abdominal pain, constipation and diarrhea.   Endocrine: Negative for cold intolerance, polyphagia and polyuria.   Genitourinary: Negative for flank pain, frequency and urgency.   Musculoskeletal: Negative for arthralgias, back pain, joint swelling, neck pain and neck stiffness.   Allergic/Immunologic: Negative for immunocompromised state.   Neurological: Positive for weakness (Generalized weaknes). Negative for dizziness, seizures, facial asymmetry and speech difficulty.   Psychiatric/Behavioral: Negative for agitation, behavioral problems and confusion.     Objective:     Vital Signs (Most Recent):  Temp: 98.2 °F (36.8 °C) (10/10/20 1543)  Pulse: 75 (10/10/20 1648)  Resp: 18 (10/10/20 1543)  BP: 125/60 (10/10/20 1543)  SpO2: 97 % (10/10/20 1543) Vital Signs (24h Range):  Temp:  [98 °F (36.7 °C)-98.9 °F (37.2 °C)] 98.2 °F (36.8 °C)  Pulse:  [71-80] 75  Resp:  [18-20] 18  SpO2:  [97 %-100 %] 97 %  BP: (121-133)/(57-70) 125/60     Weight: 113.5 kg (250 lb 3.6 oz)  Body mass index is 47.28  kg/m².    Intake/Output Summary (Last 24 hours) at 10/10/2020 1715  Last data filed at 10/10/2020 1600  Gross per 24 hour   Intake 800 ml   Output --   Net 800 ml      Physical Exam  Vitals signs and nursing note reviewed.   Constitutional:       Appearance: Normal appearance.   HENT:      Head: Normocephalic and atraumatic.   Eyes:      General: No scleral icterus.     Extraocular Movements: Extraocular movements intact.      Pupils: Pupils are equal, round, and reactive to light.   Neck:      Musculoskeletal: No neck rigidity.      Vascular: No carotid bruit.   Cardiovascular:      Rate and Rhythm: Normal rate and regular rhythm.   Pulmonary:      Effort: No respiratory distress.      Breath sounds: No stridor. No wheezing.      Comments: Shallow breath sounds in x4 quadrants  Abdominal:      General: There is no distension.      Palpations: Abdomen is soft.      Tenderness: There is no abdominal tenderness.   Musculoskeletal:      Right lower leg: Edema present.      Left lower leg: Edema present.      Comments: +3 bilateral pitting edema   Skin:     General: Skin is warm and dry.      Capillary Refill: Capillary refill takes less than 2 seconds.      Findings: No erythema or rash.   Neurological:      Mental Status: She is alert and oriented to person, place, and time.      Cranial Nerves: No cranial nerve deficit.      Coordination: Coordination normal.      Gait: Gait normal.   Psychiatric:         Mood and Affect: Mood normal.         Behavior: Behavior normal.       Results for orders placed or performed during the hospital encounter of 10/09/20   Comprehensive metabolic panel   Result Value Ref Range    Sodium 144 136 - 145 mmol/L    Potassium 4.3 3.5 - 5.1 mmol/L    Chloride 110 95 - 110 mmol/L    CO2 27 23 - 29 mmol/L    Glucose 165 (H) 70 - 110 mg/dL    BUN, Bld 33 (H) 8 - 23 mg/dL    Creatinine 2.7 (H) 0.5 - 1.4 mg/dL    Calcium 7.6 (L) 8.7 - 10.5 mg/dL    Total Protein 5.8 (L) 6.0 - 8.4 g/dL    Albumin  1.4 (L) 3.5 - 5.2 g/dL    Total Bilirubin 0.1 0.1 - 1.0 mg/dL    Alkaline Phosphatase 107 55 - 135 U/L    AST 54 (H) 10 - 40 U/L    ALT 14 10 - 44 U/L    Anion Gap 7 (L) 8 - 16 mmol/L    eGFR if African American 21 (A) >60 mL/min/1.73 m^2    eGFR if non African American 18 (A) >60 mL/min/1.73 m^2   CBC auto differential   Result Value Ref Range    WBC 5.78 3.90 - 12.70 K/uL    RBC 3.73 (L) 4.00 - 5.40 M/uL    Hemoglobin 10.7 (L) 12.0 - 16.0 g/dL    Hematocrit 34.3 (L) 37.0 - 48.5 %    Mean Corpuscular Volume 92 82 - 98 fL    Mean Corpuscular Hemoglobin 28.7 27.0 - 31.0 pg    Mean Corpuscular Hemoglobin Conc 31.2 (L) 32.0 - 36.0 g/dL    RDW 13.9 11.5 - 14.5 %    Platelets 248 150 - 350 K/uL    MPV 10.0 9.2 - 12.9 fL    Immature Granulocytes 0.2 0.0 - 0.5 %    Gran # (ANC) 2.7 1.8 - 7.7 K/uL    Immature Grans (Abs) 0.01 0.00 - 0.04 K/uL    Lymph # 2.2 1.0 - 4.8 K/uL    Mono # 0.6 0.3 - 1.0 K/uL    Eos # 0.3 0.0 - 0.5 K/uL    Baso # 0.04 0.00 - 0.20 K/uL    nRBC 0 0 /100 WBC    Gran% 45.8 38.0 - 73.0 %    Lymph% 38.1 18.0 - 48.0 %    Mono% 9.7 4.0 - 15.0 %    Eosinophil% 5.5 0.0 - 8.0 %    Basophil% 0.7 0.0 - 1.9 %    Differential Method Automated    Protime-INR   Result Value Ref Range    Prothrombin Time 10.6 9.0 - 12.5 sec    INR 1.0 0.8 - 1.2   Troponin I   Result Value Ref Range    Troponin I 0.017 0.000 - 0.026 ng/mL   Brain natriuretic peptide   Result Value Ref Range     (H) 0 - 99 pg/mL   COVID-19 Rapid Screening   Result Value Ref Range    SARS-CoV-2 RNA, Amplification, Qual Negative Negative   TSH   Result Value Ref Range    TSH 3.952 0.400 - 4.000 uIU/mL   Basic metabolic panel   Result Value Ref Range    Sodium 145 136 - 145 mmol/L    Potassium 4.4 3.5 - 5.1 mmol/L    Chloride 108 95 - 110 mmol/L    CO2 29 23 - 29 mmol/L    Glucose 83 70 - 110 mg/dL    BUN, Bld 34 (H) 8 - 23 mg/dL    Creatinine 2.7 (H) 0.5 - 1.4 mg/dL    Calcium 7.5 (L) 8.7 - 10.5 mg/dL    Anion Gap 8 8 - 16 mmol/L    eGFR if   21 (A) >60 mL/min/1.73 m^2    eGFR if non African American 18 (A) >60 mL/min/1.73 m^2   Hemoglobin A1c   Result Value Ref Range    Hemoglobin A1C 5.9 (H) 4.0 - 5.6 %    Estimated Avg Glucose 123 68 - 131 mg/dL   Magnesium   Result Value Ref Range    Magnesium 1.6 1.6 - 2.6 mg/dL   Albumin   Result Value Ref Range    Albumin 1.2 (L) 3.5 - 5.2 g/dL   Echo Color Flow Doppler? Yes   Result Value Ref Range    BSA 2.19 m2    TDI SEPTAL 0.08 m/s    LV LATERAL E/E' RATIO 12.86 m/s    LV SEPTAL E/E' RATIO 11.25 m/s    LA WIDTH 3.99 cm    TDI LATERAL 0.07 m/s    LVIDd 3.15 (A) 3.5 - 6.0 cm    IVS 1.83 (A) 0.6 - 1.1 cm    Posterior Wall 1.57 (A) 0.6 - 1.1 cm    Ao root annulus 3.38 cm    LVIDs 2.27 2.1 - 4.0 cm    FS 28 28 - 44 %    LA volume 42.72 cm3    Sinus 2.74 cm    STJ 2.63 cm    Ascending aorta 3.08 cm    LV mass 208.40 g    LA size 3.34 cm    TAPSE 1.51 cm    Left Ventricle Relative Wall Thickness 1.00 cm    AV mean gradient 8 mmHg    AV valve area 2.00 cm2    AV Velocity Ratio 0.64     AV index (prosthetic) 0.65     MV valve area p 1/2 method 2.78 cm2    E/A ratio 0.93     Mean e' 0.08 m/s    E wave decelartion time 272.77 msec    IVRT 88.49 msec    LVOT diameter 1.98 cm    LVOT area 3.1 cm2    LVOT peak bart 1.27 m/s    LVOT peak VTI 26.50 cm    Ao peak bart 1.97 m/s    Ao VTI 40.85 cm    RVOT peak bart 0.99 m/s    RVOT peak VTI 20.88 cm    LVOT stroke volume 81.55 cm3    AV peak gradient 16 mmHg    PV mean gradient 2.00 mmHg    E/E' ratio 12.00 m/s    MV Peak E Bart 0.90 m/s    TR Max Bart 2.90 m/s    MV stenosis pressure 1/2 time 79.10 ms    MV Peak A Bart 0.97 m/s    LV Systolic Volume 17.50 mL    LV Systolic Volume Index 8.5 mL/m2    LV Diastolic Volume 39.35 mL    LV Diastolic Volume Index 19.08 mL/m2    LA Volume Index 20.7 mL/m2    LV Mass Index 101 g/m2    RA Major Axis 4.21 cm    Left Atrium Minor Axis 3.23 cm    Left Atrium Major Axis 4.53 cm    Triscuspid Valve Regurgitation Peak Gradient 34  mmHg    Right Atrial Pressure (from IVC) 3 mmHg    TV rest pulmonary artery pressure 37 mmHg   POCT glucose   Result Value Ref Range    POCT Glucose 127 (H) 70 - 110 mg/dL   POCT glucose   Result Value Ref Range    POCT Glucose 106 70 - 110 mg/dL     Significant Labs: All pertinent labs within the past 24 hours have been reviewed.  Imaging Results          X-Ray Chest 1 View (Final result)  Result time 10/09/20 06:42:54    Final result by Martell Jasmine MD (10/09/20 06:42:54)                 Impression:      No acute process seen.      Electronically signed by: Martell Jasmine MD  Date:    10/09/2020  Time:    06:42             Narrative:    EXAMINATION:  XR CHEST 1 VIEW    CLINICAL HISTORY:  Shortness of breath    FINDINGS:  Single view of the chest.  Comparison 09/24/2020    Cardiac silhouette remains enlarged.  Again persistent hazy opacity in the left lung base likely representing combination of small left-sided pleural effusion and mild left basilar atelectasis.  Right lung remains clear.  No acute osseous findings demonstrated.                              Significant Imaging: I have reviewed all pertinent imaging results/findings within the past 24 hours.      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  Must restrict fluid and salt intake and aggressive diuresis    Cirrhosis  -Hx of   -s/p TIPS procedure   -Albumin of 1.4 and AST 54 noted   -Repeat labs pending   -CMP AM draw    Continue aggressive diuresis with increase protein intake    KRISTAL on CPAP  -CPAP nightly    Severe KRISTAL likely contributing to the massive Anasarca sec to Cor Pulmonale as well  Start CPAP    Acquired  hypothyroidism  -Continue home med Synthroid   -TSH pending    TSH normal    Type 2 diabetes mellitus with hyperglycemia  BS under control      Hypertension  -Continue home Doxazosin 2mg nightly  -Metolazone 5mg daily  -Isosorbide mononitrate 30mg daily  -Amlodipine 10mg daily  -Cardiology following        VTE Risk Mitigation (From admission, onward)         Ordered     IP VTE HIGH RISK PATIENT  Once      10/09/20 0912     Place sequential compression device  Until discontinued      10/09/20 0912                Discharge Planning   GILLIAN:      Code Status: Full Code   Is the patient medically ready for discharge?:     Reason for patient still in hospital (select all that apply): Patient trending condition, Laboratory test and Treatment  Discharge Plan A: Home with family                  Mateo Floyd MD  Department of Hospital Medicine   [facility]

## 2020-10-10 NOTE — ASSESSMENT & PLAN NOTE
-CPAP nightly    Severe KRISTAL likely contributing to the massive Anasarca sec to Cor Pulmonale as well  Start CPAP

## 2020-10-10 NOTE — PROGRESS NOTES
[facility] -   Cardiology  Progress Note    Patient Name: Diamond Das  MRN: [id number]  Admission Date: 10/9/2020  Hospital Length of Stay: 0 days  Code Status: Full Code   Attending Physician: Mateo Floyd MD   Primary Care Physician: Andrey Perrin MD  Expected Discharge Date:   Principal Problem:Acute on chronic heart failure    Subjective:   HPI:  Ms. Das is a 65 year old female patient whose current medical conditions include diastolic CHF, pericardial effusion, HTN, hyperlipidemia, IDDM, and cryptogenic cirrhosis s/p TIPS who presented to [facility] ED yesterday with a complaint of worsening SOB over the past 1-2 days. Associated symptoms included bilateral lower extremity edema and weakness. Patient denied any associated morena chest pain, fever, chills, orthopnea, PND, palpitations, near syncope, or syncope. Initial workup in ED revealed creatinine of 2.6 and BNP > 300 and patient was subsequently admitted for further evaluation and treatment. Cardiology consulted to assist with management. Patient seen and examined today, sitting up in bed. Feeling better since admission, states SOB is improving. Denies any chest pain. She reports compliance with her medications but admits she ran out of her metolazone earlier this week. She has also been eating more salt. Chart reviewed. Troponin negative. Albumin 1.4. Echo pending.     Hospital Course:   10/10/2020-Patient seen and examined today, resting in bed. Feeling better. Less SOB. Still has some BLE edema. Labs reviewed. Albumin 1.2. Creatinine 2.7.        Review of Systems   Constitution: Positive for malaise/fatigue.   HENT: Negative.    Eyes: Negative.    Cardiovascular: Positive for dyspnea on exertion (imrpoved) and leg swelling.   Respiratory: Negative.    Endocrine: Negative.    Hematologic/Lymphatic: Negative.    Skin: Negative.    Musculoskeletal: Negative.    Gastrointestinal: Negative.    Genitourinary: Negative.    Neurological:  Negative.    Psychiatric/Behavioral: Negative.    Allergic/Immunologic: Negative.      Objective:     Vital Signs (Most Recent):  Temp: 98.4 °F (36.9 °C) (10/10/20 0718)  Pulse: 73 (10/10/20 0718)  Resp: 20 (10/10/20 0718)  BP: 128/70 (10/10/20 0718)  SpO2: 98 % (10/10/20 0718) Vital Signs (24h Range):  Temp:  [96.3 °F (35.7 °C)-98.9 °F (37.2 °C)] 98.4 °F (36.9 °C)  Pulse:  [71-80] 73  Resp:  [18-20] 20  SpO2:  [97 %-100 %] 98 %  BP: (121-142)/(60-70) 128/70     Weight: 113.5 kg (250 lb 3.6 oz)  Body mass index is 47.28 kg/m².     SpO2: 98 %  O2 Device (Oxygen Therapy): nasal cannula      Intake/Output Summary (Last 24 hours) at 10/10/2020 1114  Last data filed at 10/9/2020 1600  Gross per 24 hour   Intake 460 ml   Output --   Net 460 ml       Lines/Drains/Airways     Peripheral Intravenous Line                 Peripheral IV - Single Lumen 10/09/20 0443 20 G Right Antecubital 1 day                Physical Exam   Constitutional: She is oriented to person, place, and time. She appears well-developed and well-nourished. No distress.   On supplemental O2   HENT:   Head: Normocephalic and atraumatic.   Eyes: Pupils are equal, round, and reactive to light. Right eye exhibits no discharge. Left eye exhibits no discharge.   Neck: Neck supple. No JVD present.   Cardiovascular: Normal rate, regular rhythm, S1 normal, S2 normal and normal heart sounds.   No murmur heard.  Pulmonary/Chest: Effort normal and breath sounds normal. No respiratory distress. She has no wheezes. She has no rales.   Abdominal: Soft. She exhibits no distension.   Musculoskeletal:         General: Edema (BLE) present.   Neurological: She is alert and oriented to person, place, and time.   Skin: Skin is warm and dry. She is not diaphoretic. No erythema.   Psychiatric: She has a normal mood and affect. Her behavior is normal. Thought content normal.   Nursing note and vitals reviewed.      Significant Labs:   CMP   Recent Labs   Lab 10/09/20  1255  10/10/20  0750    145   K 4.3 4.4    108   CO2 27 29   * 83   BUN 33* 34*   CREATININE 2.7* 2.7*   CALCIUM 7.6* 7.5*   PROT 5.8*  --    ALBUMIN 1.4* 1.2*   BILITOT 0.1  --    ALKPHOS 107  --    AST 54*  --    ALT 14  --    ANIONGAP 7* 8   ESTGFRAFRICA 21* 21*   EGFRNONAA 18* 18*   , CBC   Recent Labs   Lab 10/09/20  0442   WBC 5.78   HGB 10.7*   HCT 34.3*      , Troponin   Recent Labs   Lab 10/09/20  0442   TROPONINI 0.017    and All pertinent lab results from the last 24 hours have been reviewed.    Significant Imaging: Echocardiogram:   Transthoracic echo (TTE) complete (Cupid Only):   Results for orders placed or performed during the hospital encounter of 10/09/20   Echo Color Flow Doppler? Yes   Result Value Ref Range    BSA 2.19 m2    TDI SEPTAL 0.08 m/s    LV LATERAL E/E' RATIO 12.86 m/s    LV SEPTAL E/E' RATIO 11.25 m/s    LA WIDTH 3.99 cm    TDI LATERAL 0.07 m/s    LVIDd 3.15 (A) 3.5 - 6.0 cm    IVS 1.83 (A) 0.6 - 1.1 cm    Posterior Wall 1.57 (A) 0.6 - 1.1 cm    Ao root annulus 3.38 cm    LVIDs 2.27 2.1 - 4.0 cm    FS 28 28 - 44 %    LA volume 42.72 cm3    Sinus 2.74 cm    STJ 2.63 cm    Ascending aorta 3.08 cm    LV mass 208.40 g    LA size 3.34 cm    TAPSE 1.51 cm    Left Ventricle Relative Wall Thickness 1.00 cm    AV mean gradient 8 mmHg    AV valve area 2.00 cm2    AV Velocity Ratio 0.64     AV index (prosthetic) 0.65     MV valve area p 1/2 method 2.78 cm2    E/A ratio 0.93     Mean e' 0.08 m/s    E wave decelartion time 272.77 msec    IVRT 88.49 msec    LVOT diameter 1.98 cm    LVOT area 3.1 cm2    LVOT peak bart 1.27 m/s    LVOT peak VTI 26.50 cm    Ao peak bart 1.97 m/s    Ao VTI 40.85 cm    RVOT peak bart 0.99 m/s    RVOT peak VTI 20.88 cm    LVOT stroke volume 81.55 cm3    AV peak gradient 16 mmHg    PV mean gradient 2.00 mmHg    E/E' ratio 12.00 m/s    MV Peak E Bart 0.90 m/s    TR Max Bart 2.90 m/s    MV stenosis pressure 1/2 time 79.10 ms    MV Peak A Bart 0.97 m/s    LV  Systolic Volume 17.50 mL    LV Systolic Volume Index 8.5 mL/m2    LV Diastolic Volume 39.35 mL    LV Diastolic Volume Index 19.08 mL/m2    LA Volume Index 20.7 mL/m2    LV Mass Index 101 g/m2    RA Major Axis 4.21 cm    Left Atrium Minor Axis 3.23 cm    Left Atrium Major Axis 4.53 cm    Triscuspid Valve Regurgitation Peak Gradient 34 mmHg    Right Atrial Pressure (from IVC) 3 mmHg    TV rest pulmonary artery pressure 37 mmHg    Narrative    · There is moderate left ventricular concentric hypertrophy.  · The left ventricle is small with normal systolic function. The estimated   ejection fraction is 60%.  · Grade I diastolic dysfunction.  · Normal right ventricular systolic function.  · Mild tricuspid regurgitation.  · Normal central venous pressure (3 mmHg).  · The estimated PA systolic pressure is 37 mmHg.  · Small pericardial effusion.  · There is a moderate left pleural effusion.      , EKG: Reviewed and X-Ray: CXR: X-Ray Chest 1 View (CXR):   Results for orders placed or performed during the hospital encounter of 10/09/20   X-Ray Chest 1 View    Narrative    EXAMINATION:  XR CHEST 1 VIEW    CLINICAL HISTORY:  Shortness of breath    FINDINGS:  Single view of the chest.  Comparison 09/24/2020    Cardiac silhouette remains enlarged.  Again persistent hazy opacity in the left lung base likely representing combination of small left-sided pleural effusion and mild left basilar atelectasis.  Right lung remains clear.  No acute osseous findings demonstrated.      Impression    No acute process seen.      Electronically signed by: Martell Jasmine MD  Date:    10/09/2020  Time:    06:42    and X-Ray Chest PA and Lateral (CXR): No results found for this visit on 10/09/20.    Assessment and Plan:   Patient who presents with volume overload in setting of cirrhosis and decompensated diastolic CHF. Improved with diuresis. Continue same meds/mgmt. Follow-up in clinic.    * Acute on chronic heart failure  -Presents with  decompensated diastolic CHF in part due to running out of metolazone and dietary non-compliance  -Continue IV diuresis  -Continue amlodipine, Imdur  -No ACEi/ARB given kidney function  -Strict I's/O's  -Check echo  -Dietary restrictions re-discussed    10/10/2020  -Clinically improved  -Continue current CV meds/mgmt  -Counseled on low salt diet  -Follow-up in clinic    CHF exacerbation  -Patient presents with decompensated CHF  -Assess response to diuresis      Hypertension  -Continue amlodipine  -Imdur 30 mg daily added  -Assess response    Cirrhosis  -Contributing to fluid status  -Mgmt as per primary team    Type 2 diabetes mellitus with circulatory disorder  -Mgmt as per primary team        VTE Risk Mitigation (From admission, onward)         Ordered     IP VTE HIGH RISK PATIENT  Once      10/09/20 0912     Place sequential compression device  Until discontinued      10/09/20 0912                Samantha Osei PA-C  Cardiology  [facility]

## 2020-10-10 NOTE — SUBJECTIVE & OBJECTIVE
Review of Systems   Constitution: Positive for malaise/fatigue.   HENT: Negative.    Eyes: Negative.    Cardiovascular: Positive for dyspnea on exertion (imrpoved) and leg swelling.   Respiratory: Negative.    Endocrine: Negative.    Hematologic/Lymphatic: Negative.    Skin: Negative.    Musculoskeletal: Negative.    Gastrointestinal: Negative.    Genitourinary: Negative.    Neurological: Negative.    Psychiatric/Behavioral: Negative.    Allergic/Immunologic: Negative.      Objective:     Vital Signs (Most Recent):  Temp: 98.4 °F (36.9 °C) (10/10/20 0718)  Pulse: 73 (10/10/20 0718)  Resp: 20 (10/10/20 0718)  BP: 128/70 (10/10/20 0718)  SpO2: 98 % (10/10/20 0718) Vital Signs (24h Range):  Temp:  [96.3 °F (35.7 °C)-98.9 °F (37.2 °C)] 98.4 °F (36.9 °C)  Pulse:  [71-80] 73  Resp:  [18-20] 20  SpO2:  [97 %-100 %] 98 %  BP: (121-142)/(60-70) 128/70     Weight: 113.5 kg (250 lb 3.6 oz)  Body mass index is 47.28 kg/m².     SpO2: 98 %  O2 Device (Oxygen Therapy): nasal cannula      Intake/Output Summary (Last 24 hours) at 10/10/2020 1114  Last data filed at 10/9/2020 1600  Gross per 24 hour   Intake 460 ml   Output --   Net 460 ml       Lines/Drains/Airways     Peripheral Intravenous Line                 Peripheral IV - Single Lumen 10/09/20 0443 20 G Right Antecubital 1 day                Physical Exam   Constitutional: She is oriented to person, place, and time. She appears well-developed and well-nourished. No distress.   On supplemental O2   HENT:   Head: Normocephalic and atraumatic.   Eyes: Pupils are equal, round, and reactive to light. Right eye exhibits no discharge. Left eye exhibits no discharge.   Neck: Neck supple. No JVD present.   Cardiovascular: Normal rate, regular rhythm, S1 normal, S2 normal and normal heart sounds.   No murmur heard.  Pulmonary/Chest: Effort normal and breath sounds normal. No respiratory distress. She has no wheezes. She has no rales.   Abdominal: Soft. She exhibits no distension.    Musculoskeletal:         General: Edema (BLE) present.   Neurological: She is alert and oriented to person, place, and time.   Skin: Skin is warm and dry. She is not diaphoretic. No erythema.   Psychiatric: She has a normal mood and affect. Her behavior is normal. Thought content normal.   Nursing note and vitals reviewed.      Significant Labs:   CMP   Recent Labs   Lab 10/09/20  0442 10/10/20  0750    145   K 4.3 4.4    108   CO2 27 29   * 83   BUN 33* 34*   CREATININE 2.7* 2.7*   CALCIUM 7.6* 7.5*   PROT 5.8*  --    ALBUMIN 1.4* 1.2*   BILITOT 0.1  --    ALKPHOS 107  --    AST 54*  --    ALT 14  --    ANIONGAP 7* 8   ESTGFRAFRICA 21* 21*   EGFRNONAA 18* 18*   , CBC   Recent Labs   Lab 10/09/20  0442   WBC 5.78   HGB 10.7*   HCT 34.3*      , Troponin   Recent Labs   Lab 10/09/20  0442   TROPONINI 0.017    and All pertinent lab results from the last 24 hours have been reviewed.    Significant Imaging: Echocardiogram:   Transthoracic echo (TTE) complete (Cupid Only):   Results for orders placed or performed during the hospital encounter of 10/09/20   Echo Color Flow Doppler? Yes   Result Value Ref Range    BSA 2.19 m2    TDI SEPTAL 0.08 m/s    LV LATERAL E/E' RATIO 12.86 m/s    LV SEPTAL E/E' RATIO 11.25 m/s    LA WIDTH 3.99 cm    TDI LATERAL 0.07 m/s    LVIDd 3.15 (A) 3.5 - 6.0 cm    IVS 1.83 (A) 0.6 - 1.1 cm    Posterior Wall 1.57 (A) 0.6 - 1.1 cm    Ao root annulus 3.38 cm    LVIDs 2.27 2.1 - 4.0 cm    FS 28 28 - 44 %    LA volume 42.72 cm3    Sinus 2.74 cm    STJ 2.63 cm    Ascending aorta 3.08 cm    LV mass 208.40 g    LA size 3.34 cm    TAPSE 1.51 cm    Left Ventricle Relative Wall Thickness 1.00 cm    AV mean gradient 8 mmHg    AV valve area 2.00 cm2    AV Velocity Ratio 0.64     AV index (prosthetic) 0.65     MV valve area p 1/2 method 2.78 cm2    E/A ratio 0.93     Mean e' 0.08 m/s    E wave decelartion time 272.77 msec    IVRT 88.49 msec    LVOT diameter 1.98 cm    LVOT area 3.1  cm2    LVOT peak bart 1.27 m/s    LVOT peak VTI 26.50 cm    Ao peak bart 1.97 m/s    Ao VTI 40.85 cm    RVOT peak bart 0.99 m/s    RVOT peak VTI 20.88 cm    LVOT stroke volume 81.55 cm3    AV peak gradient 16 mmHg    PV mean gradient 2.00 mmHg    E/E' ratio 12.00 m/s    MV Peak E Bart 0.90 m/s    TR Max Bart 2.90 m/s    MV stenosis pressure 1/2 time 79.10 ms    MV Peak A Bart 0.97 m/s    LV Systolic Volume 17.50 mL    LV Systolic Volume Index 8.5 mL/m2    LV Diastolic Volume 39.35 mL    LV Diastolic Volume Index 19.08 mL/m2    LA Volume Index 20.7 mL/m2    LV Mass Index 101 g/m2    RA Major Axis 4.21 cm    Left Atrium Minor Axis 3.23 cm    Left Atrium Major Axis 4.53 cm    Triscuspid Valve Regurgitation Peak Gradient 34 mmHg    Right Atrial Pressure (from IVC) 3 mmHg    TV rest pulmonary artery pressure 37 mmHg    Narrative    · There is moderate left ventricular concentric hypertrophy.  · The left ventricle is small with normal systolic function. The estimated   ejection fraction is 60%.  · Grade I diastolic dysfunction.  · Normal right ventricular systolic function.  · Mild tricuspid regurgitation.  · Normal central venous pressure (3 mmHg).  · The estimated PA systolic pressure is 37 mmHg.  · Small pericardial effusion.  · There is a moderate left pleural effusion.      , EKG: Reviewed and X-Ray: CXR: X-Ray Chest 1 View (CXR):   Results for orders placed or performed during the hospital encounter of 10/09/20   X-Ray Chest 1 View    Narrative    EXAMINATION:  XR CHEST 1 VIEW    CLINICAL HISTORY:  Shortness of breath    FINDINGS:  Single view of the chest.  Comparison 09/24/2020    Cardiac silhouette remains enlarged.  Again persistent hazy opacity in the left lung base likely representing combination of small left-sided pleural effusion and mild left basilar atelectasis.  Right lung remains clear.  No acute osseous findings demonstrated.      Impression    No acute process seen.      Electronically signed by: Martell  MD Mitali  Date:    10/09/2020  Time:    06:42    and X-Ray Chest PA and Lateral (CXR): No results found for this visit on 10/09/20.

## 2020-10-11 PROBLEM — J96.01 ACUTE HYPOXEMIC RESPIRATORY FAILURE: Status: ACTIVE | Noted: 2020-10-11

## 2020-10-11 LAB
ANION GAP SERPL CALC-SCNC: 4 MMOL/L (ref 8–16)
BACTERIA #/AREA URNS HPF: NORMAL /HPF
BILIRUB UR QL STRIP: NEGATIVE
BNP SERPL-MCNC: 129 PG/ML (ref 0–99)
BUN SERPL-MCNC: 37 MG/DL (ref 8–23)
CALCIUM SERPL-MCNC: 7.2 MG/DL (ref 8.7–10.5)
CHLORIDE SERPL-SCNC: 107 MMOL/L (ref 95–110)
CLARITY UR: CLEAR
CO2 SERPL-SCNC: 31 MMOL/L (ref 23–29)
COLOR UR: YELLOW
CREAT SERPL-MCNC: 2.7 MG/DL (ref 0.5–1.4)
CREAT UR-MCNC: 42.8 MG/DL (ref 15–325)
CRP SERPL-MCNC: 34.3 MG/L (ref 0–8.2)
ERYTHROCYTE [SEDIMENTATION RATE] IN BLOOD BY WESTERGREN METHOD: 108 MM/HR (ref 0–20)
EST. GFR  (AFRICAN AMERICAN): 21 ML/MIN/1.73 M^2
EST. GFR  (NON AFRICAN AMERICAN): 18 ML/MIN/1.73 M^2
GLUCOSE SERPL-MCNC: 179 MG/DL (ref 70–110)
GLUCOSE UR QL STRIP: ABNORMAL
HGB UR QL STRIP: ABNORMAL
HYALINE CASTS #/AREA URNS LPF: 0 /LPF
KETONES UR QL STRIP: NEGATIVE
LEUKOCYTE ESTERASE UR QL STRIP: NEGATIVE
MICROSCOPIC COMMENT: NORMAL
NITRITE UR QL STRIP: NEGATIVE
PH UR STRIP: 6 [PH] (ref 5–8)
POCT GLUCOSE: 141 MG/DL (ref 70–110)
POCT GLUCOSE: 47 MG/DL (ref 70–110)
POCT GLUCOSE: 82 MG/DL (ref 70–110)
POTASSIUM SERPL-SCNC: 3.8 MMOL/L (ref 3.5–5.1)
PROT UR QL STRIP: ABNORMAL
PROT UR-MCNC: 210 MG/DL (ref 0–15)
PROT UR-MCNC: 210 MG/DL (ref 0–15)
PROT/CREAT UR: 4.91 MG/G{CREAT} (ref 0–0.2)
RBC #/AREA URNS HPF: 0 /HPF (ref 0–4)
SODIUM SERPL-SCNC: 142 MMOL/L (ref 136–145)
SP GR UR STRIP: 1.01 (ref 1–1.03)
SQUAMOUS #/AREA URNS HPF: 1 /HPF
URN SPEC COLLECT METH UR: ABNORMAL
UROBILINOGEN UR STRIP-ACNC: NEGATIVE EU/DL
WBC #/AREA URNS HPF: 1 /HPF (ref 0–5)
YEAST URNS QL MICRO: NORMAL

## 2020-10-11 PROCEDURE — 81000 URINALYSIS NONAUTO W/SCOPE: CPT

## 2020-10-11 PROCEDURE — 63600175 PHARM REV CODE 636 W HCPCS: Performed by: INTERNAL MEDICINE

## 2020-10-11 PROCEDURE — 25000003 PHARM REV CODE 250: Performed by: EMERGENCY MEDICINE

## 2020-10-11 PROCEDURE — G0378 HOSPITAL OBSERVATION PER HR: HCPCS

## 2020-10-11 PROCEDURE — 99232 PR SUBSEQUENT HOSPITAL CARE,LEVL II: ICD-10-PCS | Mod: ,,, | Performed by: INTERNAL MEDICINE

## 2020-10-11 PROCEDURE — 86039 ANTINUCLEAR ANTIBODIES (ANA): CPT

## 2020-10-11 PROCEDURE — 86038 ANTINUCLEAR ANTIBODIES: CPT

## 2020-10-11 PROCEDURE — 99233 PR SUBSEQUENT HOSPITAL CARE,LEVL III: ICD-10-PCS | Mod: ,,, | Performed by: INTERNAL MEDICINE

## 2020-10-11 PROCEDURE — 21400001 HC TELEMETRY ROOM

## 2020-10-11 PROCEDURE — 97110 THERAPEUTIC EXERCISES: CPT

## 2020-10-11 PROCEDURE — 94761 N-INVAS EAR/PLS OXIMETRY MLT: CPT

## 2020-10-11 PROCEDURE — 83880 ASSAY OF NATRIURETIC PEPTIDE: CPT

## 2020-10-11 PROCEDURE — 96375 TX/PRO/DX INJ NEW DRUG ADDON: CPT | Performed by: EMERGENCY MEDICINE

## 2020-10-11 PROCEDURE — 99233 SBSQ HOSP IP/OBS HIGH 50: CPT | Mod: ,,, | Performed by: INTERNAL MEDICINE

## 2020-10-11 PROCEDURE — 99232 SBSQ HOSP IP/OBS MODERATE 35: CPT | Mod: ,,, | Performed by: INTERNAL MEDICINE

## 2020-10-11 PROCEDURE — 86140 C-REACTIVE PROTEIN: CPT

## 2020-10-11 PROCEDURE — 25000003 PHARM REV CODE 250: Performed by: NURSE PRACTITIONER

## 2020-10-11 PROCEDURE — 99900035 HC TECH TIME PER 15 MIN (STAT)

## 2020-10-11 PROCEDURE — 25000003 PHARM REV CODE 250: Performed by: PHYSICIAN ASSISTANT

## 2020-10-11 PROCEDURE — 97161 PT EVAL LOW COMPLEX 20 MIN: CPT

## 2020-10-11 PROCEDURE — 82164 ANGIOTENSIN I ENZYME TEST: CPT

## 2020-10-11 PROCEDURE — 97530 THERAPEUTIC ACTIVITIES: CPT

## 2020-10-11 PROCEDURE — 85651 RBC SED RATE NONAUTOMATED: CPT

## 2020-10-11 PROCEDURE — 86431 RHEUMATOID FACTOR QUANT: CPT

## 2020-10-11 PROCEDURE — 94660 CPAP INITIATION&MGMT: CPT

## 2020-10-11 PROCEDURE — 96376 TX/PRO/DX INJ SAME DRUG ADON: CPT | Performed by: EMERGENCY MEDICINE

## 2020-10-11 PROCEDURE — 80048 BASIC METABOLIC PNL TOTAL CA: CPT

## 2020-10-11 PROCEDURE — 36415 COLL VENOUS BLD VENIPUNCTURE: CPT

## 2020-10-11 PROCEDURE — 96372 THER/PROPH/DIAG INJ SC/IM: CPT

## 2020-10-11 PROCEDURE — 84156 ASSAY OF PROTEIN URINE: CPT

## 2020-10-11 PROCEDURE — 94799 UNLISTED PULMONARY SVC/PX: CPT

## 2020-10-11 PROCEDURE — 97165 OT EVAL LOW COMPLEX 30 MIN: CPT

## 2020-10-11 PROCEDURE — 86235 NUCLEAR ANTIGEN ANTIBODY: CPT | Mod: 59

## 2020-10-11 RX ADMIN — DEXTROSE MONOHYDRATE 25 G: 25 INJECTION, SOLUTION INTRAVENOUS at 06:10

## 2020-10-11 RX ADMIN — ASPIRIN 81 MG: 81 TABLET, COATED ORAL at 08:10

## 2020-10-11 RX ADMIN — ISOSORBIDE MONONITRATE 30 MG: 30 TABLET, EXTENDED RELEASE ORAL at 08:10

## 2020-10-11 RX ADMIN — DOXAZOSIN MESYLATE 2 MG: 2 TABLET ORAL at 09:10

## 2020-10-11 RX ADMIN — FUROSEMIDE 80 MG: 10 INJECTION, SOLUTION INTRAMUSCULAR; INTRAVENOUS at 06:10

## 2020-10-11 RX ADMIN — AMLODIPINE BESYLATE 10 MG: 10 TABLET ORAL at 08:10

## 2020-10-11 RX ADMIN — LEVOTHYROXINE SODIUM 137 MCG: 25 TABLET ORAL at 06:10

## 2020-10-11 RX ADMIN — METOLAZONE 5 MG: 5 TABLET ORAL at 08:10

## 2020-10-11 RX ADMIN — POLYETHYLENE GLYCOL 3350 17 G: 17 POWDER, FOR SOLUTION ORAL at 08:10

## 2020-10-11 RX ADMIN — FAMOTIDINE 20 MG: 20 TABLET ORAL at 08:10

## 2020-10-11 NOTE — SUBJECTIVE & OBJECTIVE
Interval History: appears same, pleasant and comfortable, still has massive anasarca, 3-4 +++ leg edema, all the way up to lower chest, getting IV lasix and Zaroxolyn. Echo showed mild DD with EF 60%. Weight down to 111 kg- 244 lbs, Cr still 2.7. did well with PT/OT.    Review of Systems   Constitutional: Positive for activity change and unexpected weight change. Negative for chills, diaphoresis and fever.   HENT: Negative for congestion, sneezing, sore throat and trouble swallowing.    Eyes: Negative for photophobia, redness, itching and visual disturbance.   Respiratory: Positive for shortness of breath. Negative for choking, chest tightness, wheezing and stridor.    Cardiovascular: Positive for leg swelling. Negative for chest pain.   Gastrointestinal: Positive for abdominal distention. Negative for abdominal pain, constipation and diarrhea.   Endocrine: Negative for cold intolerance, polyphagia and polyuria.   Genitourinary: Negative for flank pain, frequency and urgency.   Musculoskeletal: Negative for arthralgias, back pain, joint swelling, neck pain and neck stiffness.   Allergic/Immunologic: Negative for immunocompromised state.   Neurological: Positive for weakness (Generalized weaknes). Negative for dizziness, seizures, facial asymmetry and speech difficulty.   Psychiatric/Behavioral: Negative for agitation, behavioral problems and confusion.     Objective:     Vital Signs (Most Recent):  Temp: 97.3 °F (36.3 °C) (10/11/20 0727)  Pulse: 75 (10/11/20 0900)  Resp: 20 (10/11/20 0727)  BP: 120/67 (10/11/20 0727)  SpO2: 97 % (10/11/20 0727) Vital Signs (24h Range):  Temp:  [97.1 °F (36.2 °C)-99.7 °F (37.6 °C)] 97.3 °F (36.3 °C)  Pulse:  [66-93] 75  Resp:  [18-20] 20  SpO2:  [97 %-99 %] 97 %  BP: (120-134)/(57-71) 120/67     Weight: 111 kg (244 lb 11.4 oz)  Body mass index is 46.24 kg/m².    Intake/Output Summary (Last 24 hours) at 10/11/2020 1045  Last data filed at 10/11/2020 0500  Gross per 24 hour   Intake  920 ml   Output --   Net 920 ml      Physical Exam  Vitals signs and nursing note reviewed.   Constitutional:       Appearance: Normal appearance.   HENT:      Head: Normocephalic and atraumatic.   Eyes:      General: No scleral icterus.     Extraocular Movements: Extraocular movements intact.      Pupils: Pupils are equal, round, and reactive to light.   Neck:      Musculoskeletal: No neck rigidity.      Vascular: No carotid bruit.   Cardiovascular:      Rate and Rhythm: Normal rate and regular rhythm.   Pulmonary:      Effort: No respiratory distress.      Breath sounds: No stridor. No wheezing.      Comments: Shallow breath sounds in x4 quadrants  Abdominal:      General: There is no distension.      Palpations: Abdomen is soft.      Tenderness: There is no abdominal tenderness.   Musculoskeletal:      Right lower leg: Edema present.      Left lower leg: Edema present.      Comments: +3 bilateral pitting edema   Skin:     General: Skin is warm and dry.      Capillary Refill: Capillary refill takes less than 2 seconds.      Findings: No erythema or rash.   Neurological:      Mental Status: She is alert and oriented to person, place, and time.      Cranial Nerves: No cranial nerve deficit.      Coordination: Coordination normal.      Gait: Gait normal.   Psychiatric:         Mood and Affect: Mood normal.         Behavior: Behavior normal.         Significant Labs:   BMP:   Recent Labs   Lab 10/10/20  0750 10/11/20  0628   GLU 83 179*    142   K 4.4 3.8    107   CO2 29 31*   BUN 34* 37*   CREATININE 2.7* 2.7*   CALCIUM 7.5* 7.2*   MG 1.6  --      CBC: No results for input(s): WBC, HGB, HCT, PLT in the last 48 hours.  All pertinent labs within the past 24 hours have been reviewed.    Significant Imaging: I have reviewed all pertinent imaging results/findings within the past 24 hours.

## 2020-10-11 NOTE — PLAN OF CARE
Bed Mobility sba; Transfers cg/sba no ad; Amb cga x 30ft in room on O2; rec HHPT and use of RW for safety with gt

## 2020-10-11 NOTE — PROGRESS NOTES
[facility] Hospital Medicine  Progress Note    Patient Name: Diamond Das  MRN: [id number]  Patient Class: OP- Observation   Admission Date: 10/9/2020  Length of Stay: 0 days  Attending Physician: Mateo Floyd MD  Primary Care Provider: Andrey Perrin MD        Subjective:     Principal Problem:Acute on chronic heart failure        HPI:  Ms Das is a 64 yo F with pmhx of CHf, HTN, sleep apnea, Gerd, HLD presented to ED with increased SOB which began this morning. States she ran out of her metolazone on Monday and that she has not noticed any improvement since addition of the medication. Associated symptoms include: bilateral lower extremity edema and generalized weakness.  States she is compliant with her medication otherwise and recently started following a restricted sodium diet inclusive of frozen chicken and fish. She is scheduled for an ECHO on Oct 16. Pt denies chest pain, headache, palpitations, fever, N/V, abdominal pain, and additional comp[liants. Pt followed outpatient by Dr Ramos (Cardiology) with medications recently adjusted and no significant symptom improvement.  Pt is a full code and daughter is the surrogate decision maker.  ER course: BUN/Creat- 33/2.7, AST/ALT-54/14, Troponin-0.017, BNP-351, CXR-negative. In the ED she received Lasix 80mg IV. ER discussed case with cardiology who reccommended admission HM contacted for pt placement in OBS for further evaluation.     Overview/Hospital Course:  Ms Das is a 64 yo F with Diastolic CHF, Pericardial Effusion, HTN, KRISTAL, GERD, HLP, IDDM, and cryptogenic cirrhosis s/p TIPS, presented to ED with increased SOB which began this morning associated with BLE edema and generalized weakness.  States she is compliant with her medication and diet. BUN/Creat- 33/2.7, AST/ALT-54/14, Troponin-0.017, BNP-351, CXR-negative. In the ED she received Lasix 80mg IV.   10/10- feels better, SOB, leg swelling improving, wanted to know if she  can go home but has massive anasarca, up to lower part of chest. Will continue aggressive Diuresis and daily weight charting plus add PT/OT. Bun/Cr 34/2.7/ Alb 1.4.  10/11- appears same, pleasant and comfortable, still has massive anasarca, 3-4 +++ leg edema, all the way up to lower chest, getting IV lasix and Zaroxolyn. Echo showed mild DD with EF 60%. Weight down to 111 kg- 244 lbs, Cr still 2.7. did well with PT/OT.    Interval History: appears same, pleasant and comfortable, still has massive anasarca, 3-4 +++ leg edema, all the way up to lower chest, getting IV lasix and Zaroxolyn. Echo showed mild DD with EF 60%. Weight down to 111 kg- 244 lbs, Cr still 2.7. did well with PT/OT.    Review of Systems   Constitutional: Positive for activity change and unexpected weight change. Negative for chills, diaphoresis and fever.   HENT: Negative for congestion, sneezing, sore throat and trouble swallowing.    Eyes: Negative for photophobia, redness, itching and visual disturbance.   Respiratory: Positive for shortness of breath. Negative for choking, chest tightness, wheezing and stridor.    Cardiovascular: Positive for leg swelling. Negative for chest pain.   Gastrointestinal: Positive for abdominal distention. Negative for abdominal pain, constipation and diarrhea.   Endocrine: Negative for cold intolerance, polyphagia and polyuria.   Genitourinary: Negative for flank pain, frequency and urgency.   Musculoskeletal: Negative for arthralgias, back pain, joint swelling, neck pain and neck stiffness.   Allergic/Immunologic: Negative for immunocompromised state.   Neurological: Positive for weakness (Generalized weaknes). Negative for dizziness, seizures, facial asymmetry and speech difficulty.   Psychiatric/Behavioral: Negative for agitation, behavioral problems and confusion.     Objective:     Vital Signs (Most Recent):  Temp: 97.3 °F (36.3 °C) (10/11/20 0727)  Pulse: 75 (10/11/20 0900)  Resp: 20 (10/11/20 0727)  BP:  120/67 (10/11/20 0727)  SpO2: 97 % (10/11/20 0727) Vital Signs (24h Range):  Temp:  [97.1 °F (36.2 °C)-99.7 °F (37.6 °C)] 97.3 °F (36.3 °C)  Pulse:  [66-93] 75  Resp:  [18-20] 20  SpO2:  [97 %-99 %] 97 %  BP: (120-134)/(57-71) 120/67     Weight: 111 kg (244 lb 11.4 oz)  Body mass index is 46.24 kg/m².    Intake/Output Summary (Last 24 hours) at 10/11/2020 1045  Last data filed at 10/11/2020 0500  Gross per 24 hour   Intake 920 ml   Output --   Net 920 ml      Physical Exam  Vitals signs and nursing note reviewed.   Constitutional:       Appearance: Normal appearance.   HENT:      Head: Normocephalic and atraumatic.   Eyes:      General: No scleral icterus.     Extraocular Movements: Extraocular movements intact.      Pupils: Pupils are equal, round, and reactive to light.   Neck:      Musculoskeletal: No neck rigidity.      Vascular: No carotid bruit.   Cardiovascular:      Rate and Rhythm: Normal rate and regular rhythm.   Pulmonary:      Effort: No respiratory distress.      Breath sounds: No stridor. No wheezing.      Comments: Shallow breath sounds in x4 quadrants  Abdominal:      General: There is no distension.      Palpations: Abdomen is soft.      Tenderness: There is no abdominal tenderness.   Musculoskeletal:      Right lower leg: Edema present.      Left lower leg: Edema present.      Comments: +3 bilateral pitting edema   Skin:     General: Skin is warm and dry.      Capillary Refill: Capillary refill takes less than 2 seconds.      Findings: No erythema or rash.   Neurological:      Mental Status: She is alert and oriented to person, place, and time.      Cranial Nerves: No cranial nerve deficit.      Coordination: Coordination normal.      Gait: Gait normal.   Psychiatric:         Mood and Affect: Mood normal.         Behavior: Behavior normal.         Significant Labs:   BMP:   Recent Labs   Lab 10/10/20  0750 10/11/20  0628   GLU 83 179*    142   K 4.4 3.8    107   CO2 29 31*   BUN 34*  37*   CREATININE 2.7* 2.7*   CALCIUM 7.5* 7.2*   MG 1.6  --      CBC: No results for input(s): WBC, HGB, HCT, PLT in the last 48 hours.  All pertinent labs within the past 24 hours have been reviewed.    Significant Imaging: I have reviewed all pertinent imaging results/findings within the past 24 hours.      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  Must restrict fluid and salt intake and aggressive diuresis    Very slow improvement- continue  May need Bumex instead of Lasix- will change    Cirrhosis  -Hx of   -s/p TIPS procedure   -Albumin of 1.4 and AST 54 noted   -Repeat labs pending   -CMP AM draw    Continue aggressive diuresis with increase protein intake    KRISTAL on CPAP  -CPAP nightly    Severe KRISTAL likely contributing to the massive Anasarca sec to Cor Pulmonale as well  Start CPAP    Pt used CPAP last night    Acquired hypothyroidism  -Continue home med Synthroid   -TSH pending    TSH normal    Type 2 diabetes mellitus with hyperglycemia  BS under control      Hypertension  -Continue home Doxazosin 2mg nightly  -Metolazone 5mg daily  -Isosorbide mononitrate 30mg daily  -Amlodipine 10mg daily  -Cardiology following      Recurrent pleural effusion on left  Expect it to resolve with CHF        VTE Risk Mitigation (From admission, onward)         Ordered     IP VTE HIGH RISK PATIENT  Once      10/09/20 0912     Place sequential compression device  Until discontinued      10/09/20 0912                Discharge Planning   GILLIAN:      Code Status: Full Code   Is the patient medically ready for discharge?:     Reason for patient still in hospital (select all that apply): Patient trending  condition, Treatment and Consult recommendations  Discharge Plan A: Home with family                  Mateo Floyd MD  Department of Hospital Medicine   [facility]

## 2020-10-11 NOTE — HPI
Patient is a 65-year-old female with history of type 2 diabetes hypertension and morbid obesity.  Patient also has history of chronic kidney disease with baseline creatinine ranging between 1.8 in 2.0.  Patient was last seen in the nephrology clinic by Dr. Contrersa in 2018.  Patient also has stage IV cirrhosis of liver biopsy proven.  Patient also underwent TIPS procedure 3 years ago.  Since then patient has been off and on deteriorating.  Patient has a last echo done 2 days ago which showed EF of 65%.  Patient has had multiple episodes of worsening creatinine.  Patient currently is in the hospital with an albumin of 1.2 with generalized anasarca and failure to diurese on IV diuretics.  Patient seen and examined at the bedside.  Do lengthy discussion with the patient at the bedside.  Patient has hepatorenal syndrome as well as cardiorenal syndrome.  Prognosis seems to be very poor explained to the patient in detail.  Patient opted to be not on in life support.  From Nephrology standpoint she is not a candidate for renal replacement therapy at this point due to poor prognosis as well as very low albumin.  After lengthy discussion with the patient it was decided the patient would be a better candidate for hospice management at home.  Case and findings discussed with .    10/13/2020 nephrotic range proteinuria.  Workup for autoimmune disorder.  May require kidney biopsy.  IV albumin PICC line plans noted.  Tips is still patent.    10/14/2020 autoimmune workup ordered.  Kidney biopsy scheduled for next week.  Renal anasarca entertained.  Follow-up planned

## 2020-10-11 NOTE — ASSESSMENT & PLAN NOTE
-Admit to OBS  -IV Lasix 40mg BID  -Supplemental O2 PRN  -Low Na diet  -Strict I& O  -Daily weights  -Troponins trended with initial result negative   -ECHO pending   -Cardiology following- will await further recommendations    Massive Anasarca sec to Cirrhosis with severe Hypoalbuminemia and likely Cardiorenal Synd  Must restrict fluid and salt intake and aggressive diuresis    Very slow improvement- continue  May need Bumex instead of Lasix- will change

## 2020-10-11 NOTE — PROGRESS NOTES
[facility]   Cardiology  Progress Note    Patient Name: Diamond Das  MRN: [id number]  Admission Date: 10/9/2020  Hospital Length of Stay: 0 days  Code Status: Full Code   Attending Physician: Mateo Floyd MD   Primary Care Physician: Andrey Perrin MD  Expected Discharge Date:   Principal Problem:Acute on chronic heart failure    Subjective:   HPI:  Ms. Das is a 65 year old female patient whose current medical conditions include diastolic CHF, pericardial effusion, HTN, hyperlipidemia, IDDM, and cryptogenic cirrhosis s/p TIPS who presented to [facility] ED yesterday with a complaint of worsening SOB over the past 1-2 days. Associated symptoms included bilateral lower extremity edema and weakness. Patient denied any associated morena chest pain, fever, chills, orthopnea, PND, palpitations, near syncope, or syncope. Initial workup in ED revealed creatinine of 2.6 and BNP > 300 and patient was subsequently admitted for further evaluation and treatment. Cardiology consulted to assist with management. Patient seen and examined today, sitting up in bed. Feeling better since admission, states SOB is improving. Denies any chest pain. She reports compliance with her medications but admits she ran out of her metolazone earlier this week. She has also been eating more salt. Chart reviewed. Troponin negative. Albumin 1.4. Echo pending.    Hospital Course:   10/10/2020-Patient seen and examined today, resting in bed. Feeling better. Less SOB. Still has some BLE edema. Labs reviewed. Albumin 1.2. Creatinine 2.7.    10/11/2020-Patient seen and examined today, lying in bed. Feels better. SOB improved. BNP trending down. Creatinine stable at 2.7.        Review of Systems   Constitution: Positive for malaise/fatigue.   HENT: Negative.    Eyes: Negative.    Cardiovascular: Positive for dyspnea on exertion (improved) and leg swelling.   Respiratory: Positive for shortness of breath.    Endocrine: Negative.     Hematologic/Lymphatic: Negative.    Skin: Negative.    Musculoskeletal: Negative.    Gastrointestinal: Negative.    Genitourinary: Negative.    Neurological: Negative.    Psychiatric/Behavioral: Negative.    Allergic/Immunologic: Negative.      Objective:     Vital Signs (Most Recent):  Temp: 98.4 °F (36.9 °C) (10/11/20 1134)  Pulse: 78 (10/11/20 1134)  Resp: 19 (10/11/20 1134)  BP: (!) 155/72 (10/11/20 1134)  SpO2: 100 % (10/11/20 1134) Vital Signs (24h Range):  Temp:  [97.1 °F (36.2 °C)-99.7 °F (37.6 °C)] 98.4 °F (36.9 °C)  Pulse:  [66-93] 78  Resp:  [18-20] 19  SpO2:  [97 %-100 %] 100 %  BP: (120-155)/(60-72) 155/72     Weight: 111 kg (244 lb 11.4 oz)  Body mass index is 46.24 kg/m².     SpO2: 100 %  O2 Device (Oxygen Therapy): CPAP      Intake/Output Summary (Last 24 hours) at 10/11/2020 1143  Last data filed at 10/11/2020 0500  Gross per 24 hour   Intake 920 ml   Output --   Net 920 ml       Lines/Drains/Airways     Peripheral Intravenous Line                 Peripheral IV - Single Lumen 10/09/20 0443 20 G Right Antecubital 2 days                Physical Exam   Constitutional: She is oriented to person, place, and time. She appears well-developed and well-nourished. No distress.   On supplemental O2   HENT:   Head: Normocephalic and atraumatic.   Eyes: Pupils are equal, round, and reactive to light. Right eye exhibits no discharge. Left eye exhibits no discharge.   Neck: Neck supple. No JVD present.   Cardiovascular: Normal rate, regular rhythm, S1 normal, S2 normal and normal heart sounds.   No murmur heard.  Pulmonary/Chest: Effort normal. No respiratory distress. She has no wheezes.   Diminished BS at bases   Abdominal: Soft. She exhibits distension. There is no abdominal tenderness.   Musculoskeletal:         General: Edema (BLE) present.   Neurological: She is alert and oriented to person, place, and time.   Skin: Skin is warm and dry. She is not diaphoretic. No erythema.   Psychiatric: She has a normal  mood and affect. Her behavior is normal. Thought content normal.   Nursing note and vitals reviewed.      Significant Labs:   CMP   Recent Labs   Lab 10/10/20  0750 10/11/20  0628    142   K 4.4 3.8    107   CO2 29 31*   GLU 83 179*   BUN 34* 37*   CREATININE 2.7* 2.7*   CALCIUM 7.5* 7.2*   ALBUMIN 1.2*  --    ANIONGAP 8 4*   ESTGFRAFRICA 21* 21*   EGFRNONAA 18* 18*   , CBC No results for input(s): WBC, HGB, HCT, PLT in the last 48 hours., Troponin No results for input(s): TROPONINI in the last 48 hours. and All pertinent lab results from the last 24 hours have been reviewed.    Significant Imaging: Echocardiogram:   Transthoracic echo (TTE) complete (Cupid Only):   Results for orders placed or performed during the hospital encounter of 10/09/20   Echo Color Flow Doppler? Yes   Result Value Ref Range    BSA 2.19 m2    TDI SEPTAL 0.08 m/s    LV LATERAL E/E' RATIO 12.86 m/s    LV SEPTAL E/E' RATIO 11.25 m/s    LA WIDTH 3.99 cm    TDI LATERAL 0.07 m/s    LVIDd 3.15 (A) 3.5 - 6.0 cm    IVS 1.83 (A) 0.6 - 1.1 cm    Posterior Wall 1.57 (A) 0.6 - 1.1 cm    Ao root annulus 3.38 cm    LVIDs 2.27 2.1 - 4.0 cm    FS 28 28 - 44 %    LA volume 42.72 cm3    Sinus 2.74 cm    STJ 2.63 cm    Ascending aorta 3.08 cm    LV mass 208.40 g    LA size 3.34 cm    TAPSE 1.51 cm    Left Ventricle Relative Wall Thickness 1.00 cm    AV mean gradient 8 mmHg    AV valve area 2.00 cm2    AV Velocity Ratio 0.64     AV index (prosthetic) 0.65     MV valve area p 1/2 method 2.78 cm2    E/A ratio 0.93     Mean e' 0.08 m/s    E wave decelartion time 272.77 msec    IVRT 88.49 msec    LVOT diameter 1.98 cm    LVOT area 3.1 cm2    LVOT peak bart 1.27 m/s    LVOT peak VTI 26.50 cm    Ao peak bart 1.97 m/s    Ao VTI 40.85 cm    RVOT peak bart 0.99 m/s    RVOT peak VTI 20.88 cm    LVOT stroke volume 81.55 cm3    AV peak gradient 16 mmHg    PV mean gradient 2.00 mmHg    E/E' ratio 12.00 m/s    MV Peak E Bart 0.90 m/s    TR Max Bart 2.90 m/s    MV  stenosis pressure 1/2 time 79.10 ms    MV Peak A Bart 0.97 m/s    LV Systolic Volume 17.50 mL    LV Systolic Volume Index 8.5 mL/m2    LV Diastolic Volume 39.35 mL    LV Diastolic Volume Index 19.08 mL/m2    LA Volume Index 20.7 mL/m2    LV Mass Index 101 g/m2    RA Major Axis 4.21 cm    Left Atrium Minor Axis 3.23 cm    Left Atrium Major Axis 4.53 cm    Triscuspid Valve Regurgitation Peak Gradient 34 mmHg    Right Atrial Pressure (from IVC) 3 mmHg    TV rest pulmonary artery pressure 37 mmHg    Narrative    · There is moderate left ventricular concentric hypertrophy.  · The left ventricle is small with normal systolic function. The estimated   ejection fraction is 60%.  · Grade I diastolic dysfunction.  · Normal right ventricular systolic function.  · Mild tricuspid regurgitation.  · Normal central venous pressure (3 mmHg).  · The estimated PA systolic pressure is 37 mmHg.  · Small pericardial effusion.  · There is a moderate left pleural effusion.      , EKG: Reviewed and X-Ray: CXR: X-Ray Chest 1 View (CXR):   Results for orders placed or performed during the hospital encounter of 10/09/20   X-Ray Chest 1 View    Narrative    EXAMINATION:  XR CHEST 1 VIEW    CLINICAL HISTORY:  Shortness of breath    FINDINGS:  Single view of the chest.  Comparison 09/24/2020    Cardiac silhouette remains enlarged.  Again persistent hazy opacity in the left lung base likely representing combination of small left-sided pleural effusion and mild left basilar atelectasis.  Right lung remains clear.  No acute osseous findings demonstrated.      Impression    No acute process seen.      Electronically signed by: Martell Jasmine MD  Date:    10/09/2020  Time:    06:42    and X-Ray Chest PA and Lateral (CXR): No results found for this visit on 10/09/20.    Assessment and Plan:   Patient who presents with volume overload in setting of CHF and cirrhosis. BNP trending down. Diurese as needed. Continue other CV meds.     * Acute on chronic  heart failure  -Presents with decompensated diastolic CHF in part due to running out of metolazone and dietary non-compliance  -Continue IV diuresis  -Continue amlodipine, Imdur  -No ACEi/ARB given kidney function  -Strict I's/O's  -Check echo  -Dietary restrictions re-discussed    10/10/2020  -Clinically improved  -Continue current CV meds/mgmt  -Counseled on low salt diet  -Follow-up in clinic    10/11/2020  -BNP trending down  -Combination of CHF/fluid overload from cirrhosis  -Diurese as needed      CHF exacerbation  -Patient presents with decompensated CHF  -Assess response to diuresis      Recurrent pleural effusion on left  -Mgmt as per pulmonary   -Assess response to diuresis     Acute on chronic kidney failure  -Creatinine 2.7  -Monitor with diuresis, nephrology consulted    Hypertension  -Continue amlodipine  -Imdur 30 mg daily added  -Assess response    Cirrhosis  -Contributing to fluid status  -Mgmt as per primary team    Cirrhosis of liver with ascites  -Mgmt as per primary team        VTE Risk Mitigation (From admission, onward)         Ordered     IP VTE HIGH RISK PATIENT  Once      10/09/20 0912     Place sequential compression device  Until discontinued      10/09/20 0912                Samantha Osei PA-C  Cardiology  [facility]

## 2020-10-11 NOTE — PLAN OF CARE
Pt remains fall free this shift.  Pt AAOx4, verbal, clear speech.  Skin warm and dry. No new skin issues.  Telemonitoring in progress, 70s-90s SR  2L O2 via NC  Up to bathroom  standby with transfers.  CBG AC/ HS with PRN SS insulin.   Bed low, side rails up x 2, wheels locked, call light in reach.   Bed alarm maintained for safety.   Patient instructed to call for assistance.   Hourly rounding completed.   24 hour chart check completed  POC updated and reviewed with pt. Will continue POC.

## 2020-10-11 NOTE — HOSPITAL COURSE
10/11: seen and examined    10/12:  Assumed care for patient on 10/12/2020: Seen and examined at bedside. Hospital chart reviewed. No acute interval detrimental events noted. She reports that she  has significantly improved    10/13: Seen and examined at bedside. Hospital chart reviewed. No acute interval detrimental events noted. She reports that she  has significantly improved

## 2020-10-11 NOTE — ASSESSMENT & PLAN NOTE
Patient has hepatorenal syndrome as well as cardiorenal syndrome.  Patient has failure to thrive and has had TIPS procedure for cirrhosis of liver 3 years ago.  Patient now has completely decompensated cirrhosis of liver.  Patient is not a candidate for liver transplantation.  At this point patient has minimal urine output.  Lengthy discussion with the patient of temporary dialysis as a trial basis.  Patient is not in a good candidate for outpatient dialysis anyway.  With this low albumin of 1.2 and multiorgan failure hospice is the likely option.  Patient wants to be at home around the family in a more comfortable situation and does not want to be on life support.  All these findings are discussed in detail with [facility] Medicine-Dr. Floyd

## 2020-10-11 NOTE — PT/OT/SLP EVAL
Physical Therapy Evaluation    Patient Name:  Diamond Das   MRN:  [id number]    Recommendations:     Discharge Recommendations:  home health PT, home(with dtr)   Discharge Equipment Recommendations: none   Barriers to discharge: None    Assessment:     Diamond Das is a 65 y.o. female admitted with a medical diagnosis of Acute on chronic heart failure.  She presents with the following impairments/functional limitations:  weakness, gait instability, decreased lower extremity function, impaired cardiopulmonary response to activity, impaired balance, impaired endurance, decreased safety awareness, impaired self care skills, impaired functional mobilty, edema.    Rehab Prognosis: Good; patient would benefit from acute skilled PT services to address these deficits and reach maximum level of function.    Recent Surgery: * No surgery found *      Plan:     During this hospitalization, patient to be seen 5 x/week to address the identified rehab impairments via gait training, therapeutic activities, therapeutic exercises and progress toward the following goals:    · Plan of Care Expires:  10/18/20    Subjective     Chief Complaint: min weakness; min sob  Patient/Family Comments/goals: to go home at of  Pain/Comfort:  · Pain Rating 1: 0/10    Patients cultural, spiritual, L[employer] day conflicts given the current situation:      Living Environment:  Lives with dtr; dtr works in the daytime; 4 steps with rails to enter home  Prior to admission, patients level of function was mod indep.  Equipment used at home: walker, rolling, rollator, CPAP, oxygen, shower chair, wheelchair, cane, straight, bedside commode.  DME owned (not currently used): use dme prn.  Upon discharge, patient will have assistance from dtr/home health.    Objective:     Communicated with RN prior to session.  Patient found HOB elevated with telemetry, oxygen  upon PT entry to room.    General Precautions: Standard, fall   Orthopedic Precautions:N/A    Braces: N/A     Exams:  · B LE rom wfl and strength grossly 4/5    Functional Mobility:  · Bed Mobility - supine to/from sit sba with bed rail and hOB elevated  · Transfers - sit to/from stand with handrail for support cg/sba for safety  · Gt - Amb 30ft in room no AD cga for balance/safety r/t unsteady gt pattern, especially with turns; min inc sob after gt    Therapeutic Activities and Exercises:   PT educated patient on POC, B LE TE to prep mobility & dec stiffness, breathing exs/techs to help manage SOB and safety/fall precautions with mobility; Rec to use RW to inc safety with gt.    AM-PAC 6 CLICK MOBILITY  Total Score:19     Patient left HOB elevated with all lines intact, call button in reach and RN notified.    GOALS:   Multidisciplinary Problems     Physical Therapy Goals        Problem: Physical Therapy Goal    Goal Priority Disciplines Outcome Goal Variances Interventions   Physical Therapy Goal     PT, PT/OT      Description: 1. Patient will perform supine to/from sit indep  2. Patient will perform sit to/from stand mod indep  3. Patient will ambulate >150ft RW spv no LOB with rest prn on O2 prn                   History:     Past Medical History:   Diagnosis Date    Ascites     Breast pain, left 1/4/2018    Cataract     CHF (congestive heart failure)     Cirrhosis     Cough     Diabetes mellitus     Diabetes mellitus, type 2     Gastroparesis     GERD (gastroesophageal reflux disease)     Hyperlipidemia     Hypertension     Hypotension 2/21/2019    Liver disease     Lumbar strain 4/27/2018    Pneumonia of right middle lobe due to infectious organism 12/19/2017    Renal disorder     Retinopathy due to secondary diabetes mellitus     Sleep apnea     Thyroid disease        Past Surgical History:   Procedure Laterality Date    CATARACT EXTRACTION      CHOLECYSTECTOMY      COLONOSCOPY N/A 9/4/2019    Procedure: COLONOSCOPY;  Surgeon: Marnie Elmore MD;  Location: [facility];   Service: Endoscopy;  Laterality: N/A;    ERCP      FLUOROSCOPY N/A 7/24/2020    Procedure: TIPS revision;  Surgeon: Martell Jasmine MD;  Location: [facility] CATH LAB;  Service: General;  Laterality: N/A;    LIVER BIOPSY      THORACENTESIS Left 1/20/2020    Procedure: Thoracentesis;  Surgeon: Angelica Hunter MD;  Location: [facility] ENDO;  Service: Pulmonary;  Laterality: Left;    TUBAL LIGATION      UPPER GASTROINTESTINAL ENDOSCOPY         Time Tracking:     PT Received On: 10/11/20  PT Start Time: 1015     PT Stop Time: 1055  PT Total Time (min): 40 min     Billable Minutes: Evaluation 15, Therapeutic Activity 15 and Therapeutic Exercise 10      Andrey Alford, PT  10/11/2020

## 2020-10-11 NOTE — ASSESSMENT & PLAN NOTE
-CPAP nightly    Severe KRISTAL likely contributing to the massive Anasarca sec to Cor Pulmonale as well  Start CPAP    Pt used CPAP last night

## 2020-10-11 NOTE — CONSULTS
[facility] -   Nephrology  Consult Note        Patient Name: Diamond Das  MRN: [id number]  Admission Date: 10/9/2020  Hospital Length of Stay: 0 days  Attending Provider: Mateo Floyd MD   Primary Care Physician: Andrey Perrin MD  Principal Problem:Acute on chronic heart failure    Consults  Subjective:     HPI: Patient is a 65-year-old female with history of type 2 diabetes hypertension and morbid obesity.  Patient also has history of chronic kidney disease with baseline creatinine ranging between 1.8 in 2.0.  Patient was last seen in the nephrology clinic by Dr. Contreras in 2018.  Patient also has stage IV cirrhosis of liver biopsy proven.  Patient also underwent TIPS procedure 3 years ago.  Since then patient has been off and on deteriorating.  Patient has a last echo done 2 days ago which showed EF of 65%.  Patient has had multiple episodes of worsening creatinine.  Patient currently is in the hospital with an albumin of 1.2 with generalized anasarca and failure to diurese on IV diuretics.  Patient seen and examined at the bedside.  Do lengthy discussion with the patient at the bedside.  Patient has hepatorenal syndrome as well as cardiorenal syndrome.  Prognosis seems to be very poor explained to the patient in detail.  Patient opted to be not on in life support.  From Nephrology standpoint she is not a candidate for renal replacement therapy at this point due to poor prognosis as well as very low albumin.  After lengthy discussion with the patient it was decided the patient would be a better candidate for hospice management at home.  Case and findings discussed with .    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     Sleep apnea     Thyroid disease        Past Surgical History:   Procedure Laterality Date    CATARACT EXTRACTION      CHOLECYSTECTOMY      COLONOSCOPY N/A 9/4/2019    Procedure: COLONOSCOPY;  Surgeon: Marnie Elmore MD;  Location: [facility] ENDO;  Service: Endoscopy;  Laterality: N/A;    ERCP      FLUOROSCOPY N/A 7/24/2020    Procedure: TIPS revision;  Surgeon: Martell Jasmine MD;  Location: [facility] CATH LAB;  Service: General;  Laterality: N/A;    LIVER BIOPSY      THORACENTESIS Left 1/20/2020    Procedure: Thoracentesis;  Surgeon: Angelica Hunter MD;  Location: [facility] ENDO;  Service: Pulmonary;  Laterality: Left;    TUBAL LIGATION      UPPER GASTROINTESTINAL ENDOSCOPY         Review of patient's allergies indicates:   Allergen Reactions    Subsys [fentanyl] Other (See Comments)     After administration pt unresponsive.  HR and respirations decreased.     Versed [midazolam] Other (See Comments)     After administration pt unresponsive.  HR and respirations decreased.     Ampicillin Rash    Codeine Nausea And Vomiting and Nausea Only     Current Facility-Administered Medications   Medication Frequency    albuterol-ipratropium 2.5 mg-0.5 mg/3 mL nebulizer solution 3 mL Q4H PRN    amLODIPine tablet 10 mg Daily    aspirin EC tablet 81 mg Daily    dextrose 50% injection 12.5 g PRN    dextrose 50% injection 25 g PRN    doxazosin tablet 2 mg QHS    famotidine tablet 20 mg Daily    furosemide injection 80 mg Q12H    glucagon (human recombinant) injection 1 mg PRN    glucose chewable tablet 16 g PRN    glucose chewable tablet 24 g PRN    insulin aspart U-100 pen 0-5 Units QID (AC + HS) PRN    insulin detemir U-100 pen 10 Units QHS    isosorbide mononitrate 24 hr tablet 30 mg Daily     levothyroxine tablet 137 mcg Before breakfast    metOLazone tablet 5 mg Daily    ondansetron disintegrating tablet 4 mg Q8H PRN    polyethylene glycol packet 17 g Daily    sodium chloride 0.9% flush 10 mL PRN     Family History     Problem Relation (Age of Onset)    Aneurysm Sister    Breast cancer Mother    Cancer Mother, Maternal Grandfather    Heart disease Father, Brother    No Known Problems Maternal Grandmother    Sarcoidosis Sister        Tobacco Use    Smoking status: Never Smoker    Smokeless tobacco: Never Used   Substance and Sexual Activity    Alcohol use: No     Frequency: Never    Drug use: No    Sexual activity: Never     Review of Systems   Constitutional: Positive for activity change, appetite change, diaphoresis, fatigue and unexpected weight change. Negative for chills and fever.   HENT: Negative for congestion, dental problem, drooling, postnasal drip, rhinorrhea and voice change.    Eyes: Negative for discharge.   Respiratory: Positive for shortness of breath. Negative for apnea, cough, choking, chest tightness, wheezing and stridor.    Cardiovascular: Positive for leg swelling. Negative for chest pain and palpitations.   Gastrointestinal: Negative for abdominal distention, blood in stool, constipation, diarrhea, nausea, rectal pain and vomiting.   Endocrine: Negative for cold intolerance, heat intolerance, polydipsia and polyuria.   Genitourinary: Positive for decreased urine volume. Negative for difficulty urinating, dysuria, enuresis, flank pain, frequency, hematuria and urgency.   Musculoskeletal: Positive for back pain, gait problem and myalgias. Negative for arthralgias and joint swelling.   Skin: Negative for rash.   Allergic/Immunologic: Negative for food allergies and immunocompromised state.   Neurological: Negative for dizziness, tremors, syncope, numbness and headaches.   Hematological: Does not bruise/bleed easily.   Psychiatric/Behavioral: Negative for agitation,  behavioral problems and self-injury. The patient is not nervous/anxious and is not hyperactive.    All other systems reviewed and are negative.    Objective:     Vital Signs (Most Recent):  Temp: 98.4 °F (36.9 °C) (10/11/20 1134)  Pulse: 78 (10/11/20 1134)  Resp: 19 (10/11/20 1134)  BP: (!) 155/72 (10/11/20 1134)  SpO2: 100 % (10/11/20 1134)  O2 Device (Oxygen Therapy): CPAP (10/11/20 0404) Vital Signs (24h Range):  Temp:  [97.1 °F (36.2 °C)-99.7 °F (37.6 °C)] 98.4 °F (36.9 °C)  Pulse:  [66-93] 78  Resp:  [18-20] 19  SpO2:  [97 %-100 %] 100 %  BP: (120-155)/(60-72) 155/72     Weight: 111 kg (244 lb 11.4 oz) (10/11/20 0500)  Body mass index is 46.24 kg/m².  Body surface area is 2.19 meters squared.    I/O last 3 completed shifts:  In: 920 [P.O.:920]  Out: -     Physical Exam  Vitals signs and nursing note reviewed.   Constitutional:       Appearance: She is well-developed.   HENT:      Head: Normocephalic and atraumatic.   Eyes:      Conjunctiva/sclera: Conjunctivae normal.      Pupils: Pupils are equal, round, and reactive to light.   Neck:      Musculoskeletal: Full passive range of motion without pain, normal range of motion and neck supple. No edema.      Thyroid: No thyroid mass or thyromegaly.      Vascular: No carotid bruit.   Cardiovascular:      Rate and Rhythm: Normal rate and regular rhythm.  No extrasystoles are present.     Chest Wall: PMI is displaced.      Pulses: Normal pulses.      Heart sounds: S1 normal and S2 normal. Murmur present. Systolic murmur present with a grade of 2/6. No friction rub.      Comments: RV heave loud P2   Pulmonary:      Effort: Pulmonary effort is normal. No accessory muscle usage or respiratory distress.      Breath sounds: No wheezing or rales.   Chest:      Chest wall: No tenderness.   Abdominal:      General: Bowel sounds are normal. There is no distension.      Palpations: Abdomen is soft. There is no mass.      Tenderness: There is no abdominal tenderness. There is no  rebound.      Hernia: No hernia is present.   Musculoskeletal: Normal range of motion.         General: No tenderness.      Comments: Severe edema anasarca.  Abdominal distension and findings of severe cirrhosis of liver with complete decompensation   Skin:     General: Skin is warm and dry.      Coloration: Skin is not pale.      Findings: No bruising, ecchymosis, erythema or rash.      Nails: There is no clubbing.     Neurological:      Mental Status: She is alert and oriented to person, place, and time.      Cranial Nerves: No cranial nerve deficit.      Sensory: No sensory deficit.      Motor: No abnormal muscle tone.      Coordination: Coordination normal.      Deep Tendon Reflexes: Reflexes are normal and symmetric.   Psychiatric:         Speech: Speech normal.         Behavior: Behavior normal.         Thought Content: Thought content normal.         Judgment: Judgment normal.         Significant Labs:  CMP:   Recent Labs   Lab 10/09/20  0442 10/10/20  0750 10/11/20  0628   * 83 179*   CALCIUM 7.6* 7.5* 7.2*   ALBUMIN 1.4* 1.2*  --    PROT 5.8*  --   --     145 142   K 4.3 4.4 3.8   CO2 27 29 31*    108 107   BUN 33* 34* 37*   CREATININE 2.7* 2.7* 2.7*   ALKPHOS 107  --   --    ALT 14  --   --    AST 54*  --   --    BILITOT 0.1  --   --      All labs within the past 24 hours have been reviewed.    Significant Imaging:  Labs: Reviewed  X-Ray: Reviewed  US: Reviewed  Echo: Reviewed    Assessment/Plan:     Acute on chronic kidney failure  Patient has hepatorenal syndrome as well as cardiorenal syndrome.  Patient has failure to thrive and has had TIPS procedure for cirrhosis of liver 3 years ago.  Patient now has completely decompensated cirrhosis of liver.  Patient is not a candidate for liver transplantation.  At this point patient has minimal urine output.  Lengthy discussion with the patient of temporary dialysis as a trial basis.  Patient is not in a good candidate for outpatient dialysis  anyway.  With this low albumin of 1.2 and multiorgan failure hospice is the likely option.  Patient wants to be at home around the family in a more comfortable situation and does not want to be on life support.  All these findings are discussed in detail with Hospital Medicine-Dr. Floyd        Thank you for your consult.     Jonathan Hernandez MD   Nephrology  [facility]

## 2020-10-11 NOTE — SUBJECTIVE & OBJECTIVE
Review of Systems   Constitution: Positive for malaise/fatigue.   HENT: Negative.    Eyes: Negative.    Cardiovascular: Positive for dyspnea on exertion (improved) and leg swelling.   Respiratory: Positive for shortness of breath.    Endocrine: Negative.    Hematologic/Lymphatic: Negative.    Skin: Negative.    Musculoskeletal: Negative.    Gastrointestinal: Negative.    Genitourinary: Negative.    Neurological: Negative.    Psychiatric/Behavioral: Negative.    Allergic/Immunologic: Negative.      Objective:     Vital Signs (Most Recent):  Temp: 98.4 °F (36.9 °C) (10/11/20 1134)  Pulse: 78 (10/11/20 1134)  Resp: 19 (10/11/20 1134)  BP: (!) 155/72 (10/11/20 1134)  SpO2: 100 % (10/11/20 1134) Vital Signs (24h Range):  Temp:  [97.1 °F (36.2 °C)-99.7 °F (37.6 °C)] 98.4 °F (36.9 °C)  Pulse:  [66-93] 78  Resp:  [18-20] 19  SpO2:  [97 %-100 %] 100 %  BP: (120-155)/(60-72) 155/72     Weight: 111 kg (244 lb 11.4 oz)  Body mass index is 46.24 kg/m².     SpO2: 100 %  O2 Device (Oxygen Therapy): CPAP      Intake/Output Summary (Last 24 hours) at 10/11/2020 1143  Last data filed at 10/11/2020 0500  Gross per 24 hour   Intake 920 ml   Output --   Net 920 ml       Lines/Drains/Airways     Peripheral Intravenous Line                 Peripheral IV - Single Lumen 10/09/20 0443 20 G Right Antecubital 2 days                Physical Exam   Constitutional: She is oriented to person, place, and time. She appears well-developed and well-nourished. No distress.   On supplemental O2   HENT:   Head: Normocephalic and atraumatic.   Eyes: Pupils are equal, round, and reactive to light. Right eye exhibits no discharge. Left eye exhibits no discharge.   Neck: Neck supple. No JVD present.   Cardiovascular: Normal rate, regular rhythm, S1 normal, S2 normal and normal heart sounds.   No murmur heard.  Pulmonary/Chest: Effort normal. No respiratory distress. She has no wheezes.   Diminished BS at bases   Abdominal: Soft. She exhibits distension.  There is no abdominal tenderness.   Musculoskeletal:         General: Edema (BLE) present.   Neurological: She is alert and oriented to person, place, and time.   Skin: Skin is warm and dry. She is not diaphoretic. No erythema.   Psychiatric: She has a normal mood and affect. Her behavior is normal. Thought content normal.   Nursing note and vitals reviewed.      Significant Labs:   CMP   Recent Labs   Lab 10/10/20  0750 10/11/20  0628    142   K 4.4 3.8    107   CO2 29 31*   GLU 83 179*   BUN 34* 37*   CREATININE 2.7* 2.7*   CALCIUM 7.5* 7.2*   ALBUMIN 1.2*  --    ANIONGAP 8 4*   ESTGFRAFRICA 21* 21*   EGFRNONAA 18* 18*   , CBC No results for input(s): WBC, HGB, HCT, PLT in the last 48 hours., Troponin No results for input(s): TROPONINI in the last 48 hours. and All pertinent lab results from the last 24 hours have been reviewed.    Significant Imaging: Echocardiogram:   Transthoracic echo (TTE) complete (Cupid Only):   Results for orders placed or performed during the hospital encounter of 10/09/20   Echo Color Flow Doppler? Yes   Result Value Ref Range    BSA 2.19 m2    TDI SEPTAL 0.08 m/s    LV LATERAL E/E' RATIO 12.86 m/s    LV SEPTAL E/E' RATIO 11.25 m/s    LA WIDTH 3.99 cm    TDI LATERAL 0.07 m/s    LVIDd 3.15 (A) 3.5 - 6.0 cm    IVS 1.83 (A) 0.6 - 1.1 cm    Posterior Wall 1.57 (A) 0.6 - 1.1 cm    Ao root annulus 3.38 cm    LVIDs 2.27 2.1 - 4.0 cm    FS 28 28 - 44 %    LA volume 42.72 cm3    Sinus 2.74 cm    STJ 2.63 cm    Ascending aorta 3.08 cm    LV mass 208.40 g    LA size 3.34 cm    TAPSE 1.51 cm    Left Ventricle Relative Wall Thickness 1.00 cm    AV mean gradient 8 mmHg    AV valve area 2.00 cm2    AV Velocity Ratio 0.64     AV index (prosthetic) 0.65     MV valve area p 1/2 method 2.78 cm2    E/A ratio 0.93     Mean e' 0.08 m/s    E wave decelartion time 272.77 msec    IVRT 88.49 msec    LVOT diameter 1.98 cm    LVOT area 3.1 cm2    LVOT peak kelin 1.27 m/s    LVOT peak VTI 26.50 cm    Ao  peak bart 1.97 m/s    Ao VTI 40.85 cm    RVOT peak bart 0.99 m/s    RVOT peak VTI 20.88 cm    LVOT stroke volume 81.55 cm3    AV peak gradient 16 mmHg    PV mean gradient 2.00 mmHg    E/E' ratio 12.00 m/s    MV Peak E Bart 0.90 m/s    TR Max Bart 2.90 m/s    MV stenosis pressure 1/2 time 79.10 ms    MV Peak A Bart 0.97 m/s    LV Systolic Volume 17.50 mL    LV Systolic Volume Index 8.5 mL/m2    LV Diastolic Volume 39.35 mL    LV Diastolic Volume Index 19.08 mL/m2    LA Volume Index 20.7 mL/m2    LV Mass Index 101 g/m2    RA Major Axis 4.21 cm    Left Atrium Minor Axis 3.23 cm    Left Atrium Major Axis 4.53 cm    Triscuspid Valve Regurgitation Peak Gradient 34 mmHg    Right Atrial Pressure (from IVC) 3 mmHg    TV rest pulmonary artery pressure 37 mmHg    Narrative    · There is moderate left ventricular concentric hypertrophy.  · The left ventricle is small with normal systolic function. The estimated   ejection fraction is 60%.  · Grade I diastolic dysfunction.  · Normal right ventricular systolic function.  · Mild tricuspid regurgitation.  · Normal central venous pressure (3 mmHg).  · The estimated PA systolic pressure is 37 mmHg.  · Small pericardial effusion.  · There is a moderate left pleural effusion.      , EKG: Reviewed and X-Ray: CXR: X-Ray Chest 1 View (CXR):   Results for orders placed or performed during the hospital encounter of 10/09/20   X-Ray Chest 1 View    Narrative    EXAMINATION:  XR CHEST 1 VIEW    CLINICAL HISTORY:  Shortness of breath    FINDINGS:  Single view of the chest.  Comparison 09/24/2020    Cardiac silhouette remains enlarged.  Again persistent hazy opacity in the left lung base likely representing combination of small left-sided pleural effusion and mild left basilar atelectasis.  Right lung remains clear.  No acute osseous findings demonstrated.      Impression    No acute process seen.      Electronically signed by: Martell Jasmine MD  Date:    10/09/2020  Time:    06:42    and X-Ray Chest  PA and Lateral (CXR): No results found for this visit on 10/09/20.

## 2020-10-11 NOTE — HPI
Diamond Das is 65 y.o.  Asked to see: CXR Pleural effusion and also seen on Echo  Known CHF EF: 60%, grade 1 DD  KRISTAL on CPAP 14 cm,   Worsening anasarca inspite adherence to therapies, Lasix and metalazone  Creatinine 2.7  She had Thora 01/2020: Lymphycytic.   Transjugular liver biopsy 2018: Variable involvement, advanced stage fibrosis: Cirrhosis (stage 4 of 4)   SP TIPS: 07/2020, recent US: reduced velocities

## 2020-10-11 NOTE — ASSESSMENT & PLAN NOTE
Left lymphocytic effusion  If significant fliud consider Thora and send for flow cytometry  Optimize underlying CHF/ Chr Liver Disease  Incentive spirometry

## 2020-10-11 NOTE — PT/OT/SLP EVAL
Occupational Therapy   Evaluation and Discharge Note    Name: Diamond Das  MRN: [id number]  Admitting Diagnosis:  Acute on chronic heart failure      Recommendations:     Discharge Recommendations: home health OT  Discharge Equipment Recommendations:  none  Barriers to discharge:  None    Assessment:     Diamond Dsa is a 65 y.o. female with a medical diagnosis of Acute on chronic heart failure. At this time, patient is functioning at their prior level of function and does not require further acute OT services.     Plan:     During this hospitalization, patient does not require further acute OT services.  Please re-consult if situation changes.    · Plan of Care Reviewed with: patient    Subjective     Chief Complaint: NONE  Patient/Family Comments/goals: RETURN HOME    Occupational Profile:  Living Environment: LIVES WITH DAUGHTER IN ON STORY HOUSE, 4 STEPS TO ENTER WITH 2 HANDRAILS. PT'S DAUGHTER WORKS DURING THE DAY  Previous level of function: INDEPENDENT- MOD I WITH ADLS AND FUNCTIONAL MOBILITY (RW PRN, ROLLATOR IN COMMUNITY)  Roles and Routines: MINIMAL  Equipment Used at home:  walker, rolling, rollator, CPAP, oxygen, shower chair, wheelchair, cane, straight, bedside commode  Assistance upon Discharge: FAMILY    Pain/Comfort:  · Pain Rating 1: 0/10  · Pain Rating Post-Intervention 1: 0/10    Patients cultural, spiritual, Roman Catholic conflicts given the current situation:      Objective:     Communicated with: NURSE prior to session.  Patient found supine with peripheral IV, telemetry, oxygen upon OT entry to room.    General Precautions: Standard,     Orthopedic Precautions:N/A   Braces: N/A     Occupational Performance:    Bed Mobility:    · Patient completed Rolling/Turning to Left with  supervision  · Patient completed Scooting/Bridging with supervision  · Patient completed Supine to Sit with supervision  · Patient completed Sit to Supine with supervision    Functional Mobility/Transfers:  · Patient  completed Sit <> Stand Transfer with stand by assistance  with  no assistive device   · Functional Mobility: AMBULATED SBA WITHOUT AD X25 FEET     Cognitive/Visual Perceptual:  Cognitive/Psychosocial Skills:     -       Oriented to: Person, Place, Time and Situation   -       Follows Commands/attention:Follows multistep  commands  -       Memory: No Deficits noted  -       Safety awareness/insight to disability: intact     Physical Exam:  Balance:    -       GOOD  Upper Extremity Range of Motion:     -       Right Upper Extremity: WFL  -       Left Upper Extremity: WFL  Upper Extremity Strength:    -       Right Upper Extremity: WFL  -       Left Upper Extremity: WFL    AMPAC 6 Click ADL:  AMPAC Total Score: 24    Treatment & Education:  PT PARTICIPATED IN OT EVALUATION. PT TO BE PLACED ONTO PEOPLE MOVERS TO MAINTAIN MOBILITY UNTIL DISCHARGE.  Education:    Patient left SEATED EOB with all lines intact, call button in reach and NURSING notified    GOALS:   Multidisciplinary Problems     Occupational Therapy Goals     Not on file                History:     Past Medical History:   Diagnosis Date    Ascites     Breast pain, left 1/4/2018    Cataract     CHF (congestive heart failure)     Cirrhosis     Cough     Diabetes mellitus     Diabetes mellitus, type 2     Gastroparesis     GERD (gastroesophageal reflux disease)     Hyperlipidemia     Hypertension     Hypotension 2/21/2019    Liver disease     Lumbar strain 4/27/2018    Pneumonia of right middle lobe due to infectious organism 12/19/2017    Renal disorder     Retinopathy due to secondary diabetes mellitus     Sleep apnea     Thyroid disease        Past Surgical History:   Procedure Laterality Date    CATARACT EXTRACTION      CHOLECYSTECTOMY      COLONOSCOPY N/A 9/4/2019    Procedure: COLONOSCOPY;  Surgeon: Marnie Elmore MD;  [facility]: [facility];  Service: Endoscopy;  Laterality: N/A;    ERCP      FLUOROSCOPY N/A 7/24/2020     Procedure: TIPS revision;  Surgeon: Martell Jasmine MD;  Location: [facility] CATH LAB;  Service: General;  Laterality: N/A;    LIVER BIOPSY      THORACENTESIS Left 1/20/2020    Procedure: Thoracentesis;  Surgeon: Angelica Hunter MD;  Location: [facility] ENDO;  Service: Pulmonary;  Laterality: Left;    TUBAL LIGATION      UPPER GASTROINTESTINAL ENDOSCOPY         Time Tracking:     OT Date of Treatment: 10/11/20  OT Start Time: 1020  OT Stop Time: 1035  OT Total Time (min): 15 min    Billable Minutes:Evaluation 15    Kelin Negro OT  10/11/2020

## 2020-10-11 NOTE — SUBJECTIVE & OBJECTIVE
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 CATH LAB;  Service: General;  Laterality: N/A;    LIVER BIOPSY      THORACENTESIS Left 1/20/2020    Procedure: Thoracentesis;  Surgeon: Angelica Hunter MD;  Location: [facility] ENDO;  Service: Pulmonary;  Laterality: Left;    TUBAL LIGATION      UPPER GASTROINTESTINAL ENDOSCOPY         Review of patient's allergies indicates:   Allergen Reactions    Subsys [fentanyl] Other (See Comments)     After administration pt unresponsive.  HR and respirations decreased.     Versed [midazolam] Other (See Comments)     After administration pt unresponsive.  HR and respirations decreased.     Ampicillin Rash    Codeine Nausea And Vomiting and Nausea Only     Current Facility-Administered Medications   Medication Frequency    albuterol-ipratropium 2.5 mg-0.5 mg/3 mL nebulizer solution 3 mL Q4H PRN    amLODIPine tablet 10 mg Daily    aspirin EC tablet 81 mg Daily    dextrose 50% injection 12.5 g PRN    dextrose 50% injection 25 g PRN    doxazosin tablet 2 mg QHS    famotidine tablet 20 mg Daily     furosemide injection 80 mg Q12H    glucagon (human recombinant) injection 1 mg PRN    glucose chewable tablet 16 g PRN    glucose chewable tablet 24 g PRN    insulin aspart U-100 pen 0-5 Units QID (AC + HS) PRN    insulin detemir U-100 pen 10 Units QHS    isosorbide mononitrate 24 hr tablet 30 mg Daily    levothyroxine tablet 137 mcg Before breakfast    metOLazone tablet 5 mg Daily    ondansetron disintegrating tablet 4 mg Q8H PRN    polyethylene glycol packet 17 g Daily    sodium chloride 0.9% flush 10 mL PRN     Family History     Problem Relation (Age of Onset)    Aneurysm Sister    Breast cancer Mother    Cancer Mother, Maternal Grandfather    Heart disease Father, Brother    No Known Problems Maternal Grandmother    Sarcoidosis Sister        Tobacco Use    Smoking status: Never Smoker    Smokeless tobacco: Never Used   Substance and Sexual Activity    Alcohol use: No     Frequency: Never    Drug use: No    Sexual activity: Never     Review of Systems   Constitutional: Positive for activity change, appetite change, diaphoresis, fatigue and unexpected weight change. Negative for chills and fever.   HENT: Negative for congestion, dental problem, drooling, postnasal drip, rhinorrhea and voice change.    Eyes: Negative for discharge.   Respiratory: Positive for shortness of breath. Negative for apnea, cough, choking, chest tightness, wheezing and stridor.    Cardiovascular: Positive for leg swelling. Negative for chest pain and palpitations.   Gastrointestinal: Negative for abdominal distention, blood in stool, constipation, diarrhea, nausea, rectal pain and vomiting.   Endocrine: Negative for cold intolerance, heat intolerance, polydipsia and polyuria.   Genitourinary: Positive for decreased urine volume. Negative for difficulty urinating, dysuria, enuresis, flank pain, frequency, hematuria and urgency.   Musculoskeletal: Positive for back pain, gait problem and myalgias. Negative for  arthralgias and joint swelling.   Skin: Negative for rash.   Allergic/Immunologic: Negative for food allergies and immunocompromised state.   Neurological: Negative for dizziness, tremors, syncope, numbness and headaches.   Hematological: Does not bruise/bleed easily.   Psychiatric/Behavioral: Negative for agitation, behavioral problems and self-injury. The patient is not nervous/anxious and is not hyperactive.    All other systems reviewed and are negative.    Objective:     Vital Signs (Most Recent):  Temp: 98.4 °F (36.9 °C) (10/11/20 1134)  Pulse: 78 (10/11/20 1134)  Resp: 19 (10/11/20 1134)  BP: (!) 155/72 (10/11/20 1134)  SpO2: 100 % (10/11/20 1134)  O2 Device (Oxygen Therapy): CPAP (10/11/20 0404) Vital Signs (24h Range):  Temp:  [97.1 °F (36.2 °C)-99.7 °F (37.6 °C)] 98.4 °F (36.9 °C)  Pulse:  [66-93] 78  Resp:  [18-20] 19  SpO2:  [97 %-100 %] 100 %  BP: (120-155)/(60-72) 155/72     Weight: 111 kg (244 lb 11.4 oz) (10/11/20 0500)  Body mass index is 46.24 kg/m².  Body surface area is 2.19 meters squared.    I/O last 3 completed shifts:  In: 920 [P.O.:920]  Out: -     Physical Exam  Vitals signs and nursing note reviewed.   Constitutional:       Appearance: She is well-developed.   HENT:      Head: Normocephalic and atraumatic.   Eyes:      Conjunctiva/sclera: Conjunctivae normal.      Pupils: Pupils are equal, round, and reactive to light.   Neck:      Musculoskeletal: Full passive range of motion without pain, normal range of motion and neck supple. No edema.      Thyroid: No thyroid mass or thyromegaly.      Vascular: No carotid bruit.   Cardiovascular:      Rate and Rhythm: Normal rate and regular rhythm.  No extrasystoles are present.     Chest Wall: PMI is displaced.      Pulses: Normal pulses.      Heart sounds: S1 normal and S2 normal. Murmur present. Systolic murmur present with a grade of 2/6. No friction rub.      Comments: RV heave loud P2   Pulmonary:      Effort: Pulmonary effort is normal. No  accessory muscle usage or respiratory distress.      Breath sounds: No wheezing or rales.   Chest:      Chest wall: No tenderness.   Abdominal:      General: Bowel sounds are normal. There is no distension.      Palpations: Abdomen is soft. There is no mass.      Tenderness: There is no abdominal tenderness. There is no rebound.      Hernia: No hernia is present.   Musculoskeletal: Normal range of motion.         General: No tenderness.      Comments: Severe edema anasarca.  Abdominal distension and findings of severe cirrhosis of liver with complete decompensation   Skin:     General: Skin is warm and dry.      Coloration: Skin is not pale.      Findings: No bruising, ecchymosis, erythema or rash.      Nails: There is no clubbing.     Neurological:      Mental Status: She is alert and oriented to person, place, and time.      Cranial Nerves: No cranial nerve deficit.      Sensory: No sensory deficit.      Motor: No abnormal muscle tone.      Coordination: Coordination normal.      Deep Tendon Reflexes: Reflexes are normal and symmetric.   Psychiatric:         Speech: Speech normal.         Behavior: Behavior normal.         Thought Content: Thought content normal.         Judgment: Judgment normal.         Significant Labs:  CMP:   Recent Labs   Lab 10/09/20  0442 10/10/20  0750 10/11/20  0628   * 83 179*   CALCIUM 7.6* 7.5* 7.2*   ALBUMIN 1.4* 1.2*  --    PROT 5.8*  --   --     145 142   K 4.3 4.4 3.8   CO2 27 29 31*    108 107   BUN 33* 34* 37*   CREATININE 2.7* 2.7* 2.7*   ALKPHOS 107  --   --    ALT 14  --   --    AST 54*  --   --    BILITOT 0.1  --   --      All labs within the past 24 hours have been reviewed.    Significant Imaging:  Labs: Reviewed  X-Ray: Reviewed  US: Reviewed  Echo: Reviewed

## 2020-10-11 NOTE — ASSESSMENT & PLAN NOTE
-Presents with decompensated diastolic CHF in part due to running out of metolazone and dietary non-compliance  -Continue IV diuresis  -Continue amlodipine, Imdur  -No ACEi/ARB given kidney function  -Strict I's/O's  -Check echo  -Dietary restrictions re-discussed    10/10/2020  -Clinically improved  -Continue current CV meds/mgmt  -Counseled on low salt diet  -Follow-up in clinic    10/11/2020  -BNP trending down  -Combination of CHF/fluid overload from cirrhosis  -Diurese as needed

## 2020-10-11 NOTE — PROGRESS NOTES
Appreciate Dr. Ortega and Dr. RENETTA Hernandez. Pt is a pleasant morbidly obese lady who has hx of cryptogenic cirrhosis s/p TIPS three years ago when she was evaluated by Liver Transplant team in [facility] and was turned down as she was not sick enough.     During this admit, she is on high dose diuretics for the massive anasarca but not diuresing much, most likely sec to severe Hypoalbuminemia- her Albumin is only 1.2- hence Dr. Ortega recommended nephrology for CRRT for fluid removal but Dr. Hernandez does not consider her a candidate for CRRT on account of her severe Hypoalbuminemia and ES Cirrhosis. Hence recommends hospice and he discussed it with the pt and informed her so. I d/w pt and her daughter Paco who is asking about a Liver Tx- hence GI- Dr. Elmore consulted. Her prognosis is poor.

## 2020-10-11 NOTE — SUBJECTIVE & OBJECTIVE
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 CATH LAB;  Service: General;  Laterality: N/A;    LIVER BIOPSY      THORACENTESIS Left 1/20/2020    Procedure: Thoracentesis;  Surgeon: Angelica Hunter MD;  Location: [facility] ENDO;  Service: Pulmonary;  Laterality: Left;    TUBAL LIGATION      UPPER GASTROINTESTINAL ENDOSCOPY         Review of patient's allergies indicates:   Allergen Reactions    Subsys [fentanyl] Other (See Comments)     After administration pt unresponsive.  HR and respirations decreased.     Versed [midazolam] Other (See Comments)     After administration pt unresponsive.  HR and respirations decreased.     Ampicillin Rash    Codeine Nausea And Vomiting and Nausea Only       Family History     Problem Relation (Age of Onset)    Aneurysm Sister    Breast cancer Mother    Cancer Mother, Maternal Grandfather    Heart disease Father, Brother    No Known Problems Maternal Grandmother    Sarcoidosis Sister        Tobacco Use    Smoking status: Never Smoker    Smokeless tobacco: Never Used   Substance and Sexual Activity     Alcohol use: No     Frequency: Never    Drug use: No    Sexual activity: Never         Review of Systems   Constitutional: Positive for fatigue and unexpected weight change.   Respiratory: Positive for apnea, chest tightness and shortness of breath.    Cardiovascular: Positive for leg swelling.   Gastrointestinal: Positive for abdominal distention.   All other systems reviewed and are negative.    Objective:     Vital Signs (Most Recent):  Temp: 97.3 °F (36.3 °C) (10/11/20 0727)  Pulse: 75 (10/11/20 0900)  Resp: 20 (10/11/20 0727)  BP: 120/67 (10/11/20 0727)  SpO2: 97 % (10/11/20 0727) Vital Signs (24h Range):  Temp:  [97.1 °F (36.2 °C)-99.7 °F (37.6 °C)] 97.3 °F (36.3 °C)  Pulse:  [66-93] 75  Resp:  [18-20] 20  SpO2:  [97 %-99 %] 97 %  BP: (120-134)/(57-71) 120/67     Weight: 111 kg (244 lb 11.4 oz)  Body mass index is 46.24 kg/m².      Intake/Output Summary (Last 24 hours) at 10/11/2020 1054  Last data filed at 10/11/2020 0500  Gross per 24 hour   Intake 920 ml   Output --   Net 920 ml       Physical Exam  Vitals signs and nursing note reviewed.   Constitutional:       Appearance: She is well-developed.   HENT:      Head: Normocephalic and atraumatic.      Nose: Nose normal.      Mouth/Throat:      Pharynx: No oropharyngeal exudate.   Eyes:      General: No scleral icterus.     Conjunctiva/sclera: Conjunctivae normal.      Pupils: Pupils are equal, round, and reactive to light.   Neck:      Musculoskeletal: Normal range of motion and neck supple.      Thyroid: No thyromegaly.      Vascular: No JVD.      Trachea: No tracheal deviation.   Cardiovascular:      Rate and Rhythm: Normal rate and regular rhythm.      Heart sounds: Normal heart sounds, S1 normal and S2 normal. No murmur.   Pulmonary:      Effort: Pulmonary effort is normal. No tachypnea, accessory muscle usage or respiratory distress.      Breath sounds: Normal breath sounds. Decreased air movement present. No stridor.   Abdominal:      General:  Bowel sounds are normal. There is distension.      Palpations: Abdomen is soft. There is shifting dullness. There is no hepatomegaly, splenomegaly or mass.      Tenderness: There is no abdominal tenderness. There is no guarding or rebound.   Musculoskeletal: Normal range of motion.         General: Swelling present. No tenderness.   Lymphadenopathy:      Upper Body:      Right upper body: No supraclavicular adenopathy.      Left upper body: No supraclavicular adenopathy.   Skin:     General: Skin is warm and dry.      Findings: No rash.      Nails: There is no clubbing.     Neurological:      Mental Status: She is alert and oriented to person, place, and time.      Coordination: Coordination normal.      Gait: Gait normal.      Deep Tendon Reflexes: Reflexes are normal and symmetric.         Vents:  Oxygen Concentration (%): 28 (10/10/20 2148)    Lines/Drains/Airways     Peripheral Intravenous Line                 Peripheral IV - Single Lumen 10/09/20 0443 20 G Right Antecubital 2 days                Significant Labs:    CBC/Anemia Profile:  No results for input(s): WBC, HGB, HCT, PLT, MCV, RDW, IRON, FERRITIN, RETIC, FOLATE, OSWYKYAN25, OCCULTBLOOD in the last 48 hours.     Chemistries:  Recent Labs   Lab 10/10/20  0750 10/11/20  0628    142   K 4.4 3.8    107   CO2 29 31*   BUN 34* 37*   CREATININE 2.7* 2.7*   CALCIUM 7.5* 7.2*   ALBUMIN 1.2*  --    MG 1.6  --        PATH  FINAL PATHOLOGIC DIAGNOSIS  LIVER, RANDOM (TRANSJUGULAR BIOPSY):  Variable involvement, advanced stage fibrosis: Cirrhosis (stage 4 of 4)  Mild predominantly lymphocytic inflammation within the fibrous septae  No significant plasma cell population  No steatosis  Minimal (1+) hepatocyte iron, focal moderate macrophage iron  Iron and trichrome stains with appropriate controls        Pleural fluid  Lymphocytes present.  1. Left pleural fluid, for cytology (1 ThinPrep, 1 cell block):  - Negative for malignant cells  - Reactive  lymphocytes and macrophages present    ABGs: No results for input(s): PH, PCO2, HCO3, POCSATURATED, BE in the last 48 hours.  Blood Culture: No results for input(s): LABBLOO in the last 48 hours.  Cardiac Markers: No results for input(s): CKMB, TROPONINT, MYOGLOBIN in the last 48 hours.  Lactic Acid: No results for input(s): LACTATE in the last 48 hours.  Pathology Results  (Last 10 years)               09/09/16 0000  Tissue Specimen to Pathology Final result    09/09/16 0000  Tissue Specimen to Pathology Final result        POCT Glucose:   Recent Labs   Lab 10/10/20  2111 10/11/20  0610 10/11/20  0718   POCTGLUCOSE 114* 47* 82     Respiratory Culture: No results for input(s): GSRESP, RESPIRATORYC in the last 48 hours.  Urine Studies: No results for input(s): COLORU, APPEARANCEUA, PHUR, SPECGRAV, PROTEINUA, GLUCUA, KETONESU, BILIRUBINUA, OCCULTUA, NITRITE, UROBILINOGEN, LEUKOCYTESUR, RBCUA, WBCUA, BACTERIA, SQUAMEPITHEL, HYALINECASTS in the last 48 hours.    Invalid input(s): WRIGHTSUR  All pertinent labs within the past 24 hours have been reviewed.    Significant Imaging:   I have reviewed all pertinent imaging results/findings within the past 24 hours.     ECHO  · There is moderate left ventricular concentric hypertrophy.  · The left ventricle is small with normal systolic function. The estimated ejection fraction is 60%.  · Grade I diastolic dysfunction.  · Normal right ventricular systolic function.  · Mild tricuspid regurgitation.  · Normal central venous pressure (3 mmHg).  · The estimated PA systolic pressure is 37 mmHg.  · Small pericardial effusion.  · There is a moderate left pleural effusion.    CXR  EXAMINATION:  XR CHEST 1 VIEW     CLINICAL HISTORY:  Shortness of breath     FINDINGS:  Single view of the chest.  Comparison 09/24/2020     Cardiac silhouette remains enlarged.  Again persistent hazy opacity in the left lung base likely representing combination of small left-sided pleural effusion and mild  left basilar atelectasis.  Right lung remains clear.  No acute osseous findings demonstrated.     Impression:     No acute process seen.     Component      Latest Ref Rng & Units 1/20/2020 1/20/2020          10:43 AM 10:43 AM   Body Fluid Type        Pleural Fluid, Left   Fluid Appearance        Hazy   Fluid Color        Colorless   WBC, Body Fluid      /cu mm  1437   Segs, Fluid      %  3   Lymphs, Fluid      %  89   Monocytes/Macrophages, Fluid      %  8   Body Fluid Source , Frozen        Pleural fluid   Protein, Fluid      g/dL  0.8   Body Fluid Source, Refrigerated       Pleural fluid Pleural fluid   LD, Fluid      U/L  69   Glucose, Fluid      mg/dL 162    Pathologist Review, Body Fluid        REVIEWED

## 2020-10-11 NOTE — CONSULTS
[facility] -   Pulmonology  Consult Note    Patient Name: Diamond Das  MRN: [id number]  Admission Date: 10/9/2020  Hospital Length of Stay: 0 days  Code Status: Full Code  Attending Physician: Mateo Floyd MD  Primary Care Provider: Andrey Perrin MD   Principal Problem: Acute on chronic heart failure      Subjective:     HPI:  Diamond Das is 65 y.o.  Asked to see: CXR Pleural effusion and also seen on Echo  Known CHF EF: 60%, grade 1 DD  KRISTAL on CPAP 14 cm,   Worsening anasarca inspite adherence to therapies, Lasix and metalazone  Creatinine 2.7  She had Thora 01/2020: Lymphycytic.   Transjugular liver biopsy 2018: Variable involvement, advanced stage fibrosis: Cirrhosis (stage 4 of 4)  SP TIPS: 07/2020, recent US: reduced velocities    Past Medical History:   Diagnosis Date    Ascites     Breast pain, left 1/4/2018    Cataract     CHF (congestive heart failure)     Cirrhosis     Cough     Diabetes mellitus     Diabetes mellitus, type 2     Gastroparesis     GERD (gastroesophageal reflux disease)     Hyperlipidemia     Hypertension     Hypotension 2/21/2019    Liver disease     Lumbar strain 4/27/2018    Pneumonia of right middle lobe due to infectious organism 12/19/2017    Renal disorder     Retinopathy due to secondary diabetes mellitus     Sleep apnea     Thyroid disease        Past Surgical History:   Procedure Laterality Date    CATARACT EXTRACTION      CHOLECYSTECTOMY      COLONOSCOPY N/A 9/4/2019    Procedure: COLONOSCOPY;  Surgeon: Marnie Elmore MD;  [facility];  Service: Endoscopy;  Laterality: N/A;    ERCP      FLUOROSCOPY N/A 7/24/2020    Procedure: TIPS revision;  Surgeon: Martell Jasmine MD;  Location: [facility] CATH LAB;  Service: General;  Laterality: N/A;    LIVER BIOPSY      THORACENTESIS Left 1/20/2020    Procedure: Thoracentesis;  Surgeon: Angelica Hunter MD;  Location: [facility] ENDO;  Service: Pulmonary;  Laterality: Left;    TUBAL  LIGATION      UPPER GASTROINTESTINAL ENDOSCOPY         Review of patient's allergies indicates:   Allergen Reactions    Subsys [fentanyl] Other (See Comments)     After administration pt unresponsive.  HR and respirations decreased.     Versed [midazolam] Other (See Comments)     After administration pt unresponsive.  HR and respirations decreased.     Ampicillin Rash    Codeine Nausea And Vomiting and Nausea Only       Family History     Problem Relation (Age of Onset)    Aneurysm Sister    Breast cancer Mother    Cancer Mother, Maternal Grandfather    Heart disease Father, Brother    No Known Problems Maternal Grandmother    Sarcoidosis Sister        Tobacco Use    Smoking status: Never Smoker    Smokeless tobacco: Never Used   Substance and Sexual Activity    Alcohol use: No     Frequency: Never    Drug use: No    Sexual activity: Never         Review of Systems   Constitutional: Positive for fatigue and unexpected weight change.   Respiratory: Positive for apnea, chest tightness and shortness of breath.    Cardiovascular: Positive for leg swelling.   Gastrointestinal: Positive for abdominal distention.   All other systems reviewed and are negative.    Objective:     Vital Signs (Most Recent):  Temp: 97.3 °F (36.3 °C) (10/11/20 0727)  Pulse: 75 (10/11/20 0900)  Resp: 20 (10/11/20 0727)  BP: 120/67 (10/11/20 0727)  SpO2: 97 % (10/11/20 0727) Vital Signs (24h Range):  Temp:  [97.1 °F (36.2 °C)-99.7 °F (37.6 °C)] 97.3 °F (36.3 °C)  Pulse:  [66-93] 75  Resp:  [18-20] 20  SpO2:  [97 %-99 %] 97 %  BP: (120-134)/(57-71) 120/67     Weight: 111 kg (244 lb 11.4 oz)  Body mass index is 46.24 kg/m².      Intake/Output Summary (Last 24 hours) at 10/11/2020 1054  Last data filed at 10/11/2020 0500  Gross per 24 hour   Intake 920 ml   Output --   Net 920 ml       Physical Exam  Vitals signs and nursing note reviewed.   Constitutional:       Appearance: She is well-developed.   HENT:      Head: Normocephalic and  atraumatic.      Nose: Nose normal.      Mouth/Throat:      Pharynx: No oropharyngeal exudate.   Eyes:      General: No scleral icterus.     Conjunctiva/sclera: Conjunctivae normal.      Pupils: Pupils are equal, round, and reactive to light.   Neck:      Musculoskeletal: Normal range of motion and neck supple.      Thyroid: No thyromegaly.      Vascular: No JVD.      Trachea: No tracheal deviation.   Cardiovascular:      Rate and Rhythm: Normal rate and regular rhythm.      Heart sounds: Normal heart sounds, S1 normal and S2 normal. No murmur.   Pulmonary:      Effort: Pulmonary effort is normal. No tachypnea, accessory muscle usage or respiratory distress.      Breath sounds: Normal breath sounds. Decreased air movement present. No stridor.   Abdominal:      General: Bowel sounds are normal. There is distension.      Palpations: Abdomen is soft. There is shifting dullness. There is no hepatomegaly, splenomegaly or mass.      Tenderness: There is no abdominal tenderness. There is no guarding or rebound.   Musculoskeletal: Normal range of motion.         General: Swelling present. No tenderness.   Lymphadenopathy:      Upper Body:      Right upper body: No supraclavicular adenopathy.      Left upper body: No supraclavicular adenopathy.   Skin:     General: Skin is warm and dry.      Findings: No rash.      Nails: There is no clubbing.     Neurological:      Mental Status: She is alert and oriented to person, place, and time.      Coordination: Coordination normal.      Gait: Gait normal.      Deep Tendon Reflexes: Reflexes are normal and symmetric.         Vents:  Oxygen Concentration (%): 28 (10/10/20 2148)    Lines/Drains/Airways     Peripheral Intravenous Line                 Peripheral IV - Single Lumen 10/09/20 0443 20 G Right Antecubital 2 days                Significant Labs:    CBC/Anemia Profile:  No results for input(s): WBC, HGB, HCT, PLT, MCV, RDW, IRON, FERRITIN, RETIC, FOLATE, HIHIELHV94, OCCULTBLOOD  in the last 48 hours.     Chemistries:  Recent Labs   Lab 10/10/20  0750 10/11/20  0628    142   K 4.4 3.8    107   CO2 29 31*   BUN 34* 37*   CREATININE 2.7* 2.7*   CALCIUM 7.5* 7.2*   ALBUMIN 1.2*  --    MG 1.6  --        PATH  FINAL PATHOLOGIC DIAGNOSIS  LIVER, RANDOM (TRANSJUGULAR BIOPSY):  Variable involvement, advanced stage fibrosis: Cirrhosis (stage 4 of 4)  Mild predominantly lymphocytic inflammation within the fibrous septae  No significant plasma cell population  No steatosis  Minimal (1+) hepatocyte iron, focal moderate macrophage iron  Iron and trichrome stains with appropriate controls        Pleural fluid  Lymphocytes present.  1. Left pleural fluid, for cytology (1 ThinPrep, 1 cell block):  - Negative for malignant cells  - Reactive lymphocytes and macrophages present    ABGs: No results for input(s): PH, PCO2, HCO3, POCSATURATED, BE in the last 48 hours.  Blood Culture: No results for input(s): LABBLOO in the last 48 hours.  Cardiac Markers: No results for input(s): CKMB, TROPONINT, MYOGLOBIN in the last 48 hours.  Lactic Acid: No results for input(s): LACTATE in the last 48 hours.  Pathology Results  (Last 10 years)               09/09/16 0000  Tissue Specimen to Pathology Final result    09/09/16 0000  Tissue Specimen to Pathology Final result        POCT Glucose:   Recent Labs   Lab 10/10/20  2111 10/11/20  0610 10/11/20  0718   POCTGLUCOSE 114* 47* 82     Respiratory Culture: No results for input(s): GSRESP, RESPIRATORYC in the last 48 hours.  Urine Studies: No results for input(s): COLORU, APPEARANCEUA, PHUR, SPECGRAV, PROTEINUA, GLUCUA, KETONESU, BILIRUBINUA, OCCULTUA, NITRITE, UROBILINOGEN, LEUKOCYTESUR, RBCUA, WBCUA, BACTERIA, SQUAMEPITHEL, HYALINECASTS in the last 48 hours.    Invalid input(s): WRIGHTSUR  All pertinent labs within the past 24 hours have been reviewed.    Significant Imaging:   I have reviewed all pertinent imaging results/findings within the past 24 hours.      ECHO  · There is moderate left ventricular concentric hypertrophy.  · The left ventricle is small with normal systolic function. The estimated ejection fraction is 60%.  · Grade I diastolic dysfunction.  · Normal right ventricular systolic function.  · Mild tricuspid regurgitation.  · Normal central venous pressure (3 mmHg).  · The estimated PA systolic pressure is 37 mmHg.  · Small pericardial effusion.  · There is a moderate left pleural effusion.    CXR  EXAMINATION:  XR CHEST 1 VIEW     CLINICAL HISTORY:  Shortness of breath     FINDINGS:  Single view of the chest.  Comparison 09/24/2020     Cardiac silhouette remains enlarged.  Again persistent hazy opacity in the left lung base likely representing combination of small left-sided pleural effusion and mild left basilar atelectasis.  Right lung remains clear.  No acute osseous findings demonstrated.     Impression:     No acute process seen.     Component      Latest Ref Rng & Units 1/20/2020 1/20/2020          10:43 AM 10:43 AM   Body Fluid Type        Pleural Fluid, Left   Fluid Appearance        Hazy   Fluid Color        Colorless   WBC, Body Fluid      /cu mm  1437   Segs, Fluid      %  3   Lymphs, Fluid      %  89   Monocytes/Macrophages, Fluid      %  8   Body Fluid Source , Frozen        Pleural fluid   Protein, Fluid      g/dL  0.8   Body Fluid Source, Refrigerated       Pleural fluid Pleural fluid   LD, Fluid      U/L  69   Glucose, Fluid      mg/dL 162    Pathologist Review, Body Fluid        REVIEWED       ABG  No results for input(s): PH, PO2, PCO2, HCO3, BE in the last 168 hours.  Assessment/Plan:     Acute hypoxemic respiratory failure  Supplementary O2  Keep SPO2> 90%    Recurrent pleural effusion on left  Left lymphocytic effusion  If significant fliud consider Thora and send for flow cytometry  Optimize underlying CHF/ Chr Liver Disease  Incentive spirometry    Acute on chronic kidney failure  Strict I/O  Consult nephrology    KRISTAL on  CPAP  Continue CPAP    Cirrhosis of liver with ascites  Hx of TIPS  Would get input from hepatology          Thank you for your consult. I will follow-up with patient. Please contact us if you have any additional questions.     Jose Ortega MD  Pulmonology  [facility]

## 2020-10-12 ENCOUNTER — TELEPHONE (OUTPATIENT)
Dept: CARDIOLOGY | Facility: CLINIC | Age: 65
End: 2020-10-12

## 2020-10-12 ENCOUNTER — TELEPHONE (OUTPATIENT)
Dept: INTERNAL MEDICINE | Facility: CLINIC | Age: 65
End: 2020-10-12

## 2020-10-12 LAB
ANA PATTERN 1: NORMAL
ANA PATTERN 2: NORMAL
ANA SER QL IF: POSITIVE
ANA TITER 2: NORMAL
ANA TITR SER IF: NORMAL {TITER}
ANION GAP SERPL CALC-SCNC: 8 MMOL/L (ref 8–16)
BUN SERPL-MCNC: 39 MG/DL (ref 8–23)
CALCIUM SERPL-MCNC: 7.3 MG/DL (ref 8.7–10.5)
CHLORIDE SERPL-SCNC: 105 MMOL/L (ref 95–110)
CO2 SERPL-SCNC: 28 MMOL/L (ref 23–29)
CREAT SERPL-MCNC: 2.6 MG/DL (ref 0.5–1.4)
EST. GFR  (AFRICAN AMERICAN): 22 ML/MIN/1.73 M^2
EST. GFR  (NON AFRICAN AMERICAN): 19 ML/MIN/1.73 M^2
GLUCOSE SERPL-MCNC: 97 MG/DL (ref 70–110)
POCT GLUCOSE: 102 MG/DL (ref 70–110)
POTASSIUM SERPL-SCNC: 4.1 MMOL/L (ref 3.5–5.1)
RHEUMATOID FACT SERPL-ACNC: <10 IU/ML (ref 0–15)
SODIUM SERPL-SCNC: 141 MMOL/L (ref 136–145)

## 2020-10-12 PROCEDURE — 63600175 PHARM REV CODE 636 W HCPCS: Performed by: INTERNAL MEDICINE

## 2020-10-12 PROCEDURE — 94799 UNLISTED PULMONARY SVC/PX: CPT

## 2020-10-12 PROCEDURE — 99232 SBSQ HOSP IP/OBS MODERATE 35: CPT | Mod: ,,, | Performed by: INTERNAL MEDICINE

## 2020-10-12 PROCEDURE — 99233 PR SUBSEQUENT HOSPITAL CARE,LEVL III: ICD-10-PCS | Mod: ,,, | Performed by: INTERNAL MEDICINE

## 2020-10-12 PROCEDURE — 97116 GAIT TRAINING THERAPY: CPT

## 2020-10-12 PROCEDURE — 99215 OFFICE O/P EST HI 40 MIN: CPT | Mod: ,,, | Performed by: INTERNAL MEDICINE

## 2020-10-12 PROCEDURE — 94660 CPAP INITIATION&MGMT: CPT

## 2020-10-12 PROCEDURE — 21400001 HC TELEMETRY ROOM

## 2020-10-12 PROCEDURE — 86480 TB TEST CELL IMMUN MEASURE: CPT

## 2020-10-12 PROCEDURE — 99900035 HC TECH TIME PER 15 MIN (STAT)

## 2020-10-12 PROCEDURE — 99233 SBSQ HOSP IP/OBS HIGH 50: CPT | Mod: ,,, | Performed by: INTERNAL MEDICINE

## 2020-10-12 PROCEDURE — 25000003 PHARM REV CODE 250: Performed by: EMERGENCY MEDICINE

## 2020-10-12 PROCEDURE — 25000003 PHARM REV CODE 250: Performed by: PHYSICIAN ASSISTANT

## 2020-10-12 PROCEDURE — 99232 PR SUBSEQUENT HOSPITAL CARE,LEVL II: ICD-10-PCS | Mod: ,,, | Performed by: INTERNAL MEDICINE

## 2020-10-12 PROCEDURE — 97530 THERAPEUTIC ACTIVITIES: CPT

## 2020-10-12 PROCEDURE — 25000003 PHARM REV CODE 250: Performed by: NURSE PRACTITIONER

## 2020-10-12 PROCEDURE — 96376 TX/PRO/DX INJ SAME DRUG ADON: CPT | Performed by: EMERGENCY MEDICINE

## 2020-10-12 PROCEDURE — 80048 BASIC METABOLIC PNL TOTAL CA: CPT

## 2020-10-12 PROCEDURE — 36415 COLL VENOUS BLD VENIPUNCTURE: CPT

## 2020-10-12 PROCEDURE — G0378 HOSPITAL OBSERVATION PER HR: HCPCS

## 2020-10-12 PROCEDURE — 94760 N-INVAS EAR/PLS OXIMETRY 1: CPT

## 2020-10-12 PROCEDURE — 99215 PR OFFICE/OUTPT VISIT, EST, LEVL V, 40-54 MIN: ICD-10-PCS | Mod: ,,, | Performed by: INTERNAL MEDICINE

## 2020-10-12 RX ORDER — BISACODYL 5 MG
10 TABLET, DELAYED RELEASE (ENTERIC COATED) ORAL ONCE
Status: COMPLETED | OUTPATIENT
Start: 2020-10-12 | End: 2020-10-12

## 2020-10-12 RX ORDER — AMOXICILLIN 250 MG
2 CAPSULE ORAL DAILY
Status: DISCONTINUED | OUTPATIENT
Start: 2020-10-12 | End: 2020-10-14 | Stop reason: HOSPADM

## 2020-10-12 RX ADMIN — BISACODYL 10 MG: 5 TABLET, COATED ORAL at 08:10

## 2020-10-12 RX ADMIN — FUROSEMIDE 80 MG: 10 INJECTION, SOLUTION INTRAMUSCULAR; INTRAVENOUS at 05:10

## 2020-10-12 RX ADMIN — METOLAZONE 5 MG: 5 TABLET ORAL at 08:10

## 2020-10-12 RX ADMIN — STANDARDIZED SENNA CONCENTRATE AND DOCUSATE SODIUM 2 TABLET: 8.6; 5 TABLET ORAL at 08:10

## 2020-10-12 RX ADMIN — AMLODIPINE BESYLATE 10 MG: 10 TABLET ORAL at 08:10

## 2020-10-12 RX ADMIN — ISOSORBIDE MONONITRATE 30 MG: 30 TABLET, EXTENDED RELEASE ORAL at 08:10

## 2020-10-12 RX ADMIN — LEVOTHYROXINE SODIUM 137 MCG: 25 TABLET ORAL at 05:10

## 2020-10-12 RX ADMIN — FAMOTIDINE 20 MG: 20 TABLET ORAL at 08:10

## 2020-10-12 RX ADMIN — ASPIRIN 81 MG: 81 TABLET, COATED ORAL at 08:10

## 2020-10-12 RX ADMIN — POLYETHYLENE GLYCOL 3350 17 G: 17 POWDER, FOR SOLUTION ORAL at 08:10

## 2020-10-12 RX ADMIN — DOXAZOSIN MESYLATE 2 MG: 2 TABLET ORAL at 08:10

## 2020-10-12 NOTE — SUBJECTIVE & OBJECTIVE
Interval History:  Worsening anasarca due to liver failure and hepatorenal syndrome.    Review of patient's allergies indicates:   Allergen Reactions    Subsys [fentanyl] Other (See Comments)     After administration pt unresponsive.  HR and respirations decreased.     Versed [midazolam] Other (See Comments)     After administration pt unresponsive.  HR and respirations decreased.     Ampicillin Rash    Codeine Nausea And Vomiting and Nausea Only     Current Facility-Administered Medications   Medication Frequency    albuterol-ipratropium 2.5 mg-0.5 mg/3 mL nebulizer solution 3 mL Q4H PRN    amLODIPine tablet 10 mg Daily    aspirin EC tablet 81 mg Daily    dextrose 50% injection 12.5 g PRN    dextrose 50% injection 25 g PRN    doxazosin tablet 2 mg QHS    famotidine tablet 20 mg Daily    furosemide injection 80 mg Q12H    glucagon (human recombinant) injection 1 mg PRN    glucose chewable tablet 16 g PRN    glucose chewable tablet 24 g PRN    insulin aspart U-100 pen 0-5 Units QID (AC + HS) PRN    insulin detemir U-100 pen 10 Units QHS    isosorbide mononitrate 24 hr tablet 30 mg Daily    levothyroxine tablet 137 mcg Before breakfast    metOLazone tablet 5 mg Daily    ondansetron disintegrating tablet 4 mg Q8H PRN    polyethylene glycol packet 17 g Daily    sodium chloride 0.9% flush 10 mL PRN       Objective:     Vital Signs (Most Recent):  Temp: 99 °F (37.2 °C) (10/12/20 0710)  Pulse: 71 (10/12/20 0710)  Resp: 20 (10/12/20 0710)  BP: 132/68 (10/12/20 0710)  SpO2: 97 % (10/12/20 0908)  O2 Device (Oxygen Therapy): room air (10/12/20 0908) Vital Signs (24h Range):  Temp:  [98.3 °F (36.8 °C)-99.3 °F (37.4 °C)] 99 °F (37.2 °C)  Pulse:  [71-80] 71  Resp:  [18-20] 20  SpO2:  [95 %-100 %] 97 %  BP: (122-155)/(57-72) 132/68     Weight: 111.3 kg (245 lb 6 oz) (10/12/20 0353)  Body mass index is 46.36 kg/m².  Body surface area is 2.19 meters squared.    I/O last 3 completed shifts:  In: 390  [P.O.:390]  Out: -     Physical Exam  Vitals signs and nursing note reviewed. Exam conducted with a chaperone present.   Constitutional:       Appearance: She is well-developed.   HENT:      Head: Normocephalic and atraumatic.   Eyes:      Conjunctiva/sclera: Conjunctivae normal.      Pupils: Pupils are equal, round, and reactive to light.   Neck:      Musculoskeletal: Full passive range of motion without pain, normal range of motion and neck supple. No edema.      Thyroid: No thyroid mass or thyromegaly.      Vascular: No carotid bruit.   Cardiovascular:      Rate and Rhythm: Normal rate and regular rhythm.  No extrasystoles are present.     Chest Wall: PMI is displaced.      Pulses: Normal pulses.      Heart sounds: S1 normal and S2 normal. Murmur present. Systolic murmur present with a grade of 2/6. No friction rub.      Comments: RV heave loud P2   Pulmonary:      Effort: Pulmonary effort is normal. No accessory muscle usage or respiratory distress.      Breath sounds: No wheezing or rales.   Chest:      Chest wall: No tenderness.   Abdominal:      General: Bowel sounds are normal. There is no distension.      Palpations: Abdomen is soft. There is no mass.      Tenderness: There is no abdominal tenderness. There is no rebound.      Hernia: No hernia is present.   Musculoskeletal: Normal range of motion.         General: No tenderness.      Comments: Severe edema anasarca.  Abdominal distension and findings of severe cirrhosis of liver with complete decompensation   Skin:     General: Skin is warm and dry.      Coloration: Skin is not pale.      Findings: No bruising, ecchymosis, erythema or rash.      Nails: There is no clubbing.     Neurological:      Mental Status: She is alert and oriented to person, place, and time.      Cranial Nerves: No cranial nerve deficit.      Sensory: No sensory deficit.      Motor: No abnormal muscle tone.      Coordination: Coordination normal.      Deep Tendon Reflexes: Reflexes  are normal and symmetric.   Psychiatric:         Speech: Speech normal.         Behavior: Behavior normal.         Thought Content: Thought content normal.         Judgment: Judgment normal.         Significant Labs:  All labs within the past 24 hours have been reviewed.     Significant Imaging:

## 2020-10-12 NOTE — ASSESSMENT & PLAN NOTE
-MELD at 16.  -Discussed patient with Dr. Davila who recommends evaluation for liver transplant outpatient.  -US with dopplar today to evaluate TIPS.  -Begin Lactulose BID, as patient has not had BM in 2 weeks.  -Continue to monitor labs.  -Discussed with HM and nephrology.

## 2020-10-12 NOTE — CONSULTS
[facility] -   Gastroenterology  Consult Note    Patient Name: Diamond Das  MRN: [id number]  Admission Date: 10/9/2020  Hospital Length of Stay: 0 days  Code Status: Full Code   Attending Provider: Mateo Floyd MD   Consulting Provider: Gloria Snider PA-C  Primary Care Physician: Andrey Perrin MD  Principal Problem:Acute on chronic heart failure    Inpatient consult to Gastroenterology  Consult performed by: Gloria Snider PA-C  Consult ordered by: Mateo Floyd MD  Reason for consult: Cirrhosis, hepatorenal syndrome        Subjective:     HPI:  MELD-Na score: 16 at 10/11/2020  6:28 AM  MELD score: 16 at 10/11/2020  6:28 AM  Calculated from:  Serum Creatinine: 2.7 mg/dL at 10/11/2020  6:28 AM  Serum Sodium: 142 mmol/L (Rounded to 137 mmol/L) at 10/11/2020  6:28 AM  Total Bilirubin: 0.1 mg/dL (Rounded to 1 mg/dL) at 10/9/2020  4:42 AM  INR(ratio): 1.0 at 10/9/2020  4:42 AM  Age: 65 years 7 months    Patient is a 64yo female with history of CHF, HTN, MCQUEEN Cirrhosis s/p TIPS and TIPS revision. Patient is followed by hepatology. MELD 16. She is currently admitted for hepatorenal and cardiorenal syndrome. She reports swelling mostly to lower extremeties and not as much in her abdomen. Denies abdominal pain, shortness of breath, sign of blood loss. Last bowel movement approximately 2 weeks ago. Does not take Lactulose currently, although has taken it in the past for hepatic encephalopathy with resolution of symptoms. She and daughter deny any recent or current confusion. States she was evaluated for liver transplant a few years ago by [facility] who denied her because her MELD was low. TIPS was completed. Swelling improved but returned. TIPS revision completed with no severe swelling since then. Labs currently BUN 39, Cr 2.6, GFR 22. Previous echo shows EF at 65%. Daughter inquiring about possibility of transplant.    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     Sleep apnea     Thyroid disease        Past Surgical History:   Procedure Laterality Date    CATARACT EXTRACTION      CHOLECYSTECTOMY      COLONOSCOPY N/A 9/4/2019    Procedure: COLONOSCOPY;  Surgeon: Marnie Elmore MD;  Location: [facility] ENDO;  Service: Endoscopy;  Laterality: N/A;    ERCP      FLUOROSCOPY N/A 7/24/2020    Procedure: TIPS revision;  Surgeon: Martell Jasmine MD;  Location: [facility] CATH LAB;  Service: General;  Laterality: N/A;    LIVER BIOPSY      THORACENTESIS Left 1/20/2020    Procedure: Thoracentesis;  Surgeon: Angelica Hunter MD;  Location: [facility] ENDO;  Service: Pulmonary;  Laterality: Left;    TUBAL LIGATION      UPPER GASTROINTESTINAL ENDOSCOPY         Review of patient's allergies indicates:   Allergen Reactions    Subsys [fentanyl] Other (See Comments)     After administration pt unresponsive.  HR and respirations decreased.     Versed [midazolam] Other (See Comments)     After administration pt unresponsive.  HR and respirations decreased.     Ampicillin Rash    Codeine Nausea And Vomiting and Nausea Only     Family History     Problem Relation (Age of Onset)    Aneurysm Sister    Breast cancer Mother    Cancer Mother, Maternal Grandfather    Heart disease Father, Brother    No Known Problems Maternal Grandmother    Sarcoidosis Sister        Tobacco Use    Smoking status: Never Smoker    Smokeless tobacco: Never Used   Substance and Sexual Activity    Alcohol use: No     Frequency: Never    Drug use: No    Sexual  activity: Never     Review of Systems   Constitutional: Negative for activity change, appetite change, chills, diaphoresis, fatigue and fever.   Respiratory: Negative for cough and shortness of breath.    Cardiovascular: Negative for chest pain.   Gastrointestinal: Positive for constipation. Negative for abdominal distention, abdominal pain, blood in stool, diarrhea and nausea.   Genitourinary: Negative for dysuria.        Still urinating, per patient, but decreased.   Skin: Negative for color change.   Neurological: Negative for dizziness, weakness and light-headedness.   Psychiatric/Behavioral: Negative for confusion and dysphoric mood. The patient is not nervous/anxious.      Objective:     Vital Signs (Most Recent):  Temp: 99 °F (37.2 °C) (10/12/20 0710)  Pulse: 71 (10/12/20 0710)  Resp: 20 (10/12/20 0710)  BP: 132/68 (10/12/20 0710)  SpO2: 97 % (10/12/20 0908) Vital Signs (24h Range):  Temp:  [98.3 °F (36.8 °C)-99.3 °F (37.4 °C)] 99 °F (37.2 °C)  Pulse:  [71-80] 71  Resp:  [18-20] 20  SpO2:  [95 %-100 %] 97 %  BP: (122-155)/(57-72) 132/68     Weight: 111.3 kg (245 lb 6 oz) (10/12/20 0353)  Body mass index is 46.36 kg/m².      Intake/Output Summary (Last 24 hours) at 10/12/2020 1129  Last data filed at 10/12/2020 0353  Gross per 24 hour   Intake 270 ml   Output --   Net 270 ml       Lines/Drains/Airways     Peripheral Intravenous Line                 Peripheral IV - Single Lumen 10/09/20 0443 20 G Right Antecubital 3 days                Physical Exam  Constitutional:       General: She is not in acute distress.     Appearance: Normal appearance. She is obese. She is not ill-appearing, toxic-appearing or diaphoretic.   HENT:      Head: Normocephalic and atraumatic.   Eyes:      General: No scleral icterus.     Extraocular Movements: Extraocular movements intact.   Neck:      Musculoskeletal: Normal range of motion.   Cardiovascular:      Rate and Rhythm: Normal rate and regular rhythm.   Pulmonary:      Effort:  Pulmonary effort is normal. No respiratory distress.      Breath sounds: Normal breath sounds. No stridor. No wheezing.   Abdominal:      General: Bowel sounds are normal. There is no distension.      Palpations: Abdomen is soft. There is no mass.      Tenderness: There is no abdominal tenderness. There is no guarding or rebound.      Comments: Obese abdomen. No obvious ascites, fluid wave, distention.   Musculoskeletal:         General: Swelling present.      Right lower leg: Edema present.      Left lower leg: Edema present.   Skin:     General: Skin is warm and dry.      Coloration: Skin is not jaundiced or pale.      Findings: No erythema or rash.   Neurological:      General: No focal deficit present.      Mental Status: She is alert and oriented to person, place, and time.   Psychiatric:         Mood and Affect: Mood normal.         Behavior: Behavior normal.         Thought Content: Thought content normal.         Judgment: Judgment normal.         Significant Labs:  CBC: No results for input(s): WBC, HGB, HCT, PLT in the last 48 hours.  CMP:   Recent Labs   Lab 10/12/20  0625   GLU 97   CALCIUM 7.3*      K 4.1   CO2 28      BUN 39*   CREATININE 2.6*     Coagulation: No results for input(s): PT, INR, APTT in the last 48 hours.    Significant Imaging:  Imaging results within the past 24 hours have been reviewed.    Assessment/Plan:     Cirrhosis of liver with ascites  -MELD at 16.  -Discussed patient with Dr. Davila who recommends evaluation for liver transplant outpatient.  -US with dopplar today to evaluate TIPS.  -Begin Lactulose BID, as patient has not had BM in 2 weeks.  -Continue to monitor labs.  -Discussed with HM and nephrology.        Thank you for your consult. I will follow-up with patient. Please contact us if you have any additional questions.    Gloria Snider PA-C  Gastroenterology  [facility]

## 2020-10-12 NOTE — PLAN OF CARE
Patient resting with daughter at bedside. Cpap on with no complaints of SOB, pain or discomfort. IV diuresis active. Bed locked and lowered and call light left within reach. Patient able to ambulate independently in room. VS stable. Will continue to monitor.   Problem: Adult Inpatient Plan of Care  Goal: Plan of Care Review  Outcome: Ongoing, Progressing  Goal: Patient-Specific Goal (Individualization)  Outcome: Ongoing, Progressing  Goal: Absence of Hospital-Acquired Illness or Injury  Outcome: Ongoing, Progressing  Goal: Optimal Comfort and Wellbeing  Outcome: Ongoing, Progressing  Goal: Readiness for Transition of Care  Outcome: Ongoing, Progressing  Goal: Rounds/Family Conference  Outcome: Ongoing, Progressing     Problem: Bariatric Environmental Safety  Goal: Safety Maintained with Care  Outcome: Ongoing, Progressing     Problem: Diabetes Comorbidity  Goal: Blood Glucose Level Within Desired Range  Outcome: Ongoing, Progressing     Problem: Electrolyte Imbalance (Acute Kidney Injury/Impairment)  Goal: Serum Electrolyte Balance  Outcome: Ongoing, Progressing     Problem: Fluid Imbalance (Acute Kidney Injury/Impairment)  Goal: Optimal Fluid Balance  Outcome: Ongoing, Progressing     Problem: Hematologic Alteration (Acute Kidney Injury/Impairment)  Goal: Hemoglobin, Hematocrit and Platelets Within Normal Range  Outcome: Ongoing, Progressing     Problem: Oral Intake Inadequate (Acute Kidney Injury/Impairment)  Goal: Optimal Nutrition Intake  Outcome: Ongoing, Progressing     Problem: Renal Function Impairment (Acute Kidney Injury/Impairment)  Goal: Effective Renal Function  Outcome: Ongoing, Progressing     Problem: Fall Injury Risk  Goal: Absence of Fall and Fall-Related Injury  Outcome: Ongoing, Progressing

## 2020-10-12 NOTE — HPI
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 Patient is followed by hepatology. MELD 16. She is currently admitted for hepatorenal and cardiorenal syndrome. She reports swelling mostly to lower extremeties and not as much in her abdomen. Denies abdominal pain, shortness of breath, sign of blood loss. Last bowel movement approximately 2 weeks ago. Does not take Lactulose currently, although has taken it in the past for hepatic encephalopathy with resolution of symptoms. She and daughter deny any recent or current confusion. States she was evaluated for liver transplant a few years ago by [facility] who denied her because her MELD was low. TIPS was completed. Swelling improved but returned. TIPS revision completed with no severe swelling since then. Labs currently BUN 39, Cr 2.6, GFR 22. Previous echo shows EF at 65%. Daughter inquiring about possibility of transplant.

## 2020-10-12 NOTE — PT/OT/SLP PROGRESS
Physical Therapy  Treatment    Diamond Das   MRN: [id number]   Admitting Diagnosis: Acute on chronic heart failure    PT Received On: 10/12/20  PT Start Time: 0930     PT Stop Time: 1000    PT Total Time (min): 30 min       Billable Minutes:  Gait Training 10 and Therapeutic Activity 20    Treatment Type: Treatment  PT/PTA: PT     PTA Visit Number: 0       General Precautions: Standard  Orthopedic Precautions: N/A   Braces: N/A    Subjective:  Communicated with NURSE JIMENEZ prior to session.  Pain/Comfort  Pain Rating 1: 0/10    Objective:   Patient found with: telemetry, peripheral IV, oxygen    Functional Mobility:  Therapeutic Activities and Exercises:  PT FOUND SUPINE IN BED UPON ARRIVAL, AGREEABLE TO TX., SUP>SIT WITH SPV, SEATED SCOOT TO EOB WITH SPV, REVIEW RW USE AND SAFETY DURING TF'S AND GAIT, SIT>STAND WITH SBA, PT AMB 90' WITH RW AND SBA, NO LOB OR SOB ON ROOM AIR, GOOD DYNAMIC BALANCE, PT RETURN TO ROOM, TF TO TOILET IN BATHROOM WITH SBA, NO ASSIST FOR CLEANING, PT STOOD AT SINK TO WASH/DRY HANDS WITH SBA, PT STOOD AT SINK TO WASH FACE AND BRUSH TEETH WITH SBA, PT RETURN TO BEDSIDE CHAIR WITH SBA, PT EDUCATED IN AND PERFORMED BLE THEREX X 15 REPS AROM WITH REST: HIP FLEX/EXT, LAQ, AP'S    AM-PAC 6 CLICK MOBILITY  How much help from another person does this patient currently need?   1 = Unable, Total/Dependent Assistance  2 = A lot, Maximum/Moderate Assistance  3 = A little, Minimum/Contact Guard/Supervision  4 = None, Modified [place]/Independent    Turning over in bed (including adjusting bedclothes, sheets and blankets)?: 4  Sitting down on and standing up from a chair with arms (e.g., wheelchair, bedside commode, etc.): 4  Moving from lying on back to sitting on the side of the bed?: 4  Moving to and from a bed to a chair (including a wheelchair)?: 4  Need to walk in hospital room?: 4  Climbing 3-5 steps with a railing?: 1  Basic Mobility Total Score: 21    AM-PAC Raw Score CMS G-Code  Modifier Level of Impairment Assistance   6 % Total / Unable   7 - 9 CM 80 - 100% Maximal Assist   10 - 14 CL 60 - 80% Moderate Assist   15 - 19 CK 40 - 60% Moderate Assist   20 - 22 CJ 20 - 40% Minimal Assist   23 CI 1-20% SBA / CGA   24 CH 0% Independent/ Mod I     Patient left up in chair with all lines intact, call button in reach, chair alarm on, NURSE notified and DAUGHTER present.    Assessment:  Diamond Das is a 65 y.o. female with a medical diagnosis of Acute on chronic heart failure   PT IS NO LONGER A CANDIDATE FOR INPATIENT SKILLED P.T. DUE TO HIGH LEVEL OF FUNCTION, PT WILL BENEFIT FROM [facility] 'S PROGRAM FOR CONT. GAIT/TE TO TOLERANCE    Rehab identified problem list/impairments:     Rehab potential is .    Activity tolerance:     Discharge recommendations: Discharge Facility/Level of Care Needs: home health PT     Barriers to discharge:      Equipment recommendations: Equipment Needed After Discharge: none     GOALS:   Multidisciplinary Problems     Physical Therapy Goals        Problem: Physical Therapy Goal    Goal Priority Disciplines Outcome Goal Variances Interventions   Physical Therapy Goal     PT, PT/OT Ongoing, Progressing     Description: 1. Patient will perform supine to/from sit indep  2. Patient will perform sit to/from stand mod indep  3. Patient will ambulate >150ft RW spv no LOB with rest prn on O2 prn                   PLAN:    D/C PT FROM P.T. SERVICES TO [facility] PROGRAM    Ariela Ayala, PT  10/12/2020

## 2020-10-12 NOTE — SUBJECTIVE & OBJECTIVE
Past Medical History:   Diagnosis Date    Ascites     Breast pain, left 1/4/2018    Cataract     CHF (congestive heart failure)     Cirrhosis     Cough     Diabetes mellitus     Diabetes mellitus, type 2     Gastroparesis     GERD (gastroesophageal reflux disease)     Hyperlipidemia     Hypertension     Hypotension 2/21/2019    Liver disease     Lumbar strain 4/27/2018    Pneumonia of right middle lobe due to infectious organism 12/19/2017    Renal disorder     Retinopathy due to secondary diabetes mellitus     Sleep apnea     Thyroid disease        Past Surgical History:   Procedure Laterality Date    CATARACT EXTRACTION      CHOLECYSTECTOMY      COLONOSCOPY N/A 9/4/2019    Procedure: COLONOSCOPY;  Surgeon: Marnie Elmore MD;  Location[facility] ENDO;  Service: Endoscopy;  Laterality: N/A;    ERCP      FLUOROSCOPY N/A 7/24/2020    Procedure: TIPS revision;  Surgeon: Martell Jasmine MD;  Location: [facility] CATH LAB;  Service: General;  Laterality: N/A;    LIVER BIOPSY      THORACENTESIS Left 1/20/2020    Procedure: Thoracentesis;  Surgeon: Angelica Hunter MD;  Location: [facility] ENDO;  Service: Pulmonary;  Laterality: Left;    TUBAL LIGATION      UPPER GASTROINTESTINAL ENDOSCOPY         Review of patient's allergies indicates:   Allergen Reactions    Subsys [fentanyl] Other (See Comments)     After administration pt unresponsive.  HR and respirations decreased.     Versed [midazolam] Other (See Comments)     After administration pt unresponsive.  HR and respirations decreased.     Ampicillin Rash    Codeine Nausea And Vomiting and Nausea Only     Family History     Problem Relation (Age of Onset)    Aneurysm Sister    Breast cancer Mother    Cancer Mother, Maternal Grandfather    Heart disease Father, Brother    No Known Problems Maternal Grandmother    Sarcoidosis Sister        Tobacco Use    Smoking status: Never Smoker    Smokeless tobacco: Never Used   Substance and Sexual Activity     Alcohol use: No     Frequency: Never    Drug use: No    Sexual activity: Never     Review of Systems   Constitutional: Negative for activity change, appetite change, chills, diaphoresis, fatigue and fever.   Respiratory: Negative for cough and shortness of breath.    Cardiovascular: Negative for chest pain.   Gastrointestinal: Positive for constipation. Negative for abdominal distention, abdominal pain, blood in stool, diarrhea and nausea.   Genitourinary: Negative for dysuria.        Still urinating, per patient, but decreased.   Skin: Negative for color change.   Neurological: Negative for dizziness, weakness and light-headedness.   Psychiatric/Behavioral: Negative for confusion and dysphoric mood. The patient is not nervous/anxious.      Objective:     Vital Signs (Most Recent):  Temp: 99 °F (37.2 °C) (10/12/20 0710)  Pulse: 71 (10/12/20 0710)  Resp: 20 (10/12/20 0710)  BP: 132/68 (10/12/20 0710)  SpO2: 97 % (10/12/20 0908) Vital Signs (24h Range):  Temp:  [98.3 °F (36.8 °C)-99.3 °F (37.4 °C)] 99 °F (37.2 °C)  Pulse:  [71-80] 71  Resp:  [18-20] 20  SpO2:  [95 %-100 %] 97 %  BP: (122-155)/(57-72) 132/68     Weight: 111.3 kg (245 lb 6 oz) (10/12/20 0353)  Body mass index is 46.36 kg/m².      Intake/Output Summary (Last 24 hours) at 10/12/2020 1129  Last data filed at 10/12/2020 0353  Gross per 24 hour   Intake 270 ml   Output --   Net 270 ml       Lines/Drains/Airways     Peripheral Intravenous Line                 Peripheral IV - Single Lumen 10/09/20 0443 20 G Right Antecubital 3 days                Physical Exam  Constitutional:       General: She is not in acute distress.     Appearance: Normal appearance. She is obese. She is not ill-appearing, toxic-appearing or diaphoretic.   HENT:      Head: Normocephalic and atraumatic.   Eyes:      General: No scleral icterus.     Extraocular Movements: Extraocular movements intact.   Neck:      Musculoskeletal: Normal range of motion.   Cardiovascular:      Rate  and Rhythm: Normal rate and regular rhythm.   Pulmonary:      Effort: Pulmonary effort is normal. No respiratory distress.      Breath sounds: Normal breath sounds. No stridor. No wheezing.   Abdominal:      General: Bowel sounds are normal. There is no distension.      Palpations: Abdomen is soft. There is no mass.      Tenderness: There is no abdominal tenderness. There is no guarding or rebound.      Comments: Obese abdomen. No obvious ascites, fluid wave, distention.   Musculoskeletal:         General: Swelling present.      Right lower leg: Edema present.      Left lower leg: Edema present.   Skin:     General: Skin is warm and dry.      Coloration: Skin is not jaundiced or pale.      Findings: No erythema or rash.   Neurological:      General: No focal deficit present.      Mental Status: She is alert and oriented to person, place, and time.   Psychiatric:         Mood and Affect: Mood normal.         Behavior: Behavior normal.         Thought Content: Thought content normal.         Judgment: Judgment normal.         Significant Labs:  CBC: No results for input(s): WBC, HGB, HCT, PLT in the last 48 hours.  CMP:   Recent Labs   Lab 10/12/20  0625   GLU 97   CALCIUM 7.3*      K 4.1   CO2 28      BUN 39*   CREATININE 2.6*     Coagulation: No results for input(s): PT, INR, APTT in the last 48 hours.    Significant Imaging:  Imaging results within the past 24 hours have been reviewed.

## 2020-10-12 NOTE — PROGRESS NOTES
[facility] -   Nephrology  Progress Note    Patient Name: Diamond Das  MRN: [id number]  Admission Date: 10/9/2020  Hospital Length of Stay: 0 days  Attending Provider: Mateo Floyd MD   Primary Care Physician: Andrey Perrin MD  Principal Problem:Acute on chronic heart failure    Subjective:     HPI: Patient is a 65-year-old female with history of type 2 diabetes hypertension and morbid obesity.  Patient also has history of chronic kidney disease with baseline creatinine ranging between 1.8 in 2.0.  Patient was last seen in the nephrology clinic by Dr. Contreras in 2018.  Patient also has stage IV cirrhosis of liver biopsy proven.  Patient also underwent TIPS procedure 3 years ago.  Since then patient has been off and on deteriorating.  Patient has a last echo done 2 days ago which showed EF of 65%.  Patient has had multiple episodes of worsening creatinine.  Patient currently is in the hospital with an albumin of 1.2 with generalized anasarca and failure to diurese on IV diuretics.  Patient seen and examined at the bedside.  Do lengthy discussion with the patient at the bedside.  Patient has hepatorenal syndrome as well as cardiorenal syndrome.  Prognosis seems to be very poor explained to the patient in detail.  Patient opted to be not on in life support.  From Nephrology standpoint she is not a candidate for renal replacement therapy at this point due to poor prognosis as well as very low albumin.  After lengthy discussion with the patient it was decided the patient would be a better candidate for hospice management at home.  Case and findings discussed with .    Interval History:  Worsening anasarca due to liver failure and hepatorenal syndrome.    Review of patient's allergies indicates:   Allergen Reactions    Subsys [fentanyl] Other (See Comments)     After administration pt unresponsive.  HR and respirations decreased.     Versed [midazolam] Other (See Comments)     After  administration pt unresponsive.  HR and respirations decreased.     Ampicillin Rash    Codeine Nausea And Vomiting and Nausea Only     Current Facility-Administered Medications   Medication Frequency    albuterol-ipratropium 2.5 mg-0.5 mg/3 mL nebulizer solution 3 mL Q4H PRN    amLODIPine tablet 10 mg Daily    aspirin EC tablet 81 mg Daily    dextrose 50% injection 12.5 g PRN    dextrose 50% injection 25 g PRN    doxazosin tablet 2 mg QHS    famotidine tablet 20 mg Daily    furosemide injection 80 mg Q12H    glucagon (human recombinant) injection 1 mg PRN    glucose chewable tablet 16 g PRN    glucose chewable tablet 24 g PRN    insulin aspart U-100 pen 0-5 Units QID (AC + HS) PRN    insulin detemir U-100 pen 10 Units QHS    isosorbide mononitrate 24 hr tablet 30 mg Daily    levothyroxine tablet 137 mcg Before breakfast    metOLazone tablet 5 mg Daily    ondansetron disintegrating tablet 4 mg Q8H PRN    polyethylene glycol packet 17 g Daily    sodium chloride 0.9% flush 10 mL PRN       Objective:     Vital Signs (Most Recent):  Temp: 99 °F (37.2 °C) (10/12/20 0710)  Pulse: 71 (10/12/20 0710)  Resp: 20 (10/12/20 0710)  BP: 132/68 (10/12/20 0710)  SpO2: 97 % (10/12/20 0908)  O2 Device (Oxygen Therapy): room air (10/12/20 0908) Vital Signs (24h Range):  Temp:  [98.3 °F (36.8 °C)-99.3 °F (37.4 °C)] 99 °F (37.2 °C)  Pulse:  [71-80] 71  Resp:  [18-20] 20  SpO2:  [95 %-100 %] 97 %  BP: (122-155)/(57-72) 132/68     Weight: 111.3 kg (245 lb 6 oz) (10/12/20 0353)  Body mass index is 46.36 kg/m².  Body surface area is 2.19 meters squared.    I/O last 3 completed shifts:  In: 390 [P.O.:390]  Out: -     Physical Exam  Vitals signs and nursing note reviewed. Exam conducted with a chaperone present.   Constitutional:       Appearance: She is well-developed.   HENT:      Head: Normocephalic and atraumatic.   Eyes:      Conjunctiva/sclera: Conjunctivae normal.      Pupils: Pupils are equal, round, and reactive  to light.   Neck:      Musculoskeletal: Full passive range of motion without pain, normal range of motion and neck supple. No edema.      Thyroid: No thyroid mass or thyromegaly.      Vascular: No carotid bruit.   Cardiovascular:      Rate and Rhythm: Normal rate and regular rhythm.  No extrasystoles are present.     Chest Wall: PMI is displaced.      Pulses: Normal pulses.      Heart sounds: S1 normal and S2 normal. Murmur present. Systolic murmur present with a grade of 2/6. No friction rub.      Comments: RV heave loud P2   Pulmonary:      Effort: Pulmonary effort is normal. No accessory muscle usage or respiratory distress.      Breath sounds: No wheezing or rales.   Chest:      Chest wall: No tenderness.   Abdominal:      General: Bowel sounds are normal. There is no distension.      Palpations: Abdomen is soft. There is no mass.      Tenderness: There is no abdominal tenderness. There is no rebound.      Hernia: No hernia is present.   Musculoskeletal: Normal range of motion.         General: No tenderness.      Comments: Severe edema anasarca.  Abdominal distension and findings of severe cirrhosis of liver with complete decompensation   Skin:     General: Skin is warm and dry.      Coloration: Skin is not pale.      Findings: No bruising, ecchymosis, erythema or rash.      Nails: There is no clubbing.     Neurological:      Mental Status: She is alert and oriented to person, place, and time.      Cranial Nerves: No cranial nerve deficit.      Sensory: No sensory deficit.      Motor: No abnormal muscle tone.      Coordination: Coordination normal.      Deep Tendon Reflexes: Reflexes are normal and symmetric.   Psychiatric:         Speech: Speech normal.         Behavior: Behavior normal.         Thought Content: Thought content normal.         Judgment: Judgment normal.         Significant Labs:  All labs within the past 24 hours have been reviewed.     Significant Imaging:      Assessment/Plan:     Acute on  chronic kidney failure  Patient has hepatorenal syndrome as well as cardiorenal syndrome.  Patient has failure to thrive and has had TIPS procedure for cirrhosis of liver 3 years ago.  Patient now has completely decompensated cirrhosis of liver.  Patient is not a candidate for liver transplantation.  At this point patient has minimal urine output.  Lengthy discussion with the patient and her daughter today.  Hepatology/Gastroenterology consultation is pending at this time.  In my opinion as a nephrologist at this point patient has no further options.  Dialysis will not be of any value long-term.  I will be happy to provide ultrafiltration if recommended by Cardiology or Gastroenterology our [facility] as  discussed in detail with [facility] Medicine-Dr. Floyd        Thank you for your consult.     Jonathan Hernandez MD  Nephrology  [facility]

## 2020-10-12 NOTE — NURSING
Chart reviewed for diabetes  Patient has been seen by this nurse on previous admit  No further action unless diabetes educational needs are identified

## 2020-10-12 NOTE — PLAN OF CARE
GOOD PARTICIPATION, SBA FOR ALL MOBILITY, D/C PT FROM P.T. SERVICES TO [facility] PROGRAM FOR CONT. GAIT/TE TO TOLERANCE

## 2020-10-12 NOTE — PROGRESS NOTES
[facility] -   Pulmonology  Progress Note    Patient Name: Diamond Das  MRN: [id number]  Admission Date: 10/9/2020  Hospital Length of Stay: 0 days  Code Status: Full Code  Attending Provider: Mateo Floyd MD  Primary Care Provider: Andrey Perrin MD   Principal Problem: Acute on chronic heart failure    Subjective:     Interval History: Seen and examined at bedside. Hospital chart reviewed. No acute interval detrimental events noted. She reports that she  has significantly improved        Review of Systems   Constitutional: Positive for fatigue and unexpected weight change.   Respiratory: Positive for apnea, chest tightness and shortness of breath.    Cardiovascular: Positive for leg swelling.   Gastrointestinal: Positive for abdominal distention.   All other systems reviewed and are negative.    Objective:     Vital Signs (Most Recent):  Temp: 98.8 °F (37.1 °C) (10/12/20 1201)  Pulse: 73 (10/12/20 1636)  Resp: 18 (10/12/20 1636)  BP: (!) 122/58 (10/12/20 1636)  SpO2: 96 % (10/12/20 1636) Vital Signs (24h Range):  Temp:  [98.3 °F (36.8 °C)-99.3 °F (37.4 °C)] 98.8 °F (37.1 °C)  Pulse:  [70-80] 73  Resp:  [18-20] 18  SpO2:  [95 %-98 %] 96 %  BP: (122-138)/(57-68) 122/58     Weight: 111.3 kg (245 lb 6 oz)  Body mass index is 46.36 kg/m².      Intake/Output Summary (Last 24 hours) at 10/12/2020 1726  Last data filed at 10/12/2020 0353  Gross per 24 hour   Intake 270 ml   Output --   Net 270 ml       Physical Exam  Vitals signs and nursing note reviewed.   Constitutional:       Appearance: She is well-developed.   HENT:      Head: Normocephalic and atraumatic.      Nose: Nose normal.      Mouth/Throat:      Pharynx: No oropharyngeal exudate.   Eyes:      General: No scleral icterus.     Conjunctiva/sclera: Conjunctivae normal.      Pupils: Pupils are equal, round, and reactive to light.   Neck:      Musculoskeletal: Normal range of motion and neck supple.      Thyroid: No thyromegaly.       Vascular: No JVD.      Trachea: No tracheal deviation.   Cardiovascular:      Rate and Rhythm: Normal rate and regular rhythm.      Heart sounds: Normal heart sounds, S1 normal and S2 normal. No murmur.   Pulmonary:      Effort: Pulmonary effort is normal. No tachypnea, accessory muscle usage or respiratory distress.      Breath sounds: Normal breath sounds. Decreased air movement present. No stridor.   Abdominal:      General: Bowel sounds are normal. There is distension.      Palpations: Abdomen is soft. There is shifting dullness. There is no hepatomegaly, splenomegaly or mass.      Tenderness: There is no abdominal tenderness. There is no guarding or rebound.   Musculoskeletal: Normal range of motion.         General: Swelling present. No tenderness.   Lymphadenopathy:      Upper Body:      Right upper body: No supraclavicular adenopathy.      Left upper body: No supraclavicular adenopathy.   Skin:     General: Skin is warm and dry.      Findings: No rash.      Nails: There is no clubbing.     Neurological:      Mental Status: She is alert and oriented to person, place, and time.      Coordination: Coordination normal.      Gait: Gait normal.      Deep Tendon Reflexes: Reflexes are normal and symmetric.         Vents:  Oxygen Concentration (%): 28 (10/12/20 0442)    Lines/Drains/Airways     Peripheral Intravenous Line                 Peripheral IV - Single Lumen 10/09/20 0443 20 G Right Antecubital 3 days                Significant Labs:     Laboratory Data:  Reviewed recent labs. Appear stable other than abnormalities addressed in plan.     Radiology:  Reviewed recent imaging studies. Appear stable other than abnormalities addressed in plan.         Assessment/Plan:     Acute hypoxemic respiratory failure  Supplementary O2  Keep SPO2> 90%    Recurrent pleural effusion on left  Thoracentesis 01/20/20:   TRANSUDATE    Pleural fluid Protein 0.8   Pleural fluid LDH 69         Optimize underlying CHF/ Chr Liver  Disease  May need therapeutic thoracentesis if symptomatic.       Acute on chronic kidney failure  Nephrology consulted and assisting with management.   Appreciate input.       Strict I/O      KRISTAL on CPAP  Continue nocturnal CPAP    Cirrhosis of liver with ascites  Gastroenterology consulted and assisting with management.                    Lucas Kennedy MD  Pulmonology  [facility]

## 2020-10-12 NOTE — SUBJECTIVE & OBJECTIVE
Interval History: Seen and examined at bedside. Hospital chart reviewed. No acute interval detrimental events noted. She reports that she  has significantly improved        Review of Systems   Constitutional: Positive for fatigue and unexpected weight change.   Respiratory: Positive for apnea, chest tightness and shortness of breath.    Cardiovascular: Positive for leg swelling.   Gastrointestinal: Positive for abdominal distention.   All other systems reviewed and are negative.    Objective:     Vital Signs (Most Recent):  Temp: 98.8 °F (37.1 °C) (10/12/20 1201)  Pulse: 73 (10/12/20 1636)  Resp: 18 (10/12/20 1636)  BP: (!) 122/58 (10/12/20 1636)  SpO2: 96 % (10/12/20 1636) Vital Signs (24h Range):  Temp:  [98.3 °F (36.8 °C)-99.3 °F (37.4 °C)] 98.8 °F (37.1 °C)  Pulse:  [70-80] 73  Resp:  [18-20] 18  SpO2:  [95 %-98 %] 96 %  BP: (122-138)/(57-68) 122/58     Weight: 111.3 kg (245 lb 6 oz)  Body mass index is 46.36 kg/m².      Intake/Output Summary (Last 24 hours) at 10/12/2020 1726  Last data filed at 10/12/2020 0353  Gross per 24 hour   Intake 270 ml   Output --   Net 270 ml       Physical Exam  Vitals signs and nursing note reviewed.   Constitutional:       Appearance: She is well-developed.   HENT:      Head: Normocephalic and atraumatic.      Nose: Nose normal.      Mouth/Throat:      Pharynx: No oropharyngeal exudate.   Eyes:      General: No scleral icterus.     Conjunctiva/sclera: Conjunctivae normal.      Pupils: Pupils are equal, round, and reactive to light.   Neck:      Musculoskeletal: Normal range of motion and neck supple.      Thyroid: No thyromegaly.      Vascular: No JVD.      Trachea: No tracheal deviation.   Cardiovascular:      Rate and Rhythm: Normal rate and regular rhythm.      Heart sounds: Normal heart sounds, S1 normal and S2 normal. No murmur.   Pulmonary:      Effort: Pulmonary effort is normal. No tachypnea, accessory muscle usage or respiratory distress.      Breath sounds: Normal breath  sounds. Decreased air movement present. No stridor.   Abdominal:      General: Bowel sounds are normal. There is distension.      Palpations: Abdomen is soft. There is shifting dullness. There is no hepatomegaly, splenomegaly or mass.      Tenderness: There is no abdominal tenderness. There is no guarding or rebound.   Musculoskeletal: Normal range of motion.         General: Swelling present. No tenderness.   Lymphadenopathy:      Upper Body:      Right upper body: No supraclavicular adenopathy.      Left upper body: No supraclavicular adenopathy.   Skin:     General: Skin is warm and dry.      Findings: No rash.      Nails: There is no clubbing.     Neurological:      Mental Status: She is alert and oriented to person, place, and time.      Coordination: Coordination normal.      Gait: Gait normal.      Deep Tendon Reflexes: Reflexes are normal and symmetric.         Vents:  Oxygen Concentration (%): 28 (10/12/20 0442)    Lines/Drains/Airways     Peripheral Intravenous Line                 Peripheral IV - Single Lumen 10/09/20 0443 20 G Right Antecubital 3 days                Significant Labs:     Laboratory Data:  Reviewed recent labs. Appear stable other than abnormalities addressed in plan.     Radiology:  Reviewed recent imaging studies. Appear stable other than abnormalities addressed in plan.

## 2020-10-12 NOTE — ASSESSMENT & PLAN NOTE
Thoracentesis 01/20/20:   TRANSUDATE    Pleural fluid Protein 0.8   Pleural fluid LDH 69         Optimize underlying CHF/ Chr Liver Disease  May need therapeutic thoracentesis if symptomatic.

## 2020-10-12 NOTE — ASSESSMENT & PLAN NOTE
Patient has hepatorenal syndrome as well as cardiorenal syndrome.  Patient has failure to thrive and has had TIPS procedure for cirrhosis of liver 3 years ago.  Patient now has completely decompensated cirrhosis of liver.  Patient is not a candidate for liver transplantation.  At this point patient has minimal urine output.  Lengthy discussion with the patient and her daughter today.  Hepatology/Gastroenterology consultation is pending at this time.  In my opinion as a nephrologist at this point patient has no further options.  Dialysis will not be of any value long-term.  I will be happy to provide ultrafiltration if recommended by Cardiology or Gastroenterology our hospital Medicine as  discussed in detail with [facility] Medicine-Dr. Floyd

## 2020-10-12 NOTE — TELEPHONE ENCOUNTER
Left information to schedule appt. Unable to schedule due to override access.     ----- Message from Andrey Perrin MD sent at 10/12/2020 12:37 PM CDT -----  Erich hilliard pt an appt with me in 1 month.

## 2020-10-13 PROBLEM — R60.1 ANASARCA: Status: ACTIVE | Noted: 2020-10-13

## 2020-10-13 PROBLEM — R60.1 ANASARCA: Status: RESOLVED | Noted: 2020-10-13 | Resolved: 2020-10-13

## 2020-10-13 PROBLEM — J96.01 ACUTE HYPOXEMIC RESPIRATORY FAILURE: Status: RESOLVED | Noted: 2020-10-11 | Resolved: 2020-10-13

## 2020-10-13 PROBLEM — R80.9 NEPHROTIC RANGE PROTEINURIA: Status: ACTIVE | Noted: 2020-10-13

## 2020-10-13 LAB
ACE SERPL-CCNC: 70 U/L (ref 16–85)
ALBUMIN SERPL BCP-MCNC: 1.2 G/DL (ref 3.5–5.2)
ALP SERPL-CCNC: 92 U/L (ref 55–135)
ALT SERPL W/O P-5'-P-CCNC: 11 U/L (ref 10–44)
ANION GAP SERPL CALC-SCNC: 6 MMOL/L (ref 8–16)
AST SERPL-CCNC: 32 U/L (ref 10–40)
BILIRUB DIRECT SERPL-MCNC: 0.1 MG/DL (ref 0.1–0.3)
BILIRUB SERPL-MCNC: 0.2 MG/DL (ref 0.1–1)
BUN SERPL-MCNC: 43 MG/DL (ref 8–23)
C3 SERPL-MCNC: 154 MG/DL (ref 50–180)
C4 SERPL-MCNC: 29 MG/DL (ref 11–44)
CALCIUM SERPL-MCNC: 7.7 MG/DL (ref 8.7–10.5)
CHLORIDE SERPL-SCNC: 105 MMOL/L (ref 95–110)
CO2 SERPL-SCNC: 29 MMOL/L (ref 23–29)
CREAT SERPL-MCNC: 2.6 MG/DL (ref 0.5–1.4)
EST. GFR  (AFRICAN AMERICAN): 22 ML/MIN/1.73 M^2
EST. GFR  (NON AFRICAN AMERICAN): 19 ML/MIN/1.73 M^2
GAMMA INTERFERON BACKGROUND BLD IA-ACNC: 0.02 IU/ML
GLUCOSE SERPL-MCNC: 108 MG/DL (ref 70–110)
INR PPP: 1 (ref 0.8–1.2)
M TB IFN-G CD4+ BCKGRND COR BLD-ACNC: 0.01 IU/ML
MITOGEN IGNF BCKGRD COR BLD-ACNC: 7.82 IU/ML
POCT GLUCOSE: 100 MG/DL (ref 70–110)
POCT GLUCOSE: 137 MG/DL (ref 70–110)
POCT GLUCOSE: 149 MG/DL (ref 70–110)
POCT GLUCOSE: 155 MG/DL (ref 70–110)
POTASSIUM SERPL-SCNC: 4.1 MMOL/L (ref 3.5–5.1)
PROT SERPL-MCNC: 4.8 G/DL (ref 6–8.4)
PROTHROMBIN TIME: 11 SEC (ref 9–12.5)
SODIUM SERPL-SCNC: 140 MMOL/L (ref 136–145)
TB GOLD PLUS: NEGATIVE
TB2 - NIL: 0.01 IU/ML

## 2020-10-13 PROCEDURE — 99232 SBSQ HOSP IP/OBS MODERATE 35: CPT | Mod: ,,, | Performed by: PHYSICIAN ASSISTANT

## 2020-10-13 PROCEDURE — 21400001 HC TELEMETRY ROOM

## 2020-10-13 PROCEDURE — 99232 SBSQ HOSP IP/OBS MODERATE 35: CPT | Mod: ,,, | Performed by: INTERNAL MEDICINE

## 2020-10-13 PROCEDURE — 80048 BASIC METABOLIC PNL TOTAL CA: CPT

## 2020-10-13 PROCEDURE — 36415 COLL VENOUS BLD VENIPUNCTURE: CPT

## 2020-10-13 PROCEDURE — 25000003 PHARM REV CODE 250: Performed by: NURSE PRACTITIONER

## 2020-10-13 PROCEDURE — P9047 ALBUMIN (HUMAN), 25%, 50ML: HCPCS | Mod: JG | Performed by: PHYSICIAN ASSISTANT

## 2020-10-13 PROCEDURE — 25000003 PHARM REV CODE 250: Performed by: PHYSICIAN ASSISTANT

## 2020-10-13 PROCEDURE — 85610 PROTHROMBIN TIME: CPT

## 2020-10-13 PROCEDURE — 99900035 HC TECH TIME PER 15 MIN (STAT)

## 2020-10-13 PROCEDURE — 86160 COMPLEMENT ANTIGEN: CPT | Mod: 59

## 2020-10-13 PROCEDURE — 36410 VNPNXR 3YR/> PHY/QHP DX/THER: CPT

## 2020-10-13 PROCEDURE — 80076 HEPATIC FUNCTION PANEL: CPT

## 2020-10-13 PROCEDURE — 63600175 PHARM REV CODE 636 W HCPCS: Mod: JG | Performed by: PHYSICIAN ASSISTANT

## 2020-10-13 PROCEDURE — 86160 COMPLEMENT ANTIGEN: CPT

## 2020-10-13 PROCEDURE — 63600175 PHARM REV CODE 636 W HCPCS: Performed by: INTERNAL MEDICINE

## 2020-10-13 PROCEDURE — 25000003 PHARM REV CODE 250: Performed by: EMERGENCY MEDICINE

## 2020-10-13 PROCEDURE — 99232 PR SUBSEQUENT HOSPITAL CARE,LEVL II: ICD-10-PCS | Mod: ,,, | Performed by: PHYSICIAN ASSISTANT

## 2020-10-13 PROCEDURE — 99233 SBSQ HOSP IP/OBS HIGH 50: CPT | Mod: ,,, | Performed by: INTERNAL MEDICINE

## 2020-10-13 PROCEDURE — 94660 CPAP INITIATION&MGMT: CPT

## 2020-10-13 PROCEDURE — 99232 PR SUBSEQUENT HOSPITAL CARE,LEVL II: ICD-10-PCS | Mod: ,,, | Performed by: INTERNAL MEDICINE

## 2020-10-13 PROCEDURE — 99233 PR SUBSEQUENT HOSPITAL CARE,LEVL III: ICD-10-PCS | Mod: ,,, | Performed by: INTERNAL MEDICINE

## 2020-10-13 RX ORDER — BISACODYL 5 MG
10 TABLET, DELAYED RELEASE (ENTERIC COATED) ORAL ONCE
Status: COMPLETED | OUTPATIENT
Start: 2020-10-13 | End: 2020-10-13

## 2020-10-13 RX ORDER — BUMETANIDE 1 MG/1
2 TABLET ORAL 2 TIMES DAILY
Status: DISCONTINUED | OUTPATIENT
Start: 2020-10-13 | End: 2020-10-14 | Stop reason: HOSPADM

## 2020-10-13 RX ORDER — ALBUMIN HUMAN 250 G/1000ML
25 SOLUTION INTRAVENOUS EVERY 8 HOURS
Status: DISCONTINUED | OUTPATIENT
Start: 2020-10-13 | End: 2020-10-14 | Stop reason: HOSPADM

## 2020-10-13 RX ORDER — SIMETHICONE 80 MG
1 TABLET,CHEWABLE ORAL 3 TIMES DAILY PRN
Status: DISCONTINUED | OUTPATIENT
Start: 2020-10-13 | End: 2020-10-14 | Stop reason: HOSPADM

## 2020-10-13 RX ADMIN — METOLAZONE 5 MG: 5 TABLET ORAL at 08:10

## 2020-10-13 RX ADMIN — FAMOTIDINE 20 MG: 20 TABLET ORAL at 08:10

## 2020-10-13 RX ADMIN — LEVOTHYROXINE SODIUM 137 MCG: 25 TABLET ORAL at 06:10

## 2020-10-13 RX ADMIN — AMLODIPINE BESYLATE 10 MG: 10 TABLET ORAL at 08:10

## 2020-10-13 RX ADMIN — BISACODYL 10 MG: 5 TABLET, COATED ORAL at 06:10

## 2020-10-13 RX ADMIN — POLYETHYLENE GLYCOL 3350 17 G: 17 POWDER, FOR SOLUTION ORAL at 08:10

## 2020-10-13 RX ADMIN — ALBUMIN (HUMAN) 25 G: 12.5 SOLUTION INTRAVENOUS at 04:10

## 2020-10-13 RX ADMIN — ISOSORBIDE MONONITRATE 30 MG: 30 TABLET, EXTENDED RELEASE ORAL at 08:10

## 2020-10-13 RX ADMIN — ASPIRIN 81 MG: 81 TABLET, COATED ORAL at 08:10

## 2020-10-13 RX ADMIN — SIMETHICONE CHEW TAB 80 MG 80 MG: 80 TABLET ORAL at 06:10

## 2020-10-13 RX ADMIN — ALBUMIN (HUMAN) 25 G: 12.5 SOLUTION INTRAVENOUS at 10:10

## 2020-10-13 RX ADMIN — STANDARDIZED SENNA CONCENTRATE AND DOCUSATE SODIUM 2 TABLET: 8.6; 5 TABLET ORAL at 08:10

## 2020-10-13 RX ADMIN — BUMETANIDE 2 MG: 1 TABLET ORAL at 08:10

## 2020-10-13 RX ADMIN — ONDANSETRON 4 MG: 4 TABLET, ORALLY DISINTEGRATING ORAL at 04:10

## 2020-10-13 RX ADMIN — BUMETANIDE 2 MG: 1 TABLET ORAL at 10:10

## 2020-10-13 RX ADMIN — DOXAZOSIN MESYLATE 2 MG: 2 TABLET ORAL at 10:10

## 2020-10-13 NOTE — PROGRESS NOTES
[facility] -   Nephrology  Progress Note    Patient Name: Diamond Das  MRN: [id number]  Admission Date: 10/9/2020  Hospital Length of Stay: 0 days  Attending Provider: Mateo Floyd MD   Primary Care Physician: Andrey Perrin MD  Principal Problem:Acute on chronic heart failure    Subjective:     HPI: Patient is a 65-year-old female with history of type 2 diabetes hypertension and morbid obesity.  Patient also has history of chronic kidney disease with baseline creatinine ranging between 1.8 in 2.0.  Patient was last seen in the nephrology clinic by Dr. Contreras in 2018.  Patient also has stage IV cirrhosis of liver biopsy proven.  Patient also underwent TIPS procedure 3 years ago.  Since then patient has been off and on deteriorating.  Patient has a last echo done 2 days ago which showed EF of 65%.  Patient has had multiple episodes of worsening creatinine.  Patient currently is in the hospital with an albumin of 1.2 with generalized anasarca and failure to diurese on IV diuretics.  Patient seen and examined at the bedside.  Do lengthy discussion with the patient at the bedside.  Patient has hepatorenal syndrome as well as cardiorenal syndrome.  Prognosis seems to be very poor explained to the patient in detail.  Patient opted to be not on in life support.  From Nephrology standpoint she is not a candidate for renal replacement therapy at this point due to poor prognosis as well as very low albumin.  After lengthy discussion with the patient it was decided the patient would be a better candidate for hospice management at home.  Case and findings discussed with .    10/13/2020 nephrotic range proteinuria.  Workup for autoimmune disorder.  May require kidney biopsy.  IV albumin PICC line plans noted.  Tips is still patent.    Interval History: 10/13/2020 nephrotic range proteinuria.  Workup for autoimmune disorder.  May require kidney biopsy.  IV albumin PICC line plans noted.  Tips is  still patent.    Review of patient's allergies indicates:   Allergen Reactions    Subsys [fentanyl] Other (See Comments)     After administration pt unresponsive.  HR and respirations decreased.     Versed [midazolam] Other (See Comments)     After administration pt unresponsive.  HR and respirations decreased.     Ampicillin Rash    Codeine Nausea And Vomiting and Nausea Only     Current Facility-Administered Medications   Medication Frequency    albumin human 25% bottle 25 g Q8H    albuterol-ipratropium 2.5 mg-0.5 mg/3 mL nebulizer solution 3 mL Q4H PRN    amLODIPine tablet 10 mg Daily    aspirin EC tablet 81 mg Daily    bumetanide tablet 2 mg BID    dextrose 50% injection 12.5 g PRN    dextrose 50% injection 25 g PRN    doxazosin tablet 2 mg QHS    famotidine tablet 20 mg Daily    glucagon (human recombinant) injection 1 mg PRN    glucose chewable tablet 16 g PRN    glucose chewable tablet 24 g PRN    insulin aspart U-100 pen 0-5 Units QID (AC + HS) PRN    insulin detemir U-100 pen 10 Units QHS    isosorbide mononitrate 24 hr tablet 30 mg Daily    levothyroxine tablet 137 mcg Before breakfast    metOLazone tablet 5 mg Daily    ondansetron disintegrating tablet 4 mg Q8H PRN    polyethylene glycol packet 17 g Daily    senna-docusate 8.6-50 mg per tablet 2 tablet Daily    sodium chloride 0.9% flush 10 mL PRN       Objective:     Vital Signs (Most Recent):  Temp: 97 °F (36.1 °C) (10/13/20 1109)  Pulse: 71 (10/13/20 1300)  Resp: 19 (10/13/20 1109)  BP: (!) 115/53 (10/13/20 1109)  SpO2: 95 % (10/13/20 1109)  O2 Device (Oxygen Therapy): CPAP (10/13/20 0307) Vital Signs (24h Range):  Temp:  [97 °F (36.1 °C)-98.9 °F (37.2 °C)] 97 °F (36.1 °C)  Pulse:  [70-88] 71  Resp:  [15-20] 19  SpO2:  [94 %-97 %] 95 %  BP: (115-150)/(53-69) 115/53     Weight: 111.3 kg (245 lb 6 oz) (10/12/20 0353)  Body mass index is 46.36 kg/m².  Body surface area is 2.19 meters squared.    I/O last 3 completed shifts:  In:  630 [P.O.:630]  Out: 300 [Urine:300]    Physical Exam  Vitals signs and nursing note reviewed. Exam conducted with a chaperone present.   Constitutional:       Appearance: She is well-developed.   HENT:      Head: Normocephalic and atraumatic.   Eyes:      Conjunctiva/sclera: Conjunctivae normal.      Pupils: Pupils are equal, round, and reactive to light.   Neck:      Musculoskeletal: Full passive range of motion without pain, normal range of motion and neck supple. No edema.      Thyroid: No thyroid mass or thyromegaly.      Vascular: No carotid bruit.   Cardiovascular:      Rate and Rhythm: Normal rate and regular rhythm.  No extrasystoles are present.     Chest Wall: PMI is displaced.      Pulses: Normal pulses.      Heart sounds: S1 normal and S2 normal. Murmur present. Systolic murmur present with a grade of 2/6. No friction rub.      Comments: RV heave loud P2   Pulmonary:      Effort: Pulmonary effort is normal. No accessory muscle usage or respiratory distress.      Breath sounds: No wheezing or rales.   Chest:      Chest wall: No tenderness.   Abdominal:      General: Bowel sounds are normal. There is no distension.      Palpations: Abdomen is soft. There is no mass.      Tenderness: There is no abdominal tenderness. There is no rebound.      Hernia: No hernia is present.   Musculoskeletal: Normal range of motion.         General: No tenderness.      Comments: Severe edema anasarca.  Abdominal distension and findings of severe cirrhosis of liver with complete decompensation   Skin:     General: Skin is warm and dry.      Coloration: Skin is not pale.      Findings: No bruising, ecchymosis, erythema or rash.      Nails: There is no clubbing.     Neurological:      Mental Status: She is alert and oriented to person, place, and time.      Cranial Nerves: No cranial nerve deficit.      Sensory: No sensory deficit.      Motor: No abnormal muscle tone.      Coordination: Coordination normal.      Deep Tendon  Reflexes: Reflexes are normal and symmetric.   Psychiatric:         Speech: Speech normal.         Behavior: Behavior normal.         Thought Content: Thought content normal.         Judgment: Judgment normal.         Significant Labs:  CMP:   Recent Labs   Lab 10/13/20  0617 10/13/20  1013     --    CALCIUM 7.7*  --    ALBUMIN  --  1.2*   PROT  --  4.8*     --    K 4.1  --    CO2 29  --      --    BUN 43*  --    CREATININE 2.6*  --    ALKPHOS  --  92   ALT  --  11   AST  --  32   BILITOT  --  0.2     All labs within the past 24 hours have been reviewed.     Significant Imaging:  Labs: Reviewed  X-Ray: Reviewed  US: Reviewed    Assessment/Plan:     Nephrotic range proteinuria  10/13/2020 nephrotic range proteinuria.  Workup for autoimmune disorder.  May require kidney biopsy.  IV albumin PICC line plans noted.  Tips is still patent.    Anasarca  Discussed with hepatology/Gastroenterology.  Recommend IV Lasix and IV albumin okay to proceed with placement of a PICC line.    Acute on chronic kidney failure  Patient has hepatorenal syndrome as well as cardiorenal syndrome.    I will be happy to provide ultrafiltration if recommended by Cardiology or Gastroenterology our [facility] Medicine as  discussed in detail with Hospital Medicine-Dr. Floyd  10/13/2020 nephrotic range proteinuria.  Workup for autoimmune disorder.  May require kidney biopsy.  IV albumin PICC line plans noted.  Tips is still patent.        Thank you for your consult.     Jonathan Hernandez MD  Nephrology  [facility] -

## 2020-10-13 NOTE — ASSESSMENT & PLAN NOTE
-Admit to OBS  -IV Lasix 40mg BID  -Supplemental O2 PRN  -Low Na diet  -Strict I& O  -Daily weights  -Troponins trended with initial result negative   -ECHO pending   -Cardiology following- will await further recommendations    Massive Anasarca sec to Cirrhosis with severe Hypoalbuminemia and likely Cardiorenal Synd  Must restrict fluid and salt intake and aggressive diuresis    Very slow improvement- continue  May need Bumex instead of Lasix- will change    Massive Anasarca sec to severe Hypoalbuminemia from Cirrhosis of Liver  Await US Liver to assess TIPS patency and Liver Tx eval as out pt.

## 2020-10-13 NOTE — ASSESSMENT & PLAN NOTE
-Admit to OBS  -IV Lasix 40mg BID  -Supplemental O2 PRN  -Low Na diet  -Strict I& O  -Daily weights  -Troponins trended with initial result negative   -ECHO pending   -Cardiology following- will await further recommendations    Massive Anasarca sec to Cirrhosis with severe Hypoalbuminemia and likely Cardiorenal Synd  Must restrict fluid and salt intake and aggressive diuresis    Very slow improvement- continue  May need Bumex instead of Lasix- will change    Massive Anasarca sec to severe Hypoalbuminemia from Cirrhosis of Liver  Await US Liver to assess TIPS patency and Liver Tx eval as out pt.    TIPS patent by US  Getting IV Albumin for possible HRS

## 2020-10-13 NOTE — PROGRESS NOTES
[facility]  Gastroenterology  Progress Note    Patient Name: Diamond Das  MRN: [id number]  Admission Date: 10/9/2020  Hospital Length of Stay: 0 days  Code Status: Full Code   Attending Provider: Mateo Floyd MD  Consulting Provider: Dario Rangel PA-C  Primary Care Physician: Andrey Perrin MD  Principal Problem: Acute on chronic heart failure      Subjective:     Interval History:   No acute events overnight. Patient without complaints. Tolerating diet. Had one small BM. US done, waiting results.     Review of Systems   Constitutional:        See Interval History for daily ROS.      Objective:     Vital Signs (Most Recent):  Temp: 98.7 °F (37.1 °C) (10/13/20 0743)  Pulse: 88 (10/13/20 0743)  Resp: 20 (10/13/20 0743)  BP: (!) 144/69 (10/13/20 0743)  SpO2: (!) 94 % (10/13/20 0743) Vital Signs (24h Range):  Temp:  [98.5 °F (36.9 °C)-98.9 °F (37.2 °C)] 98.7 °F (37.1 °C)  Pulse:  [68-88] 88  Resp:  [15-20] 20  SpO2:  [94 %-97 %] 94 %  BP: (122-150)/(57-69) 144/69     Weight: 111.3 kg (245 lb 6 oz) (10/12/20 0353)  Body mass index is 46.36 kg/m².      Intake/Output Summary (Last 24 hours) at 10/13/2020 0843  Last data filed at 10/12/2020 1700  Gross per 24 hour   Intake 360 ml   Output 300 ml   Net 60 ml       Lines/Drains/Airways     Peripheral Intravenous Line                 Peripheral IV - Single Lumen 10/09/20 0443 20 G Right Antecubital 4 days                Physical Exam  Constitutional:       General: She is not in acute distress.     Appearance: She is well-developed. She is not diaphoretic.   HENT:      Head: Normocephalic and atraumatic.   Eyes:      Extraocular Movements: Extraocular movements intact.   Cardiovascular:      Rate and Rhythm: Normal rate and regular rhythm.   Pulmonary:      Effort: Pulmonary effort is normal. No respiratory distress.      Breath sounds: Normal breath sounds. No wheezing.   Abdominal:      General: Bowel sounds are normal. There is no distension.       Palpations: Abdomen is soft.      Tenderness: There is no abdominal tenderness.   Musculoskeletal:      Right lower leg: Edema (1+) present.      Left lower leg: Edema (1+) present.   Neurological:      Mental Status: She is alert and oriented to person, place, and time.      Cranial Nerves: No cranial nerve deficit.   Psychiatric:         Behavior: Behavior normal.         Significant Labs:  CBC: No results for input(s): WBC, HGB, HCT, PLT in the last 48 hours.  CMP:   Recent Labs   Lab 10/13/20  0617      CALCIUM 7.7*      K 4.1   CO2 29      BUN 43*   CREATININE 2.6*     Coagulation: No results for input(s): PT, INR, APTT in the last 48 hours.      Significant Imaging:  Imaging results within the past 24 hours have been reviewed.    Assessment/Plan:     * Acute on chronic heart failure/ Massive Anasarca  Management per  team.     Acute on chronic kidney failure  Nephrology following. Patient had diagnosis of HRS and serum albumin of 1.2. Will start her on IV albumin.   Continue to monitor renal function.     Cirrhosis of liver without ascites  Monitor MELD daily with INR and CMP.   Await results of US to see status of TIPS.   No HE. Continue Lactulose BID for constipation.   No signs of GI bleed.             Thank you for your consult. I will follow-up with patient. Please contact us if you have any additional questions.    Dario Rangel PA-C  Gastroenterology  [facility]

## 2020-10-13 NOTE — ASSESSMENT & PLAN NOTE
10/13/2020 nephrotic range proteinuria.  Workup for autoimmune disorder.  May require kidney biopsy.  IV albumin PICC line plans noted.  Tips is still patent.

## 2020-10-13 NOTE — SUBJECTIVE & OBJECTIVE
Subjective:     Interval History:   No acute events overnight. Patient without complaints. Tolerating diet. Had one small BM. US done, waiting results.     Review of Systems   Constitutional:        See Interval History for daily ROS.      Objective:     Vital Signs (Most Recent):  Temp: 98.7 °F (37.1 °C) (10/13/20 0743)  Pulse: 88 (10/13/20 0743)  Resp: 20 (10/13/20 0743)  BP: (!) 144/69 (10/13/20 0743)  SpO2: (!) 94 % (10/13/20 0743) Vital Signs (24h Range):  Temp:  [98.5 °F (36.9 °C)-98.9 °F (37.2 °C)] 98.7 °F (37.1 °C)  Pulse:  [68-88] 88  Resp:  [15-20] 20  SpO2:  [94 %-97 %] 94 %  BP: (122-150)/(57-69) 144/69     Weight: 111.3 kg (245 lb 6 oz) (10/12/20 0353)  Body mass index is 46.36 kg/m².      Intake/Output Summary (Last 24 hours) at 10/13/2020 0843  Last data filed at 10/12/2020 1700  Gross per 24 hour   Intake 360 ml   Output 300 ml   Net 60 ml       Lines/Drains/Airways     Peripheral Intravenous Line                 Peripheral IV - Single Lumen 10/09/20 0443 20 G Right Antecubital 4 days                Physical Exam  Constitutional:       General: She is not in acute distress.     Appearance: She is well-developed. She is not diaphoretic.   HENT:      Head: Normocephalic and atraumatic.   Eyes:      Extraocular Movements: Extraocular movements intact.   Cardiovascular:      Rate and Rhythm: Normal rate and regular rhythm.   Pulmonary:      Effort: Pulmonary effort is normal. No respiratory distress.      Breath sounds: Normal breath sounds. No wheezing.   Abdominal:      General: Bowel sounds are normal. There is no distension.      Palpations: Abdomen is soft.      Tenderness: There is no abdominal tenderness.   Musculoskeletal:      Right lower leg: Edema (1+) present.      Left lower leg: Edema (1+) present.   Neurological:      Mental Status: She is alert and oriented to person, place, and time.      Cranial Nerves: No cranial nerve deficit.   Psychiatric:         Behavior: Behavior normal.          Significant Labs:  CBC: No results for input(s): WBC, HGB, HCT, PLT in the last 48 hours.  CMP:   Recent Labs   Lab 10/13/20  0617      CALCIUM 7.7*      K 4.1   CO2 29      BUN 43*   CREATININE 2.6*     Coagulation: No results for input(s): PT, INR, APTT in the last 48 hours.      Significant Imaging:  Imaging results within the past 24 hours have been reviewed.

## 2020-10-13 NOTE — ASSESSMENT & PLAN NOTE
Monitor MELD daily with INR and CMP.   Await results of US to see status of TIPS.   No HE. Continue Lactulose BID for constipation.   No signs of GI bleed.

## 2020-10-13 NOTE — PLAN OF CARE
Patient resting with daughter at bedside. No complaints of pain with some complaints of SOB on exertion. Edema increased. Liver US scheduled for today, pt NPO. VS stable. Blood glucose monitoring. Bed locked, lowered and call light within reach. Free of fall or injury. Will continue to monitor  Problem: Adult Inpatient Plan of Care  Goal: Plan of Care Review  Outcome: Ongoing, Progressing  Goal: Patient-Specific Goal (Individualization)  Outcome: Ongoing, Progressing  Goal: Absence of Hospital-Acquired Illness or Injury  Outcome: Ongoing, Progressing  Goal: Optimal Comfort and Wellbeing  Outcome: Ongoing, Progressing  Goal: Readiness for Transition of Care  Outcome: Ongoing, Progressing  Goal: Rounds/Family Conference  Outcome: Ongoing, Progressing     Problem: Bariatric Environmental Safety  Goal: Safety Maintained with Care  Outcome: Ongoing, Progressing     Problem: Diabetes Comorbidity  Goal: Blood Glucose Level Within Desired Range  Outcome: Ongoing, Progressing     Problem: Electrolyte Imbalance (Acute Kidney Injury/Impairment)  Goal: Serum Electrolyte Balance  Outcome: Ongoing, Progressing     Problem: Fluid Imbalance (Acute Kidney Injury/Impairment)  Goal: Optimal Fluid Balance  Outcome: Ongoing, Progressing     Problem: Hematologic Alteration (Acute Kidney Injury/Impairment)  Goal: Hemoglobin, Hematocrit and Platelets Within Normal Range  Outcome: Ongoing, Progressing     Problem: Oral Intake Inadequate (Acute Kidney Injury/Impairment)  Goal: Optimal Nutrition Intake  Outcome: Ongoing, Progressing     Problem: Renal Function Impairment (Acute Kidney Injury/Impairment)  Goal: Effective Renal Function  Outcome: Ongoing, Progressing     Problem: Fall Injury Risk  Goal: Absence of Fall and Fall-Related Injury  Outcome: Ongoing, Progressing

## 2020-10-13 NOTE — SUBJECTIVE & OBJECTIVE
Interval History: Seen and examined at bedside. Hospital chart reviewed. No acute interval detrimental events noted. She reports that she  has significantly improved        Review of Systems   Constitutional: Positive for fatigue and unexpected weight change.   Respiratory: Positive for apnea, chest tightness and shortness of breath.    Cardiovascular: Positive for leg swelling.   Gastrointestinal: Positive for abdominal distention.   All other systems reviewed and are negative.    Objective:     Vital Signs (Most Recent):  Temp: 98 °F (36.7 °C) (10/13/20 1516)  Pulse: 80 (10/13/20 1516)  Resp: 19 (10/13/20 1516)  BP: (!) 127/58 (10/13/20 1516)  SpO2: 95 % (10/13/20 1516) Vital Signs (24h Range):  Temp:  [97 °F (36.1 °C)-98.9 °F (37.2 °C)] 98 °F (36.7 °C)  Pulse:  [70-88] 80  Resp:  [15-20] 19  SpO2:  [94 %-97 %] 95 %  BP: (115-150)/(53-69) 127/58     Weight: 111.3 kg (245 lb 6 oz)  Body mass index is 46.36 kg/m².    No intake or output data in the 24 hours ending 10/13/20 1820    Physical Exam  Vitals signs and nursing note reviewed.   Constitutional:       Appearance: She is well-developed.   HENT:      Head: Normocephalic and atraumatic.      Nose: Nose normal.      Mouth/Throat:      Pharynx: No oropharyngeal exudate.   Eyes:      General: No scleral icterus.     Conjunctiva/sclera: Conjunctivae normal.      Pupils: Pupils are equal, round, and reactive to light.   Neck:      Musculoskeletal: Normal range of motion and neck supple.      Thyroid: No thyromegaly.      Vascular: No JVD.      Trachea: No tracheal deviation.   Cardiovascular:      Rate and Rhythm: Normal rate and regular rhythm.      Heart sounds: Normal heart sounds, S1 normal and S2 normal. No murmur.   Pulmonary:      Effort: Pulmonary effort is normal. No tachypnea, accessory muscle usage or respiratory distress.      Breath sounds: Normal breath sounds. Decreased air movement present. No stridor.   Abdominal:      General: Bowel sounds are  normal. There is distension.      Palpations: Abdomen is soft. There is shifting dullness. There is no hepatomegaly, splenomegaly or mass.      Tenderness: There is no abdominal tenderness. There is no guarding or rebound.   Musculoskeletal: Normal range of motion.         General: Swelling present. No tenderness.   Lymphadenopathy:      Upper Body:      Right upper body: No supraclavicular adenopathy.      Left upper body: No supraclavicular adenopathy.   Skin:     General: Skin is warm and dry.      Findings: No rash.      Nails: There is no clubbing.     Neurological:      Mental Status: She is alert and oriented to person, place, and time.      Coordination: Coordination normal.      Gait: Gait normal.      Deep Tendon Reflexes: Reflexes are normal and symmetric.         Vents:  Oxygen Concentration (%): 21 (10/13/20 0307)    Lines/Drains/Airways     Peripheral Intravenous Line                 Peripheral IV - Single Lumen 10/09/20 0443 20 G Right Antecubital 4 days                Significant Labs:     Laboratory Data:  Reviewed recent labs. Appear stable other than abnormalities addressed in plan.     Radiology:  Reviewed recent imaging studies. Appear stable other than abnormalities addressed in plan.

## 2020-10-13 NOTE — PROGRESS NOTES
[facility] Hospital Medicine  Progress Note    Patient Name: Diamond Das  MRN: [id number]  Patient Class: OP- Observation   Admission Date: 10/9/2020  Length of Stay: 0 days  Attending Physician: Mateo Floyd MD  Primary Care Provider: Andrey Perrin MD        Subjective:     Principal Problem:Acute on chronic heart failure        HPI:  Ms Das is a 64 yo F with pmhx of CHf, HTN, sleep apnea, Gerd, HLD presented to ED with increased SOB which began this morning. States she ran out of her metolazone on Monday and that she has not noticed any improvement since addition of the medication. Associated symptoms include: bilateral lower extremity edema and generalized weakness.  States she is compliant with her medication otherwise and recently started following a restricted sodium diet inclusive of frozen chicken and fish. She is scheduled for an ECHO on Oct 16. Pt denies chest pain, headache, palpitations, fever, N/V, abdominal pain, and additional comp[liants. Pt followed outpatient by Dr Ramos (Cardiology) with medications recently adjusted and no significant symptom improvement.  Pt is a full code and daughter is the surrogate decision maker.  ER course: BUN/Creat- 33/2.7, AST/ALT-54/14, Troponin-0.017, BNP-351, CXR-negative. In the ED she received Lasix 80mg IV. ER discussed case with cardiology who reccommended admission HM contacted for pt placement in OBS for further evaluation.     Overview/Hospital Course:  Ms Das is a 64 yo F with Diastolic CHF, Pericardial Effusion, HTN, KRISTAL, GERD, HLP, IDDM, and cryptogenic cirrhosis s/p TIPS, presented to ED with increased SOB which began this morning associated with BLE edema and generalized weakness.  States she is compliant with her medication and diet. BUN/Creat- 33/2.7, AST/ALT-54/14, Troponin-0.017, BNP-351, CXR-negative. In the ED she received Lasix 80mg IV.   10/10- feels better, SOB, leg swelling improving, wanted to know if she  can go home but has massive anasarca, up to lower part of chest. Will continue aggressive Diuresis and daily weight charting plus add PT/OT. Bun/Cr 34/2.7/ Alb 1.4.  10/11- appears same, pleasant and comfortable, still has massive anasarca, 3-4 +++ leg edema, all the way up to lower chest, getting IV lasix and Zaroxolyn. Echo showed mild DD with EF 60%. Weight down to 111 kg- 244 lbs, Cr still 2.7. did well with PT/OT.  10/12- looks and feels the same, no significant SOB, but no significant diuresis either or weight change. Cr remains 2.3. she also feels constipated x 2 weeks. D/w Hepatology- her MELD is 16 but she may still be a Liver Tx candidate despite her TIPS and obesity. Hepatology ordered US liver w doppler to assess her TIPS patency.     Interval History: looks and feels the same, no significant SOB, but no significant diuresis either or weight change. Cr remains 2.3. she also feels constipated x 2 weeks. D/w Hepatology- her MELD is 16 but she may still be a Liver Tx candidate despite her TIPS and obesity. Hepatology ordered US liver w doppler to assess her TIPS patency.    Review of Systems   Constitutional: Positive for fatigue and unexpected weight change.   Respiratory: Positive for apnea, chest tightness and shortness of breath.    Cardiovascular: Positive for leg swelling.   Gastrointestinal: Positive for abdominal distention.   All other systems reviewed and are negative.    Objective:     Vital Signs (Most Recent):  Temp: 98.8 °F (37.1 °C) (10/12/20 1201)  Pulse: 77 (10/12/20 1700)  Resp: 18 (10/12/20 1636)  BP: (!) 122/58 (10/12/20 1636)  SpO2: 96 % (10/12/20 1636) Vital Signs (24h Range):  Temp:  [98.3 °F (36.8 °C)-99.3 °F (37.4 °C)] 98.8 °F (37.1 °C)  Pulse:  [68-77] 77  Resp:  [18-20] 18  SpO2:  [95 %-98 %] 96 %  BP: (122-138)/(57-68) 122/58     Weight: 111.3 kg (245 lb 6 oz)  Body mass index is 46.36 kg/m².    Intake/Output Summary (Last 24 hours) at 10/12/2020 1937  Last data filed at  10/12/2020 1700  Gross per 24 hour   Intake 630 ml   Output 300 ml   Net 330 ml      Physical Exam  Vitals signs and nursing note reviewed.   Constitutional:       Appearance: She is well-developed.   HENT:      Head: Normocephalic and atraumatic.      Nose: Nose normal.      Mouth/Throat:      Pharynx: No oropharyngeal exudate.   Eyes:      General: No scleral icterus.     Conjunctiva/sclera: Conjunctivae normal.      Pupils: Pupils are equal, round, and reactive to light.   Neck:      Musculoskeletal: Normal range of motion and neck supple.      Thyroid: No thyromegaly.      Vascular: No JVD.      Trachea: No tracheal deviation.   Cardiovascular:      Rate and Rhythm: Normal rate and regular rhythm.      Heart sounds: Normal heart sounds, S1 normal and S2 normal. No murmur.   Pulmonary:      Effort: Pulmonary effort is normal. No tachypnea, accessory muscle usage or respiratory distress.      Breath sounds: Normal breath sounds. Decreased air movement present. No stridor.   Abdominal:      General: Bowel sounds are normal. There is distension.      Palpations: Abdomen is soft. There is shifting dullness. There is no hepatomegaly, splenomegaly or mass.      Tenderness: There is no abdominal tenderness. There is no guarding or rebound.   Musculoskeletal: Normal range of motion.         General: Swelling present. No tenderness.   Lymphadenopathy:      Upper Body:      Right upper body: No supraclavicular adenopathy.      Left upper body: No supraclavicular adenopathy.   Skin:     General: Skin is warm and dry.      Findings: No rash.      Nails: There is no clubbing.     Neurological:      Mental Status: She is alert and oriented to person, place, and time.      Coordination: Coordination normal.      Gait: Gait normal.      Deep Tendon Reflexes: Reflexes are normal and symmetric.         Significant Labs:   BMP:   Recent Labs   Lab 10/12/20  0625   GLU 97      K 4.1      CO2 28   BUN 39*   CREATININE  2.6*   CALCIUM 7.3*     CBC: No results for input(s): WBC, HGB, HCT, PLT in the last 48 hours.  CMP:   Recent Labs   Lab 10/11/20  0628 10/12/20  0625    141   K 3.8 4.1    105   CO2 31* 28   * 97   BUN 37* 39*   CREATININE 2.7* 2.6*   CALCIUM 7.2* 7.3*   ANIONGAP 4* 8   EGFRNONAA 18* 19*     Coagulation:   Magnesium:   All pertinent labs within the past 24 hours have been reviewed.    Significant Imaging: I have reviewed all pertinent imaging results/findings within the past 24 hours.      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    Cirrhosis  -Hx of   -s/p TIPS procedure   -Albumin of 1.4 and AST 54 noted   -Repeat labs pending   -CMP AM draw    Continue aggressive diuresis with increase protein intake    See above Anasarca    KRISTAL on CPAP  -CPAP nightly    Severe KRISTAL likely contributing to the massive Anasarca sec to Cor Pulmonale as well  Start CPAP    Pt used CPAP last night    Cont CPAP    Acquired hypothyroidism  -Continue home med Synthroid   -TSH pending    TSH normal    Type 2 diabetes mellitus with hyperglycemia  BS under control      Hypertension  -Continue home Doxazosin 2mg nightly  -Metolazone 5mg daily  -Isosorbide mononitrate 30mg daily  -Amlodipine 10mg daily  -Cardiology following      Acute hypoxemic respiratory failure        Recurrent pleural effusion on  left  Expect it to resolve with CHF      Acute on chronic kidney failure    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      VTE Risk Mitigation (From admission, onward)         Ordered     IP VTE HIGH RISK PATIENT  Once      10/09/20 0912     Place sequential compression device  Until discontinued      10/09/20 0912                Discharge Planning   GILLIAN:      Code Status: Full Code   Is the patient medically ready for discharge?:     Reason for patient still in hospital (select all that apply): Patient new problem, Patient trending condition, Treatment and Consult recommendations  Discharge Plan A: Home with family                  Mateo Floyd MD  Department of Hospital Medicine   [facility]

## 2020-10-13 NOTE — SUBJECTIVE & OBJECTIVE
Interval History: looks and feels the same, no significant SOB, but no significant diuresis either or weight change. Cr remains 2.3. she also feels constipated x 2 weeks. D/w Hepatology- her MELD is 16 but she may still be a Liver Tx candidate despite her TIPS and obesity. Hepatology ordered US liver w doppler to assess her TIPS patency.    Review of Systems   Constitutional: Positive for fatigue and unexpected weight change.   Respiratory: Positive for apnea, chest tightness and shortness of breath.    Cardiovascular: Positive for leg swelling.   Gastrointestinal: Positive for abdominal distention.   All other systems reviewed and are negative.    Objective:     Vital Signs (Most Recent):  Temp: 98.8 °F (37.1 °C) (10/12/20 1201)  Pulse: 77 (10/12/20 1700)  Resp: 18 (10/12/20 1636)  BP: (!) 122/58 (10/12/20 1636)  SpO2: 96 % (10/12/20 1636) Vital Signs (24h Range):  Temp:  [98.3 °F (36.8 °C)-99.3 °F (37.4 °C)] 98.8 °F (37.1 °C)  Pulse:  [68-77] 77  Resp:  [18-20] 18  SpO2:  [95 %-98 %] 96 %  BP: (122-138)/(57-68) 122/58     Weight: 111.3 kg (245 lb 6 oz)  Body mass index is 46.36 kg/m².    Intake/Output Summary (Last 24 hours) at 10/12/2020 1937  Last data filed at 10/12/2020 1700  Gross per 24 hour   Intake 630 ml   Output 300 ml   Net 330 ml      Physical Exam  Vitals signs and nursing note reviewed.   Constitutional:       Appearance: She is well-developed.   HENT:      Head: Normocephalic and atraumatic.      Nose: Nose normal.      Mouth/Throat:      Pharynx: No oropharyngeal exudate.   Eyes:      General: No scleral icterus.     Conjunctiva/sclera: Conjunctivae normal.      Pupils: Pupils are equal, round, and reactive to light.   Neck:      Musculoskeletal: Normal range of motion and neck supple.      Thyroid: No thyromegaly.      Vascular: No JVD.      Trachea: No tracheal deviation.   Cardiovascular:      Rate and Rhythm: Normal rate and regular rhythm.      Heart sounds: Normal heart sounds, S1 normal and  S2 normal. No murmur.   Pulmonary:      Effort: Pulmonary effort is normal. No tachypnea, accessory muscle usage or respiratory distress.      Breath sounds: Normal breath sounds. Decreased air movement present. No stridor.   Abdominal:      General: Bowel sounds are normal. There is distension.      Palpations: Abdomen is soft. There is shifting dullness. There is no hepatomegaly, splenomegaly or mass.      Tenderness: There is no abdominal tenderness. There is no guarding or rebound.   Musculoskeletal: Normal range of motion.         General: Swelling present. No tenderness.   Lymphadenopathy:      Upper Body:      Right upper body: No supraclavicular adenopathy.      Left upper body: No supraclavicular adenopathy.   Skin:     General: Skin is warm and dry.      Findings: No rash.      Nails: There is no clubbing.     Neurological:      Mental Status: She is alert and oriented to person, place, and time.      Coordination: Coordination normal.      Gait: Gait normal.      Deep Tendon Reflexes: Reflexes are normal and symmetric.         Significant Labs:   BMP:   Recent Labs   Lab 10/12/20  0625   GLU 97      K 4.1      CO2 28   BUN 39*   CREATININE 2.6*   CALCIUM 7.3*     CBC: No results for input(s): WBC, HGB, HCT, PLT in the last 48 hours.  CMP:   Recent Labs   Lab 10/11/20  0628 10/12/20  0625    141   K 3.8 4.1    105   CO2 31* 28   * 97   BUN 37* 39*   CREATININE 2.7* 2.6*   CALCIUM 7.2* 7.3*   ANIONGAP 4* 8   EGFRNONAA 18* 19*     Coagulation:   Magnesium:   All pertinent labs within the past 24 hours have been reviewed.    Significant Imaging: I have reviewed all pertinent imaging results/findings within the past 24 hours.

## 2020-10-13 NOTE — ASSESSMENT & PLAN NOTE
-Hx of   -s/p TIPS procedure   -Albumin of 1.4 and AST 54 noted   -Repeat labs pending   -CMP AM draw    Continue aggressive diuresis with increase protein intake    See above Anasarca

## 2020-10-13 NOTE — ASSESSMENT & PLAN NOTE
-CPAP nightly    Severe KRISTAL likely contributing to the massive Anasarca sec to Cor Pulmonale as well  Start CPAP    Pt used CPAP last night    Cont CPAP

## 2020-10-13 NOTE — ASSESSMENT & PLAN NOTE
Discussed with hepatology/Gastroenterology.  Recommend IV Lasix and IV albumin okay to proceed with placement of a PICC line.

## 2020-10-13 NOTE — ASSESSMENT & PLAN NOTE
Nephrology following. Patient had diagnosis of HRS and serum albumin of 1.2. Will start her on IV albumin.   Continue to monitor renal function.

## 2020-10-13 NOTE — PROGRESS NOTES
[facility] Hospital Medicine  Progress Note    Patient Name: Diamond Das  MRN: [id number]  Patient Class: IP- Inpatient   Admission Date: 10/9/2020  Length of Stay: 0 days  Attending Physician: Mateo Floyd MD  Primary Care Provider: Andrey Perrin MD        Subjective:     Principal Problem:Acute on chronic heart failure        HPI:  Ms Das is a 66 yo F with pmhx of CHf, HTN, sleep apnea, Gerd, HLD presented to ED with increased SOB which began this morning. States she ran out of her metolazone on Monday and that she has not noticed any improvement since addition of the medication. Associated symptoms include: bilateral lower extremity edema and generalized weakness.  States she is compliant with her medication otherwise and recently started following a restricted sodium diet inclusive of frozen chicken and fish. She is scheduled for an ECHO on Oct 16. Pt denies chest pain, headache, palpitations, fever, N/V, abdominal pain, and additional comp[liants. Pt followed outpatient by Dr Ramos (Cardiology) with medications recently adjusted and no significant symptom improvement.  Pt is a full code and daughter is the surrogate decision maker.  ER course: BUN/Creat- 33/2.7, AST/ALT-54/14, Troponin-0.017, BNP-351, CXR-negative. In the ED she received Lasix 80mg IV. ER discussed case with cardiology who reccommended admission HM contacted for pt placement in OBS for further evaluation.     Overview/Hospital Course:  Ms Das is a 66 yo F with Diastolic CHF, Pericardial Effusion, HTN, KRISTAL, GERD, HLP, IDDM, and cryptogenic cirrhosis s/p TIPS, presented to ED with increased SOB which began this morning associated with BLE edema and generalized weakness.  States she is compliant with her medication and diet. BUN/Creat- 33/2.7, AST/ALT-54/14, Troponin-0.017, BNP-351, CXR-negative. In the ED she received Lasix 80mg IV.   10/10- feels better, SOB, leg swelling improving, wanted to know if she can  go home but has massive anasarca, up to lower part of chest. Will continue aggressive Diuresis and daily weight charting plus add PT/OT. Bun/Cr 34/2.7/ Alb 1.4.  10/11- appears same, pleasant and comfortable, still has massive anasarca, 3-4 +++ leg edema, all the way up to lower chest, getting IV lasix and Zaroxolyn. Echo showed mild DD with EF 60%. Weight down to 111 kg- 244 lbs, Cr still 2.7. did well with PT/OT.  10/12- looks and feels the same, no significant SOB, but no significant diuresis either or weight change. Cr remains 2.3. she also feels constipated x 2 weeks. D/w Hepatology- her MELD is 16 but she may still be a Liver Tx candidate despite her TIPS and obesity. Hepatology ordered US liver w doppler to assess her TIPS patency.   10/13- appreciate all- pt feels better, swelling reducing a little, she can feels her hands, arms, abd wall less swollen, still does not have any robust urine output. She has 4 gm protein in her urine. Now getting oral bumex 2 mg bid PO. Started on IV albumin by GI. US abd shows patent TIPS. Had a small BM since yesterday.     Interval History: pt feels better, swelling reducing a little, she can feels her hands, arms, abd wall less swollen, still does not have any robust urine output. She has 4 gm protein in her urine. Now getting oral bumex 2 mg bid PO. Started on IV albumin by GI. US abd shows patent TIPS. Had a small BM since yesterday.       Review of Systems   Constitutional: Positive for fatigue and unexpected weight change.   Respiratory: Positive for apnea, chest tightness and shortness of breath.    Cardiovascular: Positive for leg swelling.   Gastrointestinal: Positive for abdominal distention.   All other systems reviewed and are negative.    Objective:     Vital Signs (Most Recent):  Temp: 98 °F (36.7 °C) (10/13/20 1516)  Pulse: 80 (10/13/20 1516)  Resp: 19 (10/13/20 1516)  BP: (!) 127/58 (10/13/20 1516)  SpO2: 95 % (10/13/20 1516) Vital Signs (24h Range):  Temp:   [97 °F (36.1 °C)-98.9 °F (37.2 °C)] 98 °F (36.7 °C)  Pulse:  [70-88] 80  Resp:  [15-20] 19  SpO2:  [94 %-97 %] 95 %  BP: (115-150)/(53-69) 127/58     Weight: 111.3 kg (245 lb 6 oz)  Body mass index is 46.36 kg/m².  No intake or output data in the 24 hours ending 10/13/20 1829   Physical Exam  Vitals signs and nursing note reviewed.   Constitutional:       Appearance: She is well-developed.   HENT:      Head: Normocephalic and atraumatic.      Nose: Nose normal.      Mouth/Throat:      Pharynx: No oropharyngeal exudate.   Eyes:      General: No scleral icterus.     Conjunctiva/sclera: Conjunctivae normal.      Pupils: Pupils are equal, round, and reactive to light.   Neck:      Musculoskeletal: Normal range of motion and neck supple.      Thyroid: No thyromegaly.      Vascular: No JVD.      Trachea: No tracheal deviation.   Cardiovascular:      Rate and Rhythm: Normal rate and regular rhythm.      Heart sounds: Normal heart sounds, S1 normal and S2 normal. No murmur.   Pulmonary:      Effort: Pulmonary effort is normal. No tachypnea, accessory muscle usage or respiratory distress.      Breath sounds: Normal breath sounds. Decreased air movement present. No stridor.   Abdominal:      General: Bowel sounds are normal. There is distension.      Palpations: Abdomen is soft. There is shifting dullness. There is no hepatomegaly, splenomegaly or mass.      Tenderness: There is no abdominal tenderness. There is no guarding or rebound.   Musculoskeletal: Normal range of motion.         General: Swelling present. No tenderness.   Lymphadenopathy:      Upper Body:      Right upper body: No supraclavicular adenopathy.      Left upper body: No supraclavicular adenopathy.   Skin:     General: Skin is warm and dry.      Findings: No rash.      Nails: There is no clubbing.     Neurological:      Mental Status: She is alert and oriented to person, place, and time.      Coordination: Coordination normal.      Gait: Gait normal.       Deep Tendon Reflexes: Reflexes are normal and symmetric.         Significant Labs:   CBC: No results for input(s): WBC, HGB, HCT, PLT in the last 48 hours.  CMP:   Recent Labs   Lab 10/12/20  0625 10/13/20  0617 10/13/20  1013    140  --    K 4.1 4.1  --     105  --    CO2 28 29  --    GLU 97 108  --    BUN 39* 43*  --    CREATININE 2.6* 2.6*  --    CALCIUM 7.3* 7.7*  --    PROT  --   --  4.8*   ALBUMIN  --   --  1.2*   BILITOT  --   --  0.2   ALKPHOS  --   --  92   AST  --   --  32   ALT  --   --  11   ANIONGAP 8 6*  --    EGFRNONAA 19* 19*  --      All pertinent labs within the past 24 hours have been reviewed.    Significant Imaging: I have reviewed all pertinent imaging results/findings within the past 24 hours.      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    TIPS patent by US  Getting IV Albumin for possible HRS    Cirrhosis  -Hx of   -s/p TIPS procedure   -Albumin of 1.4 and AST 54 noted   -Repeat labs pending   -CMP AM draw    Continue aggressive diuresis with increase protein intake    See above Anasarca      KRISTAL on CPAP  -CPAP nightly    Severe KRISTAL likely contributing to the massive Anasarca sec to Cor Pulmonale as well  Start CPAP    Pt used CPAP last night    Cont CPAP    Acquired hypothyroidism  -Continue home med Synthroid   -TSH pending    TSH normal    Type 2  diabetes mellitus with hyperglycemia  BS under control      Hypertension  -Continue home Doxazosin 2mg nightly  -Metolazone 5mg daily  -Isosorbide mononitrate 30mg daily  -Amlodipine 10mg daily  -Cardiology following      Recurrent pleural effusion on left  Expect it to resolve with CHF      Acute on chronic kidney failure    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    Chronic and stable Cr 2.7      VTE Risk Mitigation (From admission, onward)         Ordered     IP VTE HIGH RISK PATIENT  Once      10/09/20 0912     Place sequential compression device  Until discontinued      10/09/20 0912                Discharge Planning   GILLIAN:      Code Status: Full Code   Is the patient medically ready for discharge?:     Reason for patient still in hospital (select all that apply): Patient trending condition, Laboratory test, Treatment, Imaging and Consult recommendations  Discharge Plan A: Home with family                  Mateo Floyd MD  Department of Hospital Medicine   [facility]

## 2020-10-13 NOTE — PROGRESS NOTES
[facility]   Pulmonology  Progress Note    Patient Name: Diamond Das  MRN: [id number]  Admission Date: 10/9/2020  Hospital Length of Stay: 0 days  Code Status: Full Code  Attending Provider: Mateo Floyd MD  Primary Care Provider: Andrey Perrin MD   Principal Problem: Acute on chronic heart failure    Subjective:     Interval History: Seen and examined at bedside. Hospital chart reviewed. No acute interval detrimental events noted. She reports that she  has significantly improved        Review of Systems   Constitutional: Positive for fatigue and unexpected weight change.   Respiratory: Positive for apnea, chest tightness and shortness of breath.    Cardiovascular: Positive for leg swelling.   Gastrointestinal: Positive for abdominal distention.   All other systems reviewed and are negative.    Objective:     Vital Signs (Most Recent):  Temp: 98 °F (36.7 °C) (10/13/20 1516)  Pulse: 80 (10/13/20 1516)  Resp: 19 (10/13/20 1516)  BP: (!) 127/58 (10/13/20 1516)  SpO2: 95 % (10/13/20 1516) Vital Signs (24h Range):  Temp:  [97 °F (36.1 °C)-98.9 °F (37.2 °C)] 98 °F (36.7 °C)  Pulse:  [70-88] 80  Resp:  [15-20] 19  SpO2:  [94 %-97 %] 95 %  BP: (115-150)/(53-69) 127/58     Weight: 111.3 kg (245 lb 6 oz)  Body mass index is 46.36 kg/m².    No intake or output data in the 24 hours ending 10/13/20 1820    Physical Exam  Vitals signs and nursing note reviewed.   Constitutional:       Appearance: She is well-developed.   HENT:      Head: Normocephalic and atraumatic.      Nose: Nose normal.      Mouth/Throat:      Pharynx: No oropharyngeal exudate.   Eyes:      General: No scleral icterus.     Conjunctiva/sclera: Conjunctivae normal.      Pupils: Pupils are equal, round, and reactive to light.   Neck:      Musculoskeletal: Normal range of motion and neck supple.      Thyroid: No thyromegaly.      Vascular: No JVD.      Trachea: No tracheal deviation.   Cardiovascular:      Rate and Rhythm: Normal rate and  regular rhythm.      Heart sounds: Normal heart sounds, S1 normal and S2 normal. No murmur.   Pulmonary:      Effort: Pulmonary effort is normal. No tachypnea, accessory muscle usage or respiratory distress.      Breath sounds: Normal breath sounds. Decreased air movement present. No stridor.   Abdominal:      General: Bowel sounds are normal. There is distension.      Palpations: Abdomen is soft. There is shifting dullness. There is no hepatomegaly, splenomegaly or mass.      Tenderness: There is no abdominal tenderness. There is no guarding or rebound.   Musculoskeletal: Normal range of motion.         General: Swelling present. No tenderness.   Lymphadenopathy:      Upper Body:      Right upper body: No supraclavicular adenopathy.      Left upper body: No supraclavicular adenopathy.   Skin:     General: Skin is warm and dry.      Findings: No rash.      Nails: There is no clubbing.     Neurological:      Mental Status: She is alert and oriented to person, place, and time.      Coordination: Coordination normal.      Gait: Gait normal.      Deep Tendon Reflexes: Reflexes are normal and symmetric.         Vents:  Oxygen Concentration (%): 21 (10/13/20 0307)    Lines/Drains/Airways     Peripheral Intravenous Line                 Peripheral IV - Single Lumen 10/09/20 0443 20 G Right Antecubital 4 days                Significant Labs:     Laboratory Data:  Reviewed recent labs. Appear stable other than abnormalities addressed in plan.     Radiology:  Reviewed recent imaging studies. Appear stable other than abnormalities addressed in plan.         Assessment/Plan:     Acute hypoxemic respiratory failure  Supplemental O2  Keep SPO2> 90%    Recurrent pleural effusion on left  Thoracentesis 01/20/20:   TRANSUDATE    Pleural fluid Protein 0.8   Pleural fluid LDH 69         Optimize underlying CHF/ Chr Liver Disease  May need therapeutic thoracentesis if symptomatic.     KRISTAL on CPAP  Continue nocturnal CPAP         No  further recommendation from pulmonary stand point.  Will sign off for now. Please feel free to re consult if further pulmonary issues arise.     Lucas Kennedy MD  Pulmonology  [facility]

## 2020-10-13 NOTE — SUBJECTIVE & OBJECTIVE
Interval History: 10/13/2020 nephrotic range proteinuria.  Workup for autoimmune disorder.  May require kidney biopsy.  IV albumin PICC line plans noted.  Tips is still patent.    Review of patient's allergies indicates:   Allergen Reactions    Subsys [fentanyl] Other (See Comments)     After administration pt unresponsive.  HR and respirations decreased.     Versed [midazolam] Other (See Comments)     After administration pt unresponsive.  HR and respirations decreased.     Ampicillin Rash    Codeine Nausea And Vomiting and Nausea Only     Current Facility-Administered Medications   Medication Frequency    albumin human 25% bottle 25 g Q8H    albuterol-ipratropium 2.5 mg-0.5 mg/3 mL nebulizer solution 3 mL Q4H PRN    amLODIPine tablet 10 mg Daily    aspirin EC tablet 81 mg Daily    bumetanide tablet 2 mg BID    dextrose 50% injection 12.5 g PRN    dextrose 50% injection 25 g PRN    doxazosin tablet 2 mg QHS    famotidine tablet 20 mg Daily    glucagon (human recombinant) injection 1 mg PRN    glucose chewable tablet 16 g PRN    glucose chewable tablet 24 g PRN    insulin aspart U-100 pen 0-5 Units QID (AC + HS) PRN    insulin detemir U-100 pen 10 Units QHS    isosorbide mononitrate 24 hr tablet 30 mg Daily    levothyroxine tablet 137 mcg Before breakfast    metOLazone tablet 5 mg Daily    ondansetron disintegrating tablet 4 mg Q8H PRN    polyethylene glycol packet 17 g Daily    senna-docusate 8.6-50 mg per tablet 2 tablet Daily    sodium chloride 0.9% flush 10 mL PRN       Objective:     Vital Signs (Most Recent):  Temp: 97 °F (36.1 °C) (10/13/20 1109)  Pulse: 71 (10/13/20 1300)  Resp: 19 (10/13/20 1109)  BP: (!) 115/53 (10/13/20 1109)  SpO2: 95 % (10/13/20 1109)  O2 Device (Oxygen Therapy): CPAP (10/13/20 0307) Vital Signs (24h Range):  Temp:  [97 °F (36.1 °C)-98.9 °F (37.2 °C)] 97 °F (36.1 °C)  Pulse:  [70-88] 71  Resp:  [15-20] 19  SpO2:  [94 %-97 %] 95 %  BP: (115-150)/(53-69) 115/53      Weight: 111.3 kg (245 lb 6 oz) (10/12/20 0353)  Body mass index is 46.36 kg/m².  Body surface area is 2.19 meters squared.    I/O last 3 completed shifts:  In: 630 [P.O.:630]  Out: 300 [Urine:300]    Physical Exam  Vitals signs and nursing note reviewed. Exam conducted with a chaperone present.   Constitutional:       Appearance: She is well-developed.   HENT:      Head: Normocephalic and atraumatic.   Eyes:      Conjunctiva/sclera: Conjunctivae normal.      Pupils: Pupils are equal, round, and reactive to light.   Neck:      Musculoskeletal: Full passive range of motion without pain, normal range of motion and neck supple. No edema.      Thyroid: No thyroid mass or thyromegaly.      Vascular: No carotid bruit.   Cardiovascular:      Rate and Rhythm: Normal rate and regular rhythm.  No extrasystoles are present.     Chest Wall: PMI is displaced.      Pulses: Normal pulses.      Heart sounds: S1 normal and S2 normal. Murmur present. Systolic murmur present with a grade of 2/6. No friction rub.      Comments: RV heave loud P2   Pulmonary:      Effort: Pulmonary effort is normal. No accessory muscle usage or respiratory distress.      Breath sounds: No wheezing or rales.   Chest:      Chest wall: No tenderness.   Abdominal:      General: Bowel sounds are normal. There is no distension.      Palpations: Abdomen is soft. There is no mass.      Tenderness: There is no abdominal tenderness. There is no rebound.      Hernia: No hernia is present.   Musculoskeletal: Normal range of motion.         General: No tenderness.      Comments: Severe edema anasarca.  Abdominal distension and findings of severe cirrhosis of liver with complete decompensation   Skin:     General: Skin is warm and dry.      Coloration: Skin is not pale.      Findings: No bruising, ecchymosis, erythema or rash.      Nails: There is no clubbing.     Neurological:      Mental Status: She is alert and oriented to person, place, and time.      Cranial  Nerves: No cranial nerve deficit.      Sensory: No sensory deficit.      Motor: No abnormal muscle tone.      Coordination: Coordination normal.      Deep Tendon Reflexes: Reflexes are normal and symmetric.   Psychiatric:         Speech: Speech normal.         Behavior: Behavior normal.         Thought Content: Thought content normal.         Judgment: Judgment normal.         Significant Labs:  CMP:   Recent Labs   Lab 10/13/20  0617 10/13/20  1013     --    CALCIUM 7.7*  --    ALBUMIN  --  1.2*   PROT  --  4.8*     --    K 4.1  --    CO2 29  --      --    BUN 43*  --    CREATININE 2.6*  --    ALKPHOS  --  92   ALT  --  11   AST  --  32   BILITOT  --  0.2     All labs within the past 24 hours have been reviewed.     Significant Imaging:  Labs: Reviewed  X-Ray: Reviewed  US: Reviewed

## 2020-10-13 NOTE — SUBJECTIVE & OBJECTIVE
Interval History: pt feels better, swelling reducing a little, she can feels her hands, arms, abd wall less swollen, still does not have any robust urine output. She has 4 gm protein in her urine. Now getting oral bumex 2 mg bid PO. Started on IV albumin by GI. US abd shows patent TIPS. Had a small BM since yesterday.       Review of Systems   Constitutional: Positive for fatigue and unexpected weight change.   Respiratory: Positive for apnea, chest tightness and shortness of breath.    Cardiovascular: Positive for leg swelling.   Gastrointestinal: Positive for abdominal distention.   All other systems reviewed and are negative.    Objective:     Vital Signs (Most Recent):  Temp: 98 °F (36.7 °C) (10/13/20 1516)  Pulse: 80 (10/13/20 1516)  Resp: 19 (10/13/20 1516)  BP: (!) 127/58 (10/13/20 1516)  SpO2: 95 % (10/13/20 1516) Vital Signs (24h Range):  Temp:  [97 °F (36.1 °C)-98.9 °F (37.2 °C)] 98 °F (36.7 °C)  Pulse:  [70-88] 80  Resp:  [15-20] 19  SpO2:  [94 %-97 %] 95 %  BP: (115-150)/(53-69) 127/58     Weight: 111.3 kg (245 lb 6 oz)  Body mass index is 46.36 kg/m².  No intake or output data in the 24 hours ending 10/13/20 1829   Physical Exam  Vitals signs and nursing note reviewed.   Constitutional:       Appearance: She is well-developed.   HENT:      Head: Normocephalic and atraumatic.      Nose: Nose normal.      Mouth/Throat:      Pharynx: No oropharyngeal exudate.   Eyes:      General: No scleral icterus.     Conjunctiva/sclera: Conjunctivae normal.      Pupils: Pupils are equal, round, and reactive to light.   Neck:      Musculoskeletal: Normal range of motion and neck supple.      Thyroid: No thyromegaly.      Vascular: No JVD.      Trachea: No tracheal deviation.   Cardiovascular:      Rate and Rhythm: Normal rate and regular rhythm.      Heart sounds: Normal heart sounds, S1 normal and S2 normal. No murmur.   Pulmonary:      Effort: Pulmonary effort is normal. No tachypnea, accessory muscle usage or  respiratory distress.      Breath sounds: Normal breath sounds. Decreased air movement present. No stridor.   Abdominal:      General: Bowel sounds are normal. There is distension.      Palpations: Abdomen is soft. There is shifting dullness. There is no hepatomegaly, splenomegaly or mass.      Tenderness: There is no abdominal tenderness. There is no guarding or rebound.   Musculoskeletal: Normal range of motion.         General: Swelling present. No tenderness.   Lymphadenopathy:      Upper Body:      Right upper body: No supraclavicular adenopathy.      Left upper body: No supraclavicular adenopathy.   Skin:     General: Skin is warm and dry.      Findings: No rash.      Nails: There is no clubbing.     Neurological:      Mental Status: She is alert and oriented to person, place, and time.      Coordination: Coordination normal.      Gait: Gait normal.      Deep Tendon Reflexes: Reflexes are normal and symmetric.         Significant Labs:   CBC: No results for input(s): WBC, HGB, HCT, PLT in the last 48 hours.  CMP:   Recent Labs   Lab 10/12/20  0625 10/13/20  0617 10/13/20  1013    140  --    K 4.1 4.1  --     105  --    CO2 28 29  --    GLU 97 108  --    BUN 39* 43*  --    CREATININE 2.6* 2.6*  --    CALCIUM 7.3* 7.7*  --    PROT  --   --  4.8*   ALBUMIN  --   --  1.2*   BILITOT  --   --  0.2   ALKPHOS  --   --  92   AST  --   --  32   ALT  --   --  11   ANIONGAP 8 6*  --    EGFRNONAA 19* 19*  --      All pertinent labs within the past 24 hours have been reviewed.    Significant Imaging: I have reviewed all pertinent imaging results/findings within the past 24 hours.

## 2020-10-13 NOTE — ASSESSMENT & PLAN NOTE
Patient has hepatorenal syndrome as well as cardiorenal syndrome.    I will be happy to provide ultrafiltration if recommended by Cardiology or Gastroenterology our [facility] Medicine as  discussed in detail with [facility] Medicine-Dr. Floyd  10/13/2020 nephrotic range proteinuria.  Workup for autoimmune disorder.  May require kidney biopsy.  IV albumin PICC line plans noted.  Tips is still patent.

## 2020-10-14 ENCOUNTER — TELEPHONE (OUTPATIENT)
Dept: NEPHROLOGY | Facility: CLINIC | Age: 65
End: 2020-10-14

## 2020-10-14 ENCOUNTER — DOCUMENTATION ONLY (OUTPATIENT)
Dept: CARDIOLOGY | Facility: CLINIC | Age: 65
End: 2020-10-14

## 2020-10-14 VITALS
OXYGEN SATURATION: 97 % | HEART RATE: 84 BPM | SYSTOLIC BLOOD PRESSURE: 118 MMHG | RESPIRATION RATE: 20 BRPM | DIASTOLIC BLOOD PRESSURE: 61 MMHG | TEMPERATURE: 97 F | HEIGHT: 61 IN | WEIGHT: 234.81 LBS | BODY MASS INDEX: 44.33 KG/M2

## 2020-10-14 PROBLEM — N17.9 ACUTE ON CHRONIC KIDNEY FAILURE: Status: RESOLVED | Noted: 2019-01-03 | Resolved: 2020-10-14

## 2020-10-14 PROBLEM — N18.9 ACUTE ON CHRONIC KIDNEY FAILURE: Status: RESOLVED | Noted: 2019-01-03 | Resolved: 2020-10-14

## 2020-10-14 PROBLEM — I50.9 ACUTE ON CHRONIC HEART FAILURE: Status: RESOLVED | Noted: 2020-10-09 | Resolved: 2020-10-14

## 2020-10-14 LAB
ANION GAP SERPL CALC-SCNC: 9 MMOL/L (ref 8–16)
ANTI SM ANTIBODY: 0.08 RATIO (ref 0–0.99)
ANTI SM/RNP ANTIBODY: 0.15 RATIO (ref 0–0.99)
ANTI-SM INTERPRETATION: NEGATIVE
ANTI-SM/RNP INTERPRETATION: NEGATIVE
ANTI-SSA ANTIBODY: 0.08 RATIO (ref 0–0.99)
ANTI-SSA INTERPRETATION: NEGATIVE
ANTI-SSB ANTIBODY: 0.06 RATIO (ref 0–0.99)
ANTI-SSB INTERPRETATION: NEGATIVE
BUN SERPL-MCNC: 42 MG/DL (ref 8–23)
CALCIUM SERPL-MCNC: 7.5 MG/DL (ref 8.7–10.5)
CHLORIDE SERPL-SCNC: 106 MMOL/L (ref 95–110)
CO2 SERPL-SCNC: 29 MMOL/L (ref 23–29)
CREAT SERPL-MCNC: 2.7 MG/DL (ref 0.5–1.4)
DSDNA AB SER-ACNC: NORMAL [IU]/ML
EST. GFR  (AFRICAN AMERICAN): 21 ML/MIN/1.73 M^2
EST. GFR  (NON AFRICAN AMERICAN): 18 ML/MIN/1.73 M^2
GLUCOSE SERPL-MCNC: 111 MG/DL (ref 70–110)
POCT GLUCOSE: 116 MG/DL (ref 70–110)
POCT GLUCOSE: 87 MG/DL (ref 70–110)
POTASSIUM SERPL-SCNC: 3.7 MMOL/L (ref 3.5–5.1)
SODIUM SERPL-SCNC: 144 MMOL/L (ref 136–145)

## 2020-10-14 PROCEDURE — 86334 PATHOLOGIST INTERPRETATION IFE: ICD-10-PCS | Mod: 26,,, | Performed by: PATHOLOGY

## 2020-10-14 PROCEDURE — P9047 ALBUMIN (HUMAN), 25%, 50ML: HCPCS | Mod: JG | Performed by: PHYSICIAN ASSISTANT

## 2020-10-14 PROCEDURE — 84165 PROTEIN E-PHORESIS SERUM: CPT

## 2020-10-14 PROCEDURE — 80048 BASIC METABOLIC PNL TOTAL CA: CPT

## 2020-10-14 PROCEDURE — 99232 SBSQ HOSP IP/OBS MODERATE 35: CPT | Mod: ,,, | Performed by: PHYSICIAN ASSISTANT

## 2020-10-14 PROCEDURE — 84165 PATHOLOGIST INTERPRETATION SPE: ICD-10-PCS | Mod: 26,,, | Performed by: PATHOLOGY

## 2020-10-14 PROCEDURE — 99232 PR SUBSEQUENT HOSPITAL CARE,LEVL II: ICD-10-PCS | Mod: ,,, | Performed by: PHYSICIAN ASSISTANT

## 2020-10-14 PROCEDURE — 99233 SBSQ HOSP IP/OBS HIGH 50: CPT | Mod: ,,, | Performed by: INTERNAL MEDICINE

## 2020-10-14 PROCEDURE — 99900035 HC TECH TIME PER 15 MIN (STAT)

## 2020-10-14 PROCEDURE — 84165 PROTEIN E-PHORESIS SERUM: CPT | Mod: 26,,, | Performed by: PATHOLOGY

## 2020-10-14 PROCEDURE — 25000003 PHARM REV CODE 250: Performed by: PHYSICIAN ASSISTANT

## 2020-10-14 PROCEDURE — 83520 IMMUNOASSAY QUANT NOS NONAB: CPT | Mod: 59

## 2020-10-14 PROCEDURE — 99233 PR SUBSEQUENT HOSPITAL CARE,LEVL III: ICD-10-PCS | Mod: ,,, | Performed by: INTERNAL MEDICINE

## 2020-10-14 PROCEDURE — 86334 IMMUNOFIX E-PHORESIS SERUM: CPT | Mod: 26,,, | Performed by: PATHOLOGY

## 2020-10-14 PROCEDURE — 63600175 PHARM REV CODE 636 W HCPCS: Mod: JG | Performed by: PHYSICIAN ASSISTANT

## 2020-10-14 PROCEDURE — 86255 FLUORESCENT ANTIBODY SCREEN: CPT | Mod: 91

## 2020-10-14 PROCEDURE — 86334 IMMUNOFIX E-PHORESIS SERUM: CPT

## 2020-10-14 PROCEDURE — 25000003 PHARM REV CODE 250: Performed by: NURSE PRACTITIONER

## 2020-10-14 PROCEDURE — 94660 CPAP INITIATION&MGMT: CPT

## 2020-10-14 PROCEDURE — 86235 NUCLEAR ANTIGEN ANTIBODY: CPT

## 2020-10-14 PROCEDURE — 25000003 PHARM REV CODE 250: Performed by: EMERGENCY MEDICINE

## 2020-10-14 RX ORDER — ONDANSETRON 4 MG/1
4 TABLET, ORALLY DISINTEGRATING ORAL EVERY 8 HOURS PRN
Qty: 30 TABLET | Refills: 1 | Status: SHIPPED | OUTPATIENT
Start: 2020-10-14 | End: 2021-06-22 | Stop reason: SDUPTHER

## 2020-10-14 RX ORDER — AMOXICILLIN 250 MG
2 CAPSULE ORAL DAILY
Qty: 60 TABLET | Refills: 1 | Status: SHIPPED | OUTPATIENT
Start: 2020-10-15 | End: 2021-06-23

## 2020-10-14 RX ORDER — ISOSORBIDE MONONITRATE 30 MG/1
30 TABLET, EXTENDED RELEASE ORAL DAILY
Qty: 30 TABLET | Refills: 11 | Status: SHIPPED | OUTPATIENT
Start: 2020-10-15 | End: 2021-07-08 | Stop reason: SDUPTHER

## 2020-10-14 RX ORDER — BISACODYL 5 MG
10 TABLET, DELAYED RELEASE (ENTERIC COATED) ORAL ONCE
Status: COMPLETED | OUTPATIENT
Start: 2020-10-14 | End: 2020-10-14

## 2020-10-14 RX ORDER — BUMETANIDE 2 MG/1
2 TABLET ORAL 2 TIMES DAILY
Qty: 60 TABLET | Refills: 11 | Status: SHIPPED | OUTPATIENT
Start: 2020-10-14 | End: 2021-05-04 | Stop reason: SDUPTHER

## 2020-10-14 RX ORDER — FAMOTIDINE 20 MG/1
20 TABLET, FILM COATED ORAL DAILY
Qty: 30 TABLET | Refills: 11 | Status: SHIPPED | OUTPATIENT
Start: 2020-10-15 | End: 2021-12-20 | Stop reason: SDUPTHER

## 2020-10-14 RX ADMIN — ISOSORBIDE MONONITRATE 30 MG: 30 TABLET, EXTENDED RELEASE ORAL at 08:10

## 2020-10-14 RX ADMIN — BISACODYL 10 MG: 5 TABLET, COATED ORAL at 08:10

## 2020-10-14 RX ADMIN — ALBUMIN (HUMAN) 25 G: 12.5 SOLUTION INTRAVENOUS at 06:10

## 2020-10-14 RX ADMIN — ASPIRIN 81 MG: 81 TABLET, COATED ORAL at 08:10

## 2020-10-14 RX ADMIN — POLYETHYLENE GLYCOL 3350 17 G: 17 POWDER, FOR SOLUTION ORAL at 08:10

## 2020-10-14 RX ADMIN — LEVOTHYROXINE SODIUM 137 MCG: 25 TABLET ORAL at 05:10

## 2020-10-14 RX ADMIN — FAMOTIDINE 20 MG: 20 TABLET ORAL at 08:10

## 2020-10-14 RX ADMIN — BUMETANIDE 2 MG: 1 TABLET ORAL at 08:10

## 2020-10-14 RX ADMIN — AMLODIPINE BESYLATE 10 MG: 10 TABLET ORAL at 08:10

## 2020-10-14 RX ADMIN — STANDARDIZED SENNA CONCENTRATE AND DOCUSATE SODIUM 2 TABLET: 8.6; 5 TABLET ORAL at 08:10

## 2020-10-14 RX ADMIN — METOLAZONE 5 MG: 5 TABLET ORAL at 08:10

## 2020-10-14 NOTE — TELEPHONE ENCOUNTER
----- Message from Jonathan Hernandez MD sent at 10/14/2020 11:27 AM CDT -----  Please make sure patient is scheduled for a kidney biopsy next week and please allow her to follow up with me in the next 1-2 weeks with routine labs

## 2020-10-14 NOTE — ASSESSMENT & PLAN NOTE
10/13/2020 nephrotic range proteinuria.  Workup for autoimmune disorder.  May require kidney biopsy.  IV albumin PICC line plans noted.  Tips is still patent.    10/14/2020 outpatient kidney biopsy scheduled and ordered.  [facility] pathology papers scanned and with radiology department

## 2020-10-14 NOTE — PROGRESS NOTES
[facility]  Gastroenterology  Progress Note    Patient Name: Diamond Das  MRN: [id number]  Admission Date: 10/9/2020  Hospital Length of Stay: 1 days  Code Status: Full Code   Attending Provider: Mateo Floyd MD  Consulting Provider: Dario Rangel PA-C  Primary Care Physician: Andrey Perrin MD  Principal Problem: Acute on chronic heart failure      Subjective:     Interval History:   No acute events overnight. Urine test show proteinuria. Dr. Hernandez considering kidney biopsy. TIPs was patent. Reports 3-4 BMs yesterday and none so far today.     Review of Systems   Constitutional:        See Interval History for daily ROS.      Objective:     Vital Signs (Most Recent):  Temp: 97.2 °F (36.2 °C) (10/14/20 0732)  Pulse: 85 (10/14/20 0732)  Resp: 20 (10/14/20 0732)  BP: 118/61 (10/14/20 0732)  SpO2: 97 % (10/14/20 0732) Vital Signs (24h Range):  Temp:  [96.9 °F (36.1 °C)-98.9 °F (37.2 °C)] 97.2 °F (36.2 °C)  Pulse:  [67-85] 85  Resp:  [18-20] 20  SpO2:  [94 %-97 %] 97 %  BP: (115-140)/(53-65) 118/61     Weight: 111.7 kg (246 lb 4.1 oz) (10/13/20 2316)  Body mass index is 46.53 kg/m².      Intake/Output Summary (Last 24 hours) at 10/14/2020 0937  Last data filed at 10/14/2020 0600  Gross per 24 hour   Intake 480 ml   Output --   Net 480 ml       Lines/Drains/Airways     Peripheral Intravenous Line                 Midline Catheter Insertion/Assessment  - Single Lumen 10/13/20 1600 Right cephalic vein (lateral side of arm) 20g x 10cm less than 1 day                Physical Exam  Constitutional:       General: She is not in acute distress.     Appearance: She is well-developed. She is not diaphoretic.   HENT:      Head: Normocephalic and atraumatic.   Eyes:      Extraocular Movements: Extraocular movements intact.   Cardiovascular:      Rate and Rhythm: Normal rate and regular rhythm.   Pulmonary:      Effort: Pulmonary effort is normal. No respiratory distress.      Breath sounds: Normal breath  sounds. No wheezing.   Abdominal:      General: Bowel sounds are normal. There is no distension.      Palpations: Abdomen is soft.      Tenderness: There is no abdominal tenderness.   Musculoskeletal:      Right lower leg: Edema (minimal) present.      Left lower leg: Edema (minimal) present.   Neurological:      Mental Status: She is oriented to person, place, and time.      Cranial Nerves: No cranial nerve deficit.      Comments: Lethargic, but wakes and answers questions appropriately. Patient oriented. No asterixis.    Psychiatric:         Behavior: Behavior normal.         Significant Labs:  CBC: No results for input(s): WBC, HGB, HCT, PLT in the last 48 hours.  CMP:   Recent Labs   Lab 10/13/20  1013 10/14/20  0542   GLU  --  111*   CALCIUM  --  7.5*   ALBUMIN 1.2*  --    PROT 4.8*  --    NA  --  144   K  --  3.7   CO2  --  29   CL  --  106   BUN  --  42*   CREATININE  --  2.7*   ALKPHOS 92  --    ALT 11  --    AST 32  --    BILITOT 0.2  --      Coagulation:   Recent Labs   Lab 10/13/20  1014   INR 1.0         Significant Imaging:  Imaging results within the past 24 hours have been reviewed.    Assessment/Plan:     * Acute on chronic heart failure/ Massive Anasarca  Management per HM team.     Nephrotic range proteinuria  Management per Nephrology. Kidney biopsy are being considered.     Acute on chronic kidney failure  Nephrology following. Continue IV albumin. Discussed with Dr. Hernandez.      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 No new recommendations. Discussed with Dr. Floyd. Will need outpatient Hepatology following.   Continue to monitor MELD daily with INR and CMP.   TIPS patent.   Lethargic today, but no asterixis. Continue Lactulose BID with goal of 3-4 BMs daily.   No signs of GI bleed.     MELD-Na score: 16 at 10/14/2020  5:42 AM  MELD score: 16 at 10/14/2020  5:42 AM  Calculated from:  Serum Creatinine: 2.7 mg/dL at 10/14/2020  5:42 AM  Serum Sodium: 144 mmol/L (Rounded to 137 mmol/L) at  10/14/2020  5:42 AM  Total Bilirubin: 0.2 mg/dL (Rounded to 1 mg/dL) at 10/13/2020 10:13 AM  INR(ratio): 1.0 at 10/13/2020 10:14 AM  Age: 65 years 7 months            Thank you for your consult. I will sign off. Please contact us if you have any additional questions.    Dario Rangel PA-C  Gastroenterology  [facility]

## 2020-10-14 NOTE — ASSESSMENT & PLAN NOTE
MELD stable. No new recommendations. Discussed with Dr. Floyd. Will need outpatient Hepatology following.   Continue to monitor MELD daily with INR and CMP.   TIPS patent.   Lethargic today, but no asterixis. Continue Lactulose BID with goal of 3-4 BMs daily.   No signs of GI bleed.     MELD-Na score: 16 at 10/14/2020  5:42 AM  MELD score: 16 at 10/14/2020  5:42 AM  Calculated from:  Serum Creatinine: 2.7 mg/dL at 10/14/2020  5:42 AM  Serum Sodium: 144 mmol/L (Rounded to 137 mmol/L) at 10/14/2020  5:42 AM  Total Bilirubin: 0.2 mg/dL (Rounded to 1 mg/dL) at 10/13/2020 10:13 AM  INR(ratio): 1.0 at 10/13/2020 10:14 AM  Age: 65 years 7 months

## 2020-10-14 NOTE — PROGRESS NOTES
Discussed case with  for heart failure disease management program. Pt will follow up after hospital discharge with Dr. Ramos, regular cardiologist and have chf 48 hour discharge call scheduled.

## 2020-10-14 NOTE — PLAN OF CARE
Pt received discharge instructions and verbalized understanding. Pt was stable. IV and monitor was removed.Pt is ready for discharge.

## 2020-10-14 NOTE — SUBJECTIVE & OBJECTIVE
Interval History: ?  Renal anasarca    Review of patient's allergies indicates:   Allergen Reactions    Subsys [fentanyl] Other (See Comments)     After administration pt unresponsive.  HR and respirations decreased.     Versed [midazolam] Other (See Comments)     After administration pt unresponsive.  HR and respirations decreased.     Ampicillin Rash    Codeine Nausea And Vomiting and Nausea Only     Current Facility-Administered Medications   Medication Frequency    albumin human 25% bottle 25 g Q8H    albuterol-ipratropium 2.5 mg-0.5 mg/3 mL nebulizer solution 3 mL Q4H PRN    amLODIPine tablet 10 mg Daily    aspirin EC tablet 81 mg Daily    bumetanide tablet 2 mg BID    dextrose 50% injection 12.5 g PRN    dextrose 50% injection 25 g PRN    doxazosin tablet 2 mg QHS    famotidine tablet 20 mg Daily    glucagon (human recombinant) injection 1 mg PRN    glucose chewable tablet 16 g PRN    glucose chewable tablet 24 g PRN    insulin aspart U-100 pen 0-5 Units QID (AC + HS) PRN    insulin detemir U-100 pen 10 Units QHS    isosorbide mononitrate 24 hr tablet 30 mg Daily    levothyroxine tablet 137 mcg Before breakfast    metOLazone tablet 5 mg Daily    ondansetron disintegrating tablet 4 mg Q8H PRN    polyethylene glycol packet 17 g Daily    senna-docusate 8.6-50 mg per tablet 2 tablet Daily    simethicone chewable tablet 80 mg TID PRN    sodium chloride 0.9% flush 10 mL PRN       Objective:     Vital Signs (Most Recent):  Temp: 97.2 °F (36.2 °C) (10/14/20 0732)  Pulse: 79 (10/14/20 0900)  Resp: 20 (10/14/20 0732)  BP: 118/61 (10/14/20 0732)  SpO2: 97 % (10/14/20 0732)  O2 Device (Oxygen Therapy): CPAP (10/13/20 0307) Vital Signs (24h Range):  Temp:  [96.9 °F (36.1 °C)-98.9 °F (37.2 °C)] 97.2 °F (36.2 °C)  Pulse:  [67-85] 79  Resp:  [18-20] 20  SpO2:  [94 %-97 %] 97 %  BP: (118-140)/(58-65) 118/61     Weight: 111.7 kg (246 lb 4.1 oz) (10/13/20 2316)  Body mass index is 46.53 kg/m².  Body  surface area is 2.19 meters squared.    I/O last 3 completed shifts:  In: 480 [P.O.:480]  Out: -     Physical Exam  Vitals signs and nursing note reviewed.   Constitutional:       Appearance: She is well-developed.   HENT:      Head: Normocephalic and atraumatic.      Nose: Nose normal.      Mouth/Throat:      Pharynx: No oropharyngeal exudate.   Eyes:      General: No scleral icterus.     Conjunctiva/sclera: Conjunctivae normal.      Pupils: Pupils are equal, round, and reactive to light.   Neck:      Musculoskeletal: Normal range of motion and neck supple.      Thyroid: No thyromegaly.      Vascular: No JVD.      Trachea: No tracheal deviation.   Cardiovascular:      Rate and Rhythm: Normal rate and regular rhythm.      Heart sounds: Normal heart sounds, S1 normal and S2 normal. No murmur.   Pulmonary:      Effort: Pulmonary effort is normal. No tachypnea, accessory muscle usage or respiratory distress.      Breath sounds: Normal breath sounds. Decreased air movement present. No stridor.   Abdominal:      General: Bowel sounds are normal. There is distension.      Palpations: Abdomen is soft. There is shifting dullness. There is no hepatomegaly, splenomegaly or mass.      Tenderness: There is no abdominal tenderness. There is no guarding or rebound.   Musculoskeletal: Normal range of motion.         General: Swelling present. No tenderness.   Lymphadenopathy:      Upper Body:      Right upper body: No supraclavicular adenopathy.      Left upper body: No supraclavicular adenopathy.   Skin:     General: Skin is warm and dry.      Findings: No rash.      Nails: There is no clubbing.     Neurological:      Mental Status: She is alert and oriented to person, place, and time.      Coordination: Coordination normal.      Gait: Gait normal.      Deep Tendon Reflexes: Reflexes are normal and symmetric.         Significant Labs:  All labs within the past 24 hours have been reviewed.     Significant Imaging:  Labs:  Reviewed  X-Ray: Reviewed  US: Reviewed

## 2020-10-14 NOTE — SUBJECTIVE & OBJECTIVE
Subjective:     Interval History:   No acute events overnight. Urine test show proteinuria. Dr. Hernandez considering kidney biopsy. TIPs was patent. Reports 3-4 BMs yesterday and none so far today.     Review of Systems   Constitutional:        See Interval History for daily ROS.      Objective:     Vital Signs (Most Recent):  Temp: 97.2 °F (36.2 °C) (10/14/20 0732)  Pulse: 85 (10/14/20 0732)  Resp: 20 (10/14/20 0732)  BP: 118/61 (10/14/20 0732)  SpO2: 97 % (10/14/20 0732) Vital Signs (24h Range):  Temp:  [96.9 °F (36.1 °C)-98.9 °F (37.2 °C)] 97.2 °F (36.2 °C)  Pulse:  [67-85] 85  Resp:  [18-20] 20  SpO2:  [94 %-97 %] 97 %  BP: (115-140)/(53-65) 118/61     Weight: 111.7 kg (246 lb 4.1 oz) (10/13/20 2316)  Body mass index is 46.53 kg/m².      Intake/Output Summary (Last 24 hours) at 10/14/2020 0937  Last data filed at 10/14/2020 0600  Gross per 24 hour   Intake 480 ml   Output --   Net 480 ml       Lines/Drains/Airways     Peripheral Intravenous Line                 Midline Catheter Insertion/Assessment  - Single Lumen 10/13/20 1600 Right cephalic vein (lateral side of arm) 20g x 10cm less than 1 day                Physical Exam  Constitutional:       General: She is not in acute distress.     Appearance: She is well-developed. She is not diaphoretic.   HENT:      Head: Normocephalic and atraumatic.   Eyes:      Extraocular Movements: Extraocular movements intact.   Cardiovascular:      Rate and Rhythm: Normal rate and regular rhythm.   Pulmonary:      Effort: Pulmonary effort is normal. No respiratory distress.      Breath sounds: Normal breath sounds. No wheezing.   Abdominal:      General: Bowel sounds are normal. There is no distension.      Palpations: Abdomen is soft.      Tenderness: There is no abdominal tenderness.   Musculoskeletal:      Right lower leg: Edema (minimal) present.      Left lower leg: Edema (minimal) present.   Neurological:      Mental Status: She is oriented to person, place, and time.       Cranial Nerves: No cranial nerve deficit.      Comments: Lethargic, but wakes and answers questions appropriately. Patient oriented. No asterixis.    Psychiatric:         Behavior: Behavior normal.         Significant Labs:  CBC: No results for input(s): WBC, HGB, HCT, PLT in the last 48 hours.  CMP:   Recent Labs   Lab 10/13/20  1013 10/14/20  0542   GLU  --  111*   CALCIUM  --  7.5*   ALBUMIN 1.2*  --    PROT 4.8*  --    NA  --  144   K  --  3.7   CO2  --  29   CL  --  106   BUN  --  42*   CREATININE  --  2.7*   ALKPHOS 92  --    ALT 11  --    AST 32  --    BILITOT 0.2  --      Coagulation:   Recent Labs   Lab 10/13/20  1014   INR 1.0         Significant Imaging:  Imaging results within the past 24 hours have been reviewed.

## 2020-10-14 NOTE — CONSULTS
Chart reviewed and patient appears to be on aspirin.  Due to the risk of bleeding the radiologist recommends d/c aspirin and discuss an appropriate time frame for which the procedure can safely be done.  Thank you for the consult.

## 2020-10-14 NOTE — PROGRESS NOTES
[facility]   Nephrology  Progress Note    Patient Name: Diamond Das  MRN: [id number]  Admission Date: 10/9/2020  Hospital Length of Stay: 1 days  Attending Provider: Mateo Floyd MD   Primary Care Physician: Andrey Perrin MD  Principal Problem:Acute on chronic heart failure    Subjective:     HPI: Patient is a 65-year-old female with history of type 2 diabetes hypertension and morbid obesity.  Patient also has history of chronic kidney disease with baseline creatinine ranging between 1.8 in 2.0.  Patient was last seen in the nephrology clinic by Dr. Contreras in 2018.  Patient also has stage IV cirrhosis of liver biopsy proven.  Patient also underwent TIPS procedure 3 years ago.  Since then patient has been off and on deteriorating.  Patient has a last echo done 2 days ago which showed EF of 65%.  Patient has had multiple episodes of worsening creatinine.  Patient currently is in the hospital with an albumin of 1.2 with generalized anasarca and failure to diurese on IV diuretics.  Patient seen and examined at the bedside.  Do lengthy discussion with the patient at the bedside.  Patient has hepatorenal syndrome as well as cardiorenal syndrome.  Prognosis seems to be very poor explained to the patient in detail.  Patient opted to be not on in life support.  From Nephrology standpoint she is not a candidate for renal replacement therapy at this point due to poor prognosis as well as very low albumin.  After lengthy discussion with the patient it was decided the patient would be a better candidate for hospice management at home.  Case and findings discussed with .    10/13/2020 nephrotic range proteinuria.  Workup for autoimmune disorder.  May require kidney biopsy.  IV albumin PICC line plans noted.  Tips is still patent.    10/14/2020 autoimmune workup ordered.  Kidney biopsy scheduled for next week.  Renal anasarca entertained.  Follow-up planned    Interval History: ?  Renal  anasarca    Review of patient's allergies indicates:   Allergen Reactions    Subsys [fentanyl] Other (See Comments)     After administration pt unresponsive.  HR and respirations decreased.     Versed [midazolam] Other (See Comments)     After administration pt unresponsive.  HR and respirations decreased.     Ampicillin Rash    Codeine Nausea And Vomiting and Nausea Only     Current Facility-Administered Medications   Medication Frequency    albumin human 25% bottle 25 g Q8H    albuterol-ipratropium 2.5 mg-0.5 mg/3 mL nebulizer solution 3 mL Q4H PRN    amLODIPine tablet 10 mg Daily    aspirin EC tablet 81 mg Daily    bumetanide tablet 2 mg BID    dextrose 50% injection 12.5 g PRN    dextrose 50% injection 25 g PRN    doxazosin tablet 2 mg QHS    famotidine tablet 20 mg Daily    glucagon (human recombinant) injection 1 mg PRN    glucose chewable tablet 16 g PRN    glucose chewable tablet 24 g PRN    insulin aspart U-100 pen 0-5 Units QID (AC + HS) PRN    insulin detemir U-100 pen 10 Units QHS    isosorbide mononitrate 24 hr tablet 30 mg Daily    levothyroxine tablet 137 mcg Before breakfast    metOLazone tablet 5 mg Daily    ondansetron disintegrating tablet 4 mg Q8H PRN    polyethylene glycol packet 17 g Daily    senna-docusate 8.6-50 mg per tablet 2 tablet Daily    simethicone chewable tablet 80 mg TID PRN    sodium chloride 0.9% flush 10 mL PRN       Objective:     Vital Signs (Most Recent):  Temp: 97.2 °F (36.2 °C) (10/14/20 0732)  Pulse: 79 (10/14/20 0900)  Resp: 20 (10/14/20 0732)  BP: 118/61 (10/14/20 0732)  SpO2: 97 % (10/14/20 0732)  O2 Device (Oxygen Therapy): CPAP (10/13/20 0307) Vital Signs (24h Range):  Temp:  [96.9 °F (36.1 °C)-98.9 °F (37.2 °C)] 97.2 °F (36.2 °C)  Pulse:  [67-85] 79  Resp:  [18-20] 20  SpO2:  [94 %-97 %] 97 %  BP: (118-140)/(58-65) 118/61     Weight: 111.7 kg (246 lb 4.1 oz) (10/13/20 3817)  Body mass index is 46.53 kg/m².  Body surface area is 2.19 meters  squared.    I/O last 3 completed shifts:  In: 480 [P.O.:480]  Out: -     Physical Exam  Vitals signs and nursing note reviewed.   Constitutional:       Appearance: She is well-developed.   HENT:      Head: Normocephalic and atraumatic.      Nose: Nose normal.      Mouth/Throat:      Pharynx: No oropharyngeal exudate.   Eyes:      General: No scleral icterus.     Conjunctiva/sclera: Conjunctivae normal.      Pupils: Pupils are equal, round, and reactive to light.   Neck:      Musculoskeletal: Normal range of motion and neck supple.      Thyroid: No thyromegaly.      Vascular: No JVD.      Trachea: No tracheal deviation.   Cardiovascular:      Rate and Rhythm: Normal rate and regular rhythm.      Heart sounds: Normal heart sounds, S1 normal and S2 normal. No murmur.   Pulmonary:      Effort: Pulmonary effort is normal. No tachypnea, accessory muscle usage or respiratory distress.      Breath sounds: Normal breath sounds. Decreased air movement present. No stridor.   Abdominal:      General: Bowel sounds are normal. There is distension.      Palpations: Abdomen is soft. There is shifting dullness. There is no hepatomegaly, splenomegaly or mass.      Tenderness: There is no abdominal tenderness. There is no guarding or rebound.   Musculoskeletal: Normal range of motion.         General: Swelling present. No tenderness.   Lymphadenopathy:      Upper Body:      Right upper body: No supraclavicular adenopathy.      Left upper body: No supraclavicular adenopathy.   Skin:     General: Skin is warm and dry.      Findings: No rash.      Nails: There is no clubbing.     Neurological:      Mental Status: She is alert and oriented to person, place, and time.      Coordination: Coordination normal.      Gait: Gait normal.      Deep Tendon Reflexes: Reflexes are normal and symmetric.         Significant Labs:  All labs within the past 24 hours have been reviewed.     Significant Imaging:  Labs: Reviewed  X-Ray: Reviewed  US:  Reviewed    Assessment/Plan:     Nephrotic range proteinuria  10/13/2020 nephrotic range proteinuria.  Workup for autoimmune disorder.  May require kidney biopsy.  IV albumin PICC line plans noted.  Tips is still patent.    10/14/2020 outpatient kidney biopsy scheduled and ordered.  [place] pathology papers scanned and with radiology department    Acute on chronic kidney failure  Patient has hepatorenal syndrome as well as cardiorenal syndrome.    I will be happy to provide ultrafiltration if recommended by Cardiology or Gastroenterology our \[facility] [place] [facility]\"" Medicine as  discussed in detail with Hospital Medicine-Dr. Floyd  10/13/2020 nephrotic range proteinuria.  Workup for autoimmune disorder.  May require kidney biopsy.  IV albumin PICC line plans noted.  Tips is still patent.    10/14/2020 kidney function is stable.  Kidney biopsy planned        Thank you for your consult.     Jonathan Hernandez MD  Nephrology  [facility]

## 2020-10-14 NOTE — ASSESSMENT & PLAN NOTE
Patient has hepatorenal syndrome as well as cardiorenal syndrome.    I will be happy to provide ultrafiltration if recommended by Cardiology or Gastroenterology our [facility] as  discussed in detail with [facility]-Dr. Floyd  10/13/2020 nephrotic range proteinuria.  Workup for autoimmune disorder.  May require kidney biopsy.  IV albumin PICC line plans noted.  Tips is still patent.    10/14/2020 kidney function is stable.  Kidney biopsy planned

## 2020-10-14 NOTE — PLAN OF CARE
Pt AAOx4. VSS. Pt remained free of falls this shift. Family at the bedside. No complaints of pain or discomfort. Medications administered as ordered. Pt CPAP HS. Blood glucose monitoring. Administered albumin as ordered. Pt is normal sinus on monitor. Hourly rounding completed. Pt instructed to call for assistance. POC reviewed. Pt verbalized understanding. Will continue to monitor.

## 2020-10-15 ENCOUNTER — TELEPHONE (OUTPATIENT)
Dept: GASTROENTEROLOGY | Facility: HOSPITAL | Age: 65
End: 2020-10-15

## 2020-10-15 LAB
ALBUMIN SERPL ELPH-MCNC: 2.53 G/DL (ref 3.35–5.55)
ALPHA1 GLOB SERPL ELPH-MCNC: 0.29 G/DL (ref 0.17–0.41)
ALPHA2 GLOB SERPL ELPH-MCNC: 0.77 G/DL (ref 0.43–0.99)
B-GLOBULIN SERPL ELPH-MCNC: 0.69 G/DL (ref 0.5–1.1)
GAMMA GLOB SERPL ELPH-MCNC: 0.73 G/DL (ref 0.67–1.58)
KAPPA LC SER QL IA: 10.69 MG/DL (ref 0.33–1.94)
KAPPA LC/LAMBDA SER IA: 2.17 (ref 0.26–1.65)
LAMBDA LC SER QL IA: 4.92 MG/DL (ref 0.57–2.63)
PATHOLOGIST INTERPRETATION SPE: NORMAL
PROT SERPL-MCNC: 5 G/DL (ref 6–8.4)

## 2020-10-15 NOTE — TELEPHONE ENCOUNTER
Please schedule the patient a hospital follow up in Hepatology clinic in 2-3 weeks. Dx: cirrhosis.   Thanks,  Dario Rangel PA-C

## 2020-10-16 ENCOUNTER — TELEPHONE (OUTPATIENT)
Dept: CARDIOLOGY | Facility: CLINIC | Age: 65
End: 2020-10-16

## 2020-10-16 LAB
ANTI SM ANTIBODY: 0.07 RATIO (ref 0–0.99)
ANTI-SM INTERPRETATION: NEGATIVE
BM IGG SER-ACNC: <0.2 U
INTERPRETATION SERPL IFE-IMP: NORMAL
PATHOLOGIST INTERPRETATION IFE: NORMAL

## 2020-10-16 RX ORDER — AMLODIPINE BESYLATE 10 MG/1
10 TABLET ORAL DAILY
COMMUNITY
End: 2021-03-01

## 2020-10-16 NOTE — DISCHARGE SUMMARY
[facility] Hospital Medicine  Discharge Summary      Patient Name: Diamond Das  MRN: [id number]  Admission Date: 10/9/2020  Hospital Length of Stay: 5 days  Discharge Date and Time:  10/15/2020 11:26 PM  Attending Physician: No att. providers found   Discharging Provider: Mateo Floyd MD  Primary Care Provider: Andrey Perrin MD      HPI:   Ms Das is a 64 yo F with pmhx of CHf, HTN, sleep apnea, Gerd, HLD presented to ED with increased SOB which began this morning. States she ran out of her metolazone on Monday and that she has not noticed any improvement since addition of the medication. Associated symptoms include: bilateral lower extremity edema and generalized weakness.  States she is compliant with her medication otherwise and recently started following a restricted sodium diet inclusive of frozen chicken and fish. She is scheduled for an ECHO on Oct 16. Pt denies chest pain, headache, palpitations, fever, N/V, abdominal pain, and additional comp[liants. Pt followed outpatient by Dr Ramos (Cardiology) with medications recently adjusted and no significant symptom improvement.  Pt is a full code and daughter is the surrogate decision maker.  ER course: BUN/Creat- 33/2.7, AST/ALT-54/14, Troponin-0.017, BNP-351, CXR-negative. In the ED she received Lasix 80mg IV. ER discussed case with cardiology who reccommended admission HM contacted for pt placement in OBS for further evaluation.     * No surgery found *      Hospital Course:   Ms Das is a 64 yo F with Diastolic CHF, Pericardial Effusion, HTN, KRISTAL, GERD, HLP, IDDM, and cryptogenic cirrhosis s/p TIPS, presented to ED with increased SOB which began this morning associated with BLE edema and generalized weakness.  States she is compliant with her medication and diet. BUN/Creat- 33/2.7, AST/ALT-54/14, Troponin-0.017, BNP-351, CXR-negative. In the ED she received Lasix 80mg IV.   10/10- feels better, SOB, leg swelling improving,  wanted to know if she can go home but has massive anasarca, up to lower part of chest. Will continue aggressive Diuresis and daily weight charting plus add PT/OT. Bun/Cr 34/2.7/ Alb 1.4.  10/11- appears same, pleasant and comfortable, still has massive anasarca, 3-4 +++ leg edema, all the way up to lower chest, getting IV lasix and Zaroxolyn. Echo showed mild DD with EF 60%. Weight down to 111 kg- 244 lbs, Cr still 2.7. did well with PT/OT.  10/12- looks and feels the same, no significant SOB, but no significant diuresis either or weight change. Cr remains 2.3. she also feels constipated x 2 weeks. D/w Hepatology- her MELD is 16 but she may still be a Liver Tx candidate despite her TIPS and obesity. Hepatology ordered US liver w doppler to assess her TIPS patency.   10/13- appreciate all- pt feels better, swelling reducing a little, she can feels her hands, arms, abd wall less swollen, still does not have any robust urine output. She has 4 gm protein in her urine. Now getting oral bumex 2 mg bid PO. Started on IV albumin by GI. US abd shows patent TIPS. Had a small BM since yesterday.   10/14- looks and feels better, anasarca has much improved misti from her torso and arms and her leg swellings have also improved. She is now down to 234 lbs from 246 lbs on admission. She has been continued on oral Bumex and Zaroxolyn for the same. Dr. RENETTA Hernandez is considering Renal Anasarca sec to her severe Proteinuria- hence has scheduled her for Renal Biopsy on 10/29/2. Pt and her daughter are agreeable. They will be seen by Hepatology as well for consideration of Liver Transplant, although that seems less likely needed. She is eating drinking well, walking around well. She was seen and examined and deemed stable for discharge home today.      Consults:   Consults (From admission, onward)        Status Ordering Provider     Inpatient consult to Cardiology  Once     Provider:  Marcos Ramos MD    Completed QUIRINO ACHARYA     Inpatient  consult to Gastroenterology  Once     Provider:  Marnie Elmore MD    Completed ANIYA BRYAN     Inpatient consult to Interventional Radiology  Once     Provider:  Martell Jasmine MD    Completed DONALD ROB     Inpatient consult to Pulmonology  Once     Provider:  Jose Ortega MD    Completed ANIYA BRYAN     Inpatient consult to Registered Dietitian/Nutritionist  Once     Provider:  (Not yet assigned)    Completed QUIRINO ACHARYA     Inpatient consult to Social Work/Case Management  Once     Provider:  (Not yet assigned)    Completed QUIRINO ACHARYA          No new Assessment & Plan notes have been filed under this hospital service since the last note was generated.  Service: Hospital Medicine    Final Active Diagnoses:    Diagnosis Date Noted POA    Cirrhosis [K74.60] 01/03/2017 Yes    KRISTAL on CPAP [G47.33, Z99.89]  Not Applicable    Acquired hypothyroidism [E03.9] 09/28/2020 Yes    Type 2 diabetes mellitus with hyperglycemia [E11.65] 10/10/2020 Yes    Hypertension [I10] 12/14/2017 Yes    Nephrotic range proteinuria [R80.9] 10/13/2020 Yes    Recurrent pleural effusion on left [J90] 01/20/2020 Yes    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 [K75.81, K74.60] 09/02/2016 Yes      Problems Resolved During this Admission:    Diagnosis Date Noted Date Resolved POA    PRINCIPAL PROBLEM:  Acute on chronic heart failure/ Massive Anasarca [I50.9] 10/09/2020 10/14/2020 Yes    Anasarca [R60.1] 10/13/2020 10/13/2020 Yes    Acute hypoxemic respiratory failure [J96.01] 10/11/2020 10/13/2020 Yes    Acute on chronic kidney failure [N17.9, N18.9] 01/03/2019 10/14/2020 Yes    Type 2 diabetes mellitus with circulatory disorder [E11.59] 09/02/2016 10/10/2020 Yes       Discharged Condition: stable    Disposition: Home or Self Care    Follow Up:  Follow-up Information     Andrey Perrin MD. Schedule an appointment as soon as possible for a visit in 3 days.    Specialty: Family Medicine  Why: Hospital  follow up  Contact information:  [address] [place] [address] [phone]             Jonathan Hernandez MD. Schedule an appointment as soon as possible for a visit in 3 weeks.    Specialty: Nephrology  Why: Hospital follow up  Contact information:  [address] [facility] [place] [address]  [phone]             ANASTASIA Olvera In 1 week.    Specialty: Gastroenterology  Why: Hospital follow up for Liver transplant eval  Contact information:  [address] [facility]  [place] [address]  [phone]                 Patient Instructions:      Diet diabetic     Diet Cardiac     Activity as tolerated       Significant Diagnostic Studies: Labs:   BMP:   Recent Labs   Lab 10/14/20  0542   *      K 3.7      CO2 29   BUN 42*   CREATININE 2.7*   CALCIUM 7.5*   , CMP   Recent Labs   Lab 10/14/20  0542      K 3.7      CO2 29   *   BUN 42*   CREATININE 2.7*   CALCIUM 7.5*   ANIONGAP 9   ESTGFRAFRICA 21*   EGFRNONAA 18*   , CBC No results for input(s): WBC, HGB, HCT, PLT in the last 48 hours. and All labs within the past 24 hours have been reviewed  Radiology:   Imaging Results          X-Ray Chest 1 View (Final result)  Result time 10/09/20 06:42:54    Final result by Martell Jasmine MD (10/09/20 06:42:54)                 Impression:      No acute process seen.      Electronically signed by: Martell Jasmine MD  Date:    10/09/2020  Time:    06:42             Narrative:    EXAMINATION:  XR CHEST 1 VIEW    CLINICAL HISTORY:  Shortness of breath    FINDINGS:  Single view of the chest.  Comparison 09/24/2020    Cardiac silhouette remains enlarged.  Again persistent hazy opacity in the left lung base likely representing combination of small left-sided pleural effusion and mild left basilar atelectasis.  Right lung remains clear.  No acute osseous findings demonstrated.                                Pending Diagnostic Studies:     Procedure Component Value Units Date/Time     Anti-Hyatt antibody [[id number]] Collected: 10/14/20 1141    Order Status: Sent Lab Status: In process Updated: 10/14/20 2109    Specimen: Blood     Anti-neutrophilic cytoplasmic antibody [[id number]] Collected: 10/14/20 1141    Order Status: Sent Lab Status: In process Updated: 10/15/20 1003    Specimen: Blood     CT Biopsy Kidney [[id number]]     Order Status: Sent Lab Status: No result     Glomerular basement membrane antibodies [[id number]] Collected: 10/14/20 1141    Order Status: Sent Lab Status: In process Updated: 10/14/20 2109    Specimen: Blood          Medications:  Reconciled Home Medications:      Medication List      START taking these medications    bumetanide 2 MG tablet  Commonly known as: BUMEX  Take 1 tablet (2 mg total) by mouth 2 (two) times daily.     famotidine 20 MG tablet  Commonly known as: PEPCID  Take 1 tablet (20 mg total) by mouth once daily.     isosorbide mononitrate 30 MG 24 hr tablet  Commonly known as: IMDUR  Take 1 tablet (30 mg total) by mouth once daily.     lactulose 20 gram Pack  Commonly known as: CEPHULAC  Take 1 packet (20 g total) by mouth 2 (two) times daily.     ondansetron 4 MG Tbdl  Commonly known as: ZOFRAN-ODT  Take 1 tablet (4 mg total) by mouth every 8 (eight) hours as needed.     senna-docusate 8.6-50 mg 8.6-50 mg per tablet  Commonly known as: PERICOLACE  Take 2 tablets by mouth once daily.        CONTINUE taking these medications    aspirin 81 MG EC tablet  Commonly known as: ECOTRIN  Take 1 tablet (81 mg total) by mouth once daily.     doxazosin 2 MG tablet  Commonly known as: CARDURA  Take 1 tablet (2 mg total) by mouth every evening.     glucagon (human recombinant) 1 mg Solr  Commonly known as: GLUCAGON EMERGENCY KIT (HUMAN)  Inject 1 mg into the muscle as needed.     insulin glargine 100 units/mL (3mL) SubQ pen  Commonly known as: LANTUS SOLOSTAR U-100 INSULIN  INJECT 20 UNITS INTO THE SKIN EVERY EVENING.&nbsp;&nbsp;TITRATE UP TO 30 UNITS NIGHTLY AS DIRECTED ON  INSTRUCTION SHEET     levothyroxine 137 MCG Tab tablet  Commonly known as: SYNTHROID  Take 1 tablet (137 mcg total) by mouth before breakfast.     liraglutide 0.6 mg/0.1 mL (18 mg/3 mL) subq PNIJ 0.6 mg/0.1 mL (18 mg/3 mL) Pnij pen  Commonly known as: VICTOZA 2-SHAYY  Inject 1.8 mg into the skin once daily.     magnesium oxide 400 mg (241.3 mg magnesium) tablet  Commonly known as: MAG-OX  Take 1 tablet (400 mg total) by mouth once daily.     metOLazone 5 MG tablet  Commonly known as: ZAROXOLYN  Take 1 tablet (5 mg total) by mouth once daily. Take daily as needed and then every other day for swelling, 30 min before lasix     ondansetron 4 MG tablet  Commonly known as: ZOFRAN  TAKE 1 TABLET BY MOUTH EVERY 8 HOURS AS NEEDED     potassium chloride SA 20 MEQ tablet  Commonly known as: K-DUR,KLOR-CON  Take 1 tablet (20 mEq total) by mouth once daily.     rifAXIMin 550 mg Tab  Commonly known as: XIFAXAN  Take 1 tablet (550 mg total) by mouth 2 (two) times daily.        STOP taking these medications    furosemide 40 MG tablet  Commonly known as: LASIX     pantoprazole 20 MG tablet  Commonly known as: PROTONIX     tiZANidine 4 MG tablet  Commonly known as: ZANAFLEX            Indwelling Lines/Drains at time of discharge:   Lines/Drains/Airways     None                 Time spent on the discharge of patient: 53 minutes  Patient was seen and examined on the date of discharge and determined to be suitable for discharge.         Mateo Floyd MD  Department of Hospital Medicine  [facility]

## 2020-10-16 NOTE — TELEPHONE ENCOUNTER
CHF 48 hour call    Called and spoke with pt. Pt informed feeling good except for a little stomach ache. Denies CP, SOB, orthopnea, PND, or edema. Reviewed medications per MAR and reconciled. Pt taking all medications and informed taking norvasc 10 mg daily also, added to MAR  Recommend 2 gram sodium restriction and 1500cc fluid restriction.    Reviewed Signs and Symptoms  If any of these occur, contact your doctor immediately:   Increase in shortness of breath with movement   Increase in swelling in your legs and ankles   Weight gain of more than 2 pounds in a night or 5 pounds in a week   Difficulty breathing when you are lying down   Worsening fatigue or tiredness   Stomach bloating, a full feeling or a loss of appetite   Increased coughing--especially when you are lying down    Call 911 if any of the followin: Worsening chest tightness or chest pain  2: Cough with pink, frothy sputum      Reviewed hospital f/u appt on Monday, 10-19-20 at 3:40 with Dr. Ramos.   Pt verbalized understanding of above information and all questions answered.

## 2020-10-16 NOTE — TELEPHONE ENCOUNTER
----- Message from Briana Shook RN sent at 10/14/2020  3:49 PM CDT -----  Regarding: chf 48 hour call

## 2020-10-19 ENCOUNTER — OFFICE VISIT (OUTPATIENT)
Dept: CARDIOLOGY | Facility: CLINIC | Age: 65
End: 2020-10-19
Payer: MEDICAID

## 2020-10-19 ENCOUNTER — OFFICE VISIT (OUTPATIENT)
Dept: INTERNAL MEDICINE | Facility: CLINIC | Age: 65
End: 2020-10-19
Payer: MEDICAID

## 2020-10-19 ENCOUNTER — PATIENT OUTREACH (OUTPATIENT)
Dept: ADMINISTRATIVE | Facility: OTHER | Age: 65
End: 2020-10-19

## 2020-10-19 VITALS
SYSTOLIC BLOOD PRESSURE: 112 MMHG | DIASTOLIC BLOOD PRESSURE: 64 MMHG | HEART RATE: 64 BPM | WEIGHT: 229.25 LBS | HEIGHT: 61 IN | TEMPERATURE: 99 F | BODY MASS INDEX: 43.28 KG/M2

## 2020-10-19 VITALS
SYSTOLIC BLOOD PRESSURE: 124 MMHG | BODY MASS INDEX: 43.38 KG/M2 | DIASTOLIC BLOOD PRESSURE: 62 MMHG | WEIGHT: 229.75 LBS | HEIGHT: 61 IN | OXYGEN SATURATION: 97 % | HEART RATE: 72 BPM | RESPIRATION RATE: 16 BRPM

## 2020-10-19 DIAGNOSIS — I31.39 PERICARDIAL EFFUSION: ICD-10-CM

## 2020-10-19 DIAGNOSIS — Z28.39 IMMUNIZATION DEFICIENCY: Primary | ICD-10-CM

## 2020-10-19 DIAGNOSIS — K74.60 LIVER CIRRHOSIS SECONDARY TO NASH: ICD-10-CM

## 2020-10-19 DIAGNOSIS — E08.59 DIABETES DUE TO UNDERLYING CONDITION W OTH CIRCULATORY COMP: ICD-10-CM

## 2020-10-19 DIAGNOSIS — E78.49 OTHER HYPERLIPIDEMIA: ICD-10-CM

## 2020-10-19 DIAGNOSIS — E88.09 HYPOALBUMINEMIA: ICD-10-CM

## 2020-10-19 DIAGNOSIS — K75.81 LIVER CIRRHOSIS SECONDARY TO NASH: ICD-10-CM

## 2020-10-19 DIAGNOSIS — N18.4 CKD (CHRONIC KIDNEY DISEASE) STAGE 4, GFR 15-29 ML/MIN: ICD-10-CM

## 2020-10-19 DIAGNOSIS — I50.32 CHRONIC DIASTOLIC CONGESTIVE HEART FAILURE: Primary | ICD-10-CM

## 2020-10-19 DIAGNOSIS — I10 ESSENTIAL HYPERTENSION: ICD-10-CM

## 2020-10-19 DIAGNOSIS — E03.9 ACQUIRED HYPOTHYROIDISM: ICD-10-CM

## 2020-10-19 DIAGNOSIS — I50.33 ACUTE ON CHRONIC DIASTOLIC CONGESTIVE HEART FAILURE: ICD-10-CM

## 2020-10-19 DIAGNOSIS — R60.9 CHRONIC EDEMA: ICD-10-CM

## 2020-10-19 DIAGNOSIS — R80.9 NEPHROTIC RANGE PROTEINURIA: ICD-10-CM

## 2020-10-19 LAB
ANCA AB TITR SER IF: NORMAL TITER
P-ANCA TITR SER IF: NORMAL TITER

## 2020-10-19 PROCEDURE — 99999 PR PBB SHADOW E&M-EST. PATIENT-LVL IV: ICD-10-PCS | Mod: PBBFAC,,, | Performed by: FAMILY MEDICINE

## 2020-10-19 PROCEDURE — 99214 PR OFFICE/OUTPT VISIT, EST, LEVL IV, 30-39 MIN: ICD-10-PCS | Mod: S$PBB,,, | Performed by: INTERNAL MEDICINE

## 2020-10-19 PROCEDURE — 99214 OFFICE O/P EST MOD 30 MIN: CPT | Mod: S$PBB,,, | Performed by: INTERNAL MEDICINE

## 2020-10-19 PROCEDURE — 99999 PR PBB SHADOW E&M-EST. PATIENT-LVL IV: ICD-10-PCS | Mod: PBBFAC,,, | Performed by: INTERNAL MEDICINE

## 2020-10-19 PROCEDURE — 99214 OFFICE O/P EST MOD 30 MIN: CPT | Mod: PBBFAC,27,PO | Performed by: FAMILY MEDICINE

## 2020-10-19 PROCEDURE — 99999 PR PBB SHADOW E&M-EST. PATIENT-LVL IV: CPT | Mod: PBBFAC,,, | Performed by: INTERNAL MEDICINE

## 2020-10-19 PROCEDURE — 99214 PR OFFICE/OUTPT VISIT, EST, LEVL IV, 30-39 MIN: ICD-10-PCS | Mod: S$PBB,,, | Performed by: FAMILY MEDICINE

## 2020-10-19 PROCEDURE — 99999 PR PBB SHADOW E&M-EST. PATIENT-LVL IV: CPT | Mod: PBBFAC,,, | Performed by: FAMILY MEDICINE

## 2020-10-19 PROCEDURE — 99214 OFFICE O/P EST MOD 30 MIN: CPT | Mod: PBBFAC,PO,25 | Performed by: INTERNAL MEDICINE

## 2020-10-19 PROCEDURE — 90750 HZV VACC RECOMBINANT IM: CPT | Mod: PBBFAC,PO

## 2020-10-19 PROCEDURE — 99214 OFFICE O/P EST MOD 30 MIN: CPT | Mod: S$PBB,,, | Performed by: FAMILY MEDICINE

## 2020-10-19 RX ORDER — LANOLIN ALCOHOL/MO/W.PET/CERES
400 CREAM (GRAM) TOPICAL DAILY
Qty: 90 TABLET | Refills: 1 | Status: SHIPPED | OUTPATIENT
Start: 2020-10-19 | End: 2021-06-22 | Stop reason: SDUPTHER

## 2020-10-19 NOTE — PROGRESS NOTES
Had a long conversation with pt and daughter.  Spoke with pt regarding her cirrhosis /ascites / HF.  Explained that she has multi organ failure and that we need to look at her options.  One option is the possibility of a liver transplant, but given her multi organ failure, I feel that she may have a difficulty time obtaining a liver, the same would go for a renal transplant.  Explained to her the need for DNR status and the possibility of hospice care as well.  She is in agreement to hospice after she gets renal biopsy to see if there is any further possibilities for her as she does not wish to participate in dialysis.    Pt seen for greater than 25 min face to face. Greater than 50% of the time in the room was spent on counseling and coordination of care.

## 2020-10-19 NOTE — PROGRESS NOTES
Subjective:   Patient ID:  Diamond Das is a 65 y.o. female who presents for cardiac consult of Follow-up      Shortness of Breath  Associated symptoms include leg swelling. Pertinent negatives include no chest pain.   Follow-up  Pertinent negatives include no chest pain or nausea.     The patient came in today for cardiac consult of Follow-up    Referring Physician: Andrey Perrin MD   Reason for consult: HTN, CHF    Diamond Das is a 65 y.o. female with medical conditions diastolic CHF, pericardial effusion, HTN, HLD, IDDM, cryptogenic cirrhosis, s/p TIPS, ascites presents for CV follow up.     Pt of Dr. Shaw, last seen 2/9/18  No smoking/drinking  Last echo in  normal EF and RVF, grade II DD.  EKG today NSR poor R progression on anterior leads  Chronic weakness and fatigue. Had PNA in   Pain on lower chest bilateral for few months, worse with exercise, resolved after resting. Deep breathing made the pain worse. No pain at sleep. Associated dyspnea, N/V, palpitation and dizziness. No radiation. ASA and tylenol not really helped the pain.    3/1/19  She has been having more ascites and concern for cardiac etiologies. She was also started on Reglan, concern for prolonged Qtc, recent Qtc 475 ms. Has been feeling better with reglan and lactulose. Is dyspneic, chronically on oxygen at night and also has portable oxygen when she has to do a lot walking. Takes lasix 40 mg once/day now, was on 80mg daily. Active at home, dresses and bathes her self. Cant walk or stand to too long.     8/26/19  2D ECHO with grade 1 DD, normal Bi V function, Small pericardial effusion without tamponade. She has mild SOB at times with edema but improved lately. She will see hepatology as well. No Chest pain.   Has been taking lasix extra doses PRN and improved symptoms.     10/16/19  [facility] ER follow up  - Non toxic appearing elderly female here for worsening NICHOLE and shortness of breath. Known h/o CHF, f/b  cardiologist in [facility]. Work up is c/w acute on chronic chf exacerbation. She, unfortunately, does not follow her weights so not clear if significant change recently. CXR with likely signs of volume overload. She was given diuresis and had Cedar score of 0. Felt improved and prefers discharge with close OP f/u with her cardiologist. She was ambulated and did not significantly desat and tolerated it well.  No she is on lasix, feels better. BNP today is 453, needs to double lasix next 3-4 days for further diuresis.     11/18/19   Increased diuretics last visit. She felt better then and had weight loss as well. Still has NICHOLE but improved. She went to [facility] 2 weeks ago - presented to the ED for fall secondary to hypoglycemic episode. Pt has had 3 previous episodes of hypoglycemia requiring hospitalizations in the past with sxs of lightheadedness. CT head was unremarkable, CXR revealed improvement in CHF compared to previous study. Due to timeline of fall with insulin administration and Hx of cryptogenic cirrhosis, it is likely that pt could not compensate after receiving her insulin dosing.  BP meds were stopped, will get labs and restart lisinopril.     1/6/20  Breathing has been stable with LE edema. Last visit she doubled lasix for 3 days with min improvement. Has not had much relief lately.   ECG - NSR, poor RWP, lateral TWI    2/17/20  BNP was 863. She had recent thoracentesis as well. She has also been referred to hepatology as concern for TIPS not working?. She remains SOB will add metolazone. Discussed if kidneys and liver are worse will be difficult to manage volume status.     5/18/20  She has been stable but still has SOB with leg swelling. Symptoms have gotten a bit worse a few weeks ago. She has been taking lasix extra dose some times. Last BNP elevated 863, will need to increase lasix and metolazone and repeat labs this week. Will add K and Mg supplements.    9/28/20  BNP was 428 at last visit,  which is improved from 7 months prior. She saw PCP today with worsening NICHOLE and edema. Increased lasix and metolazone last visit.      Ref Range & Units 4mo ago  (5/21/20) 7mo ago  (2/10/20) 8mo ago  (1/10/20) 10mo ago  (11/18/19) 11mo ago  (10/25/19) 11mo ago  (10/16/19)   BNP 0 - 99 pg/mL 413High   863High  CM  1,010High  CM  793High  CM  291High  CM  453High  CM      10/19/20  Pt was admitted 10/9-10/15 for CHF exac with edema, diuresed aggressively. She also had nephro consult and hepatology eval for further treatment and possible liver transplant. She has upcoming appt for CT Renal biopsy. Autoimmune workup planned. ECHO with small pericardial effusion, no tamponde, grade 1 DD.   BNP improved to 129 two weeks prior. She is taking bumex BID. SHe is taking metolazone every other day. Weight is stable.     Patient feels no chest pain, no PND, no palpitation, no dizziness, no syncope, no CNS symptoms.    Patient has dec exercise tolerance.    Patient is compliant with medications.    Results for orders placed during the hospital encounter of 10/09/20   Echo Color Flow Doppler? Yes    Narrative · There is moderate left ventricular concentric hypertrophy.  · The left ventricle is small with normal systolic function. The estimated   ejection fraction is 60%.  · Grade I diastolic dysfunction.  · Normal right ventricular systolic function.  · Mild tricuspid regurgitation.  · Normal central venous pressure (3 mmHg).  · The estimated PA systolic pressure is 37 mmHg.  · Small pericardial effusion.  · There is a moderate left pleural effusion.                Past Medical History:   Diagnosis Date    Ascites     Breast pain, left 1/4/2018    Cataract     CHF (congestive heart failure)     Cirrhosis     Cough     Diabetes mellitus     Diabetes mellitus, type 2     Gastroparesis     GERD (gastroesophageal reflux disease)     Hyperlipidemia     Hypertension     Hypotension 2/21/2019    Liver disease     Lumbar  strain 4/27/2018    Pneumonia of right middle lobe due to infectious organism 12/19/2017    Renal disorder     Retinopathy due to secondary diabetes mellitus     Sleep apnea     Thyroid disease        Past Surgical History:   Procedure Laterality Date    CATARACT EXTRACTION      CHOLECYSTECTOMY      COLONOSCOPY N/A 9/4/2019    Procedure: COLONOSCOPY;  Surgeon: Marnie Elmore MD;  Location: [facility] ENDO;  Service: Endoscopy;  Laterality: N/A;    ERCP      FLUOROSCOPY N/A 7/24/2020    Procedure: TIPS revision;  Surgeon: Martell Jasmine MD;  Location: [facility] CATH LAB;  Service: General;  Laterality: N/A;    LIVER BIOPSY      THORACENTESIS Left 1/20/2020    Procedure: Thoracentesis;  Surgeon: Angelica Hunter MD;  Location: [facility] ENDO;  Service: Pulmonary;  Laterality: Left;    TUBAL LIGATION      UPPER GASTROINTESTINAL ENDOSCOPY         Social History     Tobacco Use    Smoking status: Never Smoker    Smokeless tobacco: Never Used   Substance Use Topics    Alcohol use: No     Frequency: Never    Drug use: No       Family History   Problem Relation Age of Onset    Cancer Mother     Breast cancer Mother     Heart disease Father     Aneurysm Sister     Heart disease Brother     No Known Problems Maternal Grandmother     Cancer Maternal Grandfather     Sarcoidosis Sister        Patient's Medications   New Prescriptions    No medications on file   Previous Medications    AMLODIPINE (NORVASC) 10 MG TABLET    Take 10 mg by mouth once daily.    ASPIRIN (ECOTRIN) 81 MG EC TABLET    Take 1 tablet (81 mg total) by mouth once daily.    BUMETANIDE (BUMEX) 2 MG TABLET    Take 1 tablet (2 mg total) by mouth 2 (two) times daily.    DOXAZOSIN (CARDURA) 2 MG TABLET    Take 1 tablet (2 mg total) by mouth every evening.    FAMOTIDINE (PEPCID) 20 MG TABLET    Take 1 tablet (20 mg total) by mouth once daily.    GLUCAGON, HUMAN RECOMBINANT, (GLUCAGON EMERGENCY KIT, HUMAN,) 1 MG SOLR    Inject 1 mg into the muscle as  needed.    INSULIN (LANTUS SOLOSTAR U-100 INSULIN) GLARGINE 100 UNITS/ML (3ML) SUBQ PEN    INJECT 20 UNITS INTO THE SKIN EVERY EVENING.  TITRATE UP TO 30 UNITS NIGHTLY AS DIRECTED ON INSTRUCTION SHEET    ISOSORBIDE MONONITRATE (IMDUR) 30 MG 24 HR TABLET    Take 1 tablet (30 mg total) by mouth once daily.    LACTULOSE (CEPHULAC) 20 GRAM PACK    Take 1 packet (20 g total) by mouth 2 (two) times daily.    LEVOTHYROXINE (SYNTHROID) 137 MCG TAB TABLET    Take 1 tablet (137 mcg total) by mouth before breakfast.    LIRAGLUTIDE 0.6 MG/0.1 ML, 18 MG/3 ML, SUBQ PNIJ (VICTOZA 2-SHAYY) 0.6 MG/0.1 ML (18 MG/3 ML) PNIJ    Inject 1.8 mg into the skin once daily.    MAGNESIUM OXIDE (MAG-OX) 400 MG (241.3 MG MAGNESIUM) TABLET    Take 1 tablet (400 mg total) by mouth once daily.    METOLAZONE (ZAROXOLYN) 5 MG TABLET    Take 1 tablet (5 mg total) by mouth once daily. Take daily as needed and then every other day for swelling, 30 min before lasix    ONDANSETRON (ZOFRAN) 4 MG TABLET    TAKE 1 TABLET BY MOUTH EVERY 8 HOURS AS NEEDED    ONDANSETRON (ZOFRAN-ODT) 4 MG TBDL    Take 1 tablet (4 mg total) by mouth every 8 (eight) hours as needed.    POTASSIUM CHLORIDE SA (K-DUR,KLOR-CON) 20 MEQ TABLET    Take 1 tablet (20 mEq total) by mouth once daily.    RIFAXIMIN (XIFAXAN) 550 MG TAB    Take 1 tablet (550 mg total) by mouth 2 (two) times daily.    SENNA-DOCUSATE 8.6-50 MG (PERICOLACE) 8.6-50 MG PER TABLET    Take 2 tablets by mouth once daily.   Modified Medications    No medications on file   Discontinued Medications    No medications on file       Review of Systems   Constitutional: Negative.    HENT: Negative.    Eyes: Negative.    Respiratory: Positive for shortness of breath.    Cardiovascular: Positive for leg swelling. Negative for chest pain and palpitations.   Gastrointestinal: Negative for nausea.   Genitourinary: Negative.    Musculoskeletal: Negative.    Skin: Negative.    Neurological: Negative.    Endo/Heme/Allergies: Negative.   "  Psychiatric/Behavioral: Negative.    All 12 systems otherwise negative.      Wt Readings from Last 3 Encounters:   10/19/20 104.2 kg (229 lb 11.5 oz)   10/14/20 106.5 kg (234 lb 12.6 oz)   09/28/20 112.6 kg (248 lb 3.8 oz)     Temp Readings from Last 3 Encounters:   10/14/20 97.2 °F (36.2 °C)   09/28/20 98.6 °F (37 °C) (Temporal)   09/24/20 97.5 °F (36.4 °C) (Temporal)     BP Readings from Last 3 Encounters:   10/19/20 124/62   10/14/20 118/61   09/28/20 (!) 160/80     Pulse Readings from Last 3 Encounters:   10/19/20 72   10/14/20 84   09/28/20 73       /62 (BP Location: Right arm, Patient Position: Sitting, BP Method: Medium (Manual))   Pulse 72   Resp 16   Ht 5' 1" (1.549 m)   Wt 104.2 kg (229 lb 11.5 oz)   LMP  (LMP Unknown)   SpO2 97%   BMI 43.41 kg/m²     Objective:   Physical Exam   Constitutional: She is oriented to person, place, and time. She appears well-developed and well-nourished. No distress.   HENT:   Head: Normocephalic and atraumatic.   Nose: Nose normal.   Mouth/Throat: Oropharynx is clear and moist. No oropharyngeal exudate.   Eyes: Conjunctivae and EOM are normal. Right eye exhibits no discharge. Left eye exhibits no discharge. No scleral icterus.   Neck: Normal range of motion. Neck supple. No JVD present. No thyromegaly present.   Cardiovascular: Normal rate, regular rhythm, S1 normal and S2 normal. Exam reveals no gallop, no S3, no S4 and no friction rub.   Murmur heard.  Pulmonary/Chest: Effort normal and breath sounds normal. No stridor. No respiratory distress. She has no wheezes. She has no rales. She exhibits no tenderness.   Abdominal: Soft. Bowel sounds are normal. She exhibits no distension and no mass. There is no abdominal tenderness. There is no rebound.   Genitourinary:    Genitourinary Comments: Deferred     Musculoskeletal: Normal range of motion.         General: Edema present. No tenderness or deformity.   Lymphadenopathy:     She has no cervical adenopathy. "   Neurological: She is alert and oriented to person, place, and time. She exhibits normal muscle tone. Coordination normal.   Skin: Skin is warm and dry. No rash noted. She is not diaphoretic. No erythema. No pallor.   Psychiatric: She has a normal mood and affect. Her behavior is normal. Judgment and thought content normal.   Nursing note and vitals reviewed.      Lab Results   Component Value Date     10/14/2020    K 3.7 10/14/2020     10/14/2020    CO2 29 10/14/2020    BUN 42 (H) 10/14/2020    CREATININE 2.7 (H) 10/14/2020     (H) 10/14/2020    HGBA1C 5.9 (H) 10/10/2020    MG 1.6 10/10/2020    AST 32 10/13/2020    ALT 11 10/13/2020    ALBUMIN 1.2 (L) 10/13/2020    PROT 4.8 (L) 10/13/2020    BILITOT 0.2 10/13/2020    WBC 5.78 10/09/2020    HGB 10.7 (L) 10/09/2020    HCT 34.3 (L) 10/09/2020    MCV 92 10/09/2020     10/09/2020    INR 1.0 10/13/2020    TSH 3.952 10/09/2020    CHOL 205 (H) 12/06/2018    HDL 41 12/06/2018    LDLCALC 145.8 12/06/2018    TRIG 91 12/06/2018     (H) 10/11/2020     Assessment:      1. Chronic diastolic congestive heart failure    2. Essential hypertension    3. Pericardial effusion    4. Other hyperlipidemia    5. Acute on chronic diastolic congestive heart failure    6. CKD (chronic kidney disease) stage 4, GFR 15-29 ml/min    7. Nephrotic range proteinuria    8. Acquired hypothyroidism    9. Diabetes due to underlying condition w oth circulatory comp    10. 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 Chronic edema    12. Hypoalbuminemia        Plan:   1. Chronic diastolic HF - recent exac  -daily weights, low salt diet -bumex 2 BID with metolazone every other day with extra dose PRN  - daily compression stockings    2. HTN  - cont meds     3. HLD  - cont statin    4. DM2  - cont meds per PCP  - sugars stable now 90s-110    5. Cirrhosis s/p TIPS with edema, low albumin  - cont tx per PCP/Hepatology  - may be candidate for transplant will follow up    6. KRISTAL  -  needs CPAP, was not using it before     7. Obesity  - rec weight loss with diet/exercise     8. Pericardial effusion  - small, sec to cirrhosis/CHF  - no tamponade clinically on recent ECHO    9. CKD with proteinuria  - cont f/u with nephro, appreciate recs  - pending CT Renal biopsy. Autoimmune workup planned.      Thank you for allowing me to participate in this patient's care. Please do not hesitate to contact me with any questions or concerns. Consult note has been forwarded to the referral physician.

## 2020-10-20 DIAGNOSIS — E08.59 DIABETES DUE TO UNDERLYING CONDITION W OTH CIRCULATORY COMP: Primary | ICD-10-CM

## 2020-10-26 ENCOUNTER — OFFICE VISIT (OUTPATIENT)
Dept: GASTROENTEROLOGY | Facility: CLINIC | Age: 65
End: 2020-10-26
Payer: MEDICAID

## 2020-10-26 ENCOUNTER — CLINICAL SUPPORT (OUTPATIENT)
Dept: DIABETES | Facility: CLINIC | Age: 65
End: 2020-10-26
Payer: MEDICAID

## 2020-10-26 VITALS
SYSTOLIC BLOOD PRESSURE: 120 MMHG | BODY MASS INDEX: 40.83 KG/M2 | WEIGHT: 216.25 LBS | DIASTOLIC BLOOD PRESSURE: 58 MMHG | HEART RATE: 78 BPM | HEIGHT: 61 IN

## 2020-10-26 VITALS — WEIGHT: 216.25 LBS | BODY MASS INDEX: 40.83 KG/M2 | HEIGHT: 61 IN

## 2020-10-26 DIAGNOSIS — I50.33 ACUTE ON CHRONIC DIASTOLIC CONGESTIVE HEART FAILURE: ICD-10-CM

## 2020-10-26 DIAGNOSIS — K76.82 HEPATIC ENCEPHALOPATHY: ICD-10-CM

## 2020-10-26 DIAGNOSIS — E08.59 DIABETES DUE TO UNDERLYING CONDITION W OTH CIRCULATORY COMP: ICD-10-CM

## 2020-10-26 DIAGNOSIS — K74.60 HEPATIC CIRRHOSIS, UNSPECIFIED HEPATIC CIRRHOSIS TYPE, UNSPECIFIED WHETHER ASCITES PRESENT: Primary | ICD-10-CM

## 2020-10-26 DIAGNOSIS — N18.4 CKD (CHRONIC KIDNEY DISEASE) STAGE 4, GFR 15-29 ML/MIN: ICD-10-CM

## 2020-10-26 PROCEDURE — 99214 PR OFFICE/OUTPT VISIT, EST, LEVL IV, 30-39 MIN: ICD-10-PCS | Mod: S$PBB,,, | Performed by: NURSE PRACTITIONER

## 2020-10-26 PROCEDURE — 99999 PR PBB SHADOW E&M-EST. PATIENT-LVL IV: ICD-10-PCS | Mod: PBBFAC,,, | Performed by: DIETITIAN, REGISTERED

## 2020-10-26 PROCEDURE — 99214 OFFICE O/P EST MOD 30 MIN: CPT | Mod: PBBFAC | Performed by: DIETITIAN, REGISTERED

## 2020-10-26 PROCEDURE — 99999 PR PBB SHADOW E&M-EST. PATIENT-LVL IV: CPT | Mod: PBBFAC,,, | Performed by: NURSE PRACTITIONER

## 2020-10-26 PROCEDURE — G0108 DIAB MANAGE TRN  PER INDIV: HCPCS | Mod: PBBFAC | Performed by: DIETITIAN, REGISTERED

## 2020-10-26 PROCEDURE — 99214 OFFICE O/P EST MOD 30 MIN: CPT | Mod: PBBFAC,27 | Performed by: NURSE PRACTITIONER

## 2020-10-26 PROCEDURE — 99999 PR PBB SHADOW E&M-EST. PATIENT-LVL IV: ICD-10-PCS | Mod: PBBFAC,,, | Performed by: NURSE PRACTITIONER

## 2020-10-26 PROCEDURE — 99999 PR PBB SHADOW E&M-EST. PATIENT-LVL IV: CPT | Mod: PBBFAC,,, | Performed by: DIETITIAN, REGISTERED

## 2020-10-26 PROCEDURE — 99214 OFFICE O/P EST MOD 30 MIN: CPT | Mod: S$PBB,,, | Performed by: NURSE PRACTITIONER

## 2020-10-26 NOTE — PROGRESS NOTES
Diabetes Education  Author: Nolvia Mcneil RD, CDE  Date: 10/26/2020    Diabetes Care Management Summary  Diabetes Education Record Assessment/Progress: Initial(assessment 10/26/20, prior 9/23/19 A1C 7.7 (7/16/19))  Current Diabetes Risk Level: Moderate - noted recent hospitalization for CHF    Last A1c:   Lab Results   Component Value Date    HGBA1C 5.9 (H) 10/10/2020     Results for GIACOMO BAEZA (MRN [id number]) as of 10/26/2020 16:04   Ref. Range 10/14/2020 05:42   eGFR if African American Latest Ref Range: >60 mL/min/1.73 m^2 21 (A)   Results for GIACOMO BAEZA (MRN [id number]) as of 10/26/2020 16:04   Ref. Range 2/10/2020 14:19   eGFR if African American Latest Ref Range: >60 mL/min/1.73 m^2 30.2 (A)   Results for GIACOMO BAEZA (MRN [id number]) as of 10/26/2020 16:04   Ref. Range 10/14/2020 05:42   Potassium Latest Ref Range: 3.5 - 5.1 mmol/L 3.7     Diabetes Type  Diabetes Type : Type II w/ CKD, liver ds    Diabetes History  Diabetes Diagnosis: 5-10 years  Current Treatment: Diet, Exercise, Insulin, Injectable(Lantus 20units daily, Victoza 1.8mg daily. Pt not dosing lantus due hypoglycemic reactions. Irregular use Victoza, pt unaware that this is not insulin.)  Reviewed Problem List with Patient: Yes    Health Maintenance was reviewed today with patient. Discussed with patient importance of routine eye exams, foot exams/foot care, blood work (i.e.: A1c, microalbumin, and lipid), dental visits, yearly flu vaccine, and pneumonia vaccine as indicated by PCP. Patient verbalized understanding.     Health Maintenance Topics with due status: Not Due       Topic Last Completion Date    TETANUS VACCINE 09/08/2016    Pneumococcal Vaccine (Highest Risk) 12/07/2017    Cervical Cancer Screening 01/29/2018    Colorectal Cancer Screening 09/04/2019    Mammogram 09/29/2020    Hemoglobin A1c 10/10/2020    Urine Microalbumin 10/11/2020     Health Maintenance Due   Topic Date Due    Lipid Panel  12/06/2019    Eye  Exam  04/09/2020    Foot Exam  05/07/2020    Influenza Vaccine (1) 08/01/2020       Nutrition  Meal Planning: skipping meals(Usual intake ~1800cals/d w/ excess sodium, sat fat (seasoning, processed foods, sausages, hog's head cheese). Since recent hospital stay, pt working to decrease sodium. Pt & dtr in clinic for meal plan resources on diabetes, liver ds, CHF, CKD. )  What type of sweetener do you use?: sugar, honey  What type of beverages do you drink?: regular soda/tea, juice  Meal Plan 24 Hour Recall - Breakfast: none or boost  Meal Plan 24 Hour Recall - Lunch: none  Meal Plan 24 Hour Recall - Dinner: homemade soup OR grilled/bkd chix with lettuce/tomato salad  Meal Plan 24 Hour Recall - Snack: fruit    Monitoring   Self Monitoring : per recall, fst -131, 193; bed 150s  Blood Glucose Logs: No  In the last month, how often have you had a low blood sugar reaction?: never(hypoglycemic unaware)  Can you tell when your blood sugar is too high?: no    Exercise   Exercise Type: none    Social History  Preferred Learning Method: Face to Face  Primary Support: Self, Daughter  Smoking Status: Never a Smoker  Alcohol Use: Never      Barriers to Change  Barriers to Change: None  Learning Challenges : None    Readiness to Learn   Readiness to Learn : Acceptance    Cultural Influences  Cultural Influences: No    Diabetes Education Assessment/Progress  Diabetes Disease Process (diabetes disease process and treatment options): Discussion, Individual Session, Demonstrates Understanding/Competency(verbalizes/demonstrates), Written Materials Provided  Nutrition (Incorporating nutritional management into one's lifestyle): Discussion, Individual Session, Demonstrates Understanding/Competency (verbalizes/demonstrates), Written Materials Provided  Physical Activity (incorporating physical activity into one's lifestyle): Discussion, Individual Session, Demonstrates Understanding/Competency (verbalizes/demonstrates), Written  Materials Provided  Medications (states correct name, dose, onset, peak, duration, side effects & timing of meds): Discussion, Individual Session, Demonstrates Understanding/Competency(verbalizes/demonstrates), Written Materials Provided  Monitoring (monitoring blood glucose/other parameters & using results): Discussion, Individual Session, Demonstrates Understanding/Competency (verbalizes/demonstrates), Written Materials Provided  Acute Complications (preventing, detecting, and treating acute complications): Discussion, Individual Session, Demonstrates Understanding/Competency (verbalizes/demonstrates), Written Materials Provided  Chronic Complications (preventing, detecting, and treating chronic complications): Discussion, Individual Session, Demonstrates Understanding/Competency (verbalizes/demonstrates), Written Materials Provided  Clinical (diabetes, other pertinent medical history, and relevant comorbidities reviewed during visit): Discussion, Individual Session, Demonstrates Understanding/Competency (verbalizes/demonstrates)  Cognitive (knowledge of self-management skills, functional health literacy): Discussion, Individual Session, Demonstrates Understanding/Competency (verbalizes/demonstrates)  Psychosocial (emotional response to diabetes): Discussion, Individual Session, Needs Review  Diabetes Distress and Support Systems: Discussion, Individual Session, Needs Review  Behavioral (readiness for change, lifestyle practices, self-care behaviors): Discussion, Individual Session, Demonstrates Understanding/Competency (verbalizes/demonstrates)    Goals  Patient has selected/evaluated goals during today's session: Yes, selected  Healthy Eating: Set(use meal plan - carb portions/spacing, reduce sodium 1500-2000mg/d (matl's used - foods lists, cooking/recipe resources, seasonings, food label, diabetes mgmt guide))  Start Date: 10/26/20  Target Date: 11/30/20  Monitoring: Set(test BG 2x/d -fst, bed)  Start Date:  10/26/20  Target Date: 11/30/20  Medications: Set(dose diabetes medications as directed)  Start Date: 10/26/20  Target Date: 11/30/20  Problem Solving: Set(to assist w/ dysgeusia, try lemon in water or suck on lemon wedge prior to meal And use biotene mouth products)  Start Date: 10/26/20  Target Date: 11/30/20  Reducing Risks: Set(continue measure daily weights to monitor fluid levels)  Start Date: 10/26/20  Target Date: 11/30/20     Diabetes Care Plan/Intervention  Education Plan/Intervention: Individual Follow-Up DSMT(KARISSA w/ Dr. Perrin for medical mgmt.) RTC ~4wks, prn or as referred.    Diabetes Meal Plan  Restrictions: Low Fat, Low Sodium, Restricted Carbohydrate  Calories: 1400  Carbohydrate Per Meal: 30-45g  Carbohydrate Per Snack : 7-15g   Protein: ~60grams/d (1gram pro/kg Adj IBW)    Today's Self-Management Care Plan was developed with the patient's input and is based on barriers identified during today's assessment.    The long and short-term goals in the care plan were written with the patient/caregiver's input. The patient has agreed to work toward these goals to improve her overall diabetes control.      The patient received a copy of today's self-management plan and verbalized understanding of the care plan, goals, and all of today's instructions.      The patient was encouraged to communicate with her physician and care team regarding her condition(s) and treatment.  I provided the patient with my contact information today and encouraged her to contact me via phone or patient portal as needed.     Education Units of Time   Time Spent: 60 min

## 2020-10-26 NOTE — PROGRESS NOTES
Clinic Follow Up:  [facility] Follow Up Note    Reason for Follow Up:  The primary encounter diagnosis was Hepatic cirrhosis, unspecified hepatic cirrhosis type, unspecified whether ascites present. Diagnoses of Hepatic encephalopathy and CKD (chronic kidney disease) stage 4, GFR 15-29 ml/min were also pertinent to this visit.    PCP: Andrey Perrin       HPI:  This is a 65 y.o. female here for follow up of the above  [facility].  Thoguht to be caused by CHF exacerbation vs HRS.   Creatinine remains elevated.  Has a kidney biospy scheudled later this week  Since her discharge, she has been feeling well.  Swelling has significnatly improved with the change in diuretics per cardiology.   She has been off rifaximin due to insurance issues. Has had no overt confusion off of the medication.  Taking lasctulose as needed based on mental state.   No upper GI bleeding.       Review of Systems   Constitutional: Positive for weight loss (due to diuretics). Negative for chills, fever and malaise/fatigue.   Respiratory: Negative for cough.    Cardiovascular: Negative for chest pain.   Gastrointestinal:        Per HPI   Musculoskeletal: Negative for myalgias.   Skin: Negative for itching and rash.   Neurological: Negative for headaches.   Psychiatric/Behavioral: The patient is not nervous/anxious.        Medical History:  Past Medical History:   Diagnosis Date    Ascites     Breast pain, left 1/4/2018    Cataract     CHF (congestive heart failure)     Cirrhosis     Cough     Diabetes mellitus     Diabetes mellitus, type 2     Gastroparesis     GERD (gastroesophageal reflux disease)     Hyperlipidemia     Hypertension     Hypotension 2/21/2019    Liver disease     Lumbar strain 4/27/2018    Pneumonia of right middle lobe due to infectious organism 12/19/2017    Renal disorder     Retinopathy due to secondary diabetes mellitus     Sleep apnea      Thyroid disease        Surgical History:   Past Surgical History:   Procedure Laterality Date    CATARACT EXTRACTION      CHOLECYSTECTOMY      COLONOSCOPY N/A 9/4/2019    Procedure: COLONOSCOPY;  Surgeon: Marnie Elmore MD;  Location: [facility] ENDO;  Service: Endoscopy;  Laterality: N/A;    ERCP      FLUOROSCOPY N/A 7/24/2020    Procedure: TIPS revision;  Surgeon: Martell Jasmine MD;  Location: [facility] CATH LAB;  Service: General;  Laterality: N/A;    LIVER BIOPSY      THORACENTESIS Left 1/20/2020    Procedure: Thoracentesis;  Surgeon: Angelica Hunter MD;  Location: [facility] ENDO;  Service: Pulmonary;  Laterality: Left;    TUBAL LIGATION      UPPER GASTROINTESTINAL ENDOSCOPY         Family History:   Family History   Problem Relation Age of Onset    Cancer Mother     Breast cancer Mother     Heart disease Father     Aneurysm Sister     Heart disease Brother     No Known Problems Maternal Grandmother     Cancer Maternal Grandfather     Sarcoidosis Sister        Social History:   Social History     Tobacco Use    Smoking status: Never Smoker    Smokeless tobacco: Never Used   Substance Use Topics    Alcohol use: No     Frequency: Never    Drug use: No       Allergies: Reviewed    Home Medications:  Current Outpatient Medications on File Prior to Visit   Medication Sig Dispense Refill    amLODIPine (NORVASC) 10 MG tablet Take 10 mg by mouth once daily.      bumetanide (BUMEX) 2 MG tablet Take 1 tablet (2 mg total) by mouth 2 (two) times daily. 60 tablet 11    doxazosin (CARDURA) 2 MG tablet Take 1 tablet (2 mg total) by mouth every evening. 90 tablet 1    famotidine (PEPCID) 20 MG tablet Take 1 tablet (20 mg total) by mouth once daily. 30 tablet 11    insulin (LANTUS SOLOSTAR U-100 INSULIN) glargine 100 units/mL (3mL) SubQ pen INJECT 20 UNITS INTO THE SKIN EVERY EVENING.  TITRATE UP TO 30 UNITS NIGHTLY AS DIRECTED ON INSTRUCTION SHEET 15 mL 6    isosorbide mononitrate (IMDUR) 30 MG 24 hr tablet  "Take 1 tablet (30 mg total) by mouth once daily. 30 tablet 11    lactulose (CEPHULAC) 20 gram Pack Take 1 packet (20 g total) by mouth 2 (two) times daily. 60 packet 2    levothyroxine (SYNTHROID) 137 MCG Tab tablet Take 1 tablet (137 mcg total) by mouth before breakfast. 90 tablet 1    liraglutide 0.6 mg/0.1 mL, 18 mg/3 mL, subq PNIJ (VICTOZA 2-SHAYY) 0.6 mg/0.1 mL (18 mg/3 mL) PnIj Inject 1.8 mg into the skin once daily. 30 mL 0    magnesium oxide (MAG-OX) 400 mg (241.3 mg magnesium) tablet Take 1 tablet (400 mg total) by mouth once daily. 90 tablet 1    metOLazone (ZAROXOLYN) 5 MG tablet Take 1 tablet (5 mg total) by mouth once daily. Take daily as needed and then every other day for swelling, 30 min before lasix 30 tablet 3    potassium chloride SA (K-DUR,KLOR-CON) 20 MEQ tablet Take 1 tablet (20 mEq total) by mouth once daily. 30 tablet 3    senna-docusate 8.6-50 mg (PERICOLACE) 8.6-50 mg per tablet Take 2 tablets by mouth once daily. 60 tablet 1    aspirin (ECOTRIN) 81 MG EC tablet Take 1 tablet (81 mg total) by mouth once daily. 365 tablet 0    glucagon, human recombinant, (GLUCAGON EMERGENCY KIT, HUMAN,) 1 mg SolR Inject 1 mg into the muscle as needed. 1 each 5    ondansetron (ZOFRAN) 4 MG tablet TAKE 1 TABLET BY MOUTH EVERY 8 HOURS AS NEEDED (Patient not taking: Reported on 10/26/2020) 30 tablet 0    ondansetron (ZOFRAN-ODT) 4 MG TbDL Take 1 tablet (4 mg total) by mouth every 8 (eight) hours as needed. (Patient not taking: Reported on 10/26/2020) 30 tablet 1    [DISCONTINUED] tiZANidine (ZANAFLEX) 4 MG tablet TAKE 1 TABLET BY MOUTH EVERY 8 HOURS FOR  20  DAYS 60 tablet 0     No current facility-administered medications on file prior to visit.        Physical Exam:  Vital Signs:  BP (!) 120/58   Pulse 78   Ht 5' 1" (1.549 m)   Wt 98.1 kg (216 lb 4.3 oz)   LMP  (LMP Unknown)   BMI 40.86 kg/m²   Body mass index is 40.86 kg/m².  Physical Exam   Constitutional: She is oriented to person, place, " and time. She appears well-developed and well-nourished.   HENT:   Head: Normocephalic.   Eyes: No scleral icterus.   Neck: Normal range of motion.   Cardiovascular: Normal rate.   Pulmonary/Chest: Effort normal.   Abdominal: She exhibits no distension.   Musculoskeletal: Normal range of motion.         General: Edema (BLE, mild) present.   Neurological: She is alert and oriented to person, place, and time.   Skin: Skin is dry.   Psychiatric: She has a normal mood and affect.   Vitals reviewed.      Labs: Pertinent labs reviewed.    MELD-Na score: 16 at 10/14/2020  5:42 AM  MELD score: 16 at 10/14/2020  5:42 AM  Calculated from:  Serum Creatinine: 2.7 mg/dL at 10/14/2020  5:42 AM  Serum Sodium: 144 mmol/L (Rounded to 137 mmol/L) at 10/14/2020  5:42 AM  Total Bilirubin: 0.2 mg/dL (Rounded to 1 mg/dL) at 10/13/2020 10:13 AM  INR(ratio): 1.0 at 10/13/2020 10:14 AM  Age: 65 years 7 months   EV: due for EV screening, has not been done due to COVID concerns.   HCC: US 10/2020 without lesion or mass, improved velocities in TIPS    Assessment:  1. Hepatic cirrhosis, unspecified hepatic cirrhosis type, unspecified whether ascites present    2. Hepatic encephalopathy    3. CKD (chronic kidney disease) stage 4, GFR 15-29 ml/min        Recommendations:  Stable from liver perspective  MELD is elevated related to elevated creatinine.   Continue current medications  Continue with POC per nephrology for biopsy.   Will have her see Dr. Davila after biopsy completed to discuss possible transplant evaluation.  Re-start rifaximin.  Prescription sent to [facility] for PA assistance.          Return to Clinic:    3 months, sooner with Dr. Davila depending on result of kidney biopsy.

## 2020-10-26 NOTE — LETTER
October 26, 2020        Andrey Perrin MD  [address] [place] [address]             [facility]  [address] [address] [place] [address]  Phone: [phone]  Fax: [phone]   Patient: Diamond Das   MR Number: [id number]   YOB: 1955   Date of Visit: 10/26/2020       Dear Dr. Perrin:    Thank you for referring Diamond Das to me for evaluation. Below are the relevant portions of my assessment and plan of care.    If you have questions, please do not hesitate to call me. I look forward to following Diamond along with you.    Sincerely,      Nolvia Mcneil RD, CDE           CC  Jonathan Hernandez MD

## 2020-10-26 NOTE — LETTER
October 26, 2020        Andrey Perrin MD  [address] [place] [address]             [facility]  [address]  [place] [address]  Phone: [phone]  Fax: [phone]   Patient: Diamond Das   MR Number: [id number]   YOB: 1955   Date of Visit: 10/26/2020     Dear Dr. Perrin,     {WILDCARD:[id number]}    Sincerely,      Nolvia Mcneil, VANE, CDE            CC  No Recipients    Enclosure

## 2020-10-28 ENCOUNTER — TELEPHONE (OUTPATIENT)
Dept: RADIOLOGY | Facility: HOSPITAL | Age: 65
End: 2020-10-28

## 2020-10-28 NOTE — TELEPHONE ENCOUNTER
Interventional radiology:    Pre procedure call complete, pt to be at hospital at 8 am, npo p mn x sips of water with meds, must have ride.  All questions answered, pt verbalized understanding of instructions.

## 2020-10-29 ENCOUNTER — HOSPITAL ENCOUNTER (OUTPATIENT)
Dept: RADIOLOGY | Facility: HOSPITAL | Age: 65
Discharge: HOME | End: 2020-10-29
Attending: INTERNAL MEDICINE
Payer: MEDICAID

## 2020-10-29 VITALS
HEART RATE: 72 BPM | WEIGHT: 213 LBS | HEIGHT: 61 IN | TEMPERATURE: 100 F | OXYGEN SATURATION: 99 % | SYSTOLIC BLOOD PRESSURE: 139 MMHG | DIASTOLIC BLOOD PRESSURE: 65 MMHG | RESPIRATION RATE: 16 BRPM | BODY MASS INDEX: 40.22 KG/M2

## 2020-10-29 LAB — SARS-COV-2 RDRP RESP QL NAA+PROBE: NEGATIVE

## 2020-10-29 PROCEDURE — 25000003 PHARM REV CODE 250: Performed by: RADIOLOGY

## 2020-10-29 PROCEDURE — 77012 CT SCAN FOR NEEDLE BIOPSY: CPT | Mod: TC

## 2020-10-29 PROCEDURE — 63600175 PHARM REV CODE 636 W HCPCS: Performed by: RADIOLOGY

## 2020-10-29 PROCEDURE — U0002 COVID-19 LAB TEST NON-CDC: HCPCS

## 2020-10-29 RX ORDER — HYDROMORPHONE HYDROCHLORIDE 1 MG/ML
INJECTION, SOLUTION INTRAMUSCULAR; INTRAVENOUS; SUBCUTANEOUS CODE/TRAUMA/SEDATION MEDICATION
Status: COMPLETED | OUTPATIENT
Start: 2020-10-29 | End: 2020-10-29

## 2020-10-29 RX ADMIN — HYDROMORPHONE HYDROCHLORIDE 0.5 MG: 1 INJECTION, SOLUTION INTRAMUSCULAR; INTRAVENOUS; SUBCUTANEOUS at 09:10

## 2020-10-29 RX ADMIN — GELATIN ABSORBABLE SPONGE SIZE 100 1 APPLICATOR: MISC at 10:10

## 2020-10-29 NOTE — DISCHARGE INSTRUCTIONS
Recovery After Procedural Sedation (Adult)   You have been given medicine by vein to make you sleep during your surgery. This may have included both a pain medicine and sleeping medicine. Most of the effects have worn off. But you may still have some drowsiness for the next 6 to 8 hours.  Home care  Follow these guidelines when you get home:  · For the next 8 hours, you should be watched by a responsible adult. This person should make sure your condition is not getting worse.  · Don't drink any alcohol for the next 24 hours.  · Don't drive, operate dangerous machinery, or make important business or personal decisions during the next 24 hours.  · To prevent injury or falls, use caution when standing and walking for at least 24 hours after your procedure.  Note: Your healthcare provider may tell you not to take any medicine by mouth for pain or sleep in the next 4 hours. These medicines may react with the medicines you were given in the hospital. This could cause a much stronger response than usual.  Follow-up care  Follow up with your healthcare provider if you are not alert and back to your usual level of activity within 12 hours.  When to seek medical advice  Call your healthcare provider right away if any of these occur:  · Drowsiness gets worse  · Weakness or dizziness gets worse  · Repeated vomiting  · You can't be awakened  · Fever  · New rash  [employer]Well last reviewed this educational content on 9/1/2019  © 4589-1248 The "[employer]. [address] [place] [address]. All rights reserved. This information is not intended as a substitute for professional medical care. Always follow your healthcare professional's instructions.        Discharge Instructions for Kidney Biopsy  You had a procedure called a kidney biopsy. Your healthcare provider used a special needle to remove a small piece of tissue from your kidney to examine it for signs of damage and disease. A kidney biopsy is ordered after  other tests have shown that there may be a problem with your kidney. Kidney biopsies are also performed when kidney disease is suspected and to rule out cancer.  Home care  · Rest for 24 hours to 48 hours. Get up only to use the bathroom.  · Dont drive for 24 hours to 48 hours after the procedure.  · Dont shower for 24 hours after the biopsy. If you wish, you may wash yourself with a sponge or washcloth. When you are able to shower, dont scrub the site. Gently wash the area and pat it dry.  · Remove the bandage covering the biopsy site 24 hours to 48 hours after the procedure.  · Dont lift anything heavier than 10 pounds for 3 days to 4 days after the procedure.  · Ask your healthcare provider when you can return to work. Be sure to tell your healthcare provider if your job involves heavy lifting.  · If you normally take blood thinner medicines (anticoagulants or antiplatelet medicines) and you stopped taking them a few days before your procedure, ask your healthcare provider when to start taking them again.  When to seek medical care  Call your healthcare provider right away if you have any of the following:  · Blood in your urine  · Exhaustion or extreme weakness  · Dizziness or lightheadedness  · Sudden or increased shortness of breath  · Sudden chest pain  · Fever of 100.4°F (38°C) or higher, or chills  · Increasing redness, tenderness, or swelling at the biopsy site  · Opening of or drainage or bleeding from the biopsy site  · Increasing pain, with or without activity   Date Last Reviewed: 2/1/2017  © 6106-6438 [employer]. [address] [place] [address], [place] [address]. All rights reserved. This information is not intended as a substitute for professional medical care. Always follow your healthcare professional's instructions.

## 2020-10-29 NOTE — SEDATION DOCUMENTATION
Procedure complete, pt tolerated well.  Vss. Band aid placed to R back puncture site, site WDL.  Pt transferred to [facility] and transported to radiology recovery.  Pt awake and alert and able to follow all commands.  Denied pain or discomfort at this time.

## 2020-10-29 NOTE — DISCHARGE SUMMARY
Pre Op Diagnosis: Nephrotic syndrome     Post Op Diagnosis: same     Procedure:  Renal core biopsy     Procedure performed by: Mitali ARCHER, Ruth SMITH     Written Informed Consent Obtained: Yes     Specimen Removed:  yes     Estimated Blood Loss:  minimal     Findings: Local anesthesia and moderate sedation were used.     The patient tolerated the procedure well and there were no complications.      Sterile technique was performed in the right flank, lidocaine was used as a local anesthetic.  Multiple samples taken from the right renal cortex.  Pt tolerated the procedure well without immediate complications.  Please see radiologist report for details. F/u with PCP and/or ordering physician.

## 2020-10-29 NOTE — PLAN OF CARE
Pt d/c home in stable condition via wheelchair with daughter.  Verbalized understanding of d/c instructions, handout gien.  Pt voiced no complaints at this time. Encouraged to f/up with ordering provider.

## 2020-10-29 NOTE — SEDATION DOCUMENTATION
Pt in ct on table prone with bilateral arms above head, cm in place, vss. Pt verbalized understanding of procedure. Daughter in hospital waiting area.

## 2020-10-30 ENCOUNTER — TELEPHONE (OUTPATIENT)
Dept: PHARMACY | Facility: CLINIC | Age: 65
End: 2020-10-30

## 2020-10-30 NOTE — TELEPHONE ENCOUNTER
Good afternoon,     I am reaching out from [facility] in regards to the prescription, Xifaxan 550mg, that required a prior authorization with the patient's insurance. The prior authorization was submitted and APPROVED through 10/30/2021. The patient has been notified.     Please let me know if you have any questions or concerns. Thank you for your time!    Amelie Oneil, PharmD  [facility]

## 2020-11-03 ENCOUNTER — TELEPHONE (OUTPATIENT)
Dept: INTERNAL MEDICINE | Facility: CLINIC | Age: 65
End: 2020-11-03

## 2020-11-03 NOTE — TELEPHONE ENCOUNTER
----- Message from Janie Church sent at 11/3/2020  2:31 PM CST -----  Regarding: clarity  Good evening,      Pt daughter called to see if it is ok for pt to have over the counter gas x or beano. She can be reached at [phone]([place])          Thanks,  Janie Church

## 2020-11-05 ENCOUNTER — LAB VISIT (OUTPATIENT)
Dept: LAB | Facility: HOSPITAL | Age: 65
End: 2020-11-05
Attending: INTERNAL MEDICINE
Payer: MEDICAID

## 2020-11-05 ENCOUNTER — OFFICE VISIT (OUTPATIENT)
Dept: NEPHROLOGY | Facility: CLINIC | Age: 65
End: 2020-11-05
Payer: MEDICAID

## 2020-11-05 VITALS
HEIGHT: 61 IN | WEIGHT: 201 LBS | DIASTOLIC BLOOD PRESSURE: 70 MMHG | HEART RATE: 74 BPM | SYSTOLIC BLOOD PRESSURE: 142 MMHG | BODY MASS INDEX: 37.95 KG/M2

## 2020-11-05 DIAGNOSIS — N17.9 AKI (ACUTE KIDNEY INJURY): ICD-10-CM

## 2020-11-05 DIAGNOSIS — R80.9 PROTEINURIA DUE TO TYPE 2 DIABETES MELLITUS: ICD-10-CM

## 2020-11-05 DIAGNOSIS — E11.29 PROTEINURIA DUE TO TYPE 2 DIABETES MELLITUS: ICD-10-CM

## 2020-11-05 DIAGNOSIS — N17.9 AKI (ACUTE KIDNEY INJURY): Primary | ICD-10-CM

## 2020-11-05 DIAGNOSIS — N18.4 CKD (CHRONIC KIDNEY DISEASE) STAGE 4, GFR 15-29 ML/MIN: ICD-10-CM

## 2020-11-05 DIAGNOSIS — N04.9 ANASARCA ASSOCIATED WITH DISORDER OF KIDNEY: ICD-10-CM

## 2020-11-05 LAB
ALBUMIN SERPL BCP-MCNC: 1.9 G/DL (ref 3.5–5.2)
ANION GAP SERPL CALC-SCNC: 9 MMOL/L (ref 8–16)
BUN SERPL-MCNC: 21 MG/DL (ref 8–23)
CHLORIDE SERPL-SCNC: 104 MMOL/L (ref 95–110)
CO2 SERPL-SCNC: 29 MMOL/L (ref 23–29)
CREAT SERPL-MCNC: 3 MG/DL (ref 0.5–1.4)
EST. GFR  (AFRICAN AMERICAN): 18 ML/MIN/1.73 M^2
EST. GFR  (NON AFRICAN AMERICAN): 16 ML/MIN/1.73 M^2
GLUCOSE SERPL-MCNC: 88 MG/DL (ref 70–110)
PHOSPHATE SERPL-MCNC: 4 MG/DL (ref 2.7–4.5)
POTASSIUM SERPL-SCNC: 4.8 MMOL/L (ref 3.5–5.1)
SODIUM SERPL-SCNC: 142 MMOL/L (ref 136–145)

## 2020-11-05 PROCEDURE — 36415 COLL VENOUS BLD VENIPUNCTURE: CPT

## 2020-11-05 PROCEDURE — 99214 OFFICE O/P EST MOD 30 MIN: CPT | Mod: PBBFAC | Performed by: INTERNAL MEDICINE

## 2020-11-05 PROCEDURE — 99215 PR OFFICE/OUTPT VISIT, EST, LEVL V, 40-54 MIN: ICD-10-PCS | Mod: S$PBB,,, | Performed by: INTERNAL MEDICINE

## 2020-11-05 PROCEDURE — 99215 OFFICE O/P EST HI 40 MIN: CPT | Mod: S$PBB,,, | Performed by: INTERNAL MEDICINE

## 2020-11-05 PROCEDURE — 99999 PR PBB SHADOW E&M-EST. PATIENT-LVL IV: CPT | Mod: PBBFAC,,, | Performed by: INTERNAL MEDICINE

## 2020-11-05 PROCEDURE — 99999 PR PBB SHADOW E&M-EST. PATIENT-LVL IV: ICD-10-PCS | Mod: PBBFAC,,, | Performed by: INTERNAL MEDICINE

## 2020-11-05 PROCEDURE — 80069 RENAL FUNCTION PANEL: CPT

## 2020-11-05 NOTE — PROGRESS NOTES
Subjective:       Patient ID: Diamond Das is a 65 y.o. Black or  female who presents for follow-up evaluation of Acute Renal Failure, Chronic Kidney Disease, and Edema    HPI     Patient is a 65-year-old female with history of type 2 diabetes hypertension and morbid obesity.  Patient also has history of chronic kidney disease with baseline creatinine ranging between 1.8 in 2.0.  Patient was last seen in the nephrology clinic by Dr. Contreras in 2018.  Patient also has stage IV cirrhosis of liver biopsy proven.  Patient also underwent TIPS procedure 3 years ago.  Since then patient has been off and on deteriorating.  Patient has a last echo done 2 days ago which showed EF of 65%.  Patient has had multiple episodes of worsening creatinine.  Patient currently is in the hospital with an albumin of 1.2 with generalized anasarca and failure to diurese on IV diuretics.     October 2020  Patient seen and examined at the bedside. 10/13/2020 nephrotic range proteinuria.  Workup for autoimmune disorder.  May require kidney biopsy.  IV albumin PICC line plans noted.  TIPS is still patent.10/14/2020 autoimmune workup -aaron .  Kidney biopsy scheduled for next week.  Renal anasarca entertained.  Follow-up planned     11/2020 kidney biopsy consistent with diabetic nephropathy and hypertensive nephropathy.  No autoimmune disorder detected.  Nephrotic range proteinuria most likely due to diabetic nephropathy.  In the hospital patient's weight was recorded as 111 kg.  Today on follow-up is 91 kg    Review of Systems   Constitutional: Negative for activity change, appetite change, chills, diaphoresis, fatigue, fever and unexpected weight change.   HENT: Negative for congestion, dental problem, drooling, postnasal drip, rhinorrhea and voice change.    Eyes: Negative for discharge.   Respiratory: Negative for apnea, cough, choking, chest tightness, shortness of breath, wheezing and stridor.    Cardiovascular: Negative  "for chest pain, palpitations and leg swelling.   Gastrointestinal: Negative for abdominal distention, blood in stool, constipation, diarrhea, nausea, rectal pain and vomiting.   Endocrine: Negative for cold intolerance, heat intolerance, polydipsia and polyuria.   Genitourinary: Negative for decreased urine volume, difficulty urinating, dysuria, enuresis, flank pain, frequency, hematuria and urgency.   Musculoskeletal: Negative for arthralgias, back pain, gait problem and joint swelling.   Skin: Negative for rash.   Allergic/Immunologic: Negative for food allergies and immunocompromised state.   Neurological: Negative for dizziness, tremors, syncope, numbness and headaches.   Hematological: Does not bruise/bleed easily.   Psychiatric/Behavioral: Negative for agitation, behavioral problems and self-injury. The patient is not nervous/anxious and is not hyperactive.    All other systems reviewed and are negative.      Objective:   BP (!) 142/70   Pulse 74   Ht 5' 1" (1.549 m)   Wt 91.2 kg (201 lb)   LMP  (LMP Unknown)   BMI 37.98 kg/m²      Physical Exam  Vitals signs and nursing note reviewed. Exam conducted with a chaperone present.   Constitutional:       Appearance: She is well-developed.   HENT:      Head: Normocephalic and atraumatic.   Eyes:      Conjunctiva/sclera: Conjunctivae normal.      Pupils: Pupils are equal, round, and reactive to light.   Neck:      Musculoskeletal: Full passive range of motion without pain, normal range of motion and neck supple. No edema.      Thyroid: No thyroid mass or thyromegaly.      Vascular: No carotid bruit.   Cardiovascular:      Rate and Rhythm: Normal rate and regular rhythm.  No extrasystoles are present.     Chest Wall: PMI is not displaced.      Pulses: Normal pulses.      Heart sounds: Normal heart sounds, S1 normal and S2 normal. No murmur. No friction rub.      Comments: RV heave loud P2   Pulmonary:      Effort: Pulmonary effort is normal. No accessory muscle " usage or respiratory distress.      Breath sounds: Normal breath sounds. No wheezing or rales.   Chest:      Chest wall: No tenderness.   Abdominal:      General: Bowel sounds are normal. There is no distension.      Palpations: Abdomen is soft. There is no mass.      Tenderness: There is no abdominal tenderness. There is no rebound.      Hernia: No hernia is present.   Musculoskeletal: Normal range of motion.         General: No tenderness.   Skin:     General: Skin is warm and dry.      Coloration: Skin is not pale.      Findings: No bruising, ecchymosis, erythema or rash.      Nails: There is no clubbing.     Neurological:      Mental Status: She is alert and oriented to person, place, and time.      Cranial Nerves: No cranial nerve deficit.      Sensory: No sensory deficit.      Motor: No abnormal muscle tone.      Coordination: Coordination normal.      Deep Tendon Reflexes: Reflexes are normal and symmetric. Reflexes normal.   Psychiatric:         Speech: Speech normal.         Behavior: Behavior normal.         Thought Content: Thought content normal.         Judgment: Judgment normal.           Lab Results   Component Value Date    CREATININE 2.7 (H) 10/14/2020    BUN 42 (H) 10/14/2020     10/14/2020    K 3.7 10/14/2020     10/14/2020    CO2 29 10/14/2020     Lab Results   Component Value Date    WBC 5.78 10/09/2020    HGB 10.7 (L) 10/09/2020    HCT 34.3 (L) 10/09/2020    MCV 92 10/09/2020     10/09/2020     Lab Results   Component Value Date    .0 (H) 06/19/2018    CALCIUM 7.5 (L) 10/14/2020    PHOS 4.7 (H) 01/04/2019     @RESUFAST(URICACID)    Assessment:    )    1. ROD (acute kidney injury)    2. CKD (chronic kidney disease) stage 4, GFR 15-29 ml/min    3. Proteinuria due to type 2 diabetes mellitus    4. Anasarca associated with disorder of kidney        Plan:         1.  Acute kidney injury mild ATN on kidney biopsy.  Conservative management.  Repeat labs today    2.  Diabetic  nephropathy with chronic kidney disease stage 4 with nephrotic range proteinuria.  Prognosis seems to be poor.  Patient may have to consider renal replacement therapy at some point    3.  Anasarca:  Likely from combination of liver and renal etiology.  From the renal standpoint I am happy to arrange for ultrafiltration if diuretics fail. Bumex 2 mg + zaroxolyn 5 mg recheck labs today and in 4 weeks     Follow-up 4-8 weeks in Nephrology Clinic---Dr. Contreras

## 2020-11-06 LAB — FINAL PATHOLOGIC DIAGNOSIS: NORMAL

## 2020-11-09 ENCOUNTER — TELEPHONE (OUTPATIENT)
Dept: INTERNAL MEDICINE | Facility: CLINIC | Age: 65
End: 2020-11-09

## 2020-11-09 NOTE — TELEPHONE ENCOUNTER
----- Message from Sheyla Radford sent at 11/9/2020  3:25 PM CST -----  Contact: Diamond Fields would like a call back at [phone], Regards to getting her appointment for any day after 2pm the next open I had with Dr. Perrin was on 12/30/2020@ 9:20 but she needs something later in the day.    Thanks  Td

## 2020-11-12 ENCOUNTER — TELEPHONE (OUTPATIENT)
Dept: INTERNAL MEDICINE | Facility: CLINIC | Age: 65
End: 2020-11-12

## 2020-11-12 NOTE — TELEPHONE ENCOUNTER
Attempted to call patient regarding appt time. No answer, left detailed message informing her that we can work her in around that time.       ----- Message from Federica Nicolas sent at 11/12/2020 11:41 AM CST -----  Please call pt @ [phone], pt states she been calling since Monday and no one return her call, pt states she have appt tomorrow,would like to come in around 2:30, pt is medicaid

## 2020-11-12 NOTE — TELEPHONE ENCOUNTER
Spoke with patient regarding message.       ----- Message from Sonia Flanagan sent at 11/12/2020 11:52 AM CST -----  Type:  Needs Medical Advice    Who Called: ALISA GIRARD  Symptoms (please be specific):    How long has patient had these symptoms:    Pharmacy name and phone #:    Would the patient rather a call back or a response via [facility]? Call   Best Call Back Number: [phone] (home) [phone] (work)  Additional Information:

## 2020-11-13 ENCOUNTER — OFFICE VISIT (OUTPATIENT)
Dept: PODIATRY | Facility: CLINIC | Age: 65
End: 2020-11-13
Payer: MEDICAID

## 2020-11-13 ENCOUNTER — OFFICE VISIT (OUTPATIENT)
Dept: INTERNAL MEDICINE | Facility: CLINIC | Age: 65
End: 2020-11-13
Payer: MEDICAID

## 2020-11-13 VITALS
WEIGHT: 189.13 LBS | HEIGHT: 61 IN | HEIGHT: 61 IN | WEIGHT: 189.13 LBS | HEART RATE: 82 BPM | BODY MASS INDEX: 35.71 KG/M2 | SYSTOLIC BLOOD PRESSURE: 138 MMHG | TEMPERATURE: 97 F | BODY MASS INDEX: 35.71 KG/M2 | DIASTOLIC BLOOD PRESSURE: 70 MMHG

## 2020-11-13 DIAGNOSIS — L85.3 XEROSIS CUTIS: ICD-10-CM

## 2020-11-13 DIAGNOSIS — B35.1 ONYCHOMYCOSIS: ICD-10-CM

## 2020-11-13 DIAGNOSIS — N18.4 CKD (CHRONIC KIDNEY DISEASE) STAGE 4, GFR 15-29 ML/MIN: ICD-10-CM

## 2020-11-13 DIAGNOSIS — E66.01 MORBID OBESITY DUE TO EXCESS CALORIES: ICD-10-CM

## 2020-11-13 PROCEDURE — 99215 OFFICE O/P EST HI 40 MIN: CPT | Mod: PBBFAC,25,27,PO | Performed by: FAMILY MEDICINE

## 2020-11-13 PROCEDURE — 99213 PR OFFICE/OUTPT VISIT, EST, LEVL III, 20-29 MIN: ICD-10-PCS | Mod: 25,S$PBB,, | Performed by: PODIATRIST

## 2020-11-13 PROCEDURE — 99213 OFFICE O/P EST LOW 20 MIN: CPT | Mod: 25,S$PBB,, | Performed by: PODIATRIST

## 2020-11-13 PROCEDURE — 99999 PR PBB SHADOW E&M-EST. PATIENT-LVL IV: CPT | Mod: PBBFAC,,, | Performed by: PODIATRIST

## 2020-11-13 PROCEDURE — 99214 OFFICE O/P EST MOD 30 MIN: CPT | Mod: S$PBB,,, | Performed by: FAMILY MEDICINE

## 2020-11-13 PROCEDURE — 99214 PR OFFICE/OUTPT VISIT, EST, LEVL IV, 30-39 MIN: ICD-10-PCS | Mod: S$PBB,,, | Performed by: FAMILY MEDICINE

## 2020-11-13 PROCEDURE — 99214 OFFICE O/P EST MOD 30 MIN: CPT | Mod: PBBFAC,PO | Performed by: PODIATRIST

## 2020-11-13 PROCEDURE — 11721 DEBRIDE NAIL 6 OR MORE: CPT | Mod: Q9,PBBFAC,PO | Performed by: PODIATRIST

## 2020-11-13 PROCEDURE — 99999 PR PBB SHADOW E&M-EST. PATIENT-LVL V: CPT | Mod: PBBFAC,,, | Performed by: FAMILY MEDICINE

## 2020-11-13 PROCEDURE — 99999 PR PBB SHADOW E&M-EST. PATIENT-LVL IV: ICD-10-PCS | Mod: PBBFAC,,, | Performed by: PODIATRIST

## 2020-11-13 PROCEDURE — 90686 IIV4 VACC NO PRSV 0.5 ML IM: CPT | Mod: PBBFAC,PO

## 2020-11-13 PROCEDURE — 11721 PR DEBRIDEMENT OF NAILS, 6 OR MORE: ICD-10-PCS | Mod: S$PBB,,, | Performed by: PODIATRIST

## 2020-11-13 PROCEDURE — 99999 PR PBB SHADOW E&M-EST. PATIENT-LVL V: ICD-10-PCS | Mod: PBBFAC,,, | Performed by: FAMILY MEDICINE

## 2020-11-13 PROCEDURE — 11721 DEBRIDE NAIL 6 OR MORE: CPT | Mod: S$PBB,,, | Performed by: PODIATRIST

## 2020-11-13 NOTE — PROGRESS NOTES
Subjective:       Patient ID: Diamond Das is a 65 y.o. female.    Chief Complaint: Diabetic Foot Exam (Pt presents for foot exam. Pt states no pain at present. Pt c/o numbness and tingling of feet. )      HPI: Diamond Das presents to the office today, under referral by their Primary Care Provider, Andrey Perrin MD, for her annual diabetic foot assessment and risk evaluation.  Patient is a DMII. Patient states history of neuropathy and kidney pathology. This patient last saw his/her primary care provider on 11/13/2020.  Reports 0/10 pain but does have numbness and tingling to the right left foot.  Currently presents today clinic with her grandson present.      Hemoglobin A1C   Date Value Ref Range Status   10/10/2020 5.9 (H) 4.0 - 5.6 % Final     Comment:     ADA Screening Guidelines:  5.7-6.4%  Consistent with prediabetes  >or=6.5%  Consistent with diabetes  High levels of fetal hemoglobin interfere with the HbA1C  assay. Heterozygous hemoglobin variants (HbS, HgC, etc)do  not significantly interfere with this assay.   However, presence of multiple variants may affect accuracy.     05/21/2020 6.8 (H) 4.0 - 5.6 % Final     Comment:     ADA Screening Guidelines:  5.7-6.4%  Consistent with prediabetes  >or=6.5%  Consistent with diabetes  High levels of fetal hemoglobin interfere with the HbA1C  assay. Heterozygous hemoglobin variants (HbS, HgC, etc)do  not significantly interfere with this assay.   However, presence of multiple variants may affect accuracy.     02/10/2020 6.9 (H) 4.0 - 5.6 % Final     Comment:     ADA Screening Guidelines:  5.7-6.4%  Consistent with prediabetes  >or=6.5%  Consistent with diabetes  High levels of fetal hemoglobin interfere with the HbA1C  assay. Heterozygous hemoglobin variants (HbS, HgC, etc)do  not significantly interfere with this assay.   However, presence of multiple variants may affect accuracy.     .    Review of patient's allergies indicates:   Allergen Reactions     Subsys [fentanyl] Other (See Comments)     After administration pt unresponsive.  HR and respirations decreased.     Versed [midazolam] Other (See Comments)     After administration pt unresponsive.  HR and respirations decreased.     Ampicillin Rash    Codeine Nausea And Vomiting and Nausea Only       Past Medical History:   Diagnosis Date    Ascites     Breast pain, left 1/4/2018    Cataract     CHF (congestive heart failure)     Cirrhosis     Cough     Diabetes mellitus     Diabetes mellitus, type 2     Gastroparesis     GERD (gastroesophageal reflux disease)     Hyperlipidemia     Hypertension     Hypotension 2/21/2019    Liver disease     Lumbar strain 4/27/2018    Pneumonia of right middle lobe due to infectious organism 12/19/2017    Renal disorder     Retinopathy due to secondary diabetes mellitus     Sleep apnea     Thyroid disease        Family History   Problem Relation Age of Onset    Cancer Mother     Breast cancer Mother     Heart disease Father     Aneurysm Sister     Heart disease Brother     No Known Problems Maternal Grandmother     Cancer Maternal Grandfather     Sarcoidosis Sister        Social History     Socioeconomic History    Marital status:      Spouse name: Not on file    Number of children: Not on file    Years of education: Not on file    Highest education level: Not on file   Occupational History    Not on file   Social Needs    Financial resource strain: Not on file    Food insecurity     Worry: Not on file     Inability: Not on file    Transportation needs     Medical: Not on file     Non-medical: Not on file   Tobacco Use    Smoking status: Never Smoker    Smokeless tobacco: Never Used   Substance and Sexual Activity    Alcohol use: No     Frequency: Never    Drug use: No    Sexual activity: Never   Lifestyle    Physical activity     Days per week: Not on file     Minutes per session: Not on file    Stress: Not at all  "  Relationships    Social connections     Talks on phone: Not on file     Gets together: Not on file     Attends Judaism service: Not on file     Active member of club or organization: Not on file     Attends meetings of clubs or organizations: Not on file     Relationship status: Not on file   Other Topics Concern    Not on file   Social History Narrative    Not on file       Past Surgical History:   Procedure Laterality Date    CATARACT EXTRACTION      CHOLECYSTECTOMY      COLONOSCOPY N/A 9/4/2019    Procedure: COLONOSCOPY;  Surgeon: Marnie Elmore MD;  Location: [facility] ENDO;  Service: Endoscopy;  Laterality: N/A;    ERCP      FLUOROSCOPY N/A 7/24/2020    Procedure: TIPS revision;  Surgeon: Martell Jasmine MD;  Location: [facility] CATH LAB;  Service: General;  Laterality: N/A;    LIVER BIOPSY      THORACENTESIS Left 1/20/2020    Procedure: Thoracentesis;  Surgeon: Angelica Hunter MD;  Location: [facility] ENDO;  Service: Pulmonary;  Laterality: Left;    TUBAL LIGATION      UPPER GASTROINTESTINAL ENDOSCOPY         Review of Systems   Constitutional: Negative for activity change, appetite change, chills and fever.   HENT: Negative for sinus pain, sore throat and voice change.    Eyes: Negative for pain, redness and visual disturbance.   Respiratory: Negative for cough and shortness of breath.    Cardiovascular: Negative for chest pain and palpitations.   Gastrointestinal: Negative for diarrhea, nausea and vomiting.   Musculoskeletal: Positive for gait problem. Negative for back pain and joint swelling.   Skin: Negative for color change and wound.   Neurological: Positive for numbness. Negative for dizziness and weakness.   Psychiatric/Behavioral: The patient is not nervous/anxious.          Objective:   Ht 5' 1" (1.549 m)   Wt 85.8 kg (189 lb 2.5 oz)   LMP  (LMP Unknown)   BMI 35.74 kg/m²     Physical Exam  LOWER EXTREMITY PHYSICAL EXAMINATION    ORTHOPEDIC:  No pain on palpation of the foot or ankle.   " Range of motion within normal limits of the ankle joint, rearfoot, and forefoot.  Manual muscle strength testing is 5/5 with dorsiflexion, plantar flexion, abduction and abduction of the lower extremity.  No pain with or without resistance.  The patient is a full to ambulate without pain or discomfort.  The patient's gait is non antalgic.  The patient does not utilize any assistive device for ambulation.    VASCULAR: Capillary refill time is less than 3s. Edema is non-pitting and moderate. The left dorsalis pedis pulse is 2/4 and the right dorsalis pedis pulse is 2/4. The left posterior tibial pulse is 1/4 and the right posterior tibial pulse is 1/4. Varicosities are absent. Spider veins and telangiectasias are absent. Hair growth is sparse to absent. Skin appearance is WNL. Proximal to distal warm to cool to touch.     NEUROLOGY: Proprioception is intact. Sensation to light touch is intact. Sensation to pin prick is decreased. Vibratory sensation is diminished to the left and right lower extremity. Examination with 5.07 Dry Branch Primo monofilament reveals that protective sensation is reduced     DERMATOLOGY: Skin is dry, intact.  There are no ulcerations, callusing, or other lesions identified to the dorsal plantar aspect right left foot.  The web spaces are clean, dry, intact without evidence of maceration.  The nails 1, 2, 3, 4, 5 on the right foot 1, 2, 3, 4, 5 on left foot are thickened, discolored, dystrophic, elevated with suggestive of onychomycosis.    Assessment:     1. Diabetes mellitus type 2, uncontrolled, with complications    2. CKD (chronic kidney disease) stage 4, GFR 15-29 ml/min    3. Morbid obesity due to excess calories    4. Onychomycosis    5. Xerosis cutis        Plan:     Diabetes mellitus type 2, uncontrolled, with complications  -     Ambulatory referral/consult to Podiatry  I counseled the patient on his/her Diabetic Mellitus regarding today's clinical examination and objection  findings. We did also discuss recent medication changes, pertinent labs and imaging evaluations and other medical consultation notes and progress notes. Greater than 50% of this visit was spent on counseling and coordination of care. Greater than 20 minutes of this appt. was spent on education about the diabetic foot, in relation to PVD and/or neuropathy, and the prevention of limb loss.     Shoe gear is inspected and wear and proper fit/type. Patient is reminded of the importance of good nutrition and blood sugar control to help prevent podiatric complications of diabetes. Patient instructed on proper foot hygeine. We discussed wearing proper shoe gear, daily foot inspections, never walking without protective shoe gear, never putting sharp instruments to feet.  Patient  will continue to monitor the areas daily, inspect feet, wear protective shoe gear when ambulatory, moisturizer to maintain skin integrity.     Patient's DMI/DMII is managed by Primary Care Provider and/or Endocrinology Advanced Practice Provider.    CKD (chronic kidney disease) stage 4, GFR 15-29 ml/min  Continue to follow with PCP/Nephrology for comorbid state.  Recommend bilateral lower leg elevation to with overall swelling    Morbid obesity due to excess calories  Patient is counseled and reminded concerning the importance of good nutrition and healthy eating habits, which may include blood sugar control, to prevent and/or help podiatric foot and ankle complications.    Onychomycosis  The onychomycotic nail plates, as outlined above (R# 1, 2, 3, 4, 5 ; L# 1, 2, 3, 4, 5), are sharply debrided with double action nail nipper, and/or with the assistance of a mechanical rotary josué, with removal of all offending nail and nail border(s), for reduction of pains. Nails are reduced in terms of length, width and girth with removal of subungual debris to facilitate pain free weight bearing and ambulation. The elongated nails as outlined in the objective  portion of this note, were trimmed to appropriate length, with a double action nail nipper, for alleviation/reduction of pains as well. Follow up in approx. 3-4 months.    Xerosis cutis  Recommend twice to 3 times daily topical emollient.  Recommend utilizes product following bathing or showering as this traps the fluid into the skin and helps to reduce overall dryness.  Did discuss with the patient concerning dry and thin skin in relation to complications due to comorbid states. Patient will purchase OTC Urea 40% and use BID or TID or OTC Eucerin or Aquaphor and use it BID or TID.

## 2020-11-13 NOTE — PROGRESS NOTES
Subjective:       Patient ID: Diamond Das is a 65 y.o. female.    Chief Complaint: Diabetes    Pt here today for further discussion of Hospice care after having biopsy.  Results were reviewed, options for future care discussed  Pt has cirrhosis of liver, and seen recently by Dr. Minnie Davila who is in favor of liver transplant, I then spoke to Dr. Hernandez over the phone during the visit who is seeing pt for renal issues.  After discussion with consultant and long discussion with pt, we have agreed to continue current care, await transplant discussion, as pt would be able to be on the renal transplant list if she were able to receive a liver.      Pt seen for greater than 25 min face to face. Greater than 50% of the time in the room was spent on counseling and coordination of care.      Review of Systems    Objective:      Physical Exam    Assessment:       1. Diabetes mellitus type 2, uncontrolled, with complications        Plan:     Problem List Items Addressed This Visit        Endocrine    Diabetes mellitus type 2, uncontrolled, with complications - Primary    Relevant Orders    Ambulatory referral/consult to Podiatry    Ambulatory referral/consult to Optometry

## 2020-11-16 ENCOUNTER — OFFICE VISIT (OUTPATIENT)
Dept: CARDIOLOGY | Facility: CLINIC | Age: 65
End: 2020-11-16
Payer: MEDICAID

## 2020-11-16 VITALS
SYSTOLIC BLOOD PRESSURE: 118 MMHG | HEART RATE: 82 BPM | OXYGEN SATURATION: 99 % | HEIGHT: 61 IN | BODY MASS INDEX: 35.75 KG/M2 | RESPIRATION RATE: 16 BRPM | WEIGHT: 189.38 LBS | DIASTOLIC BLOOD PRESSURE: 62 MMHG

## 2020-11-16 DIAGNOSIS — I50.32 CHRONIC DIASTOLIC CONGESTIVE HEART FAILURE: Primary | ICD-10-CM

## 2020-11-16 DIAGNOSIS — Z01.810 PREOP CARDIOVASCULAR EXAM: ICD-10-CM

## 2020-11-16 DIAGNOSIS — I31.39 PERICARDIAL EFFUSION: ICD-10-CM

## 2020-11-16 DIAGNOSIS — E66.01 MORBID OBESITY DUE TO EXCESS CALORIES: ICD-10-CM

## 2020-11-16 DIAGNOSIS — N18.4 CKD (CHRONIC KIDNEY DISEASE) STAGE 4, GFR 15-29 ML/MIN: ICD-10-CM

## 2020-11-16 DIAGNOSIS — I10 ESSENTIAL HYPERTENSION: ICD-10-CM

## 2020-11-16 DIAGNOSIS — E78.49 OTHER HYPERLIPIDEMIA: ICD-10-CM

## 2020-11-16 DIAGNOSIS — K74.60 LIVER CIRRHOSIS SECONDARY TO NASH: ICD-10-CM

## 2020-11-16 DIAGNOSIS — E03.4 HYPOTHYROIDISM DUE TO ACQUIRED ATROPHY OF THYROID: ICD-10-CM

## 2020-11-16 DIAGNOSIS — K75.81 LIVER CIRRHOSIS SECONDARY TO NASH: ICD-10-CM

## 2020-11-16 PROCEDURE — 99215 OFFICE O/P EST HI 40 MIN: CPT | Mod: S$PBB,,, | Performed by: INTERNAL MEDICINE

## 2020-11-16 PROCEDURE — 99214 OFFICE O/P EST MOD 30 MIN: CPT | Mod: PBBFAC,PO | Performed by: INTERNAL MEDICINE

## 2020-11-16 PROCEDURE — 99999 PR PBB SHADOW E&M-EST. PATIENT-LVL IV: ICD-10-PCS | Mod: PBBFAC,,, | Performed by: INTERNAL MEDICINE

## 2020-11-16 PROCEDURE — 99215 PR OFFICE/OUTPT VISIT, EST, LEVL V, 40-54 MIN: ICD-10-PCS | Mod: S$PBB,,, | Performed by: INTERNAL MEDICINE

## 2020-11-16 PROCEDURE — 99999 PR PBB SHADOW E&M-EST. PATIENT-LVL IV: CPT | Mod: PBBFAC,,, | Performed by: INTERNAL MEDICINE

## 2020-11-16 NOTE — PROGRESS NOTES
Subjective:   Patient ID:  Diamond Das is a 65 y.o. female who presents for cardiac consult of Follow-up      Shortness of Breath  Pertinent negatives include no chest pain or leg swelling.   Follow-up  Pertinent negatives include no chest pain or nausea.     The patient came in today for cardiac consult of Follow-up    Referring Physician: Andrey Perrin MD   Reason for consult: HTN, CHF    Diamond Das is a 65 y.o. female with medical conditions diastolic CHF, pericardial effusion, HTN, HLD, IDDM, cryptogenic cirrhosis, s/p TIPS, ascites presents for CV follow up.     Pt of Dr. Shaw, last seen 2/9/18  No smoking/drinking  Last echo in  normal EF and RVF, grade II DD.  EKG today NSR poor R progression on anterior leads  Chronic weakness and fatigue. Had PNA in   Pain on lower chest bilateral for few months, worse with exercise, resolved after resting. Deep breathing made the pain worse. No pain at sleep. Associated dyspnea, N/V, palpitation and dizziness. No radiation. ASA and tylenol not really helped the pain.    3/1/19  She has been having more ascites and concern for cardiac etiologies. She was also started on Reglan, concern for prolonged Qtc, recent Qtc 475 ms. Has been feeling better with reglan and lactulose. Is dyspneic, chronically on oxygen at night and also has portable oxygen when she has to do a lot walking. Takes lasix 40 mg once/day now, was on 80mg daily. Active at home, dresses and bathes her self. Cant walk or stand to too long.     8/26/19  2D ECHO with grade 1 DD, normal Bi V function, Small pericardial effusion without tamponade. She has mild SOB at times with edema but improved lately. She will see hepatology as well. No Chest pain.   Has been taking lasix extra doses PRN and improved symptoms.     10/16/19  [facility] ER follow up  - Non toxic appearing elderly female here for worsening NICHOLE and shortness of breath. Known h/o CHF, f/b cardiologist in [facility].  Work up is c/w acute on chronic chf exacerbation. She, unfortunately, does not follow her weights so not clear if significant change recently. CXR with likely signs of volume overload. She was given diuresis and had [place] score of 0. Felt improved and prefers discharge with close OP f/u with her cardiologist. She was ambulated and did not significantly desat and tolerated it well.  No she is on lasix, feels better. BNP today is 453, needs to double lasix next 3-4 days for further diuresis.     11/18/19   Increased diuretics last visit. She felt better then and had weight loss as well. Still has NICHOLE but improved. She went to [facility] 2 weeks ago - presented to the ED for fall secondary to hypoglycemic episode. Pt has had 3 previous episodes of hypoglycemia requiring hospitalizations in the past with sxs of lightheadedness. CT head was unremarkable, CXR revealed improvement in CHF compared to previous study. Due to timeline of fall with insulin administration and Hx of cryptogenic cirrhosis, it is likely that pt could not compensate after receiving her insulin dosing.  BP meds were stopped, will get labs and restart lisinopril.     1/6/20  Breathing has been stable with LE edema. Last visit she doubled lasix for 3 days with min improvement. Has not had much relief lately.   ECG - NSR, poor RWP, lateral TWI    2/17/20  BNP was 863. She had recent thoracentesis as well. She has also been referred to hepatology as concern for TIPS not working?. She remains SOB will add metolazone. Discussed if kidneys and liver are worse will be difficult to manage volume status.     5/18/20  She has been stable but still has SOB with leg swelling. Symptoms have gotten a bit worse a few weeks ago. She has been taking lasix extra dose some times. Last BNP elevated 863, will need to increase lasix and metolazone and repeat labs this week. Will add K and Mg supplements.    9/28/20  BNP was 428 at last visit, which is improved from 7 months  prior. She saw PCP today with worsening NICHOLE and edema. Increased lasix and metolazone last visit.      Ref Range & Units 4mo ago  (5/21/20) 7mo ago  (2/10/20) 8mo ago  (1/10/20) 10mo ago  (11/18/19) 11mo ago  (10/25/19) 11mo ago  (10/16/19)   BNP 0 - 99 pg/mL 413High   863High  CM  1,010High  CM  793High  CM  291High  CM  453High  CM      10/19/20  Pt was admitted 10/9-10/15 for CHF exac with edema, diuresed aggressively. She also had nephro consult and hepatology eval for further treatment and possible liver transplant. She has upcoming appt for CT Renal biopsy. Autoimmune workup planned. ECHO with small pericardial effusion, no tamponde, grade 1 DD.   BNP improved to 129 two weeks prior. She is taking bumex BID. SHe is taking metolazone every other day. Weight is stable.     11/16/20  She has been on Bumex with metolazone. She had recent visit with Dr. Hernandez -  Renal biopsy revealed diabetic nephropathy and hypertensive nephropathy.  No autoimmune disorder detected. She may be candidate for RRT. She will have further liver workup and be liver and kidney transplant. Will need ischemic work up, had nuclear stress in past, none recently.     Patient feels no chest pain, no PND, no palpitation, no dizziness, no syncope, no CNS symptoms.    Patient has dec exercise tolerance.    Patient is compliant with medications.    Results for orders placed during the hospital encounter of 10/09/20   Echo Color Flow Doppler? Yes    Narrative · There is moderate left ventricular concentric hypertrophy.  · The left ventricle is small with normal systolic function. The estimated   ejection fraction is 60%.  · Grade I diastolic dysfunction.  · Normal right ventricular systolic function.  · Mild tricuspid regurgitation.  · Normal central venous pressure (3 mmHg).  · The estimated PA systolic pressure is 37 mmHg.  · Small pericardial effusion.  · There is a moderate left pleural effusion.                Past Medical History:   Diagnosis  Date    Ascites     Breast pain, left 1/4/2018    Cataract     CHF (congestive heart failure)     Cirrhosis     Cough     Diabetes mellitus     Diabetes mellitus, type 2     Gastroparesis     GERD (gastroesophageal reflux disease)     Hyperlipidemia     Hypertension     Hypotension 2/21/2019    Liver disease     Lumbar strain 4/27/2018    Pneumonia of right middle lobe due to infectious organism 12/19/2017    Renal disorder     Retinopathy due to secondary diabetes mellitus     Sleep apnea     Thyroid disease        Past Surgical History:   Procedure Laterality Date    CATARACT EXTRACTION      CHOLECYSTECTOMY      COLONOSCOPY N/A 9/4/2019    Procedure: COLONOSCOPY;  Surgeon: Marnie Elmore MD;  Location[facility] ENDO;  Service: Endoscopy;  Laterality: N/A;    ERCP      FLUOROSCOPY N/A 7/24/2020    Procedure: TIPS revision;  Surgeon: Martell Jasmine MD;  Location: [facility] CATH LAB;  Service: General;  Laterality: N/A;    LIVER BIOPSY      THORACENTESIS Left 1/20/2020    Procedure: Thoracentesis;  Surgeon: Angelica Hunter MD;  Location: [facility] ENDO;  Service: Pulmonary;  Laterality: Left;    TUBAL LIGATION      UPPER GASTROINTESTINAL ENDOSCOPY         Social History     Tobacco Use    Smoking status: Never Smoker    Smokeless tobacco: Never Used   Substance Use Topics    Alcohol use: No     Frequency: Never    Drug use: No       Family History   Problem Relation Age of Onset    Cancer Mother     Breast cancer Mother     Heart disease Father     Aneurysm Sister     Heart disease Brother     No Known Problems Maternal Grandmother     Cancer Maternal Grandfather     Sarcoidosis Sister        Patient's Medications   New Prescriptions    No medications on file   Previous Medications    AMLODIPINE (NORVASC) 10 MG TABLET    Take 10 mg by mouth once daily.    ASPIRIN (ECOTRIN) 81 MG EC TABLET    Take 1 tablet (81 mg total) by mouth once daily.    BUMETANIDE (BUMEX) 2 MG TABLET    Take 1  tablet (2 mg total) by mouth 2 (two) times daily.    DOXAZOSIN (CARDURA) 2 MG TABLET    Take 1 tablet (2 mg total) by mouth every evening.    FAMOTIDINE (PEPCID) 20 MG TABLET    Take 1 tablet (20 mg total) by mouth once daily.    GLUCAGON, HUMAN RECOMBINANT, (GLUCAGON EMERGENCY KIT, HUMAN,) 1 MG SOLR    Inject 1 mg into the muscle as needed.    INSULIN (LANTUS SOLOSTAR U-100 INSULIN) GLARGINE 100 UNITS/ML (3ML) SUBQ PEN    INJECT 20 UNITS INTO THE SKIN EVERY EVENING.  TITRATE UP TO 30 UNITS NIGHTLY AS DIRECTED ON INSTRUCTION SHEET    ISOSORBIDE MONONITRATE (IMDUR) 30 MG 24 HR TABLET    Take 1 tablet (30 mg total) by mouth once daily.    LACTULOSE (CEPHULAC) 20 GRAM PACK    Take 1 packet (20 g total) by mouth 2 (two) times daily.    LEVOTHYROXINE (SYNTHROID) 137 MCG TAB TABLET    Take 1 tablet (137 mcg total) by mouth before breakfast.    LIRAGLUTIDE 0.6 MG/0.1 ML, 18 MG/3 ML, SUBQ PNIJ (VICTOZA 2-SHAYY) 0.6 MG/0.1 ML (18 MG/3 ML) PNIJ    Inject 1.8 mg into the skin once daily.    MAGNESIUM OXIDE (MAG-OX) 400 MG (241.3 MG MAGNESIUM) TABLET    Take 1 tablet (400 mg total) by mouth once daily.    METOLAZONE (ZAROXOLYN) 5 MG TABLET    Take 1 tablet (5 mg total) by mouth once daily. Take daily as needed and then every other day for swelling, 30 min before lasix    ONDANSETRON (ZOFRAN) 4 MG TABLET    TAKE 1 TABLET BY MOUTH EVERY 8 HOURS AS NEEDED    ONDANSETRON (ZOFRAN-ODT) 4 MG TBDL    Take 1 tablet (4 mg total) by mouth every 8 (eight) hours as needed.    POTASSIUM CHLORIDE SA (K-DUR,KLOR-CON) 20 MEQ TABLET    Take 1 tablet (20 mEq total) by mouth once daily.    RIFAXIMIN (XIFAXAN) 550 MG TAB    Take 1 tablet (550 mg total) by mouth 2 (two) times daily.    SENNA-DOCUSATE 8.6-50 MG (PERICOLACE) 8.6-50 MG PER TABLET    Take 2 tablets by mouth once daily.   Modified Medications    No medications on file   Discontinued Medications    No medications on file       Review of Systems   Constitutional: Negative.    HENT: Negative.  "   Eyes: Negative.    Respiratory: Negative for shortness of breath (intermittent).    Cardiovascular: Negative for chest pain, palpitations and leg swelling.   Gastrointestinal: Negative for nausea.   Genitourinary: Negative.    Musculoskeletal: Negative.    Skin: Negative.    Neurological: Negative.    Endo/Heme/Allergies: Negative.    Psychiatric/Behavioral: Negative.    All 12 systems otherwise negative.      Wt Readings from Last 3 Encounters:   11/16/20 85.9 kg (189 lb 6 oz)   11/13/20 85.8 kg (189 lb 2.5 oz)   11/13/20 85.8 kg (189 lb 2.5 oz)     Temp Readings from Last 3 Encounters:   11/13/20 97.3 °F (36.3 °C) (Temporal)   10/29/20 99.8 °F (37.7 °C) (Oral)   10/19/20 98.5 °F (36.9 °C) (Tympanic)     BP Readings from Last 3 Encounters:   11/16/20 118/62   11/13/20 138/70   11/05/20 (!) 142/70     Pulse Readings from Last 3 Encounters:   11/16/20 82   11/13/20 82   11/05/20 74       /62 (BP Location: Right arm, Patient Position: Sitting, BP Method: Large (Manual))   Pulse 82   Resp 16   Ht 5' 1" (1.549 m)   Wt 85.9 kg (189 lb 6 oz)   LMP  (LMP Unknown)   SpO2 99%   BMI 35.78 kg/m²     Objective:   Physical Exam   Constitutional: She is oriented to person, place, and time. She appears well-developed and well-nourished. No distress.   HENT:   Head: Normocephalic and atraumatic.   Nose: Nose normal.   Mouth/Throat: Oropharynx is clear and moist. No oropharyngeal exudate.   Eyes: Conjunctivae and EOM are normal. Right eye exhibits no discharge. Left eye exhibits no discharge. No scleral icterus.   Neck: Normal range of motion. Neck supple. No JVD present. No thyromegaly present.   Cardiovascular: Normal rate, regular rhythm, S1 normal and S2 normal. Exam reveals no gallop, no S3, no S4 and no friction rub.   Murmur heard.  Pulmonary/Chest: Effort normal and breath sounds normal. No stridor. No respiratory distress. She has no wheezes. She has no rales. She exhibits no tenderness.   Abdominal: Soft. " Bowel sounds are normal. She exhibits no distension and no mass. There is no abdominal tenderness. There is no rebound.   Genitourinary:    Genitourinary Comments: Deferred     Musculoskeletal: Normal range of motion.         General: No tenderness, deformity or edema.   Lymphadenopathy:     She has no cervical adenopathy.   Neurological: She is alert and oriented to person, place, and time. She exhibits normal muscle tone. Coordination normal.   Skin: Skin is warm and dry. No rash noted. She is not diaphoretic. No erythema. No pallor.   Psychiatric: She has a normal mood and affect. Her behavior is normal. Judgment and thought content normal.   Nursing note and vitals reviewed.      Lab Results   Component Value Date     11/05/2020    K 4.8 11/05/2020     11/05/2020    CO2 29 11/05/2020    BUN 21 11/05/2020    CREATININE 3.0 (H) 11/05/2020    GLU 88 11/05/2020    HGBA1C 5.9 (H) 10/10/2020    MG 1.6 10/10/2020    AST 32 10/13/2020    ALT 11 10/13/2020    ALBUMIN 1.9 (L) 11/05/2020    PROT 4.8 (L) 10/13/2020    BILITOT 0.2 10/13/2020    WBC 5.78 10/09/2020    HGB 10.7 (L) 10/09/2020    HCT 34.3 (L) 10/09/2020    MCV 92 10/09/2020     10/09/2020    INR 1.0 10/13/2020    TSH 3.952 10/09/2020    CHOL 205 (H) 12/06/2018    HDL 41 12/06/2018    LDLCALC 145.8 12/06/2018    TRIG 91 12/06/2018     (H) 10/11/2020     Assessment:      1. Chronic diastolic congestive heart failure    2. Other hyperlipidemia    3. Essential hypertension    4. Pericardial effusion    5. CKD (chronic kidney disease) stage 4, GFR 15-29 ml/min    6. Hypothyroidism due to acquired atrophy of thyroid    7. Morbid obesity due to excess calories    8. 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        Plan:   1. Chronic diastolic HF - recent exac  -daily weights, low salt diet -bumex 2 BID with metolazone every other day with extra dose PRN  - daily compression stockings    2. HTN  - cont meds     3. HLD  - cont statin    4. DM2  - cont  meds per PCP  - sugars stable now 90s-110    5. Cirrhosis s/p TIPS with edema, low albumin  - cont tx per PCP/Hepatology  - may be candidate for transplant will follow up - order pharm nuclear stress to r/o ischemia, pt cannot walk on treadmill due to dyspnea/edema      6. KRISTAL  - needs CPAP, was not using it before     7. Obesity  - rec weight loss with diet/exercise     8. Pericardial effusion  - small, sec to cirrhosis/CHF  - no tamponade clinically on recent ECHO    9. CKD with proteinuria  - cont f/u with nephro    Undergoing workup for kidney/liver transplant - stress test ordered    Thank you for allowing me to participate in this patient's care. Please do not hesitate to contact me with any questions or concerns. Consult note has been forwarded to the referral physician.          I, David Doe, performed the initial face to face bedside interview with this patient regarding history of present illness, review of symptoms and relevant past medical, social and family history.  I completed an independent physical examination.  I was the initial provider who evaluated this patient. I have signed out the follow up of any pending tests (i.e. labs, radiological studies) to the ACP.  I have communicated the patient’s plan of care and disposition with the ACP.

## 2020-11-23 RX ORDER — POTASSIUM CHLORIDE 20 MEQ/1
20 TABLET, EXTENDED RELEASE ORAL DAILY
Qty: 30 TABLET | Refills: 3 | Status: SHIPPED | OUTPATIENT
Start: 2020-11-23 | End: 2021-06-23

## 2020-11-25 ENCOUNTER — PATIENT OUTREACH (OUTPATIENT)
Dept: ADMINISTRATIVE | Facility: OTHER | Age: 65
End: 2020-11-25

## 2020-11-30 ENCOUNTER — CLINICAL SUPPORT (OUTPATIENT)
Dept: DIABETES | Facility: CLINIC | Age: 65
End: 2020-11-30
Payer: MEDICAID

## 2020-11-30 VITALS — WEIGHT: 194.44 LBS | BODY MASS INDEX: 36.71 KG/M2 | HEIGHT: 61 IN

## 2020-11-30 DIAGNOSIS — E08.59 DIABETES DUE TO UNDERLYING CONDITION W OTH CIRCULATORY COMP: Primary | ICD-10-CM

## 2020-11-30 PROCEDURE — 99999 PR PBB SHADOW E&M-EST. PATIENT-LVL III: CPT | Mod: PBBFAC,,, | Performed by: DIETITIAN, REGISTERED

## 2020-11-30 PROCEDURE — G0108 DIAB MANAGE TRN  PER INDIV: HCPCS | Mod: PBBFAC | Performed by: DIETITIAN, REGISTERED

## 2020-11-30 PROCEDURE — 99213 OFFICE O/P EST LOW 20 MIN: CPT | Mod: PBBFAC | Performed by: DIETITIAN, REGISTERED

## 2020-11-30 PROCEDURE — 99999 PR PBB SHADOW E&M-EST. PATIENT-LVL III: ICD-10-PCS | Mod: PBBFAC,,, | Performed by: DIETITIAN, REGISTERED

## 2020-11-30 NOTE — LETTER
December 1, 2020        Andrey Perrin MD  [address] [place] [id number]             [facility]  [address]  [place] [facility] [address]  Phone: [phone]  Fax: [phone]   Patient: Diamond Das   MR Number: [id number]   YOB: 1955   Date of Visit: 11/30/2020       Dear Dr. Perrin:    Thank you for referring Diamond Das to me for evaluation. Below are the relevant portions of my assessment and plan of care.    If you have questions, please do not hesitate to call me. I look forward to following Diamond along with you.    Sincerely,      Nolvia Mcneil, VANE, CDE           CC  No Recipients

## 2020-12-01 NOTE — PROGRESS NOTES
Diabetes Education  Author: Nolvia Mcneil RD, CDE  Date: 12/1/2020    Diabetes Care Management Summary  Diabetes Education Record Assessment/Progress: Comprehensive/Group(assessment 10/26/20, prior 9/23/19 A1C 7.7 (7/16/19))  Current Diabetes Risk Level: Moderate     Last A1c:   Lab Results   Component Value Date    HGBA1C 5.9 (H) 10/10/2020     Diabetes Type  Diabetes Type : Type II(CKD w/ liver ds); pt investigating transplant process and eligibility.    Diabetes History  Diabetes Diagnosis: 5-10 years  Current Treatment: (Lantus 20units daily, Victoza 1.8mg daily.)  Reviewed Problem List with Patient: Yes    Health Maintenance was reviewed today with patient. Discussed with patient importance of routine eye exams, foot exams/foot care, blood work (i.e.: A1c, microalbumin, and lipid), dental visits, yearly flu vaccine, and pneumonia vaccine as indicated by PCP. Patient verbalized understanding.     Health Maintenance Topics with due status: Not Due       Topic Last Completion Date    TETANUS VACCINE 09/08/2016    Pneumococcal Vaccine (Highest Risk) 12/07/2017    Colorectal Cancer Screening 09/04/2019    Mammogram 09/29/2020    Hemoglobin A1c 10/10/2020    Urine Microalbumin 10/11/2020    Foot Exam 11/13/2020     Health Maintenance Due   Topic Date Due    Lipid Panel  12/06/2019    Eye Exam  04/09/2020    Cervical Cancer Screening  01/29/2021       Nutrition  Meal Planning: (Intake ~1200cals/d; no excess carb, fat or sodium. Pt and dtr have changed seasonings, limiting salt and using herbs, vinegar for flavor. Pt uses oral supplement daily due to report poor appetite due changes in taste and adjusting to decreased salt.)  What type of beverages do you drink?: diet soda/tea, water  Meal Plan 24 Hour Recall - Breakfast: boost  Meal Plan 24 Hour Recall - Lunch: none  Meal Plan 24 Hour Recall - Dinner: militon dressing w/ grd meat and rice  Meal Plan 24 Hour Recall - Snack: fruit    Monitoring   Self  Monitoring : Per glucometer, 28-d avg 144, 7d 176 w/ range 116-190; higher BG patterns past 1-2 wks.  In the last month, how often have you had a low blood sugar reaction?: never  Can you tell when your blood sugar is too high?: no    Exercise   Exercise Type: (chair exercises w/ dtr daily)    Social History  Preferred Learning Method: Face to Face  Primary Support: Self, Daughter  Smoking Status: Never a Smoker  Alcohol Use: Never    Barriers to Change  Barriers to Change: None  Learning Challenges : None    Readiness to Learn   Readiness to Learn : Acceptance    Cultural Influences  Cultural Influences: Yes  If yes, please list:: associated w/ southern seasonings and cooking style    Diabetes Education Assessment/Progress  Diabetes Disease Process (diabetes disease process and treatment options): Discussion, Individual Session, Demonstrates Understanding/Competency(verbalizes/demonstrates)  Nutrition (Incorporating nutritional management into one's lifestyle): Discussion, Individual Session, Demonstrates Understanding/Competency (verbalizes/demonstrates)  Physical Activity (incorporating physical activity into one's lifestyle): Discussion, Individual Session, Demonstrates Understanding/Competency (verbalizes/demonstrates)  Medications (states correct name, dose, onset, peak, duration, side effects & timing of meds): Discussion, Individual Session, Demonstrates Understanding/Competency(verbalizes/demonstrates), Written Materials Provided  Monitoring (monitoring blood glucose/other parameters & using results): Discussion, Individual Session, Demonstrates Understanding/Competency (verbalizes/demonstrates)  Acute Complications (preventing, detecting, and treating acute complications): Discussion, Individual Session, Demonstrates Understanding/Competency (verbalizes/demonstrates)  Chronic Complications (preventing, detecting, and treating chronic complications): Discussion, Individual Session, Demonstrates  Understanding/Competency (verbalizes/demonstrates)  Clinical (diabetes, other pertinent medical history, and relevant comorbidities reviewed during visit): Discussion, Individual Session, Demonstrates Understanding/Competency (verbalizes/demonstrates)  Cognitive (knowledge of self-management skills, functional health literacy): Discussion, Individual Session, Demonstrates Understanding/Competency (verbalizes/demonstrates)  Psychosocial (emotional response to diabetes): Discussion, Individual Session, Needs Review  Diabetes Distress and Support Systems: Discussion, Individual Session, Needs Review  Behavioral (readiness for change, lifestyle practices, self-care behaviors): Discussion, Individual Session, Demonstrates Understanding/Competency (verbalizes/demonstrates)    Goals  Patient has selected/evaluated goals during today's session: Yes, evaluated  Healthy Eating: Set(use meal plan - carb portions/spacing, reduce sodium 1500-2000mg/d (matl's used - foods lists, cooking/recipe resources, seasonings, food label, diabetes mgmt guide))  Met Percentage : 50%  Start Date: 12/01/20(same above, maintain adequate oral intake)  Target Date: 01/27/21  Physical Activity: Set(150min/wk - chair based exercises, indoor walking)  Met Percentage : 50%  Start Date: 12/01/20  Target Date: 01/27/21  Monitoring: Set(test BG 2x/d -fst, bed)  Met Percentage : 100%  Start Date: 12/01/20  Target Date: 01/27/21  Medications: Set(dose diabetes medications as directed)  Met Percentage : 100%  Start Date: 12/01/20  Target Date: 01/27/21  Reducing Risks: In Progress(continue measure daily weights to monitor fluid levels)    Diabetes Care Plan/Intervention  Education Plan/Intervention: Individual Follow-Up DSMT(KARISSA paulino/ Dr. Perrin for medical mgmt.) Provided additional strategies, support for food preparation to improve flavor and meal appeal; encouraged adequate oral intake and progress of healthier lifestyle changes. RTC ~1-2 mos, prn or as  referred. Requested pt contact clinic 1-2 weeks if fst BG levels continue in 160-180 range.     Diabetes Meal Plan  Restrictions: Low Fat, Low Sodium, Restricted Carbohydrate  Calories: 1200, 1400  Carbohydrate Per Meal: 30-45g  Carbohydrate Per Snack : 7-15g    Today's Self-Management Care Plan was developed with the patient's input and is based on barriers identified during today's assessment.    The long and short-term goals in the care plan were written with the patient/caregiver's input. The patient has agreed to work toward these goals to improve her overall diabetes control.      The patient received a copy of today's self-management plan and verbalized understanding of the care plan, goals, and all of today's instructions.      The patient was encouraged to communicate with her physician and care team regarding her condition(s) and treatment.  I provided the patient with my contact information today and encouraged her to contact me via phone or patient portal as needed.     Education Units of Time   Time Spent: 60 min

## 2020-12-07 ENCOUNTER — TELEPHONE (OUTPATIENT)
Dept: RADIOLOGY | Facility: HOSPITAL | Age: 65
End: 2020-12-07

## 2020-12-08 ENCOUNTER — HOSPITAL ENCOUNTER (OUTPATIENT)
Dept: RADIOLOGY | Facility: HOSPITAL | Age: 65
Discharge: HOME | End: 2020-12-08
Attending: NURSE PRACTITIONER
Payer: MEDICAID

## 2020-12-08 DIAGNOSIS — K76.82 HEPATIC ENCEPHALOPATHY: ICD-10-CM

## 2020-12-08 DIAGNOSIS — K74.60 DECOMPENSATED HEPATIC CIRRHOSIS: ICD-10-CM

## 2020-12-08 DIAGNOSIS — R18.8 CIRRHOSIS OF LIVER WITH ASCITES, UNSPECIFIED HEPATIC CIRRHOSIS TYPE: ICD-10-CM

## 2020-12-08 DIAGNOSIS — K74.60 CIRRHOSIS OF LIVER WITH ASCITES, UNSPECIFIED HEPATIC CIRRHOSIS TYPE: ICD-10-CM

## 2020-12-08 DIAGNOSIS — Z95.828 S/P TIPS (TRANSJUGULAR INTRAHEPATIC PORTOSYSTEMIC SHUNT): ICD-10-CM

## 2020-12-08 DIAGNOSIS — K72.90 DECOMPENSATED HEPATIC CIRRHOSIS: ICD-10-CM

## 2020-12-08 PROCEDURE — 93975 US DOPPLER ABDOMEN COMPLETE: ICD-10-PCS | Mod: 26,,, | Performed by: RADIOLOGY

## 2020-12-08 PROCEDURE — 93975 VASCULAR STUDY: CPT | Mod: TC,PO

## 2020-12-08 PROCEDURE — 93975 VASCULAR STUDY: CPT | Mod: 26,,, | Performed by: RADIOLOGY

## 2020-12-11 ENCOUNTER — PATIENT MESSAGE (OUTPATIENT)
Dept: OTHER | Facility: OTHER | Age: 65
End: 2020-12-11

## 2020-12-14 ENCOUNTER — LAB VISIT (OUTPATIENT)
Dept: LAB | Facility: HOSPITAL | Age: 65
End: 2020-12-14
Attending: INTERNAL MEDICINE
Payer: MEDICAID

## 2020-12-14 DIAGNOSIS — E11.29 PROTEINURIA DUE TO TYPE 2 DIABETES MELLITUS: ICD-10-CM

## 2020-12-14 DIAGNOSIS — N18.4 CKD (CHRONIC KIDNEY DISEASE) STAGE 4, GFR 15-29 ML/MIN: ICD-10-CM

## 2020-12-14 DIAGNOSIS — N17.9 AKI (ACUTE KIDNEY INJURY): ICD-10-CM

## 2020-12-14 DIAGNOSIS — R80.9 PROTEINURIA DUE TO TYPE 2 DIABETES MELLITUS: ICD-10-CM

## 2020-12-14 LAB
ALBUMIN SERPL BCP-MCNC: 1.7 G/DL (ref 3.5–5.2)
ANION GAP SERPL CALC-SCNC: 8 MMOL/L (ref 8–16)
BUN SERPL-MCNC: 34 MG/DL (ref 8–23)
CALCIUM SERPL-MCNC: 7.9 MG/DL (ref 8.7–10.5)
CHLORIDE SERPL-SCNC: 112 MMOL/L (ref 95–110)
CO2 SERPL-SCNC: 23 MMOL/L (ref 23–29)
CREAT SERPL-MCNC: 2.2 MG/DL (ref 0.5–1.4)
EST. GFR  (AFRICAN AMERICAN): 26.7 ML/MIN/1.73 M^2
EST. GFR  (NON AFRICAN AMERICAN): 23.2 ML/MIN/1.73 M^2
GLUCOSE SERPL-MCNC: 177 MG/DL (ref 70–110)
PHOSPHATE SERPL-MCNC: 3.6 MG/DL (ref 2.7–4.5)
POTASSIUM SERPL-SCNC: 4.3 MMOL/L (ref 3.5–5.1)
SODIUM SERPL-SCNC: 143 MMOL/L (ref 136–145)

## 2020-12-14 PROCEDURE — 83970 ASSAY OF PARATHORMONE: CPT

## 2020-12-14 PROCEDURE — 36415 COLL VENOUS BLD VENIPUNCTURE: CPT | Mod: PO

## 2020-12-14 PROCEDURE — 80069 RENAL FUNCTION PANEL: CPT | Mod: PO

## 2020-12-15 LAB — PTH-INTACT SERPL-MCNC: 267 PG/ML (ref 9–77)

## 2020-12-17 ENCOUNTER — OFFICE VISIT (OUTPATIENT)
Dept: NEPHROLOGY | Facility: CLINIC | Age: 65
End: 2020-12-17
Payer: MEDICAID

## 2020-12-17 ENCOUNTER — PATIENT MESSAGE (OUTPATIENT)
Dept: GASTROENTEROLOGY | Facility: CLINIC | Age: 65
End: 2020-12-17

## 2020-12-17 VITALS
HEIGHT: 61 IN | DIASTOLIC BLOOD PRESSURE: 80 MMHG | BODY MASS INDEX: 36.96 KG/M2 | SYSTOLIC BLOOD PRESSURE: 160 MMHG | WEIGHT: 195.75 LBS

## 2020-12-17 DIAGNOSIS — N18.4 CKD (CHRONIC KIDNEY DISEASE) STAGE 4, GFR 15-29 ML/MIN: Primary | ICD-10-CM

## 2020-12-17 DIAGNOSIS — E11.29 PROTEINURIA DUE TO TYPE 2 DIABETES MELLITUS: ICD-10-CM

## 2020-12-17 DIAGNOSIS — R80.9 PROTEINURIA DUE TO TYPE 2 DIABETES MELLITUS: ICD-10-CM

## 2020-12-17 PROCEDURE — 99214 PR OFFICE/OUTPT VISIT, EST, LEVL IV, 30-39 MIN: ICD-10-PCS | Mod: S$PBB,,, | Performed by: INTERNAL MEDICINE

## 2020-12-17 PROCEDURE — 99214 OFFICE O/P EST MOD 30 MIN: CPT | Mod: S$PBB,,, | Performed by: INTERNAL MEDICINE

## 2020-12-17 PROCEDURE — 99214 OFFICE O/P EST MOD 30 MIN: CPT | Mod: PBBFAC | Performed by: INTERNAL MEDICINE

## 2020-12-17 PROCEDURE — 99999 PR PBB SHADOW E&M-EST. PATIENT-LVL IV: ICD-10-PCS | Mod: PBBFAC,,, | Performed by: INTERNAL MEDICINE

## 2020-12-17 PROCEDURE — 99999 PR PBB SHADOW E&M-EST. PATIENT-LVL IV: CPT | Mod: PBBFAC,,, | Performed by: INTERNAL MEDICINE

## 2020-12-17 RX ORDER — LISINOPRIL 5 MG/1
5 TABLET ORAL DAILY
Qty: 90 TABLET | Refills: 3 | Status: SHIPPED | OUTPATIENT
Start: 2020-12-17 | End: 2021-07-14

## 2020-12-17 NOTE — PROGRESS NOTES
PROGRESS NOTE FOR ESTABLISHED PATIENT    PHYSICIAN REQUESTING THE CONSULT: Dr. Perrin    REASON FOR VISIT: CKD      Subjective:     History of Present Illness:  65 y.o. year old female with history of hypothyroidism, liver cirrhosis, HTN, GERD, DM2, gastroparesis, CHF, obesity, sleep apnea presents to the renal clinic for evaluation of renal insufficiency.     September 6, 2018:   Patient today has no complaints except for mild LE edema.     December 17, 2020:  Patient was last seen by Dr. Mullen on 11/5/20 shortly after patient experienced episode of ROD. Creatinine has now declined to 2.2.  Patient denies any complaints today.             Past Medical History:   Past Medical History:   Diagnosis Date    Ascites     Breast pain, left 1/4/2018    Cataract     CHF (congestive heart failure)     Cirrhosis     Cough     Diabetes mellitus     Diabetes mellitus, type 2     Gastroparesis     GERD (gastroesophageal reflux disease)     Hyperlipidemia     Hypertension     Hypotension 2/21/2019    Liver disease     Lumbar strain 4/27/2018    Pneumonia of right middle lobe due to infectious organism 12/19/2017    Renal disorder     Retinopathy due to secondary diabetes mellitus     Sleep apnea     Thyroid disease        Past Surgical History:   Past Surgical History:   Procedure Laterality Date    CATARACT EXTRACTION      CHOLECYSTECTOMY      COLONOSCOPY N/A 9/4/2019    Procedure: COLONOSCOPY;  Surgeon: Marnie Elmore MD;  Location: [facility];  Service: Endoscopy;  Laterality: N/A;    ERCP      FLUOROSCOPY N/A 7/24/2020    Procedure: TIPS revision;  Surgeon: Martell Jasmine MD;  Location: [facility] CATH LAB;  Service: General;  Laterality: N/A;    LIVER BIOPSY      THORACENTESIS Left 1/20/2020    Procedure: Thoracentesis;  Surgeon: Angelica Hunter MD;  Location: [facility] ENDO;  Service: Pulmonary;  Laterality: Left;    TUBAL LIGATION      UPPER GASTROINTESTINAL ENDOSCOPY         Family History:    Family History   Problem Relation Age of Onset    Cancer Mother     Breast cancer Mother     Heart disease Father     Aneurysm Sister     Heart disease Brother     No Known Problems Maternal Grandmother     Cancer Maternal Grandfather     Sarcoidosis Sister         Social History:   Social History     Tobacco Use    Smoking status: Never Smoker    Smokeless tobacco: Never Used   Substance Use Topics    Alcohol use: No     Frequency: Never        Medications:   Current Outpatient Medications:     amLODIPine (NORVASC) 10 MG tablet, Take 10 mg by mouth once daily., Disp: , Rfl:     aspirin (ECOTRIN) 81 MG EC tablet, Take 1 tablet (81 mg total) by mouth once daily., Disp: 365 tablet, Rfl: 0    bumetanide (BUMEX) 2 MG tablet, Take 1 tablet (2 mg total) by mouth 2 (two) times daily., Disp: 60 tablet, Rfl: 11    doxazosin (CARDURA) 2 MG tablet, Take 1 tablet (2 mg total) by mouth every evening., Disp: 90 tablet, Rfl: 1    EUTHYROX 137 mcg Tab tablet, TAKE 1 TABLET BY MOUTH BEFORE BREAKFAST, Disp: 90 tablet, Rfl: 0    famotidine (PEPCID) 20 MG tablet, Take 1 tablet (20 mg total) by mouth once daily., Disp: 30 tablet, Rfl: 11    glucagon, human recombinant, (GLUCAGON EMERGENCY KIT, HUMAN,) 1 mg SolR, Inject 1 mg into the muscle as needed., Disp: 1 each, Rfl: 5    insulin (LANTUS SOLOSTAR U-100 INSULIN) glargine 100 units/mL (3mL) SubQ pen, INJECT 20 UNITS INTO THE SKIN EVERY EVENING.  TITRATE UP TO 30 UNITS NIGHTLY AS DIRECTED ON INSTRUCTION SHEET, Disp: 15 mL, Rfl: 6    isosorbide mononitrate (IMDUR) 30 MG 24 hr tablet, Take 1 tablet (30 mg total) by mouth once daily. (Patient not taking: Reported on 11/16/2020), Disp: 30 tablet, Rfl: 11    lactulose (CEPHULAC) 20 gram Pack, Take 1 packet (20 g total) by mouth 2 (two) times daily., Disp: 60 packet, Rfl: 2    liraglutide 0.6 mg/0.1 mL, 18 mg/3 mL, subq PNIJ (VICTOZA 2-SHAYY) 0.6 mg/0.1 mL (18 mg/3 mL) PnIj, Inject 1.8 mg into the skin once daily., Disp:  30 mL, Rfl: 0    magnesium oxide (MAG-OX) 400 mg (241.3 mg magnesium) tablet, Take 1 tablet (400 mg total) by mouth once daily., Disp: 90 tablet, Rfl: 1    metOLazone (ZAROXOLYN) 5 MG tablet, Take 1 tablet (5 mg total) by mouth once daily. Take daily as needed and then every other day for swelling, 30 min before lasix, Disp: 30 tablet, Rfl: 3    ondansetron (ZOFRAN) 4 MG tablet, TAKE 1 TABLET BY MOUTH EVERY 8 HOURS AS NEEDED, Disp: 30 tablet, Rfl: 0    ondansetron (ZOFRAN-ODT) 4 MG TbDL, Take 1 tablet (4 mg total) by mouth every 8 (eight) hours as needed., Disp: 30 tablet, Rfl: 1    potassium chloride SA (K-DUR,KLOR-CON) 20 MEQ tablet, Take 1 tablet (20 mEq total) by mouth once daily., Disp: 30 tablet, Rfl: 3    rifAXIMin (XIFAXAN) 550 mg Tab, Take 1 tablet (550 mg total) by mouth 2 (two) times daily., Disp: 60 tablet, Rfl: 0    senna-docusate 8.6-50 mg (PERICOLACE) 8.6-50 mg per tablet, Take 2 tablets by mouth once daily., Disp: 60 tablet, Rfl: 1    Allergies:   Review of patient's allergies indicates:   Allergen Reactions    Subsys [fentanyl] Other (See Comments)     After administration pt unresponsive.  HR and respirations decreased.     Versed [midazolam] Other (See Comments)     After administration pt unresponsive.  HR and respirations decreased.     Ampicillin Rash    Codeine Nausea And Vomiting and Nausea Only       Review Of Systems:     1. GENERAL: patient denies any fever, weight changes, generalized weakness, dizziness.  2. HEENT: patient denies headaches, visual disturbances, swallowing problems, sinus pain, nasal congestion.  3. CARDIOVASCULAR: patient denies chest pain, palpitations.  4. PULMONARY: patient denies SOB, no coughing, hemoptysis, wheezing.  5. GASTROINTESTINAL: patient denies abdominal pain and nausea, no vomiting, diarrhea.  6. GENITOURINARY: patient denies dysuria, hematuria, hesitancy, frequency.  7. EXTREMITIES: patient reports mild LE edema, LE cramping.  8. DERMATOLOGY:  "patient denies rashes, ulcers.  9. NEURO: patient denies tremors, extremity weakness, she reports intermittent extremity numbness/tingling.  10. MUSCULOSKELETAL: patient denies joint pain, joint swelling.  11. HEMATOLOGY: patient denies rectal or gum bleeding.  12: PSYCH: patient denies anxiety, depression.      Objective:     Vitals:     BP (!) 160/80   Ht 5' 1" (1.549 m)   Wt 88.8 kg (195 lb 12.3 oz)   LMP  (LMP Unknown)   BMI 36.99 kg/m²         Physical Exam:  General: Pleasant lady presents to clinic with non-labored breathing.  HENT: PERRL, no nasal discharge, no icterus, no oral exudates, moist mucosal membranes, no LAD  Pulmonary/Chest: CTA bilaterally, no wheezing, breathing is nonlabored.  Cardiovascular: Heart: RRR S1/S2, no rubs, good peripheral pulses.  Abdominal:  soft, nontender, not distended, bowel sounds are present, no abdominal hernia.  Skin: no rashes, skin is warm and dry.  Neurological:  A & O x 3, no obvious focal signs.  Extrem: mild  LE edema    Lab Results   Component Value Date    CREATININE 2.2 (H) 12/14/2020    BUN 34 (H) 12/14/2020     12/14/2020    K 4.3 12/14/2020     (H) 12/14/2020    CO2 23 12/14/2020     Lab Results   Component Value Date    .0 (H) 12/14/2020    CALCIUM 7.9 (L) 12/14/2020    PHOS 3.6 12/14/2020     Lab Results   Component Value Date    ALBUMIN 1.7 (L) 12/14/2020     Lab Results   Component Value Date    WBC 5.78 10/09/2020    HGB 10.7 (L) 10/09/2020    HCT 34.3 (L) 10/09/2020    MCV 92 10/09/2020     10/09/2020     Protein Creatinine Ratios:    Creatinine, Urine   Date Value Ref Range Status   10/11/2020 42.8 15.0 - 325.0 mg/dL Final     Comment:     The random urine reference ranges provided were established   for 24 hour urine collections.  No reference ranges exist for  random urine specimens.  Correlate clinically.     01/10/2020 223.0 15.0 - 325.0 mg/dL Final     Comment:     The random urine reference ranges provided were " established   for 24 hour urine collections.  No reference ranges exist for  random urine specimens.  Correlate clinically.     12/06/2018 118.0 15.0 - 325.0 mg/dL Final     Comment:     The random urine reference ranges provided were established   for 24 hour urine collections.  No reference ranges exist for  random urine specimens.  Correlate clinically.       Protein, Urine Random   Date Value Ref Range Status   10/11/2020 210 (H) 0 - 15 mg/dL Final     Comment:     The random urine reference ranges provided were established   for 24 hour urine collections.  No reference ranges exist for  random urine specimens.  Correlate clinically.     10/11/2020 210 (H) 0 - 15 mg/dL Final     Comment:     The random urine reference ranges provided were established   for 24 hour urine collections.  No reference ranges exist for  random urine specimens.  Correlate clinically.     05/10/2018 30 (H) 0 - 15 mg/dL Final     Comment:     The random urine reference ranges provided were established   for 24 hour urine collections.  No reference ranges exist for  random urine specimens.  Correlate clinically.       Prot/Creat Ratio, Urine   Date Value Ref Range Status   10/11/2020 4.91 (H) 0.00 - 0.20 Final   05/10/2018 0.27 (H) 0.00 - 0.20 Final     Renal biopsy from 10/29/20 is consistent with diabetic nephropathy.     Abdominal US from 9/9/16: normal kidneys.     Assessment - Diagnosis - Goals:     Impression: Diamond Das 65 y.o. female with history of hypothyroidism, liver cirrhosis, HTN, GERD, DM2, gastroparesis, CHF, obesity, sleep apnea presents to the renal clinic for evaluation of renal insufficiency.     1. Renal insufficiency: Patient presents with mild renal insufficiency, consistent with CKD stage 3. Last creatinine was 2.2. Cause of her renal insufficiency is not entirely clear, but DM2, HTN and cardiorenal syndrome are in the differential. Patient presents with nephrotic range proteinuria which makes diabetic  nephropathy likely (renal biopsy from 10/29/20 confirmed diabetic nephropathy). Will restart lisinopril at 5 mg daily to combat proteinuria.   Patient's renal function will be monitored closely and she will return to the clinic in 3-4 weeks for follow up. Patient was advised to avoid NSAID pain medications such as advil, aleve, motrin, ibuprofen, naprosyn, meloxicam, diclofenac, mobic and to hydrate with 60-65 ounces of water per day.     2. Electrolytes: Within normal limits. Corrected calcium OK.     3. Acid base status: No acute issues.    4. Volume: Mild LE edema. Continue Lasix.    5. Hypertension: BP elevated. Will add lisinopril for better BP control.     6. Medications: Reviewed. Lisinopril was added.     7. DM2: good blood glucose control. Continue insulin.    8. CHF: compensated at present.

## 2020-12-20 DIAGNOSIS — K76.82 HEPATIC ENCEPHALOPATHY: ICD-10-CM

## 2020-12-22 ENCOUNTER — TELEPHONE (OUTPATIENT)
Dept: PHARMACY | Facility: CLINIC | Age: 65
End: 2020-12-22

## 2020-12-22 NOTE — TELEPHONE ENCOUNTER
Good Afternoon,     The prior authorization for Diamond Das's Xifaxan 550mg prescription has been APPROVED FROM 12/21/2020 TO 12/21/2021 with copayment of $0.00.       Patient has been notified of the decision on 12/22/2020 and patient states she would like medication to be shipped, so arrangements have been made to ship medication.    Patient's shipping address has been verified.     If there are any additional questions or concerns, please contact me.    Thank You!   Stephanie Vizcaino CPhT, [email or web address]  Patient Care Advocate   [facility]  Phone: [phone] Ext [phone]  Fax: [phone]

## 2020-12-28 ENCOUNTER — OFFICE VISIT (OUTPATIENT)
Dept: CARDIOLOGY | Facility: CLINIC | Age: 65
End: 2020-12-28
Payer: MEDICAID

## 2020-12-28 ENCOUNTER — PATIENT OUTREACH (OUTPATIENT)
Dept: ADMINISTRATIVE | Facility: OTHER | Age: 65
End: 2020-12-28

## 2020-12-28 VITALS
BODY MASS INDEX: 36.67 KG/M2 | HEART RATE: 77 BPM | SYSTOLIC BLOOD PRESSURE: 140 MMHG | OXYGEN SATURATION: 99 % | HEIGHT: 61 IN | DIASTOLIC BLOOD PRESSURE: 72 MMHG | WEIGHT: 194.25 LBS | RESPIRATION RATE: 16 BRPM

## 2020-12-28 DIAGNOSIS — Z01.810 PREOP CARDIOVASCULAR EXAM: ICD-10-CM

## 2020-12-28 DIAGNOSIS — E08.59 DIABETES DUE TO UNDERLYING CONDITION W OTH CIRCULATORY COMP: ICD-10-CM

## 2020-12-28 DIAGNOSIS — I10 ESSENTIAL HYPERTENSION: ICD-10-CM

## 2020-12-28 DIAGNOSIS — N18.4 CKD (CHRONIC KIDNEY DISEASE) STAGE 4, GFR 15-29 ML/MIN: ICD-10-CM

## 2020-12-28 DIAGNOSIS — I31.39 PERICARDIAL EFFUSION: ICD-10-CM

## 2020-12-28 DIAGNOSIS — E03.4 HYPOTHYROIDISM DUE TO ACQUIRED ATROPHY OF THYROID: ICD-10-CM

## 2020-12-28 DIAGNOSIS — I50.32 CHRONIC DIASTOLIC CONGESTIVE HEART FAILURE: Primary | ICD-10-CM

## 2020-12-28 DIAGNOSIS — R60.0 BILATERAL LOWER EXTREMITY EDEMA: ICD-10-CM

## 2020-12-28 DIAGNOSIS — K75.81 LIVER CIRRHOSIS SECONDARY TO NASH: ICD-10-CM

## 2020-12-28 DIAGNOSIS — K74.60 LIVER CIRRHOSIS SECONDARY TO NASH: ICD-10-CM

## 2020-12-28 DIAGNOSIS — G47.33 OSA ON CPAP: ICD-10-CM

## 2020-12-28 DIAGNOSIS — E78.49 OTHER HYPERLIPIDEMIA: ICD-10-CM

## 2020-12-28 DIAGNOSIS — K74.60 HEPATIC CIRRHOSIS, UNSPECIFIED HEPATIC CIRRHOSIS TYPE, UNSPECIFIED WHETHER ASCITES PRESENT: ICD-10-CM

## 2020-12-28 PROCEDURE — 99999 PR PBB SHADOW E&M-EST. PATIENT-LVL IV: ICD-10-PCS | Mod: PBBFAC,,, | Performed by: INTERNAL MEDICINE

## 2020-12-28 PROCEDURE — 99999 PR PBB SHADOW E&M-EST. PATIENT-LVL IV: CPT | Mod: PBBFAC,,, | Performed by: INTERNAL MEDICINE

## 2020-12-28 PROCEDURE — 99215 OFFICE O/P EST HI 40 MIN: CPT | Mod: S$PBB,,, | Performed by: INTERNAL MEDICINE

## 2020-12-28 PROCEDURE — 99215 PR OFFICE/OUTPT VISIT, EST, LEVL V, 40-54 MIN: ICD-10-PCS | Mod: S$PBB,,, | Performed by: INTERNAL MEDICINE

## 2020-12-28 PROCEDURE — 99214 OFFICE O/P EST MOD 30 MIN: CPT | Mod: PBBFAC,PO | Performed by: INTERNAL MEDICINE

## 2020-12-28 NOTE — PROGRESS NOTES
Subjective:   Patient ID:  Diamond Das is a 65 y.o. female who presents for cardiac consult of Follow-up      Shortness of Breath  Pertinent negatives include no chest pain or leg swelling.   Follow-up  Pertinent negatives include no chest pain or nausea.     The patient came in today for cardiac consult of Follow-up    Referring Physician: Andrey Perrin MD   Reason for consult: HTN, CHF    Diamond Das is a 65 y.o. female with medical conditions diastolic CHF, pericardial effusion, HTN, HLD, IDDM, cryptogenic cirrhosis, s/p TIPS, ascites presents for CV follow up.     Pt of Dr. Shaw, last seen 2/9/18  No smoking/drinking  Last echo in  normal EF and RVF, grade II DD.  EKG today NSR poor R progression on anterior leads  Chronic weakness and fatigue. Had PNA in   Pain on lower chest bilateral for few months, worse with exercise, resolved after resting. Deep breathing made the pain worse. No pain at sleep. Associated dyspnea, N/V, palpitation and dizziness. No radiation. ASA and tylenol not really helped the pain.    3/1/19  She has been having more ascites and concern for cardiac etiologies. She was also started on Reglan, concern for prolonged Qtc, recent Qtc 475 ms. Has been feeling better with reglan and lactulose. Is dyspneic, chronically on oxygen at night and also has portable oxygen when she has to do a lot walking. Takes lasix 40 mg once/day now, was on 80mg daily. Active at home, dresses and bathes her self. Cant walk or stand to too long.     8/26/19  2D ECHO with grade 1 DD, normal Bi V function, Small pericardial effusion without tamponade. She has mild SOB at times with edema but improved lately. She will see hepatology as well. No Chest pain.   Has been taking lasix extra doses PRN and improved symptoms.     10/16/19  [facility] ER follow up  - Non toxic appearing elderly female here for worsening NICHOLE and shortness of breath. Known h/o CHF, f/b cardiologist in [facility].  Work up is c/w acute on chronic chf exacerbation. She, unfortunately, does not follow her weights so not clear if significant change recently. CXR with likely signs of volume overload. She was given diuresis and had Creswell score of 0. Felt improved and prefers discharge with close OP f/u with her cardiologist. She was ambulated and did not significantly desat and tolerated it well.  No she is on lasix, feels better. BNP today is 453, needs to double lasix next 3-4 days for further diuresis.     11/18/19   Increased diuretics last visit. She felt better then and had weight loss as well. Still has NICHOLE but improved. She went to [facility] 2 weeks ago - presented to the ED for fall secondary to hypoglycemic episode. Pt has had 3 previous episodes of hypoglycemia requiring hospitalizations in the past with sxs of lightheadedness. CT head was unremarkable, CXR revealed improvement in CHF compared to previous study. Due to timeline of fall with insulin administration and Hx of cryptogenic cirrhosis, it is likely that pt could not compensate after receiving her insulin dosing.  BP meds were stopped, will get labs and restart lisinopril.     1/6/20  Breathing has been stable with LE edema. Last visit she doubled lasix for 3 days with min improvement. Has not had much relief lately.   ECG - NSR, poor RWP, lateral TWI    2/17/20  BNP was 863. She had recent thoracentesis as well. She has also been referred to hepatology as concern for TIPS not working?. She remains SOB will add metolazone. Discussed if kidneys and liver are worse will be difficult to manage volume status.     5/18/20  She has been stable but still has SOB with leg swelling. Symptoms have gotten a bit worse a few weeks ago. She has been taking lasix extra dose some times. Last BNP elevated 863, will need to increase lasix and metolazone and repeat labs this week. Will add K and Mg supplements.    9/28/20  BNP was 428 at last visit, which is improved from 7 months  prior. She saw PCP today with worsening NICHOLE and edema. Increased lasix and metolazone last visit.      Ref Range & Units 4mo ago  (5/21/20) 7mo ago  (2/10/20) 8mo ago  (1/10/20) 10mo ago  (11/18/19) 11mo ago  (10/25/19) 11mo ago  (10/16/19)   BNP 0 - 99 pg/mL 413High   863High  CM  1,010High  CM  793High  CM  291High  CM  453High  CM      10/19/20  Pt was admitted 10/9-10/15 for CHF exac with edema, diuresed aggressively. She also had nephro consult and hepatology eval for further treatment and possible liver transplant. She has upcoming appt for CT Renal biopsy. Autoimmune workup planned. ECHO with small pericardial effusion, no tamponde, grade 1 DD.   BNP improved to 129 two weeks prior. She is taking bumex BID. SHe is taking metolazone every other day. Weight is stable.     11/16/20  She has been on Bumex with metolazone. She had recent visit with Dr. Hernandez -  Renal biopsy revealed diabetic nephropathy and hypertensive nephropathy.  No autoimmune disorder detected. She may be candidate for RRT. She will have further liver workup and be liver and kidney transplant. Will need ischemic work up, had nuclear stress in past, none recently.     12/28/20  Nuclear stress test pending. Saw Dr. Contreras recently, started on Lisinopril 5mg. She feels well, no CP/SOB. Stress test is pending. Is walking more and active.     Patient feels no chest pain, no PND, no palpitation, no dizziness, no syncope, no CNS symptoms.    Patient has dec exercise tolerance.    Patient is compliant with medications.    Results for orders placed during the hospital encounter of 10/09/20   Echo Color Flow Doppler? Yes    Narrative · There is moderate left ventricular concentric hypertrophy.  · The left ventricle is small with normal systolic function. The estimated   ejection fraction is 60%.  · Grade I diastolic dysfunction.  · Normal right ventricular systolic function.  · Mild tricuspid regurgitation.  · Normal central venous pressure (3  mmHg).  · The estimated PA systolic pressure is 37 mmHg.  · Small pericardial effusion.  · There is a moderate left pleural effusion.                Past Medical History:   Diagnosis Date    Ascites     Breast pain, left 1/4/2018    Cataract     CHF (congestive heart failure)     Cirrhosis     Cough     Diabetes mellitus     Diabetes mellitus, type 2     Gastroparesis     GERD (gastroesophageal reflux disease)     Hyperlipidemia     Hypertension     Hypotension 2/21/2019    Liver disease     Lumbar strain 4/27/2018    Pneumonia of right middle lobe due to infectious organism 12/19/2017    Renal disorder     Retinopathy due to secondary diabetes mellitus     Sleep apnea     Thyroid disease        Past Surgical History:   Procedure Laterality Date    CATARACT EXTRACTION      CHOLECYSTECTOMY      COLONOSCOPY N/A 9/4/2019    Procedure: COLONOSCOPY;  Surgeon: Marnie Elmore MD;  Location: [facility] ENDO;  Service: Endoscopy;  Laterality: N/A;    ERCP      FLUOROSCOPY N/A 7/24/2020    Procedure: TIPS revision;  Surgeon: Martell Jasmine MD;  Location: [facility] CATH LAB;  Service: General;  Laterality: N/A;    LIVER BIOPSY      THORACENTESIS Left 1/20/2020    Procedure: Thoracentesis;  Surgeon: Angelica Hunter MD;  Location: [facility] ENDO;  Service: Pulmonary;  Laterality: Left;    TUBAL LIGATION      UPPER GASTROINTESTINAL ENDOSCOPY         Social History     Tobacco Use    Smoking status: Never Smoker    Smokeless tobacco: Never Used   Substance Use Topics    Alcohol use: No     Frequency: Never    Drug use: No       Family History   Problem Relation Age of Onset    Cancer Mother     Breast cancer Mother     Heart disease Father     Aneurysm Sister     Heart disease Brother     No Known Problems Maternal Grandmother     Cancer Maternal Grandfather     Sarcoidosis Sister        Patient's Medications   New Prescriptions    No medications on file   Previous Medications    AMLODIPINE  (NORVASC) 10 MG TABLET    Take 10 mg by mouth once daily.    ASPIRIN (ECOTRIN) 81 MG EC TABLET    Take 1 tablet (81 mg total) by mouth once daily.    BUMETANIDE (BUMEX) 2 MG TABLET    Take 1 tablet (2 mg total) by mouth 2 (two) times daily.    DOXAZOSIN (CARDURA) 2 MG TABLET    Take 1 tablet (2 mg total) by mouth every evening.    EUTHYROX 137 MCG TAB TABLET    TAKE 1 TABLET BY MOUTH BEFORE BREAKFAST    FAMOTIDINE (PEPCID) 20 MG TABLET    Take 1 tablet (20 mg total) by mouth once daily.    GLUCAGON, HUMAN RECOMBINANT, (GLUCAGON EMERGENCY KIT, HUMAN,) 1 MG SOLR    Inject 1 mg into the muscle as needed.    INSULIN (LANTUS SOLOSTAR U-100 INSULIN) GLARGINE 100 UNITS/ML (3ML) SUBQ PEN    INJECT 20 UNITS INTO THE SKIN EVERY EVENING.  TITRATE UP TO 30 UNITS NIGHTLY AS DIRECTED ON INSTRUCTION SHEET    ISOSORBIDE MONONITRATE (IMDUR) 30 MG 24 HR TABLET    Take 1 tablet (30 mg total) by mouth once daily.    LACTULOSE (CEPHULAC) 20 GRAM PACK    Take 1 packet (20 g total) by mouth 2 (two) times daily.    LIRAGLUTIDE 0.6 MG/0.1 ML, 18 MG/3 ML, SUBQ PNIJ (VICTOZA 2-SHAYY) 0.6 MG/0.1 ML (18 MG/3 ML) PNIJ    Inject 1.8 mg into the skin once daily.    LISINOPRIL (PRINIVIL,ZESTRIL) 5 MG TABLET    Take 1 tablet (5 mg total) by mouth once daily.    MAGNESIUM OXIDE (MAG-OX) 400 MG (241.3 MG MAGNESIUM) TABLET    Take 1 tablet (400 mg total) by mouth once daily.    METOLAZONE (ZAROXOLYN) 5 MG TABLET    Take 1 tablet (5 mg total) by mouth once daily. Take daily as needed and then every other day for swelling, 30 min before lasix    ONDANSETRON (ZOFRAN) 4 MG TABLET    TAKE 1 TABLET BY MOUTH EVERY 8 HOURS AS NEEDED    ONDANSETRON (ZOFRAN-ODT) 4 MG TBDL    Take 1 tablet (4 mg total) by mouth every 8 (eight) hours as needed.    POTASSIUM CHLORIDE SA (K-DUR,KLOR-CON) 20 MEQ TABLET    Take 1 tablet (20 mEq total) by mouth once daily.    RIFAXIMIN (XIFAXAN) 550 MG TAB    Take 1 tablet (550 mg total) by mouth 2 (two) times daily.    SENNA-DOCUSATE  "8.6-50 MG (PERICOLACE) 8.6-50 MG PER TABLET    Take 2 tablets by mouth once daily.   Modified Medications    No medications on file   Discontinued Medications    No medications on file       Review of Systems   Constitutional: Negative.    HENT: Negative.    Eyes: Negative.    Respiratory: Negative for shortness of breath (intermittent).    Cardiovascular: Negative for chest pain, palpitations and leg swelling.   Gastrointestinal: Negative for nausea.   Genitourinary: Negative.    Musculoskeletal: Negative.    Skin: Negative.    Neurological: Negative.    Endo/Heme/Allergies: Negative.    Psychiatric/Behavioral: Negative.    All 12 systems otherwise negative.      Wt Readings from Last 3 Encounters:   12/28/20 88.1 kg (194 lb 3.6 oz)   12/17/20 88.8 kg (195 lb 12.3 oz)   11/30/20 88.2 kg (194 lb 7.1 oz)     Temp Readings from Last 3 Encounters:   11/13/20 97.3 °F (36.3 °C) (Temporal)   10/29/20 99.8 °F (37.7 °C) (Oral)   10/19/20 98.5 °F (36.9 °C) (Tympanic)     BP Readings from Last 3 Encounters:   12/28/20 (!) 140/72   12/17/20 (!) 160/80   11/16/20 118/62     Pulse Readings from Last 3 Encounters:   12/28/20 77   11/16/20 82   11/13/20 82       BP (!) 140/72 (BP Location: Right arm, Patient Position: Sitting, BP Method: Medium (Manual))   Pulse 77   Resp 16   Ht 5' 1" (1.549 m)   Wt 88.1 kg (194 lb 3.6 oz)   LMP  (LMP Unknown)   SpO2 99%   BMI 36.70 kg/m²     Objective:   Physical Exam   Constitutional: She is oriented to person, place, and time. She appears well-developed and well-nourished. No distress.   HENT:   Head: Normocephalic and atraumatic.   Nose: Nose normal.   Mouth/Throat: Oropharynx is clear and moist. No oropharyngeal exudate.   Eyes: Conjunctivae and EOM are normal. Right eye exhibits no discharge. Left eye exhibits no discharge. No scleral icterus.   Neck: Normal range of motion. Neck supple. No JVD present. No thyromegaly present.   Cardiovascular: Normal rate, regular rhythm, S1 normal " and S2 normal. Exam reveals no gallop, no S3, no S4 and no friction rub.   Murmur heard.  Pulmonary/Chest: Effort normal and breath sounds normal. No stridor. No respiratory distress. She has no wheezes. She has no rales. She exhibits no tenderness.   Abdominal: Soft. Bowel sounds are normal. She exhibits no distension and no mass. There is no abdominal tenderness. There is no rebound.   Genitourinary:    Genitourinary Comments: Deferred     Musculoskeletal: Normal range of motion.         General: No tenderness, deformity or edema.   Lymphadenopathy:     She has no cervical adenopathy.   Neurological: She is alert and oriented to person, place, and time. She exhibits normal muscle tone. Coordination normal.   Skin: Skin is warm and dry. No rash noted. She is not diaphoretic. No erythema. No pallor.   Psychiatric: She has a normal mood and affect. Her behavior is normal. Judgment and thought content normal.   Nursing note and vitals reviewed.      Lab Results   Component Value Date     12/14/2020    K 4.3 12/14/2020     (H) 12/14/2020    CO2 23 12/14/2020    BUN 34 (H) 12/14/2020    CREATININE 2.2 (H) 12/14/2020     (H) 12/14/2020    HGBA1C 5.9 (H) 10/10/2020    MG 1.6 10/10/2020    AST 32 10/13/2020    ALT 11 10/13/2020    ALBUMIN 1.7 (L) 12/14/2020    PROT 4.8 (L) 10/13/2020    BILITOT 0.2 10/13/2020    WBC 5.78 10/09/2020    HGB 10.7 (L) 10/09/2020    HCT 34.3 (L) 10/09/2020    MCV 92 10/09/2020     10/09/2020    INR 1.0 10/13/2020    TSH 3.952 10/09/2020    CHOL 205 (H) 12/06/2018    HDL 41 12/06/2018    LDLCALC 145.8 12/06/2018    TRIG 91 12/06/2018     (H) 10/11/2020     Assessment:      1. Chronic diastolic congestive heart failure    2. Other hyperlipidemia    3. Pericardial effusion    4. Essential hypertension    5. CKD (chronic kidney disease) stage 4, GFR 15-29 ml/min    6. Preop cardiovascular exam    7. Bilateral lower extremity edema    8. KRISTAL on CPAP    9. Hepatic  cirrhosis, unspecified hepatic cirrhosis type, unspecified whether ascites present    10. 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 Hypothyroidism due to acquired atrophy of thyroid    12. Diabetes due to underlying condition w oth circulatory comp        Plan:   1. Chronic diastolic HF -  -daily weights, low salt diet -bumex 2 BID with metolazone every other day with extra dose PRN  - daily compression stockings    2. HTN  - cont meds and titate    3. HLD  - cont statin    4. DM2 A1c - 5.9  - cont meds per PCP  - sugars stable now 90s-110    5. Cirrhosis s/p TIPS with edema, low albumin  - cont tx per PCP/Hepatology  - may be candidate for transplant will follow up - order pharm nuclear stress to r/o ischemia, pt cannot walk on treadmill due to dyspnea/edema, pending      6. KRISTAL  - needs CPAP, was not using it before     7. Obesity  - rec weight loss with diet/exercise     8. Pericardial effusion  - small, sec to cirrhosis/CHF  - no tamponade clinically on recent ECHO    9. CKD 4 with proteinuria  - cont f/u with nephro    Undergoing workup for kidney/liver transplant - nuclear stress test pending      12/31/20 ADDENDUM    Pre-OP CV evaluation prior to kidney/liver transplant  Moderate periop risk of CV events for moderate to high risk procedure.  No chest pain, active arrhythmia,CHF symptoms at time of visit.   Ok to proceed to the scheduled surgery without further cardiac study x 12 months.    Thank you for allowing me to participate in this patient's care. Please do not hesitate to contact me with any questions or concerns. Consult note has been forwarded to the referral physician.

## 2020-12-29 ENCOUNTER — OFFICE VISIT (OUTPATIENT)
Dept: OPHTHALMOLOGY | Facility: CLINIC | Age: 65
End: 2020-12-29
Payer: MEDICAID

## 2020-12-29 ENCOUNTER — TELEPHONE (OUTPATIENT)
Dept: CARDIOLOGY | Facility: HOSPITAL | Age: 65
End: 2020-12-29

## 2020-12-29 DIAGNOSIS — E11.3413 SEVERE NONPROLIFERATIVE DIABETIC RETINOPATHY OF BOTH EYES WITH MACULAR EDEMA ASSOCIATED WITH TYPE 2 DIABETES MELLITUS: Primary | ICD-10-CM

## 2020-12-29 DIAGNOSIS — H52.13 MYOPIA, BILATERAL: ICD-10-CM

## 2020-12-29 DIAGNOSIS — H52.221 REGULAR ASTIGMATISM OF RIGHT EYE: ICD-10-CM

## 2020-12-29 DIAGNOSIS — Z96.1 PSEUDOPHAKIA OF BOTH EYES: ICD-10-CM

## 2020-12-29 DIAGNOSIS — H52.4 PRESBYOPIA: ICD-10-CM

## 2020-12-29 PROCEDURE — 92014 COMPRE OPH EXAM EST PT 1/>: CPT | Mod: S$PBB,,, | Performed by: OPTOMETRIST

## 2020-12-29 PROCEDURE — 99213 OFFICE O/P EST LOW 20 MIN: CPT | Mod: PBBFAC,25 | Performed by: OPTOMETRIST

## 2020-12-29 PROCEDURE — 92134 CPTRZ OPH DX IMG PST SGM RTA: CPT | Mod: PBBFAC | Performed by: OPTOMETRIST

## 2020-12-29 PROCEDURE — 99999 PR PBB SHADOW E&M-EST. PATIENT-LVL III: CPT | Mod: PBBFAC,,, | Performed by: OPTOMETRIST

## 2020-12-29 PROCEDURE — 92134 POSTERIOR SEGMENT OCT RETINA (OCULAR COHERENCE TOMOGRAPHY)-BOTH EYES: ICD-10-PCS | Mod: 26,S$PBB,, | Performed by: OPTOMETRIST

## 2020-12-29 PROCEDURE — 92014 PR EYE EXAM, EST PATIENT,COMPREHESV: ICD-10-PCS | Mod: S$PBB,,, | Performed by: OPTOMETRIST

## 2020-12-29 PROCEDURE — 99999 PR PBB SHADOW E&M-EST. PATIENT-LVL III: ICD-10-PCS | Mod: PBBFAC,,, | Performed by: OPTOMETRIST

## 2020-12-29 PROCEDURE — 92015 DETERMINE REFRACTIVE STATE: CPT | Mod: ,,, | Performed by: OPTOMETRIST

## 2020-12-29 PROCEDURE — 92015 PR REFRACTION: ICD-10-PCS | Mod: ,,, | Performed by: OPTOMETRIST

## 2020-12-29 NOTE — TELEPHONE ENCOUNTER
Peer to peer completed with Dr. Gilbert. Appears that patient was approved from [facility] in Feb 2018 through insurance company. According to [facility] records, test never performed.    Nuclear stress test has been ordered by Dr. Marcos Ramos to rule out ischemia as cause of progressive NICHOLE, CHF and clearance for possible liver/kidney transplant       Please fax this note to   [phone] with Reference # [id number] included on cover sheet

## 2020-12-30 ENCOUNTER — HOSPITAL ENCOUNTER (OUTPATIENT)
Dept: CARDIOLOGY | Facility: HOSPITAL | Age: 65
Discharge: HOME | End: 2020-12-30
Attending: INTERNAL MEDICINE
Payer: MEDICAID

## 2020-12-30 ENCOUNTER — HOSPITAL ENCOUNTER (OUTPATIENT)
Dept: RADIOLOGY | Facility: HOSPITAL | Age: 65
Discharge: HOME | End: 2020-12-30
Attending: INTERNAL MEDICINE
Payer: MEDICAID

## 2020-12-30 DIAGNOSIS — E08.59 DIABETES DUE TO UNDERLYING CONDITION W OTH CIRCULATORY COMP: ICD-10-CM

## 2020-12-30 DIAGNOSIS — I10 ESSENTIAL HYPERTENSION: ICD-10-CM

## 2020-12-30 DIAGNOSIS — K74.60 LIVER CIRRHOSIS SECONDARY TO NASH: ICD-10-CM

## 2020-12-30 DIAGNOSIS — I50.32 CHRONIC DIASTOLIC CONGESTIVE HEART FAILURE: ICD-10-CM

## 2020-12-30 DIAGNOSIS — K75.81 LIVER CIRRHOSIS SECONDARY TO NASH: ICD-10-CM

## 2020-12-30 DIAGNOSIS — E78.49 OTHER HYPERLIPIDEMIA: ICD-10-CM

## 2020-12-30 DIAGNOSIS — Z01.810 PREOP CARDIOVASCULAR EXAM: ICD-10-CM

## 2020-12-30 LAB
CV STRESS BASE HR: 83 BPM
DIASTOLIC BLOOD PRESSURE: 67 MMHG
NUC REST EJECTION FRACTION: 64
NUC STRESS EJECTION FRACTION: 65 %
OHS CV CPX 85 PERCENT MAX PREDICTED HEART RATE MALE: 128
OHS CV CPX MAX PREDICTED HEART RATE: 151
OHS CV CPX PATIENT IS FEMALE: 1
OHS CV CPX PATIENT IS MALE: 0
OHS CV CPX PEAK DIASTOLIC BLOOD PRESSURE: 62 MMHG
OHS CV CPX PEAK HEAR RATE: 95 BPM
OHS CV CPX PEAK RATE PRESSURE PRODUCT: NORMAL
OHS CV CPX PEAK SYSTOLIC BLOOD PRESSURE: 149 MMHG
OHS CV CPX PERCENT MAX PREDICTED HEART RATE ACHIEVED: 63
OHS CV CPX RATE PRESSURE PRODUCT PRESENTING: NORMAL
STRESS ECHO POST EXERCISE DUR MIN: 1 MINUTES
STRESS ECHO POST EXERCISE DUR SEC: 9 SECONDS
SYSTOLIC BLOOD PRESSURE: 153 MMHG

## 2020-12-30 PROCEDURE — 93016 STRESS TEST WITH MYOCARDIAL PERFUSION (CUPID ONLY): ICD-10-PCS | Mod: ,,, | Performed by: INTERNAL MEDICINE

## 2020-12-30 PROCEDURE — 93018 CV STRESS TEST I&R ONLY: CPT | Mod: ,,, | Performed by: INTERNAL MEDICINE

## 2020-12-30 PROCEDURE — 93017 CV STRESS TEST TRACING ONLY: CPT

## 2020-12-30 PROCEDURE — 63600175 PHARM REV CODE 636 W HCPCS: Performed by: INTERNAL MEDICINE

## 2020-12-30 PROCEDURE — A9502 TC99M TETROFOSMIN: HCPCS

## 2020-12-30 PROCEDURE — 78452 STRESS TEST WITH MYOCARDIAL PERFUSION (CUPID ONLY): ICD-10-PCS | Mod: 26,,, | Performed by: INTERNAL MEDICINE

## 2020-12-30 PROCEDURE — 78452 HT MUSCLE IMAGE SPECT MULT: CPT | Mod: 26,,, | Performed by: INTERNAL MEDICINE

## 2020-12-30 PROCEDURE — 93018 STRESS TEST WITH MYOCARDIAL PERFUSION (CUPID ONLY): ICD-10-PCS | Mod: ,,, | Performed by: INTERNAL MEDICINE

## 2020-12-30 PROCEDURE — 93016 CV STRESS TEST SUPVJ ONLY: CPT | Mod: ,,, | Performed by: INTERNAL MEDICINE

## 2020-12-30 RX ORDER — REGADENOSON 0.08 MG/ML
0.4 INJECTION, SOLUTION INTRAVENOUS ONCE
Status: COMPLETED | OUTPATIENT
Start: 2020-12-30 | End: 2020-12-30

## 2020-12-30 RX ADMIN — REGADENOSON 0.4 MG: 0.08 INJECTION, SOLUTION INTRAVENOUS at 10:12

## 2020-12-30 NOTE — PROGRESS NOTES
HPI     Diabetic Eye Exam     Comments: Yearly              Comments     Patient last visit with [facility] on 04/09/2019.  1. Pseudophakia, OU (2012) [facility]    Diabetic eye exam  Diagnosed with diabetes over 10 years ago   Recent vision fluctuations No  Lab Results       Component                Value               Date                       HGBA1C                   5.9 (H)             10/10/2020              HPI    Any vision changes since last exam: No  Eye pain: No  Other ocular symptoms: No    Do you wear currently wear glasses or contacts? OTC Readers     Interested in contacts today? No    Do you plan on getting new glasses today? If Needed               Last edited by Candy Coley on 12/29/2020  2:56 PM. (History)            Assessment /Plan     For exam results, see Encounter Report.    Severe nonproliferative diabetic retinopathy of both eyes with macular edema associated with type 2 diabetes mellitus  -     Posterior Segment OCT Retina-Both eyes  Consult Dr Desai    Pseudophakia of both eyes  Myopia, bilateral  Regular astigmatism of right eye  Presbyopia  Eyeglass Final Rx     Eyeglass Final Rx       Sphere Cylinder Axis Add    Right -1.50 +1.00 010 +2.50    Left -0.25   +2.50    Expiration Date: 12/30/2021                RTC for retinal consult with Dr Desai, next available

## 2020-12-30 NOTE — TELEPHONE ENCOUNTER
----- Message from Marcos Ramos MD sent at 10/26/2019  9:19 AM CDT -----  Please call the patient regarding her abnormal result. BNP is elevated but improved from 10 days ago, continue lasix as discussed, monitor BP and HR. Follow up soon to re-evaluate. If any worsening leg swelling or breathing issues need to double up lasix again.  
----- Message from Marcos Ramos MD sent at 10/26/2019  9:19 AM CDT -----  Please call the patient regarding her abnormal result. BNP is elevated but improved from 10 days ago, continue lasix as discussed, monitor BP and HR. Follow up soon to re-evaluate. If any worsening leg swelling or breathing issues need to double up lasix again.  
Patient contacted and was advised that her. BNP is elevated but improved from 10 days ago, continue lasix as discussed, monitor BP and HR. Follow up soon to re-evaluate. If any worsening leg swelling or breathing issues need to double up lasix again. Patient stated understanding and will do follow up appointment as as scheduled.  
negative

## 2020-12-31 ENCOUNTER — LAB VISIT (OUTPATIENT)
Dept: LAB | Facility: HOSPITAL | Age: 65
End: 2020-12-31
Attending: INTERNAL MEDICINE
Payer: MEDICAID

## 2020-12-31 DIAGNOSIS — N18.4 CKD (CHRONIC KIDNEY DISEASE) STAGE 4, GFR 15-29 ML/MIN: ICD-10-CM

## 2020-12-31 LAB
ALBUMIN SERPL BCP-MCNC: 1.8 G/DL (ref 3.5–5.2)
ANION GAP SERPL CALC-SCNC: 9 MMOL/L (ref 8–16)
BASOPHILS # BLD AUTO: 0.04 K/UL (ref 0–0.2)
BASOPHILS NFR BLD: 0.7 % (ref 0–1.9)
BUN SERPL-MCNC: 40 MG/DL (ref 8–23)
CALCIUM SERPL-MCNC: 7.8 MG/DL (ref 8.7–10.5)
CHLORIDE SERPL-SCNC: 110 MMOL/L (ref 95–110)
CO2 SERPL-SCNC: 26 MMOL/L (ref 23–29)
CREAT SERPL-MCNC: 2 MG/DL (ref 0.5–1.4)
DIFFERENTIAL METHOD: ABNORMAL
EOSINOPHIL # BLD AUTO: 0.4 K/UL (ref 0–0.5)
EOSINOPHIL NFR BLD: 5.9 % (ref 0–8)
ERYTHROCYTE [DISTWIDTH] IN BLOOD BY AUTOMATED COUNT: 15.2 % (ref 11.5–14.5)
EST. GFR  (AFRICAN AMERICAN): 30 ML/MIN/1.73 M^2
EST. GFR  (NON AFRICAN AMERICAN): 26 ML/MIN/1.73 M^2
GLUCOSE SERPL-MCNC: 110 MG/DL (ref 70–110)
HCT VFR BLD AUTO: 37.5 % (ref 37–48.5)
HGB BLD-MCNC: 11.9 G/DL (ref 12–16)
IMM GRANULOCYTES # BLD AUTO: 0.01 K/UL (ref 0–0.04)
IMM GRANULOCYTES NFR BLD AUTO: 0.2 % (ref 0–0.5)
LYMPHOCYTES # BLD AUTO: 2.8 K/UL (ref 1–4.8)
LYMPHOCYTES NFR BLD: 47.5 % (ref 18–48)
MCH RBC QN AUTO: 28.9 PG (ref 27–31)
MCHC RBC AUTO-ENTMCNC: 31.7 G/DL (ref 32–36)
MCV RBC AUTO: 91 FL (ref 82–98)
MONOCYTES # BLD AUTO: 0.5 K/UL (ref 0.3–1)
MONOCYTES NFR BLD: 7.7 % (ref 4–15)
NEUTROPHILS # BLD AUTO: 2.3 K/UL (ref 1.8–7.7)
NEUTROPHILS NFR BLD: 38 % (ref 38–73)
NRBC BLD-RTO: 0 /100 WBC
PHOSPHATE SERPL-MCNC: 3.7 MG/DL (ref 2.7–4.5)
PLATELET # BLD AUTO: 249 K/UL (ref 150–350)
PMV BLD AUTO: 10.4 FL (ref 9.2–12.9)
POTASSIUM SERPL-SCNC: 4.3 MMOL/L (ref 3.5–5.1)
PTH-INTACT SERPL-MCNC: 301 PG/ML (ref 9–77)
RBC # BLD AUTO: 4.12 M/UL (ref 4–5.4)
SODIUM SERPL-SCNC: 145 MMOL/L (ref 136–145)
WBC # BLD AUTO: 5.94 K/UL (ref 3.9–12.7)

## 2020-12-31 PROCEDURE — 83970 ASSAY OF PARATHORMONE: CPT

## 2020-12-31 PROCEDURE — 85025 COMPLETE CBC W/AUTO DIFF WBC: CPT | Mod: PO

## 2020-12-31 PROCEDURE — 36415 COLL VENOUS BLD VENIPUNCTURE: CPT | Mod: PO

## 2020-12-31 PROCEDURE — 80069 RENAL FUNCTION PANEL: CPT | Mod: PO

## 2021-01-04 ENCOUNTER — OFFICE VISIT (OUTPATIENT)
Dept: NEPHROLOGY | Facility: CLINIC | Age: 66
End: 2021-01-04
Payer: MEDICAID

## 2021-01-04 VITALS
HEIGHT: 61 IN | WEIGHT: 197.06 LBS | HEART RATE: 72 BPM | SYSTOLIC BLOOD PRESSURE: 142 MMHG | BODY MASS INDEX: 37.2 KG/M2 | DIASTOLIC BLOOD PRESSURE: 80 MMHG

## 2021-01-04 DIAGNOSIS — E11.29 PROTEINURIA DUE TO TYPE 2 DIABETES MELLITUS: ICD-10-CM

## 2021-01-04 DIAGNOSIS — N25.81 HYPERPARATHYROIDISM, SECONDARY RENAL: ICD-10-CM

## 2021-01-04 DIAGNOSIS — R80.9 PROTEINURIA DUE TO TYPE 2 DIABETES MELLITUS: ICD-10-CM

## 2021-01-04 DIAGNOSIS — N18.30 STAGE 3 CHRONIC KIDNEY DISEASE, UNSPECIFIED WHETHER STAGE 3A OR 3B CKD: Primary | ICD-10-CM

## 2021-01-04 PROCEDURE — 99214 OFFICE O/P EST MOD 30 MIN: CPT | Mod: S$PBB,,, | Performed by: INTERNAL MEDICINE

## 2021-01-04 PROCEDURE — 99999 PR PBB SHADOW E&M-EST. PATIENT-LVL IV: CPT | Mod: PBBFAC,,, | Performed by: INTERNAL MEDICINE

## 2021-01-04 PROCEDURE — 99999 PR PBB SHADOW E&M-EST. PATIENT-LVL IV: ICD-10-PCS | Mod: PBBFAC,,, | Performed by: INTERNAL MEDICINE

## 2021-01-04 PROCEDURE — 99214 PR OFFICE/OUTPT VISIT, EST, LEVL IV, 30-39 MIN: ICD-10-PCS | Mod: S$PBB,,, | Performed by: INTERNAL MEDICINE

## 2021-01-04 PROCEDURE — 99214 OFFICE O/P EST MOD 30 MIN: CPT | Mod: PBBFAC | Performed by: INTERNAL MEDICINE

## 2021-01-05 ENCOUNTER — PATIENT OUTREACH (OUTPATIENT)
Dept: ADMINISTRATIVE | Facility: HOSPITAL | Age: 66
End: 2021-01-05

## 2021-01-27 ENCOUNTER — TELEPHONE (OUTPATIENT)
Dept: HEPATOLOGY | Facility: CLINIC | Age: 66
End: 2021-01-27

## 2021-01-29 ENCOUNTER — OFFICE VISIT (OUTPATIENT)
Dept: OPHTHALMOLOGY | Facility: CLINIC | Age: 66
End: 2021-01-29
Payer: MEDICAID

## 2021-01-29 DIAGNOSIS — E11.3413 SEVERE NONPROLIFERATIVE DIABETIC RETINOPATHY OF BOTH EYES WITH MACULAR EDEMA ASSOCIATED WITH TYPE 2 DIABETES MELLITUS: Primary | ICD-10-CM

## 2021-01-29 PROCEDURE — 99213 OFFICE O/P EST LOW 20 MIN: CPT | Mod: PBBFAC | Performed by: OPHTHALMOLOGY

## 2021-01-29 PROCEDURE — 99999 PR PBB SHADOW E&M-EST. PATIENT-LVL III: ICD-10-PCS | Mod: PBBFAC,,, | Performed by: OPHTHALMOLOGY

## 2021-01-29 PROCEDURE — 99999 PR PBB SHADOW E&M-EST. PATIENT-LVL III: CPT | Mod: PBBFAC,,, | Performed by: OPHTHALMOLOGY

## 2021-01-29 PROCEDURE — 92004 COMPRE OPH EXAM NEW PT 1/>: CPT | Mod: S$PBB,,, | Performed by: OPHTHALMOLOGY

## 2021-01-29 PROCEDURE — 92004 PR EYE EXAM, NEW PATIENT,COMPREHESV: ICD-10-PCS | Mod: S$PBB,,, | Performed by: OPHTHALMOLOGY

## 2021-02-06 DIAGNOSIS — K76.82 HEPATIC ENCEPHALOPATHY: ICD-10-CM

## 2021-02-08 ENCOUNTER — OFFICE VISIT (OUTPATIENT)
Dept: HEPATOLOGY | Facility: CLINIC | Age: 66
End: 2021-02-08
Payer: MEDICAID

## 2021-02-08 ENCOUNTER — CLINICAL SUPPORT (OUTPATIENT)
Dept: DIABETES | Facility: CLINIC | Age: 66
End: 2021-02-08
Payer: MEDICAID

## 2021-02-08 ENCOUNTER — LAB VISIT (OUTPATIENT)
Dept: LAB | Facility: HOSPITAL | Age: 66
End: 2021-02-08
Attending: NURSE PRACTITIONER
Payer: MEDICAID

## 2021-02-08 VITALS
SYSTOLIC BLOOD PRESSURE: 120 MMHG | HEART RATE: 73 BPM | WEIGHT: 198.63 LBS | BODY MASS INDEX: 37.5 KG/M2 | WEIGHT: 198.63 LBS | HEIGHT: 61 IN | BODY MASS INDEX: 37.5 KG/M2 | DIASTOLIC BLOOD PRESSURE: 64 MMHG | HEIGHT: 61 IN

## 2021-02-08 DIAGNOSIS — N18.4 CKD (CHRONIC KIDNEY DISEASE) STAGE 4, GFR 15-29 ML/MIN: ICD-10-CM

## 2021-02-08 DIAGNOSIS — K75.81 LIVER CIRRHOSIS SECONDARY TO NASH: Primary | ICD-10-CM

## 2021-02-08 DIAGNOSIS — K74.60 LIVER CIRRHOSIS SECONDARY TO NASH: ICD-10-CM

## 2021-02-08 DIAGNOSIS — I50.33 ACUTE ON CHRONIC DIASTOLIC CONGESTIVE HEART FAILURE: ICD-10-CM

## 2021-02-08 DIAGNOSIS — K75.81 LIVER CIRRHOSIS SECONDARY TO NASH: ICD-10-CM

## 2021-02-08 DIAGNOSIS — R60.0 LOCALIZED EDEMA: ICD-10-CM

## 2021-02-08 DIAGNOSIS — K74.60 LIVER CIRRHOSIS SECONDARY TO NASH: Primary | ICD-10-CM

## 2021-02-08 LAB
BASOPHILS # BLD AUTO: 0.03 K/UL (ref 0–0.2)
BASOPHILS NFR BLD: 0.6 % (ref 0–1.9)
DIFFERENTIAL METHOD: ABNORMAL
EOSINOPHIL # BLD AUTO: 0.2 K/UL (ref 0–0.5)
EOSINOPHIL NFR BLD: 4.3 % (ref 0–8)
ERYTHROCYTE [DISTWIDTH] IN BLOOD BY AUTOMATED COUNT: 15.2 % (ref 11.5–14.5)
HCT VFR BLD AUTO: 35 % (ref 37–48.5)
HGB BLD-MCNC: 11 G/DL (ref 12–16)
IMM GRANULOCYTES # BLD AUTO: 0.01 K/UL (ref 0–0.04)
IMM GRANULOCYTES NFR BLD AUTO: 0.2 % (ref 0–0.5)
INR PPP: 1 (ref 0.8–1.2)
LYMPHOCYTES # BLD AUTO: 2.4 K/UL (ref 1–4.8)
LYMPHOCYTES NFR BLD: 44.7 % (ref 18–48)
MCH RBC QN AUTO: 28.9 PG (ref 27–31)
MCHC RBC AUTO-ENTMCNC: 31.4 G/DL (ref 32–36)
MCV RBC AUTO: 92 FL (ref 82–98)
MONOCYTES # BLD AUTO: 0.4 K/UL (ref 0.3–1)
MONOCYTES NFR BLD: 7.5 % (ref 4–15)
NEUTROPHILS # BLD AUTO: 2.3 K/UL (ref 1.8–7.7)
NEUTROPHILS NFR BLD: 42.7 % (ref 38–73)
NRBC BLD-RTO: 0 /100 WBC
PLATELET # BLD AUTO: 209 K/UL (ref 150–350)
PMV BLD AUTO: 10.4 FL (ref 9.2–12.9)
PROTHROMBIN TIME: 10.8 SEC (ref 9–12.5)
RBC # BLD AUTO: 3.81 M/UL (ref 4–5.4)
WBC # BLD AUTO: 5.33 K/UL (ref 3.9–12.7)

## 2021-02-08 PROCEDURE — 80053 COMPREHEN METABOLIC PANEL: CPT

## 2021-02-08 PROCEDURE — 99213 OFFICE O/P EST LOW 20 MIN: CPT | Mod: PBBFAC | Performed by: DIETITIAN, REGISTERED

## 2021-02-08 PROCEDURE — 99999 PR PBB SHADOW E&M-EST. PATIENT-LVL III: CPT | Mod: PBBFAC,,, | Performed by: DIETITIAN, REGISTERED

## 2021-02-08 PROCEDURE — 99999 PR PBB SHADOW E&M-EST. PATIENT-LVL III: ICD-10-PCS | Mod: PBBFAC,,, | Performed by: DIETITIAN, REGISTERED

## 2021-02-08 PROCEDURE — 85025 COMPLETE CBC W/AUTO DIFF WBC: CPT

## 2021-02-08 PROCEDURE — 99214 PR OFFICE/OUTPT VISIT, EST, LEVL IV, 30-39 MIN: ICD-10-PCS | Mod: S$PBB,,, | Performed by: NURSE PRACTITIONER

## 2021-02-08 PROCEDURE — 36415 COLL VENOUS BLD VENIPUNCTURE: CPT

## 2021-02-08 PROCEDURE — 99215 OFFICE O/P EST HI 40 MIN: CPT | Mod: PBBFAC,27 | Performed by: NURSE PRACTITIONER

## 2021-02-08 PROCEDURE — 99999 PR PBB SHADOW E&M-EST. PATIENT-LVL V: CPT | Mod: PBBFAC,,, | Performed by: NURSE PRACTITIONER

## 2021-02-08 PROCEDURE — 99214 OFFICE O/P EST MOD 30 MIN: CPT | Mod: S$PBB,,, | Performed by: NURSE PRACTITIONER

## 2021-02-08 PROCEDURE — G0108 DIAB MANAGE TRN  PER INDIV: HCPCS | Mod: PBBFAC | Performed by: DIETITIAN, REGISTERED

## 2021-02-08 PROCEDURE — 82105 ALPHA-FETOPROTEIN SERUM: CPT

## 2021-02-08 PROCEDURE — 99999 PR PBB SHADOW E&M-EST. PATIENT-LVL V: ICD-10-PCS | Mod: PBBFAC,,, | Performed by: NURSE PRACTITIONER

## 2021-02-08 PROCEDURE — 85610 PROTHROMBIN TIME: CPT

## 2021-02-09 ENCOUNTER — TELEPHONE (OUTPATIENT)
Dept: RADIOLOGY | Facility: HOSPITAL | Age: 66
End: 2021-02-09

## 2021-02-09 DIAGNOSIS — E87.5 HYPERKALEMIA: Primary | ICD-10-CM

## 2021-02-09 LAB
AFP SERPL-MCNC: 1.9 NG/ML (ref 0–8.4)
ALBUMIN SERPL BCP-MCNC: 2.1 G/DL (ref 3.5–5.2)
ALP SERPL-CCNC: 103 U/L (ref 55–135)
ALT SERPL W/O P-5'-P-CCNC: 10 U/L (ref 10–44)
ANION GAP SERPL CALC-SCNC: 7 MMOL/L (ref 8–16)
AST SERPL-CCNC: 24 U/L (ref 10–40)
BILIRUB SERPL-MCNC: 0.3 MG/DL (ref 0.1–1)
BUN SERPL-MCNC: 38 MG/DL (ref 8–23)
CALCIUM SERPL-MCNC: 8.2 MG/DL (ref 8.7–10.5)
CHLORIDE SERPL-SCNC: 116 MMOL/L (ref 95–110)
CO2 SERPL-SCNC: 21 MMOL/L (ref 23–29)
CREAT SERPL-MCNC: 2.5 MG/DL (ref 0.5–1.4)
EST. GFR  (AFRICAN AMERICAN): 22.9 ML/MIN/1.73 M^2
EST. GFR  (NON AFRICAN AMERICAN): 19.9 ML/MIN/1.73 M^2
GLUCOSE SERPL-MCNC: 100 MG/DL (ref 70–110)
POTASSIUM SERPL-SCNC: 5.9 MMOL/L (ref 3.5–5.1)
PROT SERPL-MCNC: 6.1 G/DL (ref 6–8.4)
SODIUM SERPL-SCNC: 144 MMOL/L (ref 136–145)

## 2021-02-10 ENCOUNTER — HOSPITAL ENCOUNTER (OUTPATIENT)
Dept: RADIOLOGY | Facility: HOSPITAL | Age: 66
Discharge: HOME | End: 2021-02-10
Attending: NURSE PRACTITIONER
Payer: MEDICAID

## 2021-02-10 DIAGNOSIS — K74.60 LIVER CIRRHOSIS SECONDARY TO NASH: ICD-10-CM

## 2021-02-10 DIAGNOSIS — K75.81 LIVER CIRRHOSIS SECONDARY TO NASH: ICD-10-CM

## 2021-02-10 PROCEDURE — 76700 US ABDOMEN COMPLETE: ICD-10-PCS | Mod: 26,,, | Performed by: RADIOLOGY

## 2021-02-10 PROCEDURE — 76700 US EXAM ABDOM COMPLETE: CPT | Mod: 26,,, | Performed by: RADIOLOGY

## 2021-02-10 PROCEDURE — 76700 US EXAM ABDOM COMPLETE: CPT | Mod: TC,PO

## 2021-02-15 ENCOUNTER — OFFICE VISIT (OUTPATIENT)
Dept: CARDIOLOGY | Facility: CLINIC | Age: 66
End: 2021-02-15
Payer: MEDICAID

## 2021-02-15 DIAGNOSIS — I31.39 PERICARDIAL EFFUSION: ICD-10-CM

## 2021-02-15 DIAGNOSIS — K74.60 LIVER CIRRHOSIS SECONDARY TO NASH: ICD-10-CM

## 2021-02-15 DIAGNOSIS — R60.0 BILATERAL LOWER EXTREMITY EDEMA: ICD-10-CM

## 2021-02-15 DIAGNOSIS — R60.9 CHRONIC EDEMA: ICD-10-CM

## 2021-02-15 DIAGNOSIS — K74.60 DECOMPENSATED HEPATIC CIRRHOSIS: ICD-10-CM

## 2021-02-15 DIAGNOSIS — K72.90 DECOMPENSATED HEPATIC CIRRHOSIS: ICD-10-CM

## 2021-02-15 DIAGNOSIS — K75.81 LIVER CIRRHOSIS SECONDARY TO NASH: ICD-10-CM

## 2021-02-15 DIAGNOSIS — E78.49 OTHER HYPERLIPIDEMIA: ICD-10-CM

## 2021-02-15 DIAGNOSIS — I10 ESSENTIAL HYPERTENSION: ICD-10-CM

## 2021-02-15 DIAGNOSIS — E03.4 HYPOTHYROIDISM DUE TO ACQUIRED ATROPHY OF THYROID: ICD-10-CM

## 2021-02-15 DIAGNOSIS — E66.01 MORBID OBESITY DUE TO EXCESS CALORIES: ICD-10-CM

## 2021-02-15 DIAGNOSIS — E03.9 ACQUIRED HYPOTHYROIDISM: ICD-10-CM

## 2021-02-15 DIAGNOSIS — I50.32 CHRONIC DIASTOLIC CONGESTIVE HEART FAILURE: ICD-10-CM

## 2021-02-15 DIAGNOSIS — G47.33 OSA ON CPAP: ICD-10-CM

## 2021-02-15 DIAGNOSIS — Z01.810 PREOP CARDIOVASCULAR EXAM: Primary | ICD-10-CM

## 2021-02-15 PROBLEM — I50.9 CHF EXACERBATION: Status: RESOLVED | Noted: 2020-10-09 | Resolved: 2021-02-15

## 2021-02-15 PROCEDURE — 99214 PR OFFICE/OUTPT VISIT, EST, LEVL IV, 30-39 MIN: ICD-10-PCS | Mod: 95,,, | Performed by: INTERNAL MEDICINE

## 2021-02-15 PROCEDURE — 99214 OFFICE O/P EST MOD 30 MIN: CPT | Mod: 95,,, | Performed by: INTERNAL MEDICINE

## 2021-02-17 ENCOUNTER — TELEPHONE (OUTPATIENT)
Dept: CARDIOLOGY | Facility: CLINIC | Age: 66
End: 2021-02-17

## 2021-02-17 ENCOUNTER — PATIENT MESSAGE (OUTPATIENT)
Dept: HEPATOLOGY | Facility: CLINIC | Age: 66
End: 2021-02-17

## 2021-02-19 ENCOUNTER — TELEPHONE (OUTPATIENT)
Dept: CARDIOLOGY | Facility: CLINIC | Age: 66
End: 2021-02-19

## 2021-02-23 ENCOUNTER — LAB VISIT (OUTPATIENT)
Dept: LAB | Facility: HOSPITAL | Age: 66
End: 2021-02-23
Attending: NURSE PRACTITIONER
Payer: MEDICAID

## 2021-02-23 DIAGNOSIS — E87.5 HYPERKALEMIA: ICD-10-CM

## 2021-02-23 LAB — POTASSIUM SERPL-SCNC: 4 MMOL/L (ref 3.5–5.1)

## 2021-02-23 PROCEDURE — 36415 COLL VENOUS BLD VENIPUNCTURE: CPT | Mod: PO

## 2021-02-23 PROCEDURE — 84132 ASSAY OF SERUM POTASSIUM: CPT | Mod: PO

## 2021-02-24 ENCOUNTER — PATIENT MESSAGE (OUTPATIENT)
Dept: HEPATOLOGY | Facility: CLINIC | Age: 66
End: 2021-02-24

## 2021-03-04 ENCOUNTER — IMMUNIZATION (OUTPATIENT)
Dept: PRIMARY CARE CLINIC | Facility: CLINIC | Age: 66
End: 2021-03-04

## 2021-03-04 DIAGNOSIS — Z23 NEED FOR VACCINATION: Primary | ICD-10-CM

## 2021-03-04 PROCEDURE — 91301 COVID-19, MRNA, LNP-S, PF, 100 MCG/0.5 ML DOSE VACCINE: ICD-10-PCS | Mod: S$GLB,,, | Performed by: FAMILY MEDICINE

## 2021-03-04 PROCEDURE — 0011A COVID-19, MRNA, LNP-S, PF, 100 MCG/0.5 ML DOSE VACCINE: CPT | Mod: CV19,S$GLB,, | Performed by: FAMILY MEDICINE

## 2021-03-04 PROCEDURE — 0011A COVID-19, MRNA, LNP-S, PF, 100 MCG/0.5 ML DOSE VACCINE: ICD-10-PCS | Mod: CV19,S$GLB,, | Performed by: FAMILY MEDICINE

## 2021-03-04 PROCEDURE — 91301 COVID-19, MRNA, LNP-S, PF, 100 MCG/0.5 ML DOSE VACCINE: CPT | Mod: S$GLB,,, | Performed by: FAMILY MEDICINE

## 2021-03-07 DIAGNOSIS — K76.82 HEPATIC ENCEPHALOPATHY: ICD-10-CM

## 2021-03-08 ENCOUNTER — PATIENT MESSAGE (OUTPATIENT)
Dept: HEPATOLOGY | Facility: CLINIC | Age: 66
End: 2021-03-08

## 2021-03-19 ENCOUNTER — OFFICE VISIT (OUTPATIENT)
Dept: PODIATRY | Facility: CLINIC | Age: 66
End: 2021-03-19
Payer: MEDICAID

## 2021-03-19 DIAGNOSIS — B35.1 ONYCHOMYCOSIS: ICD-10-CM

## 2021-03-19 DIAGNOSIS — N18.4 CKD (CHRONIC KIDNEY DISEASE) STAGE 4, GFR 15-29 ML/MIN: ICD-10-CM

## 2021-03-19 PROCEDURE — 11719 TRIM NAIL(S) ANY NUMBER: CPT | Mod: S$PBB,,, | Performed by: PODIATRIST

## 2021-03-19 PROCEDURE — 11720 PR DEBRIDEMENT OF NAIL(S), 1-5: ICD-10-PCS | Mod: 59,S$PBB,, | Performed by: PODIATRIST

## 2021-03-19 PROCEDURE — 11719 TRIM NAIL(S) ANY NUMBER: CPT | Mod: Q9,PBBFAC,PO | Performed by: PODIATRIST

## 2021-03-19 PROCEDURE — 99999 PR PBB SHADOW E&M-EST. PATIENT-LVL III: CPT | Mod: PBBFAC,,, | Performed by: PODIATRIST

## 2021-03-19 PROCEDURE — 99499 UNLISTED E&M SERVICE: CPT | Mod: S$PBB,,, | Performed by: PODIATRIST

## 2021-03-19 PROCEDURE — 11719 PR TRIM NAIL(S): ICD-10-PCS | Mod: S$PBB,,, | Performed by: PODIATRIST

## 2021-03-19 PROCEDURE — 11720 DEBRIDE NAIL 1-5: CPT | Mod: Q9,PBBFAC,PO | Performed by: PODIATRIST

## 2021-03-19 PROCEDURE — 99499 NO LOS: ICD-10-PCS | Mod: S$PBB,,, | Performed by: PODIATRIST

## 2021-03-19 PROCEDURE — 11720 DEBRIDE NAIL 1-5: CPT | Mod: 59,S$PBB,, | Performed by: PODIATRIST

## 2021-03-19 PROCEDURE — 99213 OFFICE O/P EST LOW 20 MIN: CPT | Mod: PBBFAC,PO | Performed by: PODIATRIST

## 2021-03-19 PROCEDURE — 99999 PR PBB SHADOW E&M-EST. PATIENT-LVL III: ICD-10-PCS | Mod: PBBFAC,,, | Performed by: PODIATRIST

## 2021-03-25 DIAGNOSIS — I10 ESSENTIAL HYPERTENSION: ICD-10-CM

## 2021-03-25 RX ORDER — DOXAZOSIN 2 MG/1
2 TABLET ORAL NIGHTLY
Qty: 90 TABLET | Refills: 1 | Status: SHIPPED | OUTPATIENT
Start: 2021-03-25 | End: 2021-08-19 | Stop reason: ALTCHOICE

## 2021-04-01 ENCOUNTER — IMMUNIZATION (OUTPATIENT)
Dept: PRIMARY CARE CLINIC | Facility: CLINIC | Age: 66
End: 2021-04-01

## 2021-04-01 DIAGNOSIS — Z23 NEED FOR VACCINATION: Primary | ICD-10-CM

## 2021-04-01 PROCEDURE — 0012A COVID-19, MRNA, LNP-S, PF, 100 MCG/0.5 ML DOSE VACCINE: CPT | Mod: CV19,S$GLB,, | Performed by: FAMILY MEDICINE

## 2021-04-01 PROCEDURE — 91301 COVID-19, MRNA, LNP-S, PF, 100 MCG/0.5 ML DOSE VACCINE: ICD-10-PCS | Mod: S$GLB,,, | Performed by: FAMILY MEDICINE

## 2021-04-01 PROCEDURE — 91301 COVID-19, MRNA, LNP-S, PF, 100 MCG/0.5 ML DOSE VACCINE: CPT | Mod: S$GLB,,, | Performed by: FAMILY MEDICINE

## 2021-04-01 PROCEDURE — 0012A COVID-19, MRNA, LNP-S, PF, 100 MCG/0.5 ML DOSE VACCINE: ICD-10-PCS | Mod: CV19,S$GLB,, | Performed by: FAMILY MEDICINE

## 2021-04-17 DIAGNOSIS — K76.82 HEPATIC ENCEPHALOPATHY: ICD-10-CM

## 2021-04-25 ENCOUNTER — PATIENT OUTREACH (OUTPATIENT)
Dept: ADMINISTRATIVE | Facility: OTHER | Age: 66
End: 2021-04-25

## 2021-04-27 ENCOUNTER — OFFICE VISIT (OUTPATIENT)
Dept: OPHTHALMOLOGY | Facility: CLINIC | Age: 66
End: 2021-04-27
Payer: MEDICAID

## 2021-04-27 ENCOUNTER — CLINICAL SUPPORT (OUTPATIENT)
Dept: DIABETES | Facility: CLINIC | Age: 66
End: 2021-04-27
Payer: MEDICAID

## 2021-04-27 VITALS — HEIGHT: 61 IN | WEIGHT: 212.5 LBS | BODY MASS INDEX: 40.12 KG/M2

## 2021-04-27 DIAGNOSIS — E11.65 TYPE 2 DIABETES MELLITUS WITH HYPERGLYCEMIA, UNSPECIFIED WHETHER LONG TERM INSULIN USE: ICD-10-CM

## 2021-04-27 DIAGNOSIS — H35.62 PRERETINAL HEMORRHAGE OF LEFT EYE: ICD-10-CM

## 2021-04-27 DIAGNOSIS — Z96.1 PSEUDOPHAKIA OF BOTH EYES: ICD-10-CM

## 2021-04-27 DIAGNOSIS — E11.3413 SEVERE NONPROLIFERATIVE DIABETIC RETINOPATHY OF BOTH EYES WITH MACULAR EDEMA ASSOCIATED WITH TYPE 2 DIABETES MELLITUS: Primary | ICD-10-CM

## 2021-04-27 PROCEDURE — 99999 PR PBB SHADOW E&M-EST. PATIENT-LVL III: CPT | Mod: PBBFAC,,, | Performed by: DIETITIAN, REGISTERED

## 2021-04-27 PROCEDURE — G0108 DIAB MANAGE TRN  PER INDIV: HCPCS | Mod: PBBFAC | Performed by: DIETITIAN, REGISTERED

## 2021-04-27 PROCEDURE — 99999 PR PBB SHADOW E&M-EST. PATIENT-LVL III: CPT | Mod: PBBFAC,,, | Performed by: OPHTHALMOLOGY

## 2021-04-27 PROCEDURE — 92014 PR EYE EXAM, EST PATIENT,COMPREHESV: ICD-10-PCS | Mod: S$PBB,,, | Performed by: OPHTHALMOLOGY

## 2021-04-27 PROCEDURE — 99213 OFFICE O/P EST LOW 20 MIN: CPT | Mod: PBBFAC,27 | Performed by: DIETITIAN, REGISTERED

## 2021-04-27 PROCEDURE — 99999 PR PBB SHADOW E&M-EST. PATIENT-LVL III: ICD-10-PCS | Mod: PBBFAC,,, | Performed by: OPHTHALMOLOGY

## 2021-04-27 PROCEDURE — 92014 COMPRE OPH EXAM EST PT 1/>: CPT | Mod: S$PBB,,, | Performed by: OPHTHALMOLOGY

## 2021-04-27 PROCEDURE — 99213 OFFICE O/P EST LOW 20 MIN: CPT | Mod: PBBFAC | Performed by: OPHTHALMOLOGY

## 2021-04-27 PROCEDURE — 99999 PR PBB SHADOW E&M-EST. PATIENT-LVL III: ICD-10-PCS | Mod: PBBFAC,,, | Performed by: DIETITIAN, REGISTERED

## 2021-05-03 DIAGNOSIS — E78.49 OTHER HYPERLIPIDEMIA: ICD-10-CM

## 2021-05-03 DIAGNOSIS — I10 ESSENTIAL HYPERTENSION: ICD-10-CM

## 2021-05-03 DIAGNOSIS — I50.32 CHRONIC DIASTOLIC CONGESTIVE HEART FAILURE: Primary | ICD-10-CM

## 2021-05-04 ENCOUNTER — OFFICE VISIT (OUTPATIENT)
Dept: CARDIOLOGY | Facility: CLINIC | Age: 66
End: 2021-05-04
Attending: INTERNAL MEDICINE
Payer: MEDICAID

## 2021-05-04 ENCOUNTER — TELEPHONE (OUTPATIENT)
Dept: INTERNAL MEDICINE | Facility: CLINIC | Age: 66
End: 2021-05-04

## 2021-05-04 ENCOUNTER — OFFICE VISIT (OUTPATIENT)
Dept: INTERNAL MEDICINE | Facility: CLINIC | Age: 66
End: 2021-05-04
Payer: MEDICAID

## 2021-05-04 VITALS
HEIGHT: 61 IN | WEIGHT: 216.94 LBS | RESPIRATION RATE: 16 BRPM | SYSTOLIC BLOOD PRESSURE: 160 MMHG | HEART RATE: 78 BPM | BODY MASS INDEX: 40.96 KG/M2 | OXYGEN SATURATION: 97 % | DIASTOLIC BLOOD PRESSURE: 80 MMHG

## 2021-05-04 VITALS
HEIGHT: 61 IN | WEIGHT: 216.94 LBS | TEMPERATURE: 99 F | HEART RATE: 78 BPM | DIASTOLIC BLOOD PRESSURE: 82 MMHG | BODY MASS INDEX: 40.96 KG/M2 | SYSTOLIC BLOOD PRESSURE: 154 MMHG

## 2021-05-04 DIAGNOSIS — E08.59 DIABETES DUE TO UNDERLYING CONDITION W OTH CIRCULATORY COMP: ICD-10-CM

## 2021-05-04 DIAGNOSIS — K74.60 LIVER CIRRHOSIS SECONDARY TO NASH: ICD-10-CM

## 2021-05-04 DIAGNOSIS — G47.33 OSA ON CPAP: ICD-10-CM

## 2021-05-04 DIAGNOSIS — N18.4 CKD (CHRONIC KIDNEY DISEASE) STAGE 4, GFR 15-29 ML/MIN: ICD-10-CM

## 2021-05-04 DIAGNOSIS — E03.4 HYPOTHYROIDISM DUE TO ACQUIRED ATROPHY OF THYROID: ICD-10-CM

## 2021-05-04 DIAGNOSIS — I31.39 PERICARDIAL EFFUSION: ICD-10-CM

## 2021-05-04 DIAGNOSIS — I10 ESSENTIAL HYPERTENSION: ICD-10-CM

## 2021-05-04 DIAGNOSIS — I10 ESSENTIAL HYPERTENSION: Primary | ICD-10-CM

## 2021-05-04 DIAGNOSIS — E66.01 MORBID OBESITY DUE TO EXCESS CALORIES: ICD-10-CM

## 2021-05-04 DIAGNOSIS — K75.81 LIVER CIRRHOSIS SECONDARY TO NASH: ICD-10-CM

## 2021-05-04 DIAGNOSIS — Z12.4 SCREENING FOR MALIGNANT NEOPLASM OF CERVIX: ICD-10-CM

## 2021-05-04 DIAGNOSIS — I50.32 CHRONIC DIASTOLIC CONGESTIVE HEART FAILURE: Primary | ICD-10-CM

## 2021-05-04 DIAGNOSIS — R60.0 BILATERAL LOWER EXTREMITY EDEMA: ICD-10-CM

## 2021-05-04 DIAGNOSIS — Z78.0 POSTMENOPAUSAL: ICD-10-CM

## 2021-05-04 DIAGNOSIS — E78.49 OTHER HYPERLIPIDEMIA: ICD-10-CM

## 2021-05-04 PROCEDURE — 99214 OFFICE O/P EST MOD 30 MIN: CPT | Mod: S$PBB,,, | Performed by: FAMILY MEDICINE

## 2021-05-04 PROCEDURE — 99215 OFFICE O/P EST HI 40 MIN: CPT | Mod: PBBFAC,PO | Performed by: FAMILY MEDICINE

## 2021-05-04 PROCEDURE — 99214 OFFICE O/P EST MOD 30 MIN: CPT | Mod: S$PBB,,, | Performed by: INTERNAL MEDICINE

## 2021-05-04 PROCEDURE — 99999 PR PBB SHADOW E&M-EST. PATIENT-LVL V: ICD-10-PCS | Mod: PBBFAC,,, | Performed by: FAMILY MEDICINE

## 2021-05-04 PROCEDURE — 99999 PR PBB SHADOW E&M-EST. PATIENT-LVL IV: ICD-10-PCS | Mod: PBBFAC,,, | Performed by: INTERNAL MEDICINE

## 2021-05-04 PROCEDURE — 99214 OFFICE O/P EST MOD 30 MIN: CPT | Mod: PBBFAC,27,PO | Performed by: INTERNAL MEDICINE

## 2021-05-04 PROCEDURE — 99999 PR PBB SHADOW E&M-EST. PATIENT-LVL IV: CPT | Mod: PBBFAC,,, | Performed by: INTERNAL MEDICINE

## 2021-05-04 PROCEDURE — 99214 PR OFFICE/OUTPT VISIT, EST, LEVL IV, 30-39 MIN: ICD-10-PCS | Mod: S$PBB,,, | Performed by: INTERNAL MEDICINE

## 2021-05-04 PROCEDURE — 99214 PR OFFICE/OUTPT VISIT, EST, LEVL IV, 30-39 MIN: ICD-10-PCS | Mod: S$PBB,,, | Performed by: FAMILY MEDICINE

## 2021-05-04 PROCEDURE — 99999 PR PBB SHADOW E&M-EST. PATIENT-LVL V: CPT | Mod: PBBFAC,,, | Performed by: FAMILY MEDICINE

## 2021-05-04 RX ORDER — BUMETANIDE 2 MG/1
2 TABLET ORAL 2 TIMES DAILY
Qty: 180 TABLET | Refills: 1 | Status: SHIPPED | OUTPATIENT
Start: 2021-05-04 | End: 2021-05-18

## 2021-05-04 RX ORDER — METOLAZONE 5 MG/1
5 TABLET ORAL DAILY
Qty: 90 TABLET | Refills: 1 | Status: SHIPPED | OUTPATIENT
Start: 2021-05-04 | End: 2021-05-17 | Stop reason: SDUPTHER

## 2021-05-04 RX ORDER — MINOXIDIL 2.5 MG/1
2.5 TABLET ORAL DAILY
Qty: 90 TABLET | Refills: 1 | Status: SHIPPED | OUTPATIENT
Start: 2021-05-04 | End: 2021-05-04

## 2021-05-06 ENCOUNTER — TELEPHONE (OUTPATIENT)
Dept: CARDIOLOGY | Facility: CLINIC | Age: 66
End: 2021-05-06

## 2021-05-07 ENCOUNTER — APPOINTMENT (OUTPATIENT)
Dept: RADIOLOGY | Facility: HOSPITAL | Age: 66
End: 2021-05-07
Attending: FAMILY MEDICINE
Payer: MEDICAID

## 2021-05-07 DIAGNOSIS — Z78.0 POSTMENOPAUSAL: ICD-10-CM

## 2021-05-07 PROCEDURE — 77080 DXA BONE DENSITY AXIAL: CPT | Mod: 26,,, | Performed by: RADIOLOGY

## 2021-05-07 PROCEDURE — 77080 DXA BONE DENSITY AXIAL: CPT | Mod: TC

## 2021-05-07 PROCEDURE — 77080 DEXA BONE DENSITY SPINE HIP: ICD-10-PCS | Mod: 26,,, | Performed by: RADIOLOGY

## 2021-05-12 ENCOUNTER — PROCEDURE VISIT (OUTPATIENT)
Dept: OPHTHALMOLOGY | Facility: CLINIC | Age: 66
End: 2021-05-12
Payer: MEDICAID

## 2021-05-12 DIAGNOSIS — H35.62 PRERETINAL HEMORRHAGE OF LEFT EYE: ICD-10-CM

## 2021-05-12 DIAGNOSIS — Z96.1 PSEUDOPHAKIA OF BOTH EYES: ICD-10-CM

## 2021-05-12 DIAGNOSIS — E11.3413 SEVERE NONPROLIFERATIVE DIABETIC RETINOPATHY OF BOTH EYES WITH MACULAR EDEMA ASSOCIATED WITH TYPE 2 DIABETES MELLITUS: Primary | ICD-10-CM

## 2021-05-12 PROCEDURE — 99499 UNLISTED E&M SERVICE: CPT | Mod: S$PBB,,, | Performed by: OPHTHALMOLOGY

## 2021-05-12 PROCEDURE — 67228 TREATMENT X10SV RETINOPATHY: CPT | Mod: PBBFAC,LT | Performed by: OPHTHALMOLOGY

## 2021-05-12 PROCEDURE — 67228 PAN RETINAL PHOTOCOAGULATION - OS - LEFT EYE: ICD-10-PCS | Mod: S$PBB,LT,, | Performed by: OPHTHALMOLOGY

## 2021-05-12 PROCEDURE — 99499 NO LOS: ICD-10-PCS | Mod: S$PBB,,, | Performed by: OPHTHALMOLOGY

## 2021-05-12 PROCEDURE — 92235 FLUORESCEIN ANGIOGRAPHY - OU - BOTH EYES: ICD-10-PCS | Mod: 26,S$PBB,, | Performed by: OPHTHALMOLOGY

## 2021-05-12 PROCEDURE — 92235 FLUORESCEIN ANGRPH MLTIFRAME: CPT | Mod: PBBFAC | Performed by: OPHTHALMOLOGY

## 2021-05-14 DIAGNOSIS — E78.49 OTHER HYPERLIPIDEMIA: ICD-10-CM

## 2021-05-14 DIAGNOSIS — E66.01 MORBID OBESITY DUE TO EXCESS CALORIES: ICD-10-CM

## 2021-05-14 DIAGNOSIS — I50.32 CHRONIC DIASTOLIC CONGESTIVE HEART FAILURE: ICD-10-CM

## 2021-05-14 DIAGNOSIS — R07.89 CHEST PAIN, ATYPICAL: ICD-10-CM

## 2021-05-14 DIAGNOSIS — R60.0 LOCALIZED EDEMA: ICD-10-CM

## 2021-05-14 DIAGNOSIS — R60.9 CHRONIC EDEMA: ICD-10-CM

## 2021-05-14 DIAGNOSIS — I10 ESSENTIAL HYPERTENSION: ICD-10-CM

## 2021-05-14 DIAGNOSIS — N18.4 CKD (CHRONIC KIDNEY DISEASE) STAGE 4, GFR 15-29 ML/MIN: ICD-10-CM

## 2021-05-14 DIAGNOSIS — R74.8 ELEVATED LIPASE: ICD-10-CM

## 2021-05-14 DIAGNOSIS — R18.8 OTHER ASCITES: ICD-10-CM

## 2021-05-14 DIAGNOSIS — G47.33 OSA ON CPAP: ICD-10-CM

## 2021-05-14 DIAGNOSIS — K76.6 PORTAL HYPERTENSION: ICD-10-CM

## 2021-05-14 DIAGNOSIS — J90 RECURRENT PLEURAL EFFUSION ON LEFT: Primary | ICD-10-CM

## 2021-05-14 DIAGNOSIS — I31.39 PERICARDIAL EFFUSION: ICD-10-CM

## 2021-05-14 DIAGNOSIS — K74.60 HEPATIC CIRRHOSIS, UNSPECIFIED HEPATIC CIRRHOSIS TYPE, UNSPECIFIED WHETHER ASCITES PRESENT: ICD-10-CM

## 2021-05-17 ENCOUNTER — HOSPITAL ENCOUNTER (OUTPATIENT)
Dept: CARDIOLOGY | Facility: HOSPITAL | Age: 66
Discharge: HOME | End: 2021-05-17
Attending: INTERNAL MEDICINE
Payer: MEDICAID

## 2021-05-17 ENCOUNTER — OFFICE VISIT (OUTPATIENT)
Dept: CARDIOLOGY | Facility: CLINIC | Age: 66
End: 2021-05-17
Payer: MEDICAID

## 2021-05-17 VITALS
DIASTOLIC BLOOD PRESSURE: 72 MMHG | HEART RATE: 78 BPM | BODY MASS INDEX: 37.09 KG/M2 | OXYGEN SATURATION: 97 % | RESPIRATION RATE: 16 BRPM | HEIGHT: 61 IN | SYSTOLIC BLOOD PRESSURE: 142 MMHG | WEIGHT: 196.44 LBS

## 2021-05-17 DIAGNOSIS — I50.32 CHRONIC DIASTOLIC CONGESTIVE HEART FAILURE: Primary | ICD-10-CM

## 2021-05-17 DIAGNOSIS — R60.0 LOCALIZED EDEMA: ICD-10-CM

## 2021-05-17 DIAGNOSIS — G47.33 OSA ON CPAP: ICD-10-CM

## 2021-05-17 DIAGNOSIS — I31.39 PERICARDIAL EFFUSION: ICD-10-CM

## 2021-05-17 DIAGNOSIS — E78.49 OTHER HYPERLIPIDEMIA: ICD-10-CM

## 2021-05-17 DIAGNOSIS — E66.01 MORBID OBESITY DUE TO EXCESS CALORIES: ICD-10-CM

## 2021-05-17 DIAGNOSIS — K75.81 LIVER CIRRHOSIS SECONDARY TO NASH: ICD-10-CM

## 2021-05-17 DIAGNOSIS — R07.89 CHEST PAIN, ATYPICAL: ICD-10-CM

## 2021-05-17 DIAGNOSIS — R60.9 CHRONIC EDEMA: ICD-10-CM

## 2021-05-17 DIAGNOSIS — N18.4 CKD (CHRONIC KIDNEY DISEASE) STAGE 4, GFR 15-29 ML/MIN: ICD-10-CM

## 2021-05-17 DIAGNOSIS — E08.59 DIABETES DUE TO UNDERLYING CONDITION W OTH CIRCULATORY COMP: ICD-10-CM

## 2021-05-17 DIAGNOSIS — I10 ESSENTIAL HYPERTENSION: ICD-10-CM

## 2021-05-17 DIAGNOSIS — K74.60 HEPATIC CIRRHOSIS, UNSPECIFIED HEPATIC CIRRHOSIS TYPE, UNSPECIFIED WHETHER ASCITES PRESENT: ICD-10-CM

## 2021-05-17 DIAGNOSIS — K76.6 PORTAL HYPERTENSION: ICD-10-CM

## 2021-05-17 DIAGNOSIS — I50.32 CHRONIC DIASTOLIC CONGESTIVE HEART FAILURE: ICD-10-CM

## 2021-05-17 DIAGNOSIS — R18.8 OTHER ASCITES: ICD-10-CM

## 2021-05-17 DIAGNOSIS — R74.8 ELEVATED LIPASE: ICD-10-CM

## 2021-05-17 DIAGNOSIS — K74.60 LIVER CIRRHOSIS SECONDARY TO NASH: ICD-10-CM

## 2021-05-17 DIAGNOSIS — J90 RECURRENT PLEURAL EFFUSION ON LEFT: ICD-10-CM

## 2021-05-17 DIAGNOSIS — E03.9 ACQUIRED HYPOTHYROIDISM: ICD-10-CM

## 2021-05-17 PROCEDURE — 99214 PR OFFICE/OUTPT VISIT, EST, LEVL IV, 30-39 MIN: ICD-10-PCS | Mod: S$PBB,,, | Performed by: INTERNAL MEDICINE

## 2021-05-17 PROCEDURE — 93005 ELECTROCARDIOGRAM TRACING: CPT | Mod: PO

## 2021-05-17 PROCEDURE — 99999 PR PBB SHADOW E&M-EST. PATIENT-LVL IV: ICD-10-PCS | Mod: PBBFAC,,, | Performed by: INTERNAL MEDICINE

## 2021-05-17 PROCEDURE — 93010 ELECTROCARDIOGRAM REPORT: CPT | Mod: ,,, | Performed by: INTERNAL MEDICINE

## 2021-05-17 PROCEDURE — 99999 PR PBB SHADOW E&M-EST. PATIENT-LVL IV: CPT | Mod: PBBFAC,,, | Performed by: INTERNAL MEDICINE

## 2021-05-17 PROCEDURE — 99214 OFFICE O/P EST MOD 30 MIN: CPT | Mod: PBBFAC,PO,25 | Performed by: INTERNAL MEDICINE

## 2021-05-17 PROCEDURE — 93010 EKG 12-LEAD: ICD-10-PCS | Mod: ,,, | Performed by: INTERNAL MEDICINE

## 2021-05-17 PROCEDURE — 99214 OFFICE O/P EST MOD 30 MIN: CPT | Mod: S$PBB,,, | Performed by: INTERNAL MEDICINE

## 2021-05-17 RX ORDER — METOLAZONE 5 MG/1
5 TABLET ORAL
Qty: 90 TABLET | Refills: 1 | Status: SHIPPED | OUTPATIENT
Start: 2021-05-17 | End: 2021-07-14

## 2021-05-18 ENCOUNTER — TELEPHONE (OUTPATIENT)
Dept: CARDIOLOGY | Facility: CLINIC | Age: 66
End: 2021-05-18

## 2021-05-18 ENCOUNTER — TELEPHONE (OUTPATIENT)
Dept: NEPHROLOGY | Facility: CLINIC | Age: 66
End: 2021-05-18

## 2021-05-18 RX ORDER — BUMETANIDE 2 MG/1
2 TABLET ORAL DAILY
Qty: 60 TABLET | Refills: 3 | Status: SHIPPED | OUTPATIENT
Start: 2021-05-18 | End: 2021-07-14

## 2021-05-18 RX ORDER — BUMETANIDE 2 MG/1
2 TABLET ORAL DAILY
Qty: 30 TABLET | Refills: 3 | Status: SHIPPED | OUTPATIENT
Start: 2021-05-18 | End: 2021-05-18 | Stop reason: SDUPTHER

## 2021-05-27 ENCOUNTER — PATIENT MESSAGE (OUTPATIENT)
Dept: ADMINISTRATIVE | Facility: OTHER | Age: 66
End: 2021-05-27

## 2021-05-27 DIAGNOSIS — K76.82 HEPATIC ENCEPHALOPATHY: ICD-10-CM

## 2021-06-03 ENCOUNTER — PROCEDURE VISIT (OUTPATIENT)
Dept: OPHTHALMOLOGY | Facility: CLINIC | Age: 66
End: 2021-06-03
Payer: MEDICAID

## 2021-06-03 DIAGNOSIS — E11.3413 SEVERE NONPROLIFERATIVE DIABETIC RETINOPATHY OF BOTH EYES WITH MACULAR EDEMA ASSOCIATED WITH TYPE 2 DIABETES MELLITUS: Primary | ICD-10-CM

## 2021-06-03 PROCEDURE — 67228 TREATMENT X10SV RETINOPATHY: CPT | Mod: PBBFAC,LT | Performed by: OPHTHALMOLOGY

## 2021-06-03 PROCEDURE — 67228 PAN RETINAL PHOTOCOAGULATION - OS - LEFT EYE: ICD-10-PCS | Mod: S$PBB,LT,, | Performed by: OPHTHALMOLOGY

## 2021-06-03 PROCEDURE — 99499 UNLISTED E&M SERVICE: CPT | Mod: S$PBB,,, | Performed by: OPHTHALMOLOGY

## 2021-06-03 PROCEDURE — 99499 NO LOS: ICD-10-PCS | Mod: S$PBB,,, | Performed by: OPHTHALMOLOGY

## 2021-06-08 ENCOUNTER — PATIENT OUTREACH (OUTPATIENT)
Dept: OTHER | Facility: OTHER | Age: 66
End: 2021-06-08

## 2021-06-08 ENCOUNTER — PATIENT MESSAGE (OUTPATIENT)
Dept: OTHER | Facility: OTHER | Age: 66
End: 2021-06-08

## 2021-06-22 ENCOUNTER — OFFICE VISIT (OUTPATIENT)
Dept: OBSTETRICS AND GYNECOLOGY | Facility: CLINIC | Age: 66
End: 2021-06-22
Payer: MEDICAID

## 2021-06-22 VITALS
SYSTOLIC BLOOD PRESSURE: 152 MMHG | WEIGHT: 207.88 LBS | DIASTOLIC BLOOD PRESSURE: 64 MMHG | BODY MASS INDEX: 39.25 KG/M2 | HEIGHT: 61 IN

## 2021-06-22 DIAGNOSIS — I50.32 CHRONIC DIASTOLIC CONGESTIVE HEART FAILURE: ICD-10-CM

## 2021-06-22 DIAGNOSIS — I10 ESSENTIAL HYPERTENSION: Primary | ICD-10-CM

## 2021-06-22 DIAGNOSIS — R18.8 OTHER ASCITES: ICD-10-CM

## 2021-06-22 DIAGNOSIS — K76.82 HEPATIC ENCEPHALOPATHY: ICD-10-CM

## 2021-06-22 DIAGNOSIS — Z01.419 WELL WOMAN EXAM WITH ROUTINE GYNECOLOGICAL EXAM: Primary | ICD-10-CM

## 2021-06-22 DIAGNOSIS — Z12.4 CERVICAL CANCER SCREENING: ICD-10-CM

## 2021-06-22 PROCEDURE — 99214 OFFICE O/P EST MOD 30 MIN: CPT | Mod: PBBFAC,PO | Performed by: OBSTETRICS & GYNECOLOGY

## 2021-06-22 PROCEDURE — 99386 PR PREVENTIVE VISIT,NEW,40-64: ICD-10-PCS | Mod: S$PBB,,, | Performed by: OBSTETRICS & GYNECOLOGY

## 2021-06-22 PROCEDURE — 87624 HPV HI-RISK TYP POOLED RSLT: CPT | Performed by: OBSTETRICS & GYNECOLOGY

## 2021-06-22 PROCEDURE — 88175 CYTOPATH C/V AUTO FLUID REDO: CPT | Performed by: OBSTETRICS & GYNECOLOGY

## 2021-06-22 PROCEDURE — 99999 PR PBB SHADOW E&M-EST. PATIENT-LVL IV: ICD-10-PCS | Mod: PBBFAC,,, | Performed by: OBSTETRICS & GYNECOLOGY

## 2021-06-22 PROCEDURE — 99999 PR PBB SHADOW E&M-EST. PATIENT-LVL IV: CPT | Mod: PBBFAC,,, | Performed by: OBSTETRICS & GYNECOLOGY

## 2021-06-22 PROCEDURE — 99386 PREV VISIT NEW AGE 40-64: CPT | Mod: S$PBB,,, | Performed by: OBSTETRICS & GYNECOLOGY

## 2021-06-22 RX ORDER — LANOLIN ALCOHOL/MO/W.PET/CERES
400 CREAM (GRAM) TOPICAL DAILY
Qty: 90 TABLET | Refills: 1 | Status: SHIPPED | OUTPATIENT
Start: 2021-06-22 | End: 2022-03-10 | Stop reason: SDUPTHER

## 2021-06-23 ENCOUNTER — PROCEDURE VISIT (OUTPATIENT)
Dept: OPHTHALMOLOGY | Facility: CLINIC | Age: 66
End: 2021-06-23
Payer: MEDICAID

## 2021-06-23 DIAGNOSIS — H35.62 PRERETINAL HEMORRHAGE OF LEFT EYE: Primary | ICD-10-CM

## 2021-06-23 DIAGNOSIS — E11.3413 SEVERE NONPROLIFERATIVE DIABETIC RETINOPATHY OF BOTH EYES WITH MACULAR EDEMA ASSOCIATED WITH TYPE 2 DIABETES MELLITUS: ICD-10-CM

## 2021-06-23 PROCEDURE — 99499 NO LOS: ICD-10-PCS | Mod: S$PBB,,, | Performed by: OPHTHALMOLOGY

## 2021-06-23 PROCEDURE — 67228 PAN RETINAL PHOTOCOAGULATION - OS - LEFT EYE: ICD-10-PCS | Mod: S$PBB,LT,, | Performed by: OPHTHALMOLOGY

## 2021-06-23 PROCEDURE — 67228 TREATMENT X10SV RETINOPATHY: CPT | Mod: PBBFAC,RT | Performed by: OPHTHALMOLOGY

## 2021-06-23 PROCEDURE — 67228 TREATMENT X10SV RETINOPATHY: CPT | Mod: PBBFAC,LT | Performed by: OPHTHALMOLOGY

## 2021-06-23 PROCEDURE — 99499 UNLISTED E&M SERVICE: CPT | Mod: S$PBB,,, | Performed by: OPHTHALMOLOGY

## 2021-06-28 ENCOUNTER — PATIENT OUTREACH (OUTPATIENT)
Dept: OTHER | Facility: OTHER | Age: 66
End: 2021-06-28

## 2021-06-28 LAB
CLINICAL INFO: NORMAL
CYTO CVX: NORMAL
CYTOLOGIST CVX/VAG CYTO: NORMAL
CYTOLOGY CMNT CVX/VAG CYTO-IMP: NORMAL
CYTOLOGY PAP THIN PREP EXPLANATION: NORMAL
DATE OF PREVIOUS PAP: NORMAL
DATE PREVIOUS BX: NO
HPV I/H RISK 4 DNA CVX QL NAA+PROBE: NOT DETECTED
LMP START DATE: 0
SPECIMEN SOURCE CVX/VAG CYTO: NORMAL
STAT OF ADQ CVX/VAG CYTO-IMP: NORMAL

## 2021-07-08 ENCOUNTER — LAB VISIT (OUTPATIENT)
Dept: LAB | Facility: HOSPITAL | Age: 66
End: 2021-07-08
Attending: INTERNAL MEDICINE
Payer: MEDICAID

## 2021-07-08 DIAGNOSIS — N25.81 HYPERPARATHYROIDISM, SECONDARY RENAL: ICD-10-CM

## 2021-07-08 DIAGNOSIS — N18.30 STAGE 3 CHRONIC KIDNEY DISEASE, UNSPECIFIED WHETHER STAGE 3A OR 3B CKD: ICD-10-CM

## 2021-07-08 LAB
ALBUMIN SERPL BCP-MCNC: 1.9 G/DL (ref 3.5–5.2)
ANION GAP SERPL CALC-SCNC: 8 MMOL/L (ref 8–16)
BASOPHILS # BLD AUTO: 0.03 K/UL (ref 0–0.2)
BASOPHILS NFR BLD: 0.5 % (ref 0–1.9)
BUN SERPL-MCNC: 48 MG/DL (ref 8–23)
CALCIUM SERPL-MCNC: 8.1 MG/DL (ref 8.7–10.5)
CHLORIDE SERPL-SCNC: 111 MMOL/L (ref 95–110)
CO2 SERPL-SCNC: 26 MMOL/L (ref 23–29)
CREAT SERPL-MCNC: 3.4 MG/DL (ref 0.5–1.4)
DIFFERENTIAL METHOD: ABNORMAL
EOSINOPHIL # BLD AUTO: 0.3 K/UL (ref 0–0.5)
EOSINOPHIL NFR BLD: 5.6 % (ref 0–8)
ERYTHROCYTE [DISTWIDTH] IN BLOOD BY AUTOMATED COUNT: 14.7 % (ref 11.5–14.5)
EST. GFR  (AFRICAN AMERICAN): 15.7 ML/MIN/1.73 M^2
EST. GFR  (NON AFRICAN AMERICAN): 13.6 ML/MIN/1.73 M^2
GLUCOSE SERPL-MCNC: 115 MG/DL (ref 70–110)
HCT VFR BLD AUTO: 32.5 % (ref 37–48.5)
HGB BLD-MCNC: 10.6 G/DL (ref 12–16)
IMM GRANULOCYTES # BLD AUTO: 0.01 K/UL (ref 0–0.04)
IMM GRANULOCYTES NFR BLD AUTO: 0.2 % (ref 0–0.5)
LYMPHOCYTES # BLD AUTO: 2.4 K/UL (ref 1–4.8)
LYMPHOCYTES NFR BLD: 40.5 % (ref 18–48)
MCH RBC QN AUTO: 28.6 PG (ref 27–31)
MCHC RBC AUTO-ENTMCNC: 32.6 G/DL (ref 32–36)
MCV RBC AUTO: 88 FL (ref 82–98)
MONOCYTES # BLD AUTO: 0.4 K/UL (ref 0.3–1)
MONOCYTES NFR BLD: 6.8 % (ref 4–15)
NEUTROPHILS # BLD AUTO: 2.7 K/UL (ref 1.8–7.7)
NEUTROPHILS NFR BLD: 46.4 % (ref 38–73)
NRBC BLD-RTO: 0 /100 WBC
PHOSPHATE SERPL-MCNC: 3.6 MG/DL (ref 2.7–4.5)
PLATELET # BLD AUTO: 227 K/UL (ref 150–450)
PMV BLD AUTO: 9.7 FL (ref 9.2–12.9)
POTASSIUM SERPL-SCNC: 4.9 MMOL/L (ref 3.5–5.1)
RBC # BLD AUTO: 3.7 M/UL (ref 4–5.4)
SODIUM SERPL-SCNC: 145 MMOL/L (ref 136–145)
WBC # BLD AUTO: 5.87 K/UL (ref 3.9–12.7)

## 2021-07-08 PROCEDURE — 80069 RENAL FUNCTION PANEL: CPT | Mod: PO | Performed by: INTERNAL MEDICINE

## 2021-07-08 PROCEDURE — 36415 COLL VENOUS BLD VENIPUNCTURE: CPT | Mod: PO | Performed by: INTERNAL MEDICINE

## 2021-07-08 PROCEDURE — 83970 ASSAY OF PARATHORMONE: CPT | Performed by: INTERNAL MEDICINE

## 2021-07-08 PROCEDURE — 85025 COMPLETE CBC W/AUTO DIFF WBC: CPT | Mod: PO | Performed by: INTERNAL MEDICINE

## 2021-07-09 LAB — PTH-INTACT SERPL-MCNC: 269 PG/ML (ref 9–77)

## 2021-07-09 RX ORDER — ISOSORBIDE MONONITRATE 30 MG/1
30 TABLET, EXTENDED RELEASE ORAL DAILY
Qty: 30 TABLET | Refills: 11 | Status: SHIPPED | OUTPATIENT
Start: 2021-07-09 | End: 2021-08-23 | Stop reason: SDUPTHER

## 2021-07-14 ENCOUNTER — OFFICE VISIT (OUTPATIENT)
Dept: NEPHROLOGY | Facility: CLINIC | Age: 66
End: 2021-07-14
Payer: MEDICAID

## 2021-07-14 VITALS
BODY MASS INDEX: 40.71 KG/M2 | WEIGHT: 215.63 LBS | SYSTOLIC BLOOD PRESSURE: 146 MMHG | DIASTOLIC BLOOD PRESSURE: 82 MMHG | HEIGHT: 61 IN | HEART RATE: 79 BPM

## 2021-07-14 DIAGNOSIS — R60.9 EDEMA, UNSPECIFIED TYPE: ICD-10-CM

## 2021-07-14 DIAGNOSIS — E11.29 PROTEINURIA DUE TO TYPE 2 DIABETES MELLITUS: ICD-10-CM

## 2021-07-14 DIAGNOSIS — N25.81 HYPERPARATHYROIDISM, SECONDARY RENAL: ICD-10-CM

## 2021-07-14 DIAGNOSIS — N18.4 CKD (CHRONIC KIDNEY DISEASE) STAGE 4, GFR 15-29 ML/MIN: Primary | ICD-10-CM

## 2021-07-14 DIAGNOSIS — R80.9 PROTEINURIA DUE TO TYPE 2 DIABETES MELLITUS: ICD-10-CM

## 2021-07-14 DIAGNOSIS — I12.9 PARENCHYMAL RENAL HYPERTENSION, STAGE 1 THROUGH STAGE 4 OR UNSPECIFIED CHRONIC KIDNEY DISEASE: ICD-10-CM

## 2021-07-14 PROCEDURE — 99214 PR OFFICE/OUTPT VISIT, EST, LEVL IV, 30-39 MIN: ICD-10-PCS | Mod: S$PBB,,, | Performed by: INTERNAL MEDICINE

## 2021-07-14 PROCEDURE — 99999 PR PBB SHADOW E&M-EST. PATIENT-LVL IV: ICD-10-PCS | Mod: PBBFAC,,, | Performed by: INTERNAL MEDICINE

## 2021-07-14 PROCEDURE — 99214 OFFICE O/P EST MOD 30 MIN: CPT | Mod: S$PBB,,, | Performed by: INTERNAL MEDICINE

## 2021-07-14 PROCEDURE — 99214 OFFICE O/P EST MOD 30 MIN: CPT | Mod: PBBFAC | Performed by: INTERNAL MEDICINE

## 2021-07-14 PROCEDURE — 99999 PR PBB SHADOW E&M-EST. PATIENT-LVL IV: CPT | Mod: PBBFAC,,, | Performed by: INTERNAL MEDICINE

## 2021-07-14 RX ORDER — OLMESARTAN MEDOXOMIL 40 MG/1
40 TABLET ORAL DAILY
COMMUNITY
End: 2021-07-14

## 2021-07-14 RX ORDER — TORSEMIDE 20 MG/1
40 TABLET ORAL DAILY
Qty: 60 TABLET | Refills: 11 | Status: SHIPPED | OUTPATIENT
Start: 2021-07-14 | End: 2022-06-07

## 2021-07-14 RX ORDER — OLMESARTAN MEDOXOMIL 20 MG/1
40 TABLET ORAL DAILY
Qty: 180 TABLET | Refills: 3 | Status: SHIPPED | OUTPATIENT
Start: 2021-07-14 | End: 2021-07-14

## 2021-07-15 RX ORDER — OLMESARTAN MEDOXOMIL 40 MG/1
40 TABLET ORAL DAILY
Qty: 90 TABLET | Refills: 3 | Status: SHIPPED | OUTPATIENT
Start: 2021-07-15 | End: 2021-11-12 | Stop reason: SDUPTHER

## 2021-07-21 ENCOUNTER — CLINICAL SUPPORT (OUTPATIENT)
Dept: DIABETES | Facility: CLINIC | Age: 66
End: 2021-07-21
Payer: MEDICAID

## 2021-07-21 ENCOUNTER — PROCEDURE VISIT (OUTPATIENT)
Dept: OPHTHALMOLOGY | Facility: CLINIC | Age: 66
End: 2021-07-21
Payer: MEDICAID

## 2021-07-21 VITALS — BODY MASS INDEX: 41.03 KG/M2 | WEIGHT: 217.13 LBS

## 2021-07-21 DIAGNOSIS — E11.3413 SEVERE NONPROLIFERATIVE DIABETIC RETINOPATHY OF BOTH EYES WITH MACULAR EDEMA ASSOCIATED WITH TYPE 2 DIABETES MELLITUS: ICD-10-CM

## 2021-07-21 DIAGNOSIS — H35.62 PRERETINAL HEMORRHAGE OF LEFT EYE: Primary | ICD-10-CM

## 2021-07-21 PROCEDURE — G0108 DIAB MANAGE TRN  PER INDIV: HCPCS | Mod: PBBFAC | Performed by: DIETITIAN, REGISTERED

## 2021-07-21 PROCEDURE — 99999 PR PBB SHADOW E&M-EST. PATIENT-LVL III: ICD-10-PCS | Mod: PBBFAC,,, | Performed by: DIETITIAN, REGISTERED

## 2021-07-21 PROCEDURE — 99499 NO LOS: ICD-10-PCS | Mod: S$PBB,,, | Performed by: OPHTHALMOLOGY

## 2021-07-21 PROCEDURE — 99999 PR PBB SHADOW E&M-EST. PATIENT-LVL III: CPT | Mod: PBBFAC,,, | Performed by: DIETITIAN, REGISTERED

## 2021-07-21 PROCEDURE — 99213 OFFICE O/P EST LOW 20 MIN: CPT | Mod: PBBFAC,25 | Performed by: DIETITIAN, REGISTERED

## 2021-07-21 PROCEDURE — 99499 UNLISTED E&M SERVICE: CPT | Mod: S$PBB,,, | Performed by: OPHTHALMOLOGY

## 2021-07-21 PROCEDURE — 67228 TREATMENT X10SV RETINOPATHY: CPT | Mod: PBBFAC,LT | Performed by: OPHTHALMOLOGY

## 2021-07-21 PROCEDURE — 67228 PAN RETINAL PHOTOCOAGULATION - OS - LEFT EYE: ICD-10-PCS | Mod: S$PBB,LT,, | Performed by: OPHTHALMOLOGY

## 2021-07-23 ENCOUNTER — LAB VISIT (OUTPATIENT)
Dept: LAB | Facility: HOSPITAL | Age: 66
End: 2021-07-23
Attending: INTERNAL MEDICINE
Payer: MEDICAID

## 2021-07-23 DIAGNOSIS — N18.4 CKD (CHRONIC KIDNEY DISEASE) STAGE 4, GFR 15-29 ML/MIN: ICD-10-CM

## 2021-07-23 DIAGNOSIS — N25.81 HYPERPARATHYROIDISM, SECONDARY RENAL: ICD-10-CM

## 2021-07-23 DIAGNOSIS — I12.9 PARENCHYMAL RENAL HYPERTENSION, STAGE 1 THROUGH STAGE 4 OR UNSPECIFIED CHRONIC KIDNEY DISEASE: ICD-10-CM

## 2021-07-23 DIAGNOSIS — R60.9 EDEMA, UNSPECIFIED TYPE: ICD-10-CM

## 2021-07-23 LAB
ALBUMIN SERPL BCP-MCNC: 1.8 G/DL (ref 3.5–5.2)
ALP SERPL-CCNC: 102 U/L (ref 55–135)
ALT SERPL W/O P-5'-P-CCNC: 13 U/L (ref 10–44)
ANION GAP SERPL CALC-SCNC: 6 MMOL/L (ref 8–16)
ANION GAP SERPL CALC-SCNC: 6 MMOL/L (ref 8–16)
AST SERPL-CCNC: 28 U/L (ref 10–40)
BILIRUB SERPL-MCNC: 0.2 MG/DL (ref 0.1–1)
BUN SERPL-MCNC: 39 MG/DL (ref 8–23)
BUN SERPL-MCNC: 39 MG/DL (ref 8–23)
CALCIUM SERPL-MCNC: 7.9 MG/DL (ref 8.7–10.5)
CALCIUM SERPL-MCNC: 7.9 MG/DL (ref 8.7–10.5)
CHLORIDE SERPL-SCNC: 113 MMOL/L (ref 95–110)
CHLORIDE SERPL-SCNC: 113 MMOL/L (ref 95–110)
CO2 SERPL-SCNC: 25 MMOL/L (ref 23–29)
CO2 SERPL-SCNC: 25 MMOL/L (ref 23–29)
CREAT SERPL-MCNC: 2.9 MG/DL (ref 0.5–1.4)
CREAT SERPL-MCNC: 2.9 MG/DL (ref 0.5–1.4)
EST. GFR  (AFRICAN AMERICAN): 19 ML/MIN/1.73 M^2
EST. GFR  (AFRICAN AMERICAN): 19 ML/MIN/1.73 M^2
EST. GFR  (NON AFRICAN AMERICAN): 16.5 ML/MIN/1.73 M^2
EST. GFR  (NON AFRICAN AMERICAN): 16.5 ML/MIN/1.73 M^2
GLUCOSE SERPL-MCNC: 180 MG/DL (ref 70–110)
GLUCOSE SERPL-MCNC: 180 MG/DL (ref 70–110)
POTASSIUM SERPL-SCNC: 4.7 MMOL/L (ref 3.5–5.1)
POTASSIUM SERPL-SCNC: 4.7 MMOL/L (ref 3.5–5.1)
PROT SERPL-MCNC: 5.4 G/DL (ref 6–8.4)
SODIUM SERPL-SCNC: 144 MMOL/L (ref 136–145)
SODIUM SERPL-SCNC: 144 MMOL/L (ref 136–145)

## 2021-07-23 PROCEDURE — 82306 VITAMIN D 25 HYDROXY: CPT | Performed by: INTERNAL MEDICINE

## 2021-07-23 PROCEDURE — 36415 COLL VENOUS BLD VENIPUNCTURE: CPT | Mod: PO | Performed by: INTERNAL MEDICINE

## 2021-07-23 PROCEDURE — 83970 ASSAY OF PARATHORMONE: CPT | Performed by: INTERNAL MEDICINE

## 2021-07-23 PROCEDURE — 80053 COMPREHEN METABOLIC PANEL: CPT | Mod: PO | Performed by: INTERNAL MEDICINE

## 2021-07-24 LAB
25(OH)D3+25(OH)D2 SERPL-MCNC: 17 NG/ML (ref 30–96)
PTH-INTACT SERPL-MCNC: 236 PG/ML (ref 9–77)

## 2021-07-27 ENCOUNTER — PATIENT OUTREACH (OUTPATIENT)
Dept: OTHER | Facility: OTHER | Age: 66
End: 2021-07-27

## 2021-08-06 DIAGNOSIS — K76.82 HEPATIC ENCEPHALOPATHY: ICD-10-CM

## 2021-08-18 ENCOUNTER — PROCEDURE VISIT (OUTPATIENT)
Dept: OPHTHALMOLOGY | Facility: CLINIC | Age: 66
End: 2021-08-18
Payer: MEDICAID

## 2021-08-18 ENCOUNTER — CLINICAL SUPPORT (OUTPATIENT)
Dept: DIABETES | Facility: CLINIC | Age: 66
End: 2021-08-18
Payer: MEDICAID

## 2021-08-18 ENCOUNTER — LAB VISIT (OUTPATIENT)
Dept: LAB | Facility: HOSPITAL | Age: 66
End: 2021-08-18
Attending: INTERNAL MEDICINE
Payer: MEDICAID

## 2021-08-18 VITALS — BODY MASS INDEX: 37.34 KG/M2 | WEIGHT: 197.75 LBS | HEIGHT: 61 IN

## 2021-08-18 DIAGNOSIS — E11.29 PROTEINURIA DUE TO TYPE 2 DIABETES MELLITUS: ICD-10-CM

## 2021-08-18 DIAGNOSIS — N17.9 AKI (ACUTE KIDNEY INJURY): ICD-10-CM

## 2021-08-18 DIAGNOSIS — N18.4 CKD (CHRONIC KIDNEY DISEASE) STAGE 4, GFR 15-29 ML/MIN: ICD-10-CM

## 2021-08-18 DIAGNOSIS — R80.9 PROTEINURIA DUE TO TYPE 2 DIABETES MELLITUS: ICD-10-CM

## 2021-08-18 DIAGNOSIS — H35.62 PRERETINAL HEMORRHAGE OF LEFT EYE: Primary | ICD-10-CM

## 2021-08-18 PROCEDURE — 67228 PAN RETINAL PHOTOCOAGULATION - OS - LEFT EYE: ICD-10-PCS | Mod: S$PBB,LT,, | Performed by: OPHTHALMOLOGY

## 2021-08-18 PROCEDURE — 67228 TREATMENT X10SV RETINOPATHY: CPT | Mod: PBBFAC,LT | Performed by: OPHTHALMOLOGY

## 2021-08-18 PROCEDURE — 82610 CYSTATIN C: CPT | Performed by: INTERNAL MEDICINE

## 2021-08-18 PROCEDURE — 99499 UNLISTED E&M SERVICE: CPT | Mod: S$PBB,,, | Performed by: OPHTHALMOLOGY

## 2021-08-18 PROCEDURE — 99213 OFFICE O/P EST LOW 20 MIN: CPT | Mod: PBBFAC,25 | Performed by: DIETITIAN, REGISTERED

## 2021-08-18 PROCEDURE — 99499 NO LOS: ICD-10-PCS | Mod: S$PBB,,, | Performed by: OPHTHALMOLOGY

## 2021-08-18 PROCEDURE — 99999 PR PBB SHADOW E&M-EST. PATIENT-LVL III: CPT | Mod: PBBFAC,,, | Performed by: DIETITIAN, REGISTERED

## 2021-08-18 PROCEDURE — 85025 COMPLETE CBC W/AUTO DIFF WBC: CPT | Performed by: INTERNAL MEDICINE

## 2021-08-18 PROCEDURE — 36415 COLL VENOUS BLD VENIPUNCTURE: CPT | Performed by: INTERNAL MEDICINE

## 2021-08-18 PROCEDURE — G0108 DIAB MANAGE TRN  PER INDIV: HCPCS | Mod: PBBFAC | Performed by: DIETITIAN, REGISTERED

## 2021-08-18 PROCEDURE — 99999 PR PBB SHADOW E&M-EST. PATIENT-LVL III: ICD-10-PCS | Mod: PBBFAC,,, | Performed by: DIETITIAN, REGISTERED

## 2021-08-19 ENCOUNTER — OFFICE VISIT (OUTPATIENT)
Dept: NEPHROLOGY | Facility: CLINIC | Age: 66
End: 2021-08-19
Payer: MEDICAID

## 2021-08-19 DIAGNOSIS — I12.9 PARENCHYMAL RENAL HYPERTENSION, STAGE 1 THROUGH STAGE 4 OR UNSPECIFIED CHRONIC KIDNEY DISEASE: ICD-10-CM

## 2021-08-19 DIAGNOSIS — N18.4 CKD (CHRONIC KIDNEY DISEASE) STAGE 4, GFR 15-29 ML/MIN: Primary | ICD-10-CM

## 2021-08-19 DIAGNOSIS — N25.81 HYPERPARATHYROIDISM, SECONDARY RENAL: ICD-10-CM

## 2021-08-19 DIAGNOSIS — R80.9 PROTEINURIA DUE TO TYPE 2 DIABETES MELLITUS: ICD-10-CM

## 2021-08-19 DIAGNOSIS — E11.29 PROTEINURIA DUE TO TYPE 2 DIABETES MELLITUS: ICD-10-CM

## 2021-08-19 LAB
BASOPHILS # BLD AUTO: 0.04 K/UL (ref 0–0.2)
BASOPHILS NFR BLD: 0.7 % (ref 0–1.9)
DIFFERENTIAL METHOD: ABNORMAL
EOSINOPHIL # BLD AUTO: 0.4 K/UL (ref 0–0.5)
EOSINOPHIL NFR BLD: 6.6 % (ref 0–8)
ERYTHROCYTE [DISTWIDTH] IN BLOOD BY AUTOMATED COUNT: 14.6 % (ref 11.5–14.5)
HCT VFR BLD AUTO: 34.2 % (ref 37–48.5)
HGB BLD-MCNC: 10.5 G/DL (ref 12–16)
IMM GRANULOCYTES # BLD AUTO: 0.01 K/UL (ref 0–0.04)
IMM GRANULOCYTES NFR BLD AUTO: 0.2 % (ref 0–0.5)
LYMPHOCYTES # BLD AUTO: 2.3 K/UL (ref 1–4.8)
LYMPHOCYTES NFR BLD: 40.1 % (ref 18–48)
MCH RBC QN AUTO: 28.6 PG (ref 27–31)
MCHC RBC AUTO-ENTMCNC: 30.7 G/DL (ref 32–36)
MCV RBC AUTO: 93 FL (ref 82–98)
MONOCYTES # BLD AUTO: 0.4 K/UL (ref 0.3–1)
MONOCYTES NFR BLD: 6.6 % (ref 4–15)
NEUTROPHILS # BLD AUTO: 2.7 K/UL (ref 1.8–7.7)
NEUTROPHILS NFR BLD: 45.8 % (ref 38–73)
NRBC BLD-RTO: 0 /100 WBC
PLATELET # BLD AUTO: 292 K/UL (ref 150–450)
PMV BLD AUTO: 10.5 FL (ref 9.2–12.9)
RBC # BLD AUTO: 3.67 M/UL (ref 4–5.4)
WBC # BLD AUTO: 5.78 K/UL (ref 3.9–12.7)

## 2021-08-19 PROCEDURE — 99214 OFFICE O/P EST MOD 30 MIN: CPT | Mod: 95,,, | Performed by: INTERNAL MEDICINE

## 2021-08-19 PROCEDURE — 99214 PR OFFICE/OUTPT VISIT, EST, LEVL IV, 30-39 MIN: ICD-10-PCS | Mod: 95,,, | Performed by: INTERNAL MEDICINE

## 2021-08-19 RX ORDER — CARVEDILOL 12.5 MG/1
12.5 TABLET ORAL 2 TIMES DAILY WITH MEALS
Qty: 60 TABLET | Refills: 11 | Status: SHIPPED | OUTPATIENT
Start: 2021-08-19 | End: 2021-12-01 | Stop reason: SDUPTHER

## 2021-08-19 RX ORDER — CALCITRIOL 0.5 UG/1
0.5 CAPSULE ORAL DAILY
Qty: 1 CAPSULE | Refills: 3 | Status: ON HOLD | OUTPATIENT
Start: 2021-08-19 | End: 2021-12-07 | Stop reason: HOSPADM

## 2021-08-21 LAB
CYSTATIN C SERPL-MCNC: 2.49 MG/L (ref 0.67–1.21)
GFR/BSA.PRED SERPLBLD CYS-BASED-ARV: 21 ML/MIN/BSA

## 2021-08-23 ENCOUNTER — LAB VISIT (OUTPATIENT)
Dept: LAB | Facility: HOSPITAL | Age: 66
End: 2021-08-23
Attending: INTERNAL MEDICINE
Payer: MEDICAID

## 2021-08-23 ENCOUNTER — OFFICE VISIT (OUTPATIENT)
Dept: CARDIOLOGY | Facility: CLINIC | Age: 66
End: 2021-08-23
Payer: MEDICAID

## 2021-08-23 VITALS
DIASTOLIC BLOOD PRESSURE: 70 MMHG | WEIGHT: 197.06 LBS | SYSTOLIC BLOOD PRESSURE: 160 MMHG | BODY MASS INDEX: 37.24 KG/M2 | HEART RATE: 98 BPM

## 2021-08-23 DIAGNOSIS — I50.32 CHRONIC DIASTOLIC CONGESTIVE HEART FAILURE: ICD-10-CM

## 2021-08-23 DIAGNOSIS — E78.49 OTHER HYPERLIPIDEMIA: ICD-10-CM

## 2021-08-23 DIAGNOSIS — N18.4 CKD (CHRONIC KIDNEY DISEASE) STAGE 4, GFR 15-29 ML/MIN: ICD-10-CM

## 2021-08-23 DIAGNOSIS — I31.39 PERICARDIAL EFFUSION: ICD-10-CM

## 2021-08-23 DIAGNOSIS — I10 ESSENTIAL HYPERTENSION: ICD-10-CM

## 2021-08-23 DIAGNOSIS — I50.32 CHRONIC DIASTOLIC CONGESTIVE HEART FAILURE: Primary | ICD-10-CM

## 2021-08-23 DIAGNOSIS — K74.60 HEPATIC CIRRHOSIS, UNSPECIFIED HEPATIC CIRRHOSIS TYPE, UNSPECIFIED WHETHER ASCITES PRESENT: ICD-10-CM

## 2021-08-23 DIAGNOSIS — E11.65 TYPE 2 DIABETES MELLITUS WITH HYPERGLYCEMIA, UNSPECIFIED WHETHER LONG TERM INSULIN USE: ICD-10-CM

## 2021-08-23 DIAGNOSIS — G47.33 OSA ON CPAP: ICD-10-CM

## 2021-08-23 DIAGNOSIS — E08.59 DIABETES DUE TO UNDERLYING CONDITION W OTH CIRCULATORY COMP: ICD-10-CM

## 2021-08-23 LAB — BNP SERPL-MCNC: 420 PG/ML (ref 0–99)

## 2021-08-23 PROCEDURE — 99999 PR PBB SHADOW E&M-EST. PATIENT-LVL III: ICD-10-PCS | Mod: PBBFAC,,, | Performed by: INTERNAL MEDICINE

## 2021-08-23 PROCEDURE — 99213 OFFICE O/P EST LOW 20 MIN: CPT | Mod: PBBFAC | Performed by: INTERNAL MEDICINE

## 2021-08-23 PROCEDURE — 99214 OFFICE O/P EST MOD 30 MIN: CPT | Mod: S$PBB,,, | Performed by: INTERNAL MEDICINE

## 2021-08-23 PROCEDURE — 83880 ASSAY OF NATRIURETIC PEPTIDE: CPT | Performed by: INTERNAL MEDICINE

## 2021-08-23 PROCEDURE — 99999 PR PBB SHADOW E&M-EST. PATIENT-LVL III: CPT | Mod: PBBFAC,,, | Performed by: INTERNAL MEDICINE

## 2021-08-23 PROCEDURE — 36415 COLL VENOUS BLD VENIPUNCTURE: CPT | Performed by: INTERNAL MEDICINE

## 2021-08-23 PROCEDURE — 99214 PR OFFICE/OUTPT VISIT, EST, LEVL IV, 30-39 MIN: ICD-10-PCS | Mod: S$PBB,,, | Performed by: INTERNAL MEDICINE

## 2021-08-23 RX ORDER — ISOSORBIDE MONONITRATE 60 MG/1
60 TABLET, EXTENDED RELEASE ORAL NIGHTLY
Qty: 30 TABLET | Refills: 3 | Status: SHIPPED | OUTPATIENT
Start: 2021-08-23 | End: 2021-09-27 | Stop reason: SDUPTHER

## 2021-08-23 RX ORDER — PRAVASTATIN SODIUM 20 MG/1
20 TABLET ORAL NIGHTLY
Qty: 30 TABLET | Refills: 3 | Status: SHIPPED | OUTPATIENT
Start: 2021-08-23 | End: 2021-10-07

## 2021-09-14 DIAGNOSIS — K76.82 HEPATIC ENCEPHALOPATHY: ICD-10-CM

## 2021-09-14 RX ORDER — RIFAXIMIN 550 MG/1
550 TABLET ORAL 2 TIMES DAILY
Qty: 60 TABLET | Refills: 0 | Status: CANCELLED | OUTPATIENT
Start: 2021-09-14

## 2021-09-27 RX ORDER — ISOSORBIDE MONONITRATE 60 MG/1
60 TABLET, EXTENDED RELEASE ORAL NIGHTLY
Qty: 30 TABLET | Refills: 6 | Status: SHIPPED | OUTPATIENT
Start: 2021-09-27 | End: 2022-08-01 | Stop reason: SDUPTHER

## 2021-10-04 ENCOUNTER — PATIENT OUTREACH (OUTPATIENT)
Dept: ADMINISTRATIVE | Facility: OTHER | Age: 66
End: 2021-10-04

## 2021-10-04 ENCOUNTER — LAB VISIT (OUTPATIENT)
Dept: LAB | Facility: HOSPITAL | Age: 66
End: 2021-10-04
Attending: FAMILY MEDICINE
Payer: MEDICAID

## 2021-10-04 ENCOUNTER — OFFICE VISIT (OUTPATIENT)
Dept: INTERNAL MEDICINE | Facility: CLINIC | Age: 66
End: 2021-10-04
Payer: MEDICAID

## 2021-10-04 ENCOUNTER — OFFICE VISIT (OUTPATIENT)
Dept: CARDIOLOGY | Facility: CLINIC | Age: 66
End: 2021-10-04
Payer: MEDICAID

## 2021-10-04 VITALS
SYSTOLIC BLOOD PRESSURE: 138 MMHG | HEIGHT: 61 IN | HEART RATE: 74 BPM | BODY MASS INDEX: 36.05 KG/M2 | TEMPERATURE: 98 F | DIASTOLIC BLOOD PRESSURE: 74 MMHG | WEIGHT: 190.94 LBS

## 2021-10-04 VITALS
WEIGHT: 190.94 LBS | DIASTOLIC BLOOD PRESSURE: 74 MMHG | HEIGHT: 61 IN | OXYGEN SATURATION: 99 % | HEART RATE: 74 BPM | BODY MASS INDEX: 36.05 KG/M2 | SYSTOLIC BLOOD PRESSURE: 138 MMHG

## 2021-10-04 DIAGNOSIS — E08.59 DIABETES DUE TO UNDERLYING CONDITION W OTH CIRCULATORY COMP: ICD-10-CM

## 2021-10-04 DIAGNOSIS — S39.012A STRAIN OF LUMBAR REGION, INITIAL ENCOUNTER: ICD-10-CM

## 2021-10-04 DIAGNOSIS — I50.32 CHRONIC DIASTOLIC CONGESTIVE HEART FAILURE: Primary | ICD-10-CM

## 2021-10-04 DIAGNOSIS — G47.33 OSA ON CPAP: ICD-10-CM

## 2021-10-04 DIAGNOSIS — K74.60 LIVER CIRRHOSIS SECONDARY TO NASH: ICD-10-CM

## 2021-10-04 DIAGNOSIS — K31.84 DIABETIC GASTROPARESIS ASSOCIATED WITH TYPE 2 DIABETES MELLITUS: ICD-10-CM

## 2021-10-04 DIAGNOSIS — E11.43 DIABETIC GASTROPARESIS ASSOCIATED WITH TYPE 2 DIABETES MELLITUS: ICD-10-CM

## 2021-10-04 DIAGNOSIS — I10 PRIMARY HYPERTENSION: ICD-10-CM

## 2021-10-04 DIAGNOSIS — K75.81 LIVER CIRRHOSIS SECONDARY TO NASH: ICD-10-CM

## 2021-10-04 DIAGNOSIS — N18.4 CKD (CHRONIC KIDNEY DISEASE) STAGE 4, GFR 15-29 ML/MIN: ICD-10-CM

## 2021-10-04 DIAGNOSIS — Z00.00 ROUTINE GENERAL MEDICAL EXAMINATION AT A HEALTH CARE FACILITY: ICD-10-CM

## 2021-10-04 DIAGNOSIS — E78.49 OTHER HYPERLIPIDEMIA: ICD-10-CM

## 2021-10-04 DIAGNOSIS — K74.60 HEPATIC CIRRHOSIS, UNSPECIFIED HEPATIC CIRRHOSIS TYPE, UNSPECIFIED WHETHER ASCITES PRESENT: ICD-10-CM

## 2021-10-04 DIAGNOSIS — E03.4 HYPOTHYROIDISM DUE TO ACQUIRED ATROPHY OF THYROID: ICD-10-CM

## 2021-10-04 DIAGNOSIS — Z00.00 ROUTINE GENERAL MEDICAL EXAMINATION AT A HEALTH CARE FACILITY: Primary | ICD-10-CM

## 2021-10-04 DIAGNOSIS — R60.0 BILATERAL LOWER EXTREMITY EDEMA: ICD-10-CM

## 2021-10-04 DIAGNOSIS — Z12.31 SCREENING MAMMOGRAM, ENCOUNTER FOR: ICD-10-CM

## 2021-10-04 PROCEDURE — 85025 COMPLETE CBC W/AUTO DIFF WBC: CPT | Performed by: FAMILY MEDICINE

## 2021-10-04 PROCEDURE — 80053 COMPREHEN METABOLIC PANEL: CPT | Performed by: FAMILY MEDICINE

## 2021-10-04 PROCEDURE — 99396 PREV VISIT EST AGE 40-64: CPT | Mod: S$PBB,,, | Performed by: FAMILY MEDICINE

## 2021-10-04 PROCEDURE — 82728 ASSAY OF FERRITIN: CPT | Performed by: FAMILY MEDICINE

## 2021-10-04 PROCEDURE — 99215 OFFICE O/P EST HI 40 MIN: CPT | Mod: PBBFAC,27,PO | Performed by: INTERNAL MEDICINE

## 2021-10-04 PROCEDURE — 99396 PR PREVENTIVE VISIT,EST,40-64: ICD-10-PCS | Mod: S$PBB,,, | Performed by: FAMILY MEDICINE

## 2021-10-04 PROCEDURE — 99999 PR PBB SHADOW E&M-EST. PATIENT-LVL V: ICD-10-PCS | Mod: PBBFAC,,, | Performed by: INTERNAL MEDICINE

## 2021-10-04 PROCEDURE — 84443 ASSAY THYROID STIM HORMONE: CPT | Performed by: FAMILY MEDICINE

## 2021-10-04 PROCEDURE — 80061 LIPID PANEL: CPT | Performed by: FAMILY MEDICINE

## 2021-10-04 PROCEDURE — 99214 OFFICE O/P EST MOD 30 MIN: CPT | Mod: S$PBB,,, | Performed by: INTERNAL MEDICINE

## 2021-10-04 PROCEDURE — 99999 PR PBB SHADOW E&M-EST. PATIENT-LVL V: CPT | Mod: PBBFAC,,, | Performed by: FAMILY MEDICINE

## 2021-10-04 PROCEDURE — 99999 PR PBB SHADOW E&M-EST. PATIENT-LVL V: ICD-10-PCS | Mod: PBBFAC,,, | Performed by: FAMILY MEDICINE

## 2021-10-04 PROCEDURE — 99215 OFFICE O/P EST HI 40 MIN: CPT | Mod: PBBFAC,PO | Performed by: FAMILY MEDICINE

## 2021-10-04 PROCEDURE — 84466 ASSAY OF TRANSFERRIN: CPT | Performed by: FAMILY MEDICINE

## 2021-10-04 PROCEDURE — 99214 PR OFFICE/OUTPT VISIT, EST, LEVL IV, 30-39 MIN: ICD-10-PCS | Mod: S$PBB,,, | Performed by: INTERNAL MEDICINE

## 2021-10-04 PROCEDURE — 99999 PR PBB SHADOW E&M-EST. PATIENT-LVL V: CPT | Mod: PBBFAC,,, | Performed by: INTERNAL MEDICINE

## 2021-10-04 PROCEDURE — 83036 HEMOGLOBIN GLYCOSYLATED A1C: CPT | Performed by: FAMILY MEDICINE

## 2021-10-04 PROCEDURE — 82570 ASSAY OF URINE CREATININE: CPT | Performed by: FAMILY MEDICINE

## 2021-10-04 PROCEDURE — 90686 IIV4 VACC NO PRSV 0.5 ML IM: CPT | Mod: PBBFAC,PO

## 2021-10-04 RX ORDER — ISOSORBIDE MONONITRATE 60 MG/1
60 TABLET, EXTENDED RELEASE ORAL NIGHTLY
Qty: 90 TABLET | Refills: 3 | Status: CANCELLED | OUTPATIENT
Start: 2021-10-04 | End: 2022-05-02

## 2021-10-04 RX ORDER — LISINOPRIL 5 MG/1
5 TABLET ORAL DAILY
COMMUNITY
Start: 2021-09-03 | End: 2021-11-12 | Stop reason: ALTCHOICE

## 2021-10-05 LAB
ALBUMIN SERPL BCP-MCNC: 2.4 G/DL (ref 3.5–5.2)
ALBUMIN/CREAT UR: 3478.2 UG/MG (ref 0–30)
ALP SERPL-CCNC: 73 U/L (ref 55–135)
ALT SERPL W/O P-5'-P-CCNC: 12 U/L (ref 10–44)
ANION GAP SERPL CALC-SCNC: 13 MMOL/L (ref 8–16)
AST SERPL-CCNC: 31 U/L (ref 10–40)
BASOPHILS # BLD AUTO: 0.03 K/UL (ref 0–0.2)
BASOPHILS NFR BLD: 0.4 % (ref 0–1.9)
BILIRUB SERPL-MCNC: 0.3 MG/DL (ref 0.1–1)
BUN SERPL-MCNC: 52 MG/DL (ref 8–23)
CALCIUM SERPL-MCNC: 9.5 MG/DL (ref 8.7–10.5)
CHLORIDE SERPL-SCNC: 109 MMOL/L (ref 95–110)
CHOLEST SERPL-MCNC: 245 MG/DL (ref 120–199)
CHOLEST/HDLC SERPL: 6.3 {RATIO} (ref 2–5)
CO2 SERPL-SCNC: 21 MMOL/L (ref 23–29)
CREAT SERPL-MCNC: 3.9 MG/DL (ref 0.5–1.4)
CREAT UR-MCNC: 110 MG/DL (ref 15–325)
DIFFERENTIAL METHOD: ABNORMAL
EOSINOPHIL # BLD AUTO: 0.3 K/UL (ref 0–0.5)
EOSINOPHIL NFR BLD: 4.6 % (ref 0–8)
ERYTHROCYTE [DISTWIDTH] IN BLOOD BY AUTOMATED COUNT: 14.4 % (ref 11.5–14.5)
EST. GFR  (AFRICAN AMERICAN): 13.3 ML/MIN/1.73 M^2
EST. GFR  (NON AFRICAN AMERICAN): 11.5 ML/MIN/1.73 M^2
ESTIMATED AVG GLUCOSE: 103 MG/DL (ref 68–131)
FERRITIN SERPL-MCNC: 250 NG/ML (ref 20–300)
GLUCOSE SERPL-MCNC: 110 MG/DL (ref 70–110)
HBA1C MFR BLD: 5.2 % (ref 4–5.6)
HCT VFR BLD AUTO: 33.3 % (ref 37–48.5)
HDLC SERPL-MCNC: 39 MG/DL (ref 40–75)
HDLC SERPL: 15.9 % (ref 20–50)
HGB BLD-MCNC: 10.6 G/DL (ref 12–16)
IMM GRANULOCYTES # BLD AUTO: 0.02 K/UL (ref 0–0.04)
IMM GRANULOCYTES NFR BLD AUTO: 0.3 % (ref 0–0.5)
IRON SERPL-MCNC: 62 UG/DL (ref 30–160)
LDLC SERPL CALC-MCNC: 179.2 MG/DL (ref 63–159)
LYMPHOCYTES # BLD AUTO: 2.6 K/UL (ref 1–4.8)
LYMPHOCYTES NFR BLD: 38 % (ref 18–48)
MCH RBC QN AUTO: 28.6 PG (ref 27–31)
MCHC RBC AUTO-ENTMCNC: 31.8 G/DL (ref 32–36)
MCV RBC AUTO: 90 FL (ref 82–98)
MICROALBUMIN UR DL<=1MG/L-MCNC: 3826 UG/ML
MONOCYTES # BLD AUTO: 0.4 K/UL (ref 0.3–1)
MONOCYTES NFR BLD: 5.7 % (ref 4–15)
NEUTROPHILS # BLD AUTO: 3.4 K/UL (ref 1.8–7.7)
NEUTROPHILS NFR BLD: 51 % (ref 38–73)
NONHDLC SERPL-MCNC: 206 MG/DL
NRBC BLD-RTO: 0 /100 WBC
PLATELET # BLD AUTO: 226 K/UL (ref 150–450)
PMV BLD AUTO: 11.4 FL (ref 9.2–12.9)
POTASSIUM SERPL-SCNC: 4.9 MMOL/L (ref 3.5–5.1)
PROT SERPL-MCNC: 6.4 G/DL (ref 6–8.4)
RBC # BLD AUTO: 3.7 M/UL (ref 4–5.4)
SATURATED IRON: 23 % (ref 20–50)
SODIUM SERPL-SCNC: 143 MMOL/L (ref 136–145)
TOTAL IRON BINDING CAPACITY: 266 UG/DL (ref 250–450)
TRANSFERRIN SERPL-MCNC: 180 MG/DL (ref 200–375)
TRIGL SERPL-MCNC: 134 MG/DL (ref 30–150)
TSH SERPL DL<=0.005 MIU/L-ACNC: 2.39 UIU/ML (ref 0.4–4)
WBC # BLD AUTO: 6.71 K/UL (ref 3.9–12.7)

## 2021-10-06 ENCOUNTER — HOSPITAL ENCOUNTER (OUTPATIENT)
Dept: RADIOLOGY | Facility: HOSPITAL | Age: 66
Discharge: HOME | End: 2021-10-06
Attending: FAMILY MEDICINE
Payer: MEDICAID

## 2021-10-06 VITALS — HEIGHT: 61 IN | BODY MASS INDEX: 36.05 KG/M2 | WEIGHT: 190.94 LBS

## 2021-10-06 DIAGNOSIS — Z12.31 SCREENING MAMMOGRAM, ENCOUNTER FOR: ICD-10-CM

## 2021-10-06 PROCEDURE — 77067 SCR MAMMO BI INCL CAD: CPT | Mod: 26,,, | Performed by: RADIOLOGY

## 2021-10-06 PROCEDURE — 77067 MAMMO DIGITAL SCREENING BILAT WITH TOMO: ICD-10-PCS | Mod: 26,,, | Performed by: RADIOLOGY

## 2021-10-06 PROCEDURE — 77067 SCR MAMMO BI INCL CAD: CPT | Mod: TC,PO

## 2021-10-06 PROCEDURE — 77063 MAMMO DIGITAL SCREENING BILAT WITH TOMO: ICD-10-PCS | Mod: 26,,, | Performed by: RADIOLOGY

## 2021-10-06 PROCEDURE — 77063 BREAST TOMOSYNTHESIS BI: CPT | Mod: 26,,, | Performed by: RADIOLOGY

## 2021-10-07 RX ORDER — PRAVASTATIN SODIUM 40 MG/1
40 TABLET ORAL NIGHTLY
Qty: 30 TABLET | Refills: 3 | Status: SHIPPED | OUTPATIENT
Start: 2021-10-07 | End: 2021-12-06 | Stop reason: ALTCHOICE

## 2021-10-08 ENCOUNTER — TELEPHONE (OUTPATIENT)
Dept: CARDIOLOGY | Facility: CLINIC | Age: 66
End: 2021-10-08

## 2021-10-10 DIAGNOSIS — K76.82 HEPATIC ENCEPHALOPATHY: ICD-10-CM

## 2021-10-18 ENCOUNTER — OFFICE VISIT (OUTPATIENT)
Dept: NEPHROLOGY | Facility: CLINIC | Age: 66
End: 2021-10-18
Payer: MEDICAID

## 2021-10-18 ENCOUNTER — OFFICE VISIT (OUTPATIENT)
Dept: OPHTHALMOLOGY | Facility: CLINIC | Age: 66
End: 2021-10-18
Payer: MEDICAID

## 2021-10-18 DIAGNOSIS — I12.9 PARENCHYMAL RENAL HYPERTENSION, STAGE 1 THROUGH STAGE 4 OR UNSPECIFIED CHRONIC KIDNEY DISEASE: ICD-10-CM

## 2021-10-18 DIAGNOSIS — E11.29 PROTEINURIA DUE TO TYPE 2 DIABETES MELLITUS: ICD-10-CM

## 2021-10-18 DIAGNOSIS — R80.9 PROTEINURIA DUE TO TYPE 2 DIABETES MELLITUS: ICD-10-CM

## 2021-10-18 DIAGNOSIS — N18.4 CKD (CHRONIC KIDNEY DISEASE) STAGE 4, GFR 15-29 ML/MIN: Primary | ICD-10-CM

## 2021-10-18 DIAGNOSIS — E11.3413 SEVERE NONPROLIFERATIVE DIABETIC RETINOPATHY OF BOTH EYES WITH MACULAR EDEMA ASSOCIATED WITH TYPE 2 DIABETES MELLITUS: Primary | ICD-10-CM

## 2021-10-18 PROCEDURE — 99214 OFFICE O/P EST MOD 30 MIN: CPT | Mod: 95,,, | Performed by: INTERNAL MEDICINE

## 2021-10-18 PROCEDURE — 92134 POSTERIOR SEGMENT OCT RETINA (OCULAR COHERENCE TOMOGRAPHY)-BOTH EYES: ICD-10-PCS | Mod: 26,S$PBB,, | Performed by: OPHTHALMOLOGY

## 2021-10-18 PROCEDURE — 99999 PR PBB SHADOW E&M-EST. PATIENT-LVL III: ICD-10-PCS | Mod: PBBFAC,,, | Performed by: OPHTHALMOLOGY

## 2021-10-18 PROCEDURE — 99213 OFFICE O/P EST LOW 20 MIN: CPT | Mod: PBBFAC | Performed by: OPHTHALMOLOGY

## 2021-10-18 PROCEDURE — 92012 INTRM OPH EXAM EST PATIENT: CPT | Mod: S$PBB,,, | Performed by: OPHTHALMOLOGY

## 2021-10-18 PROCEDURE — 99999 PR PBB SHADOW E&M-EST. PATIENT-LVL III: CPT | Mod: PBBFAC,,, | Performed by: OPHTHALMOLOGY

## 2021-10-18 PROCEDURE — 99214 PR OFFICE/OUTPT VISIT, EST, LEVL IV, 30-39 MIN: ICD-10-PCS | Mod: 95,,, | Performed by: INTERNAL MEDICINE

## 2021-10-18 PROCEDURE — 92134 CPTRZ OPH DX IMG PST SGM RTA: CPT | Mod: PBBFAC | Performed by: OPHTHALMOLOGY

## 2021-10-18 PROCEDURE — 92012 PR EYE EXAM, EST PATIENT,INTERMED: ICD-10-PCS | Mod: S$PBB,,, | Performed by: OPHTHALMOLOGY

## 2021-11-07 DIAGNOSIS — K76.82 HEPATIC ENCEPHALOPATHY: ICD-10-CM

## 2021-11-12 ENCOUNTER — PATIENT MESSAGE (OUTPATIENT)
Dept: HEPATOLOGY | Facility: CLINIC | Age: 66
End: 2021-11-12
Payer: MEDICAID

## 2021-11-22 ENCOUNTER — PATIENT OUTREACH (OUTPATIENT)
Dept: OTHER | Facility: OTHER | Age: 66
End: 2021-11-22
Payer: MEDICAID

## 2021-11-23 ENCOUNTER — OFFICE VISIT (OUTPATIENT)
Dept: HEPATOLOGY | Facility: CLINIC | Age: 66
End: 2021-11-23
Payer: MEDICAID

## 2021-11-23 DIAGNOSIS — K75.81 LIVER CIRRHOSIS SECONDARY TO NASH: Primary | ICD-10-CM

## 2021-11-23 DIAGNOSIS — K74.60 LIVER CIRRHOSIS SECONDARY TO NASH: Primary | ICD-10-CM

## 2021-11-23 DIAGNOSIS — R11.0 NAUSEA: ICD-10-CM

## 2021-11-23 DIAGNOSIS — R63.0 DECREASED APPETITE: ICD-10-CM

## 2021-11-23 DIAGNOSIS — K76.82 HEPATIC ENCEPHALOPATHY: ICD-10-CM

## 2021-11-23 PROCEDURE — 99214 OFFICE O/P EST MOD 30 MIN: CPT | Mod: 95,,, | Performed by: NURSE PRACTITIONER

## 2021-11-23 PROCEDURE — 99214 PR OFFICE/OUTPT VISIT, EST, LEVL IV, 30-39 MIN: ICD-10-PCS | Mod: 95,,, | Performed by: NURSE PRACTITIONER

## 2021-11-30 ENCOUNTER — TELEPHONE (OUTPATIENT)
Dept: GASTROENTEROLOGY | Facility: CLINIC | Age: 66
End: 2021-11-30
Payer: MEDICAID

## 2021-12-02 ENCOUNTER — TELEPHONE (OUTPATIENT)
Dept: INTERNAL MEDICINE | Facility: CLINIC | Age: 66
End: 2021-12-02
Payer: MEDICAID

## 2021-12-02 ENCOUNTER — HOSPITAL ENCOUNTER (OUTPATIENT)
Dept: RADIOLOGY | Facility: HOSPITAL | Age: 66
Discharge: HOME | End: 2021-12-02
Attending: NURSE PRACTITIONER
Payer: MEDICAID

## 2021-12-02 DIAGNOSIS — K75.81 LIVER CIRRHOSIS SECONDARY TO NASH: ICD-10-CM

## 2021-12-02 DIAGNOSIS — K74.60 LIVER CIRRHOSIS SECONDARY TO NASH: ICD-10-CM

## 2021-12-02 PROCEDURE — 76705 US ABDOMEN LIMITED_LIVER: ICD-10-PCS | Mod: 26,,, | Performed by: RADIOLOGY

## 2021-12-02 PROCEDURE — 76705 ECHO EXAM OF ABDOMEN: CPT | Mod: TC,PO

## 2021-12-02 PROCEDURE — 76705 ECHO EXAM OF ABDOMEN: CPT | Mod: 26,,, | Performed by: RADIOLOGY

## 2021-12-03 ENCOUNTER — TELEPHONE (OUTPATIENT)
Dept: INTERNAL MEDICINE | Facility: CLINIC | Age: 66
End: 2021-12-03
Payer: MEDICAID

## 2021-12-03 DIAGNOSIS — E83.52 HYPERCALCEMIA: Primary | ICD-10-CM

## 2021-12-06 ENCOUNTER — HOSPITAL ENCOUNTER (INPATIENT)
Facility: HOSPITAL | Age: 66
LOS: 2 days | Discharge: HOME | DRG: 641 | End: 2021-12-08
Attending: EMERGENCY MEDICINE | Admitting: INTERNAL MEDICINE
Payer: MEDICAID

## 2021-12-06 ENCOUNTER — TELEPHONE (OUTPATIENT)
Dept: HEPATOLOGY | Facility: CLINIC | Age: 66
End: 2021-12-06
Payer: MEDICAID

## 2021-12-06 ENCOUNTER — TELEPHONE (OUTPATIENT)
Dept: GASTROENTEROLOGY | Facility: CLINIC | Age: 66
End: 2021-12-06
Payer: MEDICAID

## 2021-12-06 DIAGNOSIS — N17.9 ACUTE RENAL FAILURE SUPERIMPOSED ON CHRONIC KIDNEY DISEASE, UNSPECIFIED CKD STAGE, UNSPECIFIED ACUTE RENAL FAILURE TYPE: ICD-10-CM

## 2021-12-06 DIAGNOSIS — N17.9 ACUTE RENAL FAILURE: ICD-10-CM

## 2021-12-06 DIAGNOSIS — N18.9 ACUTE RENAL FAILURE SUPERIMPOSED ON CHRONIC KIDNEY DISEASE, UNSPECIFIED CKD STAGE, UNSPECIFIED ACUTE RENAL FAILURE TYPE: ICD-10-CM

## 2021-12-06 DIAGNOSIS — E83.52 HYPERCALCEMIA: Primary | ICD-10-CM

## 2021-12-06 DIAGNOSIS — R10.9 ABDOMINAL PAIN: ICD-10-CM

## 2021-12-06 DIAGNOSIS — E08.59 DIABETES DUE TO UNDERLYING CONDITION W OTH CIRCULATORY COMP: ICD-10-CM

## 2021-12-06 PROBLEM — D63.1 ANEMIA DUE TO STAGE 5 CHRONIC KIDNEY DISEASE, NOT ON CHRONIC DIALYSIS: Status: ACTIVE | Noted: 2021-12-06

## 2021-12-06 PROBLEM — E11.9 TYPE 2 DIABETES MELLITUS, WITHOUT LONG-TERM CURRENT USE OF INSULIN: Status: ACTIVE | Noted: 2019-05-16

## 2021-12-06 PROBLEM — N18.5 ANEMIA DUE TO STAGE 5 CHRONIC KIDNEY DISEASE, NOT ON CHRONIC DIALYSIS: Status: ACTIVE | Noted: 2021-12-06

## 2021-12-06 LAB
ALBUMIN SERPL BCP-MCNC: 2.4 G/DL (ref 3.5–5.2)
ALP SERPL-CCNC: 68 U/L (ref 55–135)
ALT SERPL W/O P-5'-P-CCNC: 18 U/L (ref 10–44)
ANION GAP SERPL CALC-SCNC: 8 MMOL/L (ref 8–16)
AST SERPL-CCNC: 33 U/L (ref 10–40)
BACTERIA #/AREA URNS HPF: ABNORMAL /HPF
BASOPHILS # BLD AUTO: 0.04 K/UL (ref 0–0.2)
BASOPHILS NFR BLD: 0.6 % (ref 0–1.9)
BILIRUB SERPL-MCNC: 0.2 MG/DL (ref 0.1–1)
BILIRUB UR QL STRIP: NEGATIVE
BNP SERPL-MCNC: 704 PG/ML (ref 0–99)
BUN SERPL-MCNC: 40 MG/DL (ref 8–23)
CALCIUM SERPL-MCNC: 12.6 MG/DL (ref 8.7–10.5)
CHLORIDE SERPL-SCNC: 105 MMOL/L (ref 95–110)
CLARITY UR: CLEAR
CO2 SERPL-SCNC: 26 MMOL/L (ref 23–29)
COLOR UR: YELLOW
CREAT SERPL-MCNC: 7 MG/DL (ref 0.5–1.4)
DIFFERENTIAL METHOD: ABNORMAL
EOSINOPHIL # BLD AUTO: 0.3 K/UL (ref 0–0.5)
EOSINOPHIL NFR BLD: 5 % (ref 0–8)
ERYTHROCYTE [DISTWIDTH] IN BLOOD BY AUTOMATED COUNT: 14.1 % (ref 11.5–14.5)
EST. GFR  (AFRICAN AMERICAN): 7 ML/MIN/1.73 M^2
EST. GFR  (NON AFRICAN AMERICAN): 6 ML/MIN/1.73 M^2
GLUCOSE SERPL-MCNC: 171 MG/DL (ref 70–110)
GLUCOSE UR QL STRIP: ABNORMAL
HCT VFR BLD AUTO: 29.9 % (ref 37–48.5)
HGB BLD-MCNC: 9.6 G/DL (ref 12–16)
HGB UR QL STRIP: ABNORMAL
HYALINE CASTS #/AREA URNS LPF: 0 /LPF
IMM GRANULOCYTES # BLD AUTO: 0.02 K/UL (ref 0–0.04)
IMM GRANULOCYTES NFR BLD AUTO: 0.3 % (ref 0–0.5)
KETONES UR QL STRIP: NEGATIVE
LEUKOCYTE ESTERASE UR QL STRIP: NEGATIVE
LYMPHOCYTES # BLD AUTO: 2.4 K/UL (ref 1–4.8)
LYMPHOCYTES NFR BLD: 35.2 % (ref 18–48)
MCH RBC QN AUTO: 28.7 PG (ref 27–31)
MCHC RBC AUTO-ENTMCNC: 32.1 G/DL (ref 32–36)
MCV RBC AUTO: 90 FL (ref 82–98)
MICROSCOPIC COMMENT: ABNORMAL
MONOCYTES # BLD AUTO: 0.7 K/UL (ref 0.3–1)
MONOCYTES NFR BLD: 9.9 % (ref 4–15)
NEUTROPHILS # BLD AUTO: 3.3 K/UL (ref 1.8–7.7)
NEUTROPHILS NFR BLD: 49 % (ref 38–73)
NITRITE UR QL STRIP: NEGATIVE
NRBC BLD-RTO: 0 /100 WBC
PH UR STRIP: 7 [PH] (ref 5–8)
PLATELET # BLD AUTO: 194 K/UL (ref 150–450)
PMV BLD AUTO: 9.2 FL (ref 9.2–12.9)
POCT GLUCOSE: 114 MG/DL (ref 70–110)
POTASSIUM SERPL-SCNC: 4.3 MMOL/L (ref 3.5–5.1)
PROT SERPL-MCNC: 5.9 G/DL (ref 6–8.4)
PROT UR QL STRIP: ABNORMAL
PTH-INTACT SERPL-MCNC: 20.8 PG/ML (ref 9–77)
RBC # BLD AUTO: 3.34 M/UL (ref 4–5.4)
RBC #/AREA URNS HPF: 0 /HPF (ref 0–4)
SODIUM SERPL-SCNC: 139 MMOL/L (ref 136–145)
SP GR UR STRIP: 1.02 (ref 1–1.03)
TROPONIN I SERPL DL<=0.01 NG/ML-MCNC: 0.06 NG/ML (ref 0–0.03)
URN SPEC COLLECT METH UR: ABNORMAL
UROBILINOGEN UR STRIP-ACNC: NEGATIVE EU/DL
WBC # BLD AUTO: 6.77 K/UL (ref 3.9–12.7)
WBC #/AREA URNS HPF: 5 /HPF (ref 0–5)

## 2021-12-06 PROCEDURE — 63600175 PHARM REV CODE 636 W HCPCS: Performed by: INTERNAL MEDICINE

## 2021-12-06 PROCEDURE — 93005 ELECTROCARDIOGRAM TRACING: CPT

## 2021-12-06 PROCEDURE — 96361 HYDRATE IV INFUSION ADD-ON: CPT

## 2021-12-06 PROCEDURE — G0378 HOSPITAL OBSERVATION PER HR: HCPCS

## 2021-12-06 PROCEDURE — 96374 THER/PROPH/DIAG INJ IV PUSH: CPT

## 2021-12-06 PROCEDURE — 83880 ASSAY OF NATRIURETIC PEPTIDE: CPT | Performed by: NURSE PRACTITIONER

## 2021-12-06 PROCEDURE — 99291 CRITICAL CARE FIRST HOUR: CPT | Mod: 25

## 2021-12-06 PROCEDURE — 82962 GLUCOSE BLOOD TEST: CPT

## 2021-12-06 PROCEDURE — 93010 ELECTROCARDIOGRAM REPORT: CPT | Mod: ,,, | Performed by: INTERNAL MEDICINE

## 2021-12-06 PROCEDURE — 11000001 HC ACUTE MED/SURG PRIVATE ROOM

## 2021-12-06 PROCEDURE — 93010 EKG 12-LEAD: ICD-10-PCS | Mod: ,,, | Performed by: INTERNAL MEDICINE

## 2021-12-06 PROCEDURE — 83970 ASSAY OF PARATHORMONE: CPT | Performed by: INTERNAL MEDICINE

## 2021-12-06 PROCEDURE — 82306 VITAMIN D 25 HYDROXY: CPT | Performed by: INTERNAL MEDICINE

## 2021-12-06 PROCEDURE — 25000003 PHARM REV CODE 250: Performed by: EMERGENCY MEDICINE

## 2021-12-06 PROCEDURE — 25000003 PHARM REV CODE 250: Performed by: INTERNAL MEDICINE

## 2021-12-06 PROCEDURE — 81000 URINALYSIS NONAUTO W/SCOPE: CPT | Performed by: NURSE PRACTITIONER

## 2021-12-06 PROCEDURE — 84484 ASSAY OF TROPONIN QUANT: CPT | Performed by: NURSE PRACTITIONER

## 2021-12-06 PROCEDURE — 82397 CHEMILUMINESCENT ASSAY: CPT | Performed by: INTERNAL MEDICINE

## 2021-12-06 PROCEDURE — 85025 COMPLETE CBC W/AUTO DIFF WBC: CPT | Performed by: NURSE PRACTITIONER

## 2021-12-06 PROCEDURE — 36415 COLL VENOUS BLD VENIPUNCTURE: CPT | Performed by: NURSE PRACTITIONER

## 2021-12-06 PROCEDURE — 80053 COMPREHEN METABOLIC PANEL: CPT | Performed by: NURSE PRACTITIONER

## 2021-12-06 RX ORDER — CARVEDILOL 12.5 MG/1
12.5 TABLET ORAL 2 TIMES DAILY
Status: DISCONTINUED | OUTPATIENT
Start: 2021-12-06 | End: 2021-12-07

## 2021-12-06 RX ORDER — FAMOTIDINE 20 MG/1
20 TABLET, FILM COATED ORAL DAILY
Status: DISCONTINUED | OUTPATIENT
Start: 2021-12-07 | End: 2021-12-08 | Stop reason: HOSPADM

## 2021-12-06 RX ORDER — ASPIRIN 81 MG/1
81 TABLET ORAL DAILY
Status: DISCONTINUED | OUTPATIENT
Start: 2021-12-07 | End: 2021-12-08 | Stop reason: HOSPADM

## 2021-12-06 RX ORDER — FUROSEMIDE 10 MG/ML
40 INJECTION INTRAMUSCULAR; INTRAVENOUS ONCE
Status: COMPLETED | OUTPATIENT
Start: 2021-12-06 | End: 2021-12-06

## 2021-12-06 RX ORDER — HYDRALAZINE HYDROCHLORIDE 20 MG/ML
10 INJECTION INTRAMUSCULAR; INTRAVENOUS EVERY 8 HOURS PRN
Status: DISCONTINUED | OUTPATIENT
Start: 2021-12-06 | End: 2021-12-08 | Stop reason: HOSPADM

## 2021-12-06 RX ORDER — IBUPROFEN 200 MG
16 TABLET ORAL
Status: DISCONTINUED | OUTPATIENT
Start: 2021-12-06 | End: 2021-12-08 | Stop reason: HOSPADM

## 2021-12-06 RX ORDER — IBUPROFEN 200 MG
24 TABLET ORAL
Status: DISCONTINUED | OUTPATIENT
Start: 2021-12-06 | End: 2021-12-08 | Stop reason: HOSPADM

## 2021-12-06 RX ORDER — CARVEDILOL 12.5 MG/1
25 TABLET ORAL ONCE
Status: COMPLETED | OUTPATIENT
Start: 2021-12-06 | End: 2021-12-06

## 2021-12-06 RX ORDER — ISOSORBIDE MONONITRATE 60 MG/1
60 TABLET, EXTENDED RELEASE ORAL NIGHTLY
Status: DISCONTINUED | OUTPATIENT
Start: 2021-12-06 | End: 2021-12-08 | Stop reason: HOSPADM

## 2021-12-06 RX ORDER — FUROSEMIDE 10 MG/ML
40 INJECTION INTRAMUSCULAR; INTRAVENOUS ONCE AS NEEDED
Status: DISCONTINUED | OUTPATIENT
Start: 2021-12-06 | End: 2021-12-08 | Stop reason: HOSPADM

## 2021-12-06 RX ORDER — AMLODIPINE BESYLATE 10 MG/1
10 TABLET ORAL DAILY
Status: DISCONTINUED | OUTPATIENT
Start: 2021-12-06 | End: 2021-12-08 | Stop reason: HOSPADM

## 2021-12-06 RX ORDER — GUAIFENESIN 100 MG/5ML
200 SOLUTION ORAL EVERY 4 HOURS PRN
Status: DISCONTINUED | OUTPATIENT
Start: 2021-12-06 | End: 2021-12-08 | Stop reason: HOSPADM

## 2021-12-06 RX ORDER — SODIUM CHLORIDE 9 MG/ML
INJECTION, SOLUTION INTRAVENOUS CONTINUOUS
Status: DISCONTINUED | OUTPATIENT
Start: 2021-12-06 | End: 2021-12-08 | Stop reason: HOSPADM

## 2021-12-06 RX ORDER — DOCUSATE SODIUM 100 MG/1
100 CAPSULE, LIQUID FILLED ORAL 3 TIMES DAILY
Status: DISCONTINUED | OUTPATIENT
Start: 2021-12-06 | End: 2021-12-08 | Stop reason: HOSPADM

## 2021-12-06 RX ORDER — ONDANSETRON 2 MG/ML
4 INJECTION INTRAMUSCULAR; INTRAVENOUS EVERY 8 HOURS PRN
Status: DISCONTINUED | OUTPATIENT
Start: 2021-12-06 | End: 2021-12-08 | Stop reason: HOSPADM

## 2021-12-06 RX ORDER — ACETAMINOPHEN 325 MG/1
650 TABLET ORAL EVERY 8 HOURS PRN
Status: DISCONTINUED | OUTPATIENT
Start: 2021-12-06 | End: 2021-12-08 | Stop reason: HOSPADM

## 2021-12-06 RX ORDER — INSULIN ASPART 100 [IU]/ML
0-5 INJECTION, SOLUTION INTRAVENOUS; SUBCUTANEOUS
Status: DISCONTINUED | OUTPATIENT
Start: 2021-12-06 | End: 2021-12-08 | Stop reason: HOSPADM

## 2021-12-06 RX ORDER — GLUCAGON 1 MG
1 KIT INJECTION
Status: DISCONTINUED | OUTPATIENT
Start: 2021-12-06 | End: 2021-12-08 | Stop reason: HOSPADM

## 2021-12-06 RX ADMIN — AMLODIPINE BESYLATE 10 MG: 10 TABLET ORAL at 06:12

## 2021-12-06 RX ADMIN — SODIUM CHLORIDE 150 ML/HR: 0.9 INJECTION, SOLUTION INTRAVENOUS at 06:12

## 2021-12-06 RX ADMIN — FUROSEMIDE 40 MG: 10 INJECTION, SOLUTION INTRAVENOUS at 08:12

## 2021-12-06 RX ADMIN — ISOSORBIDE MONONITRATE 60 MG: 60 TABLET, EXTENDED RELEASE ORAL at 08:12

## 2021-12-06 RX ADMIN — CARVEDILOL 25 MG: 12.5 TABLET, FILM COATED ORAL at 03:12

## 2021-12-06 RX ADMIN — RIFAXIMIN 550 MG: 550 TABLET ORAL at 08:12

## 2021-12-06 RX ADMIN — DOCUSATE SODIUM 100 MG: 100 CAPSULE, LIQUID FILLED ORAL at 08:12

## 2021-12-06 RX ADMIN — CARVEDILOL 12.5 MG: 12.5 TABLET, FILM COATED ORAL at 08:12

## 2021-12-07 LAB
25(OH)D3+25(OH)D2 SERPL-MCNC: 27 NG/ML (ref 30–96)
ALBUMIN SERPL BCP-MCNC: 2.2 G/DL (ref 3.5–5.2)
ALP SERPL-CCNC: 61 U/L (ref 55–135)
ALT SERPL W/O P-5'-P-CCNC: 14 U/L (ref 10–44)
ANION GAP SERPL CALC-SCNC: 7 MMOL/L (ref 8–16)
AST SERPL-CCNC: 29 U/L (ref 10–40)
BASOPHILS # BLD AUTO: 0.03 K/UL (ref 0–0.2)
BASOPHILS NFR BLD: 0.4 % (ref 0–1.9)
BILIRUB SERPL-MCNC: 0.2 MG/DL (ref 0.1–1)
BUN SERPL-MCNC: 35 MG/DL (ref 8–23)
CALCIUM SERPL-MCNC: 11.5 MG/DL (ref 8.7–10.5)
CHLORIDE SERPL-SCNC: 111 MMOL/L (ref 95–110)
CO2 SERPL-SCNC: 25 MMOL/L (ref 23–29)
CREAT SERPL-MCNC: 6.2 MG/DL (ref 0.5–1.4)
DIFFERENTIAL METHOD: ABNORMAL
EOSINOPHIL # BLD AUTO: 0.3 K/UL (ref 0–0.5)
EOSINOPHIL NFR BLD: 5.1 % (ref 0–8)
ERYTHROCYTE [DISTWIDTH] IN BLOOD BY AUTOMATED COUNT: 14.1 % (ref 11.5–14.5)
EST. GFR  (AFRICAN AMERICAN): 8 ML/MIN/1.73 M^2
EST. GFR  (NON AFRICAN AMERICAN): 7 ML/MIN/1.73 M^2
GLUCOSE SERPL-MCNC: 103 MG/DL (ref 70–110)
HCT VFR BLD AUTO: 28.2 % (ref 37–48.5)
HGB BLD-MCNC: 8.9 G/DL (ref 12–16)
IMM GRANULOCYTES # BLD AUTO: 0.01 K/UL (ref 0–0.04)
IMM GRANULOCYTES NFR BLD AUTO: 0.1 % (ref 0–0.5)
LYMPHOCYTES # BLD AUTO: 2.7 K/UL (ref 1–4.8)
LYMPHOCYTES NFR BLD: 40.1 % (ref 18–48)
MAGNESIUM SERPL-MCNC: 2.2 MG/DL (ref 1.6–2.6)
MCH RBC QN AUTO: 28.3 PG (ref 27–31)
MCHC RBC AUTO-ENTMCNC: 31.6 G/DL (ref 32–36)
MCV RBC AUTO: 90 FL (ref 82–98)
MONOCYTES # BLD AUTO: 0.6 K/UL (ref 0.3–1)
MONOCYTES NFR BLD: 9.3 % (ref 4–15)
NEUTROPHILS # BLD AUTO: 3 K/UL (ref 1.8–7.7)
NEUTROPHILS NFR BLD: 45 % (ref 38–73)
NRBC BLD-RTO: 0 /100 WBC
PLATELET # BLD AUTO: 198 K/UL (ref 150–450)
PMV BLD AUTO: 10.4 FL (ref 9.2–12.9)
POCT GLUCOSE: 130 MG/DL (ref 70–110)
POCT GLUCOSE: 134 MG/DL (ref 70–110)
POCT GLUCOSE: 137 MG/DL (ref 70–110)
POCT GLUCOSE: 143 MG/DL (ref 70–110)
POTASSIUM SERPL-SCNC: 3.9 MMOL/L (ref 3.5–5.1)
PROT SERPL-MCNC: 5.6 G/DL (ref 6–8.4)
RBC # BLD AUTO: 3.14 M/UL (ref 4–5.4)
SODIUM SERPL-SCNC: 143 MMOL/L (ref 136–145)
TSH SERPL DL<=0.005 MIU/L-ACNC: 1.34 UIU/ML (ref 0.4–4)
WBC # BLD AUTO: 6.69 K/UL (ref 3.9–12.7)

## 2021-12-07 PROCEDURE — 25000003 PHARM REV CODE 250: Performed by: INTERNAL MEDICINE

## 2021-12-07 PROCEDURE — 63600175 PHARM REV CODE 636 W HCPCS: Performed by: INTERNAL MEDICINE

## 2021-12-07 PROCEDURE — 80053 COMPREHEN METABOLIC PANEL: CPT | Performed by: INTERNAL MEDICINE

## 2021-12-07 PROCEDURE — 11000001 HC ACUTE MED/SURG PRIVATE ROOM

## 2021-12-07 PROCEDURE — 94660 CPAP INITIATION&MGMT: CPT

## 2021-12-07 PROCEDURE — 36415 COLL VENOUS BLD VENIPUNCTURE: CPT | Performed by: INTERNAL MEDICINE

## 2021-12-07 PROCEDURE — 83735 ASSAY OF MAGNESIUM: CPT | Performed by: INTERNAL MEDICINE

## 2021-12-07 PROCEDURE — 99223 PR INITIAL HOSPITAL CARE,LEVL III: ICD-10-PCS | Mod: ,,, | Performed by: INTERNAL MEDICINE

## 2021-12-07 PROCEDURE — 99223 1ST HOSP IP/OBS HIGH 75: CPT | Mod: ,,, | Performed by: INTERNAL MEDICINE

## 2021-12-07 PROCEDURE — 27000190 HC CPAP FULL FACE MASK W/VALVE

## 2021-12-07 PROCEDURE — 84443 ASSAY THYROID STIM HORMONE: CPT | Performed by: INTERNAL MEDICINE

## 2021-12-07 PROCEDURE — 85025 COMPLETE CBC W/AUTO DIFF WBC: CPT | Performed by: INTERNAL MEDICINE

## 2021-12-07 PROCEDURE — 99900035 HC TECH TIME PER 15 MIN (STAT)

## 2021-12-07 PROCEDURE — 27000221 HC OXYGEN, UP TO 24 HOURS

## 2021-12-07 PROCEDURE — 82164 ANGIOTENSIN I ENZYME TEST: CPT | Performed by: INTERNAL MEDICINE

## 2021-12-07 RX ORDER — SENNOSIDES 8.6 MG/1
8.6 TABLET ORAL DAILY PRN
Status: DISCONTINUED | OUTPATIENT
Start: 2021-12-07 | End: 2021-12-08 | Stop reason: HOSPADM

## 2021-12-07 RX ORDER — FUROSEMIDE 10 MG/ML
40 INJECTION INTRAMUSCULAR; INTRAVENOUS ONCE
Status: COMPLETED | OUTPATIENT
Start: 2021-12-07 | End: 2021-12-07

## 2021-12-07 RX ORDER — CARVEDILOL 12.5 MG/1
25 TABLET ORAL 2 TIMES DAILY
Status: DISCONTINUED | OUTPATIENT
Start: 2021-12-07 | End: 2021-12-08 | Stop reason: HOSPADM

## 2021-12-07 RX ADMIN — FUROSEMIDE 40 MG: 10 INJECTION, SOLUTION INTRAMUSCULAR; INTRAVENOUS at 12:12

## 2021-12-07 RX ADMIN — ISOSORBIDE MONONITRATE 60 MG: 60 TABLET, EXTENDED RELEASE ORAL at 09:12

## 2021-12-07 RX ADMIN — FAMOTIDINE 20 MG: 20 TABLET ORAL at 08:12

## 2021-12-07 RX ADMIN — CARVEDILOL 25 MG: 12.5 TABLET, FILM COATED ORAL at 09:12

## 2021-12-07 RX ADMIN — AMLODIPINE BESYLATE 10 MG: 10 TABLET ORAL at 08:12

## 2021-12-07 RX ADMIN — LEVOTHYROXINE SODIUM 137 MCG: 25 TABLET ORAL at 06:12

## 2021-12-07 RX ADMIN — ASPIRIN 81 MG: 81 TABLET, COATED ORAL at 08:12

## 2021-12-07 RX ADMIN — CARVEDILOL 25 MG: 12.5 TABLET, FILM COATED ORAL at 08:12

## 2021-12-07 RX ADMIN — RIFAXIMIN 550 MG: 550 TABLET ORAL at 08:12

## 2021-12-07 RX ADMIN — RIFAXIMIN 550 MG: 550 TABLET ORAL at 09:12

## 2021-12-07 RX ADMIN — DOCUSATE SODIUM 100 MG: 100 CAPSULE, LIQUID FILLED ORAL at 08:12

## 2021-12-08 ENCOUNTER — TELEPHONE (OUTPATIENT)
Dept: INTERNAL MEDICINE | Facility: CLINIC | Age: 66
End: 2021-12-08
Payer: MEDICAID

## 2021-12-08 VITALS
HEART RATE: 54 BPM | OXYGEN SATURATION: 97 % | TEMPERATURE: 99 F | HEIGHT: 61 IN | SYSTOLIC BLOOD PRESSURE: 172 MMHG | RESPIRATION RATE: 18 BRPM | WEIGHT: 200.63 LBS | BODY MASS INDEX: 37.88 KG/M2 | DIASTOLIC BLOOD PRESSURE: 66 MMHG

## 2021-12-08 LAB
ACE SERPL-CCNC: 56 U/L (ref 16–85)
ALBUMIN SERPL BCP-MCNC: 1.9 G/DL (ref 3.5–5.2)
ANION GAP SERPL CALC-SCNC: 5 MMOL/L (ref 8–16)
BUN SERPL-MCNC: 32 MG/DL (ref 8–23)
CALCIUM SERPL-MCNC: 9.9 MG/DL (ref 8.7–10.5)
CALCIUM SERPL-MCNC: 9.9 MG/DL (ref 8.7–10.5)
CHLORIDE SERPL-SCNC: 114 MMOL/L (ref 95–110)
CO2 SERPL-SCNC: 23 MMOL/L (ref 23–29)
CREAT SERPL-MCNC: 5.7 MG/DL (ref 0.5–1.4)
EST. GFR  (AFRICAN AMERICAN): 8 ML/MIN/1.73 M^2
EST. GFR  (NON AFRICAN AMERICAN): 7 ML/MIN/1.73 M^2
GLUCOSE SERPL-MCNC: 108 MG/DL (ref 70–110)
POCT GLUCOSE: 155 MG/DL (ref 70–110)
POTASSIUM SERPL-SCNC: 3.6 MMOL/L (ref 3.5–5.1)
SODIUM SERPL-SCNC: 142 MMOL/L (ref 136–145)

## 2021-12-08 PROCEDURE — 36415 COLL VENOUS BLD VENIPUNCTURE: CPT | Performed by: INTERNAL MEDICINE

## 2021-12-08 PROCEDURE — 99900035 HC TECH TIME PER 15 MIN (STAT)

## 2021-12-08 PROCEDURE — 80048 BASIC METABOLIC PNL TOTAL CA: CPT | Performed by: INTERNAL MEDICINE

## 2021-12-08 PROCEDURE — 97161 PT EVAL LOW COMPLEX 20 MIN: CPT

## 2021-12-08 PROCEDURE — 63600175 PHARM REV CODE 636 W HCPCS: Performed by: INTERNAL MEDICINE

## 2021-12-08 PROCEDURE — 99233 PR SUBSEQUENT HOSPITAL CARE,LEVL III: ICD-10-PCS | Mod: ,,, | Performed by: INTERNAL MEDICINE

## 2021-12-08 PROCEDURE — 25000242 PHARM REV CODE 250 ALT 637 W/ HCPCS: Performed by: INTERNAL MEDICINE

## 2021-12-08 PROCEDURE — 99233 SBSQ HOSP IP/OBS HIGH 50: CPT | Mod: ,,, | Performed by: INTERNAL MEDICINE

## 2021-12-08 PROCEDURE — 82040 ASSAY OF SERUM ALBUMIN: CPT | Performed by: INTERNAL MEDICINE

## 2021-12-08 PROCEDURE — 25000003 PHARM REV CODE 250: Performed by: INTERNAL MEDICINE

## 2021-12-08 RX ORDER — BENZONATATE 100 MG/1
100 CAPSULE ORAL 3 TIMES DAILY PRN
Status: DISCONTINUED | OUTPATIENT
Start: 2021-12-08 | End: 2021-12-08 | Stop reason: HOSPADM

## 2021-12-08 RX ORDER — FUROSEMIDE 10 MG/ML
40 INJECTION INTRAMUSCULAR; INTRAVENOUS ONCE
Status: COMPLETED | OUTPATIENT
Start: 2021-12-08 | End: 2021-12-08

## 2021-12-08 RX ORDER — LACTULOSE 10 G/15ML
20 SOLUTION ORAL ONCE
Status: COMPLETED | OUTPATIENT
Start: 2021-12-08 | End: 2021-12-08

## 2021-12-08 RX ORDER — FLUTICASONE PROPIONATE 50 MCG
2 SPRAY, SUSPENSION (ML) NASAL DAILY
Status: DISCONTINUED | OUTPATIENT
Start: 2021-12-08 | End: 2021-12-08 | Stop reason: HOSPADM

## 2021-12-08 RX ORDER — BENZONATATE 100 MG/1
100 CAPSULE ORAL 3 TIMES DAILY PRN
Qty: 15 CAPSULE | Refills: 0 | Status: SHIPPED | OUTPATIENT
Start: 2021-12-08 | End: 2021-12-18

## 2021-12-08 RX ORDER — DOCUSATE SODIUM 100 MG/1
100 CAPSULE, LIQUID FILLED ORAL 3 TIMES DAILY
Qty: 30 CAPSULE | Refills: 0 | Status: SHIPPED | OUTPATIENT
Start: 2021-12-08 | End: 2022-05-02 | Stop reason: SDUPTHER

## 2021-12-08 RX ORDER — FLUTICASONE PROPIONATE 50 MCG
2 SPRAY, SUSPENSION (ML) NASAL DAILY
Refills: 0
Start: 2021-12-09 | End: 2022-05-02 | Stop reason: SDUPTHER

## 2021-12-08 RX ADMIN — LEVOTHYROXINE SODIUM 137 MCG: 25 TABLET ORAL at 05:12

## 2021-12-08 RX ADMIN — LACTULOSE 20 G: 20 SOLUTION ORAL at 08:12

## 2021-12-08 RX ADMIN — CARVEDILOL 25 MG: 12.5 TABLET, FILM COATED ORAL at 08:12

## 2021-12-08 RX ADMIN — FAMOTIDINE 20 MG: 20 TABLET ORAL at 08:12

## 2021-12-08 RX ADMIN — AMLODIPINE BESYLATE 10 MG: 10 TABLET ORAL at 08:12

## 2021-12-08 RX ADMIN — FUROSEMIDE 40 MG: 10 INJECTION, SOLUTION INTRAVENOUS at 08:12

## 2021-12-08 RX ADMIN — ACETAMINOPHEN 650 MG: 325 TABLET ORAL at 06:12

## 2021-12-08 RX ADMIN — RIFAXIMIN 550 MG: 550 TABLET ORAL at 08:12

## 2021-12-08 RX ADMIN — SODIUM CHLORIDE: 0.9 INJECTION, SOLUTION INTRAVENOUS at 01:12

## 2021-12-08 RX ADMIN — FLUTICASONE PROPIONATE 100 MCG: 50 SPRAY, METERED NASAL at 08:12

## 2021-12-08 RX ADMIN — DOCUSATE SODIUM 100 MG: 100 CAPSULE, LIQUID FILLED ORAL at 08:12

## 2021-12-08 RX ADMIN — ASPIRIN 81 MG: 81 TABLET, COATED ORAL at 08:12

## 2021-12-09 ENCOUNTER — PATIENT OUTREACH (OUTPATIENT)
Dept: ADMINISTRATIVE | Facility: CLINIC | Age: 66
End: 2021-12-09
Payer: MEDICAID

## 2021-12-13 ENCOUNTER — TELEPHONE (OUTPATIENT)
Dept: INTERNAL MEDICINE | Facility: CLINIC | Age: 66
End: 2021-12-13
Payer: MEDICAID

## 2021-12-13 ENCOUNTER — PATIENT MESSAGE (OUTPATIENT)
Dept: DIABETES | Facility: CLINIC | Age: 66
End: 2021-12-13
Payer: MEDICAID

## 2021-12-13 ENCOUNTER — PATIENT MESSAGE (OUTPATIENT)
Dept: OTHER | Facility: OTHER | Age: 66
End: 2021-12-13
Payer: MEDICAID

## 2021-12-14 LAB — PTH RELATED PROT SERPL-SCNC: 1.6 PMOL/L

## 2021-12-16 ENCOUNTER — PATIENT OUTREACH (OUTPATIENT)
Dept: ADMINISTRATIVE | Facility: HOSPITAL | Age: 66
End: 2021-12-16
Payer: MEDICAID

## 2021-12-20 ENCOUNTER — HOSPITAL ENCOUNTER (OUTPATIENT)
Dept: RADIOLOGY | Facility: HOSPITAL | Age: 66
Discharge: HOME | End: 2021-12-20
Attending: FAMILY MEDICINE
Payer: MEDICAID

## 2021-12-20 ENCOUNTER — OFFICE VISIT (OUTPATIENT)
Dept: CARDIOLOGY | Facility: CLINIC | Age: 66
End: 2021-12-20
Payer: MEDICAID

## 2021-12-20 ENCOUNTER — TELEPHONE (OUTPATIENT)
Dept: INTERNAL MEDICINE | Facility: CLINIC | Age: 66
End: 2021-12-20

## 2021-12-20 ENCOUNTER — OFFICE VISIT (OUTPATIENT)
Dept: INTERNAL MEDICINE | Facility: CLINIC | Age: 66
End: 2021-12-20
Payer: MEDICAID

## 2021-12-20 VITALS
BODY MASS INDEX: 36.84 KG/M2 | WEIGHT: 195.13 LBS | OXYGEN SATURATION: 95 % | HEIGHT: 61 IN | RESPIRATION RATE: 16 BRPM | SYSTOLIC BLOOD PRESSURE: 174 MMHG | HEART RATE: 83 BPM | DIASTOLIC BLOOD PRESSURE: 68 MMHG

## 2021-12-20 VITALS
WEIGHT: 195.13 LBS | SYSTOLIC BLOOD PRESSURE: 138 MMHG | DIASTOLIC BLOOD PRESSURE: 74 MMHG | BODY MASS INDEX: 36.87 KG/M2 | TEMPERATURE: 98 F

## 2021-12-20 DIAGNOSIS — R10.13 EPIGASTRIC PAIN: ICD-10-CM

## 2021-12-20 DIAGNOSIS — I50.32 CHRONIC DIASTOLIC CONGESTIVE HEART FAILURE: Primary | ICD-10-CM

## 2021-12-20 DIAGNOSIS — K74.60 LIVER CIRRHOSIS SECONDARY TO NASH: ICD-10-CM

## 2021-12-20 DIAGNOSIS — R10.9 ABDOMINAL PAIN, UNSPECIFIED ABDOMINAL LOCATION: Primary | ICD-10-CM

## 2021-12-20 DIAGNOSIS — E78.49 OTHER HYPERLIPIDEMIA: ICD-10-CM

## 2021-12-20 DIAGNOSIS — K74.60 HEPATIC CIRRHOSIS, UNSPECIFIED HEPATIC CIRRHOSIS TYPE, UNSPECIFIED WHETHER ASCITES PRESENT: ICD-10-CM

## 2021-12-20 DIAGNOSIS — E11.59 TYPE 2 DIABETES MELLITUS WITH OTHER CIRCULATORY COMPLICATION, WITHOUT LONG-TERM CURRENT USE OF INSULIN: ICD-10-CM

## 2021-12-20 DIAGNOSIS — I10 PRIMARY HYPERTENSION: ICD-10-CM

## 2021-12-20 DIAGNOSIS — R10.9 ABDOMINAL PAIN, UNSPECIFIED ABDOMINAL LOCATION: ICD-10-CM

## 2021-12-20 DIAGNOSIS — E83.52 HYPERCALCEMIA: ICD-10-CM

## 2021-12-20 DIAGNOSIS — L98.9 SKIN LESION: ICD-10-CM

## 2021-12-20 DIAGNOSIS — G47.33 OSA ON CPAP: ICD-10-CM

## 2021-12-20 DIAGNOSIS — E08.59 DIABETES DUE TO UNDERLYING CONDITION W OTH CIRCULATORY COMP: ICD-10-CM

## 2021-12-20 DIAGNOSIS — E03.4 HYPOTHYROIDISM DUE TO ACQUIRED ATROPHY OF THYROID: ICD-10-CM

## 2021-12-20 DIAGNOSIS — K31.84 DIABETIC GASTROPARESIS ASSOCIATED WITH TYPE 2 DIABETES MELLITUS: ICD-10-CM

## 2021-12-20 DIAGNOSIS — E11.43 DIABETIC GASTROPARESIS ASSOCIATED WITH TYPE 2 DIABETES MELLITUS: ICD-10-CM

## 2021-12-20 DIAGNOSIS — K75.81 LIVER CIRRHOSIS SECONDARY TO NASH: ICD-10-CM

## 2021-12-20 DIAGNOSIS — N18.4 CKD (CHRONIC KIDNEY DISEASE) STAGE 4, GFR 15-29 ML/MIN: ICD-10-CM

## 2021-12-20 PROCEDURE — 99999 PR PBB SHADOW E&M-EST. PATIENT-LVL III: ICD-10-PCS | Mod: PBBFAC,,, | Performed by: INTERNAL MEDICINE

## 2021-12-20 PROCEDURE — 99999 PR PBB SHADOW E&M-EST. PATIENT-LVL IV: CPT | Mod: PBBFAC,,, | Performed by: FAMILY MEDICINE

## 2021-12-20 PROCEDURE — 3066F NEPHROPATHY DOC TX: CPT | Mod: CPTII,,, | Performed by: FAMILY MEDICINE

## 2021-12-20 PROCEDURE — 99214 PR OFFICE/OUTPT VISIT, EST, LEVL IV, 30-39 MIN: ICD-10-PCS | Mod: S$PBB,,, | Performed by: FAMILY MEDICINE

## 2021-12-20 PROCEDURE — 3066F PR DOCUMENTATION OF TREATMENT FOR NEPHROPATHY: ICD-10-PCS | Mod: CPTII,,, | Performed by: INTERNAL MEDICINE

## 2021-12-20 PROCEDURE — 3062F PR POS MACROALBUMINURIA RESULT DOCUMENTED/REVIEW: ICD-10-PCS | Mod: CPTII,,, | Performed by: INTERNAL MEDICINE

## 2021-12-20 PROCEDURE — 3062F POS MACROALBUMINURIA REV: CPT | Mod: CPTII,,, | Performed by: INTERNAL MEDICINE

## 2021-12-20 PROCEDURE — 99213 OFFICE O/P EST LOW 20 MIN: CPT | Mod: PBBFAC,27,PO,25 | Performed by: INTERNAL MEDICINE

## 2021-12-20 PROCEDURE — 3062F PR POS MACROALBUMINURIA RESULT DOCUMENTED/REVIEW: ICD-10-PCS | Mod: CPTII,,, | Performed by: FAMILY MEDICINE

## 2021-12-20 PROCEDURE — 99214 PR OFFICE/OUTPT VISIT, EST, LEVL IV, 30-39 MIN: ICD-10-PCS | Mod: S$PBB,,, | Performed by: INTERNAL MEDICINE

## 2021-12-20 PROCEDURE — 4010F PR ACE/ARB THEARPY RXD/TAKEN: ICD-10-PCS | Mod: CPTII,,, | Performed by: FAMILY MEDICINE

## 2021-12-20 PROCEDURE — 99214 OFFICE O/P EST MOD 30 MIN: CPT | Mod: S$PBB,,, | Performed by: INTERNAL MEDICINE

## 2021-12-20 PROCEDURE — 74150 CT ABDOMEN WITHOUT CONTRAST: ICD-10-PCS | Mod: 26,,, | Performed by: RADIOLOGY

## 2021-12-20 PROCEDURE — 25500020 PHARM REV CODE 255: Mod: PO | Performed by: FAMILY MEDICINE

## 2021-12-20 PROCEDURE — 99999 PR PBB SHADOW E&M-EST. PATIENT-LVL IV: ICD-10-PCS | Mod: PBBFAC,,, | Performed by: FAMILY MEDICINE

## 2021-12-20 PROCEDURE — 3066F PR DOCUMENTATION OF TREATMENT FOR NEPHROPATHY: ICD-10-PCS | Mod: CPTII,,, | Performed by: FAMILY MEDICINE

## 2021-12-20 PROCEDURE — 4010F ACE/ARB THERAPY RXD/TAKEN: CPT | Mod: CPTII,,, | Performed by: FAMILY MEDICINE

## 2021-12-20 PROCEDURE — 3062F POS MACROALBUMINURIA REV: CPT | Mod: CPTII,,, | Performed by: FAMILY MEDICINE

## 2021-12-20 PROCEDURE — 74150 CT ABDOMEN W/O CONTRAST: CPT | Mod: TC,PO

## 2021-12-20 PROCEDURE — 99214 OFFICE O/P EST MOD 30 MIN: CPT | Mod: PBBFAC,PO,25 | Performed by: FAMILY MEDICINE

## 2021-12-20 PROCEDURE — 99214 OFFICE O/P EST MOD 30 MIN: CPT | Mod: S$PBB,,, | Performed by: FAMILY MEDICINE

## 2021-12-20 PROCEDURE — 3066F NEPHROPATHY DOC TX: CPT | Mod: CPTII,,, | Performed by: INTERNAL MEDICINE

## 2021-12-20 PROCEDURE — 99999 PR PBB SHADOW E&M-EST. PATIENT-LVL III: CPT | Mod: PBBFAC,,, | Performed by: INTERNAL MEDICINE

## 2021-12-20 PROCEDURE — 4010F PR ACE/ARB THEARPY RXD/TAKEN: ICD-10-PCS | Mod: CPTII,,, | Performed by: INTERNAL MEDICINE

## 2021-12-20 PROCEDURE — 74150 CT ABDOMEN W/O CONTRAST: CPT | Mod: 26,,, | Performed by: RADIOLOGY

## 2021-12-20 PROCEDURE — 4010F ACE/ARB THERAPY RXD/TAKEN: CPT | Mod: CPTII,,, | Performed by: INTERNAL MEDICINE

## 2021-12-20 RX ORDER — FAMOTIDINE 20 MG/1
20 TABLET, FILM COATED ORAL DAILY
Qty: 30 TABLET | Refills: 11 | Status: SHIPPED | OUTPATIENT
Start: 2021-12-20 | End: 2022-05-02 | Stop reason: SDUPTHER

## 2021-12-20 RX ORDER — DICYCLOMINE HYDROCHLORIDE 20 MG/1
20 TABLET ORAL EVERY 6 HOURS
Qty: 120 TABLET | Refills: 0 | Status: SHIPPED | OUTPATIENT
Start: 2021-12-20 | End: 2022-01-19

## 2021-12-20 RX ADMIN — IOHEXOL 30 ML: 350 INJECTION, SOLUTION INTRAVENOUS at 03:12

## 2021-12-21 ENCOUNTER — TELEPHONE (OUTPATIENT)
Dept: INTERNAL MEDICINE | Facility: CLINIC | Age: 66
End: 2021-12-21
Payer: MEDICAID

## 2021-12-27 ENCOUNTER — LAB VISIT (OUTPATIENT)
Dept: LAB | Facility: HOSPITAL | Age: 66
End: 2021-12-27
Attending: FAMILY MEDICINE
Payer: MEDICAID

## 2021-12-27 DIAGNOSIS — R10.13 EPIGASTRIC PAIN: ICD-10-CM

## 2021-12-27 PROCEDURE — 87045 FECES CULTURE AEROBIC BACT: CPT | Performed by: FAMILY MEDICINE

## 2021-12-27 PROCEDURE — 87046 STOOL CULTR AEROBIC BACT EA: CPT | Mod: 59 | Performed by: FAMILY MEDICINE

## 2021-12-27 PROCEDURE — 87427 SHIGA-LIKE TOXIN AG IA: CPT | Mod: 59 | Performed by: FAMILY MEDICINE

## 2021-12-29 LAB
E COLI SXT1 STL QL IA: NEGATIVE
E COLI SXT2 STL QL IA: NEGATIVE

## 2021-12-31 LAB — BACTERIA STL CULT: NORMAL

## 2022-01-01 ENCOUNTER — HOSPITAL ENCOUNTER (EMERGENCY)
Facility: HOSPITAL | Age: 67
Discharge: HOME | End: 2022-01-01
Attending: EMERGENCY MEDICINE
Payer: MEDICAID

## 2022-01-01 VITALS
DIASTOLIC BLOOD PRESSURE: 71 MMHG | HEIGHT: 61 IN | RESPIRATION RATE: 21 BRPM | WEIGHT: 197.56 LBS | BODY MASS INDEX: 37.3 KG/M2 | SYSTOLIC BLOOD PRESSURE: 152 MMHG | OXYGEN SATURATION: 99 % | HEART RATE: 76 BPM

## 2022-01-01 DIAGNOSIS — J81.0 ACUTE PULMONARY EDEMA: ICD-10-CM

## 2022-01-01 DIAGNOSIS — D64.9 ANEMIA, UNSPECIFIED TYPE: ICD-10-CM

## 2022-01-01 DIAGNOSIS — R06.02 SOB (SHORTNESS OF BREATH): Primary | ICD-10-CM

## 2022-01-01 DIAGNOSIS — R06.02 SHORTNESS OF BREATH: ICD-10-CM

## 2022-01-01 DIAGNOSIS — R60.0 LOWER EXTREMITY EDEMA: ICD-10-CM

## 2022-01-01 LAB
ALBUMIN SERPL BCP-MCNC: 2.1 G/DL (ref 3.5–5.2)
ALP SERPL-CCNC: 66 U/L (ref 55–135)
ALT SERPL W/O P-5'-P-CCNC: 13 U/L (ref 10–44)
ANION GAP SERPL CALC-SCNC: 10 MMOL/L (ref 8–16)
AST SERPL-CCNC: 25 U/L (ref 10–40)
BASOPHILS # BLD AUTO: 0.04 K/UL (ref 0–0.2)
BASOPHILS NFR BLD: 0.4 % (ref 0–1.9)
BILIRUB SERPL-MCNC: 0.2 MG/DL (ref 0.1–1)
BNP SERPL-MCNC: 615 PG/ML (ref 0–99)
BUN SERPL-MCNC: 39 MG/DL (ref 8–23)
CALCIUM SERPL-MCNC: 7.6 MG/DL (ref 8.7–10.5)
CHLORIDE SERPL-SCNC: 108 MMOL/L (ref 95–110)
CO2 SERPL-SCNC: 23 MMOL/L (ref 23–29)
CREAT SERPL-MCNC: 4.9 MG/DL (ref 0.5–1.4)
DIFFERENTIAL METHOD: ABNORMAL
EOSINOPHIL # BLD AUTO: 0.3 K/UL (ref 0–0.5)
EOSINOPHIL NFR BLD: 2.7 % (ref 0–8)
ERYTHROCYTE [DISTWIDTH] IN BLOOD BY AUTOMATED COUNT: 15.6 % (ref 11.5–14.5)
EST. GFR  (AFRICAN AMERICAN): 10 ML/MIN/1.73 M^2
EST. GFR  (NON AFRICAN AMERICAN): 9 ML/MIN/1.73 M^2
FERRITIN SERPL-MCNC: 288 NG/ML (ref 20–300)
GLUCOSE SERPL-MCNC: 112 MG/DL (ref 70–110)
HCT VFR BLD AUTO: 25.2 % (ref 37–48.5)
HGB BLD-MCNC: 7.9 G/DL (ref 12–16)
IMM GRANULOCYTES # BLD AUTO: 0.03 K/UL (ref 0–0.04)
IMM GRANULOCYTES NFR BLD AUTO: 0.3 % (ref 0–0.5)
INR PPP: 1 (ref 0.8–1.2)
LYMPHOCYTES # BLD AUTO: 2.2 K/UL (ref 1–4.8)
LYMPHOCYTES NFR BLD: 23.7 % (ref 18–48)
MCH RBC QN AUTO: 28.2 PG (ref 27–31)
MCHC RBC AUTO-ENTMCNC: 31.3 G/DL (ref 32–36)
MCV RBC AUTO: 90 FL (ref 82–98)
MONOCYTES # BLD AUTO: 1.1 K/UL (ref 0.3–1)
MONOCYTES NFR BLD: 11.3 % (ref 4–15)
NEUTROPHILS # BLD AUTO: 5.8 K/UL (ref 1.8–7.7)
NEUTROPHILS NFR BLD: 61.6 % (ref 38–73)
NRBC BLD-RTO: 0 /100 WBC
OB PNL STL: NEGATIVE
PLATELET # BLD AUTO: 231 K/UL (ref 150–450)
PMV BLD AUTO: 10 FL (ref 9.2–12.9)
POTASSIUM SERPL-SCNC: 4.3 MMOL/L (ref 3.5–5.1)
PROT SERPL-MCNC: 6.1 G/DL (ref 6–8.4)
PROTHROMBIN TIME: 11.2 SEC (ref 9–12.5)
RBC # BLD AUTO: 2.8 M/UL (ref 4–5.4)
SARS-COV-2 RDRP RESP QL NAA+PROBE: NEGATIVE
SODIUM SERPL-SCNC: 141 MMOL/L (ref 136–145)
TROPONIN I SERPL DL<=0.01 NG/ML-MCNC: 0.04 NG/ML (ref 0–0.03)
WBC # BLD AUTO: 9.37 K/UL (ref 3.9–12.7)

## 2022-01-01 PROCEDURE — 84466 ASSAY OF TRANSFERRIN: CPT | Performed by: REGISTERED NURSE

## 2022-01-01 PROCEDURE — 85610 PROTHROMBIN TIME: CPT | Performed by: REGISTERED NURSE

## 2022-01-01 PROCEDURE — 82728 ASSAY OF FERRITIN: CPT | Performed by: REGISTERED NURSE

## 2022-01-01 PROCEDURE — 83880 ASSAY OF NATRIURETIC PEPTIDE: CPT | Performed by: REGISTERED NURSE

## 2022-01-01 PROCEDURE — U0002 COVID-19 LAB TEST NON-CDC: HCPCS | Performed by: EMERGENCY MEDICINE

## 2022-01-01 PROCEDURE — 85025 COMPLETE CBC W/AUTO DIFF WBC: CPT | Performed by: REGISTERED NURSE

## 2022-01-01 PROCEDURE — 36415 COLL VENOUS BLD VENIPUNCTURE: CPT | Performed by: REGISTERED NURSE

## 2022-01-01 PROCEDURE — 84484 ASSAY OF TROPONIN QUANT: CPT | Performed by: REGISTERED NURSE

## 2022-01-01 PROCEDURE — 63600175 PHARM REV CODE 636 W HCPCS: Performed by: EMERGENCY MEDICINE

## 2022-01-01 PROCEDURE — 96374 THER/PROPH/DIAG INJ IV PUSH: CPT

## 2022-01-01 PROCEDURE — 82272 OCCULT BLD FECES 1-3 TESTS: CPT | Performed by: EMERGENCY MEDICINE

## 2022-01-01 PROCEDURE — 25000003 PHARM REV CODE 250: Performed by: EMERGENCY MEDICINE

## 2022-01-01 PROCEDURE — 93010 EKG 12-LEAD: ICD-10-PCS | Mod: ,,, | Performed by: INTERNAL MEDICINE

## 2022-01-01 PROCEDURE — 80053 COMPREHEN METABOLIC PANEL: CPT | Performed by: REGISTERED NURSE

## 2022-01-01 PROCEDURE — 93010 ELECTROCARDIOGRAM REPORT: CPT | Mod: ,,, | Performed by: INTERNAL MEDICINE

## 2022-01-01 PROCEDURE — 99285 EMERGENCY DEPT VISIT HI MDM: CPT | Mod: 25

## 2022-01-01 PROCEDURE — 93005 ELECTROCARDIOGRAM TRACING: CPT

## 2022-01-01 RX ORDER — FUROSEMIDE 10 MG/ML
40 INJECTION INTRAMUSCULAR; INTRAVENOUS
Status: COMPLETED | OUTPATIENT
Start: 2022-01-01 | End: 2022-01-01

## 2022-01-01 RX ADMIN — FUROSEMIDE 40 MG: 10 INJECTION, SOLUTION INTRAMUSCULAR; INTRAVENOUS at 02:01

## 2022-01-01 RX ADMIN — NITROGLYCERIN 1 INCH: 20 OINTMENT TOPICAL at 02:01

## 2022-01-01 NOTE — DISCHARGE INSTRUCTIONS
Follow-up with your primary care doctor the next 3-4 days for recheck.  Return to the emergency department for any worsening signs or symptoms.

## 2022-01-01 NOTE — FIRST PROVIDER EVALUATION
Medical screening exam completed.  I have conducted a focused provider triage encounter, findings are as follows:    Brief history of present illness:  SOB with hx of CHF    There were no vitals filed for this visit.    Pertinent physical exam:  NAD    Brief workup plan:  Labs and imaging    Preliminary workup initiated; this workup will be continued and followed by the physician or advanced practice provider that is assigned to the patient when roomed.

## 2022-01-01 NOTE — ED PROVIDER NOTES
SCRIBE #1 NOTE: I, Lisa Encinas, am scribing for, and in the presence of, Cayetano Bertrand MD. I have scribed the entire note.       History     Chief Complaint   Patient presents with    Shortness of Breath     Started x 1 week ago, shortness, swelling to lower ext.      Review of patient's allergies indicates:   Allergen Reactions    Subsys [fentanyl] Other (See Comments)     After administration pt unresponsive.  HR and respirations decreased.     Versed [midazolam] Other (See Comments)     After administration pt unresponsive.  HR and respirations decreased.     Ampicillin Rash    Codeine Nausea And Vomiting and Nausea Only         History of Present Illness     HPI    1/1/2022, 12:43 PM  History obtained from the patient      History of Present Illness: Diamond Das is a 66 y.o. female patient with a PMHx of ascites, CHF, cirrhosis DM type 2, GERD, HLD, HTN, and thyroid disease who presents to the Emergency Department for evaluation of SOB which onset gradually 1 week ago. Pt states she feels like she has too much fluid on her. Symptoms are constant and moderate in severity. Sxs are exacerbated when pt lays down flat. Associated sxs include BLE swelling and cough. Patient denies any congestion, CP, palpitations, fever, chills, sore throat, chest tightness, and all other sxs at this time. Pt is on Demadex 20 mg BID (40 mg total) but did not take her morning dose today. Pt's last echo was done in 2019 showed grade 1 diastolic dysfunction. No further complaints or concerns at this time.       Arrival mode: Personal vehicle    PCP: Andrey Perrin MD        Past Medical History:  Past Medical History:   Diagnosis Date    Ascites     Breast pain, left 1/4/2018    Cataract     CHF (congestive heart failure)     Cirrhosis     Cough     Diabetes mellitus     Diabetes mellitus, type 2     Gastroparesis     GERD (gastroesophageal reflux disease)     Hyperlipidemia     Hypertension     Hypotension  2/21/2019    Liver disease     Lumbar strain 4/27/2018    Macular degeneration     Pneumonia of right middle lobe due to infectious organism 12/19/2017    Renal disorder     Retinopathy due to secondary diabetes mellitus     Sleep apnea     Thyroid disease        Past Surgical History:  Past Surgical History:   Procedure Laterality Date    CATARACT EXTRACTION      CHOLECYSTECTOMY      COLONOSCOPY N/A 9/4/2019    Procedure: COLONOSCOPY;  Surgeon: Marnie Elmore MD;  Location: [facility] ENDO;  Service: Endoscopy;  Laterality: N/A;    ERCP      FLUOROSCOPY N/A 7/24/2020    Procedure: TIPS revision;  Surgeon: Martell Jasmine MD;  Location: [facility] [place] CATH LAB;  Service: General;  Laterality: N/A;    LIVER BIOPSY      THORACENTESIS Left 1/20/2020    Procedure: Thoracentesis;  Surgeon: Angelica Hunter MD;  Location: [facility] [place] ENDO;  Service: Pulmonary;  Laterality: Left;    TUBAL LIGATION      UPPER GASTROINTESTINAL ENDOSCOPY           Family History:  Family History   Problem Relation Age of Onset    Cancer Mother     Breast cancer Mother     Heart disease Father     Aneurysm Sister     Heart disease Brother     No Known Problems Maternal Grandmother     Cancer Maternal Grandfather     Sarcoidosis Sister     Colon cancer Neg Hx     Ovarian cancer Neg Hx     Thrombosis Neg Hx        Social History:  Social History     Tobacco Use    Smoking status: Never Smoker    Smokeless tobacco: Never Used   Substance and Sexual Activity    Alcohol use: No    Drug use: No    Sexual activity: Not Currently        Review of Systems     Review of Systems   Constitutional: Negative for chills and fever.   HENT: Negative for congestion and sore throat.    Respiratory: Positive for cough and shortness of breath. Negative for chest tightness.    Cardiovascular: Positive for leg swelling (BLE). Negative for chest pain and palpitations.   Gastrointestinal: Negative for nausea.   Genitourinary: Negative for dysuria.  "  Musculoskeletal: Negative for back pain.   Skin: Negative for rash.   Neurological: Negative for weakness.   Hematological: Does not bruise/bleed easily.   All other systems reviewed and are negative.     Physical Exam     Initial Vitals [01/01/22 1146]   BP Pulse Resp Temp SpO2   132/63 75 (!) 22 -- 96 %      MAP       --          Physical Exam  Nursing Notes and Vital Signs Reviewed.  Constitutional: Patient is in no acute distress. Well-developed and well-nourished.  Head: Atraumatic. Normocephalic.  Eyes: PERRL. EOM intact. Conjunctivae are not pale. No scleral icterus.  ENT: Mucous membranes are moist. Oropharynx is clear and symmetric.    Neck: Supple. Full ROM. No lymphadenopathy.  Cardiovascular: Regular rate. Regular rhythm. No murmurs, rubs, or gallops. Distal pulses are 2+ and symmetric.  Pulmonary/Chest: No respiratory distress. Clear to auscultation bilaterally. No wheezing or rales.  Abdominal: Soft and non-distended.  There is no tenderness.  No rebound, guarding, or rigidity. Good bowel sounds.  Genitourinary: No CVA tenderness  Rectal: Female chaperone present for the duration of the rectal exam.There is no tenderness. No masses or hemorrhoids. Normal sphincter tone. No obvious blood.  Musculoskeletal: Moves all extremities. No obvious deformities. 2 + pitting edema BLE. No calf tenderness.  Skin: Warm and dry.  Neurological:  Alert, awake, and appropriate.  Normal speech.  No acute focal neurological deficits are appreciated.  Psychiatric: Normal affect. Good eye contact. Appropriate in content.     ED Course   Procedures  ED Vital Signs:  Vitals:    01/01/22 1146 01/01/22 1402 01/01/22 1433 01/01/22 1448   BP: 132/63 126/60 137/61 (!) 176/73   Pulse: 75  76 73   Resp: (!) 22  (!) 30 20   SpO2: 96% 99% 99% 98%   Weight: 89.6 kg (197 lb 8.5 oz)      Height: 5' 1" (1.549 m)       01/01/22 1555 01/01/22 1632 01/01/22 1803   BP: (!) 156/70 (!) 155/71 (!) 152/71   Pulse: 74 71 76   Resp: (!) 25 (!) 22 " (!) 21   SpO2: 98% 97% 99%   Weight:      Height:          Abnormal Lab Results:  Labs Reviewed   CBC W/ AUTO DIFFERENTIAL - Abnormal; Notable for the following components:       Result Value    RBC 2.80 (*)     Hemoglobin 7.9 (*)     Hematocrit 25.2 (*)     MCHC 31.3 (*)     RDW 15.6 (*)     Mono # 1.1 (*)     All other components within normal limits   COMPREHENSIVE METABOLIC PANEL - Abnormal; Notable for the following components:    Glucose 112 (*)     BUN 39 (*)     Creatinine 4.9 (*)     Calcium 7.6 (*)     Albumin 2.1 (*)     eGFR if  10 (*)     eGFR if non  9 (*)     All other components within normal limits   TROPONIN I - Abnormal; Notable for the following components:    Troponin I 0.036 (*)     All other components within normal limits   B-TYPE NATRIURETIC PEPTIDE - Abnormal; Notable for the following components:     (*)     All other components within normal limits   IRON AND TIBC - Abnormal; Notable for the following components:    Iron 18 (*)     Transferrin 131 (*)     TIBC 194 (*)     Saturated Iron 9 (*)     All other components within normal limits   PROTIME-INR   SARS-COV-2 RNA AMPLIFICATION, QUAL   OCCULT BLOOD X 1, STOOL   IRON AND TIBC   FERRITIN   FERRITIN        All Lab Results:  Results for orders placed or performed during the hospital encounter of 01/01/22   CBC auto differential   Result Value Ref Range    WBC 9.37 3.90 - 12.70 K/uL    RBC 2.80 (L) 4.00 - 5.40 M/uL    Hemoglobin 7.9 (L) 12.0 - 16.0 g/dL    Hematocrit 25.2 (L) 37.0 - 48.5 %    MCV 90 82 - 98 fL    MCH 28.2 27.0 - 31.0 pg    MCHC 31.3 (L) 32.0 - 36.0 g/dL    RDW 15.6 (H) 11.5 - 14.5 %    Platelets 231 150 - 450 K/uL    MPV 10.0 9.2 - 12.9 fL    Immature Granulocytes 0.3 0.0 - 0.5 %    Gran # (ANC) 5.8 1.8 - 7.7 K/uL    Immature Grans (Abs) 0.03 0.00 - 0.04 K/uL    Lymph # 2.2 1.0 - 4.8 K/uL    Mono # 1.1 (H) 0.3 - 1.0 K/uL    Eos # 0.3 0.0 - 0.5 K/uL    Baso # 0.04 0.00 - 0.20 K/uL     nRBC 0 0 /100 WBC    Gran % 61.6 38.0 - 73.0 %    Lymph % 23.7 18.0 - 48.0 %    Mono % 11.3 4.0 - 15.0 %    Eosinophil % 2.7 0.0 - 8.0 %    Basophil % 0.4 0.0 - 1.9 %    Differential Method Automated    Comprehensive metabolic panel   Result Value Ref Range    Sodium 141 136 - 145 mmol/L    Potassium 4.3 3.5 - 5.1 mmol/L    Chloride 108 95 - 110 mmol/L    CO2 23 23 - 29 mmol/L    Glucose 112 (H) 70 - 110 mg/dL    BUN 39 (H) 8 - 23 mg/dL    Creatinine 4.9 (H) 0.5 - 1.4 mg/dL    Calcium 7.6 (L) 8.7 - 10.5 mg/dL    Total Protein 6.1 6.0 - 8.4 g/dL    Albumin 2.1 (L) 3.5 - 5.2 g/dL    Total Bilirubin 0.2 0.1 - 1.0 mg/dL    Alkaline Phosphatase 66 55 - 135 U/L    AST 25 10 - 40 U/L    ALT 13 10 - 44 U/L    Anion Gap 10 8 - 16 mmol/L    eGFR if African American 10 (A) >60 mL/min/1.73 m^2    eGFR if non African American 9 (A) >60 mL/min/1.73 m^2   Troponin I   Result Value Ref Range    Troponin I 0.036 (H) 0.000 - 0.026 ng/mL   Brain natriuretic peptide   Result Value Ref Range     (H) 0 - 99 pg/mL   Protime-INR   Result Value Ref Range    Prothrombin Time 11.2 9.0 - 12.5 sec    INR 1.0 0.8 - 1.2   COVID-19 Rapid Screening   Result Value Ref Range    SARS-CoV-2 RNA, Amplification, Qual Negative Negative   Occult blood x 1, stool    Specimen: Stool   Result Value Ref Range    Occult Blood Negative Negative   Iron and TIBC   Result Value Ref Range    Iron 18 (L) 30 - 160 ug/dL    Transferrin 131 (L) 200 - 375 mg/dL    TIBC 194 (L) 250 - 450 ug/dL    Saturated Iron 9 (L) 20 - 50 %   Ferritin   Result Value Ref Range    Ferritin 288 20.0 - 300.0 ng/mL     *Note: Due to a large number of results and/or encounters for the requested time period, some results have not been displayed. A complete set of results can be found in Results Review.       Imaging Results:  Imaging Results          X-Ray Chest AP Portable (Final result)  Result time 01/01/22 12:41:20    Final result by José Antonio Knight Jr., MD (01/01/22 12:41:20)                  Impression:      Bilateral pleural effusions with overlying parenchymal disease that may relate to CHF.      Electronically signed by: José Antonio Knight Jr., MD  Date:    01/01/2022  Time:    12:41             Narrative:    EXAMINATION:  XR CHEST AP PORTABLE    CLINICAL HISTORY:  CHF;    COMPARISON:  Prior radiograph from 12/06/2021.    FINDINGS:  Bilateral pleural effusions with overlying atelectasis.  Hazy indistinctness of the perihilar lung markings.  No pneumothorax.  The heart size is mildly enlarged.  No significant bony findings.                                 The EKG was ordered, reviewed, and independently interpreted by the ED provider.  Interpretation time: 1158  Rate: 71 BPM  Rhythm: normal sinus rhythm  Interpretation: Cannot rule out anterior infarct, age undetermined. No STEMI.           The Emergency Provider reviewed the vital signs and test results, which are outlined above.     ED Discussion     4:41 PM: Discussed with pt admission vs discharge and pt states she would like to go home if possible.     5:23 PM: Discussed pt's case with Dr. Junior (cardiology) and reviewed labs.  He states that pt needs to be seen in a cardiology clinic by either Tuesday or Wednesday and recommends resuming torsemide at 40 mg daily.     6:03 PM: Reassessed pt at this time. Discussed with pt all pertinent ED information and results. Discussed pt dx and plan of tx. Gave pt all f/u and return to the ED instructions. All questions and concerns were addressed at this time. Pt expresses understanding of information and instructions, and is comfortable with plan to discharge. Pt is stable for discharge.    I discussed with patient and/or family/caretaker that evaluation in the ED does not suggest any emergent or life threatening medical conditions requiring immediate intervention beyond what was provided in the ED, and I believe patient is safe for discharge.  Regardless, an unremarkable evaluation in the ED does  not preclude the development or presence of a serious of life threatening condition. As such, patient was instructed to return immediately for any worsening or change in current symptoms.       Medical Decision Making:   Clinical Tests:   Lab Tests: Ordered and Reviewed  Radiological Study: Ordered and Reviewed  Medical Tests: Ordered and Reviewed           ED Medication(s):  Medications   furosemide injection 40 mg (40 mg Intravenous Given 1/1/22 1443)       Discharge Medication List as of 1/1/2022  5:57 PM           Follow-up Information     Marcos Ramos Md, MD. Schedule an appointment as soon as possible for a visit in 3 days.    Specialties: Cardiology, Internal Medicine  Contact information:  [address]  [place] [address]  [phone]             Andrey Perrin MD In 2 days.    Specialty: Family Medicine  Contact information:  [address] [place] [address]  [phone]                             Scribe Attestation:   Scribe #1: I performed the above scribed service and the documentation accurately describes the services I performed. I attest to the accuracy of the note.     Attending:   Physician Attestation Statement for Scribe #1: I, Cayetano Bertrand MD, personally performed the services described in this documentation, as scribed by Lisa Encinas, in my presence, and it is both accurate and complete.           Clinical Impression       ICD-10-CM ICD-9-CM   1. SOB (shortness of breath)  R06.02 786.05   2. Shortness of breath  R06.02 786.05   3. Acute pulmonary edema  J81.0 518.4   4. Anemia, unspecified type  D64.9 285.9   5. Lower extremity edema  R60.0 782.3       Disposition:   Disposition: Discharged  Condition: Stable         Cayetano Bertrand MD  01/02/22 2056

## 2022-01-02 LAB
IRON SERPL-MCNC: 18 UG/DL (ref 30–160)
SATURATED IRON: 9 % (ref 20–50)
TOTAL IRON BINDING CAPACITY: 194 UG/DL (ref 250–450)
TRANSFERRIN SERPL-MCNC: 131 MG/DL (ref 200–375)

## 2022-01-03 PROBLEM — Z00.00 ROUTINE GENERAL MEDICAL EXAMINATION AT A HEALTH CARE FACILITY: Status: RESOLVED | Noted: 2021-10-04 | Resolved: 2022-01-03

## 2022-01-05 ENCOUNTER — TELEPHONE (OUTPATIENT)
Dept: INTERNAL MEDICINE | Facility: CLINIC | Age: 67
End: 2022-01-05
Payer: MEDICAID

## 2022-01-05 ENCOUNTER — PATIENT MESSAGE (OUTPATIENT)
Dept: INTERNAL MEDICINE | Facility: CLINIC | Age: 67
End: 2022-01-05
Payer: MEDICAID

## 2022-01-05 NOTE — TELEPHONE ENCOUNTER
----- Message from Andrey Perrin MD sent at 1/3/2022  3:48 PM CST -----  Results have been reviewed . All labs are within normal range.   If you have any questions please feel free to contact me.

## 2022-01-06 ENCOUNTER — TELEPHONE (OUTPATIENT)
Dept: INTERNAL MEDICINE | Facility: CLINIC | Age: 67
End: 2022-01-06
Payer: MEDICAID

## 2022-01-06 ENCOUNTER — HOSPITAL ENCOUNTER (EMERGENCY)
Facility: HOSPITAL | Age: 67
Discharge: HOME | End: 2022-01-06
Attending: EMERGENCY MEDICINE
Payer: MEDICAID

## 2022-01-06 VITALS
SYSTOLIC BLOOD PRESSURE: 122 MMHG | OXYGEN SATURATION: 98 % | RESPIRATION RATE: 18 BRPM | HEIGHT: 61 IN | TEMPERATURE: 98 F | DIASTOLIC BLOOD PRESSURE: 64 MMHG | HEART RATE: 60 BPM | BODY MASS INDEX: 37.32 KG/M2

## 2022-01-06 DIAGNOSIS — R53.1 GENERALIZED WEAKNESS: Primary | ICD-10-CM

## 2022-01-06 DIAGNOSIS — N18.5 CKD (CHRONIC KIDNEY DISEASE) STAGE 5, GFR LESS THAN 15 ML/MIN: ICD-10-CM

## 2022-01-06 DIAGNOSIS — D64.9 CHRONIC ANEMIA: ICD-10-CM

## 2022-01-06 DIAGNOSIS — R53.1 WEAKNESS: ICD-10-CM

## 2022-01-06 LAB
ALBUMIN SERPL BCP-MCNC: 2.1 G/DL (ref 3.5–5.2)
ALP SERPL-CCNC: 73 U/L (ref 55–135)
ALT SERPL W/O P-5'-P-CCNC: 15 U/L (ref 10–44)
AMMONIA PLAS-SCNC: 24 UMOL/L (ref 10–50)
ANION GAP SERPL CALC-SCNC: 9 MMOL/L (ref 8–16)
AST SERPL-CCNC: 29 U/L (ref 10–40)
BACTERIA #/AREA URNS HPF: NORMAL /HPF
BASOPHILS # BLD AUTO: 0.03 K/UL (ref 0–0.2)
BASOPHILS NFR BLD: 0.4 % (ref 0–1.9)
BILIRUB SERPL-MCNC: 0.3 MG/DL (ref 0.1–1)
BILIRUB UR QL STRIP: NEGATIVE
BNP SERPL-MCNC: 748 PG/ML (ref 0–99)
BUN SERPL-MCNC: 41 MG/DL (ref 8–23)
CALCIUM SERPL-MCNC: 7.3 MG/DL (ref 8.7–10.5)
CHLORIDE SERPL-SCNC: 110 MMOL/L (ref 95–110)
CLARITY UR: CLEAR
CO2 SERPL-SCNC: 24 MMOL/L (ref 23–29)
COLOR UR: YELLOW
CREAT SERPL-MCNC: 4.6 MG/DL (ref 0.5–1.4)
CTP QC/QA: YES
DIFFERENTIAL METHOD: ABNORMAL
EOSINOPHIL # BLD AUTO: 0 K/UL (ref 0–0.5)
EOSINOPHIL NFR BLD: 0.5 % (ref 0–8)
ERYTHROCYTE [DISTWIDTH] IN BLOOD BY AUTOMATED COUNT: 15.5 % (ref 11.5–14.5)
EST. GFR  (AFRICAN AMERICAN): 11 ML/MIN/1.73 M^2
EST. GFR  (NON AFRICAN AMERICAN): 9 ML/MIN/1.73 M^2
GLUCOSE SERPL-MCNC: 108 MG/DL (ref 70–110)
GLUCOSE UR QL STRIP: NEGATIVE
HCT VFR BLD AUTO: 28.9 % (ref 37–48.5)
HGB BLD-MCNC: 8.8 G/DL (ref 12–16)
HGB UR QL STRIP: ABNORMAL
HYALINE CASTS #/AREA URNS LPF: 0 /LPF
IMM GRANULOCYTES # BLD AUTO: 0.05 K/UL (ref 0–0.04)
IMM GRANULOCYTES NFR BLD AUTO: 0.6 % (ref 0–0.5)
KETONES UR QL STRIP: NEGATIVE
LEUKOCYTE ESTERASE UR QL STRIP: NEGATIVE
LYMPHOCYTES # BLD AUTO: 1.6 K/UL (ref 1–4.8)
LYMPHOCYTES NFR BLD: 19.7 % (ref 18–48)
MCH RBC QN AUTO: 28.1 PG (ref 27–31)
MCHC RBC AUTO-ENTMCNC: 30.4 G/DL (ref 32–36)
MCV RBC AUTO: 92 FL (ref 82–98)
MICROSCOPIC COMMENT: NORMAL
MONOCYTES # BLD AUTO: 0.5 K/UL (ref 0.3–1)
MONOCYTES NFR BLD: 5.5 % (ref 4–15)
NEUTROPHILS # BLD AUTO: 6.1 K/UL (ref 1.8–7.7)
NEUTROPHILS NFR BLD: 73.3 % (ref 38–73)
NITRITE UR QL STRIP: NEGATIVE
NRBC BLD-RTO: 0 /100 WBC
PH UR STRIP: 6 [PH] (ref 5–8)
PLATELET # BLD AUTO: 278 K/UL (ref 150–450)
PMV BLD AUTO: 10 FL (ref 9.2–12.9)
POTASSIUM SERPL-SCNC: 4.6 MMOL/L (ref 3.5–5.1)
PROT SERPL-MCNC: 6.6 G/DL (ref 6–8.4)
PROT UR QL STRIP: ABNORMAL
RBC # BLD AUTO: 3.13 M/UL (ref 4–5.4)
RBC #/AREA URNS HPF: 1 /HPF (ref 0–4)
SARS-COV-2 RDRP RESP QL NAA+PROBE: NEGATIVE
SODIUM SERPL-SCNC: 143 MMOL/L (ref 136–145)
SP GR UR STRIP: >=1.03 (ref 1–1.03)
SQUAMOUS #/AREA URNS HPF: 1 /HPF
TROPONIN I SERPL DL<=0.01 NG/ML-MCNC: 0.03 NG/ML (ref 0–0.03)
URN SPEC COLLECT METH UR: ABNORMAL
UROBILINOGEN UR STRIP-ACNC: NEGATIVE EU/DL
WBC # BLD AUTO: 8.32 K/UL (ref 3.9–12.7)
WBC #/AREA URNS HPF: 1 /HPF (ref 0–5)

## 2022-01-06 PROCEDURE — 93010 EKG 12-LEAD: ICD-10-PCS | Mod: ,,, | Performed by: INTERNAL MEDICINE

## 2022-01-06 PROCEDURE — 25000003 PHARM REV CODE 250: Performed by: EMERGENCY MEDICINE

## 2022-01-06 PROCEDURE — 83880 ASSAY OF NATRIURETIC PEPTIDE: CPT | Performed by: EMERGENCY MEDICINE

## 2022-01-06 PROCEDURE — 85025 COMPLETE CBC W/AUTO DIFF WBC: CPT | Performed by: EMERGENCY MEDICINE

## 2022-01-06 PROCEDURE — 93005 ELECTROCARDIOGRAM TRACING: CPT

## 2022-01-06 PROCEDURE — 99284 EMERGENCY DEPT VISIT MOD MDM: CPT | Mod: 25

## 2022-01-06 PROCEDURE — 82140 ASSAY OF AMMONIA: CPT | Performed by: EMERGENCY MEDICINE

## 2022-01-06 PROCEDURE — 80053 COMPREHEN METABOLIC PANEL: CPT | Performed by: EMERGENCY MEDICINE

## 2022-01-06 PROCEDURE — 93010 ELECTROCARDIOGRAM REPORT: CPT | Mod: ,,, | Performed by: INTERNAL MEDICINE

## 2022-01-06 PROCEDURE — 81000 URINALYSIS NONAUTO W/SCOPE: CPT | Performed by: EMERGENCY MEDICINE

## 2022-01-06 PROCEDURE — 84484 ASSAY OF TROPONIN QUANT: CPT | Performed by: EMERGENCY MEDICINE

## 2022-01-06 PROCEDURE — U0002 COVID-19 LAB TEST NON-CDC: HCPCS | Performed by: EMERGENCY MEDICINE

## 2022-01-06 RX ORDER — ONDANSETRON 4 MG/1
4 TABLET, ORALLY DISINTEGRATING ORAL
Status: COMPLETED | OUTPATIENT
Start: 2022-01-06 | End: 2022-01-06

## 2022-01-06 RX ADMIN — ONDANSETRON 4 MG: 4 TABLET, ORALLY DISINTEGRATING ORAL at 10:01

## 2022-01-06 NOTE — ED PROVIDER NOTES
SCRIBE #1 NOTE: I, Micah Sierra, am scribing for, and in the presence of, Linda Guzman MD. I have scribed the entire note.      History      Chief Complaint   Patient presents with    Fatigue     Family states that pt was not given her medication for her ammonia levels yesterday and today she seems slightly confused and more tired. Pt answers questions approp in triage. A/O x's 3       Review of patient's allergies indicates:   Allergen Reactions    Subsys [fentanyl] Other (See Comments)     After administration pt unresponsive.  HR and respirations decreased.     Versed [midazolam] Other (See Comments)     After administration pt unresponsive.  HR and respirations decreased.     Ampicillin Rash    Codeine Nausea And Vomiting and Nausea Only        HPI   HPI    1/6/2022, 6:50 AM   History obtained from the patient and daughter      History of Present Illness: Diamond Das is a 66 y.o. female patient with a PMHx of CHF, cirrhosis, DM2 who presents to the Emergency Department for dizziness, onset 1 day PTA. Symptoms are intermittent and moderate in severity. Daughter is concerned that her home medications may be causing her dizziness. Associated sxs include confusion, generalized weakness, and diaphoresis. Patient denies any fever, chills, n/v/d, SOB, CP, numbness, headache, and all other sxs at this time. No prior Tx reported. No further complaints or concerns at this time.     Arrival mode: Personal vehicle    PCP: Andrey Perrin MD       Past Medical History:  Past Medical History:   Diagnosis Date    Ascites     Breast pain, left 1/4/2018    Cataract     CHF (congestive heart failure)     Cirrhosis     Cough     Diabetes mellitus     Diabetes mellitus, type 2     Gastroparesis     GERD (gastroesophageal reflux disease)     Hyperlipidemia     Hypertension     Hypotension 2/21/2019    Liver disease     Lumbar strain 4/27/2018    Macular degeneration     Pneumonia of right middle lobe  due to infectious organism 12/19/2017    Renal disorder     Retinopathy due to secondary diabetes mellitus     Sleep apnea     Thyroid disease        Past Surgical History:  Past Surgical History:   Procedure Laterality Date    CATARACT EXTRACTION      CHOLECYSTECTOMY      COLONOSCOPY N/A 9/4/2019    Procedure: COLONOSCOPY;  Surgeon: Marnie Elmore MD;  Location: [facility] ENDO;  Service: Endoscopy;  Laterality: N/A;    ERCP      FLUOROSCOPY N/A 7/24/2020    Procedure: TIPS revision;  Surgeon: Martell Jasmine MD;  Location: [facility] CATH [facility];  Service: General;  Laterality: N/A;    LIVER BIOPSY      THORACENTESIS Left 1/20/2020    Procedure: Thoracentesis;  Surgeon: Angelica Hunter MD;  Location: [facility] ENDO;  Service: Pulmonary;  Laterality: Left;    TUBAL LIGATION      UPPER GASTROINTESTINAL ENDOSCOPY           Family History:  Family History   Problem Relation Age of Onset    Cancer Mother     Breast cancer Mother     Heart disease Father     Aneurysm Sister     Heart disease Brother     No Known Problems Maternal Grandmother     Cancer Maternal Grandfather     Sarcoidosis Sister     Colon cancer Neg Hx     Ovarian cancer Neg Hx     Thrombosis Neg Hx        Social History:  Social History     Tobacco Use    Smoking status: Never Smoker    Smokeless tobacco: Never Used   Substance and Sexual Activity    Alcohol use: No    Drug use: No    Sexual activity: Not Currently       ROS   Review of Systems   Constitutional: Positive for diaphoresis. Negative for chills and fever.   HENT: Negative for sore throat.    Respiratory: Negative for shortness of breath.    Cardiovascular: Negative for chest pain.   Gastrointestinal: Negative for diarrhea, nausea and vomiting.   Genitourinary: Negative for dysuria.   Musculoskeletal: Negative for back pain.   Skin: Negative for rash.   Neurological: Positive for dizziness (intermittent) and weakness (generalized). Negative for light-headedness, numbness and  "headaches.   Hematological: Does not bruise/bleed easily.   Psychiatric/Behavioral: Positive for confusion.   All other systems reviewed and are negative.    Physical Exam      Initial Vitals [01/06/22 0445]   BP Pulse Resp Temp SpO2   (!) 123/56 60 10 97.9 °F (36.6 °C) 98 %      MAP       --          Physical Exam  Nursing Notes and Vital Signs Reviewed.  Constitutional: Patient is in no acute distress. Chronically debilitated.   Head: Atraumatic. Normocephalic.  Eyes: PERRL. EOM intact. Conjunctivae are not pale. No scleral icterus.  ENT: Mucous membranes are moist. Oropharynx is clear and symmetric.    Neck: Supple. Full ROM. No lymphadenopathy.  Cardiovascular: Regular rate. Regular rhythm. No murmurs, rubs, or gallops. Distal pulses are 2+ and symmetric.  Pulmonary/Chest: No respiratory distress. Clear to auscultation bilaterally. No wheezing or rales.  Abdominal: Soft and non-distended.  There is no tenderness.  No rebound, guarding, or rigidity. Good bowel sounds.  Genitourinary: No CVA tenderness  Musculoskeletal: Moves all extremities. No obvious deformities. No edema.  Skin: Warm and dry.  Neurological:  Alert, awake, and oriented.  Normal speech.  No acute focal neurological deficits are appreciated.  Psychiatric: Normal affect. Good eye contact. Appropriate in content.    ED Course    Procedures  ED Vital Signs:  Vitals:    01/06/22 0445 01/06/22 0858 01/06/22 0902 01/06/22 0904   BP: (!) 123/56 111/64 (!) 126/57 137/60   Pulse: 60 (!) 56 60 62   Resp: 10 18 18    Temp: 97.9 °F (36.6 °C) 97.9 °F (36.6 °C)     TempSrc: Oral Oral     SpO2: 98%      Height: 5' 1" (1.549 m)       01/06/22 1003 01/06/22 1038   BP: (!) 142/82 122/64   Pulse: 64 60   Resp: 18 18   Temp: 97.7 °F (36.5 °C) 97.7 °F (36.5 °C)   TempSrc: Oral Oral   SpO2:     Height:         Abnormal Lab Results:  Labs Reviewed   CBC W/ AUTO DIFFERENTIAL - Abnormal; Notable for the following components:       Result Value    RBC 3.13 (*)     " Hemoglobin 8.8 (*)     Hematocrit 28.9 (*)     MCHC 30.4 (*)     RDW 15.5 (*)     Immature Granulocytes 0.6 (*)     Immature Grans (Abs) 0.05 (*)     Gran % 73.3 (*)     All other components within normal limits   COMPREHENSIVE METABOLIC PANEL - Abnormal; Notable for the following components:    BUN 41 (*)     Creatinine 4.6 (*)     Calcium 7.3 (*)     Albumin 2.1 (*)     eGFR if  11 (*)     eGFR if non  9 (*)     All other components within normal limits   TROPONIN I - Abnormal; Notable for the following components:    Troponin I 0.030 (*)     All other components within normal limits   B-TYPE NATRIURETIC PEPTIDE - Abnormal; Notable for the following components:     (*)     All other components within normal limits   URINALYSIS, REFLEX TO URINE CULTURE - Abnormal; Notable for the following components:    Specific Gravity, UA >=1.030 (*)     Protein, UA 3+ (*)     Occult Blood UA Trace (*)     All other components within normal limits    Narrative:     Specimen Source->Urine   AMMONIA   URINALYSIS MICROSCOPIC    Narrative:     Specimen Source->Urine   SARS-COV-2 RDRP GENE    Narrative:     COVID-19 and should not be the sole basis for treatment decisions.   If COVID-19 is strongly suspected based on clinical and exposure   history, re-testing using an alternate molecular assay should be   considered.   This test is only for use under the Food and Drug   Administration s Emergency Use Authorization (EUA).   Commercial kits are provided by [employer].   Performance characteristics of the EUA have been independently   verified by [facility] Department of   Pathology and Laboratory Medicine.   __________________________________________________________This test utilizes isothermal nucleic acid amplification   technology to detect the SARS-CoV-2 RdRp nucleic acid segment.   The analytical sensitivity (limit of detection) is 125 genome   equivalents/mL.   A  POSITIVE result implies infection with the SARS-CoV-2 virus;   the patient is presumed to be contagious.     A NEGATIVE result means that SARS-CoV-2 nucleic acids are not   present above the limit of detection. A NEGATIVE result should be   treated as presumptive. It does not rule out the possibility of   COVID-19 and should not be the sole basis for treatment decisions.   If COVID-19 is strongly suspected based on clinical and exposure   history, re-testing using an alternate molecular assay should be   considered.   This test is only for use under the Food and Drug   Administration s Emergency Use Authorization (EUA).   Commercial kits are provided by [employer].   Performance characteristics of the EUA have been independently   verified by [facility] Department of   Pathology and Laboratory Medicine.   _________________________________________________________________   The authorized Fact Sheet for Healthcare Providers and the authorized Fact   Sheet for Patients of the ID NOW COVID-19 are available on the FDA   website:     https://www.fda.gov/media/454178/download  https://www.fda.gov/media/391182/download            All Lab Results:  Results for orders placed or performed during the hospital encounter of 01/06/22   Ammonia   Result Value Ref Range    Ammonia 24 10 - 50 umol/L   CBC auto differential   Result Value Ref Range    WBC 8.32 3.90 - 12.70 K/uL    RBC 3.13 (L) 4.00 - 5.40 M/uL    Hemoglobin 8.8 (L) 12.0 - 16.0 g/dL    Hematocrit 28.9 (L) 37.0 - 48.5 %    MCV 92 82 - 98 fL    MCH 28.1 27.0 - 31.0 pg    MCHC 30.4 (L) 32.0 - 36.0 g/dL    RDW 15.5 (H) 11.5 - 14.5 %    Platelets 278 150 - 450 K/uL    MPV 10.0 9.2 - 12.9 fL    Immature Granulocytes 0.6 (H) 0.0 - 0.5 %    Gran # (ANC) 6.1 1.8 - 7.7 K/uL    Immature Grans (Abs) 0.05 (H) 0.00 - 0.04 K/uL    Lymph # 1.6 1.0 - 4.8 K/uL    Mono # 0.5 0.3 - 1.0 K/uL    Eos # 0.0 0.0 - 0.5 K/uL    Baso # 0.03 0.00 - 0.20 K/uL    nRBC 0 0 /100  WBC    Gran % 73.3 (H) 38.0 - 73.0 %    Lymph % 19.7 18.0 - 48.0 %    Mono % 5.5 4.0 - 15.0 %    Eosinophil % 0.5 0.0 - 8.0 %    Basophil % 0.4 0.0 - 1.9 %    Differential Method Automated    Comprehensive metabolic panel   Result Value Ref Range    Sodium 143 136 - 145 mmol/L    Potassium 4.6 3.5 - 5.1 mmol/L    Chloride 110 95 - 110 mmol/L    CO2 24 23 - 29 mmol/L    Glucose 108 70 - 110 mg/dL    BUN 41 (H) 8 - 23 mg/dL    Creatinine 4.6 (H) 0.5 - 1.4 mg/dL    Calcium 7.3 (L) 8.7 - 10.5 mg/dL    Total Protein 6.6 6.0 - 8.4 g/dL    Albumin 2.1 (L) 3.5 - 5.2 g/dL    Total Bilirubin 0.3 0.1 - 1.0 mg/dL    Alkaline Phosphatase 73 55 - 135 U/L    AST 29 10 - 40 U/L    ALT 15 10 - 44 U/L    Anion Gap 9 8 - 16 mmol/L    eGFR if African American 11 (A) >60 mL/min/1.73 m^2    eGFR if non African American 9 (A) >60 mL/min/1.73 m^2   Troponin I #1   Result Value Ref Range    Troponin I 0.030 (H) 0.000 - 0.026 ng/mL   BNP   Result Value Ref Range     (H) 0 - 99 pg/mL   Urinalysis, Reflex to Urine Culture Urine, Clean Catch    Specimen: Urine   Result Value Ref Range    Specimen UA Urine, Clean Catch     Color, UA Yellow Yellow, Straw, Thalia    Appearance, UA Clear Clear    pH, UA 6.0 5.0 - 8.0    Specific Gravity, UA >=1.030 (A) 1.005 - 1.030    Protein, UA 3+ (A) Negative    Glucose, UA Negative Negative    Ketones, UA Negative Negative    Bilirubin (UA) Negative Negative    Occult Blood UA Trace (A) Negative    Nitrite, UA Negative Negative    Urobilinogen, UA Negative <2.0 EU/dL    Leukocytes, UA Negative Negative   Urinalysis Microscopic   Result Value Ref Range    RBC, UA 1 0 - 4 /hpf    WBC, UA 1 0 - 5 /hpf    Bacteria Rare None-Occ /hpf    Squam Epithel, UA 1 /hpf    Hyaline Casts, UA 0 0-1/lpf /lpf    Microscopic Comment SEE COMMENT    POCT COVID-19 Rapid Screening   Result Value Ref Range    POC Rapid COVID Negative Negative     Acceptable Yes      *Note: Due to a large number of results and/or  encounters for the requested time period, some results have not been displayed. A complete set of results can be found in Results Review.     Imaging Results:  Imaging Results    None        The EKG was ordered, reviewed, and independently interpreted by the ED provider.  Interpretation time: 07:11  Rate: 52 BPM  Rhythm: Sinus bradycardia with premature atrial complexes  Interpretation: Low voltage QRS. Possible anterolateral infarct, age undetermined. No STEMI.           The Emergency Provider reviewed the vital signs and test results, which are outlined above.    ED Discussion     10:31 AM: Reassessed pt at this time, clinically at baseline, not altered, labs at baseline and reviewed with patient's daughter at the bedside. Discussed with pt all pertinent ED information and results. Discussed pt dx and plan of tx. Gave pt all f/u and return to the ED instructions. All questions and concerns were addressed at this time. Pt expresses understanding of information and instructions, and is comfortable with plan to discharge. Pt is stable for discharge.    I discussed with patient and/or family/caretaker that evaluation in the ED does not suggest any emergent or life threatening medical conditions requiring immediate intervention beyond what was provided in the ED, and I believe patient is safe for discharge.  Regardless, an unremarkable evaluation in the ED does not preclude the development or presence of a serious of life threatening condition. As such, patient was instructed to return immediately for any worsening or change in current symptoms.         ED Medication(s):  Medications   ondansetron disintegrating tablet 4 mg (4 mg Oral Given 1/6/22 1037)        Follow-up Information     Andrey Perrin MD. Schedule an appointment as soon as possible for a visit in 2 days.    Specialty: Family Medicine  Contact information:  [address] [place] [address] [phone]             [facility]  [place]. Schedule an appointment as soon as possible for a visit in 2 days.    Specialty: Nephrology  Contact information:  [id number] [facility] [id number] [phone]  Additional information:  Please park on the [address] side and use the Clinic entrance. Check in on the 4th floor, to the right.                      Discharge Medication List as of 1/6/2022 10:33 AM            Medical Decision Making    Medical Decision Making:   Clinical Tests:   Lab Tests: Ordered and Reviewed  Medical Tests: Ordered and Reviewed           Scribe Attestation:   Scribe #1: I performed the above scribed service and the documentation accurately describes the services I performed. I attest to the accuracy of the note.    Attending:   Physician Attestation Statement for Scribe #1: I, Linda Guzman MD, personally performed the services described in this documentation, as scribed by Micah Sierra, in my presence, and it is both accurate and complete.          Clinical Impression       ICD-10-CM ICD-9-CM   1. Generalized weakness  R53.1 780.79   2. Weakness  R53.1 780.79   3. CKD (chronic kidney disease) stage 5, GFR less than 15 ml/min  N18.5 585.5   4. Chronic anemia  D64.9 285.9       Disposition:   Disposition: Discharged  Condition: Stable         Linda Guzman MD  01/06/22 1050

## 2022-01-06 NOTE — TELEPHONE ENCOUNTER
Pt went to ER this week with nausea, dizziness and low blood pressure. Per pt's daughter , they took her off of all meds until she is seen by Dr Perrin.. Pt's daughter wants to be seen before the weekend to review all meds. She said she can either come in do a virtual. Please advise ( No open appts)

## 2022-01-06 NOTE — TELEPHONE ENCOUNTER
----- Message from Sheyla Radford sent at 1/6/2022  3:15 PM CST -----  Contact: Diamond Fields would like a call back at [phone], Regards to getting an ER follow up and medication review.    Thanks  Td

## 2022-01-06 NOTE — PROGRESS NOTES
"Digital Medicine: Health  Follow-Up    The history is provided by other. The person identified by the patient is: daughter. The history is limited by the condition of the patient.             Reviewed BP Readings. Patients BP average is 143/68 mmHg, which is not at goal. Patient's BP goal is less than 130/80 mmHg.           Additional Follow-up details: Brief follow up with patient's daughter. She reports that they're at the ED waiting for the patient to be called back. She reports that Mrs. Das "hasn't been acting like herself" however she did not provide details. She also reports that she hasn't taken her bp medication because it drops her bp and makes her feel dizzy.                 Addressed patient questions and patient has my contact information if needed prior to next outreach.     There are no preventive care reminders to display for this patient.       Last 5 Patient Entered Readings                Current 30 Day Average: 143/68  Recent Readings 1/5/2022 1/5/2022 1/5/2022 1/4/2022 1/3/2022   SBP (mmHg) 138 135 128 150 147   DBP (mmHg) 65 90 61 63 66   Pulse 71 75 69 80 74             Last 6 Patient Entered Readings                                          Most Recent A1c: 5.2% on 10/4/2021  (Goal: 8%)     Recent Readings 1/5/2022 1/4/2022 1/3/2022 1/3/2022 12/27/2021    Blood Glucose (mg/dL) 245 204 172 298 162             "

## 2022-01-10 ENCOUNTER — HOSPITAL ENCOUNTER (INPATIENT)
Facility: HOSPITAL | Age: 67
LOS: 5 days | Discharge: HOME | DRG: 291 | End: 2022-01-15
Attending: EMERGENCY MEDICINE | Admitting: INTERNAL MEDICINE
Payer: MEDICAID

## 2022-01-10 DIAGNOSIS — I50.9 CHF EXACERBATION: ICD-10-CM

## 2022-01-10 DIAGNOSIS — I50.9 CHF (CONGESTIVE HEART FAILURE): ICD-10-CM

## 2022-01-10 DIAGNOSIS — R07.9 CHEST PAIN: ICD-10-CM

## 2022-01-10 DIAGNOSIS — D64.9 ANEMIA, UNSPECIFIED TYPE: ICD-10-CM

## 2022-01-10 DIAGNOSIS — I50.9 ACUTE ON CHRONIC CONGESTIVE HEART FAILURE, UNSPECIFIED HEART FAILURE TYPE: ICD-10-CM

## 2022-01-10 DIAGNOSIS — R06.02 SHORTNESS OF BREATH: ICD-10-CM

## 2022-01-10 DIAGNOSIS — I50.32 CHRONIC DIASTOLIC CONGESTIVE HEART FAILURE: ICD-10-CM

## 2022-01-10 DIAGNOSIS — U07.1 COVID-19: Primary | ICD-10-CM

## 2022-01-10 DIAGNOSIS — R79.89 ELEVATED D-DIMER: ICD-10-CM

## 2022-01-10 PROBLEM — N18.9 ANEMIA DUE TO CHRONIC KIDNEY DISEASE: Status: ACTIVE | Noted: 2021-12-06

## 2022-01-10 LAB
ALBUMIN SERPL BCP-MCNC: 1.7 G/DL (ref 3.5–5.2)
ALP SERPL-CCNC: 74 U/L (ref 55–135)
ALT SERPL W/O P-5'-P-CCNC: 16 U/L (ref 10–44)
ANION GAP SERPL CALC-SCNC: 8 MMOL/L (ref 8–16)
AST SERPL-CCNC: 35 U/L (ref 10–40)
BASOPHILS # BLD AUTO: 0.03 K/UL (ref 0–0.2)
BASOPHILS NFR BLD: 0.4 % (ref 0–1.9)
BILIRUB SERPL-MCNC: 0.2 MG/DL (ref 0.1–1)
BNP SERPL-MCNC: 767 PG/ML (ref 0–99)
BUN SERPL-MCNC: 37 MG/DL (ref 8–23)
CALCIUM SERPL-MCNC: 6.5 MG/DL (ref 8.7–10.5)
CHLORIDE SERPL-SCNC: 112 MMOL/L (ref 95–110)
CO2 SERPL-SCNC: 23 MMOL/L (ref 23–29)
CREAT SERPL-MCNC: 4.2 MG/DL (ref 0.5–1.4)
CTP QC/QA: YES
CTP QC/QA: YES
DIFFERENTIAL METHOD: ABNORMAL
EOSINOPHIL # BLD AUTO: 0.2 K/UL (ref 0–0.5)
EOSINOPHIL NFR BLD: 2.3 % (ref 0–8)
ERYTHROCYTE [DISTWIDTH] IN BLOOD BY AUTOMATED COUNT: 15.6 % (ref 11.5–14.5)
EST. GFR  (AFRICAN AMERICAN): 12.1 ML/MIN/1.73 M^2
EST. GFR  (NON AFRICAN AMERICAN): 10.5 ML/MIN/1.73 M^2
GLUCOSE SERPL-MCNC: 177 MG/DL (ref 70–110)
HCT VFR BLD AUTO: 24.4 % (ref 37–48.5)
HGB BLD-MCNC: 7.5 G/DL (ref 12–16)
IMM GRANULOCYTES # BLD AUTO: 0.07 K/UL (ref 0–0.04)
IMM GRANULOCYTES NFR BLD AUTO: 0.9 % (ref 0–0.5)
LYMPHOCYTES # BLD AUTO: 2.2 K/UL (ref 1–4.8)
LYMPHOCYTES NFR BLD: 29.4 % (ref 18–48)
MCH RBC QN AUTO: 28.6 PG (ref 27–31)
MCHC RBC AUTO-ENTMCNC: 30.7 G/DL (ref 32–36)
MCV RBC AUTO: 93 FL (ref 82–98)
MONOCYTES # BLD AUTO: 0.9 K/UL (ref 0.3–1)
MONOCYTES NFR BLD: 11.7 % (ref 4–15)
NEUTROPHILS # BLD AUTO: 4.1 K/UL (ref 1.8–7.7)
NEUTROPHILS NFR BLD: 55.3 % (ref 38–73)
NRBC BLD-RTO: 0 /100 WBC
PLATELET # BLD AUTO: 239 K/UL (ref 150–450)
PMV BLD AUTO: 9.9 FL (ref 9.2–12.9)
POC MOLECULAR INFLUENZA A AGN: NEGATIVE
POC MOLECULAR INFLUENZA B AGN: NEGATIVE
POCT GLUCOSE: 102 MG/DL (ref 70–110)
POTASSIUM SERPL-SCNC: 4.9 MMOL/L (ref 3.5–5.1)
PROT SERPL-MCNC: 5.9 G/DL (ref 6–8.4)
RBC # BLD AUTO: 2.62 M/UL (ref 4–5.4)
SARS-COV-2 RDRP RESP QL NAA+PROBE: POSITIVE
SODIUM SERPL-SCNC: 143 MMOL/L (ref 136–145)
TROPONIN I SERPL DL<=0.01 NG/ML-MCNC: 0.03 NG/ML (ref 0–0.03)
WBC # BLD AUTO: 7.42 K/UL (ref 3.9–12.7)

## 2022-01-10 PROCEDURE — 63600175 PHARM REV CODE 636 W HCPCS: Mod: ER | Performed by: EMERGENCY MEDICINE

## 2022-01-10 PROCEDURE — 99291 CRITICAL CARE FIRST HOUR: CPT | Mod: 25,ER

## 2022-01-10 PROCEDURE — U0002 COVID-19 LAB TEST NON-CDC: HCPCS | Mod: ER | Performed by: EMERGENCY MEDICINE

## 2022-01-10 PROCEDURE — 21400001 HC TELEMETRY ROOM

## 2022-01-10 PROCEDURE — G0378 HOSPITAL OBSERVATION PER HR: HCPCS

## 2022-01-10 PROCEDURE — 80053 COMPREHEN METABOLIC PANEL: CPT | Mod: ER | Performed by: EMERGENCY MEDICINE

## 2022-01-10 PROCEDURE — 36415 COLL VENOUS BLD VENIPUNCTURE: CPT | Performed by: STUDENT IN AN ORGANIZED HEALTH CARE EDUCATION/TRAINING PROGRAM

## 2022-01-10 PROCEDURE — 83880 ASSAY OF NATRIURETIC PEPTIDE: CPT | Mod: ER | Performed by: EMERGENCY MEDICINE

## 2022-01-10 PROCEDURE — 85025 COMPLETE CBC W/AUTO DIFF WBC: CPT | Mod: ER | Performed by: EMERGENCY MEDICINE

## 2022-01-10 PROCEDURE — 93010 ELECTROCARDIOGRAM REPORT: CPT | Mod: 76,,, | Performed by: INTERNAL MEDICINE

## 2022-01-10 PROCEDURE — 84484 ASSAY OF TROPONIN QUANT: CPT | Mod: ER | Performed by: EMERGENCY MEDICINE

## 2022-01-10 PROCEDURE — 27000221 HC OXYGEN, UP TO 24 HOURS: Mod: ER

## 2022-01-10 PROCEDURE — 83036 HEMOGLOBIN GLYCOSYLATED A1C: CPT | Performed by: STUDENT IN AN ORGANIZED HEALTH CARE EDUCATION/TRAINING PROGRAM

## 2022-01-10 PROCEDURE — 96374 THER/PROPH/DIAG INJ IV PUSH: CPT | Mod: ER

## 2022-01-10 PROCEDURE — 25000003 PHARM REV CODE 250: Performed by: STUDENT IN AN ORGANIZED HEALTH CARE EDUCATION/TRAINING PROGRAM

## 2022-01-10 PROCEDURE — 93010 EKG 12-LEAD: ICD-10-PCS | Mod: 76,,, | Performed by: INTERNAL MEDICINE

## 2022-01-10 PROCEDURE — 63600175 PHARM REV CODE 636 W HCPCS: Performed by: STUDENT IN AN ORGANIZED HEALTH CARE EDUCATION/TRAINING PROGRAM

## 2022-01-10 PROCEDURE — 93005 ELECTROCARDIOGRAM TRACING: CPT | Mod: ER

## 2022-01-10 RX ORDER — PRAVASTATIN SODIUM 20 MG/1
TABLET ORAL
Status: ON HOLD | COMMUNITY
Start: 2021-12-26 | End: 2022-01-15 | Stop reason: HOSPADM

## 2022-01-10 RX ORDER — FUROSEMIDE 10 MG/ML
80 INJECTION INTRAMUSCULAR; INTRAVENOUS
Status: DISCONTINUED | OUTPATIENT
Start: 2022-01-10 | End: 2022-01-12

## 2022-01-10 RX ORDER — POLYETHYLENE GLYCOL 3350 17 G/17G
17 POWDER, FOR SOLUTION ORAL DAILY
Status: DISCONTINUED | OUTPATIENT
Start: 2022-01-11 | End: 2022-01-15 | Stop reason: HOSPADM

## 2022-01-10 RX ORDER — FAMOTIDINE 20 MG/1
20 TABLET, FILM COATED ORAL DAILY
Status: DISCONTINUED | OUTPATIENT
Start: 2022-01-11 | End: 2022-01-15 | Stop reason: HOSPADM

## 2022-01-10 RX ORDER — ISOSORBIDE MONONITRATE 60 MG/1
60 TABLET, EXTENDED RELEASE ORAL NIGHTLY
Status: DISCONTINUED | OUTPATIENT
Start: 2022-01-10 | End: 2022-01-15 | Stop reason: HOSPADM

## 2022-01-10 RX ORDER — GLUCAGON 1 MG
1 KIT INJECTION
Status: DISCONTINUED | OUTPATIENT
Start: 2022-01-10 | End: 2022-01-15 | Stop reason: HOSPADM

## 2022-01-10 RX ORDER — IBUPROFEN 200 MG
24 TABLET ORAL
Status: DISCONTINUED | OUTPATIENT
Start: 2022-01-10 | End: 2022-01-15 | Stop reason: HOSPADM

## 2022-01-10 RX ORDER — INSULIN ASPART 100 [IU]/ML
0-5 INJECTION, SOLUTION INTRAVENOUS; SUBCUTANEOUS
Status: DISCONTINUED | OUTPATIENT
Start: 2022-01-10 | End: 2022-01-15 | Stop reason: HOSPADM

## 2022-01-10 RX ORDER — SODIUM CHLORIDE 0.9 % (FLUSH) 0.9 %
10 SYRINGE (ML) INJECTION
Status: DISCONTINUED | OUTPATIENT
Start: 2022-01-10 | End: 2022-01-15 | Stop reason: HOSPADM

## 2022-01-10 RX ORDER — PRAVASTATIN SODIUM 20 MG/1
20 TABLET ORAL DAILY
Status: DISCONTINUED | OUTPATIENT
Start: 2022-01-11 | End: 2022-01-11

## 2022-01-10 RX ORDER — IBUPROFEN 200 MG
16 TABLET ORAL
Status: DISCONTINUED | OUTPATIENT
Start: 2022-01-10 | End: 2022-01-15 | Stop reason: HOSPADM

## 2022-01-10 RX ORDER — CARVEDILOL 12.5 MG/1
25 TABLET ORAL 2 TIMES DAILY WITH MEALS
Status: DISCONTINUED | OUTPATIENT
Start: 2022-01-10 | End: 2022-01-15 | Stop reason: HOSPADM

## 2022-01-10 RX ORDER — ASPIRIN 81 MG/1
81 TABLET ORAL DAILY
Status: DISCONTINUED | OUTPATIENT
Start: 2022-01-11 | End: 2022-01-15 | Stop reason: HOSPADM

## 2022-01-10 RX ORDER — FUROSEMIDE 10 MG/ML
60 INJECTION INTRAMUSCULAR; INTRAVENOUS
Status: COMPLETED | OUTPATIENT
Start: 2022-01-10 | End: 2022-01-10

## 2022-01-10 RX ORDER — ONDANSETRON 2 MG/ML
4 INJECTION INTRAMUSCULAR; INTRAVENOUS EVERY 8 HOURS PRN
Status: DISCONTINUED | OUTPATIENT
Start: 2022-01-10 | End: 2022-01-15 | Stop reason: HOSPADM

## 2022-01-10 RX ORDER — AMLODIPINE BESYLATE 10 MG/1
10 TABLET ORAL DAILY
Status: DISCONTINUED | OUTPATIENT
Start: 2022-01-11 | End: 2022-01-15 | Stop reason: HOSPADM

## 2022-01-10 RX ADMIN — FUROSEMIDE 60 MG: 10 INJECTION, SOLUTION INTRAMUSCULAR; INTRAVENOUS at 10:01

## 2022-01-10 RX ADMIN — FUROSEMIDE 80 MG: 40 INJECTION, SOLUTION INTRAMUSCULAR; INTRAVENOUS at 08:01

## 2022-01-10 RX ADMIN — CARVEDILOL 25 MG: 12.5 TABLET, FILM COATED ORAL at 08:01

## 2022-01-10 RX ADMIN — ISOSORBIDE MONONITRATE 60 MG: 60 TABLET, EXTENDED RELEASE ORAL at 08:01

## 2022-01-10 NOTE — ED PROVIDER NOTES
Encounter Date: 1/10/2022       History     Chief Complaint   Patient presents with    Shortness of Breath     X 2 weeks; hx of diabetes and CHF     The history is provided by the patient.   Shortness of Breath  This is a new problem. The problem occurs frequently.The current episode started more than 1 week ago. The problem has been gradually worsening. Associated symptoms include cough and leg swelling. Pertinent negatives include no fever, no rhinorrhea, no sore throat, no sputum production, no hemoptysis, no wheezing, no chest pain, no syncope, no vomiting, no abdominal pain and no rash. Associated medical issues include heart failure.     Review of patient's allergies indicates:   Allergen Reactions    Subsys [fentanyl] Other (See Comments)     After administration pt unresponsive.  HR and respirations decreased.     Versed [midazolam] Other (See Comments)     After administration pt unresponsive.  HR and respirations decreased.     Ampicillin Rash    Codeine Nausea And Vomiting and Nausea Only     Past Medical History:   Diagnosis Date    Ascites     Breast pain, left 1/4/2018    Cataract     CHF (congestive heart failure)     Cirrhosis     Cough     Diabetes mellitus     Diabetes mellitus, type 2     Gastroparesis     GERD (gastroesophageal reflux disease)     Hyperlipidemia     Hypertension     Hypotension 2/21/2019    Liver disease     Lumbar strain 4/27/2018    Macular degeneration     Pneumonia of right middle lobe due to infectious organism 12/19/2017    Renal disorder     Retinopathy due to secondary diabetes mellitus     Sleep apnea     Thyroid disease      Past Surgical History:   Procedure Laterality Date    CATARACT EXTRACTION      CHOLECYSTECTOMY      COLONOSCOPY N/A 9/4/2019    Procedure: COLONOSCOPY;  Surgeon: Marnie Elmore MD;  Location: [facility];  Service: Endoscopy;  Laterality: N/A;    ERCP      FLUOROSCOPY N/A 7/24/2020    Procedure: TIPS revision;   Surgeon: Martell Jasmine MD;  Location: [facility] CATH LAB;  Service: General;  Laterality: N/A;    LIVER BIOPSY      THORACENTESIS Left 1/20/2020    Procedure: Thoracentesis;  Surgeon: Angelica Hunter MD;  Location: [facility] ENDO;  Service: Pulmonary;  Laterality: Left;    TUBAL LIGATION      UPPER GASTROINTESTINAL ENDOSCOPY       Family History   Problem Relation Age of Onset    Cancer Mother     Breast cancer Mother     Heart disease Father     Aneurysm Sister     Heart disease Brother     No Known Problems Maternal Grandmother     Cancer Maternal Grandfather     Sarcoidosis Sister     Colon cancer Neg Hx     Ovarian cancer Neg Hx     Thrombosis Neg Hx      Social History     Tobacco Use    Smoking status: Never Smoker    Smokeless tobacco: Never Used   Substance Use Topics    Alcohol use: No    Drug use: No     Review of Systems   Constitutional: Positive for fatigue. Negative for fever.   HENT: Negative for rhinorrhea and sore throat.    Respiratory: Positive for cough and shortness of breath. Negative for hemoptysis, sputum production and wheezing.    Cardiovascular: Positive for leg swelling. Negative for chest pain and syncope.   Gastrointestinal: Negative for abdominal pain and vomiting.   Skin: Negative for rash.   All other systems reviewed and are negative.      Physical Exam     Initial Vitals   BP Pulse Resp Temp SpO2   01/10/22 0944 01/10/22 0944 01/10/22 0948 01/10/22 0944 01/10/22 0944   136/73 74 (S) (!) 28 99.6 °F (37.6 °C) (!) 91 %      MAP       --                Physical Exam    Nursing note and vitals reviewed.  Constitutional: She appears well-developed and well-nourished. No distress.   Obesity   HENT:   Head: Normocephalic and atraumatic.   Mouth/Throat: Oropharynx is clear and moist.   Eyes: Conjunctivae and EOM are normal. Pupils are equal, round, and reactive to light.   Neck: Neck supple.   Normal range of motion.  Cardiovascular: Normal rate, regular rhythm and normal  heart sounds.   Pulmonary/Chest: Tachypnea noted. No respiratory distress. She has rales in the right lower field and the left lower field.   Abdominal: Abdomen is soft. Bowel sounds are normal. She exhibits no distension. There is no abdominal tenderness.   Musculoskeletal:         General: Edema present. Normal range of motion.      Cervical back: Normal range of motion and neck supple.     Neurological: She is alert and oriented to person, place, and time. She has normal strength.   Skin: Skin is warm and dry.   Psychiatric: She has a normal mood and affect. Thought content normal.         ED Course   Critical Care    Date/Time: 1/10/2022 12:18 PM  Performed by: Bill Gilmore MD  Authorized by: Bill Gilmore MD   Total critical care time (exclusive of procedural time) : 46 minutes  Critical care time was exclusive of separately billable procedures and treating other patients.  Critical care was necessary to treat or prevent imminent or life-threatening deterioration of the following conditions: cardiac failure and respiratory failure.  Critical care was time spent personally by me on the following activities: blood draw for specimens, development of treatment plan with patient or surrogate, discussions with consultants, evaluation of patient's response to treatment, examination of patient, obtaining history from patient or surrogate, ordering and performing treatments and interventions, ordering and review of laboratory studies, ordering and review of radiographic studies, pulse oximetry, re-evaluation of patient's condition and review of old charts.        Labs Reviewed   CBC W/ AUTO DIFFERENTIAL - Abnormal; Notable for the following components:       Result Value    RBC 2.62 (*)     Hemoglobin 7.5 (*)     Hematocrit 24.4 (*)     MCHC 30.7 (*)     RDW 15.6 (*)     Immature Granulocytes 0.9 (*)     Immature Grans (Abs) 0.07 (*)     All other components within normal limits   COMPREHENSIVE  METABOLIC PANEL - Abnormal; Notable for the following components:    Chloride 112 (*)     Glucose 177 (*)     BUN 37 (*)     Creatinine 4.2 (*)     Calcium 6.5 (*)     Total Protein 5.9 (*)     Albumin 1.7 (*)     eGFR if  12.1 (*)     eGFR if non  10.5 (*)     All other components within normal limits    Narrative:     Calcium critical result(s) called and verbal readback obtained from   Joanna Blackmon RN  by WAQAR 01/10/2022 11:33   TROPONIN I - Abnormal; Notable for the following components:    Troponin I 0.032 (*)     All other components within normal limits   B-TYPE NATRIURETIC PEPTIDE - Abnormal; Notable for the following components:     (*)     All other components within normal limits   SARS-COV-2 RDRP GENE - Abnormal; Notable for the following components:    POC Rapid COVID Positive (*)     All other components within normal limits   POCT INFLUENZA A/B MOLECULAR     Results for orders placed or performed during the hospital encounter of 01/10/22   CBC auto differential   Result Value Ref Range    WBC 7.42 3.90 - 12.70 K/uL    RBC 2.62 (L) 4.00 - 5.40 M/uL    Hemoglobin 7.5 (L) 12.0 - 16.0 g/dL    Hematocrit 24.4 (L) 37.0 - 48.5 %    MCV 93 82 - 98 fL    MCH 28.6 27.0 - 31.0 pg    MCHC 30.7 (L) 32.0 - 36.0 g/dL    RDW 15.6 (H) 11.5 - 14.5 %    Platelets 239 150 - 450 K/uL    MPV 9.9 9.2 - 12.9 fL    Immature Granulocytes 0.9 (H) 0.0 - 0.5 %    Gran # (ANC) 4.1 1.8 - 7.7 K/uL    Immature Grans (Abs) 0.07 (H) 0.00 - 0.04 K/uL    Lymph # 2.2 1.0 - 4.8 K/uL    Mono # 0.9 0.3 - 1.0 K/uL    Eos # 0.2 0.0 - 0.5 K/uL    Baso # 0.03 0.00 - 0.20 K/uL    nRBC 0 0 /100 WBC    Gran % 55.3 38.0 - 73.0 %    Lymph % 29.4 18.0 - 48.0 %    Mono % 11.7 4.0 - 15.0 %    Eosinophil % 2.3 0.0 - 8.0 %    Basophil % 0.4 0.0 - 1.9 %    Differential Method Automated    Comprehensive metabolic panel   Result Value Ref Range    Sodium 143 136 - 145 mmol/L    Potassium 4.9 3.5 - 5.1 mmol/L    Chloride  112 (H) 95 - 110 mmol/L    CO2 23 23 - 29 mmol/L    Glucose 177 (H) 70 - 110 mg/dL    BUN 37 (H) 8 - 23 mg/dL    Creatinine 4.2 (H) 0.5 - 1.4 mg/dL    Calcium 6.5 (LL) 8.7 - 10.5 mg/dL    Total Protein 5.9 (L) 6.0 - 8.4 g/dL    Albumin 1.7 (L) 3.5 - 5.2 g/dL    Total Bilirubin 0.2 0.1 - 1.0 mg/dL    Alkaline Phosphatase 74 55 - 135 U/L    AST 35 10 - 40 U/L    ALT 16 10 - 44 U/L    Anion Gap 8 8 - 16 mmol/L    eGFR if African American 12.1 (A) >60 mL/min/1.73 m^2    eGFR if non African American 10.5 (A) >60 mL/min/1.73 m^2   Troponin I   Result Value Ref Range    Troponin I 0.032 (H) 0.000 - 0.026 ng/mL   Brain natriuretic peptide   Result Value Ref Range     (H) 0 - 99 pg/mL   POCT COVID-19 Rapid Screening   Result Value Ref Range    POC Rapid COVID Positive (A) Negative     Acceptable Yes    POCT Influenza A/B Molecular   Result Value Ref Range    POC Molecular Influenza A Ag Negative Negative, Not Reported    POC Molecular Influenza B Ag Negative Negative, Not Reported     Acceptable Yes      *Note: Due to a large number of results and/or encounters for the requested time period, some results have not been displayed. A complete set of results can be found in Results Review.            Imaging Results          X-Ray Chest AP Portable (Final result)  Result time 01/10/22 10:29:25    Final result by Cm Roberts III, MD (01/10/22 10:29:25)                 Impression:      Worsening CHF.      Electronically signed by: Cm Roberts MD  Date:    01/10/2022  Time:    10:29             Narrative:    EXAMINATION:  XR CHEST AP PORTABLE    CLINICAL HISTORY:  CHF;    FINDINGS:  Comparison is made with the most recent prior chest x-ray.  Worsening enlargement of the cardiac silhouette..  Worsening pulmonary vascular congestion and bilateral perihilar and lower lobe pulmonary edema.  Enlarging moderate left pleural effusion with adjacent compressive atelectasis.  Small right pleural  effusion.  No pneumothorax.                            12:18 PM Discussed lab/imaging studies with patient and the need for further evaluation/admission for CHF, anemia/ hypoxia, covid. Pt verbalized understanding that this is a stand alone ER and we are unable to admit at this facility. Pt will be transferred to [facility] via [facility] with care en route to include Cardiac monitoring. I discussed this case with HS and care was accepted by Dr Pineda.         Medications   furosemide injection 60 mg (60 mg Intravenous Given 1/10/22 1047)                          Clinical Impression:   Final diagnoses:  [R06.02] Shortness of breath  [I50.9] Acute on chronic congestive heart failure, unspecified heart failure type  [D64.9] Anemia, unspecified type  [U07.1] COVID-19 (Primary)          ED Disposition Condition    Observation               Bill Gilmore MD  01/18/22 7362

## 2022-01-11 ENCOUNTER — TELEPHONE (OUTPATIENT)
Dept: INTERNAL MEDICINE | Facility: CLINIC | Age: 67
End: 2022-01-11
Payer: MEDICAID

## 2022-01-11 PROBLEM — N18.5 CKD (CHRONIC KIDNEY DISEASE) STAGE 5, GFR LESS THAN 15 ML/MIN: Status: ACTIVE | Noted: 2018-05-24

## 2022-01-11 LAB
ANION GAP SERPL CALC-SCNC: 6 MMOL/L (ref 8–16)
AORTIC ROOT ANNULUS: 2.77 CM
ASCENDING AORTA: 2.75 CM
AV INDEX (PROSTH): 0.75
AV MEAN GRADIENT: 9 MMHG
AV PEAK GRADIENT: 14 MMHG
AV VALVE AREA: 2.07 CM2
AV VELOCITY RATIO: 0.7
BASOPHILS # BLD AUTO: 0.03 K/UL (ref 0–0.2)
BASOPHILS NFR BLD: 0.4 % (ref 0–1.9)
BSA FOR ECHO PROCEDURE: 2.04 M2
BUN SERPL-MCNC: 40 MG/DL (ref 8–23)
CALCIUM SERPL-MCNC: 6.2 MG/DL (ref 8.7–10.5)
CHLORIDE SERPL-SCNC: 111 MMOL/L (ref 95–110)
CO2 SERPL-SCNC: 24 MMOL/L (ref 23–29)
CREAT SERPL-MCNC: 4.1 MG/DL (ref 0.5–1.4)
CV ECHO LV RWT: 0.57 CM
DIFFERENTIAL METHOD: ABNORMAL
DOP CALC AO PEAK VEL: 1.85 M/S
DOP CALC AO VTI: 40.2 CM
DOP CALC LVOT AREA: 2.7 CM2
DOP CALC LVOT DIAMETER: 1.87 CM
DOP CALC LVOT PEAK VEL: 1.29 M/S
DOP CALC LVOT STROKE VOLUME: 83.18 CM3
DOP CALC RVOT PEAK VEL: 0.9 M/S
DOP CALC RVOT VTI: 20.8 CM
DOP CALCLVOT PEAK VEL VTI: 30.3 CM
E WAVE DECELERATION TIME: 145.71 MSEC
E/A RATIO: 1.46
ECHO EF ESTIMATED: 77 %
ECHO LV POSTERIOR WALL: 1.3 CM (ref 0.6–1.1)
EJECTION FRACTION: 60 %
EOSINOPHIL # BLD AUTO: 0.2 K/UL (ref 0–0.5)
EOSINOPHIL NFR BLD: 2.2 % (ref 0–8)
ERYTHROCYTE [DISTWIDTH] IN BLOOD BY AUTOMATED COUNT: 15.3 % (ref 11.5–14.5)
EST. GFR  (AFRICAN AMERICAN): 12 ML/MIN/1.73 M^2
EST. GFR  (NON AFRICAN AMERICAN): 11 ML/MIN/1.73 M^2
ESTIMATED AVG GLUCOSE: 88 MG/DL (ref 68–131)
FRACTIONAL SHORTENING: 46 % (ref 28–44)
GLUCOSE SERPL-MCNC: 130 MG/DL (ref 70–110)
HBA1C MFR BLD: 4.7 % (ref 4–5.6)
HCT VFR BLD AUTO: 24.9 % (ref 37–48.5)
HGB BLD-MCNC: 7.5 G/DL (ref 12–16)
IMM GRANULOCYTES # BLD AUTO: 0.04 K/UL (ref 0–0.04)
IMM GRANULOCYTES NFR BLD AUTO: 0.6 % (ref 0–0.5)
INTERVENTRICULAR SEPTUM: 1.31 CM (ref 0.6–1.1)
IVRT: 55.36 MSEC
LA MAJOR: 5.56 CM
LA MINOR: 5.15 CM
LEFT ATRIUM SIZE: 3.83 CM
LEFT INTERNAL DIMENSION IN SYSTOLE: 2.47 CM (ref 2.1–4)
LEFT VENTRICLE DIASTOLIC VOLUME INDEX: 49.16 ML/M2
LEFT VENTRICLE DIASTOLIC VOLUME: 95.37 ML
LEFT VENTRICLE MASS INDEX: 118 G/M2
LEFT VENTRICLE SYSTOLIC VOLUME INDEX: 11.1 ML/M2
LEFT VENTRICLE SYSTOLIC VOLUME: 21.58 ML
LEFT VENTRICULAR INTERNAL DIMENSION IN DIASTOLE: 4.56 CM (ref 3.5–6)
LEFT VENTRICULAR MASS: 228.39 G
LVOT MG: 4.41 MMHG
LVOT MV: 1.01 CM/S
LYMPHOCYTES # BLD AUTO: 2.1 K/UL (ref 1–4.8)
LYMPHOCYTES NFR BLD: 29.6 % (ref 18–48)
MCH RBC QN AUTO: 27.9 PG (ref 27–31)
MCHC RBC AUTO-ENTMCNC: 30.1 G/DL (ref 32–36)
MCV RBC AUTO: 93 FL (ref 82–98)
MONOCYTES # BLD AUTO: 0.7 K/UL (ref 0.3–1)
MONOCYTES NFR BLD: 10.2 % (ref 4–15)
MV PEAK A VEL: 0.85 M/S
MV PEAK E VEL: 1.24 M/S
MV STENOSIS PRESSURE HALF TIME: 42.26 MS
MV VALVE AREA P 1/2 METHOD: 5.21 CM2
NEUTROPHILS # BLD AUTO: 4.1 K/UL (ref 1.8–7.7)
NEUTROPHILS NFR BLD: 57 % (ref 38–73)
NRBC BLD-RTO: 0 /100 WBC
PISA MRMAX VEL: 4.04 M/S
PISA TR MAX VEL: 2.81 M/S
PLATELET # BLD AUTO: 238 K/UL (ref 150–450)
PMV BLD AUTO: 10 FL (ref 9.2–12.9)
POCT GLUCOSE: 133 MG/DL (ref 70–110)
POCT GLUCOSE: 163 MG/DL (ref 70–110)
POCT GLUCOSE: 169 MG/DL (ref 70–110)
POCT GLUCOSE: 180 MG/DL (ref 70–110)
POTASSIUM SERPL-SCNC: 5.3 MMOL/L (ref 3.5–5.1)
PV MEAN GRADIENT: 2.38 MMHG
RA MAJOR: 4.71 CM
RA PRESSURE: 3 MMHG
RBC # BLD AUTO: 2.69 M/UL (ref 4–5.4)
SODIUM SERPL-SCNC: 141 MMOL/L (ref 136–145)
TR MAX PG: 32 MMHG
TR MEAN GRADIENT: 23 MMHG
TV REST PULMONARY ARTERY PRESSURE: 35 MMHG
WBC # BLD AUTO: 7.16 K/UL (ref 3.9–12.7)

## 2022-01-11 PROCEDURE — 25000003 PHARM REV CODE 250: Performed by: STUDENT IN AN ORGANIZED HEALTH CARE EDUCATION/TRAINING PROGRAM

## 2022-01-11 PROCEDURE — 36415 COLL VENOUS BLD VENIPUNCTURE: CPT | Performed by: STUDENT IN AN ORGANIZED HEALTH CARE EDUCATION/TRAINING PROGRAM

## 2022-01-11 PROCEDURE — 27000207 HC ISOLATION

## 2022-01-11 PROCEDURE — 63600175 PHARM REV CODE 636 W HCPCS: Performed by: NURSE PRACTITIONER

## 2022-01-11 PROCEDURE — 80048 BASIC METABOLIC PNL TOTAL CA: CPT | Performed by: STUDENT IN AN ORGANIZED HEALTH CARE EDUCATION/TRAINING PROGRAM

## 2022-01-11 PROCEDURE — 25000242 PHARM REV CODE 250 ALT 637 W/ HCPCS: Performed by: NURSE PRACTITIONER

## 2022-01-11 PROCEDURE — 21400001 HC TELEMETRY ROOM

## 2022-01-11 PROCEDURE — 94640 AIRWAY INHALATION TREATMENT: CPT

## 2022-01-11 PROCEDURE — 85025 COMPLETE CBC W/AUTO DIFF WBC: CPT | Performed by: STUDENT IN AN ORGANIZED HEALTH CARE EDUCATION/TRAINING PROGRAM

## 2022-01-11 PROCEDURE — 63600175 PHARM REV CODE 636 W HCPCS: Performed by: STUDENT IN AN ORGANIZED HEALTH CARE EDUCATION/TRAINING PROGRAM

## 2022-01-11 RX ORDER — ALBUTEROL SULFATE 90 UG/1
2 AEROSOL, METERED RESPIRATORY (INHALATION) EVERY 6 HOURS
Status: DISCONTINUED | OUTPATIENT
Start: 2022-01-11 | End: 2022-01-15 | Stop reason: HOSPADM

## 2022-01-11 RX ORDER — TALC
6 POWDER (GRAM) TOPICAL NIGHTLY PRN
Status: DISCONTINUED | OUTPATIENT
Start: 2022-01-11 | End: 2022-01-15 | Stop reason: HOSPADM

## 2022-01-11 RX ORDER — ENOXAPARIN SODIUM 100 MG/ML
1 INJECTION SUBCUTANEOUS 2 TIMES DAILY
Status: DISCONTINUED | OUTPATIENT
Start: 2022-01-11 | End: 2022-01-11

## 2022-01-11 RX ORDER — SODIUM CHLORIDE 0.9 % (FLUSH) 0.9 %
10 SYRINGE (ML) INJECTION
Status: DISCONTINUED | OUTPATIENT
Start: 2022-01-11 | End: 2022-01-15 | Stop reason: HOSPADM

## 2022-01-11 RX ORDER — PRAVASTATIN SODIUM 10 MG/1
10 TABLET ORAL DAILY
Status: DISCONTINUED | OUTPATIENT
Start: 2022-01-12 | End: 2022-01-15 | Stop reason: HOSPADM

## 2022-01-11 RX ORDER — HEPARIN SODIUM 5000 [USP'U]/ML
5000 INJECTION, SOLUTION INTRAVENOUS; SUBCUTANEOUS EVERY 8 HOURS
Status: DISCONTINUED | OUTPATIENT
Start: 2022-01-11 | End: 2022-01-12

## 2022-01-11 RX ORDER — ASCORBIC ACID 500 MG
500 TABLET ORAL DAILY
Status: DISCONTINUED | OUTPATIENT
Start: 2022-01-12 | End: 2022-01-15 | Stop reason: HOSPADM

## 2022-01-11 RX ADMIN — PRAVASTATIN SODIUM 20 MG: 20 TABLET ORAL at 09:01

## 2022-01-11 RX ADMIN — ASPIRIN 81 MG: 81 TABLET, COATED ORAL at 09:01

## 2022-01-11 RX ADMIN — LEVOTHYROXINE SODIUM 137 MCG: 0.03 TABLET ORAL at 06:01

## 2022-01-11 RX ADMIN — FUROSEMIDE 80 MG: 40 INJECTION, SOLUTION INTRAMUSCULAR; INTRAVENOUS at 09:01

## 2022-01-11 RX ADMIN — ISOSORBIDE MONONITRATE 60 MG: 60 TABLET, EXTENDED RELEASE ORAL at 08:01

## 2022-01-11 RX ADMIN — AMLODIPINE BESYLATE 10 MG: 10 TABLET ORAL at 09:01

## 2022-01-11 RX ADMIN — HEPARIN SODIUM 5000 UNITS: 5000 INJECTION INTRAVENOUS; SUBCUTANEOUS at 09:01

## 2022-01-11 RX ADMIN — CARVEDILOL 25 MG: 12.5 TABLET, FILM COATED ORAL at 09:01

## 2022-01-11 RX ADMIN — FAMOTIDINE 20 MG: 20 TABLET ORAL at 09:01

## 2022-01-11 RX ADMIN — CARVEDILOL 25 MG: 12.5 TABLET, FILM COATED ORAL at 05:01

## 2022-01-11 RX ADMIN — POLYETHYLENE GLYCOL 3350 17 G: 17 POWDER, FOR SOLUTION ORAL at 09:01

## 2022-01-11 RX ADMIN — DEXAMETHASONE 6 MG: 4 TABLET ORAL at 06:01

## 2022-01-11 RX ADMIN — ALBUTEROL SULFATE 2 PUFF: 90 AEROSOL, METERED RESPIRATORY (INHALATION) at 08:01

## 2022-01-11 NOTE — PLAN OF CARE
[facility] - Telemetry (Hospital)  Initial Discharge Assessment       Primary Care Provider: nAdrey Perrin MD    Admission Diagnosis: Shortness of breath [R06.02]  Chest pain [R07.9]  Anemia, unspecified type [D64.9]  Acute on chronic congestive heart failure, unspecified heart failure type [I50.9]  COVID-19 [U07.1]    Admission Date: 1/10/2022  Expected Discharge Date:     Discharge Barriers Identified: None    Payor: MEDICAID / Plan: [email or web address] [facility]) / Product Type: Managed Medicaid /     Extended Emergency Contact Information  Primary Emergency Contact: DungPaco   [place]  Mobile Phone: [phone]  Relation: Daughter    Discharge Plan A: Home with family         [facility] [place] - [address]  [address] [address]  Phone: [phone] Fax: [phone]      Initial Assessment (most recent)     Adult Discharge Assessment - 01/11/22 1503        Discharge Assessment    Assessment Type Discharge Planning Assessment     Confirmed/corrected address, phone number and insurance Yes     Confirmed Demographics Correct on Facesheet     Source of Information family     When was your last doctors appointment? 12/20/21     Communicated GILLIAN with patient/caregiver Date not available/Unable to determine     Reason For Admission CHF exacerbation     Lives With child(yaw), adult     Facility Arrived From: home     Do you expect to return to your current living situation? Yes     Do you have help at home or someone to help you manage your care at home? Yes     Who are your caregiver(s) and their phone number(s)? Paco Mccoy     Prior to hospitilization cognitive status: Alert/Oriented     Current cognitive status: Alert/Oriented     Walking or Climbing Stairs Difficulty none     Dressing/Bathing Difficulty none     Home Accessibility wheelchair accessible     Home Layout Able to live on 1st floor     Equipment Currently Used at Home  oxygen     Readmission within 30 days? No     Patient currently being followed by outpatient case management? No     Do you currently have service(s) that help you manage your care at home? No     Do you take prescription medications? Yes     Do you have prescription coverage? Yes     Coverage Medicaid     Do you have any problems affording any of your prescribed medications? No     Is the patient taking medications as prescribed? yes     Who is going to help you get home at discharge? daughter, Paco     How do you get to doctors appointments? family or friend will provide     Are you on dialysis? No     Do you take coumadin? No     Discharge Plan A Home with family     DME Needed Upon Discharge  none     Discharge Plan discussed with: Adult children     Discharge Barriers Identified None        Relationship/Environment    Name(s) of Who Lives With Patient Paco Razo, daughter               Anticipated DC dispo: home with family  Prior Level of Function: independent   PCP: Andrey Evans MD    Comments:  CM spoke with patient's daughter to introduce role and discuss discharge planning. Patient lives with her daughter, Paco, who will also be help at home and can provide transport. Paco reports patient has oxygen but patient hasn't used it in quite some time. Unable to remember who she gets O2 from. Advised to check next time she is at home since patient may need portable tanks for ride home. CM discharge needs depends on hospital progress. CM will continue following to assist with other needs.

## 2022-01-11 NOTE — SUBJECTIVE & OBJECTIVE
Past Medical History:   Diagnosis Date    Ascites     Breast pain, left 1/4/2018    Cataract     CHF (congestive heart failure)     Cirrhosis     Cough     Diabetes mellitus     Diabetes mellitus, type 2     Gastroparesis     GERD (gastroesophageal reflux disease)     Hyperlipidemia     Hypertension     Hypotension 2/21/2019    Liver disease     Lumbar strain 4/27/2018    Macular degeneration     Pneumonia of right middle lobe due to infectious organism 12/19/2017    Renal disorder     Retinopathy due to secondary diabetes mellitus     Sleep apnea     Thyroid disease        Past Surgical History:   Procedure Laterality Date    CATARACT EXTRACTION      CHOLECYSTECTOMY      COLONOSCOPY N/A 9/4/2019    Procedure: COLONOSCOPY;  Surgeon: Marnie Elmore MD;  Location: [facility] ENDO;  Service: Endoscopy;  Laterality: N/A;    ERCP      FLUOROSCOPY N/A 7/24/2020    Procedure: TIPS revision;  Surgeon: Martell Jasmine MD;  Location: [facility] CATH LAB;  Service: General;  Laterality: N/A;    LIVER BIOPSY      THORACENTESIS Left 1/20/2020    Procedure: Thoracentesis;  Surgeon: Angelica Hunter MD;  Location: [facility] ENDO;  Service: Pulmonary;  Laterality: Left;    TUBAL LIGATION      UPPER GASTROINTESTINAL ENDOSCOPY         Review of patient's allergies indicates:   Allergen Reactions    Subsys [fentanyl] Other (See Comments)     After administration pt unresponsive.  HR and respirations decreased.     Versed [midazolam] Other (See Comments)     After administration pt unresponsive.  HR and respirations decreased.     Ampicillin Rash    Codeine Nausea And Vomiting and Nausea Only       No current facility-administered medications on file prior to encounter.     Current Outpatient Medications on File Prior to Encounter   Medication Sig    amLODIPine (NORVASC) 10 MG tablet Take 1 tablet by mouth once daily    aspirin (ECOTRIN) 81 MG EC tablet Take 1 tablet by mouth once daily    carvediloL (COREG) 25  MG tablet Take 1 tablet (25 mg total) by mouth 2 (two) times daily with meals.    docusate sodium (COLACE) 100 MG capsule Take 1 capsule (100 mg total) by mouth 3 (three) times daily. Hold for diarrhea    EUTHYROX 137 mcg Tab tablet TAKE 1 TABLET BY MOUTH BEFORE BREAKFAST    famotidine (PEPCID) 20 MG tablet Take 1 tablet (20 mg total) by mouth once daily.    fluticasone propionate (FLONASE) 50 mcg/actuation nasal spray 2 sprays (100 mcg total) by Each Nostril route once daily.    garlic (GARLIQUE ORAL) Take 1 tablet by mouth once daily.    isosorbide mononitrate (IMDUR) 60 MG 24 hr tablet Take 1 tablet (60 mg total) by mouth every evening.    magnesium oxide (MAG-OX) 400 mg (241.3 mg magnesium) tablet Take 1 tablet (400 mg total) by mouth once daily.    olmesartan (BENICAR) 40 MG tablet Take 1 tablet (40 mg total) by mouth once daily.    ondansetron (ZOFRAN) 4 MG tablet TAKE 1 TABLET BY MOUTH EVERY 8 HOURS AS NEEDED    pravastatin (PRAVACHOL) 20 MG tablet SMARTSI Tablet(s) By Mouth Every Evening    rifAXIMin (XIFAXAN) 550 mg Tab Take 1 tablet (550 mg total) by mouth 2 (two) times daily.    torsemide (DEMADEX) 20 MG Tab Take 2 tablets (40 mg total) by mouth once daily.    VICTOZA 2-SHAYY 0.6 mg/0.1 mL (18 mg/3 mL) PnIj pen INJECT 1.8 MG INTO THE SKIN ONCE DAILY    dicyclomine (BENTYL) 20 mg tablet Take 1 tablet (20 mg total) by mouth every 6 (six) hours.     Family History     Problem Relation (Age of Onset)    Aneurysm Sister    Breast cancer Mother    Cancer Mother, Maternal Grandfather    Heart disease Father, Brother    No Known Problems Maternal Grandmother    Sarcoidosis Sister        Tobacco Use    Smoking status: Never Smoker    Smokeless tobacco: Never Used   Substance and Sexual Activity    Alcohol use: No    Drug use: No    Sexual activity: Not Currently     Review of Systems   Constitutional: Positive for fatigue. Negative for fever.   HENT: Negative for rhinorrhea and sore throat.     Respiratory: Positive for cough and shortness of breath. Negative for hemoptysis, sputum production and wheezing.    Cardiovascular: Positive for leg swelling. Negative for chest pain and syncope.   Gastrointestinal: Negative for abdominal pain and vomiting.   Skin: Negative for rash.     Objective:     Vital Signs (Most Recent):  Temp: 98.6 °F (37 °C) (01/10/22 1852)  Pulse: 68 (01/10/22 1852)  Resp: 18 (01/10/22 1852)  BP: 135/64 (01/10/22 1852)  SpO2: (!) 93 % (01/10/22 1852) Vital Signs (24h Range):  Temp:  [98.6 °F (37 °C)-99.6 °F (37.6 °C)] 98.6 °F (37 °C)  Pulse:  [61-74] 68  Resp:  [11-31] 18  SpO2:  [91 %-100 %] 93 %  BP: (124-152)/(59-74) 135/64     Weight: 89.6 kg (197 lb 8.5 oz)  Body mass index is 37.32 kg/m².    Physical Exam  Constitutional:       General: She is not in acute distress.     Appearance: She is obese. She is not ill-appearing.   HENT:      Head: Normocephalic and atraumatic.   Cardiovascular:      Rate and Rhythm: Normal rate and regular rhythm.   Pulmonary:      Effort: Pulmonary effort is normal. No respiratory distress.      Breath sounds: Rales present. No wheezing.   Abdominal:      General: Abdomen is flat.      Palpations: Abdomen is soft.   Musculoskeletal:         General: Normal range of motion.      Right lower leg: Edema present.      Left lower leg: Edema present.   Skin:     General: Skin is warm and dry.   Neurological:      General: No focal deficit present.      Mental Status: She is alert and oriented to person, place, and time.          Significant Labs:   All pertinent labs within the past 24 hours have been reviewed.  CBC:   Recent Labs   Lab 01/10/22  1035   WBC 7.42   HGB 7.5*   HCT 24.4*        CMP:   Recent Labs   Lab 01/10/22  1035      K 4.9   *   CO2 23   *   BUN 37*   CREATININE 4.2*   CALCIUM 6.5*   PROT 5.9*   ALBUMIN 1.7*   BILITOT 0.2   ALKPHOS 74   AST 35   ALT 16   ANIONGAP 8   EGFRNONAA 10.5*       Significant Imaging: I  have reviewed all pertinent imaging results/findings within the past 24 hours.   Imaging Results          X-Ray Chest AP Portable (Final result)  Result time 01/10/22 10:29:25    Final result by Cm Roberts III, MD (01/10/22 10:29:25)                 Impression:      Worsening CHF.      Electronically signed by: Cm Roberts MD  Date:    01/10/2022  Time:    10:29             Narrative:    EXAMINATION:  XR CHEST AP PORTABLE    CLINICAL HISTORY:  CHF;    FINDINGS:  Comparison is made with the most recent prior chest x-ray.  Worsening enlargement of the cardiac silhouette..  Worsening pulmonary vascular congestion and bilateral perihilar and lower lobe pulmonary edema.  Enlarging moderate left pleural effusion with adjacent compressive atelectasis.  Small right pleural effusion.  No pneumothorax.

## 2022-01-11 NOTE — PLAN OF CARE
Pt remains fall free this shift.  Pt AAOx4, verbal, clear speech.  Skin warm and dry. No new skin issues.  Telemonitoring in progress, (SR mid 60s)  O2/ (3L) NC  Bathroom privileges   Standby assist with transfers.  IV lasix   CBG AC/ HS with PRN SS insulin.  Bed low, side rails up x 2, wheels locked, call light in reach.  Bed alarm maintained for safety.  Patient instructed to call for assistance.  Hourly rounding completed.  24 hour chart check completed  POC updated and reviewed with pt. Will continue POC.

## 2022-01-11 NOTE — CONSULTS
Food & Nutrition Education    Nutrition Education handouts attached to discharge paperwork:   DASH Diet (English) View Edit   Heart Disease in Diabetics (English) View Edit     RD working remotely. Pt identified as COVID+.   *Please re-consult as needed.  Tentative Next Date to be Seen by RD: 01/18/22  Radha Morgan, MS, RD, LDN

## 2022-01-11 NOTE — H&P
[facility] Medicine  History & Physical    Patient Name: Diamond Das  MRN: [id number]  Patient Class: OP- Observation  Admission Date: 1/10/2022  Attending Physician: Yenny Hudson MD  Primary Care Provider: Andrey Perrin MD         Patient information was obtained from patient, past medical records and ER records.     Subjective:     Principal Problem:CHF exacerbation    Chief Complaint:   Chief Complaint   Patient presents with    Shortness of Breath     X 2 weeks; hx of diabetes and CHF        HPI: 67 y/o F PMH CHF, CKD, anemia presents with c/o SOB progressively worsening over the past week, worse with exertion. Associated symptoms include cough and leg swelling. Pertinent negatives include no fever, no rhinorrhea, no sore throat, no sputum production, no hemoptysis, no wheezing, no chest pain, no syncope, no vomiting, no abdominal pain and no rash. COVID-19 positive on presentation. Reported RA sats charted 87% and transferred from [facility]. [facility] since arrival      Past Medical History:   Diagnosis Date    Ascites     Breast pain, left 1/4/2018    Cataract     CHF (congestive heart failure)     Cirrhosis     Cough     Diabetes mellitus     Diabetes mellitus, type 2     Gastroparesis     GERD (gastroesophageal reflux disease)     Hyperlipidemia     Hypertension     Hypotension 2/21/2019    Liver disease     Lumbar strain 4/27/2018    Macular degeneration     Pneumonia of right middle lobe due to infectious organism 12/19/2017    Renal disorder     Retinopathy due to secondary diabetes mellitus     Sleep apnea     Thyroid disease        Past Surgical History:   Procedure Laterality Date    CATARACT EXTRACTION      CHOLECYSTECTOMY      COLONOSCOPY N/A 9/4/2019    Procedure: COLONOSCOPY;  Surgeon: Marnie Elmore MD;  Location: [facility];  Service: Endoscopy;  Laterality: N/A;    ERCP      FLUOROSCOPY N/A 7/24/2020    Procedure: TIPS revision;   Surgeon: Martell Jasmine MD;  Location: [facility] CATH LAB;  Service: General;  Laterality: N/A;    LIVER BIOPSY      THORACENTESIS Left 1/20/2020    Procedure: Thoracentesis;  Surgeon: Angelica Hunter MD;  Location: [facility] ENDO;  Service: Pulmonary;  Laterality: Left;    TUBAL LIGATION      UPPER GASTROINTESTINAL ENDOSCOPY         Review of patient's allergies indicates:   Allergen Reactions    Subsys [fentanyl] Other (See Comments)     After administration pt unresponsive.  HR and respirations decreased.     Versed [midazolam] Other (See Comments)     After administration pt unresponsive.  HR and respirations decreased.     Ampicillin Rash    Codeine Nausea And Vomiting and Nausea Only       No current facility-administered medications on file prior to encounter.     Current Outpatient Medications on File Prior to Encounter   Medication Sig    amLODIPine (NORVASC) 10 MG tablet Take 1 tablet by mouth once daily    aspirin (ECOTRIN) 81 MG EC tablet Take 1 tablet by mouth once daily    carvediloL (COREG) 25 MG tablet Take 1 tablet (25 mg total) by mouth 2 (two) times daily with meals.    docusate sodium (COLACE) 100 MG capsule Take 1 capsule (100 mg total) by mouth 3 (three) times daily. Hold for diarrhea    EUTHYROX 137 mcg Tab tablet TAKE 1 TABLET BY MOUTH BEFORE BREAKFAST    famotidine (PEPCID) 20 MG tablet Take 1 tablet (20 mg total) by mouth once daily.    fluticasone propionate (FLONASE) 50 mcg/actuation nasal spray 2 sprays (100 mcg total) by Each Nostril route once daily.    garlic (GARLIQUE ORAL) Take 1 tablet by mouth once daily.    isosorbide mononitrate (IMDUR) 60 MG 24 hr tablet Take 1 tablet (60 mg total) by mouth every evening.    magnesium oxide (MAG-OX) 400 mg (241.3 mg magnesium) tablet Take 1 tablet (400 mg total) by mouth once daily.    olmesartan (BENICAR) 40 MG tablet Take 1 tablet (40 mg total) by mouth once daily.    ondansetron (ZOFRAN) 4 MG tablet TAKE 1 TABLET BY MOUTH  EVERY 8 HOURS AS NEEDED    pravastatin (PRAVACHOL) 20 MG tablet SMARTSI Tablet(s) By Mouth Every Evening    rifAXIMin (XIFAXAN) 550 mg Tab Take 1 tablet (550 mg total) by mouth 2 (two) times daily.    torsemide (DEMADEX) 20 MG Tab Take 2 tablets (40 mg total) by mouth once daily.    VICTOZA 2-SHAYY 0.6 mg/0.1 mL (18 mg/3 mL) PnIj pen INJECT 1.8 MG INTO THE SKIN ONCE DAILY    dicyclomine (BENTYL) 20 mg tablet Take 1 tablet (20 mg total) by mouth every 6 (six) hours.     Family History     Problem Relation (Age of Onset)    Aneurysm Sister    Breast cancer Mother    Cancer Mother, Maternal Grandfather    Heart disease Father, Brother    No Known Problems Maternal Grandmother    Sarcoidosis Sister        Tobacco Use    Smoking status: Never Smoker    Smokeless tobacco: Never Used   Substance and Sexual Activity    Alcohol use: No    Drug use: No    Sexual activity: Not Currently     Review of Systems   Constitutional: Positive for fatigue. Negative for fever.   HENT: Negative for rhinorrhea and sore throat.    Respiratory: Positive for cough and shortness of breath. Negative for hemoptysis, sputum production and wheezing.    Cardiovascular: Positive for leg swelling. Negative for chest pain and syncope.   Gastrointestinal: Negative for abdominal pain and vomiting.   Skin: Negative for rash.     Objective:     Vital Signs (Most Recent):  Temp: 98.6 °F (37 °C) (01/10/22 1852)  Pulse: 68 (01/10/22 1852)  Resp: 18 (01/10/22 1852)  BP: 135/64 (01/10/22 1852)  SpO2: (!) 93 % (01/10/22 1852) Vital Signs (24h Range):  Temp:  [98.6 °F (37 °C)-99.6 °F (37.6 °C)] 98.6 °F (37 °C)  Pulse:  [61-74] 68  Resp:  [11-31] 18  SpO2:  [91 %-100 %] 93 %  BP: (124-152)/(59-74) 135/64     Weight: 89.6 kg (197 lb 8.5 oz)  Body mass index is 37.32 kg/m².    Physical Exam  Constitutional:       General: She is not in acute distress.     Appearance: She is obese. She is not ill-appearing.   HENT:      Head: Normocephalic and  atraumatic.   Cardiovascular:      Rate and Rhythm: Normal rate and regular rhythm.   Pulmonary:      Effort: Pulmonary effort is normal. No respiratory distress.      Breath sounds: Rales present. No wheezing.   Abdominal:      General: Abdomen is flat.      Palpations: Abdomen is soft.   Musculoskeletal:         General: Normal range of motion.      Right lower leg: Edema present.      Left lower leg: Edema present.   Skin:     General: Skin is warm and dry.   Neurological:      General: No focal deficit present.      Mental Status: She is alert and oriented to person, place, and time.          Significant Labs:   All pertinent labs within the past 24 hours have been reviewed.  CBC:   Recent Labs   Lab 01/10/22  1035   WBC 7.42   HGB 7.5*   HCT 24.4*        CMP:   Recent Labs   Lab 01/10/22  1035      K 4.9   *   CO2 23   *   BUN 37*   CREATININE 4.2*   CALCIUM 6.5*   PROT 5.9*   ALBUMIN 1.7*   BILITOT 0.2   ALKPHOS 74   AST 35   ALT 16   ANIONGAP 8   EGFRNONAA 10.5*       Significant Imaging: I have reviewed all pertinent imaging results/findings within the past 24 hours.   Imaging Results          X-Ray Chest AP Portable (Final result)  Result time 01/10/22 10:29:25    Final result by Cm Roberts III, MD (01/10/22 10:29:25)                 Impression:      Worsening CHF.      Electronically signed by: Cm Roberts MD  Date:    01/10/2022  Time:    10:29             Narrative:    EXAMINATION:  XR CHEST AP PORTABLE    CLINICAL HISTORY:  CHF;    FINDINGS:  Comparison is made with the most recent prior chest x-ray.  Worsening enlargement of the cardiac silhouette..  Worsening pulmonary vascular congestion and bilateral perihilar and lower lobe pulmonary edema.  Enlarging moderate left pleural effusion with adjacent compressive atelectasis.  Small right pleural effusion.  No pneumothorax.                                  Assessment/Plan:     * CHF exacerbation  Patient is identified as  having Diastolic (HFpEF) heart failure that is Acute on chronic. CHF is currently uncontrolled due to Rales/crackles on pulmonary exam and Pulmonary edema/pleural effusion on CXR. Latest ECHO performed and demonstrates- Results for orders placed during the hospital encounter of 10/09/20    Echo Color Flow Doppler? Yes    Interpretation Summary  · There is moderate left ventricular concentric hypertrophy.  · The left ventricle is small with normal systolic function. The estimated ejection fraction is 60%.  · Grade I diastolic dysfunction.  · Normal right ventricular systolic function.  · Mild tricuspid regurgitation.  · Normal central venous pressure (3 mmHg).  · The estimated PA systolic pressure is 37 mmHg.  · Small pericardial effusion.  · There is a moderate left pleural effusion.  . Continue Beta Blocker and monitor clinical status closely. Monitor on telemetry. Patient is on CHF pathway.  Monitor strict Is&Os and daily weights.  Place on fluid restriction of 2 L. Continue to stress to patient importance of self efficacy and  on diet for CHF. Last BNP reviewed- and noted below   Recent Labs   Lab 01/10/22  1035   *   .  IV Lasix, monitor UOP, trend Cr  Supplemental O2 PRN    COVID-19  Admit for observation  ZEESHAN  Tylenol PRN, supportive care measures  Supplemental O2 PRN      Anemia due to chronic kidney disease  Monitor Hb, no signs of active blood loss  Transfuse Hb<7      Type 2 diabetes mellitus, without long-term current use of insulin  SSI        VTE Risk Mitigation (From admission, onward)         Ordered     IP VTE HIGH RISK PATIENT  Once         01/10/22 2009     Place sequential compression device  Until discontinued         01/10/22 2009                   Yenny Hudson MD  Department of Hospital Medicine   [facility] - Telemetry ([facility])

## 2022-01-11 NOTE — ASSESSMENT & PLAN NOTE
Patient is identified as having Diastolic (HFpEF) heart failure that is Acute on chronic. CHF is currently uncontrolled due to Rales/crackles on pulmonary exam and Pulmonary edema/pleural effusion on CXR. Latest ECHO performed and demonstrates- Results for orders placed during the hospital encounter of 10/09/20    Echo Color Flow Doppler? Yes    Interpretation Summary  · There is moderate left ventricular concentric hypertrophy.  · The left ventricle is small with normal systolic function. The estimated ejection fraction is 60%.  · Grade I diastolic dysfunction.  · Normal right ventricular systolic function.  · Mild tricuspid regurgitation.  · Normal central venous pressure (3 mmHg).  · The estimated PA systolic pressure is 37 mmHg.  · Small pericardial effusion.  · There is a moderate left pleural effusion.  . Continue Beta Blocker and monitor clinical status closely. Monitor on telemetry. Patient is on CHF pathway.  Monitor strict Is&Os and daily weights.  Place on fluid restriction of 2 L. Continue to stress to patient importance of self efficacy and  on diet for CHF. Last BNP reviewed- and noted below   Recent Labs   Lab 01/10/22  1035   *   .  IV Lasix, monitor UOP, trend Cr  Supplemental O2 PRN

## 2022-01-11 NOTE — HPI
65 y/o F PMH CHF, CKD, anemia presents with c/o SOB progressively worsening over the past week, worse with exertion. Associated symptoms include cough and leg swelling. Pertinent negatives include no fever, no rhinorrhea, no sore throat, no sputum production, no hemoptysis, no wheezing, no chest pain, no syncope, no vomiting, no abdominal pain and no rash. COVID-19 positive on presentation. Reported RA sats charted 87% and transferred from [facility]. [facility] since arrival

## 2022-01-12 PROBLEM — E87.5 HYPERKALEMIA: Status: ACTIVE | Noted: 2022-01-12

## 2022-01-12 LAB
ANION GAP SERPL CALC-SCNC: 7 MMOL/L (ref 8–16)
APTT BLDCRRT: 139 SEC (ref 21–32)
APTT BLDCRRT: 31.3 SEC (ref 21–32)
BASOPHILS # BLD AUTO: 0.02 K/UL (ref 0–0.2)
BASOPHILS NFR BLD: 0.3 % (ref 0–1.9)
BUN SERPL-MCNC: 43 MG/DL (ref 8–23)
CALCIUM SERPL-MCNC: 6.7 MG/DL (ref 8.7–10.5)
CHLORIDE SERPL-SCNC: 111 MMOL/L (ref 95–110)
CO2 SERPL-SCNC: 23 MMOL/L (ref 23–29)
CREAT SERPL-MCNC: 4.6 MG/DL (ref 0.5–1.4)
D DIMER PPP IA.FEU-MCNC: 7.32 MG/L FEU
DIFFERENTIAL METHOD: ABNORMAL
EOSINOPHIL # BLD AUTO: 0 K/UL (ref 0–0.5)
EOSINOPHIL NFR BLD: 0 % (ref 0–8)
ERYTHROCYTE [DISTWIDTH] IN BLOOD BY AUTOMATED COUNT: 15 % (ref 11.5–14.5)
ERYTHROCYTE [SEDIMENTATION RATE] IN BLOOD BY WESTERGREN METHOD: 114 MM/HR (ref 0–20)
EST. GFR  (AFRICAN AMERICAN): 11 ML/MIN/1.73 M^2
EST. GFR  (NON AFRICAN AMERICAN): 9 ML/MIN/1.73 M^2
FERRITIN SERPL-MCNC: 363 NG/ML (ref 20–300)
GLUCOSE SERPL-MCNC: 172 MG/DL (ref 70–110)
HCT VFR BLD AUTO: 25.2 % (ref 37–48.5)
HGB BLD-MCNC: 7.7 G/DL (ref 12–16)
IMM GRANULOCYTES # BLD AUTO: 0.04 K/UL (ref 0–0.04)
IMM GRANULOCYTES NFR BLD AUTO: 0.6 % (ref 0–0.5)
INR PPP: 1 (ref 0.8–1.2)
LDH SERPL L TO P-CCNC: 442 U/L (ref 110–260)
LYMPHOCYTES # BLD AUTO: 1.2 K/UL (ref 1–4.8)
LYMPHOCYTES NFR BLD: 16.3 % (ref 18–48)
MCH RBC QN AUTO: 28 PG (ref 27–31)
MCHC RBC AUTO-ENTMCNC: 30.6 G/DL (ref 32–36)
MCV RBC AUTO: 92 FL (ref 82–98)
MONOCYTES # BLD AUTO: 0.1 K/UL (ref 0.3–1)
MONOCYTES NFR BLD: 1.4 % (ref 4–15)
NEUTROPHILS # BLD AUTO: 5.8 K/UL (ref 1.8–7.7)
NEUTROPHILS NFR BLD: 81.4 % (ref 38–73)
NRBC BLD-RTO: 0 /100 WBC
PLATELET # BLD AUTO: 242 K/UL (ref 150–450)
PMV BLD AUTO: 10.2 FL (ref 9.2–12.9)
POCT GLUCOSE: 209 MG/DL (ref 70–110)
POCT GLUCOSE: 256 MG/DL (ref 70–110)
POCT GLUCOSE: 276 MG/DL (ref 70–110)
POCT GLUCOSE: 290 MG/DL (ref 70–110)
POTASSIUM SERPL-SCNC: 5.6 MMOL/L (ref 3.5–5.1)
PROTHROMBIN TIME: 11.2 SEC (ref 9–12.5)
RBC # BLD AUTO: 2.75 M/UL (ref 4–5.4)
SODIUM SERPL-SCNC: 141 MMOL/L (ref 136–145)
WBC # BLD AUTO: 7.17 K/UL (ref 3.9–12.7)

## 2022-01-12 PROCEDURE — 27000221 HC OXYGEN, UP TO 24 HOURS

## 2022-01-12 PROCEDURE — 80048 BASIC METABOLIC PNL TOTAL CA: CPT | Performed by: STUDENT IN AN ORGANIZED HEALTH CARE EDUCATION/TRAINING PROGRAM

## 2022-01-12 PROCEDURE — 85379 FIBRIN DEGRADATION QUANT: CPT | Performed by: NURSE PRACTITIONER

## 2022-01-12 PROCEDURE — 27000207 HC ISOLATION

## 2022-01-12 PROCEDURE — 36415 COLL VENOUS BLD VENIPUNCTURE: CPT | Performed by: NURSE PRACTITIONER

## 2022-01-12 PROCEDURE — 82728 ASSAY OF FERRITIN: CPT | Performed by: NURSE PRACTITIONER

## 2022-01-12 PROCEDURE — 63600175 PHARM REV CODE 636 W HCPCS: Performed by: STUDENT IN AN ORGANIZED HEALTH CARE EDUCATION/TRAINING PROGRAM

## 2022-01-12 PROCEDURE — 85730 THROMBOPLASTIN TIME PARTIAL: CPT | Mod: 91 | Performed by: FAMILY MEDICINE

## 2022-01-12 PROCEDURE — 85610 PROTHROMBIN TIME: CPT | Performed by: NURSE PRACTITIONER

## 2022-01-12 PROCEDURE — 85025 COMPLETE CBC W/AUTO DIFF WBC: CPT | Performed by: NURSE PRACTITIONER

## 2022-01-12 PROCEDURE — 25000003 PHARM REV CODE 250: Performed by: NURSE PRACTITIONER

## 2022-01-12 PROCEDURE — 25000003 PHARM REV CODE 250: Performed by: STUDENT IN AN ORGANIZED HEALTH CARE EDUCATION/TRAINING PROGRAM

## 2022-01-12 PROCEDURE — 85730 THROMBOPLASTIN TIME PARTIAL: CPT | Performed by: NURSE PRACTITIONER

## 2022-01-12 PROCEDURE — 94761 N-INVAS EAR/PLS OXIMETRY MLT: CPT

## 2022-01-12 PROCEDURE — 36415 COLL VENOUS BLD VENIPUNCTURE: CPT | Performed by: FAMILY MEDICINE

## 2022-01-12 PROCEDURE — 85651 RBC SED RATE NONAUTOMATED: CPT | Performed by: NURSE PRACTITIONER

## 2022-01-12 PROCEDURE — 94640 AIRWAY INHALATION TREATMENT: CPT

## 2022-01-12 PROCEDURE — 83615 LACTATE (LD) (LDH) ENZYME: CPT | Performed by: NURSE PRACTITIONER

## 2022-01-12 PROCEDURE — 63600175 PHARM REV CODE 636 W HCPCS: Performed by: NURSE PRACTITIONER

## 2022-01-12 PROCEDURE — 21400001 HC TELEMETRY ROOM

## 2022-01-12 RX ORDER — METOLAZONE 5 MG/1
5 TABLET ORAL DAILY
Status: DISCONTINUED | OUTPATIENT
Start: 2022-01-12 | End: 2022-01-13

## 2022-01-12 RX ORDER — HEPARIN SODIUM,PORCINE/D5W 25000/250
0-40 INTRAVENOUS SOLUTION INTRAVENOUS CONTINUOUS
Status: DISCONTINUED | OUTPATIENT
Start: 2022-01-12 | End: 2022-01-15

## 2022-01-12 RX ORDER — BUMETANIDE 0.25 MG/ML
2 INJECTION INTRAMUSCULAR; INTRAVENOUS 2 TIMES DAILY
Status: DISCONTINUED | OUTPATIENT
Start: 2022-01-12 | End: 2022-01-13

## 2022-01-12 RX ADMIN — SODIUM ZIRCONIUM CYCLOSILICATE 5 G: 5 POWDER, FOR SUSPENSION ORAL at 12:01

## 2022-01-12 RX ADMIN — ASPIRIN 81 MG: 81 TABLET, COATED ORAL at 08:01

## 2022-01-12 RX ADMIN — THERA TABS 1 TABLET: TAB at 08:01

## 2022-01-12 RX ADMIN — FUROSEMIDE 80 MG: 40 INJECTION, SOLUTION INTRAMUSCULAR; INTRAVENOUS at 08:01

## 2022-01-12 RX ADMIN — HEPARIN SODIUM 5000 UNITS: 5000 INJECTION INTRAVENOUS; SUBCUTANEOUS at 05:01

## 2022-01-12 RX ADMIN — FAMOTIDINE 20 MG: 20 TABLET ORAL at 08:01

## 2022-01-12 RX ADMIN — HEPARIN SODIUM 12 UNITS/KG/HR: 1000 INJECTION, SOLUTION INTRAVENOUS; SUBCUTANEOUS at 02:01

## 2022-01-12 RX ADMIN — METOLAZONE 5 MG: 5 TABLET ORAL at 12:01

## 2022-01-12 RX ADMIN — ALBUTEROL SULFATE 2 PUFF: 90 AEROSOL, METERED RESPIRATORY (INHALATION) at 08:01

## 2022-01-12 RX ADMIN — ALBUTEROL SULFATE 2 PUFF: 90 AEROSOL, METERED RESPIRATORY (INHALATION) at 07:01

## 2022-01-12 RX ADMIN — BUMETANIDE 2 MG: 0.25 INJECTION INTRAMUSCULAR; INTRAVENOUS at 09:01

## 2022-01-12 RX ADMIN — PRAVASTATIN SODIUM 10 MG: 10 TABLET ORAL at 08:01

## 2022-01-12 RX ADMIN — CARVEDILOL 25 MG: 12.5 TABLET, FILM COATED ORAL at 08:01

## 2022-01-12 RX ADMIN — INSULIN ASPART 3 UNITS: 100 INJECTION, SOLUTION INTRAVENOUS; SUBCUTANEOUS at 05:01

## 2022-01-12 RX ADMIN — LEVOTHYROXINE SODIUM 137 MCG: 0.03 TABLET ORAL at 05:01

## 2022-01-12 RX ADMIN — ISOSORBIDE MONONITRATE 60 MG: 60 TABLET, EXTENDED RELEASE ORAL at 09:01

## 2022-01-12 RX ADMIN — CARVEDILOL 25 MG: 12.5 TABLET, FILM COATED ORAL at 05:01

## 2022-01-12 RX ADMIN — AMLODIPINE BESYLATE 10 MG: 10 TABLET ORAL at 08:01

## 2022-01-12 RX ADMIN — INSULIN ASPART 3 UNITS: 100 INJECTION, SOLUTION INTRAVENOUS; SUBCUTANEOUS at 09:01

## 2022-01-12 RX ADMIN — INSULIN ASPART 3 UNITS: 100 INJECTION, SOLUTION INTRAVENOUS; SUBCUTANEOUS at 12:01

## 2022-01-12 RX ADMIN — DEXAMETHASONE 6 MG: 4 TABLET ORAL at 08:01

## 2022-01-12 RX ADMIN — BUMETANIDE 2 MG: 0.25 INJECTION INTRAMUSCULAR; INTRAVENOUS at 12:01

## 2022-01-12 RX ADMIN — OXYCODONE HYDROCHLORIDE AND ACETAMINOPHEN 500 MG: 500 TABLET ORAL at 08:01

## 2022-01-12 RX ADMIN — ALBUTEROL SULFATE 2 PUFF: 90 AEROSOL, METERED RESPIRATORY (INHALATION) at 01:01

## 2022-01-12 RX ADMIN — INSULIN ASPART 2 UNITS: 100 INJECTION, SOLUTION INTRAVENOUS; SUBCUTANEOUS at 05:01

## 2022-01-12 NOTE — ASSESSMENT & PLAN NOTE
Patient is identified as having Diastolic (HFpEF) heart failure that is Acute on chronic. CHF is currently uncontrolled due to Rales/crackles on pulmonary exam and Pulmonary edema/pleural effusion on CXR. Latest ECHO performed and demonstrates- Results for orders placed during the hospital encounter of 10/09/20    Echo Color Flow Doppler? Yes    Interpretation Summary  · There is moderate left ventricular concentric hypertrophy.  · The left ventricle is small with normal systolic function. The estimated ejection fraction is 60%.  · Grade I diastolic dysfunction.  · Normal right ventricular systolic function.  · Mild tricuspid regurgitation.  · Normal central venous pressure (3 mmHg).  · The estimated PA systolic pressure is 37 mmHg.  · Small pericardial effusion.  · There is a moderate left pleural effusion.  . Continue Beta Blocker and monitor clinical status closely. Monitor on telemetry. Patient is on CHF pathway.  Monitor strict Is&Os and daily weights.  Place on fluid restriction of 2 L. Continue to stress to patient importance of self efficacy and  on diet for CHF. Last BNP reviewed- and noted below   Recent Labs   Lab 01/10/22  1035   *   .  IV Lasix, monitor UOP, trend Cr  Supplemental O2 PRN    1/11/22  Echocardiogram shows a preserved EF with diastolic dysfunction  Continue diuretics  CXR in AM

## 2022-01-12 NOTE — PROGRESS NOTES
'[place] - Telemetry ([facility] Medicine  Progress Note    Patient Name: Diamond Das  MRN: [id number]  Patient Class: IP- Inpatient   Admission Date: 1/10/2022  Length of Stay: 1 days  Attending Physician: Anna Macias MD  Primary Care Provider: Andrey Perrin MD        Subjective:     Principal Problem:CHF exacerbation        HPI:  67 y/o F PMH CHF, CKD, anemia presents with c/o SOB progressively worsening over the past week, worse with exertion. Associated symptoms include cough and leg swelling. Pertinent negatives include no fever, no rhinorrhea, no sore throat, no sputum production, no hemoptysis, no wheezing, no chest pain, no syncope, no vomiting, no abdominal pain and no rash. COVID-19 positive on presentation. Reported RA sats charted 87% and transferred from [facility] since arrival      Overview/Hospital Course:  Diamond Das is a 66 year old female who was admitted to [facility] for COVID-19 and decompensated CHF. CXR shows evidence of volume overload and was diuresed. Echocaridogram shows a preserved EF with diastolic dysfunction. Will repeat CXR on 1/12/22. For COVID-19 she received MVI, ascorbic acid, and dexamethasone. Currently on 3L nasal cannula.    1/12/22  D-dimer 7 today. V/Q scan indeterminant for pulmonary embolism. Could reflect fluid within the fissure as well. Started on heparin infusion. CXR today still shows fluid.  Will change diuretics to Bumex with Metolazone for one dose. Closely monitor kidney function.       Interval History: still complains of orthopnea.  CXR unchanged.    Review of Systems   Constitutional: Negative for activity change, diaphoresis, fatigue and unexpected weight change.   HENT: Negative for congestion, ear pain and sore throat.    Eyes: Negative.    Respiratory: Positive for shortness of breath. Negative for wheezing.    Cardiovascular: Negative for chest pain and palpitations.        Orthopnea     Gastrointestinal: Negative for  abdominal pain, constipation, diarrhea and vomiting.   Endocrine: Negative.    Genitourinary: Negative for flank pain, hematuria and urgency.   Musculoskeletal: Negative for joint swelling and neck pain.   Skin: Negative for pallor.   Neurological: Negative for seizures, syncope and light-headedness.   Hematological: Negative.    Psychiatric/Behavioral: Negative.      Objective:     Vital Signs (Most Recent):  Temp: 98.4 °F (36.9 °C) (01/12/22 1544)  Pulse: 60 (01/12/22 1544)  Resp: 20 (01/12/22 1544)  BP: (!) 114/56 (01/12/22 1544)  SpO2: (!) 94 % (01/12/22 1544) Vital Signs (24h Range):  Temp:  [97.8 °F (36.6 °C)-99.7 °F (37.6 °C)] 98.4 °F (36.9 °C)  Pulse:  [55-82] 60  Resp:  [16-20] 20  SpO2:  [94 %-100 %] 94 %  BP: (108-133)/(53-65) 114/56     Weight: 97.5 kg (214 lb 15.2 oz)  Body mass index is 40.61 kg/m².    Intake/Output Summary (Last 24 hours) at 1/12/2022 1658  Last data filed at 1/12/2022 1600  Gross per 24 hour   Intake 1318 ml   Output --   Net 1318 ml      Physical Exam  Constitutional:       Appearance: She is well-developed.   HENT:      Head: Normocephalic and atraumatic.   Cardiovascular:      Rate and Rhythm: Normal rate and regular rhythm.      Heart sounds: Normal heart sounds. No murmur heard.      Pulmonary:      Effort: Pulmonary effort is normal. No respiratory distress.      Breath sounds: Rales present.   Abdominal:      General: Bowel sounds are normal. There is no distension.      Palpations: Abdomen is soft.      Tenderness: There is no abdominal tenderness.   Musculoskeletal:         General: Normal range of motion.      Cervical back: Normal range of motion and neck supple.   Skin:     General: Skin is warm and dry.   Neurological:      Mental Status: She is alert and oriented to person, place, and time.       Significant Labs:   All pertinent labs within the past 24 hours have been reviewed.  CBC:   Recent Labs   Lab 01/11/22  0458 01/12/22  0546   WBC 7.16 7.17   HGB 7.5* 7.7*    HCT 24.9* 25.2*    242     CMP:   Recent Labs   Lab 01/11/22  0458 01/12/22  0546    141   K 5.3* 5.6*   * 111*   CO2 24 23   * 172*   BUN 40* 43*   CREATININE 4.1* 4.6*   CALCIUM 6.2* 6.7*   ANIONGAP 6* 7*   EGFRNONAA 11* 9*       Significant Imaging:   FINDINGS:  On the perfusion study, there is a segmental defect within the left lower lobe.  The ventilation study reveals ventilation portion was not performed due to COVID status.  The CXR demonstrates diffuse interstitial edema and small to moderate left pleural effusion.     Impression:     Findings are indeterminate for pulmonary embolism.  Findings could reflect fluid within the fissure.     CXR  XR CHEST AP PORTABLE     CLINICAL HISTORY:  follow up CHF;     TECHNIQUE:  Single frontal view of the chest was performed.     COMPARISON:  01/10/2022     FINDINGS:  Cardiomegaly with pulmonary vascular congestion and interstitial edema pattern.  Suspect small bilateral pleural effusions, left greater than right.  In comparison to the prior study, there is no adverse interval changes.     Impression:     In comparison to the prior study, there is no adverse interval changes                 Assessment/Plan:      * CHF exacerbation  Patient is identified as having Diastolic (HFpEF) heart failure that is Acute on chronic. CHF is currently uncontrolled due to Rales/crackles on pulmonary exam and Pulmonary edema/pleural effusion on CXR. Latest ECHO performed and demonstrates- Results for orders placed during the hospital encounter of 10/09/20    Echo Color Flow Doppler? Yes    Interpretation Summary  · There is moderate left ventricular concentric hypertrophy.  · The left ventricle is small with normal systolic function. The estimated ejection fraction is 60%.  · Grade I diastolic dysfunction.  · Normal right ventricular systolic function.  · Mild tricuspid regurgitation.  · Normal central venous pressure (3 mmHg).  · The estimated PA systolic  pressure is 37 mmHg.  · Small pericardial effusion.  · There is a moderate left pleural effusion.  . Continue Beta Blocker and monitor clinical status closely. Monitor on telemetry. Patient is on CHF pathway.  Monitor strict Is&Os and daily weights.  Place on fluid restriction of 2 L. Continue to stress to patient importance of self efficacy and  on diet for CHF. Last BNP reviewed- and noted below   Recent Labs   Lab 01/10/22  1035   *   .  IV Lasix, monitor UOP, trend Cr  Supplemental O2 PRN    1/11/22  Echocardiogram shows a preserved EF with diastolic dysfunction  Continue diuretics  CXR in AM    1/12/22  CXR shows unchanged pulmonary edema  Change IV Lasix to Bumex with metolazone  Strict intake and output.     Hyperkalemia  Give Lokelma to day  Also on diuretics      COVID-19  - COVID-19 testing   - Infection Control notified     - Isolation:   - Airborne, Contact and Droplet Precautions  - Cohort patients into COVID units  - N95 mask, wear eye protection  - 20 second hand hygiene              - Limit visitors per hospital policy              - Consolidating lab draws, nursing care, provider visits, and interventions    - Diagnostics: (leukopenia, hyponatremia, hyperferritinemia, elevated troponin, elevated d-dimer, age, and comorbidities are significant predictors of poor clinical outcome)  CBC, CMP, Procalcitonin, Ferritin, CRP and Portable CXR    - Management:  Supplemental O2 to maintain SpO2 >92%  Telemetry  Continuous/intermittent Pulse Ox  Albuterol treatment PRN  Vitamin C  MVI  Dexamethasone    1/12/22  D-dimer elevated. VQ can indeterminate for PE could also represent fluid. On heparin infusion.   CXR shows fluid overload    Anemia due to chronic kidney disease  Monitor Hb, no signs of active blood loss  Transfuse Hb<7      Type 2 diabetes mellitus, without long-term current use of insulin  SSI      CKD (chronic kidney disease) stage 5, GFR less than 15 ml/min  Kidney function at  baseline.     1/12/22  Kidney function up to 4.6. still within baseline.   Carefully monitor with diuretics    Hyperlipidemia  Takes pravastatin 20mg at home. Dose reduced to 10mg daily given severe renal impairment.           VTE Risk Mitigation (From admission, onward)         Ordered     heparin 25,000 units in dextrose 5% (100 units/ml) IV bolus from bag - ADDITIONAL PRN BOLUS - 60 units/kg  As needed (PRN)        Question:  Heparin Infusion Adjustment (DO NOT MODIFY ANSWER)  Answer:  [email or web address]\Images\Pharmacy\HeparinInfusions\heparin LOW INTENSITY nomogram for [facility] [id number]    01/12/22 1044     heparin 25,000 units in dextrose 5% (100 units/ml) IV bolus from bag - ADDITIONAL PRN BOLUS - 30 units/kg  As needed (PRN)        Question:  Heparin Infusion Adjustment (DO NOT MODIFY ANSWER)  Answer:  [email or web address]\epic\Images\Pharmacy\HeparinInfusions\heparin LOW INTENSITY nomogram for [facility] [id number]    01/12/22 1044     heparin 25,000 units in dextrose 5% 250 mL (100 units/mL) infusion LOW INTENSITY nomogram - [facility]  Continuous        Question Answer Comment   Heparin Infusion Adjustment (DO NOT MODIFY ANSWER) [email or web address]\epic\Images\Pharmacy\HeparinInfusions\heparin LOW INTENSITY nomogram for [facility] [id number]    Begin at (in units/kg/hr) 12        01/12/22 1044     IP VTE HIGH RISK PATIENT  Once         01/11/22 0457     Place sequential compression device  Until discontinued         01/10/22 2009                Discharge Planning   GILLIAN:      Code Status: Full Code   Is the patient medically ready for discharge?:     Reason for patient still in hospital (select all that apply): Treatment  Discharge Plan A: Home with family            Nae Bolivar NP  Department of Hospital Medicine   [facility] - Telemetry ([facility])

## 2022-01-12 NOTE — ASSESSMENT & PLAN NOTE
Patient is identified as having Diastolic (HFpEF) heart failure that is Acute on chronic. CHF is currently uncontrolled due to Rales/crackles on pulmonary exam and Pulmonary edema/pleural effusion on CXR. Latest ECHO performed and demonstrates- Results for orders placed during the hospital encounter of 10/09/20    Echo Color Flow Doppler? Yes    Interpretation Summary  · There is moderate left ventricular concentric hypertrophy.  · The left ventricle is small with normal systolic function. The estimated ejection fraction is 60%.  · Grade I diastolic dysfunction.  · Normal right ventricular systolic function.  · Mild tricuspid regurgitation.  · Normal central venous pressure (3 mmHg).  · The estimated PA systolic pressure is 37 mmHg.  · Small pericardial effusion.  · There is a moderate left pleural effusion.  . Continue Beta Blocker and monitor clinical status closely. Monitor on telemetry. Patient is on CHF pathway.  Monitor strict Is&Os and daily weights.  Place on fluid restriction of 2 L. Continue to stress to patient importance of self efficacy and  on diet for CHF. Last BNP reviewed- and noted below   Recent Labs   Lab 01/10/22  1035   *   .  IV Lasix, monitor UOP, trend Cr  Supplemental O2 PRN    1/11/22  Echocardiogram shows a preserved EF with diastolic dysfunction  Continue diuretics  CXR in AM    1/12/22  CXR shows unchanged pulmonary edema  Change IV Lasix to Bumex with metolazone  Strict intake and output.

## 2022-01-12 NOTE — ASSESSMENT & PLAN NOTE
- COVID-19 testing   - Infection Control notified     - Isolation:   - Airborne, Contact and Droplet Precautions  - Cohort patients into COVID units  - N95 mask, wear eye protection  - 20 second hand hygiene              - Limit visitors per hospital policy              - Consolidating lab draws, nursing care, provider visits, and interventions    - Diagnostics: (leukopenia, hyponatremia, hyperferritinemia, elevated troponin, elevated d-dimer, age, and comorbidities are significant predictors of poor clinical outcome)  CBC, CMP, Procalcitonin, Ferritin, CRP and Portable CXR    - Management:  Supplemental O2 to maintain SpO2 >92%  Telemetry  Continuous/intermittent Pulse Ox  Albuterol treatment PRN  Vitamin C  MVI  Dexamethasone    1/12/22  D-dimer elevated. VQ can indeterminate for PE could also represent fluid. On heparin infusion.   CXR shows fluid overload

## 2022-01-12 NOTE — PROGRESS NOTES
[facility]  Progress Note    Patient Name: Diamond Das  MRN: [id number]  Patient Class: IP- Inpatient   Admission Date: 1/10/2022  Length of Stay: 0 days  Attending Physician: Anna Macias MD  Primary Care Provider: Andrey Perrin MD      Subjective:     Principal Problem:CHF exacerbation        HPI:  65 y/o F PMH CHF, CKD, anemia presents with c/o SOB progressively worsening over the past week, worse with exertion. Associated symptoms include cough and leg swelling. Pertinent negatives include no fever, no rhinorrhea, no sore throat, no sputum production, no hemoptysis, no wheezing, no chest pain, no syncope, no vomiting, no abdominal pain and no rash. COVID-19 positive on presentation. Reported RA sats charted 87% and transferred from [facility] since arrival      Overview/Hospital Course:  Diamond Das is a 66 year old female who was admitted to [facility] for COVID-19 and decompensated CHF. CXR shows evidence of volume overload and was diuresed. Echocaridogram shows a preserved EF with diastolic dysfunction. Will repeat CXR on 1/12/22. For COVID-19 she received MVI, ascorbic acid, and dexamethasone. Currently on 3L nasal cannula.      Interval History: rough night sleeping. +orthopnea    Review of Systems   Constitutional: Negative for activity change, diaphoresis, fatigue and unexpected weight change.   HENT: Negative for congestion, ear pain and sore throat.    Eyes: Negative.    Respiratory: Positive for shortness of breath. Negative for wheezing.    Cardiovascular: Negative for chest pain and palpitations.        Orthopnea     Gastrointestinal: Negative for abdominal pain, constipation, diarrhea and vomiting.   Endocrine: Negative.    Genitourinary: Negative for flank pain, hematuria and urgency.   Musculoskeletal: Negative for joint swelling and neck pain.   Skin: Negative for pallor.   Neurological: Negative for seizures, syncope and  light-headedness.   Hematological: Negative.    Psychiatric/Behavioral: Negative.       Objective:     Vital Signs (Most Recent):  Temp: 98.1 °F (36.7 °C) (01/11/22 1539)  Pulse: 61 (01/11/22 1539)  Resp: 20 (01/11/22 1539)  BP: (!) 112/56 (01/11/22 1539)  SpO2: 97 % (01/11/22 1539) Vital Signs (24h Range):  Temp:  [98.1 °F (36.7 °C)-99.9 °F (37.7 °C)] 98.1 °F (36.7 °C)  Pulse:  [55-82] 61  Resp:  [16-20] 20  SpO2:  [93 %-99 %] 97 %  BP: (112-166)/(56-76) 112/56     Weight: 96.6 kg (213 lb)  Body mass index is 40.25 kg/m².    Intake/Output Summary (Last 24 hours) at 1/11/2022 1818  Last data filed at 1/11/2022 0600  Gross per 24 hour   Intake 480 ml   Output --   Net 480 ml      Physical Exam  Constitutional:       Appearance: She is well-developed and well-nourished.   HENT:      Head: Normocephalic and atraumatic.   Eyes:      Extraocular Movements: EOM normal.   Cardiovascular:      Rate and Rhythm: Normal rate and regular rhythm.      Heart sounds: Normal heart sounds. No murmur heard.      Pulmonary:      Effort: Pulmonary effort is normal. No respiratory distress.      Breath sounds: Rales present.   Abdominal:      General: Bowel sounds are normal. There is no distension.      Palpations: Abdomen is soft.      Tenderness: There is no abdominal tenderness.   Musculoskeletal:         General: No edema. Normal range of motion.      Cervical back: Normal range of motion and neck supple.   Skin:     General: Skin is warm and dry.   Neurological:      Mental Status: She is alert and oriented to person, place, and time.         Significant Labs:   All pertinent labs within the past 24 hours have been reviewed.  CBC:   Recent Labs   Lab 01/10/22  1035 01/11/22  0458   WBC 7.42 7.16   HGB 7.5* 7.5*   HCT 24.4* 24.9*    238     CMP:   Recent Labs   Lab 01/10/22  1035 01/11/22  0458    141   K 4.9 5.3*   * 111*   CO2 23 24   * 130*   BUN 37* 40*   CREATININE 4.2* 4.1*   CALCIUM 6.5* 6.2*   PROT  5.9*  --    ALBUMIN 1.7*  --    BILITOT 0.2  --    ALKPHOS 74  --    AST 35  --    ALT 16  --    ANIONGAP 8 6*   EGFRNONAA 10.5* 11*       Significant Imaging: I have reviewed all pertinent imaging results/findings within the past 24 hours.      Assessment/Plan:      * CHF exacerbation  Patient is identified as having Diastolic (HFpEF) heart failure that is Acute on chronic. CHF is currently uncontrolled due to Rales/crackles on pulmonary exam and Pulmonary edema/pleural effusion on CXR. Latest ECHO performed and demonstrates- Results for orders placed during the hospital encounter of 10/09/20    Echo Color Flow Doppler? Yes    Interpretation Summary  · There is moderate left ventricular concentric hypertrophy.  · The left ventricle is small with normal systolic function. The estimated ejection fraction is 60%.  · Grade I diastolic dysfunction.  · Normal right ventricular systolic function.  · Mild tricuspid regurgitation.  · Normal central venous pressure (3 mmHg).  · The estimated PA systolic pressure is 37 mmHg.  · Small pericardial effusion.  · There is a moderate left pleural effusion.  . Continue Beta Blocker and monitor clinical status closely. Monitor on telemetry. Patient is on CHF pathway.  Monitor strict Is&Os and daily weights.  Place on fluid restriction of 2 L. Continue to stress to patient importance of self efficacy and  on diet for CHF. Last BNP reviewed- and noted below   Recent Labs   Lab 01/10/22  1035   *   .  IV Lasix, monitor UOP, trend Cr  Supplemental O2 PRN    1/11/22  Echocardiogram shows a preserved EF with diastolic dysfunction  Continue diuretics  CXR in AM    COVID-19  - COVID-19 testing   - Infection Control notified     - Isolation:   - Airborne, Contact and Droplet Precautions  - Cohort patients into COVID units  - N95 mask, wear eye protection  - 20 second hand hygiene              - Limit visitors per hospital policy              - Consolidating lab draws, nursing  care, provider visits, and interventions    - Diagnostics: (leukopenia, hyponatremia, hyperferritinemia, elevated troponin, elevated d-dimer, age, and comorbidities are significant predictors of poor clinical outcome)  CBC, CMP, Procalcitonin, Ferritin, CRP and Portable CXR    - Management:  Supplemental O2 to maintain SpO2 >92%  Telemetry  Continuous/intermittent Pulse Ox  Albuterol treatment PRN  Vitamin C  MVI  dexamethasone    Anemia due to chronic kidney disease  Monitor Hb, no signs of active blood loss  Transfuse Hb<7      Type 2 diabetes mellitus, without long-term current use of insulin  SSI      CKD (chronic kidney disease) stage 5, GFR less than 15 ml/min  Kidney function at baseline.       Hyperlipidemia  Takes pravastatin 20mg at home. Dose reduced to 10mg daily given severe renal impairment.           VTE Risk Mitigation (From admission, onward)         Ordered     heparin (porcine) injection 5,000 Units  Every 8 hours         01/11/22 1813     IP VTE HIGH RISK PATIENT  Once         01/11/22 0457     Place sequential compression device  Until discontinued         01/10/22 2009                Discharge Planning   GILLIAN:      Code Status: Full Code   Is the patient medically ready for discharge?:     Reason for patient still in hospital (select all that apply): Treatment  Discharge Plan A: Home with family            Nae Bolivar NP  Department of Hospital Medicine   [facility] ([facility])

## 2022-01-12 NOTE — HOSPITAL COURSE
Diamond Das is a 66 year old female who was admitted to [facility] for COVID-19 and decompensated CHF. CXR shows evidence of volume overload and was diuresed. Echocaridogram shows a preserved EF with diastolic dysfunction. Will repeat CXR on 1/12/22. For COVID-19 she received MVI, ascorbic acid, and dexamethasone. Currently on 3L nasal cannula.    1/12/22  D-dimer 7 today. V/Q scan indeterminant for pulmonary embolism. Could reflect fluid within the fissure as well. Started on heparin infusion. CXR today still shows fluid.  Will change diuretics to Bumex with Metolazone for one dose. Closely monitor kidney function.     1/13/22  Still with decrease urine ouput. Nephrology consulted to assist with diuretics. Bumex changed to 2mg IV every 8 hours. 500mg diuril twice a day has been added for 48 hours. No intervention needed for hyperkalemia.     1/14  Reports sx improvement. Ready to go home but reports right breast swelling since admission. Lower ext edema and dyspnea improving. H/h trending down with no overt s/s bleeding. Agreeable to blood transfusion. Nephrology consulted. Consider cards consult    1/15  Dyspnea and swelling symptoms improved. No transfusion reaction after blood transfusion. Denies further breast swelling. Does not qualify for home o2. Followed o/p with Dr. Barkley, pulmonology. Advised to f/u o/p, as elevated d-dimer in setting of covid. Perfusion scan indeterminant and started on heparin gtt. Transition to eliquis on d/c.    Neph consulted for recs regarding diuresis and hyperk in ckd and chf. Metolazone started. Hyperk resolved. Cr stable but elevated and will need further adjusting of meds o/p. Advised strict na and fluid restriction. Neph recommending tolerance of higher cr while also need for diuresising for chfx    Hyperglycemia on systemic steroids. Hba1c <5. Discontinued steroids and managed hyperglycemia with insulin.     Patient seen and evaluated by me. Patient was  determined to be suitable for d/c. Patient deemed stable for discharge to home with NP to visit home. Declined homehealth referral.

## 2022-01-12 NOTE — SUBJECTIVE & OBJECTIVE
Interval History: still complains of orthopnea.  CXR unchanged.    Review of Systems   Constitutional: Negative for activity change, diaphoresis, fatigue and unexpected weight change.   HENT: Negative for congestion, ear pain and sore throat.    Eyes: Negative.    Respiratory: Positive for shortness of breath. Negative for wheezing.    Cardiovascular: Negative for chest pain and palpitations.        Orthopnea     Gastrointestinal: Negative for abdominal pain, constipation, diarrhea and vomiting.   Endocrine: Negative.    Genitourinary: Negative for flank pain, hematuria and urgency.   Musculoskeletal: Negative for joint swelling and neck pain.   Skin: Negative for pallor.   Neurological: Negative for seizures, syncope and light-headedness.   Hematological: Negative.    Psychiatric/Behavioral: Negative.      Objective:     Vital Signs (Most Recent):  Temp: 98.4 °F (36.9 °C) (01/12/22 1544)  Pulse: 60 (01/12/22 1544)  Resp: 20 (01/12/22 1544)  BP: (!) 114/56 (01/12/22 1544)  SpO2: (!) 94 % (01/12/22 1544) Vital Signs (24h Range):  Temp:  [97.8 °F (36.6 °C)-99.7 °F (37.6 °C)] 98.4 °F (36.9 °C)  Pulse:  [55-82] 60  Resp:  [16-20] 20  SpO2:  [94 %-100 %] 94 %  BP: (108-133)/(53-65) 114/56     Weight: 97.5 kg (214 lb 15.2 oz)  Body mass index is 40.61 kg/m².    Intake/Output Summary (Last 24 hours) at 1/12/2022 1658  Last data filed at 1/12/2022 1600  Gross per 24 hour   Intake 1318 ml   Output --   Net 1318 ml      Physical Exam  Constitutional:       Appearance: She is well-developed.   HENT:      Head: Normocephalic and atraumatic.   Cardiovascular:      Rate and Rhythm: Normal rate and regular rhythm.      Heart sounds: Normal heart sounds. No murmur heard.      Pulmonary:      Effort: Pulmonary effort is normal. No respiratory distress.      Breath sounds: Rales present.   Abdominal:      General: Bowel sounds are normal. There is no distension.      Palpations: Abdomen is soft.      Tenderness: There is no abdominal  tenderness.   Musculoskeletal:         General: Normal range of motion.      Cervical back: Normal range of motion and neck supple.   Skin:     General: Skin is warm and dry.   Neurological:      Mental Status: She is alert and oriented to person, place, and time.       Significant Labs:   All pertinent labs within the past 24 hours have been reviewed.  CBC:   Recent Labs   Lab 01/11/22 0458 01/12/22  0546   WBC 7.16 7.17   HGB 7.5* 7.7*   HCT 24.9* 25.2*    242     CMP:   Recent Labs   Lab 01/11/22 0458 01/12/22  0546    141   K 5.3* 5.6*   * 111*   CO2 24 23   * 172*   BUN 40* 43*   CREATININE 4.1* 4.6*   CALCIUM 6.2* 6.7*   ANIONGAP 6* 7*   EGFRNONAA 11* 9*       Significant Imaging:   FINDINGS:  On the perfusion study, there is a segmental defect within the left lower lobe.  The ventilation study reveals ventilation portion was not performed due to COVID status.  The CXR demonstrates diffuse interstitial edema and small to moderate left pleural effusion.     Impression:     Findings are indeterminate for pulmonary embolism.  Findings could reflect fluid within the fissure.     CXR  XR CHEST AP PORTABLE     CLINICAL HISTORY:  follow up CHF;     TECHNIQUE:  Single frontal view of the chest was performed.     COMPARISON:  01/10/2022     FINDINGS:  Cardiomegaly with pulmonary vascular congestion and interstitial edema pattern.  Suspect small bilateral pleural effusions, left greater than right.  In comparison to the prior study, there is no adverse interval changes.     Impression:     In comparison to the prior study, there is no adverse interval changes

## 2022-01-12 NOTE — SUBJECTIVE & OBJECTIVE
Interval History: rough night sleeping. +orthopnea    Review of Systems   Constitutional: Negative for activity change, diaphoresis, fatigue and unexpected weight change.   HENT: Negative for congestion, ear pain and sore throat.    Eyes: Negative.    Respiratory: Positive for shortness of breath. Negative for wheezing.    Cardiovascular: Negative for chest pain and palpitations.        Orthopnea     Gastrointestinal: Negative for abdominal pain, constipation, diarrhea and vomiting.   Endocrine: Negative.    Genitourinary: Negative for flank pain, hematuria and urgency.   Musculoskeletal: Negative for joint swelling and neck pain.   Skin: Negative for pallor.   Neurological: Negative for seizures, syncope and light-headedness.   Hematological: Negative.    Psychiatric/Behavioral: Negative.       Objective:     Vital Signs (Most Recent):  Temp: 98.1 °F (36.7 °C) (01/11/22 1539)  Pulse: 61 (01/11/22 1539)  Resp: 20 (01/11/22 1539)  BP: (!) 112/56 (01/11/22 1539)  SpO2: 97 % (01/11/22 1539) Vital Signs (24h Range):  Temp:  [98.1 °F (36.7 °C)-99.9 °F (37.7 °C)] 98.1 °F (36.7 °C)  Pulse:  [55-82] 61  Resp:  [16-20] 20  SpO2:  [93 %-99 %] 97 %  BP: (112-166)/(56-76) 112/56     Weight: 96.6 kg (213 lb)  Body mass index is 40.25 kg/m².    Intake/Output Summary (Last 24 hours) at 1/11/2022 1818  Last data filed at 1/11/2022 0600  Gross per 24 hour   Intake 480 ml   Output --   Net 480 ml      Physical Exam  Constitutional:       Appearance: She is well-developed and well-nourished.   HENT:      Head: Normocephalic and atraumatic.   Eyes:      Extraocular Movements: EOM normal.   Cardiovascular:      Rate and Rhythm: Normal rate and regular rhythm.      Heart sounds: Normal heart sounds. No murmur heard.      Pulmonary:      Effort: Pulmonary effort is normal. No respiratory distress.      Breath sounds: Rales present.   Abdominal:      General: Bowel sounds are normal. There is no distension.      Palpations: Abdomen is  soft.      Tenderness: There is no abdominal tenderness.   Musculoskeletal:         General: No edema. Normal range of motion.      Cervical back: Normal range of motion and neck supple.   Skin:     General: Skin is warm and dry.   Neurological:      Mental Status: She is alert and oriented to person, place, and time.         Significant Labs:   All pertinent labs within the past 24 hours have been reviewed.  CBC:   Recent Labs   Lab 01/10/22  1035 01/11/22  0458   WBC 7.42 7.16   HGB 7.5* 7.5*   HCT 24.4* 24.9*    238     CMP:   Recent Labs   Lab 01/10/22  1035 01/11/22  0458    141   K 4.9 5.3*   * 111*   CO2 23 24   * 130*   BUN 37* 40*   CREATININE 4.2* 4.1*   CALCIUM 6.5* 6.2*   PROT 5.9*  --    ALBUMIN 1.7*  --    BILITOT 0.2  --    ALKPHOS 74  --    AST 35  --    ALT 16  --    ANIONGAP 8 6*   EGFRNONAA 10.5* 11*       Significant Imaging: I have reviewed all pertinent imaging results/findings within the past 24 hours.

## 2022-01-12 NOTE — ASSESSMENT & PLAN NOTE
Kidney function at baseline.     1/12/22  Kidney function up to 4.6. still within baseline.   Carefully monitor with diuretics

## 2022-01-12 NOTE — ASSESSMENT & PLAN NOTE
- COVID-19 testing   - Infection Control notified     - Isolation:   - Airborne, Contact and Droplet Precautions  - Cohort patients into COVID units  - N95 mask, wear eye protection  - 20 second hand hygiene              - Limit visitors per hospital policy              - Consolidating lab draws, nursing care, provider visits, and interventions    - Diagnostics: (leukopenia, hyponatremia, hyperferritinemia, elevated troponin, elevated d-dimer, age, and comorbidities are significant predictors of poor clinical outcome)  CBC, CMP, Procalcitonin, Ferritin, CRP and Portable CXR    - Management:  Supplemental O2 to maintain SpO2 >92%  Telemetry  Continuous/intermittent Pulse Ox  Albuterol treatment PRN  Vitamin C  MVI  dexamethasone

## 2022-01-12 NOTE — PLAN OF CARE
Pt remains fall free this shift.  Pt AAOx4, verbal, clear speech.  Skin warm and dry. No new skin issues.  Telemonitoring in progress, (SR mid 60s)  O2/ (3L) NC  Bathroom privileges   Standby assist with transfers.  IV lasix   Chest xray this morning   CBG AC/ HS with PRN SS insulin.  Bed low, side rails up x 2, wheels locked, call light in reach.  Bed alarm maintained for safety.  Patient instructed to call for assistance.  Hourly rounding completed.  24 hour chart check completed  POC updated and reviewed with pt. Will continue POC.

## 2022-01-13 ENCOUNTER — PATIENT OUTREACH (OUTPATIENT)
Dept: OTHER | Facility: OTHER | Age: 67
End: 2022-01-13
Payer: MEDICAID

## 2022-01-13 LAB
ANION GAP SERPL CALC-SCNC: 8 MMOL/L (ref 8–16)
APTT BLDCRRT: 100.6 SEC (ref 21–32)
APTT BLDCRRT: 139 SEC (ref 21–32)
APTT BLDCRRT: 47.2 SEC (ref 21–32)
APTT BLDCRRT: 48.4 SEC (ref 21–32)
APTT BLDCRRT: 48.6 SEC (ref 21–32)
BASOPHILS # BLD AUTO: 0.01 K/UL (ref 0–0.2)
BASOPHILS NFR BLD: 0.1 % (ref 0–1.9)
BUN SERPL-MCNC: 55 MG/DL (ref 8–23)
CALCIUM SERPL-MCNC: 6.4 MG/DL (ref 8.7–10.5)
CHLORIDE SERPL-SCNC: 109 MMOL/L (ref 95–110)
CO2 SERPL-SCNC: 22 MMOL/L (ref 23–29)
CREAT SERPL-MCNC: 4.6 MG/DL (ref 0.5–1.4)
DIFFERENTIAL METHOD: ABNORMAL
EOSINOPHIL # BLD AUTO: 0 K/UL (ref 0–0.5)
EOSINOPHIL NFR BLD: 0 % (ref 0–8)
ERYTHROCYTE [DISTWIDTH] IN BLOOD BY AUTOMATED COUNT: 15 % (ref 11.5–14.5)
EST. GFR  (AFRICAN AMERICAN): 11 ML/MIN/1.73 M^2
EST. GFR  (NON AFRICAN AMERICAN): 9 ML/MIN/1.73 M^2
GLUCOSE SERPL-MCNC: 207 MG/DL (ref 70–110)
HCT VFR BLD AUTO: 24.9 % (ref 37–48.5)
HGB BLD-MCNC: 7.5 G/DL (ref 12–16)
IMM GRANULOCYTES # BLD AUTO: 0.18 K/UL (ref 0–0.04)
IMM GRANULOCYTES NFR BLD AUTO: 1.8 % (ref 0–0.5)
LYMPHOCYTES # BLD AUTO: 1.4 K/UL (ref 1–4.8)
LYMPHOCYTES NFR BLD: 13.7 % (ref 18–48)
MCH RBC QN AUTO: 27.9 PG (ref 27–31)
MCHC RBC AUTO-ENTMCNC: 30.1 G/DL (ref 32–36)
MCV RBC AUTO: 93 FL (ref 82–98)
MONOCYTES # BLD AUTO: 0.5 K/UL (ref 0.3–1)
MONOCYTES NFR BLD: 5.3 % (ref 4–15)
NEUTROPHILS # BLD AUTO: 7.8 K/UL (ref 1.8–7.7)
NEUTROPHILS NFR BLD: 79.1 % (ref 38–73)
NRBC BLD-RTO: 0 /100 WBC
PLATELET # BLD AUTO: 241 K/UL (ref 150–450)
PMV BLD AUTO: 10.4 FL (ref 9.2–12.9)
POCT GLUCOSE: 219 MG/DL (ref 70–110)
POCT GLUCOSE: 223 MG/DL (ref 70–110)
POCT GLUCOSE: 251 MG/DL (ref 70–110)
POCT GLUCOSE: 264 MG/DL (ref 70–110)
POCT GLUCOSE: 268 MG/DL (ref 70–110)
POTASSIUM SERPL-SCNC: 5.6 MMOL/L (ref 3.5–5.1)
RBC # BLD AUTO: 2.69 M/UL (ref 4–5.4)
SODIUM SERPL-SCNC: 139 MMOL/L (ref 136–145)
WBC # BLD AUTO: 9.88 K/UL (ref 3.9–12.7)

## 2022-01-13 PROCEDURE — 36415 COLL VENOUS BLD VENIPUNCTURE: CPT | Performed by: FAMILY MEDICINE

## 2022-01-13 PROCEDURE — 85730 THROMBOPLASTIN TIME PARTIAL: CPT | Mod: 91 | Performed by: FAMILY MEDICINE

## 2022-01-13 PROCEDURE — 36415 COLL VENOUS BLD VENIPUNCTURE: CPT | Performed by: NURSE PRACTITIONER

## 2022-01-13 PROCEDURE — 27000221 HC OXYGEN, UP TO 24 HOURS

## 2022-01-13 PROCEDURE — 63600175 PHARM REV CODE 636 W HCPCS: Performed by: STUDENT IN AN ORGANIZED HEALTH CARE EDUCATION/TRAINING PROGRAM

## 2022-01-13 PROCEDURE — 99222 PR INITIAL HOSPITAL CARE,LEVL II: ICD-10-PCS | Mod: ,,, | Performed by: INTERNAL MEDICINE

## 2022-01-13 PROCEDURE — 25000003 PHARM REV CODE 250: Performed by: EMERGENCY MEDICINE

## 2022-01-13 PROCEDURE — 63600175 PHARM REV CODE 636 W HCPCS: Performed by: NURSE PRACTITIONER

## 2022-01-13 PROCEDURE — 21400001 HC TELEMETRY ROOM

## 2022-01-13 PROCEDURE — 25000003 PHARM REV CODE 250: Performed by: STUDENT IN AN ORGANIZED HEALTH CARE EDUCATION/TRAINING PROGRAM

## 2022-01-13 PROCEDURE — 94640 AIRWAY INHALATION TREATMENT: CPT

## 2022-01-13 PROCEDURE — 63600175 PHARM REV CODE 636 W HCPCS: Performed by: INTERNAL MEDICINE

## 2022-01-13 PROCEDURE — 25000003 PHARM REV CODE 250: Performed by: INTERNAL MEDICINE

## 2022-01-13 PROCEDURE — 99222 1ST HOSP IP/OBS MODERATE 55: CPT | Mod: ,,, | Performed by: INTERNAL MEDICINE

## 2022-01-13 PROCEDURE — 25000003 PHARM REV CODE 250: Performed by: NURSE PRACTITIONER

## 2022-01-13 PROCEDURE — 80048 BASIC METABOLIC PNL TOTAL CA: CPT | Performed by: STUDENT IN AN ORGANIZED HEALTH CARE EDUCATION/TRAINING PROGRAM

## 2022-01-13 PROCEDURE — 94761 N-INVAS EAR/PLS OXIMETRY MLT: CPT

## 2022-01-13 PROCEDURE — 85025 COMPLETE CBC W/AUTO DIFF WBC: CPT | Performed by: NURSE PRACTITIONER

## 2022-01-13 PROCEDURE — 85730 THROMBOPLASTIN TIME PARTIAL: CPT | Performed by: NURSE PRACTITIONER

## 2022-01-13 PROCEDURE — 27000207 HC ISOLATION

## 2022-01-13 RX ORDER — BUMETANIDE 0.25 MG/ML
2 INJECTION INTRAMUSCULAR; INTRAVENOUS EVERY 8 HOURS
Status: DISCONTINUED | OUTPATIENT
Start: 2022-01-13 | End: 2022-01-14

## 2022-01-13 RX ADMIN — BUMETANIDE 2 MG: 0.25 INJECTION INTRAMUSCULAR; INTRAVENOUS at 09:01

## 2022-01-13 RX ADMIN — INSULIN ASPART 2 UNITS: 100 INJECTION, SOLUTION INTRAVENOUS; SUBCUTANEOUS at 05:01

## 2022-01-13 RX ADMIN — DEXAMETHASONE 6 MG: 4 TABLET ORAL at 09:01

## 2022-01-13 RX ADMIN — Medication 6 MG: at 09:01

## 2022-01-13 RX ADMIN — LEVOTHYROXINE SODIUM 137 MCG: 0.03 TABLET ORAL at 05:01

## 2022-01-13 RX ADMIN — ALBUTEROL SULFATE 2 PUFF: 90 AEROSOL, METERED RESPIRATORY (INHALATION) at 12:01

## 2022-01-13 RX ADMIN — METOLAZONE 5 MG: 5 TABLET ORAL at 09:01

## 2022-01-13 RX ADMIN — BUMETANIDE 2 MG: 0.25 INJECTION, SOLUTION INTRAMUSCULAR; INTRAVENOUS at 03:01

## 2022-01-13 RX ADMIN — HEPARIN SODIUM 8 UNITS/KG/HR: 1000 INJECTION, SOLUTION INTRAVENOUS; SUBCUTANEOUS at 07:01

## 2022-01-13 RX ADMIN — CHLOROTHIAZIDE SODIUM 500 MG: 500 INJECTION, POWDER, LYOPHILIZED, FOR SOLUTION INTRAVENOUS at 03:01

## 2022-01-13 RX ADMIN — ISOSORBIDE MONONITRATE 60 MG: 60 TABLET, EXTENDED RELEASE ORAL at 09:01

## 2022-01-13 RX ADMIN — OXYCODONE HYDROCHLORIDE AND ACETAMINOPHEN 500 MG: 500 TABLET ORAL at 09:01

## 2022-01-13 RX ADMIN — PRAVASTATIN SODIUM 10 MG: 10 TABLET ORAL at 09:01

## 2022-01-13 RX ADMIN — ALBUTEROL SULFATE 2 PUFF: 90 AEROSOL, METERED RESPIRATORY (INHALATION) at 07:01

## 2022-01-13 RX ADMIN — BUMETANIDE 2 MG: 0.25 INJECTION, SOLUTION INTRAMUSCULAR; INTRAVENOUS at 09:01

## 2022-01-13 RX ADMIN — AMLODIPINE BESYLATE 10 MG: 10 TABLET ORAL at 09:01

## 2022-01-13 RX ADMIN — ASPIRIN 81 MG: 81 TABLET, COATED ORAL at 09:01

## 2022-01-13 RX ADMIN — CARVEDILOL 25 MG: 12.5 TABLET, FILM COATED ORAL at 05:01

## 2022-01-13 RX ADMIN — INSULIN ASPART 1 UNITS: 100 INJECTION, SOLUTION INTRAVENOUS; SUBCUTANEOUS at 09:01

## 2022-01-13 RX ADMIN — CARVEDILOL 25 MG: 12.5 TABLET, FILM COATED ORAL at 09:01

## 2022-01-13 RX ADMIN — FAMOTIDINE 20 MG: 20 TABLET ORAL at 09:01

## 2022-01-13 RX ADMIN — THERA TABS 1 TABLET: TAB at 09:01

## 2022-01-13 RX ADMIN — INSULIN ASPART 2 UNITS: 100 INJECTION, SOLUTION INTRAVENOUS; SUBCUTANEOUS at 12:01

## 2022-01-13 NOTE — ASSESSMENT & PLAN NOTE
Kidney function at baseline.     1/12/22  Kidney function up to 4.6. still within baseline.   Carefully monitor with diuretics    1/13/22  4.6 stable range  Nephrology has been consulted for assistance with diuretics.

## 2022-01-13 NOTE — ASSESSMENT & PLAN NOTE
Patient is identified as having Diastolic (HFpEF) heart failure that is Acute on chronic. CHF is currently uncontrolled due to Rales/crackles on pulmonary exam and Pulmonary edema/pleural effusion on CXR. Latest ECHO performed and demonstrates- Results for orders placed during the hospital encounter of 10/09/20    Echo Color Flow Doppler? Yes    Interpretation Summary  · There is moderate left ventricular concentric hypertrophy.  · The left ventricle is small with normal systolic function. The estimated ejection fraction is 60%.  · Grade I diastolic dysfunction.  · Normal right ventricular systolic function.  · Mild tricuspid regurgitation.  · Normal central venous pressure (3 mmHg).  · The estimated PA systolic pressure is 37 mmHg.  · Small pericardial effusion.  · There is a moderate left pleural effusion.  . Continue Beta Blocker and monitor clinical status closely. Monitor on telemetry. Patient is on CHF pathway.  Monitor strict Is&Os and daily weights.  Place on fluid restriction of 2 L. Continue to stress to patient importance of self efficacy and  on diet for CHF. Last BNP reviewed- and noted below   Recent Labs   Lab 01/10/22  1035   *   .  IV Lasix, monitor UOP, trend Cr  Supplemental O2 PRN    1/11/22  Echocardiogram shows a preserved EF with diastolic dysfunction  Continue diuretics  CXR in AM    1/12/22  CXR shows unchanged pulmonary edema  Change IV Lasix to Bumex with metolazone  Strict intake and output.     1/13/22  Nephrology input appreciated.   bumex changed to  TID.   Diuril twice a day for 48 hours  Continue to monitor intake and output

## 2022-01-13 NOTE — PLAN OF CARE
POC reviewed with pt and she verbalized understanding. 2L NC. Heparin gtt on hold per orders with APTT monitoring per nomogram. Denies pain/discomfort. Daughter remains at bedside. Safety precautions in place. Will continue with poc.

## 2022-01-13 NOTE — ASSESSMENT & PLAN NOTE
- COVID-19 testing   - Infection Control notified     - Isolation:   - Airborne, Contact and Droplet Precautions  - Cohort patients into COVID units  - N95 mask, wear eye protection  - 20 second hand hygiene              - Limit visitors per hospital policy              - Consolidating lab draws, nursing care, provider visits, and interventions    - Diagnostics: (leukopenia, hyponatremia, hyperferritinemia, elevated troponin, elevated d-dimer, age, and comorbidities are significant predictors of poor clinical outcome)  CBC, CMP, Procalcitonin, Ferritin, CRP and Portable CXR    - Management:  Supplemental O2 to maintain SpO2 >92%  Telemetry  Continuous/intermittent Pulse Ox  Albuterol treatment PRN  Vitamin C  MVI  Dexamethasone    1/12/22  D-dimer elevated. VQ can indeterminate for PE could also represent fluid. On heparin infusion.   CXR shows fluid overload    1/13/22  Continue heparin infusion and plans as above

## 2022-01-13 NOTE — NURSING
Dr. Hudson made aware of patient's heparin gtt being on hold per nomongram and most recent aptt. APTT monitored q2h per nomogram until <69. Patient assessed at bedside with SERGIO Zaman. No signs of bleeding noted. VSS. Blood sugar 251.

## 2022-01-13 NOTE — CONSULTS
[facility] ([facility])  Nephrology  Consult Note    Patient Name: Diamond Das  MRN: [id number]  Admission Date: 1/10/2022  Hospital Length of Stay: 2 days  Attending Provider: Anna Macias MD   Primary Care Physician: Andrey Perrin MD  Principal Problem:CHF exacerbation    Consults  Subjective:     HPI:     66-year-old female well known to me from CKD clinic.  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  Also has history of several episodes of acute kidney injury in the past.  Admitted now with CHF exacerbation.  Nephrology has been consulted for assistance with managing diuretics and chronic kidney disease.  Patient was seen in her hospital room.  In bed resting comfortably.  No acute distress noted.  States that her swelling has improved since admission.    Past Medical History:   Diagnosis Date    Ascites     Breast pain, left 1/4/2018    Cataract     CHF (congestive heart failure)     Cirrhosis     Cough     Diabetes mellitus     Diabetes mellitus, type 2     Gastroparesis     GERD (gastroesophageal reflux disease)     Hyperlipidemia     Hypertension     Hypotension 2/21/2019    Liver disease     Lumbar strain 4/27/2018    Macular degeneration     Pneumonia of right middle lobe due to infectious organism 12/19/2017    Renal disorder     Retinopathy due to secondary diabetes mellitus     Sleep apnea     Thyroid disease        Past Surgical History:   Procedure Laterality Date    CATARACT EXTRACTION      CHOLECYSTECTOMY      COLONOSCOPY N/A 9/4/2019    Procedure: COLONOSCOPY;  Surgeon: Marnie Elmore MD;  Location: [facility];  Service: Endoscopy;  Laterality: N/A;    ERCP      FLUOROSCOPY N/A 7/24/2020    Procedure: TIPS revision;  Surgeon: Martlel Jasmine MD;  Location: [facility] CATH LAB;  Service: General;  Laterality: N/A;    LIVER BIOPSY      THORACENTESIS Left 1/20/2020    Procedure: Thoracentesis;  Surgeon: Angelica Hunter MD;  Location: [facility];   Service: Pulmonary;  Laterality: Left;    TUBAL LIGATION      UPPER GASTROINTESTINAL ENDOSCOPY         Review of patient's allergies indicates:   Allergen Reactions    Subsys [fentanyl] Other (See Comments)     After administration pt unresponsive.  HR and respirations decreased.     Versed [midazolam] Other (See Comments)     After administration pt unresponsive.  HR and respirations decreased.     Ampicillin Rash    Codeine Nausea And Vomiting and Nausea Only     Current Facility-Administered Medications   Medication Frequency    albuterol inhaler 2 puff Q6H    amLODIPine tablet 10 mg Daily    ascorbic acid (vitamin C) tablet 500 mg Daily    aspirin EC tablet 81 mg Daily    bumetanide injection 2 mg BID    carvediloL tablet 25 mg BID WM    dexAMETHasone tablet 6 mg Daily    dextrose 50% injection 12.5 g PRN    dextrose 50% injection 25 g PRN    famotidine tablet 20 mg Daily    glucagon (human recombinant) injection 1 mg PRN    glucose chewable tablet 16 g PRN    glucose chewable tablet 24 g PRN    heparin 25,000 units in dextrose 5% (100 units/ml) IV bolus from bag - ADDITIONAL PRN BOLUS - 30 units/kg PRN    heparin 25,000 units in dextrose 5% (100 units/ml) IV bolus from bag - ADDITIONAL PRN BOLUS - 60 units/kg PRN    heparin 25,000 units in dextrose 5% 250 mL (100 units/mL) infusion LOW INTENSITY nomogram - [facility] Continuous    insulin aspart U-100 pen 0-5 Units QID (AC + HS) PRN    isosorbide mononitrate 24 hr tablet 60 mg QHS    levothyroxine tablet 137 mcg Before breakfast    melatonin tablet 6 mg Nightly PRN    metOLazone tablet 5 mg Daily    multivitamin tablet Daily    ondansetron injection 4 mg Q8H PRN    polyethylene glycol packet 17 g Daily    pravastatin tablet 10 mg Daily    sodium chloride 0.9% flush 10 mL PRN    sodium chloride 0.9% flush 10 mL PRN     Family History     Problem Relation (Age of Onset)    Aneurysm Sister    Breast cancer Mother    Cancer Mother,  Maternal Grandfather    Heart disease Father, Brother    No Known Problems Maternal Grandmother    Sarcoidosis Sister        Tobacco Use    Smoking status: Never Smoker    Smokeless tobacco: Never Used   Substance and Sexual Activity    Alcohol use: No    Drug use: No    Sexual activity: Not Currently     Review of Systems   Constitutional: Negative.    HENT: Negative.    Eyes: Negative.    Respiratory: Positive for shortness of breath.    Cardiovascular: Positive for leg swelling.   Gastrointestinal: Negative.    Genitourinary: Negative.    Musculoskeletal: Negative.    Skin: Negative.    Neurological: Negative.      Objective:     Vital Signs (Most Recent):  Temp: 99.3 °F (37.4 °C) (01/13/22 1111)  Pulse: 60 (01/13/22 1228)  Resp: 20 (01/13/22 1228)  BP: (!) 115/59 (01/13/22 1111)  SpO2: 95 % (01/13/22 1228)  O2 Device (Oxygen Therapy): nasal cannula (01/13/22 1228) Vital Signs (24h Range):  Temp:  [97.9 °F (36.6 °C)-99.3 °F (37.4 °C)] 99.3 °F (37.4 °C)  Pulse:  [52-82] 60  Resp:  [16-20] 20  SpO2:  [94 %-100 %] 95 %  BP: (114-141)/(56-63) 115/59     Weight: 97 kg (213 lb 13.5 oz) (01/13/22 0507)  Body mass index is 40.41 kg/m².  Body surface area is 2.04 meters squared.    I/O last 3 completed shifts:  In: 1901.9 [P.O.:1798; I.V.:103.9]  Out: 500 [Urine:500]    Physical Exam  Constitutional:       Appearance: Normal appearance.   HENT:      Head: Normocephalic and atraumatic.      Mouth/Throat:      Mouth: Mucous membranes are moist.      Pharynx: Oropharynx is clear.   Eyes:      General: No scleral icterus.     Extraocular Movements: Extraocular movements intact.      Pupils: Pupils are equal, round, and reactive to light.   Cardiovascular:      Rate and Rhythm: Normal rate and regular rhythm.   Pulmonary:      Effort: Pulmonary effort is normal.      Comments: Decreased breath sounds in the bases bilaterally  Musculoskeletal:      Cervical back: Normal range of motion. No rigidity.      Right lower leg:  Edema present.      Left lower leg: Edema present.   Skin:     General: Skin is warm and dry.   Neurological:      General: No focal deficit present.      Mental Status: She is alert and oriented to person, place, and time.   Psychiatric:         Mood and Affect: Mood normal.         Behavior: Behavior normal.         Significant Labs:  BMP:   Recent Labs   Lab 01/13/22  0512   *      K 5.6*      CO2 22*   BUN 55*   CREATININE 4.6*   CALCIUM 6.4*     CMP:   Recent Labs   Lab 01/10/22  1035 01/11/22  0458 01/13/22  0512   *   < > 207*   CALCIUM 6.5*   < > 6.4*   ALBUMIN 1.7*  --   --    PROT 5.9*  --   --       < > 139   K 4.9   < > 5.6*   CO2 23   < > 22*   *   < > 109   BUN 37*   < > 55*   CREATININE 4.2*   < > 4.6*   ALKPHOS 74  --   --    ALT 16  --   --    AST 35  --   --    BILITOT 0.2  --   --     < > = values in this interval not displayed.     All labs within the past 24 hours have been reviewed.    Significant Imaging:  Reviewed    Assessment/Plan:     Active Diagnoses:    Diagnosis Date Noted POA    PRINCIPAL PROBLEM:  CHF exacerbation [I50.9] 10/09/2020 Yes    Hyperkalemia [E87.5] 01/12/2022 No    COVID-19 [U07.1] 01/10/2022 Yes    Anemia due to chronic kidney disease [N18.9, D63.1] 12/06/2021 Yes    Type 2 diabetes mellitus, without long-term current use of insulin [E11.9] 05/16/2019 Yes    CKD (chronic kidney disease) stage 5, GFR less than 15 ml/min [N18.5] 05/24/2018 Yes    Hyperlipidemia [E78.5] 03/19/2018 Yes      Problems Resolved During this Admission:       1. CHF exacerbation:  Some improvement since admission.  Will change bumetanide to 2 mg IV every 8 hours.  Will add 500 mg of Diuril twice a day for 48 hours.  Continue to monitor I's and O's.    2. CKD stage 4:  Secondary to diabetic glomerulopathy.  Baseline creatinine has run between 4 and 5 for the past several months.  She has had multiple admissions for acute kidney injury and CHF  exacerbations.  She may benefit from initiation of dialysis at some point if her volume status continues to be difficult to control.    3. Hyperkalemia:  Potassium is mildly elevated at 5.6.  Continue to monitor.    Thank you for your consult.     Pankaj Jacobo MD  Nephrology  [facility] ([facility])

## 2022-01-13 NOTE — SUBJECTIVE & OBJECTIVE
Interval History: will need more diuresing. Nephrology assisting.     Review of Systems   Constitutional: Negative for activity change, diaphoresis, fatigue and unexpected weight change.   HENT: Negative for congestion, ear pain and sore throat.    Eyes: Negative.    Respiratory: Positive for shortness of breath. Negative for wheezing.    Cardiovascular: Negative for chest pain and palpitations.        Orthopnea     Gastrointestinal: Negative for abdominal pain, constipation, diarrhea and vomiting.   Endocrine: Negative.    Genitourinary: Negative for flank pain, hematuria and urgency.   Musculoskeletal: Negative for joint swelling and neck pain.   Skin: Negative for pallor.   Neurological: Negative for seizures, syncope and light-headedness.   Hematological: Negative.    Psychiatric/Behavioral: Negative.       Objective:     Vital Signs (Most Recent):  Temp: 99.1 °F (37.3 °C) (01/13/22 1513)  Pulse: (!) 55 (01/13/22 1513)  Resp: 17 (01/13/22 1513)  BP: (!) 114/55 (01/13/22 1513)  SpO2: 97 % (01/13/22 1513) Vital Signs (24h Range):  Temp:  [97.9 °F (36.6 °C)-99.3 °F (37.4 °C)] 99.1 °F (37.3 °C)  Pulse:  [52-61] 55  Resp:  [16-20] 17  SpO2:  [95 %-100 %] 97 %  BP: (114-141)/(55-63) 114/55     Weight: 97 kg (213 lb 13.5 oz)  Body mass index is 40.41 kg/m².    Intake/Output Summary (Last 24 hours) at 1/13/2022 1729  Last data filed at 1/13/2022 1200  Gross per 24 hour   Intake 1123.88 ml   Output 500 ml   Net 623.88 ml      Physical Exam  Constitutional:       Appearance: She is well-developed.   HENT:      Head: Normocephalic and atraumatic.   Cardiovascular:      Rate and Rhythm: Normal rate and regular rhythm.      Heart sounds: Normal heart sounds. No murmur heard.      Pulmonary:      Effort: Pulmonary effort is normal. No respiratory distress.      Breath sounds: Rales present.   Abdominal:      General: Bowel sounds are normal. There is no distension.      Palpations: Abdomen is soft.      Tenderness: There is  no abdominal tenderness.   Musculoskeletal:         General: Normal range of motion.      Cervical back: Normal range of motion and neck supple.   Skin:     General: Skin is warm and dry.   Neurological:      Mental Status: She is alert and oriented to person, place, and time.       Significant Labs:   All pertinent labs within the past 24 hours have been reviewed.  CBC:   Recent Labs   Lab 01/12/22  0546 01/13/22  0512   WBC 7.17 9.88   HGB 7.7* 7.5*   HCT 25.2* 24.9*    241     CMP:   Recent Labs   Lab 01/12/22  0546 01/13/22  0512    139   K 5.6* 5.6*   * 109   CO2 23 22*   * 207*   BUN 43* 55*   CREATININE 4.6* 4.6*   CALCIUM 6.7* 6.4*   ANIONGAP 7* 8   EGFRNONAA 9* 9*       Significant Imaging: I have reviewed all pertinent imaging results/findings within the past 24 hours.

## 2022-01-13 NOTE — PROGRESS NOTES
[facility]  Progress Note    Patient Name: Diamond Das  MRN: [id number]  Patient Class: IP- Inpatient   Admission Date: 1/10/2022  Length of Stay: 2 days  Attending Physician: Anna Macias MD  Primary Care Provider: Andrey Perrin MD      Subjective:     Principal Problem:CHF exacerbation        HPI:  65 y/o F PMH CHF, CKD, anemia presents with c/o SOB progressively worsening over the past week, worse with exertion. Associated symptoms include cough and leg swelling. Pertinent negatives include no fever, no rhinorrhea, no sore throat, no sputum production, no hemoptysis, no wheezing, no chest pain, no syncope, no vomiting, no abdominal pain and no rash. COVID-19 positive on presentation. Reported RA sats charted 87% and transferred from [facility] since arrival      Overview/Hospital Course:  Diamond Das is a 66 year old female who was admitted to [facility] for COVID-19 and decompensated CHF. CXR shows evidence of volume overload and was diuresed. Echocaridogram shows a preserved EF with diastolic dysfunction. Will repeat CXR on 1/12/22. For COVID-19 she received MVI, ascorbic acid, and dexamethasone. Currently on 3L nasal cannula.    1/12/22  D-dimer 7 today. V/Q scan indeterminant for pulmonary embolism. Could reflect fluid within the fissure as well. Started on heparin infusion. CXR today still shows fluid.  Will change diuretics to Bumex with Metolazone for one dose. Closely monitor kidney function.     1/13/22  Still with decrease urine ouput. Nephrology consulted to assist with diuretics. Bumex changed to 2mg IV every 8 hours. 500mg diuril twice a day has been added for 48 hours. No intervention needed for hyperkalemia.       Interval History: will need more diuresing. Nephrology assisting.     Review of Systems   Constitutional: Negative for activity change, diaphoresis, fatigue and unexpected weight change.   HENT: Negative for congestion,  ear pain and sore throat.    Eyes: Negative.    Respiratory: Positive for shortness of breath. Negative for wheezing.    Cardiovascular: Negative for chest pain and palpitations.        Orthopnea     Gastrointestinal: Negative for abdominal pain, constipation, diarrhea and vomiting.   Endocrine: Negative.    Genitourinary: Negative for flank pain, hematuria and urgency.   Musculoskeletal: Negative for joint swelling and neck pain.   Skin: Negative for pallor.   Neurological: Negative for seizures, syncope and light-headedness.   Hematological: Negative.    Psychiatric/Behavioral: Negative.       Objective:     Vital Signs (Most Recent):  Temp: 99.1 °F (37.3 °C) (01/13/22 1513)  Pulse: (!) 55 (01/13/22 1513)  Resp: 17 (01/13/22 1513)  BP: (!) 114/55 (01/13/22 1513)  SpO2: 97 % (01/13/22 1513) Vital Signs (24h Range):  Temp:  [97.9 °F (36.6 °C)-99.3 °F (37.4 °C)] 99.1 °F (37.3 °C)  Pulse:  [52-61] 55  Resp:  [16-20] 17  SpO2:  [95 %-100 %] 97 %  BP: (114-141)/(55-63) 114/55     Weight: 97 kg (213 lb 13.5 oz)  Body mass index is 40.41 kg/m².    Intake/Output Summary (Last 24 hours) at 1/13/2022 1729  Last data filed at 1/13/2022 1200  Gross per 24 hour   Intake 1123.88 ml   Output 500 ml   Net 623.88 ml      Physical Exam  Constitutional:       Appearance: She is well-developed.   HENT:      Head: Normocephalic and atraumatic.   Cardiovascular:      Rate and Rhythm: Normal rate and regular rhythm.      Heart sounds: Normal heart sounds. No murmur heard.      Pulmonary:      Effort: Pulmonary effort is normal. No respiratory distress.      Breath sounds: Rales present.   Abdominal:      General: Bowel sounds are normal. There is no distension.      Palpations: Abdomen is soft.      Tenderness: There is no abdominal tenderness.   Musculoskeletal:         General: Normal range of motion.      Cervical back: Normal range of motion and neck supple.   Skin:     General: Skin is warm and dry.   Neurological:      Mental  Status: She is alert and oriented to person, place, and time.       Significant Labs:   All pertinent labs within the past 24 hours have been reviewed.  CBC:   Recent Labs   Lab 01/12/22  0546 01/13/22  0512   WBC 7.17 9.88   HGB 7.7* 7.5*   HCT 25.2* 24.9*    241     CMP:   Recent Labs   Lab 01/12/22  0546 01/13/22  0512    139   K 5.6* 5.6*   * 109   CO2 23 22*   * 207*   BUN 43* 55*   CREATININE 4.6* 4.6*   CALCIUM 6.7* 6.4*   ANIONGAP 7* 8   EGFRNONAA 9* 9*       Significant Imaging: I have reviewed all pertinent imaging results/findings within the past 24 hours.      Assessment/Plan:      * CHF exacerbation  Patient is identified as having Diastolic (HFpEF) heart failure that is Acute on chronic. CHF is currently uncontrolled due to Rales/crackles on pulmonary exam and Pulmonary edema/pleural effusion on CXR. Latest ECHO performed and demonstrates- Results for orders placed during the hospital encounter of 10/09/20    Echo Color Flow Doppler? Yes    Interpretation Summary  · There is moderate left ventricular concentric hypertrophy.  · The left ventricle is small with normal systolic function. The estimated ejection fraction is 60%.  · Grade I diastolic dysfunction.  · Normal right ventricular systolic function.  · Mild tricuspid regurgitation.  · Normal central venous pressure (3 mmHg).  · The estimated PA systolic pressure is 37 mmHg.  · Small pericardial effusion.  · There is a moderate left pleural effusion.  . Continue Beta Blocker and monitor clinical status closely. Monitor on telemetry. Patient is on CHF pathway.  Monitor strict Is&Os and daily weights.  Place on fluid restriction of 2 L. Continue to stress to patient importance of self efficacy and  on diet for CHF. Last BNP reviewed- and noted below   Recent Labs   Lab 01/10/22  1035   *   .  IV Lasix, monitor UOP, trend Cr  Supplemental O2 PRN    1/11/22  Echocardiogram shows a preserved EF with diastolic  dysfunction  Continue diuretics  CXR in AM    1/12/22  CXR shows unchanged pulmonary edema  Change IV Lasix to Bumex with metolazone  Strict intake and output.     1/13/22  Nephrology input appreciated.   bumex changed to  TID.   Diuril twice a day for 48 hours  Continue to monitor intake and output    Hyperkalemia  Give Lokelma to day  Also on diuretics    1/13/22  Watch per Nephrology    COVID-19  - COVID-19 testing   - Infection Control notified     - Isolation:   - Airborne, Contact and Droplet Precautions  - Cohort patients into COVID units  - N95 mask, wear eye protection  - 20 second hand hygiene              - Limit visitors per hospital policy              - Consolidating lab draws, nursing care, provider visits, and interventions    - Diagnostics: (leukopenia, hyponatremia, hyperferritinemia, elevated troponin, elevated d-dimer, age, and comorbidities are significant predictors of poor clinical outcome)  CBC, CMP, Procalcitonin, Ferritin, CRP and Portable CXR    - Management:  Supplemental O2 to maintain SpO2 >92%  Telemetry  Continuous/intermittent Pulse Ox  Albuterol treatment PRN  Vitamin C  MVI  Dexamethasone    1/12/22  D-dimer elevated. VQ can indeterminate for PE could also represent fluid. On heparin infusion.   CXR shows fluid overload    1/13/22  Continue heparin infusion and plans as above    Anemia due to chronic kidney disease  Monitor Hb, no signs of active blood loss  Transfuse Hb<7      Type 2 diabetes mellitus, without long-term current use of insulin  SSI      CKD (chronic kidney disease) stage 5, GFR less than 15 ml/min  Kidney function at baseline.     1/12/22  Kidney function up to 4.6. still within baseline.   Carefully monitor with diuretics    1/13/22  4.6 stable range  Nephrology has been consulted for assistance with diuretics.    Hyperlipidemia  Takes pravastatin 20mg at home. Dose reduced to 10mg daily given severe renal impairment.           VTE Risk Mitigation (From admission,  onward)         Ordered     heparin 25,000 units in dextrose 5% (100 units/ml) IV bolus from bag - ADDITIONAL PRN BOLUS - 60 units/kg  As needed (PRN)        Question:  Heparin Infusion Adjustment (DO NOT MODIFY ANSWER)  Answer:  [email or web address]\Images\Pharmacy\HeparinInfusions\heparin LOW INTENSITY nomogram for [facility] [id number]    01/12/22 1044     heparin 25,000 units in dextrose 5% (100 units/ml) IV bolus from bag - ADDITIONAL PRN BOLUS - 30 units/kg  As needed (PRN)        Question:  Heparin Infusion Adjustment (DO NOT MODIFY ANSWER)  Answer:  [email or web address]\Images\Pharmacy\HeparinInfusions\heparin LOW INTENSITY nomogram for [facility] [id number]    01/12/22 1044     heparin 25,000 units in dextrose 5% 250 mL (100 units/mL) infusion LOW INTENSITY nomogram - [facility]  Continuous        Question Answer Comment   Heparin Infusion Adjustment (DO NOT MODIFY ANSWER) [email or web address]\[email or web address]\Images\Pharmacy\HeparinInfusions\heparin LOW INTENSITY nomogram for [facility] [id number]    Begin at (in units/kg/hr) 12        01/12/22 1044     IP VTE HIGH RISK PATIENT  Once         01/11/22 0457     Place sequential compression device  Until discontinued         01/10/22 2009                Discharge Planning   GILLIAN:      Code Status: Full Code   Is the patient medically ready for discharge?:     Reason for patient still in hospital (select all that apply): Treatment  Discharge Plan A: Home with family          Nae Bolivar NP  Department of Hospital Medicine   [facility] - Tele[facility] ([facility])

## 2022-01-14 PROBLEM — R79.89 ELEVATED D-DIMER: Status: ACTIVE | Noted: 2022-01-14

## 2022-01-14 PROBLEM — N63.0 SWELLING OF BREAST: Status: ACTIVE | Noted: 2022-01-14

## 2022-01-14 LAB
ABO + RH BLD: NORMAL
ANION GAP SERPL CALC-SCNC: 9 MMOL/L (ref 8–16)
APTT BLDCRRT: 47.6 SEC (ref 21–32)
BASOPHILS # BLD AUTO: 0 K/UL (ref 0–0.2)
BASOPHILS NFR BLD: 0 % (ref 0–1.9)
BLD GP AB SCN CELLS X3 SERPL QL: NORMAL
BLD PROD TYP BPU: NORMAL
BLOOD UNIT EXPIRATION DATE: NORMAL
BLOOD UNIT TYPE CODE: 7300
BLOOD UNIT TYPE: NORMAL
BUN SERPL-MCNC: 66 MG/DL (ref 8–23)
CALCIUM SERPL-MCNC: 6.2 MG/DL (ref 8.7–10.5)
CHLORIDE SERPL-SCNC: 108 MMOL/L (ref 95–110)
CO2 SERPL-SCNC: 22 MMOL/L (ref 23–29)
CODING SYSTEM: NORMAL
CREAT SERPL-MCNC: 4.8 MG/DL (ref 0.5–1.4)
DIFFERENTIAL METHOD: ABNORMAL
DISPENSE STATUS: NORMAL
EOSINOPHIL # BLD AUTO: 0 K/UL (ref 0–0.5)
EOSINOPHIL NFR BLD: 0 % (ref 0–8)
ERYTHROCYTE [DISTWIDTH] IN BLOOD BY AUTOMATED COUNT: 14.9 % (ref 11.5–14.5)
EST. GFR  (AFRICAN AMERICAN): 10 ML/MIN/1.73 M^2
EST. GFR  (NON AFRICAN AMERICAN): 9 ML/MIN/1.73 M^2
GLUCOSE SERPL-MCNC: 210 MG/DL (ref 70–110)
HCT VFR BLD AUTO: 22.5 % (ref 37–48.5)
HGB BLD-MCNC: 6.9 G/DL (ref 12–16)
IMM GRANULOCYTES # BLD AUTO: 0.11 K/UL (ref 0–0.04)
IMM GRANULOCYTES NFR BLD AUTO: 1 % (ref 0–0.5)
LYMPHOCYTES # BLD AUTO: 1.1 K/UL (ref 1–4.8)
LYMPHOCYTES NFR BLD: 10 % (ref 18–48)
MCH RBC QN AUTO: 27.9 PG (ref 27–31)
MCHC RBC AUTO-ENTMCNC: 30.7 G/DL (ref 32–36)
MCV RBC AUTO: 91 FL (ref 82–98)
MONOCYTES # BLD AUTO: 0.5 K/UL (ref 0.3–1)
MONOCYTES NFR BLD: 4.4 % (ref 4–15)
NEUTROPHILS # BLD AUTO: 9.2 K/UL (ref 1.8–7.7)
NEUTROPHILS NFR BLD: 84.6 % (ref 38–73)
NRBC BLD-RTO: 0 /100 WBC
NUM UNITS TRANS PACKED RBC: NORMAL
PLATELET # BLD AUTO: 242 K/UL (ref 150–450)
PMV BLD AUTO: 10.2 FL (ref 9.2–12.9)
POCT GLUCOSE: 231 MG/DL (ref 70–110)
POCT GLUCOSE: 245 MG/DL (ref 70–110)
POCT GLUCOSE: 325 MG/DL (ref 70–110)
POCT GLUCOSE: 332 MG/DL (ref 70–110)
POTASSIUM SERPL-SCNC: 5.2 MMOL/L (ref 3.5–5.1)
RBC # BLD AUTO: 2.47 M/UL (ref 4–5.4)
SODIUM SERPL-SCNC: 139 MMOL/L (ref 136–145)
WBC # BLD AUTO: 10.92 K/UL (ref 3.9–12.7)

## 2022-01-14 PROCEDURE — 27000221 HC OXYGEN, UP TO 24 HOURS

## 2022-01-14 PROCEDURE — 99900035 HC TECH TIME PER 15 MIN (STAT)

## 2022-01-14 PROCEDURE — 25000003 PHARM REV CODE 250: Performed by: INTERNAL MEDICINE

## 2022-01-14 PROCEDURE — 21400001 HC TELEMETRY ROOM

## 2022-01-14 PROCEDURE — 25000003 PHARM REV CODE 250: Performed by: STUDENT IN AN ORGANIZED HEALTH CARE EDUCATION/TRAINING PROGRAM

## 2022-01-14 PROCEDURE — 99233 SBSQ HOSP IP/OBS HIGH 50: CPT | Mod: ,,, | Performed by: INTERNAL MEDICINE

## 2022-01-14 PROCEDURE — 36415 COLL VENOUS BLD VENIPUNCTURE: CPT | Performed by: FAMILY MEDICINE

## 2022-01-14 PROCEDURE — 94761 N-INVAS EAR/PLS OXIMETRY MLT: CPT

## 2022-01-14 PROCEDURE — 85730 THROMBOPLASTIN TIME PARTIAL: CPT | Performed by: NURSE PRACTITIONER

## 2022-01-14 PROCEDURE — P9016 RBC LEUKOCYTES REDUCED: HCPCS | Performed by: FAMILY MEDICINE

## 2022-01-14 PROCEDURE — 63600175 PHARM REV CODE 636 W HCPCS: Performed by: NURSE PRACTITIONER

## 2022-01-14 PROCEDURE — 94640 AIRWAY INHALATION TREATMENT: CPT

## 2022-01-14 PROCEDURE — 63600175 PHARM REV CODE 636 W HCPCS: Performed by: INTERNAL MEDICINE

## 2022-01-14 PROCEDURE — 27000207 HC ISOLATION

## 2022-01-14 PROCEDURE — 99233 PR SUBSEQUENT HOSPITAL CARE,LEVL III: ICD-10-PCS | Mod: ,,, | Performed by: INTERNAL MEDICINE

## 2022-01-14 PROCEDURE — 86920 COMPATIBILITY TEST SPIN: CPT | Performed by: FAMILY MEDICINE

## 2022-01-14 PROCEDURE — 85025 COMPLETE CBC W/AUTO DIFF WBC: CPT | Performed by: NURSE PRACTITIONER

## 2022-01-14 PROCEDURE — 80048 BASIC METABOLIC PNL TOTAL CA: CPT | Performed by: STUDENT IN AN ORGANIZED HEALTH CARE EDUCATION/TRAINING PROGRAM

## 2022-01-14 PROCEDURE — 25000003 PHARM REV CODE 250: Performed by: NURSE PRACTITIONER

## 2022-01-14 PROCEDURE — 86900 BLOOD TYPING SEROLOGIC ABO: CPT | Performed by: FAMILY MEDICINE

## 2022-01-14 PROCEDURE — 36415 COLL VENOUS BLD VENIPUNCTURE: CPT | Performed by: NURSE PRACTITIONER

## 2022-01-14 PROCEDURE — 86850 RBC ANTIBODY SCREEN: CPT | Performed by: FAMILY MEDICINE

## 2022-01-14 PROCEDURE — 25000003 PHARM REV CODE 250: Performed by: FAMILY MEDICINE

## 2022-01-14 RX ORDER — BUMETANIDE 0.25 MG/ML
1 INJECTION INTRAMUSCULAR; INTRAVENOUS ONCE AS NEEDED
Status: COMPLETED | OUTPATIENT
Start: 2022-01-14 | End: 2022-01-14

## 2022-01-14 RX ORDER — BUMETANIDE 0.25 MG/ML
2 INJECTION INTRAMUSCULAR; INTRAVENOUS EVERY 12 HOURS
Status: DISCONTINUED | OUTPATIENT
Start: 2022-01-14 | End: 2022-01-15 | Stop reason: HOSPADM

## 2022-01-14 RX ORDER — HYDROCODONE BITARTRATE AND ACETAMINOPHEN 500; 5 MG/1; MG/1
TABLET ORAL
Status: DISCONTINUED | OUTPATIENT
Start: 2022-01-14 | End: 2022-01-15 | Stop reason: HOSPADM

## 2022-01-14 RX ORDER — METOLAZONE 5 MG/1
5 TABLET ORAL DAILY
Status: DISCONTINUED | OUTPATIENT
Start: 2022-01-15 | End: 2022-01-15

## 2022-01-14 RX ADMIN — BUMETANIDE 1 MG: 0.25 INJECTION, SOLUTION INTRAMUSCULAR; INTRAVENOUS at 11:01

## 2022-01-14 RX ADMIN — ALBUTEROL SULFATE 2 PUFF: 90 AEROSOL, METERED RESPIRATORY (INHALATION) at 01:01

## 2022-01-14 RX ADMIN — BUMETANIDE 2 MG: 0.25 INJECTION, SOLUTION INTRAMUSCULAR; INTRAVENOUS at 02:01

## 2022-01-14 RX ADMIN — LEVOTHYROXINE SODIUM 137 MCG: 0.03 TABLET ORAL at 05:01

## 2022-01-14 RX ADMIN — CHLOROTHIAZIDE SODIUM 500 MG: 500 INJECTION, POWDER, LYOPHILIZED, FOR SOLUTION INTRAVENOUS at 03:01

## 2022-01-14 RX ADMIN — THERA TABS 1 TABLET: TAB at 08:01

## 2022-01-14 RX ADMIN — FAMOTIDINE 20 MG: 20 TABLET ORAL at 08:01

## 2022-01-14 RX ADMIN — CHLOROTHIAZIDE SODIUM 500 MG: 500 INJECTION, POWDER, LYOPHILIZED, FOR SOLUTION INTRAVENOUS at 02:01

## 2022-01-14 RX ADMIN — ASPIRIN 81 MG: 81 TABLET, COATED ORAL at 08:01

## 2022-01-14 RX ADMIN — AMLODIPINE BESYLATE 10 MG: 10 TABLET ORAL at 08:01

## 2022-01-14 RX ADMIN — CARVEDILOL 25 MG: 12.5 TABLET, FILM COATED ORAL at 04:01

## 2022-01-14 RX ADMIN — INSULIN ASPART 3 UNITS: 100 INJECTION, SOLUTION INTRAVENOUS; SUBCUTANEOUS at 05:01

## 2022-01-14 RX ADMIN — BUMETANIDE 2 MG: 0.25 INJECTION, SOLUTION INTRAMUSCULAR; INTRAVENOUS at 05:01

## 2022-01-14 RX ADMIN — BUMETANIDE 2 MG: 0.25 INJECTION INTRAMUSCULAR; INTRAVENOUS at 11:01

## 2022-01-14 RX ADMIN — INSULIN ASPART 2 UNITS: 100 INJECTION, SOLUTION INTRAVENOUS; SUBCUTANEOUS at 11:01

## 2022-01-14 RX ADMIN — PRAVASTATIN SODIUM 10 MG: 10 TABLET ORAL at 08:01

## 2022-01-14 RX ADMIN — ISOSORBIDE MONONITRATE 60 MG: 60 TABLET, EXTENDED RELEASE ORAL at 08:01

## 2022-01-14 RX ADMIN — INSULIN ASPART 4 UNITS: 100 INJECTION, SOLUTION INTRAVENOUS; SUBCUTANEOUS at 04:01

## 2022-01-14 RX ADMIN — ALBUTEROL SULFATE 2 PUFF: 90 AEROSOL, METERED RESPIRATORY (INHALATION) at 09:01

## 2022-01-14 RX ADMIN — OXYCODONE HYDROCHLORIDE AND ACETAMINOPHEN 500 MG: 500 TABLET ORAL at 08:01

## 2022-01-14 RX ADMIN — ALBUTEROL SULFATE 2 PUFF: 90 AEROSOL, METERED RESPIRATORY (INHALATION) at 12:01

## 2022-01-14 RX ADMIN — ALBUTEROL SULFATE 2 PUFF: 90 AEROSOL, METERED RESPIRATORY (INHALATION) at 07:01

## 2022-01-14 RX ADMIN — CARVEDILOL 25 MG: 12.5 TABLET, FILM COATED ORAL at 08:01

## 2022-01-14 RX ADMIN — DEXAMETHASONE 6 MG: 4 TABLET ORAL at 08:01

## 2022-01-14 NOTE — PROGRESS NOTES
Critical access hospital - Telemetry [facility] Medicine  Progress Note    Patient Name: Diamond Das  MRN: [id number]  Patient Class: IP- Inpatient   Admission Date: 1/10/2022  Length of Stay: 3 days  Attending Physician: Carlita Michael MD  Primary Care Provider: Andrey Perrin MD        Subjective:     Principal Problem:CHF exacerbation        HPI:  67 y/o F PMH CHF, CKD, anemia presents with c/o SOB progressively worsening over the past week, worse with exertion. Associated symptoms include cough and leg swelling. Pertinent negatives include no fever, no rhinorrhea, no sore throat, no sputum production, no hemoptysis, no wheezing, no chest pain, no syncope, no vomiting, no abdominal pain and no rash. COVID-19 positive on presentation. Reported RA sats charted 87% and transferred from [facility] since arrival      Overview/Hospital Course:  Diamond Das is a 66 year old female who was admitted to [facility] for COVID-19 and decompensated CHF. CXR shows evidence of volume overload and was diuresed. Echocaridogram shows a preserved EF with diastolic dysfunction. Will repeat CXR on 1/12/22. For COVID-19 she received MVI, ascorbic acid, and dexamethasone. Currently on 3L nasal cannula.    1/12/22  D-dimer 7 today. V/Q scan indeterminant for pulmonary embolism. Could reflect fluid within the fissure as well. Started on heparin infusion. CXR today still shows fluid.  Will change diuretics to Bumex with Metolazone for one dose. Closely monitor kidney function.     1/13/22  Still with decrease urine ouput. Nephrology consulted to assist with diuretics. Bumex changed to 2mg IV every 8 hours. 500mg diuril twice a day has been added for 48 hours. No intervention needed for hyperkalemia.     1/14  Reports sx improvement. Ready to go home but reports right breast swelling since admission. Lower ext edema and dyspnea improving. H/h trending down with no overt s/s bleeding. Agreeable to blood transfusion.  Nephrology consulted. Consider cards consult      Interval History: blood transfusion and consider cards consult. Monitor for additional diruesis after transfusion    Review of Systems   Constitutional: Positive for activity change (improved) and fatigue. Negative for appetite change.   Respiratory: Positive for shortness of breath (improved).    Cardiovascular: Positive for leg swelling (improved).   Gastrointestinal: Negative for abdominal pain, nausea and vomiting.   Genitourinary: Positive for frequency.   Neurological: Positive for weakness.   Psychiatric/Behavioral: Positive for dysphoric mood.     Objective:     Vital Signs (Most Recent):  Temp: 98.4 °F (36.9 °C) (01/14/22 1539)  Pulse: (!) 52 (01/14/22 1539)  Resp: 18 (01/14/22 1539)  BP: (!) 114/56 (01/14/22 1539)  SpO2: (!) 94 % (01/14/22 1539) Vital Signs (24h Range):  Temp:  [97.7 °F (36.5 °C)-98.6 °F (37 °C)] 98.4 °F (36.9 °C)  Pulse:  [49-60] 52  Resp:  [17-20] 18  SpO2:  [92 %-100 %] 94 %  BP: (110-148)/(53-71) 114/56     Weight: 97.4 kg (214 lb 11.7 oz)  Body mass index is 40.57 kg/m².    Intake/Output Summary (Last 24 hours) at 1/14/2022 1640  Last data filed at 1/14/2022 1611  Gross per 24 hour   Intake 801.18 ml   Output 1300 ml   Net -498.82 ml      Physical Exam  Vitals and nursing note reviewed. Exam conducted with a chaperone present (visitor).   Constitutional:       General: She is not in acute distress.     Appearance: She is morbidly obese. She is ill-appearing. She is not toxic-appearing.   HENT:      Head: Normocephalic and atraumatic.   Cardiovascular:      Rate and Rhythm: Normal rate.   Pulmonary:      Effort: No respiratory distress.      Breath sounds: Decreased air movement present.   Chest:   Breasts:      Right: Swelling present. No skin change.       Abdominal:      Palpations: Abdomen is soft.      Tenderness: There is no abdominal tenderness.   Musculoskeletal:      Right lower leg: Edema present.      Left lower leg: Edema  present.   Skin:     General: Skin is warm.   Neurological:      Mental Status: She is alert. Mental status is at baseline.      Motor: Weakness present.   Psychiatric:         Mood and Affect: Mood is depressed.         Behavior: Behavior is cooperative.         Significant Labs: All pertinent labs within the past 24 hours have been reviewed.h/h anemia; hyperk; hypoca    Significant Imaging: I have reviewed all pertinent imaging results/findings within the past 24 hours.perfusion indeterminate for PE; breast u/s pending      Assessment/Plan:      * CHF exacerbation  Patient is identified as having Diastolic (HFpEF) heart failure that is Acute on chronic. CHF is currently uncontrolled due to Rales/crackles on pulmonary exam and Pulmonary edema/pleural effusion on CXR. Latest ECHO performed and demonstrates- Results for orders placed during the hospital encounter of 10/09/20    Echo Color Flow Doppler? Yes    Interpretation Summary  · There is moderate left ventricular concentric hypertrophy.  · The left ventricle is small with normal systolic function. The estimated ejection fraction is 60%.  · Grade I diastolic dysfunction.  · Normal right ventricular systolic function.  · Mild tricuspid regurgitation.  · Normal central venous pressure (3 mmHg).  · The estimated PA systolic pressure is 37 mmHg.  · Small pericardial effusion.  · There is a moderate left pleural effusion.  . Continue Beta Blocker and monitor clinical status closely. Monitor on telemetry. Patient is on CHF pathway.  Monitor strict Is&Os and daily weights.  Place on fluid restriction of 2 L. Continue to stress to patient importance of self efficacy and  on diet for CHF. Last BNP reviewed- and noted below   Recent Labs   Lab 01/10/22  1035   *   .  IV Lasix, monitor UOP, trend Cr  Supplemental O2 PRN    1/11/22  Echocardiogram shows a preserved EF with diastolic dysfunction  Continue diuretics  CXR in AM    1/12/22  CXR shows unchanged  pulmonary edema  Change IV Lasix to Bumex with metolazone  Strict intake and output.     1/13/22  Nephrology input appreciated.   bumex changed to  TID.   Diuril twice a day for 48 hours  Continue to monitor intake and output    1/14  bumex and metolazone per neph recs  On imdur and coreg  Consider cards consult if no improvement or worsens after blood transfusion      Elevated d-dimer with indeterminate perfusion scan for PE  In setting of covid  On heparin gtt  Dyspnea improved after diuresis  D/c'd systemic steroids  Monitor sx   Us dvt, pending     Swelling of breast  Reports onset this admission  Us breast, pending  Bra and binder       Hyperkalemia  Give Lokelma to day  Also on diuretics    1/13/22  Watch per Nephrology    1/14  Improving      COVID-19  - COVID-19 testing   - Infection Control notified     - Isolation:   - Airborne, Contact and Droplet Precautions  - Cohort patients into COVID units  - N95 mask, wear eye protection  - 20 second hand hygiene              - Limit visitors per hospital policy              - Consolidating lab draws, nursing care, provider visits, and interventions    - Diagnostics: (leukopenia, hyponatremia, hyperferritinemia, elevated troponin, elevated d-dimer, age, and comorbidities are significant predictors of poor clinical outcome)  CBC, CMP, Procalcitonin, Ferritin, CRP and Portable CXR    - Management:  Supplemental O2 to maintain SpO2 >92%  Telemetry  Continuous/intermittent Pulse Ox  Albuterol treatment PRN  Vitamin C  MVI  Dexamethasone    1/12/22  D-dimer elevated. VQ can indeterminate for PE could also represent fluid. On heparin infusion.   CXR shows fluid overload    1/13/22  Continue heparin infusion and plans as above    1/14  Continue heparin for indeterminate perfusion scan  D/c systemic steroids d/t hyperglycemia, on  RA    Anemia due to chronic kidney disease  Monitor Hb, no signs of active blood loss  Transfuse Hb<7    1/14  Hb<7  Blood transfusion  ordered  bumex post transfusion  Stool occult pending    Type 2 diabetes mellitus, without long-term current use of insulin  SSI    1/14  Hyperglycemia noted  On systemic steroids  hba1c 4.7 jan 2022  Continue SSI and consider d/c'ing systemic steroids as now on RA      CKD (chronic kidney disease) stage 5, GFR less than 15 ml/min  Kidney function at baseline.     1/12/22  Kidney function up to 4.6. still within baseline.   Carefully monitor with diuretics    1/13/22  4.6 stable range  Nephrology has been consulted for assistance with diuretics.    1/14  Stable  neph adding metolazone     Hyperlipidemia  Takes pravastatin 20mg at home. Dose reduced to 10mg daily given severe renal impairment.         VTE Risk Mitigation (From admission, onward)         Ordered     heparin 25,000 units in dextrose 5% (100 units/ml) IV bolus from bag - ADDITIONAL PRN BOLUS - 60 units/kg  As needed (PRN)        Question:  Heparin Infusion Adjustment (DO NOT MODIFY ANSWER)  Answer:  \[email or web address]\epic\Images\Pharmacy\HeparinInfusions\heparin LOW INTENSITY nomogram for [facility] [id number].pdf    01/12/22 1044     heparin 25,000 units in dextrose 5% (100 units/ml) IV bolus from bag - ADDITIONAL PRN BOLUS - 30 units/kg  As needed (PRN)        Question:  Heparin Infusion Adjustment (DO NOT MODIFY ANSWER)  Answer:  \[email or web address]\epic\Images\Pharmacy\HeparinInfusions\heparin LOW INTENSITY nomogram for [facility] [id number].pdf    01/12/22 1044     heparin 25,000 units in dextrose 5% 250 mL (100 units/mL) infusion LOW INTENSITY nomogram - [facility]  Continuous        Question Answer Comment   Heparin Infusion Adjustment (DO NOT MODIFY ANSWER) \\[email or web address]\epic\Images\Pharmacy\HeparinInfusions\heparin LOW INTENSITY nomogram for [facility] [id number].pdf    Begin at (in units/kg/hr) 12        01/12/22 1044     IP VTE HIGH RISK PATIENT  Once         01/11/22 0457     Place sequential compression device  Until discontinued         01/10/22 2009                Discharge Planning    GILLIAN:      Code Status: Full Code   Is the patient medically ready for discharge?:     Reason for patient still in hospital (select all that apply): Patient trending condition, Laboratory test, Treatment, Imaging, Consult recommendations and Pending disposition  Discharge Plan A: Home with family                  Carlita Michael MD  Department of Hospital Medicine   [facility] ([facility])

## 2022-01-14 NOTE — ASSESSMENT & PLAN NOTE
Patient is identified as having Diastolic (HFpEF) heart failure that is Acute on chronic. CHF is currently uncontrolled due to Rales/crackles on pulmonary exam and Pulmonary edema/pleural effusion on CXR. Latest ECHO performed and demonstrates- Results for orders placed during the hospital encounter of 10/09/20    Echo Color Flow Doppler? Yes    Interpretation Summary  · There is moderate left ventricular concentric hypertrophy.  · The left ventricle is small with normal systolic function. The estimated ejection fraction is 60%.  · Grade I diastolic dysfunction.  · Normal right ventricular systolic function.  · Mild tricuspid regurgitation.  · Normal central venous pressure (3 mmHg).  · The estimated PA systolic pressure is 37 mmHg.  · Small pericardial effusion.  · There is a moderate left pleural effusion.  . Continue Beta Blocker and monitor clinical status closely. Monitor on telemetry. Patient is on CHF pathway.  Monitor strict Is&Os and daily weights.  Place on fluid restriction of 2 L. Continue to stress to patient importance of self efficacy and  on diet for CHF. Last BNP reviewed- and noted below   Recent Labs   Lab 01/10/22  1035   *   .  IV Lasix, monitor UOP, trend Cr  Supplemental O2 PRN    1/11/22  Echocardiogram shows a preserved EF with diastolic dysfunction  Continue diuretics  CXR in AM    1/12/22  CXR shows unchanged pulmonary edema  Change IV Lasix to Bumex with metolazone  Strict intake and output.     1/13/22  Nephrology input appreciated.   bumex changed to  TID.   Diuril twice a day for 48 hours  Continue to monitor intake and output    1/14  bumex and metolazone per neph recs  On imdur and coreg  Consider cards consult if no improvement or worsens after blood transfusion

## 2022-01-14 NOTE — PLAN OF CARE
Problem: Adult Inpatient Plan of Care  Goal: Plan of Care Review  Outcome: Ongoing, Progressing  Goal: Patient-Specific Goal (Individualized)  Outcome: Ongoing, Progressing  Goal: Absence of Hospital-Acquired Illness or Injury  Outcome: Ongoing, Progressing  Goal: Optimal Comfort and Wellbeing  Outcome: Ongoing, Progressing  Goal: Readiness for Transition of Care  Outcome: Ongoing, Progressing     Problem: Diabetes Comorbidity  Goal: Blood Glucose Level Within Targeted Range  Outcome: Ongoing, Progressing     Problem: Fluid and Electrolyte Imbalance (Acute Kidney Injury/Impairment)  Goal: Fluid and Electrolyte Balance  Outcome: Ongoing, Progressing     Problem: Oral Intake Inadequate (Acute Kidney Injury/Impairment)  Goal: Optimal Nutrition Intake  Outcome: Ongoing, Progressing     Problem: Renal Function Impairment (Acute Kidney Injury/Impairment)  Goal: Effective Renal Function  Outcome: Ongoing, Progressing     Problem: Bariatric Environmental Safety  Goal: Safety Maintained with Care  Outcome: Ongoing, Progressing     Problem: Anemia  Goal: Anemia Symptom Improvement  Outcome: Ongoing, Progressing     Problem: Fall Injury Risk  Goal: Absence of Fall and Fall-Related Injury  Outcome: Ongoing, Progressing

## 2022-01-14 NOTE — ASSESSMENT & PLAN NOTE
Monitor Hb, no signs of active blood loss  Transfuse Hb<7    1/14  Hb<7  Blood transfusion ordered  bumex post transfusion  Stool occult pending

## 2022-01-14 NOTE — PROGRESS NOTES
Progress Note  Nephrology    Admit Date: 1/10/2022   LOS: 3 days     SUBJECTIVE:     Follow-up For: [email or web address]    Interval History:H/O DM,stage 4 CKD,admitted for CHF exacerabation,feeling OK    Active Ambulatory Problems     Diagnosis Date Noted    Ascites 09/02/2016    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 09/02/2016    Bilateral lower extremity edema 09/02/2016    Morbid obesity due to excess calories 09/02/2016    Hypothyroidism due to acquired atrophy of thyroid 09/02/2016    Protein-calorie malnutrition, moderate 09/02/2016    Decompensated hepatic cirrhosis 09/06/2016    Medication reaction 11/17/2016    Cirrhosis 01/03/2017    Portal hypertension 01/17/2017    Hypoalbuminemia 01/18/2017    Chronic diastolic congestive heart failure 12/07/2017    Postmenopausal 12/07/2017    Screening mammogram, encounter for 12/07/2017    Screen for colon cancer 12/07/2017    Hypertension 12/14/2017    Screening for malignant neoplasm of cervix 12/14/2017    Shortness of breath 12/14/2017    Chronic edema 01/29/2018    Chest pain, atypical 02/09/2018    Hyperlipidemia 03/19/2018    Encounter for long-term (current) use of medications 03/19/2018    Edema 03/19/2018    KRISTAL on CPAP     Psychophysiological insomnia     Insomnia secondary to chronic pain     Inadequate sleep hygiene     Abdominal pain 05/10/2018    CKD (chronic kidney disease) stage 5, GFR less than 15 ml/min 05/24/2018    Diabetic gastroparesis associated with type 2 diabetes mellitus 05/24/2018    Hepatic encephalopathy 06/19/2018    ROD (acute kidney injury) 01/03/2019    Hyperammonemia 02/21/2019    Gastroesophageal reflux disease without esophagitis 04/17/2019    Type 2 diabetes mellitus, without long-term current use of insulin 05/16/2019    Pericardial effusion 08/26/2019    Diarrhea 09/04/2019    Recurrent pleural effusion on left 01/20/2020    Immunization deficiency 02/10/2020    Epigastric pain 02/11/2020    Elevated  lipase 02/11/2020    Lumbar strain, sequela 02/17/2020    Acquired hypothyroidism 09/28/2020    Diabetes due to underlying condition w oth circulatory comp 09/28/2020    CHF exacerbation 10/09/2020    Type 2 diabetes mellitus with hyperglycemia 10/10/2020    Nephrotic range proteinuria 10/13/2020    Strain of lumbar region 10/04/2021    Hypercalcemia 12/03/2021    Anemia due to chronic kidney disease 12/06/2021    Skin lesion 12/20/2021     Resolved Ambulatory Problems     Diagnosis Date Noted    Type 2 diabetes mellitus with circulatory disorder 09/02/2016    CKD stage 3 due to type 2 diabetes mellitus 09/02/2016    Organ transplant candidate 09/08/2016    Pre-transplant evaluation for liver transplant 09/08/2016    Cough     Encounter for general adult medical examination with abnormal findings 12/07/2017    Delayed immunizations 12/07/2017    Pneumonia of right middle lobe due to infectious organism 12/19/2017    Breast pain, left 01/04/2018    Lumbar strain 04/27/2018    CKD (chronic kidney disease) stage 3, GFR 30-59 ml/min 05/17/2018    Constipation 01/03/2019    Hypotension 02/21/2019    Congestive heart failure 05/18/2020    Acute on chronic heart failure/ Massive Anasarca 10/09/2020    Acute hypoxemic respiratory failure 10/11/2020    Anasarca 10/13/2020    Routine general medical examination at a health care facility 10/04/2021     Past Medical History:   Diagnosis Date    Cataract     CHF (congestive heart failure)     Diabetes mellitus     Diabetes mellitus, type 2     Gastroparesis     GERD (gastroesophageal reflux disease)     Liver disease     Macular degeneration     Renal disorder     Retinopathy due to secondary diabetes mellitus     Sleep apnea     Thyroid disease       OBJECTIVE:     Vital Signs (Most Recent)  Temp: 98.4 °F (36.9 °C) (01/14/22 1539)  Pulse: (!) 52 (01/14/22 1539)  Resp: 18 (01/14/22 1539)  BP: (!) 114/56 (01/14/22 1539)  SpO2: (!) 94 %  (01/14/22 1539)    Vital Signs Range (Last 24H):  Temp:  [97.7 °F (36.5 °C)-98.6 °F (37 °C)]   Pulse:  [49-60]   Resp:  [17-20]   BP: (110-148)/(53-71)   SpO2:  [92 %-100 %]       Intake/Output Summary (Last 24 hours) at 1/14/2022 1606  Last data filed at 1/14/2022 1300  Gross per 24 hour   Intake 801.18 ml   Output 800 ml   Net 1.18 ml        Review of Systems   Constitutional: Negative for malaise/fatigue and weight loss.   HENT: Negative.    Respiratory: Negative for shortness of breath.    Cardiovascular: Negative for leg swelling.   Gastrointestinal: Negative.    Genitourinary: Negative.    Musculoskeletal: Negative.    Neurological: Negative.    Endo/Heme/Allergies: Negative.    Psychiatric/Behavioral: Negative.         Physical Exam  Eyes:      Pupils: Pupils are equal, round, and reactive to light.   Cardiovascular:      Heart sounds: S1 normal and S2 normal.   Pulmonary:      Effort: Pulmonary effort is normal.   Abdominal:      General: Bowel sounds are normal.   Musculoskeletal:      Right lower leg: No edema.      Left lower leg: No edema.   Skin:     General: Skin is warm.   Neurological:      Mental Status: She is alert and oriented to person, place, and time.   Psychiatric:         Behavior: Behavior normal.            Anesthesia/Surgery risks, benefits and alternative options discussed and understood by patient/family. ex      Current Facility-Administered Medications:     albuterol inhaler 2 puff, 2 puff, Inhalation, Q6H, 2 puff at 01/14/22 1336 **AND** MDI Q6H, , , Q6H, Nae Bolivar NP    amLODIPine tablet 10 mg, 10 mg, Oral, Daily, Yenny Hudson MD, 10 mg at 01/14/22 0831    ascorbic acid (vitamin C) tablet 500 mg, 500 mg, Oral, Daily, Nae Bolivar NP, 500 mg at 01/14/22 0831    aspirin EC tablet 81 mg, 81 mg, Oral, Daily, Yenny Hudson MD, 81 mg at 01/14/22 0831    bumetanide injection 2 mg, 2 mg, Intravenous, Q8H, Pankaj Jacobo MD, 2 mg at 01/14/22 1410     carvediloL tablet 25 mg, 25 mg, Oral, BID WM, Yenny Hudson MD, 25 mg at 01/14/22 0831    chlorothiazide (DIURIL) 500 mg in dextrose 5 % 50 mL IVPB, 500 mg, Intravenous, Q12H, Pankaj Jacobo MD, Last Rate: 100 mL/hr at 01/14/22 1541, 500 mg at 01/14/22 1541    dexAMETHasone tablet 6 mg, 6 mg, Oral, Daily, Nae Bolivar NP, 6 mg at 01/14/22 0831    dextrose 50% injection 12.5 g, 12.5 g, Intravenous, PRN, Yenny Hudson MD    dextrose 50% injection 25 g, 25 g, Intravenous, PRN, Yenny Hudson MD    famotidine tablet 20 mg, 20 mg, Oral, Daily, Yenny Hudson MD, 20 mg at 01/14/22 0831    glucagon (human recombinant) injection 1 mg, 1 mg, Intramuscular, PRN, Yenny Hudson MD    glucose chewable tablet 16 g, 16 g, Oral, PRN, Yenny Hudson MD    glucose chewable tablet 24 g, 24 g, Oral, PRN, Yenny Hudson MD    heparin 25,000 units in dextrose 5% (100 units/ml) IV bolus from bag - ADDITIONAL PRN BOLUS - 30 units/kg, 30 Units/kg (Adjusted), Intravenous, PRN, Nae Bolivar NP    heparin 25,000 units in dextrose 5% (100 units/ml) IV bolus from bag - ADDITIONAL PRN BOLUS - 60 units/kg, 60 Units/kg (Adjusted), Intravenous, PRN, Nae Bolivar NP    heparin 25,000 units in dextrose 5% 250 mL (100 units/mL) infusion LOW INTENSITY nomogram - [facility], 0-40 Units/kg/hr (Adjusted), Intravenous, Continuous, Nae Boliavr NP, Paused at 01/13/22 6284    insulin aspart U-100 pen 0-5 Units, 0-5 Units, Subcutaneous, QID (AC + HS) PRN, Yenny Hudson MD, 2 Units at 01/14/22 1134    isosorbide mononitrate 24 hr tablet 60 mg, 60 mg, Oral, QHS, Yenny Hudson MD, 60 mg at 01/13/22 2100    levothyroxine tablet 137 mcg, 137 mcg, Oral, Before breakfast, Yenny Hudson MD, 137 mcg at 01/14/22 0529    melatonin tablet 6 mg, 6 mg, Oral, Nightly PRN, Bill Gilmore MD, 6 mg at 01/13/22 2100    multivitamin tablet, 1 tablet, Oral, Daily, Nae Bolivar NP, 1 tablet at 01/14/22  0831    ondansetron injection 4 mg, 4 mg, Intravenous, Q8H PRN, Yenny Hudson MD    polyethylene glycol packet 17 g, 17 g, Oral, Daily, Yenny Hudson MD, 17 g at 01/11/22 0930    pravastatin tablet 10 mg, 10 mg, Oral, Daily, Nae Bolivar NP, 10 mg at 01/14/22 0831    sodium chloride 0.9% flush 10 mL, 10 mL, Intravenous, PRN, Bill Gilmore MD    sodium chloride 0.9% flush 10 mL, 10 mL, Intravenous, PRN, Yenny Hudson MD     Laboratory:  Recent Labs   Lab 01/12/22  0546 01/13/22  0512 01/14/22  0606    139 139   K 5.6* 5.6* 5.2*   * 109 108   CO2 23 22* 22*   BUN 43* 55* 66*   CREATININE 4.6* 4.6* 4.8*   CALCIUM 6.7* 6.4* 6.2*     Recent Labs   Lab 01/12/22  0546 01/13/22  0512 01/14/22  0606   WBC 7.17 9.88 10.92   HGB 7.7* 7.5* 6.9*   HCT 25.2* 24.9* 22.5*    241 242        Diagnostic Results:      ASSESSMENT/PLAN:       ROD/CKD:Bun/cr worsening,improved volume status,on bumex & IV diuril  Hyperkalemia:K high but better  HTN:BP acceptab   obesity    Rec:to change bumex to Q 12 hrs  Change Diuril to metolazone  To chk stools for occult blood  Above plan D/W primary team    Thank you for allowing me to participate in the care of this patient.      Poornima Persaud MD  Nephrology

## 2022-01-14 NOTE — ASSESSMENT & PLAN NOTE
SSI    1/14  Hyperglycemia noted  On systemic steroids  hba1c 4.7 jan 2022  Continue SSI and consider d/c'ing systemic steroids as now on RA

## 2022-01-14 NOTE — SUBJECTIVE & OBJECTIVE
Interval History: blood transfusion and consider cards consult. Monitor for additional diruesis after transfusion    Review of Systems   Constitutional: Positive for activity change (improved) and fatigue. Negative for appetite change.   Respiratory: Positive for shortness of breath (improved).    Cardiovascular: Positive for leg swelling (improved).   Gastrointestinal: Negative for abdominal pain, nausea and vomiting.   Genitourinary: Positive for frequency.   Neurological: Positive for weakness.   Psychiatric/Behavioral: Positive for dysphoric mood.     Objective:     Vital Signs (Most Recent):  Temp: 98.4 °F (36.9 °C) (01/14/22 1539)  Pulse: (!) 52 (01/14/22 1539)  Resp: 18 (01/14/22 1539)  BP: (!) 114/56 (01/14/22 1539)  SpO2: (!) 94 % (01/14/22 1539) Vital Signs (24h Range):  Temp:  [97.7 °F (36.5 °C)-98.6 °F (37 °C)] 98.4 °F (36.9 °C)  Pulse:  [49-60] 52  Resp:  [17-20] 18  SpO2:  [92 %-100 %] 94 %  BP: (110-148)/(53-71) 114/56     Weight: 97.4 kg (214 lb 11.7 oz)  Body mass index is 40.57 kg/m².    Intake/Output Summary (Last 24 hours) at 1/14/2022 1640  Last data filed at 1/14/2022 1611  Gross per 24 hour   Intake 801.18 ml   Output 1300 ml   Net -498.82 ml      Physical Exam  Vitals and nursing note reviewed. Exam conducted with a chaperone present (visitor).   Constitutional:       General: She is not in acute distress.     Appearance: She is morbidly obese. She is ill-appearing. She is not toxic-appearing.   HENT:      Head: Normocephalic and atraumatic.   Cardiovascular:      Rate and Rhythm: Normal rate.   Pulmonary:      Effort: No respiratory distress.      Breath sounds: Decreased air movement present.   Chest:   Breasts:      Right: Swelling present. No skin change.       Abdominal:      Palpations: Abdomen is soft.      Tenderness: There is no abdominal tenderness.   Musculoskeletal:      Right lower leg: Edema present.      Left lower leg: Edema present.   Skin:     General: Skin is warm.    Neurological:      Mental Status: She is alert. Mental status is at baseline.      Motor: Weakness present.   Psychiatric:         Mood and Affect: Mood is depressed.         Behavior: Behavior is cooperative.         Significant Labs: All pertinent labs within the past 24 hours have been reviewed.h/h anemia; hyperk; hypoca    Significant Imaging: I have reviewed all pertinent imaging results/findings within the past 24 hours.perfusion indeterminate for PE; breast u/s pending

## 2022-01-14 NOTE — ASSESSMENT & PLAN NOTE
Kidney function at baseline.     1/12/22  Kidney function up to 4.6. still within baseline.   Carefully monitor with diuretics    1/13/22  4.6 stable range  Nephrology has been consulted for assistance with diuretics.    1/14  Stable  neph adding metolazone

## 2022-01-14 NOTE — ASSESSMENT & PLAN NOTE
- COVID-19 testing   - Infection Control notified     - Isolation:   - Airborne, Contact and Droplet Precautions  - Cohort patients into COVID units  - N95 mask, wear eye protection  - 20 second hand hygiene              - Limit visitors per hospital policy              - Consolidating lab draws, nursing care, provider visits, and interventions    - Diagnostics: (leukopenia, hyponatremia, hyperferritinemia, elevated troponin, elevated d-dimer, age, and comorbidities are significant predictors of poor clinical outcome)  CBC, CMP, Procalcitonin, Ferritin, CRP and Portable CXR    - Management:  Supplemental O2 to maintain SpO2 >92%  Telemetry  Continuous/intermittent Pulse Ox  Albuterol treatment PRN  Vitamin C  MVI  Dexamethasone    1/12/22  D-dimer elevated. VQ can indeterminate for PE could also represent fluid. On heparin infusion.   CXR shows fluid overload    1/13/22  Continue heparin infusion and plans as above    1/14  Continue heparin for indeterminate perfusion scan  D/c systemic steroids d/t hyperglycemia, on  RA

## 2022-01-14 NOTE — PLAN OF CARE
Plan of care reviewed with patient, pt and family member verbalized understanding.  Pt remains free from falls this shift, safety, contact, droplet, and airborne precautions in place.bed alarm set.  Pt remains free from skin breakdown, pt educated on the importance of frequent weight shift to decrease the risk of pressure injury. Pt verbalized understanding teaching and outcomes.  AAOx4,NAD noted at this time.  PIV 20 G 20 G to R AC L FR , Saline locked  Pt remained afebrile.  3L O2 via NC  NS/SB on the tele monitor  Heparin gtt @ * units/kg  Pt admitted for CHF exacerbation.   Pt currently resting comfortably in bed.  Hourly rounding complete. Bed in lowest position, side rails up, call light in reach.  daughter remains at the bedside.

## 2022-01-15 ENCOUNTER — PATIENT MESSAGE (OUTPATIENT)
Dept: ADMINISTRATIVE | Facility: CLINIC | Age: 67
End: 2022-01-15
Payer: MEDICAID

## 2022-01-15 VITALS
BODY MASS INDEX: 40.99 KG/M2 | WEIGHT: 217.13 LBS | RESPIRATION RATE: 18 BRPM | HEIGHT: 61 IN | SYSTOLIC BLOOD PRESSURE: 119 MMHG | TEMPERATURE: 99 F | DIASTOLIC BLOOD PRESSURE: 58 MMHG | HEART RATE: 49 BPM | OXYGEN SATURATION: 97 %

## 2022-01-15 PROBLEM — E87.5 HYPERKALEMIA: Status: RESOLVED | Noted: 2022-01-12 | Resolved: 2022-01-15

## 2022-01-15 PROBLEM — N63.0 SWELLING OF BREAST: Status: RESOLVED | Noted: 2022-01-14 | Resolved: 2022-01-15

## 2022-01-15 PROBLEM — I50.9 CHF EXACERBATION: Status: RESOLVED | Noted: 2020-10-09 | Resolved: 2022-01-15

## 2022-01-15 LAB
ANION GAP SERPL CALC-SCNC: 10 MMOL/L (ref 8–16)
ANION GAP SERPL CALC-SCNC: 6 MMOL/L (ref 8–16)
APTT BLDCRRT: 38.7 SEC (ref 21–32)
BASOPHILS # BLD AUTO: 0.01 K/UL (ref 0–0.2)
BASOPHILS NFR BLD: 0.1 % (ref 0–1.9)
BUN SERPL-MCNC: 71 MG/DL (ref 8–23)
BUN SERPL-MCNC: 72 MG/DL (ref 8–23)
CALCIUM SERPL-MCNC: 6.4 MG/DL (ref 8.7–10.5)
CALCIUM SERPL-MCNC: 6.6 MG/DL (ref 8.7–10.5)
CHLORIDE SERPL-SCNC: 106 MMOL/L (ref 95–110)
CHLORIDE SERPL-SCNC: 108 MMOL/L (ref 95–110)
CO2 SERPL-SCNC: 22 MMOL/L (ref 23–29)
CO2 SERPL-SCNC: 24 MMOL/L (ref 23–29)
CREAT SERPL-MCNC: 4.9 MG/DL (ref 0.5–1.4)
CREAT SERPL-MCNC: 5 MG/DL (ref 0.5–1.4)
DIFFERENTIAL METHOD: ABNORMAL
EOSINOPHIL # BLD AUTO: 0 K/UL (ref 0–0.5)
EOSINOPHIL NFR BLD: 0 % (ref 0–8)
ERYTHROCYTE [DISTWIDTH] IN BLOOD BY AUTOMATED COUNT: 14.9 % (ref 11.5–14.5)
EST. GFR  (AFRICAN AMERICAN): 10 ML/MIN/1.73 M^2
EST. GFR  (AFRICAN AMERICAN): 10 ML/MIN/1.73 M^2
EST. GFR  (NON AFRICAN AMERICAN): 9 ML/MIN/1.73 M^2
EST. GFR  (NON AFRICAN AMERICAN): 9 ML/MIN/1.73 M^2
GLUCOSE SERPL-MCNC: 254 MG/DL (ref 70–110)
GLUCOSE SERPL-MCNC: 307 MG/DL (ref 70–110)
HCT VFR BLD AUTO: 28.7 % (ref 37–48.5)
HGB BLD-MCNC: 9 G/DL (ref 12–16)
IMM GRANULOCYTES # BLD AUTO: 0.17 K/UL (ref 0–0.04)
IMM GRANULOCYTES NFR BLD AUTO: 1.4 % (ref 0–0.5)
LYMPHOCYTES # BLD AUTO: 1 K/UL (ref 1–4.8)
LYMPHOCYTES NFR BLD: 8.5 % (ref 18–48)
MCH RBC QN AUTO: 28.3 PG (ref 27–31)
MCHC RBC AUTO-ENTMCNC: 31.4 G/DL (ref 32–36)
MCV RBC AUTO: 90 FL (ref 82–98)
MONOCYTES # BLD AUTO: 0.7 K/UL (ref 0.3–1)
MONOCYTES NFR BLD: 5.6 % (ref 4–15)
NEUTROPHILS # BLD AUTO: 10 K/UL (ref 1.8–7.7)
NEUTROPHILS NFR BLD: 84.4 % (ref 38–73)
NRBC BLD-RTO: 0 /100 WBC
PLATELET # BLD AUTO: 273 K/UL (ref 150–450)
PMV BLD AUTO: 10.9 FL (ref 9.2–12.9)
POCT GLUCOSE: 298 MG/DL (ref 70–110)
POCT GLUCOSE: 312 MG/DL (ref 70–110)
POTASSIUM SERPL-SCNC: 4.6 MMOL/L (ref 3.5–5.1)
POTASSIUM SERPL-SCNC: 5.4 MMOL/L (ref 3.5–5.1)
RBC # BLD AUTO: 3.18 M/UL (ref 4–5.4)
SODIUM SERPL-SCNC: 138 MMOL/L (ref 136–145)
SODIUM SERPL-SCNC: 138 MMOL/L (ref 136–145)
WBC # BLD AUTO: 11.86 K/UL (ref 3.9–12.7)

## 2022-01-15 PROCEDURE — 25000003 PHARM REV CODE 250: Performed by: INTERNAL MEDICINE

## 2022-01-15 PROCEDURE — 99233 SBSQ HOSP IP/OBS HIGH 50: CPT | Mod: ,,, | Performed by: INTERNAL MEDICINE

## 2022-01-15 PROCEDURE — 25000003 PHARM REV CODE 250: Performed by: NURSE PRACTITIONER

## 2022-01-15 PROCEDURE — 97162 PT EVAL MOD COMPLEX 30 MIN: CPT

## 2022-01-15 PROCEDURE — 36415 COLL VENOUS BLD VENIPUNCTURE: CPT | Performed by: FAMILY MEDICINE

## 2022-01-15 PROCEDURE — 36415 COLL VENOUS BLD VENIPUNCTURE: CPT | Performed by: NURSE PRACTITIONER

## 2022-01-15 PROCEDURE — 85730 THROMBOPLASTIN TIME PARTIAL: CPT | Performed by: NURSE PRACTITIONER

## 2022-01-15 PROCEDURE — 63600175 PHARM REV CODE 636 W HCPCS: Performed by: FAMILY MEDICINE

## 2022-01-15 PROCEDURE — 25000003 PHARM REV CODE 250: Performed by: STUDENT IN AN ORGANIZED HEALTH CARE EDUCATION/TRAINING PROGRAM

## 2022-01-15 PROCEDURE — 36430 TRANSFUSION BLD/BLD COMPNT: CPT

## 2022-01-15 PROCEDURE — 94640 AIRWAY INHALATION TREATMENT: CPT

## 2022-01-15 PROCEDURE — 85025 COMPLETE CBC W/AUTO DIFF WBC: CPT | Performed by: NURSE PRACTITIONER

## 2022-01-15 PROCEDURE — 25000003 PHARM REV CODE 250: Performed by: FAMILY MEDICINE

## 2022-01-15 PROCEDURE — 80048 BASIC METABOLIC PNL TOTAL CA: CPT | Mod: 91 | Performed by: STUDENT IN AN ORGANIZED HEALTH CARE EDUCATION/TRAINING PROGRAM

## 2022-01-15 PROCEDURE — 80048 BASIC METABOLIC PNL TOTAL CA: CPT | Performed by: FAMILY MEDICINE

## 2022-01-15 PROCEDURE — 99900035 HC TECH TIME PER 15 MIN (STAT)

## 2022-01-15 PROCEDURE — 94761 N-INVAS EAR/PLS OXIMETRY MLT: CPT

## 2022-01-15 PROCEDURE — 99233 PR SUBSEQUENT HOSPITAL CARE,LEVL III: ICD-10-PCS | Mod: ,,, | Performed by: INTERNAL MEDICINE

## 2022-01-15 RX ORDER — METOLAZONE 5 MG/1
5 TABLET ORAL DAILY
Qty: 30 TABLET | Refills: 0 | Status: SHIPPED | OUTPATIENT
Start: 2022-01-16 | End: 2022-06-20

## 2022-01-15 RX ORDER — CALCIUM CARBONATE 200(500)MG
1000 TABLET,CHEWABLE ORAL DAILY
Status: DISCONTINUED | OUTPATIENT
Start: 2022-01-15 | End: 2022-01-15

## 2022-01-15 RX ORDER — ASCORBIC ACID 500 MG
500 TABLET ORAL DAILY
Start: 2022-01-16 | End: 2022-05-02

## 2022-01-15 RX ORDER — PRAVASTATIN SODIUM 10 MG/1
10 TABLET ORAL DAILY
Qty: 90 TABLET | Refills: 3 | Status: SHIPPED | OUTPATIENT
Start: 2022-01-16 | End: 2022-04-26 | Stop reason: SDUPTHER

## 2022-01-15 RX ADMIN — METOLAZONE 5 MG: 5 TABLET ORAL at 08:01

## 2022-01-15 RX ADMIN — ALBUTEROL SULFATE 2 PUFF: 90 AEROSOL, METERED RESPIRATORY (INHALATION) at 07:01

## 2022-01-15 RX ADMIN — INSULIN HUMAN 4 UNITS: 100 INJECTION, SOLUTION PARENTERAL at 11:01

## 2022-01-15 RX ADMIN — ALBUTEROL SULFATE 2 PUFF: 90 AEROSOL, METERED RESPIRATORY (INHALATION) at 01:01

## 2022-01-15 RX ADMIN — THERA TABS 1 TABLET: TAB at 08:01

## 2022-01-15 RX ADMIN — SODIUM ZIRCONIUM CYCLOSILICATE 5 G: 5 POWDER, FOR SUSPENSION ORAL at 08:01

## 2022-01-15 RX ADMIN — FAMOTIDINE 20 MG: 20 TABLET ORAL at 08:01

## 2022-01-15 RX ADMIN — ASPIRIN 81 MG: 81 TABLET, COATED ORAL at 08:01

## 2022-01-15 RX ADMIN — AMLODIPINE BESYLATE 10 MG: 10 TABLET ORAL at 08:01

## 2022-01-15 RX ADMIN — INSULIN ASPART 4 UNITS: 100 INJECTION, SOLUTION INTRAVENOUS; SUBCUTANEOUS at 06:01

## 2022-01-15 RX ADMIN — CARVEDILOL 25 MG: 12.5 TABLET, FILM COATED ORAL at 08:01

## 2022-01-15 RX ADMIN — BUMETANIDE 2 MG: 0.25 INJECTION INTRAMUSCULAR; INTRAVENOUS at 08:01

## 2022-01-15 RX ADMIN — PRAVASTATIN SODIUM 10 MG: 10 TABLET ORAL at 08:01

## 2022-01-15 RX ADMIN — LEVOTHYROXINE SODIUM 137 MCG: 0.03 TABLET ORAL at 06:01

## 2022-01-15 RX ADMIN — OXYCODONE HYDROCHLORIDE AND ACETAMINOPHEN 500 MG: 500 TABLET ORAL at 08:01

## 2022-01-15 RX ADMIN — APIXABAN 10 MG: 2.5 TABLET, FILM COATED ORAL at 08:01

## 2022-01-15 RX ADMIN — INSULIN ASPART 3 UNITS: 100 INJECTION, SOLUTION INTRAVENOUS; SUBCUTANEOUS at 11:01

## 2022-01-15 NOTE — PROGRESS NOTES
Progress Note  Nephrology    Admit Date: 1/10/2022   LOS: 4 days     SUBJECTIVE:     Follow-up For: ROD    Interval History:CHF,morbid obesity,DM  COVID+ve,ROD  Pt feeling better & wants to go home    Active Ambulatory Problems     Diagnosis Date Noted    Ascites 09/02/2016    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 09/02/2016    Bilateral lower extremity edema 09/02/2016    Morbid obesity due to excess calories 09/02/2016    Hypothyroidism due to acquired atrophy of thyroid 09/02/2016    Protein-calorie malnutrition, moderate 09/02/2016    Decompensated hepatic cirrhosis 09/06/2016    Medication reaction 11/17/2016    Cirrhosis 01/03/2017    Portal hypertension 01/17/2017    Hypoalbuminemia 01/18/2017    Chronic diastolic congestive heart failure 12/07/2017    Postmenopausal 12/07/2017    Screening mammogram, encounter for 12/07/2017    Screen for colon cancer 12/07/2017    Hypertension 12/14/2017    Screening for malignant neoplasm of cervix 12/14/2017    Shortness of breath 12/14/2017    Chronic edema 01/29/2018    Chest pain, atypical 02/09/2018    Hyperlipidemia 03/19/2018    Encounter for long-term (current) use of medications 03/19/2018    Edema 03/19/2018    KRISTAL on CPAP     Psychophysiological insomnia     Insomnia secondary to chronic pain     Inadequate sleep hygiene     Abdominal pain 05/10/2018    CKD (chronic kidney disease) stage 5, GFR less than 15 ml/min 05/24/2018    Diabetic gastroparesis associated with type 2 diabetes mellitus 05/24/2018    Hepatic encephalopathy 06/19/2018    ROD (acute kidney injury) 01/03/2019    Hyperammonemia 02/21/2019    Gastroesophageal reflux disease without esophagitis 04/17/2019    Type 2 diabetes mellitus, without long-term current use of insulin 05/16/2019    Pericardial effusion 08/26/2019    Diarrhea 09/04/2019    Recurrent pleural effusion on left 01/20/2020    Immunization deficiency 02/10/2020    Epigastric pain 02/11/2020     Elevated lipase 02/11/2020    Lumbar strain, sequela 02/17/2020    Acquired hypothyroidism 09/28/2020    Diabetes due to underlying condition w oth circulatory comp 09/28/2020    Type 2 diabetes mellitus with hyperglycemia 10/10/2020    Nephrotic range proteinuria 10/13/2020    Strain of lumbar region 10/04/2021    Hypercalcemia 12/03/2021    Anemia due to chronic kidney disease 12/06/2021    Skin lesion 12/20/2021     Resolved Ambulatory Problems     Diagnosis Date Noted    Type 2 diabetes mellitus with circulatory disorder 09/02/2016    CKD stage 3 due to type 2 diabetes mellitus 09/02/2016    Organ transplant candidate 09/08/2016    Pre-transplant evaluation for liver transplant 09/08/2016    Cough     Encounter for general adult medical examination with abnormal findings 12/07/2017    Delayed immunizations 12/07/2017    Pneumonia of right middle lobe due to infectious organism 12/19/2017    Breast pain, left 01/04/2018    Lumbar strain 04/27/2018    CKD (chronic kidney disease) stage 3, GFR 30-59 ml/min 05/17/2018    Constipation 01/03/2019    Hypotension 02/21/2019    Congestive heart failure 05/18/2020    CHF exacerbation 10/09/2020    Acute on chronic heart failure/ Massive Anasarca 10/09/2020    Acute hypoxemic respiratory failure 10/11/2020    Anasarca 10/13/2020    Routine general medical examination at a health care facility 10/04/2021     Past Medical History:   Diagnosis Date    Cataract     CHF (congestive heart failure)     Diabetes mellitus     Diabetes mellitus, type 2     Gastroparesis     GERD (gastroesophageal reflux disease)     Liver disease     Macular degeneration     Renal disorder     Retinopathy due to secondary diabetes mellitus     Sleep apnea     Thyroid disease       OBJECTIVE:     Vital Signs (Most Recent)  Temp: 98.8 °F (37.1 °C) (01/15/22 1103)  Pulse: (!) 49 (01/15/22 1103)  Resp: 18 (01/15/22 1103)  BP: (!) 119/58 (01/15/22 1103)  SpO2: 97 %  (01/15/22 1103)    Vital Signs Range (Last 24H):  Temp:  [97.4 °F (36.3 °C)-98.8 °F (37.1 °C)]   Pulse:  [47-65]   Resp:  [16-20]   BP: (114-152)/(56-72)   SpO2:  [94 %-100 %]       Intake/Output Summary (Last 24 hours) at 1/15/2022 1458  Last data filed at 1/14/2022 2340  Gross per 24 hour   Intake 913.75 ml   Output 500 ml   Net 413.75 ml        Review of Systems   Constitutional: Negative.    HENT: Negative.    Eyes: Negative.    Respiratory: Negative for shortness of breath.    Cardiovascular: Negative.  Negative for leg swelling.   Gastrointestinal: Negative.    Genitourinary: Negative.    Musculoskeletal: Negative.    Skin: Negative.    Neurological: Negative.    Endo/Heme/Allergies: Negative.    Psychiatric/Behavioral: Negative.         Physical Exam  Eyes:      Pupils: Pupils are equal, round, and reactive to light.   Cardiovascular:      Heart sounds: S1 normal and S2 normal.   Pulmonary:      Effort: Pulmonary effort is normal.   Abdominal:      General: Bowel sounds are normal.   Musculoskeletal:         General: No swelling.      Right lower leg: No edema.      Left lower leg: No edema.   Skin:     General: Skin is warm.   Neurological:      Mental Status: She is alert and oriented to person, place, and time.   Psychiatric:         Behavior: Behavior normal.            Anesthesia/Surgery risks, benefits and alternative options discussed and understood by patient/family. ex      Current Facility-Administered Medications:     0.9%  NaCl infusion (for blood administration), , Intravenous, Q24H PRN, Carlita Michael MD    albuterol inhaler 2 puff, 2 puff, Inhalation, Q6H, 2 puff at 01/15/22 0708 **AND** MDI Q6H, , , Q6H, Nae Bolivar, CANDACE    amLODIPine tablet 10 mg, 10 mg, Oral, Daily, Yenny Hudson MD, 10 mg at 01/15/22 0824    apixaban tablet 10 mg, 10 mg, Oral, BID, Carlita Michael MD, 10 mg at 01/15/22 0824    [START ON 1/22/2022] apixaban tablet 5 mg, 5 mg, Oral, BID, Carlita Michael MD     ascorbic acid (vitamin C) tablet 500 mg, 500 mg, Oral, Daily, Nae Bolivar NP, 500 mg at 01/15/22 0823    aspirin EC tablet 81 mg, 81 mg, Oral, Daily, Yenny Hudson MD, 81 mg at 01/15/22 0824    bumetanide injection 2 mg, 2 mg, Intravenous, Q12H, Poornima Persaud MD, 2 mg at 01/15/22 0825    carvediloL tablet 25 mg, 25 mg, Oral, BID WM, Yenny Hudson MD, 25 mg at 01/15/22 0823    dextrose 50% injection 12.5 g, 12.5 g, Intravenous, PRN, Yenny Hudson MD    dextrose 50% injection 25 g, 25 g, Intravenous, PRN, Yenny Hudson MD    famotidine tablet 20 mg, 20 mg, Oral, Daily, Yenny Hudson MD, 20 mg at 01/15/22 0823    glucagon (human recombinant) injection 1 mg, 1 mg, Intramuscular, PRN, Yenny Hudson MD    glucose chewable tablet 16 g, 16 g, Oral, PRN, Yenny Hudson MD    glucose chewable tablet 24 g, 24 g, Oral, PRN, Yenny Hudson MD    insulin aspart U-100 pen 0-5 Units, 0-5 Units, Subcutaneous, QID (AC + HS) PRN, Yenny Hudson MD, 3 Units at 01/15/22 1140    isosorbide mononitrate 24 hr tablet 60 mg, 60 mg, Oral, QHS, Yenny Hudson MD, 60 mg at 01/14/22 2030    levothyroxine tablet 137 mcg, 137 mcg, Oral, Before breakfast, Yenny Hudson MD, 137 mcg at 01/15/22 0632    melatonin tablet 6 mg, 6 mg, Oral, Nightly PRN, Bill Gilmore MD, 6 mg at 01/13/22 2100    metOLazone tablet 5 mg, 5 mg, Oral, Daily, Poornima Persaud MD, 5 mg at 01/15/22 0824    multivitamin tablet, 1 tablet, Oral, Daily, Nae Bolivar NP, 1 tablet at 01/15/22 0823    ondansetron injection 4 mg, 4 mg, Intravenous, Q8H PRN, Yenny Hudson MD    polyethylene glycol packet 17 g, 17 g, Oral, Daily, Yenny Hudson MD, 17 g at 01/11/22 0930    pravastatin tablet 10 mg, 10 mg, Oral, Daily, Nae Bolivar NP, 10 mg at 01/15/22 0823    sodium chloride 0.9% flush 10 mL, 10 mL, Intravenous, PRN, Bill Gilmore MD    sodium chloride 0.9% flush 10 mL, 10 mL, Intravenous, PRN,  Yenny Hudson MD    Current Outpatient Medications:     amLODIPine (NORVASC) 10 MG tablet, Take 1 tablet by mouth once daily, Disp: 90 tablet, Rfl: 0    aspirin (ECOTRIN) 81 MG EC tablet, Take 1 tablet by mouth once daily, Disp: 360 tablet, Rfl: 1    carvediloL (COREG) 25 MG tablet, Take 1 tablet (25 mg total) by mouth 2 (two) times daily with meals., Disp: 60 tablet, Rfl: 5    docusate sodium (COLACE) 100 MG capsule, Take 1 capsule (100 mg total) by mouth 3 (three) times daily. Hold for diarrhea, Disp: 30 capsule, Rfl: 0    EUTHYROX 137 mcg Tab tablet, TAKE 1 TABLET BY MOUTH BEFORE BREAKFAST, Disp: 90 tablet, Rfl: 0    famotidine (PEPCID) 20 MG tablet, Take 1 tablet (20 mg total) by mouth once daily., Disp: 30 tablet, Rfl: 11    fluticasone propionate (FLONASE) 50 mcg/actuation nasal spray, 2 sprays (100 mcg total) by Each Nostril route once daily., Disp: , Rfl: 0    garlic (GARLIQUE ORAL), Take 1 tablet by mouth once daily., Disp: , Rfl:     isosorbide mononitrate (IMDUR) 60 MG 24 hr tablet, Take 1 tablet (60 mg total) by mouth every evening., Disp: 30 tablet, Rfl: 6    magnesium oxide (MAG-OX) 400 mg (241.3 mg magnesium) tablet, Take 1 tablet (400 mg total) by mouth once daily., Disp: 90 tablet, Rfl: 1    ondansetron (ZOFRAN) 4 MG tablet, TAKE 1 TABLET BY MOUTH EVERY 8 HOURS AS NEEDED, Disp: 30 tablet, Rfl: 0    rifAXIMin (XIFAXAN) 550 mg Tab, Take 1 tablet (550 mg total) by mouth 2 (two) times daily., Disp: 60 tablet, Rfl: 5    torsemide (DEMADEX) 20 MG Tab, Take 2 tablets (40 mg total) by mouth once daily., Disp: 60 tablet, Rfl: 11    VICTOZA 2-SHAYY 0.6 mg/0.1 mL (18 mg/3 mL) PnIj pen, INJECT 1.8 MG INTO THE SKIN ONCE DAILY, Disp: 27 mL, Rfl: 0    apixaban (ELIQUIS) 5 mg Tab, Take 2 tablets (10 mg total) by mouth 2 (two) times daily. for 5 days, Disp: 20 tablet, Rfl: 0    [START ON 1/22/2022] apixaban (ELIQUIS) 5 mg Tab, Take 1 tablet (5 mg total) by mouth 2 (two) times daily., Disp: 60  tablet, Rfl: 0    [START ON 1/16/2022] ascorbic acid, vitamin C, (VITAMIN C) 500 MG tablet, Take 1 tablet (500 mg total) by mouth once daily., Disp: , Rfl:     dicyclomine (BENTYL) 20 mg tablet, Take 1 tablet (20 mg total) by mouth every 6 (six) hours., Disp: 120 tablet, Rfl: 0    [START ON 1/16/2022] metOLazone (ZAROXOLYN) 5 MG tablet, Take 1 tablet (5 mg total) by mouth once daily., Disp: 30 tablet, Rfl: 0    [START ON 1/16/2022] multivitamin Tab, Take 1 tablet by mouth once daily., Disp: , Rfl:     [START ON 1/16/2022] pravastatin (PRAVACHOL) 10 MG tablet, Take 1 tablet (10 mg total) by mouth once daily., Disp: 90 tablet, Rfl: 3    pulse oximeter (PULSE OXIMETER) device, by Apply Externally route 2 (two) times a day. Use twice daily at 8 AM and 3 PM and [facility] the [facility] as directed., Disp: 1 each, Rfl: 0     Laboratory:  Recent Labs   Lab 01/14/22  0606 01/15/22  0445 01/15/22  0955    138 138   K 5.2* 5.4* 4.6    108 106   CO2 22* 24 22*   BUN 66* 72* 71*   CREATININE 4.8* 4.9* 5.0*   CALCIUM 6.2* 6.4* 6.6*     Recent Labs   Lab 01/13/22  0512 01/14/22  0606 01/15/22  0445   WBC 9.88 10.92 11.86   HGB 7.5* 6.9* 9.0*   HCT 24.9* 22.5* 28.7*    242 273        Diagnostic Results:      ASSESSMENT/PLAN:     ROD:Non oliguric,bun/cr high,improved volume status  HTN:BP high on steroids  Morbid obesity  Change lasix to PO 60 mgs BID  D/C Metolazone  Out pt F/U with nephrology  Above plan D/W primary          Thank you for allowing me to participate in the care of this patient.      Poornima Persaud MD  Nephrology

## 2022-01-15 NOTE — PLAN OF CARE
INITIAL PT EVAL COMPLETED. PATIENT WILL NOT REQUIRE SKILLED THERAPY SERVICES AND IS SAFE TO DC HOME.

## 2022-01-15 NOTE — RESPIRATORY THERAPY
Home Oxygen Evaluation    Date Performed: 1/15/2022    1) Patient's Home O2 Sat on room air, while at rest: 94%        If O2 sats on room air at rest are 88% or below, patient qualifies. No additional testing needed. Document N/A in steps 2 and 3. If 89% or above, complete steps 2.      2) Patient's O2 Sat on room air while exercisin%        If O2 sats on room air while exercising remain 89% or above patient does not qualify, no further testing needed Document N/A in step 3. If O2 sats on room air while exercising are 88% or below, continue to step 3.      3) Patient's O2 Sat while exercising on O2: n/a at n/a LPM         (Must show improvement from #2 for patients to qualify)    If O2 sats improve on oxygen, patient qualifies for portable oxygen. If not, the patient does not qualify.

## 2022-01-15 NOTE — DISCHARGE INSTRUCTIONS
You have been prescribed new medications for your medical condition. Please read the information provided, as well as schedule hospital follow up appointments to discuss whether to continue or stop these new medications with your primary providers.     COVID places you at risk for developing clots. We started you on a blood thinner and continued a similar oral medication, eliquis.

## 2022-01-15 NOTE — DISCHARGE SUMMARY
[facility] - Telemetry ([facility])  [facility] Medicine  Discharge Summary      Patient Name: Diamond Das  MRN: [id number]  Patient Class: IP- Inpatient  Admission Date: 1/10/2022  Hospital Length of Stay: 4 days  Discharge Date and Time:  01/15/2022 12:57 PM  Attending Physician: Carlita Michael MD   Discharging Provider: Carlita Michael MD  Primary Care Provider: Andrey Perrin MD      HPI:   65 y/o F PMH CHF, CKD, anemia presents with c/o SOB progressively worsening over the past week, worse with exertion. Associated symptoms include cough and leg swelling. Pertinent negatives include no fever, no rhinorrhea, no sore throat, no sputum production, no hemoptysis, no wheezing, no chest pain, no syncope, no vomiting, no abdominal pain and no rash. COVID-19 positive on presentation. Reported RA sats charted 87% and transferred from [facility]. [facility] since arrival      * No surgery found *      Hospital Course:   Diamond Das is a 66 year old female who was admitted to [facility] for COVID-19 and decompensated CHF. CXR shows evidence of volume overload and was diuresed. Echocaridogram shows a preserved EF with diastolic dysfunction. Will repeat CXR on 1/12/22. For COVID-19 she received MVI, ascorbic acid, and dexamethasone. Currently on 3L nasal cannula.    1/12/22  D-dimer 7 today. V/Q scan indeterminant for pulmonary embolism. Could reflect fluid within the fissure as well. Started on heparin infusion. CXR today still shows fluid.  Will change diuretics to Bumex with Metolazone for one dose. Closely monitor kidney function.     1/13/22  Still with decrease urine ouput. Nephrology consulted to assist with diuretics. Bumex changed to 2mg IV every 8 hours. 500mg diuril twice a day has been added for 48 hours. No intervention needed for hyperkalemia.     1/14  Reports sx improvement. Ready to go home but reports right breast swelling since admission. Lower ext edema and dyspnea improving. H/h trending down with no  overt s/s bleeding. Agreeable to blood transfusion. Nephrology consulted. Consider cards consult    1/15  Dyspnea and swelling symptoms improved. No transfusion reaction after blood transfusion. Denies further breast swelling. Does not qualify for home o2. Followed o/p with Dr. Barkley, pulmonology. Advised to f/u o/p, as elevated d-dimer in setting of covid. Perfusion scan indeterminant and started on heparin gtt. Transition to eliquis on d/c.    Neph consulted for recs regarding diuresis and hyperk in ckd and chf. Metolazone started. Hyperk resolved. Cr stable but elevated and will need further adjusting of meds o/p. Advised strict na and fluid restriction. Neph recommending tolerance of higher cr while also need for diuresising for chfx    Hyperglycemia on systemic steroids. Hba1c <5. Discontinued steroids and managed hyperglycemia with insulin.     Patient seen and evaluated by me. Patient was determined to be suitable for d/c. Patient deemed stable for discharge to home with NP to visit home. Declined homehealth referral.         Goals of Care Treatment Preferences:  Code Status: Full Code    Living Will: Yes              Consults:   Consults (From admission, onward)        Status Ordering Provider     Inpatient consult to Social Work/Case Management  Once        Provider:  (Not yet assigned)    Completed ALEXIA SOTO     Inpatient consult to Nephrology  Once        Provider:  Pankaj Jacobo MD    Acknowledged KLAUDIA ONEIL     Inpatient consult to Registered Dietitian/Nutritionist  Once        Provider:  (Not yet assigned)    Completed CELSO ACHARYA          No new Assessment & Plan notes have been filed under this hospital service since the last note was generated.  Service: Hospital Medicine    Final Active Diagnoses:    Diagnosis Date Noted POA    Elevated d-dimer with indeterminate perfusion scan for PE [R79.89] 01/14/2022 Yes    COVID-19 [U07.1] 01/10/2022 Yes    Anemia due to chronic  kidney disease [N18.9, D63.1] 12/06/2021 Yes    Type 2 diabetes mellitus, without long-term current use of insulin [E11.9] 05/16/2019 Yes    CKD (chronic kidney disease) stage 5, GFR less than 15 ml/min [N18.5] 05/24/2018 Yes    Hyperlipidemia [E78.5] 03/19/2018 Yes      Problems Resolved During this Admission:    Diagnosis Date Noted Date Resolved POA    PRINCIPAL PROBLEM:  CHF exacerbation [I50.9] 10/09/2020 01/15/2022 Yes    Swelling of breast [N63.0] 01/14/2022 01/15/2022 Yes    Hyperkalemia [E87.5] 01/12/2022 01/15/2022 No       Discharged Condition: stable    Disposition:     Follow Up:   Follow-up Information     Andrey Perrin MD. Schedule an appointment as soon as possible for a visit in 3 days.    Specialty: Family Medicine  Why: hospital follow up  Contact information:  [address] [place] [id number] [phone]             Marcos Ramos Md, MD. Schedule an appointment as soon as possible for a visit in 1 week.    Specialties: Cardiology, Internal Medicine  Why: cardiology hospital follow up  Contact information:  [address]  [place] [address] [phone]             Angelica Hunter MD. Schedule an appointment as soon as possible for a visit in 1 week.    Specialty: Pulmonary Disease  Why: lung hospital follow up  Contact information:  [address] [place] [id number] [phone]                       Patient Instructions:      Ambulatory referral/consult to [facility]   Standing Status: Future   Referral Priority: Routine Referral Type: Consultation   Referral Reason: Specialty Services Required   Requested Specialty: Cardiology   Number of Visits Requested: 1     Diet Cardiac   Order Comments: Sodium and fluid restriction     COVID-19 Surveillance Program     Order Specific Question Answer Comments   Does patient have a smartphone? Yes    Does patient have the [facility] on their smartphone? Yes    While in surveillance program,  will patient be using home oxygen? Yes      Activity as tolerated       Significant Diagnostic Studies: Labs:   CMP   Recent Labs   Lab 01/14/22  0606 01/15/22  0445 01/15/22  0955    138 138   K 5.2* 5.4* 4.6    108 106   CO2 22* 24 22*   * 307* 254*   BUN 66* 72* 71*   CREATININE 4.8* 4.9* 5.0*   CALCIUM 6.2* 6.4* 6.6*   ANIONGAP 9 6* 10   ESTGFRAFRICA 10* 10* 10*   EGFRNONAA 9* 9* 9*   , CBC   Recent Labs   Lab 01/14/22  0606 01/14/22  0606 01/15/22  0445   WBC 10.92  --  11.86   HGB 6.9*  --  9.0*   HCT 22.5*   < > 28.7*     --  273    < > = values in this interval not displayed.   , Lipid Panel   Lab Results   Component Value Date    CHOL 238 (H) 10/06/2021    HDL 38 (L) 10/06/2021    LDLCALC 175.6 (H) 10/06/2021    TRIG 122 10/06/2021    CHOLHDL 16.0 (L) 10/06/2021   , Troponin   Recent Labs   Lab 01/10/22  1035   TROPONINI 0.032*   , A1C:   Recent Labs   Lab 10/04/21  1630 01/10/22  2329   HGBA1C 5.2 4.7    and All labs within the past 24 hours have been reviewed  Radiology: X-Ray: CXR: portable  Ultrasound: lower ext b/l veins  Nuclear Medicine: perfusion scan  Cardiac Graphics: Echocardiogram:   Transthoracic echo (TTE) complete (Cupid Only):   Results for orders placed or performed during the hospital encounter of 01/10/22   Echo   Result Value Ref Range    BSA 2.04 m2    Left Ventricular Outflow Tract Mean Velocity 7.5684953537 cm/s    Left Ventricular Outflow Tract Mean Gradient 4.41 mmHg    TV mean gradient 23 mmHg    LVIDd 4.56 3.5 - 6.0 cm    IVS 1.31 (A) 0.6 - 1.1 cm    Posterior Wall 1.30 (A) 0.6 - 1.1 cm    Ao root annulus 2.77 cm    LVIDs 2.47 2.1 - 4.0 cm    FS 46 28 - 44 %    Ascending aorta 2.75 cm    LV mass 228.39 g    LA size 3.83 cm    Left Ventricle Relative Wall Thickness 0.57 cm    AV mean gradient 9 mmHg    AV valve area 2.07 cm2    AV Velocity Ratio 0.70     AV index (prosthetic) 0.75     MV valve area p 1/2 method 5.21 cm2    E/A ratio 1.46     E wave  deceleration time 145.447608642184669 msec    IVRT 55.400235700632964 msec    LVOT diameter 1.87 cm    LVOT area 2.7 cm2    LVOT peak bart 1.29 m/s    LVOT peak VTI 30.30 cm    Ao peak bart 1.85 m/s    Ao VTI 40.2 cm    RVOT peak bart 0.90 m/s    RVOT peak VTI 20.8 cm    Mr max bart 4.04 m/s    LVOT stroke volume 83.18 cm3    AV peak gradient 14 mmHg    PV mean gradient 2.38 mmHg    MV Peak E Bart 1.24 m/s    TR Max Bart 2.81 m/s    MV stenosis pressure 1/2 time 42.9208987 ms    MV Peak A Bart 0.85 m/s    LV Systolic Volume 21.58 mL    LV Systolic Volume Index 11.1 mL/m2    LV Diastolic Volume 95.37 mL    LV Diastolic Volume Index 49.16 mL/m2    LV Mass Index 118 g/m2    Echo EF Estimated 77 %    RA Major Axis 4.71 cm    Left Atrium Minor Axis 5.15 cm    Left Atrium Major Axis 5.56 cm    Triscuspid Valve Regurgitation Peak Gradient 32 mmHg    Right Atrial Pressure (from IVC) 3 mmHg    EF 60 %    TV rest pulmonary artery pressure 35 mmHg    Narrative    · The left ventricle is normal in size with concentric hypertrophy and   normal systolic function.  · Indeterminate left ventricular diastolic function.  · The estimated PA systolic pressure is 35 mmHg.  · Normal right ventricular size with normal right ventricular systolic   function.  · Normal central venous pressure (3 mmHg).  · The estimated ejection fraction is 60%.  · Mild mitral regurgitation.  · Mild tricuspid regurgitation.          Pending Diagnostic Studies:     None         Medications:  Reconciled Home Medications:      Medication List      START taking these medications    * apixaban 5 mg Tab  Commonly known as: ELIQUIS  Take 2 tablets (10 mg total) by mouth 2 (two) times daily. for 5 days     * apixaban 5 mg Tab  Commonly known as: ELIQUIS  Take 1 tablet (5 mg total) by mouth 2 (two) times daily.  Start taking on: January 22, 2022     ascorbic acid (vitamin C) 500 MG tablet  Commonly known as: VITAMIN C  Take 1 tablet (500 mg total) by mouth once daily.  Start  taking on: January 16, 2022     metOLazone 5 MG tablet  Commonly known as: ZAROXOLYN  Take 1 tablet (5 mg total) by mouth once daily.  Start taking on: January 16, 2022     multivitamin Tab  Take 1 tablet by mouth once daily.  Start taking on: January 16, 2022     pulse oximeter device  Commonly known as: pulse oximeter  by Apply Externally route 2 (two) times a day. Use twice daily at 8 AM and 3 PM and record the value in WISHIt as directed.         * This list has 2 medication(s) that are the same as other medications prescribed for you. Read the directions carefully, and ask your doctor or other care provider to review them with you.            CHANGE how you take these medications    pravastatin 10 MG tablet  Commonly known as: PRAVACHOL  Take 1 tablet (10 mg total) by mouth once daily.  Start taking on: January 16, 2022  What changed:   · medication strength  · See the new instructions.        CONTINUE taking these medications    amLODIPine 10 MG tablet  Commonly known as: NORVASC  Take 1 tablet by mouth once daily     aspirin 81 MG EC tablet  Commonly known as: ECOTRIN  Take 1 tablet by mouth once daily     carvediloL 25 MG tablet  Commonly known as: COREG  Take 1 tablet (25 mg total) by mouth 2 (two) times daily with meals.     dicyclomine 20 mg tablet  Commonly known as: BENTYL  Take 1 tablet (20 mg total) by mouth every 6 (six) hours.     docusate sodium 100 MG capsule  Commonly known as: COLACE  Take 1 capsule (100 mg total) by mouth 3 (three) times daily. Hold for diarrhea     EUTHYROX 137 MCG Tab tablet  Generic drug: levothyroxine  TAKE 1 TABLET BY MOUTH BEFORE BREAKFAST     famotidine 20 MG tablet  Commonly known as: PEPCID  Take 1 tablet (20 mg total) by mouth once daily.     fluticasone propionate 50 mcg/actuation nasal spray  Commonly known as: FLONASE  2 sprays (100 mcg total) by Each Nostril route once daily.     GARLIQUE ORAL  Take 1 tablet by mouth once daily.     isosorbide mononitrate 60 MG  24 hr tablet  Commonly known as: IMDUR  Take 1 tablet (60 mg total) by mouth every evening.     magnesium oxide 400 mg (241.3 mg magnesium) tablet  Commonly known as: MAG-OX  Take 1 tablet (400 mg total) by mouth once daily.     ondansetron 4 MG tablet  Commonly known as: ZOFRAN  TAKE 1 TABLET BY MOUTH EVERY 8 HOURS AS NEEDED     torsemide 20 MG Tab  Commonly known as: DEMADEX  Take 2 tablets (40 mg total) by mouth once daily.     VICTOZA 2-SHAYY 0.6 mg/0.1 mL (18 mg/3 mL) Pnij pen  Generic drug: liraglutide 0.6 mg/0.1 mL (18 mg/3 mL) subq PNIJ  INJECT 1.8 MG INTO THE SKIN ONCE DAILY     XIFAXAN 550 mg Tab  Generic drug: rifAXIMin  Take 1 tablet (550 mg total) by mouth 2 (two) times daily.        STOP taking these medications    olmesartan 40 MG tablet  Commonly known as: BENICAR            Indwelling Lines/Drains at time of discharge:   Lines/Drains/Airways     None                 Time spent on the discharge of patient: 35 minutes         Carlita Michael MD  Department of Hospital Medicine  [facility] - Telemetry ([facility])

## 2022-01-15 NOTE — PLAN OF CARE
[facility] - Telemetry (Hospital)  Discharge Final Note    Primary Care Provider: Andrey Perrin MD    Expected Discharge Date: 1/15/2022    Final Discharge Note (most recent)     Final Note - 01/15/22 1239        Final Note    Anticipated Discharge Disposition Home or Self Care        Post-Acute Status    Discharge Delays None known at this time                 Important Message from Medicare

## 2022-01-15 NOTE — PLAN OF CARE
MARLENY. Patient received one unit of RBC without issue. Continued IV bumex for diuresis. Heparin continued, no changes to rate as patient has been therapeutic. Patient continued on 3L of oxygen via NC. No reports of shortness of breath. SSI given per order. No BM overnight for occult stool. Continue current plan of care.       Problem: Adult Inpatient Plan of Care  Goal: Plan of Care Review  Outcome: Ongoing, Progressing  Goal: Patient-Specific Goal (Individualized)  Outcome: Ongoing, Progressing  Goal: Absence of Hospital-Acquired Illness or Injury  Outcome: Ongoing, Progressing  Goal: Optimal Comfort and Wellbeing  Outcome: Ongoing, Progressing  Goal: Readiness for Transition of Care  Outcome: Ongoing, Progressing     Problem: Diabetes Comorbidity  Goal: Blood Glucose Level Within Targeted Range  Outcome: Ongoing, Progressing     Problem: Fluid and Electrolyte Imbalance (Acute Kidney Injury/Impairment)  Goal: Fluid and Electrolyte Balance  Outcome: Ongoing, Progressing     Problem: Oral Intake Inadequate (Acute Kidney Injury/Impairment)  Goal: Optimal Nutrition Intake  Outcome: Ongoing, Progressing     Problem: Renal Function Impairment (Acute Kidney Injury/Impairment)  Goal: Effective Renal Function  Outcome: Ongoing, Progressing     Problem: Bariatric Environmental Safety  Goal: Safety Maintained with Care  Outcome: Ongoing, Progressing     Problem: Anemia  Goal: Anemia Symptom Improvement  Outcome: Ongoing, Progressing     Problem: Fall Injury Risk  Goal: Absence of Fall and Fall-Related Injury  Outcome: Ongoing, Progressing

## 2022-01-15 NOTE — PT/OT/SLP EVAL
Physical Therapy Evaluation and Discharge Note    Patient Name:  Diamond Das   MRN:  [id number]    Recommendations:     Discharge Recommendations:  home   Discharge Equipment Recommendations: none   Barriers to discharge: None    Assessment:     Diamond Das is a 66 y.o. female admitted with a medical diagnosis of CHF exacerbation. .  At this time, patient is functioning at their prior level of function and does not require further acute PT services.     Recent Surgery: * No surgery found *      Plan:     During this hospitalization, patient does not require further acute PT services.  Please re-consult if situation changes.      Subjective     Chief Complaint: None stated  Patient/Family Comments/goals: Go home  Pain/Comfort:  · Pain Rating 1: 0/10    Patients cultural, spiritual, Holiness conflicts given the current situation: no    Living Environment:  Patient lives with daughter in a single level home.   Prior to admission, patients level of function was independent.  Equipment used at home: oxygen.  DME owned (not currently used): none.  Upon discharge, patient will have assistance from daughter.    Objective:     Communicated with SERGIO Rose prior to session.  Patient found seated EOB with peripheral IV upon PT entry to room.    General Precautions: Standard, airborne,contact,droplet   Orthopedic Precautions:N/A   Braces: N/A   Respiratory Status: Room air    Exams:  · RLE ROM: WFL  · RLE Strength: WFL  · LLE ROM: WFL  · LLE Strength: WFL    Functional Mobility:  · Transfers:     · Sit to Stand:  modified independence with no AD  · Gait: 20' Mod I with no AD and with no LOB    AM-PAC 6 CLICK MOBILITY  Total Score:21       Therapeutic Activities and Exercises:     INITIAL PT EVAL COMPLETED. PATIENT WILL NOT REQUIRE SKILLED THERAPY SERVICES AND IS SAFE TO DC HOME.     AM-PAC 6 CLICK MOBILITY  Total Score:21     Patient left seated EOB with call button in reach.    GOALS:   Multidisciplinary Problems      Physical Therapy Goals     Not on file                History:     Past Medical History:   Diagnosis Date    Ascites     Breast pain, left 1/4/2018    Cataract     CHF (congestive heart failure)     Cirrhosis     Cough     Diabetes mellitus     Diabetes mellitus, type 2     Gastroparesis     GERD (gastroesophageal reflux disease)     Hyperlipidemia     Hypertension     Hypotension 2/21/2019    Liver disease     Lumbar strain 4/27/2018    Macular degeneration     Pneumonia of right middle lobe due to infectious organism 12/19/2017    Renal disorder     Retinopathy due to secondary diabetes mellitus     Sleep apnea     Thyroid disease        Past Surgical History:   Procedure Laterality Date    CATARACT EXTRACTION      CHOLECYSTECTOMY      COLONOSCOPY N/A 9/4/2019    Procedure: COLONOSCOPY;  Surgeon: Marnie Elmore MD;  Location: [facility] ENDO;  Service: Endoscopy;  Laterality: N/A;    ERCP      FLUOROSCOPY N/A 7/24/2020    Procedure: TIPS revision;  Surgeon: Martlel Jasmine MD;  Location[facility] CATH LAB;  Service: General;  Laterality: N/A;    LIVER BIOPSY      THORACENTESIS Left 1/20/2020    Procedure: Thoracentesis;  Surgeon: Angelica Hunter MD;  Location: [facility] ENDO;  Service: Pulmonary;  Laterality: Left;    TUBAL LIGATION      UPPER GASTROINTESTINAL ENDOSCOPY         Time Tracking:     PT Received On: 01/15/22  PT Start Time: 1315     PT Stop Time: 1330  PT Total Time (min): 15 min     Billable Minutes: Evaluation 15      01/15/2022

## 2022-01-15 NOTE — PLAN OF CARE
Discharge instructions received and reviewed with pt and family at bedside.  Pt voiced understanding and all questions answered to satisfaction.  Stressed importance to making and keeping all follow up appointments.  Medications sent to pt pharmacy and reviewed with pt.  Tele monitor removed and brought to monitor tech.  IV d/c'd with tip intact, pressure dressing applied.  Pt transported to front of hospital via w/c by nurse to be discharged home.

## 2022-01-18 ENCOUNTER — PATIENT MESSAGE (OUTPATIENT)
Dept: ADMINISTRATIVE | Facility: OTHER | Age: 67
End: 2022-01-18
Payer: MEDICAID

## 2022-01-18 ENCOUNTER — NURSE TRIAGE (OUTPATIENT)
Dept: ADMINISTRATIVE | Facility: CLINIC | Age: 67
End: 2022-01-18
Payer: MEDICAID

## 2022-01-18 ENCOUNTER — PATIENT OUTREACH (OUTPATIENT)
Dept: ADMINISTRATIVE | Facility: CLINIC | Age: 67
End: 2022-01-18
Payer: MEDICAID

## 2022-01-18 ENCOUNTER — TELEPHONE (OUTPATIENT)
Dept: ADMINISTRATIVE | Facility: CLINIC | Age: 67
End: 2022-01-18
Payer: MEDICAID

## 2022-01-18 ENCOUNTER — PATIENT MESSAGE (OUTPATIENT)
Dept: ADMINISTRATIVE | Facility: CLINIC | Age: 67
End: 2022-01-18
Payer: MEDICAID

## 2022-01-18 NOTE — TELEPHONE ENCOUNTER
Duplicate call - pt reports she has already spoke with triage nurse.     Reason for Disposition   Caller has already spoken with another triager and has no further questions.    Protocols used: NO CONTACT OR DUPLICATE CONTACT CALL-A-AH

## 2022-01-18 NOTE — TELEPHONE ENCOUNTER
Pt escalated for SpO2 of 93. Pt denies any SOB. Pt needed teaching of how to use pulse ox. Recheck and SpO2 is 96. Advised if SpO2 ever falls below 94 and does not increase after a few deep breaths to go to ED for evaluation. All VS and symptoms WNL so no triage required. Pt has number to OOC for further concerns. Will continue to monitor.    Reason for Disposition   Information only question and nurse able to answer    Protocols used: INFORMATION ONLY CALL - NO TRIAGE-A-OH

## 2022-01-19 ENCOUNTER — PATIENT MESSAGE (OUTPATIENT)
Dept: ADMINISTRATIVE | Facility: OTHER | Age: 67
End: 2022-01-19
Payer: MEDICAID

## 2022-01-19 ENCOUNTER — PATIENT MESSAGE (OUTPATIENT)
Dept: ADMINISTRATIVE | Facility: CLINIC | Age: 67
End: 2022-01-19
Payer: MEDICAID

## 2022-01-19 ENCOUNTER — TELEPHONE (OUTPATIENT)
Dept: TRANSPLANT | Facility: CLINIC | Age: 67
End: 2022-01-19
Payer: MEDICAID

## 2022-01-20 ENCOUNTER — PATIENT MESSAGE (OUTPATIENT)
Dept: ADMINISTRATIVE | Facility: OTHER | Age: 67
End: 2022-01-20
Payer: MEDICAID

## 2022-01-21 ENCOUNTER — NURSE TRIAGE (OUTPATIENT)
Dept: ADMINISTRATIVE | Facility: CLINIC | Age: 67
End: 2022-01-21
Payer: MEDICAID

## 2022-01-21 ENCOUNTER — PATIENT MESSAGE (OUTPATIENT)
Dept: ADMINISTRATIVE | Facility: OTHER | Age: 67
End: 2022-01-21
Payer: MEDICAID

## 2022-01-21 NOTE — TELEPHONE ENCOUNTER
Per Covid Surveillance Program, pt escalated high priority for abnormal vitals; SpO2=94%.  Recheck SpO2=96% AL=72.  She denies any symptoms at this time.  All VS and symptoms are WNL.  No triage required.  Instructed pt to call OOC for worsening and/or questions/concerns.  VU.    Reason for Disposition   Information only question and nurse able to answer    Additional Information   Negative: Nursing judgment   Negative: Nursing judgment   Negative: Nursing judgment   Negative: Nursing judgment    Protocols used: INFORMATION ONLY CALL - NO TRIAGE-A-OH

## 2022-01-22 ENCOUNTER — NURSE TRIAGE (OUTPATIENT)
Dept: ADMINISTRATIVE | Facility: CLINIC | Age: 67
End: 2022-01-22
Payer: MEDICAID

## 2022-01-22 ENCOUNTER — PATIENT MESSAGE (OUTPATIENT)
Dept: ADMINISTRATIVE | Facility: OTHER | Age: 67
End: 2022-01-22
Payer: MEDICAID

## 2022-01-22 NOTE — TELEPHONE ENCOUNTER
Pt escalated due to reporting elevated HR. Pt called states entry was made in error. States intended to out 75 for HR. Pt denies further needs    Reason for Disposition   Health Information question, no triage required and triager able to answer question    Protocols used: INFORMATION ONLY CALL - NO TRIAGE-A-

## 2022-01-23 ENCOUNTER — PATIENT MESSAGE (OUTPATIENT)
Dept: ADMINISTRATIVE | Facility: CLINIC | Age: 67
End: 2022-01-23
Payer: MEDICAID

## 2022-01-23 ENCOUNTER — PATIENT MESSAGE (OUTPATIENT)
Dept: ADMINISTRATIVE | Facility: OTHER | Age: 67
End: 2022-01-23
Payer: MEDICAID

## 2022-01-24 ENCOUNTER — NURSE TRIAGE (OUTPATIENT)
Dept: ADMINISTRATIVE | Facility: CLINIC | Age: 67
End: 2022-01-24
Payer: MEDICAID

## 2022-01-24 ENCOUNTER — TELEPHONE (OUTPATIENT)
Dept: PHARMACY | Facility: CLINIC | Age: 67
End: 2022-01-24
Payer: MEDICAID

## 2022-01-24 ENCOUNTER — PATIENT MESSAGE (OUTPATIENT)
Dept: ADMINISTRATIVE | Facility: OTHER | Age: 67
End: 2022-01-24
Payer: MEDICAID

## 2022-01-24 NOTE — TELEPHONE ENCOUNTER
Pt called and she said that she is feeling amazing and she denies any SOB after cough and deep breathing and O2 sat went up to 94 pt asked if she could be removed and told yes to call back if any problems. Pts task removed    Reason for Disposition   Information only question and nurse able to answer    Protocols used: INFORMATION ONLY CALL - NO TRIAGE-A-OH

## 2022-01-25 ENCOUNTER — PATIENT MESSAGE (OUTPATIENT)
Dept: PHARMACY | Facility: CLINIC | Age: 67
End: 2022-01-25
Payer: MEDICAID

## 2022-01-25 ENCOUNTER — OFFICE VISIT (OUTPATIENT)
Dept: DERMATOLOGY | Facility: CLINIC | Age: 67
End: 2022-01-25
Payer: MEDICAID

## 2022-01-25 DIAGNOSIS — L98.9 SKIN LESION: ICD-10-CM

## 2022-01-25 DIAGNOSIS — L85.3 XEROSIS CUTIS: Primary | ICD-10-CM

## 2022-01-25 DIAGNOSIS — L82.1 SEBORRHEIC KERATOSES: ICD-10-CM

## 2022-01-25 PROCEDURE — 99202 OFFICE O/P NEW SF 15 MIN: CPT | Mod: S$PBB,,, | Performed by: STUDENT IN AN ORGANIZED HEALTH CARE EDUCATION/TRAINING PROGRAM

## 2022-01-25 PROCEDURE — 3044F HG A1C LEVEL LT 7.0%: CPT | Mod: CPTII,,, | Performed by: STUDENT IN AN ORGANIZED HEALTH CARE EDUCATION/TRAINING PROGRAM

## 2022-01-25 PROCEDURE — 1159F MED LIST DOCD IN RCRD: CPT | Mod: CPTII,,, | Performed by: STUDENT IN AN ORGANIZED HEALTH CARE EDUCATION/TRAINING PROGRAM

## 2022-01-25 PROCEDURE — 1159F PR MEDICATION LIST DOCUMENTED IN MEDICAL RECORD: ICD-10-PCS | Mod: CPTII,,, | Performed by: STUDENT IN AN ORGANIZED HEALTH CARE EDUCATION/TRAINING PROGRAM

## 2022-01-25 PROCEDURE — 1111F PR DISCHARGE MEDS RECONCILED W/ CURRENT OUTPATIENT MED LIST: ICD-10-PCS | Mod: CPTII,,, | Performed by: STUDENT IN AN ORGANIZED HEALTH CARE EDUCATION/TRAINING PROGRAM

## 2022-01-25 PROCEDURE — 1160F RVW MEDS BY RX/DR IN RCRD: CPT | Mod: CPTII,,, | Performed by: STUDENT IN AN ORGANIZED HEALTH CARE EDUCATION/TRAINING PROGRAM

## 2022-01-25 PROCEDURE — 99999 PR PBB SHADOW E&M-EST. PATIENT-LVL IV: CPT | Mod: PBBFAC,,, | Performed by: STUDENT IN AN ORGANIZED HEALTH CARE EDUCATION/TRAINING PROGRAM

## 2022-01-25 PROCEDURE — 1160F PR REVIEW ALL MEDS BY PRESCRIBER/CLIN PHARMACIST DOCUMENTED: ICD-10-PCS | Mod: CPTII,,, | Performed by: STUDENT IN AN ORGANIZED HEALTH CARE EDUCATION/TRAINING PROGRAM

## 2022-01-25 PROCEDURE — 99999 PR PBB SHADOW E&M-EST. PATIENT-LVL IV: ICD-10-PCS | Mod: PBBFAC,,, | Performed by: STUDENT IN AN ORGANIZED HEALTH CARE EDUCATION/TRAINING PROGRAM

## 2022-01-25 PROCEDURE — 99214 OFFICE O/P EST MOD 30 MIN: CPT | Mod: PBBFAC | Performed by: STUDENT IN AN ORGANIZED HEALTH CARE EDUCATION/TRAINING PROGRAM

## 2022-01-25 PROCEDURE — 1111F DSCHRG MED/CURRENT MED MERGE: CPT | Mod: CPTII,,, | Performed by: STUDENT IN AN ORGANIZED HEALTH CARE EDUCATION/TRAINING PROGRAM

## 2022-01-25 PROCEDURE — 99202 PR OFFICE/OUTPT VISIT, NEW, LEVL II, 15-29 MIN: ICD-10-PCS | Mod: S$PBB,,, | Performed by: STUDENT IN AN ORGANIZED HEALTH CARE EDUCATION/TRAINING PROGRAM

## 2022-01-25 PROCEDURE — 3044F PR MOST RECENT HEMOGLOBIN A1C LEVEL <7.0%: ICD-10-PCS | Mod: CPTII,,, | Performed by: STUDENT IN AN ORGANIZED HEALTH CARE EDUCATION/TRAINING PROGRAM

## 2022-01-25 NOTE — PROGRESS NOTES
Subjective:       Patient ID:  Diamond Das is a 66 y.o. female who presents for   Chief Complaint   Patient presents with    Lesion     History of Present Illness: The patient presents with chief complaint of several skin lesions.  Location: around eyes on face  Duration: months to year  Signs/Symptoms: brownish growths/moles, denies any symptoms with the lesions  Prior treatments: none    Patient with new complaint of lesion(s) - dry skin  Location: back  Duration: years  Symptoms: dryness, itching  Relieving factors/Previous treatments: none        Review of Systems   Constitutional: Negative for fever and chills.   Skin: Positive for itching and dry skin.        Objective:    Physical Exam   Constitutional: She appears well-developed and well-nourished. No distress.   Neurological: She is alert and oriented to person, place, and time. She is not disoriented.   Psychiatric: She has a normal mood and affect.   Skin:   Areas Examined (abnormalities noted in diagram):   Head / Face Inspection Performed  Neck Inspection Performed  Back Inspection Performed                   Diagram Legend     Erythematous scaling macule/papule c/w actinic keratosis       Vascular papule c/w angioma      Pigmented verrucoid papule/plaque c/w seborrheic keratosis      Yellow umbilicated papule c/w sebaceous hyperplasia      Irregularly shaped tan macule c/w lentigo     1-2 mm smooth white papules consistent with Milia      Movable subcutaneous cyst with punctum c/w epidermal inclusion cyst      Subcutaneous movable cyst c/w pilar cyst      Firm pink to brown papule c/w dermatofibroma      Pedunculated fleshy papule(s) c/w skin tag(s)      Evenly pigmented macule c/w junctional nevus     Mildly variegated pigmented, slightly irregular-bordered macule c/w mildly atypical nevus      Flesh colored to evenly pigmented papule c/w intradermal nevus       Pink pearly papule/plaque c/w basal cell carcinoma      Erythematous  hyperkeratotic cursted plaque c/w SCC      Surgical scar with no sign of skin cancer recurrence      Open and closed comedones      Inflammatory papules and pustules      Verrucoid papule consistent consistent with wart     Erythematous eczematous patches and plaques     Dystrophic onycholytic nail with subungual debris c/w onychomycosis     Umbilicated papule    Erythematous-base heme-crusted tan verrucoid plaque consistent with inflamed seborrheic keratosis     Erythematous Silvery Scaling Plaque c/w Psoriasis     See annotation      Assessment / Plan:        Xerosis cutis  Counseled patient on gentle skin care regimen, including need for sensitive soaps/detergents, as well as need for frequent use of sensitize moisturizers.     Seborrheic keratoses  These are benign inherited growths without a malignant potential. Reassurance given to patient. No treatment is necessary.              Follow up if symptoms worsen or fail to improve.

## 2022-01-31 ENCOUNTER — OFFICE VISIT (OUTPATIENT)
Dept: INTERNAL MEDICINE | Facility: CLINIC | Age: 67
End: 2022-01-31
Payer: MEDICAID

## 2022-01-31 ENCOUNTER — LAB VISIT (OUTPATIENT)
Dept: LAB | Facility: HOSPITAL | Age: 67
End: 2022-01-31
Attending: FAMILY MEDICINE
Payer: MEDICAID

## 2022-01-31 ENCOUNTER — TELEPHONE (OUTPATIENT)
Dept: INTERNAL MEDICINE | Facility: CLINIC | Age: 67
End: 2022-01-31
Payer: MEDICAID

## 2022-01-31 VITALS
TEMPERATURE: 98 F | SYSTOLIC BLOOD PRESSURE: 120 MMHG | WEIGHT: 187.81 LBS | HEART RATE: 70 BPM | OXYGEN SATURATION: 97 % | DIASTOLIC BLOOD PRESSURE: 62 MMHG | BODY MASS INDEX: 35.49 KG/M2

## 2022-01-31 DIAGNOSIS — E78.49 OTHER HYPERLIPIDEMIA: ICD-10-CM

## 2022-01-31 DIAGNOSIS — I10 PRIMARY HYPERTENSION: ICD-10-CM

## 2022-01-31 DIAGNOSIS — U07.1 COVID-19: ICD-10-CM

## 2022-01-31 DIAGNOSIS — N18.5 CKD (CHRONIC KIDNEY DISEASE) STAGE 5, GFR LESS THAN 15 ML/MIN: ICD-10-CM

## 2022-01-31 DIAGNOSIS — I50.32 CHRONIC DIASTOLIC CONGESTIVE HEART FAILURE: Primary | ICD-10-CM

## 2022-01-31 DIAGNOSIS — M17.10 ARTHRITIS OF KNEE: ICD-10-CM

## 2022-01-31 DIAGNOSIS — E11.59 TYPE 2 DIABETES MELLITUS WITH OTHER CIRCULATORY COMPLICATION, WITHOUT LONG-TERM CURRENT USE OF INSULIN: ICD-10-CM

## 2022-01-31 DIAGNOSIS — R79.89 ELEVATED D-DIMER: ICD-10-CM

## 2022-01-31 PROBLEM — Z28.39 IMMUNIZATION DEFICIENCY: Status: RESOLVED | Noted: 2020-02-10 | Resolved: 2022-01-31

## 2022-01-31 PROBLEM — S39.012S LUMBAR STRAIN, SEQUELA: Status: RESOLVED | Noted: 2020-02-17 | Resolved: 2022-01-31

## 2022-01-31 PROBLEM — R19.7 DIARRHEA: Status: RESOLVED | Noted: 2019-09-04 | Resolved: 2022-01-31

## 2022-01-31 PROBLEM — L98.9 SKIN LESION: Status: RESOLVED | Noted: 2021-12-20 | Resolved: 2022-01-31

## 2022-01-31 PROBLEM — K76.82 HEPATIC ENCEPHALOPATHY: Status: RESOLVED | Noted: 2018-06-19 | Resolved: 2022-01-31

## 2022-01-31 PROBLEM — E03.9 ACQUIRED HYPOTHYROIDISM: Status: RESOLVED | Noted: 2020-09-28 | Resolved: 2022-01-31

## 2022-01-31 PROBLEM — E11.65 TYPE 2 DIABETES MELLITUS WITH HYPERGLYCEMIA: Status: RESOLVED | Noted: 2020-10-10 | Resolved: 2022-01-31

## 2022-01-31 PROBLEM — N17.9 AKI (ACUTE KIDNEY INJURY): Status: RESOLVED | Noted: 2019-01-03 | Resolved: 2022-01-31

## 2022-01-31 PROBLEM — R60.9 EDEMA: Status: RESOLVED | Noted: 2018-03-19 | Resolved: 2022-01-31

## 2022-01-31 PROBLEM — S39.012A STRAIN OF LUMBAR REGION: Status: RESOLVED | Noted: 2021-10-04 | Resolved: 2022-01-31

## 2022-01-31 PROBLEM — R07.89 CHEST PAIN, ATYPICAL: Status: RESOLVED | Noted: 2018-02-09 | Resolved: 2022-01-31

## 2022-01-31 PROBLEM — R10.13 EPIGASTRIC PAIN: Status: RESOLVED | Noted: 2020-02-11 | Resolved: 2022-01-31

## 2022-01-31 LAB
ALBUMIN SERPL BCP-MCNC: 1.8 G/DL (ref 3.5–5.2)
ANION GAP SERPL CALC-SCNC: 8 MMOL/L (ref 8–16)
BUN SERPL-MCNC: 41 MG/DL (ref 8–23)
CALCIUM SERPL-MCNC: 7.9 MG/DL (ref 8.7–10.5)
CHLORIDE SERPL-SCNC: 106 MMOL/L (ref 95–110)
CO2 SERPL-SCNC: 28 MMOL/L (ref 23–29)
CREAT SERPL-MCNC: 3.4 MG/DL (ref 0.5–1.4)
EST. GFR  (AFRICAN AMERICAN): 15.7 ML/MIN/1.73 M^2
EST. GFR  (NON AFRICAN AMERICAN): 13.6 ML/MIN/1.73 M^2
GLUCOSE SERPL-MCNC: 82 MG/DL (ref 70–110)
PHOSPHATE SERPL-MCNC: 3 MG/DL (ref 2.7–4.5)
POTASSIUM SERPL-SCNC: 4 MMOL/L (ref 3.5–5.1)
SODIUM SERPL-SCNC: 142 MMOL/L (ref 136–145)

## 2022-01-31 PROCEDURE — 1111F DSCHRG MED/CURRENT MED MERGE: CPT | Mod: CPTII,,, | Performed by: FAMILY MEDICINE

## 2022-01-31 PROCEDURE — 36415 COLL VENOUS BLD VENIPUNCTURE: CPT | Mod: PO | Performed by: FAMILY MEDICINE

## 2022-01-31 PROCEDURE — 1160F PR REVIEW ALL MEDS BY PRESCRIBER/CLIN PHARMACIST DOCUMENTED: ICD-10-PCS | Mod: CPTII,,, | Performed by: FAMILY MEDICINE

## 2022-01-31 PROCEDURE — 1159F PR MEDICATION LIST DOCUMENTED IN MEDICAL RECORD: ICD-10-PCS | Mod: CPTII,,, | Performed by: FAMILY MEDICINE

## 2022-01-31 PROCEDURE — 99214 OFFICE O/P EST MOD 30 MIN: CPT | Mod: PBBFAC,PO | Performed by: FAMILY MEDICINE

## 2022-01-31 PROCEDURE — 3078F DIAST BP <80 MM HG: CPT | Mod: CPTII,,, | Performed by: FAMILY MEDICINE

## 2022-01-31 PROCEDURE — 3078F PR MOST RECENT DIASTOLIC BLOOD PRESSURE < 80 MM HG: ICD-10-PCS | Mod: CPTII,,, | Performed by: FAMILY MEDICINE

## 2022-01-31 PROCEDURE — 1160F RVW MEDS BY RX/DR IN RCRD: CPT | Mod: CPTII,,, | Performed by: FAMILY MEDICINE

## 2022-01-31 PROCEDURE — 3044F PR MOST RECENT HEMOGLOBIN A1C LEVEL <7.0%: ICD-10-PCS | Mod: CPTII,,, | Performed by: FAMILY MEDICINE

## 2022-01-31 PROCEDURE — 3044F HG A1C LEVEL LT 7.0%: CPT | Mod: CPTII,,, | Performed by: FAMILY MEDICINE

## 2022-01-31 PROCEDURE — 99214 OFFICE O/P EST MOD 30 MIN: CPT | Mod: S$PBB,,, | Performed by: FAMILY MEDICINE

## 2022-01-31 PROCEDURE — 1111F PR DISCHARGE MEDS RECONCILED W/ CURRENT OUTPATIENT MED LIST: ICD-10-PCS | Mod: CPTII,,, | Performed by: FAMILY MEDICINE

## 2022-01-31 PROCEDURE — 1159F MED LIST DOCD IN RCRD: CPT | Mod: CPTII,,, | Performed by: FAMILY MEDICINE

## 2022-01-31 PROCEDURE — 99999 PR PBB SHADOW E&M-EST. PATIENT-LVL IV: ICD-10-PCS | Mod: PBBFAC,,, | Performed by: FAMILY MEDICINE

## 2022-01-31 PROCEDURE — 3008F BODY MASS INDEX DOCD: CPT | Mod: CPTII,,, | Performed by: FAMILY MEDICINE

## 2022-01-31 PROCEDURE — 99214 PR OFFICE/OUTPT VISIT, EST, LEVL IV, 30-39 MIN: ICD-10-PCS | Mod: S$PBB,,, | Performed by: FAMILY MEDICINE

## 2022-01-31 PROCEDURE — 80069 RENAL FUNCTION PANEL: CPT | Performed by: FAMILY MEDICINE

## 2022-01-31 PROCEDURE — 3008F PR BODY MASS INDEX (BMI) DOCUMENTED: ICD-10-PCS | Mod: CPTII,,, | Performed by: FAMILY MEDICINE

## 2022-01-31 PROCEDURE — 99999 PR PBB SHADOW E&M-EST. PATIENT-LVL IV: CPT | Mod: PBBFAC,,, | Performed by: FAMILY MEDICINE

## 2022-01-31 PROCEDURE — 3074F PR MOST RECENT SYSTOLIC BLOOD PRESSURE < 130 MM HG: ICD-10-PCS | Mod: CPTII,,, | Performed by: FAMILY MEDICINE

## 2022-01-31 PROCEDURE — 3074F SYST BP LT 130 MM HG: CPT | Mod: CPTII,,, | Performed by: FAMILY MEDICINE

## 2022-01-31 RX ORDER — LIDOCAINE 40 MG/G
CREAM TOPICAL
COMMUNITY

## 2022-01-31 RX ORDER — DULAGLUTIDE 3 MG/.5ML
3 INJECTION, SOLUTION SUBCUTANEOUS
Qty: 12 PEN | Refills: 0 | Status: SHIPPED | OUTPATIENT
Start: 2022-01-31 | End: 2022-04-25

## 2022-01-31 NOTE — PROGRESS NOTES
Subjective:       Patient ID: Diamond Das is a 66 y.o. female.    Chief Complaint: Cough    Here for f/u from hospital for COVID and CHF.      Review of Systems   Respiratory: Negative for shortness of breath.    Cardiovascular: Negative for chest pain.   Gastrointestinal: Negative for abdominal pain.       Objective:      Physical Exam  Vitals and nursing note reviewed.   Constitutional:       General: She is not in acute distress.     Appearance: Normal appearance. She is well-developed. She is not diaphoretic.   HENT:      Head: Normocephalic and atraumatic.   Pulmonary:      Effort: Pulmonary effort is normal. No respiratory distress.      Breath sounds: Normal breath sounds. No wheezing.   Skin:     General: Skin is warm and dry.      Findings: No erythema or rash.   Neurological:      Mental Status: She is alert.         Assessment:       1. Chronic diastolic congestive heart failure    2. CKD (chronic kidney disease) stage 5, GFR less than 15 ml/min    3. Primary hypertension    4. Other hyperlipidemia    5. Type 2 diabetes mellitus with other circulatory complication, without long-term current use of insulin    6. Arthritis of knee    7. Elevated d-dimer with indeterminate perfusion scan for PE    8. COVID-19        Plan:     Problem List Items Addressed This Visit        Cardiac/Vascular    Chronic diastolic congestive heart failure - Primary    Hypertension    Overview     Chronic, Stable, cont amlodipine         Hyperlipidemia    Overview     Chronic, Stable, cont pravastatin            Renal/    CKD (chronic kidney disease) stage 5, GFR less than 15 ml/min    Overview      Ref Range & Units 2wk ago   Glucose 70 - 110 mg/dL 425     Sodium 136 - 145 mmol/L 140    Potassium 3.5 - 5.1 mmol/L 3.8    Chloride 95 - 110 mmol/L 101    CO2 23 - 29 mmol/L 30     BUN, Bld 8 - 23 mg/dL 26     Calcium 8.7 - 10.5 mg/dL 8.9    Creatinine 0.5 - 1.4 mg/dL 2.1     Albumin 3.5 - 5.2 g/dL 2.5     Phosphorus 2.7 - 4.5  mg/dL 3.8    eGFR if African American >60 mL/min/1.73 m^2 28.7     eGFR if non African American >60 mL/min/1.73 m^2 24.9     Comment: Calculation used to obtain the estimated glomerular filtration   rate (eGFR) is the CKD-EPI equation.    Anion Gap 8 - 16 mmol/L 9    Resulting Agency  [facility]      Specimen Collected: 05/10/18 11:16 Last Resulted: 05/10/18 11:46                   Relevant Orders    Renal Function Panel       Endocrine    Type 2 diabetes mellitus, without long-term current use of insulin    Overview     Chronic, Stable, use trulicity         Relevant Medications    dulaglutide (TRULICITY) 3 mg/0.5 mL pen injector       Orthopedic    Arthritis of knee    Relevant Orders    Ambulatory referral/consult to Physical/Occupational Therapy       Other    COVID-19    Relevant Medications    apixaban (ELIQUIS) 5 mg Tab    Elevated d-dimer with indeterminate perfusion scan for PE    Relevant Medications    apixaban (ELIQUIS) 5 mg Tab

## 2022-01-31 NOTE — TELEPHONE ENCOUNTER
----- Message from Rowdy Freire sent at 1/31/2022  9:59 AM CST -----  Contact: self  Pt lost appt due to hospitalization, and has another appt today would like to know if she can get in today around the same time. Please give her a callback at [phone]. Thanks

## 2022-02-01 ENCOUNTER — OFFICE VISIT (OUTPATIENT)
Dept: CARDIOLOGY | Facility: CLINIC | Age: 67
End: 2022-02-01
Payer: MEDICARE

## 2022-02-01 ENCOUNTER — TELEPHONE (OUTPATIENT)
Dept: CARDIOLOGY | Facility: CLINIC | Age: 67
End: 2022-02-01
Payer: MEDICAID

## 2022-02-01 ENCOUNTER — TELEPHONE (OUTPATIENT)
Dept: NEPHROLOGY | Facility: CLINIC | Age: 67
End: 2022-02-01
Payer: MEDICAID

## 2022-02-01 ENCOUNTER — TELEPHONE (OUTPATIENT)
Dept: HEPATOLOGY | Facility: CLINIC | Age: 67
End: 2022-02-01
Payer: MEDICAID

## 2022-02-01 DIAGNOSIS — Z86.16 HISTORY OF COVID-19: ICD-10-CM

## 2022-02-01 DIAGNOSIS — K74.60 LIVER CIRRHOSIS SECONDARY TO NASH: ICD-10-CM

## 2022-02-01 DIAGNOSIS — I50.32 CHRONIC DIASTOLIC CONGESTIVE HEART FAILURE: ICD-10-CM

## 2022-02-01 DIAGNOSIS — R79.89 POSITIVE D DIMER: ICD-10-CM

## 2022-02-01 DIAGNOSIS — E03.4 HYPOTHYROIDISM DUE TO ACQUIRED ATROPHY OF THYROID: ICD-10-CM

## 2022-02-01 DIAGNOSIS — I31.39 PERICARDIAL EFFUSION: ICD-10-CM

## 2022-02-01 DIAGNOSIS — R79.89 ELEVATED D-DIMER: Primary | ICD-10-CM

## 2022-02-01 DIAGNOSIS — I10 PRIMARY HYPERTENSION: ICD-10-CM

## 2022-02-01 DIAGNOSIS — I50.9 ACUTE ON CHRONIC CONGESTIVE HEART FAILURE, UNSPECIFIED HEART FAILURE TYPE: ICD-10-CM

## 2022-02-01 DIAGNOSIS — D64.9 ANEMIA, UNSPECIFIED TYPE: ICD-10-CM

## 2022-02-01 DIAGNOSIS — R06.02 SHORTNESS OF BREATH: ICD-10-CM

## 2022-02-01 DIAGNOSIS — K75.81 LIVER CIRRHOSIS SECONDARY TO NASH: ICD-10-CM

## 2022-02-01 PROCEDURE — 99215 OFFICE O/P EST HI 40 MIN: CPT | Mod: 95,,, | Performed by: INTERNAL MEDICINE

## 2022-02-01 PROCEDURE — 99215 PR OFFICE/OUTPT VISIT, EST, LEVL V, 40-54 MIN: ICD-10-PCS | Mod: 95,,, | Performed by: INTERNAL MEDICINE

## 2022-02-01 NOTE — TELEPHONE ENCOUNTER
----- Message from Emelina Edwards LPN sent at 2/1/2022 10:07 AM CST -----  Contact: Daughter (Paco) [phone]  Hemello please put her in march 1st at 11am with dr lester at the [facility]. thanks  ----- Message -----  From: Yissel Mathew  Sent: 2/1/2022   9:27 AM CST  To: [facility] Staff    No blue slot available to schedule an appointment for the patient.    Patient is established with which PCP:     Reason for the visit: sooner follow up    Would the patient like a call back, or a response through their [facility] portal?: call back

## 2022-02-01 NOTE — TELEPHONE ENCOUNTER
----- Message from Yissel Mathew sent at 2/1/2022  9:26 AM CST -----  Contact: Daughter Binh) [phone]  No blue slot available to schedule an appointment for the patient.    Patient is established with which PCP:     Reason for the visit: sooner follow up    Would the patient like a call back, or a response through their [facility] portal?: call back

## 2022-02-01 NOTE — TELEPHONE ENCOUNTER
----- Message from Claudia Nicole sent at 1/31/2022  4:55 PM CST -----  Regarding: schedule  Contact: [phone]  Request Appt    Appt Type: F/U  Call Back Number: [phone]  Additional Information:

## 2022-02-01 NOTE — PROGRESS NOTES
Please come is Diamond and let her know that her kidney function is still on the lower end of the spectrum, it did go up a little but I am still a little concerned how she is going to do in the future.  She needs to keep follow-up with Nephrology.  Please ensure that she has close follow-up scheduled with them.

## 2022-02-01 NOTE — TELEPHONE ENCOUNTER
Spoke with the patient's daughter and scheduled virtual visit for today.     ----- Message from Claudia Nicole sent at 1/31/2022  4:49 PM CST -----  Regarding: schedule  Contact: [phone]  Request Appt    Appt Type:Virtual   Call Back Number: [phone]  Additional Information:

## 2022-02-01 NOTE — TELEPHONE ENCOUNTER
Pt wants appt so I asked les to schedule as pt is medicaid and I cant schedule. Labs scheduled at [facility].2/1/22/sf

## 2022-02-01 NOTE — TELEPHONE ENCOUNTER
duplicate message    ----- Message from Yissel Mathew sent at 2/1/2022  9:19 AM CST -----  Contact: Daughter (Paco) [phone]  Patient would like to get medical advice.    Would you like a call back, or a response through your [facility] portal?:   call back    Comments:   Calling to r/s missed appt.

## 2022-02-01 NOTE — PROGRESS NOTES
Subjective:   Patient ID:  Diamond Das is a 66 y.o. female who presents for cardiac consult of No chief complaint on file.    The patient location is: home  The chief complaint leading to consultation is: CV follow up    Visit type: audiovisual    Face to Face time with patient: 12 min  55 minutes of total time spent on the encounter, which includes face to face time and non-face to face time preparing to see the patient (eg, review of tests), Obtaining and/or reviewing separately obtained history, Documenting clinical information in the electronic or other health record, Independently interpreting results (not separately reported) and communicating results to the patient/family/caregiver, or Care coordination (not separately reported).         Each patient to whom he or she provides medical services by telemedicine is:  (1) informed of the relationship between the physician and patient and the respective role of any other health care provider with respect to management of the patient; and (2) notified that he or she may decline to receive medical services by telemedicine and may withdraw from such care at any time.    Notes:       Shortness of Breath  Pertinent negatives include no chest pain or leg swelling.   Follow-up  Pertinent negatives include no chest pain or nausea.     The patient came in today for cardiac consult of No chief complaint on file.    Referring Physician: Andrey Perrin MD   Reason for consult: HTN, CHF    Diamond Das is a 66 y.o. female with medical conditions diastolic CHF, pericardial effusion, HTN, HLD, IDDM, cryptogenic cirrhosis, s/p TIPS, ascites presents for CV follow up.     Pt of Dr. Shaw, last seen 2/9/18  No smoking/drinking  Last echo in  normal EF and RVF, grade II DD.  EKG today NSR poor R progression on anterior leads  Chronic weakness and fatigue. Had PNA in   Pain on lower chest bilateral for few months, worse with exercise, resolved after resting.  Deep breathing made the pain worse. No pain at sleep. Associated dyspnea, N/V, palpitation and dizziness. No radiation. ASA and tylenol not really helped the pain.    3/1/19  She has been having more ascites and concern for cardiac etiologies. She was also started on Reglan, concern for prolonged Qtc, recent Qtc 475 ms. Has been feeling better with reglan and lactulose. Is dyspneic, chronically on oxygen at night and also has portable oxygen when she has to do a lot walking. Takes lasix 40 mg once/day now, was on 80mg daily. Active at home, dresses and bathes her self. Cant walk or stand to too long.     8/26/19  2D ECHO with grade 1 DD, normal Bi V function, Small pericardial effusion without tamponade. She has mild SOB at times with edema but improved lately. She will see hepatology as well. No Chest pain.   Has been taking lasix extra doses PRN and improved symptoms.     10/16/19  [facility] ER follow up  - Non toxic appearing elderly female here for worsening NICHOLE and shortness of breath. Known h/o CHF, f/b cardiologist in [facility]. Work up is c/w acute on chronic chf exacerbation. She, unfortunately, does not follow her weights so not clear if significant change recently. CXR with likely signs of volume overload. She was given diuresis and had Juneau score of 0. Felt improved and prefers discharge with close OP f/u with her cardiologist. She was ambulated and did not significantly desat and tolerated it well.  No she is on lasix, feels better. BNP today is 453, needs to double lasix next 3-4 days for further diuresis.     11/18/19   Increased diuretics last visit. She felt better then and had weight loss as well. Still has NICHOLE but improved. She went to [facility] 2 weeks ago - presented to the ED for fall secondary to hypoglycemic episode. Pt has had 3 previous episodes of hypoglycemia requiring hospitalizations in the past with sxs of lightheadedness. CT head was unremarkable, CXR revealed improvement in CHF  compared to previous study. Due to timeline of fall with insulin administration and Hx of cryptogenic cirrhosis, it is likely that pt could not compensate after receiving her insulin dosing.  BP meds were stopped, will get labs and restart lisinopril.     1/6/20  Breathing has been stable with LE edema. Last visit she doubled lasix for 3 days with min improvement. Has not had much relief lately.   ECG - NSR, poor RWP, lateral TWI    2/17/20  BNP was 863. She had recent thoracentesis as well. She has also been referred to hepatology as concern for TIPS not working?. She remains SOB will add metolazone. Discussed if kidneys and liver are worse will be difficult to manage volume status.     5/18/20  She has been stable but still has SOB with leg swelling. Symptoms have gotten a bit worse a few weeks ago. She has been taking lasix extra dose some times. Last BNP elevated 863, will need to increase lasix and metolazone and repeat labs this week. Will add K and Mg supplements.    9/28/20  BNP was 428 at last visit, which is improved from 7 months prior. She saw PCP today with worsening NICHOLE and edema. Increased lasix and metolazone last visit.      Ref Range & Units 4mo ago  (5/21/20) 7mo ago  (2/10/20) 8mo ago  (1/10/20) 10mo ago  (11/18/19) 11mo ago  (10/25/19) 11mo ago  (10/16/19)   BNP 0 - 99 pg/mL 413High   863High  CM  1,010High  CM  793High  CM  291High  CM  453High  CM      10/19/20  Pt was admitted 10/9-10/15 for CHF exac with edema, diuresed aggressively. She also had nephro consult and hepatology eval for further treatment and possible liver transplant. She has upcoming appt for CT Renal biopsy. Autoimmune workup planned. ECHO with small pericardial effusion, no tamponde, grade 1 DD.   BNP improved to 129 two weeks prior. She is taking bumex BID. SHe is taking metolazone every other day. Weight is stable.     11/16/20  She has been on Bumex with metolazone. She had recent visit with Dr. Hernandez -  Renal biopsy  revealed diabetic nephropathy and hypertensive nephropathy.  No autoimmune disorder detected. She may be candidate for RRT. She will have further liver workup and be liver and kidney transplant. Will need ischemic work up, had nuclear stress in past, none recently.     12/28/20  Nuclear stress test pending. Saw Dr. Contreras recently, started on Lisinopril 5mg. She feels well, no CP/SOB. Stress test is pending. Is walking more and active.     2/15/21  Nuclear stress neg in Dec 30th. BP overall stable. 189/96 today but has not taken meds yet and woke up late. BP was normal yesterday. Liver function has improved, does not need liver transplant, discussed she can remain kidney transplant stable CV status    5/4/21  BP elevated 160/80s. HR 70s. She has some salty food a few days ago. She felt extra bloated and took an diuretic. She has stable NICHOLE.     5/17/21   Ref Range & Units 13 d ago   (5/4/21) 7 mo ago   (10/11/20) 7 mo ago   (10/9/20) 7 mo ago   (10/5/20) 12 mo ago   (5/21/20) 1 yr ago   (2/10/20)   BNP 0 - 99 pg/mL 324High   129High  CM  351High  CM  349High  CM  413High  CM  863High  CM    Comment: Values of less than 100 pg/ml are consistent with non-CHF populations.       BNP 2 weeks ago elevated 324, discussed double dose of bumex for 3 days and monitor BP and weights, avoid salty food. Kidney function is slightly worse, needs to follow up with nephrologist as well asap.  216 lbs to 196 lbs today. BP and hR stable today. She had an episode of flutter one night, lasted about 15 min    8/23/21  Last visit - BNP has improved due to less fluid, kidney function is worse, creatinine is 3.3, need to follow up with nephrologist asap. Decrease Bumex to only once a day. Monitor pressures and weights if increased weight with swelling can go back to bumex twice a day.     She saw Dr. Jacobo with nephro;  changed Bumex to Torsemide 40mg daily  BP elevated today, meds changed recently.     10/4/21  BP and HR well controlled  today. Weight 190 lbs improved from 197 lbs.     12/220 hosp  DC summary - sent from hepatology clinic on account of hypercalcemia.  The patient went for her Rifaximin refill and was told to come in for high Clacium.  She admits to dry cough of some months, exertional dyspnea which is not worsening, about 2-3 weeks of weakness, constipation and further reduction of her oliguric state from roughly 500cc urine daily to about 125 cc/urine daily. But no chest pain, vomiting, diarrhea or reduced oral intake. She denies fever.  She is on Calcitriol, Carol D3 1000 U QD and calcium containing multivitamin.  She denies any bone pain or weight loss, infact she has just started to gain some weight. She denies orthopnea.  Serum albumin 2.4, calcium 12.6, corrected calcium 13.9     Hospital Course:   12/7 patient in good spirits, calcium slightly improved but still high. Not for discharge today. Otherwise stable  12/8 patient seen, awake, alert, vague abdominal pain, semi hard stools yesterday, no emesis , waiting for her labs this morning to determine if she can discharge.  Update: corrected calcium is 11.6, she is cleared for discharge by Nephrology who have made an appointment for her to see them in the clinic. She is cleared to resume her Torsemide and Benicar. She is not to take any Vit D. Calcitriol, oral calcium supplements and this has been explained in details to her very well.    12/20/21  She is s/p hosp DC for hyperCA, has been restarted on diuretics and BP meds and CA supplements stopped. Her diurertics was stopped last week due to lack of appetite and more abd pain. Her weight has low as 173 lbs, but now at 195 lbs.     Hosp DC summar   66 year old female who was admitted to [facility] for COVID-19 and decompensated CHF. CXR shows evidence of volume overload and was diuresed. Echocaridogram shows a preserved EF with diastolic dysfunction. Will repeat CXR on 1/12/22. For COVID-19 she received MVI,  ascorbic acid, and dexamethasone. Currently on 3L nasal cannula.  1/12/22  D-dimer 7 today. V/Q scan indeterminant for pulmonary embolism. Could reflect fluid within the fissure as well. Started on heparin infusion. CXR today still shows fluid.  Will change diuretics to Bumex with Metolazone for one dose. Closely monitor kidney function.   1/13/22  Still with decrease urine ouput. Nephrology consulted to assist with diuretics. Bumex changed to 2mg IV every 8 hours. 500mg diuril twice a day has been added for 48 hours. No intervention needed for hyperkalemia.   1/14  Reports sx improvement. Ready to go home but reports right breast swelling since admission. Lower ext edema and dyspnea improving. H/h trending down with no overt s/s bleeding. Agreeable to blood transfusion. Nephrology consulted. Consider cards consult  1/15  Dyspnea and swelling symptoms improved. No transfusion reaction after blood transfusion. Denies further breast swelling. Does not qualify for home o2. Followed o/p with Dr. Barkley, pulmonology. Advised to f/u o/p, as elevated d-dimer in setting of covid. Perfusion scan indeterminant and started on heparin gtt. Transition to eliquis on d/c.  Neph consulted for recs regarding diuresis and hyperk in ckd and chf. Metolazone started. Hyperk resolved. Cr stable but elevated and will need further adjusting of meds o/p. Advised strict na and fluid restriction. Neph recommending tolerance of higher cr while also need for diuresising for chfx  Hyperglycemia on systemic steroids. Hba1c <5. Discontinued steroids and managed hyperglycemia with insulin.       2/1/22  Hosp follow up for COVID 19, PE and volume overload. She is taking Torsemide 40mg with metolazone doing well. She is taking Eliquis 5 BID without issues, told to continue will repeat D dimer likely treat at least 3 months. She has been losing weight now weighs 187 lbs.     Patient feels no chest pain, no PND, no palpitation, no dizziness, no  syncope, no CNS symptoms.    Patient has dec exercise tolerance.    Patient is compliant with medications.    Results for orders placed during the hospital encounter of 01/10/22    Echo    Interpretation Summary  · The left ventricle is normal in size with concentric hypertrophy and normal systolic function.  · Indeterminate left ventricular diastolic function.  · The estimated PA systolic pressure is 35 mmHg.  · Normal right ventricular size with normal right ventricular systolic function.  · Normal central venous pressure (3 mmHg).  · The estimated ejection fraction is 60%.  · Mild mitral regurgitation.  · Mild tricuspid regurgitation.        Results for orders placed during the hospital encounter of 12/30/20    Nuclear Stress - Cardiology Interpreted    Interpretation Summary    Normal myocardial perfusion scan. There is no evidence of myocardial ischemia or infarction.    The gated perfusion images showed an ejection fraction of 64% at rest. The gated perfusion images showed an ejection fraction of 65% post stress.    The EKG portion of this study is negative for ischemia.    The patient reported no chest pain during the stress test.          Past Medical History:   Diagnosis Date    Acquired hypothyroidism 9/28/2020    ROD (acute kidney injury) 1/3/2019    Arthritis of knee 1/31/2022    Ascites     Bilateral lower extremity edema 9/2/2016    Breast pain, left 1/4/2018    Cataract     Chest pain, atypical 2/9/2018    CHF (congestive heart failure)     Cirrhosis     Cough     Diabetes mellitus     Diabetes mellitus, type 2     Diarrhea 9/4/2019    Edema 3/19/2018    Epigastric pain 2/11/2020    Gastroparesis     GERD (gastroesophageal reflux disease)     Hepatic encephalopathy 6/19/2018    Pt has history of cirrhosis, seen at [facility] on 6/7/18. Currently on lactulose.    Hyperlipidemia     Hypertension     Hypotension 2/21/2019    Immunization deficiency 2/10/2020    Liver disease      Lumbar strain 4/27/2018    Lumbar strain, sequela 2/17/2020    Macular degeneration     Medication reaction 11/17/2016    Other and unspecified hyperlipidemia 6/11/2012 11:11:32 AM    [facility] Historical - Endocrine/Metabolic/Immune: Hyperlipidemia-No Additional Notes    Pneumonia of right middle lobe due to infectious organism 12/19/2017    Renal disorder     Retinopathy due to secondary diabetes mellitus     Skin lesion 12/20/2021    Sleep apnea     Strain of lumbar region 10/4/2021    Thyroid disease     Type 2 diabetes mellitus with hyperglycemia 10/10/2020       Past Surgical History:   Procedure Laterality Date    CATARACT EXTRACTION      CHOLECYSTECTOMY      COLONOSCOPY N/A 9/4/2019    Procedure: COLONOSCOPY;  Surgeon: Marnie Elmore MD;  Location: [facility] ENDO;  Service: Endoscopy;  Laterality: N/A;    ERCP      FLUOROSCOPY N/A 7/24/2020    Procedure: TIPS revision;  Surgeon: Martell Jasmine MD;  Location: [facility] CATH LAB;  Service: General;  Laterality: N/A;    LIVER BIOPSY      THORACENTESIS Left 1/20/2020    Procedure: Thoracentesis;  Surgeon: Angelica Hunter MD;  Location: [facility] ENDO;  Service: Pulmonary;  Laterality: Left;    TUBAL LIGATION      UPPER GASTROINTESTINAL ENDOSCOPY         Social History     Tobacco Use    Smoking status: Never Smoker    Smokeless tobacco: Never Used   Substance Use Topics    Alcohol use: No    Drug use: No       Family History   Problem Relation Age of Onset    Cancer Mother     Breast cancer Mother     Heart disease Father     Aneurysm Sister     Heart disease Brother     No Known Problems Maternal Grandmother     Cancer Maternal Grandfather     Sarcoidosis Sister     Colon cancer Neg Hx     Ovarian cancer Neg Hx     Thrombosis Neg Hx        Patient's Medications   New Prescriptions    No medications on file   Previous Medications    AMLODIPINE (NORVASC) 10 MG TABLET    Take 1 tablet by mouth once daily    APIXABAN (ELIQUIS) 5 MG  TAB    Take 1 tablet (5 mg total) by mouth 2 (two) times daily.    ASCORBIC ACID, VITAMIN C, (VITAMIN C) 500 MG TABLET    Take 1 tablet (500 mg total) by mouth once daily.    CARVEDILOL (COREG) 25 MG TABLET    Take 1 tablet (25 mg total) by mouth 2 (two) times daily with meals.    DOCUSATE SODIUM (COLACE) 100 MG CAPSULE    Take 1 capsule (100 mg total) by mouth 3 (three) times daily. Hold for diarrhea    DULAGLUTIDE (TRULICITY) 3 MG/0.5 ML PEN INJECTOR    Inject 3 mg into the skin every 7 days.    EUTHYROX 137 MCG TAB TABLET    TAKE 1 TABLET BY MOUTH BEFORE BREAKFAST    FAMOTIDINE (PEPCID) 20 MG TABLET    Take 1 tablet (20 mg total) by mouth once daily.    FLUTICASONE PROPIONATE (FLONASE) 50 MCG/ACTUATION NASAL SPRAY    2 sprays (100 mcg total) by Each Nostril route once daily.    GARLIC (GARLIQUE ORAL)    Take 1 tablet by mouth once daily.    ISOSORBIDE MONONITRATE (IMDUR) 60 MG 24 HR TABLET    Take 1 tablet (60 mg total) by mouth every evening.    LIDOCAINE (LMX) 4 % CREAM    Apply topically as needed.    MAGNESIUM OXIDE (MAG-OX) 400 MG (241.3 MG MAGNESIUM) TABLET    Take 1 tablet (400 mg total) by mouth once daily.    METOLAZONE (ZAROXOLYN) 5 MG TABLET    Take 1 tablet (5 mg total) by mouth once daily.    MULTIVITAMIN TAB    Take 1 tablet by mouth once daily.    ONDANSETRON (ZOFRAN) 4 MG TABLET    TAKE 1 TABLET BY MOUTH EVERY 8 HOURS AS NEEDED    PRAVASTATIN (PRAVACHOL) 10 MG TABLET    Take 1 tablet (10 mg total) by mouth once daily.    PULSE OXIMETER (PULSE OXIMETER) DEVICE    by Apply Externally route 2 (two) times a day. Use twice daily at 8 AM and 3 PM and record the value in [employer] as directed.    RIFAXIMIN (XIFAXAN) 550 MG TAB    Take 1 tablet (550 mg total) by mouth 2 (two) times daily.    TORSEMIDE (DEMADEX) 20 MG TAB    Take 2 tablets (40 mg total) by mouth once daily.   Modified Medications    No medications on file   Discontinued Medications    ASPIRIN (ECOTRIN) 81 MG EC TABLET    Take 1 tablet by  mouth once daily       Review of Systems   Constitutional: Positive for malaise/fatigue.   HENT: Negative.    Eyes: Negative.    Respiratory: Negative for shortness of breath (intermittent).    Cardiovascular: Negative for chest pain, palpitations and leg swelling.   Gastrointestinal: Negative for nausea.   Genitourinary: Negative.    Musculoskeletal: Negative.    Skin: Negative.    Neurological: Negative.    Endo/Heme/Allergies: Negative.    Psychiatric/Behavioral: Negative.    All 12 systems otherwise negative.      Wt Readings from Last 3 Encounters:   01/31/22 85.2 kg (187 lb 13.3 oz)   01/15/22 98.5 kg (217 lb 2.5 oz)   01/01/22 89.6 kg (197 lb 8.5 oz)     Temp Readings from Last 3 Encounters:   01/31/22 98.3 °F (36.8 °C)   01/15/22 98.8 °F (37.1 °C) (Oral)   01/06/22 97.7 °F (36.5 °C) (Oral)     BP Readings from Last 3 Encounters:   01/31/22 120/62   01/15/22 (!) 119/58   01/06/22 122/64     Pulse Readings from Last 3 Encounters:   01/31/22 70   01/15/22 (!) 49   01/06/22 60       LMP  (LMP Unknown)     Objective:   Physical Exam  Constitutional:       General: She is not in acute distress.     Appearance: She is well-developed and well-nourished. She is obese. She is not diaphoretic.   HENT:      Head: Normocephalic and atraumatic.      Mouth/Throat:      Pharynx: No oropharyngeal exudate.   Eyes:      General: No scleral icterus.        Right eye: No discharge.         Left eye: No discharge.   Pulmonary:      Effort: Pulmonary effort is normal. No respiratory distress.   Skin:     Coloration: Skin is not pale.      Findings: No erythema or rash.   Neurological:      Mental Status: She is alert and oriented to person, place, and time.   Psychiatric:         Mood and Affect: Mood and affect normal.         Behavior: Behavior normal.         Thought Content: Thought content normal.         Judgment: Judgment normal.         Lab Results   Component Value Date     01/31/2022    K 4.0 01/31/2022      01/31/2022    CO2 28 01/31/2022    BUN 41 (H) 01/31/2022    CREATININE 3.4 (H) 01/31/2022    GLU 82 01/31/2022    HGBA1C 4.7 01/10/2022    MG 2.2 12/07/2021    AST 35 01/10/2022    ALT 16 01/10/2022    ALBUMIN 1.8 (L) 01/31/2022    PROT 5.9 (L) 01/10/2022    BILITOT 0.2 01/10/2022    WBC 11.86 01/15/2022    HGB 9.0 (L) 01/15/2022    HCT 28.7 (L) 01/15/2022    MCV 90 01/15/2022     01/15/2022    INR 1.0 01/12/2022    TSH 1.339 12/07/2021    CHOL 238 (H) 10/06/2021    HDL 38 (L) 10/06/2021    LDLCALC 175.6 (H) 10/06/2021    TRIG 122 10/06/2021     (H) 01/10/2022     Assessment:      1. Elevated d-dimer with indeterminate perfusion scan for PE    2. Positive D dimer    3. Acute on chronic congestive heart failure, unspecified heart failure type    4. Shortness of breath    5. Anemia, unspecified type    6. Primary hypertension    7. Hypothyroidism due to acquired atrophy of thyroid    8. Pericardial effusion    9. Chronic diastolic congestive heart failure    10. 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 History of COVID-19        Plan:   1. Chronic diastolic HF - diuresed and euvolemic now;  --> 704 --> 767  - daily weights, low salt diet   - daily compression stockings  - changed Bumex to Torsemide 40mg daily with metolazone  - repeat BNP     2. HTN   - cont meds and titate  - edema improved    3. HLD - worse LDL  - cont Pravastatin 10mg     4. DM2 A1c - 5.9 --> 5.6  - cont meds per PCP  - sugars stable now 90s-110    5. H/o Cirrhosis s/p TIPS with edema, low albumin  - cont tx per PCP/Hepatology  - liver function improved does not need transplant    6. KRISTAL  - needs CPAP, was not using it before     7. Obesity losing weight 217 --197 --> 187  - rec weight loss with diet/exercise     8. Pericardial effusion  - small, sec to cirrhosis/CHF  - no tamponade clinically on recent ECHO    9. CKD 4 with proteinuria  - diuretics adjusted   - cont f/u with nephro     10. Elevated D dimer, int prob for PE  -  repeat D dimer  - cont Eliquis x 3 month min    Thank you for allowing me to participate in this patient's care. Please do not hesitate to contact me with any questions or concerns. Consult note has been forwarded to the referral physician.

## 2022-02-02 ENCOUNTER — TELEPHONE (OUTPATIENT)
Dept: CARDIOLOGY | Facility: CLINIC | Age: 67
End: 2022-02-02
Payer: MEDICAID

## 2022-02-02 ENCOUNTER — TELEPHONE (OUTPATIENT)
Dept: INTERNAL MEDICINE | Facility: CLINIC | Age: 67
End: 2022-02-02
Payer: MEDICAID

## 2022-02-02 NOTE — TELEPHONE ENCOUNTER
----- Message from Andrey Perrin MD sent at 2/1/2022  5:21 PM CST -----  Please come is Diamond and let her know that her kidney function is still on the lower end of the spectrum, it did go up a little but I am still a little concerned how she is going to do in the future.  She needs to keep follow-up with Nephrology.  Please ensure that she has close follow-up scheduled with them.

## 2022-02-02 NOTE — TELEPHONE ENCOUNTER
Attempted to contact the patient and schedule testing and follow up, no answer, left voicemail.     ----- Message from Marcos Ramos MD sent at 2/1/2022  4:19 PM CST -----  Needs labs drawn, and follow up with me in 3 months or sooner. Thanks

## 2022-02-07 ENCOUNTER — TELEPHONE (OUTPATIENT)
Dept: INTERNAL MEDICINE | Facility: CLINIC | Age: 67
End: 2022-02-07
Payer: MEDICAID

## 2022-02-07 NOTE — TELEPHONE ENCOUNTER
Informed pt that we received a fax from [employer] [facility] stating that PA request was not able to be processed because she is no longer eligible as of 1/2/22. Pt said, she thought she had coverage until March 1st. Advised pt to contact her insurance company to find out about coverage and let us know so we can resubmit Pa if needed. She verbalized understanding.

## 2022-02-11 NOTE — PROGRESS NOTES
Digital Medicine: Health  Follow-Up    The history is provided by the patient.             Reviewed BP Readings. Patients BP average is 143/69 mmHg, which is not at goal. Patient's BP goal is less than 130/80 mmHg.             Topics Covered on Call: physical activity and Diet    Additional Follow-up details: Patient is well today. She reports that so far everything is about the same.     She denies questions/concerns/coaching. She thanked me for calling.             Diet-no change to diet    No change to diet.        Physical Activity-no change to routine  No change to exercise routine.              Addressed patient questions and patient has my contact information if needed prior to next outreach.   Explained the importance of self-monitoring and medication adherence. Encouraged the patient to communicate with their health  for lifestyle modifications to help improve or maintain a healthy lifestyle.            There are no preventive care reminders to display for this patient.       Last 5 Patient Entered Readings                Current 30 Day Average: 143/69  Recent Readings 2/11/2022 2/10/2022 2/9/2022 2/9/2022 2/8/2022   SBP (mmHg) 140 152 124 134 145   DBP (mmHg) 63 74 64 70 72   Pulse 71 73 74 74 75             Last 6 Patient Entered Readings                                          Most Recent A1c: 4.7% on 1/10/2022  (Goal: 8%)     Recent Readings 2/11/2022 2/10/2022 2/9/2022 2/9/2022 2/8/2022    Blood Glucose (mg/dL) 131 128 105 86 210

## 2022-02-25 ENCOUNTER — LAB VISIT (OUTPATIENT)
Dept: LAB | Facility: HOSPITAL | Age: 67
End: 2022-02-25
Attending: INTERNAL MEDICINE
Payer: MEDICARE

## 2022-02-25 DIAGNOSIS — N18.4 CKD (CHRONIC KIDNEY DISEASE) STAGE 4, GFR 15-29 ML/MIN: ICD-10-CM

## 2022-02-25 DIAGNOSIS — I12.9 PARENCHYMAL RENAL HYPERTENSION, STAGE 1 THROUGH STAGE 4 OR UNSPECIFIED CHRONIC KIDNEY DISEASE: ICD-10-CM

## 2022-02-25 LAB
ALBUMIN SERPL BCP-MCNC: 2.3 G/DL (ref 3.5–5.2)
ALP SERPL-CCNC: 79 U/L (ref 55–135)
ALT SERPL W/O P-5'-P-CCNC: 11 U/L (ref 10–44)
ANION GAP SERPL CALC-SCNC: 11 MMOL/L (ref 8–16)
AST SERPL-CCNC: 26 U/L (ref 10–40)
BILIRUB SERPL-MCNC: 0.4 MG/DL (ref 0.1–1)
BUN SERPL-MCNC: 42 MG/DL (ref 8–23)
CALCIUM SERPL-MCNC: 8.6 MG/DL (ref 8.7–10.5)
CHLORIDE SERPL-SCNC: 105 MMOL/L (ref 95–110)
CO2 SERPL-SCNC: 26 MMOL/L (ref 23–29)
CREAT SERPL-MCNC: 3.7 MG/DL (ref 0.5–1.4)
EST. GFR  (AFRICAN AMERICAN): 14.1 ML/MIN/1.73 M^2
EST. GFR  (NON AFRICAN AMERICAN): 12.3 ML/MIN/1.73 M^2
GLUCOSE SERPL-MCNC: 110 MG/DL (ref 70–110)
POTASSIUM SERPL-SCNC: 4.3 MMOL/L (ref 3.5–5.1)
PROT SERPL-MCNC: 7.1 G/DL (ref 6–8.4)
SODIUM SERPL-SCNC: 142 MMOL/L (ref 136–145)

## 2022-02-25 PROCEDURE — 36415 COLL VENOUS BLD VENIPUNCTURE: CPT | Mod: PO | Performed by: INTERNAL MEDICINE

## 2022-02-25 PROCEDURE — 80053 COMPREHEN METABOLIC PANEL: CPT | Mod: PO | Performed by: INTERNAL MEDICINE

## 2022-03-01 ENCOUNTER — OFFICE VISIT (OUTPATIENT)
Dept: NEPHROLOGY | Facility: CLINIC | Age: 67
End: 2022-03-01
Payer: MEDICARE

## 2022-03-01 VITALS
DIASTOLIC BLOOD PRESSURE: 58 MMHG | HEIGHT: 61 IN | RESPIRATION RATE: 18 BRPM | HEART RATE: 78 BPM | BODY MASS INDEX: 33.17 KG/M2 | SYSTOLIC BLOOD PRESSURE: 108 MMHG | WEIGHT: 175.69 LBS

## 2022-03-01 DIAGNOSIS — I12.9 PARENCHYMAL RENAL HYPERTENSION, STAGE 1 THROUGH STAGE 4 OR UNSPECIFIED CHRONIC KIDNEY DISEASE: ICD-10-CM

## 2022-03-01 DIAGNOSIS — R80.9 PROTEINURIA, UNSPECIFIED TYPE: ICD-10-CM

## 2022-03-01 DIAGNOSIS — N18.5 CKD (CHRONIC KIDNEY DISEASE), SYMPTOM MANAGEMENT ONLY, STAGE 5: Primary | ICD-10-CM

## 2022-03-01 PROCEDURE — 99999 PR PBB SHADOW E&M-EST. PATIENT-LVL IV: CPT | Mod: PBBFAC,,, | Performed by: INTERNAL MEDICINE

## 2022-03-01 PROCEDURE — 99214 OFFICE O/P EST MOD 30 MIN: CPT | Mod: PBBFAC | Performed by: INTERNAL MEDICINE

## 2022-03-01 PROCEDURE — 99214 OFFICE O/P EST MOD 30 MIN: CPT | Mod: S$PBB,,, | Performed by: INTERNAL MEDICINE

## 2022-03-01 PROCEDURE — 99999 PR PBB SHADOW E&M-EST. PATIENT-LVL IV: ICD-10-PCS | Mod: PBBFAC,,, | Performed by: INTERNAL MEDICINE

## 2022-03-01 PROCEDURE — 99214 PR OFFICE/OUTPT VISIT, EST, LEVL IV, 30-39 MIN: ICD-10-PCS | Mod: S$PBB,,, | Performed by: INTERNAL MEDICINE

## 2022-03-01 RX ORDER — AMLODIPINE BESYLATE 5 MG/1
5 TABLET ORAL DAILY
Qty: 90 TABLET | Refills: 3 | Status: SHIPPED | OUTPATIENT
Start: 2022-03-01 | End: 2022-04-05 | Stop reason: SDUPTHER

## 2022-03-01 NOTE — PROGRESS NOTES
"Subjective:       Patient ID: Diamond Das is a 67 y.o. female.    Chief Complaint:  CKD, edema    HPI    She presents to clinic today for follow-up of recent hospitalization for acute on chronic renal failure complicated by congestive heart failure and COVID-19.  Since being discharged she reports feeling much better.  Her edema is now well controlled.  She has no symptoms of PND or orthopnea.  She is currently taking torsemide every other day and dose of 40 mg q.12 hours.  She is taking metolazone daily and 5 mg a day.    Review of Systems   Constitutional: Negative.    HENT: Negative.    Eyes: Negative.    Respiratory: Negative.    Cardiovascular: Negative.    Gastrointestinal: Negative.    Genitourinary: Negative.    Musculoskeletal: Negative.    Skin: Negative.    Neurological: Negative.          BP (!) 108/58   Pulse 78   Resp 18   Ht 5' 1" (1.549 m)   Wt 79.7 kg (175 lb 11.3 oz)   LMP  (LMP Unknown)   BMI 33.20 kg/m²     Lab Results   Component Value Date    WBC 11.86 01/15/2022    HGB 9.0 (L) 01/15/2022    HCT 28.7 (L) 01/15/2022    MCV 90 01/15/2022     01/15/2022      BMP  Lab Results   Component Value Date     02/25/2022    K 4.3 02/25/2022     02/25/2022    CO2 26 02/25/2022    BUN 42 (H) 02/25/2022    CREATININE 3.7 (H) 02/25/2022    CALCIUM 8.6 (L) 02/25/2022    ANIONGAP 11 02/25/2022    ESTGFRAFRICA 14.1 (A) 02/25/2022    EGFRNONAA 12.3 (A) 02/25/2022     CMP  Sodium   Date Value Ref Range Status   02/25/2022 142 136 - 145 mmol/L Final     Potassium   Date Value Ref Range Status   02/25/2022 4.3 3.5 - 5.1 mmol/L Final     Chloride   Date Value Ref Range Status   02/25/2022 105 95 - 110 mmol/L Final     CO2   Date Value Ref Range Status   02/25/2022 26 23 - 29 mmol/L Final     Glucose   Date Value Ref Range Status   02/25/2022 110 70 - 110 mg/dL Final     BUN   Date Value Ref Range Status   02/25/2022 42 (H) 8 - 23 mg/dL Final     Creatinine   Date Value Ref Range Status "   02/25/2022 3.7 (H) 0.5 - 1.4 mg/dL Final     Calcium   Date Value Ref Range Status   02/25/2022 8.6 (L) 8.7 - 10.5 mg/dL Final     Total Protein   Date Value Ref Range Status   02/25/2022 7.1 6.0 - 8.4 g/dL Final     Albumin   Date Value Ref Range Status   02/25/2022 2.3 (L) 3.5 - 5.2 g/dL Final     Total Bilirubin   Date Value Ref Range Status   02/25/2022 0.4 0.1 - 1.0 mg/dL Final     Comment:     For infants and newborns, interpretation of results should be based  on gestational age, weight and in agreement with clinical  observations.    Premature Infant recommended reference ranges:  Up to 24 hours.............<8.0 mg/dL  Up to 48 hours............<12.0 mg/dL  3-5 days..................<15.0 mg/dL  6-29 days.................<15.0 mg/dL       Alkaline Phosphatase   Date Value Ref Range Status   02/25/2022 79 55 - 135 U/L Final     AST   Date Value Ref Range Status   02/25/2022 26 10 - 40 U/L Final     ALT   Date Value Ref Range Status   02/25/2022 11 10 - 44 U/L Final     Anion Gap   Date Value Ref Range Status   02/25/2022 11 8 - 16 mmol/L Final     eGFR if    Date Value Ref Range Status   02/25/2022 14.1 (A) >60 mL/min/1.73 m^2 Final     eGFR if non    Date Value Ref Range Status   02/25/2022 12.3 (A) >60 mL/min/1.73 m^2 Final     Comment:     Calculation used to obtain the estimated glomerular filtration  rate (eGFR) is the CKD-EPI equation.        Current Outpatient Medications on File Prior to Visit   Medication Sig Dispense Refill    apixaban (ELIQUIS) 5 mg Tab Take 1 tablet (5 mg total) by mouth 2 (two) times daily. 60 tablet 0    carvediloL (COREG) 25 MG tablet Take 1 tablet (25 mg total) by mouth 2 (two) times daily with meals. 60 tablet 5    docusate sodium (COLACE) 100 MG capsule Take 1 capsule (100 mg total) by mouth 3 (three) times daily. Hold for diarrhea 30 capsule 0    dulaglutide (TRULICITY) 3 mg/0.5 mL pen injector Inject 3 mg into the skin every 7 days. 12  pen 0    EUTHYROX 137 mcg Tab tablet TAKE 1 TABLET BY MOUTH BEFORE BREAKFAST 90 tablet 0    famotidine (PEPCID) 20 MG tablet Take 1 tablet (20 mg total) by mouth once daily. 30 tablet 11    fluticasone propionate (FLONASE) 50 mcg/actuation nasal spray 2 sprays (100 mcg total) by Each Nostril route once daily.  0    garlic (GARLIQUE ORAL) Take 1 tablet by mouth once daily.      insulin glargine 100 units/mL (3mL) SubQ pen Inject 7 Units into the skin once daily.      isosorbide mononitrate (IMDUR) 60 MG 24 hr tablet Take 1 tablet (60 mg total) by mouth every evening. 30 tablet 6    LIDOcaine (LMX) 4 % cream Apply topically as needed.      magnesium oxide (MAG-OX) 400 mg (241.3 mg magnesium) tablet Take 1 tablet (400 mg total) by mouth once daily. 90 tablet 1    metOLazone (ZAROXOLYN) 5 MG tablet Take 1 tablet (5 mg total) by mouth once daily. 30 tablet 0    ondansetron (ZOFRAN) 4 MG tablet TAKE 1 TABLET BY MOUTH EVERY 8 HOURS AS NEEDED 30 tablet 0    pravastatin (PRAVACHOL) 10 MG tablet Take 1 tablet (10 mg total) by mouth once daily. 90 tablet 3    pulse oximeter (PULSE OXIMETER) device by Apply Externally route 2 (two) times a day. Use twice daily at 8 AM and 3 PM and record the value in [place] as directed. 1 each 0    rifAXIMin (XIFAXAN) 550 mg Tab Take 1 tablet (550 mg total) by mouth 2 (two) times daily. 60 tablet 5    torsemide (DEMADEX) 20 MG Tab Take 2 tablets (40 mg total) by mouth once daily. 60 tablet 11    [DISCONTINUED] amLODIPine (NORVASC) 10 MG tablet Take 1 tablet by mouth once daily 90 tablet 0    ascorbic acid, vitamin C, (VITAMIN C) 500 MG tablet Take 1 tablet (500 mg total) by mouth once daily. (Patient not taking: Reported on 3/1/2022)      multivitamin Tab Take 1 tablet by mouth once daily. (Patient not taking: Reported on 3/1/2022)       No current facility-administered medications on file prior to visit.            Objective:            Physical Exam  Constitutional:        Appearance: Normal appearance.   HENT:      Head: Normocephalic and atraumatic.   Eyes:      General: No scleral icterus.     Extraocular Movements: Extraocular movements intact.      Pupils: Pupils are equal, round, and reactive to light.   Cardiovascular:      Rate and Rhythm: Normal rate and regular rhythm.   Pulmonary:      Effort: Pulmonary effort is normal.      Breath sounds: Normal breath sounds.   Musculoskeletal:      Right lower leg: No edema.      Left lower leg: No edema.   Skin:     General: Skin is warm and dry.   Neurological:      General: No focal deficit present.      Mental Status: She is alert and oriented to person, place, and time.   Psychiatric:         Mood and Affect: Mood normal.         Assessment:       1. CKD (chronic kidney disease), symptom management only, stage 5    2. 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 Proteinuria, unspecified type        Plan:       1. Creatinine has improved her usual baseline around 3.7.  When she was hospitalized with decompensated heart failure and COVID-19 pneumonia her creatinine peaked at around 5.0.    She currently has no symptoms of CHF.  Her lower extremity edema is now well controlled.  She is not on a RAAS inhibitor secondary to advanced CKD.    2. Blood pressure is on the low side in the clinic today.  We decreased her amlodipine to 5 mg a day.    3. She continues to have heavy proteinuria consistent with diabetic glomerulopathy.  Most recent PC ratio showed over 6 g.        Pankaj Jacobo MD

## 2022-03-10 DIAGNOSIS — I50.32 CHRONIC DIASTOLIC CONGESTIVE HEART FAILURE: ICD-10-CM

## 2022-03-10 DIAGNOSIS — R18.8 OTHER ASCITES: ICD-10-CM

## 2022-03-10 DIAGNOSIS — I10 ESSENTIAL HYPERTENSION: ICD-10-CM

## 2022-03-15 RX ORDER — LANOLIN ALCOHOL/MO/W.PET/CERES
400 CREAM (GRAM) TOPICAL DAILY
Qty: 90 TABLET | Refills: 1 | Status: SHIPPED | OUTPATIENT
Start: 2022-03-15 | End: 2022-08-01 | Stop reason: SDUPTHER

## 2022-03-24 PROCEDURE — 99454 REM MNTR PHYSIOL PARAM 16-30: CPT | Mod: PBBFAC,PO | Performed by: FAMILY MEDICINE

## 2022-03-31 ENCOUNTER — TELEPHONE (OUTPATIENT)
Dept: INTERNAL MEDICINE | Facility: CLINIC | Age: 67
End: 2022-03-31
Payer: MEDICAID

## 2022-03-31 DIAGNOSIS — M17.10 ARTHRITIS OF KNEE: Primary | ICD-10-CM

## 2022-03-31 NOTE — TELEPHONE ENCOUNTER
Called pt back. Pt was requesting a new referral to  Physical therapy since she wasn't able to go when the referral was originally sent.    Referral pended.

## 2022-03-31 NOTE — TELEPHONE ENCOUNTER
----- Message from Asha Ledezma sent at 3/31/2022 10:46 AM CDT -----  Pt would like the nurse to call her please. Call back number is [phone]. Thx. El

## 2022-04-18 DIAGNOSIS — K75.81 LIVER CIRRHOSIS SECONDARY TO NASH (NONALCOHOLIC STEATOHEPATITIS): Primary | ICD-10-CM

## 2022-04-18 DIAGNOSIS — K74.60 LIVER CIRRHOSIS SECONDARY TO NASH (NONALCOHOLIC STEATOHEPATITIS): Primary | ICD-10-CM

## 2022-04-18 NOTE — ANESTHESIA POSTPROCEDURE EVALUATION
"Anesthesia Post Evaluation    Patient: Diamond Das    Procedure(s) Performed: Procedure(s) (LRB):  TIPS (N/A)    Final Anesthesia Type: general  Patient location during evaluation: PACU  Patient participation: Yes- Able to Participate  Level of consciousness: awake and alert  Post-procedure vital signs: reviewed and stable  Pain management: adequate  Airway patency: patent  PONV status at discharge: No PONV  Anesthetic complications: no      Cardiovascular status: hemodynamically stable  Respiratory status: unassisted, spontaneous ventilation and room air  Hydration status: euvolemic  Follow-up not needed.        Visit Vitals    BP (!) 157/75 (BP Location: Left arm, Patient Position: Lying, BP Method: Automatic)    Pulse 72    Temp 36.5 °C (97.7 °F) (Oral)    Resp 17    Ht 5' 3" (1.6 m)    Wt 89.8 kg (198 lb)    LMP  (LMP Unknown)    SpO2 99%    Breastfeeding No    BMI 35.07 kg/m2       Pain/Frantz Score: Pain Assessment Performed: Yes (1/17/2017  4:15 PM)  Presence of Pain: complains of pain/discomfort (1/17/2017  4:15 PM)  Pain Rating Prior to Med Admin: 6 (1/17/2017  4:10 PM)  Frantz Score: 10 (1/17/2017  4:15 PM)      "
No

## 2022-04-21 ENCOUNTER — CLINICAL SUPPORT (OUTPATIENT)
Dept: REHABILITATION | Facility: HOSPITAL | Age: 67
End: 2022-04-21
Attending: FAMILY MEDICINE
Payer: MEDICARE

## 2022-04-21 DIAGNOSIS — Z74.09 DECREASED STRENGTH, ENDURANCE, AND MOBILITY: ICD-10-CM

## 2022-04-21 DIAGNOSIS — G89.29 CHRONIC PAIN OF BOTH KNEES: ICD-10-CM

## 2022-04-21 DIAGNOSIS — R53.1 DECREASED STRENGTH, ENDURANCE, AND MOBILITY: ICD-10-CM

## 2022-04-21 DIAGNOSIS — M25.562 CHRONIC PAIN OF BOTH KNEES: ICD-10-CM

## 2022-04-21 DIAGNOSIS — M17.10 ARTHRITIS OF KNEE: ICD-10-CM

## 2022-04-21 DIAGNOSIS — R68.89 DECREASED STRENGTH, ENDURANCE, AND MOBILITY: ICD-10-CM

## 2022-04-21 DIAGNOSIS — M25.561 CHRONIC PAIN OF BOTH KNEES: ICD-10-CM

## 2022-04-21 PROCEDURE — 97162 PT EVAL MOD COMPLEX 30 MIN: CPT | Mod: PN

## 2022-04-21 PROCEDURE — 97110 THERAPEUTIC EXERCISES: CPT | Mod: PN

## 2022-04-21 NOTE — PLAN OF CARE
[facility]  Physical Therapy Initial Evaluation    Name: Diamond Das  [facility] Number: [id number]    Therapy Diagnosis:   Encounter Diagnosis   Name Primary?    Arthritis of knee      Physician: Andrey Perrin MD     Physician Orders: PT Eval and Treat   Medical Diagnosis from Referral: Back and knee pain  Evaluation Date: 4/21/2022  Authorization Period Expiration: 3/31/2023  Plan of Care Expiration: 7/21/2022    Progress Note Due: 5/21/2022  Visit # / Visits authorized: 1/ 1  FOTO:1/3  PTA visit: 0/6    Precautions: Standard and Diabetes    Time In: 3:30  Time Out: 4:15  Total Treatment Time: 45 minutes    Subjective   Date of onset: chronic  History of current condition - Diamond reports: pain sometimes in her knees and back. She hurts when going up stairs and makes  Her feel weak. She reports having to slow down and legs will occasionally give out on her. She has completed therapy before but it was not as rigorous as she would like. She also reports loss of balance. She has a history of 2 falls.     Medical History:   Past Medical History:   Diagnosis Date    Acquired hypothyroidism 9/28/2020    ROD (acute kidney injury) 1/3/2019    Arthritis of knee 1/31/2022    Ascites     Bilateral lower extremity edema 9/2/2016    Breast pain, left 1/4/2018    Cataract     Chest pain, atypical 2/9/2018    CHF (congestive heart failure)     Cirrhosis     Cough     Diabetes mellitus     Diabetes mellitus, type 2     Diarrhea 9/4/2019    Edema 3/19/2018    Epigastric pain 2/11/2020    Gastroparesis     GERD (gastroesophageal reflux disease)     Hepatic encephalopathy 6/19/2018    Pt has history of cirrhosis, seen at [facility] on 6/7/18. Currently on lactulose.    Hyperlipidemia     Hypertension     Hypotension 2/21/2019    Immunization deficiency 2/10/2020    Liver disease     Lumbar strain 4/27/2018    Lumbar strain, sequela 2/17/2020    Macular degeneration      Medication reaction 11/17/2016    Other and unspecified hyperlipidemia 6/11/2012 11:11:32 AM    [facility] Historical - Endocrine/Metabolic/Immune: Hyperlipidemia-No Additional Notes    Pneumonia of right middle lobe due to infectious organism 12/19/2017    Renal disorder     Retinopathy due to secondary diabetes mellitus     Skin lesion 12/20/2021    Sleep apnea     Strain of lumbar region 10/4/2021    Thyroid disease     Type 2 diabetes mellitus with hyperglycemia 10/10/2020       Surgical History:   Diamond Das  has a past surgical history that includes Cholecystectomy; ERCP; Upper gastrointestinal endoscopy; Liver biopsy; Tubal ligation; Colonoscopy (N/A, 9/4/2019); Thoracentesis (Left, 1/20/2020); Cataract extraction; and Fluoroscopy (N/A, 7/24/2020).    Medications:   Diamond has a current medication list which includes the following prescription(s): amlodipine, ascorbic acid (vitamin c), carvedilol, docusate sodium, trulicity, eliquis, euthyrox, famotidine, fluticasone propionate, garlic, insulin, isosorbide mononitrate, lidocaine, magnesium oxide, metolazone, multivitamin, ondansetron, pravastatin, pulse oximeter, rifaximin, and torsemide.    Allergies:   Review of patient's allergies indicates:   Allergen Reactions    Subsys [fentanyl] Other (See Comments)     After administration pt unresponsive.  HR and respirations decreased.     Versed [midazolam] Other (See Comments)     After administration pt unresponsive.  HR and respirations decreased.     Ampicillin Rash    Codeine Nausea And Vomiting and Nausea Only        Imaging, none reported    Prior Therapy: yes  Social History: lives with their family  Occupation: not employed  Prior Level of Function: Independent with pain  Current Level of Function: Modified independent sometimes with decreased endurance and limited to short distances    Pain:  Current 5/10, worst 7/10, best 0/10   Location: bilateral knee, back, and lower  legs  Description: Aching and Throbbing  Aggravating Factors: Standing and Walking  Easing Factors: rest    Pts goals: decreased knee pain, Increased ability to walk, increased strength and endurance in lower extremity     Objective     Sensation:  Sensation is intact to light touch    (x = not tested due to pain and/or inability to obtain test position)    RANGE OF MOTION:  Within functional limitations    STRENGTH:    Hip/Knee MMT Right  4/21/2022 Goal   Hip Flexion  4/5 5/5 B    Knee Flexion 4-/5 5/5 B   Knee Extension 4/5 5/5 B     Hip/Knee MMT Left  4/21/2022 Goal   Hip Flexion  4/5 5/5 B    Knee Flexion 4-/5 5/5 B   Knee Extension 4/5 5/5 B   Palpation:  Pain/Tenderness to anterior knee and joint line    Gait Analysis: The patient ambulated with the use of rolling walker and presents with the following gait abnormalities: increased ROS and decreased step length bilateral    Other: Pt presents with postural abnormalities which include: forward head and rounded shoulders      Movement Analysis:   Functional Test  Outcome   Double Leg Squat Dysfunctional and use of upper extremity    Single Leg Step Down x     Rehab Tests  Outcome Norms GOAL   Five Time Sit to Stand 21.6 60s: <11.4 sec  70s: <12.6 sec  80s: 14.8 sec <11.4   Timed Up and Go x <13.5 sec x   6 minutes walk x 60s: >538f, 572m  70s: >471f, 527m  80s: >417f, 392m x        Balance:    RIGHT  4/21/2022 LEFT  4/21/2022 Goal   Closed x - 60 seconds     Sit to stand 21.6           CMS Impairment/Limitation/Restriction for FOTO knee Survey    Therapist reviewed FOTO scores for Diamond Das on 4/21/2022.   FOTO documents entered into [employer] - see Media section.    Limitation Score: 26%           TREATMENT     Total Billable time separate from Evaluation: (10) minutes    Diamond received therapeutic exercises to develop strength, endurance, ROM, flexibility, posture and core stabilization for (10) minutes including:    TherEx 4/21/2022    bike     long arc  quad  x 10 Bilateral  HEP   Supine Marching x 10 Bilateral HEP   Calf raises  HEP   straight leg raise   HEP   Toe raises  HEP   Hamstring curls     clamshells     Ball squeeze          bridges x 10x HEP        Step ups     Sit to stands x 2x5 HEP     Plan for Next Visit: progress towards overall lower extremity strength, increased walking distances, side stepping, focus on quad and hips, visit 3 progress to standing exercises     Home Exercises and Patient Education Provided    Education provided:    Patient educated on the impairments noted above and the effects of physical therapy intervention to improve overall condition and QOL.    Patient was educated on all the above exercise prior/during/after for proper posture, positioning, and execution for safe performance with home exercise program.    Written Home Exercises Provided: yes.  Exercises were reviewed and Diamond was able to demonstrate them prior to the end of the session.  Diamond demonstrated good understanding of the education provided.     See EMR under Patient Instructions for exercises provided 4/21/2022.    Assessment     Diamond is a 67 y.o. female referred to outpatient Physical Therapy with a medical diagnosis of back and knee pain. Diamond presents with clinical signs and symptoms that support this diagnosis    decreased Lumbar ROM, decreased lower extremity strength, Lumbar joint(s) hypomobility, decreased hip girdle, quad, and hamstring Strength, patellar joint hypomobility, increased joint and soft tissue edema, and impaired functional mobility.    The above impairments will be addressed through manual therapy techniques, therapeutic exercises, functional training, and modalities as necessary. Patient was treated and educated on exercises for home program, progression of therapy, and benefits of therapy to achieve full functional mobility. Patient will benefit from skilled physical therapy to meet short and long term goals and return to prior level  of function.    Pt prognosis is Good.   Pt will benefit from skilled outpatient Physical Therapy to address the deficits stated above and in the chart below, provide pt/family education, and to maximize pt's level of independence.     Plan of care discussed with patient: Yes  Pt's spiritual, cultural and educational needs considered and patient is agreeable to the plan of care and goals as stated below:     Anticipated Barriers for therapy: Back pain, BMI over 30, Congestive Heart Failure or Heart Disease, Diabetes Type I or  II, Gastrointestinal Disease, High Blood Pressure, Kidney, Bladder, Prostate or Urination Problems, Sleep dysfunction    Medical Necessity is demonstrated by the following  History  Co-morbidities and personal factors that may impact the plan of care Co-morbidities:   CKD stage 5, diabetes, difficulty sleeping, high BMI and HTN    Personal Factors:   no deficits     high   Examination  Body Structures and Functions, activity limitations and participation restrictions that may impact the plan of care Body Regions:   back  lower extremities    Body Systems:    gross symmetry  ROM  strength  gross coordinated movement  balance  gait    Participation Restrictions:   none    Activity limitations:   Learning and applying knowledge  no deficits    General Tasks and Commands  no deficits    Communication  no deficits    Mobility  lifting and carrying objects  walking    Self care  no deficits    Domestic Life  shopping  cooking  doing house work (cleaning house, washing dishes, laundry)    Interactions/Relationships  no deficits    Life Areas  no deficits    Community and Social Life  community life  recreation and leisure         high   Clinical Presentation evolving clinical presentation with changing clinical characteristics moderate   Decision Making/ Complexity Score: moderate     GOALS:  SHORT TERM GOALS: 6 weeks    1. Recent signs and systems trend is improving in order to progress towards  LTG's.    2. Patient will be independent with HEP in order to further progress and return to maximal function.    3. Pain rating at Worst: 5/10 in order to progress towards increased independence with activity.    4. Patient will be able to correct postural deviations in sitting and standing, to decrease pain and promote postural awareness for injury prevention.       LONG TERM GOALS: 12 weeks    1. Patient will return to normal ADL, recreational, and work related activities with less pain and limitation.     2. Patient will improve AROM to stated goals in order to return to maximal functional potential.     3. Patient will improve Strength to stated goals of appropriate musculature in order to improve functional independence.     4. Pain Rating at Best: 1/10 to improve Quality of Life.               5. Patient will meet predicted functional outcome (FOTO) score: 26% to increase self-worth & perceived functional ability.              6.   Patient will have met/partially met personal goal of: decreased knee pain, Increased ability to walk, increased strength and endurance in lower extremity.                PM=Partially Met     PC=Progressing/Continued     M=Met    Plan   Plan of care Certification: 4/21/2022 to 7/22/2022.    Outpatient Physical Therapy 2 times weekly for 12 weeks to include any combination of the following interventions: virtual visits, dry needling, modalities, electrical stimulation (IFC, Pre-Mod, Attended with Functional Dry Needling), Manual Therapy, Moist Heat/ Ice, Neuromuscular Re-ed, Patient Education, Self Care, Therapeutic Exercise, Functional Training and Therapeutic Activites     Thank you for this referral.    These services are reasonable and necessary for the conditions set forth above while under my care.      Miah Alvarado, PT

## 2022-04-22 PROBLEM — M25.562 BILATERAL KNEE PAIN: Status: ACTIVE | Noted: 2022-04-22

## 2022-04-22 PROBLEM — M25.561 BILATERAL KNEE PAIN: Status: ACTIVE | Noted: 2022-04-22

## 2022-04-22 PROBLEM — Z74.09 DECREASED STRENGTH, ENDURANCE, AND MOBILITY: Status: ACTIVE | Noted: 2022-04-22

## 2022-04-22 PROBLEM — R68.89 DECREASED STRENGTH, ENDURANCE, AND MOBILITY: Status: ACTIVE | Noted: 2022-04-22

## 2022-04-22 PROBLEM — R53.1 DECREASED STRENGTH, ENDURANCE, AND MOBILITY: Status: ACTIVE | Noted: 2022-04-22

## 2022-04-26 RX ORDER — PRAVASTATIN SODIUM 10 MG/1
10 TABLET ORAL DAILY
Qty: 90 TABLET | Refills: 3 | Status: SHIPPED | OUTPATIENT
Start: 2022-04-26 | End: 2022-05-20 | Stop reason: SDUPTHER

## 2022-04-28 ENCOUNTER — CLINICAL SUPPORT (OUTPATIENT)
Dept: REHABILITATION | Facility: HOSPITAL | Age: 67
End: 2022-04-28
Attending: FAMILY MEDICINE
Payer: MEDICARE

## 2022-04-28 DIAGNOSIS — R53.1 DECREASED STRENGTH, ENDURANCE, AND MOBILITY: Primary | ICD-10-CM

## 2022-04-28 DIAGNOSIS — M25.562 CHRONIC PAIN OF BOTH KNEES: ICD-10-CM

## 2022-04-28 DIAGNOSIS — R68.89 DECREASED STRENGTH, ENDURANCE, AND MOBILITY: Primary | ICD-10-CM

## 2022-04-28 DIAGNOSIS — Z74.09 DECREASED STRENGTH, ENDURANCE, AND MOBILITY: Primary | ICD-10-CM

## 2022-04-28 DIAGNOSIS — G89.29 CHRONIC PAIN OF BOTH KNEES: ICD-10-CM

## 2022-04-28 DIAGNOSIS — M25.561 CHRONIC PAIN OF BOTH KNEES: ICD-10-CM

## 2022-04-28 PROCEDURE — 97110 THERAPEUTIC EXERCISES: CPT | Mod: PN

## 2022-04-28 NOTE — PROGRESS NOTES
[facility]  Physical Therapy Treatment Note       Name: Diamond Das  [facility] Number: [id number]    Therapy Diagnosis:   Encounter Diagnoses   Name Primary?    Decreased strength, endurance, and mobility Yes    Chronic pain of both knees      Physician: Andrey Perrin MD    Visit Date: 4/28/2022    Physician Orders: PT Eval and Treat   Medical Diagnosis from Referral: Back and knee pain  Evaluation Date: 4/21/2022  Authorization Period Expiration: 12/31/2022  Plan of Care Expiration: 7/21/2022                Progress Note Due: 5/21/2022  Visit # / Visits authorized: 1/ 20(+1 eval)  FOTO:1/3  PTA visit: 0/6     Precautions: Standard and Diabetes      Time In: 2:45  Time Out: 3:30  Total Treatment Time: 45 minutes    SUBJECTIVE     Pt reports:  She has been feeling better. She is a little fatigued in her legs but feels like she is getting stronger.  Compliance with Hep: Daily  Response to previous treatment: no adverse reactions to treatment/updated HEP  Functional change: Better    Pain: 0/10   Worst: 2/10  Location: Bilateral knees  Aggravating factors: Standing and Walking      OBJECTIVE     Objective Measures updated at progress report unless specified otherwise.    Treatment        Total Billable time separate from Evaluation: (45) minutes     Diamond received therapeutic exercises to develop strength, endurance, ROM, flexibility, posture and core stabilization for (45) minutes including:     TherEx 4/21/2022     nustep x   5 Minutes    long arc quad  x 3x10 Bilateral     Supine Marching x 3x10 Bilateral    straight leg raise   x 3x10 Bilateral    bridges x 30x with Red Theraband    Ball squeeze  x 30x   clamshells  x 30x Red Theraband    Hamstring curls x 3x10 Red Theraband     Sit to stands   x  2x10   Calf raises x 3x10   Toe raises  x 3x10    Step ups   add visit 5           Plan for Next Visit: progress towards overall lower extremity strength, increased walking distances,  side stepping, focus on quad and hips, visit 3 progress to standing exercises        Home Exercises and Patient Education Provided    Education/Self-Care provided: (included in treatment) minutes    Patient educated on the impairments noted above and the effects of physical therapy intervention to improve overall condition and QOL.    Patient was educated on all the above exercise prior/during/after for proper posture, positioning, and execution for safe performance with home exercise program.    Written Home Exercises Provided: No.   Exercises were reviewed and Diamond was able to demonstrate them prior to the end of the session.  Diamond demonstrated good understanding of the education provided.     See EMR under Patient Instructions for exercises provided during therapy sessions.    ASSESSMENT     Diamond Das tolerated PT session well with no  complaints of pain or discomfort. Objective findings are improving with measurements of ROM and functional mobility.  Therapy exercises were reviewed by revisiting exercises given from previous home exercise program while adding exercises above.  Handouts were not issued during today's visit. Diamond demonstrated good understanding of new exercises and will continue to progress at home until next follow-up.       Diamond is progressing well towards her goals.   Pt prognosis is Good.     Pt will continue to benefit from skilled outpatient physical therapy to address the deficits listed in the problem list box on initial evaluation, provide pt/family education and to maximize pt's level of independence in the home and community environment.     Pt's spiritual, cultural and educational needs considered and pt agreeable to plan of care and goals.    Anticipated barriers to physical therapy: Back pain, BMI over 30, Congestive Heart Failure or Heart Disease, Diabetes Type I or II, Gastrointestinal Disease, High Blood Pressure, Kidney, Bladder, Prostate or Urination Problems, Sleep  dysfunction    GOALS:  SHORT TERM GOALS: 6 weeks     1. Recent signs and systems trend is improving in order to progress towards LTG's.     2. Patient will be independent with HEP in order to further progress and return to maximal function.     3. Pain rating at Worst: 5/10 in order to progress towards increased independence with activity.     4. Patient will be able to correct postural deviations in sitting and standing, to decrease pain and promote postural awareness for injury prevention.         LONG TERM GOALS: 12 weeks     1. Patient will return to normal ADL, recreational, and work related activities with less pain and limitation.      2. Patient will improve AROM to stated goals in order to return to maximal functional potential.      3. Patient will improve Strength to stated goals of appropriate musculature in order to improve functional independence.      4. Pain Rating at Best: 1/10 to improve Quality of Life.               5. Patient will meet predicted functional outcome (FOTO) score: 26% to increase self-worth & perceived functional ability.              6.   Patient will have met/partially met personal goal of: decreased knee pain, Increased ability to walk, increased strength and endurance in lower extremity.      PC = progressing/continue  PM= partially met  DC= discontinue    PLAN     Continue Plan of Care (POC) and progress per patient tolerance. See Treatment section for exercise progression.    Miah Alvarado, PT, DPT

## 2022-05-02 ENCOUNTER — OFFICE VISIT (OUTPATIENT)
Dept: INTERNAL MEDICINE | Facility: CLINIC | Age: 67
End: 2022-05-02
Payer: MEDICARE

## 2022-05-02 ENCOUNTER — LAB VISIT (OUTPATIENT)
Dept: LAB | Facility: HOSPITAL | Age: 67
End: 2022-05-02
Attending: FAMILY MEDICINE
Payer: MEDICARE

## 2022-05-02 VITALS
SYSTOLIC BLOOD PRESSURE: 120 MMHG | BODY MASS INDEX: 34.58 KG/M2 | DIASTOLIC BLOOD PRESSURE: 58 MMHG | HEART RATE: 66 BPM | TEMPERATURE: 97 F | WEIGHT: 183.19 LBS | HEIGHT: 61 IN

## 2022-05-02 DIAGNOSIS — R10.13 EPIGASTRIC PAIN: ICD-10-CM

## 2022-05-02 DIAGNOSIS — E11.59 TYPE 2 DIABETES MELLITUS WITH OTHER CIRCULATORY COMPLICATION, WITHOUT LONG-TERM CURRENT USE OF INSULIN: Primary | ICD-10-CM

## 2022-05-02 DIAGNOSIS — E03.4 HYPOTHYROIDISM DUE TO ACQUIRED ATROPHY OF THYROID: ICD-10-CM

## 2022-05-02 DIAGNOSIS — R79.89 ELEVATED D-DIMER: ICD-10-CM

## 2022-05-02 DIAGNOSIS — U07.1 COVID-19: ICD-10-CM

## 2022-05-02 DIAGNOSIS — I10 PRIMARY HYPERTENSION: ICD-10-CM

## 2022-05-02 DIAGNOSIS — I12.9 PARENCHYMAL RENAL HYPERTENSION, STAGE 1 THROUGH STAGE 4 OR UNSPECIFIED CHRONIC KIDNEY DISEASE: ICD-10-CM

## 2022-05-02 DIAGNOSIS — E11.59 TYPE 2 DIABETES MELLITUS WITH OTHER CIRCULATORY COMPLICATION, WITHOUT LONG-TERM CURRENT USE OF INSULIN: ICD-10-CM

## 2022-05-02 PROCEDURE — 83036 HEMOGLOBIN GLYCOSYLATED A1C: CPT | Performed by: FAMILY MEDICINE

## 2022-05-02 PROCEDURE — 36415 COLL VENOUS BLD VENIPUNCTURE: CPT | Mod: PO | Performed by: FAMILY MEDICINE

## 2022-05-02 PROCEDURE — 99213 OFFICE O/P EST LOW 20 MIN: CPT | Mod: PBBFAC,PO | Performed by: FAMILY MEDICINE

## 2022-05-02 PROCEDURE — 99999 PR PBB SHADOW E&M-EST. PATIENT-LVL III: ICD-10-PCS | Mod: PBBFAC,CR,, | Performed by: FAMILY MEDICINE

## 2022-05-02 PROCEDURE — 99214 OFFICE O/P EST MOD 30 MIN: CPT | Mod: S$PBB,CR,, | Performed by: FAMILY MEDICINE

## 2022-05-02 PROCEDURE — 99999 PR PBB SHADOW E&M-EST. PATIENT-LVL III: CPT | Mod: PBBFAC,CR,, | Performed by: FAMILY MEDICINE

## 2022-05-02 PROCEDURE — 99214 PR OFFICE/OUTPT VISIT, EST, LEVL IV, 30-39 MIN: ICD-10-PCS | Mod: S$PBB,CR,, | Performed by: FAMILY MEDICINE

## 2022-05-02 RX ORDER — DOCUSATE SODIUM 100 MG/1
100 CAPSULE, LIQUID FILLED ORAL 3 TIMES DAILY
Qty: 30 CAPSULE | Refills: 0 | Status: SHIPPED | OUTPATIENT
Start: 2022-05-02 | End: 2023-10-27 | Stop reason: SDUPTHER

## 2022-05-02 RX ORDER — FAMOTIDINE 20 MG/1
20 TABLET, FILM COATED ORAL DAILY
Qty: 90 TABLET | Refills: 1 | Status: SHIPPED | OUTPATIENT
Start: 2022-05-02 | End: 2022-08-24 | Stop reason: SDUPTHER

## 2022-05-02 RX ORDER — DULAGLUTIDE 4.5 MG/.5ML
4.5 INJECTION, SOLUTION SUBCUTANEOUS
Qty: 12 PEN | Refills: 1 | Status: SHIPPED | OUTPATIENT
Start: 2022-05-02 | End: 2022-07-18

## 2022-05-02 RX ORDER — ONDANSETRON 4 MG/1
4 TABLET, FILM COATED ORAL EVERY 8 HOURS PRN
Qty: 90 TABLET | Refills: 1 | Status: SHIPPED | OUTPATIENT
Start: 2022-05-02 | End: 2022-08-24 | Stop reason: SDUPTHER

## 2022-05-02 RX ORDER — FLUTICASONE PROPIONATE 50 MCG
2 SPRAY, SUSPENSION (ML) NASAL DAILY
Refills: 0
Start: 2022-05-02 | End: 2023-10-27 | Stop reason: SDUPTHER

## 2022-05-02 RX ORDER — CARVEDILOL 25 MG/1
25 TABLET ORAL 2 TIMES DAILY WITH MEALS
Qty: 180 TABLET | Refills: 1 | Status: SHIPPED | OUTPATIENT
Start: 2022-05-02 | End: 2022-07-18 | Stop reason: SDUPTHER

## 2022-05-02 RX ORDER — LEVOTHYROXINE SODIUM 137 UG/1
137 TABLET ORAL
Qty: 90 TABLET | Refills: 1 | Status: SHIPPED | OUTPATIENT
Start: 2022-05-02 | End: 2022-07-18 | Stop reason: SDUPTHER

## 2022-05-02 RX ORDER — AMLODIPINE BESYLATE 10 MG/1
10 TABLET ORAL DAILY
Qty: 90 TABLET | Refills: 1 | Status: SHIPPED | OUTPATIENT
Start: 2022-05-02 | End: 2022-06-07

## 2022-05-02 NOTE — PROGRESS NOTES
Subjective:       Patient ID: Diamond Das is a 67 y.o. female.    Chief Complaint: Follow-up    Diabetes  She presents for her follow-up diabetic visit. She has type 2 diabetes mellitus. Her disease course has been stable. Pertinent negatives for hypoglycemia include no confusion, dizziness, headaches or hunger. Pertinent negatives for diabetes include no blurred vision, no chest pain, no fatigue and no foot paresthesias. Symptoms are stable.     Review of Systems   Constitutional: Negative for fatigue.   Eyes: Negative for blurred vision.   Respiratory: Negative for shortness of breath.    Cardiovascular: Negative for chest pain.   Gastrointestinal: Negative for abdominal pain.   Neurological: Negative for dizziness and headaches.   Psychiatric/Behavioral: Negative for confusion.       Objective:      Physical Exam  Vitals and nursing note reviewed.   Constitutional:       General: She is not in acute distress.     Appearance: Normal appearance. She is well-developed. She is not diaphoretic.   HENT:      Head: Normocephalic and atraumatic.   Pulmonary:      Effort: Pulmonary effort is normal. No respiratory distress.      Breath sounds: Normal breath sounds. No wheezing.   Skin:     General: Skin is warm and dry.      Findings: No erythema or rash.   Neurological:      Mental Status: She is alert.         Assessment:       1. Type 2 diabetes mellitus with other circulatory complication, without long-term current use of insulin    2. Primary hypertension    3. Hypothyroidism due to acquired atrophy of thyroid    4. Parenchymal renal hypertension, stage 1 through stage 4 or unspecified chronic kidney disease    5. Elevated d-dimer with indeterminate perfusion scan for PE    6. COVID-19    7. Epigastric pain        Plan:     Problem List Items Addressed This Visit        Cardiac/Vascular    Hypertension    Overview     Chronic, Stable, cont amlodipine           Relevant Medications    amLODIPine (NORVASC) 10 MG  tablet       ID    COVID-19    Relevant Medications    apixaban (ELIQUIS) 5 mg Tab       Hematology    Elevated d-dimer with indeterminate perfusion scan for PE    Relevant Medications    apixaban (ELIQUIS) 5 mg Tab       Endocrine    Hypothyroidism due to acquired atrophy of thyroid    Overview     Chronic, Stable, cont Synthroid.           Relevant Medications    levothyroxine (EUTHYROX) 137 MCG Tab tablet    Type 2 diabetes mellitus, without long-term current use of insulin - Primary    Overview     Chronic, Stable, use trulicity           Relevant Medications    dulaglutide (TRULICITY) 4.5 mg/0.5 mL pen injector    Other Relevant Orders    Hemoglobin A1C       GI    Abdominal pain    Relevant Medications    ondansetron (ZOFRAN) 4 MG tablet      Other Visit Diagnoses     Parenchymal renal hypertension, stage 1 through stage 4 or unspecified chronic kidney disease        Relevant Medications    carvediloL (COREG) 25 MG tablet

## 2022-05-03 LAB
ESTIMATED AVG GLUCOSE: 85 MG/DL (ref 68–131)
HBA1C MFR BLD: 4.6 % (ref 4–5.6)

## 2022-05-05 ENCOUNTER — CLINICAL SUPPORT (OUTPATIENT)
Dept: REHABILITATION | Facility: HOSPITAL | Age: 67
End: 2022-05-05
Attending: FAMILY MEDICINE
Payer: MEDICARE

## 2022-05-05 DIAGNOSIS — M25.561 PAIN IN BOTH KNEES, UNSPECIFIED CHRONICITY: ICD-10-CM

## 2022-05-05 DIAGNOSIS — R53.1 DECREASED STRENGTH, ENDURANCE, AND MOBILITY: Primary | ICD-10-CM

## 2022-05-05 DIAGNOSIS — M25.562 PAIN IN BOTH KNEES, UNSPECIFIED CHRONICITY: ICD-10-CM

## 2022-05-05 DIAGNOSIS — R68.89 DECREASED STRENGTH, ENDURANCE, AND MOBILITY: Primary | ICD-10-CM

## 2022-05-05 DIAGNOSIS — Z74.09 DECREASED STRENGTH, ENDURANCE, AND MOBILITY: Primary | ICD-10-CM

## 2022-05-05 PROCEDURE — 97110 THERAPEUTIC EXERCISES: CPT | Mod: PN,CQ

## 2022-05-05 NOTE — PROGRESS NOTES
[facility]  Physical Therapy Treatment Note       Name: Diamond Das  Clinic Number: [id number]    Therapy Diagnosis:   Encounter Diagnoses   Name Primary?    Decreased strength, endurance, and mobility Yes    Pain in both knees, unspecified chronicity      Physician: Andrey Perrin MD    Visit Date: 5/5/2022    Physician Orders: PT Eval and Treat   Medical Diagnosis from Referral: Back and knee pain  Evaluation Date: 4/21/2022  Authorization Period Expiration: 12/31/2022  Plan of Care Expiration: 7/21/2022                Progress Note Due: 5/21/2022  Visit # / Visits authorized: 2/ 20(+1 eval)  FOTO:1/3  PTA visit: 1/5     Precautions: Standard and Diabetes      Time In: 3:45pm  Time Out: 4:30pm  Total Treatment Time: 45 minutes    SUBJECTIVE     Pt reports:  Knees have felt radhames weak, like being tired. She's moving next week.  Compliance with Hep: Daily  Response to previous treatment: no adverse reactions to treatment/updated HEP  Functional change: Better    Pain: 0/10   Worst: 2/10  Location: Bilateral knees  Aggravating factors: Standing and Walking      OBJECTIVE     Objective Measures updated at progress report unless specified otherwise.    Treatment        Diamond received therapeutic exercises to develop strength, endurance, ROM, flexibility, posture and core stabilization for (45) minutes including:     TherEx 4/21/2022     nustep  x   5 Minutes    long arc quad  x 3x10 Bilateral     Supine Marching x 3x10 Bilateral    straight leg raise  x 3x10 Bilateral    bridges x 30x with Red Theraband    Ball squeeze x 30x   Sidelying clamshells x 30x Red Theraband    Hamstring curls x 3x10 Red Theraband     Sit to stands  x  2x10   Calf raises x 3x10   Toe raises x 3x10    Step ups   add visit 5           Plan for Next Visit: progress towards overall lower extremity strength, increased walking distances, side stepping, focus on quad and hips, visit 3 progress to standing  exercises        Home Exercises and Patient Education Provided    Education/Self-Care provided: (included in treatment) minutes    Patient educated on the impairments noted above and the effects of physical therapy intervention to improve overall condition and QOL.    Patient was educated on all the above exercise prior/during/after for proper posture, positioning, and execution for safe performance with home exercise program.    Written Home Exercises Provided: No.   Exercises were reviewed and Diamond was able to demonstrate them prior to the end of the session.  Diamond demonstrated good understanding of the education provided.     See EMR under Patient Instructions for exercises provided during therapy sessions.    ASSESSMENT     Pt presents today reporting some fatigue in her LE's from doing a lot at home, but otherwise no new complaints. Continued with established exercises to good tolerance and no increase in pain. Pt does have difficulty with maintaining terminal knee extension during SLR and require cues for quad contraction. Pt tolerated sit-stand and heel raises very well without increase in pain, and moderate fatigue.    Diamond is progressing well towards her goals.   Pt prognosis is Good.     Pt will continue to benefit from skilled outpatient physical therapy to address the deficits listed in the problem list box on initial evaluation, provide pt/family education and to maximize pt's level of independence in the home and community environment.     Pt's spiritual, cultural and educational needs considered and pt agreeable to plan of care and goals.    Anticipated barriers to physical therapy: Back pain, BMI over 30, Congestive Heart Failure or Heart Disease, Diabetes Type I or II, Gastrointestinal Disease, High Blood Pressure, Kidney, Bladder, Prostate or Urination Problems, Sleep dysfunction    GOALS:  SHORT TERM GOALS: 6 weeks     1. Recent signs and systems trend is improving in order to progress  towards LTG's.  PC   2. Patient will be independent with HEP in order to further progress and return to maximal function.  PC   3. Pain rating at Worst: 5/10 in order to progress towards increased independence with activity.  PC   4. Patient will be able to correct postural deviations in sitting and standing, to decrease pain and promote postural awareness for injury prevention.   PC      LONG TERM GOALS: 12 weeks     1. Patient will return to normal ADL, recreational, and work related activities with less pain and limitation.   PC   2. Patient will improve AROM to stated goals in order to return to maximal functional potential.   PC   3. Patient will improve Strength to stated goals of appropriate musculature in order to improve functional independence.   PC   4. Pain Rating at Best: 1/10 to improve Quality of Life.        PC   5. Patient will meet predicted functional outcome (FOTO) score: 26% to increase self-worth & perceived functional ability.       PC   6.   Patient will have met/partially met personal goal of: decreased knee pain, Increased ability to walk, increased strength and endurance in lower extremity.       PC     PC = progressing/continue  PM= partially met  DC= discontinue    PLAN     Continue Plan of Care (POC) and progress per patient tolerance. See Treatment section for exercise progression.    Colin Felix PTA, DPT

## 2022-05-12 ENCOUNTER — CLINICAL SUPPORT (OUTPATIENT)
Dept: REHABILITATION | Facility: HOSPITAL | Age: 67
End: 2022-05-12
Attending: FAMILY MEDICINE
Payer: MEDICARE

## 2022-05-12 DIAGNOSIS — M25.561 ACUTE PAIN OF BOTH KNEES: ICD-10-CM

## 2022-05-12 DIAGNOSIS — R68.89 DECREASED STRENGTH, ENDURANCE, AND MOBILITY: Primary | ICD-10-CM

## 2022-05-12 DIAGNOSIS — Z74.09 DECREASED STRENGTH, ENDURANCE, AND MOBILITY: Primary | ICD-10-CM

## 2022-05-12 DIAGNOSIS — R53.1 DECREASED STRENGTH, ENDURANCE, AND MOBILITY: Primary | ICD-10-CM

## 2022-05-12 DIAGNOSIS — M25.562 ACUTE PAIN OF BOTH KNEES: ICD-10-CM

## 2022-05-12 PROCEDURE — 97110 THERAPEUTIC EXERCISES: CPT | Mod: PN

## 2022-05-12 NOTE — PROGRESS NOTES
[facility]  Physical Therapy Treatment Note       Name: Diamond Das  Clinic Number: [id number]    Therapy Diagnosis:   Encounter Diagnoses   Name Primary?    Decreased strength, endurance, and mobility Yes    Acute pain of both knees      Physician: Andrey Perrin MD    Visit Date: 5/12/2022    Physician Orders: PT Eval and Treat   Medical Diagnosis from Referral: Back and knee pain  Evaluation Date: 4/21/2022  Authorization Period Expiration: 12/31/2022  Plan of Care Expiration: 7/21/2022                Progress Note Due: 5/21/2022  Visit # / Visits authorized: 3/ 20(+1 eval)  FOTO:1/3  PTA visit: 0/5     Precautions: Standard and Diabetes      Time In: 4:38pm  Time Out: 5:15pm  Total Treatment Time: 37 minutes    SUBJECTIVE     Pt reports:  Legs feel real tired. She's moving all week and is feeling tight.  Compliance with Hep: Daily  Response to previous treatment: no adverse reactions to treatment/updated HEP  Functional change: Better    Pain: 0/10   Worst: 2/10  Location: Bilateral knees  Aggravating factors: Standing and Walking      OBJECTIVE     Objective Measures updated at progress report unless specified otherwise.    Treatment        Diamond received therapeutic exercises to develop strength, endurance, ROM, flexibility, posture and core stabilization for (37) minutes including:     TherEx 4/21/2022     nustep  x   8 Minutes    long arc quad  x 3x10 Bilateral     Supine Marching x 3x10 Bilateral    straight leg raise  x 2x10 Bilateral    bridges  30x with Red Theraband    Ball squeeze x 30x   Sidelying clamshells  30x Red Theraband    Hamstring curls  3x10 Red Theraband     Sit to stands    2x10   Calf raises  3x10   Toe raises  3x10    Step ups  add visit 5   Seated Hamstring stretch x 5x10s Bilateral    Seated piriformis stretch  x 5x10s Bilateral    Trunk rotations x 3 Minutes    Calf stretch x 3x30s            Plan for Next Visit: progress towards overall lower  extremity strength, increased walking distances, side stepping, focus on quad and hips, visit 3 progress to standing exercises        Home Exercises and Patient Education Provided    Education/Self-Care provided: (included in treatment) minutes    Patient educated on the impairments noted above and the effects of physical therapy intervention to improve overall condition and QOL.    Patient was educated on all the above exercise prior/during/after for proper posture, positioning, and execution for safe performance with home exercise program.    Written Home Exercises Provided: No.   Exercises were reviewed and Diamond was able to demonstrate them prior to the end of the session.  Diamond demonstrated good understanding of the education provided.     See EMR under Patient Instructions for exercises provided during therapy sessions.    ASSESSMENT     Pt presents today reporting some stiffness from moving furniture. Changed up routine today due to tightness from moving and will return to primary activity as tolerated next visit.   Diamond is progressing well towards her goals.   Pt prognosis is Good.     Pt will continue to benefit from skilled outpatient physical therapy to address the deficits listed in the problem list box on initial evaluation, provide pt/family education and to maximize pt's level of independence in the home and community environment.     Pt's spiritual, cultural and educational needs considered and pt agreeable to plan of care and goals.    Anticipated barriers to physical therapy: Back pain, BMI over 30, Congestive Heart Failure or Heart Disease, Diabetes Type I or II, Gastrointestinal Disease, High Blood Pressure, Kidney, Bladder, Prostate or Urination Problems, Sleep dysfunction    GOALS:  SHORT TERM GOALS: 6 weeks     1. Recent signs and systems trend is improving in order to progress towards LTG's.  PC   2. Patient will be independent with HEP in order to further progress and return to maximal  function.  PC   3. Pain rating at Worst: 5/10 in order to progress towards increased independence with activity.  PC   4. Patient will be able to correct postural deviations in sitting and standing, to decrease pain and promote postural awareness for injury prevention.   PC      LONG TERM GOALS: 12 weeks     1. Patient will return to normal ADL, recreational, and work related activities with less pain and limitation.   PC   2. Patient will improve AROM to stated goals in order to return to maximal functional potential.   PC   3. Patient will improve Strength to stated goals of appropriate musculature in order to improve functional independence.   PC   4. Pain Rating at Best: 1/10 to improve Quality of Life.        PC   5. Patient will meet predicted functional outcome (FOTO) score: 26% to increase self-worth & perceived functional ability.       PC   6.   Patient will have met/partially met personal goal of: decreased knee pain, Increased ability to walk, increased strength and endurance in lower extremity.       PC     PC = progressing/continue  PM= partially met  DC= discontinue    PLAN     Continue Plan of Care (POC) and progress per patient tolerance. See Treatment section for exercise progression.    Miah Alvarado, PT, DPT

## 2022-05-18 ENCOUNTER — HOSPITAL ENCOUNTER (OUTPATIENT)
Dept: PREADMISSION TESTING | Facility: HOSPITAL | Age: 67
Discharge: HOME | End: 2022-05-18
Attending: FAMILY MEDICINE
Payer: MEDICARE

## 2022-05-18 ENCOUNTER — TELEPHONE (OUTPATIENT)
Dept: PREADMISSION TESTING | Facility: HOSPITAL | Age: 67
End: 2022-05-18
Payer: MEDICAID

## 2022-05-18 DIAGNOSIS — K75.81 LIVER CIRRHOSIS SECONDARY TO NASH (NONALCOHOLIC STEATOHEPATITIS): ICD-10-CM

## 2022-05-18 DIAGNOSIS — K74.60 LIVER CIRRHOSIS SECONDARY TO NASH (NONALCOHOLIC STEATOHEPATITIS): ICD-10-CM

## 2022-05-18 DIAGNOSIS — K21.9 GASTROESOPHAGEAL REFLUX DISEASE WITHOUT ESOPHAGITIS: Primary | ICD-10-CM

## 2022-05-18 NOTE — TELEPHONE ENCOUNTER
Dr. Ramos,     Ms. Diamond Das  1955 MRN [id number] is scheduled for an EGD on 2022.    May we hold her Eliquis for 2 days prior?

## 2022-05-20 ENCOUNTER — OFFICE VISIT (OUTPATIENT)
Dept: INTERNAL MEDICINE | Facility: CLINIC | Age: 67
End: 2022-05-20
Payer: MEDICARE

## 2022-05-20 VITALS
DIASTOLIC BLOOD PRESSURE: 90 MMHG | TEMPERATURE: 98 F | HEIGHT: 61 IN | BODY MASS INDEX: 35.43 KG/M2 | HEART RATE: 71 BPM | SYSTOLIC BLOOD PRESSURE: 202 MMHG | WEIGHT: 187.63 LBS

## 2022-05-20 DIAGNOSIS — E11.59 TYPE 2 DIABETES MELLITUS WITH OTHER CIRCULATORY COMPLICATION, WITHOUT LONG-TERM CURRENT USE OF INSULIN: ICD-10-CM

## 2022-05-20 DIAGNOSIS — R25.2 LEG CRAMPS: Primary | ICD-10-CM

## 2022-05-20 DIAGNOSIS — E78.49 OTHER HYPERLIPIDEMIA: ICD-10-CM

## 2022-05-20 PROCEDURE — 99214 OFFICE O/P EST MOD 30 MIN: CPT | Mod: S$PBB,,, | Performed by: FAMILY MEDICINE

## 2022-05-20 PROCEDURE — 99999 PR PBB SHADOW E&M-EST. PATIENT-LVL III: ICD-10-PCS | Mod: PBBFAC,,, | Performed by: FAMILY MEDICINE

## 2022-05-20 PROCEDURE — 99214 PR OFFICE/OUTPT VISIT, EST, LEVL IV, 30-39 MIN: ICD-10-PCS | Mod: S$PBB,,, | Performed by: FAMILY MEDICINE

## 2022-05-20 PROCEDURE — 99213 OFFICE O/P EST LOW 20 MIN: CPT | Mod: PBBFAC,PO | Performed by: FAMILY MEDICINE

## 2022-05-20 PROCEDURE — 99999 PR PBB SHADOW E&M-EST. PATIENT-LVL III: CPT | Mod: PBBFAC,,, | Performed by: FAMILY MEDICINE

## 2022-05-20 RX ORDER — DOCUSATE SODIUM 100 MG
CAPSULE ORAL
COMMUNITY
Start: 2022-05-02

## 2022-05-20 RX ORDER — TIZANIDINE 4 MG/1
4 TABLET ORAL EVERY 8 HOURS
Qty: 30 TABLET | Refills: 0 | Status: SHIPPED | OUTPATIENT
Start: 2022-05-20 | End: 2022-05-30

## 2022-05-20 RX ORDER — PRAVASTATIN SODIUM 10 MG/1
10 TABLET ORAL DAILY
Qty: 90 TABLET | Refills: 3 | Status: SHIPPED | OUTPATIENT
Start: 2022-05-20 | End: 2022-06-07 | Stop reason: SDUPTHER

## 2022-05-20 NOTE — PROGRESS NOTES
Subjective:       Patient ID: Diamond Das is a 67 y.o. female.    Chief Complaint: No chief complaint on file.    Leg Pain   The incident occurred more than 1 week ago. The incident occurred at home. There was no injury mechanism. Pain location: ble.     Review of Systems   Respiratory: Negative for shortness of breath.    Cardiovascular: Negative for chest pain.   Gastrointestinal: Negative for abdominal pain.       Objective:      Physical Exam  Vitals and nursing note reviewed.   Constitutional:       General: She is not in acute distress.     Appearance: Normal appearance. She is well-developed. She is not diaphoretic.   HENT:      Head: Normocephalic and atraumatic.   Pulmonary:      Effort: Pulmonary effort is normal. No respiratory distress.      Breath sounds: Normal breath sounds. No wheezing.   Abdominal:      General: There is no distension.      Palpations: Abdomen is soft.      Tenderness: There is no abdominal tenderness. There is no guarding.   Musculoskeletal:      Right lower leg: No edema.      Left lower leg: No edema.   Skin:     General: Skin is warm and dry.      Findings: No erythema or rash.   Neurological:      Mental Status: She is alert.         Assessment:       1. Leg cramps    2. Other hyperlipidemia    3. Type 2 diabetes mellitus with other circulatory complication, without long-term current use of insulin        Plan:     Problem List Items Addressed This Visit        Cardiac/Vascular    Hyperlipidemia    Overview     Chronic, Stable, cont pravastatin           Relevant Medications    pravastatin (PRAVACHOL) 10 MG tablet       Endocrine    Type 2 diabetes mellitus, without long-term current use of insulin    Overview     Chronic, Stable, use trulicity              Other    Leg cramps - Primary    Current Assessment & Plan     Drink tonic water.           Relevant Medications    tiZANidine (ZANAFLEX) 4 MG tablet

## 2022-05-23 NOTE — PROGRESS NOTES
[facility]  Physical Therapy Treatment Note       Name: Diamond Das  Clinic Number: [id number]    Therapy Diagnosis:   Encounter Diagnoses   Name Primary?    Decreased strength, endurance, and mobility Yes    Chronic pain of both knees      Physician: Andrey Perrin MD    Visit Date: 5/24/2022    Physician Orders: PT Eval and Treat   Medical Diagnosis from Referral: Back and knee pain  Evaluation Date: 4/21/2022  Authorization Period Expiration: 12/31/2022  Plan of Care Expiration: 7/21/2022                Progress Note Due: 5/21/2022  Visit # / Visits authorized: 5/ 20(+1 eval)  FOTO:1/3  PTA visit: 0/5     Precautions: Standard and Diabetes      Time In: 3:30  Time Out: 415 pm  Total Treatment Time: 40 minutes    SUBJECTIVE     Pt reports: She's moving all week and is feeling tight. Feels overdid it with packing this last week    Compliance with Hep: Daily  Response to previous treatment: no adverse reactions to treatment/updated HEP  Functional change: Better    Pain: 0/10   Worst: 2/10  Location: Bilateral knees  Aggravating factors: Standing and Walking      OBJECTIVE     Objective Measures updated at progress report unless specified otherwise.    Treatment        Diamond received therapeutic exercises to develop strength, endurance, ROM, flexibility, posture and core stabilization for (40) minutes including:     TherEx 4/21/2022          nustep  x   8 Minutes    Recumbant bike seat          long arc quad  x [id number]  2#   Supine Marching  3x10 Bilateral    straight leg raise  x 3x15 Bilateral    bridges x 3X 15 with ball squeeze    Ball squeeze  30x   Sidelying clamshells x 2 X 15    Hamstring curls  3x10 Red Theraband     Sit to stands  x  2x10   Calf raises  3x10   Toe raises  3x10    Step ups  add visit 5   Seated Hamstring stretch  5x10s Bilateral    Seated piriformis stretch   5x10s Bilateral    Trunk rotations  3 Minutes    Calf stretch x 3x30s            Plan  for Next Visit: progress towards overall lower extremity strength, increased walking distances, side stepping, focus on quad and hips, visit 3 progress to standing exercises        Home Exercises and Patient Education Provided    Education/Self-Care provided: (included in treatment) minutes    Patient educated on the impairments noted above and the effects of physical therapy intervention to improve overall condition and QOL.    Patient was educated on all the above exercise prior/during/after for proper posture, positioning, and execution for safe performance with home exercise program.    Written Home Exercises Provided: No.   Exercises were reviewed and Diamond was able to demonstrate them prior to the end of the session.  Diamond demonstrated good understanding of the education provided.     See EMR under Patient Instructions for exercises provided during therapy sessions.    ASSESSMENT     Pt tolerated activities well today with minimal complaints. Some muscle fatigue last set of right hip abd    Diamond is progressing well towards her goals.   Pt prognosis is Good.     Pt will continue to benefit from skilled outpatient physical therapy to address the deficits listed in the problem list box on initial evaluation, provide pt/family education and to maximize pt's level of independence in the home and community environment.     Pt's spiritual, cultural and educational needs considered and pt agreeable to plan of care and goals.    Anticipated barriers to physical therapy: Back pain, BMI over 30, Congestive Heart Failure or Heart Disease, Diabetes Type I or II, Gastrointestinal Disease, High Blood Pressure, Kidney, Bladder, Prostate or Urination Problems, Sleep dysfunction    GOALS:  SHORT TERM GOALS: 6 weeks     1. Recent signs and systems trend is improving in order to progress towards LTG's.  PC   2. Patient will be independent with HEP in order to further progress and return to maximal function.  PC   3. Pain  rating at Worst: 5/10 in order to progress towards increased independence with activity.  PC   4. Patient will be able to correct postural deviations in sitting and standing, to decrease pain and promote postural awareness for injury prevention.   PC      LONG TERM GOALS: 12 weeks     1. Patient will return to normal ADL, recreational, and work related activities with less pain and limitation.   PC   2. Patient will improve AROM to stated goals in order to return to maximal functional potential.   PC   3. Patient will improve Strength to stated goals of appropriate musculature in order to improve functional independence.   PC   4. Pain Rating at Best: 1/10 to improve Quality of Life.        PC   5. Patient will meet predicted functional outcome (FOTO) score: 26% to increase self-worth & perceived functional ability.       PC   6.   Patient will have met/partially met personal goal of: decreased knee pain, Increased ability to walk, increased strength and endurance in lower extremity.       PC     PC = progressing/continue  PM= partially met  DC= discontinue    PLAN     Continue Plan of Care (POC) and progress per patient tolerance. See Treatment section for exercise progression.    Yolanda Martinez, PT, DPT

## 2022-05-24 ENCOUNTER — CLINICAL SUPPORT (OUTPATIENT)
Dept: REHABILITATION | Facility: HOSPITAL | Age: 67
End: 2022-05-24
Attending: FAMILY MEDICINE
Payer: MEDICARE

## 2022-05-24 DIAGNOSIS — G89.29 CHRONIC PAIN OF BOTH KNEES: ICD-10-CM

## 2022-05-24 DIAGNOSIS — Z74.09 DECREASED STRENGTH, ENDURANCE, AND MOBILITY: Primary | ICD-10-CM

## 2022-05-24 DIAGNOSIS — R53.1 DECREASED STRENGTH, ENDURANCE, AND MOBILITY: Primary | ICD-10-CM

## 2022-05-24 DIAGNOSIS — M25.561 CHRONIC PAIN OF BOTH KNEES: ICD-10-CM

## 2022-05-24 DIAGNOSIS — M25.562 CHRONIC PAIN OF BOTH KNEES: ICD-10-CM

## 2022-05-24 DIAGNOSIS — R68.89 DECREASED STRENGTH, ENDURANCE, AND MOBILITY: Primary | ICD-10-CM

## 2022-05-24 PROCEDURE — 97110 THERAPEUTIC EXERCISES: CPT | Mod: PN

## 2022-05-25 NOTE — PROGRESS NOTES
[facility]  Physical Therapy Treatment Note       Name: Diamond Das  [facility] Number: [id number]    Therapy Diagnosis:   Encounter Diagnoses   Name Primary?    Decreased strength, endurance, and mobility Yes    Acute pain of both knees      Physician: nAdrey Perrin MD    Visit Date: 5/26/2022    Physician Orders: PT Eval and Treat   Medical Diagnosis from Referral: Back and knee pain  Evaluation Date: 4/21/2022  Authorization Period Expiration: 12/31/2022  Plan of Care Expiration: 7/21/2022                Progress Note Due: 6/26/2022  Visit # / Visits authorized: 6/ 20(+1 eval)  FOTO:1/3  PTA visit: 0/5     Precautions: Standard and Diabetes      Time In: 3:30  Time Out: 415 pm  Total Treatment Time: 40 minutes    SUBJECTIVE     Pt reports: She's moving all week and is feeling tight. Feels overdid it with packing this last week    Compliance with Hep: Daily  Response to previous treatment: no adverse reactions to treatment/updated HEP  Functional change: Better    Pain: 0/10   Worst: 2/10  Location: Bilateral knees  Aggravating factors: Standing and Walking      OBJECTIVE     Objective Measures updated at progress report unless specified otherwise.  STRENGTH:     Hip/Knee MMT Right  4/21/2022 Goal   Hip Flexion  4+/5 5/5 B    Knee Flexion 4+/5 5/5 B   Knee Extension 4+/5 5/5 B      Hip/Knee MMT Left  4/21/2022 Goal   Hip Flexion  4+/5 5/5 B    Knee Flexion 4/5 5/5 B   Knee Extension 4+/5 5/5 B   Palpation:  Pain/Tenderness to anterior knee and joint line       Gait Analysis: The patient ambulated with no AD     Other: Pt presents with postural abnormalities which include: forward head and rounded shoulders       Movement Analysis:   Functional Test  Outcome   Double Leg Squat Dysfunctional and use of upper extremity    Single Leg Step Down x      Rehab Tests  Outcome Norms GOAL   Five Time Sit to Stand 21.6 60s: <11.4 sec  70s: <12.6 sec  80s: 14.8 sec <11.4   Timed Up and Go  x <13.5 sec x   6 minutes walk x 60s: >538f, 572m  70s: >471f, 527m  80s: >417f, 392m x     Balance:     RIGHT  4/21/2022 LEFT  4/21/2022 Goal   Closed x - 60 seconds      Sit to stand 21.6                CMS Impairment/Limitation/Restriction for FOTO knee Survey     Therapist reviewed FOTO scores for Diamond Das on 4/21/2022.   FOTO documents entered into [employer] - see Media section.     Limitation Score: 42%       Treatment        Diamond received therapeutic exercises to develop strength, endurance, ROM, flexibility, posture and core stabilization for (40) minutes including:     TherEx 4/21/2022          nustep  x   8 Minutes    Recumbant bike seat          long arc quad  x [id number]  2#   Supine Marching  3x10 Bilateral    straight leg raise  x 3x15 Bilateral    bridges x 3X 15 with ball squeeze    Ball squeeze  30x   Sidelying clamshells x 2 X 15    Hamstring curls  3x10 Red Theraband     Sit to stands  x  2x10   Calf raises  3x10   Toe raises  3x10    Step ups  add visit 5   Seated Hamstring stretch  5x10s Bilateral    Seated piriformis stretch   5x10s Bilateral    Trunk rotations  3 Minutes    Calf stretch x 3x30s            Plan for Next Visit: progress towards overall lower extremity strength, increased walking distances, side stepping, focus on quad and hips, visit 3 progress to standing exercises        Home Exercises and Patient Education Provided    Education/Self-Care provided: (included in treatment) minutes    Patient educated on the impairments noted above and the effects of physical therapy intervention to improve overall condition and QOL.    Patient was educated on all the above exercise prior/during/after for proper posture, positioning, and execution for safe performance with home exercise program.    Written Home Exercises Provided: No.   Exercises were reviewed and Diamond was able to demonstrate them prior to the end of the session.  Diamond demonstrated good understanding of the education  provided.     See EMR under Patient Instructions for exercises provided during therapy sessions.    ASSESSMENT     Pt tolerated activities well today with minimal complaints. Some muscle fatigue last set of right hip abd    Diamond is progressing well towards her goals.   Pt prognosis is Good.     Pt will continue to benefit from skilled outpatient physical therapy to address the deficits listed in the problem list box on initial evaluation, provide pt/family education and to maximize pt's level of independence in the home and community environment.     Pt's spiritual, cultural and educational needs considered and pt agreeable to plan of care and goals.    Anticipated barriers to physical therapy: Back pain, BMI over 30, Congestive Heart Failure or Heart Disease, Diabetes Type I or II, Gastrointestinal Disease, High Blood Pressure, Kidney, Bladder, Prostate or Urination Problems, Sleep dysfunction    GOALS:  SHORT TERM GOALS: 6 weeks     1. Recent signs and systems trend is improving in order to progress towards LTG's.  PC   2. Patient will be independent with HEP in order to further progress and return to maximal function.  PC   3. Pain rating at Worst: 5/10 in order to progress towards increased independence with activity.  PC   4. Patient will be able to correct postural deviations in sitting and standing, to decrease pain and promote postural awareness for injury prevention.   PC      LONG TERM GOALS: 12 weeks     1. Patient will return to normal ADL, recreational, and work related activities with less pain and limitation.   PC   2. Patient will improve AROM to stated goals in order to return to maximal functional potential.   PC   3. Patient will improve Strength to stated goals of appropriate musculature in order to improve functional independence.   PC   4. Pain Rating at Best: 1/10 to improve Quality of Life.        PC   5. Patient will meet predicted functional outcome (FOTO) score: 26% to increase self-worth  & perceived functional ability.       PC   6.   Patient will have met/partially met personal goal of: decreased knee pain, Increased ability to walk, increased strength and endurance in lower extremity.       PC     PC = progressing/continue  PM= partially met  DC= discontinue    PLAN     Continue Plan of Care (POC) and progress per patient tolerance. See Treatment section for exercise progression.    Yolanda Martinez, PT, DPT

## 2022-05-26 ENCOUNTER — CLINICAL SUPPORT (OUTPATIENT)
Dept: REHABILITATION | Facility: HOSPITAL | Age: 67
End: 2022-05-26
Attending: FAMILY MEDICINE
Payer: MEDICARE

## 2022-05-26 DIAGNOSIS — R53.1 DECREASED STRENGTH, ENDURANCE, AND MOBILITY: Primary | ICD-10-CM

## 2022-05-26 DIAGNOSIS — R68.89 DECREASED STRENGTH, ENDURANCE, AND MOBILITY: Primary | ICD-10-CM

## 2022-05-26 DIAGNOSIS — M25.561 ACUTE PAIN OF BOTH KNEES: ICD-10-CM

## 2022-05-26 DIAGNOSIS — M25.562 ACUTE PAIN OF BOTH KNEES: ICD-10-CM

## 2022-05-26 DIAGNOSIS — Z74.09 DECREASED STRENGTH, ENDURANCE, AND MOBILITY: Primary | ICD-10-CM

## 2022-05-26 PROCEDURE — 97140 MANUAL THERAPY 1/> REGIONS: CPT | Mod: PN

## 2022-05-26 PROCEDURE — 97110 THERAPEUTIC EXERCISES: CPT | Mod: PN

## 2022-05-26 NOTE — PROGRESS NOTES
[facility]  Physical Therapy Treatment Note       Name: Diamond Das  [facility] Number: [id number]    Therapy Diagnosis:   Encounter Diagnoses   Name Primary?    Decreased strength, endurance, and mobility Yes    Acute pain of both knees      Physician: Andrey Perrin MD    Visit Date: 5/31/2022    Physician Orders: PT Eval and Treat   Medical Diagnosis from Referral: Back and knee pain  Evaluation Date: 4/21/2022  Authorization Period Expiration: 12/31/2022  Plan of Care Expiration: 7/21/2022                Progress Note Due: 6/26/2022  Visit # / Visits authorized: 6/ 20(+1 eval)  FOTO:1/3  PTA visit: 0/5     Precautions: Standard and Diabetes      Time In: 2:45  Time Out: 3:30  Total Treatment Time: 45 minutes    SUBJECTIVE     Pt reports: still moving. Has been doing some more walking and did ok.     Compliance with Hep: Daily  Response to previous treatment: no adverse reactions to treatment/updated HEP  Functional change: Better    Pain: 0/10   Worst: 2/10  Location: Bilateral knees  Aggravating factors: Standing and Walking      OBJECTIVE     Objective Measures updated at progress report unless specified otherwise.          Treatment        Diamond received therapeutic exercises to develop strength, endurance, ROM, flexibility, posture and core stabilization for (40) minutes including:     TherEx 4/21/2022          nustep  x   8 Minutes    Recumbant bike seat 1          long arc quad  x [id number]  2#   Supine Marching  3x10 Bilateral    straight leg raise  x 3x15 Bilateral 2#   bridges x 3X 15 with ball squeeze    Ball squeeze  30x   Sidelying clamshells x 2 X 15    Hamstring curls x 2x15     Sit to stands  x  2x10   Calf raises x 2x15   Toe raises  3x10    Step ups x add visit 5   Seated Hamstring stretch  5x10s Bilateral    Seated piriformis stretch   5x10s Bilateral    Trunk rotations  3 Minutes    Calf stretch x 3x30s            Plan for Next Visit: progress  towards overall lower extremity strength, increased walking distances, side stepping, focus on quad and hips, visit 3 progress to standing exercises        Home Exercises and Patient Education Provided    Education/Self-Care provided: (included in treatment) minutes    Patient educated on the impairments noted above and the effects of physical therapy intervention to improve overall condition and QOL.    Patient was educated on all the above exercise prior/during/after for proper posture, positioning, and execution for safe performance with home exercise program.    Written Home Exercises Provided: No.   Exercises were reviewed and Diamond was able to demonstrate them prior to the end of the session.  Diamond demonstrated good understanding of the education provided.     See EMR under Patient Instructions for exercises provided during therapy sessions.    ASSESSMENT     Pt tolerated activities well today. Increased weight to SLR. Added step ups with no c/o. Some muscle fatigue with HS curls standing at 40 min in.    Diamond is progressing well towards her goals.   Pt prognosis is Good.     Pt will continue to benefit from skilled outpatient physical therapy to address the deficits listed in the problem list box on initial evaluation, provide pt/family education and to maximize pt's level of independence in the home and community environment.     Pt's spiritual, cultural and educational needs considered and pt agreeable to plan of care and goals.    Anticipated barriers to physical therapy: Back pain, BMI over 30, Congestive Heart Failure or Heart Disease, Diabetes Type I or II, Gastrointestinal Disease, High Blood Pressure, Kidney, Bladder, Prostate or Urination Problems, Sleep dysfunction    GOALS:  SHORT TERM GOALS: 6 weeks     1. Recent signs and systems trend is improving in order to progress towards LTG's.  PC   2. Patient will be independent with HEP in order to further progress and return to maximal function.  PC    3. Pain rating at Worst: 5/10 in order to progress towards increased independence with activity.  PC   4. Patient will be able to correct postural deviations in sitting and standing, to decrease pain and promote postural awareness for injury prevention.   PC      LONG TERM GOALS: 12 weeks     1. Patient will return to normal ADL, recreational, and work related activities with less pain and limitation.   PC   2. Patient will improve AROM to stated goals in order to return to maximal functional potential.   PC   3. Patient will improve Strength to stated goals of appropriate musculature in order to improve functional independence.   PC   4. Pain Rating at Best: 1/10 to improve Quality of Life.        PC   5. Patient will meet predicted functional outcome (FOTO) score: 26% to increase self-worth & perceived functional ability.       PC   6.   Patient will have met/partially met personal goal of: decreased knee pain, Increased ability to walk, increased strength and endurance in lower extremity.       PC     PC = progressing/continue  PM= partially met  DC= discontinue    PLAN     Continue Plan of Care (POC) and progress per patient tolerance. See Treatment section for exercise progression.    Yolanda Martinez, PT, DPT

## 2022-05-31 ENCOUNTER — CLINICAL SUPPORT (OUTPATIENT)
Dept: REHABILITATION | Facility: HOSPITAL | Age: 67
End: 2022-05-31
Attending: FAMILY MEDICINE
Payer: MEDICARE

## 2022-05-31 DIAGNOSIS — M25.561 ACUTE PAIN OF BOTH KNEES: ICD-10-CM

## 2022-05-31 DIAGNOSIS — M25.562 ACUTE PAIN OF BOTH KNEES: ICD-10-CM

## 2022-05-31 DIAGNOSIS — Z74.09 DECREASED STRENGTH, ENDURANCE, AND MOBILITY: Primary | ICD-10-CM

## 2022-05-31 DIAGNOSIS — R68.89 DECREASED STRENGTH, ENDURANCE, AND MOBILITY: Primary | ICD-10-CM

## 2022-05-31 DIAGNOSIS — R53.1 DECREASED STRENGTH, ENDURANCE, AND MOBILITY: Primary | ICD-10-CM

## 2022-05-31 PROCEDURE — 97110 THERAPEUTIC EXERCISES: CPT | Mod: PN

## 2022-06-02 ENCOUNTER — CLINICAL SUPPORT (OUTPATIENT)
Dept: REHABILITATION | Facility: HOSPITAL | Age: 67
End: 2022-06-02
Attending: FAMILY MEDICINE
Payer: MEDICARE

## 2022-06-02 DIAGNOSIS — M25.562 PAIN IN BOTH KNEES, UNSPECIFIED CHRONICITY: ICD-10-CM

## 2022-06-02 DIAGNOSIS — Z74.09 DECREASED STRENGTH, ENDURANCE, AND MOBILITY: Primary | ICD-10-CM

## 2022-06-02 DIAGNOSIS — R68.89 DECREASED STRENGTH, ENDURANCE, AND MOBILITY: Primary | ICD-10-CM

## 2022-06-02 DIAGNOSIS — R53.1 DECREASED STRENGTH, ENDURANCE, AND MOBILITY: Primary | ICD-10-CM

## 2022-06-02 DIAGNOSIS — M25.561 PAIN IN BOTH KNEES, UNSPECIFIED CHRONICITY: ICD-10-CM

## 2022-06-02 PROCEDURE — 97110 THERAPEUTIC EXERCISES: CPT | Mod: PN,CQ

## 2022-06-02 NOTE — PROGRESS NOTES
[facility]  Physical Therapy Treatment Note       Name: Diamond Das  Clinic Number: [id number]    Therapy Diagnosis:   Encounter Diagnoses   Name Primary?    Decreased strength, endurance, and mobility Yes    Pain in both knees, unspecified chronicity      Physician: Andrey Perrin MD    Visit Date: 6/2/2022    Physician Orders: PT Eval and Treat   Medical Diagnosis from Referral: Back and knee pain  Evaluation Date: 4/21/2022  Authorization Period Expiration: 12/31/2022  Plan of Care Expiration: 7/21/2022                Progress Note Due: 6/26/2022  Visit # / Visits authorized: 7/ 20(+1 eval)  FOTO:1/3  PTA visit: 1/5     Precautions: Standard and Diabetes      Time In: 11:15am  Time Out: 12:00pm  Total Treatment Time: 45 minutes    SUBJECTIVE     Pt reports: Knees are coming along, better than what they were.     Compliance with Hep: Daily  Response to previous treatment: no adverse reactions to treatment/updated HEP  Functional change: Better    Pain: 0/10   Worst: 2/10  Location: Bilateral knees  Aggravating factors: Standing and Walking      OBJECTIVE     Objective Measures updated at progress report unless specified otherwise.      Treatment        Diamond received therapeutic exercises to develop strength, endurance, ROM, flexibility, posture and core stabilization for (45) minutes including:     TherEx 4/21/2022          nustep  x   8 Minutes    Recumbant bike seat 1          long arc quad  x [id number]  2#   Supine Marching  3x10 Bilateral    straight leg raise  x 3x15 Bilateral 2#   bridges x 3X 15 with ball squeeze    Ball squeeze  30x   Sidelying clamshells x 2 X 15    Hamstring curls x 2x15     Sit to stands  x  2x10   Calf raises x 2x15   Toe raises  3x10    Step ups x 2x15 each   Seated Hamstring stretch  5x10s Bilateral    Seated piriformis stretch   5x10s Bilateral    Trunk rotations  3 Minutes    Calf stretch x 3x30s            Plan for Next Visit: progress  towards overall lower extremity strength, increased walking distances, side stepping, focus on quad and hips, visit 3 progress to standing exercises        Home Exercises and Patient Education Provided    Education/Self-Care provided: (included in treatment) minutes    Patient educated on the impairments noted above and the effects of physical therapy intervention to improve overall condition and QOL.    Patient was educated on all the above exercise prior/during/after for proper posture, positioning, and execution for safe performance with home exercise program.    Written Home Exercises Provided: No.   Exercises were reviewed and Diamond was able to demonstrate them prior to the end of the session.  Diamond demonstrated good understanding of the education provided.     See EMR under Patient Instructions for exercises provided during therapy sessions.    ASSESSMENT     Pt presents today reporting improvement in her knee pain overall. Continued with established exercises to good tolerance, pt did have more fatigue today though, she reports she did not eat before therapy. Will continue to progress hip and knee strengthening in upcoming visits per pt's tolerance.    Diamond is progressing well towards her goals.   Pt prognosis is Good.     Pt will continue to benefit from skilled outpatient physical therapy to address the deficits listed in the problem list box on initial evaluation, provide pt/family education and to maximize pt's level of independence in the home and community environment.     Pt's spiritual, cultural and educational needs considered and pt agreeable to plan of care and goals.    Anticipated barriers to physical therapy: Back pain, BMI over 30, Congestive Heart Failure or Heart Disease, Diabetes Type I or II, Gastrointestinal Disease, High Blood Pressure, Kidney, Bladder, Prostate or Urination Problems, Sleep dysfunction    GOALS:  SHORT TERM GOALS: 6 weeks     1. Recent signs and systems trend is  improving in order to progress towards LTG's.  PC   2. Patient will be independent with HEP in order to further progress and return to maximal function.  PC   3. Pain rating at Worst: 5/10 in order to progress towards increased independence with activity.  PC   4. Patient will be able to correct postural deviations in sitting and standing, to decrease pain and promote postural awareness for injury prevention.   PC      LONG TERM GOALS: 12 weeks     1. Patient will return to normal ADL, recreational, and work related activities with less pain and limitation.   PC   2. Patient will improve AROM to stated goals in order to return to maximal functional potential.   PC   3. Patient will improve Strength to stated goals of appropriate musculature in order to improve functional independence.   PC   4. Pain Rating at Best: 1/10 to improve Quality of Life.        PC   5. Patient will meet predicted functional outcome (FOTO) score: 26% to increase self-worth & perceived functional ability.       PC   6.   Patient will have met/partially met personal goal of: decreased knee pain, Increased ability to walk, increased strength and endurance in lower extremity.       PC     PC = progressing/continue  PM= partially met  DC= discontinue    PLAN     Continue Plan of Care (POC) and progress per patient tolerance. See Treatment section for exercise progression.    Colin Felix PTA

## 2022-06-06 ENCOUNTER — TELEPHONE (OUTPATIENT)
Dept: PULMONOLOGY | Facility: CLINIC | Age: 67
End: 2022-06-06
Payer: MEDICAID

## 2022-06-06 ENCOUNTER — LAB VISIT (OUTPATIENT)
Dept: LAB | Facility: HOSPITAL | Age: 67
End: 2022-06-06
Attending: INTERNAL MEDICINE
Payer: MEDICAID

## 2022-06-06 DIAGNOSIS — N18.5 CKD (CHRONIC KIDNEY DISEASE), SYMPTOM MANAGEMENT ONLY, STAGE 5: ICD-10-CM

## 2022-06-06 DIAGNOSIS — I12.9 PARENCHYMAL RENAL HYPERTENSION, STAGE 1 THROUGH STAGE 4 OR UNSPECIFIED CHRONIC KIDNEY DISEASE: ICD-10-CM

## 2022-06-06 LAB
ALBUMIN SERPL BCP-MCNC: 2.5 G/DL (ref 3.5–5.2)
ANION GAP SERPL CALC-SCNC: 6 MMOL/L (ref 8–16)
BUN SERPL-MCNC: 46 MG/DL (ref 8–23)
CALCIUM SERPL-MCNC: 8 MG/DL (ref 8.7–10.5)
CHLORIDE SERPL-SCNC: 112 MMOL/L (ref 95–110)
CO2 SERPL-SCNC: 23 MMOL/L (ref 23–29)
CREAT SERPL-MCNC: 4.2 MG/DL (ref 0.5–1.4)
EST. GFR  (AFRICAN AMERICAN): 12.1 ML/MIN/1.73 M^2
EST. GFR  (NON AFRICAN AMERICAN): 10.5 ML/MIN/1.73 M^2
GLUCOSE SERPL-MCNC: 78 MG/DL (ref 70–110)
PHOSPHATE SERPL-MCNC: 4.4 MG/DL (ref 2.7–4.5)
POTASSIUM SERPL-SCNC: 5.5 MMOL/L (ref 3.5–5.1)
PTH-INTACT SERPL-MCNC: 283.2 PG/ML (ref 9–77)
SODIUM SERPL-SCNC: 141 MMOL/L (ref 136–145)

## 2022-06-06 PROCEDURE — 80069 RENAL FUNCTION PANEL: CPT | Performed by: INTERNAL MEDICINE

## 2022-06-06 PROCEDURE — 83970 ASSAY OF PARATHORMONE: CPT | Performed by: INTERNAL MEDICINE

## 2022-06-06 PROCEDURE — 36415 COLL VENOUS BLD VENIPUNCTURE: CPT | Mod: PO | Performed by: INTERNAL MEDICINE

## 2022-06-06 NOTE — TELEPHONE ENCOUNTER
----- Message from Mark Willis sent at 6/6/2022 10:25 AM CDT -----  Contact: Paco/ daughter  Type:  Sooner Apoointment Request    Caller is requesting a sooner appointment.  Caller declined first available appointment listed below.  Caller will not accept being placed on the waitlist and is requesting a message be sent to doctor.  Name of Caller: Paco   When is the first available appointment? 6/28/22    Symptoms: pt would like to discuss fluid medication   Would the patient rather a call back or a response via [facility]?  Call back   Best Call Back Number: [phone]   Additional Information:

## 2022-06-07 ENCOUNTER — LAB VISIT (OUTPATIENT)
Dept: LAB | Facility: HOSPITAL | Age: 67
End: 2022-06-07
Attending: FAMILY MEDICINE
Payer: MEDICARE

## 2022-06-07 ENCOUNTER — OFFICE VISIT (OUTPATIENT)
Dept: INTERNAL MEDICINE | Facility: CLINIC | Age: 67
End: 2022-06-07
Payer: MEDICARE

## 2022-06-07 VITALS
WEIGHT: 197.56 LBS | DIASTOLIC BLOOD PRESSURE: 68 MMHG | SYSTOLIC BLOOD PRESSURE: 138 MMHG | OXYGEN SATURATION: 99 % | HEART RATE: 66 BPM | BODY MASS INDEX: 37.32 KG/M2 | TEMPERATURE: 98 F

## 2022-06-07 DIAGNOSIS — I10 PRIMARY HYPERTENSION: Primary | ICD-10-CM

## 2022-06-07 DIAGNOSIS — E11.59 TYPE 2 DIABETES MELLITUS WITH OTHER CIRCULATORY COMPLICATION, WITHOUT LONG-TERM CURRENT USE OF INSULIN: ICD-10-CM

## 2022-06-07 DIAGNOSIS — E78.49 OTHER HYPERLIPIDEMIA: ICD-10-CM

## 2022-06-07 DIAGNOSIS — I50.32 CHRONIC DIASTOLIC CONGESTIVE HEART FAILURE: ICD-10-CM

## 2022-06-07 LAB
ALBUMIN SERPL BCP-MCNC: 2.6 G/DL (ref 3.5–5.2)
ALP SERPL-CCNC: 81 U/L (ref 55–135)
ALT SERPL W/O P-5'-P-CCNC: 14 U/L (ref 10–44)
ANION GAP SERPL CALC-SCNC: 9 MMOL/L (ref 8–16)
AST SERPL-CCNC: 27 U/L (ref 10–40)
BILIRUB SERPL-MCNC: 0.3 MG/DL (ref 0.1–1)
BUN SERPL-MCNC: 50 MG/DL (ref 8–23)
CALCIUM SERPL-MCNC: 8.2 MG/DL (ref 8.7–10.5)
CHLORIDE SERPL-SCNC: 112 MMOL/L (ref 95–110)
CO2 SERPL-SCNC: 22 MMOL/L (ref 23–29)
CREAT SERPL-MCNC: 4.7 MG/DL (ref 0.5–1.4)
EST. GFR  (AFRICAN AMERICAN): 10.5 ML/MIN/1.73 M^2
EST. GFR  (NON AFRICAN AMERICAN): 9.1 ML/MIN/1.73 M^2
ESTIMATED AVG GLUCOSE: 100 MG/DL (ref 68–131)
GLUCOSE SERPL-MCNC: 105 MG/DL (ref 70–110)
HBA1C MFR BLD: 5.1 % (ref 4–5.6)
POTASSIUM SERPL-SCNC: 5.4 MMOL/L (ref 3.5–5.1)
PROT SERPL-MCNC: 5.7 G/DL (ref 6–8.4)
SODIUM SERPL-SCNC: 143 MMOL/L (ref 136–145)

## 2022-06-07 PROCEDURE — 80053 COMPREHEN METABOLIC PANEL: CPT | Performed by: FAMILY MEDICINE

## 2022-06-07 PROCEDURE — 99214 OFFICE O/P EST MOD 30 MIN: CPT | Mod: PBBFAC,PO | Performed by: FAMILY MEDICINE

## 2022-06-07 PROCEDURE — 99214 OFFICE O/P EST MOD 30 MIN: CPT | Mod: S$GLB,,, | Performed by: FAMILY MEDICINE

## 2022-06-07 PROCEDURE — 99999 PR PBB SHADOW E&M-EST. PATIENT-LVL IV: CPT | Mod: PBBFAC,,, | Performed by: FAMILY MEDICINE

## 2022-06-07 PROCEDURE — 99999 PR PBB SHADOW E&M-EST. PATIENT-LVL IV: ICD-10-PCS | Mod: PBBFAC,,, | Performed by: FAMILY MEDICINE

## 2022-06-07 PROCEDURE — 36415 COLL VENOUS BLD VENIPUNCTURE: CPT | Mod: PO | Performed by: FAMILY MEDICINE

## 2022-06-07 PROCEDURE — 99214 PR OFFICE/OUTPT VISIT, EST, LEVL IV, 30-39 MIN: ICD-10-PCS | Mod: S$GLB,,, | Performed by: FAMILY MEDICINE

## 2022-06-07 PROCEDURE — 83036 HEMOGLOBIN GLYCOSYLATED A1C: CPT | Performed by: FAMILY MEDICINE

## 2022-06-07 RX ORDER — AZITHROMYCIN 250 MG/1
TABLET, FILM COATED ORAL
Qty: 6 TABLET | Refills: 0 | Status: SHIPPED | OUTPATIENT
Start: 2022-06-07 | End: 2022-06-12

## 2022-06-07 RX ORDER — PRAVASTATIN SODIUM 10 MG/1
10 TABLET ORAL DAILY
Qty: 90 TABLET | Refills: 3 | Status: SHIPPED | OUTPATIENT
Start: 2022-06-07 | End: 2022-07-21 | Stop reason: SDUPTHER

## 2022-06-07 RX ORDER — TORSEMIDE 10 MG/1
20 TABLET ORAL DAILY
Qty: 180 TABLET | Refills: 1 | Status: SHIPPED | OUTPATIENT
Start: 2022-06-07 | End: 2022-06-20 | Stop reason: SDUPTHER

## 2022-06-07 RX ORDER — AMLODIPINE AND OLMESARTAN MEDOXOMIL 10; 40 MG/1; MG/1
1 TABLET ORAL DAILY
COMMUNITY
Start: 2022-05-20 | End: 2022-08-01

## 2022-06-07 RX ORDER — CIPROFLOXACIN 500 MG/1
500 TABLET ORAL EVERY 12 HOURS
Qty: 14 TABLET | Refills: 0 | Status: ON HOLD | OUTPATIENT
Start: 2022-06-07 | End: 2022-08-05 | Stop reason: HOSPADM

## 2022-06-07 NOTE — PROGRESS NOTES
Subjective:       Patient ID: Diamond Das is a 67 y.o. female.    Chief Complaint: Hypertension    Hypertension  This is a chronic problem. The current episode started more than 1 year ago. The problem has been gradually worsening since onset. The problem is controlled. Pertinent negatives include no anxiety, blurred vision, chest pain, headaches or shortness of breath. There are no associated agents to hypertension.     Review of Systems   Eyes: Negative for blurred vision.   Respiratory: Negative for shortness of breath.    Cardiovascular: Negative for chest pain.   Gastrointestinal: Negative for abdominal pain.   Neurological: Negative for headaches.       Objective:      Physical Exam  Vitals and nursing note reviewed.   Constitutional:       General: She is not in acute distress.     Appearance: Normal appearance. She is well-developed. She is not diaphoretic.   HENT:      Head: Normocephalic and atraumatic.   Pulmonary:      Effort: Pulmonary effort is normal. No respiratory distress.      Breath sounds: Normal breath sounds. No wheezing.   Abdominal:      General: There is no distension.      Palpations: Abdomen is soft.      Tenderness: There is no abdominal tenderness. There is no guarding.   Musculoskeletal:      Right lower leg: Edema present.      Left lower leg: Edema present.   Skin:     General: Skin is warm and dry.      Findings: No erythema or rash.   Neurological:      Mental Status: She is alert.         Assessment:       1. Primary hypertension    2. Other hyperlipidemia    3. Chronic diastolic congestive heart failure    4. Type 2 diabetes mellitus with other circulatory complication, without long-term current use of insulin        Plan:     Problem List Items Addressed This Visit        Cardiac/Vascular    Chronic diastolic congestive heart failure    Relevant Medications    torsemide (DEMADEX) 10 MG Tab    Hypertension - Primary    Overview     Chronic, Stable, cont amlodipine            Hyperlipidemia    Overview     Chronic, Stable, cont pravastatin           Relevant Medications    pravastatin (PRAVACHOL) 10 MG tablet       Endocrine    Type 2 diabetes mellitus, without long-term current use of insulin    Overview     Chronic, Stable, use trulicity           Relevant Orders    Hemoglobin A1C    Microalbumin/Creatinine Ratio, Urine    Comprehensive Metabolic Panel

## 2022-06-09 ENCOUNTER — TELEPHONE (OUTPATIENT)
Dept: INTERNAL MEDICINE | Facility: CLINIC | Age: 67
End: 2022-06-09
Payer: MEDICAID

## 2022-06-09 ENCOUNTER — CLINICAL SUPPORT (OUTPATIENT)
Dept: REHABILITATION | Facility: HOSPITAL | Age: 67
End: 2022-06-09
Attending: FAMILY MEDICINE
Payer: MEDICARE

## 2022-06-09 DIAGNOSIS — Z74.09 DECREASED STRENGTH, ENDURANCE, AND MOBILITY: Primary | ICD-10-CM

## 2022-06-09 DIAGNOSIS — M25.562 PAIN IN BOTH KNEES, UNSPECIFIED CHRONICITY: ICD-10-CM

## 2022-06-09 DIAGNOSIS — M25.561 PAIN IN BOTH KNEES, UNSPECIFIED CHRONICITY: ICD-10-CM

## 2022-06-09 DIAGNOSIS — R53.1 DECREASED STRENGTH, ENDURANCE, AND MOBILITY: Primary | ICD-10-CM

## 2022-06-09 DIAGNOSIS — R68.89 DECREASED STRENGTH, ENDURANCE, AND MOBILITY: Primary | ICD-10-CM

## 2022-06-09 PROCEDURE — 97110 THERAPEUTIC EXERCISES: CPT | Mod: PN,CQ

## 2022-06-09 NOTE — TELEPHONE ENCOUNTER
I returned the call from Theo at [facility]. He is asking for all of pt's meds to be sent to them because pt requested this. I called back and left a message and told him that they needed to do a store to store transfer instead of Dr Perrin having to send in new presriptions

## 2022-06-09 NOTE — PROGRESS NOTES
[facility] DISCHARGE SUMMARY    Patient ID:  Leon Borrero [id number]     Disposition:    Admit date: 9/2/2020    Discharge date:  9/4/2020      Admitting Physician: Annie Tineo MD     Discharge Physician: Jeremias Holguin MD    Primary Discharge Diagnoses:  Active Problems:    CAD (coronary artery disease)    HLD (hyperlipidemia)    Asthma    EUSEBIO (obstructive sleep apnea)    Ventricular thrombus following MI (myocardial infarction) (CMS/[facility]ly [facility])    Other chronic pulmonary embolism without acute cor pulmonale (CMS/[facility]ly [facility])    Essential hypertension    Type 2 diabetes mellitus, with long-term current use of insulin (CMS/[facility]ly [facility])    Atrial flutter (CMS/[facility]ly [facility])    Ischemic dilated cardiomyopathy (CMS/[facility]ly [facility])    COPD with chronic bronchitis and emphysema (CMS/[facility]ly [facility])    Chronic systolic congestive heart failure, NYHA class 2 (CMS/[facility]ly [facility])    AF (paroxysmal atrial fibrillation) (CMS/[facility]ly [facility])    Major depressive disorder, recurrent episode, moderate (CMS/[facility]ly [facility])    Anxiety disorder  Resolved Problems:    * No resolved hospital problems. *        Hospital Course:  The patient was continued on Wellbutrin  mg daily and the Cymbalta dose was increased to 40 mg daily.  On September 4th the patient noted he was feeling significantly better from the standpoint of his anxiety level and mood.  He attributed this to the higher dose of Cymbalta and to “getting things off my chest” after talking with his mental health counselor on September 3rd.  He denied having further suicidal thoughts and that he felt stable on September 4th to discharge back to outpatient treatment.  He noted he had some personal business to take care of involving arenterand was discharged per his request.      Mental Status Exam:     APPEARANCE: Appropriately dressed  GROOMING: Normally groomed  ATTITUDE:  Appropriate  PSYCHOMOTOR STATE: Normal psychomotor activity  ABNORMAL MOVEMENTS OR POSTURE: Normal movements and posture  EYE CONTACT:     [facility]  Physical Therapy Treatment Note       Name: Diamond Das  Clinic Number: [id number]    Therapy Diagnosis:   Encounter Diagnoses   Name Primary?    Decreased strength, endurance, and mobility Yes    Pain in both knees, unspecified chronicity      Physician: Andrey Perrin MD    Visit Date: 6/9/2022    Physician Orders: PT Eval and Treat   Medical Diagnosis from Referral: Back and knee pain  Evaluation Date: 4/21/2022  Authorization Period Expiration: 12/31/2022  Plan of Care Expiration: 7/21/2022                Progress Note Due: 6/26/2022  Visit # / Visits authorized: 8/ 20(+1 eval)  FOTO:1/3  PTA visit: 2/5     Precautions: Standard and Diabetes      Time In: 1:30pm  Time Out: 2:15pm  Total Treatment Time: 45 minutes    SUBJECTIVE     Pt reports: Feeling better today than she did.    Compliance with Hep: Daily  Response to previous treatment: no adverse reactions to treatment/updated HEP  Functional change: Better    Pain: 0/10   Worst: 2/10  Location: Bilateral knees  Aggravating factors: Standing and Walking      OBJECTIVE     Objective Measures updated at progress report unless specified otherwise.      Treatment        Diamond received therapeutic exercises to develop strength, endurance, ROM, flexibility, posture and core stabilization for (45) minutes including:     TherEx 4/21/2022          nustep  x   8 Minutes    Recumbant bike seat 1          long arc quad  x [id number]  3#   Supine Marching  3x10 Bilateral    straight leg raise  x 3x15 Bilateral    bridges x 3X 15 with ball squeeze    Ball squeeze  30x   Sidelying clamshells x 2 X 15    Hamstring curls standing x 3 x 10 3#    Sit to stands  x  2x10   Calf raises x 3x15   Toe raises  3x10    Step ups x 2x15 each; 6 inch block   Seated Hamstring stretch  5x10s Bilateral    Seated piriformis stretch   5x10s Bilateral    Trunk rotations  3 Minutes    Calf stretch x 3x30s    Marches on foam X 2x20   Tandem  Appropriate  SPEECH: Normal rate and rhythm   MOOD: Euthymic   AFFECT: Full range  THOUGHT PROCESS:  Logical without any formal thought disorder  THOUGHT CONTENT: Normal thought content without delusions, hallucinations or perceptual disturbances  PERCEPTION: Normal   CONSCIOUSNESS:  Normal level of consciousness  ORIENTATION:  Oriented x3  MEMORY:  IMMEDIATE: Intact                        RECENT: Intact                      REMOTE: Intact  ATTENTION:  Normal attention span  CONCENTRATION: Normal  INSIGHT:  Intact  JUDGMENT:  Appropriate judgment  SUICIDAL IDEATIONS: No suicidal ideation  HOMICIDAL IDEATIONS: No homicidal ideation    Discharge Medications:  Current Discharge Medication List      CONTINUE these medications which have NOT CHANGED    Details   DULoxetine (CYMBALTA) 20 MG capsule Take two capsules by mouth daily  Qty: 60 capsule, Refills: 3    Comments: DOSE INCREASE  Associated Diagnoses: Severe episode of recurrent major depressive disorder, without psychotic features (CMS/HCC)      tadalafil (CIALIS) 5 MG tablet Take 1 tablet by mouth daily.      clindamycin (CLEOCIN) 300 MG capsule Take 1 capsule by mouth daily. Take one tablet for 21 days      !! DIAZepam (VALIUM) 10 MG tablet Take 10 mg by mouth every 6 hours as needed.      HYDROcodone-acetaminophen (NORCO) 5-325 MG per tablet Take 1 tablet by mouth every 4 hours as needed for Pain.  Qty: 12 tablet, Refills: 0      ondansetron (ZOFRAN ODT) 4 MG disintegrating tablet Place 1 tablet onto the tongue every 6 hours as needed for Nausea.  Qty: 25 tablet, Refills: 0      cyclobenzaprine (FLEXERIL) 5 MG tablet Take 1 tablet by mouth 3 times daily as needed for Muscle spasms.  Qty: 21 tablet, Refills: 0      apixaBAN (ELIQUIS) 5 MG Tab Take 1 tablet by mouth every 12 hours.  Qty: 60 tablet, Refills: 11    Associated Diagnoses: Paroxysmal atrial fibrillation (CMS/HCC)      zileuton 600 MG extended-release tablet Take 600 mg by mouth 2 times daily.     walks x x5 laps           Plan for Next Visit: progress towards overall lower extremity strength, increased walking distances, side stepping, focus on quad and hips, visit 3 progress to standing exercises        Home Exercises and Patient Education Provided    Education/Self-Care provided: (included in treatment) minutes    Patient educated on the impairments noted above and the effects of physical therapy intervention to improve overall condition and QOL.    Patient was educated on all the above exercise prior/during/after for proper posture, positioning, and execution for safe performance with home exercise program.    Written Home Exercises Provided: No.   Exercises were reviewed and Diamond was able to demonstrate them prior to the end of the session.  Diamond demonstrated good understanding of the education provided.     See EMR under Patient Instructions for exercises provided during therapy sessions.    ASSESSMENT     Pt presents today reporting ongoing improvement in her bilateral knees. Progressed resistance during LAQs and HS curls to good tolerance and appropriate fatigue. Removed weight during SLRs due to pt's form breaking down towards the end of each set, cued pt to maintain good form throughout the exercise by maintaining knee extension. Progressed step ups with 6 inch block. Overall pt tolerated session well today without increase in pain, and moderate fatigue from progressions.    Diamond is progressing well towards her goals.   Pt prognosis is Good.     Pt will continue to benefit from skilled outpatient physical therapy to address the deficits listed in the problem list box on initial evaluation, provide pt/family education and to maximize pt's level of independence in the home and community environment.     Pt's spiritual, cultural and educational needs considered and pt agreeable to plan of care and goals.    Anticipated barriers to physical therapy: Back pain, BMI over 30, Congestive Heart Failure    Insulin Asp Prot & Asp FlexPen (NOVOLOG MIX 70-30 FLEXPEN) (70-30) 100 UNIT/ML pen-injector Inject 25 Units into the skin 3 times daily (with meals). Uses an additional 2 units for every 50 mg/dL greater than 150 BGT      albuterol (PROAIR HFA) 108 (90 Base) MCG/ACT inhaler Inhale 2 puffs into the lungs every 4 hours as needed for Shortness of Breath or Wheezing.  Qty: 8.5 g, Refills: 11    Associated Diagnoses: Aspirin-sensitive asthma with nasal polyps      tamsulosin (FLOMAX) 0.4 MG Cap TAKE 1 CAPSULE BY MOUTH DAILY AFTER A MEAL  Qty: 90 capsule, Refills: 1      atorvastatin (LIPITOR) 80 MG tablet TAKE 1 TABLET BY MOUTH DAILY  Qty: 90 tablet, Refills: 2      fluticasone-umeclidin-vilanterol (TRELEGY ELLIPTA) 100-62.5-25 MCG/INH inhaler Inhale 1 puff into the lungs daily. Hold unti lf/u with dr. ervin.  Qty: 1 each, Refills: 11    Associated Diagnoses: Severe persistent asthma with acute exacerbation      magnesium oxide 400 (241.3 Mg) MG Tab Take 1 tablet by mouth 2 times daily.  Qty: 100 tablet, Refills: 3      potassium CHLORIDE (KLOR-CON M) 20 MEQ tawanna ER tablet Take 1 tablet by mouth 2 times daily.  Qty: 60 tablet, Refills: 1      TRESIBA FLEXTOUCH 200 UNIT/ML pen-injector Inject 50 Units into the skin at bedtime.  Qty: 9 mL, Refills: 12      furosemide (LASIX) 40 MG tablet Take 40-80 mg by mouth daily as needed.   Qty: 120 tablet, Refills: 3    Comments: Patient will be taking an additional tablet PRN as of 10/10/19      predniSONE (DELTASONE) 20 MG tablet Starting 5/18/20: 4 tab qam x 4 days, 3 tab qam x 4 days, 2 tab qam x 4 days, 1 tab qam until f/u with Dr. Ervin  Qty: 50 tablet, Refills: 1      amphetamine-dextroamphetamine XR (ADDERALL XR) 15 MG 24 hr capsule Take 1 capsule by mouth daily.      gabapentin (NEURONTIN) 300 MG capsule Take 600 mg by mouth nightly.       buPROPion (WELLBUTRIN XL) 300 MG 24 hr tablet Take 1 tablet by mouth daily.  Qty: 90 tablet, Refills: 0    Comments: Would like to have  or Heart Disease, Diabetes Type I or II, Gastrointestinal Disease, High Blood Pressure, Kidney, Bladder, Prostate or Urination Problems, Sleep dysfunction    GOALS:  SHORT TERM GOALS: 6 weeks     1. Recent signs and systems trend is improving in order to progress towards LTG's.  PC   2. Patient will be independent with HEP in order to further progress and return to maximal function.  PC   3. Pain rating at Worst: 5/10 in order to progress towards increased independence with activity.  PC   4. Patient will be able to correct postural deviations in sitting and standing, to decrease pain and promote postural awareness for injury prevention.   PC      LONG TERM GOALS: 12 weeks     1. Patient will return to normal ADL, recreational, and work related activities with less pain and limitation.   PC   2. Patient will improve AROM to stated goals in order to return to maximal functional potential.   PC   3. Patient will improve Strength to stated goals of appropriate musculature in order to improve functional independence.   PC   4. Pain Rating at Best: 1/10 to improve Quality of Life.        PC   5. Patient will meet predicted functional outcome (FOTO) score: 26% to increase self-worth & perceived functional ability.       PC   6.   Patient will have met/partially met personal goal of: decreased knee pain, Increased ability to walk, increased strength and endurance in lower extremity.       PC     PC = progressing/continue  PM= partially met  DC= discontinue    PLAN     Continue Plan of Care (POC) and progress per patient tolerance. See Treatment section for exercise progression.    Colin Felix PTA     90 supply. If does not cover 90 days do 30 days with three refills  Associated Diagnoses: Moderate episode of recurrent major depressive disorder (CMS/HCC)      metoPROLOL succinate (TOPROL-XL) 25 MG 24 hr tablet Take 25 mg by mouth daily.  Refills: 1      isosorbide-hydrALAZINE (BIDIL) 20-37.5 MG per tablet Take 1 tablet by mouth daily.  Qty: 90 tablet, Refills: 3    Comments: Dose decreased 2/12/20. No refills needed      roflumilast (DALIRESP) 500 MCG Tab Take 1 tablet by mouth daily.  Qty: 30 tablet, Refills: 11    Associated Diagnoses: COPD with acute exacerbation (CMS/HCC)      testosterone cypionate (DEPO-TESTOSTERONE) 200 MG/ML injectable solution INJECT 300 MG IM Q 2 WEEKS  Refills: 3      omeprazole (PRILOSEC) 40 MG capsule Take 40 mg by mouth nightly.   Refills: 3      clopidogrel (PLAVIX) 75 MG tablet TAKE 1 TABLET BY MOUTH ONCE DAILY      empagliflozin (JARDIANCE) 25 MG tablet Take 25 mg by mouth daily (before breakfast).      budesonide (PULMICORT) 1 MG/2ML nebulizer suspension Take 2 mLs by nebulization 2 times daily.  Qty: 120 mL, Refills: 11    Associated Diagnoses: Severe persistent asthma with acute exacerbation      Respiratory Therapy Supplies (NEBULIZER) Device Inhale 1 inhalation into the lungs 4 times daily.      albuterol (VENTOLIN) (2.5 MG/3ML) 0.083% nebulizer solution Take 3 mLs by nebulization 4 times daily.  Qty: 75 mL, Refills: 12      acetaminophen (TYLENOL) 500 MG tablet Take 1 tablet by mouth every 4 hours as needed for Pain. 2 TABLETS(=1,000MG)  Qty: 30 tablet, Refills: 0      oxyCODONE/APAP (PERCOCET) 5-325 MG per tablet Take 1 tablet by mouth 2 times daily as needed for Pain.  Qty: 30 tablet, Refills: 0      !! diazePAM (VALIUM) 5 MG tablet Take 1 tablet by mouth as directed. Take one tablet by mouth, one hour prior to MRI      sulfamethoxazole-trimethoprim (BACTRIM DS) 800-160 MG per tablet Take 1 tablet by mouth 3 days a week.  Qty: 15 tablet, Refills: 11    Associated  Diagnoses: Need for pneumocystis prophylaxis      Sennosides (SENNA) 8.6 MG Tab Take 2 tablets by mouth 2 times daily. For constipation  Qty: 120 tablet, Refills: 0      fluticasone (FLONASE) 50 MCG/ACT nasal spray Spray 2 sprays in each nostril daily.  Qty: 16 g, Refills: 12    Associated Diagnoses: Aspirin-sensitive asthma with nasal polyps      exenatide ER (BYDUREON) 2 MG pen-injector Inject 1 syringe SQ once per week      DISPENSE Please dispense 14 fr coude male long self catheters to use tid for diagnosis of urinary retention R33.9, length of need: indefinite  Qty: 30 each, Refills: 11    Associated Diagnoses: Urinary retention       !! - Potential duplicate medications found. Please discuss with provider.          Disposition:   See below      Follow-up:  Adelaida LEON  Time spent on discharge was less than 30 minutes.    Signed:  Jeremias Holguin MD  9/4/2020  12:51 PM

## 2022-06-09 NOTE — TELEPHONE ENCOUNTER
----- Message from Annabelle Hyatt sent at 6/9/2022  2:59 PM CDT -----  Miguel Angel Theo from [facility] pharmacy called regarding this pt meds amlodipine-olmesartan (DALY) 10-40 mg per tablet and levothyroxine (EUTHYROX) 137 MCG Tab tablet pt is new to this pharmacy and they need new scripts for this medication please call [phone] Fax [phone] please call or fax this new scrips over     Thanks bs

## 2022-06-09 NOTE — TELEPHONE ENCOUNTER
----- Message from Annabelle Hyatt sent at 6/9/2022  2:59 PM CDT -----  Miguel Angel Theo from [facility] pharmacy called regarding this pt meds amlodipine-olmesartan (DALY) 10-40 mg per tablet and levothyroxine (EUTHYROX) 137 MCG Tab tablet pt is new to this pharmacy and they need new scripts for this medication please call [phone] Fax [phone] please call or fax this new scrips over     Thanks bs

## 2022-06-12 ENCOUNTER — PATIENT MESSAGE (OUTPATIENT)
Dept: OTHER | Facility: OTHER | Age: 67
End: 2022-06-12
Payer: MEDICAID

## 2022-06-14 NOTE — PROGRESS NOTES
[facility]  Physical Therapy Treatment Note       Name: Diamond Das  [facility] Number: [id number]    Therapy Diagnosis:   Encounter Diagnoses   Name Primary?    Decreased strength, endurance, and mobility Yes    Acute pain of both knees      Physician: Andrey Perrin MD    Visit Date: 6/16/2022    Physician Orders: PT Eval and Treat   Medical Diagnosis from Referral: Back and knee pain  Evaluation Date: 4/21/2022  Authorization Period Expiration: 12/31/2022  Plan of Care Expiration: 7/21/2022                Progress Note Due: 6/26/2022  Visit # / Visits authorized: 8/ 20(+1 eval)  FOTO:1/3  PTA visit: 0/5     Precautions: Standard and Diabetes      Time In: 2:10 pm  Time Out: 2:50 pm  Total Treatment Time: 40 minutes    SUBJECTIVE     Pt reports: Knees and lower back are doing good overall. Felt like last visit balance activities were difficult. Feels like she is about 50% improved overall with less pain but difficulty with lifting leg as in steps.     Compliance with Hep: Daily  Response to previous treatment: no adverse reactions to treatment/updated HEP  Functional change: Better    Pain: 0/10   Worst: 2/10  Location: Bilateral knees  Aggravating factors: Standing and Walking      OBJECTIVE     Objective Measures updated at progress report unless specified otherwise.  BP: 143/70  HR:62  O2 sats: 97    Treatment        Diamond received therapeutic exercises to develop strength, endurance, ROM, flexibility, posture and core stabilization for (38) minutes including:     TherEx 4/21/2022          nustep  x   8 Minutes    Recumbant bike seat 1          long arc quad  x [id number]  3#   Supine Marching  3x10 Bilateral    straight leg raise   3x15 Bilateral    bridges  3X 15 with ball squeeze    Ball squeeze  30x   Sidelying clamshells  2 X 15    Hamstring curls standing  3 x 10 3#    Sit to stands    2x10   Calf raises  3x15   Toe raises  3x10    Step ups x 2x15 each; 6 inch  block   Seated Hamstring stretch  5x10s Bilateral    Seated piriformis stretch   5x10s Bilateral    Trunk rotations  3 Minutes    Calf stretch  3x30s    Marches on foam X 2x2min   Tandem walks x x5 laps   Toe taps to foam pad  x X 1 min                               Plan for Next Visit: progress towards overall lower extremity strength, increased walking distances, side stepping, focus on quad and hips, visit 3 progress to standing exercises        Home Exercises and Patient Education Provided    Education/Self-Care provided: (included in treatment) minutes    Patient educated on the impairments noted above and the effects of physical therapy intervention to improve overall condition and QOL.    Patient was educated on all the above exercise prior/during/after for proper posture, positioning, and execution for safe performance with home exercise program.    Written Home Exercises Provided: No.   Exercises were reviewed and Diamond was able to demonstrate them prior to the end of the session.  Diamond demonstrated good understanding of the education provided.     See EMR under Patient Instructions for exercises provided during therapy sessions.    ASSESSMENT     Pt presents today reporting ongoing improvement in her bilateral knees. Pt was very SOB with increased activities as in Nustep, step ups, and required frequent rest breaks and was given cup of juice.     Diamond is progressing well towards her goals.   Pt prognosis is Good.     Pt will continue to benefit from skilled outpatient physical therapy to address the deficits listed in the problem list box on initial evaluation, provide pt/family education and to maximize pt's level of independence in the home and community environment.     Pt's spiritual, cultural and educational needs considered and pt agreeable to plan of care and goals.    Anticipated barriers to physical therapy: Back pain, BMI over 30, Congestive Heart Failure or Heart Disease, Diabetes Type I or  II, Gastrointestinal Disease, High Blood Pressure, Kidney, Bladder, Prostate or Urination Problems, Sleep dysfunction    GOALS:  SHORT TERM GOALS: 6 weeks     1. Recent signs and systems trend is improving in order to progress towards LTG's.  PC   2. Patient will be independent with HEP in order to further progress and return to maximal function.  PC   3. Pain rating at Worst: 5/10 in order to progress towards increased independence with activity.  PC   4. Patient will be able to correct postural deviations in sitting and standing, to decrease pain and promote postural awareness for injury prevention.   PC      LONG TERM GOALS: 12 weeks     1. Patient will return to normal ADL, recreational, and work related activities with less pain and limitation.   PC   2. Patient will improve AROM to stated goals in order to return to maximal functional potential.   PC   3. Patient will improve Strength to stated goals of appropriate musculature in order to improve functional independence.   PC   4. Pain Rating at Best: 1/10 to improve Quality of Life.        PC   5. Patient will meet predicted functional outcome (FOTO) score: 26% to increase self-worth & perceived functional ability.       PC   6.   Patient will have met/partially met personal goal of: decreased knee pain, Increased ability to walk, increased strength and endurance in lower extremity.       PC     PC = progressing/continue  PM= partially met  DC= discontinue    PLAN     Continue Plan of Care (POC) and progress per patient tolerance. See Treatment section for exercise progression. Add Balance activities to regimen    Yolanda Martinez, PT

## 2022-06-16 ENCOUNTER — CLINICAL SUPPORT (OUTPATIENT)
Dept: REHABILITATION | Facility: HOSPITAL | Age: 67
End: 2022-06-16
Attending: FAMILY MEDICINE
Payer: MEDICARE

## 2022-06-16 DIAGNOSIS — R68.89 DECREASED STRENGTH, ENDURANCE, AND MOBILITY: Primary | ICD-10-CM

## 2022-06-16 DIAGNOSIS — Z74.09 DECREASED STRENGTH, ENDURANCE, AND MOBILITY: Primary | ICD-10-CM

## 2022-06-16 DIAGNOSIS — R53.1 DECREASED STRENGTH, ENDURANCE, AND MOBILITY: Primary | ICD-10-CM

## 2022-06-16 DIAGNOSIS — M25.562 ACUTE PAIN OF BOTH KNEES: ICD-10-CM

## 2022-06-16 DIAGNOSIS — M25.561 ACUTE PAIN OF BOTH KNEES: ICD-10-CM

## 2022-06-16 PROCEDURE — 97110 THERAPEUTIC EXERCISES: CPT | Mod: PN

## 2022-06-20 ENCOUNTER — LAB VISIT (OUTPATIENT)
Dept: LAB | Facility: HOSPITAL | Age: 67
End: 2022-06-20
Attending: INTERNAL MEDICINE
Payer: MEDICARE

## 2022-06-20 ENCOUNTER — OFFICE VISIT (OUTPATIENT)
Dept: CARDIOLOGY | Facility: CLINIC | Age: 67
End: 2022-06-20
Payer: MEDICARE

## 2022-06-20 VITALS
HEART RATE: 60 BPM | DIASTOLIC BLOOD PRESSURE: 60 MMHG | BODY MASS INDEX: 37.5 KG/M2 | SYSTOLIC BLOOD PRESSURE: 130 MMHG | HEIGHT: 61 IN | WEIGHT: 198.63 LBS | OXYGEN SATURATION: 99 %

## 2022-06-20 DIAGNOSIS — Z86.16 HISTORY OF COVID-19: ICD-10-CM

## 2022-06-20 DIAGNOSIS — I31.39 PERICARDIAL EFFUSION: ICD-10-CM

## 2022-06-20 DIAGNOSIS — R79.89 ELEVATED D-DIMER: ICD-10-CM

## 2022-06-20 DIAGNOSIS — E03.4 HYPOTHYROIDISM DUE TO ACQUIRED ATROPHY OF THYROID: ICD-10-CM

## 2022-06-20 DIAGNOSIS — K75.81 LIVER CIRRHOSIS SECONDARY TO NASH: ICD-10-CM

## 2022-06-20 DIAGNOSIS — I50.32 CHRONIC DIASTOLIC CONGESTIVE HEART FAILURE: ICD-10-CM

## 2022-06-20 DIAGNOSIS — I10 PRIMARY HYPERTENSION: ICD-10-CM

## 2022-06-20 DIAGNOSIS — K74.60 LIVER CIRRHOSIS SECONDARY TO NASH: ICD-10-CM

## 2022-06-20 DIAGNOSIS — E66.01 MORBID OBESITY DUE TO EXCESS CALORIES: ICD-10-CM

## 2022-06-20 DIAGNOSIS — R06.02 SHORTNESS OF BREATH: ICD-10-CM

## 2022-06-20 DIAGNOSIS — R06.02 SHORTNESS OF BREATH: Primary | ICD-10-CM

## 2022-06-20 DIAGNOSIS — N18.4 CKD (CHRONIC KIDNEY DISEASE) STAGE 4, GFR 15-29 ML/MIN: ICD-10-CM

## 2022-06-20 DIAGNOSIS — R60.9 CHRONIC EDEMA: ICD-10-CM

## 2022-06-20 LAB
ALBUMIN SERPL BCP-MCNC: 2.7 G/DL (ref 3.5–5.2)
ALP SERPL-CCNC: 77 U/L (ref 55–135)
ALT SERPL W/O P-5'-P-CCNC: 23 U/L (ref 10–44)
ANION GAP SERPL CALC-SCNC: 9 MMOL/L (ref 8–16)
AST SERPL-CCNC: 48 U/L (ref 10–40)
BASOPHILS # BLD AUTO: 0.03 K/UL (ref 0–0.2)
BASOPHILS NFR BLD: 0.6 % (ref 0–1.9)
BILIRUB SERPL-MCNC: 0.3 MG/DL (ref 0.1–1)
BNP SERPL-MCNC: 517 PG/ML (ref 0–99)
BUN SERPL-MCNC: 44 MG/DL (ref 8–23)
CALCIUM SERPL-MCNC: 8.2 MG/DL (ref 8.7–10.5)
CHLORIDE SERPL-SCNC: 112 MMOL/L (ref 95–110)
CO2 SERPL-SCNC: 20 MMOL/L (ref 23–29)
CREAT SERPL-MCNC: 4.3 MG/DL (ref 0.5–1.4)
D DIMER PPP IA.FEU-MCNC: 2.14 MG/L FEU
DIFFERENTIAL METHOD: ABNORMAL
EOSINOPHIL # BLD AUTO: 0.2 K/UL (ref 0–0.5)
EOSINOPHIL NFR BLD: 4.3 % (ref 0–8)
ERYTHROCYTE [DISTWIDTH] IN BLOOD BY AUTOMATED COUNT: 15.4 % (ref 11.5–14.5)
EST. GFR  (AFRICAN AMERICAN): 12 ML/MIN/1.73 M^2
EST. GFR  (NON AFRICAN AMERICAN): 10 ML/MIN/1.73 M^2
GLUCOSE SERPL-MCNC: 87 MG/DL (ref 70–110)
HCT VFR BLD AUTO: 33.2 % (ref 37–48.5)
HGB BLD-MCNC: 10.3 G/DL (ref 12–16)
IMM GRANULOCYTES # BLD AUTO: 0 K/UL (ref 0–0.04)
IMM GRANULOCYTES NFR BLD AUTO: 0 % (ref 0–0.5)
LYMPHOCYTES # BLD AUTO: 2.4 K/UL (ref 1–4.8)
LYMPHOCYTES NFR BLD: 49.2 % (ref 18–48)
MAGNESIUM SERPL-MCNC: 3.1 MG/DL (ref 1.6–2.6)
MCH RBC QN AUTO: 28.1 PG (ref 27–31)
MCHC RBC AUTO-ENTMCNC: 31 G/DL (ref 32–36)
MCV RBC AUTO: 91 FL (ref 82–98)
MONOCYTES # BLD AUTO: 0.4 K/UL (ref 0.3–1)
MONOCYTES NFR BLD: 7.1 % (ref 4–15)
NEUTROPHILS # BLD AUTO: 1.9 K/UL (ref 1.8–7.7)
NEUTROPHILS NFR BLD: 38.8 % (ref 38–73)
NRBC BLD-RTO: 0 /100 WBC
PLATELET # BLD AUTO: 205 K/UL (ref 150–450)
PMV BLD AUTO: 10.8 FL (ref 9.2–12.9)
POTASSIUM SERPL-SCNC: 6.5 MMOL/L (ref 3.5–5.1)
PROT SERPL-MCNC: 6.6 G/DL (ref 6–8.4)
RBC # BLD AUTO: 3.67 M/UL (ref 4–5.4)
SODIUM SERPL-SCNC: 141 MMOL/L (ref 136–145)
WBC # BLD AUTO: 4.92 K/UL (ref 3.9–12.7)

## 2022-06-20 PROCEDURE — 85379 FIBRIN DEGRADATION QUANT: CPT | Performed by: INTERNAL MEDICINE

## 2022-06-20 PROCEDURE — 99215 OFFICE O/P EST HI 40 MIN: CPT | Mod: S$GLB,,, | Performed by: INTERNAL MEDICINE

## 2022-06-20 PROCEDURE — 1157F PR ADVANCE CARE PLAN OR EQUIV PRESENT IN MEDICAL RECORD: ICD-10-PCS | Mod: CPTII,S$GLB,, | Performed by: INTERNAL MEDICINE

## 2022-06-20 PROCEDURE — 3008F BODY MASS INDEX DOCD: CPT | Mod: CPTII,S$GLB,, | Performed by: INTERNAL MEDICINE

## 2022-06-20 PROCEDURE — 99999 PR PBB SHADOW E&M-EST. PATIENT-LVL IV: CPT | Mod: PBBFAC,,, | Performed by: INTERNAL MEDICINE

## 2022-06-20 PROCEDURE — 3288F PR FALLS RISK ASSESSMENT DOCUMENTED: ICD-10-PCS | Mod: CPTII,S$GLB,, | Performed by: INTERNAL MEDICINE

## 2022-06-20 PROCEDURE — 3078F DIAST BP <80 MM HG: CPT | Mod: CPTII,S$GLB,, | Performed by: INTERNAL MEDICINE

## 2022-06-20 PROCEDURE — 3062F PR POS MACROALBUMINURIA RESULT DOCUMENTED/REVIEW: ICD-10-PCS | Mod: CPTII,S$GLB,, | Performed by: INTERNAL MEDICINE

## 2022-06-20 PROCEDURE — 83735 ASSAY OF MAGNESIUM: CPT | Performed by: INTERNAL MEDICINE

## 2022-06-20 PROCEDURE — 3075F SYST BP GE 130 - 139MM HG: CPT | Mod: CPTII,S$GLB,, | Performed by: INTERNAL MEDICINE

## 2022-06-20 PROCEDURE — 80053 COMPREHEN METABOLIC PANEL: CPT | Performed by: INTERNAL MEDICINE

## 2022-06-20 PROCEDURE — 4010F ACE/ARB THERAPY RXD/TAKEN: CPT | Mod: CPTII,S$GLB,, | Performed by: INTERNAL MEDICINE

## 2022-06-20 PROCEDURE — 99499 UNLISTED E&M SERVICE: CPT | Mod: S$GLB,,, | Performed by: INTERNAL MEDICINE

## 2022-06-20 PROCEDURE — 1160F PR REVIEW ALL MEDS BY PRESCRIBER/CLIN PHARMACIST DOCUMENTED: ICD-10-PCS | Mod: CPTII,S$GLB,, | Performed by: INTERNAL MEDICINE

## 2022-06-20 PROCEDURE — 3075F PR MOST RECENT SYSTOLIC BLOOD PRESS GE 130-139MM HG: ICD-10-PCS | Mod: CPTII,S$GLB,, | Performed by: INTERNAL MEDICINE

## 2022-06-20 PROCEDURE — 99215 PR OFFICE/OUTPT VISIT, EST, LEVL V, 40-54 MIN: ICD-10-PCS | Mod: S$GLB,,, | Performed by: INTERNAL MEDICINE

## 2022-06-20 PROCEDURE — 3066F NEPHROPATHY DOC TX: CPT | Mod: CPTII,S$GLB,, | Performed by: INTERNAL MEDICINE

## 2022-06-20 PROCEDURE — 3044F PR MOST RECENT HEMOGLOBIN A1C LEVEL <7.0%: ICD-10-PCS | Mod: CPTII,S$GLB,, | Performed by: INTERNAL MEDICINE

## 2022-06-20 PROCEDURE — 1101F PR PT FALLS ASSESS DOC 0-1 FALLS W/OUT INJ PAST YR: ICD-10-PCS | Mod: CPTII,S$GLB,, | Performed by: INTERNAL MEDICINE

## 2022-06-20 PROCEDURE — 3062F POS MACROALBUMINURIA REV: CPT | Mod: CPTII,S$GLB,, | Performed by: INTERNAL MEDICINE

## 2022-06-20 PROCEDURE — 3008F PR BODY MASS INDEX (BMI) DOCUMENTED: ICD-10-PCS | Mod: CPTII,S$GLB,, | Performed by: INTERNAL MEDICINE

## 2022-06-20 PROCEDURE — 1101F PT FALLS ASSESS-DOCD LE1/YR: CPT | Mod: CPTII,S$GLB,, | Performed by: INTERNAL MEDICINE

## 2022-06-20 PROCEDURE — 1160F RVW MEDS BY RX/DR IN RCRD: CPT | Mod: CPTII,S$GLB,, | Performed by: INTERNAL MEDICINE

## 2022-06-20 PROCEDURE — 3044F HG A1C LEVEL LT 7.0%: CPT | Mod: CPTII,S$GLB,, | Performed by: INTERNAL MEDICINE

## 2022-06-20 PROCEDURE — 99499 RISK ADDL DX/OHS AUDIT: ICD-10-PCS | Mod: S$GLB,,, | Performed by: INTERNAL MEDICINE

## 2022-06-20 PROCEDURE — 3066F PR DOCUMENTATION OF TREATMENT FOR NEPHROPATHY: ICD-10-PCS | Mod: CPTII,S$GLB,, | Performed by: INTERNAL MEDICINE

## 2022-06-20 PROCEDURE — 1159F PR MEDICATION LIST DOCUMENTED IN MEDICAL RECORD: ICD-10-PCS | Mod: CPTII,S$GLB,, | Performed by: INTERNAL MEDICINE

## 2022-06-20 PROCEDURE — 99285 EMERGENCY DEPT VISIT HI MDM: CPT | Mod: 25

## 2022-06-20 PROCEDURE — 85025 COMPLETE CBC W/AUTO DIFF WBC: CPT | Performed by: INTERNAL MEDICINE

## 2022-06-20 PROCEDURE — 99999 PR PBB SHADOW E&M-EST. PATIENT-LVL IV: ICD-10-PCS | Mod: PBBFAC,,, | Performed by: INTERNAL MEDICINE

## 2022-06-20 PROCEDURE — 1159F MED LIST DOCD IN RCRD: CPT | Mod: CPTII,S$GLB,, | Performed by: INTERNAL MEDICINE

## 2022-06-20 PROCEDURE — 3288F FALL RISK ASSESSMENT DOCD: CPT | Mod: CPTII,S$GLB,, | Performed by: INTERNAL MEDICINE

## 2022-06-20 PROCEDURE — 99284 EMERGENCY DEPT VISIT MOD MDM: CPT | Mod: 25

## 2022-06-20 PROCEDURE — 3078F PR MOST RECENT DIASTOLIC BLOOD PRESSURE < 80 MM HG: ICD-10-PCS | Mod: CPTII,S$GLB,, | Performed by: INTERNAL MEDICINE

## 2022-06-20 PROCEDURE — 83880 ASSAY OF NATRIURETIC PEPTIDE: CPT | Performed by: INTERNAL MEDICINE

## 2022-06-20 PROCEDURE — 36415 COLL VENOUS BLD VENIPUNCTURE: CPT | Mod: PO | Performed by: INTERNAL MEDICINE

## 2022-06-20 PROCEDURE — 1157F ADVNC CARE PLAN IN RCRD: CPT | Mod: CPTII,S$GLB,, | Performed by: INTERNAL MEDICINE

## 2022-06-20 PROCEDURE — 4010F PR ACE/ARB THEARPY RXD/TAKEN: ICD-10-PCS | Mod: CPTII,S$GLB,, | Performed by: INTERNAL MEDICINE

## 2022-06-20 RX ORDER — TORSEMIDE 20 MG/1
20 TABLET ORAL DAILY
Qty: 180 TABLET | Refills: 1 | Status: SHIPPED | OUTPATIENT
Start: 2022-06-20 | End: 2022-08-01

## 2022-06-20 NOTE — PROGRESS NOTES
Subjective:   Patient ID:  Diamond Das is a 67 y.o. female who presents for cardiac consult of No chief complaint on file.      Shortness of Breath  Pertinent negatives include no chest pain or leg swelling.   Follow-up  Pertinent negatives include no chest pain or nausea.     The patient came in today for cardiac consult of No chief complaint on file.    Referring Physician: Andrey Perrin MD   Reason for consult: HTN, CHF    Diamond Das is a 67 y.o. female with medical conditions diastolic CHF, pericardial effusion, HTN, HLD, IDDM, cryptogenic cirrhosis, s/p TIPS, ascites presents for CV follow up.     Pt of Dr. Shaw, last seen 2/9/18  No smoking/drinking  Last echo in  normal EF and RVF, grade II DD.  EKG today NSR poor R progression on anterior leads  Chronic weakness and fatigue. Had PNA in   Pain on lower chest bilateral for few months, worse with exercise, resolved after resting. Deep breathing made the pain worse. No pain at sleep. Associated dyspnea, N/V, palpitation and dizziness. No radiation. ASA and tylenol not really helped the pain.    3/1/19  She has been having more ascites and concern for cardiac etiologies. She was also started on Reglan, concern for prolonged Qtc, recent Qtc 475 ms. Has been feeling better with reglan and lactulose. Is dyspneic, chronically on oxygen at night and also has portable oxygen when she has to do a lot walking. Takes lasix 40 mg once/day now, was on 80mg daily. Active at home, dresses and bathes her self. Cant walk or stand to too long.     8/26/19  2D ECHO with grade 1 DD, normal Bi V function, Small pericardial effusion without tamponade. She has mild SOB at times with edema but improved lately. She will see hepatology as well. No Chest pain.   Has been taking lasix extra doses PRN and improved symptoms.     10/16/19  [facility] hosp ER follow up  - Non toxic appearing elderly female here for worsening NICHOLE and shortness of breath. Known h/o CHF,  f/b cardiologist in [facility]. Work up is c/w acute on chronic chf exacerbation. She, unfortunately, does not follow her weights so not clear if significant change recently. CXR with likely signs of volume overload. She was given diuresis and had Republic score of 0. Felt improved and prefers discharge with close OP f/u with her cardiologist. She was ambulated and did not significantly desat and tolerated it well.  No she is on lasix, feels better. BNP today is 453, needs to double lasix next 3-4 days for further diuresis.     11/18/19   Increased diuretics last visit. She felt better then and had weight loss as well. Still has NICHOLE but improved. She went to [facility] 2 weeks ago - presented to the ED for fall secondary to hypoglycemic episode. Pt has had 3 previous episodes of hypoglycemia requiring hospitalizations in the past with sxs of lightheadedness. CT head was unremarkable, CXR revealed improvement in CHF compared to previous study. Due to timeline of fall with insulin administration and Hx of cryptogenic cirrhosis, it is likely that pt could not compensate after receiving her insulin dosing.  BP meds were stopped, will get labs and restart lisinopril.     1/6/20  Breathing has been stable with LE edema. Last visit she doubled lasix for 3 days with min improvement. Has not had much relief lately.   ECG - NSR, poor RWP, lateral TWI    2/17/20  BNP was 863. She had recent thoracentesis as well. She has also been referred to hepatology as concern for TIPS not working?. She remains SOB will add metolazone. Discussed if kidneys and liver are worse will be difficult to manage volume status.     5/18/20  She has been stable but still has SOB with leg swelling. Symptoms have gotten a bit worse a few weeks ago. She has been taking lasix extra dose some times. Last BNP elevated 863, will need to increase lasix and metolazone and repeat labs this week. Will add K and Mg supplements.    9/28/20  BNP was 428 at last visit,  which is improved from 7 months prior. She saw PCP today with worsening NICHOLE and edema. Increased lasix and metolazone last visit.      Ref Range & Units 4mo ago  (5/21/20) 7mo ago  (2/10/20) 8mo ago  (1/10/20) 10mo ago  (11/18/19) 11mo ago  (10/25/19) 11mo ago  (10/16/19)   BNP 0 - 99 pg/mL 413High   863High  CM  1,010High  CM  793High  CM  291High  CM  453High  CM      10/19/20  Pt was admitted 10/9-10/15 for CHF exac with edema, diuresed aggressively. She also had nephro consult and hepatology eval for further treatment and possible liver transplant. She has upcoming appt for CT Renal biopsy. Autoimmune workup planned. ECHO with small pericardial effusion, no tamponde, grade 1 DD.   BNP improved to 129 two weeks prior. She is taking bumex BID. SHe is taking metolazone every other day. Weight is stable.     11/16/20  She has been on Bumex with metolazone. She had recent visit with Dr. Hernandez -  Renal biopsy revealed diabetic nephropathy and hypertensive nephropathy.  No autoimmune disorder detected. She may be candidate for RRT. She will have further liver workup and be liver and kidney transplant. Will need ischemic work up, had nuclear stress in past, none recently.     12/28/20  Nuclear stress test pending. Saw Dr. Contreras recently, started on Lisinopril 5mg. She feels well, no CP/SOB. Stress test is pending. Is walking more and active.     2/15/21  Nuclear stress neg in Dec 30th. BP overall stable. 189/96 today but has not taken meds yet and woke up late. BP was normal yesterday. Liver function has improved, does not need liver transplant, discussed she can remain kidney transplant stable CV status    5/4/21  BP elevated 160/80s. HR 70s. She has some salty food a few days ago. She felt extra bloated and took an diuretic. She has stable NICHOLE.     5/17/21   Ref Range & Units 13 d ago   (5/4/21) 7 mo ago   (10/11/20) 7 mo ago   (10/9/20) 7 mo ago   (10/5/20) 12 mo ago   (5/21/20) 1 yr ago   (2/10/20)   BNP 0 - 99  pg/mL 324High   129High  CM  351High  CM  349High  CM  413High  CM  863High  CM    Comment: Values of less than 100 pg/ml are consistent with non-CHF populations.       BNP 2 weeks ago elevated 324, discussed double dose of bumex for 3 days and monitor BP and weights, avoid salty food. Kidney function is slightly worse, needs to follow up with nephrologist as well asap.  216 lbs to 196 lbs today. BP and hR stable today. She had an episode of flutter one night, lasted about 15 min    8/23/21  Last visit - BNP has improved due to less fluid, kidney function is worse, creatinine is 3.3, need to follow up with nephrologist asap. Decrease Bumex to only once a day. Monitor pressures and weights if increased weight with swelling can go back to bumex twice a day.     She saw Dr. Jacobo with nephro;  changed Bumex to Torsemide 40mg daily  BP elevated today, meds changed recently.     10/4/21  BP and HR well controlled today. Weight 190 lbs improved from 197 lbs.     12/220 hosp  DC summary - sent from hepatology clinic on account of hypercalcemia.  The patient went for her Rifaximin refill and was told to come in for high Clacium.  She admits to dry cough of some months, exertional dyspnea which is not worsening, about 2-3 weeks of weakness, constipation and further reduction of her oliguric state from roughly 500cc urine daily to about 125 cc/urine daily. But no chest pain, vomiting, diarrhea or reduced oral intake. She denies fever.  She is on Calcitriol, Carol D3 1000 U QD and calcium containing multivitamin.  She denies any bone pain or weight loss, infact she has just started to gain some weight. She denies orthopnea.  Serum albumin 2.4, calcium 12.6, corrected calcium 13.9     Hospital Course:   12/7 patient in good spirits, calcium slightly improved but still high. Not for discharge today. Otherwise stable  12/8 patient seen, awake, alert, vague abdominal pain, semi hard stools yesterday, no emesis , waiting for her  labs this morning to determine if she can discharge.  Update: corrected calcium is 11.6, she is cleared for discharge by Nephrology who have made an appointment for her to see them in the clinic. She is cleared to resume her Torsemide and Benicar. She is not to take any Vit D. Calcitriol, oral calcium supplements and this has been explained in details to her very well.    12/20/21  She is s/p hosp DC for hyperCA, has been restarted on diuretics and BP meds and CA supplements stopped. Her diurertics was stopped last week due to lack of appetite and more abd pain. Her weight has low as 173 lbs, but now at 195 lbs.     Hosp DC summar   66 year old female who was admitted to [facility] for COVID-19 and decompensated CHF. CXR shows evidence of volume overload and was diuresed. Echocaridogram shows a preserved EF with diastolic dysfunction. Will repeat CXR on 1/12/22. For COVID-19 she received MVI, ascorbic acid, and dexamethasone. Currently on 3L nasal cannula.  1/12/22  D-dimer 7 today. V/Q scan indeterminant for pulmonary embolism. Could reflect fluid within the fissure as well. Started on heparin infusion. CXR today still shows fluid.  Will change diuretics to Bumex with Metolazone for one dose. Closely monitor kidney function.   1/13/22  Still with decrease urine ouput. Nephrology consulted to assist with diuretics. Bumex changed to 2mg IV every 8 hours. 500mg diuril twice a day has been added for 48 hours. No intervention needed for hyperkalemia.   1/14  Reports sx improvement. Ready to go home but reports right breast swelling since admission. Lower ext edema and dyspnea improving. H/h trending down with no overt s/s bleeding. Agreeable to blood transfusion. Nephrology consulted. Consider cards consult  1/15  Dyspnea and swelling symptoms improved. No transfusion reaction after blood transfusion. Denies further breast swelling. Does not qualify for home o2. Followed o/p with Dr. Barkley, pulmonology.  Advised to f/u o/p, as elevated d-dimer in setting of covid. Perfusion scan indeterminant and started on heparin gtt. Transition to eliquis on d/c.  Neph consulted for recs regarding diuresis and hyperk in ckd and chf. Metolazone started. Hyperk resolved. Cr stable but elevated and will need further adjusting of meds o/p. Advised strict na and fluid restriction. Neph recommending tolerance of higher cr while also need for diuresising for chfx  Hyperglycemia on systemic steroids. Hba1c <5. Discontinued steroids and managed hyperglycemia with insulin.     2/1/22  Hosp follow up for COVID 19, PE and volume overload. She is taking Torsemide 40mg with metolazone doing well. She is taking Eliquis 5 BID without issues, told to continue will repeat D dimer likely treat at least 3 months. She has been losing weight now weighs 187 lbs.     6/20/22  BP and HR well controlled today. BMI 37 - 198 lbs. Her weight is increased from prior visit, she has more edema now. She went to ER a few weeks ago for swelling/edema was told to stop diuretic due to worsening renal function. But then she has restarted Torsemide 20mg    Patient feels no chest pain, no PND, no palpitation, no dizziness, no syncope, no CNS symptoms.    Patient has dec exercise tolerance.    Patient is compliant with medications.    Results for orders placed during the hospital encounter of 01/10/22    Echo    Interpretation Summary  · The left ventricle is normal in size with concentric hypertrophy and normal systolic function.  · Indeterminate left ventricular diastolic function.  · The estimated PA systolic pressure is 35 mmHg.  · Normal right ventricular size with normal right ventricular systolic function.  · Normal central venous pressure (3 mmHg).  · The estimated ejection fraction is 60%.  · Mild mitral regurgitation.  · Mild tricuspid regurgitation.        Results for orders placed during the hospital encounter of 12/30/20    Nuclear Stress - Cardiology  Interpreted    Interpretation Summary    Normal myocardial perfusion scan. There is no evidence of myocardial ischemia or infarction.    The gated perfusion images showed an ejection fraction of 64% at rest. The gated perfusion images showed an ejection fraction of 65% post stress.    The EKG portion of this study is negative for ischemia.    The patient reported no chest pain during the stress test.          Past Medical History:   Diagnosis Date    Acquired hypothyroidism 9/28/2020    ROD (acute kidney injury) 1/3/2019    Arthritis of knee 1/31/2022    Ascites     Bilateral lower extremity edema 9/2/2016    Breast pain, left 1/4/2018    Cataract     Chest pain, atypical 2/9/2018    CHF (congestive heart failure)     Cirrhosis     Cough     Diabetes mellitus     Diabetes mellitus, type 2     Diarrhea 9/4/2019    Edema 3/19/2018    Epigastric pain 2/11/2020    Gastroparesis     GERD (gastroesophageal reflux disease)     Hepatic encephalopathy 6/19/2018    Pt has history of cirrhosis, seen at [facility] on 6/7/18. Currently on lactulose.    Hyperlipidemia     Hypertension     Hypotension 2/21/2019    Immunization deficiency 2/10/2020    Liver disease     Lumbar strain 4/27/2018    Lumbar strain, sequela 2/17/2020    Macular degeneration     Medication reaction 11/17/2016    Other and unspecified hyperlipidemia 6/11/2012 11:11:32 AM    [facility] Historical - Endocrine/Metabolic/Immune: Hyperlipidemia-No Additional Notes    Pneumonia of right middle lobe due to infectious organism 12/19/2017    Renal disorder     Retinopathy due to secondary diabetes mellitus     Skin lesion 12/20/2021    Sleep apnea     Strain of lumbar region 10/4/2021    Thyroid disease     Type 2 diabetes mellitus with hyperglycemia 10/10/2020       Past Surgical History:   Procedure Laterality Date    CATARACT EXTRACTION      CHOLECYSTECTOMY      COLONOSCOPY N/A 9/4/2019    Procedure: COLONOSCOPY;  Surgeon:  Marnie Elmore MD;  Location: [facility] ENDO;  Service: Endoscopy;  Laterality: N/A;    ERCP      FLUOROSCOPY N/A 7/24/2020    Procedure: TIPS revision;  Surgeon: Martell Jasmine MD;  Location: [facility] CATH LAB;  Service: General;  Laterality: N/A;    LIVER BIOPSY      THORACENTESIS Left 1/20/2020    Procedure: Thoracentesis;  Surgeon: Angelica Hunter MD;  Location: [facility] ENDO;  Service: Pulmonary;  Laterality: Left;    TUBAL LIGATION      UPPER GASTROINTESTINAL ENDOSCOPY         Social History     Tobacco Use    Smoking status: Never Smoker    Smokeless tobacco: Never Used   Substance Use Topics    Alcohol use: No    Drug use: No       Family History   Problem Relation Age of Onset    Cancer Mother     Breast cancer Mother     Heart disease Father     Aneurysm Sister     Heart disease Brother     No Known Problems Maternal Grandmother     Cancer Maternal Grandfather     Sarcoidosis Sister     Colon cancer Neg Hx     Ovarian cancer Neg Hx     Thrombosis Neg Hx        Patient's Medications   New Prescriptions    No medications on file   Previous Medications    AMLODIPINE-OLMESARTAN (DALY) 10-40 MG PER TABLET    Take 1 tablet by mouth once daily.    CARVEDILOL (COREG) 25 MG TABLET    Take 1 tablet (25 mg total) by mouth 2 (two) times daily with meals.    CIPROFLOXACIN HCL (CIPRO) 500 MG TABLET    Take 1 tablet (500 mg total) by mouth every 12 (twelve) hours.    DOCUSATE SODIUM (COLACE) 100 MG CAPSULE    Take 1 capsule (100 mg total) by mouth 3 (three) times daily. Hold for diarrhea    DULAGLUTIDE (TRULICITY) 4.5 MG/0.5 ML PEN INJECTOR    Inject 4.5 mg into the skin every 7 days.    ELIQUIS 5 MG TAB    Take 1 tablet by mouth twice daily    FAMOTIDINE (PEPCID) 20 MG TABLET    Take 1 tablet (20 mg total) by mouth once daily.    FERROUS SULFATE (IRON ORAL)    Take by mouth.    FLUTICASONE PROPIONATE (FLONASE) 50 MCG/ACTUATION NASAL SPRAY    2 sprays (100 mcg total) by Each Nostril route once daily.     GARLIC (GARLIQUE ORAL)    Take 1 tablet by mouth once daily.    ISOSORBIDE MONONITRATE (IMDUR) 60 MG 24 HR TABLET    Take 1 tablet (60 mg total) by mouth every evening.    LEVOTHYROXINE (EUTHYROX) 137 MCG TAB TABLET    Take 1 tablet (137 mcg total) by mouth before breakfast.    LIDOCAINE (LMX) 4 % CREAM    Apply topically as needed.    MAGNESIUM OXIDE (MAG-OX) 400 MG (241.3 MG MAGNESIUM) TABLET    Take 1 tablet (400 mg total) by mouth once daily.    METOLAZONE (ZAROXOLYN) 5 MG TABLET    Take 1 tablet (5 mg total) by mouth once daily.    ONDANSETRON (ZOFRAN) 4 MG TABLET    Take 1 tablet (4 mg total) by mouth every 8 (eight) hours as needed.    PRAVASTATIN (PRAVACHOL) 10 MG TABLET    Take 1 tablet (10 mg total) by mouth once daily.    PULSE OXIMETER (PULSE OXIMETER) DEVICE    by Apply Externally route 2 (two) times a day. Use twice daily at 8 AM and 3 PM and [facility] the [facility] as directed.    RIFAXIMIN (XIFAXAN) 550 MG TAB    Take 1 tablet (550 mg total) by mouth 2 (two) times daily.    STOOL SOFTENER 100 MG CAPSULE    TAKE 1 CAPSULE BY MOUTH THREE TIMES DAILY FOR DIARRHEA (HOLD FOR DIARRHEA)    TORSEMIDE (DEMADEX) 10 MG TAB    Take 2 tablets (20 mg total) by mouth once daily.   Modified Medications    No medications on file   Discontinued Medications    No medications on file       Review of Systems   Constitutional: Positive for malaise/fatigue.   HENT: Negative.    Eyes: Negative.    Respiratory: Negative for shortness of breath (intermittent).    Cardiovascular: Negative for chest pain, palpitations and leg swelling.   Gastrointestinal: Negative for nausea.   Genitourinary: Negative.    Musculoskeletal: Negative.    Skin: Negative.    Neurological: Negative.    Endo/Heme/Allergies: Negative.    Psychiatric/Behavioral: Negative.    All 12 systems otherwise negative.      Wt Readings from Last 3 Encounters:   06/20/22 90.1 kg (198 lb 10.2 oz)   06/07/22 89.6 kg (197 lb 8.5 oz)   05/20/22 85.1 kg (187 lb 9.8  "oz)     Temp Readings from Last 3 Encounters:   06/07/22 97.5 °F (36.4 °C)   05/20/22 97.8 °F (36.6 °C)   05/02/22 97.2 °F (36.2 °C)     BP Readings from Last 3 Encounters:   06/20/22 130/60   06/07/22 138/68   05/20/22 (!) 202/90     Pulse Readings from Last 3 Encounters:   06/20/22 60   06/07/22 66   05/20/22 71       /60 (BP Location: Left arm, Patient Position: Sitting, BP Method: Large (Manual))   Pulse 60   Ht 5' 1" (1.549 m)   Wt 90.1 kg (198 lb 10.2 oz)   LMP  (LMP Unknown)   SpO2 99%   BMI 37.53 kg/m²     Objective:   Physical Exam  Constitutional:       General: She is not in acute distress.     Appearance: She is well-developed. She is obese. She is not diaphoretic.   HENT:      Head: Normocephalic and atraumatic.      Mouth/Throat:      Pharynx: No oropharyngeal exudate.   Eyes:      General: No scleral icterus.        Right eye: No discharge.         Left eye: No discharge.   Pulmonary:      Effort: Pulmonary effort is normal. No respiratory distress.   Skin:     Coloration: Skin is not pale.      Findings: No erythema or rash.   Neurological:      Mental Status: She is alert and oriented to person, place, and time.   Psychiatric:         Behavior: Behavior normal.         Thought Content: Thought content normal.         Judgment: Judgment normal.         Lab Results   Component Value Date     06/07/2022    K 5.4 (H) 06/07/2022     (H) 06/07/2022    CO2 22 (L) 06/07/2022    BUN 50 (H) 06/07/2022    CREATININE 4.7 (H) 06/07/2022     06/07/2022    HGBA1C 5.1 06/07/2022    MG 2.2 12/07/2021    AST 27 06/07/2022    ALT 14 06/07/2022    ALBUMIN 2.6 (L) 06/07/2022    PROT 5.7 (L) 06/07/2022    BILITOT 0.3 06/07/2022    WBC 11.86 01/15/2022    HGB 9.0 (L) 01/15/2022    HCT 28.7 (L) 01/15/2022    MCV 90 01/15/2022     01/15/2022    INR 1.0 01/12/2022    TSH 1.339 12/07/2021    CHOL 238 (H) 10/06/2021    HDL 38 (L) 10/06/2021    LDLCALC 175.6 (H) 10/06/2021    TRIG 122 " 10/06/2021     (H) 01/10/2022     Assessment:      1. Shortness of breath    2. Elevated d-dimer with indeterminate perfusion scan for PE    3. Primary hypertension    4. Hypothyroidism due to acquired atrophy of thyroid    5. Pericardial effusion    6. Chronic diastolic congestive heart failure    7. History of COVID-19    8. 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    9. Morbid obesity due to excess calories    10. CKD (chronic kidney disease) stage 4, GFR 15-29 ml/min    11. Chronic edema        Plan:   1. Chronic diastolic HF - ;  --> 704 --> 767; worsening edema and dyspnea now  - daily weights, low salt diet   - daily compression stockings  - changed Bumex to Torsemide 40mg daily with metolazone -- changed to Torsemide 20mg due to recent worsening renal function  - repeat CMP, BNP , Mg  - rec double Torsemide to 40mg daily x 3 days and monitor BP and weighs    2. HTN   - cont meds and titate  - edema improved    3. HLD - worse LDL  - cont Pravastatin 10mg     4. DM2 A1c - 5.9 --> 5.6  - cont meds per PCP  - sugars stable now 90s-110    5. H/o Cirrhosis s/p TIPS with edema, low albumin  - cont tx per PCP/Hepatology  - liver function improved does not need transplant    6. KRISTAL  - needs CPAP, was not using it before     7. Obesity losing weight 217 --197 --> 187  ---> 198 lbs  - needs weight loss with diet/exercise     8. Pericardial effusion  - small, sec to cirrhosis/CHF  - no tamponade clinically on last ECHO    9. CKD 4 with proteinuria  - diuretics adjusted   - cont f/u with nephro     10. Elevated D dimer, int prob for PE  - repeat D dimer - pending  - cont Eliquis     Thank you for allowing me to participate in this patient's care. Please do not hesitate to contact me with any questions or concerns. Consult note has been forwarded to the referral physician.

## 2022-06-21 ENCOUNTER — TELEPHONE (OUTPATIENT)
Dept: CARDIOLOGY | Facility: CLINIC | Age: 67
End: 2022-06-21
Payer: MEDICAID

## 2022-06-21 ENCOUNTER — HOSPITAL ENCOUNTER (EMERGENCY)
Facility: HOSPITAL | Age: 67
Discharge: HOME | End: 2022-06-21
Attending: EMERGENCY MEDICINE
Payer: MEDICARE

## 2022-06-21 ENCOUNTER — TELEPHONE (OUTPATIENT)
Dept: NEPHROLOGY | Facility: CLINIC | Age: 67
End: 2022-06-21
Payer: MEDICAID

## 2022-06-21 VITALS
SYSTOLIC BLOOD PRESSURE: 143 MMHG | HEIGHT: 61 IN | RESPIRATION RATE: 18 BRPM | BODY MASS INDEX: 37.47 KG/M2 | TEMPERATURE: 99 F | HEART RATE: 72 BPM | OXYGEN SATURATION: 100 % | DIASTOLIC BLOOD PRESSURE: 68 MMHG | WEIGHT: 198.44 LBS

## 2022-06-21 DIAGNOSIS — R06.02 SHORTNESS OF BREATH: ICD-10-CM

## 2022-06-21 DIAGNOSIS — R06.02 SOB (SHORTNESS OF BREATH): ICD-10-CM

## 2022-06-21 DIAGNOSIS — E87.5 HYPERKALEMIA: Primary | ICD-10-CM

## 2022-06-21 LAB
ALBUMIN SERPL BCP-MCNC: 2.5 G/DL (ref 3.5–5.2)
ALP SERPL-CCNC: 80 U/L (ref 55–135)
ALT SERPL W/O P-5'-P-CCNC: 20 U/L (ref 10–44)
ANION GAP SERPL CALC-SCNC: 10 MMOL/L (ref 8–16)
AST SERPL-CCNC: 29 U/L (ref 10–40)
BASOPHILS # BLD AUTO: 0.03 K/UL (ref 0–0.2)
BASOPHILS NFR BLD: 0.5 % (ref 0–1.9)
BILIRUB SERPL-MCNC: 0.3 MG/DL (ref 0.1–1)
BNP SERPL-MCNC: 477 PG/ML (ref 0–99)
BUN SERPL-MCNC: 43 MG/DL (ref 8–23)
CALCIUM SERPL-MCNC: 8.4 MG/DL (ref 8.7–10.5)
CHLORIDE SERPL-SCNC: 111 MMOL/L (ref 95–110)
CO2 SERPL-SCNC: 22 MMOL/L (ref 23–29)
CREAT SERPL-MCNC: 4.3 MG/DL (ref 0.5–1.4)
D DIMER PPP IA.FEU-MCNC: 1.67 MG/L FEU
DIFFERENTIAL METHOD: ABNORMAL
EOSINOPHIL # BLD AUTO: 0.3 K/UL (ref 0–0.5)
EOSINOPHIL NFR BLD: 4.7 % (ref 0–8)
ERYTHROCYTE [DISTWIDTH] IN BLOOD BY AUTOMATED COUNT: 15.5 % (ref 11.5–14.5)
EST. GFR  (AFRICAN AMERICAN): 12 ML/MIN/1.73 M^2
EST. GFR  (NON AFRICAN AMERICAN): 10 ML/MIN/1.73 M^2
GLUCOSE SERPL-MCNC: 85 MG/DL (ref 70–110)
HCT VFR BLD AUTO: 32.4 % (ref 37–48.5)
HGB BLD-MCNC: 10.1 G/DL (ref 12–16)
IMM GRANULOCYTES # BLD AUTO: 0.01 K/UL (ref 0–0.04)
IMM GRANULOCYTES NFR BLD AUTO: 0.2 % (ref 0–0.5)
LYMPHOCYTES # BLD AUTO: 2.9 K/UL (ref 1–4.8)
LYMPHOCYTES NFR BLD: 50.6 % (ref 18–48)
MCH RBC QN AUTO: 28 PG (ref 27–31)
MCHC RBC AUTO-ENTMCNC: 31.2 G/DL (ref 32–36)
MCV RBC AUTO: 90 FL (ref 82–98)
MONOCYTES # BLD AUTO: 0.5 K/UL (ref 0.3–1)
MONOCYTES NFR BLD: 7.9 % (ref 4–15)
NEUTROPHILS # BLD AUTO: 2.1 K/UL (ref 1.8–7.7)
NEUTROPHILS NFR BLD: 36.1 % (ref 38–73)
NRBC BLD-RTO: 0 /100 WBC
PLATELET # BLD AUTO: 199 K/UL (ref 150–450)
PMV BLD AUTO: 10.2 FL (ref 9.2–12.9)
POTASSIUM SERPL-SCNC: 5.2 MMOL/L (ref 3.5–5.1)
PROT SERPL-MCNC: 6.6 G/DL (ref 6–8.4)
RBC # BLD AUTO: 3.61 M/UL (ref 4–5.4)
SODIUM SERPL-SCNC: 143 MMOL/L (ref 136–145)
TROPONIN I SERPL DL<=0.01 NG/ML-MCNC: 0.02 NG/ML (ref 0–0.03)
WBC # BLD AUTO: 5.71 K/UL (ref 3.9–12.7)

## 2022-06-21 PROCEDURE — 85025 COMPLETE CBC W/AUTO DIFF WBC: CPT | Performed by: NURSE PRACTITIONER

## 2022-06-21 PROCEDURE — 93005 ELECTROCARDIOGRAM TRACING: CPT

## 2022-06-21 PROCEDURE — 93010 EKG 12-LEAD: ICD-10-PCS | Mod: ,,, | Performed by: INTERNAL MEDICINE

## 2022-06-21 PROCEDURE — 84484 ASSAY OF TROPONIN QUANT: CPT | Performed by: NURSE PRACTITIONER

## 2022-06-21 PROCEDURE — 83880 ASSAY OF NATRIURETIC PEPTIDE: CPT | Performed by: NURSE PRACTITIONER

## 2022-06-21 PROCEDURE — 85379 FIBRIN DEGRADATION QUANT: CPT | Performed by: NURSE PRACTITIONER

## 2022-06-21 PROCEDURE — 80053 COMPREHEN METABOLIC PANEL: CPT | Performed by: NURSE PRACTITIONER

## 2022-06-21 PROCEDURE — 93010 ELECTROCARDIOGRAM REPORT: CPT | Mod: ,,, | Performed by: INTERNAL MEDICINE

## 2022-06-21 NOTE — TELEPHONE ENCOUNTER
L/m that she was to see us again in June and to call to schedule appt. If she is going out of the country she should see us first.6/21/22/sf

## 2022-06-21 NOTE — FIRST PROVIDER EVALUATION
"Medical screening exam completed.  I have conducted a focused provider triage encounter, findings are as follows:    Brief history of present illness:  Patient reports history of chronic shortness of breath.  Patient did have a positive D-dimer in the clinic earlier and which her doctors trending.  Patient also reports elevated potassium and she was instructed to come to the ER for further evaluation    Vitals:    06/20/22 2219   BP: (!) 143/65   BP Location: Right arm   Patient Position: Sitting   Pulse: 67   Resp: 20   Temp: 99.3 °F (37.4 °C)   TempSrc: Oral   SpO2: 95%   Weight: 90 kg (198 lb 6.6 oz)   Height: 5' 1" (1.549 m)       Pertinent physical exam:  No acute distress    Brief workup plan:  Labs, imaging, EKG    Preliminary workup initiated; this workup will be continued and followed by the physician or advanced practice provider that is assigned to the patient when roomed.  "

## 2022-06-21 NOTE — DISCHARGE INSTRUCTIONS
Continue home medications.  Follow up with her doctor 1-2 days for re-evaluation.  Return as needed for any worsening symptoms, problems, questions or concerns for

## 2022-06-21 NOTE — TELEPHONE ENCOUNTER
----- Message from Marcos Ramos MD sent at 6/21/2022  8:37 AM CDT -----  To my staff - please contact the patient and let them know that their results improved in ER with decreased potassium, BNP has improved as well continue the double dose of Torsemide for 3 days and then daily, need to avoid any potassium rich foods, recommend seeing nephrologist before you leave out of country but if not as soon as you are back. Continue taking the blood thinner as well as the D dimer remains elevated. Monitor BP and weights every day with low salt diet.

## 2022-06-21 NOTE — ED NOTES
Pt d/c in a good condition, AOx4, in no acute distress. Home care, and follow-up discussed. Pt verbalized understanding of d/c teachings.

## 2022-06-21 NOTE — ED PROVIDER NOTES
SCRIBE #1 NOTE: I, Randy Harris, am scribing for, and in the presence of, Caleb El Jr., MD. I have scribed the entire note.       History     Chief Complaint   Patient presents with    abnormal labs     Pt seen at [facility] earlier today. Labs had elevated K. Sent to ED by PCP. Offers no CC     Review of patient's allergies indicates:   Allergen Reactions    Subsys [fentanyl] Other (See Comments)     After administration pt unresponsive.  HR and respirations decreased.     Versed [midazolam] Other (See Comments)     After administration pt unresponsive.  HR and respirations decreased.     Ampicillin Rash    Codeine Nausea And Vomiting and Nausea Only         History of Present Illness     HPI    6/21/2022, 2:15 AM  History obtained from the patient      History of Present Illness: Diamond Das is a 67 y.o. female patient with a PMHx of hypothyroidism, renal disorder, HTN, HLD, and DM who presents to the Emergency Department for evaluation of elevated potassium levels. Pt was sent earlier today from [facility] due abnormal labs. Pt is currently asysmptomatic. No associated sxs reported. Patient denies any cough, fever, SOB, nausea, vomiting, diarrhea, dysuria, hematuria, and all other sxs at this time. No prior Tx reported. No further complaints or concerns at this time.       Arrival mode: Personal Transportation    PCP: Andrey Perrin MD        Past Medical History:  Past Medical History:   Diagnosis Date    Acquired hypothyroidism 9/28/2020    ROD (acute kidney injury) 1/3/2019    Arthritis of knee 1/31/2022    Ascites     Bilateral lower extremity edema 9/2/2016    Breast pain, left 1/4/2018    Cataract     Chest pain, atypical 2/9/2018    CHF (congestive heart failure)     Cirrhosis     Cough     Diabetes mellitus     Diabetes mellitus, type 2     Diarrhea 9/4/2019    Edema 3/19/2018    Epigastric pain 2/11/2020    Gastroparesis     GERD (gastroesophageal reflux  disease)     Hepatic encephalopathy 6/19/2018    Pt has history of cirrhosis, seen at [facility] on 6/7/18. Currently on lactulose.    Hyperlipidemia     Hypertension     Hypotension 2/21/2019    Immunization deficiency 2/10/2020    Liver disease     Lumbar strain 4/27/2018    Lumbar strain, sequela 2/17/2020    Macular degeneration     Medication reaction 11/17/2016    Other and unspecified hyperlipidemia 6/11/2012 11:11:32 AM    [facility] Historical - Endocrine/Metabolic/Immune: Hyperlipidemia-No Additional Notes    Pneumonia of right middle lobe due to infectious organism 12/19/2017    Renal disorder     Retinopathy due to secondary diabetes mellitus     Skin lesion 12/20/2021    Sleep apnea     Strain of lumbar region 10/4/2021    Thyroid disease     Type 2 diabetes mellitus with hyperglycemia 10/10/2020       Past Surgical History:  Past Surgical History:   Procedure Laterality Date    CATARACT EXTRACTION      CHOLECYSTECTOMY      COLONOSCOPY N/A 9/4/2019    Procedure: COLONOSCOPY;  Surgeon: Marnie Elmore MD;  [facility];  Service: Endoscopy;  Laterality: N/A;    ERCP      FLUOROSCOPY N/A 7/24/2020    Procedure: TIPS revision;  Surgeon: Martell Jasmine MD;  Location: [facility] CATH LAB;  Service: General;  Laterality: N/A;    LIVER BIOPSY      THORACENTESIS Left 1/20/2020    Procedure: Thoracentesis;  Surgeon: Angelica Hunter MD;  Location: [facility] ENDO;  Service: Pulmonary;  Laterality: Left;    TUBAL LIGATION      UPPER GASTROINTESTINAL ENDOSCOPY           Family History:  Family History   Problem Relation Age of Onset    Cancer Mother     Breast cancer Mother     Heart disease Father     Aneurysm Sister     Heart disease Brother     No Known Problems Maternal Grandmother     Cancer Maternal Grandfather     Sarcoidosis Sister     Colon cancer Neg Hx     Ovarian cancer Neg Hx     Thrombosis Neg Hx        Social History:  Social History     Tobacco Use    Smoking  status: Never Smoker    Smokeless tobacco: Never Used   Substance and Sexual Activity    Alcohol use: No    Drug use: No    Sexual activity: Not Currently        Review of Systems     Review of Systems   Constitutional: Negative for fever.   HENT: Negative for sore throat.    Respiratory: Negative for cough and shortness of breath.    Cardiovascular: Negative for chest pain.   Gastrointestinal: Negative for diarrhea, nausea and vomiting.   Genitourinary: Negative for dysuria and hematuria.   Musculoskeletal: Negative for back pain.   Skin: Negative for rash.   Neurological: Negative for weakness.   Hematological: Does not bruise/bleed easily.   All other systems reviewed and are negative.     Physical Exam     Initial Vitals [06/20/22 2219]   BP Pulse Resp Temp SpO2   (!) 143/65 67 20 99.3 °F (37.4 °C) 95 %      MAP       --          Physical Exam  Nursing Notes and Vital Signs Reviewed.  Constitutional: Patient is in no acute distress. Well-developed and well-nourished.  Head: Atraumatic. Normocephalic.  Eyes:  EOM intact.  No scleral icterus.  ENT: Mucous membranes are moist.  Nares clear   Neck:  Full ROM. No JVD.  Cardiovascular: Regular rate. Regular rhythm No murmurs, rubs, or gallops. Distal pulses are 2+ and symmetric  Pulmonary/Chest: No respiratory distress. Clear to auscultation bilaterally. No wheezing or rales.  Equal chest wall rise bilaterally  Abdominal: Soft and non-distended.  There is no tenderness.  No rebound, guarding, or rigidity. Good bowel sounds.  Genitourinary: No CVA tenderness.  No suprapubic tenderness  Musculoskeletal: Moves all extremities. No obvious deformities.  5 x 5 strength in all extremities   Skin: Warm and dry.  Neurological:  Alert, awake, and appropriate.  Normal speech.  No acute focal neurological deficits are appreciated.  Two through 12 intact bilaterally.  Psychiatric: Normal affect. Good eye contact. Appropriate in content.     ED Course   Procedures  ED Vital  "Signs:  Vitals:    06/20/22 2219 06/21/22 0236   BP: (!) 143/65 (!) 143/68   Pulse: 67 72   Resp: 20 18   Temp: 99.3 °F (37.4 °C)    TempSrc: Oral    SpO2: 95% 100%   Weight: 90 kg (198 lb 6.6 oz)    Height: 5' 1" (1.549 m)        Abnormal Lab Results:  Labs Reviewed   CBC W/ AUTO DIFFERENTIAL - Abnormal; Notable for the following components:       Result Value    RBC 3.61 (*)     Hemoglobin 10.1 (*)     Hematocrit 32.4 (*)     MCHC 31.2 (*)     RDW 15.5 (*)     Gran % 36.1 (*)     Lymph % 50.6 (*)     All other components within normal limits   COMPREHENSIVE METABOLIC PANEL - Abnormal; Notable for the following components:    Potassium 5.2 (*)     Chloride 111 (*)     CO2 22 (*)     BUN 43 (*)     Creatinine 4.3 (*)     Calcium 8.4 (*)     Albumin 2.5 (*)     eGFR if  12 (*)     eGFR if non  10 (*)     All other components within normal limits   D DIMER, QUANTITATIVE - Abnormal; Notable for the following components:    D-Dimer 1.67 (*)     All other components within normal limits   B-TYPE NATRIURETIC PEPTIDE - Abnormal; Notable for the following components:     (*)     All other components within normal limits   TROPONIN I   SARS-COV-2 RDRP GENE        All Lab Results:  Results for orders placed or performed during the hospital encounter of 06/21/22   CBC Auto Differential   Result Value Ref Range    WBC 5.71 3.90 - 12.70 K/uL    RBC 3.61 (L) 4.00 - 5.40 M/uL    Hemoglobin 10.1 (L) 12.0 - 16.0 g/dL    Hematocrit 32.4 (L) 37.0 - 48.5 %    MCV 90 82 - 98 fL    MCH 28.0 27.0 - 31.0 pg    MCHC 31.2 (L) 32.0 - 36.0 g/dL    RDW 15.5 (H) 11.5 - 14.5 %    Platelets 199 150 - 450 K/uL    MPV 10.2 9.2 - 12.9 fL    Immature Granulocytes 0.2 0.0 - 0.5 %    Gran # (ANC) 2.1 1.8 - 7.7 K/uL    Immature Grans (Abs) 0.01 0.00 - 0.04 K/uL    Lymph # 2.9 1.0 - 4.8 K/uL    Mono # 0.5 0.3 - 1.0 K/uL    Eos # 0.3 0.0 - 0.5 K/uL    Baso # 0.03 0.00 - 0.20 K/uL    nRBC 0 0 /100 WBC    Gran % 36.1 " (L) 38.0 - 73.0 %    Lymph % 50.6 (H) 18.0 - 48.0 %    Mono % 7.9 4.0 - 15.0 %    Eosinophil % 4.7 0.0 - 8.0 %    Basophil % 0.5 0.0 - 1.9 %    Differential Method Automated    Comprehensive Metabolic Panel   Result Value Ref Range    Sodium 143 136 - 145 mmol/L    Potassium 5.2 (H) 3.5 - 5.1 mmol/L    Chloride 111 (H) 95 - 110 mmol/L    CO2 22 (L) 23 - 29 mmol/L    Glucose 85 70 - 110 mg/dL    BUN 43 (H) 8 - 23 mg/dL    Creatinine 4.3 (H) 0.5 - 1.4 mg/dL    Calcium 8.4 (L) 8.7 - 10.5 mg/dL    Total Protein 6.6 6.0 - 8.4 g/dL    Albumin 2.5 (L) 3.5 - 5.2 g/dL    Total Bilirubin 0.3 0.1 - 1.0 mg/dL    Alkaline Phosphatase 80 55 - 135 U/L    AST 29 10 - 40 U/L    ALT 20 10 - 44 U/L    Anion Gap 10 8 - 16 mmol/L    eGFR if African American 12 (A) >60 mL/min/1.73 m^2    eGFR if non African American 10 (A) >60 mL/min/1.73 m^2   D dimer, quantitative   Result Value Ref Range    D-Dimer 1.67 (H) <0.50 mg/L FEU   Troponin I   Result Value Ref Range    Troponin I 0.017 0.000 - 0.026 ng/mL   Brain natriuretic peptide   Result Value Ref Range     (H) 0 - 99 pg/mL     *Note: Due to a large number of results and/or encounters for the requested time period, some results have not been displayed. A complete set of results can be found in Results Review.         Imaging Results:  Imaging Results          X-Ray Chest AP Portable (Final result)  Result time 06/20/22 22:43:34    Final result by Dao Fitzpatrick MD (06/20/22 22:43:34)                 Impression:      As above      Electronically signed by: Amari Dc  Date:    06/20/2022  Time:    22:43             Narrative:    EXAMINATION:  XR CHEST AP PORTABLE    CLINICAL HISTORY:  Shortness of breath    TECHNIQUE:  Single frontal view of the chest was performed.    COMPARISON:  None    FINDINGS:  Cardiomegaly with perihilar edema consistent with CHF.    Bones are intact.                                 The EKG was ordered, reviewed, and independently interpreted by the ED  provider.  Interpretation time: 00:51  Rate: 65 BPM  Rhythm: normal sinus rhythm  Interpretation: Anterior infarct (cited on or before 17-May-2021). No STEMI.           The Emergency Provider reviewed the vital signs and test results, which are outlined above.     ED Discussion       2:37 AM: Reassessed pt at this time.  Pt states her condition has  at this time. Discussed with pt all pertinent ED information and results. Discussed pt dx and plan of tx. Gave pt all f/u and return to the ED instructions. All questions and concerns were addressed at this time. Pt expresses understanding of information and instructions, and is comfortable with plan to discharge. Pt is stable for discharge.    I discussed with patient and/or family/caretaker that evaluation in the ED does not suggest any emergent or life threatening medical conditions requiring immediate intervention beyond what was provided in the ED, and I believe patient is safe for discharge.  Regardless, an unremarkable evaluation in the ED does not preclude the development or presence of a serious of life threatening condition. As such, patient was instructed to return immediately for any worsening or change in current symptoms.        patient is stable nontoxic.  D-dimer trending down.  Renal failure function is at baseline.  Potassium is 5.2.  She is not hyperkalemic significantly.  She is stable safe for discharge my opinion.  Discussed with her all findings well as plan of care.  She verbalized agreement understanding with all instructions reliable.  She is safe for discharge my opinion.    Medical Decision Making:   Clinical Tests:   Lab Tests: Ordered and Reviewed  Radiological Study: Ordered and Reviewed  Medical Tests: Ordered and Reviewed           ED Medication(s):  Medications - No data to display    New Prescriptions    No medications on file        Follow-up Information     Andrey Perrin MD.    Specialty: Family Medicine  Contact  information:  [address] [place] [address]  [phone]                             Scribe Attestation:   Scribe #1: I performed the above scribed service and the documentation accurately describes the services I performed. I attest to the accuracy of the note.     Attending:   Physician Attestation Statement for Scribe #1: I, Caleb El Jr., MD, personally performed the services described in this documentation, as scribed by Randy Harris, in my presence, and it is both accurate and complete.           Clinical Impression       ICD-10-CM ICD-9-CM   1. Hyperkalemia  E87.5 276.7   2. Shortness of breath  R06.02 786.05   3. SOB (shortness of breath)  R06.02 786.05       Disposition:   Disposition: Discharged  Condition: Stable         Caleb El Jr., MD  06/21/22 0254

## 2022-07-02 DIAGNOSIS — K76.82 HEPATIC ENCEPHALOPATHY: ICD-10-CM

## 2022-07-11 NOTE — PROGRESS NOTES
[facility]  Physical Therapy Re-evaluation Note       Name: Diamond Das  [facility] Number: [id number]    Therapy Diagnosis:   Encounter Diagnoses   Name Primary?    Decreased strength, endurance, and mobility Yes    Chronic pain of both knees      Physician: Andrey Perrin MD    Visit Date: 7/13/2022    Physician Orders: PT Eval and Treat   Medical Diagnosis from Referral: Back and knee pain  Evaluation Date: 4/21/2022  Re-evalution date: 7/13/2022  Authorization Period Expiration: 12/31/2022  Plan of Care Expiration: 10/13/2022                Progress Note Due: 8/13/2022    Visit # / Visits authorized: 11/ 20(+1 eval)  FOTO:2/3  PTA visit: 0/5     Precautions: Standard and Diabetes      Time In: 3:30 pm  Time Out: 4:15pm  Total Treatment Time: 40 minutes    SUBJECTIVE     Pt reports: Knees and lower back are doing good overall. Felt like last visit balance activities were difficult. Feels like she is about 50% improved overall with less pain but difficulty with lifting leg as in steps. Pt feels less pain in her lower back and knee overall. Her primary c/o is of lack of balance. Feels her legs get weak and shaky with walking longer distances like 1/2 block.     Compliance with Hep: Daily  Response to previous treatment: no adverse reactions to treatment/updated HEP  Functional change: Better    Pain: 0/10   Worst: 2/10  Location: Bilateral knees  Aggravating factors: Standing and Walking      OBJECTIVE     Objective Measures updated at progress report unless specified otherwise.  STRENGTH:     Hip/Knee MMT Right  4/21/2022 Goal   Hip Flexion  5/5 5/5 B    Knee Flexion 4+/5 5/5 B   Knee Extension 5/5 5/5 B      Hip/Knee MMT Left  4/21/2022 Goal   Hip Flexion  5/5 5/5 B    Knee Flexion 4/5 5/5 B   Knee Extension 5/5 5/5 B   Palpation:  Pain/Tenderness to anterior knee and joint line     Movement Analysis:   Functional Test  Outcome   Double Leg Squat Dysfunctional and use of  upper extremity    Single Leg Step Down x      Rehab Tests  Outcome Norms GOAL   Five Time Sit to Stand 18 60s: <11.4 sec  70s: <12.6 sec  80s: 14.8 sec <11.4   Timed Up and Go x <13.5 sec x   6 minutes walk x 60s: >538f, 572m  70s: >471f, 527m  80s: >417f, 392m x      Balance:     RIGHT  4/21/2022 LEFT  4/21/2022 Goal   Closed x - 60 seconds      CAMPUZANO 39/56   50/56      CMS Impairment/Limitation/Restriction for FOTO knee Survey     Therapist reviewed FOTO scores for Diamond Das on 4/21/2022.   FOTO documents entered into [employer] - see Media section.     Limitation Score: 46%      Treatment        Diamond received therapeutic exercises to develop strength, endurance, ROM, flexibility, posture and core stabilization for (38) minutes including:     TherEx 4/21/2022          nustep  x   8 Minutes    Recumbant bike seat 1          long arc quad  x [id number]  3#   Supine Marching  3x10 Bilateral    straight leg raise  x 3x15 Bilateral    bridges x 3X 15 with ball squeeze    Ball squeeze x 30x   Sidelying clamshells  2 X 15    Hamstring curls standing x 3 x 10 3#    Sit to stands    2x10   Calf raises  3x15   Toe raises  3x10    Step ups x 2x15 each; 6 inch block   SAQ  x 2 X 15 ea    side steps in II bars  x  3 X   Trunk rotations      Calf stretch  3x30s    Marches on foam X 2x2min   Tandem walks x x5 laps   Toe taps to foam pad  x X 1 min                               Plan for Next Visit: progress towards overall lower extremity strength, increased walking distances, side stepping, focus on quad and hips, visit 3 progress to standing exercises        Home Exercises and Patient Education Provided    Education/Self-Care provided: (included in treatment) minutes    Patient educated on the impairments noted above and the effects of physical therapy intervention to improve overall condition and QOL.    Patient was educated on all the above exercise prior/during/after for proper posture, positioning, and execution for  safe performance with home exercise program.    Written Home Exercises Provided: No.   Exercises were reviewed and Diamond was able to demonstrate them prior to the end of the session.  Diamond demonstrated good understanding of the education provided.     See EMR under Patient Instructions for exercises provided during therapy sessions.    ASSESSMENT     Pt presents today reporting ongoing improvement in her bilateral knees and lower back pain. Pt has been on vacation for 2 weeks. Pt has balance difficulties at moderate fall risk. Pt has met some of her goals.     Diamond is progressing well towards her goals.   Pt prognosis is Good.     Pt will continue to benefit from skilled outpatient physical therapy to address the deficits listed in the problem list box on initial evaluation, provide pt/family education and to maximize pt's level of independence in the home and community environment.     Pt's spiritual, cultural and educational needs considered and pt agreeable to plan of care and goals.    Anticipated barriers to physical therapy: Back pain, BMI over 30, Congestive Heart Failure or Heart Disease, Diabetes Type I or II, Gastrointestinal Disease, High Blood Pressure, Kidney, Bladder, Prostate or Urination Problems, Sleep dysfunction    GOALS:  SHORT TERM GOALS: 6 weeks     1. Recent signs and systems trend is improving in order to progress towards LTG's.  Met   2. Patient will be independent with HEP in order to further progress and return to maximal function.  PC   3. Pain rating at Worst: 5/10 in order to progress towards increased independence with activity.  Met   4. Patient to show improvement on CAMPUZANO balance scale by 5 to 6 points    5. Patient will be able to correct postural deviations in sitting and standing, to decrease pain and promote postural awareness for injury prevention.   PC      LONG TERM GOALS: 12 weeks     1. Patient will return to normal ADL, recreational, and work related activities with less  pain and limitation.   PC   2. Patient will improve AROM to stated goals in order to return to maximal functional potential.   PC   3. Patient will improve Strength to stated goals of appropriate musculature in order to improve functional independence.   PC   4. Pain Rating at Best: 1/10 to improve Quality of Life.        PC   5. Patient will meet predicted functional outcome (FOTO) score: 26% to increase self-worth & perceived functional ability.       PC   6. Patient to show improvement on CAMPUZANO balance scale by 10 points    7.   Patient will have met/partially met personal goal of: decreased knee pain, Increased ability to walk, increased strength and endurance in lower extremity.       PC     PC = progressing/continue  PM= partially met  DC= discontinue    PLAN     Pt would benefit from continued Physical Therapy 2 x week for 8 to 10 weeks to work on LE and core strengthening, manual therapies as needed and appropriate, balance activities and training.    Yolanda Martinez, PT, DPT

## 2022-07-13 ENCOUNTER — CLINICAL SUPPORT (OUTPATIENT)
Dept: REHABILITATION | Facility: HOSPITAL | Age: 67
End: 2022-07-13
Attending: FAMILY MEDICINE
Payer: MEDICARE

## 2022-07-13 DIAGNOSIS — M25.561 CHRONIC PAIN OF BOTH KNEES: ICD-10-CM

## 2022-07-13 DIAGNOSIS — R68.89 DECREASED STRENGTH, ENDURANCE, AND MOBILITY: Primary | ICD-10-CM

## 2022-07-13 DIAGNOSIS — G89.29 CHRONIC PAIN OF BOTH KNEES: ICD-10-CM

## 2022-07-13 DIAGNOSIS — R53.1 DECREASED STRENGTH, ENDURANCE, AND MOBILITY: Primary | ICD-10-CM

## 2022-07-13 DIAGNOSIS — M25.562 CHRONIC PAIN OF BOTH KNEES: ICD-10-CM

## 2022-07-13 DIAGNOSIS — Z74.09 DECREASED STRENGTH, ENDURANCE, AND MOBILITY: Primary | ICD-10-CM

## 2022-07-13 PROCEDURE — 97110 THERAPEUTIC EXERCISES: CPT | Mod: PN

## 2022-07-13 NOTE — PLAN OF CARE
[facility]  Physical Therapy Re-evaluation Note       Name: Diamond Das  [facility] Number: [id number]    Therapy Diagnosis:   Encounter Diagnoses   Name Primary?    Decreased strength, endurance, and mobility Yes    Chronic pain of both knees      Physician: Andrey Perrin MD    Visit Date: 7/13/2022    Physician Orders: PT Eval and Treat   Medical Diagnosis from Referral: Back and knee pain  Evaluation Date: 4/21/2022  Re-evalution date: 7/13/2022  Authorization Period Expiration: 12/31/2022  Plan of Care Expiration: 10/13/2022                Progress Note Due: 8/13/2022    Visit # / Visits authorized: 11/ 20(+1 eval)  FOTO:2/3  PTA visit: 0/5     Precautions: Standard and Diabetes      Time In: 3:30 pm  Time Out: 4:15pm  Total Treatment Time: 40 minutes    SUBJECTIVE     Pt reports: Knees and lower back are doing good overall. Felt like last visit balance activities were difficult. Feels like she is about 50% improved overall with less pain but difficulty with lifting leg as in steps. Pt feels less pain in her lower back and knee overall. Her primary c/o is of lack of balance. Feels her legs get weak and shaky with walking longer distances like 1/2 block.     Compliance with Hep: Daily  Response to previous treatment: no adverse reactions to treatment/updated HEP  Functional change: Better    Pain: 0/10   Worst: 2/10  Location: Bilateral knees  Aggravating factors: Standing and Walking      OBJECTIVE     Objective Measures updated at progress report unless specified otherwise.  STRENGTH:     Hip/Knee MMT Right  4/21/2022 Goal   Hip Flexion  5/5 5/5 B    Knee Flexion 4+/5 5/5 B   Knee Extension 5/5 5/5 B      Hip/Knee MMT Left  4/21/2022 Goal   Hip Flexion  5/5 5/5 B    Knee Flexion 4/5 5/5 B   Knee Extension 5/5 5/5 B   Palpation:  Pain/Tenderness to anterior knee and joint line     Movement Analysis:   Functional Test  Outcome   Double Leg Squat Dysfunctional and use of  upper extremity    Single Leg Step Down x      Rehab Tests  Outcome Norms GOAL   Five Time Sit to Stand 18 60s: <11.4 sec  70s: <12.6 sec  80s: 14.8 sec <11.4   Timed Up and Go x <13.5 sec x   6 minutes walk x 60s: >538f, 572m  70s: >471f, 527m  80s: >417f, 392m x      Balance:     RIGHT  4/21/2022 LEFT  4/21/2022 Goal   Closed x - 60 seconds      CAMPUZANO 39/56   50/56      CMS Impairment/Limitation/Restriction for FOTO knee Survey     Therapist reviewed FOTO scores for Diamond Das on 4/21/2022.   FOTO documents entered into [employer] - see Media section.     Limitation Score: 46%      Treatment        Diamond received therapeutic exercises to develop strength, endurance, ROM, flexibility, posture and core stabilization for (38) minutes including:     TherEx 4/21/2022          nustep  x   8 Minutes    Recumbant bike seat 1          long arc quad  x [id number]  3#   Supine Marching  3x10 Bilateral    straight leg raise  x 3x15 Bilateral    bridges x 3X 15 with ball squeeze    Ball squeeze x 30x   Sidelying clamshells  2 X 15    Hamstring curls standing x 3 x 10 3#    Sit to stands    2x10   Calf raises  3x15   Toe raises  3x10    Step ups x 2x15 each; 6 inch block   SAQ  x 2 X 15 ea    side steps in II bars  x  3 X   Trunk rotations      Calf stretch  3x30s    Marches on foam X 2x2min   Tandem walks x x5 laps   Toe taps to foam pad  x X 1 min                               Plan for Next Visit: progress towards overall lower extremity strength, increased walking distances, side stepping, focus on quad and hips, visit 3 progress to standing exercises        Home Exercises and Patient Education Provided    Education/Self-Care provided: (included in treatment) minutes    Patient educated on the impairments noted above and the effects of physical therapy intervention to improve overall condition and QOL.    Patient was educated on all the above exercise prior/during/after for proper posture, positioning, and execution for  safe performance with home exercise program.    Written Home Exercises Provided: No.   Exercises were reviewed and Diamond was able to demonstrate them prior to the end of the session.  Diamond demonstrated good understanding of the education provided.     See EMR under Patient Instructions for exercises provided during therapy sessions.    ASSESSMENT     Pt presents today reporting ongoing improvement in her bilateral knees and lower back pain. Pt has been on vacation for 2 weeks. Pt has balance difficulties at moderate fall risk. Pt has met some of her goals.     Diamond is progressing well towards her goals.   Pt prognosis is Good.     Pt will continue to benefit from skilled outpatient physical therapy to address the deficits listed in the problem list box on initial evaluation, provide pt/family education and to maximize pt's level of independence in the home and community environment.     Pt's spiritual, cultural and educational needs considered and pt agreeable to plan of care and goals.    Anticipated barriers to physical therapy: Back pain, BMI over 30, Congestive Heart Failure or Heart Disease, Diabetes Type I or II, Gastrointestinal Disease, High Blood Pressure, Kidney, Bladder, Prostate or Urination Problems, Sleep dysfunction    GOALS:  SHORT TERM GOALS: 6 weeks     1. Recent signs and systems trend is improving in order to progress towards LTG's.  Met   2. Patient will be independent with HEP in order to further progress and return to maximal function.  PC   3. Pain rating at Worst: 5/10 in order to progress towards increased independence with activity.  Met   4. Patient to show improvement on CAMPUZANO balance scale by 5 to 6 points    5. Patient will be able to correct postural deviations in sitting and standing, to decrease pain and promote postural awareness for injury prevention.   PC      LONG TERM GOALS: 12 weeks     1. Patient will return to normal ADL, recreational, and work related activities with less  pain and limitation.   PC   2. Patient will improve AROM to stated goals in order to return to maximal functional potential.   PC   3. Patient will improve Strength to stated goals of appropriate musculature in order to improve functional independence.   PC   4. Pain Rating at Best: 1/10 to improve Quality of Life.        PC   5. Patient will meet predicted functional outcome (FOTO) score: 26% to increase self-worth & perceived functional ability.       PC   6. Patient to show improvement on CAMPUZANO balance scale by 10 points    7.   Patient will have met/partially met personal goal of: decreased knee pain, Increased ability to walk, increased strength and endurance in lower extremity.       PC     PC = progressing/continue  PM= partially met  DC= discontinue    PLAN     Pt would benefit from continued Physical Therapy 2 x week for 8 to 10 weeks to work on LE and core strengthening, manual therapies as needed and appropriate, balance activities and training.    Yolanda Martinez, PT, DPT

## 2022-07-15 DIAGNOSIS — E78.49 OTHER HYPERLIPIDEMIA: ICD-10-CM

## 2022-07-18 DIAGNOSIS — R79.89 ELEVATED D-DIMER: ICD-10-CM

## 2022-07-18 DIAGNOSIS — U07.1 COVID-19: ICD-10-CM

## 2022-07-18 DIAGNOSIS — I12.9 PARENCHYMAL RENAL HYPERTENSION, STAGE 1 THROUGH STAGE 4 OR UNSPECIFIED CHRONIC KIDNEY DISEASE: ICD-10-CM

## 2022-07-18 DIAGNOSIS — E03.4 HYPOTHYROIDISM DUE TO ACQUIRED ATROPHY OF THYROID: ICD-10-CM

## 2022-07-18 RX ORDER — LEVOTHYROXINE SODIUM 137 UG/1
137 TABLET ORAL
Qty: 90 TABLET | Refills: 1 | Status: SHIPPED | OUTPATIENT
Start: 2022-07-18 | End: 2022-08-24 | Stop reason: SDUPTHER

## 2022-07-18 RX ORDER — CARVEDILOL 25 MG/1
25 TABLET ORAL 2 TIMES DAILY WITH MEALS
Qty: 180 TABLET | Refills: 1 | Status: SHIPPED | OUTPATIENT
Start: 2022-07-18 | End: 2022-08-24 | Stop reason: SDUPTHER

## 2022-07-18 RX ORDER — PRAVASTATIN SODIUM 10 MG/1
TABLET ORAL
Refills: 0 | OUTPATIENT
Start: 2022-07-18

## 2022-07-18 NOTE — TELEPHONE ENCOUNTER
----- Message from Andrey Perrin MD sent at 7/18/2022  7:23 AM CDT -----  Please send patient's Eliquis prescription to Dr. Ramos.

## 2022-07-18 NOTE — TELEPHONE ENCOUNTER
Please let pharmacy know that there are no directions on the refill request that they have sent.  Please advise them and resend prescription with directions

## 2022-07-20 ENCOUNTER — CLINICAL SUPPORT (OUTPATIENT)
Dept: REHABILITATION | Facility: HOSPITAL | Age: 67
End: 2022-07-20
Attending: FAMILY MEDICINE
Payer: MEDICARE

## 2022-07-20 DIAGNOSIS — M25.562 PAIN IN BOTH KNEES, UNSPECIFIED CHRONICITY: ICD-10-CM

## 2022-07-20 DIAGNOSIS — Z74.09 DECREASED STRENGTH, ENDURANCE, AND MOBILITY: Primary | ICD-10-CM

## 2022-07-20 DIAGNOSIS — R53.1 DECREASED STRENGTH, ENDURANCE, AND MOBILITY: Primary | ICD-10-CM

## 2022-07-20 DIAGNOSIS — R68.89 DECREASED STRENGTH, ENDURANCE, AND MOBILITY: Primary | ICD-10-CM

## 2022-07-20 DIAGNOSIS — M25.561 PAIN IN BOTH KNEES, UNSPECIFIED CHRONICITY: ICD-10-CM

## 2022-07-20 PROCEDURE — 97110 THERAPEUTIC EXERCISES: CPT | Mod: PN,CQ

## 2022-07-20 NOTE — PROGRESS NOTES
[facility]  Physical Therapy Re-evaluation Note       Name: Diamond Das  Clinic Number: [id number]    Therapy Diagnosis:   Encounter Diagnoses   Name Primary?    Decreased strength, endurance, and mobility Yes    Pain in both knees, unspecified chronicity      Physician: Andrey Perrin MD    Visit Date: 7/20/2022    Physician Orders: PT Eval and Treat   Medical Diagnosis from Referral: Back and knee pain  Evaluation Date: 4/21/2022  Re-evalution date: 7/13/2022  Authorization Period Expiration: 12/31/2022  Plan of Care Expiration: 10/13/2022                Progress Note Due: 8/13/2022    Visit # / Visits authorized: 12/ 20(+1 eval)  FOTO:2/3  PTA visit: 1/5     Precautions: Standard and Diabetes      Time In: 3:15pm  Time Out: 4:00pm  Total Treatment Time: 45 minutes    SUBJECTIVE     Pt reports: Knees have been pretty good. She has been walking around better, longer distances but not back to normal yet.    Compliance with Hep: Daily  Response to previous treatment: no adverse reactions to treatment/updated HEP  Functional change: Better    Pain: 0/10   Worst: 2/10  Location: Bilateral knees  Aggravating factors: Standing and Walking      OBJECTIVE     Objective Measures updated at progress report unless specified otherwise.      Treatment        Diamond received therapeutic exercises to develop strength, endurance, ROM, flexibility, posture and core stabilization for (45) minutes including:     TherEx 4/21/2022          nustep  x   8 Minutes    Recumbant bike seat 1          long arc quad  x [id number]  4#   Supine Marching  3x10 Bilateral    straight leg raise  x 3x15 Bilateral    bridges x 3X 15 with ball squeeze    Ball squeeze  30x   Sidelying clamshells  2 X 15    Hamstring curls standing x 3 x 15 4#    Sit to stands    2x10   Calf raises  3x15   Toe raises  3x10    Step ups x 2x15 each; 6 inch block   SAQ   2 X 15 ea    side steps in II bars  x  4 X   Trunk rotations  "     Calf stretch  3x30s    Marches on foam X 3 x 20 fingers on bars   Tandem walks x x5 laps   Toe taps to foam pad  x X 1 min   Romberg stance on foam SBA x 3 x 30" arms crossed, eyes closed                          Plan for Next Visit: progress towards overall lower extremity strength, increased walking distances, side stepping, focus on quad and hips, visit 3 progress to standing exercises        Home Exercises and Patient Education Provided    Education/Self-Care provided: (included in treatment) minutes    Patient educated on the impairments noted above and the effects of physical therapy intervention to improve overall condition and QOL.    Patient was educated on all the above exercise prior/during/after for proper posture, positioning, and execution for safe performance with home exercise program.    Written Home Exercises Provided: No.   Exercises were reviewed and Diamond was able to demonstrate them prior to the end of the session.  Diamond demonstrated good understanding of the education provided.     See EMR under Patient Instructions for exercises provided during therapy sessions.    ASSESSMENT     Pt presents today reporting improvement in her knee with reducing pain. Continued with established exercises to good tolerance today. Added romberg stance to progress balance tasks, pt was able to maintain her stance independently on foam with her eyes close but did have moderate amount of sway, SBA was utilized for safety as well.    Diamond is progressing well towards her goals.   Pt prognosis is Good.     Pt will continue to benefit from skilled outpatient physical therapy to address the deficits listed in the problem list box on initial evaluation, provide pt/family education and to maximize pt's level of independence in the home and community environment.     Pt's spiritual, cultural and educational needs considered and pt agreeable to plan of care and goals.    Anticipated barriers to physical therapy: " Back pain, BMI over 30, Congestive Heart Failure or Heart Disease, Diabetes Type I or II, Gastrointestinal Disease, High Blood Pressure, Kidney, Bladder, Prostate or Urination Problems, Sleep dysfunction    GOALS:  SHORT TERM GOALS: 6 weeks     1. Recent signs and systems trend is improving in order to progress towards LTG's.  Met   2. Patient will be independent with HEP in order to further progress and return to maximal function.  PC   3. Pain rating at Worst: 5/10 in order to progress towards increased independence with activity.  Met   4. Patient to show improvement on CAMPUZANO balance scale by 5 to 6 points    5. Patient will be able to correct postural deviations in sitting and standing, to decrease pain and promote postural awareness for injury prevention.   PC      LONG TERM GOALS: 12 weeks     1. Patient will return to normal ADL, recreational, and work related activities with less pain and limitation.   PC   2. Patient will improve AROM to stated goals in order to return to maximal functional potential.   PC   3. Patient will improve Strength to stated goals of appropriate musculature in order to improve functional independence.   PC   4. Pain Rating at Best: 1/10 to improve Quality of Life.        PC   5. Patient will meet predicted functional outcome (FOTO) score: 26% to increase self-worth & perceived functional ability.       PC   6. Patient to show improvement on CAMPUZANO balance scale by 10 points    7.   Patient will have met/partially met personal goal of: decreased knee pain, Increased ability to walk, increased strength and endurance in lower extremity.       PC     PC = progressing/continue  PM= partially met  DC= discontinue    PLAN     Pt would benefit from continued Physical Therapy 2 x week for 8 to 10 weeks to work on LE and core strengthening, manual therapies as needed and appropriate, balance activities and training.    Colin Felix, YO

## 2022-07-21 DIAGNOSIS — E78.49 OTHER HYPERLIPIDEMIA: ICD-10-CM

## 2022-07-21 RX ORDER — PRAVASTATIN SODIUM 10 MG/1
10 TABLET ORAL DAILY
Qty: 90 TABLET | Refills: 3 | Status: SHIPPED | OUTPATIENT
Start: 2022-07-21 | End: 2022-08-24 | Stop reason: SDUPTHER

## 2022-07-25 ENCOUNTER — TELEPHONE (OUTPATIENT)
Dept: PREADMISSION TESTING | Facility: HOSPITAL | Age: 67
End: 2022-07-25
Payer: MEDICAID

## 2022-07-25 ENCOUNTER — CLINICAL SUPPORT (OUTPATIENT)
Dept: REHABILITATION | Facility: HOSPITAL | Age: 67
End: 2022-07-25
Attending: FAMILY MEDICINE
Payer: MEDICAID

## 2022-07-25 DIAGNOSIS — R68.89 DECREASED STRENGTH, ENDURANCE, AND MOBILITY: Primary | ICD-10-CM

## 2022-07-25 DIAGNOSIS — M25.561 PAIN IN BOTH KNEES, UNSPECIFIED CHRONICITY: ICD-10-CM

## 2022-07-25 DIAGNOSIS — M25.562 PAIN IN BOTH KNEES, UNSPECIFIED CHRONICITY: ICD-10-CM

## 2022-07-25 DIAGNOSIS — R53.1 DECREASED STRENGTH, ENDURANCE, AND MOBILITY: Primary | ICD-10-CM

## 2022-07-25 DIAGNOSIS — Z74.09 DECREASED STRENGTH, ENDURANCE, AND MOBILITY: Primary | ICD-10-CM

## 2022-07-25 PROCEDURE — 97110 THERAPEUTIC EXERCISES: CPT | Mod: PN,CQ

## 2022-07-25 NOTE — TELEPHONE ENCOUNTER
Please advise if patient may hold Eliquis 4 days prior to endoscopy procedure due to CKD stage 4. She is scheduled for 7/28/22  Thank you

## 2022-07-25 NOTE — PROGRESS NOTES
[facility]  Physical Therapy Re-evaluation Note       Name: Diamond Das  Clinic Number: [id number]    Therapy Diagnosis:   Encounter Diagnoses   Name Primary?    Decreased strength, endurance, and mobility Yes    Pain in both knees, unspecified chronicity      Physician: Andrey Perrin MD    Visit Date: 7/25/2022    Physician Orders: PT Eval and Treat   Medical Diagnosis from Referral: Back and knee pain  Evaluation Date: 4/21/2022  Re-evalution date: 7/13/2022  Authorization Period Expiration: 12/31/2022  Plan of Care Expiration: 10/13/2022                Progress Note Due: 8/13/2022    Visit # / Visits authorized: 13/ 20(+1 eval)  FOTO:2/3  PTA visit: 2/5     Precautions: Standard and Diabetes      Time In: 2:30pm  Time Out: 3:15pm  Total Treatment Time: 45 minutes    SUBJECTIVE     Pt reports: Feeling more bloated today. She had to get off of her blood thinners since she has a colonoscopy coming up this week, she has been more short of breath since. She has not eaten yet today due to lack of appetite.    Compliance with Hep: Daily  Response to previous treatment: no adverse reactions to treatment/updated HEP  Functional change: Better    Pain: 0/10   Worst: 2/10  Location: Bilateral knees  Aggravating factors: Standing and Walking      OBJECTIVE     Objective Measures updated at progress report unless specified otherwise.      Treatment        Diamond received therapeutic exercises to develop strength, endurance, ROM, flexibility, posture and core stabilization for (45) minutes including:     TherEx 4/21/2022          nustep  x   8 Minutes    Recumbant bike seat 1          long arc quad  x [id number]  4#   Supine Marching x 3x10 Bilateral    straight leg raise  x 3x15 Bilateral    bridges x 3X 15 with ball squeeze    Ball squeeze  30x   Sidelying clamshells x 2 X 15    Hamstring curls standing x 3 x 15 4#    Sit to stands    2x10   Calf raises  3x15   Toe raises  3x10   "  Step ups  2x15 each; 6 inch block   SAQ  x 2 X 15 ea    side steps in II bars    4 X   Trunk rotations      Calf stretch  3x30s    Marches on foam  3 x 20 fingers on bars   Tandem walks  x5 laps   Toe taps to foam pad   X 1 min   Romberg stance on foam SBA  3 x 30" arms crossed, eyes closed                          Plan for Next Visit: Continue to progress resistance and work on balance        Home Exercises and Patient Education Provided    Education/Self-Care provided: (included in treatment) minutes    Patient educated on the impairments noted above and the effects of physical therapy intervention to improve overall condition and QOL.    Patient was educated on all the above exercise prior/during/after for proper posture, positioning, and execution for safe performance with home exercise program.    Written Home Exercises Provided: No.   Exercises were reviewed and Diamond was able to demonstrate them prior to the end of the session.  Diamond demonstrated good understanding of the education provided.     See EMR under Patient Instructions for exercises provided during therapy sessions.    ASSESSMENT     Pt presents today reporting increased feeling of being "bloated" with more peripheral edema and shortness of breath. Pt was becoming short of breath while on the NuStep, cued pt to take frequent rest breaks, pt was able to complete 8 minutes safely. Performed less demanding exercises today due to pt's shortness of breath and reports of lack of appetite leading to her not eating prior to therapy. Otherwise pt tolerated session well today without increase in pain and demo of good muscular strength, she will benefit from progression of resistance and increase in repetitions.    Diamond is progressing well towards her goals.   Pt prognosis is Good.     Pt will continue to benefit from skilled outpatient physical therapy to address the deficits listed in the problem list box on initial evaluation, provide pt/family " education and to maximize pt's level of independence in the home and community environment.     Pt's spiritual, cultural and educational needs considered and pt agreeable to plan of care and goals.    Anticipated barriers to physical therapy: Back pain, BMI over 30, Congestive Heart Failure or Heart Disease, Diabetes Type I or II, Gastrointestinal Disease, High Blood Pressure, Kidney, Bladder, Prostate or Urination Problems, Sleep dysfunction    GOALS:  SHORT TERM GOALS: 6 weeks     1. Recent signs and systems trend is improving in order to progress towards LTG's.  Met   2. Patient will be independent with HEP in order to further progress and return to maximal function.  PC   3. Pain rating at Worst: 5/10 in order to progress towards increased independence with activity.  Met   4. Patient to show improvement on CAMPUZANO balance scale by 5 to 6 points    5. Patient will be able to correct postural deviations in sitting and standing, to decrease pain and promote postural awareness for injury prevention.   PC      LONG TERM GOALS: 12 weeks     1. Patient will return to normal ADL, recreational, and work related activities with less pain and limitation.   PC   2. Patient will improve AROM to stated goals in order to return to maximal functional potential.   PC   3. Patient will improve Strength to stated goals of appropriate musculature in order to improve functional independence.   PC   4. Pain Rating at Best: 1/10 to improve Quality of Life.        PC   5. Patient will meet predicted functional outcome (FOTO) score: 26% to increase self-worth & perceived functional ability.       PC   6. Patient to show improvement on CAMPUZANO balance scale by 10 points    7.   Patient will have met/partially met personal goal of: decreased knee pain, Increased ability to walk, increased strength and endurance in lower extremity.       PC     PC = progressing/continue  PM= partially met  DC= discontinue    PLAN     Pt would benefit from  continued Physical Therapy 2 x week for 8 to 10 weeks to work on LE and core strengthening, manual therapies as needed and appropriate, balance activities and training.    Colin Felix, PTA

## 2022-07-28 ENCOUNTER — ANESTHESIA (OUTPATIENT)
Dept: ENDOSCOPY | Facility: HOSPITAL | Age: 67
End: 2022-07-28
Payer: MEDICARE

## 2022-07-28 ENCOUNTER — HOSPITAL ENCOUNTER (OUTPATIENT)
Facility: HOSPITAL | Age: 67
Discharge: HOME | End: 2022-07-28
Attending: INTERNAL MEDICINE | Admitting: INTERNAL MEDICINE
Payer: MEDICARE

## 2022-07-28 ENCOUNTER — ANESTHESIA EVENT (OUTPATIENT)
Dept: ENDOSCOPY | Facility: HOSPITAL | Age: 67
End: 2022-07-28
Payer: MEDICARE

## 2022-07-28 DIAGNOSIS — K74.60 DECOMPENSATED HEPATIC CIRRHOSIS: ICD-10-CM

## 2022-07-28 DIAGNOSIS — K31.84 GASTROPARESIS: Primary | ICD-10-CM

## 2022-07-28 DIAGNOSIS — K72.90 DECOMPENSATED HEPATIC CIRRHOSIS: ICD-10-CM

## 2022-07-28 LAB — POCT GLUCOSE: 115 MG/DL (ref 70–110)

## 2022-07-28 PROCEDURE — 37000009 HC ANESTHESIA EA ADD 15 MINS: Performed by: INTERNAL MEDICINE

## 2022-07-28 PROCEDURE — 37000008 HC ANESTHESIA 1ST 15 MINUTES: Performed by: INTERNAL MEDICINE

## 2022-07-28 PROCEDURE — 25000003 PHARM REV CODE 250: Performed by: NURSE ANESTHETIST, CERTIFIED REGISTERED

## 2022-07-28 PROCEDURE — 43235 EGD DIAGNOSTIC BRUSH WASH: CPT | Mod: ,,, | Performed by: INTERNAL MEDICINE

## 2022-07-28 PROCEDURE — 43235 PR EGD, FLEX, DIAGNOSTIC: ICD-10-PCS | Mod: ,,, | Performed by: INTERNAL MEDICINE

## 2022-07-28 PROCEDURE — 43235 EGD DIAGNOSTIC BRUSH WASH: CPT | Performed by: INTERNAL MEDICINE

## 2022-07-28 PROCEDURE — 63600175 PHARM REV CODE 636 W HCPCS: Performed by: NURSE ANESTHETIST, CERTIFIED REGISTERED

## 2022-07-28 RX ORDER — LIDOCAINE HYDROCHLORIDE 10 MG/ML
INJECTION, SOLUTION EPIDURAL; INFILTRATION; INTRACAUDAL; PERINEURAL
Status: DISCONTINUED | OUTPATIENT
Start: 2022-07-28 | End: 2022-07-28

## 2022-07-28 RX ORDER — PROPOFOL 10 MG/ML
VIAL (ML) INTRAVENOUS
Status: DISCONTINUED | OUTPATIENT
Start: 2022-07-28 | End: 2022-07-28

## 2022-07-28 RX ADMIN — LIDOCAINE HYDROCHLORIDE 50 MG: 10 INJECTION, SOLUTION EPIDURAL; INFILTRATION; INTRACAUDAL; PERINEURAL at 09:07

## 2022-07-28 RX ADMIN — GLYCOPYRROLATE 0.2 MG: 0.2 INJECTION, SOLUTION INTRAMUSCULAR; INTRAVITREAL at 09:07

## 2022-07-28 RX ADMIN — PROPOFOL 20 MG: 10 INJECTION, EMULSION INTRAVENOUS at 10:07

## 2022-07-28 RX ADMIN — SODIUM CHLORIDE, SODIUM LACTATE, POTASSIUM CHLORIDE, AND CALCIUM CHLORIDE: .6; .31; .03; .02 INJECTION, SOLUTION INTRAVENOUS at 09:07

## 2022-07-28 RX ADMIN — PROPOFOL 60 MG: 10 INJECTION, EMULSION INTRAVENOUS at 09:07

## 2022-07-28 NOTE — ANESTHESIA POSTPROCEDURE EVALUATION
Anesthesia Post Evaluation    Patient: Diamond Das    Procedure(s) Performed: Procedure(s) (LRB):  EGD (ESOPHAGOGASTRODUODENOSCOPY) (N/A)    Final Anesthesia Type: MAC      Patient location during evaluation: PACU  Patient participation: Yes- Able to Participate  Level of consciousness: awake and alert  Post-procedure vital signs: reviewed and stable  Pain management: adequate  Airway patency: patent    PONV status at discharge: No PONV  Anesthetic complications: no      Cardiovascular status: blood pressure returned to baseline  Respiratory status: unassisted and room air  Hydration status: euvolemic  Follow-up not needed.          Vitals Value Taken Time   BP  07/28/22 1015   Temp  07/28/22 1015   Pulse  07/28/22 1015   Resp  07/28/22 1015   SpO2  07/28/22 1015         No case tracking events are documented in the log.      Pain/Frantz Score: No data recorded       LM should be able to use current order.

## 2022-07-28 NOTE — PROVATION PATIENT INSTRUCTIONS
Discharge Summary/Instructions after an Endoscopic Procedure  Patient Name: Diamond Das  Patient MRN: [id number]  Patient YOB: 1955 Thursday, July 28, 2022 Jimy Katz MD  Dear patient,  As a result of recent federal legislation (The Federal Cures Act), you may   receive lab or pathology results from your procedure in your [facility]   account before your physician is able to contact you. Your physician or   their representative will relay the results to you with their   recommendations at their soonest availability.  Thank you,  RESTRICTIONS:  During your procedure today, you received medications for sedation.  These   medications may affect your judgment, balance and coordination.  Therefore,   for 24 hours, you have the following restrictions:   - DO NOT drive a car, operate machinery, make legal/financial decisions,   sign important papers or drink alcohol.    ACTIVITY:  Today: no heavy lifting, straining or running due to procedural   sedation/anesthesia.  The following day: return to full activity including work.  DIET:  Eat and drink normally unless instructed otherwise.     TREATMENT FOR COMMON SIDE EFFECTS:  - Mild abdominal pain, nausea, belching, bloating or excessive gas:  rest,   eat lightly and use a heating pad.  - Sore Throat: treat with throat lozenges and/or gargle with warm salt   water.  - Because air was used during the procedure, expelling large amounts of air   from your rectum or belching is normal.  - If a bowel prep was taken, you may not have a bowel movement for 1-3 days.    This is normal.  SYMPTOMS TO WATCH FOR AND REPORT TO YOUR PHYSICIAN:  1. Abdominal pain or bloating, other than gas cramps.  2. Chest pain.  3. Back pain.  4. Signs of infection such as: chills or fever occurring within 24 hours   after the procedure.  5. Rectal bleeding, which would show as bright red, maroon, or black stools.   (A tablespoon of blood from the rectum is not serious, especially  if   hemorrhoids are present.)  6. Vomiting.  7. Weakness or dizziness.  GO DIRECTLY TO THE NEAREST EMERGENCY ROOM IF YOU HAVE ANY OF THE FOLLOWING:      Difficulty breathing              Chills and/or fever over 101 F   Persistent vomiting and/or vomiting blood   Severe abdominal pain   Severe chest pain   Black, tarry stools   Bleeding- more than one tablespoon   Any other symptom or condition that you feel may need urgent attention  Your doctor recommends these additional instructions:  If any biopsies were taken, your doctors clinic will contact you in 1 to 2   weeks with any results.  - Discharge patient to home.   - Resume previous diet.   - Continue present medications.   - Please consider gastric emptying study given presence of food.   - Return to referring physician.  For questions, problems or results please call your physician Jimy Katz MD at Work:  [phone]  If you have any questions about the above instructions, call the GI   department at [phone] or call the endoscopy unit at [phone]   from 7am until 3 pm.  [facility] [place], EMERGENCY ROOM PHONE NUMBER:   [phone]  IF A COMPLICATION OR EMERGENCY SITUATION ARISES AND YOU ARE UNABLE TO REACH   YOUR PHYSICIAN - GO DIRECTLY TO THE EMERGENCY ROOM.  I have read or have had read to me these discharge instructions for my   procedure and have received a written copy.  I understand these   instructions and will follow-up with my physician if I have any questions.     __________________________________       _____________________________________  Nurse Signature                                          Patient/Designated   Responsible Party Signature  Jimy Katz MD  7/28/2022 10:05:23 AM  This report has been verified and signed electronically.  Dear patient,  As a result of recent federal legislation (The Federal Cures Act), you may   receive lab or pathology results from your procedure in your  [facility]   account before your physician is able to contact you. Your physician or   their representative will relay the results to you with their   recommendations at their soonest availability.  Thank you,  PROVATION

## 2022-07-28 NOTE — TRANSFER OF CARE
"Anesthesia Transfer of Care Note    Patient: Diamond Das    Procedure(s) Performed: Procedure(s) (LRB):  EGD (ESOPHAGOGASTRODUODENOSCOPY) (N/A)    Patient location: PACU    Anesthesia Type: MAC    Transport from OR: Transported from OR on room air with adequate spontaneous ventilation    Post pain: adequate analgesia    Post assessment: no apparent anesthetic complications    Post vital signs: stable    Level of consciousness: responds to stimulation    Nausea/Vomiting: no nausea/vomiting    Complications: none    Transfer of care protocol was followed      Last vitals:   Visit Vitals  /67 (BP Location: Left arm, Patient Position: Lying)   Pulse 63   Temp 36.7 °C (98.1 °F) (Temporal)   Resp 16   Ht 5' 1" (1.549 m)   Wt 86.2 kg (190 lb)   LMP  (LMP Unknown)   SpO2 97%   Breastfeeding No   BMI 35.90 kg/m²     "

## 2022-07-28 NOTE — H&P
PRE PROCEDURE H&P    Patient Name: Diamond Das  MRN: [id number]  : 1955  Date of Procedure:  2022  Referring Physician: Jimy Katz MD  Primary Physician: Andrey Perrin MD  Procedure Physician: Jimy Katz MD       Planned Procedure: EGD  Diagnosis: slow digestion   Chief Complaint: Same as above    HPI: Patient is an 67 y.o. female is here for the above.       Anticoagulation: None     Past Medical History:   Past Medical History:   Diagnosis Date    Acquired hypothyroidism 2020    ROD (acute kidney injury) 1/3/2019    Arthritis of knee 2022    Ascites     Bilateral lower extremity edema 2016    Breast pain, left 2018    Cataract     Chest pain, atypical 2018    CHF (congestive heart failure)     Cirrhosis     Cough     Diabetes mellitus     Diabetes mellitus, type 2     Diarrhea 2019    Edema 3/19/2018    Epigastric pain 2020    Gastroparesis     GERD (gastroesophageal reflux disease)     Hepatic encephalopathy 2018    Pt has history of cirrhosis, seen at [facility] on 18. Currently on lactulose.    Hyperlipidemia     Hypertension     Hypotension 2019    Immunization deficiency 2/10/2020    Liver disease     Lumbar strain 2018    Lumbar strain, sequela 2020    Macular degeneration     Medication reaction 2016    Other and unspecified hyperlipidemia 2012 11:11:32 AM    [facility] Historical - Endocrine/Metabolic/Immune: Hyperlipidemia-No Additional Notes    Pneumonia of right middle lobe due to infectious organism 2017    Renal disorder     Retinopathy due to secondary diabetes mellitus     Skin lesion 2021    Sleep apnea     Strain of lumbar region 10/4/2021    Thyroid disease     Type 2 diabetes mellitus with hyperglycemia 10/10/2020        Past Surgical History:  Past Surgical History:   Procedure Laterality Date    CATARACT EXTRACTION      CHOLECYSTECTOMY       COLONOSCOPY N/A 9/4/2019    Procedure: COLONOSCOPY;  Surgeon: Marnie Elmore MD;  Location: [facility] ENDO;  Service: Endoscopy;  Laterality: N/A;    ERCP      FLUOROSCOPY N/A 7/24/2020    Procedure: TIPS revision;  Surgeon: Martell Jasmine MD;  Location: [facility] CATH LAB;  Service: General;  Laterality: N/A;    LIVER BIOPSY      THORACENTESIS Left 1/20/2020    Procedure: Thoracentesis;  Surgeon: Angelica Hunter MD;  Location: [facility] ENDO;  Service: Pulmonary;  Laterality: Left;    TUBAL LIGATION      UPPER GASTROINTESTINAL ENDOSCOPY          Home Medications:  Prior to Admission medications    Medication Sig Start Date End Date Taking? Authorizing Provider   carvediloL (COREG) 25 MG tablet Take 1 tablet (25 mg total) by mouth 2 (two) times daily with meals. 7/18/22 1/14/23 Yes Andrey Perrin MD   docusate sodium (COLACE) 100 MG capsule Take 1 capsule (100 mg total) by mouth 3 (three) times daily. Hold for diarrhea 5/2/22  Yes Andrey Perrin MD   famotidine (PEPCID) 20 MG tablet Take 1 tablet (20 mg total) by mouth once daily. 5/2/22 10/29/22 Yes Andrey Perrin MD   ferrous sulfate (IRON ORAL) Take by mouth.   Yes Historical Provider   fluticasone propionate (FLONASE) 50 mcg/actuation nasal spray 2 sprays (100 mcg total) by Each Nostril route once daily. 5/2/22  Yes Andrey Perrin MD   garlic (GARLIQUE ORAL) Take 1 tablet by mouth once daily.   Yes Historical Provider   levothyroxine (EUTHYROX) 137 MCG Tab tablet Take 1 tablet (137 mcg total) by mouth before breakfast. 7/18/22  Yes Andrey Perrin MD   LIDOcaine (LMX) 4 % cream Apply topically as needed.   Yes Historical Provider   magnesium oxide (MAG-OX) 400 mg (241.3 mg magnesium) tablet Take 1 tablet (400 mg total) by mouth once daily. 3/15/22  Yes Marcos Ramos MD   pravastatin (PRAVACHOL) 10 MG tablet Take 1 tablet (10 mg total) by mouth once daily. 7/21/22 7/21/23 Yes Andrey Perrin MD   pulse oximeter (PULSE OXIMETER) device by Apply  Externally route 2 (two) times a day. Use twice daily at 8 AM and 3 PM and record the value in MyChart as directed. 1/15/22  Yes Carlita Michael MD   rifAXIMin (XIFAXAN) 550 mg Tab Take 1 tablet (550 mg total) by mouth 2 (two) times daily. 7/6/22  Yes ANASTASIA Villatoro   STOOL SOFTENER 100 mg capsule TAKE 1 CAPSULE BY MOUTH THREE TIMES DAILY FOR DIARRHEA (HOLD FOR DIARRHEA) 5/2/22  Yes Historical Provider   torsemide (DEMADEX) 20 MG Tab Take 1 tablet (20 mg total) by mouth once daily. Take two tablets daily next 3 days 6/20/22 12/17/22 Yes Marcos Ramos MD   torsemide (DEMADEX) 20 MG Tab Take 1 tablet (20 mg total) by mouth once daily. Take extra dose as needed for extra swelling/fluid build up, monitor BP and weights 7/18/22  Yes Marcos Ramos MD   amlodipine-olmesartan (DALY) 10-40 mg per tablet Take 1 tablet by mouth once daily. 5/20/22   Historical Provider   apixaban (ELIQUIS) 5 mg Tab Take 1 tablet (5 mg total) by mouth 2 (two) times daily. 7/19/22   Marcos Ramos MD   ciprofloxacin HCl (CIPRO) 500 MG tablet Take 1 tablet (500 mg total) by mouth every 12 (twelve) hours. 6/7/22   Andrey Perrin MD   dulaglutide (TRULICITY) 4.5 mg/0.5 mL pen injector Inject 4.5 mg into the skin every 7 days. 7/18/22 1/2/23  Andrey Perrin MD   isosorbide mononitrate (IMDUR) 60 MG 24 hr tablet Take 1 tablet (60 mg total) by mouth every evening. 9/27/21 5/20/22  Marcos Ramos MD   ondansetron (ZOFRAN) 4 MG tablet Take 1 tablet (4 mg total) by mouth every 8 (eight) hours as needed. 5/2/22   Andrey Perrin MD        Allergies:  Review of patient's allergies indicates:   Allergen Reactions    Subsys [fentanyl] Other (See Comments)     After administration pt unresponsive.  HR and respirations decreased.     Versed [midazolam] Other (See Comments)     After administration pt unresponsive.  HR and respirations decreased.     Ampicillin Rash    Codeine Nausea And Vomiting and Nausea Only        Social History:   Social History  "    Socioeconomic History    Marital status:    Tobacco Use    Smoking status: Never Smoker    Smokeless tobacco: Never Used   Substance and Sexual Activity    Alcohol use: No    Drug use: No    Sexual activity: Not Currently       Family History:  Family History   Problem Relation Age of Onset    Cancer Mother     Breast cancer Mother     Heart disease Father     Aneurysm Sister     Heart disease Brother     No Known Problems Maternal Grandmother     Cancer Maternal Grandfather     Sarcoidosis Sister     Colon cancer Neg Hx     Ovarian cancer Neg Hx     Thrombosis Neg Hx        ROS: No acute cardiac events, no acute respiratory complaints.     Physical Exam (all patients):    /67 (BP Location: Left arm, Patient Position: Lying)   Pulse 63   Temp 98.1 °F (36.7 °C) (Temporal)   Resp 16   Ht 5' 1" (1.549 m)   Wt 86.2 kg (190 lb)   LMP  (LMP Unknown)   SpO2 97%   Breastfeeding No   BMI 35.90 kg/m²   Lungs: Clear to auscultation bilaterally, respirations unlabored  Heart: Regular rate and rhythm, S1 and S2 normal, no obvious murmurs  Abdomen:         Soft, non-tender, bowel sounds normal, no masses, no organomegaly    Lab Results   Component Value Date    WBC 5.71 06/21/2022    MCV 90 06/21/2022    RDW 15.5 (H) 06/21/2022     06/21/2022    INR 1.0 01/12/2022    GLU 85 06/21/2022    HGBA1C 5.1 06/07/2022    BUN 43 (H) 06/21/2022     06/21/2022    K 5.2 (H) 06/21/2022     (H) 06/21/2022        SEDATION PLAN: per anesthesia      History reviewed, vital signs satisfactory, cardiopulmonary status satisfactory, sedation options, risks and plans have been discussed with the patient  All their questions were answered and the patient agrees to the sedation procedures as planned and the patient is deemed an appropriate candidate for the sedation as planned.    Procedure explained to patient, informed consent obtained and placed in chart.    Jimy LEIGH" Sterling  7/28/2022  9:52 AM

## 2022-07-28 NOTE — ANESTHESIA PREPROCEDURE EVALUATION
07/28/2022  Diamond Das is a 67 y.o., female.      Pre-op Assessment    I have reviewed the Patient Summary Reports.     I have reviewed the Nursing Notes. I have reviewed the NPO Status.   I have reviewed the Medications.     Review of Systems  Anesthesia Hx:  No problems with previous Anesthesia  Denies Family Hx of Anesthesia complications.   Denies Personal Hx of Anesthesia complications.   Social:  Non-Smoker    Hematology/Oncology:     Oncology Normal    -- Anemia:   EENT/Dental:EENT/Dental Normal   Cardiovascular:   Exercise tolerance: poor Hypertension CHF ECG has been reviewed.    Pulmonary:   Pneumonia Shortness of breath Sleep Apnea, CPAP    Renal/:   Chronic Renal Disease, CRI    Hepatic/GI:   GERD Liver Disease, Hepatitis TIPS 5 yrs ago   Neurological:   Neuromuscular Disease,    Endocrine:   Diabetes, type 2 Hypothyroidism  Obesity / BMI > 30  Dermatological:  Skin Normal    Psych:  Psychiatric Normal           Physical Exam  General: Cooperative, Alert and Oriented    Airway:  Mallampati: II   Mouth Opening: Normal  TM Distance: Normal  Tongue: Normal, Large  Neck ROM: Normal ROM    Dental:  Intact    Chest/Lungs:  Clear to auscultation, Normal Respiratory Rate    Heart:  Rate: Normal  Rhythm: Regular Rhythm        Anesthesia Plan  Type of Anesthesia, risks & benefits discussed:    Anesthesia Type: MAC  Intra-op Monitoring Plan: Standard ASA Monitors  Induction:  IV  Informed Consent: Informed consent signed with the Patient and all parties understand the risks and agree with anesthesia plan.  All questions answered.   ASA Score: 3  Day of Surgery Review of History & Physical: H&P Update referred to the surgeon/provider.I have interviewed and examined the patient. I have reviewed the patient's H&P dated: There are no significant changes.     Ready For Surgery From Anesthesia Perspective.      .

## 2022-07-29 VITALS
BODY MASS INDEX: 35.87 KG/M2 | SYSTOLIC BLOOD PRESSURE: 115 MMHG | RESPIRATION RATE: 16 BRPM | DIASTOLIC BLOOD PRESSURE: 69 MMHG | OXYGEN SATURATION: 98 % | HEART RATE: 88 BPM | TEMPERATURE: 98 F | WEIGHT: 190 LBS | HEIGHT: 61 IN

## 2022-08-01 ENCOUNTER — OFFICE VISIT (OUTPATIENT)
Dept: CARDIOLOGY | Facility: CLINIC | Age: 67
DRG: 682 | End: 2022-08-01
Payer: MEDICARE

## 2022-08-01 VITALS
SYSTOLIC BLOOD PRESSURE: 124 MMHG | OXYGEN SATURATION: 97 % | BODY MASS INDEX: 40.2 KG/M2 | HEIGHT: 61 IN | HEART RATE: 64 BPM | WEIGHT: 212.94 LBS | DIASTOLIC BLOOD PRESSURE: 64 MMHG | RESPIRATION RATE: 16 BRPM

## 2022-08-01 DIAGNOSIS — K74.60 LIVER CIRRHOSIS SECONDARY TO NASH: ICD-10-CM

## 2022-08-01 DIAGNOSIS — Z86.16 HISTORY OF COVID-19: ICD-10-CM

## 2022-08-01 DIAGNOSIS — K75.81 LIVER CIRRHOSIS SECONDARY TO NASH: ICD-10-CM

## 2022-08-01 DIAGNOSIS — R79.89 POSITIVE D DIMER: ICD-10-CM

## 2022-08-01 DIAGNOSIS — K31.84 DIABETIC GASTROPARESIS ASSOCIATED WITH TYPE 2 DIABETES MELLITUS: ICD-10-CM

## 2022-08-01 DIAGNOSIS — E11.43 DIABETIC GASTROPARESIS ASSOCIATED WITH TYPE 2 DIABETES MELLITUS: ICD-10-CM

## 2022-08-01 DIAGNOSIS — I10 ESSENTIAL HYPERTENSION: ICD-10-CM

## 2022-08-01 DIAGNOSIS — R18.8 OTHER ASCITES: ICD-10-CM

## 2022-08-01 DIAGNOSIS — R60.9 CHRONIC EDEMA: ICD-10-CM

## 2022-08-01 DIAGNOSIS — I50.32 CHRONIC DIASTOLIC CONGESTIVE HEART FAILURE: Primary | ICD-10-CM

## 2022-08-01 DIAGNOSIS — R06.02 SHORTNESS OF BREATH: ICD-10-CM

## 2022-08-01 DIAGNOSIS — E03.4 HYPOTHYROIDISM DUE TO ACQUIRED ATROPHY OF THYROID: ICD-10-CM

## 2022-08-01 DIAGNOSIS — R79.89 ELEVATED D-DIMER: ICD-10-CM

## 2022-08-01 DIAGNOSIS — I31.39 PERICARDIAL EFFUSION: ICD-10-CM

## 2022-08-01 DIAGNOSIS — N18.4 CKD (CHRONIC KIDNEY DISEASE) STAGE 4, GFR 15-29 ML/MIN: ICD-10-CM

## 2022-08-01 DIAGNOSIS — I10 PRIMARY HYPERTENSION: ICD-10-CM

## 2022-08-01 DIAGNOSIS — E66.01 MORBID OBESITY DUE TO EXCESS CALORIES: ICD-10-CM

## 2022-08-01 PROCEDURE — 3288F FALL RISK ASSESSMENT DOCD: CPT | Mod: CPTII,S$GLB,, | Performed by: INTERNAL MEDICINE

## 2022-08-01 PROCEDURE — 99215 OFFICE O/P EST HI 40 MIN: CPT | Mod: S$GLB,,, | Performed by: INTERNAL MEDICINE

## 2022-08-01 PROCEDURE — 3066F NEPHROPATHY DOC TX: CPT | Mod: CPTII,S$GLB,, | Performed by: INTERNAL MEDICINE

## 2022-08-01 PROCEDURE — 4010F ACE/ARB THERAPY RXD/TAKEN: CPT | Mod: CPTII,S$GLB,, | Performed by: INTERNAL MEDICINE

## 2022-08-01 PROCEDURE — 99214 OFFICE O/P EST MOD 30 MIN: CPT | Mod: PBBFAC,PO | Performed by: INTERNAL MEDICINE

## 2022-08-01 PROCEDURE — 3008F PR BODY MASS INDEX (BMI) DOCUMENTED: ICD-10-PCS | Mod: CPTII,S$GLB,, | Performed by: INTERNAL MEDICINE

## 2022-08-01 PROCEDURE — 99999 PR PBB SHADOW E&M-EST. PATIENT-LVL IV: ICD-10-PCS | Mod: PBBFAC,,, | Performed by: INTERNAL MEDICINE

## 2022-08-01 PROCEDURE — 3074F SYST BP LT 130 MM HG: CPT | Mod: CPTII,S$GLB,, | Performed by: INTERNAL MEDICINE

## 2022-08-01 PROCEDURE — 1157F PR ADVANCE CARE PLAN OR EQUIV PRESENT IN MEDICAL RECORD: ICD-10-PCS | Mod: CPTII,S$GLB,, | Performed by: INTERNAL MEDICINE

## 2022-08-01 PROCEDURE — 99215 PR OFFICE/OUTPT VISIT, EST, LEVL V, 40-54 MIN: ICD-10-PCS | Mod: S$GLB,,, | Performed by: INTERNAL MEDICINE

## 2022-08-01 PROCEDURE — 1101F PT FALLS ASSESS-DOCD LE1/YR: CPT | Mod: CPTII,S$GLB,, | Performed by: INTERNAL MEDICINE

## 2022-08-01 PROCEDURE — 1160F RVW MEDS BY RX/DR IN RCRD: CPT | Mod: CPTII,S$GLB,, | Performed by: INTERNAL MEDICINE

## 2022-08-01 PROCEDURE — 3066F PR DOCUMENTATION OF TREATMENT FOR NEPHROPATHY: ICD-10-PCS | Mod: CPTII,S$GLB,, | Performed by: INTERNAL MEDICINE

## 2022-08-01 PROCEDURE — 1157F ADVNC CARE PLAN IN RCRD: CPT | Mod: CPTII,S$GLB,, | Performed by: INTERNAL MEDICINE

## 2022-08-01 PROCEDURE — 1159F PR MEDICATION LIST DOCUMENTED IN MEDICAL RECORD: ICD-10-PCS | Mod: CPTII,S$GLB,, | Performed by: INTERNAL MEDICINE

## 2022-08-01 PROCEDURE — 3044F HG A1C LEVEL LT 7.0%: CPT | Mod: CPTII,S$GLB,, | Performed by: INTERNAL MEDICINE

## 2022-08-01 PROCEDURE — 99499 RISK ADDL DX/OHS AUDIT: ICD-10-PCS | Mod: S$PBB,,, | Performed by: INTERNAL MEDICINE

## 2022-08-01 PROCEDURE — 3074F PR MOST RECENT SYSTOLIC BLOOD PRESSURE < 130 MM HG: ICD-10-PCS | Mod: CPTII,S$GLB,, | Performed by: INTERNAL MEDICINE

## 2022-08-01 PROCEDURE — 3062F POS MACROALBUMINURIA REV: CPT | Mod: CPTII,S$GLB,, | Performed by: INTERNAL MEDICINE

## 2022-08-01 PROCEDURE — 3288F PR FALLS RISK ASSESSMENT DOCUMENTED: ICD-10-PCS | Mod: CPTII,S$GLB,, | Performed by: INTERNAL MEDICINE

## 2022-08-01 PROCEDURE — 3062F PR POS MACROALBUMINURIA RESULT DOCUMENTED/REVIEW: ICD-10-PCS | Mod: CPTII,S$GLB,, | Performed by: INTERNAL MEDICINE

## 2022-08-01 PROCEDURE — 3078F PR MOST RECENT DIASTOLIC BLOOD PRESSURE < 80 MM HG: ICD-10-PCS | Mod: CPTII,S$GLB,, | Performed by: INTERNAL MEDICINE

## 2022-08-01 PROCEDURE — 3078F DIAST BP <80 MM HG: CPT | Mod: CPTII,S$GLB,, | Performed by: INTERNAL MEDICINE

## 2022-08-01 PROCEDURE — 99499 UNLISTED E&M SERVICE: CPT | Mod: S$PBB,,, | Performed by: INTERNAL MEDICINE

## 2022-08-01 PROCEDURE — 1101F PR PT FALLS ASSESS DOC 0-1 FALLS W/OUT INJ PAST YR: ICD-10-PCS | Mod: CPTII,S$GLB,, | Performed by: INTERNAL MEDICINE

## 2022-08-01 PROCEDURE — 1159F MED LIST DOCD IN RCRD: CPT | Mod: CPTII,S$GLB,, | Performed by: INTERNAL MEDICINE

## 2022-08-01 PROCEDURE — 3008F BODY MASS INDEX DOCD: CPT | Mod: CPTII,S$GLB,, | Performed by: INTERNAL MEDICINE

## 2022-08-01 PROCEDURE — 99999 PR PBB SHADOW E&M-EST. PATIENT-LVL IV: CPT | Mod: PBBFAC,,, | Performed by: INTERNAL MEDICINE

## 2022-08-01 PROCEDURE — 1160F PR REVIEW ALL MEDS BY PRESCRIBER/CLIN PHARMACIST DOCUMENTED: ICD-10-PCS | Mod: CPTII,S$GLB,, | Performed by: INTERNAL MEDICINE

## 2022-08-01 PROCEDURE — 4010F PR ACE/ARB THEARPY RXD/TAKEN: ICD-10-PCS | Mod: CPTII,S$GLB,, | Performed by: INTERNAL MEDICINE

## 2022-08-01 PROCEDURE — 3044F PR MOST RECENT HEMOGLOBIN A1C LEVEL <7.0%: ICD-10-PCS | Mod: CPTII,S$GLB,, | Performed by: INTERNAL MEDICINE

## 2022-08-01 RX ORDER — AMLODIPINE BESYLATE 5 MG/1
5 TABLET ORAL DAILY
Qty: 30 TABLET | Refills: 3 | Status: SHIPPED | OUTPATIENT
Start: 2022-08-01 | End: 2022-09-16

## 2022-08-01 RX ORDER — TORSEMIDE 20 MG/1
40 TABLET ORAL DAILY
Qty: 180 TABLET | Refills: 1 | Status: ON HOLD | OUTPATIENT
Start: 2022-08-01 | End: 2022-08-05 | Stop reason: SDUPTHER

## 2022-08-01 RX ORDER — ISOSORBIDE MONONITRATE 60 MG/1
60 TABLET, EXTENDED RELEASE ORAL NIGHTLY
Qty: 90 TABLET | Refills: 1 | Status: SHIPPED | OUTPATIENT
Start: 2022-08-01 | End: 2022-08-24 | Stop reason: SDUPTHER

## 2022-08-01 RX ORDER — LANOLIN ALCOHOL/MO/W.PET/CERES
400 CREAM (GRAM) TOPICAL DAILY
Qty: 90 TABLET | Refills: 1 | Status: SHIPPED | OUTPATIENT
Start: 2022-08-01 | End: 2023-05-09 | Stop reason: SDUPTHER

## 2022-08-01 RX ORDER — AMOXICILLIN 500 MG
CAPSULE ORAL DAILY
COMMUNITY

## 2022-08-01 NOTE — PROGRESS NOTES
Subjective:   Patient ID:  Diamond Das is a 67 y.o. female who presents for cardiac consult of No chief complaint on file.      Shortness of Breath  Associated symptoms include leg swelling. Pertinent negatives include no chest pain.   Follow-up  Pertinent negatives include no chest pain or nausea.     The patient came in today for cardiac consult of No chief complaint on file.    Referring Physician: Andrey Perrin MD   Reason for consult: HTN, CHF    Diamond Das is a 67 y.o. female with medical conditions diastolic CHF, pericardial effusion, HTN, HLD, DM2, cryptogenic cirrhosis, s/p TIPS, ascites presents for CV follow up.     Pt of Dr. Shaw, last seen 2/9/18  No smoking/drinking  Last echo in  normal EF and RVF, grade II DD.  EKG today NSR poor R progression on anterior leads  Chronic weakness and fatigue. Had PNA in   Pain on lower chest bilateral for few months, worse with exercise, resolved after resting. Deep breathing made the pain worse. No pain at sleep. Associated dyspnea, N/V, palpitation and dizziness. No radiation. ASA and tylenol not really helped the pain.    3/1/19  She has been having more ascites and concern for cardiac etiologies. She was also started on Reglan, concern for prolonged Qtc, recent Qtc 475 ms. Has been feeling better with reglan and lactulose. Is dyspneic, chronically on oxygen at night and also has portable oxygen when she has to do a lot walking. Takes lasix 40 mg once/day now, was on 80mg daily. Active at home, dresses and bathes her self. Cant walk or stand to too long.     8/26/19  2D ECHO with grade 1 DD, normal Bi V function, Small pericardial effusion without tamponade. She has mild SOB at times with edema but improved lately. She will see hepatology as well. No Chest pain.   Has been taking lasix extra doses PRN and improved symptoms.     10/16/19  [facility] hosp ER follow up  - Non toxic appearing elderly female here for worsening NICHOLE and shortness  of breath. Known h/o CHF, f/b cardiologist in [facility]. Work up is c/w acute on chronic chf exacerbation. She, unfortunately, does not follow her weights so not clear if significant change recently. CXR with likely signs of volume overload. She was given diuresis and had Warren score of 0. Felt improved and prefers discharge with close OP f/u with her cardiologist. She was ambulated and did not significantly desat and tolerated it well.  No she is on lasix, feels better. BNP today is 453, needs to double lasix next 3-4 days for further diuresis.     11/18/19   Increased diuretics last visit. She felt better then and had weight loss as well. Still has NICHOLE but improved. She went to [facility] 2 weeks ago - presented to the ED for fall secondary to hypoglycemic episode. Pt has had 3 previous episodes of hypoglycemia requiring hospitalizations in the past with sxs of lightheadedness. CT head was unremarkable, CXR revealed improvement in CHF compared to previous study. Due to timeline of fall with insulin administration and Hx of cryptogenic cirrhosis, it is likely that pt could not compensate after receiving her insulin dosing.  BP meds were stopped, will get labs and restart lisinopril.     1/6/20  Breathing has been stable with LE edema. Last visit she doubled lasix for 3 days with min improvement. Has not had much relief lately.   ECG - NSR, poor RWP, lateral TWI    2/17/20  BNP was 863. She had recent thoracentesis as well. She has also been referred to hepatology as concern for TIPS not working?. She remains SOB will add metolazone. Discussed if kidneys and liver are worse will be difficult to manage volume status.     5/18/20  She has been stable but still has SOB with leg swelling. Symptoms have gotten a bit worse a few weeks ago. She has been taking lasix extra dose some times. Last BNP elevated 863, will need to increase lasix and metolazone and repeat labs this week. Will add K and Mg  supplements.    9/28/20  BNP was 428 at last visit, which is improved from 7 months prior. She saw PCP today with worsening NICHOLE and edema. Increased lasix and metolazone last visit.      Ref Range & Units 4mo ago  (5/21/20) 7mo ago  (2/10/20) 8mo ago  (1/10/20) 10mo ago  (11/18/19) 11mo ago  (10/25/19) 11mo ago  (10/16/19)   BNP 0 - 99 pg/mL 413High   863High  CM  1,010High  CM  793High  CM  291High  CM  453High  CM      10/19/20  Pt was admitted 10/9-10/15 for CHF exac with edema, diuresed aggressively. She also had nephro consult and hepatology eval for further treatment and possible liver transplant. She has upcoming appt for CT Renal biopsy. Autoimmune workup planned. ECHO with small pericardial effusion, no tamponde, grade 1 DD.   BNP improved to 129 two weeks prior. She is taking bumex BID. SHe is taking metolazone every other day. Weight is stable.     11/16/20  She has been on Bumex with metolazone. She had recent visit with Dr. Hernandez -  Renal biopsy revealed diabetic nephropathy and hypertensive nephropathy.  No autoimmune disorder detected. She may be candidate for RRT. She will have further liver workup and be liver and kidney transplant. Will need ischemic work up, had nuclear stress in past, none recently.     12/28/20  Nuclear stress test pending. Saw Dr. Contreras recently, started on Lisinopril 5mg. She feels well, no CP/SOB. Stress test is pending. Is walking more and active.     2/15/21  Nuclear stress neg in Dec 30th. BP overall stable. 189/96 today but has not taken meds yet and woke up late. BP was normal yesterday. Liver function has improved, does not need liver transplant, discussed she can remain kidney transplant stable CV status    5/4/21  BP elevated 160/80s. HR 70s. She has some salty food a few days ago. She felt extra bloated and took an diuretic. She has stable NICHOLE.     5/17/21   Ref Range & Units 13 d ago   (5/4/21) 7 mo ago   (10/11/20) 7 mo ago   (10/9/20) 7 mo ago   (10/5/20) 12 mo  ago   (5/21/20) 1 yr ago   (2/10/20)   BNP 0 - 99 pg/mL 324High   129High  CM  351High  CM  349High  CM  413High  CM  863High  CM    Comment: Values of less than 100 pg/ml are consistent with non-CHF populations.       BNP 2 weeks ago elevated 324, discussed double dose of bumex for 3 days and monitor BP and weights, avoid salty food. Kidney function is slightly worse, needs to follow up with nephrologist as well asap.  216 lbs to 196 lbs today. BP and hR stable today. She had an episode of flutter one night, lasted about 15 min    8/23/21  Last visit - BNP has improved due to less fluid, kidney function is worse, creatinine is 3.3, need to follow up with nephrologist asap. Decrease Bumex to only once a day. Monitor pressures and weights if increased weight with swelling can go back to bumex twice a day.     She saw Dr. Jacobo with nephro;  changed Bumex to Torsemide 40mg daily  BP elevated today, meds changed recently.     10/4/21  BP and HR well controlled today. Weight 190 lbs improved from 197 lbs.     12/220 hosp  DC summary - sent from hepatology clinic on account of hypercalcemia.  The patient went for her Rifaximin refill and was told to come in for high Clacium.  She admits to dry cough of some months, exertional dyspnea which is not worsening, about 2-3 weeks of weakness, constipation and further reduction of her oliguric state from roughly 500cc urine daily to about 125 cc/urine daily. But no chest pain, vomiting, diarrhea or reduced oral intake. She denies fever.  She is on Calcitriol, Carol D3 1000 U QD and calcium containing multivitamin.  She denies any bone pain or weight loss, infact she has just started to gain some weight. She denies orthopnea.  Serum albumin 2.4, calcium 12.6, corrected calcium 13.9     Hospital Course:   12/7 patient in good spirits, calcium slightly improved but still high. Not for discharge today. Otherwise stable  12/8 patient seen, awake, alert, vague abdominal pain, semi  hard stools yesterday, no emesis , waiting for her labs this morning to determine if she can discharge.  Update: corrected calcium is 11.6, she is cleared for discharge by Nephrology who have made an appointment for her to see them in the clinic. She is cleared to resume her Torsemide and Benicar. She is not to take any Vit D. Calcitriol, oral calcium supplements and this has been explained in details to her very well.    12/20/21  She is s/p hosp DC for hyperCA, has been restarted on diuretics and BP meds and CA supplements stopped. Her diurertics was stopped last week due to lack of appetite and more abd pain. Her weight has low as 173 lbs, but now at 195 lbs.     Hosp DC summar   66 year old female who was admitted to [facility] for COVID-19 and decompensated CHF. CXR shows evidence of volume overload and was diuresed. Echocaridogram shows a preserved EF with diastolic dysfunction. Will repeat CXR on 1/12/22. For COVID-19 she received MVI, ascorbic acid, and dexamethasone. Currently on 3L nasal cannula.  1/12/22  D-dimer 7 today. V/Q scan indeterminant for pulmonary embolism. Could reflect fluid within the fissure as well. Started on heparin infusion. CXR today still shows fluid.  Will change diuretics to Bumex with Metolazone for one dose. Closely monitor kidney function.   1/13/22  Still with decrease urine ouput. Nephrology consulted to assist with diuretics. Bumex changed to 2mg IV every 8 hours. 500mg diuril twice a day has been added for 48 hours. No intervention needed for hyperkalemia.   1/14  Reports sx improvement. Ready to go home but reports right breast swelling since admission. Lower ext edema and dyspnea improving. H/h trending down with no overt s/s bleeding. Agreeable to blood transfusion. Nephrology consulted. Consider cards consult  1/15  Dyspnea and swelling symptoms improved. No transfusion reaction after blood transfusion. Denies further breast swelling. Does not qualify for home  o2. Followed o/p with Dr. Barkley, pulmonology. Advised to f/u o/p, as elevated d-dimer in setting of covid. Perfusion scan indeterminant and started on heparin gtt. Transition to eliquis on d/c.  Neph consulted for recs regarding diuresis and hyperk in ckd and chf. Metolazone started. Hyperk resolved. Cr stable but elevated and will need further adjusting of meds o/p. Advised strict na and fluid restriction. Neph recommending tolerance of higher cr while also need for diuresising for chfx  Hyperglycemia on systemic steroids. Hba1c <5. Discontinued steroids and managed hyperglycemia with insulin.     2/1/22  Hosp follow up for COVID 19, PE and volume overload. She is taking Torsemide 40mg with metolazone doing well. She is taking Eliquis 5 BID without issues, told to continue will repeat D dimer likely treat at least 3 months. She has been losing weight now weighs 187 lbs.     6/20/22  BP and HR well controlled today. BMI 37 - 198 lbs. Her weight is increased from prior visit, she has more edema now. She went to ER a few weeks ago for swelling/edema was told to stop diuretic due to worsening renal function. But then she has restarted Torsemide 20mg    8/1/22   She went to ER after last visit for hyperkalemia 6.5, improved recently. Had EGD past month may need gastric emptying study for gasteroparesis. BMI 40 - 212 lbs, BP and HR well controlled today. Has gained appx 12-14 lbs since last visit. She just got back from , did not have any issues with fluid then. She started gaining weight since.     Patient feels no chest pain, no PND, no palpitation, no dizziness, no syncope, no CNS symptoms.    Patient has dec exercise tolerance.    Patient is compliant with medications.    Results for orders placed during the hospital encounter of 01/10/22    Echo    Interpretation Summary  · The left ventricle is normal in size with concentric hypertrophy and normal systolic function.  · Indeterminate left ventricular diastolic  function.  · The estimated PA systolic pressure is 35 mmHg.  · Normal right ventricular size with normal right ventricular systolic function.  · Normal central venous pressure (3 mmHg).  · The estimated ejection fraction is 60%.  · Mild mitral regurgitation.  · Mild tricuspid regurgitation.        Results for orders placed during the hospital encounter of 12/30/20    Nuclear Stress - Cardiology Interpreted    Interpretation Summary    Normal myocardial perfusion scan. There is no evidence of myocardial ischemia or infarction.    The gated perfusion images showed an ejection fraction of 64% at rest. The gated perfusion images showed an ejection fraction of 65% post stress.    The EKG portion of this study is negative for ischemia.    The patient reported no chest pain during the stress test.          Past Medical History:   Diagnosis Date    Acquired hypothyroidism 9/28/2020    ROD (acute kidney injury) 1/3/2019    Arthritis of knee 1/31/2022    Ascites     Bilateral lower extremity edema 9/2/2016    Breast pain, left 1/4/2018    Cataract     Chest pain, atypical 2/9/2018    CHF (congestive heart failure)     Cirrhosis     Cough     Diabetes mellitus     Diabetes mellitus, type 2     Diarrhea 9/4/2019    Edema 3/19/2018    Epigastric pain 2/11/2020    Gastroparesis     GERD (gastroesophageal reflux disease)     Hepatic encephalopathy 6/19/2018    Pt has history of cirrhosis, seen at [facility] on 6/7/18. Currently on lactulose.    Hyperlipidemia     Hypertension     Hypotension 2/21/2019    Immunization deficiency 2/10/2020    Liver disease     Lumbar strain 4/27/2018    Lumbar strain, sequela 2/17/2020    Macular degeneration     Medication reaction 11/17/2016    Other and unspecified hyperlipidemia 6/11/2012 11:11:32 AM    [facility] Historical - Endocrine/Metabolic/Immune: Hyperlipidemia-No Additional Notes    Pneumonia of right middle lobe due to infectious organism 12/19/2017     Renal disorder     Retinopathy due to secondary diabetes mellitus     Skin lesion 12/20/2021    Sleep apnea     Strain of lumbar region 10/4/2021    Thyroid disease     Type 2 diabetes mellitus with hyperglycemia 10/10/2020       Past Surgical History:   Procedure Laterality Date    CATARACT EXTRACTION      CHOLECYSTECTOMY      COLONOSCOPY N/A 9/4/2019    Procedure: COLONOSCOPY;  Surgeon: Marnie Elmore MD;  Location: [facility] ENDO;  Service: Endoscopy;  Laterality: N/A;    ERCP      ESOPHAGOGASTRODUODENOSCOPY N/A 7/28/2022    Procedure: EGD (ESOPHAGOGASTRODUODENOSCOPY);  Surgeon: Jimy Katz MD;  Location: [facility] ENDO;  Service: Endoscopy;  Laterality: N/A;    FLUOROSCOPY N/A 7/24/2020    Procedure: TIPS revision;  Surgeon: Martell Jasmine MD;  Location: [facility] CATH LAB;  Service: General;  Laterality: N/A;    LIVER BIOPSY      THORACENTESIS Left 1/20/2020    Procedure: Thoracentesis;  Surgeon: Angelica Hunter MD;  Location: [facility] ENDO;  Service: Pulmonary;  Laterality: Left;    TUBAL LIGATION      UPPER GASTROINTESTINAL ENDOSCOPY         Social History     Tobacco Use    Smoking status: Never Smoker    Smokeless tobacco: Never Used   Substance Use Topics    Alcohol use: No    Drug use: No       Family History   Problem Relation Age of Onset    Cancer Mother     Breast cancer Mother     Heart disease Father     Aneurysm Sister     Heart disease Brother     No Known Problems Maternal Grandmother     Cancer Maternal Grandfather     Sarcoidosis Sister     Colon cancer Neg Hx     Ovarian cancer Neg Hx     Thrombosis Neg Hx        Patient's Medications   New Prescriptions    AMLODIPINE (NORVASC) 5 MG TABLET    Take 1 tablet (5 mg total) by mouth once daily.   Previous Medications    APIXABAN (ELIQUIS) 5 MG TAB    Take 1 tablet (5 mg total) by mouth 2 (two) times daily.    CARVEDILOL (COREG) 25 MG TABLET    Take 1 tablet (25 mg total) by mouth 2 (two) times daily with meals.     CIPROFLOXACIN HCL (CIPRO) 500 MG TABLET    Take 1 tablet (500 mg total) by mouth every 12 (twelve) hours.    DOCUSATE SODIUM (COLACE) 100 MG CAPSULE    Take 1 capsule (100 mg total) by mouth 3 (three) times daily. Hold for diarrhea    DULAGLUTIDE (TRULICITY) 4.5 MG/0.5 ML PEN INJECTOR    Inject 4.5 mg into the skin every 7 days.    FAMOTIDINE (PEPCID) 20 MG TABLET    Take 1 tablet (20 mg total) by mouth once daily.    FERROUS SULFATE (IRON ORAL)    Take by mouth.    FLUTICASONE PROPIONATE (FLONASE) 50 MCG/ACTUATION NASAL SPRAY    2 sprays (100 mcg total) by Each Nostril route once daily.    GARLIC (GARLIQUE ORAL)    Take 1 tablet by mouth once daily.    LEVOTHYROXINE (EUTHYROX) 137 MCG TAB TABLET    Take 1 tablet (137 mcg total) by mouth before breakfast.    LIDOCAINE (LMX) 4 % CREAM    Apply topically as needed.    OMEGA-3 FATTY ACIDS/FISH OIL (FISH OIL-OMEGA-3 FATTY ACIDS) 300-1,000 MG CAPSULE    Take by mouth once daily.    ONDANSETRON (ZOFRAN) 4 MG TABLET    Take 1 tablet (4 mg total) by mouth every 8 (eight) hours as needed.    PRAVASTATIN (PRAVACHOL) 10 MG TABLET    Take 1 tablet (10 mg total) by mouth once daily.    PULSE OXIMETER (PULSE OXIMETER) DEVICE    by Apply Externally route 2 (two) times a day. Use twice daily at 8 AM and 3 PM and [facility].    RIFAXIMIN (XIFAXAN) 550 MG TAB    Take 1 tablet (550 mg total) by mouth 2 (two) times daily.    STOOL SOFTENER 100 MG CAPSULE    TAKE 1 CAPSULE BY MOUTH THREE TIMES DAILY FOR DIARRHEA (HOLD FOR DIARRHEA)   Modified Medications    Modified Medication Previous Medication    ISOSORBIDE MONONITRATE (IMDUR) 60 MG 24 HR TABLET isosorbide mononitrate (IMDUR) 60 MG 24 hr tablet       Take 1 tablet (60 mg total) by mouth every evening.    Take 1 tablet (60 mg total) by mouth every evening.    MAGNESIUM OXIDE (MAG-OX) 400 MG (241.3 MG MAGNESIUM) TABLET magnesium oxide (MAG-OX) 400 mg (241.3 mg magnesium) tablet       Take 1 tablet (400 mg  "total) by mouth once daily.    Take 1 tablet (400 mg total) by mouth once daily.    TORSEMIDE (DEMADEX) 20 MG TAB torsemide (DEMADEX) 20 MG Tab       Take 2 tablets (40 mg total) by mouth once daily.    Take 1 tablet (20 mg total) by mouth once daily. Take two tablets daily next 3 days   Discontinued Medications    AMLODIPINE-OLMESARTAN (DALY) 10-40 MG PER TABLET    Take 1 tablet by mouth once daily.    TORSEMIDE (DEMADEX) 20 MG TAB    Take 1 tablet (20 mg total) by mouth once daily. Take extra dose as needed for extra swelling/fluid build up, monitor BP and weights       Review of Systems   Constitutional: Positive for malaise/fatigue.   HENT: Negative.    Eyes: Negative.    Respiratory: Positive for shortness of breath (intermittent).    Cardiovascular: Positive for leg swelling. Negative for chest pain and palpitations.   Gastrointestinal: Negative for nausea.   Genitourinary: Negative.    Musculoskeletal: Negative.    Skin: Negative.    Neurological: Negative.    Endo/Heme/Allergies: Negative.    Psychiatric/Behavioral: Negative.    All 12 systems otherwise negative.      Wt Readings from Last 3 Encounters:   08/01/22 96.6 kg (212 lb 15.4 oz)   07/28/22 86.2 kg (190 lb)   06/20/22 90 kg (198 lb 6.6 oz)     Temp Readings from Last 3 Encounters:   07/28/22 97.7 °F (36.5 °C) (Temporal)   06/20/22 99.3 °F (37.4 °C) (Oral)   06/07/22 97.5 °F (36.4 °C)     BP Readings from Last 3 Encounters:   08/01/22 124/64   07/28/22 115/69   06/21/22 (!) 143/68     Pulse Readings from Last 3 Encounters:   08/01/22 64   07/28/22 88   06/21/22 72       /64 (BP Location: Left arm, Patient Position: Sitting, BP Method: Large (Manual))   Pulse 64   Resp 16   Ht 5' 1" (1.549 m)   Wt 96.6 kg (212 lb 15.4 oz)   LMP  (LMP Unknown)   SpO2 97%   BMI 40.24 kg/m²     Objective:   Physical Exam  Constitutional:       General: She is not in acute distress.     Appearance: She is well-developed. She is obese. She is not diaphoretic. "   HENT:      Head: Normocephalic and atraumatic.      Mouth/Throat:      Pharynx: No oropharyngeal exudate.   Eyes:      General: No scleral icterus.        Right eye: No discharge.         Left eye: No discharge.   Cardiovascular:      Rate and Rhythm: Normal rate and regular rhythm.      Heart sounds: Murmur heard.   Pulmonary:      Effort: Pulmonary effort is normal. No respiratory distress.   Musculoskeletal:      Right lower leg: Edema present.      Left lower leg: Edema present.   Skin:     Coloration: Skin is not pale.      Findings: No erythema or rash.   Neurological:      Mental Status: She is alert and oriented to person, place, and time.   Psychiatric:         Behavior: Behavior normal.         Thought Content: Thought content normal.         Judgment: Judgment normal.         Lab Results   Component Value Date     06/21/2022    K 5.2 (H) 06/21/2022     (H) 06/21/2022    CO2 22 (L) 06/21/2022    BUN 43 (H) 06/21/2022    CREATININE 4.3 (H) 06/21/2022    GLU 85 06/21/2022    HGBA1C 5.1 06/07/2022    MG 3.1 (H) 06/20/2022    AST 29 06/21/2022    ALT 20 06/21/2022    ALBUMIN 2.5 (L) 06/21/2022    PROT 6.6 06/21/2022    BILITOT 0.3 06/21/2022    WBC 5.71 06/21/2022    HGB 10.1 (L) 06/21/2022    HCT 32.4 (L) 06/21/2022    MCV 90 06/21/2022     06/21/2022    INR 1.0 01/12/2022    TSH 1.339 12/07/2021    CHOL 238 (H) 10/06/2021    HDL 38 (L) 10/06/2021    LDLCALC 175.6 (H) 10/06/2021    TRIG 122 10/06/2021     (H) 06/21/2022     Assessment:      1. Chronic diastolic congestive heart failure    2. Primary hypertension    3. Pericardial effusion    4. Hypothyroidism due to acquired atrophy of thyroid    5. 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 Morbid obesity due to excess calories    7. History of COVID-19    8. CKD (chronic kidney disease) stage 4, GFR 15-29 ml/min    9. Elevated d-dimer with indeterminate perfusion scan for PE    10. Shortness of breath    11. Chronic edema    12.  Diabetic gastroparesis associated with type 2 diabetes mellitus    13. Positive D dimer    14. Essential hypertension    15. Other ascites        Plan:   1. Chronic diastolic HF - ;  --> 704 --> 767 --> 477; with more edema/NICHOLE  - daily weights, low salt diet   - daily compression stockings  - was on Bumex to Torsemide 40mg daily with metolazone -- changed to Torsemide 20mg due to worsening renal function  - repeat CMP, BNP , Mg today and ECHO   - with > 12 lb weight gain and edema increase Torsemide to 40mg  - if ongoing weight gain/swelling not improved with PO diuretics needs to go to ER and have IV diuresis and admission     2. HTN   - cont meds and titrate  - [facility] ER started her on Olmesartan with norvasc but DC now due to hyperK  - Cont Norvasc 5mg and Imdur     3. HLD - worse LDL  - cont Pravastatin 10mg     4. DM2 with gastroparesis A1c - 5.9 --> 5.6  - cont meds per PCP    5. H/o Cirrhosis s/p TIPS with edema, low albumin  - cont tx per PCP/Hepatology  - liver function improved does not need transplant  - had EGD concern for gastroparesis    6. KRISTAL  - needs CPAP, was not using it before     7. Obesity losing weight 217 --197 --> 187  ---> 198 lbs --> BMI 40 - 212 lbs  - needs weight loss with diet/exercise     8. Pericardial effusion  - small, sec to cirrhosis/CHF  - no tamponade clinically on last ECHO    9. CKD 4 with proteinuria  - diuretics adjusted   - cont f/u with nephro     10. Elevated D dimer, int prob for PE  - repeat D dimer - elevated   - cont Eliquis     Thank you for allowing me to participate in this patient's care. Please do not hesitate to contact me with any questions or concerns. Consult note has been forwarded to the referral physician.

## 2022-08-02 ENCOUNTER — TELEPHONE (OUTPATIENT)
Dept: CARDIOLOGY | Facility: CLINIC | Age: 67
End: 2022-08-02
Payer: MEDICAID

## 2022-08-02 ENCOUNTER — HOSPITAL ENCOUNTER (INPATIENT)
Facility: HOSPITAL | Age: 67
LOS: 2 days | Discharge: HOME | DRG: 682 | End: 2022-08-05
Attending: EMERGENCY MEDICINE | Admitting: INTERNAL MEDICINE
Payer: MEDICARE

## 2022-08-02 DIAGNOSIS — R60.1 ANASARCA: ICD-10-CM

## 2022-08-02 DIAGNOSIS — K72.90 DECOMPENSATED HEPATIC CIRRHOSIS: ICD-10-CM

## 2022-08-02 DIAGNOSIS — E87.70 HYPERVOLEMIA, UNSPECIFIED HYPERVOLEMIA TYPE: ICD-10-CM

## 2022-08-02 DIAGNOSIS — I50.33 ACUTE ON CHRONIC DIASTOLIC CONGESTIVE HEART FAILURE: ICD-10-CM

## 2022-08-02 DIAGNOSIS — K74.60 DECOMPENSATED HEPATIC CIRRHOSIS: ICD-10-CM

## 2022-08-02 DIAGNOSIS — I50.9 CONGESTIVE HEART FAILURE, UNSPECIFIED HF CHRONICITY, UNSPECIFIED HEART FAILURE TYPE: Primary | ICD-10-CM

## 2022-08-02 DIAGNOSIS — N17.1 ACUTE RENAL FAILURE WITH ACUTE RENAL CORTICAL NECROSIS SUPERIMPOSED ON STAGE 5 CHRONIC KIDNEY DISEASE, NOT ON CHRONIC DIALYSIS: ICD-10-CM

## 2022-08-02 DIAGNOSIS — E66.01 MORBID OBESITY DUE TO EXCESS CALORIES: ICD-10-CM

## 2022-08-02 DIAGNOSIS — N18.5 ACUTE RENAL FAILURE WITH ACUTE RENAL CORTICAL NECROSIS SUPERIMPOSED ON STAGE 5 CHRONIC KIDNEY DISEASE, NOT ON CHRONIC DIALYSIS: ICD-10-CM

## 2022-08-02 DIAGNOSIS — R06.02 SOB (SHORTNESS OF BREATH): ICD-10-CM

## 2022-08-02 DIAGNOSIS — I50.32 CHRONIC DIASTOLIC CONGESTIVE HEART FAILURE: ICD-10-CM

## 2022-08-02 LAB
ALBUMIN SERPL BCP-MCNC: 2.8 G/DL (ref 3.5–5.2)
ALP SERPL-CCNC: 70 U/L (ref 55–135)
ALT SERPL W/O P-5'-P-CCNC: 14 U/L (ref 10–44)
ANION GAP SERPL CALC-SCNC: 8 MMOL/L (ref 8–16)
AST SERPL-CCNC: 26 U/L (ref 10–40)
BASOPHILS # BLD AUTO: 0.02 K/UL (ref 0–0.2)
BASOPHILS NFR BLD: 0.4 % (ref 0–1.9)
BILIRUB SERPL-MCNC: 0.3 MG/DL (ref 0.1–1)
BNP SERPL-MCNC: 347 PG/ML (ref 0–99)
BUN SERPL-MCNC: 63 MG/DL (ref 8–23)
CALCIUM SERPL-MCNC: 8 MG/DL (ref 8.7–10.5)
CHLORIDE SERPL-SCNC: 109 MMOL/L (ref 95–110)
CK SERPL-CCNC: 359 U/L (ref 20–180)
CO2 SERPL-SCNC: 24 MMOL/L (ref 23–29)
CREAT SERPL-MCNC: 5.5 MG/DL (ref 0.5–1.4)
DIFFERENTIAL METHOD: ABNORMAL
EOSINOPHIL # BLD AUTO: 0.2 K/UL (ref 0–0.5)
EOSINOPHIL NFR BLD: 4 % (ref 0–8)
ERYTHROCYTE [DISTWIDTH] IN BLOOD BY AUTOMATED COUNT: 16 % (ref 11.5–14.5)
EST. GFR  (NO RACE VARIABLE): 8 ML/MIN/1.73 M^2
GLUCOSE SERPL-MCNC: 108 MG/DL (ref 70–110)
HCT VFR BLD AUTO: 27.2 % (ref 37–48.5)
HGB BLD-MCNC: 8.6 G/DL (ref 12–16)
IMM GRANULOCYTES # BLD AUTO: 0.01 K/UL (ref 0–0.04)
IMM GRANULOCYTES NFR BLD AUTO: 0.2 % (ref 0–0.5)
LYMPHOCYTES # BLD AUTO: 2.2 K/UL (ref 1–4.8)
LYMPHOCYTES NFR BLD: 45.6 % (ref 18–48)
MCH RBC QN AUTO: 28 PG (ref 27–31)
MCHC RBC AUTO-ENTMCNC: 31.6 G/DL (ref 32–36)
MCV RBC AUTO: 89 FL (ref 82–98)
MONOCYTES # BLD AUTO: 0.4 K/UL (ref 0.3–1)
MONOCYTES NFR BLD: 8.5 % (ref 4–15)
NEUTROPHILS # BLD AUTO: 2 K/UL (ref 1.8–7.7)
NEUTROPHILS NFR BLD: 41.3 % (ref 38–73)
NRBC BLD-RTO: 0 /100 WBC
PLATELET # BLD AUTO: 151 K/UL (ref 150–450)
PMV BLD AUTO: 10.3 FL (ref 9.2–12.9)
POTASSIUM SERPL-SCNC: 5.1 MMOL/L (ref 3.5–5.1)
PROT SERPL-MCNC: 6.4 G/DL (ref 6–8.4)
RBC # BLD AUTO: 3.07 M/UL (ref 4–5.4)
SARS-COV-2 RDRP RESP QL NAA+PROBE: NEGATIVE
SODIUM SERPL-SCNC: 141 MMOL/L (ref 136–145)
TROPONIN I SERPL DL<=0.01 NG/ML-MCNC: 0.01 NG/ML (ref 0–0.03)
WBC # BLD AUTO: 4.72 K/UL (ref 3.9–12.7)

## 2022-08-02 PROCEDURE — G0378 HOSPITAL OBSERVATION PER HR: HCPCS

## 2022-08-02 PROCEDURE — U0002 COVID-19 LAB TEST NON-CDC: HCPCS | Performed by: EMERGENCY MEDICINE

## 2022-08-02 PROCEDURE — 93010 EKG 12-LEAD: ICD-10-PCS | Mod: ,,, | Performed by: INTERNAL MEDICINE

## 2022-08-02 PROCEDURE — 99291 CRITICAL CARE FIRST HOUR: CPT | Mod: 25

## 2022-08-02 PROCEDURE — 83880 ASSAY OF NATRIURETIC PEPTIDE: CPT | Performed by: NURSE PRACTITIONER

## 2022-08-02 PROCEDURE — 84484 ASSAY OF TROPONIN QUANT: CPT | Performed by: EMERGENCY MEDICINE

## 2022-08-02 PROCEDURE — 80053 COMPREHEN METABOLIC PANEL: CPT | Performed by: EMERGENCY MEDICINE

## 2022-08-02 PROCEDURE — 63600175 PHARM REV CODE 636 W HCPCS: Performed by: NURSE PRACTITIONER

## 2022-08-02 PROCEDURE — 96374 THER/PROPH/DIAG INJ IV PUSH: CPT

## 2022-08-02 PROCEDURE — 96376 TX/PRO/DX INJ SAME DRUG ADON: CPT

## 2022-08-02 PROCEDURE — 93010 ELECTROCARDIOGRAM REPORT: CPT | Mod: ,,, | Performed by: INTERNAL MEDICINE

## 2022-08-02 PROCEDURE — 63600175 PHARM REV CODE 636 W HCPCS: Performed by: INTERNAL MEDICINE

## 2022-08-02 PROCEDURE — 82550 ASSAY OF CK (CPK): CPT | Performed by: EMERGENCY MEDICINE

## 2022-08-02 PROCEDURE — 25000003 PHARM REV CODE 250: Performed by: INTERNAL MEDICINE

## 2022-08-02 PROCEDURE — 93005 ELECTROCARDIOGRAM TRACING: CPT

## 2022-08-02 PROCEDURE — 85025 COMPLETE CBC W/AUTO DIFF WBC: CPT | Performed by: NURSE PRACTITIONER

## 2022-08-02 RX ORDER — ACETAMINOPHEN 325 MG/1
650 TABLET ORAL EVERY 6 HOURS PRN
Status: DISCONTINUED | OUTPATIENT
Start: 2022-08-02 | End: 2022-08-05 | Stop reason: HOSPADM

## 2022-08-02 RX ORDER — CARVEDILOL 12.5 MG/1
25 TABLET ORAL 2 TIMES DAILY WITH MEALS
Status: DISCONTINUED | OUTPATIENT
Start: 2022-08-02 | End: 2022-08-05 | Stop reason: HOSPADM

## 2022-08-02 RX ORDER — FUROSEMIDE 10 MG/ML
40 INJECTION INTRAMUSCULAR; INTRAVENOUS
Status: COMPLETED | OUTPATIENT
Start: 2022-08-02 | End: 2022-08-02

## 2022-08-02 RX ORDER — FUROSEMIDE 10 MG/ML
40 INJECTION INTRAMUSCULAR; INTRAVENOUS ONCE
Status: COMPLETED | OUTPATIENT
Start: 2022-08-02 | End: 2022-08-02

## 2022-08-02 RX ORDER — FUROSEMIDE 10 MG/ML
INJECTION INTRAMUSCULAR; INTRAVENOUS
Status: DISPENSED
Start: 2022-08-02 | End: 2022-08-03

## 2022-08-02 RX ORDER — FUROSEMIDE 10 MG/ML
80 INJECTION INTRAMUSCULAR; INTRAVENOUS
Status: COMPLETED | OUTPATIENT
Start: 2022-08-03 | End: 2022-08-03

## 2022-08-02 RX ORDER — IPRATROPIUM BROMIDE AND ALBUTEROL SULFATE 2.5; .5 MG/3ML; MG/3ML
3 SOLUTION RESPIRATORY (INHALATION) EVERY 4 HOURS PRN
Status: DISCONTINUED | OUTPATIENT
Start: 2022-08-02 | End: 2022-08-05 | Stop reason: HOSPADM

## 2022-08-02 RX ORDER — PRAVASTATIN SODIUM 10 MG/1
10 TABLET ORAL DAILY
Status: DISCONTINUED | OUTPATIENT
Start: 2022-08-03 | End: 2022-08-05 | Stop reason: HOSPADM

## 2022-08-02 RX ORDER — LANOLIN ALCOHOL/MO/W.PET/CERES
1 CREAM (GRAM) TOPICAL DAILY
Status: DISCONTINUED | OUTPATIENT
Start: 2022-08-03 | End: 2022-08-05 | Stop reason: HOSPADM

## 2022-08-02 RX ORDER — ONDANSETRON 2 MG/ML
4 INJECTION INTRAMUSCULAR; INTRAVENOUS EVERY 8 HOURS PRN
Status: DISCONTINUED | OUTPATIENT
Start: 2022-08-02 | End: 2022-08-05 | Stop reason: HOSPADM

## 2022-08-02 RX ORDER — GUAIFENESIN 100 MG/5ML
200 SOLUTION ORAL EVERY 4 HOURS PRN
Status: DISCONTINUED | OUTPATIENT
Start: 2022-08-02 | End: 2022-08-05 | Stop reason: HOSPADM

## 2022-08-02 RX ADMIN — APIXABAN 2.5 MG: 2.5 TABLET, FILM COATED ORAL at 09:08

## 2022-08-02 RX ADMIN — FUROSEMIDE 40 MG: 10 INJECTION INTRAMUSCULAR; INTRAVENOUS at 04:08

## 2022-08-02 RX ADMIN — FUROSEMIDE 40 MG: 10 INJECTION, SOLUTION INTRAMUSCULAR; INTRAVENOUS at 08:08

## 2022-08-02 NOTE — ED NOTES
Bed: 08  Expected date:   Expected time:   Means of arrival:   Comments:  Thiago from lobby when clean.

## 2022-08-02 NOTE — TELEPHONE ENCOUNTER
----- Message from Marcos Ramos MD sent at 8/2/2022 10:37 AM CDT -----  Please contact the patient and let them know that their results reveal worsening kidney function along with elevated fluid marker which may not improve with the torsemide and may result in renal failure if not treated, I recommend ER evaluation at [facility] [address] to admit for IV lasix treatment for fluid removal and close monitoring of kidney function and consult nephrologist as well. The D dimer remains elevated continue Eliquis.

## 2022-08-02 NOTE — HPI
Ms. Das is a 67-year-old  female with PMH significant for diastolic CHF, CKD stage 5, followed by Dr. 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  Patient has significant bilateral lower extremity edema, and has been having multiple changes to her diuretic regimen recently.  She was advised to go to the ED to be admitted for IV diuretics.  Denies SOB at rest, but unable to walk few steps due to increasing short of breath.  She is chronically home oxygen dependent at 2-3 L O2 N/C.  In the ED she received Lasix 40 mg IV x1.  Creatinine is worsened to 5.5 (in baseline 4.3).    Admitting diagnosis:  ROD on CKD stage 5. Cardiorenal syndrome.  Anasarca.  Acute on chronic diastolic CHF.

## 2022-08-02 NOTE — ED PROVIDER NOTES
SCRIBE #1 NOTE: I, Monique Moy, am scribing for, and in the presence of, Hugo Guerin MD. I have scribed the entire note.       History     Chief Complaint   Patient presents with    Shortness of Breath     Pt reports she is retaining fluid in BLE. Pt is also feeling SOB. Pt denies CP, dizziness, or HA     Review of patient's allergies indicates:   Allergen Reactions    Subsys [fentanyl] Other (See Comments)     After administration pt unresponsive.  HR and respirations decreased.     Versed [midazolam] Other (See Comments)     After administration pt unresponsive.  HR and respirations decreased.     Ampicillin Rash    Codeine Nausea And Vomiting and Nausea Only         History of Present Illness     HPI    8/2/2022, 4:31 PM  History obtained from the patient      History of Present Illness: Diamond Das is a 67 y.o. female patient with a PMHx of ROD and bilateral lower extremity edema who presents to the Emergency Department for evaluation of SOB which onset 3 weeks ago. Symptoms are constant and moderate in severity. No mitigating or exacerbating factors reported. Associated sxs include bilateral lower extremity edema. Patient denies any fever, chills, CP, dizziness, HA, and all other sxs at this time. No further complaints or concerns at this time. Recently seen in cardiology clinic with noted worsening renal function, and diuretic adjustment.      Arrival mode: Personal vehicle    PCP: Andrey Perrin MD        Past Medical History:  Past Medical History:   Diagnosis Date    Acquired hypothyroidism 9/28/2020    ROD (acute kidney injury) 1/3/2019    Arthritis of knee 1/31/2022    Ascites     Bilateral lower extremity edema 9/2/2016    Breast pain, left 1/4/2018    Cataract     Chest pain, atypical 2/9/2018    CHF (congestive heart failure)     Cirrhosis     Cough     Diabetes mellitus     Diabetes mellitus, type 2     Diarrhea 9/4/2019    Edema 3/19/2018    Epigastric pain 2/11/2020     Gastroparesis     GERD (gastroesophageal reflux disease)     Hepatic encephalopathy 6/19/2018    Pt has history of cirrhosis, seen at [facility] on 6/7/18. Currently on lactulose.    Hyperlipidemia     Hypertension     Hypotension 2/21/2019    Immunization deficiency 2/10/2020    Liver disease     Lumbar strain 4/27/2018    Lumbar strain, sequela 2/17/2020    Macular degeneration     Medication reaction 11/17/2016    Other and unspecified hyperlipidemia 6/11/2012 11:11:32 AM    [facility] Historical - Endocrine/Metabolic/Immune: Hyperlipidemia-No Additional Notes    Pneumonia of right middle lobe due to infectious organism 12/19/2017    Renal disorder     Retinopathy due to secondary diabetes mellitus     Skin lesion 12/20/2021    Sleep apnea     Strain of lumbar region 10/4/2021    Thyroid disease     Type 2 diabetes mellitus with hyperglycemia 10/10/2020       Past Surgical History:  Past Surgical History:   Procedure Laterality Date    CATARACT EXTRACTION      CHOLECYSTECTOMY      COLONOSCOPY N/A 9/4/2019    Procedure: COLONOSCOPY;  Surgeon: Marnie Elmore MD;  Location: [facility];  Service: Endoscopy;  Laterality: N/A;    ERCP      ESOPHAGOGASTRODUODENOSCOPY N/A 7/28/2022    Procedure: EGD (ESOPHAGOGASTRODUODENOSCOPY);  Surgeon: Jimy Katz MD;  Location: [facility];  Service: Endoscopy;  Laterality: N/A;    FLUOROSCOPY N/A 7/24/2020    Procedure: TIPS revision;  Surgeon: Martell Jasmine MD;  Location: [facility] CATH LAB;  Service: General;  Laterality: N/A;    LIVER BIOPSY      THORACENTESIS Left 1/20/2020    Procedure: Thoracentesis;  Surgeon: Angelica Hunter MD;  Location: [facility];  Service: Pulmonary;  Laterality: Left;    TUBAL LIGATION      UPPER GASTROINTESTINAL ENDOSCOPY           Family History:  Family History   Problem Relation Age of Onset    Cancer Mother     Breast cancer Mother     Heart disease Father     Aneurysm Sister     Heart disease Brother      No Known Problems Maternal Grandmother     Cancer Maternal Grandfather     Sarcoidosis Sister     Colon cancer Neg Hx     Ovarian cancer Neg Hx     Thrombosis Neg Hx        Social History:  Social History     Tobacco Use    Smoking status: Never Smoker    Smokeless tobacco: Never Used   Substance and Sexual Activity    Alcohol use: No    Drug use: No    Sexual activity: Not Currently        Review of Systems     Review of Systems   Constitutional: Negative for chills and fever.   HENT: Negative for sore throat.    Respiratory: Positive for shortness of breath.    Cardiovascular: Positive for leg swelling. Negative for chest pain.   Gastrointestinal: Negative for nausea.   Genitourinary: Negative for dysuria.   Musculoskeletal: Negative for back pain.   Skin: Negative for rash.   Neurological: Negative for dizziness, weakness and headaches.   Hematological: Does not bruise/bleed easily.   All other systems reviewed and are negative.       Physical Exam     Initial Vitals [08/02/22 1453]   BP Pulse Resp Temp SpO2   (!) 121/58 62 18 97.9 °F (36.6 °C) 100 %      MAP       --          Physical Exam  Nursing Notes and Vital Signs Reviewed.  Constitutional: Patient is in no acute distress. Well-developed and well-nourished.  Head: Atraumatic. Normocephalic.  Eyes: EOM intact. Conjunctivae are not pale. No scleral icterus.  ENT: Mucous membranes are moist. Oropharynx is clear and symmetric.    Neck: Supple. Full ROM. No lymphadenopathy.  Cardiovascular: Regular rate. Regular rhythm. No murmurs, rubs, or gallops. Distal pulses are 2+ and symmetric.  Pulmonary/Chest: No respiratory distress. Crackles bilaterally.  Abdominal: Soft and non-distended.  There is no tenderness.  No rebound, guarding, or rigidity.  Musculoskeletal: Moves all extremities. 4+ pitting edema up to mid calf bilaterally.  No calf tenderness.  Skin: Warm and dry.  Neurological:  Alert, awake, and appropriate.  Normal speech.  No acute focal  neurological deficits are appreciated.  Psychiatric: Normal affect. Good eye contact. Appropriate in content.     ED Course   Critical Care    Date/Time: 8/2/2022 6:07 PM  Performed by: Hugo Guerin MD  Authorized by: Hugo Guerin MD   Direct patient critical care time: 15 minutes  Additional history critical care time: 5 minutes  Ordering / reviewing critical care time: 6 minutes  Documentation critical care time: 10 minutes  Consulting other physicians critical care time: 2 minutes  Total critical care time (exclusive of procedural time) : 38 minutes  Critical care time was exclusive of separately billable procedures and treating other patients and teaching time.  Critical care was necessary to treat or prevent imminent or life-threatening deterioration of the following conditions: cardiac failure.  Critical care was time spent personally by me on the following activities: blood draw for specimens, development of treatment plan with patient or surrogate, interpretation of cardiac output measurements, evaluation of patient's response to treatment, examination of patient, obtaining history from patient or surrogate, ordering and performing treatments and interventions, discussions with consultants, ordering and review of laboratory studies, ordering and review of radiographic studies, pulse oximetry, re-evaluation of patient's condition and review of old charts.        ED Vital Signs:  Vitals:    08/02/22 1453 08/02/22 1647 08/02/22 1803 08/02/22 2023   BP: (!) 121/58 (!) 119/57 (!) 140/67 133/60   Pulse: 62 62 66    Resp: 18      Temp: 97.9 °F (36.6 °C)      TempSrc: Oral      SpO2: 100% 100% 100%    Weight: 96.6 kg (212 lb 13.7 oz)      Height:        08/02/22 2103 08/02/22 2300 08/03/22 0018 08/03/22 0100   BP: 138/65  134/60    Pulse: 67 64 65 63   Resp: 16  12    Temp: 98.2 °F (36.8 °C)  98.2 °F (36.8 °C)    TempSrc: Oral      SpO2: 97%  98%    Weight: 96.8 kg (213 lb 6.5 oz)  96.5 kg (212 lb 11.9 oz)   "  Height: 5' 1" (1.549 m)          Abnormal Lab Results:  Labs Reviewed   CBC W/ AUTO DIFFERENTIAL - Abnormal; Notable for the following components:       Result Value    RBC 3.07 (*)     Hemoglobin 8.6 (*)     Hematocrit 27.2 (*)     MCHC 31.6 (*)     RDW 16.0 (*)     All other components within normal limits   B-TYPE NATRIURETIC PEPTIDE - Abnormal; Notable for the following components:     (*)     All other components within normal limits   COMPREHENSIVE METABOLIC PANEL - Abnormal; Notable for the following components:    BUN 63 (*)     Creatinine 5.5 (*)     Calcium 8.0 (*)     Albumin 2.8 (*)     eGFR 8 (*)     All other components within normal limits   CK - Abnormal; Notable for the following components:     (*)     All other components within normal limits   TROPONIN I   SARS-COV-2 RNA AMPLIFICATION, QUAL        All Lab Results:  Results for orders placed or performed during the hospital encounter of 08/02/22   CBC auto differential   Result Value Ref Range    WBC 4.72 3.90 - 12.70 K/uL    RBC 3.07 (L) 4.00 - 5.40 M/uL    Hemoglobin 8.6 (L) 12.0 - 16.0 g/dL    Hematocrit 27.2 (L) 37.0 - 48.5 %    MCV 89 82 - 98 fL    MCH 28.0 27.0 - 31.0 pg    MCHC 31.6 (L) 32.0 - 36.0 g/dL    RDW 16.0 (H) 11.5 - 14.5 %    Platelets 151 150 - 450 K/uL    MPV 10.3 9.2 - 12.9 fL    Immature Granulocytes 0.2 0.0 - 0.5 %    Gran # (ANC) 2.0 1.8 - 7.7 K/uL    Immature Grans (Abs) 0.01 0.00 - 0.04 K/uL    Lymph # 2.2 1.0 - 4.8 K/uL    Mono # 0.4 0.3 - 1.0 K/uL    Eos # 0.2 0.0 - 0.5 K/uL    Baso # 0.02 0.00 - 0.20 K/uL    nRBC 0 0 /100 WBC    Gran % 41.3 38.0 - 73.0 %    Lymph % 45.6 18.0 - 48.0 %    Mono % 8.5 4.0 - 15.0 %    Eosinophil % 4.0 0.0 - 8.0 %    Basophil % 0.4 0.0 - 1.9 %    Differential Method Automated    Brain natriuretic peptide   Result Value Ref Range     (H) 0 - 99 pg/mL   Comprehensive metabolic panel   Result Value Ref Range    Sodium 141 136 - 145 mmol/L    Potassium 5.1 3.5 - 5.1 mmol/L "    Chloride 109 95 - 110 mmol/L    CO2 24 23 - 29 mmol/L    Glucose 108 70 - 110 mg/dL    BUN 63 (H) 8 - 23 mg/dL    Creatinine 5.5 (H) 0.5 - 1.4 mg/dL    Calcium 8.0 (L) 8.7 - 10.5 mg/dL    Total Protein 6.4 6.0 - 8.4 g/dL    Albumin 2.8 (L) 3.5 - 5.2 g/dL    Total Bilirubin 0.3 0.1 - 1.0 mg/dL    Alkaline Phosphatase 70 55 - 135 U/L    AST 26 10 - 40 U/L    ALT 14 10 - 44 U/L    Anion Gap 8 8 - 16 mmol/L    eGFR 8 (A) >60 mL/min/1.73 m^2   CK   Result Value Ref Range     (H) 20 - 180 U/L   Troponin I   Result Value Ref Range    Troponin I 0.013 0.000 - 0.026 ng/mL   COVID-19 Rapid Screening   Result Value Ref Range    SARS-CoV-2 RNA, Amplification, Qual Negative Negative     *Note: Due to a large number of results and/or encounters for the requested time period, some results have not been displayed. A complete set of results can be found in Results Review.       Imaging Results:  Imaging Results          X-Ray Chest 1 View (Final result)  Result time 08/02/22 15:22:11    Final result by Jose Rafael Pedraza MD (08/02/22 15:22:11)                 Impression:      1.  Chronic or recurrent vascular congestion and left greater than right pleural effusions or scarring.  Negative for new pulmonary opacities.  Chronic or recurrent interstitial pulmonary edema must be considered.    2.  Markedly enlarged cardiac silhouette.  Pericardial effusion not excluded.  Clinical correlation is advised.    3.  Other stable findings as noted above.      Electronically signed by: Jose Rafael Pedraza MD  Date:    08/02/2022  Time:    15:22             Narrative:    EXAMINATION:  XR CHEST 1 VIEW    CLINICAL HISTORY:  Shortness of breath    COMPARISON:  June 20, 2022, as well as studies dating back to June 22, 2020    FINDINGS:  Chronic or recurrent blunting of the left lateral costophrenic angle and to lesser degree the left costophrenic angle with areas of scarring or atelectasis in the mid lower left lung again seen.  The lungs are free  of new pulmonary opacities.  The cardiac silhouette size is markedly enlarged.  The trachea is midline and the mediastinal width is normal. Negative for pneumothorax.  Pulmonary vasculature is chronically mildly congested.  Negative for osseous abnormalities. Tortuous aorta.  Degenerative changes of the spine with convex right curvature.  Degenerative changes of the shoulder girdles.  Azygous lobe.                                 The EKG was ordered, reviewed, and independently interpreted by the ED provider.  Interpretation time: 14:49  Rate: 60 BPM  Rhythm: normal sinus rhythm  Interpretation: Low voltage QRS. Cannot rule out anterior infarct. No STEMI.  When compared to EKG performed 21 June, 2022, there are no significant changes.           The Emergency Provider reviewed the vital signs and test results, which are outlined above.     ED Discussion     6:01 PM: Discussed case with Bandar Garcia (Interventional Cardiology) and Janel Nicole NP (Hospital Medicine). Dr. Bowling agrees with current care and management of pt and accepts admission.   Admitting Service: Hospital Medicine  Admitting Physician: Dr. Bowling  Admit to: Obs    6:03 PM: Re-evaluated pt. I have discussed test results, shared treatment plan, and the need for admission with patient and family at bedside. Pt and family express understanding at this time and agree with all information. All questions answered. Pt and family have no further questions or concerns at this time. Pt is ready for admit.       Medical Decision Making:   Clinical Tests:   Lab Tests: Ordered and Reviewed  Radiological Study: Ordered and Reviewed  Medical Tests: Ordered and Reviewed           ED Medication(s):  Medications   furosemide (LASIX) 10 mg/mL injection (has no administration in time range)   acetaminophen tablet 650 mg (has no administration in time range)   ondansetron injection 4 mg (has no administration in time range)   guaiFENesin 100 mg/5 ml syrup 200 mg  (has no administration in time range)   albuterol-ipratropium 2.5 mg-0.5 mg/3 mL nebulizer solution 3 mL (has no administration in time range)   furosemide injection 80 mg (has no administration in time range)   apixaban tablet 2.5 mg (2.5 mg Oral Given 8/2/22 2147)   carvediloL tablet 25 mg (0 mg Oral Hold 8/2/22 2023)   ferrous sulfate tablet 1 each (has no administration in time range)   levothyroxine tablet 137 mcg (has no administration in time range)   pravastatin tablet 10 mg (has no administration in time range)   furosemide injection 40 mg (40 mg Intravenous Given 8/2/22 1610)   furosemide injection 40 mg (40 mg Intravenous Given 8/2/22 2024)       Current Discharge Medication List                  Scribe Attestation:   Scribe #1: I performed the above scribed service and the documentation accurately describes the services I performed. I attest to the accuracy of the note.     Attending:   Physician Attestation Statement for Scribe #1: I, Hugo Guerin MD, personally performed the services described in this documentation, as scribed by Monique Moy, in my presence, and it is both accurate and complete.           Clinical Impression       ICD-10-CM ICD-9-CM   1. Congestive heart failure, unspecified HF chronicity, unspecified heart failure type  I50.9 428.0   2. SOB (shortness of breath)  R06.02 786.05   3. Hypervolemia, unspecified hypervolemia type  E87.70 276.69       Disposition:   Disposition: Admitted  Condition: Fair         Hugo Guerin MD  08/03/22 [id number]

## 2022-08-02 NOTE — FIRST PROVIDER EVALUATION
Medical screening exam completed.  I have conducted a focused provider triage encounter, findings are as follows:    Brief history of present illness:  67-year-old female with complaint of shortness of breath lower leg swelling that has worsened over the past week.    Vitals:    08/02/22 1453   BP: (!) 121/58   BP Location: Right arm   Patient Position: Sitting   Pulse: 62   Resp: 18   Temp: 97.9 °F (36.6 °C)   TempSrc: Oral   SpO2: 100%   Weight: 96.6 kg (212 lb 13.7 oz)       Pertinent physical exam:  [id number]

## 2022-08-03 LAB
ALBUMIN SERPL BCP-MCNC: 2.7 G/DL (ref 3.5–5.2)
ALP SERPL-CCNC: 66 U/L (ref 55–135)
ALT SERPL W/O P-5'-P-CCNC: 14 U/L (ref 10–44)
ANION GAP SERPL CALC-SCNC: 9 MMOL/L (ref 8–16)
AST SERPL-CCNC: 22 U/L (ref 10–40)
BASOPHILS # BLD AUTO: 0.02 K/UL (ref 0–0.2)
BASOPHILS NFR BLD: 0.4 % (ref 0–1.9)
BILIRUB SERPL-MCNC: 0.2 MG/DL (ref 0.1–1)
BUN SERPL-MCNC: 67 MG/DL (ref 8–23)
CALCIUM SERPL-MCNC: 8 MG/DL (ref 8.7–10.5)
CHLORIDE SERPL-SCNC: 110 MMOL/L (ref 95–110)
CO2 SERPL-SCNC: 25 MMOL/L (ref 23–29)
CREAT SERPL-MCNC: 5.7 MG/DL (ref 0.5–1.4)
CREAT UR-MCNC: 37.5 MG/DL (ref 15–325)
DIFFERENTIAL METHOD: ABNORMAL
EOSINOPHIL # BLD AUTO: 0.2 K/UL (ref 0–0.5)
EOSINOPHIL NFR BLD: 4 % (ref 0–8)
ERYTHROCYTE [DISTWIDTH] IN BLOOD BY AUTOMATED COUNT: 16.1 % (ref 11.5–14.5)
EST. GFR  (NO RACE VARIABLE): 8 ML/MIN/1.73 M^2
FERRITIN SERPL-MCNC: 158 NG/ML (ref 20–300)
GLUCOSE SERPL-MCNC: 130 MG/DL (ref 70–110)
HCT VFR BLD AUTO: 28 % (ref 37–48.5)
HGB BLD-MCNC: 8.6 G/DL (ref 12–16)
IMM GRANULOCYTES # BLD AUTO: 0 K/UL (ref 0–0.04)
IMM GRANULOCYTES NFR BLD AUTO: 0 % (ref 0–0.5)
LYMPHOCYTES # BLD AUTO: 2.3 K/UL (ref 1–4.8)
LYMPHOCYTES NFR BLD: 48.2 % (ref 18–48)
MAGNESIUM SERPL-MCNC: 3.2 MG/DL (ref 1.6–2.6)
MCH RBC QN AUTO: 27.9 PG (ref 27–31)
MCHC RBC AUTO-ENTMCNC: 30.7 G/DL (ref 32–36)
MCV RBC AUTO: 91 FL (ref 82–98)
MONOCYTES # BLD AUTO: 0.4 K/UL (ref 0.3–1)
MONOCYTES NFR BLD: 8.2 % (ref 4–15)
NEUTROPHILS # BLD AUTO: 1.9 K/UL (ref 1.8–7.7)
NEUTROPHILS NFR BLD: 39.2 % (ref 38–73)
NRBC BLD-RTO: 0 /100 WBC
PLATELET # BLD AUTO: 147 K/UL (ref 150–450)
PMV BLD AUTO: 10.7 FL (ref 9.2–12.9)
POCT GLUCOSE: 111 MG/DL (ref 70–110)
POTASSIUM SERPL-SCNC: 5.2 MMOL/L (ref 3.5–5.1)
PROT SERPL-MCNC: 5.7 G/DL (ref 6–8.4)
PROT UR-MCNC: 70 MG/DL (ref 0–15)
PROT/CREAT UR: 1.87 MG/G{CREAT} (ref 0–0.2)
RBC # BLD AUTO: 3.08 M/UL (ref 4–5.4)
SODIUM SERPL-SCNC: 144 MMOL/L (ref 136–145)
T4 FREE SERPL-MCNC: 0.85 NG/DL (ref 0.71–1.51)
TSH SERPL DL<=0.005 MIU/L-ACNC: 28.71 UIU/ML (ref 0.4–4)
WBC # BLD AUTO: 4.77 K/UL (ref 3.9–12.7)

## 2022-08-03 PROCEDURE — 82728 ASSAY OF FERRITIN: CPT | Performed by: INTERNAL MEDICINE

## 2022-08-03 PROCEDURE — 85025 COMPLETE CBC W/AUTO DIFF WBC: CPT | Performed by: INTERNAL MEDICINE

## 2022-08-03 PROCEDURE — 63600175 PHARM REV CODE 636 W HCPCS: Performed by: INTERNAL MEDICINE

## 2022-08-03 PROCEDURE — 80053 COMPREHEN METABOLIC PANEL: CPT | Performed by: INTERNAL MEDICINE

## 2022-08-03 PROCEDURE — 83735 ASSAY OF MAGNESIUM: CPT | Performed by: INTERNAL MEDICINE

## 2022-08-03 PROCEDURE — 99223 PR INITIAL HOSPITAL CARE,LEVL III: ICD-10-PCS | Mod: ,,, | Performed by: INTERNAL MEDICINE

## 2022-08-03 PROCEDURE — 21400001 HC TELEMETRY ROOM

## 2022-08-03 PROCEDURE — 99222 1ST HOSP IP/OBS MODERATE 55: CPT | Mod: ,,, | Performed by: PHYSICIAN ASSISTANT

## 2022-08-03 PROCEDURE — 63600175 PHARM REV CODE 636 W HCPCS: Mod: JG | Performed by: INTERNAL MEDICINE

## 2022-08-03 PROCEDURE — 84443 ASSAY THYROID STIM HORMONE: CPT | Performed by: INTERNAL MEDICINE

## 2022-08-03 PROCEDURE — 84466 ASSAY OF TRANSFERRIN: CPT | Performed by: INTERNAL MEDICINE

## 2022-08-03 PROCEDURE — 82570 ASSAY OF URINE CREATININE: CPT | Performed by: INTERNAL MEDICINE

## 2022-08-03 PROCEDURE — 99223 1ST HOSP IP/OBS HIGH 75: CPT | Mod: ,,, | Performed by: INTERNAL MEDICINE

## 2022-08-03 PROCEDURE — 36415 COLL VENOUS BLD VENIPUNCTURE: CPT | Performed by: INTERNAL MEDICINE

## 2022-08-03 PROCEDURE — 25000003 PHARM REV CODE 250: Performed by: INTERNAL MEDICINE

## 2022-08-03 PROCEDURE — 84439 ASSAY OF FREE THYROXINE: CPT | Performed by: INTERNAL MEDICINE

## 2022-08-03 PROCEDURE — 96376 TX/PRO/DX INJ SAME DRUG ADON: CPT

## 2022-08-03 PROCEDURE — 99222 PR INITIAL HOSPITAL CARE,LEVL II: ICD-10-PCS | Mod: ,,, | Performed by: PHYSICIAN ASSISTANT

## 2022-08-03 RX ORDER — METOLAZONE 2.5 MG/1
2.5 TABLET ORAL DAILY
Status: DISCONTINUED | OUTPATIENT
Start: 2022-08-03 | End: 2022-08-05 | Stop reason: HOSPADM

## 2022-08-03 RX ADMIN — APIXABAN 2.5 MG: 2.5 TABLET, FILM COATED ORAL at 08:08

## 2022-08-03 RX ADMIN — METOLAZONE 2.5 MG: 2.5 TABLET ORAL at 03:08

## 2022-08-03 RX ADMIN — FUROSEMIDE 80 MG: 10 INJECTION, SOLUTION INTRAMUSCULAR; INTRAVENOUS at 08:08

## 2022-08-03 RX ADMIN — LEVOTHYROXINE SODIUM 137 MCG: 0.11 TABLET ORAL at 06:08

## 2022-08-03 RX ADMIN — FERROUS SULFATE TAB 325 MG (65 MG ELEMENTAL FE) 1 EACH: 325 (65 FE) TAB at 08:08

## 2022-08-03 RX ADMIN — PRAVASTATIN SODIUM 10 MG: 10 TABLET ORAL at 08:08

## 2022-08-03 RX ADMIN — ERYTHROPOIETIN 5000 UNITS: 10000 INJECTION, SOLUTION INTRAVENOUS; SUBCUTANEOUS at 04:08

## 2022-08-03 RX ADMIN — CARVEDILOL 25 MG: 12.5 TABLET, FILM COATED ORAL at 04:08

## 2022-08-03 NOTE — SUBJECTIVE & OBJECTIVE
Past Medical History:   Diagnosis Date    Acquired hypothyroidism 9/28/2020    ROD (acute kidney injury) 1/3/2019    Arthritis of knee 1/31/2022    Ascites     Bilateral lower extremity edema 9/2/2016    Breast pain, left 1/4/2018    Cataract     Chest pain, atypical 2/9/2018    CHF (congestive heart failure)     Cirrhosis     Cough     Diabetes mellitus     Diabetes mellitus, type 2     Diarrhea 9/4/2019    Edema 3/19/2018    Epigastric pain 2/11/2020    Gastroparesis     GERD (gastroesophageal reflux disease)     Hepatic encephalopathy 6/19/2018    Pt has history of cirrhosis, seen at [facility] on 6/7/18. Currently on lactulose.    Hyperlipidemia     Hypertension     Hypotension 2/21/2019    Immunization deficiency 2/10/2020    Liver disease     Lumbar strain 4/27/2018    Lumbar strain, sequela 2/17/2020    Macular degeneration     Medication reaction 11/17/2016    Other and unspecified hyperlipidemia 6/11/2012 11:11:32 AM    [facility] Historical - Endocrine/Metabolic/Immune: Hyperlipidemia-No Additional Notes    Pneumonia of right middle lobe due to infectious organism 12/19/2017    Renal disorder     Retinopathy due to secondary diabetes mellitus     Skin lesion 12/20/2021    Sleep apnea     Strain of lumbar region 10/4/2021    Thyroid disease     Type 2 diabetes mellitus with hyperglycemia 10/10/2020       Past Surgical History:   Procedure Laterality Date    CATARACT EXTRACTION      CHOLECYSTECTOMY      COLONOSCOPY N/A 9/4/2019    Procedure: COLONOSCOPY;  Surgeon: Marnie Elmore MD;  Location: [facility];  Service: Endoscopy;  Laterality: N/A;    ERCP      ESOPHAGOGASTRODUODENOSCOPY N/A 7/28/2022    Procedure: EGD (ESOPHAGOGASTRODUODENOSCOPY);  Surgeon: Jimy Katz MD;  Location: [facility] ENDO;  Service: Endoscopy;  Laterality: N/A;    FLUOROSCOPY N/A 7/24/2020    Procedure: TIPS revision;  Surgeon: Martell Jasmine MD;  Location: [facility] CATH LAB;  Service: General;  Laterality: N/A;    LIVER BIOPSY       THORACENTESIS Left 1/20/2020    Procedure: Thoracentesis;  Surgeon: Angelica Hunter MD;  Location: [facility];  Service: Pulmonary;  Laterality: Left;    TUBAL LIGATION      UPPER GASTROINTESTINAL ENDOSCOPY         Review of patient's allergies indicates:   Allergen Reactions    Subsys [fentanyl] Other (See Comments)     After administration pt unresponsive.  HR and respirations decreased.     Versed [midazolam] Other (See Comments)     After administration pt unresponsive.  HR and respirations decreased.     Ampicillin Rash    Codeine Nausea And Vomiting and Nausea Only       No current facility-administered medications on file prior to encounter.     Current Outpatient Medications on File Prior to Encounter   Medication Sig    amLODIPine (NORVASC) 5 MG tablet Take 1 tablet (5 mg total) by mouth once daily.    apixaban (ELIQUIS) 5 mg Tab Take 1 tablet (5 mg total) by mouth 2 (two) times daily.    carvediloL (COREG) 25 MG tablet Take 1 tablet (25 mg total) by mouth 2 (two) times daily with meals.    ciprofloxacin HCl (CIPRO) 500 MG tablet Take 1 tablet (500 mg total) by mouth every 12 (twelve) hours. (Patient not taking: Reported on 8/1/2022)    docusate sodium (COLACE) 100 MG capsule Take 1 capsule (100 mg total) by mouth 3 (three) times daily. Hold for diarrhea    dulaglutide (TRULICITY) 4.5 mg/0.5 mL pen injector Inject 4.5 mg into the skin every 7 days.    famotidine (PEPCID) 20 MG tablet Take 1 tablet (20 mg total) by mouth once daily.    ferrous sulfate (IRON ORAL) Take by mouth.    fluticasone propionate (FLONASE) 50 mcg/actuation nasal spray 2 sprays (100 mcg total) by Each Nostril route once daily.    garlic (GARLIQUE ORAL) Take 1 tablet by mouth once daily.    isosorbide mononitrate (IMDUR) 60 MG 24 hr tablet Take 1 tablet (60 mg total) by mouth every evening.    levothyroxine (EUTHYROX) 137 MCG Tab tablet Take 1 tablet (137 mcg total) by mouth before breakfast.    LIDOcaine (LMX) 4 % cream Apply  topically as needed.    magnesium oxide (MAG-OX) 400 mg (241.3 mg magnesium) tablet Take 1 tablet (400 mg total) by mouth once daily.    omega-3 fatty acids/fish oil (FISH OIL-OMEGA-3 FATTY ACIDS) 300-1,000 mg capsule Take by mouth once daily.    ondansetron (ZOFRAN) 4 MG tablet Take 1 tablet (4 mg total) by mouth every 8 (eight) hours as needed.    pravastatin (PRAVACHOL) 10 MG tablet Take 1 tablet (10 mg total) by mouth once daily.    pulse oximeter (PULSE OXIMETER) device by Apply Externally route 2 (two) times a day. Use twice daily at 8 AM and 3 PM and record the value in [employer] as directed.    rifAXIMin (XIFAXAN) 550 mg Tab Take 1 tablet (550 mg total) by mouth 2 (two) times daily.    STOOL SOFTENER 100 mg capsule TAKE 1 CAPSULE BY MOUTH THREE TIMES DAILY FOR DIARRHEA (HOLD FOR DIARRHEA)    torsemide (DEMADEX) 20 MG Tab Take 2 tablets (40 mg total) by mouth once daily.     Family History       Problem Relation (Age of Onset)    Aneurysm Sister    Breast cancer Mother    Cancer Mother, Maternal Grandfather    Heart disease Father, Brother    No Known Problems Maternal Grandmother    Sarcoidosis Sister          Tobacco Use    Smoking status: Never Smoker    Smokeless tobacco: Never Used   Substance and Sexual Activity    Alcohol use: No    Drug use: No    Sexual activity: Not Currently     Review of Systems   Constitutional: Positive for malaise/fatigue.   HENT: Negative.     Eyes: Negative.    Cardiovascular:  Positive for dyspnea on exertion and leg swelling.   Respiratory:  Positive for shortness of breath.    Endocrine: Negative.    Hematologic/Lymphatic: Negative.    Skin: Negative.    Musculoskeletal: Negative.    Gastrointestinal:  Positive for bloating.   Genitourinary: Negative.    Neurological: Negative.    Psychiatric/Behavioral: Negative.     Allergic/Immunologic: Negative.    Objective:     Vital Signs (Most Recent):  Temp: 98.2 °F (36.8 °C) (08/03/22 1111)  Pulse: 65 (08/03/22 1111)  Resp: 14  (08/03/22 1111)  BP: (!) 147/69 (08/03/22 1111)  SpO2: 97 % (08/03/22 1111)   Vital Signs (24h Range):  Temp:  [97.9 °F (36.6 °C)-98.2 °F (36.8 °C)] 98.2 °F (36.8 °C)  Pulse:  [59-67] 65  Resp:  [12-18] 14  SpO2:  [97 %-100 %] 97 %  BP: (119-147)/(57-69) 147/69     Weight: 96.5 kg (212 lb 11.9 oz)  Body mass index is 40.2 kg/m².    SpO2: 97 %  O2 Device (Oxygen Therapy): room air      Intake/Output Summary (Last 24 hours) at 8/3/2022 1145  Last data filed at 8/3/2022 0800  Gross per 24 hour   Intake 680 ml   Output --   Net 680 ml       Lines/Drains/Airways       Peripheral Intravenous Line  Duration                  Peripheral IV - Single Lumen 08/03/22 0730 20 G Right Forearm <1 day                    Physical Exam  Vitals and nursing note reviewed.   Constitutional:       General: She is not in acute distress.     Appearance: Normal appearance. She is well-developed. She is not diaphoretic.   HENT:      Head: Normocephalic and atraumatic.   Eyes:      General:         Right eye: No discharge.         Left eye: No discharge.      Pupils: Pupils are equal, round, and reactive to light.   Neck:      Thyroid: No thyromegaly.      Vascular: No JVD.      Trachea: No tracheal deviation.   Cardiovascular:      Rate and Rhythm: Normal rate and regular rhythm.      Heart sounds: Normal heart sounds, S1 normal and S2 normal. No murmur heard.  Pulmonary:      Effort: Pulmonary effort is normal. No respiratory distress.      Breath sounds: No wheezing.      Comments: Diminished BS at base  Abdominal:      General: There is no distension.      Tenderness: There is no abdominal tenderness. There is no rebound.   Musculoskeletal:      Cervical back: Neck supple.      Right lower leg: Edema (4+) present.      Left lower leg: Edema (4+) present.   Skin:     General: Skin is warm and dry.      Findings: No erythema.   Neurological:      General: No focal deficit present.      Mental Status: She is alert and oriented to person,  place, and time.   Psychiatric:         Mood and Affect: Mood normal.         Behavior: Behavior normal.         Thought Content: Thought content normal.       Significant Labs: CMP   Recent Labs   Lab 08/01/22  1654 08/02/22  1652 08/03/22  0258    141 144   K 5.0 5.1 5.2*    109 110   CO2 24 24 25    108 130*   BUN 64* 63* 67*   CREATININE 5.7* 5.5* 5.7*   CALCIUM 8.1* 8.0* 8.0*   PROT 6.2 6.4 5.7*   ALBUMIN 2.8* 2.8* 2.7*   BILITOT 0.2 0.3 0.2   ALKPHOS 64 70 66   AST 24 26 22   ALT 13 14 14   ANIONGAP 9 8 9   , CBC   Recent Labs   Lab 08/02/22  1557 08/03/22  0258   WBC 4.72 4.77   HGB 8.6* 8.6*   HCT 27.2* 28.0*    147*   , Troponin   Recent Labs   Lab 08/02/22  1652   TROPONINI 0.013   , and All pertinent lab results from the last 24 hours have been reviewed.    Significant Imaging: Echocardiogram: Transthoracic echo (TTE) complete (Cupid Only):   Results for orders placed or performed during the hospital encounter of 01/10/22   Echo   Result Value Ref Range    BSA 2.04 m2    Left Ventricular Outflow Tract Mean Velocity 8.7085183583 cm/s    Left Ventricular Outflow Tract Mean Gradient 4.41 mmHg    TV mean gradient 23 mmHg    LVIDd 4.56 3.5 - 6.0 cm    IVS 1.31 (A) 0.6 - 1.1 cm    Posterior Wall 1.30 (A) 0.6 - 1.1 cm    Ao root annulus 2.77 cm    LVIDs 2.47 2.1 - 4.0 cm    FS 46 28 - 44 %    Ascending aorta 2.75 cm    LV mass 228.39 g    LA size 3.83 cm    Left Ventricle Relative Wall Thickness 0.57 cm    AV mean gradient 9 mmHg    AV valve area 2.07 cm2    AV Velocity Ratio 0.70     AV index (prosthetic) 0.75     MV valve area p 1/2 method 5.21 cm2    E/A ratio 1.46     E wave deceleration time 145.525516501698176 msec    IVRT 55.102581430930235 msec    LVOT diameter 1.87 cm    LVOT area 2.7 cm2    LVOT peak kelin 1.29 m/s    LVOT peak VTI 30.30 cm    Ao peak kelin 1.85 m/s    Ao VTI 40.2 cm    RVOT peak kelin 0.90 m/s    RVOT peak VTI 20.8 cm    Mr max kelin 4.04 m/s    LVOT stroke volume  83.18 cm3    AV peak gradient 14 mmHg    PV mean gradient 2.38 mmHg    MV Peak E Bart 1.24 m/s    TR Max Bart 2.81 m/s    MV stenosis pressure 1/2 time 42.7436968 ms    MV Peak A Bart 0.85 m/s    LV Systolic Volume 21.58 mL    LV Systolic Volume Index 11.1 mL/m2    LV Diastolic Volume 95.37 mL    LV Diastolic Volume Index 49.16 mL/m2    LV Mass Index 118 g/m2    Echo EF Estimated 77 %    RA Major Axis 4.71 cm    Left Atrium Minor Axis 5.15 cm    Left Atrium Major Axis 5.56 cm    Triscuspid Valve Regurgitation Peak Gradient 32 mmHg    Right Atrial Pressure (from IVC) 3 mmHg    EF 60 %    TV rest pulmonary artery pressure 35 mmHg    Narrative    · The left ventricle is normal in size with concentric hypertrophy and   normal systolic function.  · Indeterminate left ventricular diastolic function.  · The estimated PA systolic pressure is 35 mmHg.  · Normal right ventricular size with normal right ventricular systolic   function.  · Normal central venous pressure (3 mmHg).  · The estimated ejection fraction is 60%.  · Mild mitral regurgitation.  · Mild tricuspid regurgitation.      , EKG: Reviewed, and X-Ray: CXR: X-Ray Chest 1 View (CXR):   Results for orders placed or performed during the hospital encounter of 08/02/22   X-Ray Chest 1 View    Narrative    EXAMINATION:  XR CHEST 1 VIEW    CLINICAL HISTORY:  Shortness of breath    COMPARISON:  June 20, 2022, as well as studies dating back to June 22, 2020    FINDINGS:  Chronic or recurrent blunting of the left lateral costophrenic angle and to lesser degree the left costophrenic angle with areas of scarring or atelectasis in the mid lower left lung again seen.  The lungs are free of new pulmonary opacities.  The cardiac silhouette size is markedly enlarged.  The trachea is midline and the mediastinal width is normal. Negative for pneumothorax.  Pulmonary vasculature is chronically mildly congested.  Negative for osseous abnormalities. Tortuous aorta.  Degenerative changes  of the spine with convex right curvature.  Degenerative changes of the shoulder girdles.  Azygous lobe.      Impression    1.  Chronic or recurrent vascular congestion and left greater than right pleural effusions or scarring.  Negative for new pulmonary opacities.  Chronic or recurrent interstitial pulmonary edema must be considered.    2.  Markedly enlarged cardiac silhouette.  Pericardial effusion not excluded.  Clinical correlation is advised.    3.  Other stable findings as noted above.      Electronically signed by: Jose Rafael Pedraza MD  Date:    08/02/2022  Time:    15:22    and X-Ray Chest PA and Lateral (CXR): No results found for this visit on 08/02/22.

## 2022-08-03 NOTE — HPI
Ms. 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 Patient complained of increased BLE edema along with increased SOB and weakness. No associated morena chest pain, fever, chills, palpitations, near syncope, or syncope. Initial workup in ED reveled creatinine of 5.5, hypoalbuminemia (2.7), and BNP of 347. CXR showed findings consistent with probable chronic edema and patient was subsequently admitted for further evaluation and treatment. Cardiology consulted to assist with management. Patient seen and examined today, resting in bed. Feels slightly better. Still grossly edematous. Chest pain free. She reports compliance with her medications. Followed routinely in clinic by Dr. Ramos. Echo 1/10/22 showed normal EF. Nephrology consulted to assist with management.

## 2022-08-03 NOTE — SUBJECTIVE & OBJECTIVE
Past Medical History:   Diagnosis Date    Acquired hypothyroidism 9/28/2020    ROD (acute kidney injury) 1/3/2019    Arthritis of knee 1/31/2022    Ascites     Bilateral lower extremity edema 9/2/2016    Breast pain, left 1/4/2018    Cataract     Chest pain, atypical 2/9/2018    CHF (congestive heart failure)     Cirrhosis     Cough     Diabetes mellitus     Diabetes mellitus, type 2     Diarrhea 9/4/2019    Edema 3/19/2018    Epigastric pain 2/11/2020    Gastroparesis     GERD (gastroesophageal reflux disease)     Hepatic encephalopathy 6/19/2018    Pt has history of cirrhosis, seen at [facility] on 6/7/18. Currently on lactulose.    Hyperlipidemia     Hypertension     Hypotension 2/21/2019    Immunization deficiency 2/10/2020    Liver disease     Lumbar strain 4/27/2018    Lumbar strain, sequela 2/17/2020    Macular degeneration     Medication reaction 11/17/2016    Other and unspecified hyperlipidemia 6/11/2012 11:11:32 AM    [facility] Historical - Endocrine/Metabolic/Immune: Hyperlipidemia-No Additional Notes    Pneumonia of right middle lobe due to infectious organism 12/19/2017    Renal disorder     Retinopathy due to secondary diabetes mellitus     Skin lesion 12/20/2021    Sleep apnea     Strain of lumbar region 10/4/2021    Thyroid disease     Type 2 diabetes mellitus with hyperglycemia 10/10/2020       Past Surgical History:   Procedure Laterality Date    CATARACT EXTRACTION      CHOLECYSTECTOMY      COLONOSCOPY N/A 9/4/2019    Procedure: COLONOSCOPY;  Surgeon: Marnie Elmore MD;  Location: [facility];  Service: Endoscopy;  Laterality: N/A;    ERCP      ESOPHAGOGASTRODUODENOSCOPY N/A 7/28/2022    Procedure: EGD (ESOPHAGOGASTRODUODENOSCOPY);  Surgeon: Jimy Katz MD;  Location: [facility] [place] ENDO;  Service: Endoscopy;  Laterality: N/A;    FLUOROSCOPY N/A 7/24/2020    Procedure: TIPS revision;  Surgeon: Martell Jasmine MD;  Location: [facility] [place] CATH LAB;  Service: General;  Laterality: N/A;    LIVER BIOPSY       THORACENTESIS Left 1/20/2020    Procedure: Thoracentesis;  Surgeon: Angelica Hunter MD;  Location: [facility];  Service: Pulmonary;  Laterality: Left;    TUBAL LIGATION      UPPER GASTROINTESTINAL ENDOSCOPY         Review of patient's allergies indicates:   Allergen Reactions    Subsys [fentanyl] Other (See Comments)     After administration pt unresponsive.  HR and respirations decreased.     Versed [midazolam] Other (See Comments)     After administration pt unresponsive.  HR and respirations decreased.     Ampicillin Rash    Codeine Nausea And Vomiting and Nausea Only       No current facility-administered medications on file prior to encounter.     Current Outpatient Medications on File Prior to Encounter   Medication Sig    amLODIPine (NORVASC) 5 MG tablet Take 1 tablet (5 mg total) by mouth once daily.    apixaban (ELIQUIS) 5 mg Tab Take 1 tablet (5 mg total) by mouth 2 (two) times daily.    carvediloL (COREG) 25 MG tablet Take 1 tablet (25 mg total) by mouth 2 (two) times daily with meals.    ciprofloxacin HCl (CIPRO) 500 MG tablet Take 1 tablet (500 mg total) by mouth every 12 (twelve) hours. (Patient not taking: Reported on 8/1/2022)    docusate sodium (COLACE) 100 MG capsule Take 1 capsule (100 mg total) by mouth 3 (three) times daily. Hold for diarrhea    dulaglutide (TRULICITY) 4.5 mg/0.5 mL pen injector Inject 4.5 mg into the skin every 7 days.    famotidine (PEPCID) 20 MG tablet Take 1 tablet (20 mg total) by mouth once daily.    ferrous sulfate (IRON ORAL) Take by mouth.    fluticasone propionate (FLONASE) 50 mcg/actuation nasal spray 2 sprays (100 mcg total) by Each Nostril route once daily.    garlic (GARLIQUE ORAL) Take 1 tablet by mouth once daily.    isosorbide mononitrate (IMDUR) 60 MG 24 hr tablet Take 1 tablet (60 mg total) by mouth every evening.    levothyroxine (EUTHYROX) 137 MCG Tab tablet Take 1 tablet (137 mcg total) by mouth before breakfast.    LIDOcaine (LMX) 4 % cream Apply  topically as needed.    magnesium oxide (MAG-OX) 400 mg (241.3 mg magnesium) tablet Take 1 tablet (400 mg total) by mouth once daily.    omega-3 fatty acids/fish oil (FISH OIL-OMEGA-3 FATTY ACIDS) 300-1,000 mg capsule Take by mouth once daily.    ondansetron (ZOFRAN) 4 MG tablet Take 1 tablet (4 mg total) by mouth every 8 (eight) hours as needed.    pravastatin (PRAVACHOL) 10 MG tablet Take 1 tablet (10 mg total) by mouth once daily.    pulse oximeter (PULSE OXIMETER) device by Apply Externally route 2 (two) times a day. Use twice daily at 8 AM and 3 PM and record the value in [facility] as directed.    rifAXIMin (XIFAXAN) 550 mg Tab Take 1 tablet (550 mg total) by mouth 2 (two) times daily.    STOOL SOFTENER 100 mg capsule TAKE 1 CAPSULE BY MOUTH THREE TIMES DAILY FOR DIARRHEA (HOLD FOR DIARRHEA)    torsemide (DEMADEX) 20 MG Tab Take 2 tablets (40 mg total) by mouth once daily.     Family History       Problem Relation (Age of Onset)    Aneurysm Sister    Breast cancer Mother    Cancer Mother, Maternal Grandfather    Heart disease Father, Brother    No Known Problems Maternal Grandmother    Sarcoidosis Sister          Tobacco Use    Smoking status: Never Smoker    Smokeless tobacco: Never Used   Substance and Sexual Activity    Alcohol use: No    Drug use: No    Sexual activity: Not Currently     Review of Systems   Constitutional:  Positive for fatigue. Negative for chills and fever.   HENT: Negative.  Negative for congestion, rhinorrhea, sore throat and trouble swallowing.    Eyes: Negative.  Negative for visual disturbance.   Respiratory:  Positive for shortness of breath. Negative for cough and wheezing.    Cardiovascular:  Positive for leg swelling. Negative for chest pain and palpitations.   Gastrointestinal: Negative.  Negative for abdominal pain, diarrhea, nausea and vomiting.   Endocrine: Negative.    Genitourinary: Negative.  Negative for dysuria and flank pain.   Musculoskeletal: Negative.  Negative for  back pain.   Skin: Negative.  Negative for rash.   Allergic/Immunologic: Negative.    Neurological:  Negative for speech difficulty, numbness and headaches.   Hematological: Negative.    Psychiatric/Behavioral: Negative.  Negative for hallucinations. The patient is not nervous/anxious.    All other systems reviewed and are negative.  Objective:     Vital Signs (Most Recent):  Temp: 97.9 °F (36.6 °C) (08/02/22 1453)  Pulse: 66 (08/02/22 1803)  Resp: 18 (08/02/22 1453)  BP: (!) 140/67 (08/02/22 1803)  SpO2: 100 % (08/02/22 1803)   Vital Signs (24h Range):  Temp:  [97.9 °F (36.6 °C)] 97.9 °F (36.6 °C)  Pulse:  [62-66] 66  Resp:  [18] 18  SpO2:  [100 %] 100 %  BP: (119-140)/(57-67) 140/67     Weight: 96.6 kg (212 lb 13.7 oz)  Body mass index is 40.22 kg/m².    Physical Exam  Vitals and nursing note reviewed.   Constitutional:       General: She is awake. She is not in acute distress.     Appearance: She is morbidly obese. She is ill-appearing.   HENT:      Head: Normocephalic and atraumatic.      Mouth/Throat:      Mouth: Mucous membranes are moist.   Eyes:      General: No scleral icterus.     Conjunctiva/sclera: Conjunctivae normal.   Cardiovascular:      Rate and Rhythm: Normal rate and regular rhythm.      Heart sounds: No murmur heard.  Pulmonary:      Effort: Pulmonary effort is normal. No respiratory distress.      Breath sounds: Rales present. No wheezing.   Abdominal:      Palpations: Abdomen is soft.      Tenderness: There is no abdominal tenderness.      Comments: Obese abdomen   Musculoskeletal:         General: Swelling (Anasarca.  4+ bilateral pretibial pitting edema.) present. Normal range of motion.      Cervical back: Normal range of motion and neck supple.   Skin:     General: Skin is warm.      Coloration: Skin is not jaundiced.   Neurological:      General: No focal deficit present.      Mental Status: She is alert and oriented to person, place, and time. Mental status is at baseline.   Psychiatric:          Attention and Perception: Attention normal.         Mood and Affect: Mood normal.         Speech: Speech normal.         Behavior: Behavior normal. Behavior is cooperative.           Significant Labs: All pertinent labs within the past 24 hours have been reviewed.  Bilirubin:   Recent Labs   Lab 08/01/22 1654 08/02/22  1652   BILITOT 0.2 0.3     BMP:   Recent Labs   Lab 08/01/22  1654 08/02/22  1652    108    141   K 5.0 5.1    109   CO2 24 24   BUN 64* 63*   CREATININE 5.7* 5.5*   CALCIUM 8.1* 8.0*   MG 3.3*  --      CBC:   Recent Labs   Lab 08/02/22  1557   WBC 4.72   HGB 8.6*   HCT 27.2*        CMP:   Recent Labs   Lab 08/01/22 1654 08/02/22  1652    141   K 5.0 5.1    109   CO2 24 24    108   BUN 64* 63*   CREATININE 5.7* 5.5*   CALCIUM 8.1* 8.0*   PROT 6.2 6.4   ALBUMIN 2.8* 2.8*   BILITOT 0.2 0.3   ALKPHOS 64 70   AST 24 26   ALT 13 14   ANIONGAP 9 8     Cardiac Markers:   Recent Labs   Lab 08/02/22  1557   *     Troponin:   Recent Labs   Lab 08/02/22  1652   TROPONINI 0.013     Significant Imaging: I have reviewed all pertinent imaging results/findings within the past 24 hours.  I have reviewed and interpreted all pertinent imaging results/findings within the past 24 hours.    Imaging Results              X-Ray Chest 1 View (Final result)  Result time 08/02/22 15:22:11      Final result by Jose Rafael Pedraza MD (08/02/22 15:22:11)                   Impression:      1.  Chronic or recurrent vascular congestion and left greater than right pleural effusions or scarring.  Negative for new pulmonary opacities.  Chronic or recurrent interstitial pulmonary edema must be considered.    2.  Markedly enlarged cardiac silhouette.  Pericardial effusion not excluded.  Clinical correlation is advised.    3.  Other stable findings as noted above.      Electronically signed by: Jose Rafael Pedraza MD  Date:    08/02/2022  Time:    15:22               Narrative:     EXAMINATION:  XR CHEST 1 VIEW    CLINICAL HISTORY:  Shortness of breath    COMPARISON:  June 20, 2022, as well as studies dating back to June 22, 2020    FINDINGS:  Chronic or recurrent blunting of the left lateral costophrenic angle and to lesser degree the left costophrenic angle with areas of scarring or atelectasis in the mid lower left lung again seen.  The lungs are free of new pulmonary opacities.  The cardiac silhouette size is markedly enlarged.  The trachea is midline and the mediastinal width is normal. Negative for pneumothorax.  Pulmonary vasculature is chronically mildly congested.  Negative for osseous abnormalities. Tortuous aorta.  Degenerative changes of the spine with convex right curvature.  Degenerative changes of the shoulder girdles.  Azygous lobe.                                      I have independently reviewed and interpreted the EKG.     I have independently reviewed all pertinent labs within the past 24 hours.    I have independently reviewed, visualized and interpreted all pertinent imaging results within the past 24 hours and discussed the findings with the ED physician, Dr. Guerin

## 2022-08-03 NOTE — SUBJECTIVE & OBJECTIVE
Interval History: Continue IV diuresis, Nephrology and cardiology following.     Review of Systems   Constitutional:  Positive for fatigue. Negative for chills and fever.   HENT: Negative.  Negative for congestion, rhinorrhea, sore throat and trouble swallowing.    Eyes: Negative.  Negative for visual disturbance.   Respiratory:  Positive for shortness of breath. Negative for cough and wheezing.    Cardiovascular:  Positive for leg swelling. Negative for chest pain and palpitations.   Gastrointestinal: Negative.  Negative for abdominal pain, diarrhea, nausea and vomiting.   Endocrine: Negative.    Genitourinary: Negative.  Negative for dysuria and flank pain.   Musculoskeletal: Negative.  Negative for back pain.   Skin: Negative.  Negative for rash.   Allergic/Immunologic: Negative.    Neurological:  Negative for speech difficulty, numbness and headaches.   Hematological: Negative.    Psychiatric/Behavioral: Negative.  Negative for hallucinations. The patient is not nervous/anxious.    All other systems reviewed and are negative.  Objective:     Vital Signs (Most Recent):  Temp: 99 °F (37.2 °C) (08/03/22 1500)  Pulse: 64 (08/03/22 1500)  Resp: 16 (08/03/22 1500)  BP: (!) 141/67 (08/03/22 1500)  SpO2: 100 % (08/03/22 1500)   Vital Signs (24h Range):  Temp:  [98.1 °F (36.7 °C)-99 °F (37.2 °C)] 99 °F (37.2 °C)  Pulse:  [59-67] 64  Resp:  [12-16] 16  SpO2:  [97 %-100 %] 100 %  BP: (130-147)/(60-69) 141/67     Weight: 96.5 kg (212 lb 11.9 oz)  Body mass index is 40.2 kg/m².    Intake/Output Summary (Last 24 hours) at 8/3/2022 1722  Last data filed at 8/3/2022 1200  Gross per 24 hour   Intake 880 ml   Output --   Net 880 ml      Physical Exam  Vitals and nursing note reviewed.   Constitutional:       General: She is awake. She is not in acute distress.     Appearance: She is morbidly obese. She is ill-appearing.   HENT:      Head: Normocephalic and atraumatic.      Mouth/Throat:      Mouth: Mucous membranes are moist.    Eyes:      General: No scleral icterus.     Conjunctiva/sclera: Conjunctivae normal.   Cardiovascular:      Rate and Rhythm: Normal rate and regular rhythm.      Heart sounds: No murmur heard.  Pulmonary:      Effort: Pulmonary effort is normal. No respiratory distress.      Breath sounds: Examination of the right-lower field reveals rales. Examination of the left-lower field reveals rales. Rales present. No wheezing.   Abdominal:      Palpations: Abdomen is soft.      Tenderness: There is no abdominal tenderness.      Comments: Anasarca present    Musculoskeletal:         General: Swelling (3+ bilateral pretibial pitting edema.) present. Normal range of motion.      Cervical back: Normal range of motion and neck supple.   Skin:     General: Skin is warm.      Coloration: Skin is not jaundiced.   Neurological:      General: No focal deficit present.      Mental Status: She is alert and oriented to person, place, and time. Mental status is at baseline.   Psychiatric:         Attention and Perception: Attention normal.         Mood and Affect: Mood normal.         Speech: Speech normal.         Behavior: Behavior normal. Behavior is cooperative.       Significant Labs: All pertinent labs within the past 24 hours have been reviewed.  CBC:   Recent Labs   Lab 08/02/22  1557 08/03/22  0258   WBC 4.72 4.77   HGB 8.6* 8.6*   HCT 27.2* 28.0*    147*     CMP:   Recent Labs   Lab 08/02/22  1652 08/03/22  0258    144   K 5.1 5.2*    110   CO2 24 25    130*   BUN 63* 67*   CREATININE 5.5* 5.7*   CALCIUM 8.0* 8.0*   PROT 6.4 5.7*   ALBUMIN 2.8* 2.7*   BILITOT 0.3 0.2   ALKPHOS 70 66   AST 26 22   ALT 14 14   ANIONGAP 8 9     Cardiac Markers:   Recent Labs   Lab 08/02/22  1557   *     Magnesium:   Recent Labs   Lab 08/03/22  0258   MG 3.2*     POCT Glucose:   Recent Labs   Lab 08/03/22  0713   POCTGLUCOSE 111*       Significant Imaging:     Imaging Results              X-Ray Chest 1 View (Final  result)  Result time 08/02/22 15:22:11      Final result by Jose Rafael Pedraza MD (08/02/22 15:22:11)                   Impression:      1.  Chronic or recurrent vascular congestion and left greater than right pleural effusions or scarring.  Negative for new pulmonary opacities.  Chronic or recurrent interstitial pulmonary edema must be considered.    2.  Markedly enlarged cardiac silhouette.  Pericardial effusion not excluded.  Clinical correlation is advised.    3.  Other stable findings as noted above.      Electronically signed by: Jose Rafael Pedraza MD  Date:    08/02/2022  Time:    15:22               Narrative:    EXAMINATION:  XR CHEST 1 VIEW    CLINICAL HISTORY:  Shortness of breath    COMPARISON:  June 20, 2022, as well as studies dating back to June 22, 2020    FINDINGS:  Chronic or recurrent blunting of the left lateral costophrenic angle and to lesser degree the left costophrenic angle with areas of scarring or atelectasis in the mid lower left lung again seen.  The lungs are free of new pulmonary opacities.  The cardiac silhouette size is markedly enlarged.  The trachea is midline and the mediastinal width is normal. Negative for pneumothorax.  Pulmonary vasculature is chronically mildly congested.  Negative for osseous abnormalities. Tortuous aorta.  Degenerative changes of the spine with convex right curvature.  Degenerative changes of the shoulder girdles.  Azygous lobe.

## 2022-08-03 NOTE — ASSESSMENT & PLAN NOTE
-Presents with decompensated diastolic CHF/volume overload  -Underlying cirrhosis and hypoalbuminemia contributing  -Continue IV diuresis  -Continue other home meds as tolerated  -Strict I's/O's

## 2022-08-03 NOTE — ASSESSMENT & PLAN NOTE
Body mass index is 40.22 kg/m². Morbid obesity complicates all aspects of disease management from diagnostic modalities to treatment. Weight loss encouraged and health benefits explained to patient.

## 2022-08-03 NOTE — ASSESSMENT & PLAN NOTE
Patient with acute kidney injury likely due to Cardiorenal syndrome ROD is currently worsening. Labs reviewed- Renal function/electrolytes with Estimated Creatinine Clearance: 10.5 mL/min (A) (based on SCr of 5.7 mg/dL (H)). according to latest data. Monitor urine output and serial BMP and adjust therapy as needed. Avoid nephrotoxins and renally dose meds for GFR listed above.     -serum creatinine has worsened to 5.5 (recently 4.3 about six weeks ago)  -likely cardiorenal syndrome  -Lasix 80 mg IV b.i.d. x2 doses.  -may need ultrafiltration volume control.  -consult Nephrology  -patient followed by Dr. Jacobo as outpatient      8/3/2022  Nephrology following   Continue IV diuresis   Monitor

## 2022-08-03 NOTE — PLAN OF CARE
[facility] - Telemetry (Hospital)  Initial Discharge Assessment       Primary Care Provider: Andrey Perrin MD    Admission Diagnosis: SOB (shortness of breath) [R06.02]  Hypervolemia, unspecified hypervolemia type [E87.70]  Congestive heart failure, unspecified HF chronicity, unspecified heart failure type [I50.9]    Admission Date: 8/2/2022  Expected Discharge Date:     Discharge Barriers Identified: None    Payor: [employer] / Plan: [employer] / Product Type: Medicare Advantage /     Extended Emergency Contact Information  Primary Emergency Contact: Paco Razo   [place]  Mobile Phone: [phone]  Relation: Healthcare Power of     Discharge Plan A: Home with family  Discharge Plan B: Home Health      [facility] - [place] - [address]  [address] [place] [address]  Phone: [phone] Fax: [phone]    [facility] - [place], [place] - [address]  [address]  [place] [address]  Phone: [phone] Fax: [phone]    [facility] - [place] - [address]  [address]  [place] [address]  Phone: [phone] Fax: [phone]    [facility] (Now [facility]) - [place], [place] - [address]  [address] [address]  Phone: [phone] Fax: [phone]    [facility]  [place], [place] - [address]  [address]  [place] [address]  Phone: [phone] Fax: [phone]      Initial Assessment (most recent)     Adult Discharge Assessment - 08/03/22 1122        Discharge Assessment    Assessment Type Discharge Planning Assessment     Confirmed/corrected address, phone number and insurance Yes     Confirmed Demographics Correct on Facesheet     Source of Information patient     Communicated GLILIAN with patient/caregiver Date not available/Unable to determine     Reason  For Admission renal failure     Lives With child(yaw), adult     Facility Arrived From: home     Do you expect to return to your current living situation? Yes     Do you have help at home or someone to help you manage your care at home? Yes     Who are your caregiver(s) and their phone number(s)? lives with daughter     Prior to hospitilization cognitive status: Alert/Oriented     Current cognitive status: Alert/Oriented     Walking or Climbing Stairs Difficulty ambulation difficulty, requires equipment     Mobility Management walker when needed     Dressing/Bathing Difficulty none     Equipment Currently Used at Home walker, rolling     Readmission within 30 days? No     Patient currently being followed by outpatient case management? No     Do you currently have service(s) that help you manage your care at home? No     Do you take prescription medications? Yes     Do you have prescription coverage? Yes     Do you have any problems affording any of your prescribed medications? No     Is the patient taking medications as prescribed? yes     Who is going to help you get home at discharge? Paco pryor     How do you get to doctors appointments? family or friend will provide     Are you on dialysis? No     Discharge Plan A Home with family     Discharge Plan B Home Health     DME Needed Upon Discharge  none     Discharge Plan discussed with: Patient     Discharge Barriers Identified None               Anticipated DC Dispo: home with daughter, patient was going to outpt PT several times per week prior to admission  Prior Level of Function: independent, use of walker as needed.  PCP: Dr. Perrin  Comments:  CM met with patient at bedside to introduce role and discuss d/c planning. Patient is independent, lives with daughter. Family will provide transport upon d/c. CM following for needs.

## 2022-08-03 NOTE — H&P
[facility] - Emergency Dept.  [facility]  History & Physical    Patient Name: Diamond Das  MRN: [id number]  Patient Class: OP- Observation  Admission Date: 8/2/2022  Attending Physician: Hugo Guerin MD   Primary Care Provider: Andrey Perrin MD         Patient information was obtained from patient, past medical records and ER records.     Subjective:     Principal Problem:Acute renal failure with acute renal cortical necrosis superimposed on stage 5 chronic kidney disease, not on chronic dialysis    Chief Complaint:   Chief Complaint   Patient presents with    Shortness of Breath     Pt reports she is retaining fluid in BLE. Pt is also feeling SOB. Pt denies CP, dizziness, or HA        HPI: Ms. Das is a 67-year-old  female with PMH significant for diastolic CHF, CKD stage 5, followed by Dr. 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  Patient has significant bilateral lower extremity edema, and has been having multiple changes to her diuretic regimen recently.  She was advised to go to the ED to be admitted for IV diuretics.  Denies SOB at rest, but unable to walk few steps due to increasing short of breath.  She is chronically home oxygen dependent at 2-3 L O2 N/C.  In the ED she received Lasix 40 mg IV x1.  Creatinine is worsened to 5.5 (in baseline 4.3).    Admitting diagnosis:  ROD on CKD stage 5. Cardiorenal syndrome.  Anasarca.  Acute on chronic diastolic CHF.      Past Medical History:   Diagnosis Date    Acquired hypothyroidism 9/28/2020    ROD (acute kidney injury) 1/3/2019    Arthritis of knee 1/31/2022    Ascites     Bilateral lower extremity edema 9/2/2016    Breast pain, left 1/4/2018    Cataract     Chest pain, atypical 2/9/2018    CHF (congestive heart failure)     Cirrhosis      Cough     Diabetes mellitus     Diabetes mellitus, type 2     Diarrhea 9/4/2019    Edema 3/19/2018    Epigastric pain 2/11/2020    Gastroparesis     GERD (gastroesophageal reflux disease)     Hepatic encephalopathy 6/19/2018    Pt has history of cirrhosis, seen at [facility] on 6/7/18. Currently on lactulose.    Hyperlipidemia     Hypertension     Hypotension 2/21/2019    Immunization deficiency 2/10/2020    Liver disease     Lumbar strain 4/27/2018    Lumbar strain, sequela 2/17/2020    Macular degeneration     Medication reaction 11/17/2016    Other and unspecified hyperlipidemia 6/11/2012 11:11:32 AM    [facility] Historical - Endocrine/Metabolic/Immune: Hyperlipidemia-No Additional Notes    Pneumonia of right middle lobe due to infectious organism 12/19/2017    Renal disorder     Retinopathy due to secondary diabetes mellitus     Skin lesion 12/20/2021    Sleep apnea     Strain of lumbar region 10/4/2021    Thyroid disease     Type 2 diabetes mellitus with hyperglycemia 10/10/2020       Past Surgical History:   Procedure Laterality Date    CATARACT EXTRACTION      CHOLECYSTECTOMY      COLONOSCOPY N/A 9/4/2019    Procedure: COLONOSCOPY;  Surgeon: Marnie Elmore MD;  Location[facility];  Service: Endoscopy;  Laterality: N/A;    ERCP      ESOPHAGOGASTRODUODENOSCOPY N/A 7/28/2022    Procedure: EGD (ESOPHAGOGASTRODUODENOSCOPY);  Surgeon: Jimy Katz MD;  Location: [facility];  Service: Endoscopy;  Laterality: N/A;    FLUOROSCOPY N/A 7/24/2020    Procedure: TIPS revision;  Surgeon: Martell Jasmine MD;  Location: [facility] CATH LAB;  Service: General;  Laterality: N/A;    LIVER BIOPSY      THORACENTESIS Left 1/20/2020    Procedure: Thoracentesis;  Surgeon: Angelica Hunter MD;  Location: [facility];  Service: Pulmonary;  Laterality: Left;    TUBAL LIGATION      UPPER GASTROINTESTINAL ENDOSCOPY         Review of patient's allergies indicates:   Allergen Reactions    Subsys  [fentanyl] Other (See Comments)     After administration pt unresponsive.  HR and respirations decreased.     Versed [midazolam] Other (See Comments)     After administration pt unresponsive.  HR and respirations decreased.     Ampicillin Rash    Codeine Nausea And Vomiting and Nausea Only       No current facility-administered medications on file prior to encounter.     Current Outpatient Medications on File Prior to Encounter   Medication Sig    amLODIPine (NORVASC) 5 MG tablet Take 1 tablet (5 mg total) by mouth once daily.    apixaban (ELIQUIS) 5 mg Tab Take 1 tablet (5 mg total) by mouth 2 (two) times daily.    carvediloL (COREG) 25 MG tablet Take 1 tablet (25 mg total) by mouth 2 (two) times daily with meals.    ciprofloxacin HCl (CIPRO) 500 MG tablet Take 1 tablet (500 mg total) by mouth every 12 (twelve) hours. (Patient not taking: Reported on 8/1/2022)    docusate sodium (COLACE) 100 MG capsule Take 1 capsule (100 mg total) by mouth 3 (three) times daily. Hold for diarrhea    dulaglutide (TRULICITY) 4.5 mg/0.5 mL pen injector Inject 4.5 mg into the skin every 7 days.    famotidine (PEPCID) 20 MG tablet Take 1 tablet (20 mg total) by mouth once daily.    ferrous sulfate (IRON ORAL) Take by mouth.    fluticasone propionate (FLONASE) 50 mcg/actuation nasal spray 2 sprays (100 mcg total) by Each Nostril route once daily.    garlic (GARLIQUE ORAL) Take 1 tablet by mouth once daily.    isosorbide mononitrate (IMDUR) 60 MG 24 hr tablet Take 1 tablet (60 mg total) by mouth every evening.    levothyroxine (EUTHYROX) 137 MCG Tab tablet Take 1 tablet (137 mcg total) by mouth before breakfast.    LIDOcaine (LMX) 4 % cream Apply topically as needed.    magnesium oxide (MAG-OX) 400 mg (241.3 mg magnesium) tablet Take 1 tablet (400 mg total) by mouth once daily.    omega-3 fatty acids/fish oil (FISH OIL-OMEGA-3 FATTY ACIDS) 300-1,000 mg capsule Take by mouth once daily.    ondansetron (ZOFRAN) 4 MG  tablet Take 1 tablet (4 mg total) by mouth every 8 (eight) hours as needed.    pravastatin (PRAVACHOL) 10 MG tablet Take 1 tablet (10 mg total) by mouth once daily.    pulse oximeter (PULSE OXIMETER) device by Apply Externally route 2 (two) times a day. Use twice daily at 8 AM and 3 PM and record the value in MyChart as directed.    rifAXIMin (XIFAXAN) 550 mg Tab Take 1 tablet (550 mg total) by mouth 2 (two) times daily.    STOOL SOFTENER 100 mg capsule TAKE 1 CAPSULE BY MOUTH THREE TIMES DAILY FOR DIARRHEA (HOLD FOR DIARRHEA)    torsemide (DEMADEX) 20 MG Tab Take 2 tablets (40 mg total) by mouth once daily.     Family History       Problem Relation (Age of Onset)    Aneurysm Sister    Breast cancer Mother    Cancer Mother, Maternal Grandfather    Heart disease Father, Brother    No Known Problems Maternal Grandmother    Sarcoidosis Sister          Tobacco Use    Smoking status: Never Smoker    Smokeless tobacco: Never Used   Substance and Sexual Activity    Alcohol use: No    Drug use: No    Sexual activity: Not Currently     Review of Systems   Constitutional:  Positive for fatigue. Negative for chills and fever.   HENT: Negative.  Negative for congestion, rhinorrhea, sore throat and trouble swallowing.    Eyes: Negative.  Negative for visual disturbance.   Respiratory:  Positive for shortness of breath. Negative for cough and wheezing.    Cardiovascular:  Positive for leg swelling. Negative for chest pain and palpitations.   Gastrointestinal: Negative.  Negative for abdominal pain, diarrhea, nausea and vomiting.   Endocrine: Negative.    Genitourinary: Negative.  Negative for dysuria and flank pain.   Musculoskeletal: Negative.  Negative for back pain.   Skin: Negative.  Negative for rash.   Allergic/Immunologic: Negative.    Neurological:  Negative for speech difficulty, numbness and headaches.   Hematological: Negative.    Psychiatric/Behavioral: Negative.  Negative for hallucinations. The patient is  not nervous/anxious.    All other systems reviewed and are negative.  Objective:     Vital Signs (Most Recent):  Temp: 97.9 °F (36.6 °C) (08/02/22 1453)  Pulse: 66 (08/02/22 1803)  Resp: 18 (08/02/22 1453)  BP: (!) 140/67 (08/02/22 1803)  SpO2: 100 % (08/02/22 1803)   Vital Signs (24h Range):  Temp:  [97.9 °F (36.6 °C)] 97.9 °F (36.6 °C)  Pulse:  [62-66] 66  Resp:  [18] 18  SpO2:  [100 %] 100 %  BP: (119-140)/(57-67) 140/67     Weight: 96.6 kg (212 lb 13.7 oz)  Body mass index is 40.22 kg/m².    Physical Exam  Vitals and nursing note reviewed.   Constitutional:       General: She is awake. She is not in acute distress.     Appearance: She is morbidly obese. She is ill-appearing.   HENT:      Head: Normocephalic and atraumatic.      Mouth/Throat:      Mouth: Mucous membranes are moist.   Eyes:      General: No scleral icterus.     Conjunctiva/sclera: Conjunctivae normal.   Cardiovascular:      Rate and Rhythm: Normal rate and regular rhythm.      Heart sounds: No murmur heard.  Pulmonary:      Effort: Pulmonary effort is normal. No respiratory distress.      Breath sounds: Rales present. No wheezing.   Abdominal:      Palpations: Abdomen is soft.      Tenderness: There is no abdominal tenderness.      Comments: Obese abdomen   Musculoskeletal:         General: Swelling (Anasarca.  4+ bilateral pretibial pitting edema.) present. Normal range of motion.      Cervical back: Normal range of motion and neck supple.   Skin:     General: Skin is warm.      Coloration: Skin is not jaundiced.   Neurological:      General: No focal deficit present.      Mental Status: She is alert and oriented to person, place, and time. Mental status is at baseline.   Psychiatric:         Attention and Perception: Attention normal.         Mood and Affect: Mood normal.         Speech: Speech normal.         Behavior: Behavior normal. Behavior is cooperative.           Significant Labs: All pertinent labs within the past 24 hours have been  reviewed.  Bilirubin:   Recent Labs   Lab 08/01/22  1654 08/02/22  1652   BILITOT 0.2 0.3     BMP:   Recent Labs   Lab 08/01/22  1654 08/02/22  1652    108    141   K 5.0 5.1    109   CO2 24 24   BUN 64* 63*   CREATININE 5.7* 5.5*   CALCIUM 8.1* 8.0*   MG 3.3*  --      CBC:   Recent Labs   Lab 08/02/22  1557   WBC 4.72   HGB 8.6*   HCT 27.2*        CMP:   Recent Labs   Lab 08/01/22  1654 08/02/22  1652    141   K 5.0 5.1    109   CO2 24 24    108   BUN 64* 63*   CREATININE 5.7* 5.5*   CALCIUM 8.1* 8.0*   PROT 6.2 6.4   ALBUMIN 2.8* 2.8*   BILITOT 0.2 0.3   ALKPHOS 64 70   AST 24 26   ALT 13 14   ANIONGAP 9 8     Cardiac Markers:   Recent Labs   Lab 08/02/22  1557   *     Troponin:   Recent Labs   Lab 08/02/22  1652   TROPONINI 0.013     Significant Imaging: I have reviewed all pertinent imaging results/findings within the past 24 hours.  I have reviewed and interpreted all pertinent imaging results/findings within the past 24 hours.    Imaging Results              X-Ray Chest 1 View (Final result)  Result time 08/02/22 15:22:11      Final result by Jose Rafael Pedraza MD (08/02/22 15:22:11)                   Impression:      1.  Chronic or recurrent vascular congestion and left greater than right pleural effusions or scarring.  Negative for new pulmonary opacities.  Chronic or recurrent interstitial pulmonary edema must be considered.    2.  Markedly enlarged cardiac silhouette.  Pericardial effusion not excluded.  Clinical correlation is advised.    3.  Other stable findings as noted above.      Electronically signed by: Jose Rafael Pedraza MD  Date:    08/02/2022  Time:    15:22               Narrative:    EXAMINATION:  XR CHEST 1 VIEW    CLINICAL HISTORY:  Shortness of breath    COMPARISON:  June 20, 2022, as well as studies dating back to June 22, 2020    FINDINGS:  Chronic or recurrent blunting of the left lateral costophrenic angle and to lesser degree the left  costophrenic angle with areas of scarring or atelectasis in the mid lower left lung again seen.  The lungs are free of new pulmonary opacities.  The cardiac silhouette size is markedly enlarged.  The trachea is midline and the mediastinal width is normal. Negative for pneumothorax.  Pulmonary vasculature is chronically mildly congested.  Negative for osseous abnormalities. Tortuous aorta.  Degenerative changes of the spine with convex right curvature.  Degenerative changes of the shoulder girdles.  Azygous lobe.                                      I have independently reviewed and interpreted the EKG.     I have independently reviewed all pertinent labs within the past 24 hours.    I have independently reviewed, visualized and interpreted all pertinent imaging results within the past 24 hours and discussed the findings with the ED physician, Dr. Guerin          Assessment/Plan:     * Acute renal failure with acute renal cortical necrosis superimposed on stage 5 chronic kidney disease, not on chronic dialysis  Patient with acute kidney injury likely due to Cardiorenal syndrome ROD is currently worsening. Labs reviewed- Renal function/electrolytes with Estimated Creatinine Clearance: 10.8 mL/min (A) (based on SCr of 5.5 mg/dL (H)). according to latest data. Monitor urine output and serial BMP and adjust therapy as needed. Avoid nephrotoxins and renally dose meds for GFR listed above.     -serum creatinine has worsened to 5.5 (recently 4.3 about six weeks ago)  -likely cardiorenal syndrome  -Lasix 80 mg IV b.i.d. x2 doses.  -may need ultrafiltration volume control.  -consult Nephrology  -patient followed by Dr. Jacobo as outpatient    Anasarca  -IV duretics      Acute on chronic diastolic congestive heart failure  -recent echocardiogram, preserved EF.  -not requiring supplemental oxygen at this time.  -continue IV diuretics.      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  -history of TIPS procedure    Elevated d-dimer with  indeterminate perfusion scan for PE  -continue home dose apixaban.      Anemia due to chronic kidney disease  -hemoglobin 8.9.  Monitor  -continue  Iron supplementation    Pericardial effusion  -mild, no hemodynamic compromise.  -no evidence of cardiac tamponade      Type 2 diabetes mellitus, without long-term current use of insulin  Patient's FSGs are controlled on current medication regimen.  Last A1c reviewed-   Lab Results   Component Value Date    HGBA1C 5.1 06/07/2022     Most recent fingerstick glucose reviewed- No results for input(s): POCTGLUCOSE in the last 24 hours.  Current correctional scale  Low  Maintain anti-hyperglycemic dose as follows-   Antihyperglycemics (From admission, onward)            None        Hold Oral hypoglycemics while patient is in the hospital.    Hypothyroidism due to acquired atrophy of thyroid  -continue Synthroid      Morbid obesity due to excess calories  Body mass index is 40.22 kg/m². Morbid obesity complicates all aspects of disease management from diagnostic modalities to treatment. Weight loss encouraged and health benefits explained to patient.           VTE Risk Mitigation (From admission, onward)         Ordered     apixaban tablet 2.5 mg  2 times daily         08/02/22 1942     Place sequential compression device  Until discontinued         08/02/22 1940                The patient is placed in OBSERVATION status.      Napoleon Bowling MD  Department of Hospital Medicine   [facility] - Emergency Dept.

## 2022-08-03 NOTE — HOSPITAL COURSE
Ms Das is a 67 year old female who presented to [facility] Emergency Room for evaluation after being seen in Cardiology clinic before admission. Associated symptoms include shortness of breath, severe bilatearl lower extremely edema and anasarca. Creatinine elevated above normal. She has been started on IV diuresis. Cardiology and Nephrology following.     8/4- Seen at bedside . Sitting on the side of the bed and eating Jello. Reports feeling a whole lot better. Leg swelling and SOB improved. Nephro feels pt is responding to diuretic therapy and does not need RRT at this time. Diuretic transitioned to oral . Possible DC home tomorrow after labs. Creatinine down to 5.3>5.7, K 4.9.     8/5- Creatinine further improved to 5.1. Pt continues to feel better with less peripheral edema. Nephrology cleared pt for discharge with Torsemide 40 mg daily and Metolazone 2.5 mg daily with close follow up in the Nephrology and Cardiology clinic as well CHF clinic.

## 2022-08-03 NOTE — ASSESSMENT & PLAN NOTE
Has advanced CKD stage 5. Pt is pre-ESRD  CKD due to DM, HTN, metabolic syndrome  Has nephrotic range proteinuria, likely diabetic nephropathy    Pt has complications of advanced CKD:  Fluid overload, peripheral and pulmonary edema, to the point of anasarca  Fluid gain expected in advanced CKD, when combined with CHF and liver disease, has become exacerbated by pt's over-indulgence in massive liquid intake    Pt was advised that if she does not change her dietary habits and start drinking much less liquids AND if diuretics will not work, she will need dialysis  Expect pt will need HD sooner than later.

## 2022-08-03 NOTE — CONSULTS
[facility] - Tele[facility] ([facility])  Cardiology  Consult Note    Patient Name: Diamond Das  MRN: [id number]  Admission Date: 8/2/2022  Hospital Length of Stay: 0 days  Code Status: Prior   Attending Provider: Sydni Tamez MD   Consulting Provider: Samantha Osei PA-C  Primary Care Physician: Andrey Perrin MD  Principal Problem:Acute renal failure with acute renal cortical necrosis superimposed on stage 5 chronic kidney disease, not on chronic dialysis    Patient information was obtained from patient, past medical records and ER records.     Inpatient consult to Cardiology  Consult performed by: Samantha Osei PA-C  Consult ordered by: Hugo Guerin MD        Subjective:     Chief Complaint:  Volume overload    HPI:   Ms. 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 Patient complained of increased BLE edema along with increased SOB and weakness. No associated morena chest pain, fever, chills, palpitations, near syncope, or syncope. Initial workup in ED reveled creatinine of 5.5, hypoalbuminemia (2.7), and BNP of 347. CXR showed findings consistent with probable chronic edema and patient was subsequently admitted for further evaluation and treatment. Cardiology consulted to assist with management. Patient seen and examined today, resting in bed. Feels slightly better. Still grossly edematous. Chest pain free. She reports compliance with her medications. Followed routinely in clinic by Dr. Ramos. Echo 1/10/22 showed normal EF. Nephrology consulted to assist with management.         Past Medical History:   Diagnosis Date    Acquired hypothyroidism 9/28/2020    ROD (acute kidney injury) 1/3/2019    Arthritis of knee 1/31/2022    Ascites     Bilateral lower extremity edema 9/2/2016     Breast pain, left 1/4/2018    Cataract     Chest pain, atypical 2/9/2018    CHF (congestive heart failure)     Cirrhosis     Cough     Diabetes mellitus     Diabetes mellitus, type 2     Diarrhea 9/4/2019    Edema 3/19/2018    Epigastric pain 2/11/2020    Gastroparesis     GERD (gastroesophageal reflux disease)     Hepatic encephalopathy 6/19/2018    Pt has history of cirrhosis, seen at [facility] on 6/7/18. Currently on lactulose.    Hyperlipidemia     Hypertension     Hypotension 2/21/2019    Immunization deficiency 2/10/2020    Liver disease     Lumbar strain 4/27/2018    Lumbar strain, sequela 2/17/2020    Macular degeneration     Medication reaction 11/17/2016    Other and unspecified hyperlipidemia 6/11/2012 11:11:32 AM    [facility] Historical - Endocrine/Metabolic/Immune: Hyperlipidemia-No Additional Notes    Pneumonia of right middle lobe due to infectious organism 12/19/2017    Renal disorder     Retinopathy due to secondary diabetes mellitus     Skin lesion 12/20/2021    Sleep apnea     Strain of lumbar region 10/4/2021    Thyroid disease     Type 2 diabetes mellitus with hyperglycemia 10/10/2020       Past Surgical History:   Procedure Laterality Date    CATARACT EXTRACTION      CHOLECYSTECTOMY      COLONOSCOPY N/A 9/4/2019    Procedure: COLONOSCOPY;  Surgeon: Marnie Elmore MD;  Location[facility];  Service: Endoscopy;  Laterality: N/A;    ERCP      ESOPHAGOGASTRODUODENOSCOPY N/A 7/28/2022    Procedure: EGD (ESOPHAGOGASTRODUODENOSCOPY);  Surgeon: Jimy Katz MD;  Location: [facility] ENDO;  Service: Endoscopy;  Laterality: N/A;    FLUOROSCOPY N/A 7/24/2020    Procedure: TIPS revision;  Surgeon: Martell Jasmine MD;  Location: [facility] CATH LAB;  Service: General;  Laterality: N/A;    LIVER BIOPSY      THORACENTESIS Left 1/20/2020    Procedure: Thoracentesis;  Surgeon: Angelica Hunter MD;  Location: [facility] ENDO;  Service: Pulmonary;  Laterality: Left;    TUBAL  LIGATION      UPPER GASTROINTESTINAL ENDOSCOPY         Review of patient's allergies indicates:   Allergen Reactions    Subsys [fentanyl] Other (See Comments)     After administration pt unresponsive.  HR and respirations decreased.     Versed [midazolam] Other (See Comments)     After administration pt unresponsive.  HR and respirations decreased.     Ampicillin Rash    Codeine Nausea And Vomiting and Nausea Only       No current facility-administered medications on file prior to encounter.     Current Outpatient Medications on File Prior to Encounter   Medication Sig    amLODIPine (NORVASC) 5 MG tablet Take 1 tablet (5 mg total) by mouth once daily.    apixaban (ELIQUIS) 5 mg Tab Take 1 tablet (5 mg total) by mouth 2 (two) times daily.    carvediloL (COREG) 25 MG tablet Take 1 tablet (25 mg total) by mouth 2 (two) times daily with meals.    ciprofloxacin HCl (CIPRO) 500 MG tablet Take 1 tablet (500 mg total) by mouth every 12 (twelve) hours. (Patient not taking: Reported on 8/1/2022)    docusate sodium (COLACE) 100 MG capsule Take 1 capsule (100 mg total) by mouth 3 (three) times daily. Hold for diarrhea    dulaglutide (TRULICITY) 4.5 mg/0.5 mL pen injector Inject 4.5 mg into the skin every 7 days.    famotidine (PEPCID) 20 MG tablet Take 1 tablet (20 mg total) by mouth once daily.    ferrous sulfate (IRON ORAL) Take by mouth.    fluticasone propionate (FLONASE) 50 mcg/actuation nasal spray 2 sprays (100 mcg total) by Each Nostril route once daily.    garlic (GARLIQUE ORAL) Take 1 tablet by mouth once daily.    isosorbide mononitrate (IMDUR) 60 MG 24 hr tablet Take 1 tablet (60 mg total) by mouth every evening.    levothyroxine (EUTHYROX) 137 MCG Tab tablet Take 1 tablet (137 mcg total) by mouth before breakfast.    LIDOcaine (LMX) 4 % cream Apply topically as needed.    magnesium oxide (MAG-OX) 400 mg (241.3 mg magnesium) tablet Take 1 tablet (400 mg total) by mouth once daily.    omega-3  fatty acids/fish oil (FISH OIL-OMEGA-3 FATTY ACIDS) 300-1,000 mg capsule Take by mouth once daily.    ondansetron (ZOFRAN) 4 MG tablet Take 1 tablet (4 mg total) by mouth every 8 (eight) hours as needed.    pravastatin (PRAVACHOL) 10 MG tablet Take 1 tablet (10 mg total) by mouth once daily.    pulse oximeter (PULSE OXIMETER) device by Apply Externally route 2 (two) times a day. Use twice daily at 8 AM and 3 PM and [facility] as [facility].    rifAXIMin (XIFAXAN) 550 mg Tab Take 1 tablet (550 mg total) by mouth 2 (two) times daily.    STOOL SOFTENER 100 mg capsule TAKE 1 CAPSULE BY MOUTH THREE TIMES DAILY FOR DIARRHEA (HOLD FOR DIARRHEA)    torsemide (DEMADEX) 20 MG Tab Take 2 tablets (40 mg total) by mouth once daily.     Family History       Problem Relation (Age of Onset)    Aneurysm Sister    Breast cancer Mother    Cancer Mother, Maternal Grandfather    Heart disease Father, Brother    No Known Problems Maternal Grandmother    Sarcoidosis Sister          Tobacco Use    Smoking status: Never Smoker    Smokeless tobacco: Never Used   Substance and Sexual Activity    Alcohol use: No    Drug use: No    Sexual activity: Not Currently     Review of Systems   Constitutional: Positive for malaise/fatigue.   HENT: Negative.     Eyes: Negative.    Cardiovascular:  Positive for dyspnea on exertion and leg swelling.   Respiratory:  Positive for shortness of breath.    Endocrine: Negative.    Hematologic/Lymphatic: Negative.    Skin: Negative.    Musculoskeletal: Negative.    Gastrointestinal:  Positive for bloating.   Genitourinary: Negative.    Neurological: Negative.    Psychiatric/Behavioral: Negative.     Allergic/Immunologic: Negative.    Objective:     Vital Signs (Most Recent):  Temp: 98.2 °F (36.8 °C) (08/03/22 1111)  Pulse: 65 (08/03/22 1111)  Resp: 14 (08/03/22 1111)  BP: (!) 147/69 (08/03/22 1111)  SpO2: 97 % (08/03/22 1111)   Vital Signs (24h Range):  Temp:  [97.9 °F (36.6 °C)-98.2 °F  (36.8 °C)] 98.2 °F (36.8 °C)  Pulse:  [59-67] 65  Resp:  [12-18] 14  SpO2:  [97 %-100 %] 97 %  BP: (119-147)/(57-69) 147/69     Weight: 96.5 kg (212 lb 11.9 oz)  Body mass index is 40.2 kg/m².    SpO2: 97 %  O2 Device (Oxygen Therapy): room air      Intake/Output Summary (Last 24 hours) at 8/3/2022 1145  Last data filed at 8/3/2022 0800  Gross per 24 hour   Intake 680 ml   Output --   Net 680 ml       Lines/Drains/Airways       Peripheral Intravenous Line  Duration                  Peripheral IV - Single Lumen 08/03/22 0730 20 G Right Forearm <1 day                    Physical Exam  Vitals and nursing note reviewed.   Constitutional:       General: She is not in acute distress.     Appearance: Normal appearance. She is well-developed. She is not diaphoretic.   HENT:      Head: Normocephalic and atraumatic.   Eyes:      General:         Right eye: No discharge.         Left eye: No discharge.      Pupils: Pupils are equal, round, and reactive to light.   Neck:      Thyroid: No thyromegaly.      Vascular: No JVD.      Trachea: No tracheal deviation.   Cardiovascular:      Rate and Rhythm: Normal rate and regular rhythm.      Heart sounds: Normal heart sounds, S1 normal and S2 normal. No murmur heard.  Pulmonary:      Effort: Pulmonary effort is normal. No respiratory distress.      Breath sounds: No wheezing.      Comments: Diminished BS at base  Abdominal:      General: There is no distension.      Tenderness: There is no abdominal tenderness. There is no rebound.   Musculoskeletal:      Cervical back: Neck supple.      Right lower leg: Edema (4+) present.      Left lower leg: Edema (4+) present.   Skin:     General: Skin is warm and dry.      Findings: No erythema.   Neurological:      General: No focal deficit present.      Mental Status: She is alert and oriented to person, place, and time.   Psychiatric:         Mood and Affect: Mood normal.         Behavior: Behavior normal.         Thought Content: Thought  content normal.       Significant Labs: CMP   Recent Labs   Lab 08/01/22  1654 08/02/22  1652 08/03/22  0258    141 144   K 5.0 5.1 5.2*    109 110   CO2 24 24 25    108 130*   BUN 64* 63* 67*   CREATININE 5.7* 5.5* 5.7*   CALCIUM 8.1* 8.0* 8.0*   PROT 6.2 6.4 5.7*   ALBUMIN 2.8* 2.8* 2.7*   BILITOT 0.2 0.3 0.2   ALKPHOS 64 70 66   AST 24 26 22   ALT 13 14 14   ANIONGAP 9 8 9   , CBC   Recent Labs   Lab 08/02/22  1557 08/03/22  0258   WBC 4.72 4.77   HGB 8.6* 8.6*   HCT 27.2* 28.0*    147*   , Troponin   Recent Labs   Lab 08/02/22  1652   TROPONINI 0.013   , and All pertinent lab results from the last 24 hours have been reviewed.    Significant Imaging: Echocardiogram: Transthoracic echo (TTE) complete (Cupid Only):   Results for orders placed or performed during the hospital encounter of 01/10/22   Echo   Result Value Ref Range    BSA 2.04 m2    Left Ventricular Outflow Tract Mean Velocity 2.0427290303 cm/s    Left Ventricular Outflow Tract Mean Gradient 4.41 mmHg    TV mean gradient 23 mmHg    LVIDd 4.56 3.5 - 6.0 cm    IVS 1.31 (A) 0.6 - 1.1 cm    Posterior Wall 1.30 (A) 0.6 - 1.1 cm    Ao root annulus 2.77 cm    LVIDs 2.47 2.1 - 4.0 cm    FS 46 28 - 44 %    Ascending aorta 2.75 cm    LV mass 228.39 g    LA size 3.83 cm    Left Ventricle Relative Wall Thickness 0.57 cm    AV mean gradient 9 mmHg    AV valve area 2.07 cm2    AV Velocity Ratio 0.70     AV index (prosthetic) 0.75     MV valve area p 1/2 method 5.21 cm2    E/A ratio 1.46     E wave deceleration time 145.594745836657683 msec    IVRT 55.863791231036879 msec    LVOT diameter 1.87 cm    LVOT area 2.7 cm2    LVOT peak bart 1.29 m/s    LVOT peak VTI 30.30 cm    Ao peak bart 1.85 m/s    Ao VTI 40.2 cm    RVOT peak bart 0.90 m/s    RVOT peak VTI 20.8 cm    Mr max bart 4.04 m/s    LVOT stroke volume 83.18 cm3    AV peak gradient 14 mmHg    PV mean gradient 2.38 mmHg    MV Peak E Bart 1.24 m/s    TR Max Bart 2.81 m/s    MV stenosis pressure  1/2 time 42.7969121 ms    MV Peak A Bart 0.85 m/s    LV Systolic Volume 21.58 mL    LV Systolic Volume Index 11.1 mL/m2    LV Diastolic Volume 95.37 mL    LV Diastolic Volume Index 49.16 mL/m2    LV Mass Index 118 g/m2    Echo EF Estimated 77 %    RA Major Axis 4.71 cm    Left Atrium Minor Axis 5.15 cm    Left Atrium Major Axis 5.56 cm    Triscuspid Valve Regurgitation Peak Gradient 32 mmHg    Right Atrial Pressure (from IVC) 3 mmHg    EF 60 %    TV rest pulmonary artery pressure 35 mmHg    Narrative    · The left ventricle is normal in size with concentric hypertrophy and   normal systolic function.  · Indeterminate left ventricular diastolic function.  · The estimated PA systolic pressure is 35 mmHg.  · Normal right ventricular size with normal right ventricular systolic   function.  · Normal central venous pressure (3 mmHg).  · The estimated ejection fraction is 60%.  · Mild mitral regurgitation.  · Mild tricuspid regurgitation.      , EKG: Reviewed, and X-Ray: CXR: X-Ray Chest 1 View (CXR):   Results for orders placed or performed during the hospital encounter of 08/02/22   X-Ray Chest 1 View    Narrative    EXAMINATION:  XR CHEST 1 VIEW    CLINICAL HISTORY:  Shortness of breath    COMPARISON:  June 20, 2022, as well as studies dating back to June 22, 2020    FINDINGS:  Chronic or recurrent blunting of the left lateral costophrenic angle and to lesser degree the left costophrenic angle with areas of scarring or atelectasis in the mid lower left lung again seen.  The lungs are free of new pulmonary opacities.  The cardiac silhouette size is markedly enlarged.  The trachea is midline and the mediastinal width is normal. Negative for pneumothorax.  Pulmonary vasculature is chronically mildly congested.  Negative for osseous abnormalities. Tortuous aorta.  Degenerative changes of the spine with convex right curvature.  Degenerative changes of the shoulder girdles.  Azygous lobe.      Impression    1.  Chronic or  recurrent vascular congestion and left greater than right pleural effusions or scarring.  Negative for new pulmonary opacities.  Chronic or recurrent interstitial pulmonary edema must be considered.    2.  Markedly enlarged cardiac silhouette.  Pericardial effusion not excluded.  Clinical correlation is advised.    3.  Other stable findings as noted above.      Electronically signed by: Jose Rafael Pedraza MD  Date:    08/02/2022  Time:    15:22    and X-Ray Chest PA and Lateral (CXR): No results found for this visit on 08/02/22.    Assessment and Plan:   Patient who presents with decompensated CHF/volume overload. Continue IV diuresis. Continue home regimen as tolerated. Nephrology on board.    * Acute renal failure with acute renal cortical necrosis superimposed on stage 5 chronic kidney disease, not on chronic dialysis  -Monitor with diuresis  -Nephrology consulted to assist with management    Elevated d-dimer with indeterminate perfusion scan for PE  -Continue Eliquis    Anemia due to chronic kidney disease  -Monitor during admission    Anasarca  -Assess response to IV diuresis     Type 2 diabetes mellitus, without long-term current use of insulin  [facility]    Acute on chronic diastolic congestive heart failure  -Presents with decompensated diastolic CHF/volume overload  -Underlying cirrhosis and hypoalbuminemia contributing  -Continue IV diuresis  -Continue other home meds as tolerated  -Strict I's/O's      Decompensated hepatic cirrhosis  -Mgmt as per primary team/GI    Morbid obesity due to excess calories  -weight loss    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  -Mgmt as per primary team        VTE Risk Mitigation (From admission, onward)         Ordered     apixaban tablet 2.5 mg  2 times daily         08/02/22 1942     Place sequential compression device  Until discontinued         08/02/22 1940                Thank you for your consult. I will follow-up with patient. Please contact us if you  have any additional questions.    Samantha Osei PA-C  Cardiology   [facility] - Telemetry ([facility])

## 2022-08-03 NOTE — CONSULTS
"[facility] - Tele[facility] ([facility])  Nephrology  Consult Note      Patient Name: Diamond Das  MRN: [id number]  Admission Date: 8/2/2022  Hospital Length of Stay: 0 days  Attending Provider: Sydni Tamez MD   Primary Care Physician: Andrey Perrin MD  Principal Problem:Acute renal failure with acute renal cortical necrosis superimposed on stage 5 chronic kidney disease, not on chronic dialysis    Reason for consult: kidney failure, fluid overload    Consults  Subjective:     HPI: Pt was seen and examined. Labs and meds reviewed. Discussed with other providers. Pt is a 66 y/o female with multiple medical and metabolic conditions who presents for slowly worsening fluid gain. Pt feels "puffy all over." Has SOB on activity, feels "stomach is bigger" and "legs are swollen." Pt has a PMH that is remarkable for CKD stage 5, DM, HTN, diastolic CHF and MCQUEEN-induced liver cirrhosis. Pt admits to XS food and fluid intake. Takes torsemide at home, but "recently it doesn't work." On this visit, pt was surrounded at bedside by water cups, jugs, and even artificial fruit juice in a large bottle.      Past Medical History:   Diagnosis Date    Acquired hypothyroidism 9/28/2020    ROD (acute kidney injury) 1/3/2019    Arthritis of knee 1/31/2022    Ascites     Bilateral lower extremity edema 9/2/2016    Breast pain, left 1/4/2018    Cataract     Chest pain, atypical 2/9/2018    CHF (congestive heart failure)     Cirrhosis     Cough     Diabetes mellitus     Diabetes mellitus, type 2     Diarrhea 9/4/2019    Edema 3/19/2018    Epigastric pain 2/11/2020    Gastroparesis     GERD (gastroesophageal reflux disease)     Hepatic encephalopathy 6/19/2018    Pt has history of cirrhosis, seen at [facility] on 6/7/18. Currently on lactulose.    Hyperlipidemia     Hypertension     Hypotension 2/21/2019    Immunization deficiency 2/10/2020    Liver disease     Lumbar strain 4/27/2018    Lumbar strain, sequela 2/17/2020    " Macular degeneration     Medication reaction 11/17/2016    Other and unspecified hyperlipidemia 6/11/2012 11:11:32 AM    [facility] Historical - Endocrine/Metabolic/Immune: Hyperlipidemia-No Additional Notes    Pneumonia of right middle lobe due to infectious organism 12/19/2017    Renal disorder     Retinopathy due to secondary diabetes mellitus     Skin lesion 12/20/2021    Sleep apnea     Strain of lumbar region 10/4/2021    Thyroid disease     Type 2 diabetes mellitus with hyperglycemia 10/10/2020       Past Surgical History:   Procedure Laterality Date    CATARACT EXTRACTION      CHOLECYSTECTOMY      COLONOSCOPY N/A 9/4/2019    Procedure: COLONOSCOPY;  Surgeon: Marnie Elmore MD;  Location: [facility] ENDO;  Service: Endoscopy;  Laterality: N/A;    ERCP      ESOPHAGOGASTRODUODENOSCOPY N/A 7/28/2022    Procedure: EGD (ESOPHAGOGASTRODUODENOSCOPY);  Surgeon: Jimy Katz MD;  Location: [facility] ENDO;  Service: Endoscopy;  Laterality: N/A;    FLUOROSCOPY N/A 7/24/2020    Procedure: TIPS revision;  Surgeon: Martell Jasmine MD;  Location: [facility] CATH LAB;  Service: General;  Laterality: N/A;    LIVER BIOPSY      THORACENTESIS Left 1/20/2020    Procedure: Thoracentesis;  Surgeon: Angelica Hunter MD;  Location: [facility] ENDO;  Service: Pulmonary;  Laterality: Left;    TUBAL LIGATION      UPPER GASTROINTESTINAL ENDOSCOPY         Review of patient's allergies indicates:   Allergen Reactions    Subsys [fentanyl] Other (See Comments)     After administration pt unresponsive.  HR and respirations decreased.     Versed [midazolam] Other (See Comments)     After administration pt unresponsive.  HR and respirations decreased.     Ampicillin Rash    Codeine Nausea And Vomiting and Nausea Only     Current Facility-Administered Medications   Medication Frequency    acetaminophen tablet 650 mg Q6H PRN    albuterol-ipratropium 2.5 mg-0.5 mg/3 mL nebulizer solution 3 mL Q4H PRN    apixaban tablet 2.5 mg BID     carvediloL tablet 25 mg BID WM    ferrous sulfate tablet 1 each Daily    furosemide injection 80 mg Q12H    guaiFENesin 100 mg/5 ml syrup 200 mg Q4H PRN    levothyroxine tablet 137 mcg Before breakfast    metOLazone tablet 2.5 mg Daily    ondansetron injection 4 mg Q8H PRN    pravastatin tablet 10 mg Daily     Family History       Problem Relation (Age of Onset)    Aneurysm Sister    Breast cancer Mother    Cancer Mother, Maternal Grandfather    Heart disease Father, Brother    No Known Problems Maternal Grandmother    Sarcoidosis Sister          Tobacco Use    Smoking status: Never Smoker    Smokeless tobacco: Never Used   Substance and Sexual Activity    Alcohol use: No    Drug use: No    Sexual activity: Not Currently     Review of Systems   Constitutional: Negative.    HENT: Negative.     Respiratory:  Positive for shortness of breath.    Cardiovascular:  Positive for leg swelling.   Gastrointestinal:  Positive for abdominal distention.   Genitourinary: Negative.    Musculoskeletal: Negative.    Hematological: Negative.    Psychiatric/Behavioral: Negative.     Objective:     Vital Signs (Most Recent):  Temp: 98.2 °F (36.8 °C) (08/03/22 1111)  Pulse: 65 (08/03/22 1111)  Resp: 14 (08/03/22 1111)  BP: (!) 144/68 (BP taken manually) (08/03/22 1127)  SpO2: 97 % (08/03/22 1111)  O2 Device (Oxygen Therapy): room air (08/02/22 2103)   Vital Signs (24h Range):  Temp:  [97.9 °F (36.6 °C)-98.2 °F (36.8 °C)] 98.2 °F (36.8 °C)  Pulse:  [59-67] 65  Resp:  [12-18] 14  SpO2:  [97 %-100 %] 97 %  BP: (119-147)/(57-69) 144/68     Weight: 96.5 kg (212 lb 11.9 oz) (08/03/22 0018)  Body mass index is 40.2 kg/m².  Body surface area is 2.04 meters squared.    I/O last 3 completed shifts:  In: 480 [P.O.:480]  Out: -     Physical Exam  Vitals and nursing note reviewed.   Constitutional:       Appearance: Normal appearance.   HENT:      Head: Normocephalic and atraumatic.   Cardiovascular:      Rate and Rhythm: Normal rate  and regular rhythm.      Pulses: Normal pulses.      Heart sounds: Normal heart sounds.   Pulmonary:      Effort: Respiratory distress present.      Breath sounds: Normal breath sounds.   Abdominal:      General: There is distension.      Palpations: Abdomen is soft.      Tenderness: There is no abdominal tenderness.   Musculoskeletal:      Right lower leg: Edema present.      Left lower leg: Edema present.   Neurological:      Mental Status: She is alert and oriented to person, place, and time.   Psychiatric:         Mood and Affect: Mood normal.         Behavior: Behavior normal.       Significant Labs: reviewed  BMP  Lab Results   Component Value Date     08/03/2022    K 5.2 (H) 08/03/2022     08/03/2022    CO2 25 08/03/2022    BUN 67 (H) 08/03/2022    CREATININE 5.7 (H) 08/03/2022    CALCIUM 8.0 (L) 08/03/2022    ANIONGAP 9 08/03/2022    ESTGFRAFRICA 12 (A) 06/21/2022    EGFRNONAA 10 (A) 06/21/2022     Lab Results   Component Value Date    WBC 4.77 08/03/2022    HGB 8.6 (L) 08/03/2022    HCT 28.0 (L) 08/03/2022    MCV 91 08/03/2022     (L) 08/03/2022       Recent Labs   Lab 08/02/22  1652   TROPONINI 0.013     Lab Results   Component Value Date    .2 (H) 06/06/2022    CALCIUM 8.0 (L) 08/03/2022    PHOS 4.4 06/06/2022     Urine prot/cr 3-6 g    Significant Imaging: CXR: +pulmonary edema and vascular congestion    Assessment/Plan:     66 y/o female with risk factors for fluid gain presents for fluid gain:    Stage 5 chronic kidney disease  Has advanced CKD stage 5. Pt is pre-ESRD  CKD due to DM, HTN, metabolic syndrome  Has nephrotic range proteinuria, likely diabetic nephropathy    Pt has complications of advanced CKD:  Fluid overload, peripheral and pulmonary edema, to the point of anasarca  Fluid gain expected in advanced CKD, when combined with CHF and liver disease, has become exacerbated by pt's over-indulgence in massive liquid intake  K: mild hyperkalemia  Na normal  Acid base no  issues at present  Hypocalcemia, mild  PO4 normal  Secondary hyperparathyroidism, mild/moderate  Anemia: normocytic. Will provide epogen, will check iron    Pt was advised that if she does not change her dietary habits and start drinking much less liquids AND if diuretics will not work, she will need dialysis  Expect pt will need HD sooner than later.  Wants to thinks about dialysis    Anasarca  Due to:  Advanced CKD + CHF decompensation + liver cirrhosis (alb 2.7)  Pt was informed of the relation between salt and fluid intake and her medical maladies    Hypothyroidism due to acquired atrophy of thyroid  Reviewed the chart  Likely also contributing to fluid gain    Decompensated hepatic cirrhosis  Reviewed the chart, has MCQUEEN  MCQUEEN comes mainly from excess eating food    Acute on chronic diastolic congestive heart failure  Reviewed the chart and cardiology note  EF is well preserved       Plans and recommendations:  As discussed above  Total time spent 70 minutes including time needed to review the records, the   patient evaluation, documentation, face-to-face discussion with the patient,   more than 50% of the time was spent on coordination of care and counseling.    Level V visit.      Mahin Ramires MD   Nephrology  [facility] ([facility])

## 2022-08-03 NOTE — ASSESSMENT & PLAN NOTE
-recent echocardiogram, preserved EF.  -not requiring supplemental oxygen at this time.  -continue IV diuretics.      8/3/2020  Cardiology following   Continue IV diuresis   Strict I & O   CHF pathway

## 2022-08-03 NOTE — ASSESSMENT & PLAN NOTE
Body mass index is 40.2 kg/m². Morbid obesity complicates all aspects of disease management from diagnostic modalities to treatment. Weight loss encouraged and health benefits explained to patient.

## 2022-08-03 NOTE — ASSESSMENT & PLAN NOTE
Patient's FSGs are controlled on current medication regimen.  Last A1c reviewed-   Lab Results   Component Value Date    HGBA1C 5.1 06/07/2022     Most recent fingerstick glucose reviewed-   Recent Labs   Lab 08/03/22  0713   POCTGLUCOSE 111*     Current correctional scale  Low  Maintain anti-hyperglycemic dose as follows-   Antihyperglycemics (From admission, onward)            None        Hold Oral hypoglycemics while patient is in the hospital.

## 2022-08-03 NOTE — HPI
"Pt was seen and examined. Labs and meds reviewed. Discussed with other providers. Pt is a 66 y/o female with multiple medical and metabolic conditions who presents for slowly worsening fluid gain. Pt feels "puffy all over." Has SOB on activity, feels "stomach is bigger" and "legs are swollen." Pt has a PMH that is remarkable for CKD stage 5, DM, HTN, diastolic CHF and MCQUEEN-induced liver cirrhosis. Pt admits to XS food and fluid intake. Takes torsemide at home, but "recently it doesn't work." On this visit, pt was surrounded at bedside by water cups, jugs, and even artificial fruit juice in a large bottle.  "

## 2022-08-03 NOTE — PROGRESS NOTES
[facility] Medicine  Progress Note    Patient Name: Diamond Das  MRN: [id number]  Patient Class: IP- Inpatient   Admission Date: 8/2/2022  Length of Stay: 0 days  Attending Physician: Sydni Tamez MD  Primary Care Provider: Andrey Perrin MD        Subjective:     Principal Problem:Acute renal failure with acute renal cortical necrosis superimposed on stage 5 chronic kidney disease, not on chronic dialysis        HPI:  Ms. Das is a 67-year-old  female with PMH significant for diastolic CHF, CKD stage 5, followed by Dr. 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  Patient has significant bilateral lower extremity edema, and has been having multiple changes to her diuretic regimen recently.  She was advised to go to the ED to be admitted for IV diuretics.  Denies SOB at rest, but unable to walk few steps due to increasing short of breath.  She is chronically home oxygen dependent at 2-3 L O2 N/C.  In the ED she received Lasix 40 mg IV x1.  Creatinine is worsened to 5.5 (in baseline 4.3).    Admitting diagnosis:  ROD on CKD stage 5. Cardiorenal syndrome.  Anasarca.  Acute on chronic diastolic CHF.      Overview/Hospital Course:  Ms Das is a 67 year old female who presented to [facility] Emergency Room for evaluation after being seen in Cardiology clinic before admission. Associated symptoms include shortness of breath, severe bilatearl lower extremely edema and anasarca. Creatinine elevated above normal. She has been started on IV diuresis. Cardiology and Nephrology following.       Interval History: Continue IV diuresis, Nephrology and cardiology following.     Review of Systems   Constitutional:  Positive for fatigue. Negative for chills and fever.   HENT: Negative.  Negative for  congestion, rhinorrhea, sore throat and trouble swallowing.    Eyes: Negative.  Negative for visual disturbance.   Respiratory:  Positive for shortness of breath. Negative for cough and wheezing.    Cardiovascular:  Positive for leg swelling. Negative for chest pain and palpitations.   Gastrointestinal: Negative.  Negative for abdominal pain, diarrhea, nausea and vomiting.   Endocrine: Negative.    Genitourinary: Negative.  Negative for dysuria and flank pain.   Musculoskeletal: Negative.  Negative for back pain.   Skin: Negative.  Negative for rash.   Allergic/Immunologic: Negative.    Neurological:  Negative for speech difficulty, numbness and headaches.   Hematological: Negative.    Psychiatric/Behavioral: Negative.  Negative for hallucinations. The patient is not nervous/anxious.    All other systems reviewed and are negative.  Objective:     Vital Signs (Most Recent):  Temp: 99 °F (37.2 °C) (08/03/22 1500)  Pulse: 64 (08/03/22 1500)  Resp: 16 (08/03/22 1500)  BP: (!) 141/67 (08/03/22 1500)  SpO2: 100 % (08/03/22 1500)   Vital Signs (24h Range):  Temp:  [98.1 °F (36.7 °C)-99 °F (37.2 °C)] 99 °F (37.2 °C)  Pulse:  [59-67] 64  Resp:  [12-16] 16  SpO2:  [97 %-100 %] 100 %  BP: (130-147)/(60-69) 141/67     Weight: 96.5 kg (212 lb 11.9 oz)  Body mass index is 40.2 kg/m².    Intake/Output Summary (Last 24 hours) at 8/3/2022 1722  Last data filed at 8/3/2022 1200  Gross per 24 hour   Intake 880 ml   Output --   Net 880 ml      Physical Exam  Vitals and nursing note reviewed.   Constitutional:       General: She is awake. She is not in acute distress.     Appearance: She is morbidly obese. She is ill-appearing.   HENT:      Head: Normocephalic and atraumatic.      Mouth/Throat:      Mouth: Mucous membranes are moist.   Eyes:      General: No scleral icterus.     Conjunctiva/sclera: Conjunctivae normal.   Cardiovascular:      Rate and Rhythm: Normal rate and regular rhythm.      Heart sounds: No murmur heard.  Pulmonary:       Effort: Pulmonary effort is normal. No respiratory distress.      Breath sounds: Examination of the right-lower field reveals rales. Examination of the left-lower field reveals rales. Rales present. No wheezing.   Abdominal:      Palpations: Abdomen is soft.      Tenderness: There is no abdominal tenderness.      Comments: Anasarca present    Musculoskeletal:         General: Swelling (3+ bilateral pretibial pitting edema.) present. Normal range of motion.      Cervical back: Normal range of motion and neck supple.   Skin:     General: Skin is warm.      Coloration: Skin is not jaundiced.   Neurological:      General: No focal deficit present.      Mental Status: She is alert and oriented to person, place, and time. Mental status is at baseline.   Psychiatric:         Attention and Perception: Attention normal.         Mood and Affect: Mood normal.         Speech: Speech normal.         Behavior: Behavior normal. Behavior is cooperative.       Significant Labs: All pertinent labs within the past 24 hours have been reviewed.  CBC:   Recent Labs   Lab 08/02/22  1557 08/03/22  0258   WBC 4.72 4.77   HGB 8.6* 8.6*   HCT 27.2* 28.0*    147*     CMP:   Recent Labs   Lab 08/02/22  1652 08/03/22  0258    144   K 5.1 5.2*    110   CO2 24 25    130*   BUN 63* 67*   CREATININE 5.5* 5.7*   CALCIUM 8.0* 8.0*   PROT 6.4 5.7*   ALBUMIN 2.8* 2.7*   BILITOT 0.3 0.2   ALKPHOS 70 66   AST 26 22   ALT 14 14   ANIONGAP 8 9     Cardiac Markers:   Recent Labs   Lab 08/02/22  1557   *     Magnesium:   Recent Labs   Lab 08/03/22  0258   MG 3.2*     POCT Glucose:   Recent Labs   Lab 08/03/22  0713   POCTGLUCOSE 111*       Significant Imaging:     Imaging Results              X-Ray Chest 1 View (Final result)  Result time 08/02/22 15:22:11      Final result by Jose Rafael Pedraza MD (08/02/22 15:22:11)                   Impression:      1.  Chronic or recurrent vascular congestion and left greater than right  pleural effusions or scarring.  Negative for new pulmonary opacities.  Chronic or recurrent interstitial pulmonary edema must be considered.    2.  Markedly enlarged cardiac silhouette.  Pericardial effusion not excluded.  Clinical correlation is advised.    3.  Other stable findings as noted above.      Electronically signed by: Jose Rafael Pedraza MD  Date:    08/02/2022  Time:    15:22               Narrative:    EXAMINATION:  XR CHEST 1 VIEW    CLINICAL HISTORY:  Shortness of breath    COMPARISON:  June 20, 2022, as well as studies dating back to June 22, 2020    FINDINGS:  Chronic or recurrent blunting of the left lateral costophrenic angle and to lesser degree the left costophrenic angle with areas of scarring or atelectasis in the mid lower left lung again seen.  The lungs are free of new pulmonary opacities.  The cardiac silhouette size is markedly enlarged.  The trachea is midline and the mediastinal width is normal. Negative for pneumothorax.  Pulmonary vasculature is chronically mildly congested.  Negative for osseous abnormalities. Tortuous aorta.  Degenerative changes of the spine with convex right curvature.  Degenerative changes of the shoulder girdles.  Azygous lobe.                                         Assessment/Plan:      * Acute renal failure with acute renal cortical necrosis superimposed on stage 5 chronic kidney disease, not on chronic dialysis  Patient with acute kidney injury likely due to Cardiorenal syndrome ROD is currently worsening. Labs reviewed- Renal function/electrolytes with Estimated Creatinine Clearance: 10.5 mL/min (A) (based on SCr of 5.7 mg/dL (H)). according to latest data. Monitor urine output and serial BMP and adjust therapy as needed. Avoid nephrotoxins and renally dose meds for GFR listed above.     -serum creatinine has worsened to 5.5 (recently 4.3 about six weeks ago)  -likely cardiorenal syndrome  -Lasix 80 mg IV b.i.d. x2 doses.  -may need ultrafiltration volume  control.  -consult Nephrology  -patient followed by Dr. Jacobo as outpatient      8/3/2022  Nephrology following   Continue IV diuresis   Monitor     Elevated d-dimer with indeterminate perfusion scan for PE  -continue home dose apixaban.      Anemia due to chronic kidney disease  -hemoglobin 8.9.  Monitor  -continue  Iron supplementation    Nephrology following     Anasarca  -IV duretics      Pericardial effusion  -mild, no hemodynamic compromise.  -no evidence of cardiac tamponade      Type 2 diabetes mellitus, without long-term current use of insulin  Patient's FSGs are controlled on current medication regimen.  Last A1c reviewed-   Lab Results   Component Value Date    HGBA1C 5.1 06/07/2022     Most recent fingerstick glucose reviewed-   Recent Labs   Lab 08/03/22  0713   POCTGLUCOSE 111*     Current correctional scale  Low  Maintain anti-hyperglycemic dose as follows-   Antihyperglycemics (From admission, onward)            None        Hold Oral hypoglycemics while patient is in the hospital.    Stage 5 chronic kidney disease    Nephrology following     Acute on chronic diastolic congestive heart failure  -recent echocardiogram, preserved EF.  -not requiring supplemental oxygen at this time.  -continue IV diuretics.      8/3/2020  Cardiology following   Continue IV diuresis   Strict I & O   CHF pathway     Decompensated hepatic cirrhosis  Monitor liver function       Hypothyroidism due to acquired atrophy of thyroid  -continue Synthroid      Morbid obesity due to excess calories  Body mass index is 40.2 kg/m². Morbid obesity complicates all aspects of disease management from diagnostic modalities to treatment. Weight loss encouraged and health benefits explained to patient.         Liver cirrhosis secondary to MQCUEEN  -history of TIPS procedure      VTE Risk Mitigation (From admission, onward)         Ordered     apixaban tablet 2.5 mg  2 times daily         08/02/22 1942     Place sequential compression device   Until discontinued         08/02/22 1940                Discharge Planning   GILLIAN:      Code Status: Prior   Is the patient medically ready for discharge?:     Reason for patient still in hospital (select all that apply): Patient trending condition and Treatment  Discharge Plan A: Home with family                  Denzel Castellanos NP  Department of Hospital Medicine   [facility] ([facility])

## 2022-08-03 NOTE — ASSESSMENT & PLAN NOTE
Due to:  Advanced CKD + CHF decompensation + liver cirrhosis (alb 2.7)  Pt was informed of the relation between salt and fluid intake and her medical maladies

## 2022-08-03 NOTE — SUBJECTIVE & OBJECTIVE
Past Medical History:   Diagnosis Date    Acquired hypothyroidism 9/28/2020    ROD (acute kidney injury) 1/3/2019    Arthritis of knee 1/31/2022    Ascites     Bilateral lower extremity edema 9/2/2016    Breast pain, left 1/4/2018    Cataract     Chest pain, atypical 2/9/2018    CHF (congestive heart failure)     Cirrhosis     Cough     Diabetes mellitus     Diabetes mellitus, type 2     Diarrhea 9/4/2019    Edema 3/19/2018    Epigastric pain 2/11/2020    Gastroparesis     GERD (gastroesophageal reflux disease)     Hepatic encephalopathy 6/19/2018    Pt has history of cirrhosis, seen at [facility] on 6/7/18. Currently on lactulose.    Hyperlipidemia     Hypertension     Hypotension 2/21/2019    Immunization deficiency 2/10/2020    Liver disease     Lumbar strain 4/27/2018    Lumbar strain, sequela 2/17/2020    Macular degeneration     Medication reaction 11/17/2016    Other and unspecified hyperlipidemia 6/11/2012 11:11:32 AM    [facility] Historical - Endocrine/Metabolic/Immune: Hyperlipidemia-No Additional Notes    Pneumonia of right middle lobe due to infectious organism 12/19/2017    Renal disorder     Retinopathy due to secondary diabetes mellitus     Skin lesion 12/20/2021    Sleep apnea     Strain of lumbar region 10/4/2021    Thyroid disease     Type 2 diabetes mellitus with hyperglycemia 10/10/2020       Past Surgical History:   Procedure Laterality Date    CATARACT EXTRACTION      CHOLECYSTECTOMY      COLONOSCOPY N/A 9/4/2019    Procedure: COLONOSCOPY;  Surgeon: Marnie Elmore MD;  Location: [facility];  Service: Endoscopy;  Laterality: N/A;    ERCP      ESOPHAGOGASTRODUODENOSCOPY N/A 7/28/2022    Procedure: EGD (ESOPHAGOGASTRODUODENOSCOPY);  Surgeon: Jimy Katz MD;  Location: [facility] ENDO;  Service: Endoscopy;  Laterality: N/A;    FLUOROSCOPY N/A 7/24/2020    Procedure: TIPS revision;  Surgeon: Martell Jasmine MD;  Location: [facility] CATH LAB;  Service: General;  Laterality: N/A;    LIVER BIOPSY       THORACENTESIS Left 1/20/2020    Procedure: Thoracentesis;  Surgeon: Angelica Hunter MD;  Location: [facility];  Service: Pulmonary;  Laterality: Left;    TUBAL LIGATION      UPPER GASTROINTESTINAL ENDOSCOPY         Review of patient's allergies indicates:   Allergen Reactions    Subsys [fentanyl] Other (See Comments)     After administration pt unresponsive.  HR and respirations decreased.     Versed [midazolam] Other (See Comments)     After administration pt unresponsive.  HR and respirations decreased.     Ampicillin Rash    Codeine Nausea And Vomiting and Nausea Only     Current Facility-Administered Medications   Medication Frequency    acetaminophen tablet 650 mg Q6H PRN    albuterol-ipratropium 2.5 mg-0.5 mg/3 mL nebulizer solution 3 mL Q4H PRN    apixaban tablet 2.5 mg BID    carvediloL tablet 25 mg BID WM    ferrous sulfate tablet 1 each Daily    furosemide injection 80 mg Q12H    guaiFENesin 100 mg/5 ml syrup 200 mg Q4H PRN    levothyroxine tablet 137 mcg Before breakfast    metOLazone tablet 2.5 mg Daily    ondansetron injection 4 mg Q8H PRN    pravastatin tablet 10 mg Daily     Family History       Problem Relation (Age of Onset)    Aneurysm Sister    Breast cancer Mother    Cancer Mother, Maternal Grandfather    Heart disease Father, Brother    No Known Problems Maternal Grandmother    Sarcoidosis Sister          Tobacco Use    Smoking status: Never Smoker    Smokeless tobacco: Never Used   Substance and Sexual Activity    Alcohol use: No    Drug use: No    Sexual activity: Not Currently     Review of Systems   Constitutional: Negative.    HENT: Negative.     Respiratory:  Positive for shortness of breath.    Cardiovascular:  Positive for leg swelling.   Gastrointestinal:  Positive for abdominal distention.   Genitourinary: Negative.    Musculoskeletal: Negative.    Hematological: Negative.    Psychiatric/Behavioral: Negative.     Objective:     Vital Signs (Most Recent):  Temp: 98.2 °F (36.8 °C)  (08/03/22 1111)  Pulse: 65 (08/03/22 1111)  Resp: 14 (08/03/22 1111)  BP: (!) 144/68 (BP taken manually) (08/03/22 1127)  SpO2: 97 % (08/03/22 1111)  O2 Device (Oxygen Therapy): room air (08/02/22 2103)   Vital Signs (24h Range):  Temp:  [97.9 °F (36.6 °C)-98.2 °F (36.8 °C)] 98.2 °F (36.8 °C)  Pulse:  [59-67] 65  Resp:  [12-18] 14  SpO2:  [97 %-100 %] 97 %  BP: (119-147)/(57-69) 144/68     Weight: 96.5 kg (212 lb 11.9 oz) (08/03/22 0018)  Body mass index is 40.2 kg/m².  Body surface area is 2.04 meters squared.    I/O last 3 completed shifts:  In: 480 [P.O.:480]  Out: -     Physical Exam  Vitals and nursing note reviewed.   Constitutional:       Appearance: Normal appearance.   HENT:      Head: Normocephalic and atraumatic.   Cardiovascular:      Rate and Rhythm: Normal rate and regular rhythm.      Pulses: Normal pulses.      Heart sounds: Normal heart sounds.   Pulmonary:      Effort: Respiratory distress present.      Breath sounds: Normal breath sounds.   Abdominal:      General: There is distension.      Palpations: Abdomen is soft.      Tenderness: There is no abdominal tenderness.   Musculoskeletal:      Right lower leg: Edema present.      Left lower leg: Edema present.   Neurological:      Mental Status: She is alert and oriented to person, place, and time.   Psychiatric:         Mood and Affect: Mood normal.         Behavior: Behavior normal.       Significant Labs: reviewed  BMP  Lab Results   Component Value Date     08/03/2022    K 5.2 (H) 08/03/2022     08/03/2022    CO2 25 08/03/2022    BUN 67 (H) 08/03/2022    CREATININE 5.7 (H) 08/03/2022    CALCIUM 8.0 (L) 08/03/2022    ANIONGAP 9 08/03/2022    ESTGFRAFRICA 12 (A) 06/21/2022    EGFRNONAA 10 (A) 06/21/2022     Lab Results   Component Value Date    WBC 4.77 08/03/2022    HGB 8.6 (L) 08/03/2022    HCT 28.0 (L) 08/03/2022    MCV 91 08/03/2022     (L) 08/03/2022       Recent Labs   Lab 08/02/22  1652   TROPONINI 0.013     Lab Results    Component Value Date    .2 (H) 06/06/2022    CALCIUM 8.0 (L) 08/03/2022    PHOS 4.4 06/06/2022       Significant Imaging: CXR: +pulmonary edema and vascular congestion

## 2022-08-03 NOTE — ASSESSMENT & PLAN NOTE
Patient with acute kidney injury likely due to Cardiorenal syndrome ROD is currently worsening. Labs reviewed- Renal function/electrolytes with Estimated Creatinine Clearance: 10.8 mL/min (A) (based on SCr of 5.5 mg/dL (H)). according to latest data. Monitor urine output and serial BMP and adjust therapy as needed. Avoid nephrotoxins and renally dose meds for GFR listed above.     -serum creatinine has worsened to 5.5 (recently 4.3 about six weeks ago)  -likely cardiorenal syndrome  -Lasix 80 mg IV b.i.d. x2 doses.  -may need ultrafiltration volume control.  -consult Nephrology  -patient followed by Dr. Jacobo as outpatient

## 2022-08-03 NOTE — ASSESSMENT & PLAN NOTE
Patient's FSGs are controlled on current medication regimen.  Last A1c reviewed-   Lab Results   Component Value Date    HGBA1C 5.1 06/07/2022     Most recent fingerstick glucose reviewed- No results for input(s): POCTGLUCOSE in the last 24 hours.  Current correctional scale  Low  Maintain anti-hyperglycemic dose as follows-   Antihyperglycemics (From admission, onward)            None        Hold Oral hypoglycemics while patient is in the hospital.

## 2022-08-03 NOTE — PLAN OF CARE
Pt AAOx4. VSS. Pt remained free of falls this shift. No complaints of pain or discomfort. Medications administered as ordered. Pt is normal sinus on monitor. Hourly rounding completed. Pt instructed to call for assistance. POC reviewed. Pt verbalized understanding.

## 2022-08-03 NOTE — ASSESSMENT & PLAN NOTE
-recent echocardiogram, preserved EF.  -not requiring supplemental oxygen at this time.  -continue IV diuretics.

## 2022-08-04 LAB
ALBUMIN SERPL BCP-MCNC: 2.6 G/DL (ref 3.5–5.2)
ALP SERPL-CCNC: 62 U/L (ref 55–135)
ALT SERPL W/O P-5'-P-CCNC: 12 U/L (ref 10–44)
ANION GAP SERPL CALC-SCNC: 9 MMOL/L (ref 8–16)
AST SERPL-CCNC: 20 U/L (ref 10–40)
BASOPHILS # BLD AUTO: 0.02 K/UL (ref 0–0.2)
BASOPHILS NFR BLD: 0.4 % (ref 0–1.9)
BILIRUB SERPL-MCNC: 0.3 MG/DL (ref 0.1–1)
BUN SERPL-MCNC: 64 MG/DL (ref 8–23)
CALCIUM SERPL-MCNC: 7.9 MG/DL (ref 8.7–10.5)
CHLORIDE SERPL-SCNC: 109 MMOL/L (ref 95–110)
CO2 SERPL-SCNC: 24 MMOL/L (ref 23–29)
CREAT SERPL-MCNC: 5.3 MG/DL (ref 0.5–1.4)
DIFFERENTIAL METHOD: ABNORMAL
EOSINOPHIL # BLD AUTO: 0.2 K/UL (ref 0–0.5)
EOSINOPHIL NFR BLD: 4.3 % (ref 0–8)
ERYTHROCYTE [DISTWIDTH] IN BLOOD BY AUTOMATED COUNT: 16.6 % (ref 11.5–14.5)
EST. GFR  (NO RACE VARIABLE): 8 ML/MIN/1.73 M^2
GLUCOSE SERPL-MCNC: 82 MG/DL (ref 70–110)
HCT VFR BLD AUTO: 31.3 % (ref 37–48.5)
HGB BLD-MCNC: 9.3 G/DL (ref 12–16)
IMM GRANULOCYTES # BLD AUTO: 0.01 K/UL (ref 0–0.04)
IMM GRANULOCYTES NFR BLD AUTO: 0.2 % (ref 0–0.5)
IRON SERPL-MCNC: 68 UG/DL (ref 30–160)
LYMPHOCYTES # BLD AUTO: 2 K/UL (ref 1–4.8)
LYMPHOCYTES NFR BLD: 43.5 % (ref 18–48)
MAGNESIUM SERPL-MCNC: 3.1 MG/DL (ref 1.6–2.6)
MCH RBC QN AUTO: 27.2 PG (ref 27–31)
MCHC RBC AUTO-ENTMCNC: 29.7 G/DL (ref 32–36)
MCV RBC AUTO: 92 FL (ref 82–98)
MONOCYTES # BLD AUTO: 0.4 K/UL (ref 0.3–1)
MONOCYTES NFR BLD: 9.2 % (ref 4–15)
NEUTROPHILS # BLD AUTO: 2 K/UL (ref 1.8–7.7)
NEUTROPHILS NFR BLD: 42.4 % (ref 38–73)
NRBC BLD-RTO: 0 /100 WBC
PHOSPHATE SERPL-MCNC: 4.4 MG/DL (ref 2.7–4.5)
PLATELET # BLD AUTO: 170 K/UL (ref 150–450)
PMV BLD AUTO: 10.6 FL (ref 9.2–12.9)
POTASSIUM SERPL-SCNC: 4.9 MMOL/L (ref 3.5–5.1)
PROT SERPL-MCNC: 5.9 G/DL (ref 6–8.4)
RBC # BLD AUTO: 3.42 M/UL (ref 4–5.4)
SATURATED IRON: 27 % (ref 20–50)
SODIUM SERPL-SCNC: 142 MMOL/L (ref 136–145)
TOTAL IRON BINDING CAPACITY: 255 UG/DL (ref 250–450)
TRANSFERRIN SERPL-MCNC: 172 MG/DL (ref 200–375)
WBC # BLD AUTO: 4.69 K/UL (ref 3.9–12.7)

## 2022-08-04 PROCEDURE — 99232 PR SUBSEQUENT HOSPITAL CARE,LEVL II: ICD-10-PCS | Mod: ,,, | Performed by: INTERNAL MEDICINE

## 2022-08-04 PROCEDURE — 21400001 HC TELEMETRY ROOM

## 2022-08-04 PROCEDURE — 36415 COLL VENOUS BLD VENIPUNCTURE: CPT | Performed by: INTERNAL MEDICINE

## 2022-08-04 PROCEDURE — 99232 SBSQ HOSP IP/OBS MODERATE 35: CPT | Mod: ,,, | Performed by: INTERNAL MEDICINE

## 2022-08-04 PROCEDURE — 25000003 PHARM REV CODE 250: Performed by: INTERNAL MEDICINE

## 2022-08-04 PROCEDURE — 99232 PR SUBSEQUENT HOSPITAL CARE,LEVL II: ICD-10-PCS | Mod: ,,, | Performed by: PHYSICIAN ASSISTANT

## 2022-08-04 PROCEDURE — 83735 ASSAY OF MAGNESIUM: CPT | Performed by: INTERNAL MEDICINE

## 2022-08-04 PROCEDURE — 85025 COMPLETE CBC W/AUTO DIFF WBC: CPT | Performed by: INTERNAL MEDICINE

## 2022-08-04 PROCEDURE — 84100 ASSAY OF PHOSPHORUS: CPT | Performed by: INTERNAL MEDICINE

## 2022-08-04 PROCEDURE — 80053 COMPREHEN METABOLIC PANEL: CPT | Performed by: INTERNAL MEDICINE

## 2022-08-04 PROCEDURE — 99232 SBSQ HOSP IP/OBS MODERATE 35: CPT | Mod: ,,, | Performed by: PHYSICIAN ASSISTANT

## 2022-08-04 RX ORDER — TORSEMIDE 10 MG/1
40 TABLET ORAL DAILY
Status: DISCONTINUED | OUTPATIENT
Start: 2022-08-04 | End: 2022-08-05 | Stop reason: HOSPADM

## 2022-08-04 RX ADMIN — APIXABAN 2.5 MG: 2.5 TABLET, FILM COATED ORAL at 08:08

## 2022-08-04 RX ADMIN — FERROUS SULFATE TAB 325 MG (65 MG ELEMENTAL FE) 1 EACH: 325 (65 FE) TAB at 08:08

## 2022-08-04 RX ADMIN — LEVOTHYROXINE SODIUM 137 MCG: 0.11 TABLET ORAL at 05:08

## 2022-08-04 RX ADMIN — METOLAZONE 2.5 MG: 2.5 TABLET ORAL at 08:08

## 2022-08-04 RX ADMIN — CARVEDILOL 25 MG: 12.5 TABLET, FILM COATED ORAL at 04:08

## 2022-08-04 RX ADMIN — CARVEDILOL 25 MG: 12.5 TABLET, FILM COATED ORAL at 08:08

## 2022-08-04 RX ADMIN — TORSEMIDE 40 MG: 10 TABLET ORAL at 02:08

## 2022-08-04 RX ADMIN — PRAVASTATIN SODIUM 10 MG: 10 TABLET ORAL at 08:08

## 2022-08-04 NOTE — PROGRESS NOTES
[facility]  Progress Note    Patient Name: Diamond Das  MRN: [id number]  Patient Class: IP- Inpatient   Admission Date: 8/2/2022  Length of Stay: 1 days  Attending Physician: Sydni Tamez MD  Primary Care Provider: Andrey Perrin MD        Subjective:     Principal Problem:Acute renal failure with acute renal cortical necrosis superimposed on stage 5 chronic kidney disease, not on chronic dialysis        HPI:  Ms. Das is a 67-year-old  female with PMH significant for diastolic CHF, CKD stage 5, followed by Dr. 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  Patient has significant bilateral lower extremity edema, and has been having multiple changes to her diuretic regimen recently.  She was advised to go to the ED to be admitted for IV diuretics.  Denies SOB at rest, but unable to walk few steps due to increasing short of breath.  She is chronically home oxygen dependent at 2-3 L O2 N/C.  In the ED she received Lasix 40 mg IV x1.  Creatinine is worsened to 5.5 (in baseline 4.3).    Admitting diagnosis:  ROD on CKD stage 5. Cardiorenal syndrome.  Anasarca.  Acute on chronic diastolic CHF.      Overview/Hospital Course:  Ms Das is a 67 year old female who presented to [facility] Room for evaluation after being seen in Cardiology clinic before admission. Associated symptoms include shortness of breath, severe bilatearl lower extremely edema and anasarca. Creatinine elevated above normal. She has been started on IV diuresis. Cardiology and Nephrology following.     8/4- Seen at bedside . Sitting on the side of the bed and eating Jello. Reports feeling a whole lot better. Leg swelling and SOB improved. Nephro feels pt is responding to diuretic therapy and does not need  RRT at this time. Diuretic transitioned to oral . Possible DC home tomorrow after labs. Creatinine down to 5.3>5.7, K 4.9.           Interval History:   Clinically improved. Diuretic transitioned to oral . Possible DC home tomorrow after labs. Creatinine down to 5.3>5.7, K 4.9.       Review of Systems   Constitutional:  Positive for fatigue. Negative for chills and fever.   HENT: Negative.  Negative for congestion, rhinorrhea, sore throat and trouble swallowing.    Eyes: Negative.  Negative for visual disturbance.   Respiratory:  Positive for shortness of breath (improved). Negative for cough and wheezing.    Cardiovascular:  Positive for leg swelling (improved). Negative for chest pain and palpitations.   Gastrointestinal: Negative.  Negative for abdominal pain, diarrhea, nausea and vomiting.   Endocrine: Negative.    Genitourinary: Negative.  Negative for dysuria and flank pain.   Musculoskeletal: Negative.  Negative for back pain.   Skin: Negative.  Negative for rash.   Allergic/Immunologic: Negative.    Neurological:  Negative for speech difficulty, numbness and headaches.   Hematological: Negative.    Psychiatric/Behavioral: Negative.  Negative for hallucinations. The patient is not nervous/anxious.    All other systems reviewed and are negative.  Objective:     Vital Signs (Most Recent):  Temp: 97.9 °F (36.6 °C) (08/04/22 1100)  Pulse: 62 (08/04/22 1100)  Resp: 16 (08/04/22 1100)  BP: 139/65 (08/04/22 1100)  SpO2: 97 % (08/04/22 1100) Vital Signs (24h Range):  Temp:  [97.3 °F (36.3 °C)-99 °F (37.2 °C)] 97.9 °F (36.6 °C)  Pulse:  [62-66] 62  Resp:  [16-20] 16  SpO2:  [90 %-100 %] 97 %  BP: (131-162)/(59-70) 139/65     Weight: 96.5 kg (212 lb 11.9 oz)  Body mass index is 40.2 kg/m².    Intake/Output Summary (Last 24 hours) at 8/4/2022 1317  Last data filed at 8/3/2022 1800  Gross per 24 hour   Intake 200 ml   Output 100 ml   Net 100 ml      Physical Exam  Vitals and nursing note reviewed.   Constitutional:        General: She is awake. She is not in acute distress.     Appearance: She is morbidly obese. She is not ill-appearing.   HENT:      Head: Normocephalic and atraumatic.      Mouth/Throat:      Mouth: Mucous membranes are moist.   Eyes:      General: No scleral icterus.     Conjunctiva/sclera: Conjunctivae normal.   Cardiovascular:      Rate and Rhythm: Normal rate and regular rhythm.      Heart sounds: No murmur heard.  Pulmonary:      Effort: Pulmonary effort is normal. No respiratory distress.      Breath sounds: No wheezing or rales.   Abdominal:      Palpations: Abdomen is soft.      Tenderness: There is no abdominal tenderness.      Comments: Anasarca present    Musculoskeletal:         General: No swelling (3+ bilateral pretibial pitting edema.). Normal range of motion.      Cervical back: Normal range of motion and neck supple.      Right lower leg: Edema (1+) present.      Left lower leg: Edema (1+) present.   Skin:     General: Skin is warm.      Coloration: Skin is not jaundiced.   Neurological:      General: No focal deficit present.      Mental Status: She is alert and oriented to person, place, and time. Mental status is at baseline.   Psychiatric:         Attention and Perception: Attention normal.         Mood and Affect: Mood normal.         Speech: Speech normal.         Behavior: Behavior normal. Behavior is cooperative.       Significant Labs: All pertinent labs within the past 24 hours have been reviewed.  BMP:   Recent Labs   Lab 08/04/22  0500   GLU 82      K 4.9      CO2 24   BUN 64*   CREATININE 5.3*   CALCIUM 7.9*   MG 3.1*     CBC:   Recent Labs   Lab 08/02/22  1557 08/03/22  0258 08/04/22  0500   WBC 4.72 4.77 4.69   HGB 8.6* 8.6* 9.3*   HCT 27.2* 28.0* 31.3*    147* 170     CMP:   Recent Labs   Lab 08/02/22  1652 08/03/22  0258 08/04/22  0500    144 142   K 5.1 5.2* 4.9    110 109   CO2 24 25 24    130* 82   BUN 63* 67* 64*   CREATININE 5.5* 5.7* 5.3*    CALCIUM 8.0* 8.0* 7.9*   PROT 6.4 5.7* 5.9*   ALBUMIN 2.8* 2.7* 2.6*   BILITOT 0.3 0.2 0.3   ALKPHOS 70 66 62   AST 26 22 20   ALT 14 14 12   ANIONGAP 8 9 9       Significant Imaging:       Assessment/Plan:      * Acute renal failure with acute renal cortical necrosis superimposed on stage 5 chronic kidney disease, not on chronic dialysis  Patient with acute kidney injury likely due to Cardiorenal syndrome ROD is currently worsening. Labs reviewed- Renal function/electrolytes with Estimated Creatinine Clearance: 11.2 mL/min (A) (based on SCr of 5.3 mg/dL (H)). according to latest data. Monitor urine output and serial BMP and adjust therapy as needed. Avoid nephrotoxins and renally dose meds for GFR listed above.     -serum creatinine has worsened to 5.5 (recently 4.3 about six weeks ago)  -likely cardiorenal syndrome  -Lasix 80 mg IV b.i.d. x2 doses.  -may need ultrafiltration volume control.  -consult Nephrology  -patient followed by Dr. Jacobo as outpatient      8/3/2022  Nephrology following   Continue IV diuresis   Monitor   8/4-  Renal indices are stable  Volume status improved   Diuretic transitioned to oral     Elevated d-dimer with indeterminate perfusion scan for PE  -continue home dose apixaban.      Anemia due to chronic kidney disease  -hemoglobin 8.9.  Monitor  -continue  Iron supplementation    Nephrology following     Anasarca  -IV duretics  8/4-  Diuretics transitioned to oral       Pericardial effusion  -mild, no hemodynamic compromise.  -no evidence of cardiac tamponade      Type 2 diabetes mellitus, without long-term current use of insulin  Patient's FSGs are controlled on current medication regimen.  Last A1c reviewed-   Lab Results   Component Value Date    HGBA1C 5.1 06/07/2022     Most recent fingerstick glucose reviewed-   No results for input(s): POCTGLUCOSE in the last 24 hours.  Current correctional scale  Low  Maintain anti-hyperglycemic dose as follows-   Antihyperglycemics (From  admission, onward)            None        Hold Oral hypoglycemics while patient is in the hospital.    Stage 5 chronic kidney disease    Nephrology following     Acute on chronic diastolic congestive heart failure  -recent echocardiogram, preserved EF.  -not requiring supplemental oxygen at this time.  -continue IV diuretics.      8/3/2020  Cardiology following   Continue IV diuresis   Strict I & O   CHF pathway   8/4-  Diuretic transitioned to oral   Continue BB  Not on ACEi or ARB given advanced CKD    Decompensated hepatic cirrhosis  -Continue diuretics  -Monitor liver function       Hypothyroidism due to acquired atrophy of thyroid  -TSH is not at goal, however continue current home dose and adjust dose as OP with follow up labs       Morbid obesity due to excess calories  Body mass index is 40.2 kg/m². Morbid obesity complicates all aspects of disease management from diagnostic modalities to treatment. Weight loss encouraged and health benefits explained to patient.         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  -history of TIPS procedure      VTE Risk Mitigation (From admission, onward)         Ordered     apixaban tablet 2.5 mg  2 times daily         08/02/22 1942     Place sequential compression device  Until discontinued         08/02/22 1940                Discharge Planning   GILLIAN:      Code Status: Prior   Is the patient medically ready for discharge?:     Reason for patient still in hospital (select all that apply): Patient trending condition  Discharge Plan A: Home with family                  Sydni Tamez MD  Department of Hospital Medicine   [facility] - Telemetry ([facility])

## 2022-08-04 NOTE — ASSESSMENT & PLAN NOTE
-recent echocardiogram, preserved EF.  -not requiring supplemental oxygen at this time.  -continue IV diuretics.      8/3/2020  Cardiology following   Continue IV diuresis   Strict I & O   CHF pathway   8/4-  Diuretic transitioned to oral   Continue BB  Not on ACEi or ARB given advanced CKD

## 2022-08-04 NOTE — SUBJECTIVE & OBJECTIVE
Interval History:   Clinically improved. Diuretic transitioned to oral . Possible DC home tomorrow after labs. Creatinine down to 5.3>5.7, K 4.9.       Review of Systems   Constitutional:  Positive for fatigue. Negative for chills and fever.   HENT: Negative.  Negative for congestion, rhinorrhea, sore throat and trouble swallowing.    Eyes: Negative.  Negative for visual disturbance.   Respiratory:  Positive for shortness of breath (improved). Negative for cough and wheezing.    Cardiovascular:  Positive for leg swelling (improved). Negative for chest pain and palpitations.   Gastrointestinal: Negative.  Negative for abdominal pain, diarrhea, nausea and vomiting.   Endocrine: Negative.    Genitourinary: Negative.  Negative for dysuria and flank pain.   Musculoskeletal: Negative.  Negative for back pain.   Skin: Negative.  Negative for rash.   Allergic/Immunologic: Negative.    Neurological:  Negative for speech difficulty, numbness and headaches.   Hematological: Negative.    Psychiatric/Behavioral: Negative.  Negative for hallucinations. The patient is not nervous/anxious.    All other systems reviewed and are negative.  Objective:     Vital Signs (Most Recent):  Temp: 97.9 °F (36.6 °C) (08/04/22 1100)  Pulse: 62 (08/04/22 1100)  Resp: 16 (08/04/22 1100)  BP: 139/65 (08/04/22 1100)  SpO2: 97 % (08/04/22 1100) Vital Signs (24h Range):  Temp:  [97.3 °F (36.3 °C)-99 °F (37.2 °C)] 97.9 °F (36.6 °C)  Pulse:  [62-66] 62  Resp:  [16-20] 16  SpO2:  [90 %-100 %] 97 %  BP: (131-162)/(59-70) 139/65     Weight: 96.5 kg (212 lb 11.9 oz)  Body mass index is 40.2 kg/m².    Intake/Output Summary (Last 24 hours) at 8/4/2022 1317  Last data filed at 8/3/2022 1800  Gross per 24 hour   Intake 200 ml   Output 100 ml   Net 100 ml      Physical Exam  Vitals and nursing note reviewed.   Constitutional:       General: She is awake. She is not in acute distress.     Appearance: She is morbidly obese. She is not ill-appearing.   HENT:       Head: Normocephalic and atraumatic.      Mouth/Throat:      Mouth: Mucous membranes are moist.   Eyes:      General: No scleral icterus.     Conjunctiva/sclera: Conjunctivae normal.   Cardiovascular:      Rate and Rhythm: Normal rate and regular rhythm.      Heart sounds: No murmur heard.  Pulmonary:      Effort: Pulmonary effort is normal. No respiratory distress.      Breath sounds: No wheezing or rales.   Abdominal:      Palpations: Abdomen is soft.      Tenderness: There is no abdominal tenderness.      Comments: Anasarca present    Musculoskeletal:         General: No swelling (3+ bilateral pretibial pitting edema.). Normal range of motion.      Cervical back: Normal range of motion and neck supple.      Right lower leg: Edema (1+) present.      Left lower leg: Edema (1+) present.   Skin:     General: Skin is warm.      Coloration: Skin is not jaundiced.   Neurological:      General: No focal deficit present.      Mental Status: She is alert and oriented to person, place, and time. Mental status is at baseline.   Psychiatric:         Attention and Perception: Attention normal.         Mood and Affect: Mood normal.         Speech: Speech normal.         Behavior: Behavior normal. Behavior is cooperative.       Significant Labs: All pertinent labs within the past 24 hours have been reviewed.  BMP:   Recent Labs   Lab 08/04/22  0500   GLU 82      K 4.9      CO2 24   BUN 64*   CREATININE 5.3*   CALCIUM 7.9*   MG 3.1*     CBC:   Recent Labs   Lab 08/02/22  1557 08/03/22  0258 08/04/22  0500   WBC 4.72 4.77 4.69   HGB 8.6* 8.6* 9.3*   HCT 27.2* 28.0* 31.3*    147* 170     CMP:   Recent Labs   Lab 08/02/22  1652 08/03/22  0258 08/04/22  0500    144 142   K 5.1 5.2* 4.9    110 109   CO2 24 25 24    130* 82   BUN 63* 67* 64*   CREATININE 5.5* 5.7* 5.3*   CALCIUM 8.0* 8.0* 7.9*   PROT 6.4 5.7* 5.9*   ALBUMIN 2.8* 2.7* 2.6*   BILITOT 0.3 0.2 0.3   ALKPHOS 70 66 62   AST 26 22 20   ALT  14 14 12   ANIONGAP 8 9 9       Significant Imaging:

## 2022-08-04 NOTE — ASSESSMENT & PLAN NOTE
-TSH is not at goal, however continue current home dose and adjust dose as OP with follow up labs

## 2022-08-04 NOTE — ASSESSMENT & PLAN NOTE
-Presents with decompensated diastolic CHF/volume overload  -Underlying cirrhosis and hypoalbuminemia contributing  -Continue IV diuresis  -Continue other home meds as tolerated  -Strict I's/O's    8/4/22  -Much improved  -Can transition to po diuretics soon, nephrology on board to assist with dosing  -Continue other home meds

## 2022-08-04 NOTE — PLAN OF CARE
Pt oriented x4 VSS  Pt remained afebile throughout this shift  All meds administered per order.  Pt remianed free of falls  Plan of care reviewed. pt verbalizes understanding.  Patient moving/ turning independently.  Patient normal sinus on monitor  Bed low, side rails up x2, wheels locked, call light in reach.  Pt instructed to call for assistance  Hourly rounding completed.

## 2022-08-04 NOTE — PROGRESS NOTES
[facility] ([facility])  Cardiology  Progress Note    Patient Name: Diamond Das  MRN: [id number]  Admission Date: 8/2/2022  Hospital Length of Stay: 1 days  Code Status: Prior   Attending Physician: Sydni aTmez MD   Primary Care Physician: Andrey Perrin MD  Expected Discharge Date:   Principal Problem:Acute renal failure with acute renal cortical necrosis superimposed on stage 5 chronic kidney disease, not on chronic dialysis    Subjective:   HPI:  Ms. 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 ED from cardiology clinic due to volume overload. Patient complained of increased BLE edema along with increased SOB and weakness. No associated morena chest pain, fever, chills, palpitations, near syncope, or syncope. Initial workup in ED reveled creatinine of 5.5, hypoalbuminemia (2.7), and BNP of 347. CXR showed findings consistent with probable chronic edema and patient was subsequently admitted for further evaluation and treatment. Cardiology consulted to assist with management. Patient seen and examined today, resting in bed. Feels slightly better. Still grossly edematous. Chest pain free. She reports compliance with her medications. Followed routinely in clinic by Dr. Ramos. Echo 1/10/22 showed normal EF. Nephrology consulted to assist with management.     Hospital Course:   8/4/22-Patient seen and examined today, sitting up in bed. Feeling much better. SOB and BLE edema significantly improved. Chest pain free. Labs reviewed. Creatinine 5.3, stable, nephrology following.           Review of Systems   Constitutional: Negative.   HENT: Negative.     Eyes: Negative.    Cardiovascular:  Positive for leg swelling (improved).   Respiratory: Negative.     Endocrine: Negative.    Hematologic/Lymphatic: Negative.    Skin: Negative.     Musculoskeletal: Negative.    Gastrointestinal: Negative.    Genitourinary: Negative.    Neurological: Negative.    Psychiatric/Behavioral: Negative.     Allergic/Immunologic: Negative.    Objective:     Vital Signs (Most Recent):  Temp: 97.9 °F (36.6 °C) (08/04/22 1100)  Pulse: 62 (08/04/22 1100)  Resp: 16 (08/04/22 1100)  BP: 139/65 (08/04/22 1100)  SpO2: 97 % (08/04/22 1100) Vital Signs (24h Range):  Temp:  [97.3 °F (36.3 °C)-99 °F (37.2 °C)] 97.9 °F (36.6 °C)  Pulse:  [62-66] 62  Resp:  [16-20] 16  SpO2:  [90 %-100 %] 97 %  BP: (131-162)/(59-70) 139/65     Weight: 96.5 kg (212 lb 11.9 oz)  Body mass index is 40.2 kg/m².     SpO2: 97 %  O2 Device (Oxygen Therapy): room air      Intake/Output Summary (Last 24 hours) at 8/4/2022 1252  Last data filed at 8/3/2022 1800  Gross per 24 hour   Intake 200 ml   Output 100 ml   Net 100 ml       Lines/Drains/Airways       Peripheral Intravenous Line  Duration                  Peripheral IV - Single Lumen 08/03/22 0730 20 G Right Forearm 1 day                    Physical Exam  Vitals and nursing note reviewed.   Constitutional:       General: She is not in acute distress.     Appearance: Normal appearance. She is well-developed. She is not diaphoretic.   HENT:      Head: Normocephalic and atraumatic.   Eyes:      General:         Right eye: No discharge.         Left eye: No discharge.      Pupils: Pupils are equal, round, and reactive to light.   Neck:      Thyroid: No thyromegaly.      Vascular: No JVD.      Trachea: No tracheal deviation.   Cardiovascular:      Rate and Rhythm: Normal rate and regular rhythm.      Heart sounds: Normal heart sounds, S1 normal and S2 normal. No murmur heard.  Pulmonary:      Effort: Pulmonary effort is normal. No respiratory distress.      Breath sounds: Normal breath sounds. No wheezing or rales.   Abdominal:      General: There is no distension.      Palpations: Abdomen is soft.      Tenderness: There is no rebound.   Musculoskeletal:       Cervical back: Neck supple.      Right lower leg: Edema (trace) present.      Left lower leg: Edema (trace) present.   Skin:     General: Skin is warm and dry.      Findings: No erythema.   Neurological:      General: No focal deficit present.      Mental Status: She is alert and oriented to person, place, and time.   Psychiatric:         Mood and Affect: Mood normal.         Behavior: Behavior normal.         Thought Content: Thought content normal.       Significant Labs: CMP   Recent Labs   Lab 08/02/22  1652 08/03/22  0258 08/04/22  0500    144 142   K 5.1 5.2* 4.9    110 109   CO2 24 25 24    130* 82   BUN 63* 67* 64*   CREATININE 5.5* 5.7* 5.3*   CALCIUM 8.0* 8.0* 7.9*   PROT 6.4 5.7* 5.9*   ALBUMIN 2.8* 2.7* 2.6*   BILITOT 0.3 0.2 0.3   ALKPHOS 70 66 62   AST 26 22 20   ALT 14 14 12   ANIONGAP 8 9 9   , CBC   Recent Labs   Lab 08/02/22  1557 08/03/22  0258 08/04/22  0500   WBC 4.72 4.77 4.69   HGB 8.6* 8.6* 9.3*   HCT 27.2* 28.0* 31.3*    147* 170   , INR No results for input(s): INR, PROTIME in the last 48 hours., Troponin   Recent Labs   Lab 08/02/22  1652   TROPONINI 0.013   , and All pertinent lab results from the last 24 hours have been reviewed.    Significant Imaging: Echocardiogram: Transthoracic echo (TTE) complete (Cupid Only):   Results for orders placed or performed during the hospital encounter of 01/10/22   Echo   Result Value Ref Range    BSA 2.04 m2    Left Ventricular Outflow Tract Mean Velocity 9.0759515202 cm/s    Left Ventricular Outflow Tract Mean Gradient 4.41 mmHg    TV mean gradient 23 mmHg    LVIDd 4.56 3.5 - 6.0 cm    IVS 1.31 (A) 0.6 - 1.1 cm    Posterior Wall 1.30 (A) 0.6 - 1.1 cm    Ao root annulus 2.77 cm    LVIDs 2.47 2.1 - 4.0 cm    FS 46 28 - 44 %    Ascending aorta 2.75 cm    LV mass 228.39 g    LA size 3.83 cm    Left Ventricle Relative Wall Thickness 0.57 cm    AV mean gradient 9 mmHg    AV valve area 2.07 cm2    AV Velocity Ratio 0.70     AV  index (prosthetic) 0.75     MV valve area p 1/2 method 5.21 cm2    E/A ratio 1.46     E wave deceleration time 145.126953294617827 msec    IVRT 55.447963985343548 msec    LVOT diameter 1.87 cm    LVOT area 2.7 cm2    LVOT peak bart 1.29 m/s    LVOT peak VTI 30.30 cm    Ao peak bart 1.85 m/s    Ao VTI 40.2 cm    RVOT peak bart 0.90 m/s    RVOT peak VTI 20.8 cm    Mr max bart 4.04 m/s    LVOT stroke volume 83.18 cm3    AV peak gradient 14 mmHg    PV mean gradient 2.38 mmHg    MV Peak E Bart 1.24 m/s    TR Max Bart 2.81 m/s    MV stenosis pressure 1/2 time 42.1869241 ms    MV Peak A Bart 0.85 m/s    LV Systolic Volume 21.58 mL    LV Systolic Volume Index 11.1 mL/m2    LV Diastolic Volume 95.37 mL    LV Diastolic Volume Index 49.16 mL/m2    LV Mass Index 118 g/m2    Echo EF Estimated 77 %    RA Major Axis 4.71 cm    Left Atrium Minor Axis 5.15 cm    Left Atrium Major Axis 5.56 cm    Triscuspid Valve Regurgitation Peak Gradient 32 mmHg    Right Atrial Pressure (from IVC) 3 mmHg    EF 60 %    TV rest pulmonary artery pressure 35 mmHg    Narrative    · The left ventricle is normal in size with concentric hypertrophy and   normal systolic function.  · Indeterminate left ventricular diastolic function.  · The estimated PA systolic pressure is 35 mmHg.  · Normal right ventricular size with normal right ventricular systolic   function.  · Normal central venous pressure (3 mmHg).  · The estimated ejection fraction is 60%.  · Mild mitral regurgitation.  · Mild tricuspid regurgitation.      , EKG: Reviewed, and X-Ray: CXR: X-Ray Chest 1 View (CXR):   Results for orders placed or performed during the hospital encounter of 08/02/22   X-Ray Chest 1 View    Narrative    EXAMINATION:  XR CHEST 1 VIEW    CLINICAL HISTORY:  Shortness of breath    COMPARISON:  June 20, 2022, as well as studies dating back to June 22, 2020    FINDINGS:  Chronic or recurrent blunting of the left lateral costophrenic angle and to lesser degree the left  costophrenic angle with areas of scarring or atelectasis in the mid lower left lung again seen.  The lungs are free of new pulmonary opacities.  The cardiac silhouette size is markedly enlarged.  The trachea is midline and the mediastinal width is normal. Negative for pneumothorax.  Pulmonary vasculature is chronically mildly congested.  Negative for osseous abnormalities. Tortuous aorta.  Degenerative changes of the spine with convex right curvature.  Degenerative changes of the shoulder girdles.  Azygous lobe.      Impression    1.  Chronic or recurrent vascular congestion and left greater than right pleural effusions or scarring.  Negative for new pulmonary opacities.  Chronic or recurrent interstitial pulmonary edema must be considered.    2.  Markedly enlarged cardiac silhouette.  Pericardial effusion not excluded.  Clinical correlation is advised.    3.  Other stable findings as noted above.      Electronically signed by: Jose Rafael Pedraza MD  Date:    08/02/2022  Time:    15:22    and X-Ray Chest PA and Lateral (CXR): No results found for this visit on 08/02/22.    Assessment and Plan:   Patient who presents with decompensated diastolic CHF. Clinically improved. Nephrology on board, appreciate assistance.     * Acute renal failure with acute renal cortical necrosis superimposed on stage 5 chronic kidney disease, not on chronic dialysis  -Monitor with diuresis  -Nephrology consulted to assist with management    Elevated d-dimer with indeterminate perfusion scan for PE  -Continue Eliquis    Anemia due to chronic kidney disease  -Monitor during admission    Anasarca  -Assess response to IV diuresis     8/4/22  -Much improved    Type 2 diabetes mellitus, without long-term current use of insulin  -Mgmt as per hospital meidcine    Acute on chronic diastolic congestive heart failure  -Presents with decompensated diastolic CHF/volume overload  -Underlying cirrhosis and hypoalbuminemia contributing  -Continue IV  diuresis  -Continue other home meds as tolerated  -Strict I's/O's    8/4/22  -Much improved  -Can transition to po diuretics soon, nephrology on board to assist with dosing  -Continue other home meds    Decompensated hepatic cirrhosis  -Mgmt as per primary team/GI    Morbid obesity due to excess calories  -weight loss    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  -Mgmt as per primary team        VTE Risk Mitigation (From admission, onward)         Ordered     apixaban tablet 2.5 mg  2 times daily         08/02/22 1942     Place sequential compression device  Until discontinued         08/02/22 1940                Samantha Osei PA-C  Cardiology  [facility] ([facility])

## 2022-08-04 NOTE — ASSESSMENT & PLAN NOTE
Patient with acute kidney injury likely due to Cardiorenal syndrome ROD is currently worsening. Labs reviewed- Renal function/electrolytes with Estimated Creatinine Clearance: 11.2 mL/min (A) (based on SCr of 5.3 mg/dL (H)). according to latest data. Monitor urine output and serial BMP and adjust therapy as needed. Avoid nephrotoxins and renally dose meds for GFR listed above.     -serum creatinine has worsened to 5.5 (recently 4.3 about six weeks ago)  -likely cardiorenal syndrome  -Lasix 80 mg IV b.i.d. x2 doses.  -may need ultrafiltration volume control.  -consult Nephrology  -patient followed by Dr. Jacobo as outpatient      8/3/2022  Nephrology following   Continue IV diuresis   Monitor   8/4-  Renal indices are stable  Volume status improved   Diuretic transitioned to oral

## 2022-08-04 NOTE — PROGRESS NOTES
[facility] ([facility])  Nephrology  Progress Note    Patient Name: Diamond Das  MRN: [id number]  Admission Date: 8/2/2022  Hospital Length of Stay: 1 days  Attending Provider: Sydni Tamez MD   Primary Care Physician: Andrey Perrin MD  Principal Problem:Acute renal failure with acute renal cortical necrosis superimposed on stage 5 chronic kidney disease, not on chronic dialysis    Inpatient consult to Nephrology  Consult performed by: Pankaj Jacobo MD  Consult ordered by: Napoleon Bowling MD  Reason for consult: ROD, CKD 4        Subjective:     Interval History:  Patient was seen in her hospital room.  Sitting on side of the bed resting comfortably.  No acute distress noted.    Review of systems:  States she is feeling much better since admission.  Denies shortness of breath and chest discomfort.  No fevers or chills.  No significant swelling.  No nausea vomiting or diarrhea.    Review of patient's allergies indicates:   Allergen Reactions    Subsys [fentanyl] Other (See Comments)     After administration pt unresponsive.  HR and respirations decreased.     Versed [midazolam] Other (See Comments)     After administration pt unresponsive.  HR and respirations decreased.     Ampicillin Rash    Codeine Nausea And Vomiting and Nausea Only     Current Facility-Administered Medications   Medication Frequency    acetaminophen tablet 650 mg Q6H PRN    albuterol-ipratropium 2.5 mg-0.5 mg/3 mL nebulizer solution 3 mL Q4H PRN    apixaban tablet 2.5 mg BID    carvediloL tablet 25 mg BID WM    epoetin rose injection 5,000 Units Every Mon, Wed, Fri    ferrous sulfate tablet 1 each Daily    guaiFENesin 100 mg/5 ml syrup 200 mg Q4H PRN    levothyroxine tablet 137 mcg Before breakfast    metOLazone tablet 2.5 mg Daily    ondansetron injection 4 mg Q8H PRN    pravastatin tablet 10 mg Daily       Objective:     Vital Signs (Most Recent):  Temp: 97.9 °F (36.6 °C) (08/04/22 1100)  Pulse: 62 (08/04/22  1100)  Resp: 16 (08/04/22 1100)  BP: 139/65 (08/04/22 1100)  SpO2: 97 % (08/04/22 1100)  O2 Device (Oxygen Therapy): room air (08/02/22 2103) Vital Signs (24h Range):  Temp:  [97.3 °F (36.3 °C)-99 °F (37.2 °C)] 97.9 °F (36.6 °C)  Pulse:  [62-66] 62  Resp:  [16-20] 16  SpO2:  [90 %-100 %] 97 %  BP: (131-162)/(59-70) 139/65     Weight: 96.5 kg (212 lb 11.9 oz) (08/03/22 0018)  Body mass index is 40.2 kg/m².  Body surface area is 2.04 meters squared.    I/O last 3 completed shifts:  In: 1080 [P.O.:1080]  Out: 100 [Urine:100]    Physical Exam  Constitutional:       Appearance: Normal appearance.   HENT:      Head: Normocephalic and atraumatic.   Eyes:      General: No scleral icterus.     Pupils: Pupils are equal, round, and reactive to light.   Cardiovascular:      Rate and Rhythm: Normal rate and regular rhythm.   Pulmonary:      Effort: Pulmonary effort is normal.      Breath sounds: Normal breath sounds.   Musculoskeletal:      Right lower leg: No edema.      Left lower leg: No edema.   Skin:     General: Skin is warm and dry.   Neurological:      General: No focal deficit present.      Mental Status: She is alert and oriented to person, place, and time.   Psychiatric:         Mood and Affect: Mood normal.         Behavior: Behavior normal.         Significant Labs:sure  BMP:   Recent Labs   Lab 08/04/22  0500   GLU 82      K 4.9      CO2 24   BUN 64*   CREATININE 5.3*   CALCIUM 7.9*   MG 3.1*     CMP:   Recent Labs   Lab 08/04/22  0500   GLU 82   CALCIUM 7.9*   ALBUMIN 2.6*   PROT 5.9*      K 4.9   CO2 24      BUN 64*   CREATININE 5.3*   ALKPHOS 62   ALT 12   AST 20   BILITOT 0.3     All labs within the past 24 hours have been reviewed.    Significant Imaging:  Labs: Reviewed  Reviewed    Assessment/Plan:     Active Diagnoses:    Diagnosis Date Noted POA    PRINCIPAL PROBLEM:  Acute renal failure with acute renal cortical necrosis superimposed on stage 5 chronic kidney disease, not on  chronic dialysis [N17.1, N18.5] 08/02/2022 Yes    Elevated d-dimer with indeterminate perfusion scan for PE [R79.89] 01/14/2022 Yes    Anemia due to chronic kidney disease [N18.9, D63.1] 12/06/2021 Yes    Anasarca [R60.1] 10/13/2020 Yes    Pericardial effusion [I31.3] 08/26/2019 Yes    Type 2 diabetes mellitus, without long-term current use of insulin [E11.9] 05/16/2019 Yes    Stage 5 chronic kidney disease [N18.5] 05/24/2018 Unknown    Acute on chronic diastolic congestive heart failure [I50.33] 12/07/2017 Yes    Decompensated hepatic cirrhosis [K72.90, K74.60] 09/06/2016 Yes    Hypothyroidism due to acquired atrophy of thyroid [E03.4] 09/02/2016 Yes    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 ROD:  Creatinine has improved from 5.7 down to 5.3 this morning.  ROD likely secondary to decompensated CHF.  Symptomatic Jamar she has improved since admission with medical management.    Urine output has not been measured however she is improved clinically since admission.    2. CKD 4:  Baseline creatinine usually runs around 4. At her last office visit her creatinine was 3.7.  Currently she has no symptoms of uremia and does not require renal replacement therapy.    3. Potassium is stable 4.9.    4. Bicarbonate is stable at 24.      Thank you for your consult.     Pankaj Jacobo MD  Nephrology  [facility] ([facility])

## 2022-08-04 NOTE — HOSPITAL COURSE
8/4/22-Patient seen and examined today, sitting up in bed. Feeling much better. SOB and BLE edema significantly improved. Chest pain free. Labs reviewed. Creatinine 5.3, stable, nephrology following.     8/5/22-Patient seen and examined today, resting in bed. Stable. No complaints. Denies SOB. Labs reviewed, creatinine 5.1. Appreciate nephrology rec's.

## 2022-08-04 NOTE — SUBJECTIVE & OBJECTIVE
Review of Systems   Constitutional: Negative.   HENT: Negative.     Eyes: Negative.    Cardiovascular:  Positive for leg swelling (improved).   Respiratory: Negative.     Endocrine: Negative.    Hematologic/Lymphatic: Negative.    Skin: Negative.    Musculoskeletal: Negative.    Gastrointestinal: Negative.    Genitourinary: Negative.    Neurological: Negative.    Psychiatric/Behavioral: Negative.     Allergic/Immunologic: Negative.    Objective:     Vital Signs (Most Recent):  Temp: 97.9 °F (36.6 °C) (08/04/22 1100)  Pulse: 62 (08/04/22 1100)  Resp: 16 (08/04/22 1100)  BP: 139/65 (08/04/22 1100)  SpO2: 97 % (08/04/22 1100) Vital Signs (24h Range):  Temp:  [97.3 °F (36.3 °C)-99 °F (37.2 °C)] 97.9 °F (36.6 °C)  Pulse:  [62-66] 62  Resp:  [16-20] 16  SpO2:  [90 %-100 %] 97 %  BP: (131-162)/(59-70) 139/65     Weight: 96.5 kg (212 lb 11.9 oz)  Body mass index is 40.2 kg/m².     SpO2: 97 %  O2 Device (Oxygen Therapy): room air      Intake/Output Summary (Last 24 hours) at 8/4/2022 1252  Last data filed at 8/3/2022 1800  Gross per 24 hour   Intake 200 ml   Output 100 ml   Net 100 ml       Lines/Drains/Airways       Peripheral Intravenous Line  Duration                  Peripheral IV - Single Lumen 08/03/22 0730 20 G Right Forearm 1 day                    Physical Exam  Vitals and nursing note reviewed.   Constitutional:       General: She is not in acute distress.     Appearance: Normal appearance. She is well-developed. She is not diaphoretic.   HENT:      Head: Normocephalic and atraumatic.   Eyes:      General:         Right eye: No discharge.         Left eye: No discharge.      Pupils: Pupils are equal, round, and reactive to light.   Neck:      Thyroid: No thyromegaly.      Vascular: No JVD.      Trachea: No tracheal deviation.   Cardiovascular:      Rate and Rhythm: Normal rate and regular rhythm.      Heart sounds: Normal heart sounds, S1 normal and S2 normal. No murmur heard.  Pulmonary:      Effort: Pulmonary  effort is normal. No respiratory distress.      Breath sounds: Normal breath sounds. No wheezing or rales.   Abdominal:      General: There is no distension.      Palpations: Abdomen is soft.      Tenderness: There is no rebound.   Musculoskeletal:      Cervical back: Neck supple.      Right lower leg: Edema (trace) present.      Left lower leg: Edema (trace) present.   Skin:     General: Skin is warm and dry.      Findings: No erythema.   Neurological:      General: No focal deficit present.      Mental Status: She is alert and oriented to person, place, and time.   Psychiatric:         Mood and Affect: Mood normal.         Behavior: Behavior normal.         Thought Content: Thought content normal.       Significant Labs: CMP   Recent Labs   Lab 08/02/22  1652 08/03/22  0258 08/04/22  0500    144 142   K 5.1 5.2* 4.9    110 109   CO2 24 25 24    130* 82   BUN 63* 67* 64*   CREATININE 5.5* 5.7* 5.3*   CALCIUM 8.0* 8.0* 7.9*   PROT 6.4 5.7* 5.9*   ALBUMIN 2.8* 2.7* 2.6*   BILITOT 0.3 0.2 0.3   ALKPHOS 70 66 62   AST 26 22 20   ALT 14 14 12   ANIONGAP 8 9 9   , CBC   Recent Labs   Lab 08/02/22  1557 08/03/22  0258 08/04/22  0500   WBC 4.72 4.77 4.69   HGB 8.6* 8.6* 9.3*   HCT 27.2* 28.0* 31.3*    147* 170   , INR No results for input(s): INR, PROTIME in the last 48 hours., Troponin   Recent Labs   Lab 08/02/22  1652   TROPONINI 0.013   , and All pertinent lab results from the last 24 hours have been reviewed.    Significant Imaging: Echocardiogram: Transthoracic echo (TTE) complete (Cupid Only):   Results for orders placed or performed during the hospital encounter of 01/10/22   Echo   Result Value Ref Range    BSA 2.04 m2    Left Ventricular Outflow Tract Mean Velocity 5.3116216972 cm/s    Left Ventricular Outflow Tract Mean Gradient 4.41 mmHg    TV mean gradient 23 mmHg    LVIDd 4.56 3.5 - 6.0 cm    IVS 1.31 (A) 0.6 - 1.1 cm    Posterior Wall 1.30 (A) 0.6 - 1.1 cm    Ao root annulus 2.77 cm     LVIDs 2.47 2.1 - 4.0 cm    FS 46 28 - 44 %    Ascending aorta 2.75 cm    LV mass 228.39 g    LA size 3.83 cm    Left Ventricle Relative Wall Thickness 0.57 cm    AV mean gradient 9 mmHg    AV valve area 2.07 cm2    AV Velocity Ratio 0.70     AV index (prosthetic) 0.75     MV valve area p 1/2 method 5.21 cm2    E/A ratio 1.46     E wave deceleration time 145.562118781170678 msec    IVRT 55.573262969113068 msec    LVOT diameter 1.87 cm    LVOT area 2.7 cm2    LVOT peak bart 1.29 m/s    LVOT peak VTI 30.30 cm    Ao peak bart 1.85 m/s    Ao VTI 40.2 cm    RVOT peak bart 0.90 m/s    RVOT peak VTI 20.8 cm    Mr max bart 4.04 m/s    LVOT stroke volume 83.18 cm3    AV peak gradient 14 mmHg    PV mean gradient 2.38 mmHg    MV Peak E Bart 1.24 m/s    TR Max Bart 2.81 m/s    MV stenosis pressure 1/2 time 42.4594610 ms    MV Peak A Bart 0.85 m/s    LV Systolic Volume 21.58 mL    LV Systolic Volume Index 11.1 mL/m2    LV Diastolic Volume 95.37 mL    LV Diastolic Volume Index 49.16 mL/m2    LV Mass Index 118 g/m2    Echo EF Estimated 77 %    RA Major Axis 4.71 cm    Left Atrium Minor Axis 5.15 cm    Left Atrium Major Axis 5.56 cm    Triscuspid Valve Regurgitation Peak Gradient 32 mmHg    Right Atrial Pressure (from IVC) 3 mmHg    EF 60 %    TV rest pulmonary artery pressure 35 mmHg    Narrative    · The left ventricle is normal in size with concentric hypertrophy and   normal systolic function.  · Indeterminate left ventricular diastolic function.  · The estimated PA systolic pressure is 35 mmHg.  · Normal right ventricular size with normal right ventricular systolic   function.  · Normal central venous pressure (3 mmHg).  · The estimated ejection fraction is 60%.  · Mild mitral regurgitation.  · Mild tricuspid regurgitation.      , EKG: Reviewed, and X-Ray: CXR: X-Ray Chest 1 View (CXR):   Results for orders placed or performed during the hospital encounter of 08/02/22   X-Ray Chest 1 View    Narrative    EXAMINATION:  XR CHEST 1  VIEW    CLINICAL HISTORY:  Shortness of breath    COMPARISON:  June 20, 2022, as well as studies dating back to June 22, 2020    FINDINGS:  Chronic or recurrent blunting of the left lateral costophrenic angle and to lesser degree the left costophrenic angle with areas of scarring or atelectasis in the mid lower left lung again seen.  The lungs are free of new pulmonary opacities.  The cardiac silhouette size is markedly enlarged.  The trachea is midline and the mediastinal width is normal. Negative for pneumothorax.  Pulmonary vasculature is chronically mildly congested.  Negative for osseous abnormalities. Tortuous aorta.  Degenerative changes of the spine with convex right curvature.  Degenerative changes of the shoulder girdles.  Azygous lobe.      Impression    1.  Chronic or recurrent vascular congestion and left greater than right pleural effusions or scarring.  Negative for new pulmonary opacities.  Chronic or recurrent interstitial pulmonary edema must be considered.    2.  Markedly enlarged cardiac silhouette.  Pericardial effusion not excluded.  Clinical correlation is advised.    3.  Other stable findings as noted above.      Electronically signed by: Jose Rafael Pedraza MD  Date:    08/02/2022  Time:    15:22    and X-Ray Chest PA and Lateral (CXR): No results found for this visit on 08/02/22.

## 2022-08-05 ENCOUNTER — TELEPHONE (OUTPATIENT)
Dept: INTERNAL MEDICINE | Facility: CLINIC | Age: 67
End: 2022-08-05
Payer: MEDICAID

## 2022-08-05 VITALS
TEMPERATURE: 100 F | HEIGHT: 61 IN | HEART RATE: 61 BPM | OXYGEN SATURATION: 96 % | SYSTOLIC BLOOD PRESSURE: 133 MMHG | BODY MASS INDEX: 38.46 KG/M2 | DIASTOLIC BLOOD PRESSURE: 65 MMHG | RESPIRATION RATE: 17 BRPM | WEIGHT: 203.69 LBS

## 2022-08-05 DIAGNOSIS — I50.33 ACUTE ON CHRONIC DIASTOLIC CONGESTIVE HEART FAILURE: Primary | ICD-10-CM

## 2022-08-05 LAB
ANION GAP SERPL CALC-SCNC: 7 MMOL/L (ref 8–16)
BUN SERPL-MCNC: 65 MG/DL (ref 8–23)
CALCIUM SERPL-MCNC: 7.8 MG/DL (ref 8.7–10.5)
CHLORIDE SERPL-SCNC: 109 MMOL/L (ref 95–110)
CO2 SERPL-SCNC: 26 MMOL/L (ref 23–29)
CREAT SERPL-MCNC: 5.1 MG/DL (ref 0.5–1.4)
EST. GFR  (NO RACE VARIABLE): 9 ML/MIN/1.73 M^2
GLUCOSE SERPL-MCNC: 81 MG/DL (ref 70–110)
MAGNESIUM SERPL-MCNC: 2.9 MG/DL (ref 1.6–2.6)
PHOSPHATE SERPL-MCNC: 4.4 MG/DL (ref 2.7–4.5)
POTASSIUM SERPL-SCNC: 4.8 MMOL/L (ref 3.5–5.1)
SODIUM SERPL-SCNC: 142 MMOL/L (ref 136–145)

## 2022-08-05 PROCEDURE — 25000003 PHARM REV CODE 250: Performed by: INTERNAL MEDICINE

## 2022-08-05 PROCEDURE — 99232 PR SUBSEQUENT HOSPITAL CARE,LEVL II: ICD-10-PCS | Mod: ,,, | Performed by: INTERNAL MEDICINE

## 2022-08-05 PROCEDURE — 80048 BASIC METABOLIC PNL TOTAL CA: CPT | Performed by: INTERNAL MEDICINE

## 2022-08-05 PROCEDURE — 99232 SBSQ HOSP IP/OBS MODERATE 35: CPT | Mod: ,,, | Performed by: PHYSICIAN ASSISTANT

## 2022-08-05 PROCEDURE — 83735 ASSAY OF MAGNESIUM: CPT | Performed by: INTERNAL MEDICINE

## 2022-08-05 PROCEDURE — 99232 PR SUBSEQUENT HOSPITAL CARE,LEVL II: ICD-10-PCS | Mod: ,,, | Performed by: PHYSICIAN ASSISTANT

## 2022-08-05 PROCEDURE — 63600175 PHARM REV CODE 636 W HCPCS: Mod: JG | Performed by: INTERNAL MEDICINE

## 2022-08-05 PROCEDURE — 99232 SBSQ HOSP IP/OBS MODERATE 35: CPT | Mod: ,,, | Performed by: INTERNAL MEDICINE

## 2022-08-05 PROCEDURE — 84100 ASSAY OF PHOSPHORUS: CPT | Performed by: INTERNAL MEDICINE

## 2022-08-05 PROCEDURE — 36415 COLL VENOUS BLD VENIPUNCTURE: CPT | Performed by: INTERNAL MEDICINE

## 2022-08-05 RX ORDER — TORSEMIDE 20 MG/1
40 TABLET ORAL DAILY
Qty: 60 TABLET | Refills: 0 | Status: SHIPPED | OUTPATIENT
Start: 2022-08-05 | End: 2022-09-16

## 2022-08-05 RX ORDER — AMLODIPINE BESYLATE 10 MG/1
10 TABLET ORAL DAILY
Status: DISCONTINUED | OUTPATIENT
Start: 2022-08-05 | End: 2022-08-05 | Stop reason: HOSPADM

## 2022-08-05 RX ORDER — HYDRALAZINE HYDROCHLORIDE 20 MG/ML
10 INJECTION INTRAMUSCULAR; INTRAVENOUS EVERY 8 HOURS PRN
Status: DISCONTINUED | OUTPATIENT
Start: 2022-08-05 | End: 2022-08-05 | Stop reason: HOSPADM

## 2022-08-05 RX ORDER — METOLAZONE 5 MG/1
5 TABLET ORAL DAILY
Qty: 30 TABLET | Refills: 0 | Status: SHIPPED | OUTPATIENT
Start: 2022-08-06 | End: 2023-02-13 | Stop reason: SDUPTHER

## 2022-08-05 RX ORDER — ISOSORBIDE MONONITRATE 60 MG/1
60 TABLET, EXTENDED RELEASE ORAL NIGHTLY
Status: DISCONTINUED | OUTPATIENT
Start: 2022-08-05 | End: 2022-08-05 | Stop reason: HOSPADM

## 2022-08-05 RX ORDER — AMLODIPINE BESYLATE 5 MG/1
5 TABLET ORAL DAILY
Status: DISCONTINUED | OUTPATIENT
Start: 2022-08-05 | End: 2022-08-05

## 2022-08-05 RX ADMIN — TORSEMIDE 40 MG: 10 TABLET ORAL at 08:08

## 2022-08-05 RX ADMIN — APIXABAN 2.5 MG: 2.5 TABLET, FILM COATED ORAL at 08:08

## 2022-08-05 RX ADMIN — CARVEDILOL 25 MG: 12.5 TABLET, FILM COATED ORAL at 08:08

## 2022-08-05 RX ADMIN — LEVOTHYROXINE SODIUM 137 MCG: 0.11 TABLET ORAL at 05:08

## 2022-08-05 RX ADMIN — FERROUS SULFATE TAB 325 MG (65 MG ELEMENTAL FE) 1 EACH: 325 (65 FE) TAB at 08:08

## 2022-08-05 RX ADMIN — ERYTHROPOIETIN 5000 UNITS: 10000 INJECTION, SOLUTION INTRAVENOUS; SUBCUTANEOUS at 08:08

## 2022-08-05 RX ADMIN — AMLODIPINE BESYLATE 10 MG: 10 TABLET ORAL at 08:08

## 2022-08-05 RX ADMIN — PRAVASTATIN SODIUM 10 MG: 10 TABLET ORAL at 08:08

## 2022-08-05 RX ADMIN — METOLAZONE 2.5 MG: 2.5 TABLET ORAL at 08:08

## 2022-08-05 NOTE — PROGRESS NOTES
[facility] ([facility])  Cardiology  Progress Note    Patient Name: Diamond Das  MRN: [id number]  Admission Date: 8/2/2022  Hospital Length of Stay: 2 days  Code Status: Prior   Attending Physician: Sydni Tamez MD   Primary Care Physician: Andrey Perrin MD  Expected Discharge Date: 8/5/2022  Principal Problem:Acute renal failure with acute renal cortical necrosis superimposed on stage 5 chronic kidney disease, not on chronic dialysis    Subjective:   HPI:  Ms. 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 Patient complained of increased BLE edema along with increased SOB and weakness. No associated morena chest pain, fever, chills, palpitations, near syncope, or syncope. Initial workup in ED reveled creatinine of 5.5, hypoalbuminemia (2.7), and BNP of 347. CXR showed findings consistent with probable chronic edema and patient was subsequently admitted for further evaluation and treatment. Cardiology consulted to assist with management. Patient seen and examined today, resting in bed. Feels slightly better. Still grossly edematous. Chest pain free. She reports compliance with her medications. Followed routinely in clinic by Dr. Ramos. Echo 1/10/22 showed normal EF. Nephrology consulted to assist with management.     Hospital Course:   8/4/22-Patient seen and examined today, sitting up in bed. Feeling much better. SOB and BLE edema significantly improved. Chest pain free. Labs reviewed. Creatinine 5.3, stable, nephrology following.     8/5/22-Patient seen and examined today, resting in bed. Stable. No complaints. Denies SOB. Labs reviewed, creatinine 5.1. Appreciate nephrology rec's.          Review of Systems   Constitutional: Negative.   HENT: Negative.     Eyes: Negative.     Cardiovascular: Negative.    Respiratory: Negative.     Endocrine: Negative.    Hematologic/Lymphatic: Negative.    Skin: Negative.    Musculoskeletal: Negative.    Gastrointestinal: Negative.    Genitourinary: Negative.    Neurological: Negative.    Psychiatric/Behavioral: Negative.     Allergic/Immunologic: Negative.    Objective:     Vital Signs (Most Recent):  Temp: 99.5 °F (37.5 °C) (08/05/22 1109)  Pulse: 61 (08/05/22 1109)  Resp: 17 (08/05/22 1109)  BP: 133/65 (08/05/22 1109)  SpO2: 96 % (08/05/22 1109) Vital Signs (24h Range):  Temp:  [96.3 °F (35.7 °C)-99.5 °F (37.5 °C)] 99.5 °F (37.5 °C)  Pulse:  [61-70] 61  Resp:  [16-17] 17  SpO2:  [95 %-96 %] 96 %  BP: (133-174)/(61-74) 133/65     Weight: 92.4 kg (203 lb 11.3 oz)  Body mass index is 38.49 kg/m².     SpO2: 96 %  O2 Device (Oxygen Therapy): room air      Intake/Output Summary (Last 24 hours) at 8/5/2022 1149  Last data filed at 8/5/2022 0800  Gross per 24 hour   Intake 480 ml   Output --   Net 480 ml       Lines/Drains/Airways       Peripheral Intravenous Line  Duration                  Peripheral IV - Single Lumen 08/03/22 0730 20 G Right Forearm 2 days                    Physical Exam  Vitals and nursing note reviewed.   Constitutional:       General: She is not in acute distress.     Appearance: Normal appearance. She is well-developed. She is not diaphoretic.   HENT:      Head: Normocephalic and atraumatic.   Eyes:      General:         Right eye: No discharge.         Left eye: No discharge.      Pupils: Pupils are equal, round, and reactive to light.   Neck:      Thyroid: No thyromegaly.      Vascular: No JVD.      Trachea: No tracheal deviation.   Cardiovascular:      Rate and Rhythm: Normal rate and regular rhythm.      Heart sounds: Normal heart sounds, S1 normal and S2 normal. No murmur heard.  Pulmonary:      Effort: Pulmonary effort is normal. No respiratory distress.      Breath sounds: Normal breath sounds. No wheezing or rales.    Abdominal:      General: There is no distension.      Tenderness: There is no rebound.   Musculoskeletal:      Cervical back: Neck supple.      Right lower leg: Edema (trace) present.      Left lower leg: Edema (trace) present.   Skin:     General: Skin is warm and dry.      Findings: No erythema.   Neurological:      General: No focal deficit present.      Mental Status: She is alert and oriented to person, place, and time.   Psychiatric:         Mood and Affect: Mood normal.         Behavior: Behavior normal.         Thought Content: Thought content normal.       Significant Labs: CMP   Recent Labs   Lab 08/04/22  0500 08/05/22  0528    142   K 4.9 4.8    109   CO2 24 26   GLU 82 81   BUN 64* 65*   CREATININE 5.3* 5.1*   CALCIUM 7.9* 7.8*   PROT 5.9*  --    ALBUMIN 2.6*  --    BILITOT 0.3  --    ALKPHOS 62  --    AST 20  --    ALT 12  --    ANIONGAP 9 7*   , CBC   Recent Labs   Lab 08/04/22  0500   WBC 4.69   HGB 9.3*   HCT 31.3*      , Troponin No results for input(s): TROPONINI in the last 48 hours., and All pertinent lab results from the last 24 hours have been reviewed.    Significant Imaging: Echocardiogram: Transthoracic echo (TTE) complete (Cupid Only):   Results for orders placed or performed during the hospital encounter of 01/10/22   Echo   Result Value Ref Range    BSA 2.04 m2    Left Ventricular Outflow Tract Mean Velocity 8.8402892339 cm/s    Left Ventricular Outflow Tract Mean Gradient 4.41 mmHg    TV mean gradient 23 mmHg    LVIDd 4.56 3.5 - 6.0 cm    IVS 1.31 (A) 0.6 - 1.1 cm    Posterior Wall 1.30 (A) 0.6 - 1.1 cm    Ao root annulus 2.77 cm    LVIDs 2.47 2.1 - 4.0 cm    FS 46 28 - 44 %    Ascending aorta 2.75 cm    LV mass 228.39 g    LA size 3.83 cm    Left Ventricle Relative Wall Thickness 0.57 cm    AV mean gradient 9 mmHg    AV valve area 2.07 cm2    AV Velocity Ratio 0.70     AV index (prosthetic) 0.75     MV valve area p 1/2 method 5.21 cm2    E/A ratio 1.46     E wave  deceleration time 145.207699586054499 msec    IVRT 55.436871351790817 msec    LVOT diameter 1.87 cm    LVOT area 2.7 cm2    LVOT peak bart 1.29 m/s    LVOT peak VTI 30.30 cm    Ao peak bart 1.85 m/s    Ao VTI 40.2 cm    RVOT peak bart 0.90 m/s    RVOT peak VTI 20.8 cm    Mr max bart 4.04 m/s    LVOT stroke volume 83.18 cm3    AV peak gradient 14 mmHg    PV mean gradient 2.38 mmHg    MV Peak E Bart 1.24 m/s    TR Max Bart 2.81 m/s    MV stenosis pressure 1/2 time 42.4060245 ms    MV Peak A Bart 0.85 m/s    LV Systolic Volume 21.58 mL    LV Systolic Volume Index 11.1 mL/m2    LV Diastolic Volume 95.37 mL    LV Diastolic Volume Index 49.16 mL/m2    LV Mass Index 118 g/m2    Echo EF Estimated 77 %    RA Major Axis 4.71 cm    Left Atrium Minor Axis 5.15 cm    Left Atrium Major Axis 5.56 cm    Triscuspid Valve Regurgitation Peak Gradient 32 mmHg    Right Atrial Pressure (from IVC) 3 mmHg    EF 60 %    TV rest pulmonary artery pressure 35 mmHg    Narrative    · The left ventricle is normal in size with concentric hypertrophy and   normal systolic function.  · Indeterminate left ventricular diastolic function.  · The estimated PA systolic pressure is 35 mmHg.  · Normal right ventricular size with normal right ventricular systolic   function.  · Normal central venous pressure (3 mmHg).  · The estimated ejection fraction is 60%.  · Mild mitral regurgitation.  · Mild tricuspid regurgitation.      , EKG: Reviewed, and X-Ray: CXR: X-Ray Chest 1 View (CXR):   Results for orders placed or performed during the hospital encounter of 08/02/22   X-Ray Chest 1 View    Narrative    EXAMINATION:  XR CHEST 1 VIEW    CLINICAL HISTORY:  Shortness of breath    COMPARISON:  June 20, 2022, as well as studies dating back to June 22, 2020    FINDINGS:  Chronic or recurrent blunting of the left lateral costophrenic angle and to lesser degree the left costophrenic angle with areas of scarring or atelectasis in the mid lower left lung again seen.  The  lungs are free of new pulmonary opacities.  The cardiac silhouette size is markedly enlarged.  The trachea is midline and the mediastinal width is normal. Negative for pneumothorax.  Pulmonary vasculature is chronically mildly congested.  Negative for osseous abnormalities. Tortuous aorta.  Degenerative changes of the spine with convex right curvature.  Degenerative changes of the shoulder girdles.  Azygous lobe.      Impression    1.  Chronic or recurrent vascular congestion and left greater than right pleural effusions or scarring.  Negative for new pulmonary opacities.  Chronic or recurrent interstitial pulmonary edema must be considered.    2.  Markedly enlarged cardiac silhouette.  Pericardial effusion not excluded.  Clinical correlation is advised.    3.  Other stable findings as noted above.      Electronically signed by: Jose Rafael Pedraza MD  Date:    08/02/2022  Time:    15:22    and X-Ray Chest PA and Lateral (CXR): No results found for this visit on 08/02/22.    Assessment and Plan:   Patient who presents with decompensated diastolic CHF. Much improved, nephrology on board, appreciate assistance. Follow-up in clinic.    * Acute renal failure with acute renal cortical necrosis superimposed on stage 5 chronic kidney disease, not on chronic dialysis  -Monitor with diuresis  -Nephrology consulted to assist with management    Elevated d-dimer with indeterminate perfusion scan for PE  -Continue Eliquis    Anemia due to chronic kidney disease  -Monitor during admission    Anasarca  -Assess response to IV diuresis     8/4/22  -Much improved    Type 2 diabetes mellitus, without long-term current use of insulin  -Mgmt as per hospital meidcine    Acute on chronic diastolic congestive heart failure  -Presents with decompensated diastolic CHF/volume overload  -Underlying cirrhosis and hypoalbuminemia contributing  -Continue IV diuresis  -Continue other home meds as tolerated  -Strict I's/O's    8/4/22  -Much  improved  -Can transition to po diuretics soon, nephrology on board to assist with dosing  -Continue other home meds    8/5/22  -Stable  -Diuretics adjusted, appreciate nephrology assistance  -Follow-up in clinic    Decompensated hepatic cirrhosis  -Mgmt as per primary team/GI    Morbid obesity due to excess calories  -weight loss    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  -Mgmt as per primary team        VTE Risk Mitigation (From admission, onward)         Ordered     apixaban tablet 2.5 mg  2 times daily         08/02/22 1942     Place sequential compression device  Until discontinued         08/02/22 1940                Samantha Osei PA-C  Cardiology  [facility] - Telemetry ([facility])

## 2022-08-05 NOTE — NURSING
Heart monitor and saline lock removed. Reviewed all discharge information with patient and gave patient a copy of discharge paperwork. Pharmacy brought patient her discharge prescriptions. Patient awaiting ride home

## 2022-08-05 NOTE — TELEPHONE ENCOUNTER
----- Message from Pamela Banuelos sent at 8/5/2022 12:25 PM CDT -----  Contact: Diamond  Patient would like a call back at [phone]  in regards to getting a hospital follow up appointment. She is getting discharged today.        Thanks

## 2022-08-05 NOTE — PLAN OF CARE
Pt oriented x4 VSS  Pt remained afebile throughout this shift  All meds administered per order.  Pt remianed free of falls  Plan of care reviewed. pt verbalizes understanding.  Patient moving/ turning independently.  Patient normal sinus on monitor  Bed low, side rails up x2, wheels locked, call light in reach.  Pt instructed to call for assistance  Will continue hourly rounding

## 2022-08-05 NOTE — ASSESSMENT & PLAN NOTE
-Presents with decompensated diastolic CHF/volume overload  -Underlying cirrhosis and hypoalbuminemia contributing  -Continue IV diuresis  -Continue other home meds as tolerated  -Strict I's/O's    8/4/22  -Much improved  -Can transition to po diuretics soon, nephrology on board to assist with dosing  -Continue other home meds    8/5/22  -Stable  -Diuretics adjusted, appreciate nephrology assistance  -Follow-up in clinic

## 2022-08-05 NOTE — PLAN OF CARE
[facility] - Tele[facility] (Hospital)  Discharge Final Note    Primary Care Provider: Andrey Perrin MD    Expected Discharge Date: 8/5/2022    Final Discharge Note (most recent)     Final Note - 08/05/22 1300        Final Note    Assessment Type Final Discharge Note     Anticipated Discharge Disposition Home or Self Care     Hospital Resources/Appts/Education Provided Appointments scheduled and added to AVS                 Important Message from Medicare             Contact Info     Andrey Perrin MD   Specialty: Family Medicine   Relationship: PCP - General    [address] [place] [id number]   Phone: [phone]       Next Steps: Schedule an appointment as soon as possible for a visit in 3 day(s)    Instructions: Discharge follow up. Message sent to PCP clinic requesting f/u appt.    Samantha Osei PA-C   Specialty: Cardiology    [address] [place] [address] [address] [place] [address]   Phone: [phone]       Next Steps: Schedule an appointment as soon as possible for a visit in 1 week(s)    Instructions: Cardiology follow up with labs    Pankaj Jacobo MD   Specialty: Nephrology    [address] [address] NEPHROLOGY  [facility] [address]   Phone: [phone]       Next Steps: Schedule an appointment as soon as possible for a visit in 1 week(s)    Instructions: Nephrology follow up        DC Dispo: home  PCP f/u: message left with clinic as CM staff unable to schedule within 1 week of d/c

## 2022-08-05 NOTE — PROGRESS NOTES
[facility] ([facility])  Nephrology  Progress Note    Patient Name: Diamond Das  MRN: [id number]  Admission Date: 8/2/2022  Hospital Length of Stay: 2 days  Attending Provider: Sydni Tamez MD   Primary Care Physician: Andrey Perrin MD  Principal Problem:Acute renal failure with acute renal cortical necrosis superimposed on stage 5 chronic kidney disease, not on chronic dialysis    Consults  Subjective:     Interval History:  Patient was seen in her hospital room.  Sitting on side of the bed resting comfortably.  No acute distress noted.    Review of systems:  States she is feeling much better since admission.  Denies shortness of breath and chest discomfort.  No fevers or chills.  No significant swelling.  No nausea vomiting or diarrhea.    08/05/2022:  Patient was seen in her hospital room.  In bed resting comfortably.  No acute distress noted.  States she is feeling much better.    Review of systems:  No change from 08/04/2022.  No new complaints from overnight    Review of patient's allergies indicates:   Allergen Reactions    Subsys [fentanyl] Other (See Comments)     After administration pt unresponsive.  HR and respirations decreased.     Versed [midazolam] Other (See Comments)     After administration pt unresponsive.  HR and respirations decreased.     Ampicillin Rash    Codeine Nausea And Vomiting and Nausea Only     Current Facility-Administered Medications   Medication Frequency    acetaminophen tablet 650 mg Q6H PRN    albuterol-ipratropium 2.5 mg-0.5 mg/3 mL nebulizer solution 3 mL Q4H PRN    amLODIPine tablet 10 mg Daily    apixaban tablet 2.5 mg BID    carvediloL tablet 25 mg BID WM    epoetin rose injection 5,000 Units Every Mon, Wed, Fri    ferrous sulfate tablet 1 each Daily    guaiFENesin 100 mg/5 ml syrup 200 mg Q4H PRN    hydrALAZINE injection 10 mg Q8H PRN    isosorbide mononitrate 24 hr tablet 60 mg QHS    levothyroxine tablet 137 mcg Before breakfast     metOLazone tablet 2.5 mg Daily    ondansetron injection 4 mg Q8H PRN    pravastatin tablet 10 mg Daily    torsemide tablet 40 mg Daily       Objective:     Vital Signs (Most Recent):  Temp: 99.5 °F (37.5 °C) (08/05/22 1109)  Pulse: 61 (08/05/22 1109)  Resp: 17 (08/05/22 1109)  BP: 133/65 (08/05/22 1109)  SpO2: 96 % (08/05/22 1109)  O2 Device (Oxygen Therapy): room air (08/02/22 2103) Vital Signs (24h Range):  Temp:  [96.3 °F (35.7 °C)-99.5 °F (37.5 °C)] 99.5 °F (37.5 °C)  Pulse:  [61-70] 61  Resp:  [16-17] 17  SpO2:  [95 %-96 %] 96 %  BP: (133-174)/(61-74) 133/65     Weight: 92.4 kg (203 lb 11.3 oz) (08/05/22 0126)  Body mass index is 38.49 kg/m².  Body surface area is 1.99 meters squared.    I/O last 3 completed shifts:  In: 240 [P.O.:240]  Out: -     Physical Exam  Constitutional:       Appearance: Normal appearance.   HENT:      Head: Normocephalic and atraumatic.   Eyes:      General: No scleral icterus.     Pupils: Pupils are equal, round, and reactive to light.   Cardiovascular:      Rate and Rhythm: Normal rate and regular rhythm.   Pulmonary:      Effort: Pulmonary effort is normal.      Breath sounds: Normal breath sounds.   Musculoskeletal:      Right lower leg: No edema.      Left lower leg: No edema.   Skin:     General: Skin is warm and dry.   Neurological:      General: No focal deficit present.      Mental Status: She is alert and oriented to person, place, and time.   Psychiatric:         Mood and Affect: Mood normal.         Behavior: Behavior normal.         Significant Labs:sure  BMP:   Recent Labs   Lab 08/05/22 0528   GLU 81      K 4.8      CO2 26   BUN 65*   CREATININE 5.1*   CALCIUM 7.8*   MG 2.9*     CMP:   Recent Labs   Lab 08/04/22  0500 08/05/22 0528   GLU 82 81   CALCIUM 7.9* 7.8*   ALBUMIN 2.6*  --    PROT 5.9*  --     142   K 4.9 4.8   CO2 24 26    109   BUN 64* 65*   CREATININE 5.3* 5.1*   ALKPHOS 62  --    ALT 12  --    AST 20  --    BILITOT 0.3  --       All labs within the past 24 hours have been reviewed.    Significant Imaging:  Labs: Reviewed  Reviewed    Assessment/Plan:     Active Diagnoses:    Diagnosis Date Noted POA    PRINCIPAL PROBLEM:  Acute renal failure with acute renal cortical necrosis superimposed on stage 5 chronic kidney disease, not on chronic dialysis [N17.1, N18.5] 08/02/2022 Yes    Elevated d-dimer with indeterminate perfusion scan for PE [R79.89] 01/14/2022 Yes    Anemia due to chronic kidney disease [N18.9, D63.1] 12/06/2021 Yes    Anasarca [R60.1] 10/13/2020 Yes    Pericardial effusion [I31.3] 08/26/2019 Yes    Type 2 diabetes mellitus, without long-term current use of insulin [E11.9] 05/16/2019 Yes    Stage 5 chronic kidney disease [N18.5] 05/24/2018 Yes    SOB (shortness of breath) [R06.02] 12/14/2017 Yes    Acute on chronic diastolic congestive heart failure [I50.33] 12/07/2017 Yes    Decompensated hepatic cirrhosis [K72.90, K74.60] 09/06/2016 Yes    Hypothyroidism due to acquired atrophy of thyroid [E03.4] 09/02/2016 Yes    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 ROD:  Creatinine continues to improve, down to 5.1 from 5.3 yesterday..  ROD likely secondary to decompensated CHF.  Symptomatic Jamar she has improved since admission with medical management.    Urine output has not been measured however she is improved clinically since admission.    2. CKD 4:  Baseline creatinine usually runs around 4. At her last office visit her creatinine was 3.7.  Currently she has no symptoms of uremia and does not require renal replacement therapy.    3. Potassium is stable 4.8.    4. Bicarbonate is stable at 26.      She is stable for discharge from a Nephrology standpoint.  She will be seen in Nephrology Clinic for follow-up in a few weeks.  Would suggest checking labs next week.  Would  continue torsemide 40 mg a day with metolazone 5 mg a day.    Thank you for your consult.     Pankaj Jacobo MD  Nephrology  [facility] ([facility])

## 2022-08-05 NOTE — SUBJECTIVE & OBJECTIVE
Review of Systems   Constitutional: Negative.   HENT: Negative.     Eyes: Negative.    Cardiovascular: Negative.    Respiratory: Negative.     Endocrine: Negative.    Hematologic/Lymphatic: Negative.    Skin: Negative.    Musculoskeletal: Negative.    Gastrointestinal: Negative.    Genitourinary: Negative.    Neurological: Negative.    Psychiatric/Behavioral: Negative.     Allergic/Immunologic: Negative.    Objective:     Vital Signs (Most Recent):  Temp: 99.5 °F (37.5 °C) (08/05/22 1109)  Pulse: 61 (08/05/22 1109)  Resp: 17 (08/05/22 1109)  BP: 133/65 (08/05/22 1109)  SpO2: 96 % (08/05/22 1109) Vital Signs (24h Range):  Temp:  [96.3 °F (35.7 °C)-99.5 °F (37.5 °C)] 99.5 °F (37.5 °C)  Pulse:  [61-70] 61  Resp:  [16-17] 17  SpO2:  [95 %-96 %] 96 %  BP: (133-174)/(61-74) 133/65     Weight: 92.4 kg (203 lb 11.3 oz)  Body mass index is 38.49 kg/m².     SpO2: 96 %  O2 Device (Oxygen Therapy): room air      Intake/Output Summary (Last 24 hours) at 8/5/2022 1149  Last data filed at 8/5/2022 0800  Gross per 24 hour   Intake 480 ml   Output --   Net 480 ml       Lines/Drains/Airways       Peripheral Intravenous Line  Duration                  Peripheral IV - Single Lumen 08/03/22 0730 20 G Right Forearm 2 days                    Physical Exam  Vitals and nursing note reviewed.   Constitutional:       General: She is not in acute distress.     Appearance: Normal appearance. She is well-developed. She is not diaphoretic.   HENT:      Head: Normocephalic and atraumatic.   Eyes:      General:         Right eye: No discharge.         Left eye: No discharge.      Pupils: Pupils are equal, round, and reactive to light.   Neck:      Thyroid: No thyromegaly.      Vascular: No JVD.      Trachea: No tracheal deviation.   Cardiovascular:      Rate and Rhythm: Normal rate and regular rhythm.      Heart sounds: Normal heart sounds, S1 normal and S2 normal. No murmur heard.  Pulmonary:      Effort: Pulmonary effort is normal. No  respiratory distress.      Breath sounds: Normal breath sounds. No wheezing or rales.   Abdominal:      General: There is no distension.      Tenderness: There is no rebound.   Musculoskeletal:      Cervical back: Neck supple.      Right lower leg: Edema (trace) present.      Left lower leg: Edema (trace) present.   Skin:     General: Skin is warm and dry.      Findings: No erythema.   Neurological:      General: No focal deficit present.      Mental Status: She is alert and oriented to person, place, and time.   Psychiatric:         Mood and Affect: Mood normal.         Behavior: Behavior normal.         Thought Content: Thought content normal.       Significant Labs: CMP   Recent Labs   Lab 08/04/22  0500 08/05/22  0528    142   K 4.9 4.8    109   CO2 24 26   GLU 82 81   BUN 64* 65*   CREATININE 5.3* 5.1*   CALCIUM 7.9* 7.8*   PROT 5.9*  --    ALBUMIN 2.6*  --    BILITOT 0.3  --    ALKPHOS 62  --    AST 20  --    ALT 12  --    ANIONGAP 9 7*   , CBC   Recent Labs   Lab 08/04/22  0500   WBC 4.69   HGB 9.3*   HCT 31.3*      , Troponin No results for input(s): TROPONINI in the last 48 hours., and All pertinent lab results from the last 24 hours have been reviewed.    Significant Imaging: Echocardiogram: Transthoracic echo (TTE) complete (Cupid Only):   Results for orders placed or performed during the hospital encounter of 01/10/22   Echo   Result Value Ref Range    BSA 2.04 m2    Left Ventricular Outflow Tract Mean Velocity 2.9197211679 cm/s    Left Ventricular Outflow Tract Mean Gradient 4.41 mmHg    TV mean gradient 23 mmHg    LVIDd 4.56 3.5 - 6.0 cm    IVS 1.31 (A) 0.6 - 1.1 cm    Posterior Wall 1.30 (A) 0.6 - 1.1 cm    Ao root annulus 2.77 cm    LVIDs 2.47 2.1 - 4.0 cm    FS 46 28 - 44 %    Ascending aorta 2.75 cm    LV mass 228.39 g    LA size 3.83 cm    Left Ventricle Relative Wall Thickness 0.57 cm    AV mean gradient 9 mmHg    AV valve area 2.07 cm2    AV Velocity Ratio 0.70     AV index  (prosthetic) 0.75     MV valve area p 1/2 method 5.21 cm2    E/A ratio 1.46     E wave deceleration time 145.226942398931588 msec    IVRT 55.782178787556724 msec    LVOT diameter 1.87 cm    LVOT area 2.7 cm2    LVOT peak bart 1.29 m/s    LVOT peak VTI 30.30 cm    Ao peak bart 1.85 m/s    Ao VTI 40.2 cm    RVOT peak bart 0.90 m/s    RVOT peak VTI 20.8 cm    Mr max bart 4.04 m/s    LVOT stroke volume 83.18 cm3    AV peak gradient 14 mmHg    PV mean gradient 2.38 mmHg    MV Peak E Bart 1.24 m/s    TR Max Bart 2.81 m/s    MV stenosis pressure 1/2 time 42.7820075 ms    MV Peak A Bart 0.85 m/s    LV Systolic Volume 21.58 mL    LV Systolic Volume Index 11.1 mL/m2    LV Diastolic Volume 95.37 mL    LV Diastolic Volume Index 49.16 mL/m2    LV Mass Index 118 g/m2    Echo EF Estimated 77 %    RA Major Axis 4.71 cm    Left Atrium Minor Axis 5.15 cm    Left Atrium Major Axis 5.56 cm    Triscuspid Valve Regurgitation Peak Gradient 32 mmHg    Right Atrial Pressure (from IVC) 3 mmHg    EF 60 %    TV rest pulmonary artery pressure 35 mmHg    Narrative    · The left ventricle is normal in size with concentric hypertrophy and   normal systolic function.  · Indeterminate left ventricular diastolic function.  · The estimated PA systolic pressure is 35 mmHg.  · Normal right ventricular size with normal right ventricular systolic   function.  · Normal central venous pressure (3 mmHg).  · The estimated ejection fraction is 60%.  · Mild mitral regurgitation.  · Mild tricuspid regurgitation.      , EKG: Reviewed, and X-Ray: CXR: X-Ray Chest 1 View (CXR):   Results for orders placed or performed during the hospital encounter of 08/02/22   X-Ray Chest 1 View    Narrative    EXAMINATION:  XR CHEST 1 VIEW    CLINICAL HISTORY:  Shortness of breath    COMPARISON:  June 20, 2022, as well as studies dating back to June 22, 2020    FINDINGS:  Chronic or recurrent blunting of the left lateral costophrenic angle and to lesser degree the left costophrenic  angle with areas of scarring or atelectasis in the mid lower left lung again seen.  The lungs are free of new pulmonary opacities.  The cardiac silhouette size is markedly enlarged.  The trachea is midline and the mediastinal width is normal. Negative for pneumothorax.  Pulmonary vasculature is chronically mildly congested.  Negative for osseous abnormalities. Tortuous aorta.  Degenerative changes of the spine with convex right curvature.  Degenerative changes of the shoulder girdles.  Azygous lobe.      Impression    1.  Chronic or recurrent vascular congestion and left greater than right pleural effusions or scarring.  Negative for new pulmonary opacities.  Chronic or recurrent interstitial pulmonary edema must be considered.    2.  Markedly enlarged cardiac silhouette.  Pericardial effusion not excluded.  Clinical correlation is advised.    3.  Other stable findings as noted above.      Electronically signed by: Jose Rafael Pedraza MD  Date:    08/02/2022  Time:    15:22    and X-Ray Chest PA and Lateral (CXR): No results found for this visit on 08/02/22.

## 2022-08-08 ENCOUNTER — PATIENT OUTREACH (OUTPATIENT)
Dept: ADMINISTRATIVE | Facility: CLINIC | Age: 67
End: 2022-08-08
Payer: MEDICAID

## 2022-08-08 ENCOUNTER — HOSPITAL ENCOUNTER (OUTPATIENT)
Dept: CARDIOLOGY | Facility: HOSPITAL | Age: 67
Discharge: HOME | End: 2022-08-08
Attending: INTERNAL MEDICINE
Payer: MEDICARE

## 2022-08-08 VITALS
BODY MASS INDEX: 38.33 KG/M2 | HEIGHT: 61 IN | DIASTOLIC BLOOD PRESSURE: 65 MMHG | WEIGHT: 203 LBS | SYSTOLIC BLOOD PRESSURE: 133 MMHG

## 2022-08-08 DIAGNOSIS — I31.39 PERICARDIAL EFFUSION: ICD-10-CM

## 2022-08-08 DIAGNOSIS — I50.32 CHRONIC DIASTOLIC CONGESTIVE HEART FAILURE: ICD-10-CM

## 2022-08-08 DIAGNOSIS — I10 PRIMARY HYPERTENSION: ICD-10-CM

## 2022-08-08 DIAGNOSIS — R06.02 SHORTNESS OF BREATH: ICD-10-CM

## 2022-08-08 LAB
AORTIC ROOT ANNULUS: 3.29 CM
AV INDEX (PROSTH): 0.81
AV MEAN GRADIENT: 8 MMHG
AV PEAK GRADIENT: 14 MMHG
AV VALVE AREA: 2.31 CM2
AV VELOCITY RATIO: 0.73
BSA FOR ECHO PROCEDURE: 1.99 M2
CV ECHO LV RWT: 0.45 CM
DOP CALC AO PEAK VEL: 1.84 M/S
DOP CALC AO VTI: 43.8 CM
DOP CALC LVOT AREA: 2.9 CM2
DOP CALC LVOT DIAMETER: 1.91 CM
DOP CALC LVOT PEAK VEL: 1.34 M/S
DOP CALC LVOT STROKE VOLUME: 101.09 CM3
DOP CALC RVOT PEAK VEL: 0.83 M/S
DOP CALC RVOT VTI: 21.8 CM
DOP CALCLVOT PEAK VEL VTI: 35.3 CM
E WAVE DECELERATION TIME: 205.53 MSEC
E/A RATIO: 1.16
E/E' RATIO: 14.33 M/S
ECHO LV POSTERIOR WALL: 1.05 CM (ref 0.6–1.1)
EJECTION FRACTION: 60 %
FRACTIONAL SHORTENING: 40 % (ref 28–44)
INTERVENTRICULAR SEPTUM: 1 CM (ref 0.6–1.1)
IVRT: 94.2 MSEC
LA MAJOR: 7.31 CM
LA MINOR: 6.57 CM
LA WIDTH: 5.5 CM
LEFT ATRIUM SIZE: 4.13 CM
LEFT ATRIUM VOLUME INDEX MOD: 49.5 ML/M2
LEFT ATRIUM VOLUME INDEX: 70.3 ML/M2
LEFT ATRIUM VOLUME MOD: 93.98 CM3
LEFT ATRIUM VOLUME: 133.61 CM3
LEFT INTERNAL DIMENSION IN SYSTOLE: 2.8 CM (ref 2.1–4)
LEFT VENTRICLE DIASTOLIC VOLUME INDEX: 52.53 ML/M2
LEFT VENTRICLE DIASTOLIC VOLUME: 99.81 ML
LEFT VENTRICLE MASS INDEX: 88 G/M2
LEFT VENTRICLE SYSTOLIC VOLUME INDEX: 15.6 ML/M2
LEFT VENTRICLE SYSTOLIC VOLUME: 29.67 ML
LEFT VENTRICULAR INTERNAL DIMENSION IN DIASTOLE: 4.65 CM (ref 3.5–6)
LEFT VENTRICULAR MASS: 167.18 G
LV LATERAL E/E' RATIO: 11.73 M/S
LV SEPTAL E/E' RATIO: 18.43 M/S
LVOT MG: 4.66 MMHG
LVOT MV: 1.03 CM/S
MV PEAK A VEL: 1.11 M/S
MV PEAK E VEL: 1.29 M/S
PISA TR MAX VEL: 3.32 M/S
PULM VEIN S/D RATIO: 1.19
PV MEAN GRADIENT: 1.31 MMHG
PV PEAK D VEL: 0.74 M/S
PV PEAK S VEL: 0.88 M/S
PV PEAK VELOCITY: 1.15 CM/S
RA MAJOR: 5.13 CM
RA WIDTH: 4.3 CM
RIGHT VENTRICULAR END-DIASTOLIC DIMENSION: 3.42 CM
SINUS: 2.62 CM
STJ: 2.16 CM
TDI LATERAL: 0.11 M/S
TDI SEPTAL: 0.07 M/S
TDI: 0.09 M/S
TR MAX PG: 44 MMHG
TRICUSPID ANNULAR PLANE SYSTOLIC EXCURSION: 3.6 CM

## 2022-08-08 PROCEDURE — 93306 TTE W/DOPPLER COMPLETE: CPT | Mod: PO

## 2022-08-08 PROCEDURE — 93306 TTE W/DOPPLER COMPLETE: CPT | Mod: 26,,, | Performed by: INTERNAL MEDICINE

## 2022-08-08 PROCEDURE — 93306 ECHO (CUPID ONLY): ICD-10-PCS | Mod: 26,,, | Performed by: INTERNAL MEDICINE

## 2022-08-08 NOTE — DISCHARGE SUMMARY
[facility] - [facility] ([facility] Medicine  Discharge Summary      Patient Name: Diamond Das  MRN: [id number]  Patient Class: IP- Inpatient  Admission Date: 8/2/2022  Hospital Length of Stay: 2 days  Discharge Date and Time: 8/5/2022  3:06 PM  Attending Physician: No att. providers found   Discharging Provider: Sydni Tamez MD  Primary Care Provider: Andrey Perrin MD      HPI:   Ms. Das is a 67-year-old  female with PMH significant for diastolic CHF, CKD stage 5, followed by Dr. 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  Patient has significant bilateral lower extremity edema, and has been having multiple changes to her diuretic regimen recently.  She was advised to go to the ED to be admitted for IV diuretics.  Denies SOB at rest, but unable to walk few steps due to increasing short of breath.  She is chronically home oxygen dependent at 2-3 L O2 N/C.  In the ED she received Lasix 40 mg IV x1.  Creatinine is worsened to 5.5 (in baseline 4.3).    Admitting diagnosis:  ROD on CKD stage 5. Cardiorenal syndrome.  Anasarca.  Acute on chronic diastolic CHF.      * No surgery found *      Hospital Course:   Ms Das is a 67 year old female who presented to [facility] Emergency Room for evaluation after being seen in Cardiology clinic before admission. Associated symptoms include shortness of breath, severe bilatearl lower extremely edema and anasarca. Creatinine elevated above normal. She has been started on IV diuresis. Cardiology and Nephrology following.     8/4- Seen at bedside . Sitting on the side of the bed and eating Jello. Reports feeling a whole lot better. Leg swelling and SOB improved. Nephro feels pt is responding to diuretic therapy and does not need RRT at this time. Diuretic transitioned to  oral . Possible DC home tomorrow after labs. Creatinine down to 5.3>5.7, K 4.9.     8/5- Creatinine further improved to 5.1. Pt continues to feel better with less peripheral edema. Nephrology cleared pt for discharge with Torsemide 40 mg daily and Metolazone 2.5 mg daily with close follow up in the Nephrology and Cardiology clinic as well CHF clinic.          Goals of Care Treatment Preferences:  Code Status: Full Code    Living Will: Yes              Consults:   Consults (From admission, onward)        Status Ordering Provider     IP consult to case management  Once        Provider:  (Not yet assigned)    Completed NGUYỄN GRAY     Inpatient consult to Nephrology  Once        Provider:  Mahin Ramires MD    Completed NGUYỄN GRAY     Inpatient consult to Cardiology  Once        Provider:  (Not yet assigned)    Completed LO ANGULO new Assessment & Plan notes have been filed under this hospital service since the last note was generated.  Service: Hospital Medicine    Final Active Diagnoses:    Diagnosis Date Noted POA    PRINCIPAL PROBLEM:  Acute renal failure with acute renal cortical necrosis superimposed on stage 5 chronic kidney disease, not on chronic dialysis [N17.1, N18.5] 08/02/2022 Yes    Elevated d-dimer with indeterminate perfusion scan for PE [R79.89] 01/14/2022 Yes    Anemia due to chronic kidney disease [N18.9, D63.1] 12/06/2021 Yes    Anasarca [R60.1] 10/13/2020 Yes    Pericardial effusion [I31.3] 08/26/2019 Yes    Type 2 diabetes mellitus, without long-term current use of insulin [E11.9] 05/16/2019 Yes    Stage 5 chronic kidney disease [N18.5] 05/24/2018 Yes    SOB (shortness of breath) [R06.02] 12/14/2017 Yes    Acute on chronic diastolic congestive heart failure [I50.33] 12/07/2017 Yes    Decompensated hepatic cirrhosis [K72.90, K74.60] 09/06/2016 Yes    Hypothyroidism due to acquired atrophy of thyroid [E03.4] 09/02/2016 Yes    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 [K75.81,  K74.60] 09/02/2016 Yes    Morbid obesity due to excess calories [E66.01] 09/02/2016 Yes      Problems Resolved During this Admission:       Discharged Condition: stable    Disposition: Home or Self Care    Follow Up:   Follow-up Information     Andrey Perrin MD. Schedule an appointment as soon as possible for a visit in 3 day(s).    Specialty: Family Medicine  Why: Discharge follow up. Message sent to PCP clinic requesting f/u appt.  Contact information:  [address] [address] [address]  [phone]             Samantha sOei PA-C. Schedule an appointment as soon as possible for a visit in 1 week(s).    Specialty: Cardiology  Why: Cardiology follow up with labs  Contact information:  [address] [place] [address] [address] [address] [phone]             Pankaj Jacobo MD. Schedule an appointment as soon as possible for a visit in 1 week(s).    Specialty: Nephrology  Why: Nephrology follow up  Contact information:  [address] [address] NEPHROLOGY  [facility] [address]  [phone]                       Patient Instructions:      Ambulatory referral/consult to [facility]   Standing Status: Future   Referral Priority: Routine Referral Type: Consultation   Referral Reason: Specialty Services Required   Number of Visits Requested: 1     Ambulatory referral/consult to Cong[facility] Failure [facility]   Standing Status: Future   Referral Priority: Routine Referral Type: Consultation   Referral Reason: Specialty Services Required   Requested Specialty: Cardiology   Number of Visits Requested: 1     Diet renal     Diet Cardiac     Activity as tolerated       Significant Diagnostic Studies: Labs: BMP: No results for input(s): GLU, NA, K, CL, CO2, BUN, CREATININE, CALCIUM, MG in the last 48 hours., CMP No results for input(s): NA, K, CL, CO2, GLU, BUN, CREATININE, CALCIUM, PROT, ALBUMIN, BILITOT, ALKPHOS, AST, ALT, ANIONGAP, ESTGFRAFRICA, EGFRNONAA in the last 48 hours. and CBC No  results for input(s): WBC, HGB, HCT, PLT in the last 48 hours.    Pending Diagnostic Studies:     None         Medications:  Reconciled Home Medications:      Medication List      START taking these medications    metOLazone 5 MG tablet  Commonly known as: ZAROXOLYN  Take 1 tablet (5 mg total) by mouth once daily.        CONTINUE taking these medications    amLODIPine 5 MG tablet  Commonly known as: NORVASC  Take 1 tablet (5 mg total) by mouth once daily.     apixaban 5 mg Tab  Commonly known as: ELIQUIS  Take 1 tablet (5 mg total) by mouth 2 (two) times daily.     carvediloL 25 MG tablet  Commonly known as: COREG  Take 1 tablet (25 mg total) by mouth 2 (two) times daily with meals.     * docusate sodium 100 MG capsule  Commonly known as: COLACE  Take 1 capsule (100 mg total) by mouth 3 (three) times daily. Hold for diarrhea     * STOOL SOFTENER 100 MG capsule  Generic drug: docusate sodium  TAKE 1 CAPSULE BY MOUTH THREE TIMES DAILY FOR DIARRHEA (HOLD FOR DIARRHEA)     famotidine 20 MG tablet  Commonly known as: PEPCID  Take 1 tablet (20 mg total) by mouth once daily.     fish oil-omega-3 fatty acids 300-1,000 mg capsule  Take by mouth once daily.     fluticasone propionate 50 mcg/actuation nasal spray  Commonly known as: FLONASE  2 sprays (100 mcg total) by Each Nostril route once daily.     GARLIQUE ORAL  Take 1 tablet by mouth once daily.     IRON ORAL  Take by mouth.     isosorbide mononitrate 60 MG 24 hr tablet  Commonly known as: IMDUR  Take 1 tablet (60 mg total) by mouth every evening.     levothyroxine 137 MCG Tab tablet  Commonly known as: EUTHYROX  Take 1 tablet (137 mcg total) by mouth before breakfast.     LIDOcaine 4 % cream  Commonly known as: LMX  Apply topically as needed.     magnesium oxide 400 mg (241.3 mg magnesium) tablet  Commonly known as: MAG-OX  Take 1 tablet (400 mg total) by mouth once daily.     ondansetron 4 MG tablet  Commonly known as: ZOFRAN  Take 1 tablet (4 mg total) by mouth every  8 (eight) hours as needed.     pravastatin 10 MG tablet  Commonly known as: PRAVACHOL  Take 1 tablet (10 mg total) by mouth once daily.     pulse oximeter device  Commonly known as: pulse oximeter  by Apply Externally route 2 (two) times a day. Use twice daily at 8 AM and 3 PM and record the value in MyChart as directed.     torsemide 20 MG Tab  Commonly known as: DEMADEX  Take 2 tablets (40 mg total) by mouth once daily.     TRULICITY 4.5 mg/0.5 mL pen injector  Generic drug: dulaglutide  Inject 4.5 mg into the skin every 7 days.     XIFAXAN 550 mg Tab  Generic drug: rifAXIMin  Take 1 tablet (550 mg total) by mouth 2 (two) times daily.         * This list has 2 medication(s) that are the same as other medications prescribed for you. Read the directions carefully, and ask your doctor or other care provider to review them with you.            STOP taking these medications    ciprofloxacin HCl 500 MG tablet  Commonly known as: CIPRO            Indwelling Lines/Drains at time of discharge:   Lines/Drains/Airways     None                 Time spent on the discharge of patient: 30  minutes         Sydni Tamez MD  Department of Hospital Medicine  [facility] Tele[facility] ([facility])

## 2022-08-09 ENCOUNTER — OFFICE VISIT (OUTPATIENT)
Dept: INTERNAL MEDICINE | Facility: CLINIC | Age: 67
End: 2022-08-09
Payer: MEDICARE

## 2022-08-09 VITALS
TEMPERATURE: 98 F | WEIGHT: 196.63 LBS | SYSTOLIC BLOOD PRESSURE: 138 MMHG | DIASTOLIC BLOOD PRESSURE: 64 MMHG | BODY MASS INDEX: 37.16 KG/M2

## 2022-08-09 DIAGNOSIS — E11.43 DIABETIC GASTROPARESIS ASSOCIATED WITH TYPE 2 DIABETES MELLITUS: Primary | ICD-10-CM

## 2022-08-09 DIAGNOSIS — N25.81 HYPERPARATHYROIDISM, SECONDARY RENAL: ICD-10-CM

## 2022-08-09 DIAGNOSIS — D63.1 ANEMIA DUE TO STAGE 5 CHRONIC KIDNEY DISEASE, NOT ON CHRONIC DIALYSIS: ICD-10-CM

## 2022-08-09 DIAGNOSIS — K31.84 DIABETIC GASTROPARESIS ASSOCIATED WITH TYPE 2 DIABETES MELLITUS: Primary | ICD-10-CM

## 2022-08-09 DIAGNOSIS — Z12.31 SCREENING MAMMOGRAM, ENCOUNTER FOR: ICD-10-CM

## 2022-08-09 DIAGNOSIS — N18.5 ANEMIA DUE TO STAGE 5 CHRONIC KIDNEY DISEASE, NOT ON CHRONIC DIALYSIS: ICD-10-CM

## 2022-08-09 PROBLEM — M25.561 BILATERAL KNEE PAIN: Status: RESOLVED | Noted: 2022-04-22 | Resolved: 2022-08-09

## 2022-08-09 PROBLEM — I50.33 ACUTE ON CHRONIC DIASTOLIC CONGESTIVE HEART FAILURE: Status: RESOLVED | Noted: 2017-12-07 | Resolved: 2022-08-09

## 2022-08-09 PROBLEM — R60.1 ANASARCA: Status: RESOLVED | Noted: 2020-10-13 | Resolved: 2022-08-09

## 2022-08-09 PROBLEM — R25.2 LEG CRAMPS: Status: RESOLVED | Noted: 2022-05-20 | Resolved: 2022-08-09

## 2022-08-09 PROBLEM — K74.60 CIRRHOSIS: Status: RESOLVED | Noted: 2017-01-03 | Resolved: 2022-08-09

## 2022-08-09 PROBLEM — Z12.4 SCREENING FOR MALIGNANT NEOPLASM OF CERVIX: Status: RESOLVED | Noted: 2017-12-14 | Resolved: 2022-08-09

## 2022-08-09 PROBLEM — Z79.899 ENCOUNTER FOR LONG-TERM (CURRENT) USE OF MEDICATIONS: Status: RESOLVED | Noted: 2018-03-19 | Resolved: 2022-08-09

## 2022-08-09 PROBLEM — M25.562 BILATERAL KNEE PAIN: Status: RESOLVED | Noted: 2022-04-22 | Resolved: 2022-08-09

## 2022-08-09 PROBLEM — R10.9 ABDOMINAL PAIN: Status: RESOLVED | Noted: 2018-05-10 | Resolved: 2022-08-09

## 2022-08-09 PROCEDURE — 99215 OFFICE O/P EST HI 40 MIN: CPT | Mod: PBBFAC,PO | Performed by: FAMILY MEDICINE

## 2022-08-09 PROCEDURE — 99499 UNLISTED E&M SERVICE: CPT | Mod: S$PBB,,, | Performed by: FAMILY MEDICINE

## 2022-08-09 PROCEDURE — 99999 PR PBB SHADOW E&M-EST. PATIENT-LVL V: CPT | Mod: PBBFAC,,, | Performed by: FAMILY MEDICINE

## 2022-08-09 PROCEDURE — 1159F PR MEDICATION LIST DOCUMENTED IN MEDICAL RECORD: ICD-10-PCS | Mod: CPTII,S$GLB,, | Performed by: FAMILY MEDICINE

## 2022-08-09 PROCEDURE — 3044F PR MOST RECENT HEMOGLOBIN A1C LEVEL <7.0%: ICD-10-PCS | Mod: CPTII,S$GLB,, | Performed by: FAMILY MEDICINE

## 2022-08-09 PROCEDURE — 1111F PR DISCHARGE MEDS RECONCILED W/ CURRENT OUTPATIENT MED LIST: ICD-10-PCS | Mod: CPTII,S$GLB,, | Performed by: FAMILY MEDICINE

## 2022-08-09 PROCEDURE — 3062F PR POS MACROALBUMINURIA RESULT DOCUMENTED/REVIEW: ICD-10-PCS | Mod: CPTII,S$GLB,, | Performed by: FAMILY MEDICINE

## 2022-08-09 PROCEDURE — 4010F PR ACE/ARB THEARPY RXD/TAKEN: ICD-10-PCS | Mod: CPTII,S$GLB,, | Performed by: FAMILY MEDICINE

## 2022-08-09 PROCEDURE — 3075F PR MOST RECENT SYSTOLIC BLOOD PRESS GE 130-139MM HG: ICD-10-PCS | Mod: CPTII,S$GLB,, | Performed by: FAMILY MEDICINE

## 2022-08-09 PROCEDURE — 3066F NEPHROPATHY DOC TX: CPT | Mod: CPTII,S$GLB,, | Performed by: FAMILY MEDICINE

## 2022-08-09 PROCEDURE — 1157F ADVNC CARE PLAN IN RCRD: CPT | Mod: CPTII,S$GLB,, | Performed by: FAMILY MEDICINE

## 2022-08-09 PROCEDURE — 3078F PR MOST RECENT DIASTOLIC BLOOD PRESSURE < 80 MM HG: ICD-10-PCS | Mod: CPTII,S$GLB,, | Performed by: FAMILY MEDICINE

## 2022-08-09 PROCEDURE — 3008F BODY MASS INDEX DOCD: CPT | Mod: CPTII,S$GLB,, | Performed by: FAMILY MEDICINE

## 2022-08-09 PROCEDURE — 3062F POS MACROALBUMINURIA REV: CPT | Mod: CPTII,S$GLB,, | Performed by: FAMILY MEDICINE

## 2022-08-09 PROCEDURE — 3044F HG A1C LEVEL LT 7.0%: CPT | Mod: CPTII,S$GLB,, | Performed by: FAMILY MEDICINE

## 2022-08-09 PROCEDURE — 99499 RISK ADDL DX/OHS AUDIT: ICD-10-PCS | Mod: S$PBB,,, | Performed by: FAMILY MEDICINE

## 2022-08-09 PROCEDURE — 1159F MED LIST DOCD IN RCRD: CPT | Mod: CPTII,S$GLB,, | Performed by: FAMILY MEDICINE

## 2022-08-09 PROCEDURE — 3288F PR FALLS RISK ASSESSMENT DOCUMENTED: ICD-10-PCS | Mod: CPTII,S$GLB,, | Performed by: FAMILY MEDICINE

## 2022-08-09 PROCEDURE — 3078F DIAST BP <80 MM HG: CPT | Mod: CPTII,S$GLB,, | Performed by: FAMILY MEDICINE

## 2022-08-09 PROCEDURE — 4010F ACE/ARB THERAPY RXD/TAKEN: CPT | Mod: CPTII,S$GLB,, | Performed by: FAMILY MEDICINE

## 2022-08-09 PROCEDURE — 99214 PR OFFICE/OUTPT VISIT, EST, LEVL IV, 30-39 MIN: ICD-10-PCS | Mod: S$GLB,,, | Performed by: FAMILY MEDICINE

## 2022-08-09 PROCEDURE — 3008F PR BODY MASS INDEX (BMI) DOCUMENTED: ICD-10-PCS | Mod: CPTII,S$GLB,, | Performed by: FAMILY MEDICINE

## 2022-08-09 PROCEDURE — 3075F SYST BP GE 130 - 139MM HG: CPT | Mod: CPTII,S$GLB,, | Performed by: FAMILY MEDICINE

## 2022-08-09 PROCEDURE — 3066F PR DOCUMENTATION OF TREATMENT FOR NEPHROPATHY: ICD-10-PCS | Mod: CPTII,S$GLB,, | Performed by: FAMILY MEDICINE

## 2022-08-09 PROCEDURE — 1101F PT FALLS ASSESS-DOCD LE1/YR: CPT | Mod: CPTII,S$GLB,, | Performed by: FAMILY MEDICINE

## 2022-08-09 PROCEDURE — 1101F PR PT FALLS ASSESS DOC 0-1 FALLS W/OUT INJ PAST YR: ICD-10-PCS | Mod: CPTII,S$GLB,, | Performed by: FAMILY MEDICINE

## 2022-08-09 PROCEDURE — 99999 PR PBB SHADOW E&M-EST. PATIENT-LVL V: ICD-10-PCS | Mod: PBBFAC,,, | Performed by: FAMILY MEDICINE

## 2022-08-09 PROCEDURE — 3288F FALL RISK ASSESSMENT DOCD: CPT | Mod: CPTII,S$GLB,, | Performed by: FAMILY MEDICINE

## 2022-08-09 PROCEDURE — 1157F PR ADVANCE CARE PLAN OR EQUIV PRESENT IN MEDICAL RECORD: ICD-10-PCS | Mod: CPTII,S$GLB,, | Performed by: FAMILY MEDICINE

## 2022-08-09 PROCEDURE — 99214 OFFICE O/P EST MOD 30 MIN: CPT | Mod: S$GLB,,, | Performed by: FAMILY MEDICINE

## 2022-08-09 PROCEDURE — 1111F DSCHRG MED/CURRENT MED MERGE: CPT | Mod: CPTII,S$GLB,, | Performed by: FAMILY MEDICINE

## 2022-08-09 NOTE — PROGRESS NOTES
Subjective:       Patient ID: Diamond Das is a 67 y.o. female.    Chief Complaint: No chief complaint on file.    Hypertension  This is a chronic problem. The current episode started more than 1 year ago. The problem has been resolved since onset. The problem is controlled. Pertinent negatives include no anxiety, blurred vision, chest pain, headaches or shortness of breath. There are no associated agents to hypertension.     Review of Systems   Eyes: Negative for blurred vision.   Respiratory: Negative for shortness of breath.    Cardiovascular: Negative for chest pain.   Gastrointestinal: Negative for abdominal pain.   Neurological: Negative for headaches.       Objective:      Physical Exam  Vitals and nursing note reviewed.   Constitutional:       General: She is not in acute distress.     Appearance: Normal appearance. She is well-developed. She is not diaphoretic.   HENT:      Head: Normocephalic and atraumatic.   Cardiovascular:      Heart sounds: Murmur heard.   Pulmonary:      Effort: Pulmonary effort is normal. No respiratory distress.      Breath sounds: Normal breath sounds. No wheezing.   Abdominal:      General: There is no distension.      Palpations: Abdomen is soft.      Tenderness: There is no abdominal tenderness. There is no guarding.   Musculoskeletal:      Right lower leg: Edema present.      Left lower leg: Edema present.   Skin:     General: Skin is warm and dry.      Findings: No erythema or rash.   Neurological:      Mental Status: She is alert.         Assessment:       1. Diabetic gastroparesis associated with type 2 diabetes mellitus    2. Screening mammogram, encounter for    3. Hyperparathyroidism, secondary renal    4. Anemia due to stage 5 chronic kidney disease, not on chronic dialysis        Plan:     Problem List Items Addressed This Visit        Neuro    Diabetic gastroparesis associated with type 2 diabetes mellitus - Primary    Relevant Orders    Ambulatory referral/consult  to Podiatry    Ambulatory referral/consult to Optometry    Hemoglobin A1C    Lipid Panel       Renal/    Screening mammogram, encounter for    Relevant Orders    Mammo Digital Screening Bilat w/ Pradip       Oncology    Anemia due to chronic kidney disease    Relevant Orders    CBC Auto Differential    Iron and TIBC    Ferritin       Endocrine    Hyperparathyroidism, secondary renal    Relevant Orders    PTH, intact

## 2022-08-10 ENCOUNTER — LAB VISIT (OUTPATIENT)
Dept: LAB | Facility: HOSPITAL | Age: 67
End: 2022-08-10
Attending: FAMILY MEDICINE
Payer: MEDICAID

## 2022-08-10 DIAGNOSIS — E11.43 DIABETIC GASTROPARESIS ASSOCIATED WITH TYPE 2 DIABETES MELLITUS: ICD-10-CM

## 2022-08-10 DIAGNOSIS — N25.81 HYPERPARATHYROIDISM, SECONDARY RENAL: ICD-10-CM

## 2022-08-10 DIAGNOSIS — N18.5 ANEMIA DUE TO STAGE 5 CHRONIC KIDNEY DISEASE, NOT ON CHRONIC DIALYSIS: ICD-10-CM

## 2022-08-10 DIAGNOSIS — K31.84 DIABETIC GASTROPARESIS ASSOCIATED WITH TYPE 2 DIABETES MELLITUS: ICD-10-CM

## 2022-08-10 DIAGNOSIS — D63.1 ANEMIA DUE TO STAGE 5 CHRONIC KIDNEY DISEASE, NOT ON CHRONIC DIALYSIS: ICD-10-CM

## 2022-08-10 LAB
CHOLEST SERPL-MCNC: 178 MG/DL (ref 120–199)
CHOLEST/HDLC SERPL: 4.7 {RATIO} (ref 2–5)
ESTIMATED AVG GLUCOSE: 94 MG/DL (ref 68–131)
FERRITIN SERPL-MCNC: 133 NG/ML (ref 20–300)
HBA1C MFR BLD: 4.9 % (ref 4–5.6)
HDLC SERPL-MCNC: 38 MG/DL (ref 40–75)
HDLC SERPL: 21.3 % (ref 20–50)
IRON SERPL-MCNC: 34 UG/DL (ref 30–160)
LDLC SERPL CALC-MCNC: 123.8 MG/DL (ref 63–159)
NONHDLC SERPL-MCNC: 140 MG/DL
PTH-INTACT SERPL-MCNC: 285.3 PG/ML (ref 9–77)
SATURATED IRON: 13 % (ref 20–50)
TOTAL IRON BINDING CAPACITY: 256 UG/DL (ref 250–450)
TRANSFERRIN SERPL-MCNC: 173 MG/DL (ref 200–375)
TRIGL SERPL-MCNC: 81 MG/DL (ref 30–150)

## 2022-08-10 PROCEDURE — 85025 COMPLETE CBC W/AUTO DIFF WBC: CPT | Performed by: FAMILY MEDICINE

## 2022-08-10 PROCEDURE — 36415 COLL VENOUS BLD VENIPUNCTURE: CPT | Mod: PN | Performed by: FAMILY MEDICINE

## 2022-08-10 PROCEDURE — 83036 HEMOGLOBIN GLYCOSYLATED A1C: CPT | Performed by: FAMILY MEDICINE

## 2022-08-10 PROCEDURE — 82728 ASSAY OF FERRITIN: CPT | Performed by: FAMILY MEDICINE

## 2022-08-10 PROCEDURE — 83970 ASSAY OF PARATHORMONE: CPT | Performed by: FAMILY MEDICINE

## 2022-08-10 PROCEDURE — 80061 LIPID PANEL: CPT | Performed by: FAMILY MEDICINE

## 2022-08-10 PROCEDURE — 84466 ASSAY OF TRANSFERRIN: CPT | Performed by: FAMILY MEDICINE

## 2022-08-11 LAB
BASOPHILS # BLD AUTO: 0.02 K/UL (ref 0–0.2)
BASOPHILS NFR BLD: 0.4 % (ref 0–1.9)
DIFFERENTIAL METHOD: ABNORMAL
EOSINOPHIL # BLD AUTO: 0.2 K/UL (ref 0–0.5)
EOSINOPHIL NFR BLD: 4.3 % (ref 0–8)
ERYTHROCYTE [DISTWIDTH] IN BLOOD BY AUTOMATED COUNT: 18.2 % (ref 11.5–14.5)
HCT VFR BLD AUTO: 31 % (ref 37–48.5)
HGB BLD-MCNC: 9.5 G/DL (ref 12–16)
IMM GRANULOCYTES # BLD AUTO: 0.01 K/UL (ref 0–0.04)
IMM GRANULOCYTES NFR BLD AUTO: 0.2 % (ref 0–0.5)
LYMPHOCYTES # BLD AUTO: 2 K/UL (ref 1–4.8)
LYMPHOCYTES NFR BLD: 39.7 % (ref 18–48)
MCH RBC QN AUTO: 29 PG (ref 27–31)
MCHC RBC AUTO-ENTMCNC: 30.6 G/DL (ref 32–36)
MCV RBC AUTO: 95 FL (ref 82–98)
MONOCYTES # BLD AUTO: 0.5 K/UL (ref 0.3–1)
MONOCYTES NFR BLD: 10 % (ref 4–15)
NEUTROPHILS # BLD AUTO: 2.2 K/UL (ref 1.8–7.7)
NEUTROPHILS NFR BLD: 45.4 % (ref 38–73)
NRBC BLD-RTO: 0 /100 WBC
PLATELET # BLD AUTO: 179 K/UL (ref 150–450)
PMV BLD AUTO: 10.7 FL (ref 9.2–12.9)
RBC # BLD AUTO: 3.28 M/UL (ref 4–5.4)
WBC # BLD AUTO: 4.91 K/UL (ref 3.9–12.7)

## 2022-08-15 ENCOUNTER — OFFICE VISIT (OUTPATIENT)
Dept: TRANSPLANT | Facility: CLINIC | Age: 67
End: 2022-08-15
Payer: MEDICARE

## 2022-08-15 ENCOUNTER — LAB VISIT (OUTPATIENT)
Dept: LAB | Facility: HOSPITAL | Age: 67
End: 2022-08-15
Attending: PHYSICIAN ASSISTANT
Payer: MEDICARE

## 2022-08-15 VITALS
SYSTOLIC BLOOD PRESSURE: 140 MMHG | HEART RATE: 70 BPM | BODY MASS INDEX: 37.03 KG/M2 | DIASTOLIC BLOOD PRESSURE: 60 MMHG | OXYGEN SATURATION: 96 % | WEIGHT: 196 LBS

## 2022-08-15 DIAGNOSIS — N18.5 ACUTE RENAL FAILURE WITH ACUTE RENAL CORTICAL NECROSIS SUPERIMPOSED ON STAGE 5 CHRONIC KIDNEY DISEASE, NOT ON CHRONIC DIALYSIS: Primary | ICD-10-CM

## 2022-08-15 DIAGNOSIS — N17.1 ACUTE RENAL FAILURE WITH ACUTE RENAL CORTICAL NECROSIS SUPERIMPOSED ON STAGE 5 CHRONIC KIDNEY DISEASE, NOT ON CHRONIC DIALYSIS: Primary | ICD-10-CM

## 2022-08-15 DIAGNOSIS — I50.9 CONGESTIVE HEART FAILURE, UNSPECIFIED HF CHRONICITY, UNSPECIFIED HEART FAILURE TYPE: ICD-10-CM

## 2022-08-15 DIAGNOSIS — G47.33 OSA ON CPAP: ICD-10-CM

## 2022-08-15 DIAGNOSIS — E11.59 TYPE 2 DIABETES MELLITUS WITH OTHER CIRCULATORY COMPLICATION, WITHOUT LONG-TERM CURRENT USE OF INSULIN: ICD-10-CM

## 2022-08-15 DIAGNOSIS — I50.33 ACUTE ON CHRONIC DIASTOLIC CONGESTIVE HEART FAILURE: ICD-10-CM

## 2022-08-15 DIAGNOSIS — I50.32 CHRONIC DIASTOLIC HEART FAILURE: ICD-10-CM

## 2022-08-15 LAB
ALBUMIN SERPL BCP-MCNC: 2.9 G/DL (ref 3.5–5.2)
ALP SERPL-CCNC: 79 U/L (ref 55–135)
ALT SERPL W/O P-5'-P-CCNC: 14 U/L (ref 10–44)
ANION GAP SERPL CALC-SCNC: 8 MMOL/L (ref 8–16)
AST SERPL-CCNC: 29 U/L (ref 10–40)
BILIRUB SERPL-MCNC: 0.5 MG/DL (ref 0.1–1)
BUN SERPL-MCNC: 44 MG/DL (ref 8–23)
CALCIUM SERPL-MCNC: 8.1 MG/DL (ref 8.7–10.5)
CHLORIDE SERPL-SCNC: 106 MMOL/L (ref 95–110)
CO2 SERPL-SCNC: 27 MMOL/L (ref 23–29)
CREAT SERPL-MCNC: 4.4 MG/DL (ref 0.5–1.4)
EST. GFR  (NO RACE VARIABLE): 10.6 ML/MIN/1.73 M^2
GLUCOSE SERPL-MCNC: 116 MG/DL (ref 70–110)
POTASSIUM SERPL-SCNC: 4.9 MMOL/L (ref 3.5–5.1)
PROT SERPL-MCNC: 6.2 G/DL (ref 6–8.4)
SODIUM SERPL-SCNC: 141 MMOL/L (ref 136–145)

## 2022-08-15 PROCEDURE — 3078F DIAST BP <80 MM HG: CPT | Mod: CPTII,S$GLB,, | Performed by: PHYSICIAN ASSISTANT

## 2022-08-15 PROCEDURE — 3062F PR POS MACROALBUMINURIA RESULT DOCUMENTED/REVIEW: ICD-10-PCS | Mod: CPTII,S$GLB,, | Performed by: PHYSICIAN ASSISTANT

## 2022-08-15 PROCEDURE — 3077F PR MOST RECENT SYSTOLIC BLOOD PRESSURE >= 140 MM HG: ICD-10-PCS | Mod: CPTII,S$GLB,, | Performed by: PHYSICIAN ASSISTANT

## 2022-08-15 PROCEDURE — 3044F HG A1C LEVEL LT 7.0%: CPT | Mod: CPTII,S$GLB,, | Performed by: PHYSICIAN ASSISTANT

## 2022-08-15 PROCEDURE — 3008F PR BODY MASS INDEX (BMI) DOCUMENTED: ICD-10-PCS | Mod: CPTII,S$GLB,, | Performed by: PHYSICIAN ASSISTANT

## 2022-08-15 PROCEDURE — 1159F MED LIST DOCD IN RCRD: CPT | Mod: CPTII,S$GLB,, | Performed by: PHYSICIAN ASSISTANT

## 2022-08-15 PROCEDURE — 99999 PR PBB SHADOW E&M-EST. PATIENT-LVL IV: ICD-10-PCS | Mod: PBBFAC,,, | Performed by: PHYSICIAN ASSISTANT

## 2022-08-15 PROCEDURE — 1157F PR ADVANCE CARE PLAN OR EQUIV PRESENT IN MEDICAL RECORD: ICD-10-PCS | Mod: CPTII,S$GLB,, | Performed by: PHYSICIAN ASSISTANT

## 2022-08-15 PROCEDURE — 1157F ADVNC CARE PLAN IN RCRD: CPT | Mod: CPTII,S$GLB,, | Performed by: PHYSICIAN ASSISTANT

## 2022-08-15 PROCEDURE — 99214 PR OFFICE/OUTPT VISIT, EST, LEVL IV, 30-39 MIN: ICD-10-PCS | Mod: S$GLB,,, | Performed by: PHYSICIAN ASSISTANT

## 2022-08-15 PROCEDURE — 3062F POS MACROALBUMINURIA REV: CPT | Mod: CPTII,S$GLB,, | Performed by: PHYSICIAN ASSISTANT

## 2022-08-15 PROCEDURE — 3008F BODY MASS INDEX DOCD: CPT | Mod: CPTII,S$GLB,, | Performed by: PHYSICIAN ASSISTANT

## 2022-08-15 PROCEDURE — 3078F PR MOST RECENT DIASTOLIC BLOOD PRESSURE < 80 MM HG: ICD-10-PCS | Mod: CPTII,S$GLB,, | Performed by: PHYSICIAN ASSISTANT

## 2022-08-15 PROCEDURE — 4010F PR ACE/ARB THEARPY RXD/TAKEN: ICD-10-PCS | Mod: CPTII,S$GLB,, | Performed by: PHYSICIAN ASSISTANT

## 2022-08-15 PROCEDURE — 1111F DSCHRG MED/CURRENT MED MERGE: CPT | Mod: CPTII,S$GLB,, | Performed by: PHYSICIAN ASSISTANT

## 2022-08-15 PROCEDURE — 36415 COLL VENOUS BLD VENIPUNCTURE: CPT | Mod: PO | Performed by: PHYSICIAN ASSISTANT

## 2022-08-15 PROCEDURE — 3066F NEPHROPATHY DOC TX: CPT | Mod: CPTII,S$GLB,, | Performed by: PHYSICIAN ASSISTANT

## 2022-08-15 PROCEDURE — 4010F ACE/ARB THERAPY RXD/TAKEN: CPT | Mod: CPTII,S$GLB,, | Performed by: PHYSICIAN ASSISTANT

## 2022-08-15 PROCEDURE — 3066F PR DOCUMENTATION OF TREATMENT FOR NEPHROPATHY: ICD-10-PCS | Mod: CPTII,S$GLB,, | Performed by: PHYSICIAN ASSISTANT

## 2022-08-15 PROCEDURE — 99214 OFFICE O/P EST MOD 30 MIN: CPT | Mod: S$GLB,,, | Performed by: PHYSICIAN ASSISTANT

## 2022-08-15 PROCEDURE — 80053 COMPREHEN METABOLIC PANEL: CPT | Performed by: PHYSICIAN ASSISTANT

## 2022-08-15 PROCEDURE — 1159F PR MEDICATION LIST DOCUMENTED IN MEDICAL RECORD: ICD-10-PCS | Mod: CPTII,S$GLB,, | Performed by: PHYSICIAN ASSISTANT

## 2022-08-15 PROCEDURE — 99999 PR PBB SHADOW E&M-EST. PATIENT-LVL IV: CPT | Mod: PBBFAC,,, | Performed by: PHYSICIAN ASSISTANT

## 2022-08-15 PROCEDURE — 3077F SYST BP >= 140 MM HG: CPT | Mod: CPTII,S$GLB,, | Performed by: PHYSICIAN ASSISTANT

## 2022-08-15 PROCEDURE — 1111F PR DISCHARGE MEDS RECONCILED W/ CURRENT OUTPATIENT MED LIST: ICD-10-PCS | Mod: CPTII,S$GLB,, | Performed by: PHYSICIAN ASSISTANT

## 2022-08-15 PROCEDURE — 3044F PR MOST RECENT HEMOGLOBIN A1C LEVEL <7.0%: ICD-10-PCS | Mod: CPTII,S$GLB,, | Performed by: PHYSICIAN ASSISTANT

## 2022-08-15 NOTE — PROGRESS NOTES
[facility] Provider Note (Initial Clinic) Consult Note    Date of original referral: 87/2022  Age: 67 y.o.  Gender: female  Ethnicity: Not  or /a   Number of admissions for CHF within the preceding year:  1  Duration of CHF: ?  Type of Congestive Heart Failure: Diastolic   Etiology: Non-ischemic    Social history: none  Enrolled in Infusion suite: no    Diagnostic Labs:   EKG - 08/02/2022  CXR - 08/02/2022  ECHO - 08/08/2022  Stress test -   Stress echo - 12/30/2020  Pharmacologic stress -   Cardiac catheterization - 07/24/2020   Cardiac MRI -     Lab Results   Component Value Date     08/05/2022     08/04/2022    K 4.8 08/05/2022    K 4.9 08/04/2022     08/05/2022     08/04/2022    CO2 26 08/05/2022    CO2 24 08/04/2022    GLU 81 08/05/2022    GLU 82 08/04/2022    BUN 65 (H) 08/05/2022    BUN 64 (H) 08/04/2022    CREATININE 5.1 (H) 08/05/2022    CREATININE 5.3 (H) 08/04/2022    CALCIUM 7.8 (L) 08/05/2022    CALCIUM 7.9 (L) 08/04/2022    PROT 5.9 (L) 08/04/2022    PROT 5.7 (L) 08/03/2022    ALBUMIN 2.6 (L) 08/04/2022    ALBUMIN 2.7 (L) 08/03/2022    BILITOT 0.3 08/04/2022    BILITOT 0.2 08/03/2022    ALKPHOS 62 08/04/2022    ALKPHOS 66 08/03/2022    AST 20 08/04/2022    AST 22 08/03/2022    ALT 12 08/04/2022    ALT 14 08/03/2022    ANIONGAP 7 (L) 08/05/2022    ANIONGAP 9 08/04/2022    ESTGFRAFRICA 12 (A) 06/21/2022    ESTGFRAFRICA 12 (A) 06/20/2022    EGFRNONAA 10 (A) 06/21/2022    EGFRNONAA 10 (A) 06/20/2022       Lab Results   Component Value Date    WBC 4.91 08/10/2022    WBC 4.69 08/04/2022    RBC 3.28 (L) 08/10/2022    RBC 3.42 (L) 08/04/2022    HGB 9.5 (L) 08/10/2022    HGB 9.3 (L) 08/04/2022    HCT 31.0 (L) 08/10/2022    HCT 31.3 (L) 08/04/2022    MCV 95 08/10/2022    MCV 92 08/04/2022    MCH 29.0 08/10/2022    MCH 27.2 08/04/2022    MCHC 30.6 (L) 08/10/2022    MCHC 29.7 (L) 08/04/2022    RDW 18.2 (H) 08/10/2022    RDW 16.6 (H) 08/04/2022     08/10/2022      08/04/2022    MPV 10.7 08/10/2022    MPV 10.6 08/04/2022    IMMGR 0.2 08/10/2022    IMMGR 0.2 08/04/2022    IGABS 0.01 08/10/2022    IGABS 0.01 08/04/2022    LYMPH 2.0 08/10/2022    LYMPH 39.7 08/10/2022    MONO 0.5 08/10/2022    MONO 10.0 08/10/2022    EOS 0.2 08/10/2022    EOS 0.2 08/04/2022    BASO 0.02 08/10/2022    BASO 0.02 08/04/2022    NRBC 0 08/10/2022    NRBC 0 08/04/2022    GRAN 2.2 08/10/2022    GRAN 45.4 08/10/2022    EOSINOPHIL 4.3 08/10/2022    EOSINOPHIL 4.3 08/04/2022    BASOPHIL 0.4 08/10/2022    BASOPHIL 0.4 08/04/2022       Lab Results   Component Value Date     (H) 08/02/2022     (H) 08/01/2022    MG 2.9 (H) 08/05/2022    MG 3.1 (H) 08/04/2022    PHOS 4.4 08/05/2022    PHOS 4.4 08/04/2022    TROPONINI 0.013 08/02/2022    TROPONINI 0.017 06/21/2022    HGBA1C 4.9 08/10/2022    HGBA1C 5.1 06/07/2022    TSH 28.708 (H) 08/03/2022    TSH 1.339 12/07/2021    FREET4 0.85 08/03/2022    FREET4 1.04 07/30/2018       Lab Results   Component Value Date    IRON 34 08/10/2022    TIBC 256 08/10/2022    FERRITIN 133 08/10/2022    CHOL 178 08/10/2022    TRIG 81 08/10/2022    HDL 38 (L) 08/10/2022    LDLCALC 123.8 08/10/2022    CHOLHDL 21.3 08/10/2022    TOTALCHOLEST 4.7 08/10/2022    NONHDLCHOL 140 08/10/2022    COLORU Yellow 06/06/2022    APPEARANCEUA Hazy (A) 06/06/2022    PHUR 5.0 06/06/2022    SPECGRAV 1.010 06/06/2022    PROTEINUA 2+ (A) 06/06/2022    GLUCUA 1+ (A) 06/06/2022    KETONESU Negative 06/06/2022    BILIRUBINUA Negative 06/06/2022    OCCULTUA Negative 06/06/2022    NITRITE Negative 06/06/2022    LEUKOCYTESUR Negative 06/06/2022       List all implanted cardiac devices:   none    Current Outpatient Medications on File Prior to Visit   Medication Sig Dispense Refill    amLODIPine (NORVASC) 5 MG tablet Take 1 tablet (5 mg total) by mouth once daily. 30 tablet 3    apixaban (ELIQUIS) 5 mg Tab Take 1 tablet (5 mg total) by mouth 2 (two) times daily. 60 tablet 3    carvediloL (COREG) 25  MG tablet Take 1 tablet (25 mg total) by mouth 2 (two) times daily with meals. 180 tablet 1    docusate sodium (COLACE) 100 MG capsule Take 1 capsule (100 mg total) by mouth 3 (three) times daily. Hold for diarrhea 30 capsule 0    dulaglutide (TRULICITY) 4.5 mg/0.5 mL pen injector Inject 4.5 mg into the skin every 7 days. 12 pen 1    famotidine (PEPCID) 20 MG tablet Take 1 tablet (20 mg total) by mouth once daily. 90 tablet 1    ferrous sulfate (IRON ORAL) Take by mouth.      fluticasone propionate (FLONASE) 50 mcg/actuation nasal spray 2 sprays (100 mcg total) by Each Nostril route once daily.  0    garlic (GARLIQUE ORAL) Take 1 tablet by mouth once daily.      isosorbide mononitrate (IMDUR) 60 MG 24 hr tablet Take 1 tablet (60 mg total) by mouth every evening. 90 tablet 1    levothyroxine (EUTHYROX) 137 MCG Tab tablet Take 1 tablet (137 mcg total) by mouth before breakfast. 90 tablet 1    LIDOcaine (LMX) 4 % cream Apply topically as needed.      magnesium oxide (MAG-OX) 400 mg (241.3 mg magnesium) tablet Take 1 tablet (400 mg total) by mouth once daily. 90 tablet 1    metOLazone (ZAROXOLYN) 5 MG tablet Take 1 tablet (5 mg total) by mouth once daily. 30 tablet 0    omega-3 fatty acids/fish oil (FISH OIL-OMEGA-3 FATTY ACIDS) 300-1,000 mg capsule Take by mouth once daily.      ondansetron (ZOFRAN) 4 MG tablet Take 1 tablet (4 mg total) by mouth every 8 (eight) hours as needed. 90 tablet 1    pravastatin (PRAVACHOL) 10 MG tablet Take 1 tablet (10 mg total) by mouth once daily. 90 tablet 3    pulse oximeter (PULSE OXIMETER) device by Apply Externally route 2 (two) times a day. Use twice daily at 8 AM and 3 PM and record the value in [facility] as directed. 1 each 0    rifAXIMin (XIFAXAN) 550 mg Tab Take 1 tablet (550 mg total) by mouth 2 (two) times daily. 60 tablet 2    STOOL SOFTENER 100 mg capsule TAKE 1 CAPSULE BY MOUTH THREE TIMES DAILY FOR DIARRHEA (HOLD FOR DIARRHEA)      torsemide (DEMADEX) 20 MG  Tab Take 2 tablets (40 mg total) by mouth once daily. 60 tablet 0     No current facility-administered medications on file prior to visit.         HPI:  Patient can walk    Patient sleeps on    Patient wakes up SOB, has to get out of bed, associated cough, sputum    Palpitations -    Dizzy, light-headed, pre-syncope or syncope   Since discharge frequency of performing weights, home weight and weight change   Other information felt pertinent to HPI    Ms. Das is a 67-year-old  female with PMH significant for diastolic CHF, CKD stage 5, followed by Dr. 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  Patient has significant bilateral lower extremity edema, and has been having multiple changes to her diuretic regimen recently.  She was advised to go to the ED to be admitted for IV diuretics.   She is chronically home oxygen dependent at 2-3 L O2 N/C.     Nephro inpatient feels pt is responding to diuretic therapy and does not need RRT at this time. Diuretic transitioned to oral .      Nephrology cleared pt for discharge with Torsemide 40 mg daily and Metolazone 2.5 mg daily         PHYSICAL: There were no vitals filed for this visit.   Wt Readings from Last 3 Encounters:   08/09/22 89.2 kg (196 lb 10.4 oz)   08/08/22 92.1 kg (203 lb)   08/05/22 92.4 kg (203 lb 11.3 oz)       JVD: no   Heart rhythm: regular  Cardiac murmur: No    S3: no  S4: no  Lungs: clear  Liver span: 10 cm:   Hepatojugular reflux: no  Edema: no      ASSESSMENT: acute on chronic diastolic heart failure     PLAN:      Patient Instructions:    Instruct the patient to notify this clinic if HH, a physician or an advanced care provider wants to change medication one of their HF medications    Activity and Diet restrictions:   o Recommend 2-3 gram sodium  restriction and 1500cc- 2000cc fluid restriction.  o Encourage physical activity with graded exercise program.  o Requested patient to weigh themselves daily, and to notify us if their weight increases by more than 3 lbs in 1 day or 5 lbs in 3 days.    Assigned dry weight on home scale: 89 kg   Medication changes (include current dose and changed dose) decrease metolazone to MWF from daily  Upcoming labs and date anticipated: RTC 1 week with labs

## 2022-08-21 ENCOUNTER — PATIENT MESSAGE (OUTPATIENT)
Dept: OTHER | Facility: OTHER | Age: 67
End: 2022-08-21
Payer: MEDICAID

## 2022-08-24 ENCOUNTER — TELEPHONE (OUTPATIENT)
Dept: TRANSPLANT | Facility: CLINIC | Age: 67
End: 2022-08-24
Payer: MEDICAID

## 2022-08-24 DIAGNOSIS — I12.9 PARENCHYMAL RENAL HYPERTENSION, STAGE 1 THROUGH STAGE 4 OR UNSPECIFIED CHRONIC KIDNEY DISEASE: ICD-10-CM

## 2022-08-24 DIAGNOSIS — R79.89 ELEVATED D-DIMER: ICD-10-CM

## 2022-08-24 DIAGNOSIS — E11.59 TYPE 2 DIABETES MELLITUS WITH OTHER CIRCULATORY COMPLICATION, WITHOUT LONG-TERM CURRENT USE OF INSULIN: ICD-10-CM

## 2022-08-24 DIAGNOSIS — R10.13 EPIGASTRIC PAIN: ICD-10-CM

## 2022-08-24 DIAGNOSIS — E03.4 HYPOTHYROIDISM DUE TO ACQUIRED ATROPHY OF THYROID: ICD-10-CM

## 2022-08-24 DIAGNOSIS — E78.49 OTHER HYPERLIPIDEMIA: ICD-10-CM

## 2022-08-24 DIAGNOSIS — U07.1 COVID-19: ICD-10-CM

## 2022-08-24 RX ORDER — ONDANSETRON 4 MG/1
4 TABLET, FILM COATED ORAL EVERY 8 HOURS PRN
Qty: 90 TABLET | Refills: 1 | Status: SHIPPED | OUTPATIENT
Start: 2022-08-24 | End: 2022-11-03 | Stop reason: SDUPTHER

## 2022-08-24 RX ORDER — PRAVASTATIN SODIUM 10 MG/1
10 TABLET ORAL DAILY
Qty: 90 TABLET | Refills: 3 | Status: SHIPPED | OUTPATIENT
Start: 2022-08-24 | End: 2022-11-03 | Stop reason: SDUPTHER

## 2022-08-24 RX ORDER — ISOSORBIDE MONONITRATE 60 MG/1
60 TABLET, EXTENDED RELEASE ORAL NIGHTLY
Qty: 90 TABLET | Refills: 1 | Status: SHIPPED | OUTPATIENT
Start: 2022-08-24 | End: 2022-11-03 | Stop reason: SDUPTHER

## 2022-08-24 RX ORDER — CARVEDILOL 25 MG/1
25 TABLET ORAL 2 TIMES DAILY WITH MEALS
Qty: 180 TABLET | Refills: 1 | Status: SHIPPED | OUTPATIENT
Start: 2022-08-24 | End: 2022-11-03 | Stop reason: SDUPTHER

## 2022-08-24 RX ORDER — LEVOTHYROXINE SODIUM 137 UG/1
137 TABLET ORAL
Qty: 90 TABLET | Refills: 1 | Status: SHIPPED | OUTPATIENT
Start: 2022-08-24 | End: 2022-11-03 | Stop reason: SDUPTHER

## 2022-08-24 RX ORDER — DULAGLUTIDE 4.5 MG/.5ML
4.5 INJECTION, SOLUTION SUBCUTANEOUS
Qty: 12 PEN | Refills: 1 | Status: SHIPPED | OUTPATIENT
Start: 2022-08-24 | End: 2022-11-03 | Stop reason: SDUPTHER

## 2022-08-24 RX ORDER — FAMOTIDINE 20 MG/1
20 TABLET, FILM COATED ORAL DAILY
Qty: 90 TABLET | Refills: 1 | Status: SHIPPED | OUTPATIENT
Start: 2022-08-24 | End: 2022-11-03 | Stop reason: SDUPTHER

## 2022-08-24 NOTE — TELEPHONE ENCOUNTER
----- Message from Annabelle Hyatt sent at 8/24/2022  1:57 PM CDT -----  Pt called in re: todays appt she would like to reschedule please call pt @ .[phone]    Thanks [facility]

## 2022-08-24 NOTE — TELEPHONE ENCOUNTER
----- Message from Halima Castellanos sent at 8/24/2022  9:37 AM CDT -----  Contact: Aracelis/ [facility]  Type:  Pharmacy Calling to Clarify an RX    Name of Caller: Aracelis  Pharmacy Name: [facility]  Prescription Name: List of medications  What do they need to clarify?: Needing to ensure that the medication are okay to fill as the DC box was checked and they are also needing a list of the patient's allergies sent over as well  Best Call Back Number: [phone]  Additional Information:

## 2022-08-31 NOTE — TELEPHONE ENCOUNTER
----- Message from Mary Ellen Fisher sent at 8/29/2022  3:22 PM CDT -----  [facility]  [facility] calling to speak to the nurse regarding diabetic supplies. Reports needing orders for the supplies. Also states needing testing frequency. Please give Maryan a call back at ..[phone] or fax to ..[phone]

## 2022-08-31 NOTE — TELEPHONE ENCOUNTER
[facility] needs a new rx for diabetic supplies along with the number of times pt checks her glucose per day. I called Ms Fields and she said that she checks her blood sugar everyday, I will include this on the rx

## 2022-08-31 NOTE — TELEPHONE ENCOUNTER
----- Message from Vidya Das sent at 8/31/2022  1:35 PM CDT -----  Contact: [facility]  Please fax sign orders and clinic notes for diabetes supplies to  [phone], can be reached at [phone]///[email or web address]

## 2022-09-01 ENCOUNTER — OFFICE VISIT (OUTPATIENT)
Dept: CARDIOLOGY | Facility: CLINIC | Age: 67
End: 2022-09-01
Payer: MEDICARE

## 2022-09-01 VITALS
WEIGHT: 178.13 LBS | SYSTOLIC BLOOD PRESSURE: 120 MMHG | BODY MASS INDEX: 33.66 KG/M2 | OXYGEN SATURATION: 98 % | HEART RATE: 70 BPM | DIASTOLIC BLOOD PRESSURE: 60 MMHG

## 2022-09-01 DIAGNOSIS — I50.32 CHRONIC DIASTOLIC HEART FAILURE: Primary | ICD-10-CM

## 2022-09-01 DIAGNOSIS — E11.59 TYPE 2 DIABETES MELLITUS WITH OTHER CIRCULATORY COMPLICATION, WITHOUT LONG-TERM CURRENT USE OF INSULIN: ICD-10-CM

## 2022-09-01 DIAGNOSIS — N18.5 ACUTE RENAL FAILURE WITH ACUTE RENAL CORTICAL NECROSIS SUPERIMPOSED ON STAGE 5 CHRONIC KIDNEY DISEASE, NOT ON CHRONIC DIALYSIS: ICD-10-CM

## 2022-09-01 DIAGNOSIS — G47.33 OSA ON CPAP: ICD-10-CM

## 2022-09-01 DIAGNOSIS — N17.1 ACUTE RENAL FAILURE WITH ACUTE RENAL CORTICAL NECROSIS SUPERIMPOSED ON STAGE 5 CHRONIC KIDNEY DISEASE, NOT ON CHRONIC DIALYSIS: ICD-10-CM

## 2022-09-01 PROCEDURE — 99213 OFFICE O/P EST LOW 20 MIN: CPT | Mod: S$GLB,,, | Performed by: PHYSICIAN ASSISTANT

## 2022-09-01 PROCEDURE — 1157F ADVNC CARE PLAN IN RCRD: CPT | Mod: CPTII,S$GLB,, | Performed by: PHYSICIAN ASSISTANT

## 2022-09-01 PROCEDURE — 3008F BODY MASS INDEX DOCD: CPT | Mod: CPTII,S$GLB,, | Performed by: PHYSICIAN ASSISTANT

## 2022-09-01 PROCEDURE — 1159F PR MEDICATION LIST DOCUMENTED IN MEDICAL RECORD: ICD-10-PCS | Mod: CPTII,S$GLB,, | Performed by: PHYSICIAN ASSISTANT

## 2022-09-01 PROCEDURE — 99999 PR PBB SHADOW E&M-EST. PATIENT-LVL III: ICD-10-PCS | Mod: PBBFAC,,, | Performed by: PHYSICIAN ASSISTANT

## 2022-09-01 PROCEDURE — 4010F ACE/ARB THERAPY RXD/TAKEN: CPT | Mod: CPTII,S$GLB,, | Performed by: PHYSICIAN ASSISTANT

## 2022-09-01 PROCEDURE — 3078F DIAST BP <80 MM HG: CPT | Mod: CPTII,S$GLB,, | Performed by: PHYSICIAN ASSISTANT

## 2022-09-01 PROCEDURE — 1160F RVW MEDS BY RX/DR IN RCRD: CPT | Mod: CPTII,S$GLB,, | Performed by: PHYSICIAN ASSISTANT

## 2022-09-01 PROCEDURE — 1159F MED LIST DOCD IN RCRD: CPT | Mod: CPTII,S$GLB,, | Performed by: PHYSICIAN ASSISTANT

## 2022-09-01 PROCEDURE — 3008F PR BODY MASS INDEX (BMI) DOCUMENTED: ICD-10-PCS | Mod: CPTII,S$GLB,, | Performed by: PHYSICIAN ASSISTANT

## 2022-09-01 PROCEDURE — 3066F PR DOCUMENTATION OF TREATMENT FOR NEPHROPATHY: ICD-10-PCS | Mod: CPTII,S$GLB,, | Performed by: PHYSICIAN ASSISTANT

## 2022-09-01 PROCEDURE — 3044F HG A1C LEVEL LT 7.0%: CPT | Mod: CPTII,S$GLB,, | Performed by: PHYSICIAN ASSISTANT

## 2022-09-01 PROCEDURE — 99999 PR PBB SHADOW E&M-EST. PATIENT-LVL III: CPT | Mod: PBBFAC,,, | Performed by: PHYSICIAN ASSISTANT

## 2022-09-01 PROCEDURE — 3062F PR POS MACROALBUMINURIA RESULT DOCUMENTED/REVIEW: ICD-10-PCS | Mod: CPTII,S$GLB,, | Performed by: PHYSICIAN ASSISTANT

## 2022-09-01 PROCEDURE — 3074F PR MOST RECENT SYSTOLIC BLOOD PRESSURE < 130 MM HG: ICD-10-PCS | Mod: CPTII,S$GLB,, | Performed by: PHYSICIAN ASSISTANT

## 2022-09-01 PROCEDURE — 3062F POS MACROALBUMINURIA REV: CPT | Mod: CPTII,S$GLB,, | Performed by: PHYSICIAN ASSISTANT

## 2022-09-01 PROCEDURE — 3078F PR MOST RECENT DIASTOLIC BLOOD PRESSURE < 80 MM HG: ICD-10-PCS | Mod: CPTII,S$GLB,, | Performed by: PHYSICIAN ASSISTANT

## 2022-09-01 PROCEDURE — 99213 PR OFFICE/OUTPT VISIT, EST, LEVL III, 20-29 MIN: ICD-10-PCS | Mod: S$GLB,,, | Performed by: PHYSICIAN ASSISTANT

## 2022-09-01 PROCEDURE — 3044F PR MOST RECENT HEMOGLOBIN A1C LEVEL <7.0%: ICD-10-PCS | Mod: CPTII,S$GLB,, | Performed by: PHYSICIAN ASSISTANT

## 2022-09-01 PROCEDURE — 3074F SYST BP LT 130 MM HG: CPT | Mod: CPTII,S$GLB,, | Performed by: PHYSICIAN ASSISTANT

## 2022-09-01 PROCEDURE — 3066F NEPHROPATHY DOC TX: CPT | Mod: CPTII,S$GLB,, | Performed by: PHYSICIAN ASSISTANT

## 2022-09-01 PROCEDURE — 1111F PR DISCHARGE MEDS RECONCILED W/ CURRENT OUTPATIENT MED LIST: ICD-10-PCS | Mod: CPTII,S$GLB,, | Performed by: PHYSICIAN ASSISTANT

## 2022-09-01 PROCEDURE — 4010F PR ACE/ARB THEARPY RXD/TAKEN: ICD-10-PCS | Mod: CPTII,S$GLB,, | Performed by: PHYSICIAN ASSISTANT

## 2022-09-01 PROCEDURE — 1111F DSCHRG MED/CURRENT MED MERGE: CPT | Mod: CPTII,S$GLB,, | Performed by: PHYSICIAN ASSISTANT

## 2022-09-01 PROCEDURE — 1157F PR ADVANCE CARE PLAN OR EQUIV PRESENT IN MEDICAL RECORD: ICD-10-PCS | Mod: CPTII,S$GLB,, | Performed by: PHYSICIAN ASSISTANT

## 2022-09-01 PROCEDURE — 1160F PR REVIEW ALL MEDS BY PRESCRIBER/CLIN PHARMACIST DOCUMENTED: ICD-10-PCS | Mod: CPTII,S$GLB,, | Performed by: PHYSICIAN ASSISTANT

## 2022-09-01 RX ORDER — LANCETS
EACH MISCELLANEOUS
Refills: 0 | OUTPATIENT
Start: 2022-09-01

## 2022-09-01 RX ORDER — INSULIN PUMP SYRINGE, 3 ML
EACH MISCELLANEOUS
Refills: 0 | OUTPATIENT
Start: 2022-09-01 | End: 2023-08-31

## 2022-09-02 NOTE — PROGRESS NOTES
[facility] Provider Note (Follow-up) Consult Note      HPI:  Patient can walk down driveway, to clinic with no SOB   Patient sleeps on 2 pillows   Patient wakes up SOB, has to get out of bed, associated cough, sputum denies   Palpitations - none   Dizzy, light-headed, pre-syncope or syncope none   Since discharge frequency of performing weights, home weight and weight change-weight log reviewed, down 3 pounds   Other information felt pertinent to HPI    Since last visit, patient has continued to imprve clinically. Denies any SOB, Le edema improved. No PND, orhtopnea. Brought home log of BP and weight, all WNL.         PHYSICAL:   Vitals:    09/01/22 1503   BP: 120/60   Pulse: 70      Wt Readings from Last 3 Encounters:   09/01/22 80.8 kg (178 lb 2.1 oz)   08/15/22 88.9 kg (195 lb 15.8 oz)   08/09/22 89.2 kg (196 lb 10.4 oz)       JVD: no   Heart rhythm: regular  Cardiac murmur: No    S3: no  S4: no  Lungs: clear    Hepatojugular reflux: no  Edema: yes, feet edema, shoes fit now      ASSESSMENT: acute on chronic diastolic HF    PLAN:      Patient Instructions:   Instruct the patient to notify this clinic if HH, a physician or an advanced care provider wants to change medication one of their HF medications   Activity and Diet restrictions:   Recommend 2-3 gram sodium restriction and 1500cc- 2000cc fluid restriction.  Encourage physical activity with graded exercise program.  Requested patient to weigh themselves daily, and to notify us if their weight increases by more than 3 lbs in 1 day or 5 lbs in 3 days.    Assigned dry weight on home scale: 178 lbs  Medication changes (include current dose and changed dose)  Continue torsemide 20 daily.  Metolazone MWF  Continue coreg 25 BID  Upcoming labs and date anticipated: RTC 2 weeks

## 2022-09-08 ENCOUNTER — TELEPHONE (OUTPATIENT)
Dept: CARDIOLOGY | Facility: CLINIC | Age: 67
End: 2022-09-08
Payer: MEDICARE

## 2022-09-08 ENCOUNTER — DOCUMENTATION ONLY (OUTPATIENT)
Dept: REHABILITATION | Facility: HOSPITAL | Age: 67
End: 2022-09-08
Payer: MEDICARE

## 2022-09-08 DIAGNOSIS — N18.5 STAGE 5 CHRONIC KIDNEY DISEASE: Primary | ICD-10-CM

## 2022-09-09 RX ORDER — INSULIN PUMP SYRINGE, 3 ML
EACH MISCELLANEOUS
Qty: 1 EACH | Refills: 11 | Status: CANCELLED | OUTPATIENT
Start: 2022-09-09 | End: 2023-09-01

## 2022-09-09 RX ORDER — SYRINGE AND NEEDLE,INSULIN,1ML 31GX15/64"
SYRINGE, EMPTY DISPOSABLE MISCELLANEOUS
Refills: 0 | Status: CANCELLED | OUTPATIENT
Start: 2022-09-09 | End: 2023-09-09

## 2022-09-09 RX ORDER — LANCETS
EACH MISCELLANEOUS
Qty: 200 EACH | Refills: 3 | Status: CANCELLED | OUTPATIENT
Start: 2022-09-09

## 2022-09-16 ENCOUNTER — LAB VISIT (OUTPATIENT)
Dept: LAB | Facility: HOSPITAL | Age: 67
End: 2022-09-16
Attending: INTERNAL MEDICINE
Payer: MEDICARE

## 2022-09-16 ENCOUNTER — OFFICE VISIT (OUTPATIENT)
Dept: CARDIOLOGY | Facility: CLINIC | Age: 67
End: 2022-09-16
Payer: MEDICARE

## 2022-09-16 VITALS
WEIGHT: 175.06 LBS | DIASTOLIC BLOOD PRESSURE: 60 MMHG | HEART RATE: 64 BPM | BODY MASS INDEX: 33.05 KG/M2 | SYSTOLIC BLOOD PRESSURE: 142 MMHG | HEIGHT: 61 IN

## 2022-09-16 DIAGNOSIS — N17.1 ACUTE RENAL FAILURE WITH ACUTE RENAL CORTICAL NECROSIS SUPERIMPOSED ON STAGE 5 CHRONIC KIDNEY DISEASE, NOT ON CHRONIC DIALYSIS: ICD-10-CM

## 2022-09-16 DIAGNOSIS — I50.32 CHRONIC DIASTOLIC CONGESTIVE HEART FAILURE: ICD-10-CM

## 2022-09-16 DIAGNOSIS — N18.5 ACUTE RENAL FAILURE WITH ACUTE RENAL CORTICAL NECROSIS SUPERIMPOSED ON STAGE 5 CHRONIC KIDNEY DISEASE, NOT ON CHRONIC DIALYSIS: ICD-10-CM

## 2022-09-16 DIAGNOSIS — I50.32 CHRONIC DIASTOLIC HEART FAILURE: Primary | ICD-10-CM

## 2022-09-16 DIAGNOSIS — N18.5 STAGE 5 CHRONIC KIDNEY DISEASE: ICD-10-CM

## 2022-09-16 LAB
ALBUMIN SERPL BCP-MCNC: 3 G/DL (ref 3.5–5.2)
ALP SERPL-CCNC: 110 U/L (ref 55–135)
ALT SERPL W/O P-5'-P-CCNC: 14 U/L (ref 10–44)
ANION GAP SERPL CALC-SCNC: 8 MMOL/L (ref 8–16)
AST SERPL-CCNC: 26 U/L (ref 10–40)
BILIRUB SERPL-MCNC: 0.4 MG/DL (ref 0.1–1)
BUN SERPL-MCNC: 65 MG/DL (ref 8–23)
CALCIUM SERPL-MCNC: 8.9 MG/DL (ref 8.7–10.5)
CHLORIDE SERPL-SCNC: 107 MMOL/L (ref 95–110)
CO2 SERPL-SCNC: 28 MMOL/L (ref 23–29)
CREAT SERPL-MCNC: 4.5 MG/DL (ref 0.5–1.4)
EST. GFR  (NO RACE VARIABLE): 10.3 ML/MIN/1.73 M^2
GLUCOSE SERPL-MCNC: 106 MG/DL (ref 70–110)
POTASSIUM SERPL-SCNC: 4.1 MMOL/L (ref 3.5–5.1)
PROT SERPL-MCNC: 6.8 G/DL (ref 6–8.4)
SODIUM SERPL-SCNC: 143 MMOL/L (ref 136–145)

## 2022-09-16 PROCEDURE — 1160F PR REVIEW ALL MEDS BY PRESCRIBER/CLIN PHARMACIST DOCUMENTED: ICD-10-PCS | Mod: CPTII,S$GLB,, | Performed by: PHYSICIAN ASSISTANT

## 2022-09-16 PROCEDURE — 99214 OFFICE O/P EST MOD 30 MIN: CPT | Mod: S$GLB,,, | Performed by: PHYSICIAN ASSISTANT

## 2022-09-16 PROCEDURE — 99999 PR PBB SHADOW E&M-EST. PATIENT-LVL III: ICD-10-PCS | Mod: PBBFAC,,, | Performed by: PHYSICIAN ASSISTANT

## 2022-09-16 PROCEDURE — 1159F PR MEDICATION LIST DOCUMENTED IN MEDICAL RECORD: ICD-10-PCS | Mod: CPTII,S$GLB,, | Performed by: PHYSICIAN ASSISTANT

## 2022-09-16 PROCEDURE — 3062F POS MACROALBUMINURIA REV: CPT | Mod: CPTII,S$GLB,, | Performed by: PHYSICIAN ASSISTANT

## 2022-09-16 PROCEDURE — 80053 COMPREHEN METABOLIC PANEL: CPT | Performed by: INTERNAL MEDICINE

## 2022-09-16 PROCEDURE — 3077F PR MOST RECENT SYSTOLIC BLOOD PRESSURE >= 140 MM HG: ICD-10-PCS | Mod: CPTII,S$GLB,, | Performed by: PHYSICIAN ASSISTANT

## 2022-09-16 PROCEDURE — 3044F PR MOST RECENT HEMOGLOBIN A1C LEVEL <7.0%: ICD-10-PCS | Mod: CPTII,S$GLB,, | Performed by: PHYSICIAN ASSISTANT

## 2022-09-16 PROCEDURE — 3008F BODY MASS INDEX DOCD: CPT | Mod: CPTII,S$GLB,, | Performed by: PHYSICIAN ASSISTANT

## 2022-09-16 PROCEDURE — 3066F PR DOCUMENTATION OF TREATMENT FOR NEPHROPATHY: ICD-10-PCS | Mod: CPTII,S$GLB,, | Performed by: PHYSICIAN ASSISTANT

## 2022-09-16 PROCEDURE — 3078F PR MOST RECENT DIASTOLIC BLOOD PRESSURE < 80 MM HG: ICD-10-PCS | Mod: CPTII,S$GLB,, | Performed by: PHYSICIAN ASSISTANT

## 2022-09-16 PROCEDURE — 3078F DIAST BP <80 MM HG: CPT | Mod: CPTII,S$GLB,, | Performed by: PHYSICIAN ASSISTANT

## 2022-09-16 PROCEDURE — 99999 PR PBB SHADOW E&M-EST. PATIENT-LVL III: CPT | Mod: PBBFAC,,, | Performed by: PHYSICIAN ASSISTANT

## 2022-09-16 PROCEDURE — 3008F PR BODY MASS INDEX (BMI) DOCUMENTED: ICD-10-PCS | Mod: CPTII,S$GLB,, | Performed by: PHYSICIAN ASSISTANT

## 2022-09-16 PROCEDURE — 1157F ADVNC CARE PLAN IN RCRD: CPT | Mod: CPTII,S$GLB,, | Performed by: PHYSICIAN ASSISTANT

## 2022-09-16 PROCEDURE — 36415 COLL VENOUS BLD VENIPUNCTURE: CPT | Performed by: INTERNAL MEDICINE

## 2022-09-16 PROCEDURE — 4010F PR ACE/ARB THEARPY RXD/TAKEN: ICD-10-PCS | Mod: CPTII,S$GLB,, | Performed by: PHYSICIAN ASSISTANT

## 2022-09-16 PROCEDURE — 1160F RVW MEDS BY RX/DR IN RCRD: CPT | Mod: CPTII,S$GLB,, | Performed by: PHYSICIAN ASSISTANT

## 2022-09-16 PROCEDURE — 3044F HG A1C LEVEL LT 7.0%: CPT | Mod: CPTII,S$GLB,, | Performed by: PHYSICIAN ASSISTANT

## 2022-09-16 PROCEDURE — 4010F ACE/ARB THERAPY RXD/TAKEN: CPT | Mod: CPTII,S$GLB,, | Performed by: PHYSICIAN ASSISTANT

## 2022-09-16 PROCEDURE — 1159F MED LIST DOCD IN RCRD: CPT | Mod: CPTII,S$GLB,, | Performed by: PHYSICIAN ASSISTANT

## 2022-09-16 PROCEDURE — 99214 PR OFFICE/OUTPT VISIT, EST, LEVL IV, 30-39 MIN: ICD-10-PCS | Mod: S$GLB,,, | Performed by: PHYSICIAN ASSISTANT

## 2022-09-16 PROCEDURE — 1157F PR ADVANCE CARE PLAN OR EQUIV PRESENT IN MEDICAL RECORD: ICD-10-PCS | Mod: CPTII,S$GLB,, | Performed by: PHYSICIAN ASSISTANT

## 2022-09-16 PROCEDURE — 3062F PR POS MACROALBUMINURIA RESULT DOCUMENTED/REVIEW: ICD-10-PCS | Mod: CPTII,S$GLB,, | Performed by: PHYSICIAN ASSISTANT

## 2022-09-16 PROCEDURE — 3066F NEPHROPATHY DOC TX: CPT | Mod: CPTII,S$GLB,, | Performed by: PHYSICIAN ASSISTANT

## 2022-09-16 PROCEDURE — 3077F SYST BP >= 140 MM HG: CPT | Mod: CPTII,S$GLB,, | Performed by: PHYSICIAN ASSISTANT

## 2022-09-16 RX ORDER — TORSEMIDE 20 MG/1
40 TABLET ORAL DAILY
Qty: 60 TABLET | Refills: 3 | Status: SHIPPED | OUTPATIENT
Start: 2022-09-16 | End: 2023-02-13 | Stop reason: SDUPTHER

## 2022-09-16 RX ORDER — AMLODIPINE BESYLATE 10 MG/1
10 TABLET ORAL DAILY
Qty: 30 TABLET | Refills: 3 | Status: SHIPPED | OUTPATIENT
Start: 2022-09-16 | End: 2023-01-23

## 2022-09-19 NOTE — PROGRESS NOTES
[facility] Provider Note (Follow-up) Consult Note      HPI:  Patient can walk around house, to clinic no SOB   Patient sleeps on 2 pillows   Patient wakes up SOB, has to get out of bed, associated cough, sputum denies   Palpitations - denies   Dizzy, light-headed, pre-syncope or syncope denies   Since discharge frequency of performing weights, home weight and weight change-stable    Other information felt pertinent to HPI    Since last clinic visit 2 weeks ago, continues to do well from HF standpoint. Continues on torsemide 40 daily with stable weight. No PND, orthopnea. BP improved     PHYSICAL:   Vitals:    09/16/22 1243   BP: (!) 142/60   Pulse: 64      Wt Readings from Last 3 Encounters:   09/16/22 79.4 kg (175 lb 0.7 oz)   09/01/22 80.8 kg (178 lb 2.1 oz)   08/15/22 88.9 kg (195 lb 15.8 oz)       JVD: no   Heart rhythm: regular  Cardiac murmur: No    S3: no  S4: no  Lungs: clear  Liver span: 10 cm:   Hepatojugular reflux: no  Edema: no      ASSESSMENT: chronic diastolic HF    PLAN:      Patient Instructions:   Instruct the patient to notify this clinic if HH, a physician or an advanced care provider wants to change medication one of their HF medications   Activity and Diet restrictions:   Recommend 2-3 gram sodium restriction and 1500cc- 2000cc fluid restriction.  Encourage physical activity with graded exercise program.  Requested patient to weigh themselves daily, and to notify us if their weight increases by more than 3 lbs in 1 day or 5 lbs in 3 days.    Assigned dry weight on home scale: 79 kg  Medication changes (include current dose and changed dose)    Euvolemic on exam 4 weeks post dc  Continue torsemide 40 daily. Has prn metolazone  Continue coreg 25 BID  Upcoming labs and date anticipated: Has follow up with cardiologist Dr Ramos in 2 months. Educated to please call [facility] clinic if any return of HF symptoms.

## 2022-09-21 ENCOUNTER — OFFICE VISIT (OUTPATIENT)
Dept: NEPHROLOGY | Facility: CLINIC | Age: 67
End: 2022-09-21
Payer: MEDICARE

## 2022-09-21 VITALS
WEIGHT: 172.19 LBS | BODY MASS INDEX: 32.51 KG/M2 | SYSTOLIC BLOOD PRESSURE: 140 MMHG | HEART RATE: 61 BPM | DIASTOLIC BLOOD PRESSURE: 72 MMHG | HEIGHT: 61 IN

## 2022-09-21 DIAGNOSIS — N18.5 CKD (CHRONIC KIDNEY DISEASE), SYMPTOM MANAGEMENT ONLY, STAGE 5: Primary | ICD-10-CM

## 2022-09-21 DIAGNOSIS — I10 PRIMARY HYPERTENSION: ICD-10-CM

## 2022-09-21 DIAGNOSIS — R80.9 PROTEINURIA, UNSPECIFIED TYPE: ICD-10-CM

## 2022-09-21 PROCEDURE — 4010F ACE/ARB THERAPY RXD/TAKEN: CPT | Mod: CPTII,S$GLB,, | Performed by: INTERNAL MEDICINE

## 2022-09-21 PROCEDURE — 3288F FALL RISK ASSESSMENT DOCD: CPT | Mod: CPTII,S$GLB,, | Performed by: INTERNAL MEDICINE

## 2022-09-21 PROCEDURE — 1101F PR PT FALLS ASSESS DOC 0-1 FALLS W/OUT INJ PAST YR: ICD-10-PCS | Mod: CPTII,S$GLB,, | Performed by: INTERNAL MEDICINE

## 2022-09-21 PROCEDURE — 99999 PR PBB SHADOW E&M-EST. PATIENT-LVL IV: ICD-10-PCS | Mod: PBBFAC,,, | Performed by: INTERNAL MEDICINE

## 2022-09-21 PROCEDURE — 1101F PT FALLS ASSESS-DOCD LE1/YR: CPT | Mod: CPTII,S$GLB,, | Performed by: INTERNAL MEDICINE

## 2022-09-21 PROCEDURE — 3077F PR MOST RECENT SYSTOLIC BLOOD PRESSURE >= 140 MM HG: ICD-10-PCS | Mod: CPTII,S$GLB,, | Performed by: INTERNAL MEDICINE

## 2022-09-21 PROCEDURE — 1160F RVW MEDS BY RX/DR IN RCRD: CPT | Mod: CPTII,S$GLB,, | Performed by: INTERNAL MEDICINE

## 2022-09-21 PROCEDURE — 3008F BODY MASS INDEX DOCD: CPT | Mod: CPTII,S$GLB,, | Performed by: INTERNAL MEDICINE

## 2022-09-21 PROCEDURE — 3044F HG A1C LEVEL LT 7.0%: CPT | Mod: CPTII,S$GLB,, | Performed by: INTERNAL MEDICINE

## 2022-09-21 PROCEDURE — 1159F MED LIST DOCD IN RCRD: CPT | Mod: CPTII,S$GLB,, | Performed by: INTERNAL MEDICINE

## 2022-09-21 PROCEDURE — 3044F PR MOST RECENT HEMOGLOBIN A1C LEVEL <7.0%: ICD-10-PCS | Mod: CPTII,S$GLB,, | Performed by: INTERNAL MEDICINE

## 2022-09-21 PROCEDURE — 3288F PR FALLS RISK ASSESSMENT DOCUMENTED: ICD-10-PCS | Mod: CPTII,S$GLB,, | Performed by: INTERNAL MEDICINE

## 2022-09-21 PROCEDURE — 1160F PR REVIEW ALL MEDS BY PRESCRIBER/CLIN PHARMACIST DOCUMENTED: ICD-10-PCS | Mod: CPTII,S$GLB,, | Performed by: INTERNAL MEDICINE

## 2022-09-21 PROCEDURE — 1157F PR ADVANCE CARE PLAN OR EQUIV PRESENT IN MEDICAL RECORD: ICD-10-PCS | Mod: CPTII,S$GLB,, | Performed by: INTERNAL MEDICINE

## 2022-09-21 PROCEDURE — 3062F POS MACROALBUMINURIA REV: CPT | Mod: CPTII,S$GLB,, | Performed by: INTERNAL MEDICINE

## 2022-09-21 PROCEDURE — 1159F PR MEDICATION LIST DOCUMENTED IN MEDICAL RECORD: ICD-10-PCS | Mod: CPTII,S$GLB,, | Performed by: INTERNAL MEDICINE

## 2022-09-21 PROCEDURE — 3078F DIAST BP <80 MM HG: CPT | Mod: CPTII,S$GLB,, | Performed by: INTERNAL MEDICINE

## 2022-09-21 PROCEDURE — 3008F PR BODY MASS INDEX (BMI) DOCUMENTED: ICD-10-PCS | Mod: CPTII,S$GLB,, | Performed by: INTERNAL MEDICINE

## 2022-09-21 PROCEDURE — 3066F PR DOCUMENTATION OF TREATMENT FOR NEPHROPATHY: ICD-10-PCS | Mod: CPTII,S$GLB,, | Performed by: INTERNAL MEDICINE

## 2022-09-21 PROCEDURE — 3066F NEPHROPATHY DOC TX: CPT | Mod: CPTII,S$GLB,, | Performed by: INTERNAL MEDICINE

## 2022-09-21 PROCEDURE — 99214 PR OFFICE/OUTPT VISIT, EST, LEVL IV, 30-39 MIN: ICD-10-PCS | Mod: S$GLB,,, | Performed by: INTERNAL MEDICINE

## 2022-09-21 PROCEDURE — 1157F ADVNC CARE PLAN IN RCRD: CPT | Mod: CPTII,S$GLB,, | Performed by: INTERNAL MEDICINE

## 2022-09-21 PROCEDURE — 3077F SYST BP >= 140 MM HG: CPT | Mod: CPTII,S$GLB,, | Performed by: INTERNAL MEDICINE

## 2022-09-21 PROCEDURE — 4010F PR ACE/ARB THEARPY RXD/TAKEN: ICD-10-PCS | Mod: CPTII,S$GLB,, | Performed by: INTERNAL MEDICINE

## 2022-09-21 PROCEDURE — 99999 PR PBB SHADOW E&M-EST. PATIENT-LVL IV: CPT | Mod: PBBFAC,,, | Performed by: INTERNAL MEDICINE

## 2022-09-21 PROCEDURE — 99214 OFFICE O/P EST MOD 30 MIN: CPT | Mod: S$GLB,,, | Performed by: INTERNAL MEDICINE

## 2022-09-21 PROCEDURE — 3062F PR POS MACROALBUMINURIA RESULT DOCUMENTED/REVIEW: ICD-10-PCS | Mod: CPTII,S$GLB,, | Performed by: INTERNAL MEDICINE

## 2022-09-21 PROCEDURE — 3078F PR MOST RECENT DIASTOLIC BLOOD PRESSURE < 80 MM HG: ICD-10-PCS | Mod: CPTII,S$GLB,, | Performed by: INTERNAL MEDICINE

## 2022-09-21 NOTE — PROGRESS NOTES
"Subjective:       Patient ID: Diamond Das is a 67 y.o. female.    Chief Complaint: Chronic Kidney Disease    HPI    She presents to clinic today for routine follow-up.  Since her last office visit she has been doing well and has no specific or new complaints.  Her laboratory studies and medications were reviewed.  All Nephrology related questions were answered to her satisfaction.      Review of Systems   Constitutional: Negative.    HENT: Negative.     Eyes: Negative.    Respiratory: Negative.     Cardiovascular: Negative.    Gastrointestinal: Negative.    Genitourinary: Negative.    Musculoskeletal: Negative.    Skin: Negative.    Neurological: Negative.        BP (!) 140/72   Pulse 61   Ht 5' 1" (1.549 m)   Wt 78.1 kg (172 lb 2.9 oz)   LMP  (LMP Unknown)   BMI 32.53 kg/m²     Lab Results   Component Value Date    WBC 4.91 08/10/2022    HGB 9.5 (L) 08/10/2022    HCT 31.0 (L) 08/10/2022    MCV 95 08/10/2022     08/10/2022      BMP  Lab Results   Component Value Date     09/16/2022    K 4.1 09/16/2022     09/16/2022    CO2 28 09/16/2022    BUN 65 (H) 09/16/2022    CREATININE 4.5 (H) 09/16/2022    CALCIUM 8.9 09/16/2022    ANIONGAP 8 09/16/2022    ESTGFRAFRICA 12 (A) 06/21/2022    EGFRNONAA 10 (A) 06/21/2022     CMP  Sodium   Date Value Ref Range Status   09/16/2022 143 136 - 145 mmol/L Final     Potassium   Date Value Ref Range Status   09/16/2022 4.1 3.5 - 5.1 mmol/L Final     Chloride   Date Value Ref Range Status   09/16/2022 107 95 - 110 mmol/L Final     CO2   Date Value Ref Range Status   09/16/2022 28 23 - 29 mmol/L Final     Glucose   Date Value Ref Range Status   09/16/2022 106 70 - 110 mg/dL Final     BUN   Date Value Ref Range Status   09/16/2022 65 (H) 8 - 23 mg/dL Final     Creatinine   Date Value Ref Range Status   09/16/2022 4.5 (H) 0.5 - 1.4 mg/dL Final     Calcium   Date Value Ref Range Status   09/16/2022 8.9 8.7 - 10.5 mg/dL Final     Total Protein   Date Value Ref " Range Status   09/16/2022 6.8 6.0 - 8.4 g/dL Final     Albumin   Date Value Ref Range Status   09/16/2022 3.0 (L) 3.5 - 5.2 g/dL Final     Total Bilirubin   Date Value Ref Range Status   09/16/2022 0.4 0.1 - 1.0 mg/dL Final     Comment:     For infants and newborns, interpretation of results should be based  on gestational age, weight and in agreement with clinical  observations.    Premature Infant recommended reference ranges:  Up to 24 hours.............<8.0 mg/dL  Up to 48 hours............<12.0 mg/dL  3-5 days..................<15.0 mg/dL  6-29 days.................<15.0 mg/dL       Alkaline Phosphatase   Date Value Ref Range Status   09/16/2022 110 55 - 135 U/L Final     AST   Date Value Ref Range Status   09/16/2022 26 10 - 40 U/L Final     ALT   Date Value Ref Range Status   09/16/2022 14 10 - 44 U/L Final     Anion Gap   Date Value Ref Range Status   09/16/2022 8 8 - 16 mmol/L Final     eGFR if    Date Value Ref Range Status   06/21/2022 12 (A) >60 mL/min/1.73 m^2 Final     eGFR if non    Date Value Ref Range Status   06/21/2022 10 (A) >60 mL/min/1.73 m^2 Final     Comment:     Calculation used to obtain the estimated glomerular filtration  rate (eGFR) is the CKD-EPI equation.        Current Outpatient Medications on File Prior to Visit   Medication Sig Dispense Refill    amLODIPine (NORVASC) 10 MG tablet Take 1 tablet (10 mg total) by mouth once daily. 30 tablet 3    apixaban (ELIQUIS) 5 mg Tab Take 1 tablet (5 mg total) by mouth 2 (two) times daily. 60 tablet 3    carvediloL (COREG) 25 MG tablet Take 1 tablet (25 mg total) by mouth 2 (two) times daily with meals. 180 tablet 1    docusate sodium (COLACE) 100 MG capsule Take 1 capsule (100 mg total) by mouth 3 (three) times daily. Hold for diarrhea 30 capsule 0    dulaglutide (TRULICITY) 4.5 mg/0.5 mL pen injector Inject 4.5 mg into the skin every 7 days. 12 pen 1    famotidine (PEPCID) 20 MG tablet Take 1 tablet (20 mg  total) by mouth once daily. 90 tablet 1    ferrous sulfate (IRON ORAL) Take by mouth.      fluticasone propionate (FLONASE) 50 mcg/actuation nasal spray 2 sprays (100 mcg total) by Each Nostril route once daily.  0    garlic (GARLIQUE ORAL) Take 1 tablet by mouth once daily.      isosorbide mononitrate (IMDUR) 60 MG 24 hr tablet Take 1 tablet (60 mg total) by mouth every evening. 90 tablet 1    levothyroxine (EUTHYROX) 137 MCG Tab tablet Take 1 tablet (137 mcg total) by mouth before breakfast. 90 tablet 1    LIDOcaine (LMX) 4 % cream Apply topically as needed.      magnesium oxide (MAG-OX) 400 mg (241.3 mg magnesium) tablet Take 1 tablet (400 mg total) by mouth once daily. 90 tablet 1    omega-3 fatty acids/fish oil (FISH OIL-OMEGA-3 FATTY ACIDS) 300-1,000 mg capsule Take by mouth once daily.      ondansetron (ZOFRAN) 4 MG tablet Take 1 tablet (4 mg total) by mouth every 8 (eight) hours as needed. 90 tablet 1    pravastatin (PRAVACHOL) 10 MG tablet Take 1 tablet (10 mg total) by mouth once daily. 90 tablet 3    pulse oximeter (PULSE OXIMETER) device by Apply Externally route 2 (two) times a day. Use twice daily at 8 AM and 3 PM and record the value in [employer] as directed. 1 each 0    rifAXIMin (XIFAXAN) 550 mg Tab Take 1 tablet (550 mg total) by mouth 2 (two) times daily. 60 tablet 2    STOOL SOFTENER 100 mg capsule TAKE 1 CAPSULE BY MOUTH THREE TIMES DAILY FOR DIARRHEA (HOLD FOR DIARRHEA)      torsemide (DEMADEX) 20 MG Tab Take 2 tablets (40 mg total) by mouth once daily. 60 tablet 3    metOLazone (ZAROXOLYN) 5 MG tablet Take 1 tablet (5 mg total) by mouth once daily. 30 tablet 0     No current facility-administered medications on file prior to visit.            Objective:            Physical Exam  Constitutional:       Appearance: Normal appearance.   HENT:      Head: Normocephalic and atraumatic.   Eyes:      General: No scleral icterus.     Extraocular Movements: Extraocular movements intact.      Pupils: Pupils  are equal, round, and reactive to light.   Cardiovascular:      Rate and Rhythm: Normal rate and regular rhythm.      Heart sounds: Murmur heard.   Pulmonary:      Effort: Pulmonary effort is normal.      Breath sounds: Normal breath sounds.   Musculoskeletal:      Right lower leg: No edema.      Left lower leg: No edema.   Skin:     General: Skin is warm and dry.   Neurological:      General: No focal deficit present.      Mental Status: She is alert and oriented to person, place, and time.   Psychiatric:         Mood and Affect: Mood normal.         Behavior: Behavior normal.       Assessment:       1. CKD (chronic kidney disease), symptom management only, stage 5    2. Primary hypertension    3. Proteinuria, unspecified type          Plan:       1. Creatinine has improved slightly to 4.5.  She had increased to 5.7 about a month ago.  She has no symptoms of uremia at this time.    2. Blood pressure is adequately controlled on current regimen.  She is not on a RAAS inhibitor secondary to advanced chronic kidney disease.    3. She continues to have sub nephrotic range proteinuria about 3 g by PC ratio.  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        Pankaj Jacobo MD

## 2022-09-26 ENCOUNTER — OFFICE VISIT (OUTPATIENT)
Dept: OPHTHALMOLOGY | Facility: CLINIC | Age: 67
End: 2022-09-26
Payer: MEDICARE

## 2022-09-26 DIAGNOSIS — E11.3413 SEVERE NONPROLIFERATIVE DIABETIC RETINOPATHY OF BOTH EYES WITH MACULAR EDEMA ASSOCIATED WITH TYPE 2 DIABETES MELLITUS: Primary | ICD-10-CM

## 2022-09-26 DIAGNOSIS — E11.43 DIABETIC GASTROPARESIS ASSOCIATED WITH TYPE 2 DIABETES MELLITUS: ICD-10-CM

## 2022-09-26 DIAGNOSIS — K31.84 DIABETIC GASTROPARESIS ASSOCIATED WITH TYPE 2 DIABETES MELLITUS: ICD-10-CM

## 2022-09-26 DIAGNOSIS — Z96.1 PSEUDOPHAKIA OF BOTH EYES: ICD-10-CM

## 2022-09-26 PROCEDURE — 3044F HG A1C LEVEL LT 7.0%: CPT | Mod: CPTII,S$GLB,, | Performed by: OPHTHALMOLOGY

## 2022-09-26 PROCEDURE — 99999 PR PBB SHADOW E&M-EST. PATIENT-LVL III: ICD-10-PCS | Mod: PBBFAC,,, | Performed by: OPHTHALMOLOGY

## 2022-09-26 PROCEDURE — 92014 PR EYE EXAM, EST PATIENT,COMPREHESV: ICD-10-PCS | Mod: S$GLB,,, | Performed by: OPHTHALMOLOGY

## 2022-09-26 PROCEDURE — 3044F PR MOST RECENT HEMOGLOBIN A1C LEVEL <7.0%: ICD-10-PCS | Mod: CPTII,S$GLB,, | Performed by: OPHTHALMOLOGY

## 2022-09-26 PROCEDURE — 99213 OFFICE O/P EST LOW 20 MIN: CPT | Mod: PBBFAC | Performed by: OPHTHALMOLOGY

## 2022-09-26 PROCEDURE — 99499 RISK ADDL DX/OHS AUDIT: ICD-10-PCS | Mod: S$GLB,,, | Performed by: OPHTHALMOLOGY

## 2022-09-26 PROCEDURE — 4010F PR ACE/ARB THEARPY RXD/TAKEN: ICD-10-PCS | Mod: CPTII,S$GLB,, | Performed by: OPHTHALMOLOGY

## 2022-09-26 PROCEDURE — 4010F ACE/ARB THERAPY RXD/TAKEN: CPT | Mod: CPTII,S$GLB,, | Performed by: OPHTHALMOLOGY

## 2022-09-26 PROCEDURE — 3066F PR DOCUMENTATION OF TREATMENT FOR NEPHROPATHY: ICD-10-PCS | Mod: CPTII,S$GLB,, | Performed by: OPHTHALMOLOGY

## 2022-09-26 PROCEDURE — 1159F MED LIST DOCD IN RCRD: CPT | Mod: CPTII,S$GLB,, | Performed by: OPHTHALMOLOGY

## 2022-09-26 PROCEDURE — 99999 PR PBB SHADOW E&M-EST. PATIENT-LVL III: CPT | Mod: PBBFAC,,, | Performed by: OPHTHALMOLOGY

## 2022-09-26 PROCEDURE — 1157F PR ADVANCE CARE PLAN OR EQUIV PRESENT IN MEDICAL RECORD: ICD-10-PCS | Mod: CPTII,S$GLB,, | Performed by: OPHTHALMOLOGY

## 2022-09-26 PROCEDURE — 3062F POS MACROALBUMINURIA REV: CPT | Mod: CPTII,S$GLB,, | Performed by: OPHTHALMOLOGY

## 2022-09-26 PROCEDURE — 3066F NEPHROPATHY DOC TX: CPT | Mod: CPTII,S$GLB,, | Performed by: OPHTHALMOLOGY

## 2022-09-26 PROCEDURE — 1157F ADVNC CARE PLAN IN RCRD: CPT | Mod: CPTII,S$GLB,, | Performed by: OPHTHALMOLOGY

## 2022-09-26 PROCEDURE — 92134 POSTERIOR SEGMENT OCT RETINA (OCULAR COHERENCE TOMOGRAPHY)-BOTH EYES: ICD-10-PCS | Mod: S$GLB,,, | Performed by: OPHTHALMOLOGY

## 2022-09-26 PROCEDURE — 1159F PR MEDICATION LIST DOCUMENTED IN MEDICAL RECORD: ICD-10-PCS | Mod: CPTII,S$GLB,, | Performed by: OPHTHALMOLOGY

## 2022-09-26 PROCEDURE — 92134 CPTRZ OPH DX IMG PST SGM RTA: CPT | Mod: PBBFAC | Performed by: OPHTHALMOLOGY

## 2022-09-26 PROCEDURE — 1160F RVW MEDS BY RX/DR IN RCRD: CPT | Mod: CPTII,S$GLB,, | Performed by: OPHTHALMOLOGY

## 2022-09-26 PROCEDURE — 92014 COMPRE OPH EXAM EST PT 1/>: CPT | Mod: S$GLB,,, | Performed by: OPHTHALMOLOGY

## 2022-09-26 PROCEDURE — 1160F PR REVIEW ALL MEDS BY PRESCRIBER/CLIN PHARMACIST DOCUMENTED: ICD-10-PCS | Mod: CPTII,S$GLB,, | Performed by: OPHTHALMOLOGY

## 2022-09-26 PROCEDURE — 3062F PR POS MACROALBUMINURIA RESULT DOCUMENTED/REVIEW: ICD-10-PCS | Mod: CPTII,S$GLB,, | Performed by: OPHTHALMOLOGY

## 2022-09-26 PROCEDURE — 99499 UNLISTED E&M SERVICE: CPT | Mod: S$GLB,,, | Performed by: OPHTHALMOLOGY

## 2022-09-26 NOTE — PROGRESS NOTES
===============================  Date today is 9/26/2022  Diamond Das is a 67 y.o. female  Last visit [facility]: :10/18/2021   Last visit eye dept. Visit date not found    Uncorrected distance visual acuity was 20/40 in the right eye and 20/30 in the left eye.  Tonometry       Tonometry (Applanation, 2:32 PM)         Right Left    Pressure 15 145                  Not recorded       Not recorded       Not recorded       Chief Complaint   Patient presents with    Diabetic Eye Exam     Pt states her va is good / here for dm eye exam     HPI     Diabetic Eye Exam     Additional comments: Pt states her va is good / here for dm eye exam           Comments    DM   PDR OU  PRH OS   PRP OS 5/12/2021 - 6/3/2021 - 6/23/2021          Last edited by JANAY Cárdenas on 9/26/2022  2:31 PM.      Problem List Items Addressed This Visit          Eye/Vision problems    Diabetic gastroparesis associated with type 2 diabetes mellitus     Other Visit Diagnoses       Severe nonproliferative diabetic retinopathy of both eyes with macular edema associated with type 2 diabetes mellitus    -  Primary    Relevant Orders    Posterior Segment OCT Retina-Both eyes (Completed)    Pseudophakia of both eyes              Instructed to call 24/7 for any worsening of vision, visual distortion or pain.  Check OU independently daily.    Gave my office and personal cell phone number.  ________________  9/26/2022 today  Diamond Das    DM with stable BDR  OCT better today  Post PRP OS  PCIOL OU  No swelling  No NV  No PRH  No VH   No treatment  needed at this time    RTC 6 months  Instructed to call 24/7 for any worsening of vision or symptoms. Check OU daily.   Gave my office and cell phone number.    =============================

## 2022-10-05 ENCOUNTER — TELEPHONE (OUTPATIENT)
Dept: PHARMACY | Facility: CLINIC | Age: 67
End: 2022-10-05
Payer: MEDICARE

## 2022-11-02 ENCOUNTER — TELEPHONE (OUTPATIENT)
Dept: INTERNAL MEDICINE | Facility: CLINIC | Age: 67
End: 2022-11-02
Payer: MEDICARE

## 2022-11-02 NOTE — TELEPHONE ENCOUNTER
----- Message from Angiarthur Angel sent at 11/2/2022  9:45 AM CDT -----  Contact: Diamond  Type:  Same Day Appointment Request    Caller is requesting a same day appointment.  Caller declined first available appointment listed below.    Name of Caller:Diamond  When is the first available appointment?11/4/22  Symptoms:Check-up  Best Call Back Number:[phone]   Additional Information: Patient request to be seen today, sooner than next available.  Thank you,  [facility]

## 2022-11-02 NOTE — TELEPHONE ENCOUNTER
Called pt back to discuss scheduling an earlier appt. Pt states she will keep her appt for 11/3/22.

## 2022-11-03 ENCOUNTER — LAB VISIT (OUTPATIENT)
Dept: LAB | Facility: HOSPITAL | Age: 67
End: 2022-11-03
Attending: FAMILY MEDICINE
Payer: MEDICARE

## 2022-11-03 ENCOUNTER — OFFICE VISIT (OUTPATIENT)
Dept: INTERNAL MEDICINE | Facility: CLINIC | Age: 67
End: 2022-11-03
Payer: MEDICARE

## 2022-11-03 VITALS
WEIGHT: 182.56 LBS | TEMPERATURE: 98 F | HEART RATE: 60 BPM | BODY MASS INDEX: 34.47 KG/M2 | SYSTOLIC BLOOD PRESSURE: 130 MMHG | DIASTOLIC BLOOD PRESSURE: 64 MMHG | HEIGHT: 61 IN

## 2022-11-03 DIAGNOSIS — I12.9 PARENCHYMAL RENAL HYPERTENSION, STAGE 1 THROUGH STAGE 4 OR UNSPECIFIED CHRONIC KIDNEY DISEASE: ICD-10-CM

## 2022-11-03 DIAGNOSIS — E11.59 TYPE 2 DIABETES MELLITUS WITH OTHER CIRCULATORY COMPLICATION, WITHOUT LONG-TERM CURRENT USE OF INSULIN: ICD-10-CM

## 2022-11-03 DIAGNOSIS — E03.4 HYPOTHYROIDISM DUE TO ACQUIRED ATROPHY OF THYROID: ICD-10-CM

## 2022-11-03 DIAGNOSIS — R10.13 EPIGASTRIC PAIN: ICD-10-CM

## 2022-11-03 DIAGNOSIS — I10 PRIMARY HYPERTENSION: ICD-10-CM

## 2022-11-03 DIAGNOSIS — E78.49 OTHER HYPERLIPIDEMIA: ICD-10-CM

## 2022-11-03 DIAGNOSIS — E11.59 TYPE 2 DIABETES MELLITUS WITH OTHER CIRCULATORY COMPLICATION, WITHOUT LONG-TERM CURRENT USE OF INSULIN: Primary | ICD-10-CM

## 2022-11-03 PROCEDURE — 3044F PR MOST RECENT HEMOGLOBIN A1C LEVEL <7.0%: ICD-10-PCS | Mod: CPTII,S$GLB,, | Performed by: FAMILY MEDICINE

## 2022-11-03 PROCEDURE — 1159F MED LIST DOCD IN RCRD: CPT | Mod: CPTII,S$GLB,, | Performed by: FAMILY MEDICINE

## 2022-11-03 PROCEDURE — 99214 OFFICE O/P EST MOD 30 MIN: CPT | Mod: 25,S$GLB,, | Performed by: FAMILY MEDICINE

## 2022-11-03 PROCEDURE — G0008 ADMIN INFLUENZA VIRUS VAC: HCPCS | Mod: S$GLB,,, | Performed by: FAMILY MEDICINE

## 2022-11-03 PROCEDURE — 99214 PR OFFICE/OUTPT VISIT, EST, LEVL IV, 30-39 MIN: ICD-10-PCS | Mod: 25,S$GLB,, | Performed by: FAMILY MEDICINE

## 2022-11-03 PROCEDURE — 3078F DIAST BP <80 MM HG: CPT | Mod: CPTII,S$GLB,, | Performed by: FAMILY MEDICINE

## 2022-11-03 PROCEDURE — 3075F PR MOST RECENT SYSTOLIC BLOOD PRESS GE 130-139MM HG: ICD-10-PCS | Mod: CPTII,S$GLB,, | Performed by: FAMILY MEDICINE

## 2022-11-03 PROCEDURE — 1159F PR MEDICATION LIST DOCUMENTED IN MEDICAL RECORD: ICD-10-PCS | Mod: CPTII,S$GLB,, | Performed by: FAMILY MEDICINE

## 2022-11-03 PROCEDURE — 83036 HEMOGLOBIN GLYCOSYLATED A1C: CPT | Performed by: FAMILY MEDICINE

## 2022-11-03 PROCEDURE — 36415 COLL VENOUS BLD VENIPUNCTURE: CPT | Mod: PO | Performed by: FAMILY MEDICINE

## 2022-11-03 PROCEDURE — 99999 PR PBB SHADOW E&M-EST. PATIENT-LVL III: CPT | Mod: PBBFAC,,, | Performed by: FAMILY MEDICINE

## 2022-11-03 PROCEDURE — 1157F ADVNC CARE PLAN IN RCRD: CPT | Mod: CPTII,S$GLB,, | Performed by: FAMILY MEDICINE

## 2022-11-03 PROCEDURE — 3288F PR FALLS RISK ASSESSMENT DOCUMENTED: ICD-10-PCS | Mod: CPTII,S$GLB,, | Performed by: FAMILY MEDICINE

## 2022-11-03 PROCEDURE — 99999 PR PBB SHADOW E&M-EST. PATIENT-LVL III: ICD-10-PCS | Mod: PBBFAC,,, | Performed by: FAMILY MEDICINE

## 2022-11-03 PROCEDURE — 3008F PR BODY MASS INDEX (BMI) DOCUMENTED: ICD-10-PCS | Mod: CPTII,S$GLB,, | Performed by: FAMILY MEDICINE

## 2022-11-03 PROCEDURE — G0008 FLU VACCINE - QUADRIVALENT - ADJUVANTED: ICD-10-PCS | Mod: S$GLB,,, | Performed by: FAMILY MEDICINE

## 2022-11-03 PROCEDURE — 3288F FALL RISK ASSESSMENT DOCD: CPT | Mod: CPTII,S$GLB,, | Performed by: FAMILY MEDICINE

## 2022-11-03 PROCEDURE — 3066F NEPHROPATHY DOC TX: CPT | Mod: CPTII,S$GLB,, | Performed by: FAMILY MEDICINE

## 2022-11-03 PROCEDURE — 3075F SYST BP GE 130 - 139MM HG: CPT | Mod: CPTII,S$GLB,, | Performed by: FAMILY MEDICINE

## 2022-11-03 PROCEDURE — 3062F POS MACROALBUMINURIA REV: CPT | Mod: CPTII,S$GLB,, | Performed by: FAMILY MEDICINE

## 2022-11-03 PROCEDURE — 90694 VACC AIIV4 NO PRSRV 0.5ML IM: CPT | Mod: S$GLB,,, | Performed by: FAMILY MEDICINE

## 2022-11-03 PROCEDURE — 3008F BODY MASS INDEX DOCD: CPT | Mod: CPTII,S$GLB,, | Performed by: FAMILY MEDICINE

## 2022-11-03 PROCEDURE — 1101F PR PT FALLS ASSESS DOC 0-1 FALLS W/OUT INJ PAST YR: ICD-10-PCS | Mod: CPTII,S$GLB,, | Performed by: FAMILY MEDICINE

## 2022-11-03 PROCEDURE — 90694 FLU VACCINE - QUADRIVALENT - ADJUVANTED: ICD-10-PCS | Mod: S$GLB,,, | Performed by: FAMILY MEDICINE

## 2022-11-03 PROCEDURE — 4010F ACE/ARB THERAPY RXD/TAKEN: CPT | Mod: CPTII,S$GLB,, | Performed by: FAMILY MEDICINE

## 2022-11-03 PROCEDURE — 4010F PR ACE/ARB THEARPY RXD/TAKEN: ICD-10-PCS | Mod: CPTII,S$GLB,, | Performed by: FAMILY MEDICINE

## 2022-11-03 PROCEDURE — 3078F PR MOST RECENT DIASTOLIC BLOOD PRESSURE < 80 MM HG: ICD-10-PCS | Mod: CPTII,S$GLB,, | Performed by: FAMILY MEDICINE

## 2022-11-03 PROCEDURE — 1157F PR ADVANCE CARE PLAN OR EQUIV PRESENT IN MEDICAL RECORD: ICD-10-PCS | Mod: CPTII,S$GLB,, | Performed by: FAMILY MEDICINE

## 2022-11-03 PROCEDURE — 3062F PR POS MACROALBUMINURIA RESULT DOCUMENTED/REVIEW: ICD-10-PCS | Mod: CPTII,S$GLB,, | Performed by: FAMILY MEDICINE

## 2022-11-03 PROCEDURE — 3044F HG A1C LEVEL LT 7.0%: CPT | Mod: CPTII,S$GLB,, | Performed by: FAMILY MEDICINE

## 2022-11-03 PROCEDURE — 3066F PR DOCUMENTATION OF TREATMENT FOR NEPHROPATHY: ICD-10-PCS | Mod: CPTII,S$GLB,, | Performed by: FAMILY MEDICINE

## 2022-11-03 PROCEDURE — 1101F PT FALLS ASSESS-DOCD LE1/YR: CPT | Mod: CPTII,S$GLB,, | Performed by: FAMILY MEDICINE

## 2022-11-03 RX ORDER — PRAVASTATIN SODIUM 10 MG/1
10 TABLET ORAL DAILY
Qty: 90 TABLET | Refills: 1 | Status: SHIPPED | OUTPATIENT
Start: 2022-11-03 | End: 2023-07-18

## 2022-11-03 RX ORDER — CARVEDILOL 25 MG/1
25 TABLET ORAL 2 TIMES DAILY WITH MEALS
Qty: 180 TABLET | Refills: 1 | Status: SHIPPED | OUTPATIENT
Start: 2022-11-03 | End: 2022-12-06 | Stop reason: SDUPTHER

## 2022-11-03 RX ORDER — DULAGLUTIDE 4.5 MG/.5ML
4.5 INJECTION, SOLUTION SUBCUTANEOUS
Qty: 12 PEN | Refills: 1 | Status: SHIPPED | OUTPATIENT
Start: 2022-11-03 | End: 2022-12-06 | Stop reason: SDUPTHER

## 2022-11-03 RX ORDER — LEVOTHYROXINE SODIUM 137 UG/1
137 TABLET ORAL
Qty: 90 TABLET | Refills: 1 | Status: SHIPPED | OUTPATIENT
Start: 2022-11-03 | End: 2022-12-06 | Stop reason: SDUPTHER

## 2022-11-03 RX ORDER — ISOSORBIDE MONONITRATE 60 MG/1
60 TABLET, EXTENDED RELEASE ORAL NIGHTLY
Qty: 90 TABLET | Refills: 1 | Status: SHIPPED | OUTPATIENT
Start: 2022-11-03 | End: 2022-12-06 | Stop reason: SDUPTHER

## 2022-11-03 RX ORDER — ONDANSETRON 4 MG/1
4 TABLET, FILM COATED ORAL EVERY 8 HOURS PRN
Qty: 90 TABLET | Refills: 1 | Status: SHIPPED | OUTPATIENT
Start: 2022-11-03 | End: 2023-08-10

## 2022-11-03 RX ORDER — FAMOTIDINE 20 MG/1
20 TABLET, FILM COATED ORAL DAILY
Qty: 90 TABLET | Refills: 1 | Status: SHIPPED | OUTPATIENT
Start: 2022-11-03 | End: 2022-12-06 | Stop reason: SDUPTHER

## 2022-11-03 NOTE — PROGRESS NOTES
Subjective:       Patient ID: Diamond Das is a 67 y.o. female.    Chief Complaint: Diabetes    Diabetes  She presents for her follow-up diabetic visit. She has type 2 diabetes mellitus. Her disease course has been stable. Pertinent negatives for hypoglycemia include no confusion, dizziness or headaches. Pertinent negatives for diabetes include no blurred vision, no chest pain, no fatigue and no foot paresthesias. Symptoms are stable.   Review of Systems   Constitutional:  Negative for fatigue.   Eyes:  Negative for blurred vision.   Respiratory:  Negative for shortness of breath.    Cardiovascular:  Negative for chest pain.   Gastrointestinal:  Negative for abdominal pain.   Neurological:  Negative for dizziness and headaches.   Psychiatric/Behavioral:  Negative for confusion.      Objective:      Physical Exam  Vitals and nursing note reviewed.   Constitutional:       General: She is not in acute distress.     Appearance: Normal appearance. She is well-developed. She is not diaphoretic.   HENT:      Head: Normocephalic and atraumatic.   Cardiovascular:      Heart sounds: Murmur heard.   Pulmonary:      Effort: Pulmonary effort is normal. No respiratory distress.      Breath sounds: Normal breath sounds. No wheezing.   Abdominal:      General: There is no distension.      Palpations: Abdomen is soft.      Tenderness: There is no abdominal tenderness. There is no guarding.   Musculoskeletal:      Right lower leg: Edema present.      Left lower leg: Edema present.   Skin:     General: Skin is warm and dry.      Findings: No erythema or rash.   Neurological:      Mental Status: She is alert.       Assessment:       1. Type 2 diabetes mellitus with other circulatory complication, without long-term current use of insulin    2. Primary hypertension    3. Other hyperlipidemia    4. Parenchymal renal hypertension, stage 1 through stage 4 or unspecified chronic kidney disease    5. Hypothyroidism due to acquired atrophy  of thyroid    6. Epigastric pain          Plan:     Problem List Items Addressed This Visit          Cardiac/Vascular    Hypertension    Overview     Chronic, Stable, cont amlodipine         Hyperlipidemia    Overview     Chronic, Stable, cont pravastatin         Relevant Medications    pravastatin (PRAVACHOL) 10 MG tablet       Endocrine    Hypothyroidism due to acquired atrophy of thyroid    Overview     Chronic, Stable, cont Synthroid.         Relevant Medications    levothyroxine (EUTHYROX) 137 MCG Tab tablet    Type 2 diabetes mellitus, without long-term current use of insulin - Primary    Overview     Chronic, Stable, use trulicity         Relevant Medications    dulaglutide (TRULICITY) 4.5 mg/0.5 mL pen injector    Other Relevant Orders    Hemoglobin A1C     Other Visit Diagnoses       Parenchymal renal hypertension, stage 1 through stage 4 or unspecified chronic kidney disease        Relevant Medications    carvediloL (COREG) 25 MG tablet    Epigastric pain        Relevant Medications    ondansetron (ZOFRAN) 4 MG tablet

## 2022-11-04 LAB
ESTIMATED AVG GLUCOSE: 105 MG/DL (ref 68–131)
HBA1C MFR BLD: 5.3 % (ref 4–5.6)

## 2022-11-11 DIAGNOSIS — K76.82 HEPATIC ENCEPHALOPATHY: ICD-10-CM

## 2022-11-16 ENCOUNTER — LAB VISIT (OUTPATIENT)
Dept: LAB | Facility: HOSPITAL | Age: 67
End: 2022-11-16
Attending: INTERNAL MEDICINE
Payer: MEDICARE

## 2022-11-16 ENCOUNTER — OFFICE VISIT (OUTPATIENT)
Dept: CARDIOLOGY | Facility: CLINIC | Age: 67
End: 2022-11-16
Payer: MEDICARE

## 2022-11-16 VITALS
HEART RATE: 67 BPM | BODY MASS INDEX: 33.87 KG/M2 | WEIGHT: 179.25 LBS | OXYGEN SATURATION: 98 % | DIASTOLIC BLOOD PRESSURE: 64 MMHG | SYSTOLIC BLOOD PRESSURE: 136 MMHG

## 2022-11-16 DIAGNOSIS — R79.89 ELEVATED D-DIMER: ICD-10-CM

## 2022-11-16 DIAGNOSIS — I50.32 CHRONIC DIASTOLIC CONGESTIVE HEART FAILURE: Primary | ICD-10-CM

## 2022-11-16 DIAGNOSIS — I50.32 CHRONIC DIASTOLIC CONGESTIVE HEART FAILURE: ICD-10-CM

## 2022-11-16 DIAGNOSIS — E11.59 TYPE 2 DIABETES MELLITUS WITH OTHER CIRCULATORY COMPLICATION, WITHOUT LONG-TERM CURRENT USE OF INSULIN: ICD-10-CM

## 2022-11-16 DIAGNOSIS — N18.4 CKD (CHRONIC KIDNEY DISEASE) STAGE 4, GFR 15-29 ML/MIN: ICD-10-CM

## 2022-11-16 DIAGNOSIS — R06.02 SHORTNESS OF BREATH: ICD-10-CM

## 2022-11-16 DIAGNOSIS — I31.39 PERICARDIAL EFFUSION: ICD-10-CM

## 2022-11-16 DIAGNOSIS — G47.33 OSA ON CPAP: ICD-10-CM

## 2022-11-16 DIAGNOSIS — I10 PRIMARY HYPERTENSION: ICD-10-CM

## 2022-11-16 DIAGNOSIS — Z86.16 HISTORY OF COVID-19: ICD-10-CM

## 2022-11-16 LAB
ANION GAP SERPL CALC-SCNC: 11 MMOL/L (ref 8–16)
BNP SERPL-MCNC: 410 PG/ML (ref 0–99)
BUN SERPL-MCNC: 63 MG/DL (ref 8–23)
CALCIUM SERPL-MCNC: 8.4 MG/DL (ref 8.7–10.5)
CHLORIDE SERPL-SCNC: 105 MMOL/L (ref 95–110)
CO2 SERPL-SCNC: 26 MMOL/L (ref 23–29)
CREAT SERPL-MCNC: 4.8 MG/DL (ref 0.5–1.4)
EST. GFR  (NO RACE VARIABLE): 10 ML/MIN/1.73 M^2
GLUCOSE SERPL-MCNC: 145 MG/DL (ref 70–110)
MAGNESIUM SERPL-MCNC: 2.7 MG/DL (ref 1.6–2.6)
POTASSIUM SERPL-SCNC: 4.3 MMOL/L (ref 3.5–5.1)
SODIUM SERPL-SCNC: 142 MMOL/L (ref 136–145)

## 2022-11-16 PROCEDURE — 1157F PR ADVANCE CARE PLAN OR EQUIV PRESENT IN MEDICAL RECORD: ICD-10-PCS | Mod: CPTII,S$GLB,, | Performed by: INTERNAL MEDICINE

## 2022-11-16 PROCEDURE — 83735 ASSAY OF MAGNESIUM: CPT | Performed by: INTERNAL MEDICINE

## 2022-11-16 PROCEDURE — 80048 BASIC METABOLIC PNL TOTAL CA: CPT | Performed by: INTERNAL MEDICINE

## 2022-11-16 PROCEDURE — 99499 RISK ADDL DX/OHS AUDIT: ICD-10-PCS | Mod: S$GLB,,, | Performed by: INTERNAL MEDICINE

## 2022-11-16 PROCEDURE — 3044F HG A1C LEVEL LT 7.0%: CPT | Mod: CPTII,S$GLB,, | Performed by: INTERNAL MEDICINE

## 2022-11-16 PROCEDURE — 3066F NEPHROPATHY DOC TX: CPT | Mod: CPTII,S$GLB,, | Performed by: INTERNAL MEDICINE

## 2022-11-16 PROCEDURE — 99999 PR PBB SHADOW E&M-EST. PATIENT-LVL IV: ICD-10-PCS | Mod: PBBFAC,,, | Performed by: INTERNAL MEDICINE

## 2022-11-16 PROCEDURE — 1160F PR REVIEW ALL MEDS BY PRESCRIBER/CLIN PHARMACIST DOCUMENTED: ICD-10-PCS | Mod: CPTII,S$GLB,, | Performed by: INTERNAL MEDICINE

## 2022-11-16 PROCEDURE — 3078F PR MOST RECENT DIASTOLIC BLOOD PRESSURE < 80 MM HG: ICD-10-PCS | Mod: CPTII,S$GLB,, | Performed by: INTERNAL MEDICINE

## 2022-11-16 PROCEDURE — 3075F SYST BP GE 130 - 139MM HG: CPT | Mod: CPTII,S$GLB,, | Performed by: INTERNAL MEDICINE

## 2022-11-16 PROCEDURE — 1159F MED LIST DOCD IN RCRD: CPT | Mod: CPTII,S$GLB,, | Performed by: INTERNAL MEDICINE

## 2022-11-16 PROCEDURE — 3288F FALL RISK ASSESSMENT DOCD: CPT | Mod: CPTII,S$GLB,, | Performed by: INTERNAL MEDICINE

## 2022-11-16 PROCEDURE — 3062F PR POS MACROALBUMINURIA RESULT DOCUMENTED/REVIEW: ICD-10-PCS | Mod: CPTII,S$GLB,, | Performed by: INTERNAL MEDICINE

## 2022-11-16 PROCEDURE — 1101F PR PT FALLS ASSESS DOC 0-1 FALLS W/OUT INJ PAST YR: ICD-10-PCS | Mod: CPTII,S$GLB,, | Performed by: INTERNAL MEDICINE

## 2022-11-16 PROCEDURE — 3008F BODY MASS INDEX DOCD: CPT | Mod: CPTII,S$GLB,, | Performed by: INTERNAL MEDICINE

## 2022-11-16 PROCEDURE — 3066F PR DOCUMENTATION OF TREATMENT FOR NEPHROPATHY: ICD-10-PCS | Mod: CPTII,S$GLB,, | Performed by: INTERNAL MEDICINE

## 2022-11-16 PROCEDURE — 36415 COLL VENOUS BLD VENIPUNCTURE: CPT | Performed by: INTERNAL MEDICINE

## 2022-11-16 PROCEDURE — 3062F POS MACROALBUMINURIA REV: CPT | Mod: CPTII,S$GLB,, | Performed by: INTERNAL MEDICINE

## 2022-11-16 PROCEDURE — 99214 PR OFFICE/OUTPT VISIT, EST, LEVL IV, 30-39 MIN: ICD-10-PCS | Mod: S$GLB,,, | Performed by: INTERNAL MEDICINE

## 2022-11-16 PROCEDURE — 1101F PT FALLS ASSESS-DOCD LE1/YR: CPT | Mod: CPTII,S$GLB,, | Performed by: INTERNAL MEDICINE

## 2022-11-16 PROCEDURE — 3288F PR FALLS RISK ASSESSMENT DOCUMENTED: ICD-10-PCS | Mod: CPTII,S$GLB,, | Performed by: INTERNAL MEDICINE

## 2022-11-16 PROCEDURE — 3008F PR BODY MASS INDEX (BMI) DOCUMENTED: ICD-10-PCS | Mod: CPTII,S$GLB,, | Performed by: INTERNAL MEDICINE

## 2022-11-16 PROCEDURE — 4010F PR ACE/ARB THEARPY RXD/TAKEN: ICD-10-PCS | Mod: CPTII,S$GLB,, | Performed by: INTERNAL MEDICINE

## 2022-11-16 PROCEDURE — 3075F PR MOST RECENT SYSTOLIC BLOOD PRESS GE 130-139MM HG: ICD-10-PCS | Mod: CPTII,S$GLB,, | Performed by: INTERNAL MEDICINE

## 2022-11-16 PROCEDURE — 99214 OFFICE O/P EST MOD 30 MIN: CPT | Mod: S$GLB,,, | Performed by: INTERNAL MEDICINE

## 2022-11-16 PROCEDURE — 83880 ASSAY OF NATRIURETIC PEPTIDE: CPT | Performed by: INTERNAL MEDICINE

## 2022-11-16 PROCEDURE — 99999 PR PBB SHADOW E&M-EST. PATIENT-LVL IV: CPT | Mod: PBBFAC,,, | Performed by: INTERNAL MEDICINE

## 2022-11-16 PROCEDURE — 3078F DIAST BP <80 MM HG: CPT | Mod: CPTII,S$GLB,, | Performed by: INTERNAL MEDICINE

## 2022-11-16 PROCEDURE — 1160F RVW MEDS BY RX/DR IN RCRD: CPT | Mod: CPTII,S$GLB,, | Performed by: INTERNAL MEDICINE

## 2022-11-16 PROCEDURE — 4010F ACE/ARB THERAPY RXD/TAKEN: CPT | Mod: CPTII,S$GLB,, | Performed by: INTERNAL MEDICINE

## 2022-11-16 PROCEDURE — 3044F PR MOST RECENT HEMOGLOBIN A1C LEVEL <7.0%: ICD-10-PCS | Mod: CPTII,S$GLB,, | Performed by: INTERNAL MEDICINE

## 2022-11-16 PROCEDURE — 1157F ADVNC CARE PLAN IN RCRD: CPT | Mod: CPTII,S$GLB,, | Performed by: INTERNAL MEDICINE

## 2022-11-16 PROCEDURE — 1159F PR MEDICATION LIST DOCUMENTED IN MEDICAL RECORD: ICD-10-PCS | Mod: CPTII,S$GLB,, | Performed by: INTERNAL MEDICINE

## 2022-11-16 PROCEDURE — 99499 UNLISTED E&M SERVICE: CPT | Mod: S$GLB,,, | Performed by: INTERNAL MEDICINE

## 2022-11-16 NOTE — PROGRESS NOTES
Subjective:   Patient ID:  Diamond Das is a 67 y.o. female who presents for cardiac consult of No chief complaint on file.      The patient came in today for cardiac consult of No chief complaint on file.    Referring Physician: Andrey Perrin MD   Reason for consult: HTN, CHF    Diamond Das is a 67 y.o. female with medical conditions diastolic CHF, pericardial effusion, HTN, HLD, DM2, cryptogenic cirrhosis, s/p TIPS, ascites presents for CV follow up.     Pt of Dr. Shaw, last seen 2/9/18  No smoking/drinking  Last echo in  normal EF and RVF, grade II DD.  EKG today NSR poor R progression on anterior leads  Chronic weakness and fatigue. Had PNA in   Pain on lower chest bilateral for few months, worse with exercise, resolved after resting. Deep breathing made the pain worse. No pain at sleep. Associated dyspnea, N/V, palpitation and dizziness. No radiation. ASA and tylenol not really helped the pain.    3/1/19  She has been having more ascites and concern for cardiac etiologies. She was also started on Reglan, concern for prolonged Qtc, recent Qtc 475 ms. Has been feeling better with reglan and lactulose. Is dyspneic, chronically on oxygen at night and also has portable oxygen when she has to do a lot walking. Takes lasix 40 mg once/day now, was on 80mg daily. Active at home, dresses and bathes her self. Cant walk or stand to too long.     8/26/19  2D ECHO with grade 1 DD, normal Bi V function, Small pericardial effusion without tamponade. She has mild SOB at times with edema but improved lately. She will see hepatology as well. No Chest pain.   Has been taking lasix extra doses PRN and improved symptoms.     10/16/19  [facility] hosp ER follow up  - Non toxic appearing elderly female here for worsening NICHOLE and shortness of breath. Known h/o CHF, f/b cardiologist in [facility]. Work up is c/w acute on chronic chf exacerbation. She, unfortunately, does not follow her weights so not clear if  significant change recently. CXR with likely signs of volume overload. She was given diuresis and had [place] score of 0. Felt improved and prefers discharge with close OP f/u with her cardiologist. She was ambulated and did not significantly desat and tolerated it well.  No she is on lasix, feels better. BNP today is 453, needs to double lasix next 3-4 days for further diuresis.     11/18/19   Increased diuretics last visit. She felt better then and had weight loss as well. Still has NICHOLE but improved. She went to [facility] 2 weeks ago - presented to the ED for fall secondary to hypoglycemic episode. Pt has had 3 previous episodes of hypoglycemia requiring hospitalizations in the past with sxs of lightheadedness. CT head was unremarkable, CXR revealed improvement in CHF compared to previous study. Due to timeline of fall with insulin administration and Hx of cryptogenic cirrhosis, it is likely that pt could not compensate after receiving her insulin dosing.  BP meds were stopped, will get labs and restart lisinopril.     1/6/20  Breathing has been stable with LE edema. Last visit she doubled lasix for 3 days with min improvement. Has not had much relief lately.   ECG - NSR, poor RWP, lateral TWI    2/17/20  BNP was 863. She had recent thoracentesis as well. She has also been referred to hepatology as concern for TIPS not working?. She remains SOB will add metolazone. Discussed if kidneys and liver are worse will be difficult to manage volume status.     5/18/20  She has been stable but still has SOB with leg swelling. Symptoms have gotten a bit worse a few weeks ago. She has been taking lasix extra dose some times. Last BNP elevated 863, will need to increase lasix and metolazone and repeat labs this week. Will add K and Mg supplements.    9/28/20  BNP was 428 at last visit, which is improved from 7 months prior. She saw PCP today with worsening NICHOLE and edema. Increased lasix and metolazone last visit.      Ref Range &  Units 4mo ago  (5/21/20) 7mo ago  (2/10/20) 8mo ago  (1/10/20) 10mo ago  (11/18/19) 11mo ago  (10/25/19) 11mo ago  (10/16/19)   BNP 0 - 99 pg/mL 413High   863High  CM  1,010High  CM  793High  CM  291High  CM  453High  CM      10/19/20  Pt was admitted 10/9-10/15 for CHF exac with edema, diuresed aggressively. She also had nephro consult and hepatology eval for further treatment and possible liver transplant. She has upcoming appt for CT Renal biopsy. Autoimmune workup planned. ECHO with small pericardial effusion, no tamponde, grade 1 DD.   BNP improved to 129 two weeks prior. She is taking bumex BID. SHe is taking metolazone every other day. Weight is stable.     11/16/20  She has been on Bumex with metolazone. She had recent visit with Dr. Hernandez -  Renal biopsy revealed diabetic nephropathy and hypertensive nephropathy.  No autoimmune disorder detected. She may be candidate for RRT. She will have further liver workup and be liver and kidney transplant. Will need ischemic work up, had nuclear stress in past, none recently.     12/28/20  Nuclear stress test pending. Saw Dr. Contreras recently, started on Lisinopril 5mg. She feels well, no CP/SOB. Stress test is pending. Is walking more and active.     2/15/21  Nuclear stress neg in Dec 30th. BP overall stable. 189/96 today but has not taken meds yet and woke up late. BP was normal yesterday. Liver function has improved, does not need liver transplant, discussed she can remain kidney transplant stable CV status    5/4/21  BP elevated 160/80s. HR 70s. She has some salty food a few days ago. She felt extra bloated and took an diuretic. She has stable NICHOLE.     5/17/21   Ref Range & Units 13 d ago   (5/4/21) 7 mo ago   (10/11/20) 7 mo ago   (10/9/20) 7 mo ago   (10/5/20) 12 mo ago   (5/21/20) 1 yr ago   (2/10/20)   BNP 0 - 99 pg/mL 324High   129High  CM  351High  CM  349High  CM  413High  CM  863High  CM    Comment: Values of less than 100 pg/ml are consistent with non-CHF  populations.       BNP 2 weeks ago elevated 324, discussed double dose of bumex for 3 days and monitor BP and weights, avoid salty food. Kidney function is slightly worse, needs to follow up with nephrologist as well asap.  216 lbs to 196 lbs today. BP and hR stable today. She had an episode of flutter one night, lasted about 15 min    8/23/21  Last visit - BNP has improved due to less fluid, kidney function is worse, creatinine is 3.3, need to follow up with nephrologist asap. Decrease Bumex to only once a day. Monitor pressures and weights if increased weight with swelling can go back to bumex twice a day.     She saw Dr. Jacobo with nephro;  changed Bumex to Torsemide 40mg daily  BP elevated today, meds changed recently.     10/4/21  BP and HR well controlled today. Weight 190 lbs improved from 197 lbs.     12/220 hosp  DC summary - sent from hepatology clinic on account of hypercalcemia.  The patient went for her Rifaximin refill and was told to come in for high Clacium.  She admits to dry cough of some months, exertional dyspnea which is not worsening, about 2-3 weeks of weakness, constipation and further reduction of her oliguric state from roughly 500cc urine daily to about 125 cc/urine daily. But no chest pain, vomiting, diarrhea or reduced oral intake. She denies fever.  She is on Calcitriol, Carol D3 1000 U QD and calcium containing multivitamin.  She denies any bone pain or weight loss, infact she has just started to gain some weight. She denies orthopnea.  Serum albumin 2.4, calcium 12.6, corrected calcium 13.9     Hospital Course:   12/7 patient in good spirits, calcium slightly improved but still high. Not for discharge today. Otherwise stable  12/8 patient seen, awake, alert, vague abdominal pain, semi hard stools yesterday, no emesis , waiting for her labs this morning to determine if she can discharge.  Update: corrected calcium is 11.6, she is cleared for discharge by Nephrology who have made an  appointment for her to see them in the clinic. She is cleared to resume her Torsemide and Benicar. She is not to take any Vit D. Calcitriol, oral calcium supplements and this has been explained in details to her very well.    12/20/21  She is s/p hosp DC for hyperCA, has been restarted on diuretics and BP meds and CA supplements stopped. Her diurertics was stopped last week due to lack of appetite and more abd pain. Her weight has low as 173 lbs, but now at 195 lbs.     Hosp DC summar   66 year old female who was admitted to [facility] for COVID-19 and decompensated CHF. CXR shows evidence of volume overload and was diuresed. Echocaridogram shows a preserved EF with diastolic dysfunction. Will repeat CXR on 1/12/22. For COVID-19 she received MVI, ascorbic acid, and dexamethasone. Currently on 3L nasal cannula.  1/12/22  D-dimer 7 today. V/Q scan indeterminant for pulmonary embolism. Could reflect fluid within the fissure as well. Started on heparin infusion. CXR today still shows fluid.  Will change diuretics to Bumex with Metolazone for one dose. Closely monitor kidney function.   1/13/22  Still with decrease urine ouput. Nephrology consulted to assist with diuretics. Bumex changed to 2mg IV every 8 hours. 500mg diuril twice a day has been added for 48 hours. No intervention needed for hyperkalemia.   1/14  Reports sx improvement. Ready to go home but reports right breast swelling since admission. Lower ext edema and dyspnea improving. H/h trending down with no overt s/s bleeding. Agreeable to blood transfusion. Nephrology consulted. Consider cards consult  1/15  Dyspnea and swelling symptoms improved. No transfusion reaction after blood transfusion. Denies further breast swelling. Does not qualify for home o2. Followed o/p with Dr. Barkley, pulmonology. Advised to f/u o/p, as elevated d-dimer in setting of covid. Perfusion scan indeterminant and started on heparin gtt. Transition to eliquis on  d/c.  Neph consulted for recs regarding diuresis and hyperk in ckd and chf. Metolazone started. Hyperk resolved. Cr stable but elevated and will need further adjusting of meds o/p. Advised strict na and fluid restriction. Neph recommending tolerance of higher cr while also need for diuresising for chfx  Hyperglycemia on systemic steroids. Hba1c <5. Discontinued steroids and managed hyperglycemia with insulin.     2/1/22  Hosp follow up for COVID 19, PE and volume overload. She is taking Torsemide 40mg with metolazone doing well. She is taking Eliquis 5 BID without issues, told to continue will repeat D dimer likely treat at least 3 months. She has been losing weight now weighs 187 lbs.     6/20/22  BP and HR well controlled today. BMI 37 - 198 lbs. Her weight is increased from prior visit, she has more edema now. She went to ER a few weeks ago for swelling/edema was told to stop diuretic due to worsening renal function. But then she has restarted Torsemide 20mg    8/1/22   She went to ER after last visit for hyperkalemia 6.5, improved recently. Had EGD past month may need gastric emptying study for gasteroparesis. BMI 40 - 212 lbs, BP and HR well controlled today. Has gained appx 12-14 lbs since last visit. She just got back from , did not have any issues with fluid then. She started gaining weight since.     11/16/22  ECHO 8/2022 with normal bi V function, grade 2 DD, mild to mod TR, small peric effusion, PASP > 44mmHg. BP and HR well controlled today. BMI 33 - 179 lbs.     Patient feels no chest pain, no PND, no palpitation, no dizziness, no syncope, no CNS symptoms.    Patient has dec exercise tolerance.    Patient is compliant with medications.    Results for orders placed during the hospital encounter of 08/08/22    Echo    Interpretation Summary  · Concentric remodeling and normal systolic function.  · Moderate left atrial enlargement.  · The estimated ejection fraction is 60%.  · Grade II left ventricular  diastolic dysfunction.  · Mild right atrial enlargement.  · Mild to moderate tricuspid regurgitation.  · There is pulmonary hypertension.  · Small pericardial effusion.  · Mild mitral regurgitation.  The estimated PA systolic pressure is greater than 44 mmHg.      Results for orders placed during the hospital encounter of 12/30/20    Nuclear Stress - Cardiology Interpreted    Interpretation Summary    Normal myocardial perfusion scan. There is no evidence of myocardial ischemia or infarction.    The gated perfusion images showed an ejection fraction of 64% at rest. The gated perfusion images showed an ejection fraction of 65% post stress.    The EKG portion of this study is negative for ischemia.    The patient reported no chest pain during the stress test.          Past Medical History:   Diagnosis Date    Abdominal pain 5/10/2018    Acquired hypothyroidism 9/28/2020    Acute on chronic diastolic congestive heart failure 12/7/2017    ROD (acute kidney injury) 1/3/2019    Anasarca 10/13/2020    Arthritis of knee 1/31/2022    Ascites     Ascites 9/2/2016    Bilateral knee pain 4/22/2022    Bilateral lower extremity edema 9/2/2016    Breast pain, left 1/4/2018    Cataract     Chest pain, atypical 2/9/2018    CHF (congestive heart failure)     Cirrhosis     Cirrhosis 1/3/2017    Cough     Diabetes mellitus     Diabetes mellitus, type 2     Diarrhea 9/4/2019    Edema 3/19/2018    Encounter for long-term (current) use of medications 3/19/2018    Epigastric pain 2/11/2020    Gastroparesis     GERD (gastroesophageal reflux disease)     Hepatic encephalopathy 6/19/2018    Pt has history of cirrhosis, seen at [facility] on 6/7/18. Currently on lactulose.    Hyperlipidemia     Hypertension     Hypotension 2/21/2019    Immunization deficiency 2/10/2020    Leg cramps 5/20/2022    Liver disease     Lumbar strain 4/27/2018    Lumbar strain, sequela 2/17/2020    Macular degeneration     Medication reaction 11/17/2016    Other and  unspecified hyperlipidemia 6/11/2012 11:11:32 AM    [facility] Historical - Endocrine/Metabolic/Immune: Hyperlipidemia-No Additional Notes    Pneumonia of right middle lobe due to infectious organism 12/19/2017    Renal disorder     Retinopathy due to secondary diabetes mellitus     Screening for malignant neoplasm of cervix 12/14/2017    Skin lesion 12/20/2021    Sleep apnea     Strain of lumbar region 10/4/2021    Thyroid disease     Type 2 diabetes mellitus with hyperglycemia 10/10/2020       Past Surgical History:   Procedure Laterality Date    CATARACT EXTRACTION      CHOLECYSTECTOMY      COLONOSCOPY N/A 9/4/2019    Procedure: COLONOSCOPY;  Surgeon: Marnie Elmore MD;  Location: [facility] ENDO;  Service: Endoscopy;  Laterality: N/A;    ERCP      ESOPHAGOGASTRODUODENOSCOPY N/A 7/28/2022    Procedure: EGD (ESOPHAGOGASTRODUODENOSCOPY);  Surgeon: Jimy Katz MD;  Location: [facility] ENDO;  Service: Endoscopy;  Laterality: N/A;    FLUOROSCOPY N/A 7/24/2020    Procedure: TIPS revision;  Surgeon: Martell Jasmine MD;  Location: [facility] CATH LAB;  Service: General;  Laterality: N/A;    LIVER BIOPSY      THORACENTESIS Left 1/20/2020    Procedure: Thoracentesis;  Surgeon: Angelica Hunter MD;  Location: [facility] ENDO;  Service: Pulmonary;  Laterality: Left;    TUBAL LIGATION      UPPER GASTROINTESTINAL ENDOSCOPY         Social History     Tobacco Use    Smoking status: Never    Smokeless tobacco: Never   Substance Use Topics    Alcohol use: No    Drug use: No       Family History   Problem Relation Age of Onset    Cancer Mother     Breast cancer Mother     Heart disease Father     Aneurysm Sister     Heart disease Brother     No Known Problems Maternal Grandmother     Cancer Maternal Grandfather     Sarcoidosis Sister     Colon cancer Neg Hx     Ovarian cancer Neg Hx     Thrombosis Neg Hx        Patient's Medications   New Prescriptions    No medications on file   Previous Medications    AMLODIPINE (NORVASC) 10 MG TABLET     Take 1 tablet (10 mg total) by mouth once daily.    APIXABAN (ELIQUIS) 5 MG TAB    Take 1 tablet (5 mg total) by mouth 2 (two) times daily.    CARVEDILOL (COREG) 25 MG TABLET    Take 1 tablet (25 mg total) by mouth 2 (two) times daily with meals.    DOCUSATE SODIUM (COLACE) 100 MG CAPSULE    Take 1 capsule (100 mg total) by mouth 3 (three) times daily. Hold for diarrhea    DULAGLUTIDE (TRULICITY) 4.5 MG/0.5 ML PEN INJECTOR    Inject 4.5 mg into the skin every 7 days.    FAMOTIDINE (PEPCID) 20 MG TABLET    Take 1 tablet (20 mg total) by mouth once daily.    FERROUS SULFATE (IRON ORAL)    Take by mouth.    FLUTICASONE PROPIONATE (FLONASE) 50 MCG/ACTUATION NASAL SPRAY    2 sprays (100 mcg total) by Each Nostril route once daily.    GARLIC (GARLIQUE ORAL)    Take 1 tablet by mouth once daily.    ISOSORBIDE MONONITRATE (IMDUR) 60 MG 24 HR TABLET    Take 1 tablet (60 mg total) by mouth every evening.    LEVOTHYROXINE (EUTHYROX) 137 MCG TAB TABLET    Take 1 tablet (137 mcg total) by mouth before breakfast.    LIDOCAINE (LMX) 4 % CREAM    Apply topically as needed.    MAGNESIUM OXIDE (MAG-OX) 400 MG (241.3 MG MAGNESIUM) TABLET    Take 1 tablet (400 mg total) by mouth once daily.    METOLAZONE (ZAROXOLYN) 5 MG TABLET    Take 1 tablet (5 mg total) by mouth once daily.    OMEGA-3 FATTY ACIDS/FISH OIL (FISH OIL-OMEGA-3 FATTY ACIDS) 300-1,000 MG CAPSULE    Take by mouth once daily.    ONDANSETRON (ZOFRAN) 4 MG TABLET    Take 1 tablet (4 mg total) by mouth every 8 (eight) hours as needed for Nausea.    PRAVASTATIN (PRAVACHOL) 10 MG TABLET    Take 1 tablet (10 mg total) by mouth once daily.    PULSE OXIMETER (PULSE OXIMETER) DEVICE    by Apply Externally route 2 (two) times a day. Use twice daily at 8 AM and 3 PM and [facility] the [facility] as directed.    RIFAXIMIN (XIFAXAN) 550 MG TAB    Take 1 tablet (550 mg total) by mouth 2 (two) times daily.    STOOL SOFTENER 100 MG CAPSULE    TAKE 1 CAPSULE BY MOUTH THREE TIMES DAILY  FOR DIARRHEA (HOLD FOR DIARRHEA)    TORSEMIDE (DEMADEX) 20 MG TAB    Take 2 tablets (40 mg total) by mouth once daily.   Modified Medications    No medications on file   Discontinued Medications    No medications on file       Review of Systems   Constitutional:  Positive for malaise/fatigue.   HENT: Negative.     Eyes: Negative.    Respiratory:  Positive for shortness of breath (intermittent).    Cardiovascular:  Positive for leg swelling. Negative for chest pain and palpitations.   Gastrointestinal:  Negative for nausea.   Genitourinary: Negative.    Musculoskeletal: Negative.    Skin: Negative.    Neurological: Negative.    Endo/Heme/Allergies: Negative.    Psychiatric/Behavioral: Negative.     All 12 systems otherwise negative.    Wt Readings from Last 3 Encounters:   11/16/22 81.3 kg (179 lb 3.7 oz)   11/03/22 82.8 kg (182 lb 8.7 oz)   09/21/22 78.1 kg (172 lb 2.9 oz)     Temp Readings from Last 3 Encounters:   11/03/22 98.1 °F (36.7 °C) (Temporal)   08/09/22 98 °F (36.7 °C)   08/05/22 99.5 °F (37.5 °C) (Oral)     BP Readings from Last 3 Encounters:   11/16/22 136/64   11/03/22 130/64   09/21/22 (!) 140/72     Pulse Readings from Last 3 Encounters:   11/16/22 67   11/03/22 60   09/21/22 61       /64   Pulse 67   Wt 81.3 kg (179 lb 3.7 oz)   LMP  (LMP Unknown)   SpO2 98%   BMI 33.87 kg/m²     Objective:   Physical Exam  Constitutional:       General: She is not in acute distress.     Appearance: She is well-developed. She is obese. She is not diaphoretic.   HENT:      Head: Normocephalic and atraumatic.      Mouth/Throat:      Pharynx: No oropharyngeal exudate.   Eyes:      General: No scleral icterus.        Right eye: No discharge.         Left eye: No discharge.   Cardiovascular:      Rate and Rhythm: Normal rate and regular rhythm.      Heart sounds: Murmur heard.   Pulmonary:      Effort: Pulmonary effort is normal. No respiratory distress.   Musculoskeletal:      Right lower leg: Edema present.       Left lower leg: Edema present.   Skin:     Coloration: Skin is not pale.      Findings: No erythema or rash.   Neurological:      Mental Status: She is alert and oriented to person, place, and time.   Psychiatric:         Behavior: Behavior normal.         Thought Content: Thought content normal.         Judgment: Judgment normal.       Lab Results   Component Value Date     09/16/2022    K 4.1 09/16/2022     09/16/2022    CO2 28 09/16/2022    BUN 65 (H) 09/16/2022    CREATININE 4.5 (H) 09/16/2022     09/16/2022    HGBA1C 5.3 11/03/2022    MG 2.9 (H) 08/05/2022    AST 26 09/16/2022    ALT 14 09/16/2022    ALBUMIN 3.0 (L) 09/16/2022    PROT 6.8 09/16/2022    BILITOT 0.4 09/16/2022    WBC 4.91 08/10/2022    HGB 9.5 (L) 08/10/2022    HCT 31.0 (L) 08/10/2022    MCV 95 08/10/2022     08/10/2022    INR 1.0 01/12/2022    TSH 28.708 (H) 08/03/2022    CHOL 178 08/10/2022    HDL 38 (L) 08/10/2022    LDLCALC 123.8 08/10/2022    TRIG 81 08/10/2022     (H) 08/02/2022     Assessment:      1. Chronic diastolic congestive heart failure    2. KRISTAL on CPAP    3. Type 2 diabetes mellitus with other circulatory complication, without long-term current use of insulin    4. Primary hypertension    5. Shortness of breath    6. Pericardial effusion    7. CKD (chronic kidney disease) stage 4, GFR 15-29 ml/min    8. History of COVID-19    9. Elevated d-dimer with indeterminate perfusion scan for PE          Plan:   1. Chronic diastolic HF - ;  --> 704 --> 767 --> 477 --> 347  - ECHO 8/2022 with normal bi V function, grade 2 DD, mild to mod TR, small peric effusion, PASP > 44mmHg.   - daily weights, low salt diet   - daily compression stockings  metolazone every 3rd day -- changed to Torsemide 40mg due to worsening renal function  - repeat CMP, BNP , Mg today   - with > 12 lb weight gain and edema increase Torsemide   - if ongoing weight gain/swelling not improved with PO diuretics needs to go to ER  and have IV diuresis and admission     2. HTN   - cont meds and titrate    3. HLD - worse LDL  - cont Pravastatin 10mg     4. DM2 with gastroparesis A1c - 5.9 --> 5.6  - cont meds per PCP    5. H/o Cirrhosis s/p TIPS with edema, low albumin  - cont tx per PCP/Hepatology  - liver function improved does not need transplant  - had EGD concern for gastroparesis    6. KRISTAL  - needs CPAP, was not using it before     7. Obesity losing weight 217 --197 --> 187  ---> 198 lbs --> BMI 40 - 212 lbs --> BMI 33 - 179 lbs.   - needs weight loss with diet/exercise     8. Pericardial effusion  - small, sec to cirrhosis/CHF  - no tamponade clinically on last ECHO    9. CKD 4 with proteinuria  - diuretics adjusted   - cont f/u with nephro     10. Elevated D dimer, int prob for PE  - repeat D dimer - elevated   - cont Eliquis     Thank you for allowing me to participate in this patient's care. Please do not hesitate to contact me with any questions or concerns. Consult note has been forwarded to the referral physician.

## 2022-11-21 ENCOUNTER — PATIENT MESSAGE (OUTPATIENT)
Dept: OTHER | Facility: OTHER | Age: 67
End: 2022-11-21
Payer: MEDICARE

## 2022-11-27 DIAGNOSIS — K76.82 HEPATIC ENCEPHALOPATHY: ICD-10-CM

## 2022-11-28 ENCOUNTER — PATIENT MESSAGE (OUTPATIENT)
Dept: GASTROENTEROLOGY | Facility: CLINIC | Age: 67
End: 2022-11-28
Payer: MEDICARE

## 2022-11-30 ENCOUNTER — TELEPHONE (OUTPATIENT)
Dept: HEPATOLOGY | Facility: CLINIC | Age: 67
End: 2022-11-30
Payer: MEDICARE

## 2022-11-30 NOTE — TELEPHONE ENCOUNTER
----- Message from Riya Wharton sent at 11/30/2022  2:58 PM CST -----  Type:  RX Refill Request    Who Called: pt  Refill or New Rx:refill  RX Name and Strength:rifAXIMin (XIFAXAN) 550 mg Tab      How is the patient currently taking it? (ex. 1XDay):2x  Is this a 30 day or 90 day RX:90  Preferred Pharmacy with phone number:  [address] [address] [place] [id number] [facility] [address]  Phone: [phone] Fax: [phone]        Local or Mail Order:local  Ordering Provider:  Would the patient rather a call back or a response via [facility]? call  Best Call Back Number:[phone]  Additional Information:

## 2022-12-05 DIAGNOSIS — K76.82 HEPATIC ENCEPHALOPATHY: ICD-10-CM

## 2022-12-06 DIAGNOSIS — U07.1 COVID-19: ICD-10-CM

## 2022-12-06 DIAGNOSIS — I12.9 PARENCHYMAL RENAL HYPERTENSION, STAGE 1 THROUGH STAGE 4 OR UNSPECIFIED CHRONIC KIDNEY DISEASE: ICD-10-CM

## 2022-12-06 DIAGNOSIS — E11.59 TYPE 2 DIABETES MELLITUS WITH OTHER CIRCULATORY COMPLICATION, WITHOUT LONG-TERM CURRENT USE OF INSULIN: ICD-10-CM

## 2022-12-06 DIAGNOSIS — E03.4 HYPOTHYROIDISM DUE TO ACQUIRED ATROPHY OF THYROID: ICD-10-CM

## 2022-12-06 DIAGNOSIS — R79.89 ELEVATED D-DIMER: ICD-10-CM

## 2022-12-07 RX ORDER — FAMOTIDINE 20 MG/1
20 TABLET, FILM COATED ORAL DAILY
Qty: 90 TABLET | Refills: 1 | Status: SHIPPED | OUTPATIENT
Start: 2022-12-07 | End: 2023-01-23

## 2022-12-07 RX ORDER — LEVOTHYROXINE SODIUM 137 UG/1
137 TABLET ORAL
Qty: 90 TABLET | Refills: 1 | Status: SHIPPED | OUTPATIENT
Start: 2022-12-07 | End: 2023-02-13 | Stop reason: SDUPTHER

## 2022-12-07 RX ORDER — CARVEDILOL 25 MG/1
25 TABLET ORAL 2 TIMES DAILY WITH MEALS
Qty: 180 TABLET | Refills: 1 | Status: SHIPPED | OUTPATIENT
Start: 2022-12-07 | End: 2023-02-13 | Stop reason: SDUPTHER

## 2022-12-07 RX ORDER — ISOSORBIDE MONONITRATE 60 MG/1
60 TABLET, EXTENDED RELEASE ORAL NIGHTLY
Qty: 90 TABLET | Refills: 1 | Status: SHIPPED | OUTPATIENT
Start: 2022-12-07 | End: 2023-02-07 | Stop reason: SDUPTHER

## 2022-12-07 RX ORDER — DULAGLUTIDE 4.5 MG/.5ML
4.5 INJECTION, SOLUTION SUBCUTANEOUS
Qty: 12 PEN | Refills: 1 | Status: SHIPPED | OUTPATIENT
Start: 2022-12-07 | End: 2023-05-09 | Stop reason: SDUPTHER

## 2022-12-21 DIAGNOSIS — U07.1 COVID-19: ICD-10-CM

## 2022-12-21 DIAGNOSIS — R79.89 ELEVATED D-DIMER: ICD-10-CM

## 2022-12-21 NOTE — TELEPHONE ENCOUNTER
----- Message from Andra Astorga sent at 12/21/2022  2:07 PM CST -----  Contact: Diamond  Type:  RX Refill Request    Who Called:Diamond  Refill or New Rx:Refill  RX Name and Strength:apixaban (ELIQUIS) 5 mg Tab  How is the patient currently taking it? (ex. 1XDay):2 times a day  Is this a 30 day or 90 day RX:90  Preferred Pharmacy with phone number:  [address] [address] [place] [id number] [facility] [address]  Phone: [phone] Fax: [phone]  Local or Mail Order:local  Ordering Provider:Dr. Perrin  Would the patient rather a call back or a response via [facility]? Call back  Best Call Back Number:[phone]  Additional Information: patient stated she will be out of the medication on tomorrow      Thanks  JENNIFER

## 2023-01-09 DIAGNOSIS — E11.59 TYPE 2 DIABETES MELLITUS WITH OTHER CIRCULATORY COMPLICATION, WITHOUT LONG-TERM CURRENT USE OF INSULIN: ICD-10-CM

## 2023-01-09 RX ORDER — DULAGLUTIDE 4.5 MG/.5ML
4.5 INJECTION, SOLUTION SUBCUTANEOUS
Qty: 12 PEN | Refills: 1 | OUTPATIENT
Start: 2023-01-09 | End: 2023-06-26

## 2023-01-10 ENCOUNTER — TELEPHONE (OUTPATIENT)
Dept: HEPATOLOGY | Facility: CLINIC | Age: 68
End: 2023-01-10
Payer: MEDICARE

## 2023-01-10 NOTE — TELEPHONE ENCOUNTER
Called [phone] and asked for Mrs. Diamond Das. Person stated this is not Diamond Das and hung up the phone. I tried to call [phone] back in the effort of trying to find out if she knew Diamond Das. No answer, phone went to the answering machine. No way to contact patient due to incorrect number on file.       ----- Message from Nick Caldera sent at 1/10/2023  8:27 AM CST -----  Contact: [phone]  Patient would like to consult with a nurse in regards to her scheduled virtual visit. She stated she was not able to login. Please call back at [phone]. Thanks booker

## 2023-01-26 ENCOUNTER — PATIENT MESSAGE (OUTPATIENT)
Dept: GASTROENTEROLOGY | Facility: CLINIC | Age: 68
End: 2023-01-26
Payer: MEDICARE

## 2023-01-26 ENCOUNTER — TELEPHONE (OUTPATIENT)
Dept: INTERNAL MEDICINE | Facility: CLINIC | Age: 68
End: 2023-01-26
Payer: MEDICARE

## 2023-01-26 NOTE — TELEPHONE ENCOUNTER
Pt's daughter is requesting a refill for Rifaximin . I informed her that Dr Perrin isn't the Doctor that writes this Rx . I told her to call the pharmacy and have them contact , Gloria Snider, because she is that last one to issue this prescription for Ms Fields.

## 2023-01-26 NOTE — TELEPHONE ENCOUNTER
----- Message from Thuy Radford sent at 1/23/2023 11:31 AM CST -----  Pt is requesting a call back in regards to some of her medication that need refills. Pt can be reached at [phone] (bkks)

## 2023-02-09 DIAGNOSIS — I50.9 CONGESTIVE HEART FAILURE, UNSPECIFIED HF CHRONICITY, UNSPECIFIED HEART FAILURE TYPE: Primary | ICD-10-CM

## 2023-02-13 ENCOUNTER — HOSPITAL ENCOUNTER (OUTPATIENT)
Dept: CARDIOLOGY | Facility: HOSPITAL | Age: 68
Discharge: HOME | End: 2023-02-13
Attending: INTERNAL MEDICINE
Payer: MEDICARE

## 2023-02-13 ENCOUNTER — OFFICE VISIT (OUTPATIENT)
Dept: CARDIOLOGY | Facility: CLINIC | Age: 68
End: 2023-02-13
Payer: MEDICARE

## 2023-02-13 VITALS
OXYGEN SATURATION: 98 % | WEIGHT: 181.69 LBS | SYSTOLIC BLOOD PRESSURE: 134 MMHG | HEART RATE: 70 BPM | DIASTOLIC BLOOD PRESSURE: 68 MMHG | BODY MASS INDEX: 34.32 KG/M2

## 2023-02-13 DIAGNOSIS — E11.59 TYPE 2 DIABETES MELLITUS WITH OTHER CIRCULATORY COMPLICATION, WITHOUT LONG-TERM CURRENT USE OF INSULIN: ICD-10-CM

## 2023-02-13 DIAGNOSIS — I31.39 PERICARDIAL EFFUSION: ICD-10-CM

## 2023-02-13 DIAGNOSIS — K31.84 DIABETIC GASTROPARESIS ASSOCIATED WITH TYPE 2 DIABETES MELLITUS: ICD-10-CM

## 2023-02-13 DIAGNOSIS — I10 HYPERTENSION, UNSPECIFIED TYPE: ICD-10-CM

## 2023-02-13 DIAGNOSIS — N18.4 CKD (CHRONIC KIDNEY DISEASE) STAGE 4, GFR 15-29 ML/MIN: ICD-10-CM

## 2023-02-13 DIAGNOSIS — E11.43 DIABETIC GASTROPARESIS ASSOCIATED WITH TYPE 2 DIABETES MELLITUS: ICD-10-CM

## 2023-02-13 DIAGNOSIS — R06.02 SHORTNESS OF BREATH: ICD-10-CM

## 2023-02-13 DIAGNOSIS — G47.33 OSA ON CPAP: ICD-10-CM

## 2023-02-13 DIAGNOSIS — U07.1 COVID-19: ICD-10-CM

## 2023-02-13 DIAGNOSIS — Z86.16 HISTORY OF COVID-19: ICD-10-CM

## 2023-02-13 DIAGNOSIS — R79.89 ELEVATED D-DIMER: ICD-10-CM

## 2023-02-13 DIAGNOSIS — E03.4 HYPOTHYROIDISM DUE TO ACQUIRED ATROPHY OF THYROID: ICD-10-CM

## 2023-02-13 DIAGNOSIS — I50.9 CONGESTIVE HEART FAILURE, UNSPECIFIED HF CHRONICITY, UNSPECIFIED HEART FAILURE TYPE: ICD-10-CM

## 2023-02-13 DIAGNOSIS — I50.32 CHRONIC DIASTOLIC CONGESTIVE HEART FAILURE: Primary | ICD-10-CM

## 2023-02-13 DIAGNOSIS — I10 PRIMARY HYPERTENSION: ICD-10-CM

## 2023-02-13 DIAGNOSIS — I12.9 PARENCHYMAL RENAL HYPERTENSION, STAGE 1 THROUGH STAGE 4 OR UNSPECIFIED CHRONIC KIDNEY DISEASE: ICD-10-CM

## 2023-02-13 DIAGNOSIS — I50.32 CHRONIC DIASTOLIC HEART FAILURE: ICD-10-CM

## 2023-02-13 PROCEDURE — 1157F ADVNC CARE PLAN IN RCRD: CPT | Mod: CPTII,S$GLB,, | Performed by: INTERNAL MEDICINE

## 2023-02-13 PROCEDURE — 99499 UNLISTED E&M SERVICE: CPT | Mod: S$GLB,,, | Performed by: INTERNAL MEDICINE

## 2023-02-13 PROCEDURE — 93005 ELECTROCARDIOGRAM TRACING: CPT | Mod: PO

## 2023-02-13 PROCEDURE — 93010 ELECTROCARDIOGRAM REPORT: CPT | Mod: ,,, | Performed by: INTERNAL MEDICINE

## 2023-02-13 PROCEDURE — 1160F RVW MEDS BY RX/DR IN RCRD: CPT | Mod: CPTII,S$GLB,, | Performed by: INTERNAL MEDICINE

## 2023-02-13 PROCEDURE — 3288F FALL RISK ASSESSMENT DOCD: CPT | Mod: CPTII,S$GLB,, | Performed by: INTERNAL MEDICINE

## 2023-02-13 PROCEDURE — 1101F PT FALLS ASSESS-DOCD LE1/YR: CPT | Mod: CPTII,S$GLB,, | Performed by: INTERNAL MEDICINE

## 2023-02-13 PROCEDURE — 99214 PR OFFICE/OUTPT VISIT, EST, LEVL IV, 30-39 MIN: ICD-10-PCS | Mod: S$GLB,,, | Performed by: INTERNAL MEDICINE

## 2023-02-13 PROCEDURE — 3008F PR BODY MASS INDEX (BMI) DOCUMENTED: ICD-10-PCS | Mod: CPTII,S$GLB,, | Performed by: INTERNAL MEDICINE

## 2023-02-13 PROCEDURE — 99214 OFFICE O/P EST MOD 30 MIN: CPT | Mod: S$GLB,,, | Performed by: INTERNAL MEDICINE

## 2023-02-13 PROCEDURE — 3075F SYST BP GE 130 - 139MM HG: CPT | Mod: CPTII,S$GLB,, | Performed by: INTERNAL MEDICINE

## 2023-02-13 PROCEDURE — 1101F PR PT FALLS ASSESS DOC 0-1 FALLS W/OUT INJ PAST YR: ICD-10-PCS | Mod: CPTII,S$GLB,, | Performed by: INTERNAL MEDICINE

## 2023-02-13 PROCEDURE — 99999 PR PBB SHADOW E&M-EST. PATIENT-LVL III: ICD-10-PCS | Mod: PBBFAC,,, | Performed by: INTERNAL MEDICINE

## 2023-02-13 PROCEDURE — 1125F PR PAIN SEVERITY QUANTIFIED, PAIN PRESENT: ICD-10-PCS | Mod: CPTII,S$GLB,, | Performed by: INTERNAL MEDICINE

## 2023-02-13 PROCEDURE — 3078F PR MOST RECENT DIASTOLIC BLOOD PRESSURE < 80 MM HG: ICD-10-PCS | Mod: CPTII,S$GLB,, | Performed by: INTERNAL MEDICINE

## 2023-02-13 PROCEDURE — 1159F MED LIST DOCD IN RCRD: CPT | Mod: CPTII,S$GLB,, | Performed by: INTERNAL MEDICINE

## 2023-02-13 PROCEDURE — 1159F PR MEDICATION LIST DOCUMENTED IN MEDICAL RECORD: ICD-10-PCS | Mod: CPTII,S$GLB,, | Performed by: INTERNAL MEDICINE

## 2023-02-13 PROCEDURE — 3008F BODY MASS INDEX DOCD: CPT | Mod: CPTII,S$GLB,, | Performed by: INTERNAL MEDICINE

## 2023-02-13 PROCEDURE — 1160F PR REVIEW ALL MEDS BY PRESCRIBER/CLIN PHARMACIST DOCUMENTED: ICD-10-PCS | Mod: CPTII,S$GLB,, | Performed by: INTERNAL MEDICINE

## 2023-02-13 PROCEDURE — 1125F AMNT PAIN NOTED PAIN PRSNT: CPT | Mod: CPTII,S$GLB,, | Performed by: INTERNAL MEDICINE

## 2023-02-13 PROCEDURE — 3075F PR MOST RECENT SYSTOLIC BLOOD PRESS GE 130-139MM HG: ICD-10-PCS | Mod: CPTII,S$GLB,, | Performed by: INTERNAL MEDICINE

## 2023-02-13 PROCEDURE — 93010 EKG 12-LEAD: ICD-10-PCS | Mod: ,,, | Performed by: INTERNAL MEDICINE

## 2023-02-13 PROCEDURE — 99499 RISK ADDL DX/OHS AUDIT: ICD-10-PCS | Mod: S$GLB,,, | Performed by: INTERNAL MEDICINE

## 2023-02-13 PROCEDURE — 99999 PR PBB SHADOW E&M-EST. PATIENT-LVL III: CPT | Mod: PBBFAC,,, | Performed by: INTERNAL MEDICINE

## 2023-02-13 PROCEDURE — 3078F DIAST BP <80 MM HG: CPT | Mod: CPTII,S$GLB,, | Performed by: INTERNAL MEDICINE

## 2023-02-13 PROCEDURE — 3288F PR FALLS RISK ASSESSMENT DOCUMENTED: ICD-10-PCS | Mod: CPTII,S$GLB,, | Performed by: INTERNAL MEDICINE

## 2023-02-13 PROCEDURE — 1157F PR ADVANCE CARE PLAN OR EQUIV PRESENT IN MEDICAL RECORD: ICD-10-PCS | Mod: CPTII,S$GLB,, | Performed by: INTERNAL MEDICINE

## 2023-02-13 RX ORDER — TORSEMIDE 20 MG/1
40 TABLET ORAL DAILY
Qty: 180 TABLET | Refills: 1 | Status: SHIPPED | OUTPATIENT
Start: 2023-02-13 | End: 2023-02-14 | Stop reason: SDUPTHER

## 2023-02-13 RX ORDER — AMLODIPINE BESYLATE 10 MG/1
10 TABLET ORAL DAILY
Qty: 90 TABLET | Refills: 3 | Status: SHIPPED | OUTPATIENT
Start: 2023-02-13 | End: 2023-11-24

## 2023-02-13 RX ORDER — LEVOTHYROXINE SODIUM 137 UG/1
137 TABLET ORAL
Qty: 90 TABLET | Refills: 0 | Status: SHIPPED | OUTPATIENT
Start: 2023-02-13 | End: 2023-05-24

## 2023-02-13 RX ORDER — CARVEDILOL 25 MG/1
25 TABLET ORAL 2 TIMES DAILY WITH MEALS
Qty: 180 TABLET | Refills: 1 | Status: SHIPPED | OUTPATIENT
Start: 2023-02-13 | End: 2023-09-25 | Stop reason: SDUPTHER

## 2023-02-13 RX ORDER — ISOSORBIDE MONONITRATE 120 MG/1
120 TABLET, EXTENDED RELEASE ORAL NIGHTLY
Qty: 90 TABLET | Refills: 1 | Status: SHIPPED | OUTPATIENT
Start: 2023-02-13 | End: 2023-11-24

## 2023-02-13 RX ORDER — METOLAZONE 5 MG/1
5 TABLET ORAL
Qty: 12 TABLET | Refills: 0 | Status: SHIPPED | OUTPATIENT
Start: 2023-02-13 | End: 2023-02-22 | Stop reason: SDUPTHER

## 2023-02-13 NOTE — TELEPHONE ENCOUNTER
Last appt: 11/3/22    Called pt and she was requesting her thyroid medication.  Pharmacy verified.    Med pended

## 2023-02-13 NOTE — PROGRESS NOTES
Subjective:   Patient ID:  Diamond Das is a 67 y.o. female who presents for cardiac consult of No chief complaint on file.      The patient came in today for cardiac consult of No chief complaint on file.    Referring Physician: Andrey Perrin MD   Reason for consult: HTN, CHF    Diamond Das is a 67 y.o. female with medical conditions diastolic CHF, pericardial effusion, HTN, HLD, DM2, cryptogenic cirrhosis, s/p TIPS, ascites presents for CV follow up.     Pt of Dr. Shaw, last seen 2/9/18  No smoking/drinking  Last echo in  normal EF and RVF, grade II DD.  EKG today NSR poor R progression on anterior leads  Chronic weakness and fatigue. Had PNA in   Pain on lower chest bilateral for few months, worse with exercise, resolved after resting. Deep breathing made the pain worse. No pain at sleep. Associated dyspnea, N/V, palpitation and dizziness. No radiation. ASA and tylenol not really helped the pain.    3/1/19  She has been having more ascites and concern for cardiac etiologies. She was also started on Reglan, concern for prolonged Qtc, recent Qtc 475 ms. Has been feeling better with reglan and lactulose. Is dyspneic, chronically on oxygen at night and also has portable oxygen when she has to do a lot walking. Takes lasix 40 mg once/day now, was on 80mg daily. Active at home, dresses and bathes her self. Cant walk or stand to too long.     8/26/19  2D ECHO with grade 1 DD, normal Bi V function, Small pericardial effusion without tamponade. She has mild SOB at times with edema but improved lately. She will see hepatology as well. No Chest pain.   Has been taking lasix extra doses PRN and improved symptoms.     10/16/19  [facility] hosp ER follow up  - Non toxic appearing elderly female here for worsening NICHOLE and shortness of breath. Known h/o CHF, f/b cardiologist in [facility]. Work up is c/w acute on chronic chf exacerbation. She, unfortunately, does not follow her weights so not clear if  significant change recently. CXR with likely signs of volume overload. She was given diuresis and had [place] score of 0. Felt improved and prefers discharge with close OP f/u with her cardiologist. She was ambulated and did not significantly desat and tolerated it well.  No she is on lasix, feels better. BNP today is 453, needs to double lasix next 3-4 days for further diuresis.     11/18/19   Increased diuretics last visit. She felt better then and had weight loss as well. Still has NICHOLE but improved. She went to [facility] 2 weeks ago - presented to the ED for fall secondary to hypoglycemic episode. Pt has had 3 previous episodes of hypoglycemia requiring hospitalizations in the past with sxs of lightheadedness. CT head was unremarkable, CXR revealed improvement in CHF compared to previous study. Due to timeline of fall with insulin administration and Hx of cryptogenic cirrhosis, it is likely that pt could not compensate after receiving her insulin dosing.  BP meds were stopped, will get labs and restart lisinopril.     1/6/20  Breathing has been stable with LE edema. Last visit she doubled lasix for 3 days with min improvement. Has not had much relief lately.   ECG - NSR, poor RWP, lateral TWI    2/17/20  BNP was 863. She had recent thoracentesis as well. She has also been referred to hepatology as concern for TIPS not working?. She remains SOB will add metolazone. Discussed if kidneys and liver are worse will be difficult to manage volume status.     5/18/20  She has been stable but still has SOB with leg swelling. Symptoms have gotten a bit worse a few weeks ago. She has been taking lasix extra dose some times. Last BNP elevated 863, will need to increase lasix and metolazone and repeat labs this week. Will add K and Mg supplements.    9/28/20  BNP was 428 at last visit, which is improved from 7 months prior. She saw PCP today with worsening NICHOLE and edema. Increased lasix and metolazone last visit.      Ref Range &  Units 4mo ago  (5/21/20) 7mo ago  (2/10/20) 8mo ago  (1/10/20) 10mo ago  (11/18/19) 11mo ago  (10/25/19) 11mo ago  (10/16/19)   BNP 0 - 99 pg/mL 413High   863High  CM  1,010High  CM  793High  CM  291High  CM  453High  CM      10/19/20  Pt was admitted 10/9-10/15 for CHF exac with edema, diuresed aggressively. She also had nephro consult and hepatology eval for further treatment and possible liver transplant. She has upcoming appt for CT Renal biopsy. Autoimmune workup planned. ECHO with small pericardial effusion, no tamponde, grade 1 DD.   BNP improved to 129 two weeks prior. She is taking bumex BID. SHe is taking metolazone every other day. Weight is stable.     11/16/20  She has been on Bumex with metolazone. She had recent visit with Dr. Hernandez -  Renal biopsy revealed diabetic nephropathy and hypertensive nephropathy.  No autoimmune disorder detected. She may be candidate for RRT. She will have further liver workup and be liver and kidney transplant. Will need ischemic work up, had nuclear stress in past, none recently.     12/28/20  Nuclear stress test pending. Saw Dr. Contreras recently, started on Lisinopril 5mg. She feels well, no CP/SOB. Stress test is pending. Is walking more and active.     2/15/21  Nuclear stress neg in Dec 30th. BP overall stable. 189/96 today but has not taken meds yet and woke up late. BP was normal yesterday. Liver function has improved, does not need liver transplant, discussed she can remain kidney transplant stable CV status    5/4/21  BP elevated 160/80s. HR 70s. She has some salty food a few days ago. She felt extra bloated and took an diuretic. She has stable NICHOLE.     5/17/21   Ref Range & Units 13 d ago   (5/4/21) 7 mo ago   (10/11/20) 7 mo ago   (10/9/20) 7 mo ago   (10/5/20) 12 mo ago   (5/21/20) 1 yr ago   (2/10/20)   BNP 0 - 99 pg/mL 324High   129High  CM  351High  CM  349High  CM  413High  CM  863High  CM    Comment: Values of less than 100 pg/ml are consistent with non-CHF  populations.       BNP 2 weeks ago elevated 324, discussed double dose of bumex for 3 days and monitor BP and weights, avoid salty food. Kidney function is slightly worse, needs to follow up with nephrologist as well asap.  216 lbs to 196 lbs today. BP and hR stable today. She had an episode of flutter one night, lasted about 15 min    8/23/21  Last visit - BNP has improved due to less fluid, kidney function is worse, creatinine is 3.3, need to follow up with nephrologist asap. Decrease Bumex to only once a day. Monitor pressures and weights if increased weight with swelling can go back to bumex twice a day.     She saw Dr. Jacobo with nephro;  changed Bumex to Torsemide 40mg daily  BP elevated today, meds changed recently.     10/4/21  BP and HR well controlled today. Weight 190 lbs improved from 197 lbs.     12/220 hosp  DC summary - sent from hepatology clinic on account of hypercalcemia.  The patient went for her Rifaximin refill and was told to come in for high Clacium.  She admits to dry cough of some months, exertional dyspnea which is not worsening, about 2-3 weeks of weakness, constipation and further reduction of her oliguric state from roughly 500cc urine daily to about 125 cc/urine daily. But no chest pain, vomiting, diarrhea or reduced oral intake. She denies fever.  She is on Calcitriol, Carol D3 1000 U QD and calcium containing multivitamin.  She denies any bone pain or weight loss, infact she has just started to gain some weight. She denies orthopnea.  Serum albumin 2.4, calcium 12.6, corrected calcium 13.9     Hospital Course:   12/7 patient in good spirits, calcium slightly improved but still high. Not for discharge today. Otherwise stable  12/8 patient seen, awake, alert, vague abdominal pain, semi hard stools yesterday, no emesis , waiting for her labs this morning to determine if she can discharge.  Update: corrected calcium is 11.6, she is cleared for discharge by Nephrology who have made an  appointment for her to see them in the clinic. She is cleared to resume her Torsemide and Benicar. She is not to take any Vit D. Calcitriol, oral calcium supplements and this has been explained in details to her very well.    12/20/21  She is s/p hosp DC for hyperCA, has been restarted on diuretics and BP meds and CA supplements stopped. Her diurertics was stopped last week due to lack of appetite and more abd pain. Her weight has low as 173 lbs, but now at 195 lbs.     Hosp DC summar   66 year old female who was admitted to [facility] for COVID-19 and decompensated CHF. CXR shows evidence of volume overload and was diuresed. Echocaridogram shows a preserved EF with diastolic dysfunction. Will repeat CXR on 1/12/22. For COVID-19 she received MVI, ascorbic acid, and dexamethasone. Currently on 3L nasal cannula.  1/12/22  D-dimer 7 today. V/Q scan indeterminant for pulmonary embolism. Could reflect fluid within the fissure as well. Started on heparin infusion. CXR today still shows fluid.  Will change diuretics to Bumex with Metolazone for one dose. Closely monitor kidney function.   1/13/22  Still with decrease urine ouput. Nephrology consulted to assist with diuretics. Bumex changed to 2mg IV every 8 hours. 500mg diuril twice a day has been added for 48 hours. No intervention needed for hyperkalemia.   1/14  Reports sx improvement. Ready to go home but reports right breast swelling since admission. Lower ext edema and dyspnea improving. H/h trending down with no overt s/s bleeding. Agreeable to blood transfusion. Nephrology consulted. Consider cards consult  1/15  Dyspnea and swelling symptoms improved. No transfusion reaction after blood transfusion. Denies further breast swelling. Does not qualify for home o2. Followed o/p with Dr. Barkley, pulmonology. Advised to f/u o/p, as elevated d-dimer in setting of covid. Perfusion scan indeterminant and started on heparin gtt. Transition to eliquis on  d/c.  Neph consulted for recs regarding diuresis and hyperk in ckd and chf. Metolazone started. Hyperk resolved. Cr stable but elevated and will need further adjusting of meds o/p. Advised strict na and fluid restriction. Neph recommending tolerance of higher cr while also need for diuresising for chfx  Hyperglycemia on systemic steroids. Hba1c <5. Discontinued steroids and managed hyperglycemia with insulin.     2/1/22  Hosp follow up for COVID 19, PE and volume overload. She is taking Torsemide 40mg with metolazone doing well. She is taking Eliquis 5 BID without issues, told to continue will repeat D dimer likely treat at least 3 months. She has been losing weight now weighs 187 lbs.     6/20/22  BP and HR well controlled today. BMI 37 - 198 lbs. Her weight is increased from prior visit, she has more edema now. She went to ER a few weeks ago for swelling/edema was told to stop diuretic due to worsening renal function. But then she has restarted Torsemide 20mg    8/1/22   She went to ER after last visit for hyperkalemia 6.5, improved recently. Had EGD past month may need gastric emptying study for gasteroparesis. BMI 40 - 212 lbs, BP and HR well controlled today. Has gained appx 12-14 lbs since last visit. She just got back from , did not have any issues with fluid then. She started gaining weight since.     11/16/22  ECHO 8/2022 with normal bi V function, grade 2 DD, mild to mod TR, small peric effusion, PASP > 44mmHg. BP and HR well controlled today. BMI 33 - 179 lbs.     2/13/23    Recent Readings 2/13/2023 2/10/2023 2/8/2023 2/7/2023 2/4/2023   SBP (mmHg) 166 153 154 144 153   DBP (mmHg) 71 76 74 70 72   Pulse 69 64 67 70 62     Bp consisently elevated on digital HTN readings. Today BP and HR well controlled. BMI 34 - 181 lbs. BMI 34 - 181 lbs.     Patient feels no chest pain, no PND, no palpitation, no dizziness, no syncope, no CNS symptoms.    Patient has dec exercise tolerance.    Patient is compliant with  medications.    Results for orders placed during the hospital encounter of 08/08/22    Echo    Interpretation Summary  · Concentric remodeling and normal systolic function.  · Moderate left atrial enlargement.  · The estimated ejection fraction is 60%.  · Grade II left ventricular diastolic dysfunction.  · Mild right atrial enlargement.  · Mild to moderate tricuspid regurgitation.  · There is pulmonary hypertension.  · Small pericardial effusion.  · Mild mitral regurgitation.  The estimated PA systolic pressure is greater than 44 mmHg.      Results for orders placed during the hospital encounter of 12/30/20    Nuclear Stress - Cardiology Interpreted    Interpretation Summary    Normal myocardial perfusion scan. There is no evidence of myocardial ischemia or infarction.    The gated perfusion images showed an ejection fraction of 64% at rest. The gated perfusion images showed an ejection fraction of 65% post stress.    The EKG portion of this study is negative for ischemia.    The patient reported no chest pain during the stress test.          Past Medical History:   Diagnosis Date    Abdominal pain 5/10/2018    Acquired hypothyroidism 9/28/2020    Acute on chronic diastolic congestive heart failure 12/7/2017    ROD (acute kidney injury) 1/3/2019    Anasarca 10/13/2020    Arthritis of knee 1/31/2022    Ascites     Ascites 9/2/2016    Bilateral knee pain 4/22/2022    Bilateral lower extremity edema 9/2/2016    Breast pain, left 1/4/2018    Cataract     Chest pain, atypical 2/9/2018    CHF (congestive heart failure)     Cirrhosis     Cirrhosis 1/3/2017    Cough     Diabetes mellitus     Diabetes mellitus, type 2     Diarrhea 9/4/2019    Edema 3/19/2018    Encounter for long-term (current) use of medications 3/19/2018    Epigastric pain 2/11/2020    Gastroparesis     GERD (gastroesophageal reflux disease)     Hepatic encephalopathy 6/19/2018    Pt has history of cirrhosis, seen at [facility] on 6/7/18. Currently on lactulose.     Hyperlipidemia     Hypertension     Hypotension 2/21/2019    Immunization deficiency 2/10/2020    Leg cramps 5/20/2022    Liver disease     Lumbar strain 4/27/2018    Lumbar strain, sequela 2/17/2020    Macular degeneration     Medication reaction 11/17/2016    Other and unspecified hyperlipidemia 6/11/2012 11:11:32 AM    [facility] Historical - Endocrine/Metabolic/Immune: Hyperlipidemia-No Additional Notes    Pneumonia of right middle lobe due to infectious organism 12/19/2017    Renal disorder     Retinopathy due to secondary diabetes mellitus     Screening for malignant neoplasm of cervix 12/14/2017    Skin lesion 12/20/2021    Sleep apnea     Strain of lumbar region 10/4/2021    Thyroid disease     Type 2 diabetes mellitus with hyperglycemia 10/10/2020       Past Surgical History:   Procedure Laterality Date    CATARACT EXTRACTION      CHOLECYSTECTOMY      COLONOSCOPY N/A 9/4/2019    Procedure: COLONOSCOPY;  Surgeon: Marnie Elmore MD;  Location: [facility];  Service: Endoscopy;  Laterality: N/A;    ERCP      ESOPHAGOGASTRODUODENOSCOPY N/A 7/28/2022    Procedure: EGD (ESOPHAGOGASTRODUODENOSCOPY);  Surgeon: Jimy Katz MD;  Location[facility];  Service: Endoscopy;  Laterality: N/A;    FLUOROSCOPY N/A 7/24/2020    Procedure: TIPS revision;  Surgeon: Martell Jasmine MD;  Location: [facility] CATH LAB;  Service: General;  Laterality: N/A;    LIVER BIOPSY      THORACENTESIS Left 1/20/2020    Procedure: Thoracentesis;  Surgeon: Angelica Hunter MD;  Location: [facility] ENDO;  Service: Pulmonary;  Laterality: Left;    TUBAL LIGATION      UPPER GASTROINTESTINAL ENDOSCOPY         Social History     Tobacco Use    Smoking status: Never    Smokeless tobacco: Never   Substance Use Topics    Alcohol use: No    Drug use: No       Family History   Problem Relation Age of Onset    Cancer Mother     Breast cancer Mother     Heart disease Father     Aneurysm Sister     Heart disease Brother     No Known Problems Maternal  Grandmother     Cancer Maternal Grandfather     Sarcoidosis Sister     Colon cancer Neg Hx     Ovarian cancer Neg Hx     Thrombosis Neg Hx        Patient's Medications   New Prescriptions    No medications on file   Previous Medications    AMLODIPINE (NORVASC) 10 MG TABLET    Take 1 tablet by mouth once daily    APIXABAN (ELIQUIS) 5 MG TAB    Take 1 tablet (5 mg total) by mouth 2 (two) times daily.    CARVEDILOL (COREG) 25 MG TABLET    Take 1 tablet (25 mg total) by mouth 2 (two) times daily with meals.    DOCUSATE SODIUM (COLACE) 100 MG CAPSULE    Take 1 capsule (100 mg total) by mouth 3 (three) times daily. Hold for diarrhea    DULAGLUTIDE (TRULICITY) 4.5 MG/0.5 ML PEN INJECTOR    Inject 4.5 mg into the skin every 7 days.    FAMOTIDINE (PEPCID) 20 MG TABLET    Take 1 tablet by mouth once daily    FERROUS SULFATE (IRON ORAL)    Take by mouth.    FLUTICASONE PROPIONATE (FLONASE) 50 MCG/ACTUATION NASAL SPRAY    2 sprays (100 mcg total) by Each Nostril route once daily.    GARLIC (GARLIQUE ORAL)    Take 1 tablet by mouth once daily.    ISOSORBIDE MONONITRATE (IMDUR) 120 MG 24 HR TABLET    Take 1 tablet (120 mg total) by mouth every evening.    LEVOTHYROXINE (EUTHYROX) 137 MCG TAB TABLET    Take 1 tablet (137 mcg total) by mouth before breakfast.    LIDOCAINE (LMX) 4 % CREAM    Apply topically as needed.    MAGNESIUM OXIDE (MAG-OX) 400 MG (241.3 MG MAGNESIUM) TABLET    Take 1 tablet (400 mg total) by mouth once daily.    METOLAZONE (ZAROXOLYN) 5 MG TABLET    Take 1 tablet (5 mg total) by mouth once daily.    OMEGA-3 FATTY ACIDS/FISH OIL (FISH OIL-OMEGA-3 FATTY ACIDS) 300-1,000 MG CAPSULE    Take by mouth once daily.    ONDANSETRON (ZOFRAN) 4 MG TABLET    Take 1 tablet (4 mg total) by mouth every 8 (eight) hours as needed for Nausea.    PRAVASTATIN (PRAVACHOL) 10 MG TABLET    Take 1 tablet (10 mg total) by mouth once daily.    PULSE OXIMETER (PULSE OXIMETER) DEVICE    by Apply Externally route 2 (two) times a day. Use  twice daily at 8 AM and 3 PM and record the value in MyChart as directed.    RIFAXIMIN (XIFAXAN) 550 MG TAB    Take 1 tablet (550 mg total) by mouth 2 (two) times daily.    STOOL SOFTENER 100 MG CAPSULE    TAKE 1 CAPSULE BY MOUTH THREE TIMES DAILY FOR DIARRHEA (HOLD FOR DIARRHEA)    TORSEMIDE (DEMADEX) 20 MG TAB    Take 2 tablets (40 mg total) by mouth once daily.   Modified Medications    No medications on file   Discontinued Medications    No medications on file       Review of Systems   Constitutional:  Positive for malaise/fatigue.   HENT: Negative.     Eyes: Negative.    Respiratory:  Positive for shortness of breath (intermittent).    Cardiovascular:  Positive for leg swelling. Negative for chest pain and palpitations.   Gastrointestinal:  Negative for nausea.   Genitourinary: Negative.    Musculoskeletal: Negative.    Skin: Negative.    Neurological: Negative.    Endo/Heme/Allergies: Negative.    Psychiatric/Behavioral: Negative.     All 12 systems otherwise negative.    Wt Readings from Last 3 Encounters:   02/13/23 82.4 kg (181 lb 10.5 oz)   11/16/22 81.3 kg (179 lb 3.7 oz)   11/03/22 82.8 kg (182 lb 8.7 oz)     Temp Readings from Last 3 Encounters:   11/03/22 98.1 °F (36.7 °C) (Temporal)   08/09/22 98 °F (36.7 °C)   08/05/22 99.5 °F (37.5 °C) (Oral)     BP Readings from Last 3 Encounters:   02/13/23 134/68   11/16/22 136/64   11/03/22 130/64     Pulse Readings from Last 3 Encounters:   02/13/23 70   11/16/22 67   11/03/22 60       /68   Pulse 70   Wt 82.4 kg (181 lb 10.5 oz)   LMP  (LMP Unknown)   SpO2 98%   BMI 34.32 kg/m²     Objective:   Physical Exam  Constitutional:       General: She is not in acute distress.     Appearance: She is well-developed. She is obese. She is not diaphoretic.   HENT:      Head: Normocephalic and atraumatic.      Mouth/Throat:      Pharynx: No oropharyngeal exudate.   Eyes:      General: No scleral icterus.        Right eye: No discharge.         Left eye: No  discharge.   Cardiovascular:      Rate and Rhythm: Normal rate and regular rhythm.      Heart sounds: Murmur heard.   Pulmonary:      Effort: Pulmonary effort is normal. No respiratory distress.   Musculoskeletal:      Right lower leg: Edema present.      Left lower leg: Edema present.   Skin:     Coloration: Skin is not pale.      Findings: No erythema or rash.   Neurological:      Mental Status: She is alert and oriented to person, place, and time.   Psychiatric:         Behavior: Behavior normal.         Thought Content: Thought content normal.         Judgment: Judgment normal.       Lab Results   Component Value Date     11/16/2022    K 4.3 11/16/2022     11/16/2022    CO2 26 11/16/2022    BUN 63 (H) 11/16/2022    CREATININE 4.8 (H) 11/16/2022     (H) 11/16/2022    HGBA1C 5.3 11/03/2022    MG 2.7 (H) 11/16/2022    AST 26 09/16/2022    ALT 14 09/16/2022    ALBUMIN 3.0 (L) 09/16/2022    PROT 6.8 09/16/2022    BILITOT 0.4 09/16/2022    WBC 4.91 08/10/2022    HGB 9.5 (L) 08/10/2022    HCT 31.0 (L) 08/10/2022    MCV 95 08/10/2022     08/10/2022    INR 1.0 01/12/2022    TSH 28.708 (H) 08/03/2022    CHOL 178 08/10/2022    HDL 38 (L) 08/10/2022    LDLCALC 123.8 08/10/2022    TRIG 81 08/10/2022     (H) 11/16/2022     Assessment:      1. Chronic diastolic congestive heart failure    2. KRISTAL on CPAP    3. Primary hypertension    4. Type 2 diabetes mellitus with other circulatory complication, without long-term current use of insulin    5. Shortness of breath    6. History of COVID-19    7. CKD (chronic kidney disease) stage 4, GFR 15-29 ml/min    8. Pericardial effusion    9. Elevated d-dimer with indeterminate perfusion scan for PE    10. Chronic diastolic heart failure    11. Diabetic gastroparesis associated with type 2 diabetes mellitus            Plan:   1. Chronic diastolic HF - ;  --> 704 --> 767 --> 477 --> 347  - ECHO 8/2022 with normal bi V function, grade 2 DD, mild to mod  TR, small peric effusion, PASP > 44mmHg.   - daily weights, low salt diet   - daily compression stockings  metolazone every 3rd day -- changed to Torsemide 40mg due to worsening renal function  - repeat CMP, BNP , Mg today   - with > 12 lb weight gain and edema increase Torsemide   - if ongoing weight gain/swelling not improved with PO diuretics needs to go to ER and have IV diuresis and admission     2. HTN  - elevated lately at home  - cont meds and titrate    3. HLD - worse LDL  - cont Pravastatin 10mg     4. DM2 with gastroparesis A1c - 5.9 --> 5.6 --> 5.3  - cont meds per PCP    5. H/o Cirrhosis s/p TIPS with edema, low albumin  - cont tx per PCP/Hepatology  - liver function improved does not need transplant  - had EGD concern for gastroparesis    6. KRISTAL  - needs CPAP, was not using it before     7. Obesity BMI 40 - 212 lbs --> BMI 33 - 179 lbs. - BMI 34 - 181 lbs.   - needs weight loss with diet/exercise     8. Pericardial effusion  - small, sec to cirrhosis/CHF  - no tamponade clinically on last ECHO    9. CKD 4 with proteinuria  - diuretics adjusted   - cont f/u with nephro     10. Elevated D dimer, int prob for PE  - repeat D dimer - elevated   - cont Eliquis     Thank you for allowing me to participate in this patient's care. Please do not hesitate to contact me with any questions or concerns. Consult note has been forwarded to the referral physician.

## 2023-02-13 NOTE — TELEPHONE ENCOUNTER
----- Message from Clari Smalls sent at 2/13/2023  3:45 PM CST -----  Regarding: medication refill  Pt was inquiring about a refill on her medication, the pharmacy said it was taken off by the doctor. Can yall call and talk to her about this? [phone] please call this number.

## 2023-02-14 ENCOUNTER — TELEPHONE (OUTPATIENT)
Dept: CARDIOLOGY | Facility: CLINIC | Age: 68
End: 2023-02-14
Payer: MEDICARE

## 2023-02-14 ENCOUNTER — TELEPHONE (OUTPATIENT)
Dept: INTERNAL MEDICINE | Facility: CLINIC | Age: 68
End: 2023-02-14
Payer: MEDICARE

## 2023-02-14 DIAGNOSIS — N17.9 AKI (ACUTE KIDNEY INJURY): Primary | ICD-10-CM

## 2023-02-14 DIAGNOSIS — I13.10 CARDIORENAL SYNDROME WITH RENAL FAILURE: ICD-10-CM

## 2023-02-14 DIAGNOSIS — I50.32 CHRONIC DIASTOLIC CONGESTIVE HEART FAILURE: ICD-10-CM

## 2023-02-14 RX ORDER — TORSEMIDE 20 MG/1
40 TABLET ORAL DAILY
Qty: 180 TABLET | Refills: 1 | Status: SHIPPED | OUTPATIENT
Start: 2023-02-14 | End: 2023-05-22 | Stop reason: SDUPTHER

## 2023-02-14 NOTE — TELEPHONE ENCOUNTER
Spoke with pt daughter in regards to     To my staff - Please contact the patient and let them know that their results reveal elevated BNP which is much higher than prior due to extra fluid, needs to double up torsemide to 40mg twice a day for 3 day - recommend taking early morning and next dose around 2 pm, monitor BP and weights daily, continue metolazone 3 times/week for now. Also needs to repeat labs in 1 week, referral placed for nephrology as well as kidney function is worse needs to follow up in a week or two with nephro. Follow up with me in 4 weeks, needs to be very careful regarding any extra salt. If no improvement recommend ER eval and IV diuresis. Per Rachel        Pt daughter verbalized understanding with no questions or concerns, 1 week lab and 4 week f/u scheduled.

## 2023-02-14 NOTE — TELEPHONE ENCOUNTER
----- Message from Mary Ellen Fisher sent at 2/13/2023 12:27 PM CST -----  Contact: Diamond  Patient is calling to speak with the nurse regarding medication. Reports called the pharmacy and the medication was cancelled by provider. Please give patient a call back at .[phone]

## 2023-02-21 ENCOUNTER — OFFICE VISIT (OUTPATIENT)
Dept: GASTROENTEROLOGY | Facility: CLINIC | Age: 68
End: 2023-02-21
Payer: MEDICARE

## 2023-02-21 ENCOUNTER — LAB VISIT (OUTPATIENT)
Dept: LAB | Facility: HOSPITAL | Age: 68
End: 2023-02-21
Attending: INTERNAL MEDICINE
Payer: MEDICARE

## 2023-02-21 VITALS — WEIGHT: 177 LBS | HEIGHT: 61 IN | BODY MASS INDEX: 33.42 KG/M2

## 2023-02-21 DIAGNOSIS — N17.9 AKI (ACUTE KIDNEY INJURY): ICD-10-CM

## 2023-02-21 DIAGNOSIS — N18.5 STAGE 5 CHRONIC KIDNEY DISEASE: ICD-10-CM

## 2023-02-21 DIAGNOSIS — I13.10 CARDIORENAL SYNDROME WITH RENAL FAILURE: ICD-10-CM

## 2023-02-21 DIAGNOSIS — K74.60 LIVER CIRRHOSIS SECONDARY TO NASH: Primary | ICD-10-CM

## 2023-02-21 DIAGNOSIS — K76.82 HEPATIC ENCEPHALOPATHY: ICD-10-CM

## 2023-02-21 DIAGNOSIS — I50.32 CHRONIC DIASTOLIC CONGESTIVE HEART FAILURE: ICD-10-CM

## 2023-02-21 DIAGNOSIS — K75.81 LIVER CIRRHOSIS SECONDARY TO NASH: Primary | ICD-10-CM

## 2023-02-21 LAB
AFP SERPL-MCNC: <2 NG/ML (ref 0–8.4)
ALBUMIN SERPL BCP-MCNC: 3.2 G/DL (ref 3.5–5.2)
ALBUMIN SERPL BCP-MCNC: 3.2 G/DL (ref 3.5–5.2)
ALP SERPL-CCNC: 87 U/L (ref 55–135)
ALP SERPL-CCNC: 87 U/L (ref 55–135)
ALT SERPL W/O P-5'-P-CCNC: 20 U/L (ref 10–44)
ALT SERPL W/O P-5'-P-CCNC: 20 U/L (ref 10–44)
ANION GAP SERPL CALC-SCNC: 11 MMOL/L (ref 8–16)
ANION GAP SERPL CALC-SCNC: 11 MMOL/L (ref 8–16)
AST SERPL-CCNC: 30 U/L (ref 10–40)
AST SERPL-CCNC: 30 U/L (ref 10–40)
BILIRUB SERPL-MCNC: 0.3 MG/DL (ref 0.1–1)
BILIRUB SERPL-MCNC: 0.3 MG/DL (ref 0.1–1)
BNP SERPL-MCNC: 804 PG/ML (ref 0–99)
BUN SERPL-MCNC: 68 MG/DL (ref 8–23)
BUN SERPL-MCNC: 68 MG/DL (ref 8–23)
CALCIUM SERPL-MCNC: 8.9 MG/DL (ref 8.7–10.5)
CALCIUM SERPL-MCNC: 8.9 MG/DL (ref 8.7–10.5)
CHLORIDE SERPL-SCNC: 105 MMOL/L (ref 95–110)
CHLORIDE SERPL-SCNC: 105 MMOL/L (ref 95–110)
CO2 SERPL-SCNC: 27 MMOL/L (ref 23–29)
CO2 SERPL-SCNC: 27 MMOL/L (ref 23–29)
CREAT SERPL-MCNC: 5 MG/DL (ref 0.5–1.4)
CREAT SERPL-MCNC: 5 MG/DL (ref 0.5–1.4)
EST. GFR  (NO RACE VARIABLE): 9.1 ML/MIN/1.73 M^2
EST. GFR  (NO RACE VARIABLE): 9.1 ML/MIN/1.73 M^2
GLUCOSE SERPL-MCNC: 117 MG/DL (ref 70–110)
GLUCOSE SERPL-MCNC: 117 MG/DL (ref 70–110)
INR PPP: 1.2 (ref 0.8–1.2)
MAGNESIUM SERPL-MCNC: 2.9 MG/DL (ref 1.6–2.6)
POTASSIUM SERPL-SCNC: 4.3 MMOL/L (ref 3.5–5.1)
POTASSIUM SERPL-SCNC: 4.3 MMOL/L (ref 3.5–5.1)
PROT SERPL-MCNC: 6.8 G/DL (ref 6–8.4)
PROT SERPL-MCNC: 6.8 G/DL (ref 6–8.4)
PROTHROMBIN TIME: 12 SEC (ref 9–12.5)
SODIUM SERPL-SCNC: 143 MMOL/L (ref 136–145)
SODIUM SERPL-SCNC: 143 MMOL/L (ref 136–145)

## 2023-02-21 PROCEDURE — 1157F ADVNC CARE PLAN IN RCRD: CPT | Mod: CPTII,95,, | Performed by: NURSE PRACTITIONER

## 2023-02-21 PROCEDURE — 36415 COLL VENOUS BLD VENIPUNCTURE: CPT | Mod: PO | Performed by: NURSE PRACTITIONER

## 2023-02-21 PROCEDURE — 99051 MED SERV EVE/WKEND/HOLIDAY: CPT | Mod: 95,,, | Performed by: NURSE PRACTITIONER

## 2023-02-21 PROCEDURE — 99214 OFFICE O/P EST MOD 30 MIN: CPT | Mod: 95,,, | Performed by: NURSE PRACTITIONER

## 2023-02-21 PROCEDURE — 83880 ASSAY OF NATRIURETIC PEPTIDE: CPT | Performed by: INTERNAL MEDICINE

## 2023-02-21 PROCEDURE — 1126F PR PAIN SEVERITY QUANTIFIED, NO PAIN PRESENT: ICD-10-PCS | Mod: CPTII,95,, | Performed by: NURSE PRACTITIONER

## 2023-02-21 PROCEDURE — 1101F PT FALLS ASSESS-DOCD LE1/YR: CPT | Mod: CPTII,95,, | Performed by: NURSE PRACTITIONER

## 2023-02-21 PROCEDURE — 83735 ASSAY OF MAGNESIUM: CPT | Performed by: INTERNAL MEDICINE

## 2023-02-21 PROCEDURE — 80053 COMPREHEN METABOLIC PANEL: CPT | Performed by: INTERNAL MEDICINE

## 2023-02-21 PROCEDURE — 3288F FALL RISK ASSESSMENT DOCD: CPT | Mod: CPTII,95,, | Performed by: NURSE PRACTITIONER

## 2023-02-21 PROCEDURE — 1126F AMNT PAIN NOTED NONE PRSNT: CPT | Mod: CPTII,95,, | Performed by: NURSE PRACTITIONER

## 2023-02-21 PROCEDURE — 1157F PR ADVANCE CARE PLAN OR EQUIV PRESENT IN MEDICAL RECORD: ICD-10-PCS | Mod: CPTII,95,, | Performed by: NURSE PRACTITIONER

## 2023-02-21 PROCEDURE — 85025 COMPLETE CBC W/AUTO DIFF WBC: CPT | Performed by: NURSE PRACTITIONER

## 2023-02-21 PROCEDURE — 99214 PR OFFICE/OUTPT VISIT, EST, LEVL IV, 30-39 MIN: ICD-10-PCS | Mod: 95,,, | Performed by: NURSE PRACTITIONER

## 2023-02-21 PROCEDURE — 3008F BODY MASS INDEX DOCD: CPT | Mod: CPTII,95,, | Performed by: NURSE PRACTITIONER

## 2023-02-21 PROCEDURE — 3288F PR FALLS RISK ASSESSMENT DOCUMENTED: ICD-10-PCS | Mod: CPTII,95,, | Performed by: NURSE PRACTITIONER

## 2023-02-21 PROCEDURE — 1159F PR MEDICATION LIST DOCUMENTED IN MEDICAL RECORD: ICD-10-PCS | Mod: CPTII,95,, | Performed by: NURSE PRACTITIONER

## 2023-02-21 PROCEDURE — 1159F MED LIST DOCD IN RCRD: CPT | Mod: CPTII,95,, | Performed by: NURSE PRACTITIONER

## 2023-02-21 PROCEDURE — 1101F PR PT FALLS ASSESS DOC 0-1 FALLS W/OUT INJ PAST YR: ICD-10-PCS | Mod: CPTII,95,, | Performed by: NURSE PRACTITIONER

## 2023-02-21 PROCEDURE — 82105 ALPHA-FETOPROTEIN SERUM: CPT | Performed by: NURSE PRACTITIONER

## 2023-02-21 PROCEDURE — 3008F PR BODY MASS INDEX (BMI) DOCUMENTED: ICD-10-PCS | Mod: CPTII,95,, | Performed by: NURSE PRACTITIONER

## 2023-02-21 PROCEDURE — 85610 PROTHROMBIN TIME: CPT | Performed by: NURSE PRACTITIONER

## 2023-02-21 PROCEDURE — 99051 PR MEDICAL SERVICES, EVE/WKEND/HOLIDAY: ICD-10-PCS | Mod: 95,,, | Performed by: NURSE PRACTITIONER

## 2023-02-21 NOTE — PROGRESS NOTES
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 Diagnoses of Hepatic encephalopathy and Stage 5 chronic kidney disease were also pertinent to this visit.    PCP: Andrey Perrin     The patient location is: [place]  The chief complaint leading to consultation is: above    Visit type: audiovisual    Face to Face time with patient: 15 minutes  25 minutes of total time spent on the encounter, which includes face to face time and non-face to face time preparing to see the patient (eg, review of tests), Obtaining and/or reviewing separately obtained history, Documenting clinical information in the electronic or other health record, Independently interpreting results (not separately reported) and communicating results to the patient/family/caregiver, or Care coordination (not separately reported).         Each patient to whom he or she provides medical services by telemedicine is:  (1) informed of the relationship between the physician and patient and the respective role of any other health care provider with respect to management of the patient; and (2) notified that he or she may decline to receive medical services by telemedicine and may withdraw from such care at any time.    Notes:     HPI:  This is a 67 y.o. female here for follow up of the above  Pt has not been seen since 2021,has had multiple No show appts.   She states that she has been feeling overall stable without any decompensating events.   Due for updated MELD labs and HCC screening.   No upper GI bleeding.  No ascites or BLE.  No overt confusion.  Taking rifaximin BID without side effect.  Has had no issues with confusion on this medication.       Review of Systems   Constitutional:  Negative for chills, fever, malaise/fatigue and weight loss.   Respiratory:  Negative for cough.    Cardiovascular:  Negative for chest pain.    Gastrointestinal:         Per HPI   Musculoskeletal:  Negative for myalgias.   Skin:  Negative for itching and rash.   Neurological:  Negative for headaches.   Psychiatric/Behavioral:  The patient is not nervous/anxious.      Medical History:  Past Medical History:   Diagnosis Date    Abdominal pain 5/10/2018    Acquired hypothyroidism 9/28/2020    Acute on chronic diastolic congestive heart failure 12/7/2017    ROD (acute kidney injury) 1/3/2019    Anasarca 10/13/2020    Arthritis of knee 1/31/2022    Ascites     Ascites 9/2/2016    Bilateral knee pain 4/22/2022    Bilateral lower extremity edema 9/2/2016    Breast pain, left 1/4/2018    Cataract     Chest pain, atypical 2/9/2018    CHF (congestive heart failure)     Cirrhosis     Cirrhosis 1/3/2017    Cough     Diabetes mellitus     Diabetes mellitus, type 2     Diarrhea 9/4/2019    Edema 3/19/2018    Encounter for long-term (current) use of medications 3/19/2018    Epigastric pain 2/11/2020    Gastroparesis     GERD (gastroesophageal reflux disease)     Hepatic encephalopathy 6/19/2018    Pt has history of cirrhosis, seen at [facility] on 6/7/18. Currently on lactulose.    Hyperlipidemia     Hypertension     Hypotension 2/21/2019    Immunization deficiency 2/10/2020    Leg cramps 5/20/2022    Liver disease     Lumbar strain 4/27/2018    Lumbar strain, sequela 2/17/2020    Macular degeneration     Medication reaction 11/17/2016    Other and unspecified hyperlipidemia 6/11/2012 11:11:32 AM    [facility] Historical - Endocrine/Metabolic/Immune: Hyperlipidemia-No Additional Notes    Pneumonia of right middle lobe due to infectious organism 12/19/2017    Renal disorder     Retinopathy due to secondary diabetes mellitus     Screening for malignant neoplasm of cervix 12/14/2017    Skin lesion 12/20/2021    Sleep apnea     Strain of lumbar region 10/4/2021    Thyroid disease     Type 2 diabetes mellitus with hyperglycemia 10/10/2020       Surgical History:   Past Surgical  History:   Procedure Laterality Date    CATARACT EXTRACTION      CHOLECYSTECTOMY      COLONOSCOPY N/A 9/4/2019    Procedure: COLONOSCOPY;  Surgeon: Marnie Elmore MD;  Location: [facility] ENDO;  Service: Endoscopy;  Laterality: N/A;    ERCP      ESOPHAGOGASTRODUODENOSCOPY N/A 7/28/2022    Procedure: EGD (ESOPHAGOGASTRODUODENOSCOPY);  Surgeon: Jimy Katz MD;  Location: [facility] ENDO;  Service: Endoscopy;  Laterality: N/A;    FLUOROSCOPY N/A 7/24/2020    Procedure: TIPS revision;  Surgeon: Martell Jasmine MD;  Location: [facility] CATH LAB;  Service: General;  Laterality: N/A;    LIVER BIOPSY      THORACENTESIS Left 1/20/2020    Procedure: Thoracentesis;  Surgeon: Angelica Hunter MD;  Location: [facility] ENDO;  Service: Pulmonary;  Laterality: Left;    TUBAL LIGATION      UPPER GASTROINTESTINAL ENDOSCOPY         Family History:   Family History   Problem Relation Age of Onset    Cancer Mother     Breast cancer Mother     Heart disease Father     Aneurysm Sister     Heart disease Brother     No Known Problems Maternal Grandmother     Cancer Maternal Grandfather     Sarcoidosis Sister     Colon cancer Neg Hx     Ovarian cancer Neg Hx     Thrombosis Neg Hx        Social History:   Social History     Tobacco Use    Smoking status: Never    Smokeless tobacco: Never   Substance Use Topics    Alcohol use: No    Drug use: No       Allergies: Reviewed    Home Medications:  Current Outpatient Medications on File Prior to Visit   Medication Sig Dispense Refill    amLODIPine (NORVASC) 10 MG tablet Take 1 tablet (10 mg total) by mouth once daily. 90 tablet 3    apixaban (ELIQUIS) 5 mg Tab Take 1 tablet (5 mg total) by mouth 2 (two) times daily. 60 tablet 3    carvediloL (COREG) 25 MG tablet Take 1 tablet (25 mg total) by mouth 2 (two) times daily with meals. 180 tablet 1    docusate sodium (COLACE) 100 MG capsule Take 1 capsule (100 mg total) by mouth 3 (three) times daily. Hold for diarrhea 30 capsule 0    dulaglutide (TRULICITY) 4.5  mg/0.5 mL pen injector Inject 4.5 mg into the skin every 7 days. 12 pen 1    famotidine (PEPCID) 20 MG tablet Take 1 tablet by mouth once daily 90 tablet 1    ferrous sulfate (IRON ORAL) Take by mouth.      fluticasone propionate (FLONASE) 50 mcg/actuation nasal spray 2 sprays (100 mcg total) by Each Nostril route once daily.  0    garlic (GARLIQUE ORAL) Take 1 tablet by mouth once daily.      isosorbide mononitrate (IMDUR) 120 MG 24 hr tablet Take 1 tablet (120 mg total) by mouth every evening. 90 tablet 1    levothyroxine (EUTHYROX) 137 MCG Tab tablet Take 1 tablet (137 mcg total) by mouth before breakfast. 90 tablet 0    LIDOcaine (LMX) 4 % cream Apply topically as needed.      magnesium oxide (MAG-OX) 400 mg (241.3 mg magnesium) tablet Take 1 tablet (400 mg total) by mouth once daily. 90 tablet 1    metOLazone (ZAROXOLYN) 5 MG tablet Take 1 tablet (5 mg total) by mouth 3 (three) times a week. Monday, Wed, Fri 12 tablet 0    omega-3 fatty acids/fish oil (FISH OIL-OMEGA-3 FATTY ACIDS) 300-1,000 mg capsule Take by mouth once daily.      ondansetron (ZOFRAN) 4 MG tablet Take 1 tablet (4 mg total) by mouth every 8 (eight) hours as needed for Nausea. 90 tablet 1    pravastatin (PRAVACHOL) 10 MG tablet Take 1 tablet (10 mg total) by mouth once daily. 90 tablet 1    pulse oximeter (PULSE OXIMETER) device by Apply Externally route 2 (two) times a day. Use twice daily at 8 AM and 3 PM and record the value in [employer] as directed. 1 each 0    STOOL SOFTENER 100 mg capsule TAKE 1 CAPSULE BY MOUTH THREE TIMES DAILY FOR DIARRHEA (HOLD FOR DIARRHEA)      torsemide (DEMADEX) 20 MG Tab Take 2 tablets (40 mg total) by mouth once daily. If having more swelling/weight gain/fluid take this in morning and afternoon for 3 days 180 tablet 1    [DISCONTINUED] rifAXIMin (XIFAXAN) 550 mg Tab Take 1 tablet (550 mg total) by mouth 2 (two) times daily. 60 tablet 0     No current facility-administered medications on file prior to visit.  "      Physical Exam:  Vital Signs:  Ht 5' 1" (1.549 m)   Wt 80.3 kg (177 lb)   LMP  (LMP Unknown)   BMI 33.44 kg/m²   Body mass index is 33.44 kg/m².  Physical Exam  Constitutional:       Appearance: She is well-developed.   HENT:      Head: Normocephalic.   Eyes:      General: No scleral icterus.  Pulmonary:      Effort: Pulmonary effort is normal.   Musculoskeletal:         General: Normal range of motion.      Cervical back: Normal range of motion.   Skin:     General: Skin is dry.   Neurological:      Mental Status: She is alert.       Labs: Pertinent labs reviewed.  Computed MELD-Na score unavailable. Necessary lab results were not found in the last year.  Computed MELD score unavailable. Necessary lab results were not found in the last year.  EV: On BB  HCC: due for update        Assessment:  1. 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 Hepatic encephalopathy    3. Stage 5 chronic kidney disease        Recommendations:  Stable without new complaints.   - will plan for updated MELD labs and HCC screening with US and AFP  - Discussed importance of regular follow up and screening.  Pt states understanding.  - continue current medication.   - continue follow up with nephrology as planned for CKD      Return to Clinic:  6 months with pre-clinic labs and imaging.           "

## 2023-02-22 ENCOUNTER — TELEPHONE (OUTPATIENT)
Dept: CARDIOLOGY | Facility: CLINIC | Age: 68
End: 2023-02-22
Payer: MEDICARE

## 2023-02-22 DIAGNOSIS — I50.33 ACUTE ON CHRONIC HEART FAILURE WITH PRESERVED EJECTION FRACTION: Primary | ICD-10-CM

## 2023-02-22 LAB
BASOPHILS # BLD AUTO: 0.01 K/UL (ref 0–0.2)
BASOPHILS NFR BLD: 0.2 % (ref 0–1.9)
DIFFERENTIAL METHOD: ABNORMAL
EOSINOPHIL # BLD AUTO: 0.2 K/UL (ref 0–0.5)
EOSINOPHIL NFR BLD: 4.2 % (ref 0–8)
ERYTHROCYTE [DISTWIDTH] IN BLOOD BY AUTOMATED COUNT: 15 % (ref 11.5–14.5)
HCT VFR BLD AUTO: 28.4 % (ref 37–48.5)
HGB BLD-MCNC: 8.5 G/DL (ref 12–16)
IMM GRANULOCYTES # BLD AUTO: 0.02 K/UL (ref 0–0.04)
IMM GRANULOCYTES NFR BLD AUTO: 0.4 % (ref 0–0.5)
LYMPHOCYTES # BLD AUTO: 2 K/UL (ref 1–4.8)
LYMPHOCYTES NFR BLD: 37.7 % (ref 18–48)
MCH RBC QN AUTO: 28.7 PG (ref 27–31)
MCHC RBC AUTO-ENTMCNC: 29.9 G/DL (ref 32–36)
MCV RBC AUTO: 96 FL (ref 82–98)
MONOCYTES # BLD AUTO: 0.5 K/UL (ref 0.3–1)
MONOCYTES NFR BLD: 9.8 % (ref 4–15)
NEUTROPHILS # BLD AUTO: 2.5 K/UL (ref 1.8–7.7)
NEUTROPHILS NFR BLD: 47.7 % (ref 38–73)
NRBC BLD-RTO: 0 /100 WBC
PLATELET # BLD AUTO: 174 K/UL (ref 150–450)
PMV BLD AUTO: 10.4 FL (ref 9.2–12.9)
RBC # BLD AUTO: 2.96 M/UL (ref 4–5.4)
WBC # BLD AUTO: 5.28 K/UL (ref 3.9–12.7)

## 2023-02-22 RX ORDER — METOLAZONE 5 MG/1
5 TABLET ORAL DAILY
Qty: 90 TABLET | Refills: 1 | Status: SHIPPED | OUTPATIENT
Start: 2023-02-22 | End: 2023-05-22 | Stop reason: SDUPTHER

## 2023-02-22 NOTE — TELEPHONE ENCOUNTER
Spoke with pt daughter in regards to Pt lab results       Please contact the patient and let them know that their results worsening BNP due to extra fluid, will change metolazone to take daily 30 min before fluid pill, kidney function is stable, need to repeat labs in 2 weeks. Monitor BP and weights daily.       Pt daughter verbalized understanding with no questions or concerns and will inform the pt of this information.      Pt 2 week lab sched for 03/08/23 at [facility] lab

## 2023-02-24 ENCOUNTER — HOSPITAL ENCOUNTER (OUTPATIENT)
Dept: RADIOLOGY | Facility: HOSPITAL | Age: 68
Discharge: HOME | End: 2023-02-24
Attending: NURSE PRACTITIONER
Payer: MEDICARE

## 2023-02-24 ENCOUNTER — TELEPHONE (OUTPATIENT)
Dept: INTERNAL MEDICINE | Facility: CLINIC | Age: 68
End: 2023-02-24
Payer: MEDICARE

## 2023-02-24 DIAGNOSIS — K76.82 HEPATIC ENCEPHALOPATHY: ICD-10-CM

## 2023-02-24 PROCEDURE — 76705 US ABDOMEN LIMITED: ICD-10-PCS | Mod: 26,,, | Performed by: RADIOLOGY

## 2023-02-24 PROCEDURE — 76705 ECHO EXAM OF ABDOMEN: CPT | Mod: TC,PO

## 2023-02-24 PROCEDURE — 76705 ECHO EXAM OF ABDOMEN: CPT | Mod: 26,,, | Performed by: RADIOLOGY

## 2023-03-08 ENCOUNTER — LAB VISIT (OUTPATIENT)
Dept: LAB | Facility: HOSPITAL | Age: 68
End: 2023-03-08
Attending: INTERNAL MEDICINE
Payer: MEDICARE

## 2023-03-08 DIAGNOSIS — I50.33 ACUTE ON CHRONIC HEART FAILURE WITH PRESERVED EJECTION FRACTION: ICD-10-CM

## 2023-03-08 PROCEDURE — 83880 ASSAY OF NATRIURETIC PEPTIDE: CPT | Performed by: INTERNAL MEDICINE

## 2023-03-08 PROCEDURE — 80048 BASIC METABOLIC PNL TOTAL CA: CPT | Performed by: INTERNAL MEDICINE

## 2023-03-08 PROCEDURE — 36415 COLL VENOUS BLD VENIPUNCTURE: CPT | Mod: PO | Performed by: INTERNAL MEDICINE

## 2023-03-09 LAB
ANION GAP SERPL CALC-SCNC: 8 MMOL/L (ref 8–16)
BNP SERPL-MCNC: 472 PG/ML (ref 0–99)
BUN SERPL-MCNC: 55 MG/DL (ref 8–23)
CALCIUM SERPL-MCNC: 8.3 MG/DL (ref 8.7–10.5)
CHLORIDE SERPL-SCNC: 105 MMOL/L (ref 95–110)
CO2 SERPL-SCNC: 28 MMOL/L (ref 23–29)
CREAT SERPL-MCNC: 5.3 MG/DL (ref 0.5–1.4)
EST. GFR  (NO RACE VARIABLE): 8.4 ML/MIN/1.73 M^2
GLUCOSE SERPL-MCNC: 132 MG/DL (ref 70–110)
POTASSIUM SERPL-SCNC: 4 MMOL/L (ref 3.5–5.1)
SODIUM SERPL-SCNC: 141 MMOL/L (ref 136–145)

## 2023-03-15 ENCOUNTER — TELEPHONE (OUTPATIENT)
Dept: SLEEP MEDICINE | Facility: CLINIC | Age: 68
End: 2023-03-15
Payer: MEDICARE

## 2023-03-21 NOTE — PROGRESS NOTES
===============================  Date today is 3/27/2023  Diamond Das is a 68 y.o. female  Last visit [facility]: :9/26/2022   Last visit eye dept. Visit date not found    Uncorrected distance visual acuity was 20/40 in the right eye and 20/30 in the left eye.  Tonometry       Tonometry (Applanation, 3:19 PM)         Right Left    Pressure 16 15                  Not recorded       Not recorded       Not recorded       Chief Complaint   Patient presents with    Diabetic Eye Exam     6 mos dm eye exam, no va c/o       Problem List Items Addressed This Visit    None  Visit Diagnoses       Severe nonproliferative diabetic retinopathy of both eyes with macular edema associated with type 2 diabetes mellitus    -  Primary    Relevant Orders    Posterior Segment OCT Retina-Both eyes (Completed)    Pseudophakia of both eyes              Instructed to call 24/7 for any worsening of vision, visual distortion or pain.  Check OU independently daily.    Gave my office and personal cell phone number.  ________________  3/27/2023 today  Diamond Das    Following ou dme   Sl worse but good vision  asymptomatic     OCT sl worse    PCIOL OU  1+ DBH midperiphery OD  Macular OK  No NV  OS regressed FVP    RTC 6 months  Instructed to call 24/7 for any worsening of vision or symptoms. Check OU daily.   Gave my office and cell phone number.    =============================

## 2023-03-27 ENCOUNTER — OFFICE VISIT (OUTPATIENT)
Dept: OPHTHALMOLOGY | Facility: CLINIC | Age: 68
End: 2023-03-27
Payer: MEDICARE

## 2023-03-27 DIAGNOSIS — E11.3413 SEVERE NONPROLIFERATIVE DIABETIC RETINOPATHY OF BOTH EYES WITH MACULAR EDEMA ASSOCIATED WITH TYPE 2 DIABETES MELLITUS: Primary | ICD-10-CM

## 2023-03-27 DIAGNOSIS — Z96.1 PSEUDOPHAKIA OF BOTH EYES: ICD-10-CM

## 2023-03-27 PROCEDURE — 1160F RVW MEDS BY RX/DR IN RCRD: CPT | Mod: CPTII,S$GLB,, | Performed by: OPHTHALMOLOGY

## 2023-03-27 PROCEDURE — 92134 POSTERIOR SEGMENT OCT RETINA (OCULAR COHERENCE TOMOGRAPHY)-BOTH EYES: ICD-10-PCS | Mod: S$GLB,,, | Performed by: OPHTHALMOLOGY

## 2023-03-27 PROCEDURE — 92014 PR EYE EXAM, EST PATIENT,COMPREHESV: ICD-10-PCS | Mod: S$GLB,,, | Performed by: OPHTHALMOLOGY

## 2023-03-27 PROCEDURE — 1157F ADVNC CARE PLAN IN RCRD: CPT | Mod: CPTII,S$GLB,, | Performed by: OPHTHALMOLOGY

## 2023-03-27 PROCEDURE — 99999 PR PBB SHADOW E&M-EST. PATIENT-LVL III: CPT | Mod: PBBFAC,,, | Performed by: OPHTHALMOLOGY

## 2023-03-27 PROCEDURE — 1157F PR ADVANCE CARE PLAN OR EQUIV PRESENT IN MEDICAL RECORD: ICD-10-PCS | Mod: CPTII,S$GLB,, | Performed by: OPHTHALMOLOGY

## 2023-03-27 PROCEDURE — 1159F MED LIST DOCD IN RCRD: CPT | Mod: CPTII,S$GLB,, | Performed by: OPHTHALMOLOGY

## 2023-03-27 PROCEDURE — 92014 COMPRE OPH EXAM EST PT 1/>: CPT | Mod: S$GLB,,, | Performed by: OPHTHALMOLOGY

## 2023-03-27 PROCEDURE — 1126F AMNT PAIN NOTED NONE PRSNT: CPT | Mod: CPTII,S$GLB,, | Performed by: OPHTHALMOLOGY

## 2023-03-27 PROCEDURE — 92134 CPTRZ OPH DX IMG PST SGM RTA: CPT | Mod: S$GLB,,, | Performed by: OPHTHALMOLOGY

## 2023-03-27 PROCEDURE — 1126F PR PAIN SEVERITY QUANTIFIED, NO PAIN PRESENT: ICD-10-PCS | Mod: CPTII,S$GLB,, | Performed by: OPHTHALMOLOGY

## 2023-03-27 PROCEDURE — 1160F PR REVIEW ALL MEDS BY PRESCRIBER/CLIN PHARMACIST DOCUMENTED: ICD-10-PCS | Mod: CPTII,S$GLB,, | Performed by: OPHTHALMOLOGY

## 2023-03-27 PROCEDURE — 1159F PR MEDICATION LIST DOCUMENTED IN MEDICAL RECORD: ICD-10-PCS | Mod: CPTII,S$GLB,, | Performed by: OPHTHALMOLOGY

## 2023-03-27 PROCEDURE — 99999 PR PBB SHADOW E&M-EST. PATIENT-LVL III: ICD-10-PCS | Mod: PBBFAC,,, | Performed by: OPHTHALMOLOGY

## 2023-04-25 ENCOUNTER — LAB VISIT (OUTPATIENT)
Dept: LAB | Facility: HOSPITAL | Age: 68
End: 2023-04-25
Attending: INTERNAL MEDICINE
Payer: MEDICARE

## 2023-04-25 DIAGNOSIS — N18.5 CKD (CHRONIC KIDNEY DISEASE), SYMPTOM MANAGEMENT ONLY, STAGE 5: ICD-10-CM

## 2023-04-25 DIAGNOSIS — I10 PRIMARY HYPERTENSION: ICD-10-CM

## 2023-04-25 LAB
ALBUMIN SERPL BCP-MCNC: 3.1 G/DL (ref 3.5–5.2)
ANION GAP SERPL CALC-SCNC: 10 MMOL/L (ref 8–16)
BUN SERPL-MCNC: 73 MG/DL (ref 8–23)
CALCIUM SERPL-MCNC: 8.8 MG/DL (ref 8.7–10.5)
CHLORIDE SERPL-SCNC: 107 MMOL/L (ref 95–110)
CO2 SERPL-SCNC: 26 MMOL/L (ref 23–29)
CREAT SERPL-MCNC: 5.6 MG/DL (ref 0.5–1.4)
EST. GFR  (NO RACE VARIABLE): 7.9 ML/MIN/1.73 M^2
ESTIMATED AVG GLUCOSE: 111 MG/DL (ref 68–131)
GLUCOSE SERPL-MCNC: 91 MG/DL (ref 70–110)
HBA1C MFR BLD: 5.5 % (ref 4–5.6)
PHOSPHATE SERPL-MCNC: 4.5 MG/DL (ref 2.7–4.5)
POTASSIUM SERPL-SCNC: 5.2 MMOL/L (ref 3.5–5.1)
SODIUM SERPL-SCNC: 143 MMOL/L (ref 136–145)

## 2023-04-25 PROCEDURE — 80069 RENAL FUNCTION PANEL: CPT | Performed by: INTERNAL MEDICINE

## 2023-04-25 PROCEDURE — 83036 HEMOGLOBIN GLYCOSYLATED A1C: CPT | Performed by: FAMILY MEDICINE

## 2023-04-25 PROCEDURE — 36415 COLL VENOUS BLD VENIPUNCTURE: CPT | Mod: PN | Performed by: INTERNAL MEDICINE

## 2023-04-25 PROCEDURE — 83970 ASSAY OF PARATHORMONE: CPT | Performed by: INTERNAL MEDICINE

## 2023-04-26 LAB — PTH-INTACT SERPL-MCNC: 290.1 PG/ML (ref 9–77)

## 2023-05-03 ENCOUNTER — OFFICE VISIT (OUTPATIENT)
Dept: NEPHROLOGY | Facility: CLINIC | Age: 68
End: 2023-05-03
Payer: MEDICARE

## 2023-05-03 DIAGNOSIS — I10 PRIMARY HYPERTENSION: ICD-10-CM

## 2023-05-03 DIAGNOSIS — N18.5 CKD (CHRONIC KIDNEY DISEASE), SYMPTOM MANAGEMENT ONLY, STAGE 5: Primary | ICD-10-CM

## 2023-05-03 DIAGNOSIS — N25.81 SECONDARY HYPERPARATHYROIDISM OF RENAL ORIGIN: ICD-10-CM

## 2023-05-03 PROCEDURE — 99214 OFFICE O/P EST MOD 30 MIN: CPT | Mod: 95,,, | Performed by: INTERNAL MEDICINE

## 2023-05-03 PROCEDURE — 1159F MED LIST DOCD IN RCRD: CPT | Mod: CPTII,95,, | Performed by: INTERNAL MEDICINE

## 2023-05-03 PROCEDURE — 1160F RVW MEDS BY RX/DR IN RCRD: CPT | Mod: CPTII,95,, | Performed by: INTERNAL MEDICINE

## 2023-05-03 PROCEDURE — 1160F PR REVIEW ALL MEDS BY PRESCRIBER/CLIN PHARMACIST DOCUMENTED: ICD-10-PCS | Mod: CPTII,95,, | Performed by: INTERNAL MEDICINE

## 2023-05-03 PROCEDURE — 1157F PR ADVANCE CARE PLAN OR EQUIV PRESENT IN MEDICAL RECORD: ICD-10-PCS | Mod: CPTII,95,, | Performed by: INTERNAL MEDICINE

## 2023-05-03 PROCEDURE — 3066F NEPHROPATHY DOC TX: CPT | Mod: CPTII,95,, | Performed by: INTERNAL MEDICINE

## 2023-05-03 PROCEDURE — 1159F PR MEDICATION LIST DOCUMENTED IN MEDICAL RECORD: ICD-10-PCS | Mod: CPTII,95,, | Performed by: INTERNAL MEDICINE

## 2023-05-03 PROCEDURE — 3066F PR DOCUMENTATION OF TREATMENT FOR NEPHROPATHY: ICD-10-PCS | Mod: CPTII,95,, | Performed by: INTERNAL MEDICINE

## 2023-05-03 PROCEDURE — 1157F ADVNC CARE PLAN IN RCRD: CPT | Mod: CPTII,95,, | Performed by: INTERNAL MEDICINE

## 2023-05-03 PROCEDURE — 3044F HG A1C LEVEL LT 7.0%: CPT | Mod: CPTII,95,, | Performed by: INTERNAL MEDICINE

## 2023-05-03 PROCEDURE — 3044F PR MOST RECENT HEMOGLOBIN A1C LEVEL <7.0%: ICD-10-PCS | Mod: CPTII,95,, | Performed by: INTERNAL MEDICINE

## 2023-05-03 PROCEDURE — 99214 PR OFFICE/OUTPT VISIT, EST, LEVL IV, 30-39 MIN: ICD-10-PCS | Mod: 95,,, | Performed by: INTERNAL MEDICINE

## 2023-05-03 NOTE — PROGRESS NOTES
The patient location is: home   The chief complaint leading to consultation is: CKD    Visit type: audiovisual    Face to Face time with patient: 15  30 minutes of total time spent on the encounter, which includes face to face time and non-face to face time preparing to see the patient (eg, review of tests), Obtaining and/or reviewing separately obtained history, Documenting clinical information in the electronic or other health record, Independently interpreting results (not separately reported) and communicating results to the patient/family/caregiver, or Care coordination (not separately reported).         Each patient to whom he or she provides medical services by telemedicine is:  (1) informed of the relationship between the physician and patient and the respective role of any other health care provider with respect to management of the patient; and (2) notified that he or she may decline to receive medical services by telemedicine and may withdraw from such care at any time.    Notes:      Subjective:       Patient ID: Diamond aDs is a 68 y.o. female.    Chief Complaint:  CKD    HPI    She presents to clinic today for routine follow-up.  Since her last office visit she has been doing well and has no specific or new complaints.  Her laboratory studies and medications were reviewed.  All Nephrology related questions were answered to her satisfaction.      Review of Systems   Constitutional: Negative.    HENT: Negative.     Eyes: Negative.    Respiratory: Negative.     Cardiovascular: Negative.    Gastrointestinal: Negative.    Genitourinary: Negative.    Musculoskeletal: Negative.    Skin: Negative.    Neurological: Negative.        LMP  (LMP Unknown)     Lab Results   Component Value Date    WBC 5.28 02/21/2023    HGB 8.5 (L) 02/21/2023    HCT 28.4 (L) 02/21/2023    MCV 96 02/21/2023     02/21/2023      BMP  Lab Results   Component Value Date     04/25/2023    K 5.2 (H) 04/25/2023      04/25/2023    CO2 26 04/25/2023    BUN 73 (H) 04/25/2023    CREATININE 5.6 (H) 04/25/2023    CALCIUM 8.8 04/25/2023    ANIONGAP 10 04/25/2023    ESTGFRAFRICA 12 (A) 06/21/2022    EGFRNONAA 10 (A) 06/21/2022     CMP  Sodium   Date Value Ref Range Status   04/25/2023 143 136 - 145 mmol/L Final     Potassium   Date Value Ref Range Status   04/25/2023 5.2 (H) 3.5 - 5.1 mmol/L Final     Chloride   Date Value Ref Range Status   04/25/2023 107 95 - 110 mmol/L Final     CO2   Date Value Ref Range Status   04/25/2023 26 23 - 29 mmol/L Final     Glucose   Date Value Ref Range Status   04/25/2023 91 70 - 110 mg/dL Final     BUN   Date Value Ref Range Status   04/25/2023 73 (H) 8 - 23 mg/dL Final     Creatinine   Date Value Ref Range Status   04/25/2023 5.6 (H) 0.5 - 1.4 mg/dL Final     Calcium   Date Value Ref Range Status   04/25/2023 8.8 8.7 - 10.5 mg/dL Final     Total Protein   Date Value Ref Range Status   02/21/2023 6.8 6.0 - 8.4 g/dL Final   02/21/2023 6.8 6.0 - 8.4 g/dL Final     Albumin   Date Value Ref Range Status   04/25/2023 3.1 (L) 3.5 - 5.2 g/dL Final     Total Bilirubin   Date Value Ref Range Status   02/21/2023 0.3 0.1 - 1.0 mg/dL Final     Comment:     For infants and newborns, interpretation of results should be based  on gestational age, weight and in agreement with clinical  observations.    Premature Infant recommended reference ranges:  Up to 24 hours.............<8.0 mg/dL  Up to 48 hours............<12.0 mg/dL  3-5 days..................<15.0 mg/dL  6-29 days.................<15.0 mg/dL     02/21/2023 0.3 0.1 - 1.0 mg/dL Final     Comment:     For infants and newborns, interpretation of results should be based  on gestational age, weight and in agreement with clinical  observations.    Premature Infant recommended reference ranges:  Up to 24 hours.............<8.0 mg/dL  Up to 48 hours............<12.0 mg/dL  3-5 days..................<15.0 mg/dL  6-29 days.................<15.0 mg/dL       Alkaline  Phosphatase   Date Value Ref Range Status   02/21/2023 87 55 - 135 U/L Final   02/21/2023 87 55 - 135 U/L Final     AST   Date Value Ref Range Status   02/21/2023 30 10 - 40 U/L Final   02/21/2023 30 10 - 40 U/L Final     ALT   Date Value Ref Range Status   02/21/2023 20 10 - 44 U/L Final   02/21/2023 20 10 - 44 U/L Final     Anion Gap   Date Value Ref Range Status   04/25/2023 10 8 - 16 mmol/L Final     eGFR if    Date Value Ref Range Status   06/21/2022 12 (A) >60 mL/min/1.73 m^2 Final     eGFR if non    Date Value Ref Range Status   06/21/2022 10 (A) >60 mL/min/1.73 m^2 Final     Comment:     Calculation used to obtain the estimated glomerular filtration  rate (eGFR) is the CKD-EPI equation.             Current Outpatient Medications on File Prior to Visit   Medication Sig Dispense Refill    amLODIPine (NORVASC) 10 MG tablet Take 1 tablet (10 mg total) by mouth once daily. 90 tablet 3    apixaban (ELIQUIS) 5 mg Tab Take 1 tablet (5 mg total) by mouth 2 (two) times daily. 60 tablet 3    carvediloL (COREG) 25 MG tablet Take 1 tablet (25 mg total) by mouth 2 (two) times daily with meals. 180 tablet 1    docusate sodium (COLACE) 100 MG capsule Take 1 capsule (100 mg total) by mouth 3 (three) times daily. Hold for diarrhea 30 capsule 0    dulaglutide (TRULICITY) 4.5 mg/0.5 mL pen injector Inject 4.5 mg into the skin every 7 days. 12 pen 1    famotidine (PEPCID) 20 MG tablet Take 1 tablet by mouth once daily 90 tablet 1    ferrous sulfate (IRON ORAL) Take by mouth.      fluticasone propionate (FLONASE) 50 mcg/actuation nasal spray 2 sprays (100 mcg total) by Each Nostril route once daily.  0    garlic (GARLIQUE ORAL) Take 1 tablet by mouth once daily.      isosorbide mononitrate (IMDUR) 120 MG 24 hr tablet Take 1 tablet (120 mg total) by mouth every evening. 90 tablet 1    levothyroxine (EUTHYROX) 137 MCG Tab tablet Take 1 tablet (137 mcg total) by mouth before breakfast. 90 tablet 0     LIDOcaine (LMX) 4 % cream Apply topically as needed.      magnesium oxide (MAG-OX) 400 mg (241.3 mg magnesium) tablet Take 1 tablet (400 mg total) by mouth once daily. 90 tablet 1    metOLazone (ZAROXOLYN) 5 MG tablet Take 1 tablet (5 mg total) by mouth once daily. Take daily 30 min before fluid pill 90 tablet 1    omega-3 fatty acids/fish oil (FISH OIL-OMEGA-3 FATTY ACIDS) 300-1,000 mg capsule Take by mouth once daily.      ondansetron (ZOFRAN) 4 MG tablet Take 1 tablet (4 mg total) by mouth every 8 (eight) hours as needed for Nausea. 90 tablet 1    pravastatin (PRAVACHOL) 10 MG tablet Take 1 tablet (10 mg total) by mouth once daily. 90 tablet 1    pulse oximeter (PULSE OXIMETER) device by Apply Externally route 2 (two) times a day. Use twice daily at 8 AM and 3 PM and record the value in [employer]t as directed. 1 each 0    rifAXIMin (XIFAXAN) 550 mg Tab Take 1 tablet (550 mg total) by mouth 2 (two) times daily. 60 tablet 5    STOOL SOFTENER 100 mg capsule TAKE 1 CAPSULE BY MOUTH THREE TIMES DAILY FOR DIARRHEA (HOLD FOR DIARRHEA)      torsemide (DEMADEX) 20 MG Tab Take 2 tablets (40 mg total) by mouth once daily. If having more swelling/weight gain/fluid take this in morning and afternoon for 3 days 180 tablet 1     No current facility-administered medications on file prior to visit.         Objective:            Physical Exam  Vitals reviewed: Physical exam was not performed, virtual visit.       Assessment:       1. CKD (chronic kidney disease), symptom management only, stage 5    2. Primary hypertension    3. Secondary hyperparathyroidism of renal origin        Plan:       1. Creatinine has remained relatively stable ranging between 5.0 and 5.6 for the past 3-4 months.  She has no symptoms of uremia at this time.  We briefly discussed renal replacement therapy today.  Will return to the subject at her next office visit.    2. She reports that her systolic blood pressures for the most part ranged between 130 and  140.  She is not on a RAAS inhibitor secondary to advanced chronic kidney disease.    3. Intact PTH is elevated at 290 consistent with her stage renal function.  Will check a vitamin-D level prior to her next office visit.  Calcium was normal at 8.8 with an albumin of 3.1.  Phosphorus is normal at 4.5.        Pankaj Jacobo MD

## 2023-05-09 ENCOUNTER — LAB VISIT (OUTPATIENT)
Dept: LAB | Facility: HOSPITAL | Age: 68
End: 2023-05-09
Attending: FAMILY MEDICINE
Payer: MEDICARE

## 2023-05-09 ENCOUNTER — OFFICE VISIT (OUTPATIENT)
Dept: INTERNAL MEDICINE | Facility: CLINIC | Age: 68
End: 2023-05-09
Payer: MEDICARE

## 2023-05-09 VITALS
TEMPERATURE: 96 F | HEART RATE: 65 BPM | SYSTOLIC BLOOD PRESSURE: 130 MMHG | BODY MASS INDEX: 35.2 KG/M2 | OXYGEN SATURATION: 98 % | DIASTOLIC BLOOD PRESSURE: 68 MMHG | WEIGHT: 186.31 LBS

## 2023-05-09 DIAGNOSIS — I10 ESSENTIAL HYPERTENSION: ICD-10-CM

## 2023-05-09 DIAGNOSIS — E11.59 TYPE 2 DIABETES MELLITUS WITH OTHER CIRCULATORY COMPLICATION, WITHOUT LONG-TERM CURRENT USE OF INSULIN: Primary | ICD-10-CM

## 2023-05-09 DIAGNOSIS — E78.49 OTHER HYPERLIPIDEMIA: ICD-10-CM

## 2023-05-09 DIAGNOSIS — R18.8 OTHER ASCITES: ICD-10-CM

## 2023-05-09 DIAGNOSIS — E66.01 MORBID OBESITY DUE TO EXCESS CALORIES: ICD-10-CM

## 2023-05-09 DIAGNOSIS — I50.32 CHRONIC DIASTOLIC CONGESTIVE HEART FAILURE: ICD-10-CM

## 2023-05-09 DIAGNOSIS — N18.5 CKD (CHRONIC KIDNEY DISEASE), SYMPTOM MANAGEMENT ONLY, STAGE 5: ICD-10-CM

## 2023-05-09 DIAGNOSIS — N25.81 SECONDARY HYPERPARATHYROIDISM OF RENAL ORIGIN: ICD-10-CM

## 2023-05-09 DIAGNOSIS — I10 HYPERTENSION, UNSPECIFIED TYPE: ICD-10-CM

## 2023-05-09 DIAGNOSIS — E11.59 TYPE 2 DIABETES MELLITUS WITH OTHER CIRCULATORY COMPLICATION, WITHOUT LONG-TERM CURRENT USE OF INSULIN: ICD-10-CM

## 2023-05-09 DIAGNOSIS — I10 PRIMARY HYPERTENSION: ICD-10-CM

## 2023-05-09 DIAGNOSIS — E44.0 PROTEIN-CALORIE MALNUTRITION, MODERATE: ICD-10-CM

## 2023-05-09 DIAGNOSIS — Z28.9 DELAYED IMMUNIZATIONS: ICD-10-CM

## 2023-05-09 PROCEDURE — 1159F PR MEDICATION LIST DOCUMENTED IN MEDICAL RECORD: ICD-10-PCS | Mod: CPTII,S$GLB,, | Performed by: FAMILY MEDICINE

## 2023-05-09 PROCEDURE — 3044F PR MOST RECENT HEMOGLOBIN A1C LEVEL <7.0%: ICD-10-PCS | Mod: CPTII,S$GLB,, | Performed by: FAMILY MEDICINE

## 2023-05-09 PROCEDURE — 99999 PR PBB SHADOW E&M-EST. PATIENT-LVL V: ICD-10-PCS | Mod: PBBFAC,,, | Performed by: FAMILY MEDICINE

## 2023-05-09 PROCEDURE — 3066F PR DOCUMENTATION OF TREATMENT FOR NEPHROPATHY: ICD-10-PCS | Mod: CPTII,S$GLB,, | Performed by: FAMILY MEDICINE

## 2023-05-09 PROCEDURE — 83970 ASSAY OF PARATHORMONE: CPT | Performed by: INTERNAL MEDICINE

## 2023-05-09 PROCEDURE — 3044F HG A1C LEVEL LT 7.0%: CPT | Mod: CPTII,S$GLB,, | Performed by: FAMILY MEDICINE

## 2023-05-09 PROCEDURE — 3075F PR MOST RECENT SYSTOLIC BLOOD PRESS GE 130-139MM HG: ICD-10-PCS | Mod: CPTII,S$GLB,, | Performed by: FAMILY MEDICINE

## 2023-05-09 PROCEDURE — 1126F AMNT PAIN NOTED NONE PRSNT: CPT | Mod: CPTII,S$GLB,, | Performed by: FAMILY MEDICINE

## 2023-05-09 PROCEDURE — 99999 PR PBB SHADOW E&M-EST. PATIENT-LVL V: CPT | Mod: PBBFAC,,, | Performed by: FAMILY MEDICINE

## 2023-05-09 PROCEDURE — 3075F SYST BP GE 130 - 139MM HG: CPT | Mod: CPTII,S$GLB,, | Performed by: FAMILY MEDICINE

## 2023-05-09 PROCEDURE — G0009 ADMIN PNEUMOCOCCAL VACCINE: HCPCS | Mod: S$GLB,,, | Performed by: FAMILY MEDICINE

## 2023-05-09 PROCEDURE — 3008F BODY MASS INDEX DOCD: CPT | Mod: CPTII,S$GLB,, | Performed by: FAMILY MEDICINE

## 2023-05-09 PROCEDURE — 1126F PR PAIN SEVERITY QUANTIFIED, NO PAIN PRESENT: ICD-10-PCS | Mod: CPTII,S$GLB,, | Performed by: FAMILY MEDICINE

## 2023-05-09 PROCEDURE — 3288F PR FALLS RISK ASSESSMENT DOCUMENTED: ICD-10-PCS | Mod: CPTII,S$GLB,, | Performed by: FAMILY MEDICINE

## 2023-05-09 PROCEDURE — G0009 PNEUMOCOCCAL CONJUGATE VACCINE 20-VALENT: ICD-10-PCS | Mod: S$GLB,,, | Performed by: FAMILY MEDICINE

## 2023-05-09 PROCEDURE — 83036 HEMOGLOBIN GLYCOSYLATED A1C: CPT | Performed by: FAMILY MEDICINE

## 2023-05-09 PROCEDURE — 90677 PNEUMOCOCCAL CONJUGATE VACCINE 20-VALENT: ICD-10-PCS | Mod: S$GLB,,, | Performed by: FAMILY MEDICINE

## 2023-05-09 PROCEDURE — 99214 PR OFFICE/OUTPT VISIT, EST, LEVL IV, 30-39 MIN: ICD-10-PCS | Mod: 25,S$GLB,, | Performed by: FAMILY MEDICINE

## 2023-05-09 PROCEDURE — 1159F MED LIST DOCD IN RCRD: CPT | Mod: CPTII,S$GLB,, | Performed by: FAMILY MEDICINE

## 2023-05-09 PROCEDURE — 36415 COLL VENOUS BLD VENIPUNCTURE: CPT | Mod: PO | Performed by: INTERNAL MEDICINE

## 2023-05-09 PROCEDURE — 82306 VITAMIN D 25 HYDROXY: CPT | Performed by: INTERNAL MEDICINE

## 2023-05-09 PROCEDURE — 1101F PT FALLS ASSESS-DOCD LE1/YR: CPT | Mod: CPTII,S$GLB,, | Performed by: FAMILY MEDICINE

## 2023-05-09 PROCEDURE — 80061 LIPID PANEL: CPT | Performed by: FAMILY MEDICINE

## 2023-05-09 PROCEDURE — 3288F FALL RISK ASSESSMENT DOCD: CPT | Mod: CPTII,S$GLB,, | Performed by: FAMILY MEDICINE

## 2023-05-09 PROCEDURE — 1157F ADVNC CARE PLAN IN RCRD: CPT | Mod: CPTII,S$GLB,, | Performed by: FAMILY MEDICINE

## 2023-05-09 PROCEDURE — 99214 OFFICE O/P EST MOD 30 MIN: CPT | Mod: 25,S$GLB,, | Performed by: FAMILY MEDICINE

## 2023-05-09 PROCEDURE — 80069 RENAL FUNCTION PANEL: CPT | Performed by: INTERNAL MEDICINE

## 2023-05-09 PROCEDURE — 80053 COMPREHEN METABOLIC PANEL: CPT | Performed by: FAMILY MEDICINE

## 2023-05-09 PROCEDURE — 1101F PR PT FALLS ASSESS DOC 0-1 FALLS W/OUT INJ PAST YR: ICD-10-PCS | Mod: CPTII,S$GLB,, | Performed by: FAMILY MEDICINE

## 2023-05-09 PROCEDURE — 3008F PR BODY MASS INDEX (BMI) DOCUMENTED: ICD-10-PCS | Mod: CPTII,S$GLB,, | Performed by: FAMILY MEDICINE

## 2023-05-09 PROCEDURE — 1157F PR ADVANCE CARE PLAN OR EQUIV PRESENT IN MEDICAL RECORD: ICD-10-PCS | Mod: CPTII,S$GLB,, | Performed by: FAMILY MEDICINE

## 2023-05-09 PROCEDURE — 3078F DIAST BP <80 MM HG: CPT | Mod: CPTII,S$GLB,, | Performed by: FAMILY MEDICINE

## 2023-05-09 PROCEDURE — 3066F NEPHROPATHY DOC TX: CPT | Mod: CPTII,S$GLB,, | Performed by: FAMILY MEDICINE

## 2023-05-09 PROCEDURE — 3078F PR MOST RECENT DIASTOLIC BLOOD PRESSURE < 80 MM HG: ICD-10-PCS | Mod: CPTII,S$GLB,, | Performed by: FAMILY MEDICINE

## 2023-05-09 PROCEDURE — 90677 PCV20 VACCINE IM: CPT | Mod: S$GLB,,, | Performed by: FAMILY MEDICINE

## 2023-05-09 RX ORDER — LANOLIN ALCOHOL/MO/W.PET/CERES
400 CREAM (GRAM) TOPICAL DAILY
Qty: 90 TABLET | Refills: 1 | Status: SHIPPED | OUTPATIENT
Start: 2023-05-09 | End: 2023-05-17 | Stop reason: SDUPTHER

## 2023-05-09 RX ORDER — DULAGLUTIDE 4.5 MG/.5ML
4.5 INJECTION, SOLUTION SUBCUTANEOUS
Qty: 12 PEN | Refills: 1 | Status: SHIPPED | OUTPATIENT
Start: 2023-05-09 | End: 2023-05-16

## 2023-05-09 NOTE — PROGRESS NOTES
Subjective:       Patient ID: Diamond Das is a 68 y.o. female.    Chief Complaint: Hypertension    Diabetes  She presents for her follow-up diabetic visit. She has type 2 diabetes mellitus. Her disease course has been stable. Pertinent negatives for hypoglycemia include no confusion, dizziness, headaches or hunger. Pertinent negatives for diabetes include no chest pain, no fatigue and no foot paresthesias. Symptoms are stable.   Review of Systems   Constitutional:  Negative for fatigue.   Respiratory:  Negative for shortness of breath.    Cardiovascular:  Negative for chest pain.   Gastrointestinal:  Negative for abdominal pain.   Neurological:  Negative for dizziness and headaches.   Psychiatric/Behavioral:  Negative for confusion.      Objective:      Physical Exam  Vitals and nursing note reviewed.   Constitutional:       General: She is not in acute distress.     Appearance: Normal appearance. She is well-developed. She is not diaphoretic.   HENT:      Head: Normocephalic and atraumatic.   Cardiovascular:      Heart sounds: Murmur heard.   Pulmonary:      Effort: Pulmonary effort is normal. No respiratory distress.      Breath sounds: Normal breath sounds. No wheezing.   Abdominal:      General: There is no distension.      Palpations: Abdomen is soft.      Tenderness: There is no abdominal tenderness. There is no guarding.   Musculoskeletal:      Right lower leg: Edema present.      Left lower leg: Edema present.   Skin:     General: Skin is warm and dry.      Findings: No erythema or rash.   Neurological:      Mental Status: She is alert.       Assessment:       1. Type 2 diabetes mellitus with other circulatory complication, without long-term current use of insulin    2. Delayed immunizations    3. Protein-calorie malnutrition, moderate    4. Morbid obesity due to excess calories    5. Primary hypertension    6. Other hyperlipidemia    7. Chronic diastolic congestive heart failure    8. Essential  hypertension    9. Other ascites        Plan:     Problem List Items Addressed This Visit          Cardiac/Vascular    Hypertension    Overview     Chronic, Stable, cont amlodipine           Relevant Medications    magnesium oxide (MAG-OX) 400 mg (241.3 mg magnesium) tablet    Hyperlipidemia    Overview     Chronic, Stable, cont pravastatin              ID    Delayed immunizations    Relevant Orders    Pneumococcal Conjugate Vaccine (20 Valent) (IM)       Endocrine    Morbid obesity due to excess calories    Protein-calorie malnutrition, moderate    Type 2 diabetes mellitus, without long-term current use of insulin - Primary    Overview     Chronic, Stable, use trulicity           Relevant Medications    dulaglutide (TRULICITY) 4.5 mg/0.5 mL pen injector    Other Relevant Orders    Hemoglobin A1C    Lipid Panel    Microalbumin/Creatinine Ratio, Urine    Comprehensive Metabolic Panel    Ambulatory referral/consult to Podiatry     Other Visit Diagnoses       Chronic diastolic congestive heart failure        Relevant Medications    magnesium oxide (MAG-OX) 400 mg (241.3 mg magnesium) tablet    Essential hypertension        Relevant Medications    magnesium oxide (MAG-OX) 400 mg (241.3 mg magnesium) tablet    Other ascites        Relevant Medications    magnesium oxide (MAG-OX) 400 mg (241.3 mg magnesium) tablet

## 2023-05-10 ENCOUNTER — TELEPHONE (OUTPATIENT)
Dept: NEPHROLOGY | Facility: CLINIC | Age: 68
End: 2023-05-10
Payer: MEDICARE

## 2023-05-10 DIAGNOSIS — N18.5 CKD (CHRONIC KIDNEY DISEASE), SYMPTOM MANAGEMENT ONLY, STAGE 5: Primary | ICD-10-CM

## 2023-05-10 LAB
25(OH)D3+25(OH)D2 SERPL-MCNC: 43 NG/ML (ref 30–96)
ALBUMIN SERPL BCP-MCNC: 3.3 G/DL (ref 3.5–5.2)
ALBUMIN SERPL BCP-MCNC: 3.3 G/DL (ref 3.5–5.2)
ALP SERPL-CCNC: 82 U/L (ref 55–135)
ALT SERPL W/O P-5'-P-CCNC: 12 U/L (ref 10–44)
ANION GAP SERPL CALC-SCNC: 11 MMOL/L (ref 8–16)
AST SERPL-CCNC: 22 U/L (ref 10–40)
BILIRUB SERPL-MCNC: 0.3 MG/DL (ref 0.1–1)
BUN SERPL-MCNC: 80 MG/DL (ref 8–23)
CALCIUM SERPL-MCNC: 8.8 MG/DL (ref 8.7–10.5)
CHLORIDE SERPL-SCNC: 107 MMOL/L (ref 95–110)
CHOLEST SERPL-MCNC: 137 MG/DL (ref 120–199)
CHOLEST/HDLC SERPL: 4.4 {RATIO} (ref 2–5)
CO2 SERPL-SCNC: 27 MMOL/L (ref 23–29)
CREAT SERPL-MCNC: 4.8 MG/DL (ref 0.5–1.4)
EST. GFR  (NO RACE VARIABLE): 9.5 ML/MIN/1.73 M^2
ESTIMATED AVG GLUCOSE: 108 MG/DL (ref 68–131)
GLUCOSE SERPL-MCNC: 136 MG/DL (ref 70–110)
HBA1C MFR BLD: 5.4 % (ref 4–5.6)
HDLC SERPL-MCNC: 31 MG/DL (ref 40–75)
HDLC SERPL: 22.6 % (ref 20–50)
LDLC SERPL CALC-MCNC: 95.6 MG/DL (ref 63–159)
NONHDLC SERPL-MCNC: 106 MG/DL
PHOSPHATE SERPL-MCNC: 4.6 MG/DL (ref 2.7–4.5)
POTASSIUM SERPL-SCNC: 4.2 MMOL/L (ref 3.5–5.1)
PROT SERPL-MCNC: 6.8 G/DL (ref 6–8.4)
PTH-INTACT SERPL-MCNC: 319.2 PG/ML (ref 9–77)
SODIUM SERPL-SCNC: 145 MMOL/L (ref 136–145)
TRIGL SERPL-MCNC: 52 MG/DL (ref 30–150)

## 2023-05-13 DIAGNOSIS — E11.59 TYPE 2 DIABETES MELLITUS WITH OTHER CIRCULATORY COMPLICATION, WITHOUT LONG-TERM CURRENT USE OF INSULIN: ICD-10-CM

## 2023-05-15 ENCOUNTER — TELEPHONE (OUTPATIENT)
Dept: INTERNAL MEDICINE | Facility: CLINIC | Age: 68
End: 2023-05-15
Payer: MEDICARE

## 2023-05-15 NOTE — TELEPHONE ENCOUNTER
Pt states Trulicity was called in to the wrong pharmacy.    Refill for Trulicity has been sent to Dr Perrin, pending approval.

## 2023-05-15 NOTE — TELEPHONE ENCOUNTER
Last appt: 5/9/23    Pt states Trulicity was called in to the wrong pharmacy.    Trulicity pended to preferred pharmacy.

## 2023-05-15 NOTE — TELEPHONE ENCOUNTER
----- Message from Blanca Liu sent at 5/15/2023  1:46 PM CDT -----  Type:  RX Refill Request    Who Called: pt  Refill or New Rx:New Rx  RX Name and Strength:Trulicity  How is the patient currently taking it? (ex. 1XDay):1XWeek  Is this a 30 day or 90 day RX:?  Preferred Pharmacy with phone number:.  [address] [address] [place] [id number] [facility] [address]  Phone: [phone] Fax: [phone]  Local or Mail Order:local  Ordering Provider:Dr Perrin  Would the patient rather a call back or a response via [facility]? call  Best Call Back Number:[phone]  Additional Information: .    Thank you

## 2023-05-16 ENCOUNTER — TELEPHONE (OUTPATIENT)
Dept: INTERNAL MEDICINE | Facility: CLINIC | Age: 68
End: 2023-05-16
Payer: MEDICARE

## 2023-05-16 RX ORDER — DULAGLUTIDE 4.5 MG/.5ML
4.5 INJECTION, SOLUTION SUBCUTANEOUS
Qty: 12 PEN | Refills: 1 | Status: SHIPPED | OUTPATIENT
Start: 2023-05-16 | End: 2023-10-27 | Stop reason: SDUPTHER

## 2023-05-16 NOTE — TELEPHONE ENCOUNTER
----- Message from Jerod Whitneylas sent at 5/15/2023  2:34 PM CDT -----  Contact: shagufta  .Type:  Patient Returning Call    Who Called:shagufta  Who Left Message for Patient:nurse  Does the patient know what this is regarding?:yes  Would the patient rather a call back or a response via [facility]? Call back   Best Call Back Number:[phone]  Additional Information: n/a      Thanks  DD

## 2023-05-16 NOTE — TELEPHONE ENCOUNTER
Called pt to advise that Dr Perrin has approved her Trulicity and is available to be picked up from her pharmacy.    Pt verified understanding.

## 2023-05-17 DIAGNOSIS — R18.8 OTHER ASCITES: ICD-10-CM

## 2023-05-17 DIAGNOSIS — I50.32 CHRONIC DIASTOLIC CONGESTIVE HEART FAILURE: ICD-10-CM

## 2023-05-17 DIAGNOSIS — I10 ESSENTIAL HYPERTENSION: ICD-10-CM

## 2023-05-17 RX ORDER — LANOLIN ALCOHOL/MO/W.PET/CERES
400 CREAM (GRAM) TOPICAL DAILY
Qty: 90 TABLET | Refills: 1 | Status: SHIPPED | OUTPATIENT
Start: 2023-05-17 | End: 2023-12-18 | Stop reason: SDUPTHER

## 2023-05-22 ENCOUNTER — OFFICE VISIT (OUTPATIENT)
Dept: CARDIOLOGY | Facility: CLINIC | Age: 68
End: 2023-05-22
Payer: MEDICARE

## 2023-05-22 VITALS
SYSTOLIC BLOOD PRESSURE: 132 MMHG | HEART RATE: 65 BPM | BODY MASS INDEX: 33.09 KG/M2 | WEIGHT: 175.25 LBS | OXYGEN SATURATION: 99 % | HEIGHT: 61 IN | DIASTOLIC BLOOD PRESSURE: 70 MMHG

## 2023-05-22 DIAGNOSIS — R79.89 ELEVATED D-DIMER: ICD-10-CM

## 2023-05-22 DIAGNOSIS — E03.4 HYPOTHYROIDISM DUE TO ACQUIRED ATROPHY OF THYROID: ICD-10-CM

## 2023-05-22 DIAGNOSIS — Z86.16 HISTORY OF COVID-19: ICD-10-CM

## 2023-05-22 DIAGNOSIS — I50.32 CHRONIC DIASTOLIC HEART FAILURE: ICD-10-CM

## 2023-05-22 DIAGNOSIS — G47.33 OSA ON CPAP: ICD-10-CM

## 2023-05-22 DIAGNOSIS — E11.59 TYPE 2 DIABETES MELLITUS WITH OTHER CIRCULATORY COMPLICATION, WITHOUT LONG-TERM CURRENT USE OF INSULIN: ICD-10-CM

## 2023-05-22 DIAGNOSIS — I50.32 CHRONIC DIASTOLIC CONGESTIVE HEART FAILURE: ICD-10-CM

## 2023-05-22 DIAGNOSIS — I10 PRIMARY HYPERTENSION: ICD-10-CM

## 2023-05-22 DIAGNOSIS — I13.10 CARDIORENAL SYNDROME WITH RENAL FAILURE: Primary | ICD-10-CM

## 2023-05-22 DIAGNOSIS — N18.4 CKD (CHRONIC KIDNEY DISEASE) STAGE 4, GFR 15-29 ML/MIN: ICD-10-CM

## 2023-05-22 PROCEDURE — 3066F PR DOCUMENTATION OF TREATMENT FOR NEPHROPATHY: ICD-10-PCS | Mod: CPTII,S$GLB,, | Performed by: INTERNAL MEDICINE

## 2023-05-22 PROCEDURE — 1126F AMNT PAIN NOTED NONE PRSNT: CPT | Mod: CPTII,S$GLB,, | Performed by: INTERNAL MEDICINE

## 2023-05-22 PROCEDURE — 3075F SYST BP GE 130 - 139MM HG: CPT | Mod: CPTII,S$GLB,, | Performed by: INTERNAL MEDICINE

## 2023-05-22 PROCEDURE — 99999 PR PBB SHADOW E&M-EST. PATIENT-LVL IV: CPT | Mod: PBBFAC,,, | Performed by: INTERNAL MEDICINE

## 2023-05-22 PROCEDURE — 1157F PR ADVANCE CARE PLAN OR EQUIV PRESENT IN MEDICAL RECORD: ICD-10-PCS | Mod: CPTII,S$GLB,, | Performed by: INTERNAL MEDICINE

## 2023-05-22 PROCEDURE — 99499 RISK ADDL DX/OHS AUDIT: ICD-10-PCS | Mod: S$GLB,,, | Performed by: INTERNAL MEDICINE

## 2023-05-22 PROCEDURE — 99214 OFFICE O/P EST MOD 30 MIN: CPT | Mod: PO | Performed by: INTERNAL MEDICINE

## 2023-05-22 PROCEDURE — 99214 OFFICE O/P EST MOD 30 MIN: CPT | Mod: S$GLB,,, | Performed by: INTERNAL MEDICINE

## 2023-05-22 PROCEDURE — 99999 PR PBB SHADOW E&M-EST. PATIENT-LVL IV: ICD-10-PCS | Mod: PBBFAC,,, | Performed by: INTERNAL MEDICINE

## 2023-05-22 PROCEDURE — 3288F FALL RISK ASSESSMENT DOCD: CPT | Mod: CPTII,S$GLB,, | Performed by: INTERNAL MEDICINE

## 2023-05-22 PROCEDURE — 3044F PR MOST RECENT HEMOGLOBIN A1C LEVEL <7.0%: ICD-10-PCS | Mod: CPTII,S$GLB,, | Performed by: INTERNAL MEDICINE

## 2023-05-22 PROCEDURE — 3008F BODY MASS INDEX DOCD: CPT | Mod: CPTII,S$GLB,, | Performed by: INTERNAL MEDICINE

## 2023-05-22 PROCEDURE — 1159F MED LIST DOCD IN RCRD: CPT | Mod: CPTII,S$GLB,, | Performed by: INTERNAL MEDICINE

## 2023-05-22 PROCEDURE — 1159F PR MEDICATION LIST DOCUMENTED IN MEDICAL RECORD: ICD-10-PCS | Mod: CPTII,S$GLB,, | Performed by: INTERNAL MEDICINE

## 2023-05-22 PROCEDURE — 1160F PR REVIEW ALL MEDS BY PRESCRIBER/CLIN PHARMACIST DOCUMENTED: ICD-10-PCS | Mod: CPTII,S$GLB,, | Performed by: INTERNAL MEDICINE

## 2023-05-22 PROCEDURE — 1101F PR PT FALLS ASSESS DOC 0-1 FALLS W/OUT INJ PAST YR: ICD-10-PCS | Mod: CPTII,S$GLB,, | Performed by: INTERNAL MEDICINE

## 2023-05-22 PROCEDURE — 3062F PR POS MACROALBUMINURIA RESULT DOCUMENTED/REVIEW: ICD-10-PCS | Mod: CPTII,S$GLB,, | Performed by: INTERNAL MEDICINE

## 2023-05-22 PROCEDURE — 1101F PT FALLS ASSESS-DOCD LE1/YR: CPT | Mod: CPTII,S$GLB,, | Performed by: INTERNAL MEDICINE

## 2023-05-22 PROCEDURE — 3075F PR MOST RECENT SYSTOLIC BLOOD PRESS GE 130-139MM HG: ICD-10-PCS | Mod: CPTII,S$GLB,, | Performed by: INTERNAL MEDICINE

## 2023-05-22 PROCEDURE — 3288F PR FALLS RISK ASSESSMENT DOCUMENTED: ICD-10-PCS | Mod: CPTII,S$GLB,, | Performed by: INTERNAL MEDICINE

## 2023-05-22 PROCEDURE — 3008F PR BODY MASS INDEX (BMI) DOCUMENTED: ICD-10-PCS | Mod: CPTII,S$GLB,, | Performed by: INTERNAL MEDICINE

## 2023-05-22 PROCEDURE — 3044F HG A1C LEVEL LT 7.0%: CPT | Mod: CPTII,S$GLB,, | Performed by: INTERNAL MEDICINE

## 2023-05-22 PROCEDURE — 3066F NEPHROPATHY DOC TX: CPT | Mod: CPTII,S$GLB,, | Performed by: INTERNAL MEDICINE

## 2023-05-22 PROCEDURE — 1126F PR PAIN SEVERITY QUANTIFIED, NO PAIN PRESENT: ICD-10-PCS | Mod: CPTII,S$GLB,, | Performed by: INTERNAL MEDICINE

## 2023-05-22 PROCEDURE — 1160F RVW MEDS BY RX/DR IN RCRD: CPT | Mod: CPTII,S$GLB,, | Performed by: INTERNAL MEDICINE

## 2023-05-22 PROCEDURE — 99499 UNLISTED E&M SERVICE: CPT | Mod: S$GLB,,, | Performed by: INTERNAL MEDICINE

## 2023-05-22 PROCEDURE — 3078F DIAST BP <80 MM HG: CPT | Mod: CPTII,S$GLB,, | Performed by: INTERNAL MEDICINE

## 2023-05-22 PROCEDURE — 1157F ADVNC CARE PLAN IN RCRD: CPT | Mod: CPTII,S$GLB,, | Performed by: INTERNAL MEDICINE

## 2023-05-22 PROCEDURE — 3062F POS MACROALBUMINURIA REV: CPT | Mod: CPTII,S$GLB,, | Performed by: INTERNAL MEDICINE

## 2023-05-22 PROCEDURE — 99214 PR OFFICE/OUTPT VISIT, EST, LEVL IV, 30-39 MIN: ICD-10-PCS | Mod: S$GLB,,, | Performed by: INTERNAL MEDICINE

## 2023-05-22 PROCEDURE — 3078F PR MOST RECENT DIASTOLIC BLOOD PRESSURE < 80 MM HG: ICD-10-PCS | Mod: CPTII,S$GLB,, | Performed by: INTERNAL MEDICINE

## 2023-05-22 RX ORDER — METOLAZONE 5 MG/1
5 TABLET ORAL
Qty: 60 TABLET | Refills: 1 | Status: SHIPPED | OUTPATIENT
Start: 2023-05-22 | End: 2023-12-18 | Stop reason: SDUPTHER

## 2023-05-22 RX ORDER — TORSEMIDE 20 MG/1
40 TABLET ORAL DAILY
Qty: 180 TABLET | Refills: 1 | Status: SHIPPED | OUTPATIENT
Start: 2023-05-22 | End: 2023-12-18 | Stop reason: SDUPTHER

## 2023-05-22 NOTE — PROGRESS NOTES
Subjective:   Patient ID:  Diamond Das is a 68 y.o. female who presents for cardiac consult of No chief complaint on file.      The patient came in today for cardiac consult of No chief complaint on file.    Referring Physician: Andrey Perrin MD   Reason for consult: HTN, CHF    Diamond Das is a 68 y.o. female with medical conditions diastolic CHF, pericardial effusion, HTN, HLD, DM2, cryptogenic cirrhosis, s/p TIPS, ascites presents for CV follow up.     Pt of Dr. Shaw, last seen 2/9/18  No smoking/drinking  Last echo in  normal EF and RVF, grade II DD.  EKG today NSR poor R progression on anterior leads  Chronic weakness and fatigue. Had PNA in   Pain on lower chest bilateral for few months, worse with exercise, resolved after resting. Deep breathing made the pain worse. No pain at sleep. Associated dyspnea, N/V, palpitation and dizziness. No radiation. ASA and tylenol not really helped the pain.    3/1/19  She has been having more ascites and concern for cardiac etiologies. She was also started on Reglan, concern for prolonged Qtc, recent Qtc 475 ms. Has been feeling better with reglan and lactulose. Is dyspneic, chronically on oxygen at night and also has portable oxygen when she has to do a lot walking. Takes lasix 40 mg once/day now, was on 80mg daily. Active at home, dresses and bathes her self. Cant walk or stand to too long.     8/26/19  2D ECHO with grade 1 DD, normal Bi V function, Small pericardial effusion without tamponade. She has mild SOB at times with edema but improved lately. She will see hepatology as well. No Chest pain.   Has been taking lasix extra doses PRN and improved symptoms.     10/16/19  [facility] hosp ER follow up  - Non toxic appearing elderly female here for worsening NICHOLE and shortness of breath. Known h/o CHF, f/b cardiologist in [facility]. Work up is c/w acute on chronic chf exacerbation. She, unfortunately, does not follow her weights so not clear if  significant change recently. CXR with likely signs of volume overload. She was given diuresis and had Lancaster score of 0. Felt improved and prefers discharge with close OP f/u with her cardiologist. She was ambulated and did not significantly desat and tolerated it well.  No she is on lasix, feels better. BNP today is 453, needs to double lasix next 3-4 days for further diuresis.     11/18/19   Increased diuretics last visit. She felt better then and had weight loss as well. Still has NICHOLE but improved. She went to [facility] 2 weeks ago - presented to the ED for fall secondary to hypoglycemic episode. Pt has had 3 previous episodes of hypoglycemia requiring hospitalizations in the past with sxs of lightheadedness. CT head was unremarkable, CXR revealed improvement in CHF compared to previous study. Due to timeline of fall with insulin administration and Hx of cryptogenic cirrhosis, it is likely that pt could not compensate after receiving her insulin dosing.  BP meds were stopped, will get labs and restart lisinopril.     1/6/20  Breathing has been stable with LE edema. Last visit she doubled lasix for 3 days with min improvement. Has not had much relief lately.   ECG - NSR, poor RWP, lateral TWI    2/17/20  BNP was 863. She had recent thoracentesis as well. She has also been referred to hepatology as concern for TIPS not working?. She remains SOB will add metolazone. Discussed if kidneys and liver are worse will be difficult to manage volume status.     5/18/20  She has been stable but still has SOB with leg swelling. Symptoms have gotten a bit worse a few weeks ago. She has been taking lasix extra dose some times. Last BNP elevated 863, will need to increase lasix and metolazone and repeat labs this week. Will add K and Mg supplements.    9/28/20  BNP was 428 at last visit, which is improved from 7 months prior. She saw PCP today with worsening NICHOLE and edema. Increased lasix and metolazone last visit.      Ref Range &  Units 4mo ago  (5/21/20) 7mo ago  (2/10/20) 8mo ago  (1/10/20) 10mo ago  (11/18/19) 11mo ago  (10/25/19) 11mo ago  (10/16/19)   BNP 0 - 99 pg/mL 413High   863High  CM  1,010High  CM  793High  CM  291High  CM  453High  CM      10/19/20  Pt was admitted 10/9-10/15 for CHF exac with edema, diuresed aggressively. She also had nephro consult and hepatology eval for further treatment and possible liver transplant. She has upcoming appt for CT Renal biopsy. Autoimmune workup planned. ECHO with small pericardial effusion, no tamponde, grade 1 DD.   BNP improved to 129 two weeks prior. She is taking bumex BID. SHe is taking metolazone every other day. Weight is stable.     11/16/20  She has been on Bumex with metolazone. She had recent visit with Dr. Hernandez -  Renal biopsy revealed diabetic nephropathy and hypertensive nephropathy.  No autoimmune disorder detected. She may be candidate for RRT. She will have further liver workup and be liver and kidney transplant. Will need ischemic work up, had nuclear stress in past, none recently.     12/28/20  Nuclear stress test pending. Saw Dr. Contreras recently, started on Lisinopril 5mg. She feels well, no CP/SOB. Stress test is pending. Is walking more and active.     2/15/21  Nuclear stress neg in Dec 30th. BP overall stable. 189/96 today but has not taken meds yet and woke up late. BP was normal yesterday. Liver function has improved, does not need liver transplant, discussed she can remain kidney transplant stable CV status    5/4/21  BP elevated 160/80s. HR 70s. She has some salty food a few days ago. She felt extra bloated and took an diuretic. She has stable NICHOLE.     5/17/21   Ref Range & Units 13 d ago   (5/4/21) 7 mo ago   (10/11/20) 7 mo ago   (10/9/20) 7 mo ago   (10/5/20) 12 mo ago   (5/21/20) 1 yr ago   (2/10/20)   BNP 0 - 99 pg/mL 324High   129High  CM  351High  CM  349High  CM  413High  CM  863High  CM    Comment: Values of less than 100 pg/ml are consistent with non-CHF  populations.       BNP 2 weeks ago elevated 324, discussed double dose of bumex for 3 days and monitor BP and weights, avoid salty food. Kidney function is slightly worse, needs to follow up with nephrologist as well asap.  216 lbs to 196 lbs today. BP and hR stable today. She had an episode of flutter one night, lasted about 15 min    8/23/21  Last visit - BNP has improved due to less fluid, kidney function is worse, creatinine is 3.3, need to follow up with nephrologist asap. Decrease Bumex to only once a day. Monitor pressures and weights if increased weight with swelling can go back to bumex twice a day.     She saw Dr. Jacobo with nephro;  changed Bumex to Torsemide 40mg daily  BP elevated today, meds changed recently.     10/4/21  BP and HR well controlled today. Weight 190 lbs improved from 197 lbs.     12/220 hosp  DC summary - sent from hepatology clinic on account of hypercalcemia.  The patient went for her Rifaximin refill and was told to come in for high Clacium.  She admits to dry cough of some months, exertional dyspnea which is not worsening, about 2-3 weeks of weakness, constipation and further reduction of her oliguric state from roughly 500cc urine daily to about 125 cc/urine daily. But no chest pain, vomiting, diarrhea or reduced oral intake. She denies fever.  She is on Calcitriol, Carol D3 1000 U QD and calcium containing multivitamin.  She denies any bone pain or weight loss, infact she has just started to gain some weight. She denies orthopnea.  Serum albumin 2.4, calcium 12.6, corrected calcium 13.9     Hospital Course:   12/7 patient in good spirits, calcium slightly improved but still high. Not for discharge today. Otherwise stable  12/8 patient seen, awake, alert, vague abdominal pain, semi hard stools yesterday, no emesis , waiting for her labs this morning to determine if she can discharge.  Update: corrected calcium is 11.6, she is cleared for discharge by Nephrology who have made an  appointment for her to see them in the clinic. She is cleared to resume her Torsemide and Benicar. She is not to take any Vit D. Calcitriol, oral calcium supplements and this has been explained in details to her very well.    12/20/21  She is s/p hosp DC for hyperCA, has been restarted on diuretics and BP meds and CA supplements stopped. Her diurertics was stopped last week due to lack of appetite and more abd pain. Her weight has low as 173 lbs, but now at 195 lbs.     Hosp DC summar   66 year old female who was admitted to [facility] for COVID-19 and decompensated CHF. CXR shows evidence of volume overload and was diuresed. Echocaridogram shows a preserved EF with diastolic dysfunction. Will repeat CXR on 1/12/22. For COVID-19 she received MVI, ascorbic acid, and dexamethasone. Currently on 3L nasal cannula.  1/12/22  D-dimer 7 today. V/Q scan indeterminant for pulmonary embolism. Could reflect fluid within the fissure as well. Started on heparin infusion. CXR today still shows fluid.  Will change diuretics to Bumex with Metolazone for one dose. Closely monitor kidney function.   1/13/22  Still with decrease urine ouput. Nephrology consulted to assist with diuretics. Bumex changed to 2mg IV every 8 hours. 500mg diuril twice a day has been added for 48 hours. No intervention needed for hyperkalemia.   1/14  Reports sx improvement. Ready to go home but reports right breast swelling since admission. Lower ext edema and dyspnea improving. H/h trending down with no overt s/s bleeding. Agreeable to blood transfusion. Nephrology consulted. Consider cards consult  1/15  Dyspnea and swelling symptoms improved. No transfusion reaction after blood transfusion. Denies further breast swelling. Does not qualify for home o2. Followed o/p with Dr. Barkley, pulmonology. Advised to f/u o/p, as elevated d-dimer in setting of covid. Perfusion scan indeterminant and started on heparin gtt. Transition to eliquis on  d/c.  Neph consulted for recs regarding diuresis and hyperk in ckd and chf. Metolazone started. Hyperk resolved. Cr stable but elevated and will need further adjusting of meds o/p. Advised strict na and fluid restriction. Neph recommending tolerance of higher cr while also need for diuresising for chfx  Hyperglycemia on systemic steroids. Hba1c <5. Discontinued steroids and managed hyperglycemia with insulin.     2/1/22  Hosp follow up for COVID 19, PE and volume overload. She is taking Torsemide 40mg with metolazone doing well. She is taking Eliquis 5 BID without issues, told to continue will repeat D dimer likely treat at least 3 months. She has been losing weight now weighs 187 lbs.     6/20/22  BP and HR well controlled today. BMI 37 - 198 lbs. Her weight is increased from prior visit, she has more edema now. She went to ER a few weeks ago for swelling/edema was told to stop diuretic due to worsening renal function. But then she has restarted Torsemide 20mg    8/1/22   She went to ER after last visit for hyperkalemia 6.5, improved recently. Had EGD past month may need gastric emptying study for gasteroparesis. BMI 40 - 212 lbs, BP and HR well controlled today. Has gained appx 12-14 lbs since last visit. She just got back from , did not have any issues with fluid then. She started gaining weight since.     11/16/22  ECHO 8/2022 with normal bi V function, grade 2 DD, mild to mod TR, small peric effusion, PASP > 44mmHg. BP and HR well controlled today. BMI 33 - 179 lbs.     2/13/23    Recent Readings 2/13/2023 2/10/2023 2/8/2023 2/7/2023 2/4/2023   SBP (mmHg) 166 153 154 144 153   DBP (mmHg) 71 76 74 70 72   Pulse 69 64 67 70 62     Bp consisently elevated on digital HTN readings. Today BP and HR well controlled. BMI 34 - 181 lbs. BMI 34 - 181 lbs.     5/22/23    BP and HR well controlled. BMI 33 - 175 lbs. Recent nephro - Creatinine has remained relatively stable ranging between 5.0 and 5.6 for the past 3-4  months      Recent Readings 5/21/2023 5/20/2023 5/17/2023 5/12/2023 5/2/2023   SBP (mmHg) 151 134 131 145 141   DBP (mmHg) 77 57 62 62 61   Pulse 59 68 65 62 70     BNP 0 - 99 pg/mL 472 High   804 High  CM  704 High       She is taking torsemide 40mg daily, metolazine M, W, F.     Patient feels no chest pain, no PND, no palpitation, no dizziness, no syncope, no CNS symptoms.    Patient has dec exercise tolerance.    Patient is compliant with medications.    Results for orders placed during the hospital encounter of 08/08/22    Echo    Interpretation Summary  · Concentric remodeling and normal systolic function.  · Moderate left atrial enlargement.  · The estimated ejection fraction is 60%.  · Grade II left ventricular diastolic dysfunction.  · Mild right atrial enlargement.  · Mild to moderate tricuspid regurgitation.  · There is pulmonary hypertension.  · Small pericardial effusion.  · Mild mitral regurgitation.  The estimated PA systolic pressure is greater than 44 mmHg.      Results for orders placed during the hospital encounter of 12/30/20    Nuclear Stress - Cardiology Interpreted    Interpretation Summary    Normal myocardial perfusion scan. There is no evidence of myocardial ischemia or infarction.    The gated perfusion images showed an ejection fraction of 64% at rest. The gated perfusion images showed an ejection fraction of 65% post stress.    The EKG portion of this study is negative for ischemia.    The patient reported no chest pain during the stress test.          Past Medical History:   Diagnosis Date    Abdominal pain 5/10/2018    Acquired hypothyroidism 9/28/2020    Acute on chronic diastolic congestive heart failure 12/7/2017    ROD (acute kidney injury) 1/3/2019    Anasarca 10/13/2020    Arthritis of knee 1/31/2022    Ascites     Ascites 9/2/2016    Bilateral knee pain 4/22/2022    Bilateral lower extremity edema 9/2/2016    Breast pain, left 1/4/2018    Cataract     Chest pain, atypical  2/9/2018    CHF (congestive heart failure)     Cirrhosis     Cirrhosis 1/3/2017    Cough     Diabetes mellitus     Diabetes mellitus, type 2     Diarrhea 9/4/2019    Edema 3/19/2018    Encounter for long-term (current) use of medications 3/19/2018    Epigastric pain 2/11/2020    Gastroparesis     GERD (gastroesophageal reflux disease)     Hepatic encephalopathy 6/19/2018    Pt has history of cirrhosis, seen at [facility] on 6/7/18. Currently on lactulose.    Hyperlipidemia     Hypertension     Hypotension 2/21/2019    Immunization deficiency 2/10/2020    Leg cramps 5/20/2022    Liver disease     Lumbar strain 4/27/2018    Lumbar strain, sequela 2/17/2020    Macular degeneration     Medication reaction 11/17/2016    Other and unspecified hyperlipidemia 6/11/2012 11:11:32 AM    [facility] Historical - Endocrine/Metabolic/Immune: Hyperlipidemia-No Additional Notes    Pneumonia of right middle lobe due to infectious organism 12/19/2017    Renal disorder     Retinopathy due to secondary diabetes mellitus     Screening for malignant neoplasm of cervix 12/14/2017    Skin lesion 12/20/2021    Sleep apnea     Strain of lumbar region 10/4/2021    Thyroid disease     Type 2 diabetes mellitus with hyperglycemia 10/10/2020       Past Surgical History:   Procedure Laterality Date    CATARACT EXTRACTION      CHOLECYSTECTOMY      COLONOSCOPY N/A 9/4/2019    Procedure: COLONOSCOPY;  Surgeon: Marnie Elmore MD;  Location: [facility];  Service: Endoscopy;  Laterality: N/A;    ERCP      ESOPHAGOGASTRODUODENOSCOPY N/A 7/28/2022    Procedure: EGD (ESOPHAGOGASTRODUODENOSCOPY);  Surgeon: Jimy Katz MD;  Location: [facility] ENDO;  Service: Endoscopy;  Laterality: N/A;    FLUOROSCOPY N/A 7/24/2020    Procedure: TIPS revision;  Surgeon: Martell Jasmine MD;  Location: [facility] CATH LAB;  Service: General;  Laterality: N/A;    LIVER BIOPSY      THORACENTESIS Left 1/20/2020    Procedure: Thoracentesis;  Surgeon: Angelica Hunter MD;  Location:  [facility] ENDO;  Service: Pulmonary;  Laterality: Left;    TUBAL LIGATION      UPPER GASTROINTESTINAL ENDOSCOPY         Social History     Tobacco Use    Smoking status: Never    Smokeless tobacco: Never   Substance Use Topics    Alcohol use: No    Drug use: No       Family History   Problem Relation Age of Onset    Cancer Mother     Breast cancer Mother     Heart disease Father     Aneurysm Sister     Heart disease Brother     No Known Problems Maternal Grandmother     Cancer Maternal Grandfather     Sarcoidosis Sister     Colon cancer Neg Hx     Ovarian cancer Neg Hx     Thrombosis Neg Hx        Patient's Medications   New Prescriptions    No medications on file   Previous Medications    AMLODIPINE (NORVASC) 10 MG TABLET    Take 1 tablet (10 mg total) by mouth once daily.    APIXABAN (ELIQUIS) 5 MG TAB    Take 1 tablet (5 mg total) by mouth 2 (two) times daily.    CARVEDILOL (COREG) 25 MG TABLET    Take 1 tablet (25 mg total) by mouth 2 (two) times daily with meals.    DOCUSATE SODIUM (COLACE) 100 MG CAPSULE    Take 1 capsule (100 mg total) by mouth 3 (three) times daily. Hold for diarrhea    FAMOTIDINE (PEPCID) 20 MG TABLET    Take 1 tablet by mouth once daily    FERROUS SULFATE (IRON ORAL)    Take by mouth.    FLUTICASONE PROPIONATE (FLONASE) 50 MCG/ACTUATION NASAL SPRAY    2 sprays (100 mcg total) by Each Nostril route once daily.    GARLIC (GARLIQUE ORAL)    Take 1 tablet by mouth once daily.    ISOSORBIDE MONONITRATE (IMDUR) 120 MG 24 HR TABLET    Take 1 tablet (120 mg total) by mouth every evening.    LEVOTHYROXINE (EUTHYROX) 137 MCG TAB TABLET    Take 1 tablet (137 mcg total) by mouth before breakfast.    LIDOCAINE (LMX) 4 % CREAM    Apply topically as needed.    MAGNESIUM OXIDE (MAG-OX) 400 MG (241.3 MG MAGNESIUM) TABLET    Take 1 tablet (400 mg total) by mouth once daily.    OMEGA-3 FATTY ACIDS/FISH OIL (FISH OIL-OMEGA-3 FATTY ACIDS) 300-1,000 MG CAPSULE    Take by mouth once daily.    ONDANSETRON (ZOFRAN) 4  MG TABLET    Take 1 tablet (4 mg total) by mouth every 8 (eight) hours as needed for Nausea.    PRAVASTATIN (PRAVACHOL) 10 MG TABLET    Take 1 tablet (10 mg total) by mouth once daily.    PULSE OXIMETER (PULSE OXIMETER) DEVICE    by Apply Externally route 2 (two) times a day. Use twice daily at 8 AM and 3 PM and [facility].    RIFAXIMIN (XIFAXAN) 550 MG TAB    Take 1 tablet (550 mg total) by mouth 2 (two) times daily.    STOOL SOFTENER 100 MG CAPSULE    TAKE 1 CAPSULE BY MOUTH THREE TIMES DAILY FOR DIARRHEA (HOLD FOR DIARRHEA)    TRULICITY 4.5 MG/0.5 ML PEN INJECTOR    INJECT 4.5 MG INTO THE SKIN EVERY 7 DAYS   Modified Medications    Modified Medication Previous Medication    METOLAZONE (ZAROXOLYN) 5 MG TABLET metOLazone (ZAROXOLYN) 5 MG tablet       Take 1 tablet (5 mg total) by mouth every Mon, Wed, Fri. Take 30 min before Torsemide    Take 1 tablet (5 mg total) by mouth once daily. Take daily 30 min before fluid pill    TORSEMIDE (DEMADEX) 20 MG TAB torsemide (DEMADEX) 20 MG Tab       Take 2 tablets (40 mg total) by mouth once daily. If having more swelling/weight gain/fluid take this in morning and afternoon for 3 days    Take 2 tablets (40 mg total) by mouth once daily. If having more swelling/weight gain/fluid take this in morning and afternoon for 3 days   Discontinued Medications    No medications on file       Review of Systems   Constitutional:  Positive for malaise/fatigue.   HENT: Negative.     Eyes: Negative.    Respiratory:  Positive for shortness of breath (intermittent).    Cardiovascular:  Positive for leg swelling. Negative for chest pain and palpitations.   Gastrointestinal:  Negative for nausea.   Genitourinary: Negative.    Musculoskeletal: Negative.    Skin: Negative.    Neurological: Negative.    Endo/Heme/Allergies: Negative.    Psychiatric/Behavioral: Negative.     All 12 systems otherwise negative.    Wt Readings from Last 3 Encounters:   05/22/23 79.5 kg (175  "lb 4.3 oz)   05/09/23 84.5 kg (186 lb 4.6 oz)   02/21/23 80.3 kg (177 lb)     Temp Readings from Last 3 Encounters:   05/09/23 96 °F (35.6 °C) (Tympanic)   11/03/22 98.1 °F (36.7 °C) (Temporal)   08/09/22 98 °F (36.7 °C)     BP Readings from Last 3 Encounters:   05/22/23 132/70   05/09/23 130/68   02/13/23 134/68     Pulse Readings from Last 3 Encounters:   05/22/23 65   05/09/23 65   02/13/23 70       /70 (BP Location: Left arm, Patient Position: Sitting, BP Method: Medium (Manual))   Pulse 65   Ht 5' 1" (1.549 m)   Wt 79.5 kg (175 lb 4.3 oz)   LMP  (LMP Unknown)   SpO2 99%   BMI 33.12 kg/m²     Objective:   Physical Exam  Constitutional:       General: She is not in acute distress.     Appearance: She is well-developed. She is obese. She is not diaphoretic.   HENT:      Head: Normocephalic and atraumatic.      Mouth/Throat:      Pharynx: No oropharyngeal exudate.   Eyes:      General: No scleral icterus.        Right eye: No discharge.         Left eye: No discharge.   Cardiovascular:      Rate and Rhythm: Normal rate and regular rhythm.      Heart sounds: Murmur heard.   Pulmonary:      Effort: Pulmonary effort is normal. No respiratory distress.   Musculoskeletal:      Right lower leg: Edema present.      Left lower leg: Edema present.   Skin:     Coloration: Skin is not pale.      Findings: No erythema or rash.   Neurological:      Mental Status: She is alert and oriented to person, place, and time.   Psychiatric:         Behavior: Behavior normal.         Thought Content: Thought content normal.         Judgment: Judgment normal.       Lab Results   Component Value Date     05/09/2023     05/09/2023     05/09/2023    K 4.2 05/09/2023    K 4.2 05/09/2023    K 4.2 05/09/2023     05/09/2023     05/09/2023     05/09/2023    CO2 27 05/09/2023    CO2 27 05/09/2023    CO2 27 05/09/2023    BUN 80 (H) 05/09/2023    BUN 80 (H) 05/09/2023    BUN 80 (H) 05/09/2023    " CREATININE 4.8 (H) 05/09/2023    CREATININE 4.8 (H) 05/09/2023    CREATININE 4.8 (H) 05/09/2023     (H) 05/09/2023     (H) 05/09/2023     (H) 05/09/2023    HGBA1C 5.4 05/09/2023    MG 2.9 (H) 02/21/2023    AST 22 05/09/2023    ALT 12 05/09/2023    ALBUMIN 3.3 (L) 05/09/2023    ALBUMIN 3.3 (L) 05/09/2023    PROT 6.8 05/09/2023    BILITOT 0.3 05/09/2023    WBC 5.28 02/21/2023    HGB 8.5 (L) 02/21/2023    HCT 28.4 (L) 02/21/2023    MCV 96 02/21/2023     02/21/2023    INR 1.2 02/21/2023    TSH 28.708 (H) 08/03/2022    CHOL 137 05/09/2023    HDL 31 (L) 05/09/2023    LDLCALC 95.6 05/09/2023    TRIG 52 05/09/2023     (H) 03/08/2023     Assessment:      1. Cardiorenal syndrome with renal failure    2. Chronic diastolic congestive heart failure    3. KRISTAL on CPAP    4. Type 2 diabetes mellitus with other circulatory complication, without long-term current use of insulin    5. Primary hypertension    6. CKD (chronic kidney disease) stage 4, GFR 15-29 ml/min    7. History of COVID-19    8. Elevated d-dimer with indeterminate perfusion scan for PE    9. Chronic diastolic heart failure    10. Hypothyroidism due to acquired atrophy of thyroid              Plan:   1. Chronic diastolic HF - ;  --> 704 --> 767 --> 477 --> 347 --> 800--> 472  - ECHO 8/2022 with normal bi V function, grade 2 DD, mild to mod TR, small peric effusion, PASP > 44mmHg.   - daily weights, low salt diet   - daily compression stockings  - diuretics --> metolazone -M, W, F -- changed to Torsemide 40mg due to worsening renal function  - repeat CMP, BNP , Mg today   - with > 12 lb weight gain and edema increase Torsemide   - if ongoing weight gain/swelling not improved with PO diuretics needs to go to ER and have IV diuresis and admission     2. HTN    - cont meds and titrate    3. HLD  - cont Pravastatin 10mg     4. DM2 with gastroparesis A1c - 5.9 --> 5.6 --> 5.3  - cont meds per PCP    5. H/o Cirrhosis s/p TIPS with  edema, low albumin  - cont tx per PCP/Hepatology  - liver function improved does not need transplant  - had EGD concern for gastroparesis    6. KRISTAL  - needs CPAP, was not using it before     7. Obesity BMI 40 - 212 lbs --> BMI 33 - 179 lbs. - BMI 34 - 181 lbs --> 175 lb  - needs weight loss with diet/exercise     8. Pericardial effusion  - small, sec to cirrhosis/CHF  - no tamponade clinically on last ECHO    9. CKD 4 with proteinuria  - diuretics adjusted   - cont f/u with nephro -  - Creatinine is  5.0 and 5.6 for    10. Elevated D dimer, int prob for PE  - repeat D dimer - elevated   - cont Eliqujamarcus     Thank you for allowing me to participate in this patient's care. Please do not hesitate to contact me with any questions or concerns. Consult note has been forwarded to the referral physician.

## 2023-05-24 RX ORDER — LEVOTHYROXINE SODIUM 137 UG/1
TABLET ORAL
Qty: 90 TABLET | Refills: 0 | Status: SHIPPED | OUTPATIENT
Start: 2023-05-24 | End: 2023-08-15

## 2023-06-02 ENCOUNTER — OFFICE VISIT (OUTPATIENT)
Dept: PODIATRY | Facility: CLINIC | Age: 68
End: 2023-06-02
Payer: MEDICARE

## 2023-06-02 DIAGNOSIS — N18.5 CONTROLLED TYPE 2 DIABETES MELLITUS WITH STAGE 5 CHRONIC KIDNEY DISEASE NOT ON CHRONIC DIALYSIS, WITHOUT LONG-TERM CURRENT USE OF INSULIN: ICD-10-CM

## 2023-06-02 DIAGNOSIS — E11.9 ENCOUNTER FOR COMPREHENSIVE DIABETIC FOOT EXAMINATION, TYPE 2 DIABETES MELLITUS: Primary | ICD-10-CM

## 2023-06-02 DIAGNOSIS — N18.5 STAGE 5 CHRONIC KIDNEY DISEASE NOT ON CHRONIC DIALYSIS: ICD-10-CM

## 2023-06-02 DIAGNOSIS — L60.3 ONYCHODYSTROPHY: ICD-10-CM

## 2023-06-02 DIAGNOSIS — E11.59 TYPE 2 DIABETES MELLITUS WITH OTHER CIRCULATORY COMPLICATION, WITHOUT LONG-TERM CURRENT USE OF INSULIN: ICD-10-CM

## 2023-06-02 DIAGNOSIS — E11.22 CONTROLLED TYPE 2 DIABETES MELLITUS WITH STAGE 5 CHRONIC KIDNEY DISEASE NOT ON CHRONIC DIALYSIS, WITHOUT LONG-TERM CURRENT USE OF INSULIN: ICD-10-CM

## 2023-06-02 PROCEDURE — 99999 PR PBB SHADOW E&M-EST. PATIENT-LVL III: ICD-10-PCS | Mod: PBBFAC,,, | Performed by: PODIATRIST

## 2023-06-02 PROCEDURE — 3066F NEPHROPATHY DOC TX: CPT | Mod: CPTII,S$GLB,, | Performed by: PODIATRIST

## 2023-06-02 PROCEDURE — 1159F PR MEDICATION LIST DOCUMENTED IN MEDICAL RECORD: ICD-10-PCS | Mod: CPTII,S$GLB,, | Performed by: PODIATRIST

## 2023-06-02 PROCEDURE — 11721 DEBRIDE NAIL 6 OR MORE: CPT | Mod: Q9,S$GLB,, | Performed by: PODIATRIST

## 2023-06-02 PROCEDURE — 3044F HG A1C LEVEL LT 7.0%: CPT | Mod: CPTII,S$GLB,, | Performed by: PODIATRIST

## 2023-06-02 PROCEDURE — 1101F PR PT FALLS ASSESS DOC 0-1 FALLS W/OUT INJ PAST YR: ICD-10-PCS | Mod: CPTII,S$GLB,, | Performed by: PODIATRIST

## 2023-06-02 PROCEDURE — 1157F ADVNC CARE PLAN IN RCRD: CPT | Mod: CPTII,S$GLB,, | Performed by: PODIATRIST

## 2023-06-02 PROCEDURE — 1160F RVW MEDS BY RX/DR IN RCRD: CPT | Mod: CPTII,S$GLB,, | Performed by: PODIATRIST

## 2023-06-02 PROCEDURE — 3062F POS MACROALBUMINURIA REV: CPT | Mod: CPTII,S$GLB,, | Performed by: PODIATRIST

## 2023-06-02 PROCEDURE — 1101F PT FALLS ASSESS-DOCD LE1/YR: CPT | Mod: CPTII,S$GLB,, | Performed by: PODIATRIST

## 2023-06-02 PROCEDURE — 3288F FALL RISK ASSESSMENT DOCD: CPT | Mod: CPTII,S$GLB,, | Performed by: PODIATRIST

## 2023-06-02 PROCEDURE — 1126F PR PAIN SEVERITY QUANTIFIED, NO PAIN PRESENT: ICD-10-PCS | Mod: CPTII,S$GLB,, | Performed by: PODIATRIST

## 2023-06-02 PROCEDURE — 11721 PR DEBRIDEMENT OF NAILS, 6 OR MORE: ICD-10-PCS | Mod: Q9,S$GLB,, | Performed by: PODIATRIST

## 2023-06-02 PROCEDURE — 1160F PR REVIEW ALL MEDS BY PRESCRIBER/CLIN PHARMACIST DOCUMENTED: ICD-10-PCS | Mod: CPTII,S$GLB,, | Performed by: PODIATRIST

## 2023-06-02 PROCEDURE — 3062F PR POS MACROALBUMINURIA RESULT DOCUMENTED/REVIEW: ICD-10-PCS | Mod: CPTII,S$GLB,, | Performed by: PODIATRIST

## 2023-06-02 PROCEDURE — 3044F PR MOST RECENT HEMOGLOBIN A1C LEVEL <7.0%: ICD-10-PCS | Mod: CPTII,S$GLB,, | Performed by: PODIATRIST

## 2023-06-02 PROCEDURE — 99999 PR PBB SHADOW E&M-EST. PATIENT-LVL III: CPT | Mod: PBBFAC,,, | Performed by: PODIATRIST

## 2023-06-02 PROCEDURE — 99214 PR OFFICE/OUTPT VISIT, EST, LEVL IV, 30-39 MIN: ICD-10-PCS | Mod: 25,S$GLB,, | Performed by: PODIATRIST

## 2023-06-02 PROCEDURE — 99214 OFFICE O/P EST MOD 30 MIN: CPT | Mod: 25,S$GLB,, | Performed by: PODIATRIST

## 2023-06-02 PROCEDURE — 3288F PR FALLS RISK ASSESSMENT DOCUMENTED: ICD-10-PCS | Mod: CPTII,S$GLB,, | Performed by: PODIATRIST

## 2023-06-02 PROCEDURE — 3066F PR DOCUMENTATION OF TREATMENT FOR NEPHROPATHY: ICD-10-PCS | Mod: CPTII,S$GLB,, | Performed by: PODIATRIST

## 2023-06-02 PROCEDURE — 1157F PR ADVANCE CARE PLAN OR EQUIV PRESENT IN MEDICAL RECORD: ICD-10-PCS | Mod: CPTII,S$GLB,, | Performed by: PODIATRIST

## 2023-06-02 PROCEDURE — 1159F MED LIST DOCD IN RCRD: CPT | Mod: CPTII,S$GLB,, | Performed by: PODIATRIST

## 2023-06-02 PROCEDURE — 1126F AMNT PAIN NOTED NONE PRSNT: CPT | Mod: CPTII,S$GLB,, | Performed by: PODIATRIST

## 2023-06-02 NOTE — PROGRESS NOTES
Subjective:       Patient ID: Diamond Das is a 68 y.o. female.    Chief Complaint: Diabetic Foot Exam (Patient is a diabetic and continues on eliquis. She was last seen on 5.9.23 by Dr. Andrey Perrin. She denies pain at present and is wearing strap sandals. /)      HPI: Diamond Das presents to the office today, under referral by , Andrey Perrin MD, for her annual diabetic foot assessment and risk evaluation.  Patient is a DMII with history of stage 5 chronic kidney disease not currently on dialysis.  Reports 0/10 pain to the right foot and left foot.  Patient currently on Eliquis as well. This patient last saw his/her internal/family medicine physician on 05/09/2023.     Hemoglobin A1C   Date Value Ref Range Status   05/09/2023 5.4 4.0 - 5.6 % Final     Comment:     ADA Screening Guidelines:  5.7-6.4%  Consistent with prediabetes  >or=6.5%  Consistent with diabetes    High levels of fetal hemoglobin interfere with the HbA1C  assay. Heterozygous hemoglobin variants (HbS, HgC, etc)do  not significantly interfere with this assay.   However, presence of multiple variants may affect accuracy.     04/25/2023 5.5 4.0 - 5.6 % Final     Comment:     ADA Screening Guidelines:  5.7-6.4%  Consistent with prediabetes  >or=6.5%  Consistent with diabetes    High levels of fetal hemoglobin interfere with the HbA1C  assay. Heterozygous hemoglobin variants (HbS, HgC, etc)do  not significantly interfere with this assay.   However, presence of multiple variants may affect accuracy.     11/03/2022 5.3 4.0 - 5.6 % Final     Comment:     ADA Screening Guidelines:  5.7-6.4%  Consistent with prediabetes  >or=6.5%  Consistent with diabetes    High levels of fetal hemoglobin interfere with the HbA1C  assay. Heterozygous hemoglobin variants (HbS, HgC, etc)do  not significantly interfere with this assay.   However, presence of multiple variants may affect accuracy.     .    Review of patient's allergies indicates:   Allergen  "Reactions    Midazolam Other (See Comments)     After administration pt unresponsive.  HR and respirations decreased.     Subsys [fentanyl] Other (See Comments)     After administration pt unresponsive.  HR and respirations decreased.     Ampicillin Rash     Other reaction(s): "pins and needles"    Codeine Nausea And Vomiting and Nausea Only       Past Medical History:   Diagnosis Date    Abdominal pain 5/10/2018    Acquired hypothyroidism 9/28/2020    Acute on chronic diastolic congestive heart failure 12/7/2017    ROD (acute kidney injury) 1/3/2019    Anasarca 10/13/2020    Arthritis of knee 1/31/2022    Ascites     Ascites 9/2/2016    Bilateral knee pain 4/22/2022    Bilateral lower extremity edema 9/2/2016    Breast pain, left 1/4/2018    Cataract     Chest pain, atypical 2/9/2018    CHF (congestive heart failure)     Cirrhosis     Cirrhosis 1/3/2017    Cough     Diabetes mellitus     Diabetes mellitus, type 2     Diarrhea 9/4/2019    Edema 3/19/2018    Encounter for long-term (current) use of medications 3/19/2018    Epigastric pain 2/11/2020    Gastroparesis     GERD (gastroesophageal reflux disease)     Hepatic encephalopathy 6/19/2018    Pt has history of cirrhosis, seen at [facility] on 6/7/18. Currently on lactulose.    Hyperlipidemia     Hypertension     Hypotension 2/21/2019    Immunization deficiency 2/10/2020    Leg cramps 5/20/2022    Liver disease     Lumbar strain 4/27/2018    Lumbar strain, sequela 2/17/2020    Macular degeneration     Medication reaction 11/17/2016    Other and unspecified hyperlipidemia 6/11/2012 11:11:32 AM    [facility] Historical - Endocrine/Metabolic/Immune: Hyperlipidemia-No Additional Notes    Pneumonia of right middle lobe due to infectious organism 12/19/2017    Renal disorder     Retinopathy due to secondary diabetes mellitus     Screening for malignant neoplasm of cervix 12/14/2017    Skin lesion 12/20/2021    Sleep apnea     Strain of lumbar region 10/4/2021    Thyroid " disease     Type 2 diabetes mellitus with hyperglycemia 10/10/2020       Family History   Problem Relation Age of Onset    Cancer Mother     Breast cancer Mother     Heart disease Father     Aneurysm Sister     Heart disease Brother     No Known Problems Maternal Grandmother     Cancer Maternal Grandfather     Sarcoidosis Sister     Colon cancer Neg Hx     Ovarian cancer Neg Hx     Thrombosis Neg Hx        Social History     Socioeconomic History    Marital status:    Tobacco Use    Smoking status: Never    Smokeless tobacco: Never   Substance and Sexual Activity    Alcohol use: No    Drug use: No    Sexual activity: Not Currently       Past Surgical History:   Procedure Laterality Date    CATARACT EXTRACTION      CHOLECYSTECTOMY      COLONOSCOPY N/A 9/4/2019    Procedure: COLONOSCOPY;  Surgeon: Marnie Elmore MD;  Location: [facility] ENDO;  Service: Endoscopy;  Laterality: N/A;    ERCP      ESOPHAGOGASTRODUODENOSCOPY N/A 7/28/2022    Procedure: EGD (ESOPHAGOGASTRODUODENOSCOPY);  Surgeon: Jiym Katz MD;  Location: [facility] ENDO;  Service: Endoscopy;  Laterality: N/A;    FLUOROSCOPY N/A 7/24/2020    Procedure: TIPS revision;  Surgeon: Martell Jasmine MD;  Location: [facility] CATH LAB;  Service: General;  Laterality: N/A;    LIVER BIOPSY      THORACENTESIS Left 1/20/2020    Procedure: Thoracentesis;  Surgeon: Angelica Hunter MD;  Location: [facility] ENDO;  Service: Pulmonary;  Laterality: Left;    TUBAL LIGATION      UPPER GASTROINTESTINAL ENDOSCOPY         Review of Systems        Objective:   LMP  (LMP Unknown)     Physical Exam  LOWER EXTREMITY PHYSICAL EXAMINATION    ORTHOPEDIC:  No gross or structural anatomic deformity present to the right foot or left foot.  Ambulating with a nonantalgic gait.  Intrinsic and extrinsic musculature of lower extremities intact    VASCULAR:  The right dorsalis pedis pulse 2/4 and the right posterior tibial pulse 1/4.  The left dorsalis pedis pulse 2/4 and posterior tibial pulse  on the left is 1/4.  Capillary refill is intact.  Pedal hair growth decreased.     NEUROLOGY:  Protective sensation is intact with [place] Prim[place] monofilament. Proprioception is intact. Intact sensation to light touch.  Vibratory sensation is diminished to the 1st metatarsal phalangeal joint.     DERMATOLOGY:  Skin is supple, moist, intact.  There is no signs of callusing, ulcerations, other lesions identified to the dorsal or plantar aspect of the right or left foot.  The R1, 2, 5 and left L1,2, 5 are thickened, discolored dystrophic.  There is subungual debris.  Nail plates have area of dark discoloration.  The remaining nails 3-4 on the right foot and the left foot are elongated but of normal color, thickness, and texture.   There is no signs of ingrowing into the medial or lateral borders.  There is no evidence of wounds or skin breakdown.  No edema or erythema.  No obvious lacerations or fissuring.  Interdigital spaces are clean, dry, intact.  No rashes or scars appreciated.    Assessment:     1. Encounter for comprehensive diabetic foot examination, type 2 diabetes mellitus    2. Type 2 diabetes mellitus with other circulatory complication, without long-term current use of insulin    3. Controlled type 2 diabetes mellitus with stage 5 chronic kidney disease not on chronic dialysis, without long-term current use of insulin    4. Stage 5 chronic kidney disease not on chronic dialysis    5. Onychodystrophy        Plan:     Encounter for comprehensive diabetic foot examination, type 2 diabetes mellitus    Type 2 diabetes mellitus with other circulatory complication, without long-term current use of insulin  -     Ambulatory referral/consult to Podiatry    Controlled type 2 diabetes mellitus with stage 5 chronic kidney disease not on chronic dialysis, without long-term current use of insulin    Stage 5 chronic kidney disease not on chronic dialysis    Onychodystrophy      I counseled the patient on his/her  Diabetic Mellitus regarding today's clinical examination and objection findings. We did also discuss recent medication changes, pertinent labs and imaging evaluations and other medical consultation notes and progress notes. Greater than 50% of this visit was spent on counseling and coordination of care. Greater than 20 minutes of this appt. was spent on education about the diabetic foot, in relation to PVD and/or neuropathy, and the prevention of limb loss.     Shoe gear is inspected and wear and proper fit/type. Patient is reminded of the importance of good nutrition and blood sugar control to help prevent podiatric complications of diabetes. Patient instructed on proper foot hygeine. We discussed wearing proper shoe gear, daily foot inspections, never walking without protective shoe gear, never putting sharp instruments to feet.  Patient  will continue to monitor the areas daily, inspect feet, wear protective shoe gear when ambulatory, moisturizer to maintain skin integrity.     Patient's DMI/DMII is managed by Internal/Family Medicine Physician and/or Endocrinology Advanced Practice Provider.   Last A1c stable.     CKD stage 5 currently stable.  Continue to follow with Nephrology.    Dystrophic nail plates, as outlined above (R#1,2,5  ; L#1,2,5 ), are sharply debrided with double action nail nipper, and/or with the assistance of a mechanical rotary josué, with removal of all offending nail and nail border(s), for reduction of pains. Nails are reduced in terms of length, width and girth with removal of subungual debris to facilitate pain free weight bearing and ambulation. The elongated nails as outlined in the objective portion of this note, were trimmed to appropriate length, with a double action nail nipper, for alleviation/reduction of pains as well. Follow up in approx. 3-4 months.

## 2023-06-05 ENCOUNTER — HOSPITAL ENCOUNTER (OUTPATIENT)
Dept: RADIOLOGY | Facility: HOSPITAL | Age: 68
Discharge: HOME | End: 2023-06-05
Attending: INTERNAL MEDICINE
Payer: MEDICARE

## 2023-06-05 ENCOUNTER — OFFICE VISIT (OUTPATIENT)
Dept: PULMONOLOGY | Facility: CLINIC | Age: 68
End: 2023-06-05
Payer: MEDICARE

## 2023-06-05 VITALS
WEIGHT: 168.88 LBS | RESPIRATION RATE: 17 BRPM | HEIGHT: 61 IN | BODY MASS INDEX: 31.88 KG/M2 | DIASTOLIC BLOOD PRESSURE: 80 MMHG | OXYGEN SATURATION: 99 % | SYSTOLIC BLOOD PRESSURE: 122 MMHG | HEART RATE: 68 BPM

## 2023-06-05 DIAGNOSIS — J90 RECURRENT PLEURAL EFFUSION ON LEFT: Primary | ICD-10-CM

## 2023-06-05 DIAGNOSIS — G47.33 OBSTRUCTIVE SLEEP APNEA: ICD-10-CM

## 2023-06-05 DIAGNOSIS — J90 RECURRENT PLEURAL EFFUSION ON LEFT: ICD-10-CM

## 2023-06-05 DIAGNOSIS — Z71.89 CPAP USE COUNSELING: ICD-10-CM

## 2023-06-05 DIAGNOSIS — I50.32 CHRONIC HEART FAILURE WITH PRESERVED EJECTION FRACTION: ICD-10-CM

## 2023-06-05 DIAGNOSIS — G47.34 NOCTURNAL HYPOXEMIA: ICD-10-CM

## 2023-06-05 PROCEDURE — 3074F PR MOST RECENT SYSTOLIC BLOOD PRESSURE < 130 MM HG: ICD-10-PCS | Mod: CPTII,S$GLB,, | Performed by: INTERNAL MEDICINE

## 2023-06-05 PROCEDURE — 3079F PR MOST RECENT DIASTOLIC BLOOD PRESSURE 80-89 MM HG: ICD-10-PCS | Mod: CPTII,S$GLB,, | Performed by: INTERNAL MEDICINE

## 2023-06-05 PROCEDURE — 1101F PR PT FALLS ASSESS DOC 0-1 FALLS W/OUT INJ PAST YR: ICD-10-PCS | Mod: CPTII,S$GLB,, | Performed by: INTERNAL MEDICINE

## 2023-06-05 PROCEDURE — 3044F PR MOST RECENT HEMOGLOBIN A1C LEVEL <7.0%: ICD-10-PCS | Mod: CPTII,S$GLB,, | Performed by: INTERNAL MEDICINE

## 2023-06-05 PROCEDURE — 1157F ADVNC CARE PLAN IN RCRD: CPT | Mod: CPTII,S$GLB,, | Performed by: INTERNAL MEDICINE

## 2023-06-05 PROCEDURE — 99214 OFFICE O/P EST MOD 30 MIN: CPT | Mod: S$GLB,,, | Performed by: INTERNAL MEDICINE

## 2023-06-05 PROCEDURE — 99499 UNLISTED E&M SERVICE: CPT | Mod: S$GLB,,, | Performed by: INTERNAL MEDICINE

## 2023-06-05 PROCEDURE — 3044F HG A1C LEVEL LT 7.0%: CPT | Mod: CPTII,S$GLB,, | Performed by: INTERNAL MEDICINE

## 2023-06-05 PROCEDURE — 99214 PR OFFICE/OUTPT VISIT, EST, LEVL IV, 30-39 MIN: ICD-10-PCS | Mod: S$GLB,,, | Performed by: INTERNAL MEDICINE

## 2023-06-05 PROCEDURE — 3008F PR BODY MASS INDEX (BMI) DOCUMENTED: ICD-10-PCS | Mod: CPTII,S$GLB,, | Performed by: INTERNAL MEDICINE

## 2023-06-05 PROCEDURE — 3079F DIAST BP 80-89 MM HG: CPT | Mod: CPTII,S$GLB,, | Performed by: INTERNAL MEDICINE

## 2023-06-05 PROCEDURE — 3008F BODY MASS INDEX DOCD: CPT | Mod: CPTII,S$GLB,, | Performed by: INTERNAL MEDICINE

## 2023-06-05 PROCEDURE — 99499 RISK ADDL DX/OHS AUDIT: ICD-10-PCS | Mod: S$GLB,,, | Performed by: INTERNAL MEDICINE

## 2023-06-05 PROCEDURE — 3288F FALL RISK ASSESSMENT DOCD: CPT | Mod: CPTII,S$GLB,, | Performed by: INTERNAL MEDICINE

## 2023-06-05 PROCEDURE — 71046 X-RAY EXAM CHEST 2 VIEWS: CPT | Mod: 26,,, | Performed by: STUDENT IN AN ORGANIZED HEALTH CARE EDUCATION/TRAINING PROGRAM

## 2023-06-05 PROCEDURE — 1101F PT FALLS ASSESS-DOCD LE1/YR: CPT | Mod: CPTII,S$GLB,, | Performed by: INTERNAL MEDICINE

## 2023-06-05 PROCEDURE — 3066F NEPHROPATHY DOC TX: CPT | Mod: CPTII,S$GLB,, | Performed by: INTERNAL MEDICINE

## 2023-06-05 PROCEDURE — 3066F PR DOCUMENTATION OF TREATMENT FOR NEPHROPATHY: ICD-10-PCS | Mod: CPTII,S$GLB,, | Performed by: INTERNAL MEDICINE

## 2023-06-05 PROCEDURE — 71046 X-RAY EXAM CHEST 2 VIEWS: CPT | Mod: TC

## 2023-06-05 PROCEDURE — 1159F MED LIST DOCD IN RCRD: CPT | Mod: CPTII,S$GLB,, | Performed by: INTERNAL MEDICINE

## 2023-06-05 PROCEDURE — 1159F PR MEDICATION LIST DOCUMENTED IN MEDICAL RECORD: ICD-10-PCS | Mod: CPTII,S$GLB,, | Performed by: INTERNAL MEDICINE

## 2023-06-05 PROCEDURE — 99999 PR PBB SHADOW E&M-EST. PATIENT-LVL III: ICD-10-PCS | Mod: PBBFAC,,, | Performed by: INTERNAL MEDICINE

## 2023-06-05 PROCEDURE — 99999 PR PBB SHADOW E&M-EST. PATIENT-LVL III: CPT | Mod: PBBFAC,,, | Performed by: INTERNAL MEDICINE

## 2023-06-05 PROCEDURE — 71046 XR CHEST PA AND LATERAL: ICD-10-PCS | Mod: 26,,, | Performed by: STUDENT IN AN ORGANIZED HEALTH CARE EDUCATION/TRAINING PROGRAM

## 2023-06-05 PROCEDURE — 1160F PR REVIEW ALL MEDS BY PRESCRIBER/CLIN PHARMACIST DOCUMENTED: ICD-10-PCS | Mod: CPTII,S$GLB,, | Performed by: INTERNAL MEDICINE

## 2023-06-05 PROCEDURE — 3074F SYST BP LT 130 MM HG: CPT | Mod: CPTII,S$GLB,, | Performed by: INTERNAL MEDICINE

## 2023-06-05 PROCEDURE — 1157F PR ADVANCE CARE PLAN OR EQUIV PRESENT IN MEDICAL RECORD: ICD-10-PCS | Mod: CPTII,S$GLB,, | Performed by: INTERNAL MEDICINE

## 2023-06-05 PROCEDURE — 3288F PR FALLS RISK ASSESSMENT DOCUMENTED: ICD-10-PCS | Mod: CPTII,S$GLB,, | Performed by: INTERNAL MEDICINE

## 2023-06-05 PROCEDURE — 3062F POS MACROALBUMINURIA REV: CPT | Mod: CPTII,S$GLB,, | Performed by: INTERNAL MEDICINE

## 2023-06-05 PROCEDURE — 1160F RVW MEDS BY RX/DR IN RCRD: CPT | Mod: CPTII,S$GLB,, | Performed by: INTERNAL MEDICINE

## 2023-06-05 PROCEDURE — 3062F PR POS MACROALBUMINURIA RESULT DOCUMENTED/REVIEW: ICD-10-PCS | Mod: CPTII,S$GLB,, | Performed by: INTERNAL MEDICINE

## 2023-06-05 NOTE — PROGRESS NOTES
Pulmonary Outpatient Follow Up Visit     Subjective:       Patient ID: Diamond Das is a 68 y.o. female.    Chief Complaint: Sleep Apnea and Shortness of Breath (/)      HPI        68-year-old female patient presenting for 3 years follow-up    06/05/2023 has not been admitted to the hospital since August 2022.      Suboptimal compliance with CPAP.  Received a recall letter but did not proceed with registering her machine.      Would like to be re-evaluated for CPAP therapy.  Initial in-lab polysomnography 2018.    Excessive daytime sleepiness [place] Sleepiness Scale score 8, nonrestorative sleep, witnessed apneic spells snoring.    Did perform a thoracentesis with 185 mL of cloudy fluid drained on the left side in January 2020.  Not malignant.      Initially  evaluated February 2018 seen for hypoxemic respiratory failure after hospital admission precipitated by CHF exacerbation currently using oxygen during sleep with CPAP, 6 min walking test showed no need for oxygen on exertion in 2018.       On CPAP on O2 during sleep suboptimal CPAP usage.  Lost her new machine from [facility] due to suboptimal compliance however she did have an old machine that she is using however oxygen is being used via nasal cannula and the CPAP mask on top of it.           She has obstructive sleep apnea and she has been on CPAP for more than 5 years her study was done at [facility].     Known with diastolic heart failure, cryptogenic liver cirrhosis status post paracentesis and TIPS in 2017, chronic lower extremity edema.       Currently on 40 mg Lasix daily, metolazone 5 mg daily.  Following with Dr. Ramos cardiology.      Evaluated by GI, tips still working however there is a decrease flow, IR performed tips evaluation again 07/24/2020              Review of Systems   Constitutional:  Positive for weight gain. Negative for fever and chills.   HENT:  Negative for trouble  swallowing, voice change and congestion.         History of cataract.   Eyes:  Negative for redness.   Respiratory:  Positive for apnea, snoring, cough, shortness of breath, orthopnea and dyspnea on extertion.    Cardiovascular:  Positive for leg swelling. Negative for chest pain.        History of diastolic CHF.   Genitourinary:         CKD 3    Endocrine: History of diabetes.  Hypothyroid.     Musculoskeletal:  Positive for arthralgias and back pain.   Skin:  Negative for rash.   Gastrointestinal:  Positive for abdominal distention and acid reflux.        History of liver cirrhosis, ascites status post paracentesis in the past.  Status post TIPS.  Gastroesophageal reflux disease.  Gastroparesis.   Neurological:  Positive for headaches. Negative for syncope.   Hematological:  No excessive bruising.   Psychiatric/Behavioral:  Positive for sleep disturbance. Negative for confusion. The patient is not nervous/anxious.         KRISTAL     Outpatient Encounter Medications as of 6/5/2023   Medication Sig Dispense Refill    amLODIPine (NORVASC) 10 MG tablet Take 1 tablet (10 mg total) by mouth once daily. 90 tablet 3    apixaban (ELIQUIS) 5 mg Tab Take 1 tablet (5 mg total) by mouth 2 (two) times daily. 60 tablet 3    carvediloL (COREG) 25 MG tablet Take 1 tablet (25 mg total) by mouth 2 (two) times daily with meals. 180 tablet 1    docusate sodium (COLACE) 100 MG capsule Take 1 capsule (100 mg total) by mouth 3 (three) times daily. Hold for diarrhea 30 capsule 0    famotidine (PEPCID) 20 MG tablet Take 1 tablet by mouth once daily 90 tablet 1    ferrous sulfate (IRON ORAL) Take by mouth.      fluticasone propionate (FLONASE) 50 mcg/actuation nasal spray 2 sprays (100 mcg total) by Each Nostril route once daily.  0    garlic (GARLIQUE ORAL) Take 1 tablet by mouth once daily.      isosorbide mononitrate (IMDUR) 120 MG 24 hr tablet Take 1 tablet (120 mg total) by mouth every evening. 90 tablet 1    levothyroxine (SYNTHROID) 137  MCG Tab tablet TAKE 1 TABLET BY MOUTH BEFORE BREAKFAST 90 tablet 0    LIDOcaine (LMX) 4 % cream Apply topically as needed.      magnesium oxide (MAG-OX) 400 mg (241.3 mg magnesium) tablet Take 1 tablet (400 mg total) by mouth once daily. 90 tablet 1    metOLazone (ZAROXOLYN) 5 MG tablet Take 1 tablet (5 mg total) by mouth every Mon, Wed, Fri. Take 30 min before Torsemide 60 tablet 1    omega-3 fatty acids/fish oil (FISH OIL-OMEGA-3 FATTY ACIDS) 300-1,000 mg capsule Take by mouth once daily.      ondansetron (ZOFRAN) 4 MG tablet Take 1 tablet (4 mg total) by mouth every 8 (eight) hours as needed for Nausea. 90 tablet 1    pravastatin (PRAVACHOL) 10 MG tablet Take 1 tablet (10 mg total) by mouth once daily. 90 tablet 1    pulse oximeter (PULSE OXIMETER) device by Apply Externally route 2 (two) times a day. Use twice daily at 8 AM and 3 PM and record the value in [employer] as directed. 1 each 0    rifAXIMin (XIFAXAN) 550 mg Tab Take 1 tablet (550 mg total) by mouth 2 (two) times daily. 60 tablet 5    STOOL SOFTENER 100 mg capsule TAKE 1 CAPSULE BY MOUTH THREE TIMES DAILY FOR DIARRHEA (HOLD FOR DIARRHEA)      torsemide (DEMADEX) 20 MG Tab Take 2 tablets (40 mg total) by mouth once daily. If having more swelling/weight gain/fluid take this in morning and afternoon for 3 days 180 tablet 1    TRULICITY 4.5 mg/0.5 mL pen injector INJECT 4.5 MG INTO THE SKIN EVERY 7 DAYS 12 pen 1    [DISCONTINUED] dulaglutide (TRULICITY) 4.5 mg/0.5 mL pen injector Inject 4.5 mg into the skin every 7 days. 12 pen 1    [DISCONTINUED] levothyroxine (EUTHYROX) 137 MCG Tab tablet Take 1 tablet (137 mcg total) by mouth before breakfast. 90 tablet 0    [DISCONTINUED] magnesium oxide (MAG-OX) 400 mg (241.3 mg magnesium) tablet Take 1 tablet (400 mg total) by mouth once daily. 90 tablet 1    [DISCONTINUED] metOLazone (ZAROXOLYN) 5 MG tablet Take 1 tablet (5 mg total) by mouth once daily. Take daily 30 min before fluid pill 90 tablet 1    [DISCONTINUED]  "torsemide (DEMADEX) 20 MG Tab Take 2 tablets (40 mg total) by mouth once daily. If having more swelling/weight gain/fluid take this in morning and afternoon for 3 days 180 tablet 1     No facility-administered encounter medications on file as of 6/5/2023.       Objective:     Vital Signs (Most Recent)  Vital Signs  Pulse: 68  Resp: 17  SpO2: 99 %  BP: 122/80  Height and Weight  Height: 5' 1" (154.9 cm)  Weight: 76.6 kg (168 lb 14 oz)  BSA (Calculated - sq m): 1.82 sq meters  BMI (Calculated): 31.9  Weight in (lb) to have BMI = 25: 132]  Wt Readings from Last 2 Encounters:   06/05/23 76.6 kg (168 lb 14 oz)   05/22/23 79.5 kg (175 lb 4.3 oz)       Physical Exam   Constitutional: She is oriented to person, place, and time. She appears well-developed and well-nourished.   HENT:   Head: Normocephalic.   Cardiovascular: Normal rate and regular rhythm.   Pulmonary/Chest: Normal expansion and effort normal. No respiratory distress. She has decreased breath sounds. She has no rhonchi. She has rales.   Breath sounds decreased right more than left.   Abdominal: Soft. She exhibits no distension.   Musculoskeletal:         General: Edema present. No tenderness.      Cervical back: Neck supple.      Comments: Bilateral lower extremity pitting edema   Lymphadenopathy:     She has no axillary adenopathy.   Neurological: She is alert and oriented to person, place, and time.   Skin: Skin is warm.   Psychiatric: She has a normal mood and affect.     Laboratory  Lab Results   Component Value Date    WBC 5.28 02/21/2023    RBC 2.96 (L) 02/21/2023    HGB 8.5 (L) 02/21/2023    HCT 28.4 (L) 02/21/2023    MCV 96 02/21/2023    MCH 28.7 02/21/2023    MCHC 29.9 (L) 02/21/2023    RDW 15.0 (H) 02/21/2023     02/21/2023    MPV 10.4 02/21/2023    GRAN 2.5 02/21/2023    GRAN 47.7 02/21/2023    LYMPH 2.0 02/21/2023    LYMPH 37.7 02/21/2023    MONO 0.5 02/21/2023    MONO 9.8 02/21/2023    EOS 0.2 02/21/2023    BASO 0.01 02/21/2023    " EOSINOPHIL 4.2 02/21/2023    BASOPHIL 0.2 02/21/2023       BMP  Lab Results   Component Value Date     05/09/2023     05/09/2023     05/09/2023    K 4.2 05/09/2023    K 4.2 05/09/2023    K 4.2 05/09/2023     05/09/2023     05/09/2023     05/09/2023    CO2 27 05/09/2023    CO2 27 05/09/2023    CO2 27 05/09/2023    BUN 80 (H) 05/09/2023    BUN 80 (H) 05/09/2023    BUN 80 (H) 05/09/2023    CREATININE 4.8 (H) 05/09/2023    CREATININE 4.8 (H) 05/09/2023    CREATININE 4.8 (H) 05/09/2023    CALCIUM 8.8 05/09/2023    CALCIUM 8.8 05/09/2023    CALCIUM 8.8 05/09/2023    ANIONGAP 11 05/09/2023    ANIONGAP 11 05/09/2023    ANIONGAP 11 05/09/2023    ESTGFRAFRICA 12 (A) 06/21/2022    EGFRNONAA 10 (A) 06/21/2022    AST 22 05/09/2023    ALT 12 05/09/2023    PROT 6.8 05/09/2023       Lab Results   Component Value Date     (H) 03/08/2023     (H) 02/21/2023     (H) 02/13/2023     (H) 11/16/2022     (H) 08/02/2022     (H) 08/01/2022       Lab Results   Component Value Date    TSH 28.708 (H) 08/03/2022     No malignancy on pleural effusion fluid.      Lab Results   Component Value Date    SEDRATE 114 (H) 01/12/2022       Lab Results   Component Value Date    CRP 34.3 (H) 10/11/2020     No results found for: IGE     No results found for: ASPERGILLUS  No results found for: AFUMIGATUSCL     Lab Results   Component Value Date    ACE 56 12/07/2021        Diagnostic Results:  I have personally reviewed today the following studies:       CLINICAL HISTORY:  Shortness of breath     COMPARISON:  June 20, 2022, as well as studies dating back to June 22, 2020     FINDINGS:  Chronic or recurrent blunting of the left lateral costophrenic angle and to lesser degree the left costophrenic angle with areas of scarring or atelectasis in the mid lower left lung again seen.  The lungs are free of new pulmonary opacities.  The cardiac silhouette size is markedly enlarged.  The  trachea is midline and the mediastinal width is normal. Negative for pneumothorax.  Pulmonary vasculature is chronically mildly congested.  Negative for osseous abnormalities. Tortuous aorta.  Degenerative changes of the spine with convex right curvature.  Degenerative changes of the shoulder girdles.  Azygous lobe.     Impression:     1.  Chronic or recurrent vascular congestion and left greater than right pleural effusions or scarring.  Negative for new pulmonary opacities.  Chronic or recurrent interstitial pulmonary edema must be considered.     2.  Markedly enlarged cardiac silhouette.  Pericardial effusion not excluded.  Clinical correlation is advised.                 Chest x-ray June 22, 2020    Cardiomegaly and prominence central pulmonary vasculature suggesting pulmonary venous congestion.     Chronic findings left base suggesting effusion with underlying infiltrate/consolidation not excluded.     6 min walking test 09/24/2020 lowest O2 sat 97% severe reduction of exercise capacity.    Doppler liver US :     Impression         1. Findings in keeping with cirrhosis with known left portal vein thrombosis which now appears more occlusive.  2. TIPS shunt catheter appears patent but demonstrates low internal velocities suggesting possible TIPS malfunction.  Velocities are similar to prior.         PSG 4/18   RESPIRATORY: The overall apnea-hypopnea index (AHI) was 13.3 events /hr asleep. Ms Das had 27 hypopneas, 21  obstructive sleep apneas, 0 central sleep apneas, and 0 mixed sleep apneas. Persistent loud snoring was noted. Analysis of  continuous oxygen saturations showed a mean SpO2 value of 93.6 % for the study, with a minimum oxygen saturation during  sleep of 54.0 %, and a waking baseline SpO2 = 96 %. Oxygen saturations were ? 88 % for 23.7 minutes of the time spent  asleep.        PFT 31/18:     The Forced Vital Capacity (FVC) is normal.  FEV1 is normal.  No significant improvement after  bronchodilator.  The absence of an acute response to aerosolized bronchodilator does not necessarily mean that the subject will not  respond to a clinical trial of aerosolized or oral bronchodilators.  FEV1 over FVC is 82%  Lung Volumes are consistent with mild restrictive disease.  Diffusion capacity is severely reduced - unadjusted for hemoglobin (DLCO < 40%).  Mild restriction, severe DLCO reduction.        Echo June 2019       1 - Normal left ventricular systolic function (EF 60-65%).     2 - Impaired LV relaxation, normal LAP (grade 1 diastolic dysfunction).     3 - Normal right ventricular systolic function .     4 - No wall motion abnormalities.     5 - Concentric hypertrophy.     6 - Trivial mitral regurgitation.     7 - Trivial tricuspid regurgitation.     8 - Small pericardial effusion.     9 - No echo evidence of tamponade.     Echo 5/17 :      1 - Normal left ventricular systolic function (EF 60-65%). Mild concentric hypertrophy.     2 - Impaired LV relaxation, elevated LAP (grade 2 diastolic dysfunction).     3 - Normal right ventricular systolic function .     4 - Severe left atrial enlargement.       Assessment/Plan:   Recurrent pleural effusion on left  -     X-Ray Chest PA And Lateral; Future; Expected date: 06/05/2023    Obstructive sleep apnea  -     BiPAP/CPAP Titration ((Must have dx of KRISTAL from previous sleep study); Future    Chronic heart failure with preserved ejection fraction  -     BiPAP/CPAP Titration ((Must have dx of KRISTAL from previous sleep study); Future    Nocturnal hypoxemia  -     BiPAP/CPAP Titration ((Must have dx of KRISTAL from previous sleep study); Future    CPAP use counseling  -     BiPAP/CPAP Titration ((Must have dx of KRISTAL from previous sleep study); Future         Maximize cardiac therapy.      Fatty liver cirrhosis.  Xifaxan rifaximin follow-up with GI.        Patient wants to be re-evaluated for her CPAP therapy with currently having a recalled machine.  Initial test was  done 2018.  Will proceed with CPAP titration.              Follow-up with GI.  Continue Xifaxan and rifaximin.          Continue diuretics. Repeat chest x-ray.    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    Follow up in about 3 months (around 9/5/2023).    This note was prepared using voice recognition system and is likely to have sound alike errors that may have been overlooked even after proof reading.  Please call me with any questions    Discussed diagnosis, its evaluation, treatment and usual course. All questions answered.      Angelica Hunter MD

## 2023-06-05 NOTE — PATIENT INSTRUCTIONS
No eating / drinking for 3 hours before going to bed.  Elevate head of bed 30 - 45 %     CPAP HABITUATION PROCEDURE     Pankaj Ly, Ph.D., [facility] and Reggie José M.D.  [facility], [facility]     Some people have difficulty adjusting to CPAP/BiPAP/AutoCPAP.  This is not unusual or hard to understand: Breathing with CPAP is different from ordinary breathing, and this difference is aversive to some. The problem can be overcome, however, and the benefits CPAP confers are certainly worth the effort.  Below, you will find a simple and gradual way to get used to CPAP before you try to use it all night, every night.  The essence of this procedure is to relax and let breathing with CPAP become a habit.  It may take about 2 weeks, and involves the following:      CPAP while awake and comfortably seated, during the late evening.     CPAP in bed while attempting sleep at night.     If your discomfort is too great at any time, discontinue and attempt again later the same night, for the same amount of time.   You and your physician may alter the times and pressures if necessary.     If you find that it is very easy to get used to CPAP, you may start using it every night when you are comfortable enough to do so.  IMPORTANT REMINDER: If you have a cold or sinus congestion it is okay to miss a night or two of CPAP. Consider using antihistamines or decongestants to clear up your sinus congestion prior to sleeping.     DAYS  1-3   Start CPAP while awake and comfortably seated during the late evening, after having prepared for bed.  You may do this while watching television, listening to music or reading. Use for 1 hour, then take off CPAP and go directly to bed to sleep     DAYS  4-6     Start CPAP when you go to bed and use for 1 hour, or until you fall asleep.  If your discomfort is too great at any time, discontinue and attempt again later the same night, for the same designated  amount of time (1 hour).      DAYS  7-9     Increase time with CPAP to 2 hours a night.  If your discomfort is too great at any time, discontinue and attempt again later the same night, for the same designated amount of time (2 hours).      DAYS 10-12    Increase time with CPAP to 3 hours a night. If your discomfort is too great at any time, discontinue and attempt again later the same night, for the same designated amount of time (3 hours).      DAYS 13-15     Sleep the entire night with CPAP.      OPTIONAL: You may use Progressive Muscle Relaxation (PMR) to help put you at ease when using CPAP; do PMR twice each day, once in the morning or afternoon, and once in the evening just before using CPAP. You may do PMR prior to any attempt until you are comfortable with CPAP.        Continuous Positive Air Pressure (CPAP)  Continuous positive air pressure (CPAP) uses gentle air pressure to hold the airway open. CPAP is often the most effective treatment for sleep apnea and severe snoring. It works very well for many people. But keep in mind that it can take several adjustments before the setup is right for you.       How CPAP Works  CPAP is a small portable pump beside the bed. The pump sends air through a hose, which is held over your nose and/or mouth by a mask. Mild air pressure is gently pushed through your airway. The air pressure nudges sagging tissues aside. This widens the airway so you can breathe better. CPAP may be combined with other kinds of therapy for sleep apnea.       Types of Air Pressure Treatments  There are different types of CPAP. Your doctor or CPAP technician will help you decide which type is best for you:  Basic CPAP keeps the pressure constant all night long.  A bilevel device (BiPAP) provides more pressure when you breathe in and less when you breathe out. A BiPAP machine also may be set to provide automatic breaths to maintain breathing if you stop breathing while sleeping.  An autoCPAP  device automatically adjusts pressure throughout the night and in response to changes such as body position, sleep stage, and snoring.  © 8108-6352 [employer]. [address] [place] [address]. All rights reserved. This information is not intended as a substitute for professional medical care. Always follow your healthcare professional's instructions.        Snoring and Sleep Apnea: Notes for a Partner  Snoring and sleep apnea affect your life, as well as your partners. You can help in the treatment of the problem. Be supportive. Encourage your partner both to get treatment and to make the adjustments needed to follow through.       Adjusting to Changes  Your partners treatment may involve making changes to certain life habits. You can help your partner make and stick with these changes. For example:  Support and even join your partners exercise program.  Be supportive if your partner gets CPAP (continuous positive airway pressure). He or she may feel self-conscious at first. Remind your partner to expect adjustments to CPAP before it feels just right.  Consider joining a snoring and sleep apnea support group.  Go Along to See the Health Care Provider  You can give the health care provider the best account of your partners nighttime breathing and snoring patterns. Try to go along to health care providers appointments. If you cant go, write notes for your partner to give to the health care provider. Describe your partners snoring and sleep breathing patterns in detail.  Tips for Sleeping with a Snorer  Until treatment takes care of your partners snoring:  Try to go to bed first. It may help if youre already asleep when your partner starts to snore.  Wear earplugs to bed. A fan or other source of background noise may also help drown out snoring.   © 6531-1997 [employer]. [address] [place] [address]. All rights reserved. This information is not intended as a  substitute for professional medical care. Always follow your healthcare professional's instructions.        Continuous Positive Airway Pressure (CPAP)  Your health care provider has prescribed continuous positive airway pressure (CPAP) therapy for you. A CPAP device helps you breathe better at night. The device delivers air through your nose or mouth when you breathe in to keep your air passages open. CPAP is:  Used most often to treat sleep apnea and some other problems (Sleep apnea is a chronic condition with periods of sleep in which you briefly stop breathing.)  Safe and very effective, but it takes time to get used to the mask.   Your health care provider, nurse, or medical supplier will give you tips for wearing and caring for your CPAP device.  General guidelines  It's very important not to give up! It takes time to get used to wearing the mask at night.  Practice using your CPAP device during the day, especially whenever you take a nap.  Remember, there are several different types of masks. If you cant get used to your mask, ask your provider or medical supply company about trying another style.  If you have nasal stuffiness or dryness when using your CPAP device, talk with your provider or medical supply company. There are ways to lessen these problems. For example, your provider may recommend moistening nasal spray or the medical supply company may recommend a device with a humidifier.  The goal is to use your CPAP all night, every night, during all naps, and even when you travel.  Keep your mask clean. Wash it with soap and water. Be sure to rinse the mask and tubing well with water to remove any soap. Let them air-dry thoroughly before using.  Make yourself comfortable when sleeping with CPAP. Try using extra pillows.  Work with your medical supply company so that you know how to correctly use your CPAP. Their representative will be able to help you:  Use the CPAP correctly  Troubleshoot any problems  that come up  Learn to clean and maintain the device  Adjust to regular use of the CPAP  © 8428-9556 [employer]. [address] [place] [address], [place] [address]. All rights reserved. This information is not intended as a substitute for professional medical care. Always follow your healthcare professional's instructions.

## 2023-06-11 ENCOUNTER — HOSPITAL ENCOUNTER (OUTPATIENT)
Dept: SLEEP MEDICINE | Facility: HOSPITAL | Age: 68
Discharge: HOME | End: 2023-06-11
Attending: INTERNAL MEDICINE
Payer: MEDICARE

## 2023-06-11 DIAGNOSIS — Z72.821 INADEQUATE SLEEP HYGIENE: ICD-10-CM

## 2023-06-11 DIAGNOSIS — Z71.89 CPAP USE COUNSELING: ICD-10-CM

## 2023-06-11 DIAGNOSIS — I50.32 CHRONIC HEART FAILURE WITH PRESERVED EJECTION FRACTION: ICD-10-CM

## 2023-06-11 DIAGNOSIS — G47.34 NOCTURNAL HYPOXEMIA: ICD-10-CM

## 2023-06-11 DIAGNOSIS — G47.33 OBSTRUCTIVE SLEEP APNEA: Primary | ICD-10-CM

## 2023-06-11 PROCEDURE — 95811 POLYSOM 6/>YRS CPAP 4/> PARM: CPT

## 2023-06-11 PROCEDURE — 95811 PR POLYSOMNOGRAPHY W/CPAP: ICD-10-PCS | Mod: 26,,, | Performed by: PSYCHOLOGIST

## 2023-06-11 PROCEDURE — 95811 POLYSOM 6/>YRS CPAP 4/> PARM: CPT | Mod: 26,,, | Performed by: PSYCHOLOGIST

## 2023-06-17 NOTE — PROCEDURES
"Patient Name: Diamond Das  Date of Report: 23   Study Date:  2023  [facility] Clinic No.: [id number]  : 1955    Time of Study:  09:19:20 PM - 04:56:26 AM   Sex:  Female     Age:  68 year    Weight:  168.0 lb    Height:  5' 1"  Type of Study:  CPAP re-titration    REASONS FOR REFERRAL: Ms Das was a 63year old female, referred for diagnostic polysomnography by Dr. Angelica Hunter  based on the patient's reported prior diagnosis of obstructive sleep apnea / hypopnea syndrome (OSAHS) over 5 years ago, and subsequent use of CPAP, with which she was compliant.  She since wished to have a repeat sleep test.  Dr. Andrey Perrin is the patient's primary care physician.   Her diagnostic PSG of 18 revealed an Apnea + Hypopnea Index of 13.3 respiratory events / hr asleep.  She had been using CPAP since then but was less than optimally compliant, and when she needed a replacement machine for her recalled one, did not follow up completely.  Now, using her original machine, she wants to be re-evaluated for a better CPAP setting. Dr. Hunter ordered the titration PSG.  Dr. Perrin is still her PCP    STUDY PARAMETERS: This study involved analysis of the patient's sleep pattern while breathing was assisted. The study was performed with a sleep technologist in attendance for the entire test period, with video monitoring throughout the study, and routine laboratory clinical parameters recorded:  NOTE:  This polysomnographic sleep study was reviewed epoch-by-epoch, interpreted and signed below by an American Academy of Sleep Medicine Board Certified Sleep Specialist    SUMMARY STATEMENTS  DIAGNOSTIC IMPRESSIONS  G47.33  / 327.23 Mild Obstructive Sleep Apnea, Adult (OSAHS)  Z72.821 / V69.4 Inadequate Sleep Hygiene  G25.81  / 333.99  r/o Restless Legs Syndrome (RLS)  F51.04   / 307.42 r/o Psychophysiological Insomnia (stress - related and conditioned)      PRIMARY TREATMENT RECOMMENDATIONS  Treat, or refer to " Sleep Disorders Center.  CPAP was initiated at 09:19:20 PM.  The CPAP titration polysomnography revealed that 11 cm H2O pressure (C - Flex 3, humidity ?)   the most effective pressure most completely tested, was optimal, as it reduced the rate of respiratory events 92 % to A + H Index = 1.1 events / hr asleep  in 2.8  hrs sleep with 0.2 hrs REM sleep.  A lower pressure fully tested also could be successful (7 cm A + H I = 3.0  in 1.0 hrs sleep, with 0.4 hrs REM sleep).  Snoring was not eliminated at any pressure.  AutoCPAP 6 cm - 20 cm could be tried if necessary.  The overall A + H Index was 1.1 / hr asleep, with no respiratory event - related arousals  or  RERAs for the titration trial.  The  mean SpO2 value was 96.0 % throughout the study, mild, with a minimum oxygen saturation during sleep of 88.0 % and a maximum waking baseline SpO2 of 100.0 %).    A medium Resmed AirFit  F30 full - face CPAP mask was used and was well - tolerated.  See CPAP trial outcomes table, below.          CPAP Titration Data Table    PAP  Device PAP  Level O2  Level Time (min) Wake (min) NREM  (min) REM  (min) Sleep Eff% OA # CA # MA # Hyp # AH I RERA RD I Min OSat LM # LM  Index AR #   CPAP 5 - 151.5 23.5 128.0 0.0 84.5% - - - - - Level  Used - 86.0 - - -   CPAP 7 - 60.0 0.5 37.5 22.0 99.2% 1 - - 2 3.0 Level  Used 3.0 88.0 - - 3   CPAP 9 - 29.5 0.5 29.0 0.0 98.3% - - - 1 2.1 Level  Used 2.1 92.0 - - -   CPAP 10 - 39.5 0.0 39.5 0.0 100.0% - - - 1 1.5 Level Used 1.5 91.0 - - -   CPAP 11 - 177.0 9.0 155.5 12.5 94.9% - - - 3 1.1 Level Used 1.1 91.0 19 6.8 7   Weight loss to the normal range is recommended as it can decrease respiratory events and snoring in overweight patients.  The following changes in sleep hygiene / sleep - related behavior are recommended after medical treatments are successful  Limit time for sleep from 10 hrs to a number of hours of sleep optimal for adequate daytime functioning (7.5 to 9.0 hrs / night).  Regular  bedtimes and wake times, including weekends: Total sleep time / night should not be more than one hour more           than usual, and bedtime or wake time should not be more than one hour earlier or later than usual.    Do not attempt to make up lost sleep by extending sleep periods.    Avoid naps; none longer than 20 min or later than mid - afternoon.  Avoid caffeinated beverages after 6:00 pm or within 4 hours of bedtime.    SECONDARY TREATMENT RECOMMENDATIONS  Treat, or refer to [facility] if problems are not satisfactorily resolved by the above.  Follow - up inquiry regarding frequency, duration and nature of reported, delayed sleep onset (r/o  stress - related and / or conditioned psychophysiological insomnia).  Please see SDI.    The following treatment recommendations from the  4-24-18  report of Sleep Disorder Evaluation may still apply:    PRIMARY TREATMENT RECOMMENDATIONS  Treat, or refer to Sleep Disorders Center.  The diagnostic polysomnography revealed a mild obstructive sleep apnea / hypopnea syndrome (A + H Index = 13.3 events / hr asleep with only 1.1 respiratory event - related arousals / hr asleep for the study, and no RERAs (respiratory effort -  related arousals).  The mean SpO2 value was 93.6 %, moderate, minimum oxygen saturation during sleep was 54.0 %, and waking baseline SpO2 was 96 %.  Persistent, loud snoring was noted.  A  CPAP titration polysomnography is recommended.      Weight loss to the normal range is recommended as it can decrease respiratory events and snoring in overweight patients.  The following changes in sleep hygiene / sleep - related behavior are recommended after medical treatments are successful  Set time for sleep to number of hours of sleep needed for adequate daytime functioning (7.5 to 9.0 hrs / night).  Regular bedtimes and wake times, including weekends: Total sleep time / night should not be more than one hour more              than usual, and bedtime or wake time should  not be more than one hour earlier or later than usual.    Do not attempt to make up lost sleep by extending sleep periods.    Avoid naps; none longer than 20 min or later than mid - afternoon.  Avoid caffeinated beverages after 6:00 pm or within 4 hours of bedtime.    SECONDARY TREATMENT RECOMMENDATIONS  Treat, or refer to [facility] if problems are not satisfactorily resolved by the above.  If  insomnia persists after treatments for medical sleep disorders have proven effective, recommend  follow - up inquiry regarding frequency, duration and nature of reported sleep loss, delayed sleep onset, and poor sleep maintenance (stress - related and / or conditioned psychophysiological insomnia) and referral for behavioral treatment of insomnia, as indicated.  Please see SDI.    See below for a complete interpretation of data from the polysomnography and Sleep Disorders Inventory.     Thank you for referring this patient to the [facility].      Pankaj Ly, Ph.D., ABPP; Diplomate, American Board of Sleep Medicine

## 2023-06-19 DIAGNOSIS — G47.33 OSA (OBSTRUCTIVE SLEEP APNEA): Primary | ICD-10-CM

## 2023-07-06 ENCOUNTER — TELEPHONE (OUTPATIENT)
Dept: CARDIOLOGY | Facility: CLINIC | Age: 68
End: 2023-07-06
Payer: MEDICARE

## 2023-07-06 NOTE — TELEPHONE ENCOUNTER
LM for pt to call us back          ----- Message from Liudmila Sales CMA sent at 7/6/2023  4:24 PM CDT -----    ----- Message -----  From: Elgin Das  Sent: 7/6/2023   3:33 PM CDT  To: Marychuy Balderas Staff        Name of Who is Calling:PT          What is the request in detail:PT is requesting a call back to discuss medication that has her dizzy all the time and that has her blood pressure low 114/57 90/50 and 123/57    isosorbide mononitrate (IMDUR) 120 MG 24 hr tablet 90 tablet 1 2/13/2023 9/11/2023 No  Sig - Route: Take 1 tablet (120 mg total) by mouth every evening. - Oral  Sent to pharmacy as: isosorbide mononitrate (IMDUR) 120 MG 24 hr tablet  Class: Normal            Can the clinic reply by [facility]:no          What Number to Call Back if not in [phone]

## 2023-07-07 ENCOUNTER — PATIENT MESSAGE (OUTPATIENT)
Dept: INFECTIOUS DISEASES | Facility: CLINIC | Age: 68
End: 2023-07-07
Payer: MEDICARE

## 2023-07-15 RX ORDER — FAMOTIDINE 20 MG/1
TABLET, FILM COATED ORAL
Qty: 90 TABLET | Refills: 0 | Status: SHIPPED | OUTPATIENT
Start: 2023-07-15 | End: 2023-10-16

## 2023-07-18 DIAGNOSIS — E78.49 OTHER HYPERLIPIDEMIA: ICD-10-CM

## 2023-07-18 RX ORDER — PRAVASTATIN SODIUM 10 MG/1
TABLET ORAL
Qty: 90 TABLET | Refills: 1 | Status: SHIPPED | OUTPATIENT
Start: 2023-07-18 | End: 2023-10-27 | Stop reason: SDUPTHER

## 2023-07-27 ENCOUNTER — HOSPITAL ENCOUNTER (OUTPATIENT)
Dept: RADIOLOGY | Facility: HOSPITAL | Age: 68
Discharge: HOME | End: 2023-07-27
Attending: NURSE PRACTITIONER
Payer: MEDICARE

## 2023-07-27 DIAGNOSIS — K76.82 HEPATIC ENCEPHALOPATHY: ICD-10-CM

## 2023-07-27 PROCEDURE — 76705 ECHO EXAM OF ABDOMEN: CPT | Mod: 26,,, | Performed by: RADIOLOGY

## 2023-07-27 PROCEDURE — 76705 ECHO EXAM OF ABDOMEN: CPT | Mod: TC,PO

## 2023-07-27 PROCEDURE — 76705 US ABDOMEN LIMITED: ICD-10-PCS | Mod: 26,,, | Performed by: RADIOLOGY

## 2023-07-31 ENCOUNTER — PATIENT MESSAGE (OUTPATIENT)
Dept: FAMILY MEDICINE | Facility: CLINIC | Age: 68
End: 2023-07-31
Payer: MEDICARE

## 2023-08-07 ENCOUNTER — OFFICE VISIT (OUTPATIENT)
Dept: GASTROENTEROLOGY | Facility: CLINIC | Age: 68
End: 2023-08-07
Payer: MEDICARE

## 2023-08-07 VITALS — HEIGHT: 61 IN | BODY MASS INDEX: 31.91 KG/M2 | WEIGHT: 169 LBS

## 2023-08-07 DIAGNOSIS — K76.82 HEPATIC ENCEPHALOPATHY: ICD-10-CM

## 2023-08-07 DIAGNOSIS — K74.60 LIVER CIRRHOSIS SECONDARY TO NASH: Primary | ICD-10-CM

## 2023-08-07 DIAGNOSIS — N18.5 STAGE 5 CHRONIC KIDNEY DISEASE: ICD-10-CM

## 2023-08-07 DIAGNOSIS — K75.81 LIVER CIRRHOSIS SECONDARY TO NASH: Primary | ICD-10-CM

## 2023-08-07 PROCEDURE — 1159F PR MEDICATION LIST DOCUMENTED IN MEDICAL RECORD: ICD-10-PCS | Mod: CPTII,95,, | Performed by: NURSE PRACTITIONER

## 2023-08-07 PROCEDURE — 3008F BODY MASS INDEX DOCD: CPT | Mod: CPTII,95,, | Performed by: NURSE PRACTITIONER

## 2023-08-07 PROCEDURE — 3008F PR BODY MASS INDEX (BMI) DOCUMENTED: ICD-10-PCS | Mod: CPTII,95,, | Performed by: NURSE PRACTITIONER

## 2023-08-07 PROCEDURE — 1157F ADVNC CARE PLAN IN RCRD: CPT | Mod: CPTII,95,, | Performed by: NURSE PRACTITIONER

## 2023-08-07 PROCEDURE — 3288F FALL RISK ASSESSMENT DOCD: CPT | Mod: CPTII,95,, | Performed by: NURSE PRACTITIONER

## 2023-08-07 PROCEDURE — 3062F PR POS MACROALBUMINURIA RESULT DOCUMENTED/REVIEW: ICD-10-PCS | Mod: CPTII,95,, | Performed by: NURSE PRACTITIONER

## 2023-08-07 PROCEDURE — 99214 PR OFFICE/OUTPT VISIT, EST, LEVL IV, 30-39 MIN: ICD-10-PCS | Mod: 95,,, | Performed by: NURSE PRACTITIONER

## 2023-08-07 PROCEDURE — 1159F MED LIST DOCD IN RCRD: CPT | Mod: CPTII,95,, | Performed by: NURSE PRACTITIONER

## 2023-08-07 PROCEDURE — 1101F PR PT FALLS ASSESS DOC 0-1 FALLS W/OUT INJ PAST YR: ICD-10-PCS | Mod: CPTII,95,, | Performed by: NURSE PRACTITIONER

## 2023-08-07 PROCEDURE — 3062F POS MACROALBUMINURIA REV: CPT | Mod: CPTII,95,, | Performed by: NURSE PRACTITIONER

## 2023-08-07 PROCEDURE — 1126F PR PAIN SEVERITY QUANTIFIED, NO PAIN PRESENT: ICD-10-PCS | Mod: CPTII,95,, | Performed by: NURSE PRACTITIONER

## 2023-08-07 PROCEDURE — 1126F AMNT PAIN NOTED NONE PRSNT: CPT | Mod: CPTII,95,, | Performed by: NURSE PRACTITIONER

## 2023-08-07 PROCEDURE — 99214 OFFICE O/P EST MOD 30 MIN: CPT | Mod: 95,,, | Performed by: NURSE PRACTITIONER

## 2023-08-07 PROCEDURE — 3044F PR MOST RECENT HEMOGLOBIN A1C LEVEL <7.0%: ICD-10-PCS | Mod: CPTII,95,, | Performed by: NURSE PRACTITIONER

## 2023-08-07 PROCEDURE — 3066F PR DOCUMENTATION OF TREATMENT FOR NEPHROPATHY: ICD-10-PCS | Mod: CPTII,95,, | Performed by: NURSE PRACTITIONER

## 2023-08-07 PROCEDURE — 3044F HG A1C LEVEL LT 7.0%: CPT | Mod: CPTII,95,, | Performed by: NURSE PRACTITIONER

## 2023-08-07 PROCEDURE — 3066F NEPHROPATHY DOC TX: CPT | Mod: CPTII,95,, | Performed by: NURSE PRACTITIONER

## 2023-08-07 PROCEDURE — 1157F PR ADVANCE CARE PLAN OR EQUIV PRESENT IN MEDICAL RECORD: ICD-10-PCS | Mod: CPTII,95,, | Performed by: NURSE PRACTITIONER

## 2023-08-07 PROCEDURE — 1101F PT FALLS ASSESS-DOCD LE1/YR: CPT | Mod: CPTII,95,, | Performed by: NURSE PRACTITIONER

## 2023-08-07 PROCEDURE — 3288F PR FALLS RISK ASSESSMENT DOCUMENTED: ICD-10-PCS | Mod: CPTII,95,, | Performed by: NURSE PRACTITIONER

## 2023-08-07 NOTE — PROGRESS NOTES
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 Diagnoses of Hepatic encephalopathy and Stage 5 chronic kidney disease were also pertinent to this visit.    PCP: Andrey Perrin     The patient location is: [place]  The chief complaint leading to consultation is: above    Visit type: audiovisual    Face to Face time with patient: 10 minutes  20 minutes of total time spent on the encounter, which includes face to face time and non-face to face time preparing to see the patient (eg, review of tests), Obtaining and/or reviewing separately obtained history, Documenting clinical information in the electronic or other health record, Independently interpreting results (not separately reported) and communicating results to the patient/family/caregiver, or Care coordination (not separately reported).         Each patient to whom he or she provides medical services by telemedicine is:  (1) informed of the relationship between the physician and patient and the respective role of any other health care provider with respect to management of the patient; and (2) notified that he or she may decline to receive medical services by telemedicine and may withdraw from such care at any time.    Notes:       HPI:  This is a 68 y.o. female here for follow up of the above  Pt states that she has been feeling overall stable without any decompensating events  Recent labs are stable with the exception of elevated creatinine due to ESRD  US without lesion or mass.  TIPS with decreased velocity.  Pt without ascites or confusion.   No upper GI bleeding.  No ascites or BLE.  No overt confusion.  Taking Rifaximin without side effect.       Review of Systems   Constitutional:  Negative for chills, fever, malaise/fatigue and weight loss.   Respiratory:  Negative for cough.    Cardiovascular:  Negative for chest pain.    Gastrointestinal:         Per HPI   Musculoskeletal:  Negative for myalgias.   Skin:  Negative for itching and rash.   Neurological:  Negative for headaches.   Psychiatric/Behavioral:  The patient is not nervous/anxious.        Medical History:  Past Medical History:   Diagnosis Date    Abdominal pain 5/10/2018    Acquired hypothyroidism 9/28/2020    Acute on chronic diastolic congestive heart failure 12/7/2017    ROD (acute kidney injury) 1/3/2019    Anasarca 10/13/2020    Arthritis of knee 1/31/2022    Ascites     Ascites 9/2/2016    Bilateral knee pain 4/22/2022    Bilateral lower extremity edema 9/2/2016    Breast pain, left 1/4/2018    Cataract     Chest pain, atypical 2/9/2018    CHF (congestive heart failure)     Cirrhosis     Cirrhosis 1/3/2017    Cough     Diabetes mellitus     Diabetes mellitus, type 2     Diarrhea 9/4/2019    Edema 3/19/2018    Encounter for long-term (current) use of medications 3/19/2018    Epigastric pain 2/11/2020    Gastroparesis     GERD (gastroesophageal reflux disease)     Hepatic encephalopathy 6/19/2018    Pt has history of cirrhosis, seen at [facility] on 6/7/18. Currently on lactulose.    Hyperlipidemia     Hypertension     Hypotension 2/21/2019    Immunization deficiency 2/10/2020    Leg cramps 5/20/2022    Liver disease     Lumbar strain 4/27/2018    Lumbar strain, sequela 2/17/2020    Macular degeneration     Medication reaction 11/17/2016    Other and unspecified hyperlipidemia 6/11/2012 11:11:32 AM    [facility] Historical - Endocrine/Metabolic/Immune: Hyperlipidemia-No Additional Notes    Pneumonia of right middle lobe due to infectious organism 12/19/2017    Renal disorder     Retinopathy due to secondary diabetes mellitus     Screening for malignant neoplasm of cervix 12/14/2017    Skin lesion 12/20/2021    Sleep apnea     Strain of lumbar region 10/4/2021    Thyroid disease     Type 2 diabetes mellitus with hyperglycemia 10/10/2020       Surgical History:   Past  Surgical History:   Procedure Laterality Date    CATARACT EXTRACTION      CHOLECYSTECTOMY      COLONOSCOPY N/A 9/4/2019    Procedure: COLONOSCOPY;  Surgeon: Marnie Elmore MD;  Location: [facility];  Service: Endoscopy;  Laterality: N/A;    ERCP      ESOPHAGOGASTRODUODENOSCOPY N/A 7/28/2022    Procedure: EGD (ESOPHAGOGASTRODUODENOSCOPY);  Surgeon: Jimy Katz MD;  Location: [facility] ENDO;  Service: Endoscopy;  Laterality: N/A;    FLUOROSCOPY N/A 7/24/2020    Procedure: TIPS revision;  Surgeon: Martell Jasmine MD;  Location: [facility] CATH LAB;  Service: General;  Laterality: N/A;    LIVER BIOPSY      THORACENTESIS Left 1/20/2020    Procedure: Thoracentesis;  Surgeon: Angelica Hunter MD;  Location: [facility] ENDO;  Service: Pulmonary;  Laterality: Left;    TUBAL LIGATION      UPPER GASTROINTESTINAL ENDOSCOPY         Family History:   Family History   Problem Relation Age of Onset    Cancer Mother     Breast cancer Mother     Heart disease Father     Aneurysm Sister     Heart disease Brother     No Known Problems Maternal Grandmother     Cancer Maternal Grandfather     Sarcoidosis Sister     Colon cancer Neg Hx     Ovarian cancer Neg Hx     Thrombosis Neg Hx        Social History:   Social History     Tobacco Use    Smoking status: Never    Smokeless tobacco: Never   Substance Use Topics    Alcohol use: No    Drug use: No       Allergies: Reviewed    Home Medications:  Current Outpatient Medications on File Prior to Visit   Medication Sig Dispense Refill    amLODIPine (NORVASC) 10 MG tablet Take 1 tablet (10 mg total) by mouth once daily. 90 tablet 3    apixaban (ELIQUIS) 5 mg Tab Take 1 tablet (5 mg total) by mouth 2 (two) times daily. 60 tablet 3    carvediloL (COREG) 25 MG tablet Take 1 tablet (25 mg total) by mouth 2 (two) times daily with meals. 180 tablet 1    docusate sodium (COLACE) 100 MG capsule Take 1 capsule (100 mg total) by mouth 3 (three) times daily. Hold for diarrhea 30 capsule 0    famotidine (PEPCID)  "20 MG tablet Take 1 tablet by mouth once daily 90 tablet 0    ferrous sulfate (IRON ORAL) Take by mouth.      fluticasone propionate (FLONASE) 50 mcg/actuation nasal spray 2 sprays (100 mcg total) by Each Nostril route once daily.  0    garlic (GARLIQUE ORAL) Take 1 tablet by mouth once daily.      isosorbide mononitrate (IMDUR) 120 MG 24 hr tablet Take 1 tablet (120 mg total) by mouth every evening. 90 tablet 1    levothyroxine (SYNTHROID) 137 MCG Tab tablet TAKE 1 TABLET BY MOUTH BEFORE BREAKFAST 90 tablet 0    LIDOcaine (LMX) 4 % cream Apply topically as needed.      magnesium oxide (MAG-OX) 400 mg (241.3 mg magnesium) tablet Take 1 tablet (400 mg total) by mouth once daily. 90 tablet 1    metOLazone (ZAROXOLYN) 5 MG tablet Take 1 tablet (5 mg total) by mouth every Mon, Wed, Fri. Take 30 min before Torsemide 60 tablet 1    omega-3 fatty acids/fish oil (FISH OIL-OMEGA-3 FATTY ACIDS) 300-1,000 mg capsule Take by mouth once daily.      ondansetron (ZOFRAN) 4 MG tablet Take 1 tablet (4 mg total) by mouth every 8 (eight) hours as needed for Nausea. 90 tablet 1    pravastatin (PRAVACHOL) 10 MG tablet Take 1 tablet by mouth once daily 90 tablet 1    pulse oximeter (PULSE OXIMETER) device by Apply Externally route 2 (two) times a day. Use twice daily at 8 AM and 3 PM and record the value in [facility] as directed. 1 each 0    rifAXIMin (XIFAXAN) 550 mg Tab Take 1 tablet (550 mg total) by mouth 2 (two) times daily. 60 tablet 5    STOOL SOFTENER 100 mg capsule TAKE 1 CAPSULE BY MOUTH THREE TIMES DAILY FOR DIARRHEA (HOLD FOR DIARRHEA)      torsemide (DEMADEX) 20 MG Tab Take 2 tablets (40 mg total) by mouth once daily. If having more swelling/weight gain/fluid take this in morning and afternoon for 3 days 180 tablet 1    TRULICITY 4.5 mg/0.5 mL pen injector INJECT 4.5 MG INTO THE SKIN EVERY 7 DAYS 12 pen 1     No current facility-administered medications on file prior to visit.       Physical Exam:  Vital Signs:  Ht 5' 1" " (1.549 m)   Wt 76.7 kg (169 lb)   LMP  (LMP Unknown)   BMI 31.93 kg/m²   Body mass index is 31.93 kg/m².  Physical Exam  Constitutional:       Appearance: She is well-developed.   HENT:      Head: Normocephalic.   Eyes:      General: No scleral icterus.  Pulmonary:      Effort: Pulmonary effort is normal.   Musculoskeletal:         General: Normal range of motion.      Cervical back: Normal range of motion.   Skin:     General: Skin is dry.   Neurological:      Mental Status: She is alert.         Labs: Pertinent labs reviewed.  MELD 3.0: 20 at 7/27/2023  8:08 AM  MELD-Na: 21 at 7/27/2023  8:08 AM  Calculated from:  Serum Creatinine: 5.6 mg/dL (Using max of 3 mg/dL) at 7/27/2023  8:08 AM  Serum Sodium: 144 mmol/L (Using max of 137 mmol/L) at 7/27/2023  8:08 AM  Total Bilirubin: 0.4 mg/dL (Using min of 1 mg/dL) at 7/27/2023  8:08 AM  Serum Albumin: 3.3 g/dL at 7/27/2023  8:08 AM  INR(ratio): 1.1 at 7/27/2023  8:08 AM  Age at listing (hypothetical): 68 years  Sex: Female at 7/27/2023  8:08 AM  EV: S/P TIPS  HCC: US 7/23 without lesion or mass           Assessment:  1. 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 Hepatic encephalopathy    3. Stage 5 chronic kidney disease        Recommendations:  Stable without any new complaints.   MELD is elevated due to ESRD, stable from previous  Discussed US results with Dr. De Leon, nothing to do at this time.  Will continue to monitor.   Continue with current medication  Continue with MELD labs and HCC screening every 6 months.           Return to Clinic:  6 months with pre-clinic labs and imaging.

## 2023-08-09 ENCOUNTER — LAB VISIT (OUTPATIENT)
Dept: LAB | Facility: HOSPITAL | Age: 68
End: 2023-08-09
Attending: INTERNAL MEDICINE
Payer: MEDICARE

## 2023-08-09 DIAGNOSIS — R10.13 EPIGASTRIC PAIN: ICD-10-CM

## 2023-08-09 DIAGNOSIS — N18.5 CKD (CHRONIC KIDNEY DISEASE), SYMPTOM MANAGEMENT ONLY, STAGE 5: ICD-10-CM

## 2023-08-09 PROCEDURE — 36415 COLL VENOUS BLD VENIPUNCTURE: CPT | Mod: PO | Performed by: INTERNAL MEDICINE

## 2023-08-09 PROCEDURE — 80069 RENAL FUNCTION PANEL: CPT | Performed by: INTERNAL MEDICINE

## 2023-08-09 PROCEDURE — 83970 ASSAY OF PARATHORMONE: CPT | Performed by: INTERNAL MEDICINE

## 2023-08-10 LAB
ALBUMIN SERPL BCP-MCNC: 3.3 G/DL (ref 3.5–5.2)
ANION GAP SERPL CALC-SCNC: 11 MMOL/L (ref 8–16)
BUN SERPL-MCNC: 73 MG/DL (ref 8–23)
CALCIUM SERPL-MCNC: 9.1 MG/DL (ref 8.7–10.5)
CHLORIDE SERPL-SCNC: 107 MMOL/L (ref 95–110)
CO2 SERPL-SCNC: 28 MMOL/L (ref 23–29)
CREAT SERPL-MCNC: 5.7 MG/DL (ref 0.5–1.4)
EST. GFR  (NO RACE VARIABLE): 7.7 ML/MIN/1.73 M^2
GLUCOSE SERPL-MCNC: 72 MG/DL (ref 70–110)
PHOSPHATE SERPL-MCNC: 4.7 MG/DL (ref 2.7–4.5)
POTASSIUM SERPL-SCNC: 4 MMOL/L (ref 3.5–5.1)
PTH-INTACT SERPL-MCNC: 350.9 PG/ML (ref 9–77)
SODIUM SERPL-SCNC: 146 MMOL/L (ref 136–145)

## 2023-08-10 RX ORDER — ONDANSETRON 4 MG/1
4 TABLET, FILM COATED ORAL EVERY 8 HOURS PRN
Qty: 90 TABLET | Refills: 11 | Status: SHIPPED | OUTPATIENT
Start: 2023-08-10 | End: 2023-10-27 | Stop reason: SDUPTHER

## 2023-08-15 DIAGNOSIS — E03.4 HYPOTHYROIDISM DUE TO ACQUIRED ATROPHY OF THYROID: ICD-10-CM

## 2023-08-15 RX ORDER — LEVOTHYROXINE SODIUM 137 UG/1
TABLET ORAL
Qty: 90 TABLET | Refills: 0 | Status: SHIPPED | OUTPATIENT
Start: 2023-08-15 | End: 2023-10-27 | Stop reason: SDUPTHER

## 2023-08-16 ENCOUNTER — OFFICE VISIT (OUTPATIENT)
Dept: NEPHROLOGY | Facility: CLINIC | Age: 68
End: 2023-08-16
Payer: MEDICARE

## 2023-08-16 VITALS
SYSTOLIC BLOOD PRESSURE: 136 MMHG | HEART RATE: 78 BPM | WEIGHT: 163.81 LBS | HEIGHT: 61 IN | DIASTOLIC BLOOD PRESSURE: 62 MMHG | BODY MASS INDEX: 30.93 KG/M2

## 2023-08-16 DIAGNOSIS — N25.81 SECONDARY HYPERPARATHYROIDISM OF RENAL ORIGIN: ICD-10-CM

## 2023-08-16 DIAGNOSIS — R80.9 PROTEINURIA, UNSPECIFIED TYPE: ICD-10-CM

## 2023-08-16 DIAGNOSIS — N18.5 CKD (CHRONIC KIDNEY DISEASE), SYMPTOM MANAGEMENT ONLY, STAGE 5: Primary | ICD-10-CM

## 2023-08-16 DIAGNOSIS — I10 PRIMARY HYPERTENSION: ICD-10-CM

## 2023-08-16 PROCEDURE — 3008F PR BODY MASS INDEX (BMI) DOCUMENTED: ICD-10-PCS | Mod: CPTII,S$GLB,, | Performed by: INTERNAL MEDICINE

## 2023-08-16 PROCEDURE — 1101F PR PT FALLS ASSESS DOC 0-1 FALLS W/OUT INJ PAST YR: ICD-10-PCS | Mod: CPTII,S$GLB,, | Performed by: INTERNAL MEDICINE

## 2023-08-16 PROCEDURE — 3288F PR FALLS RISK ASSESSMENT DOCUMENTED: ICD-10-PCS | Mod: CPTII,S$GLB,, | Performed by: INTERNAL MEDICINE

## 2023-08-16 PROCEDURE — 1159F MED LIST DOCD IN RCRD: CPT | Mod: CPTII,S$GLB,, | Performed by: INTERNAL MEDICINE

## 2023-08-16 PROCEDURE — 3062F PR POS MACROALBUMINURIA RESULT DOCUMENTED/REVIEW: ICD-10-PCS | Mod: CPTII,S$GLB,, | Performed by: INTERNAL MEDICINE

## 2023-08-16 PROCEDURE — 3044F HG A1C LEVEL LT 7.0%: CPT | Mod: CPTII,S$GLB,, | Performed by: INTERNAL MEDICINE

## 2023-08-16 PROCEDURE — 1157F PR ADVANCE CARE PLAN OR EQUIV PRESENT IN MEDICAL RECORD: ICD-10-PCS | Mod: CPTII,S$GLB,, | Performed by: INTERNAL MEDICINE

## 2023-08-16 PROCEDURE — 3078F DIAST BP <80 MM HG: CPT | Mod: CPTII,S$GLB,, | Performed by: INTERNAL MEDICINE

## 2023-08-16 PROCEDURE — 1157F ADVNC CARE PLAN IN RCRD: CPT | Mod: CPTII,S$GLB,, | Performed by: INTERNAL MEDICINE

## 2023-08-16 PROCEDURE — 99214 OFFICE O/P EST MOD 30 MIN: CPT | Mod: S$GLB,,, | Performed by: INTERNAL MEDICINE

## 2023-08-16 PROCEDURE — 3066F PR DOCUMENTATION OF TREATMENT FOR NEPHROPATHY: ICD-10-PCS | Mod: CPTII,S$GLB,, | Performed by: INTERNAL MEDICINE

## 2023-08-16 PROCEDURE — 99999 PR PBB SHADOW E&M-EST. PATIENT-LVL IV: ICD-10-PCS | Mod: PBBFAC,,, | Performed by: INTERNAL MEDICINE

## 2023-08-16 PROCEDURE — 1159F PR MEDICATION LIST DOCUMENTED IN MEDICAL RECORD: ICD-10-PCS | Mod: CPTII,S$GLB,, | Performed by: INTERNAL MEDICINE

## 2023-08-16 PROCEDURE — 1101F PT FALLS ASSESS-DOCD LE1/YR: CPT | Mod: CPTII,S$GLB,, | Performed by: INTERNAL MEDICINE

## 2023-08-16 PROCEDURE — 99214 PR OFFICE/OUTPT VISIT, EST, LEVL IV, 30-39 MIN: ICD-10-PCS | Mod: S$GLB,,, | Performed by: INTERNAL MEDICINE

## 2023-08-16 PROCEDURE — 1160F PR REVIEW ALL MEDS BY PRESCRIBER/CLIN PHARMACIST DOCUMENTED: ICD-10-PCS | Mod: CPTII,S$GLB,, | Performed by: INTERNAL MEDICINE

## 2023-08-16 PROCEDURE — 3078F PR MOST RECENT DIASTOLIC BLOOD PRESSURE < 80 MM HG: ICD-10-PCS | Mod: CPTII,S$GLB,, | Performed by: INTERNAL MEDICINE

## 2023-08-16 PROCEDURE — 3008F BODY MASS INDEX DOCD: CPT | Mod: CPTII,S$GLB,, | Performed by: INTERNAL MEDICINE

## 2023-08-16 PROCEDURE — 3075F PR MOST RECENT SYSTOLIC BLOOD PRESS GE 130-139MM HG: ICD-10-PCS | Mod: CPTII,S$GLB,, | Performed by: INTERNAL MEDICINE

## 2023-08-16 PROCEDURE — 3062F POS MACROALBUMINURIA REV: CPT | Mod: CPTII,S$GLB,, | Performed by: INTERNAL MEDICINE

## 2023-08-16 PROCEDURE — 3075F SYST BP GE 130 - 139MM HG: CPT | Mod: CPTII,S$GLB,, | Performed by: INTERNAL MEDICINE

## 2023-08-16 PROCEDURE — 3066F NEPHROPATHY DOC TX: CPT | Mod: CPTII,S$GLB,, | Performed by: INTERNAL MEDICINE

## 2023-08-16 PROCEDURE — 3044F PR MOST RECENT HEMOGLOBIN A1C LEVEL <7.0%: ICD-10-PCS | Mod: CPTII,S$GLB,, | Performed by: INTERNAL MEDICINE

## 2023-08-16 PROCEDURE — 1126F PR PAIN SEVERITY QUANTIFIED, NO PAIN PRESENT: ICD-10-PCS | Mod: CPTII,S$GLB,, | Performed by: INTERNAL MEDICINE

## 2023-08-16 PROCEDURE — 1126F AMNT PAIN NOTED NONE PRSNT: CPT | Mod: CPTII,S$GLB,, | Performed by: INTERNAL MEDICINE

## 2023-08-16 PROCEDURE — 3288F FALL RISK ASSESSMENT DOCD: CPT | Mod: CPTII,S$GLB,, | Performed by: INTERNAL MEDICINE

## 2023-08-16 PROCEDURE — 1160F RVW MEDS BY RX/DR IN RCRD: CPT | Mod: CPTII,S$GLB,, | Performed by: INTERNAL MEDICINE

## 2023-08-16 PROCEDURE — 99999 PR PBB SHADOW E&M-EST. PATIENT-LVL IV: CPT | Mod: PBBFAC,,, | Performed by: INTERNAL MEDICINE

## 2023-08-16 NOTE — PROGRESS NOTES
"Subjective:       Patient ID: Diamond Das is a 68 y.o. female.    Chief Complaint: Chronic Kidney Disease    HPI    She presents to clinic today for routine follow-up.  Since her last office visit she has been doing well and has no specific or new complaints.  Her laboratory studies and medications were reviewed.  All Nephrology related questions were answered to her satisfaction.    Review of Systems   Constitutional: Negative.    HENT: Negative.     Respiratory: Negative.     Cardiovascular: Negative.    Gastrointestinal: Negative.    Genitourinary: Negative.    Musculoskeletal: Negative.    Skin: Negative.    Neurological: Negative.        /62   Pulse 78   Ht 5' 1" (1.549 m)   Wt 74.3 kg (163 lb 12.8 oz)   LMP  (LMP Unknown)   BMI 30.95 kg/m²     Lab Results   Component Value Date    WBC 6.22 07/27/2023    HGB 10.3 (L) 07/27/2023    HCT 33.2 (L) 07/27/2023    MCV 92 07/27/2023     07/27/2023      BMP  Lab Results   Component Value Date     (H) 08/09/2023    K 4.0 08/09/2023     08/09/2023    CO2 28 08/09/2023    BUN 73 (H) 08/09/2023    CREATININE 5.7 (H) 08/09/2023    CALCIUM 9.1 08/09/2023    ANIONGAP 11 08/09/2023    ESTGFRAFRICA 12 (A) 06/21/2022    EGFRNONAA 10 (A) 06/21/2022     CMP  Sodium   Date Value Ref Range Status   08/09/2023 146 (H) 136 - 145 mmol/L Final     Potassium   Date Value Ref Range Status   08/09/2023 4.0 3.5 - 5.1 mmol/L Final     Chloride   Date Value Ref Range Status   08/09/2023 107 95 - 110 mmol/L Final     CO2   Date Value Ref Range Status   08/09/2023 28 23 - 29 mmol/L Final     Glucose   Date Value Ref Range Status   08/09/2023 72 70 - 110 mg/dL Final     BUN   Date Value Ref Range Status   08/09/2023 73 (H) 8 - 23 mg/dL Final     Creatinine   Date Value Ref Range Status   08/09/2023 5.7 (H) 0.5 - 1.4 mg/dL Final     Calcium   Date Value Ref Range Status   08/09/2023 9.1 8.7 - 10.5 mg/dL Final     Total Protein   Date Value Ref Range Status "   07/27/2023 7.2 6.0 - 8.4 g/dL Final     Albumin   Date Value Ref Range Status   08/09/2023 3.3 (L) 3.5 - 5.2 g/dL Final     Total Bilirubin   Date Value Ref Range Status   07/27/2023 0.4 0.1 - 1.0 mg/dL Final     Comment:     For infants and newborns, interpretation of results should be based  on gestational age, weight and in agreement with clinical  observations.    Premature Infant recommended reference ranges:  Up to 24 hours.............<8.0 mg/dL  Up to 48 hours............<12.0 mg/dL  3-5 days..................<15.0 mg/dL  6-29 days.................<15.0 mg/dL       Alkaline Phosphatase   Date Value Ref Range Status   07/27/2023 89 55 - 135 U/L Final     AST   Date Value Ref Range Status   07/27/2023 25 10 - 40 U/L Final     ALT   Date Value Ref Range Status   07/27/2023 16 10 - 44 U/L Final     Anion Gap   Date Value Ref Range Status   08/09/2023 11 8 - 16 mmol/L Final     eGFR if    Date Value Ref Range Status   06/21/2022 12 (A) >60 mL/min/1.73 m^2 Final     eGFR if non    Date Value Ref Range Status   06/21/2022 10 (A) >60 mL/min/1.73 m^2 Final     Comment:     Calculation used to obtain the estimated glomerular filtration  rate (eGFR) is the CKD-EPI equation.        Current Outpatient Medications on File Prior to Visit   Medication Sig Dispense Refill    amLODIPine (NORVASC) 10 MG tablet Take 1 tablet (10 mg total) by mouth once daily. 90 tablet 3    apixaban (ELIQUIS) 5 mg Tab Take 1 tablet (5 mg total) by mouth 2 (two) times daily. 60 tablet 3    carvediloL (COREG) 25 MG tablet Take 1 tablet (25 mg total) by mouth 2 (two) times daily with meals. 180 tablet 1    docusate sodium (COLACE) 100 MG capsule Take 1 capsule (100 mg total) by mouth 3 (three) times daily. Hold for diarrhea 30 capsule 0    famotidine (PEPCID) 20 MG tablet Take 1 tablet by mouth once daily 90 tablet 0    ferrous sulfate (IRON ORAL) Take by mouth.      fluticasone propionate (FLONASE) 50  mcg/actuation nasal spray 2 sprays (100 mcg total) by Each Nostril route once daily.  0    garlic (GARLIQUE ORAL) Take 1 tablet by mouth once daily.      isosorbide mononitrate (IMDUR) 120 MG 24 hr tablet Take 1 tablet (120 mg total) by mouth every evening. 90 tablet 1    levothyroxine (SYNTHROID) 137 MCG Tab tablet TAKE 1 TABLET BY MOUTH BEFORE BREAKFAST 90 tablet 0    LIDOcaine (LMX) 4 % cream Apply topically as needed.      magnesium oxide (MAG-OX) 400 mg (241.3 mg magnesium) tablet Take 1 tablet (400 mg total) by mouth once daily. 90 tablet 1    metOLazone (ZAROXOLYN) 5 MG tablet Take 1 tablet (5 mg total) by mouth every Mon, Wed, Fri. Take 30 min before Torsemide 60 tablet 1    omega-3 fatty acids/fish oil (FISH OIL-OMEGA-3 FATTY ACIDS) 300-1,000 mg capsule Take by mouth once daily.      ondansetron (ZOFRAN) 4 MG tablet Take 1 tablet (4 mg total) by mouth every 8 (eight) hours as needed for Nausea. 90 tablet 11    pravastatin (PRAVACHOL) 10 MG tablet Take 1 tablet by mouth once daily 90 tablet 1    pulse oximeter (PULSE OXIMETER) device by Apply Externally route 2 (two) times a day. Use twice daily at 8 AM and 3 PM and record the value in [facility] as directed. 1 each 0    rifAXIMin (XIFAXAN) 550 mg Tab Take 1 tablet (550 mg total) by mouth 2 (two) times daily. 60 tablet 5    STOOL SOFTENER 100 mg capsule TAKE 1 CAPSULE BY MOUTH THREE TIMES DAILY FOR DIARRHEA (HOLD FOR DIARRHEA)      torsemide (DEMADEX) 20 MG Tab Take 2 tablets (40 mg total) by mouth once daily. If having more swelling/weight gain/fluid take this in morning and afternoon for 3 days 180 tablet 1    TRULICITY 4.5 mg/0.5 mL pen injector INJECT 4.5 MG INTO THE SKIN EVERY 7 DAYS 12 pen 1     No current facility-administered medications on file prior to visit.            Objective:            Physical Exam  Constitutional:       Appearance: Normal appearance.   HENT:      Head: Normocephalic and atraumatic.   Eyes:      General: No scleral icterus.      Extraocular Movements: Extraocular movements intact.      Pupils: Pupils are equal, round, and reactive to light.   Cardiovascular:      Rate and Rhythm: Normal rate and regular rhythm.   Pulmonary:      Effort: Pulmonary effort is normal.      Breath sounds: Normal breath sounds.   Musculoskeletal:      Right lower leg: No edema.      Left lower leg: No edema.   Skin:     General: Skin is warm and dry.   Neurological:      General: No focal deficit present.      Mental Status: She is alert and oriented to person, place, and time.   Psychiatric:         Mood and Affect: Mood normal.         Behavior: Behavior normal.       Assessment:       1. CKD (chronic kidney disease), symptom management only, stage 5    2. Primary hypertension    3. Proteinuria, unspecified type    4. Secondary hyperparathyroidism of renal origin        Plan:       1. Creatinine has ranged between about 4.8 and 5.7 for the past 6 months.  She has no symptoms of uremia.  Such symptoms were reviewed in detail.  We discussed renal replacement therapy today.  She is agreeable to a [facility] referral, her contact information was forwarded.    She is on a GLP 1 agonist.    2. Blood pressure is adequately controlled on current regimen.      3. She continues have low-grade proteinuria about 440 mg by PC ratio.  Secondary to diabetic glomerulopathy.    4. Intact PTH is elevated at 350 consistent with her stage renal function.  Vitamin-D level was normal 43.  Phosphorus is mildly elevated at 4.7 however does not require binder at this time.  Calcium was normal at 9.1 with an albumin of 3.3.      Pankaj Jacobo MD

## 2023-08-17 ENCOUNTER — TELEPHONE (OUTPATIENT)
Dept: INTERNAL MEDICINE | Facility: CLINIC | Age: 68
End: 2023-08-17
Payer: MEDICARE

## 2023-08-17 NOTE — TELEPHONE ENCOUNTER
----- Message from Shayne Acuna sent at 8/17/2023  4:20 PM CDT -----  Contact: shagufta  Patient is requesting a call back from the office regarding bp medication, reports sending message this morning and never received a call back. Please call back at [phone]

## 2023-08-17 NOTE — TELEPHONE ENCOUNTER
Pt is on Isosorbide BP meds. Was put on this in addition to other BP meds to control HTN . Pt states that starting 2 weeks ago her BP was 52/55 and she felt horrible . She stopped the Isosorbide 1 week ago and her BP has been 128/74 on average . I checked with Dr Perrin and he agreed for pt to stay off of this medication , but I informed the pt that if she start with high BP again, she needs an appt to come in and see Dr Perrin. Pt understands and agrees

## 2023-08-29 ENCOUNTER — HOSPITAL ENCOUNTER (OUTPATIENT)
Dept: RADIOLOGY | Facility: HOSPITAL | Age: 68
Discharge: HOME | End: 2023-08-29
Attending: FAMILY MEDICINE
Payer: MEDICARE

## 2023-08-29 DIAGNOSIS — Z12.31 SCREENING MAMMOGRAM, ENCOUNTER FOR: ICD-10-CM

## 2023-08-29 PROCEDURE — 77063 BREAST TOMOSYNTHESIS BI: CPT | Mod: 26,,, | Performed by: RADIOLOGY

## 2023-08-29 PROCEDURE — 77067 MAMMO DIGITAL SCREENING BILAT WITH TOMO: ICD-10-PCS | Mod: 26,,, | Performed by: RADIOLOGY

## 2023-08-29 PROCEDURE — 77067 SCR MAMMO BI INCL CAD: CPT | Mod: TC,PN

## 2023-08-29 PROCEDURE — 77067 SCR MAMMO BI INCL CAD: CPT | Mod: 26,,, | Performed by: RADIOLOGY

## 2023-08-29 PROCEDURE — 77063 MAMMO DIGITAL SCREENING BILAT WITH TOMO: ICD-10-PCS | Mod: 26,,, | Performed by: RADIOLOGY

## 2023-09-11 ENCOUNTER — OFFICE VISIT (OUTPATIENT)
Dept: PULMONOLOGY | Facility: CLINIC | Age: 68
End: 2023-09-11
Payer: MEDICARE

## 2023-09-11 VITALS
SYSTOLIC BLOOD PRESSURE: 120 MMHG | OXYGEN SATURATION: 99 % | BODY MASS INDEX: 30.66 KG/M2 | WEIGHT: 162.38 LBS | RESPIRATION RATE: 20 BRPM | DIASTOLIC BLOOD PRESSURE: 62 MMHG | HEIGHT: 61 IN | HEART RATE: 71 BPM

## 2023-09-11 DIAGNOSIS — I10 PRIMARY HYPERTENSION: ICD-10-CM

## 2023-09-11 DIAGNOSIS — G47.33 OSA ON CPAP: ICD-10-CM

## 2023-09-11 DIAGNOSIS — G89.29 INSOMNIA SECONDARY TO CHRONIC PAIN: ICD-10-CM

## 2023-09-11 DIAGNOSIS — G47.01 INSOMNIA SECONDARY TO CHRONIC PAIN: ICD-10-CM

## 2023-09-11 DIAGNOSIS — N18.5 STAGE 5 CHRONIC KIDNEY DISEASE: ICD-10-CM

## 2023-09-11 PROCEDURE — 1101F PT FALLS ASSESS-DOCD LE1/YR: CPT | Mod: CPTII,S$GLB,, | Performed by: NURSE PRACTITIONER

## 2023-09-11 PROCEDURE — 3078F PR MOST RECENT DIASTOLIC BLOOD PRESSURE < 80 MM HG: ICD-10-PCS | Mod: CPTII,S$GLB,, | Performed by: NURSE PRACTITIONER

## 2023-09-11 PROCEDURE — 1157F ADVNC CARE PLAN IN RCRD: CPT | Mod: CPTII,S$GLB,, | Performed by: NURSE PRACTITIONER

## 2023-09-11 PROCEDURE — 3008F BODY MASS INDEX DOCD: CPT | Mod: CPTII,S$GLB,, | Performed by: NURSE PRACTITIONER

## 2023-09-11 PROCEDURE — 99214 PR OFFICE/OUTPT VISIT, EST, LEVL IV, 30-39 MIN: ICD-10-PCS | Mod: S$GLB,,, | Performed by: NURSE PRACTITIONER

## 2023-09-11 PROCEDURE — 1157F PR ADVANCE CARE PLAN OR EQUIV PRESENT IN MEDICAL RECORD: ICD-10-PCS | Mod: CPTII,S$GLB,, | Performed by: NURSE PRACTITIONER

## 2023-09-11 PROCEDURE — 3062F POS MACROALBUMINURIA REV: CPT | Mod: CPTII,S$GLB,, | Performed by: NURSE PRACTITIONER

## 2023-09-11 PROCEDURE — 3066F NEPHROPATHY DOC TX: CPT | Mod: CPTII,S$GLB,, | Performed by: NURSE PRACTITIONER

## 2023-09-11 PROCEDURE — 3288F FALL RISK ASSESSMENT DOCD: CPT | Mod: CPTII,S$GLB,, | Performed by: NURSE PRACTITIONER

## 2023-09-11 PROCEDURE — 3044F HG A1C LEVEL LT 7.0%: CPT | Mod: CPTII,S$GLB,, | Performed by: NURSE PRACTITIONER

## 2023-09-11 PROCEDURE — 99999 PR PBB SHADOW E&M-EST. PATIENT-LVL IV: ICD-10-PCS | Mod: PBBFAC,,, | Performed by: NURSE PRACTITIONER

## 2023-09-11 PROCEDURE — 99214 OFFICE O/P EST MOD 30 MIN: CPT | Mod: S$GLB,,, | Performed by: NURSE PRACTITIONER

## 2023-09-11 PROCEDURE — 1101F PR PT FALLS ASSESS DOC 0-1 FALLS W/OUT INJ PAST YR: ICD-10-PCS | Mod: CPTII,S$GLB,, | Performed by: NURSE PRACTITIONER

## 2023-09-11 PROCEDURE — 3062F PR POS MACROALBUMINURIA RESULT DOCUMENTED/REVIEW: ICD-10-PCS | Mod: CPTII,S$GLB,, | Performed by: NURSE PRACTITIONER

## 2023-09-11 PROCEDURE — 1160F PR REVIEW ALL MEDS BY PRESCRIBER/CLIN PHARMACIST DOCUMENTED: ICD-10-PCS | Mod: CPTII,S$GLB,, | Performed by: NURSE PRACTITIONER

## 2023-09-11 PROCEDURE — 3074F PR MOST RECENT SYSTOLIC BLOOD PRESSURE < 130 MM HG: ICD-10-PCS | Mod: CPTII,S$GLB,, | Performed by: NURSE PRACTITIONER

## 2023-09-11 PROCEDURE — 1159F PR MEDICATION LIST DOCUMENTED IN MEDICAL RECORD: ICD-10-PCS | Mod: CPTII,S$GLB,, | Performed by: NURSE PRACTITIONER

## 2023-09-11 PROCEDURE — 3066F PR DOCUMENTATION OF TREATMENT FOR NEPHROPATHY: ICD-10-PCS | Mod: CPTII,S$GLB,, | Performed by: NURSE PRACTITIONER

## 2023-09-11 PROCEDURE — 3078F DIAST BP <80 MM HG: CPT | Mod: CPTII,S$GLB,, | Performed by: NURSE PRACTITIONER

## 2023-09-11 PROCEDURE — 1160F RVW MEDS BY RX/DR IN RCRD: CPT | Mod: CPTII,S$GLB,, | Performed by: NURSE PRACTITIONER

## 2023-09-11 PROCEDURE — 99999 PR PBB SHADOW E&M-EST. PATIENT-LVL IV: CPT | Mod: PBBFAC,,, | Performed by: NURSE PRACTITIONER

## 2023-09-11 PROCEDURE — 3008F PR BODY MASS INDEX (BMI) DOCUMENTED: ICD-10-PCS | Mod: CPTII,S$GLB,, | Performed by: NURSE PRACTITIONER

## 2023-09-11 PROCEDURE — 3074F SYST BP LT 130 MM HG: CPT | Mod: CPTII,S$GLB,, | Performed by: NURSE PRACTITIONER

## 2023-09-11 PROCEDURE — 3044F PR MOST RECENT HEMOGLOBIN A1C LEVEL <7.0%: ICD-10-PCS | Mod: CPTII,S$GLB,, | Performed by: NURSE PRACTITIONER

## 2023-09-11 PROCEDURE — 3288F PR FALLS RISK ASSESSMENT DOCUMENTED: ICD-10-PCS | Mod: CPTII,S$GLB,, | Performed by: NURSE PRACTITIONER

## 2023-09-11 PROCEDURE — 1159F MED LIST DOCD IN RCRD: CPT | Mod: CPTII,S$GLB,, | Performed by: NURSE PRACTITIONER

## 2023-09-11 NOTE — PROGRESS NOTES
Subjective:      Patient ID: Diamond Das is a 68 y.o. female.    Chief Complaint: Sleep Apnea    HPI: Diamond Das presents to clinic for follow up for KRISTAL with initial CPAP complaince assessment after resuming CPAP.  She is on CPAP of 11 cmH2O pressure which is sub- optimally controlling sleep apnea with apneic index (AHI) 15.7 events an hour with very high mask leak.   Complaince download today reveals 33% of days with greater than 4 hours of device use.   Patient reports benefit from CPAP use and denies snoring or excessive daytime sleepiness.  Patient reports complaint of high mask leak, will meet back with RRT with [facility]  . Hybrid Full face mask is used.   Patient finds benefit and wants to work on adherence and correcting mask leak.     7/14/2023 obtained Res Med WITH [facility]     6/11/2023 CPAP re-titration CPAP 11 cm optimal    4/24/2018 PSG diagnostic polysomnography revealed a mild obstructive sleep apnea / hypopnea syndrome (A + H Index = 13.3 events).     Compliance  Payor Standard  Usage 08/07/2023 - 09/05/2023  Usage days 22/30 days (73%)  >= 4 hours 10 days (33%)  < 4 hours 12 days (40%)  Usage hours 82 hours 45 minutes  Average usage (total days) 2 hours 46 minutes  Average usage (days used) 3 hours 46 minutes  Median usage (days used) 3 hours 54 minutes  Total used hours (value since last reset - 09/05/2023) 164 hours  AirSense 10 AutoSet  Serial number [id number]  Mode CPAP  Set pressure 11 cmH2O  EPR Fulltime  EPR level 3  Therapy  Leaks - L/min Median: 77.0 95th percentile: 112.0 Maximum: 118.0  Events per hour AI: 15.6 HI: 0.1 AHI: 15.7  Apnea Index Central: 0.0 Obstructive: 0.0 Unknown: 15.6  RERA Index 0.1  Cheyne-Schrader respiration (average duration per night) 0 minutes (0%)     Upham Sleepiness Scale       9/11/2023     3:05 PM 6/5/2023     1:41 PM 9/24/2020    11:00 AM 6/22/2020    11:00 AM 1/13/2020     9:00 AM 3/26/2019     2:00 PM 9/25/2018     1:00 PM    EPWORTH SLEEPINESS SCALE   Sitting and reading 1 2 1 0 0 1 0   Watching TV 1 2 0 0 0 1 2   Sitting, inactive in a public place (e.g. a theatre or a meeting) 0 0 1 0 2 0 0   As a passenger in a car for an hour without a break 0 2 0 0 0 1 3   Lying down to rest in the afternoon when circumstances permit 1 0 3 3 0 1 3   Sitting and talking to someone 0 2 0 0 0 0 0   Sitting quietly after a lunch without alcohol 1 0 2 3 0 1 0   In a car, while stopped for a few minutes in traffic 0 0 0 0 0 0 0   Total score 4 8 7 6 2 5 8         Previous Report Reviewed: lab reports and office notes     Past Medical History: The following portions of the patient's history were reviewed and updated as appropriate:   She  has a past surgical history that includes Cholecystectomy; ERCP; Upper gastrointestinal endoscopy; Liver biopsy; Tubal ligation; Colonoscopy (N/A, 09/04/2019); Thoracentesis (Left, 01/20/2020); Cataract extraction; Fluoroscopy (N/A, 07/24/2020); and Esophagogastroduodenoscopy (N/A, 07/28/2022).  Her family history includes Aneurysm in her sister; Breast cancer in her mother; Cancer in her maternal grandfather and mother; Heart disease in her brother and father; No Known Problems in her maternal grandmother; Sarcoidosis in her sister.  She  reports that she has never smoked. She has never used smokeless tobacco. She reports that she does not drink alcohol and does not use drugs.  She has a current medication list which includes the following prescription(s): amlodipine, apixaban, docusate sodium, famotidine, ferrous sulfate, fluticasone propionate, garlic, isosorbide mononitrate, levothyroxine, lidocaine, magnesium oxide, metolazone, fish oil-omega-3 fatty acids, ondansetron, pravastatin, pulse oximeter, rifaximin, stool softener, torsemide, trulicity, and carvedilol.  She is allergic to midazolam, subsys [fentanyl], ampicillin, and codeine..    The following portions of the patient's history were reviewed and updated  "as appropriate: allergies, current medications, past family history, past medical history, past social history, past surgical history and problem list.    Review of Systems   Constitutional:  Negative for fever, chills, weight loss, weight gain, activity change, appetite change, fatigue and night sweats.   HENT:  Negative for postnasal drip, rhinorrhea, sinus pressure, voice change and congestion.    Eyes:  Negative for redness and itching.   Respiratory:  Negative for snoring, cough, sputum production, chest tightness, shortness of breath, wheezing, orthopnea, asthma nighttime symptoms, dyspnea on extertion, use of rescue inhaler and somnolence.    Cardiovascular: Negative.  Negative for chest pain, palpitations and leg swelling.   Genitourinary:  Negative for difficulty urinating and hematuria.   Endocrine:  Negative for cold intolerance and heat intolerance.    Musculoskeletal:  Negative for arthralgias, gait problem, joint swelling and myalgias.   Skin: Negative.    Gastrointestinal:  Negative for nausea, vomiting, abdominal pain and acid reflux.   Neurological:  Negative for dizziness, weakness, light-headedness and headaches.   Hematological:  Negative for adenopathy. No excessive bruising.   All other systems reviewed and are negative.     Objective:   /62   Pulse 71   Resp 20   Ht 5' 1" (1.549 m)   Wt 73.6 kg (162 lb 5.9 oz)   LMP  (LMP Unknown)   SpO2 99%   BMI 30.68 kg/m²   Physical Exam  Vitals and nursing note reviewed.   Constitutional:       General: She is not in acute distress.     Appearance: Normal appearance. She is well-developed. She is not ill-appearing or toxic-appearing.   HENT:      Head: Normocephalic.      Right Ear: External ear normal.      Left Ear: External ear normal.      Nose: Nose normal.      Mouth/Throat:      Pharynx: No oropharyngeal exudate.   Eyes:      Conjunctiva/sclera: Conjunctivae normal.   Cardiovascular:      Rate and Rhythm: Normal rate and regular " rhythm.      Heart sounds: Normal heart sounds.   Pulmonary:      Effort: Pulmonary effort is normal.      Breath sounds: Normal breath sounds. No stridor.   Abdominal:      Palpations: Abdomen is soft.   Musculoskeletal:         General: Normal range of motion.      Cervical back: Normal range of motion and neck supple.   Lymphadenopathy:      Cervical: No cervical adenopathy.   Skin:     General: Skin is warm and dry.   Neurological:      Mental Status: She is alert and oriented to person, place, and time.   Psychiatric:         Behavior: Behavior normal. Behavior is cooperative.         Thought Content: Thought content normal.         Judgment: Judgment normal.         Personal Diagnostic Review  CPAP download    Assessment:     1. KRISTAL on CPAP    2. Insomnia secondary to chronic pain    3. Stage 5 chronic kidney disease    4. Primary hypertension        No orders of the defined types were placed in this encounter.    Plan:     Problem List Items Addressed This Visit       Stage 5 chronic kidney disease     Continued management with Nephrology         KRISTAL on CPAP     7/14/2023 obtained Res Med WITH [facility]   6/11/2023 CPAP re-titration CPAP 11 cm optimal  4/24/2018 PSG diagnostic polysomnography revealed a mild obstructive sleep apnea / hypopnea syndrome (A + H Index = 13.3 events).   On CPAP 11 cm   Correct mask leak  Adherence  Follow up 2 months not compliant initial download           Insomnia secondary to chronic pain     Optimize treatment for pain         Hypertension     Control on current treatment continued management PCP           (DME) - [facility]  Reviewed therapeutic goals for positive airway pressure therapy Auto CPAP  Ideal is usage 100% of nights for 6 - 8 hours per night. Minimum usage is 70% of night for at least 4 hours per night used.     Follow up in about 8 weeks (around 11/6/2023) for CPAP compliance download not compliant at initial.

## 2023-09-11 NOTE — ASSESSMENT & PLAN NOTE
7/14/2023 obtained Res Med WITH [facility]   6/11/2023 CPAP re-titration CPAP 11 cm optimal  4/24/2018 PSG diagnostic polysomnography revealed a mild obstructive sleep apnea / hypopnea syndrome (A + H Index = 13.3 events).   On CPAP 11 cm   Correct mask leak  Adherence  Follow up 2 months not compliant initial download

## 2023-09-14 DIAGNOSIS — K76.82 HEPATIC ENCEPHALOPATHY: ICD-10-CM

## 2023-09-15 ENCOUNTER — OFFICE VISIT (OUTPATIENT)
Dept: INTERNAL MEDICINE | Facility: CLINIC | Age: 68
End: 2023-09-15
Payer: MEDICARE

## 2023-09-15 ENCOUNTER — LAB VISIT (OUTPATIENT)
Dept: LAB | Facility: HOSPITAL | Age: 68
End: 2023-09-15
Attending: FAMILY MEDICINE
Payer: MEDICARE

## 2023-09-15 VITALS
HEART RATE: 70 BPM | DIASTOLIC BLOOD PRESSURE: 70 MMHG | OXYGEN SATURATION: 98 % | SYSTOLIC BLOOD PRESSURE: 100 MMHG | RESPIRATION RATE: 16 BRPM | BODY MASS INDEX: 30.37 KG/M2 | TEMPERATURE: 99 F | WEIGHT: 160.69 LBS

## 2023-09-15 DIAGNOSIS — I10 PRIMARY HYPERTENSION: Primary | ICD-10-CM

## 2023-09-15 DIAGNOSIS — E78.49 OTHER HYPERLIPIDEMIA: ICD-10-CM

## 2023-09-15 DIAGNOSIS — I10 PRIMARY HYPERTENSION: ICD-10-CM

## 2023-09-15 LAB
ALBUMIN SERPL BCP-MCNC: 3.4 G/DL (ref 3.5–5.2)
ANION GAP SERPL CALC-SCNC: 11 MMOL/L (ref 8–16)
BUN SERPL-MCNC: 75 MG/DL (ref 8–23)
CALCIUM SERPL-MCNC: 9.6 MG/DL (ref 8.7–10.5)
CHLORIDE SERPL-SCNC: 104 MMOL/L (ref 95–110)
CO2 SERPL-SCNC: 28 MMOL/L (ref 23–29)
CREAT SERPL-MCNC: 5.5 MG/DL (ref 0.5–1.4)
EST. GFR  (NO RACE VARIABLE): 8 ML/MIN/1.73 M^2
GLUCOSE SERPL-MCNC: 165 MG/DL (ref 70–110)
PHOSPHATE SERPL-MCNC: 3.9 MG/DL (ref 2.7–4.5)
POTASSIUM SERPL-SCNC: 4.5 MMOL/L (ref 3.5–5.1)
SODIUM SERPL-SCNC: 143 MMOL/L (ref 136–145)

## 2023-09-15 PROCEDURE — 1101F PR PT FALLS ASSESS DOC 0-1 FALLS W/OUT INJ PAST YR: ICD-10-PCS | Mod: CPTII,S$GLB,, | Performed by: FAMILY MEDICINE

## 2023-09-15 PROCEDURE — 3066F NEPHROPATHY DOC TX: CPT | Mod: CPTII,S$GLB,, | Performed by: FAMILY MEDICINE

## 2023-09-15 PROCEDURE — 99214 OFFICE O/P EST MOD 30 MIN: CPT | Mod: S$GLB,,, | Performed by: FAMILY MEDICINE

## 2023-09-15 PROCEDURE — 3044F HG A1C LEVEL LT 7.0%: CPT | Mod: CPTII,S$GLB,, | Performed by: FAMILY MEDICINE

## 2023-09-15 PROCEDURE — 1126F PR PAIN SEVERITY QUANTIFIED, NO PAIN PRESENT: ICD-10-PCS | Mod: CPTII,S$GLB,, | Performed by: FAMILY MEDICINE

## 2023-09-15 PROCEDURE — 3074F SYST BP LT 130 MM HG: CPT | Mod: CPTII,S$GLB,, | Performed by: FAMILY MEDICINE

## 2023-09-15 PROCEDURE — 3288F FALL RISK ASSESSMENT DOCD: CPT | Mod: CPTII,S$GLB,, | Performed by: FAMILY MEDICINE

## 2023-09-15 PROCEDURE — 1159F MED LIST DOCD IN RCRD: CPT | Mod: CPTII,S$GLB,, | Performed by: FAMILY MEDICINE

## 2023-09-15 PROCEDURE — 36415 COLL VENOUS BLD VENIPUNCTURE: CPT | Mod: PO | Performed by: FAMILY MEDICINE

## 2023-09-15 PROCEDURE — 80069 RENAL FUNCTION PANEL: CPT | Performed by: FAMILY MEDICINE

## 2023-09-15 PROCEDURE — 99999 PR PBB SHADOW E&M-EST. PATIENT-LVL IV: CPT | Mod: PBBFAC,,, | Performed by: FAMILY MEDICINE

## 2023-09-15 PROCEDURE — 1160F RVW MEDS BY RX/DR IN RCRD: CPT | Mod: CPTII,S$GLB,, | Performed by: FAMILY MEDICINE

## 2023-09-15 PROCEDURE — 1160F PR REVIEW ALL MEDS BY PRESCRIBER/CLIN PHARMACIST DOCUMENTED: ICD-10-PCS | Mod: CPTII,S$GLB,, | Performed by: FAMILY MEDICINE

## 2023-09-15 PROCEDURE — 1126F AMNT PAIN NOTED NONE PRSNT: CPT | Mod: CPTII,S$GLB,, | Performed by: FAMILY MEDICINE

## 2023-09-15 PROCEDURE — 3066F PR DOCUMENTATION OF TREATMENT FOR NEPHROPATHY: ICD-10-PCS | Mod: CPTII,S$GLB,, | Performed by: FAMILY MEDICINE

## 2023-09-15 PROCEDURE — 1101F PT FALLS ASSESS-DOCD LE1/YR: CPT | Mod: CPTII,S$GLB,, | Performed by: FAMILY MEDICINE

## 2023-09-15 PROCEDURE — 1157F PR ADVANCE CARE PLAN OR EQUIV PRESENT IN MEDICAL RECORD: ICD-10-PCS | Mod: CPTII,S$GLB,, | Performed by: FAMILY MEDICINE

## 2023-09-15 PROCEDURE — 3078F DIAST BP <80 MM HG: CPT | Mod: CPTII,S$GLB,, | Performed by: FAMILY MEDICINE

## 2023-09-15 PROCEDURE — 3074F PR MOST RECENT SYSTOLIC BLOOD PRESSURE < 130 MM HG: ICD-10-PCS | Mod: CPTII,S$GLB,, | Performed by: FAMILY MEDICINE

## 2023-09-15 PROCEDURE — 99214 PR OFFICE/OUTPT VISIT, EST, LEVL IV, 30-39 MIN: ICD-10-PCS | Mod: S$GLB,,, | Performed by: FAMILY MEDICINE

## 2023-09-15 PROCEDURE — 99999 PR PBB SHADOW E&M-EST. PATIENT-LVL IV: ICD-10-PCS | Mod: PBBFAC,,, | Performed by: FAMILY MEDICINE

## 2023-09-15 PROCEDURE — 1157F ADVNC CARE PLAN IN RCRD: CPT | Mod: CPTII,S$GLB,, | Performed by: FAMILY MEDICINE

## 2023-09-15 PROCEDURE — 3044F PR MOST RECENT HEMOGLOBIN A1C LEVEL <7.0%: ICD-10-PCS | Mod: CPTII,S$GLB,, | Performed by: FAMILY MEDICINE

## 2023-09-15 PROCEDURE — 1159F PR MEDICATION LIST DOCUMENTED IN MEDICAL RECORD: ICD-10-PCS | Mod: CPTII,S$GLB,, | Performed by: FAMILY MEDICINE

## 2023-09-15 PROCEDURE — 3062F POS MACROALBUMINURIA REV: CPT | Mod: CPTII,S$GLB,, | Performed by: FAMILY MEDICINE

## 2023-09-15 PROCEDURE — 3288F PR FALLS RISK ASSESSMENT DOCUMENTED: ICD-10-PCS | Mod: CPTII,S$GLB,, | Performed by: FAMILY MEDICINE

## 2023-09-15 PROCEDURE — 3078F PR MOST RECENT DIASTOLIC BLOOD PRESSURE < 80 MM HG: ICD-10-PCS | Mod: CPTII,S$GLB,, | Performed by: FAMILY MEDICINE

## 2023-09-15 PROCEDURE — 3008F PR BODY MASS INDEX (BMI) DOCUMENTED: ICD-10-PCS | Mod: CPTII,S$GLB,, | Performed by: FAMILY MEDICINE

## 2023-09-15 PROCEDURE — 3008F BODY MASS INDEX DOCD: CPT | Mod: CPTII,S$GLB,, | Performed by: FAMILY MEDICINE

## 2023-09-15 PROCEDURE — 3062F PR POS MACROALBUMINURIA RESULT DOCUMENTED/REVIEW: ICD-10-PCS | Mod: CPTII,S$GLB,, | Performed by: FAMILY MEDICINE

## 2023-09-15 NOTE — PROGRESS NOTES
Subjective:       Patient ID: Diamond Das is a 68 y.o. female.    Chief Complaint: Follow-up (Low BP)    Here today to discuss BP      Review of Systems   Respiratory:  Negative for shortness of breath.    Cardiovascular:  Negative for chest pain.   Gastrointestinal:  Negative for abdominal pain.       Objective:      Physical Exam  Vitals and nursing note reviewed.   Constitutional:       General: She is not in acute distress.     Appearance: Normal appearance. She is well-developed. She is not diaphoretic.   HENT:      Head: Normocephalic and atraumatic.   Cardiovascular:      Heart sounds: Murmur heard.   Pulmonary:      Effort: Pulmonary effort is normal. No respiratory distress.      Breath sounds: Normal breath sounds. No wheezing.   Abdominal:      General: There is no distension.      Palpations: Abdomen is soft.      Tenderness: There is no abdominal tenderness. There is no guarding.   Musculoskeletal:      Right lower leg: Edema present.      Left lower leg: Edema present.   Skin:     General: Skin is warm and dry.      Findings: No erythema or rash.   Neurological:      Mental Status: She is alert.         Assessment:       1. Primary hypertension    2. Other hyperlipidemia        Plan:     Problem List Items Addressed This Visit          Cardiac/Vascular    Hypertension - Primary    Current Assessment & Plan     Chronic, Stable.    Hold amlodipine and imdur for now.  Use if BP > 140/90         Relevant Orders    RENAL FUNCTION PANEL    Hyperlipidemia    Overview     Chronic, Stable, cont pravastatin

## 2023-09-20 DIAGNOSIS — K76.82 HEPATIC ENCEPHALOPATHY: ICD-10-CM

## 2023-09-25 DIAGNOSIS — I12.9 PARENCHYMAL RENAL HYPERTENSION, STAGE 1 THROUGH STAGE 4 OR UNSPECIFIED CHRONIC KIDNEY DISEASE: ICD-10-CM

## 2023-09-25 RX ORDER — CARVEDILOL 25 MG/1
25 TABLET ORAL 2 TIMES DAILY WITH MEALS
Qty: 180 TABLET | Refills: 1 | Status: SHIPPED | OUTPATIENT
Start: 2023-09-25 | End: 2023-10-02 | Stop reason: SDUPTHER

## 2023-09-26 ENCOUNTER — OFFICE VISIT (OUTPATIENT)
Dept: OPHTHALMOLOGY | Facility: CLINIC | Age: 68
End: 2023-09-26
Payer: MEDICARE

## 2023-09-26 DIAGNOSIS — Z96.1 PSEUDOPHAKIA OF BOTH EYES: ICD-10-CM

## 2023-09-26 DIAGNOSIS — E11.3413 SEVERE NONPROLIFERATIVE DIABETIC RETINOPATHY OF BOTH EYES WITH MACULAR EDEMA ASSOCIATED WITH TYPE 2 DIABETES MELLITUS: Primary | ICD-10-CM

## 2023-09-26 PROCEDURE — 2022F PR DILATED RETINAL EYE EXAM WITH INTERP/REVIEW: ICD-10-PCS | Mod: CPTII,S$GLB,, | Performed by: OPHTHALMOLOGY

## 2023-09-26 PROCEDURE — 3062F PR POS MACROALBUMINURIA RESULT DOCUMENTED/REVIEW: ICD-10-PCS | Mod: CPTII,S$GLB,, | Performed by: OPHTHALMOLOGY

## 2023-09-26 PROCEDURE — 92134 CPTRZ OPH DX IMG PST SGM RTA: CPT | Mod: S$GLB,,, | Performed by: OPHTHALMOLOGY

## 2023-09-26 PROCEDURE — 1160F PR REVIEW ALL MEDS BY PRESCRIBER/CLIN PHARMACIST DOCUMENTED: ICD-10-PCS | Mod: CPTII,S$GLB,, | Performed by: OPHTHALMOLOGY

## 2023-09-26 PROCEDURE — 3066F NEPHROPATHY DOC TX: CPT | Mod: CPTII,S$GLB,, | Performed by: OPHTHALMOLOGY

## 2023-09-26 PROCEDURE — 1159F MED LIST DOCD IN RCRD: CPT | Mod: CPTII,S$GLB,, | Performed by: OPHTHALMOLOGY

## 2023-09-26 PROCEDURE — 3062F POS MACROALBUMINURIA REV: CPT | Mod: CPTII,S$GLB,, | Performed by: OPHTHALMOLOGY

## 2023-09-26 PROCEDURE — 2022F DILAT RTA XM EVC RTNOPTHY: CPT | Mod: CPTII,S$GLB,, | Performed by: OPHTHALMOLOGY

## 2023-09-26 PROCEDURE — 1159F PR MEDICATION LIST DOCUMENTED IN MEDICAL RECORD: ICD-10-PCS | Mod: CPTII,S$GLB,, | Performed by: OPHTHALMOLOGY

## 2023-09-26 PROCEDURE — 99999 PR PBB SHADOW E&M-EST. PATIENT-LVL III: CPT | Mod: PBBFAC,,, | Performed by: OPHTHALMOLOGY

## 2023-09-26 PROCEDURE — 1160F RVW MEDS BY RX/DR IN RCRD: CPT | Mod: CPTII,S$GLB,, | Performed by: OPHTHALMOLOGY

## 2023-09-26 PROCEDURE — 92134 POSTERIOR SEGMENT OCT RETINA (OCULAR COHERENCE TOMOGRAPHY)-BOTH EYES: ICD-10-PCS | Mod: S$GLB,,, | Performed by: OPHTHALMOLOGY

## 2023-09-26 PROCEDURE — 1126F AMNT PAIN NOTED NONE PRSNT: CPT | Mod: CPTII,S$GLB,, | Performed by: OPHTHALMOLOGY

## 2023-09-26 PROCEDURE — 1126F PR PAIN SEVERITY QUANTIFIED, NO PAIN PRESENT: ICD-10-PCS | Mod: CPTII,S$GLB,, | Performed by: OPHTHALMOLOGY

## 2023-09-26 PROCEDURE — 1157F ADVNC CARE PLAN IN RCRD: CPT | Mod: CPTII,S$GLB,, | Performed by: OPHTHALMOLOGY

## 2023-09-26 PROCEDURE — 3044F PR MOST RECENT HEMOGLOBIN A1C LEVEL <7.0%: ICD-10-PCS | Mod: CPTII,S$GLB,, | Performed by: OPHTHALMOLOGY

## 2023-09-26 PROCEDURE — 3066F PR DOCUMENTATION OF TREATMENT FOR NEPHROPATHY: ICD-10-PCS | Mod: CPTII,S$GLB,, | Performed by: OPHTHALMOLOGY

## 2023-09-26 PROCEDURE — 92014 COMPRE OPH EXAM EST PT 1/>: CPT | Mod: S$GLB,,, | Performed by: OPHTHALMOLOGY

## 2023-09-26 PROCEDURE — 3044F HG A1C LEVEL LT 7.0%: CPT | Mod: CPTII,S$GLB,, | Performed by: OPHTHALMOLOGY

## 2023-09-26 PROCEDURE — 1157F PR ADVANCE CARE PLAN OR EQUIV PRESENT IN MEDICAL RECORD: ICD-10-PCS | Mod: CPTII,S$GLB,, | Performed by: OPHTHALMOLOGY

## 2023-09-26 PROCEDURE — 92014 PR EYE EXAM, EST PATIENT,COMPREHESV: ICD-10-PCS | Mod: S$GLB,,, | Performed by: OPHTHALMOLOGY

## 2023-09-26 PROCEDURE — 99999 PR PBB SHADOW E&M-EST. PATIENT-LVL III: ICD-10-PCS | Mod: PBBFAC,,, | Performed by: OPHTHALMOLOGY

## 2023-09-26 NOTE — PROGRESS NOTES
===============================  Date today is 9/26/2023  Diamond Das is a 68 y.o. female  Last visit [facility]: :3/27/2023   Last visit eye dept. Visit date not found    Uncorrected distance visual acuity was 20/50 in the right eye and 20/50 in the left eye.  Tonometry       Tonometry (Applanation, 2:50 PM)         Right Left    Pressure 12 12                  Wearing Rx       Wearing Rx         Sphere Cylinder Axis Add    Right -1.50 +1.00 010 +2.50    Left -0.25 Sphere  +2.50              Wearing Rx #2         Sphere Cylinder Axis Add    Right -1.50 +1.00 010 +2.50    Left -0.25 Sphere  +2.50                  Manifest Refraction       Manifest Refraction         Sphere Cylinder Axis Dist VA    Right -0.75 +1.00 010 20/25    Left -0.75   20/50                  Not recorded       Chief Complaint   Patient presents with    PDR     6 months DM exam     HPI     PDR     Additional comments: 6 months DM exam           Comments    DM   PDR OU  PRH OS   PRP OS 5/12/2021 - 6/3/2021 - 6/23/2021          Last edited by Melonie Morales on 9/26/2023  2:32 PM.      Problem List Items Addressed This Visit    None  Visit Diagnoses       Severe nonproliferative diabetic retinopathy of both eyes with macular edema associated with type 2 diabetes mellitus    -  Primary    Relevant Orders    Posterior Segment OCT Retina-Both eyes (Completed)    Pseudophakia of both eyes              Instructed to call 24/7 for any worsening of vision, visual distortion or pain.  Check OU independently daily.    Gave my office and personal cell phone number.  ________________  9/26/2023 today  Diamond Das    :  DM stable BDR  OCT no change  PCIOL OU  Post PRP OS  No NV   No VH/PRH  Will follow for now    RTC 9 months  Instructed to call 24/7 for any worsening of vision or symptoms. Check OU daily.   Gave my office and cell phone number.    =============================

## 2023-10-01 DIAGNOSIS — I12.9 PARENCHYMAL RENAL HYPERTENSION, STAGE 1 THROUGH STAGE 4 OR UNSPECIFIED CHRONIC KIDNEY DISEASE: ICD-10-CM

## 2023-10-01 DIAGNOSIS — E03.4 HYPOTHYROIDISM DUE TO ACQUIRED ATROPHY OF THYROID: ICD-10-CM

## 2023-10-02 RX ORDER — LEVOTHYROXINE SODIUM 137 UG/1
TABLET ORAL
Qty: 90 TABLET | Refills: 0 | OUTPATIENT
Start: 2023-10-02

## 2023-10-02 RX ORDER — CARVEDILOL 25 MG/1
25 TABLET ORAL 2 TIMES DAILY WITH MEALS
Qty: 180 TABLET | Refills: 1 | Status: SHIPPED | OUTPATIENT
Start: 2023-10-02 | End: 2024-04-01 | Stop reason: SDUPTHER

## 2023-10-16 DIAGNOSIS — K76.82 HEPATIC ENCEPHALOPATHY: ICD-10-CM

## 2023-10-16 RX ORDER — FAMOTIDINE 20 MG/1
TABLET, FILM COATED ORAL
Qty: 90 TABLET | Refills: 0 | Status: SHIPPED | OUTPATIENT
Start: 2023-10-16 | End: 2023-10-27 | Stop reason: SDUPTHER

## 2023-10-27 ENCOUNTER — OFFICE VISIT (OUTPATIENT)
Dept: INTERNAL MEDICINE | Facility: CLINIC | Age: 68
End: 2023-10-27
Payer: MEDICARE

## 2023-10-27 VITALS
BODY MASS INDEX: 31.01 KG/M2 | TEMPERATURE: 97 F | WEIGHT: 164.25 LBS | SYSTOLIC BLOOD PRESSURE: 118 MMHG | DIASTOLIC BLOOD PRESSURE: 70 MMHG | OXYGEN SATURATION: 97 % | HEIGHT: 61 IN | HEART RATE: 83 BPM

## 2023-10-27 DIAGNOSIS — I10 PRIMARY HYPERTENSION: ICD-10-CM

## 2023-10-27 DIAGNOSIS — E78.49 OTHER HYPERLIPIDEMIA: ICD-10-CM

## 2023-10-27 DIAGNOSIS — E03.4 HYPOTHYROIDISM DUE TO ACQUIRED ATROPHY OF THYROID: ICD-10-CM

## 2023-10-27 DIAGNOSIS — U07.1 COVID-19: ICD-10-CM

## 2023-10-27 DIAGNOSIS — R79.89 ELEVATED D-DIMER: ICD-10-CM

## 2023-10-27 DIAGNOSIS — E11.59 TYPE 2 DIABETES MELLITUS WITH OTHER CIRCULATORY COMPLICATION, WITHOUT LONG-TERM CURRENT USE OF INSULIN: ICD-10-CM

## 2023-10-27 DIAGNOSIS — E11.59 TYPE 2 DIABETES MELLITUS WITH OTHER CIRCULATORY COMPLICATION, WITHOUT LONG-TERM CURRENT USE OF INSULIN: Primary | ICD-10-CM

## 2023-10-27 DIAGNOSIS — R10.13 EPIGASTRIC PAIN: ICD-10-CM

## 2023-10-27 PROCEDURE — 3062F POS MACROALBUMINURIA REV: CPT | Mod: CPTII,S$GLB,, | Performed by: FAMILY MEDICINE

## 2023-10-27 PROCEDURE — 1160F RVW MEDS BY RX/DR IN RCRD: CPT | Mod: CPTII,S$GLB,, | Performed by: FAMILY MEDICINE

## 2023-10-27 PROCEDURE — 3074F SYST BP LT 130 MM HG: CPT | Mod: CPTII,S$GLB,, | Performed by: FAMILY MEDICINE

## 2023-10-27 PROCEDURE — 3074F PR MOST RECENT SYSTOLIC BLOOD PRESSURE < 130 MM HG: ICD-10-PCS | Mod: CPTII,S$GLB,, | Performed by: FAMILY MEDICINE

## 2023-10-27 PROCEDURE — 3044F HG A1C LEVEL LT 7.0%: CPT | Mod: CPTII,S$GLB,, | Performed by: FAMILY MEDICINE

## 2023-10-27 PROCEDURE — 3008F BODY MASS INDEX DOCD: CPT | Mod: CPTII,S$GLB,, | Performed by: FAMILY MEDICINE

## 2023-10-27 PROCEDURE — 3044F PR MOST RECENT HEMOGLOBIN A1C LEVEL <7.0%: ICD-10-PCS | Mod: CPTII,S$GLB,, | Performed by: FAMILY MEDICINE

## 2023-10-27 PROCEDURE — 3066F PR DOCUMENTATION OF TREATMENT FOR NEPHROPATHY: ICD-10-PCS | Mod: CPTII,S$GLB,, | Performed by: FAMILY MEDICINE

## 2023-10-27 PROCEDURE — 3078F DIAST BP <80 MM HG: CPT | Mod: CPTII,S$GLB,, | Performed by: FAMILY MEDICINE

## 2023-10-27 PROCEDURE — 1159F MED LIST DOCD IN RCRD: CPT | Mod: CPTII,S$GLB,, | Performed by: FAMILY MEDICINE

## 2023-10-27 PROCEDURE — 1126F AMNT PAIN NOTED NONE PRSNT: CPT | Mod: CPTII,S$GLB,, | Performed by: FAMILY MEDICINE

## 2023-10-27 PROCEDURE — 99214 OFFICE O/P EST MOD 30 MIN: CPT | Mod: S$GLB,,, | Performed by: FAMILY MEDICINE

## 2023-10-27 PROCEDURE — 1160F PR REVIEW ALL MEDS BY PRESCRIBER/CLIN PHARMACIST DOCUMENTED: ICD-10-PCS | Mod: CPTII,S$GLB,, | Performed by: FAMILY MEDICINE

## 2023-10-27 PROCEDURE — 1157F ADVNC CARE PLAN IN RCRD: CPT | Mod: CPTII,S$GLB,, | Performed by: FAMILY MEDICINE

## 2023-10-27 PROCEDURE — 1157F PR ADVANCE CARE PLAN OR EQUIV PRESENT IN MEDICAL RECORD: ICD-10-PCS | Mod: CPTII,S$GLB,, | Performed by: FAMILY MEDICINE

## 2023-10-27 PROCEDURE — 1126F PR PAIN SEVERITY QUANTIFIED, NO PAIN PRESENT: ICD-10-PCS | Mod: CPTII,S$GLB,, | Performed by: FAMILY MEDICINE

## 2023-10-27 PROCEDURE — 3078F PR MOST RECENT DIASTOLIC BLOOD PRESSURE < 80 MM HG: ICD-10-PCS | Mod: CPTII,S$GLB,, | Performed by: FAMILY MEDICINE

## 2023-10-27 PROCEDURE — 3008F PR BODY MASS INDEX (BMI) DOCUMENTED: ICD-10-PCS | Mod: CPTII,S$GLB,, | Performed by: FAMILY MEDICINE

## 2023-10-27 PROCEDURE — 3066F NEPHROPATHY DOC TX: CPT | Mod: CPTII,S$GLB,, | Performed by: FAMILY MEDICINE

## 2023-10-27 PROCEDURE — 1159F PR MEDICATION LIST DOCUMENTED IN MEDICAL RECORD: ICD-10-PCS | Mod: CPTII,S$GLB,, | Performed by: FAMILY MEDICINE

## 2023-10-27 PROCEDURE — 99999 PR PBB SHADOW E&M-EST. PATIENT-LVL III: ICD-10-PCS | Mod: PBBFAC,,, | Performed by: FAMILY MEDICINE

## 2023-10-27 PROCEDURE — 3062F PR POS MACROALBUMINURIA RESULT DOCUMENTED/REVIEW: ICD-10-PCS | Mod: CPTII,S$GLB,, | Performed by: FAMILY MEDICINE

## 2023-10-27 PROCEDURE — 99999 PR PBB SHADOW E&M-EST. PATIENT-LVL III: CPT | Mod: PBBFAC,,, | Performed by: FAMILY MEDICINE

## 2023-10-27 PROCEDURE — 99214 PR OFFICE/OUTPT VISIT, EST, LEVL IV, 30-39 MIN: ICD-10-PCS | Mod: S$GLB,,, | Performed by: FAMILY MEDICINE

## 2023-10-27 RX ORDER — DOCUSATE SODIUM 100 MG/1
100 CAPSULE, LIQUID FILLED ORAL 3 TIMES DAILY
Qty: 90 CAPSULE | Refills: 5 | Status: SHIPPED | OUTPATIENT
Start: 2023-10-27 | End: 2024-04-24

## 2023-10-27 RX ORDER — PRAVASTATIN SODIUM 10 MG/1
10 TABLET ORAL DAILY
Qty: 90 TABLET | Refills: 1 | Status: SHIPPED | OUTPATIENT
Start: 2023-10-27 | End: 2023-12-18 | Stop reason: SDUPTHER

## 2023-10-27 RX ORDER — LEVOTHYROXINE SODIUM 137 UG/1
137 TABLET ORAL
Qty: 90 TABLET | Refills: 1 | Status: SHIPPED | OUTPATIENT
Start: 2023-10-27

## 2023-10-27 RX ORDER — FAMOTIDINE 20 MG/1
20 TABLET, FILM COATED ORAL DAILY
Qty: 90 TABLET | Refills: 1 | Status: SHIPPED | OUTPATIENT
Start: 2023-10-27 | End: 2023-12-14

## 2023-10-27 RX ORDER — DULAGLUTIDE 4.5 MG/.5ML
4.5 INJECTION, SOLUTION SUBCUTANEOUS
Qty: 12 PEN | Refills: 1 | Status: SHIPPED | OUTPATIENT
Start: 2023-10-27 | End: 2024-04-12

## 2023-10-27 RX ORDER — ONDANSETRON 4 MG/1
4 TABLET, FILM COATED ORAL EVERY 8 HOURS PRN
Qty: 90 TABLET | Refills: 1 | Status: SHIPPED | OUTPATIENT
Start: 2023-10-27

## 2023-10-27 RX ORDER — FLUTICASONE PROPIONATE 50 MCG
2 SPRAY, SUSPENSION (ML) NASAL DAILY
Refills: 0
Start: 2023-10-27 | End: 2024-04-24

## 2023-10-27 NOTE — PROGRESS NOTES
Subjective:       Patient ID: Diamond Das is a 68 y.o. female.    Chief Complaint: Follow-up    Hypertension  This is a chronic problem. The current episode started more than 1 year ago. The problem has been resolved since onset. The problem is controlled. Pertinent negatives include no anxiety, blurred vision, chest pain or shortness of breath. There are no associated agents to hypertension.     Review of Systems   Eyes:  Negative for blurred vision.   Respiratory:  Negative for shortness of breath.    Cardiovascular:  Negative for chest pain.   Gastrointestinal:  Negative for abdominal pain.       Objective:      Physical Exam  Vitals and nursing note reviewed.   Constitutional:       General: She is not in acute distress.     Appearance: Normal appearance. She is well-developed. She is not diaphoretic.   HENT:      Head: Normocephalic and atraumatic.   Cardiovascular:      Heart sounds: Murmur heard.   Pulmonary:      Effort: Pulmonary effort is normal. No respiratory distress.      Breath sounds: Normal breath sounds. No wheezing.   Abdominal:      General: There is no distension.      Palpations: Abdomen is soft.      Tenderness: There is no abdominal tenderness. There is no guarding.   Musculoskeletal:      Right lower leg: Edema present.      Left lower leg: Edema present.   Skin:     General: Skin is warm and dry.      Findings: No erythema or rash.   Neurological:      Mental Status: She is alert.         Assessment:       1. Type 2 diabetes mellitus with other circulatory complication, without long-term current use of insulin    2. Primary hypertension    3. Hypothyroidism due to acquired atrophy of thyroid        Plan:     Problem List Items Addressed This Visit          Cardiac/Vascular    Hypertension    Overview     Chronic, Stable, cont meds            Endocrine    Hypothyroidism due to acquired atrophy of thyroid    Overview     Chronic, Stable, cont Synthroid.         Type 2 diabetes mellitus,  without long-term current use of insulin - Primary    Overview     Chronic, Stable, use trulicity

## 2023-10-30 ENCOUNTER — TELEPHONE (OUTPATIENT)
Dept: INTERNAL MEDICINE | Facility: CLINIC | Age: 68
End: 2023-10-30
Payer: MEDICARE

## 2023-10-30 NOTE — TELEPHONE ENCOUNTER
Alda Lynch, PharmD  MARCIO Perrin [facility] Staff  Caller: Unspecified (3 days ago,  6:12 PM)  Good evening Dr. Perrin,     Patient has been in both Diabetes and HTN Digital Medicine. I placed annual Digital Medicine orders for patient to continue participation. You declined the HTN order today and put 'not appropriate for this patient'. However the diabetes orders were approved on 10/10/23.     Just wanted to confirm and clarify if you believe patient should be discharged from both programs because of her current CKD stage 5 status?     Please let me know so I can notify the patient and discharge for not being clinically appropriate.     Thank you,   Alda Lynch, PharmD   Digital Medicine Clinical Pharmacist       Please advise and I will reach out to [facility]

## 2023-10-30 NOTE — TELEPHONE ENCOUNTER
----- Message from Alda Lynch PharmD sent at 10/27/2023  6:12 PM CDT -----  Regarding: Digital Medicine Clarification  Good evening Dr. Perrin,    Patient has been in both Diabetes and HTN Digital Medicine. I placed annual Digital Medicine orders for patient to continue participation. You declined the HTN order today and put 'not appropriate for this patient'. However the diabetes orders were approved on 10/10/23.    Just wanted to confirm and clarify if you believe patient should be discharged from both programs because of her current CKD stage 5 status?     Please let me know so I can notify the patient and discharge for not being clinically appropriate.    Thank you,  Alda Lynch, PharmD  Digital Medicine Clinical Pharmacist

## 2023-11-17 ENCOUNTER — LAB VISIT (OUTPATIENT)
Dept: LAB | Facility: HOSPITAL | Age: 68
End: 2023-11-17
Attending: INTERNAL MEDICINE
Payer: MEDICARE

## 2023-11-17 DIAGNOSIS — R80.9 PROTEINURIA, UNSPECIFIED TYPE: ICD-10-CM

## 2023-11-17 DIAGNOSIS — N18.5 CKD (CHRONIC KIDNEY DISEASE), SYMPTOM MANAGEMENT ONLY, STAGE 5: ICD-10-CM

## 2023-11-17 DIAGNOSIS — I10 PRIMARY HYPERTENSION: ICD-10-CM

## 2023-11-17 DIAGNOSIS — N25.81 SECONDARY HYPERPARATHYROIDISM OF RENAL ORIGIN: ICD-10-CM

## 2023-11-17 LAB
ALBUMIN SERPL BCP-MCNC: 2.8 G/DL (ref 3.5–5.2)
ANION GAP SERPL CALC-SCNC: 14 MMOL/L (ref 8–16)
BILIRUB UR QL STRIP: NEGATIVE
BUN SERPL-MCNC: 83 MG/DL (ref 8–23)
CALCIUM SERPL-MCNC: 9 MG/DL (ref 8.7–10.5)
CHLORIDE SERPL-SCNC: 104 MMOL/L (ref 95–110)
CLARITY UR REFRACT.AUTO: CLEAR
CO2 SERPL-SCNC: 28 MMOL/L (ref 23–29)
COLOR UR AUTO: COLORLESS
CREAT SERPL-MCNC: 4.9 MG/DL (ref 0.5–1.4)
CREAT UR-MCNC: 55 MG/DL (ref 15–325)
EST. GFR  (NO RACE VARIABLE): 9.2 ML/MIN/1.73 M^2
GLUCOSE SERPL-MCNC: 110 MG/DL (ref 70–110)
GLUCOSE UR QL STRIP: NEGATIVE
HGB UR QL STRIP: NEGATIVE
KETONES UR QL STRIP: NEGATIVE
LEUKOCYTE ESTERASE UR QL STRIP: NEGATIVE
NITRITE UR QL STRIP: NEGATIVE
PH UR STRIP: 7 [PH] (ref 5–8)
PHOSPHATE SERPL-MCNC: 5 MG/DL (ref 2.7–4.5)
POTASSIUM SERPL-SCNC: 4 MMOL/L (ref 3.5–5.1)
PROT UR QL STRIP: ABNORMAL
PROT UR-MCNC: 28 MG/DL (ref 0–15)
PROT/CREAT UR: 0.51 MG/G{CREAT} (ref 0–0.2)
PTH-INTACT SERPL-MCNC: 462.7 PG/ML (ref 9–77)
SODIUM SERPL-SCNC: 146 MMOL/L (ref 136–145)
SP GR UR STRIP: 1.01 (ref 1–1.03)
URN SPEC COLLECT METH UR: ABNORMAL

## 2023-11-17 PROCEDURE — 83036 HEMOGLOBIN GLYCOSYLATED A1C: CPT | Performed by: FAMILY MEDICINE

## 2023-11-17 PROCEDURE — 83970 ASSAY OF PARATHORMONE: CPT | Performed by: INTERNAL MEDICINE

## 2023-11-17 PROCEDURE — 81003 URINALYSIS AUTO W/O SCOPE: CPT | Performed by: INTERNAL MEDICINE

## 2023-11-17 PROCEDURE — 80069 RENAL FUNCTION PANEL: CPT | Performed by: INTERNAL MEDICINE

## 2023-11-17 PROCEDURE — 36415 COLL VENOUS BLD VENIPUNCTURE: CPT | Mod: PO | Performed by: INTERNAL MEDICINE

## 2023-11-17 PROCEDURE — 82570 ASSAY OF URINE CREATININE: CPT | Performed by: INTERNAL MEDICINE

## 2023-11-18 LAB
ESTIMATED AVG GLUCOSE: 114 MG/DL (ref 68–131)
HBA1C MFR BLD: 5.6 % (ref 4–5.6)

## 2023-11-24 ENCOUNTER — OFFICE VISIT (OUTPATIENT)
Dept: NEPHROLOGY | Facility: CLINIC | Age: 68
End: 2023-11-24
Payer: MEDICARE

## 2023-11-24 VITALS
SYSTOLIC BLOOD PRESSURE: 130 MMHG | HEIGHT: 61 IN | WEIGHT: 162.5 LBS | BODY MASS INDEX: 30.68 KG/M2 | DIASTOLIC BLOOD PRESSURE: 80 MMHG | HEART RATE: 68 BPM

## 2023-11-24 DIAGNOSIS — R80.9 PROTEINURIA, UNSPECIFIED TYPE: ICD-10-CM

## 2023-11-24 DIAGNOSIS — I10 PRIMARY HYPERTENSION: ICD-10-CM

## 2023-11-24 DIAGNOSIS — N18.5 CKD (CHRONIC KIDNEY DISEASE), SYMPTOM MANAGEMENT ONLY, STAGE 5: Primary | ICD-10-CM

## 2023-11-24 DIAGNOSIS — N25.81 SECONDARY HYPERPARATHYROIDISM OF RENAL ORIGIN: ICD-10-CM

## 2023-11-24 PROCEDURE — 3066F NEPHROPATHY DOC TX: CPT | Mod: CPTII,S$GLB,, | Performed by: INTERNAL MEDICINE

## 2023-11-24 PROCEDURE — 1101F PT FALLS ASSESS-DOCD LE1/YR: CPT | Mod: CPTII,S$GLB,, | Performed by: INTERNAL MEDICINE

## 2023-11-24 PROCEDURE — 99999 PR PBB SHADOW E&M-EST. PATIENT-LVL IV: ICD-10-PCS | Mod: PBBFAC,,, | Performed by: INTERNAL MEDICINE

## 2023-11-24 PROCEDURE — 3075F PR MOST RECENT SYSTOLIC BLOOD PRESS GE 130-139MM HG: ICD-10-PCS | Mod: CPTII,S$GLB,, | Performed by: INTERNAL MEDICINE

## 2023-11-24 PROCEDURE — 1101F PR PT FALLS ASSESS DOC 0-1 FALLS W/OUT INJ PAST YR: ICD-10-PCS | Mod: CPTII,S$GLB,, | Performed by: INTERNAL MEDICINE

## 2023-11-24 PROCEDURE — 3008F BODY MASS INDEX DOCD: CPT | Mod: CPTII,S$GLB,, | Performed by: INTERNAL MEDICINE

## 2023-11-24 PROCEDURE — 1126F AMNT PAIN NOTED NONE PRSNT: CPT | Mod: CPTII,S$GLB,, | Performed by: INTERNAL MEDICINE

## 2023-11-24 PROCEDURE — 1159F MED LIST DOCD IN RCRD: CPT | Mod: CPTII,S$GLB,, | Performed by: INTERNAL MEDICINE

## 2023-11-24 PROCEDURE — 3079F PR MOST RECENT DIASTOLIC BLOOD PRESSURE 80-89 MM HG: ICD-10-PCS | Mod: CPTII,S$GLB,, | Performed by: INTERNAL MEDICINE

## 2023-11-24 PROCEDURE — 3288F FALL RISK ASSESSMENT DOCD: CPT | Mod: CPTII,S$GLB,, | Performed by: INTERNAL MEDICINE

## 2023-11-24 PROCEDURE — 1160F PR REVIEW ALL MEDS BY PRESCRIBER/CLIN PHARMACIST DOCUMENTED: ICD-10-PCS | Mod: CPTII,S$GLB,, | Performed by: INTERNAL MEDICINE

## 2023-11-24 PROCEDURE — 99214 OFFICE O/P EST MOD 30 MIN: CPT | Mod: S$GLB,,, | Performed by: INTERNAL MEDICINE

## 2023-11-24 PROCEDURE — 1126F PR PAIN SEVERITY QUANTIFIED, NO PAIN PRESENT: ICD-10-PCS | Mod: CPTII,S$GLB,, | Performed by: INTERNAL MEDICINE

## 2023-11-24 PROCEDURE — 3062F PR POS MACROALBUMINURIA RESULT DOCUMENTED/REVIEW: ICD-10-PCS | Mod: CPTII,S$GLB,, | Performed by: INTERNAL MEDICINE

## 2023-11-24 PROCEDURE — 3044F HG A1C LEVEL LT 7.0%: CPT | Mod: CPTII,S$GLB,, | Performed by: INTERNAL MEDICINE

## 2023-11-24 PROCEDURE — 1157F ADVNC CARE PLAN IN RCRD: CPT | Mod: CPTII,S$GLB,, | Performed by: INTERNAL MEDICINE

## 2023-11-24 PROCEDURE — 3288F PR FALLS RISK ASSESSMENT DOCUMENTED: ICD-10-PCS | Mod: CPTII,S$GLB,, | Performed by: INTERNAL MEDICINE

## 2023-11-24 PROCEDURE — 3066F PR DOCUMENTATION OF TREATMENT FOR NEPHROPATHY: ICD-10-PCS | Mod: CPTII,S$GLB,, | Performed by: INTERNAL MEDICINE

## 2023-11-24 PROCEDURE — 3008F PR BODY MASS INDEX (BMI) DOCUMENTED: ICD-10-PCS | Mod: CPTII,S$GLB,, | Performed by: INTERNAL MEDICINE

## 2023-11-24 PROCEDURE — 3062F POS MACROALBUMINURIA REV: CPT | Mod: CPTII,S$GLB,, | Performed by: INTERNAL MEDICINE

## 2023-11-24 PROCEDURE — 99999 PR PBB SHADOW E&M-EST. PATIENT-LVL IV: CPT | Mod: PBBFAC,,, | Performed by: INTERNAL MEDICINE

## 2023-11-24 PROCEDURE — 3075F SYST BP GE 130 - 139MM HG: CPT | Mod: CPTII,S$GLB,, | Performed by: INTERNAL MEDICINE

## 2023-11-24 PROCEDURE — 99214 PR OFFICE/OUTPT VISIT, EST, LEVL IV, 30-39 MIN: ICD-10-PCS | Mod: S$GLB,,, | Performed by: INTERNAL MEDICINE

## 2023-11-24 PROCEDURE — 1160F RVW MEDS BY RX/DR IN RCRD: CPT | Mod: CPTII,S$GLB,, | Performed by: INTERNAL MEDICINE

## 2023-11-24 PROCEDURE — 3044F PR MOST RECENT HEMOGLOBIN A1C LEVEL <7.0%: ICD-10-PCS | Mod: CPTII,S$GLB,, | Performed by: INTERNAL MEDICINE

## 2023-11-24 PROCEDURE — 1157F PR ADVANCE CARE PLAN OR EQUIV PRESENT IN MEDICAL RECORD: ICD-10-PCS | Mod: CPTII,S$GLB,, | Performed by: INTERNAL MEDICINE

## 2023-11-24 PROCEDURE — 3079F DIAST BP 80-89 MM HG: CPT | Mod: CPTII,S$GLB,, | Performed by: INTERNAL MEDICINE

## 2023-11-24 PROCEDURE — 1159F PR MEDICATION LIST DOCUMENTED IN MEDICAL RECORD: ICD-10-PCS | Mod: CPTII,S$GLB,, | Performed by: INTERNAL MEDICINE

## 2023-11-24 NOTE — PROGRESS NOTES
"Subjective:       Patient ID: Diamond Das is a 68 y.o. female.    Chief Complaint: Chronic Kidney Disease    HPI    She presents to clinic today for routine follow-up.  Since her last office visit she has been doing well and has no specific or new complaints.  Her laboratory studies and medications were reviewed.  All Nephrology related questions were answered to her satisfaction.    Review of Systems   Constitutional: Negative.    HENT: Negative.     Respiratory: Negative.     Cardiovascular: Negative.    Gastrointestinal: Negative.    Genitourinary: Negative.    Musculoskeletal: Negative.    Skin: Negative.        /80   Pulse 68   Ht 5' 1" (1.549 m)   Wt 73.7 kg (162 lb 7.7 oz)   LMP  (LMP Unknown)   BMI 30.70 kg/m²     Lab Results   Component Value Date    WBC 6.22 07/27/2023    HGB 10.3 (L) 07/27/2023    HCT 33.2 (L) 07/27/2023    MCV 92 07/27/2023     07/27/2023      BMP  Lab Results   Component Value Date     (H) 11/17/2023    K 4.0 11/17/2023     11/17/2023    CO2 28 11/17/2023    BUN 83 (H) 11/17/2023    CREATININE 4.9 (H) 11/17/2023    CALCIUM 9.0 11/17/2023    ANIONGAP 14 11/17/2023    ESTGFRAFRICA 12 (A) 06/21/2022    EGFRNONAA 10 (A) 06/21/2022     CMP  Sodium   Date Value Ref Range Status   11/17/2023 146 (H) 136 - 145 mmol/L Final     Potassium   Date Value Ref Range Status   11/17/2023 4.0 3.5 - 5.1 mmol/L Final     Chloride   Date Value Ref Range Status   11/17/2023 104 95 - 110 mmol/L Final     CO2   Date Value Ref Range Status   11/17/2023 28 23 - 29 mmol/L Final     Glucose   Date Value Ref Range Status   11/17/2023 110 70 - 110 mg/dL Final     BUN   Date Value Ref Range Status   11/17/2023 83 (H) 8 - 23 mg/dL Final     Creatinine   Date Value Ref Range Status   11/17/2023 4.9 (H) 0.5 - 1.4 mg/dL Final     Calcium   Date Value Ref Range Status   11/17/2023 9.0 8.7 - 10.5 mg/dL Final     Total Protein   Date Value Ref Range Status   07/27/2023 7.2 6.0 - 8.4 g/dL " Final     Albumin   Date Value Ref Range Status   11/17/2023 2.8 (L) 3.5 - 5.2 g/dL Final     Total Bilirubin   Date Value Ref Range Status   07/27/2023 0.4 0.1 - 1.0 mg/dL Final     Comment:     For infants and newborns, interpretation of results should be based  on gestational age, weight and in agreement with clinical  observations.    Premature Infant recommended reference ranges:  Up to 24 hours.............<8.0 mg/dL  Up to 48 hours............<12.0 mg/dL  3-5 days..................<15.0 mg/dL  6-29 days.................<15.0 mg/dL       Alkaline Phosphatase   Date Value Ref Range Status   07/27/2023 89 55 - 135 U/L Final     AST   Date Value Ref Range Status   07/27/2023 25 10 - 40 U/L Final     ALT   Date Value Ref Range Status   07/27/2023 16 10 - 44 U/L Final     Anion Gap   Date Value Ref Range Status   11/17/2023 14 8 - 16 mmol/L Final     eGFR if    Date Value Ref Range Status   06/21/2022 12 (A) >60 mL/min/1.73 m^2 Final     eGFR if non    Date Value Ref Range Status   06/21/2022 10 (A) >60 mL/min/1.73 m^2 Final     Comment:     Calculation used to obtain the estimated glomerular filtration  rate (eGFR) is the CKD-EPI equation.        Current Outpatient Medications on File Prior to Visit   Medication Sig Dispense Refill    apixaban (ELIQUIS) 5 mg Tab Take 1 tablet (5 mg total) by mouth 2 (two) times daily. 60 tablet 3    carvediloL (COREG) 25 MG tablet Take 1 tablet (25 mg total) by mouth 2 (two) times daily with meals. 180 tablet 1    docusate sodium (COLACE) 100 MG capsule Take 1 capsule (100 mg total) by mouth 3 (three) times daily. Hold for diarrhea 90 capsule 5    dulaglutide (TRULICITY) 4.5 mg/0.5 mL pen injector Inject 4.5 mg into the skin every 7 days. 12 pen 1    famotidine (PEPCID) 20 MG tablet Take 1 tablet (20 mg total) by mouth once daily. 90 tablet 1    ferrous sulfate (IRON ORAL) Take by mouth.      fluticasone propionate (FLONASE) 50 mcg/actuation nasal  spray 2 sprays (100 mcg total) by Each Nostril route once daily.  0    garlic (GARLIQUE ORAL) Take 1 tablet by mouth once daily.      levothyroxine (SYNTHROID) 137 MCG Tab tablet Take 1 tablet (137 mcg total) by mouth before breakfast. 90 tablet 1    LIDOcaine (LMX) 4 % cream Apply topically as needed.      magnesium oxide (MAG-OX) 400 mg (241.3 mg magnesium) tablet Take 1 tablet (400 mg total) by mouth once daily. 90 tablet 1    metOLazone (ZAROXOLYN) 5 MG tablet Take 1 tablet (5 mg total) by mouth every Mon, Wed, Fri. Take 30 min before Torsemide 60 tablet 1    omega-3 fatty acids/fish oil (FISH OIL-OMEGA-3 FATTY ACIDS) 300-1,000 mg capsule Take by mouth once daily.      ondansetron (ZOFRAN) 4 MG tablet Take 1 tablet (4 mg total) by mouth every 8 (eight) hours as needed for Nausea. 90 tablet 1    pravastatin (PRAVACHOL) 10 MG tablet Take 1 tablet (10 mg total) by mouth once daily. 90 tablet 1    pulse oximeter (PULSE OXIMETER) device by Apply Externally route 2 (two) times a day. Use twice daily at 8 AM and 3 PM and record the value in [employer] as directed. 1 each 0    rifAXIMin (XIFAXAN) 550 mg Tab Take 1 tablet (550 mg total) by mouth 2 (two) times daily. 60 tablet 5    STOOL SOFTENER 100 mg capsule TAKE 1 CAPSULE BY MOUTH THREE TIMES DAILY FOR DIARRHEA (HOLD FOR DIARRHEA)      torsemide (DEMADEX) 20 MG Tab Take 2 tablets (40 mg total) by mouth once daily. If having more swelling/weight gain/fluid take this in morning and afternoon for 3 days 180 tablet 1    [DISCONTINUED] amLODIPine (NORVASC) 10 MG tablet Take 1 tablet (10 mg total) by mouth once daily. (Patient not taking: Reported on 11/24/2023) 90 tablet 3    [DISCONTINUED] isosorbide mononitrate (IMDUR) 120 MG 24 hr tablet Take 1 tablet (120 mg total) by mouth every evening. (Patient not taking: Reported on 11/24/2023) 90 tablet 1     No current facility-administered medications on file prior to visit.            Objective:            Physical  Exam  Constitutional:       Appearance: Normal appearance.   HENT:      Head: Atraumatic.   Eyes:      General: No scleral icterus.     Extraocular Movements: Extraocular movements intact.      Pupils: Pupils are equal, round, and reactive to light.   Pulmonary:      Effort: Pulmonary effort is normal.      Breath sounds: No stridor.   Musculoskeletal:      Right lower leg: No edema.      Left lower leg: No edema.   Skin:     General: Skin is warm and dry.   Neurological:      General: No focal deficit present.      Mental Status: She is oriented to person, place, and time.   Psychiatric:         Mood and Affect: Mood normal.         Behavior: Behavior normal.       Assessment:       1. CKD (chronic kidney disease), symptom management only, stage 5    2. Primary hypertension    3. Proteinuria, unspecified type    4. Secondary hyperparathyroidism of renal origin        Plan:       1. Creatinine has remained relatively stable ranging between 4.8 and 5.7 for the past 6-8 months.  She has no symptoms of uremia.  She did meet with [facility] and has decided that when she needs dialysis she would like to do peritoneal.    2. Blood pressure is well controlled on current regimen.    3. 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  She is on a GLP 1 agonist    4. Intact PTH was elevated 462.  Will check a vitamin-D level prior to her next office visit.  Her previous vitamin-D level was within normal range.  Phosphorus is mildly elevated at 5.0 but does not require binder at this time.  Calcium is stable at 9.0 with an albumin of 2.8.            Pankaj Jacobo MD

## 2023-11-29 DIAGNOSIS — I50.32 CHRONIC DIASTOLIC CONGESTIVE HEART FAILURE: Primary | ICD-10-CM

## 2023-12-14 RX ORDER — FAMOTIDINE 20 MG/1
TABLET, FILM COATED ORAL
Qty: 90 TABLET | Refills: 1 | Status: SHIPPED | OUTPATIENT
Start: 2023-12-14

## 2023-12-18 ENCOUNTER — HOSPITAL ENCOUNTER (OUTPATIENT)
Dept: CARDIOLOGY | Facility: HOSPITAL | Age: 68
Discharge: HOME | End: 2023-12-18
Attending: INTERNAL MEDICINE
Payer: MEDICARE

## 2023-12-18 ENCOUNTER — OFFICE VISIT (OUTPATIENT)
Dept: CARDIOLOGY | Facility: CLINIC | Age: 68
End: 2023-12-18
Payer: MEDICARE

## 2023-12-18 VITALS
BODY MASS INDEX: 31.16 KG/M2 | WEIGHT: 164.88 LBS | SYSTOLIC BLOOD PRESSURE: 138 MMHG | HEART RATE: 65 BPM | OXYGEN SATURATION: 99 % | DIASTOLIC BLOOD PRESSURE: 70 MMHG

## 2023-12-18 DIAGNOSIS — I50.32 CHRONIC DIASTOLIC CONGESTIVE HEART FAILURE: ICD-10-CM

## 2023-12-18 DIAGNOSIS — N18.4 CKD (CHRONIC KIDNEY DISEASE) STAGE 4, GFR 15-29 ML/MIN: ICD-10-CM

## 2023-12-18 DIAGNOSIS — R06.02 SOB (SHORTNESS OF BREATH): ICD-10-CM

## 2023-12-18 DIAGNOSIS — U07.1 COVID-19: ICD-10-CM

## 2023-12-18 DIAGNOSIS — R60.9 CHRONIC EDEMA: ICD-10-CM

## 2023-12-18 DIAGNOSIS — I50.32 CHRONIC DIASTOLIC CONGESTIVE HEART FAILURE: Primary | ICD-10-CM

## 2023-12-18 DIAGNOSIS — Z86.16 HISTORY OF COVID-19: ICD-10-CM

## 2023-12-18 DIAGNOSIS — G47.33 OSA ON CPAP: ICD-10-CM

## 2023-12-18 DIAGNOSIS — D50.9 IRON DEFICIENCY ANEMIA, UNSPECIFIED IRON DEFICIENCY ANEMIA TYPE: ICD-10-CM

## 2023-12-18 DIAGNOSIS — I50.32 CHRONIC DIASTOLIC HEART FAILURE: ICD-10-CM

## 2023-12-18 DIAGNOSIS — I10 ESSENTIAL HYPERTENSION: ICD-10-CM

## 2023-12-18 DIAGNOSIS — E11.59 TYPE 2 DIABETES MELLITUS WITH OTHER CIRCULATORY COMPLICATION, WITHOUT LONG-TERM CURRENT USE OF INSULIN: ICD-10-CM

## 2023-12-18 DIAGNOSIS — R18.8 OTHER ASCITES: ICD-10-CM

## 2023-12-18 DIAGNOSIS — E03.4 HYPOTHYROIDISM DUE TO ACQUIRED ATROPHY OF THYROID: ICD-10-CM

## 2023-12-18 DIAGNOSIS — E78.49 OTHER HYPERLIPIDEMIA: ICD-10-CM

## 2023-12-18 DIAGNOSIS — R79.89 ELEVATED D-DIMER: ICD-10-CM

## 2023-12-18 DIAGNOSIS — R06.02 SHORTNESS OF BREATH: ICD-10-CM

## 2023-12-18 PROCEDURE — 3008F BODY MASS INDEX DOCD: CPT | Mod: CPTII,S$GLB,, | Performed by: INTERNAL MEDICINE

## 2023-12-18 PROCEDURE — 3044F PR MOST RECENT HEMOGLOBIN A1C LEVEL <7.0%: ICD-10-PCS | Mod: CPTII,S$GLB,, | Performed by: INTERNAL MEDICINE

## 2023-12-18 PROCEDURE — 1160F PR REVIEW ALL MEDS BY PRESCRIBER/CLIN PHARMACIST DOCUMENTED: ICD-10-PCS | Mod: CPTII,S$GLB,, | Performed by: INTERNAL MEDICINE

## 2023-12-18 PROCEDURE — 3062F PR POS MACROALBUMINURIA RESULT DOCUMENTED/REVIEW: ICD-10-PCS | Mod: CPTII,S$GLB,, | Performed by: INTERNAL MEDICINE

## 2023-12-18 PROCEDURE — 3078F PR MOST RECENT DIASTOLIC BLOOD PRESSURE < 80 MM HG: ICD-10-PCS | Mod: CPTII,S$GLB,, | Performed by: INTERNAL MEDICINE

## 2023-12-18 PROCEDURE — 99214 PR OFFICE/OUTPT VISIT, EST, LEVL IV, 30-39 MIN: ICD-10-PCS | Mod: S$GLB,,, | Performed by: INTERNAL MEDICINE

## 2023-12-18 PROCEDURE — 3078F DIAST BP <80 MM HG: CPT | Mod: CPTII,S$GLB,, | Performed by: INTERNAL MEDICINE

## 2023-12-18 PROCEDURE — 99999 PR PBB SHADOW E&M-EST. PATIENT-LVL III: CPT | Mod: PBBFAC,,, | Performed by: INTERNAL MEDICINE

## 2023-12-18 PROCEDURE — 1159F PR MEDICATION LIST DOCUMENTED IN MEDICAL RECORD: ICD-10-PCS | Mod: CPTII,S$GLB,, | Performed by: INTERNAL MEDICINE

## 2023-12-18 PROCEDURE — 3062F POS MACROALBUMINURIA REV: CPT | Mod: CPTII,S$GLB,, | Performed by: INTERNAL MEDICINE

## 2023-12-18 PROCEDURE — 93010 EKG 12-LEAD: ICD-10-PCS | Mod: ,,, | Performed by: INTERNAL MEDICINE

## 2023-12-18 PROCEDURE — 3008F PR BODY MASS INDEX (BMI) DOCUMENTED: ICD-10-PCS | Mod: CPTII,S$GLB,, | Performed by: INTERNAL MEDICINE

## 2023-12-18 PROCEDURE — 3066F PR DOCUMENTATION OF TREATMENT FOR NEPHROPATHY: ICD-10-PCS | Mod: CPTII,S$GLB,, | Performed by: INTERNAL MEDICINE

## 2023-12-18 PROCEDURE — 93010 ELECTROCARDIOGRAM REPORT: CPT | Mod: ,,, | Performed by: INTERNAL MEDICINE

## 2023-12-18 PROCEDURE — 1101F PR PT FALLS ASSESS DOC 0-1 FALLS W/OUT INJ PAST YR: ICD-10-PCS | Mod: CPTII,S$GLB,, | Performed by: INTERNAL MEDICINE

## 2023-12-18 PROCEDURE — 1160F RVW MEDS BY RX/DR IN RCRD: CPT | Mod: CPTII,S$GLB,, | Performed by: INTERNAL MEDICINE

## 2023-12-18 PROCEDURE — 3044F HG A1C LEVEL LT 7.0%: CPT | Mod: CPTII,S$GLB,, | Performed by: INTERNAL MEDICINE

## 2023-12-18 PROCEDURE — 1157F ADVNC CARE PLAN IN RCRD: CPT | Mod: CPTII,S$GLB,, | Performed by: INTERNAL MEDICINE

## 2023-12-18 PROCEDURE — 1159F MED LIST DOCD IN RCRD: CPT | Mod: CPTII,S$GLB,, | Performed by: INTERNAL MEDICINE

## 2023-12-18 PROCEDURE — 3075F SYST BP GE 130 - 139MM HG: CPT | Mod: CPTII,S$GLB,, | Performed by: INTERNAL MEDICINE

## 2023-12-18 PROCEDURE — 3288F FALL RISK ASSESSMENT DOCD: CPT | Mod: CPTII,S$GLB,, | Performed by: INTERNAL MEDICINE

## 2023-12-18 PROCEDURE — 3075F PR MOST RECENT SYSTOLIC BLOOD PRESS GE 130-139MM HG: ICD-10-PCS | Mod: CPTII,S$GLB,, | Performed by: INTERNAL MEDICINE

## 2023-12-18 PROCEDURE — 3288F PR FALLS RISK ASSESSMENT DOCUMENTED: ICD-10-PCS | Mod: CPTII,S$GLB,, | Performed by: INTERNAL MEDICINE

## 2023-12-18 PROCEDURE — 99999 PR PBB SHADOW E&M-EST. PATIENT-LVL III: ICD-10-PCS | Mod: PBBFAC,,, | Performed by: INTERNAL MEDICINE

## 2023-12-18 PROCEDURE — 3066F NEPHROPATHY DOC TX: CPT | Mod: CPTII,S$GLB,, | Performed by: INTERNAL MEDICINE

## 2023-12-18 PROCEDURE — 99214 OFFICE O/P EST MOD 30 MIN: CPT | Mod: S$GLB,,, | Performed by: INTERNAL MEDICINE

## 2023-12-18 PROCEDURE — 1157F PR ADVANCE CARE PLAN OR EQUIV PRESENT IN MEDICAL RECORD: ICD-10-PCS | Mod: CPTII,S$GLB,, | Performed by: INTERNAL MEDICINE

## 2023-12-18 PROCEDURE — 93005 ELECTROCARDIOGRAM TRACING: CPT | Mod: PO

## 2023-12-18 PROCEDURE — 1101F PT FALLS ASSESS-DOCD LE1/YR: CPT | Mod: CPTII,S$GLB,, | Performed by: INTERNAL MEDICINE

## 2023-12-18 RX ORDER — LANOLIN ALCOHOL/MO/W.PET/CERES
400 CREAM (GRAM) TOPICAL DAILY
Qty: 90 TABLET | Refills: 1 | Status: SHIPPED | OUTPATIENT
Start: 2023-12-18 | End: 2024-04-01 | Stop reason: SDUPTHER

## 2023-12-18 RX ORDER — METOLAZONE 5 MG/1
5 TABLET ORAL
Qty: 60 TABLET | Refills: 1 | Status: SHIPPED | OUTPATIENT
Start: 2023-12-18 | End: 2024-04-01 | Stop reason: SDUPTHER

## 2023-12-18 RX ORDER — TORSEMIDE 20 MG/1
40 TABLET ORAL DAILY
Qty: 180 TABLET | Refills: 1 | Status: SHIPPED | OUTPATIENT
Start: 2023-12-18 | End: 2024-04-01 | Stop reason: SDUPTHER

## 2023-12-18 RX ORDER — PRAVASTATIN SODIUM 10 MG/1
10 TABLET ORAL DAILY
Qty: 90 TABLET | Refills: 1 | Status: SHIPPED | OUTPATIENT
Start: 2023-12-18

## 2023-12-18 NOTE — PROGRESS NOTES
Subjective:   Patient ID:  Diamond Das is a 68 y.o. female who presents for cardiac consult of No chief complaint on file.      The patient came in today for cardiac consult of No chief complaint on file.    Referring Physician: Andrey Perrin MD   Reason for consult: HTN, CHF    Diamond Das is a 68 y.o. female with medical conditions diastolic CHF, pericardial effusion, HTN, HLD, DM2, cryptogenic cirrhosis, s/p TIPS, ascites presents for CV follow up.     Pt of Dr. Shaw, last seen 2/9/18  No smoking/drinking  Last echo in  normal EF and RVF, grade II DD.  EKG today NSR poor R progression on anterior leads  Chronic weakness and fatigue. Had PNA in   Pain on lower chest bilateral for few months, worse with exercise, resolved after resting. Deep breathing made the pain worse. No pain at sleep. Associated dyspnea, N/V, palpitation and dizziness. No radiation. ASA and tylenol not really helped the pain.    5/22/23    BP and HR well controlled. BMI 33 - 175 lbs. Recent nephro - Creatinine has remained relatively stable ranging between 5.0 and 5.6 for the past 3-4 months    Recent Readings 5/21/2023 5/20/2023 5/17/2023 5/12/2023 5/2/2023   SBP (mmHg) 151 134 131 145 141   DBP (mmHg) 77 57 62 62 61   Pulse 59 68 65 62 70     BNP 0 - 99 pg/mL 472 High   804 High  CM  704 High     She is taking torsemide 40mg daily, metolazine M, W, F.       12/18/23  BP and HR well controlled. BMI 31 - 164 lbs - lost > 10 lbs since last visit.   Overall doing well. She will be travelling to [place].   ECG - NSR, poor RWP      Patient has dec exercise tolerance.    Patient is compliant with medications.    Results for orders placed during the hospital encounter of 08/08/22    Echo    Interpretation Summary  · Concentric remodeling and normal systolic function.  · Moderate left atrial enlargement.  · The estimated ejection fraction is 60%.  · Grade II left ventricular diastolic dysfunction.  · Mild right atrial  enlargement.  · Mild to moderate tricuspid regurgitation.  · There is pulmonary hypertension.  · Small pericardial effusion.  · Mild mitral regurgitation.  The estimated PA systolic pressure is greater than 44 mmHg.      Results for orders placed during the hospital encounter of 12/30/20    Nuclear Stress - Cardiology Interpreted    Interpretation Summary    Normal myocardial perfusion scan. There is no evidence of myocardial ischemia or infarction.    The gated perfusion images showed an ejection fraction of 64% at rest. The gated perfusion images showed an ejection fraction of 65% post stress.    The EKG portion of this study is negative for ischemia.    The patient reported no chest pain during the stress test.          Past Medical History:   Diagnosis Date    Abdominal pain 05/10/2018    Acquired hypothyroidism 09/28/2020    Acute on chronic diastolic congestive heart failure 12/07/2017    ROD (acute kidney injury) 01/03/2019    Anasarca 10/13/2020    Arthritis of knee 01/31/2022    Ascites     Ascites 09/02/2016    Bilateral knee pain 04/22/2022    Bilateral lower extremity edema 09/02/2016    Breast pain, left 01/04/2018    Cataract     Chest pain, atypical 02/09/2018    CHF (congestive heart failure)     Cirrhosis     Cirrhosis 01/03/2017    Cough     Diabetes mellitus     Diabetes mellitus, type 2     Diarrhea 09/04/2019    Edema 03/19/2018    Encounter for long-term (current) use of medications 03/19/2018    Epigastric pain 02/11/2020    Gastroparesis     GERD (gastroesophageal reflux disease)     Hepatic encephalopathy 06/19/2018    Pt has history of cirrhosis, seen at [facility] on 6/7/18. Currently on lactulose.    Hyperlipidemia     Hypertension     Hypotension 02/21/2019    Immunization deficiency 02/10/2020    Leg cramps 05/20/2022    Liver disease     Lumbar strain 04/27/2018    Lumbar strain, sequela 02/17/2020    Macular degeneration     Medication reaction 11/17/2016    Other and unspecified  hyperlipidemia 6/11/2012 11:11:32 AM    [facility] Historical - Endocrine/Metabolic/Immune: Hyperlipidemia-No Additional Notes    Pneumonia of right middle lobe due to infectious organism 12/19/2017    Renal disorder     Retinopathy due to secondary diabetes mellitus     Screening for malignant neoplasm of cervix 12/14/2017    Skin lesion 12/20/2021    Sleep apnea     Strain of lumbar region 10/04/2021    Thyroid disease     Type 2 diabetes mellitus with hyperglycemia 10/10/2020       Past Surgical History:   Procedure Laterality Date    CATARACT EXTRACTION      CHOLECYSTECTOMY      COLONOSCOPY N/A 09/04/2019    Procedure: COLONOSCOPY;  Surgeon: Marnie Elmore MD;  Location: [facility] ENDO;  Service: Endoscopy;  Laterality: N/A;    ERCP      ESOPHAGOGASTRODUODENOSCOPY N/A 07/28/2022    Procedure: EGD (ESOPHAGOGASTRODUODENOSCOPY);  Surgeon: Jimy Katz MD;  Location: [facility] ENDO;  Service: Endoscopy;  Laterality: N/A;    FLUOROSCOPY N/A 07/24/2020    Procedure: TIPS revision;  Surgeon: Martell Jasmine MD;  Location: [facility] CATH LAB;  Service: General;  Laterality: N/A;    LIVER BIOPSY      THORACENTESIS Left 01/20/2020    Procedure: Thoracentesis;  Surgeon: Angelica Hunter MD;  Location: [facility] ENDO;  Service: Pulmonary;  Laterality: Left;    TUBAL LIGATION      UPPER GASTROINTESTINAL ENDOSCOPY         Social History     Tobacco Use    Smoking status: Never    Smokeless tobacco: Never   Substance Use Topics    Alcohol use: No    Drug use: No       Family History   Problem Relation Age of Onset    Cancer Mother     Breast cancer Mother     Heart disease Father     Aneurysm Sister     Heart disease Brother     No Known Problems Maternal Grandmother     Cancer Maternal Grandfather     Sarcoidosis Sister     Colon cancer Neg Hx     Ovarian cancer Neg Hx     Thrombosis Neg Hx        Patient's Medications   New Prescriptions    No medications on file   Previous Medications    APIXABAN (ELIQUIS) 5 MG TAB    Take 1  tablet (5 mg total) by mouth 2 (two) times daily.    CARVEDILOL (COREG) 25 MG TABLET    Take 1 tablet (25 mg total) by mouth 2 (two) times daily with meals.    DOCUSATE SODIUM (COLACE) 100 MG CAPSULE    Take 1 capsule (100 mg total) by mouth 3 (three) times daily. Hold for diarrhea    DULAGLUTIDE (TRULICITY) 4.5 MG/0.5 ML PEN INJECTOR    Inject 4.5 mg into the skin every 7 days.    FAMOTIDINE (PEPCID) 20 MG TABLET    TAKE ONE TABLET BY MOUTH DAILY AT 9 AM    FERROUS SULFATE (IRON ORAL)    Take by mouth.    FLUTICASONE PROPIONATE (FLONASE) 50 MCG/ACTUATION NASAL SPRAY    2 sprays (100 mcg total) by Each Nostril route once daily.    GARLIC (GARLIQUE ORAL)    Take 1 tablet by mouth once daily.    LEVOTHYROXINE (SYNTHROID) 137 MCG TAB TABLET    Take 1 tablet (137 mcg total) by mouth before breakfast.    LIDOCAINE (LMX) 4 % CREAM    Apply topically as needed.    MAGNESIUM OXIDE (MAG-OX) 400 MG (241.3 MG MAGNESIUM) TABLET    Take 1 tablet (400 mg total) by mouth once daily.    METOLAZONE (ZAROXOLYN) 5 MG TABLET    Take 1 tablet (5 mg total) by mouth every Mon, Wed, Fri. Take 30 min before Torsemide    OMEGA-3 FATTY ACIDS/FISH OIL (FISH OIL-OMEGA-3 FATTY ACIDS) 300-1,000 MG CAPSULE    Take by mouth once daily.    ONDANSETRON (ZOFRAN) 4 MG TABLET    Take 1 tablet (4 mg total) by mouth every 8 (eight) hours as needed for Nausea.    PRAVASTATIN (PRAVACHOL) 10 MG TABLET    Take 1 tablet (10 mg total) by mouth once daily.    PULSE OXIMETER (PULSE OXIMETER) DEVICE    by Apply Externally route 2 (two) times a day. Use twice daily at 8 AM and 3 PM and record the value in [facility] as directed.    RIFAXIMIN (XIFAXAN) 550 MG TAB    Take 1 tablet (550 mg total) by mouth 2 (two) times daily.    STOOL SOFTENER 100 MG CAPSULE    TAKE 1 CAPSULE BY MOUTH THREE TIMES DAILY FOR DIARRHEA (HOLD FOR DIARRHEA)    TORSEMIDE (DEMADEX) 20 MG TAB    Take 2 tablets (40 mg total) by mouth once daily. If having more swelling/weight gain/fluid take this  in morning and afternoon for 3 days   Modified Medications    No medications on file   Discontinued Medications    No medications on file       Review of Systems   Constitutional:  Positive for malaise/fatigue.   HENT: Negative.     Eyes: Negative.    Respiratory:  Positive for shortness of breath (intermittent).    Cardiovascular:  Positive for leg swelling. Negative for chest pain and palpitations.   Gastrointestinal:  Negative for nausea.   Genitourinary: Negative.    Musculoskeletal: Negative.    Skin: Negative.    Neurological: Negative.    Endo/Heme/Allergies: Negative.    Psychiatric/Behavioral: Negative.     All 12 systems otherwise negative.      Wt Readings from Last 3 Encounters:   12/18/23 74.8 kg (164 lb 14.5 oz)   11/24/23 73.7 kg (162 lb 7.7 oz)   10/27/23 74.5 kg (164 lb 3.9 oz)     Temp Readings from Last 3 Encounters:   10/27/23 96.8 °F (36 °C)   09/15/23 98.6 °F (37 °C)   05/09/23 96 °F (35.6 °C) (Tympanic)     BP Readings from Last 3 Encounters:   12/18/23 138/70   11/24/23 130/80   10/27/23 118/70     Pulse Readings from Last 3 Encounters:   12/18/23 65   11/24/23 68   10/27/23 83       /70 (BP Location: Left arm, Patient Position: Sitting)   Pulse 65   Wt 74.8 kg (164 lb 14.5 oz)   LMP  (LMP Unknown)   SpO2 99%   BMI 31.16 kg/m²     Objective:   Physical Exam  Constitutional:       General: She is not in acute distress.     Appearance: She is well-developed. She is obese. She is not diaphoretic.   HENT:      Head: Normocephalic and atraumatic.      Mouth/Throat:      Pharynx: No oropharyngeal exudate.   Eyes:      General: No scleral icterus.        Right eye: No discharge.         Left eye: No discharge.   Cardiovascular:      Rate and Rhythm: Normal rate and regular rhythm.      Heart sounds: Murmur heard.   Pulmonary:      Effort: Pulmonary effort is normal. No respiratory distress.   Musculoskeletal:      Right lower leg: Edema present.      Left lower leg: Edema present.    Skin:     Coloration: Skin is not pale.      Findings: No erythema or rash.   Neurological:      Mental Status: She is alert and oriented to person, place, and time.   Psychiatric:         Behavior: Behavior normal.         Thought Content: Thought content normal.         Judgment: Judgment normal.         Lab Results   Component Value Date     (H) 11/17/2023    K 4.0 11/17/2023     11/17/2023    CO2 28 11/17/2023    BUN 83 (H) 11/17/2023    CREATININE 4.9 (H) 11/17/2023     11/17/2023    HGBA1C 5.6 11/17/2023    MG 2.9 (H) 02/21/2023    AST 25 07/27/2023    ALT 16 07/27/2023    ALBUMIN 2.8 (L) 11/17/2023    PROT 7.2 07/27/2023    BILITOT 0.4 07/27/2023    WBC 6.22 07/27/2023    HGB 10.3 (L) 07/27/2023    HCT 33.2 (L) 07/27/2023    MCV 92 07/27/2023     07/27/2023    INR 1.1 07/27/2023    TSH 28.708 (H) 08/03/2022    CHOL 137 05/09/2023    HDL 31 (L) 05/09/2023    LDLCALC 95.6 05/09/2023    TRIG 52 05/09/2023     (H) 03/08/2023     Assessment:      1. Chronic diastolic congestive heart failure    2. KRISTAL on CPAP    3. Type 2 diabetes mellitus with other circulatory complication, without long-term current use of insulin    4. CKD (chronic kidney disease) stage 4, GFR 15-29 ml/min    5. History of COVID-19    6. Chronic diastolic heart failure    7. Hypothyroidism due to acquired atrophy of thyroid    8. Shortness of breath    9. SOB (shortness of breath)    10. Chronic edema    11. BMI 30.0-30.9,adult      Plan:   1. Chronic diastolic HF - ;  --> 704 --> 767 --> 477 --> 347 --> 800--> 472  - ECHO 8/2022 with normal bi V function, grade 2 DD, mild to mod TR, small peric effusion, PASP > 44mmHg.   - daily weights, low salt diet   - daily compression stockings  - diuretics --> metolazone -M, W, F -- changed to Torsemide 40mg due to worsening renal function  - repeat CMP, BNP , Mg today   - with > 12 lb weight gain and edema increase Torsemide   - if ongoing weight gain/swelling  not improved with PO diuretics needs to go to ER and have IV diuresis and admission  - stable     2. HTN    - cont meds and titrate    3. HLD - improved  - cont Pravastatin 10mg     4. DM2 with gastroparesis A1c - 5.9 --> 5.6 --> 5.3  - cont meds per PCP - on Trulicity     5. H/o Cirrhosis s/p TIPS with edema, low albumin  - cont tx per PCP/Hepatology  - liver function improved does not need transplant  - had EGD concern for gastroparesis    6. KRISTAL  - needs CPAP, was not using it before     7. Obesity BMI 40 - 212 lbs --> BMI 33 - 179 lbs. - BMI 34 - 181 lbs --> 175 lb --> 164 lbs   - cont weight loss with diet/exercise     8. Pericardial effusion  - small, sec to cirrhosis/CHF  - no tamponade clinically on last ECHO    9. CKD 4 with proteinuria  - diuretics adjusted   - cont f/u with nephro -  - Creatinine is  5.0 and 5.6 for    10. Elevated D dimer, int prob for PE  - repeat D dimer - elevated   - cont Eliquis     Thank you for allowing me to participate in this patient's care. Please do not hesitate to contact me with any questions or concerns. Consult note has been forwarded to the referral physician.

## 2024-01-04 ENCOUNTER — PATIENT MESSAGE (OUTPATIENT)
Dept: PULMONOLOGY | Facility: CLINIC | Age: 69
End: 2024-01-04
Payer: MEDICARE

## 2024-02-05 NOTE — ASSESSMENT & PLAN NOTE
DISCHARGE INSTRUCTIONS / ARM CARE POST CATHERIZATION    Home Care       Remove pressure dressing  after 24 hours.  Do not shower until dressing/bandaid is removed.  Do not apply band aids. KEEP SITE CLEAN DRY AND OPEN TO THE AIR.  No powder or lotion.  Do not soak in a pool or tub and do not swim for one week.   If there is any bleeding at the catheter site, apply firm pressure directly over site with your hand until the bleeding stops. If bleeding continues after 3 minutes call 911.   If there is any swelling or firm areas at your puncture site, this could be bleeding under the skin(hematoma), and if you have any concerns seek help immediately.   Drink plenty of fluids after the test. This will flush the x-ray dye from your system.   Return to your normal diet.         NO STRENUOUS LIFTING WITH AFFECTED ARM FOR 3 DAYS ANYTHING HEAVIER THAN 8 TO 10 POUNDS    WATCH FOR SIGNS OF INFECTION /  REDNESS / SWELLING / DRAINAGE / WARMTH / TEMPERATURE GREATER THAN 101    IF AREA BECOMES HARD AND SWOLLEN AND IF YOU ARE AT ALL CONCERNED SEEK HELP IMMEDIATELY    SEEK HELP IMMEDIATELY IF AFFECTED ARM BECOMES COLD / NUMB / SEVERE PAIN / NAILBEDS TURN BLUE     IF ON METFORMIN / GLUCOPHAGE DO NOT RESTART MEDICATION FOR 48 HOURS    CALL 911 if you have symptoms including:   Drooping facial muscles   Changes in vision or speech   Difficulty walking or using your limbs   Change in sensation to affected arm/hand including numbness, feeling cold, or change in color   Extreme sweating, nausea or vomiting   Dizziness or lightheadedness   Chest pain   Rapid, irregular heartbeat   Palpitations   Cough, shortness of breath, or difficulty breathing   Weakness or fainting   If you think you have an emergency, CALL 911 .    SEDATION / ANALGESIA INFORMATION / HOME GOING ADVICE  You have received the sedation/analgesia medication during your visit    Sedation/analgesia is used during short medical procedures under controlled supervision. The  Continue nocturnal CPAP

## 2024-02-27 ENCOUNTER — LAB VISIT (OUTPATIENT)
Dept: LAB | Facility: HOSPITAL | Age: 69
End: 2024-02-27
Attending: INTERNAL MEDICINE
Payer: MEDICARE

## 2024-02-27 DIAGNOSIS — I10 PRIMARY HYPERTENSION: ICD-10-CM

## 2024-02-27 DIAGNOSIS — R80.9 PROTEINURIA, UNSPECIFIED TYPE: ICD-10-CM

## 2024-02-27 DIAGNOSIS — N25.81 SECONDARY HYPERPARATHYROIDISM OF RENAL ORIGIN: ICD-10-CM

## 2024-02-27 DIAGNOSIS — N18.5 CKD (CHRONIC KIDNEY DISEASE), SYMPTOM MANAGEMENT ONLY, STAGE 5: ICD-10-CM

## 2024-02-27 LAB
ALBUMIN SERPL BCP-MCNC: 2.7 G/DL (ref 3.5–5.2)
ANION GAP SERPL CALC-SCNC: 8 MMOL/L (ref 8–16)
BUN SERPL-MCNC: 47 MG/DL (ref 8–23)
CALCIUM SERPL-MCNC: 8.5 MG/DL (ref 8.7–10.5)
CHLORIDE SERPL-SCNC: 111 MMOL/L (ref 95–110)
CO2 SERPL-SCNC: 26 MMOL/L (ref 23–29)
CREAT SERPL-MCNC: 3 MG/DL (ref 0.5–1.4)
EST. GFR  (NO RACE VARIABLE): 16.5 ML/MIN/1.73 M^2
GLUCOSE SERPL-MCNC: 124 MG/DL (ref 70–110)
PHOSPHATE SERPL-MCNC: 3.2 MG/DL (ref 2.7–4.5)
POTASSIUM SERPL-SCNC: 3.9 MMOL/L (ref 3.5–5.1)
PTH-INTACT SERPL-MCNC: 388.3 PG/ML (ref 9–77)
SODIUM SERPL-SCNC: 145 MMOL/L (ref 136–145)

## 2024-02-27 PROCEDURE — 83970 ASSAY OF PARATHORMONE: CPT | Performed by: INTERNAL MEDICINE

## 2024-02-27 PROCEDURE — 36415 COLL VENOUS BLD VENIPUNCTURE: CPT | Mod: PO | Performed by: INTERNAL MEDICINE

## 2024-02-27 PROCEDURE — 80069 RENAL FUNCTION PANEL: CPT | Performed by: INTERNAL MEDICINE

## 2024-02-28 ENCOUNTER — HOSPITAL ENCOUNTER (OUTPATIENT)
Dept: RADIOLOGY | Facility: HOSPITAL | Age: 69
Discharge: HOME | End: 2024-02-28
Attending: NURSE PRACTITIONER
Payer: MEDICARE

## 2024-02-28 DIAGNOSIS — K74.60 LIVER CIRRHOSIS SECONDARY TO NASH: ICD-10-CM

## 2024-02-28 DIAGNOSIS — K75.81 LIVER CIRRHOSIS SECONDARY TO NASH: ICD-10-CM

## 2024-02-28 PROCEDURE — 76705 ECHO EXAM OF ABDOMEN: CPT | Mod: 26,,, | Performed by: RADIOLOGY

## 2024-02-28 PROCEDURE — 76705 ECHO EXAM OF ABDOMEN: CPT | Mod: TC,PO

## 2024-04-01 ENCOUNTER — OFFICE VISIT (OUTPATIENT)
Dept: CARDIOLOGY | Facility: CLINIC | Age: 69
End: 2024-04-01
Payer: MEDICARE

## 2024-04-01 VITALS
HEART RATE: 64 BPM | BODY MASS INDEX: 35.88 KG/M2 | DIASTOLIC BLOOD PRESSURE: 85 MMHG | HEIGHT: 61 IN | SYSTOLIC BLOOD PRESSURE: 205 MMHG | WEIGHT: 190.06 LBS | OXYGEN SATURATION: 98 %

## 2024-04-01 DIAGNOSIS — I12.9 PARENCHYMAL RENAL HYPERTENSION, STAGE 1 THROUGH STAGE 4 OR UNSPECIFIED CHRONIC KIDNEY DISEASE: ICD-10-CM

## 2024-04-01 DIAGNOSIS — R06.02 SHORTNESS OF BREATH: ICD-10-CM

## 2024-04-01 DIAGNOSIS — E66.01 MORBID OBESITY DUE TO EXCESS CALORIES: ICD-10-CM

## 2024-04-01 DIAGNOSIS — R18.8 OTHER ASCITES: ICD-10-CM

## 2024-04-01 DIAGNOSIS — I50.32 CHRONIC DIASTOLIC CONGESTIVE HEART FAILURE: ICD-10-CM

## 2024-04-01 DIAGNOSIS — U07.1 COVID-19: ICD-10-CM

## 2024-04-01 DIAGNOSIS — G47.33 OSA ON CPAP: ICD-10-CM

## 2024-04-01 DIAGNOSIS — E11.51 TYPE 2 DIABETES MELLITUS WITH DIABETIC PERIPHERAL ANGIOPATHY WITHOUT GANGRENE, UNSPECIFIED WHETHER LONG TERM INSULIN USE: ICD-10-CM

## 2024-04-01 DIAGNOSIS — I50.33 ACUTE ON CHRONIC DIASTOLIC (CONGESTIVE) HEART FAILURE: ICD-10-CM

## 2024-04-01 DIAGNOSIS — D50.9 IRON DEFICIENCY ANEMIA, UNSPECIFIED IRON DEFICIENCY ANEMIA TYPE: ICD-10-CM

## 2024-04-01 DIAGNOSIS — I10 ESSENTIAL HYPERTENSION: ICD-10-CM

## 2024-04-01 DIAGNOSIS — I50.33 ACUTE ON CHRONIC HEART FAILURE WITH PRESERVED EJECTION FRACTION: ICD-10-CM

## 2024-04-01 DIAGNOSIS — I13.10 CARDIORENAL SYNDROME WITH RENAL FAILURE: ICD-10-CM

## 2024-04-01 DIAGNOSIS — R79.89 ELEVATED D-DIMER: ICD-10-CM

## 2024-04-01 DIAGNOSIS — I25.119 ATHEROSCLEROSIS OF NATIVE CORONARY ARTERY OF NATIVE HEART WITH ANGINA PECTORIS: ICD-10-CM

## 2024-04-01 DIAGNOSIS — N18.4 CKD (CHRONIC KIDNEY DISEASE) STAGE 4, GFR 15-29 ML/MIN: ICD-10-CM

## 2024-04-01 DIAGNOSIS — E03.4 HYPOTHYROIDISM DUE TO ACQUIRED ATROPHY OF THYROID: ICD-10-CM

## 2024-04-01 DIAGNOSIS — E11.59 TYPE 2 DIABETES MELLITUS WITH OTHER CIRCULATORY COMPLICATION, WITHOUT LONG-TERM CURRENT USE OF INSULIN: ICD-10-CM

## 2024-04-01 DIAGNOSIS — R06.02 SOB (SHORTNESS OF BREATH): ICD-10-CM

## 2024-04-01 DIAGNOSIS — I50.32 CHRONIC DIASTOLIC HEART FAILURE: ICD-10-CM

## 2024-04-01 DIAGNOSIS — E78.49 OTHER HYPERLIPIDEMIA: ICD-10-CM

## 2024-04-01 DIAGNOSIS — I48.0 PAROXYSMAL ATRIAL FIBRILLATION: Primary | ICD-10-CM

## 2024-04-01 DIAGNOSIS — Z86.16 HISTORY OF COVID-19: ICD-10-CM

## 2024-04-01 PROCEDURE — 1159F MED LIST DOCD IN RCRD: CPT | Mod: HCNC,CPTII,S$GLB, | Performed by: INTERNAL MEDICINE

## 2024-04-01 PROCEDURE — 1126F AMNT PAIN NOTED NONE PRSNT: CPT | Mod: HCNC,CPTII,S$GLB, | Performed by: INTERNAL MEDICINE

## 2024-04-01 PROCEDURE — 3079F DIAST BP 80-89 MM HG: CPT | Mod: HCNC,CPTII,S$GLB, | Performed by: INTERNAL MEDICINE

## 2024-04-01 PROCEDURE — 99999 PR PBB SHADOW E&M-EST. PATIENT-LVL III: CPT | Mod: PBBFAC,HCNC,, | Performed by: INTERNAL MEDICINE

## 2024-04-01 PROCEDURE — G2211 COMPLEX E/M VISIT ADD ON: HCPCS | Mod: HCNC,S$GLB,, | Performed by: INTERNAL MEDICINE

## 2024-04-01 PROCEDURE — 1160F RVW MEDS BY RX/DR IN RCRD: CPT | Mod: HCNC,CPTII,S$GLB, | Performed by: INTERNAL MEDICINE

## 2024-04-01 PROCEDURE — 3008F BODY MASS INDEX DOCD: CPT | Mod: HCNC,CPTII,S$GLB, | Performed by: INTERNAL MEDICINE

## 2024-04-01 PROCEDURE — 3060F POS MICROALBUMINURIA REV: CPT | Mod: HCNC,CPTII,S$GLB, | Performed by: INTERNAL MEDICINE

## 2024-04-01 PROCEDURE — 99215 OFFICE O/P EST HI 40 MIN: CPT | Mod: HCNC,S$GLB,, | Performed by: INTERNAL MEDICINE

## 2024-04-01 PROCEDURE — 1157F ADVNC CARE PLAN IN RCRD: CPT | Mod: HCNC,CPTII,S$GLB, | Performed by: INTERNAL MEDICINE

## 2024-04-01 PROCEDURE — 3077F SYST BP >= 140 MM HG: CPT | Mod: HCNC,CPTII,S$GLB, | Performed by: INTERNAL MEDICINE

## 2024-04-01 PROCEDURE — 3044F HG A1C LEVEL LT 7.0%: CPT | Mod: HCNC,CPTII,S$GLB, | Performed by: INTERNAL MEDICINE

## 2024-04-01 PROCEDURE — 3066F NEPHROPATHY DOC TX: CPT | Mod: HCNC,CPTII,S$GLB, | Performed by: INTERNAL MEDICINE

## 2024-04-01 RX ORDER — METOLAZONE 5 MG/1
5 TABLET ORAL
Qty: 90 TABLET | Refills: 1 | Status: SHIPPED | OUTPATIENT
Start: 2024-04-01

## 2024-04-01 RX ORDER — CARVEDILOL 25 MG/1
25 TABLET ORAL 2 TIMES DAILY WITH MEALS
Qty: 180 TABLET | Refills: 1 | Status: SHIPPED | OUTPATIENT
Start: 2024-04-01 | End: 2024-09-28

## 2024-04-01 RX ORDER — ISOSORBIDE MONONITRATE 30 MG/1
30 TABLET, EXTENDED RELEASE ORAL NIGHTLY
Qty: 90 TABLET | Refills: 1 | Status: SHIPPED | OUTPATIENT
Start: 2024-04-01 | End: 2025-04-01

## 2024-04-01 RX ORDER — LANOLIN ALCOHOL/MO/W.PET/CERES
400 CREAM (GRAM) TOPICAL DAILY
Qty: 90 TABLET | Refills: 1 | Status: SHIPPED | OUTPATIENT
Start: 2024-04-01

## 2024-04-01 RX ORDER — TORSEMIDE 20 MG/1
40 TABLET ORAL DAILY
Qty: 180 TABLET | Refills: 1 | Status: SHIPPED | OUTPATIENT
Start: 2024-04-01

## 2024-04-01 NOTE — PROGRESS NOTES
Subjective:   Patient ID:  Diamond Das is a 69 y.o. female who presents for cardiac consult of No chief complaint on file.      The patient came in today for cardiac consult of No chief complaint on file.    Referring Physician: Andrey Perrin MD   Reason for consult: HTN, CHF    Diamond Das is a 69 y.o. female with medical conditions diastolic CHF, pericardial effusion, HTN, HLD, DM2, cryptogenic cirrhosis, s/p TIPS, ascites presents for CV follow up.     Pt of Dr. Shaw, last seen 2/9/18  No smoking/drinking  Last echo in  normal EF and RVF, grade II DD.  EKG today NSR poor R progression on anterior leads  Chronic weakness and fatigue. Had PNA in   Pain on lower chest bilateral for few months, worse with exercise, resolved after resting. Deep breathing made the pain worse. No pain at sleep. Associated dyspnea, N/V, palpitation and dizziness. No radiation. ASA and tylenol not really helped the pain.    5/22/23    BP and HR well controlled. BMI 33 - 175 lbs. Recent nephro - Creatinine has remained relatively stable ranging between 5.0 and 5.6 for the past 3-4 months    Recent Readings 5/21/2023 5/20/2023 5/17/2023 5/12/2023 5/2/2023   SBP (mmHg) 151 134 131 145 141   DBP (mmHg) 77 57 62 62 61   Pulse 59 68 65 62 70     BNP 0 - 99 pg/mL 472 High   804 High  CM  704 High     She is taking torsemide 40mg daily, metolazine M, W, F.       12/18/23  BP and HR well controlled. BMI 31 - 164 lbs - lost > 10 lbs since last visit.   Overall doing well. She will be travelling to [place].   ECG - NSR, poor RWP    4/1/24  Bp elevated 210/86. HR 60s. BMI 35 - 190 lbs - has gained a lot of weight lately.   No CP. Has more edema.     Patient has dec exercise tolerance.    Patient is compliant with medications.    Results for orders placed during the hospital encounter of 08/08/22    Echo    Interpretation Summary  · Concentric remodeling and normal systolic function.  · Moderate left atrial enlargement.  ·  The estimated ejection fraction is 60%.  · Grade II left ventricular diastolic dysfunction.  · Mild right atrial enlargement.  · Mild to moderate tricuspid regurgitation.  · There is pulmonary hypertension.  · Small pericardial effusion.  · Mild mitral regurgitation.  The estimated PA systolic pressure is greater than 44 mmHg.      Results for orders placed during the hospital encounter of 12/30/20    Nuclear Stress - Cardiology Interpreted    Interpretation Summary    Normal myocardial perfusion scan. There is no evidence of myocardial ischemia or infarction.    The gated perfusion images showed an ejection fraction of 64% at rest. The gated perfusion images showed an ejection fraction of 65% post stress.    The EKG portion of this study is negative for ischemia.    The patient reported no chest pain during the stress test.          Past Medical History:   Diagnosis Date    Abdominal pain 05/10/2018    Acquired hypothyroidism 09/28/2020    Acute on chronic diastolic congestive heart failure 12/07/2017    ROD (acute kidney injury) 01/03/2019    Anasarca 10/13/2020    Arthritis of knee 01/31/2022    Ascites     Ascites 09/02/2016    Atherosclerosis of native coronary artery of native heart with angina pectoris 4/1/2024    Bilateral knee pain 04/22/2022    Bilateral lower extremity edema 09/02/2016    Breast pain, left 01/04/2018    Cataract     Chest pain, atypical 02/09/2018    CHF (congestive heart failure)     Cirrhosis     Cirrhosis 01/03/2017    Cough     Diabetes mellitus     Diabetes mellitus, type 2     Diarrhea 09/04/2019    Edema 03/19/2018    Encounter for long-term (current) use of medications 03/19/2018    Epigastric pain 02/11/2020    Gastroparesis     GERD (gastroesophageal reflux disease)     Hepatic encephalopathy 06/19/2018    Pt has history of cirrhosis, seen at [facility] on 6/7/18. Currently on lactulose.    Hyperlipidemia     Hypertension     Hypotension 02/21/2019    Immunization deficiency  02/10/2020    Leg cramps 05/20/2022    Liver disease     Lumbar strain 04/27/2018    Lumbar strain, sequela 02/17/2020    Macular degeneration     Medication reaction 11/17/2016    Other and unspecified hyperlipidemia 6/11/2012 11:11:32 AM    [facility] Historical - Endocrine/Metabolic/Immune: Hyperlipidemia-No Additional Notes    Paroxysmal atrial fibrillation 4/1/2024    Pneumonia of right middle lobe due to infectious organism 12/19/2017    Renal disorder     Retinopathy due to secondary diabetes mellitus     Screening for malignant neoplasm of cervix 12/14/2017    Skin lesion 12/20/2021    Sleep apnea     Strain of lumbar region 10/04/2021    Thyroid disease     Type 2 diabetes mellitus with hyperglycemia 10/10/2020       Past Surgical History:   Procedure Laterality Date    CATARACT EXTRACTION      CHOLECYSTECTOMY      COLONOSCOPY N/A 09/04/2019    Procedure: COLONOSCOPY;  Surgeon: Marnie Elmore MD;  Location: [facility];  Service: Endoscopy;  Laterality: N/A;    ERCP      ESOPHAGOGASTRODUODENOSCOPY N/A 07/28/2022    Procedure: EGD (ESOPHAGOGASTRODUODENOSCOPY);  Surgeon: Jimy Katz MD;  Location: [facility];  Service: Endoscopy;  Laterality: N/A;    FLUOROSCOPY N/A 07/24/2020    Procedure: TIPS revision;  Surgeon: Martell Jasmine MD;  Location: [facility] CATH LAB;  Service: General;  Laterality: N/A;    LIVER BIOPSY      THORACENTESIS Left 01/20/2020    Procedure: Thoracentesis;  Surgeon: Angelica Hunter MD;  Location: [facility] ENDO;  Service: Pulmonary;  Laterality: Left;    TUBAL LIGATION      UPPER GASTROINTESTINAL ENDOSCOPY         Social History     Tobacco Use    Smoking status: Never    Smokeless tobacco: Never   Substance Use Topics    Alcohol use: No    Drug use: No       Family History   Problem Relation Age of Onset    Cancer Mother     Breast cancer Mother     Heart disease Father     Aneurysm Sister     Heart disease Brother     No Known Problems Maternal Grandmother     Cancer Maternal  Grandfather     Sarcoidosis Sister     Colon cancer Neg Hx     Ovarian cancer Neg Hx     Thrombosis Neg Hx        Patient's Medications   New Prescriptions    ISOSORBIDE MONONITRATE (IMDUR) 30 MG 24 HR TABLET    Take 1 tablet (30 mg total) by mouth every evening.   Previous Medications    DOCUSATE SODIUM (COLACE) 100 MG CAPSULE    Take 1 capsule (100 mg total) by mouth 3 (three) times daily. Hold for diarrhea    DULAGLUTIDE (TRULICITY) 4.5 MG/0.5 ML PEN INJECTOR    Inject 4.5 mg into the skin every 7 days.    FAMOTIDINE (PEPCID) 20 MG TABLET    TAKE ONE TABLET BY MOUTH DAILY AT 9 AM    FERROUS SULFATE (IRON ORAL)    Take by mouth.    FLUTICASONE PROPIONATE (FLONASE) 50 MCG/ACTUATION NASAL SPRAY    2 sprays (100 mcg total) by Each Nostril route once daily.    GARLIC (GARLIQUE ORAL)    Take 1 tablet by mouth once daily.    LEVOTHYROXINE (SYNTHROID) 137 MCG TAB TABLET    Take 1 tablet (137 mcg total) by mouth before breakfast.    LIDOCAINE (LMX) 4 % CREAM    Apply topically as needed.    OMEGA-3 FATTY ACIDS/FISH OIL (FISH OIL-OMEGA-3 FATTY ACIDS) 300-1,000 MG CAPSULE    Take by mouth once daily.    ONDANSETRON (ZOFRAN) 4 MG TABLET    Take 1 tablet (4 mg total) by mouth every 8 (eight) hours as needed for Nausea.    PRAVASTATIN (PRAVACHOL) 10 MG TABLET    Take 1 tablet (10 mg total) by mouth once daily.    PULSE OXIMETER (PULSE OXIMETER) DEVICE    by Apply Externally route 2 (two) times a day. Use twice daily at 8 AM and 3 PM and record the value in [facility] as directed.    RIFAXIMIN (XIFAXAN) 550 MG TAB    Take 1 tablet (550 mg total) by mouth 2 (two) times daily.    STOOL SOFTENER 100 MG CAPSULE    TAKE 1 CAPSULE BY MOUTH THREE TIMES DAILY FOR DIARRHEA (HOLD FOR DIARRHEA)   Modified Medications    Modified Medication Previous Medication    APIXABAN (ELIQUIS) 5 MG TAB apixaban (ELIQUIS) 5 mg Tab       Take 1 tablet (5 mg total) by mouth 2 (two) times daily.    Take 1 tablet (5 mg total) by mouth 2 (two) times daily.     CARVEDILOL (COREG) 25 MG TABLET carvediloL (COREG) 25 MG tablet       Take 1 tablet (25 mg total) by mouth 2 (two) times daily with meals.    Take 1 tablet (25 mg total) by mouth 2 (two) times daily with meals.    MAGNESIUM OXIDE (MAG-OX) 400 MG (241.3 MG MAGNESIUM) TABLET magnesium oxide (MAG-OX) 400 mg (241.3 mg magnesium) tablet       Take 1 tablet (400 mg total) by mouth once daily.    Take 1 tablet (400 mg total) by mouth once daily.    METOLAZONE (ZAROXOLYN) 5 MG TABLET metOLazone (ZAROXOLYN) 5 MG tablet       Take 1 tablet (5 mg total) by mouth every Mon, Wed, Fri. Take 30 min before Torsemide; take daily next 5 days and then M, W, F    Take 1 tablet (5 mg total) by mouth every Mon, Wed, Fri. Take 30 min before Torsemide    TORSEMIDE (DEMADEX) 20 MG TAB torsemide (DEMADEX) 20 MG Tab       Take 2 tablets (40 mg total) by mouth once daily. If having more swelling/weight gain/fluid take this in morning and afternoon for 3 days    Take 2 tablets (40 mg total) by mouth once daily. If having more swelling/weight gain/fluid take this in morning and afternoon for 3 days   Discontinued Medications    No medications on file       Review of Systems   Constitutional:  Positive for malaise/fatigue.   HENT: Negative.     Eyes: Negative.    Respiratory:  Positive for shortness of breath (intermittent).    Cardiovascular:  Positive for leg swelling. Negative for chest pain and palpitations.   Gastrointestinal:  Negative for nausea.   Genitourinary: Negative.    Musculoskeletal: Negative.    Skin: Negative.    Neurological: Negative.    Endo/Heme/Allergies: Negative.    Psychiatric/Behavioral: Negative.     All 12 systems otherwise negative.      Wt Readings from Last 3 Encounters:   04/01/24 86.2 kg (190 lb 0.6 oz)   12/18/23 74.8 kg (164 lb 14.5 oz)   11/24/23 73.7 kg (162 lb 7.7 oz)     Temp Readings from Last 3 Encounters:   10/27/23 96.8 °F (36 °C)   09/15/23 98.6 °F (37 °C)   05/09/23 96 °F (35.6 °C) (Tympanic)     BP  "Readings from Last 3 Encounters:   04/01/24 (!) 205/85   12/18/23 138/70   11/24/23 130/80     Pulse Readings from Last 3 Encounters:   04/01/24 64   12/18/23 65   11/24/23 68       BP (!) 205/85 (BP Location: Right arm, Patient Position: Sitting)   Pulse 64   Ht 5' 1" (1.549 m)   Wt 86.2 kg (190 lb 0.6 oz)   LMP  (LMP Unknown)   SpO2 98%   BMI 35.91 kg/m²     Objective:   Physical Exam  Constitutional:       General: She is not in acute distress.     Appearance: She is well-developed. She is obese. She is not diaphoretic.   HENT:      Head: Normocephalic and atraumatic.      Mouth/Throat:      Pharynx: No oropharyngeal exudate.   Eyes:      General: No scleral icterus.        Right eye: No discharge.         Left eye: No discharge.   Cardiovascular:      Rate and Rhythm: Normal rate and regular rhythm.      Heart sounds: Murmur heard.   Pulmonary:      Effort: Pulmonary effort is normal. No respiratory distress.   Musculoskeletal:      Right lower leg: Edema present.      Left lower leg: Edema present.   Skin:     Coloration: Skin is not pale.      Findings: No erythema or rash.   Neurological:      Mental Status: She is alert and oriented to person, place, and time.   Psychiatric:         Behavior: Behavior normal.         Thought Content: Thought content normal.         Judgment: Judgment normal.         Lab Results   Component Value Date     02/27/2024    K 3.9 02/27/2024     (H) 02/27/2024    CO2 26 02/27/2024    BUN 47 (H) 02/27/2024    CREATININE 3.0 (H) 02/27/2024     (H) 02/27/2024    HGBA1C 6.1 (H) 01/22/2024    HGBA1C 5.6 11/17/2023    MG 2.9 (H) 02/21/2023    AST 25 07/27/2023    ALT 16 07/27/2023    ALBUMIN 2.7 (L) 02/27/2024    PROT 7.2 07/27/2023    BILITOT 0.4 07/27/2023    WBC 6.22 07/27/2023    HGB 10.3 (L) 07/27/2023    HCT 33.2 (L) 07/27/2023    MCV 92 07/27/2023     07/27/2023    INR 1.1 07/27/2023    TSH 0.14 (L) 01/22/2024    TSH 28.708 (H) 08/03/2022    CHOL " 137 05/09/2023    HDL 31 (L) 05/09/2023    LDLCALC 95.6 05/09/2023    TRIG 52 05/09/2023     (H) 12/18/2023     Assessment:      1. Paroxysmal atrial fibrillation    2. Atherosclerosis of native coronary artery of native heart with angina pectoris    3. Type 2 diabetes mellitus with diabetic peripheral angiopathy without gangrene, unspecified whether long term insulin use    4. Morbid obesity due to excess calories    5. Chronic diastolic heart failure    6. Acute on chronic diastolic (congestive) heart failure    7. CKD (chronic kidney disease) stage 4, GFR 15-29 ml/min    8. Chronic diastolic congestive heart failure    9. KRISTAL on CPAP    10. Type 2 diabetes mellitus with other circulatory complication, without long-term current use of insulin    11. History of COVID-19    12. Shortness of breath    13. Hypothyroidism due to acquired atrophy of thyroid    14. SOB (shortness of breath)    15. Essential hypertension    16. Other hyperlipidemia    17. Iron deficiency anemia, unspecified iron deficiency anemia type    18. Cardiorenal syndrome with renal failure    19. Acute on chronic heart failure with preserved ejection fraction    20. Parenchymal renal hypertension, stage 1 through stage 4 or unspecified chronic kidney disease    21. Elevated d-dimer with indeterminate perfusion scan for PE    22. COVID-19    23. Other ascites      Plan:   1. Chronic diastolic HF - ;  --> 704 --> 767 --> 477 --> 347 --> 800--> 472 --> 519   - ECHO 8/2022 with normal bi V function, grade 2 DD, mild to mod TR, small peric effusion, PASP > 44mmHg.   - daily weights, low salt diet   - daily compression stockings  - diuretics --> metolazone -M, W, F -- changed to Torsemide 40mg due to worsening renal function - daily metolazone for 5 days  - repeat CMP, BNP , Mg in 3 days, repeat  ECHO     - if ongoing weight gain/swelling not improved with PO diuretics needs to go to ER and have IV diuresis and admission      2. HTN    -  cont meds and titrate  - add IMdur  - has more fluid overload now     3. HLD - improved  - cont Pravastatin 10mg     4. DM2 with gastroparesis A1c - 5.9 --> 5.6 --> 5.3  - cont meds per PCP - on Trulicity     5. H/o Cirrhosis s/p TIPS with edema, low albumin  - cont tx per PCP/Hepatology  - liver function improved does not need transplant  - had EGD concern for gastroparesis    6. KRISTAL  - needs CPAP, was not using it before     7. Obesity BMI 40 - 212 lbs --> BMI 33 - 179 lbs. - BMI 34 - 181 lbs --> 175 lb --> 164 lbs  --> 190 lbs   - cont weight loss with diet/exercise     8. Pericardial effusion  - small, sec to cirrhosis/CHF  - no tamponade clinically on last ECHO    9. CKD 4 with proteinuria  - diuretics adjusted   - cont f/u with nephro -    10. Elevated D dimer, int prob for PE  - repeat D dimer - elevated   - cont Eliquis     Visit today included increased complexity associated with the care of the episodic problem CHF addressed and managing the longitudinal care of the patient due to the serious and/or complex managed problem(s) .      Thank you for allowing me to participate in this patient's care. Please do not hesitate to contact me with any questions or concerns. Consult note has been forwarded to the referral physician.

## 2024-04-03 ENCOUNTER — PATIENT MESSAGE (OUTPATIENT)
Dept: PULMONOLOGY | Facility: CLINIC | Age: 69
End: 2024-04-03
Payer: MEDICARE

## 2024-04-04 ENCOUNTER — HOSPITAL ENCOUNTER (OUTPATIENT)
Dept: CARDIOLOGY | Facility: HOSPITAL | Age: 69
Discharge: HOME | End: 2024-04-04
Attending: INTERNAL MEDICINE
Payer: MEDICARE

## 2024-04-04 VITALS — HEIGHT: 61 IN | BODY MASS INDEX: 35.87 KG/M2 | WEIGHT: 190 LBS

## 2024-04-04 DIAGNOSIS — G47.33 OSA ON CPAP: ICD-10-CM

## 2024-04-04 DIAGNOSIS — I48.0 PAROXYSMAL ATRIAL FIBRILLATION: ICD-10-CM

## 2024-04-04 DIAGNOSIS — I50.32 CHRONIC DIASTOLIC HEART FAILURE: ICD-10-CM

## 2024-04-04 DIAGNOSIS — I50.32 CHRONIC DIASTOLIC CONGESTIVE HEART FAILURE: ICD-10-CM

## 2024-04-04 DIAGNOSIS — E11.51 TYPE 2 DIABETES MELLITUS WITH DIABETIC PERIPHERAL ANGIOPATHY WITHOUT GANGRENE, UNSPECIFIED WHETHER LONG TERM INSULIN USE: ICD-10-CM

## 2024-04-04 DIAGNOSIS — I13.10 CARDIORENAL SYNDROME WITH RENAL FAILURE: ICD-10-CM

## 2024-04-04 DIAGNOSIS — D50.9 IRON DEFICIENCY ANEMIA, UNSPECIFIED IRON DEFICIENCY ANEMIA TYPE: ICD-10-CM

## 2024-04-04 DIAGNOSIS — I50.33 ACUTE ON CHRONIC DIASTOLIC (CONGESTIVE) HEART FAILURE: ICD-10-CM

## 2024-04-04 DIAGNOSIS — I50.33 ACUTE ON CHRONIC HEART FAILURE WITH PRESERVED EJECTION FRACTION: ICD-10-CM

## 2024-04-04 LAB
AORTIC ROOT ANNULUS: 2.85 CM
AV INDEX (PROSTH): 0.67
AV MEAN GRADIENT: 7 MMHG
AV PEAK GRADIENT: 12 MMHG
AV VALVE AREA BY VELOCITY RATIO: 2.28 CM²
AV VALVE AREA: 2.13 CM²
AV VELOCITY RATIO: 0.72
BSA FOR ECHO PROCEDURE: 1.93 M2
CV ECHO LV RWT: 0.71 CM
DOP CALC AO PEAK VEL: 1.71 M/S
DOP CALC AO VTI: 47.3 CM
DOP CALC LVOT AREA: 3.2 CM2
DOP CALC LVOT DIAMETER: 2.01 CM
DOP CALC LVOT PEAK VEL: 1.23 M/S
DOP CALC LVOT STROKE VOLUME: 100.54 CM3
DOP CALC RVOT PEAK VEL: 0.7 M/S
DOP CALC RVOT VTI: 17.2 CM
DOP CALCLVOT PEAK VEL VTI: 31.7 CM
E WAVE DECELERATION TIME: 148.71 MSEC
E/A RATIO: 1.51
E/E' RATIO: 16.93 M/S
ECHO LV POSTERIOR WALL: 1.4 CM (ref 0.6–1.1)
FRACTIONAL SHORTENING: 26 % (ref 28–44)
INTERVENTRICULAR SEPTUM: 1.27 CM (ref 0.6–1.1)
IVRT: 85.63 MSEC
LA MAJOR: 6.4 CM
LA MINOR: 6.1 CM
LA WIDTH: 3.5 CM
LEFT ATRIUM SIZE: 3.91 CM
LEFT ATRIUM VOLUME INDEX MOD: 37.4 ML/M2
LEFT ATRIUM VOLUME INDEX: 39.3 ML/M2
LEFT ATRIUM VOLUME MOD: 69.12 CM3
LEFT ATRIUM VOLUME: 72.66 CM3
LEFT INTERNAL DIMENSION IN SYSTOLE: 2.89 CM (ref 2.1–4)
LEFT VENTRICLE DIASTOLIC VOLUME INDEX: 36.25 ML/M2
LEFT VENTRICLE DIASTOLIC VOLUME: 67.06 ML
LEFT VENTRICLE MASS INDEX: 102 G/M2
LEFT VENTRICLE SYSTOLIC VOLUME INDEX: 17.3 ML/M2
LEFT VENTRICLE SYSTOLIC VOLUME: 31.93 ML
LEFT VENTRICULAR INTERNAL DIMENSION IN DIASTOLE: 3.93 CM (ref 3.5–6)
LEFT VENTRICULAR MASS: 189.3 G
LV LATERAL E/E' RATIO: 21.17 M/S
LV SEPTAL E/E' RATIO: 14.11 M/S
LVOT MG: 3.41 MMHG
LVOT MV: 0.86 CM/S
MV PEAK A VEL: 0.84 M/S
MV PEAK E VEL: 1.27 M/S
PISA TR MAX VEL: 3.68 M/S
PULM VEIN S/D RATIO: 0.73
PV MEAN GRADIENT: 1 MMHG
PV PEAK D VEL: 0.8 M/S
PV PEAK GRADIENT: 2 MMHG
PV PEAK S VEL: 0.58 M/S
PV PEAK VELOCITY: 0.79 M/S
RA MAJOR: 5.1 CM
RA PRESSURE ESTIMATED: 15 MMHG
RA WIDTH: 3.12 CM
RIGHT VENTRICULAR END-DIASTOLIC DIMENSION: 2.78 CM
RV TB RVSP: 19 MMHG
SINUS: 2.35 CM
STJ: 2.22 CM
TDI LATERAL: 0.06 M/S
TDI SEPTAL: 0.09 M/S
TDI: 0.08 M/S
TR MAX PG: 54 MMHG
TRICUSPID ANNULAR PLANE SYSTOLIC EXCURSION: 2.5 CM
TV REST PULMONARY ARTERY PRESSURE: 69 MMHG
Z-SCORE OF LEFT VENTRICULAR DIMENSION IN END DIASTOLE: -2.68
Z-SCORE OF LEFT VENTRICULAR DIMENSION IN END SYSTOLE: -0.74

## 2024-04-04 PROCEDURE — 93306 TTE W/DOPPLER COMPLETE: CPT | Mod: 26,HCNC,, | Performed by: INTERNAL MEDICINE

## 2024-04-04 PROCEDURE — 93306 TTE W/DOPPLER COMPLETE: CPT | Mod: HCNC,PO

## 2024-04-05 ENCOUNTER — TELEPHONE (OUTPATIENT)
Dept: CARDIOLOGY | Facility: CLINIC | Age: 69
End: 2024-04-05
Payer: MEDICARE

## 2024-04-05 NOTE — TELEPHONE ENCOUNTER
I have attempted without success to contact this patient by phone to discuss lab results and I left a message on answering machine.    ----- Message from Marcos Ramos MD sent at 4/4/2024 10:16 PM CDT -----  Please contact the patient and let them know that their results reveal significantly elevated BNP due to extra fluid, need to have a very low salt diet and recommend taking metolazone daily for 5 days with Torsemide, if not much improvement can't double up torsmide to twice a day for 3-4 days as well. If feeling much worse may need ER/hospital workup and admission.

## 2024-06-07 ENCOUNTER — TELEPHONE (OUTPATIENT)
Dept: CARDIOLOGY | Facility: CLINIC | Age: 69
End: 2024-06-07
Payer: MEDICARE

## 2024-06-18 ENCOUNTER — OFFICE VISIT (OUTPATIENT)
Dept: OPHTHALMOLOGY | Facility: CLINIC | Age: 69
End: 2024-06-18
Payer: MEDICARE

## 2024-06-18 DIAGNOSIS — Z96.1 PSEUDOPHAKIA OF BOTH EYES: ICD-10-CM

## 2024-06-18 DIAGNOSIS — K31.84 DIABETIC GASTROPARESIS ASSOCIATED WITH TYPE 2 DIABETES MELLITUS: ICD-10-CM

## 2024-06-18 DIAGNOSIS — E11.3413 SEVERE NONPROLIFERATIVE DIABETIC RETINOPATHY OF BOTH EYES WITH MACULAR EDEMA ASSOCIATED WITH TYPE 2 DIABETES MELLITUS: Primary | ICD-10-CM

## 2024-06-18 DIAGNOSIS — E11.43 DIABETIC GASTROPARESIS ASSOCIATED WITH TYPE 2 DIABETES MELLITUS: ICD-10-CM

## 2024-06-18 PROCEDURE — 92134 CPTRZ OPH DX IMG PST SGM RTA: CPT | Mod: HCNC,S$GLB,, | Performed by: OPHTHALMOLOGY

## 2024-06-18 PROCEDURE — 3044F HG A1C LEVEL LT 7.0%: CPT | Mod: HCNC,CPTII,S$GLB, | Performed by: OPHTHALMOLOGY

## 2024-06-18 PROCEDURE — 1159F MED LIST DOCD IN RCRD: CPT | Mod: HCNC,CPTII,S$GLB, | Performed by: OPHTHALMOLOGY

## 2024-06-18 PROCEDURE — 99999 PR PBB SHADOW E&M-EST. PATIENT-LVL III: CPT | Mod: PBBFAC,HCNC,, | Performed by: OPHTHALMOLOGY

## 2024-06-18 PROCEDURE — 1160F RVW MEDS BY RX/DR IN RCRD: CPT | Mod: HCNC,CPTII,S$GLB, | Performed by: OPHTHALMOLOGY

## 2024-06-18 PROCEDURE — 3066F NEPHROPATHY DOC TX: CPT | Mod: HCNC,CPTII,S$GLB, | Performed by: OPHTHALMOLOGY

## 2024-06-18 PROCEDURE — 99214 OFFICE O/P EST MOD 30 MIN: CPT | Mod: HCNC,S$GLB,, | Performed by: OPHTHALMOLOGY

## 2024-06-18 PROCEDURE — 1126F AMNT PAIN NOTED NONE PRSNT: CPT | Mod: HCNC,CPTII,S$GLB, | Performed by: OPHTHALMOLOGY

## 2024-06-18 PROCEDURE — 2022F DILAT RTA XM EVC RTNOPTHY: CPT | Mod: HCNC,CPTII,S$GLB, | Performed by: OPHTHALMOLOGY

## 2024-06-18 PROCEDURE — 1157F ADVNC CARE PLAN IN RCRD: CPT | Mod: HCNC,CPTII,S$GLB, | Performed by: OPHTHALMOLOGY

## 2024-06-18 PROCEDURE — 3060F POS MICROALBUMINURIA REV: CPT | Mod: HCNC,CPTII,S$GLB, | Performed by: OPHTHALMOLOGY

## 2024-06-18 NOTE — PROGRESS NOTES
===============================  Date today is 6/18/2024  Diamond Das is a 69 y.o. female  Last visit [facility]: :9/26/2023   Last visit eye dept. Visit date not found    Uncorrected distance visual acuity was 20/40 in the right eye and 20/60 in the left eye.  Tonometry       Tonometry (Applanation, 2:24 PM)         Right Left    Pressure 12 9                  Not recorded       Not recorded       Not recorded       Chief Complaint   Patient presents with    PDR/ME     9 MONTHS BDR     HPI     PDR/ME            Comments: 9 MONTHS BDR          Comments    DM   PDR OU  PRH OS   PRP OS 5/12/2021 - 6/3/2021 - 6/23/2021          Last edited by Melonie Morales on 6/18/2024  2:10 PM.      Problem List Items Addressed This Visit          Eye/Vision problems    Diabetic gastroparesis associated with type 2 diabetes mellitus     Other Visit Diagnoses       Severe nonproliferative diabetic retinopathy of both eyes with macular edema associated with type 2 diabetes mellitus    -  Primary    Relevant Orders    Posterior Segment OCT Retina-Both eyes (Completed)    Pseudophakia of both eyes              Instructed to call 24/7 for any worsening of vision, visual distortion or pain.  Check OU independently daily.    Gave my office and personal cell phone number.  ________________  6/18/2024 today  Diamond Das    DM with stable retinopathy  OCT looks good  PCIOL OU  Macula looks good OU   No VH  No PRH  IOP ok  No glaucoma  Post PRP OS    RTC 1 year  Instructed to call 24/7 for any worsening of vision or symptoms. Check OU daily.   Gave my office and cell phone number.    =============================

## 2025-01-03 ENCOUNTER — TELEPHONE (OUTPATIENT)
Dept: CARDIOLOGY | Facility: CLINIC | Age: 70
End: 2025-01-03
Payer: MEDICARE

## 2025-01-03 DIAGNOSIS — I12.9 PARENCHYMAL RENAL HYPERTENSION, STAGE 1 THROUGH STAGE 4 OR UNSPECIFIED CHRONIC KIDNEY DISEASE: ICD-10-CM

## 2025-01-03 RX ORDER — CARVEDILOL 25 MG/1
25 TABLET ORAL 2 TIMES DAILY WITH MEALS
Qty: 180 TABLET | Refills: 0 | Status: SHIPPED | OUTPATIENT
Start: 2025-01-03 | End: 2025-07-02

## 2025-02-10 DIAGNOSIS — R18.8 OTHER ASCITES: ICD-10-CM

## 2025-02-10 DIAGNOSIS — I10 ESSENTIAL HYPERTENSION: ICD-10-CM

## 2025-02-10 DIAGNOSIS — I50.32 CHRONIC DIASTOLIC CONGESTIVE HEART FAILURE: ICD-10-CM

## 2025-02-10 RX ORDER — LANOLIN ALCOHOL/MO/W.PET/CERES
400 CREAM (GRAM) TOPICAL DAILY
Qty: 90 TABLET | Refills: 1 | Status: SHIPPED | OUTPATIENT
Start: 2025-02-10

## 2025-02-10 NOTE — TELEPHONE ENCOUNTER
----- Message from Jono sent at 2/10/2025 11:09 AM CST -----  Contact: [phone]@[email or web address]  Requesting an RX refill or new RX.magnesium oxide (MAG-OX) 400 mg (241.3 mg magnesium) tablet    Is this a refill or new RX:  refill     RX name and strength (copy/paste from chart):      Is this a 30 day or 90 day RX:     Pharmacy name and phone #  [facility] [address] [address] [address] [facility]    The doctors have asked that we provide their patients with the following 2 reminders -- prescription refills can take up to 72 hours, and a friendly reminder that in the future you can use your [facility] account to request refills:

## 2025-02-11 ENCOUNTER — TELEPHONE (OUTPATIENT)
Dept: CARDIOLOGY | Facility: CLINIC | Age: 70
End: 2025-02-11
Payer: MEDICARE

## 2025-02-11 NOTE — TELEPHONE ENCOUNTER
I have attempted without success to contact this patient by phone to confirm Appt Date/Time/Location. No Left Voicemail was not available

## 2025-03-25 NOTE — PROGRESS NOTES
C3 nurse attempted to contact Diamond Das   for a TCC post hospital discharge follow up call. No answer. Left voicemail with callback information. The patient does not have a scheduled HOSFU appointment. Message sent to PCP staff for assistance with scheduling visit with patient.  
  C3 nurse attempted to contact Diamond Das   for a TCC post hospital discharge follow up call. No answer. Left voicemail with callback information. The patient does not have a scheduled HOSFU appointment. Message sent to PCP staff for assistance with scheduling visit with patient. Portal message sent.   
25-Mar-2025 13:07

## 2025-05-08 DIAGNOSIS — I12.9 PARENCHYMAL RENAL HYPERTENSION, STAGE 1 THROUGH STAGE 4 OR UNSPECIFIED CHRONIC KIDNEY DISEASE: ICD-10-CM

## 2025-05-09 RX ORDER — CARVEDILOL 25 MG/1
25 TABLET ORAL 2 TIMES DAILY WITH MEALS
Qty: 60 TABLET | Refills: 1 | Status: SHIPPED | OUTPATIENT
Start: 2025-05-09 | End: 2025-11-05

## 2025-05-14 DIAGNOSIS — I50.33 ACUTE ON CHRONIC DIASTOLIC (CONGESTIVE) HEART FAILURE: ICD-10-CM

## 2025-05-14 DIAGNOSIS — I10 ESSENTIAL HYPERTENSION: Primary | ICD-10-CM

## 2025-05-14 DIAGNOSIS — I48.0 PAROXYSMAL ATRIAL FIBRILLATION: ICD-10-CM

## 2025-05-19 ENCOUNTER — HOSPITAL ENCOUNTER (OUTPATIENT)
Dept: CARDIOLOGY | Facility: HOSPITAL | Age: 70
Discharge: HOME | End: 2025-05-19
Attending: INTERNAL MEDICINE
Payer: MEDICARE

## 2025-05-19 ENCOUNTER — PATIENT MESSAGE (OUTPATIENT)
Dept: CARDIOLOGY | Facility: HOSPITAL | Age: 70
End: 2025-05-19
Payer: MEDICARE

## 2025-05-19 ENCOUNTER — OFFICE VISIT (OUTPATIENT)
Dept: CARDIOLOGY | Facility: CLINIC | Age: 70
End: 2025-05-19
Payer: MEDICARE

## 2025-05-19 VITALS
OXYGEN SATURATION: 97 % | HEART RATE: 67 BPM | HEIGHT: 61 IN | WEIGHT: 182.56 LBS | SYSTOLIC BLOOD PRESSURE: 98 MMHG | BODY MASS INDEX: 34.47 KG/M2 | DIASTOLIC BLOOD PRESSURE: 66 MMHG

## 2025-05-19 DIAGNOSIS — I10 ESSENTIAL HYPERTENSION: ICD-10-CM

## 2025-05-19 DIAGNOSIS — E78.49 OTHER HYPERLIPIDEMIA: ICD-10-CM

## 2025-05-19 DIAGNOSIS — E66.01 SEVERE OBESITY: ICD-10-CM

## 2025-05-19 DIAGNOSIS — I48.0 PAROXYSMAL ATRIAL FIBRILLATION: ICD-10-CM

## 2025-05-19 DIAGNOSIS — I12.9 PARENCHYMAL RENAL HYPERTENSION, STAGE 1 THROUGH STAGE 4 OR UNSPECIFIED CHRONIC KIDNEY DISEASE: ICD-10-CM

## 2025-05-19 DIAGNOSIS — U07.1 COVID-19: ICD-10-CM

## 2025-05-19 DIAGNOSIS — R79.89 ELEVATED D-DIMER: ICD-10-CM

## 2025-05-19 DIAGNOSIS — I27.20 PULMONARY HYPERTENSION: Primary | ICD-10-CM

## 2025-05-19 DIAGNOSIS — I50.32 CHRONIC DIASTOLIC CONGESTIVE HEART FAILURE: ICD-10-CM

## 2025-05-19 DIAGNOSIS — R18.8 OTHER ASCITES: ICD-10-CM

## 2025-05-19 DIAGNOSIS — I50.33 ACUTE ON CHRONIC DIASTOLIC (CONGESTIVE) HEART FAILURE: ICD-10-CM

## 2025-05-19 LAB
OHS QRS DURATION: 88 MS
OHS QTC CALCULATION: 515 MS

## 2025-05-19 PROCEDURE — 3078F DIAST BP <80 MM HG: CPT | Mod: CPTII,S$GLB,, | Performed by: INTERNAL MEDICINE

## 2025-05-19 PROCEDURE — 1126F AMNT PAIN NOTED NONE PRSNT: CPT | Mod: CPTII,S$GLB,, | Performed by: INTERNAL MEDICINE

## 2025-05-19 PROCEDURE — 3008F BODY MASS INDEX DOCD: CPT | Mod: CPTII,S$GLB,, | Performed by: INTERNAL MEDICINE

## 2025-05-19 PROCEDURE — 1159F MED LIST DOCD IN RCRD: CPT | Mod: CPTII,S$GLB,, | Performed by: INTERNAL MEDICINE

## 2025-05-19 PROCEDURE — 93005 ELECTROCARDIOGRAM TRACING: CPT | Mod: PO

## 2025-05-19 PROCEDURE — 99999 PR PBB SHADOW E&M-EST. PATIENT-LVL III: CPT | Mod: PBBFAC,,, | Performed by: INTERNAL MEDICINE

## 2025-05-19 PROCEDURE — 3044F HG A1C LEVEL LT 7.0%: CPT | Mod: CPTII,S$GLB,, | Performed by: INTERNAL MEDICINE

## 2025-05-19 PROCEDURE — 1157F ADVNC CARE PLAN IN RCRD: CPT | Mod: CPTII,S$GLB,, | Performed by: INTERNAL MEDICINE

## 2025-05-19 PROCEDURE — G2211 COMPLEX E/M VISIT ADD ON: HCPCS | Mod: S$GLB,,, | Performed by: INTERNAL MEDICINE

## 2025-05-19 PROCEDURE — 93010 ELECTROCARDIOGRAM REPORT: CPT | Mod: ,,, | Performed by: INTERNAL MEDICINE

## 2025-05-19 PROCEDURE — 1101F PT FALLS ASSESS-DOCD LE1/YR: CPT | Mod: CPTII,S$GLB,, | Performed by: INTERNAL MEDICINE

## 2025-05-19 PROCEDURE — 99214 OFFICE O/P EST MOD 30 MIN: CPT | Mod: S$GLB,,, | Performed by: INTERNAL MEDICINE

## 2025-05-19 PROCEDURE — 3288F FALL RISK ASSESSMENT DOCD: CPT | Mod: CPTII,S$GLB,, | Performed by: INTERNAL MEDICINE

## 2025-05-19 PROCEDURE — 3074F SYST BP LT 130 MM HG: CPT | Mod: CPTII,S$GLB,, | Performed by: INTERNAL MEDICINE

## 2025-05-19 PROCEDURE — 1160F RVW MEDS BY RX/DR IN RCRD: CPT | Mod: CPTII,S$GLB,, | Performed by: INTERNAL MEDICINE

## 2025-05-19 RX ORDER — CIPROFLOXACIN 500 MG/1
500 TABLET, FILM COATED ORAL 2 TIMES DAILY
COMMUNITY
Start: 2025-05-12

## 2025-05-19 RX ORDER — METOLAZONE 5 MG/1
5 TABLET ORAL
Qty: 90 TABLET | Refills: 1 | Status: SHIPPED | OUTPATIENT
Start: 2025-05-19

## 2025-05-19 RX ORDER — TORSEMIDE 20 MG/1
40 TABLET ORAL DAILY
Qty: 180 TABLET | Refills: 1 | Status: SHIPPED | OUTPATIENT
Start: 2025-05-19

## 2025-05-19 RX ORDER — LANOLIN ALCOHOL/MO/W.PET/CERES
400 CREAM (GRAM) TOPICAL DAILY
Qty: 90 TABLET | Refills: 1 | Status: SHIPPED | OUTPATIENT
Start: 2025-05-19

## 2025-05-19 RX ORDER — CARVEDILOL 25 MG/1
25 TABLET ORAL 2 TIMES DAILY WITH MEALS
Qty: 60 TABLET | Refills: 1 | Status: SHIPPED | OUTPATIENT
Start: 2025-05-19 | End: 2025-11-15

## 2025-05-19 RX ORDER — PRAVASTATIN SODIUM 20 MG/1
20 TABLET ORAL NIGHTLY
Qty: 90 TABLET | Refills: 1 | Status: SHIPPED | OUTPATIENT
Start: 2025-05-19

## 2025-05-19 NOTE — PROGRESS NOTES
Subjective:   Patient ID:  Diamond Das is a 70 y.o. female who presents for cardiac consult of No chief complaint on file.      The patient came in today for cardiac consult of No chief complaint on file.    Referring Physician: Andrey Perrin MD   Reason for consult: HTN, CHF    Diamond Das is a 70 y.o. female with medical conditions diastolic CHF, pericardial effusion, HTN, HLD, DM2, cryptogenic cirrhosis, s/p TIPS, ascites presents for CV follow up.     4/1/24  Bp elevated 210/86. HR 60s. BMI 35 - 190 lbs - has gained a lot of weight lately.   No CP. Has more edema.     5/19/25  Follow up from 4/2024.   She has been having constant CP last few weeks ago - so cut out salty food.   BP low here but normal at home. BMI 34 - 182 lbs   SHe will see nephro soon. She will go on a cruise in July and then another trip.     ECG - NSR, poor RWP     Results for orders placed during the hospital encounter of 08/08/22    Echo    Interpretation Summary  · Concentric remodeling and normal systolic function.  · Moderate left atrial enlargement.  · The estimated ejection fraction is 60%.  · Grade II left ventricular diastolic dysfunction.  · Mild right atrial enlargement.  · Mild to moderate tricuspid regurgitation.  · There is pulmonary hypertension.  · Small pericardial effusion.  · Mild mitral regurgitation.  The estimated PA systolic pressure is greater than 44 mmHg.      Results for orders placed during the hospital encounter of 12/30/20    Nuclear Stress - Cardiology Interpreted    Interpretation Summary    Normal myocardial perfusion scan. There is no evidence of myocardial ischemia or infarction.    The gated perfusion images showed an ejection fraction of 64% at rest. The gated perfusion images showed an ejection fraction of 65% post stress.    The EKG portion of this study is negative for ischemia.    The patient reported no chest pain during the stress test.          Past Medical History:   Diagnosis Date     Abdominal pain 05/10/2018    Acquired hypothyroidism 09/28/2020    Acute on chronic diastolic congestive heart failure 12/07/2017    ROD (acute kidney injury) 01/03/2019    Anasarca 10/13/2020    Arthritis of knee 01/31/2022    Ascites     Ascites 09/02/2016    Atherosclerosis of native coronary artery of native heart with angina pectoris 4/1/2024    Bilateral knee pain 04/22/2022    Bilateral lower extremity edema 09/02/2016    Breast pain, left 01/04/2018    Cataract     Chest pain, atypical 02/09/2018    CHF (congestive heart failure)     Cirrhosis     Cirrhosis 01/03/2017    Cough     Diabetes mellitus     Diabetes mellitus, type 2     Diarrhea 09/04/2019    Edema 03/19/2018    Encounter for long-term (current) use of medications 03/19/2018    Epigastric pain 02/11/2020    Gastroparesis     GERD (gastroesophageal reflux disease)     Hepatic encephalopathy 06/19/2018    Pt has history of cirrhosis, seen at [facility] on 6/7/18. Currently on lactulose.    Hyperlipidemia     Hypertension     Hypotension 02/21/2019    Immunization deficiency 02/10/2020    Leg cramps 05/20/2022    Liver disease     Lumbar strain 04/27/2018    Lumbar strain, sequela 02/17/2020    Macular degeneration     Medication reaction 11/17/2016    Other and unspecified hyperlipidemia 6/11/2012 11:11:32 AM    [facility] Historical - Endocrine/Metabolic/Immune: Hyperlipidemia-No Additional Notes    Paroxysmal atrial fibrillation 4/1/2024    Pneumonia of right middle lobe due to infectious organism 12/19/2017    Renal disorder     Retinopathy due to secondary diabetes mellitus     Screening for malignant neoplasm of cervix 12/14/2017    Skin lesion 12/20/2021    Sleep apnea     Strain of lumbar region 10/04/2021    Thyroid disease     Type 2 diabetes mellitus with hyperglycemia 10/10/2020       Past Surgical History:   Procedure Laterality Date    CATARACT EXTRACTION      CHOLECYSTECTOMY      COLONOSCOPY N/A 09/04/2019    Procedure: COLONOSCOPY;   Surgeon: Marnie Elmore MD;  Location: [facility] ENDO;  Service: Endoscopy;  Laterality: N/A;    ERCP      ESOPHAGOGASTRODUODENOSCOPY N/A 07/28/2022    Procedure: EGD (ESOPHAGOGASTRODUODENOSCOPY);  Surgeon: Jimy Katz MD;  Location: [facility] ENDO;  Service: Endoscopy;  Laterality: N/A;    FLUOROSCOPY N/A 07/24/2020    Procedure: TIPS revision;  Surgeon: Martell Jasmine MD;  Location: [facility] CATH LAB;  Service: General;  Laterality: N/A;    LIVER BIOPSY      THORACENTESIS Left 01/20/2020    Procedure: Thoracentesis;  Surgeon: Angelica Hunter MD;  Location: [facility] ENDO;  Service: Pulmonary;  Laterality: Left;    TUBAL LIGATION      UPPER GASTROINTESTINAL ENDOSCOPY         Social History     Tobacco Use    Smoking status: Never    Smokeless tobacco: Never   Substance Use Topics    Alcohol use: No    Drug use: No       Family History   Problem Relation Name Age of Onset    Cancer Mother      Breast cancer Mother      Heart disease Father      Aneurysm Sister      Heart disease Brother      No Known Problems Maternal Grandmother      Cancer Maternal Grandfather      Sarcoidosis Sister      Colon cancer Neg Hx      Ovarian cancer Neg Hx      Thrombosis Neg Hx         Patient's Medications   New Prescriptions    No medications on file   Previous Medications    CIPROFLOXACIN HCL (CIPRO) 500 MG TABLET    Take 500 mg by mouth 2 (two) times daily.    FAMOTIDINE (PEPCID) 20 MG TABLET    TAKE ONE TABLET BY MOUTH DAILY AT 9 AM    FERROUS SULFATE (IRON ORAL)    Take by mouth.    GARLIC (GARLIQUE ORAL)    Take 1 tablet by mouth once daily.    ISOSORBIDE MONONITRATE (IMDUR) 30 MG 24 HR TABLET    Take 1 tablet (30 mg total) by mouth every evening.    LEVOTHYROXINE (SYNTHROID) 137 MCG TAB TABLET    Take 1 tablet (137 mcg total) by mouth before breakfast.    LIDOCAINE (LMX) 4 % CREAM    Apply topically as needed.    MAGNESIUM OXIDE (MAG-OX) 400 MG (241.3 MG MAGNESIUM) TABLET    Take 1 tablet (400 mg total) by mouth once daily.     METOLAZONE (ZAROXOLYN) 5 MG TABLET    Take 1 tablet (5 mg total) by mouth every Mon, Wed, Fri. Take 30 min before Torsemide; take daily next 5 days and then M, W, F    OMEGA-3 FATTY ACIDS/FISH OIL (FISH OIL-OMEGA-3 FATTY ACIDS) 300-1,000 MG CAPSULE    Take by mouth once daily.    ONDANSETRON (ZOFRAN) 4 MG TABLET    Take 1 tablet (4 mg total) by mouth every 8 (eight) hours as needed for Nausea.    PULSE OXIMETER (PULSE OXIMETER) DEVICE    by Apply Externally route 2 (two) times a day. Use twice daily at 8 AM and 3 PM and [facility] the [facility] as directed.    RIFAXIMIN (XIFAXAN) 550 MG TAB    Take 1 tablet (550 mg total) by mouth 2 (two) times daily.    STOOL SOFTENER 100 MG CAPSULE    TAKE 1 CAPSULE BY MOUTH THREE TIMES DAILY FOR DIARRHEA (HOLD FOR DIARRHEA)   Modified Medications    Modified Medication Previous Medication    APIXABAN (ELIQUIS) 5 MG TAB apixaban (ELIQUIS) 5 mg Tab       Take 1 tablet (5 mg total) by mouth 2 (two) times daily.    Take 1 tablet (5 mg total) by mouth 2 (two) times daily.    CARVEDILOL (COREG) 25 MG TABLET carvediloL (COREG) 25 MG tablet       Take 1 tablet (25 mg total) by mouth 2 (two) times daily with meals.    Take 1 tablet (25 mg total) by mouth 2 (two) times daily with meals.    PRAVASTATIN (PRAVACHOL) 20 MG TABLET pravastatin (PRAVACHOL) 10 MG tablet       Take 1 tablet (20 mg total) by mouth every evening.    Take 1 tablet (10 mg total) by mouth once daily.    TORSEMIDE (DEMADEX) 20 MG TAB torsemide (DEMADEX) 20 MG Tab       Take 2 tablets (40 mg total) by mouth once daily. If having more swelling/weight gain/fluid take this in morning and afternoon for 3 days    Take 2 tablets (40 mg total) by mouth once daily. If having more swelling/weight gain/fluid take this in morning and afternoon for 3 days   Discontinued Medications    No medications on file       Review of Systems   Constitutional:  Positive for malaise/fatigue.   HENT: Negative.     Eyes: Negative.   "  Respiratory:  Positive for shortness of breath (intermittent).    Cardiovascular:  Positive for chest pain and leg swelling. Negative for palpitations.   Gastrointestinal:  Negative for nausea.   Genitourinary: Negative.    Musculoskeletal: Negative.    Skin: Negative.    Neurological: Negative.    Endo/Heme/Allergies: Negative.    Psychiatric/Behavioral: Negative.     All 12 systems otherwise negative.      Wt Readings from Last 3 Encounters:   05/19/25 82.8 kg (182 lb 8.7 oz)   04/04/24 86.2 kg (190 lb)   04/01/24 86.2 kg (190 lb 0.6 oz)     Temp Readings from Last 3 Encounters:   10/27/23 96.8 °F (36 °C)   09/15/23 98.6 °F (37 °C)   05/09/23 96 °F (35.6 °C) (Tympanic)     BP Readings from Last 3 Encounters:   05/19/25 98/66   04/01/24 (!) 205/85   12/18/23 138/70     Pulse Readings from Last 3 Encounters:   05/19/25 67   04/01/24 64   12/18/23 65       BP 98/66 (BP Location: Right arm, Patient Position: Sitting)   Pulse 67   Ht 5' 1" (1.549 m)   Wt 82.8 kg (182 lb 8.7 oz)   LMP  (LMP Unknown)   SpO2 97%   BMI 34.49 kg/m²     Objective:   Physical Exam  Constitutional:       General: She is not in acute distress.     Appearance: She is well-developed. She is obese. She is not diaphoretic.   HENT:      Head: Normocephalic and atraumatic.      Mouth/Throat:      Pharynx: No oropharyngeal exudate.   Eyes:      General: No scleral icterus.        Right eye: No discharge.         Left eye: No discharge.   Cardiovascular:      Rate and Rhythm: Normal rate and regular rhythm.      Heart sounds: Murmur heard.   Pulmonary:      Effort: Pulmonary effort is normal. No respiratory distress.   Musculoskeletal:      Right lower leg: Edema present.      Left lower leg: Edema present.   Skin:     Coloration: Skin is not pale.      Findings: No erythema or rash.   Neurological:      Mental Status: She is alert and oriented to person, place, and time.   Psychiatric:         Behavior: Behavior normal.         Thought " Content: Thought content normal.         Judgment: Judgment normal.         Lab Results   Component Value Date     04/04/2024    K 3.9 04/04/2024     (H) 04/04/2024    CO2 26 04/04/2024    BUN 38 (H) 04/04/2024    CREATININE 3.2 (H) 04/04/2024     04/04/2024    HGBA1C 6.5 (H) 05/09/2025    HGBA1C 5.6 11/17/2023    MG 1.8 04/04/2024    AST 25 07/27/2023    ALT 16 07/27/2023    ALBUMIN 2.7 (L) 02/27/2024    PROT 7.2 07/27/2023    BILITOT 0.4 07/27/2023    WBC 6.22 07/27/2023    HGB 10.3 (L) 07/27/2023    HCT 33.2 (L) 07/27/2023    MCV 92 07/27/2023     07/27/2023    INR 1.4 (H) 07/31/2024    INR 1.1 07/27/2023    TSH 0.3 (L) 01/20/2025    TSH 28.708 (H) 08/03/2022    CHOL 137 05/09/2023    HDL 31 (L) 05/09/2023    LDLCALC 95.6 05/09/2023    TRIG 52 05/09/2023    BNP 1,107 (H) 04/04/2024     Assessment:      1. Pulmonary hypertension    2. Morbid obesity due to excess calories    3. Paroxysmal atrial fibrillation    4. Other hyperlipidemia    5. Elevated d-dimer with indeterminate perfusion scan for PE    6. COVID-19    7. Parenchymal renal hypertension, stage 1 through stage 4 or unspecified chronic kidney disease    8. Chronic diastolic congestive heart failure    9. Severe obesity      Plan:   1. Chronic diastolic HF - ;  --> 704 --> 767 --> 477 --> 347 --> 800--> 472 --> 519   - ECHO 8/2022 with normal bi V function, grade 2 DD, mild to mod TR, small peric effusion, PASP > 44mmHg.   - daily weights, low salt diet   - daily compression stockings  - diuretics -changed to Torsemide 40mg ,  cont metolazone M, W, F  - order ECHO repeat, and pharm nuclear stress test - pt cannot walk on treadmill    2. HTN    - cont meds and titrate    3. HLD -   - Pravastatin 10mg --> increase to 20mg     4. DM2 with gastroparesis A1c - 5.9 --> 5.6 --> 5.3 --> 6.5  - cont meds per PCP - was on Trulicity  --> on Ozempic    5. H/o Cirrhosis s/p TIPS with edema, low albumin  - cont tx per PCP/Hepatology  -  liver function improved does not need transplant  - had EGD concern for gastroparesis    6. KRISTAL  - needs CPAP,  was not using it before     7. Obesity BMI 40 - 212 lbs --> BMI 33 - 179 lbs. - BMI 34 - 181 lbs --> 175 lb --> 164 lbs  --> 190 lbs --> 182 lbs   - cont weight loss with diet/exercise     8. Pericardial effusion  - small, sec to cirrhosis/CHF  - no tamponade clinically on last ECHO    9. CKD 4 with proteinuria  - diuretics adjusted   - cont f/u with nephro -    10. Elevated D dimer, int prob for PE  - repeat D dimer - elevated   - cont Eliquis     Visit today included increased complexity associated with the care of the episodic problem CHF addressed and managing the longitudinal care of the patient due to the serious and/or complex managed problem(s) .      Thank you for allowing me to participate in this patient's care. Please do not hesitate to contact me with any questions or concerns. Consult note has been forwarded to the referral physician.

## 2025-05-26 ENCOUNTER — HOSPITAL ENCOUNTER (OUTPATIENT)
Dept: CARDIOLOGY | Facility: HOSPITAL | Age: 70
Discharge: HOME | End: 2025-05-26
Attending: INTERNAL MEDICINE
Payer: MEDICARE

## 2025-05-26 ENCOUNTER — HOSPITAL ENCOUNTER (OUTPATIENT)
Dept: RADIOLOGY | Facility: HOSPITAL | Age: 70
Discharge: HOME | End: 2025-05-26
Attending: INTERNAL MEDICINE
Payer: MEDICARE

## 2025-05-26 VITALS
WEIGHT: 182 LBS | SYSTOLIC BLOOD PRESSURE: 98 MMHG | HEIGHT: 61 IN | DIASTOLIC BLOOD PRESSURE: 66 MMHG | BODY MASS INDEX: 34.36 KG/M2

## 2025-05-26 DIAGNOSIS — I27.20 PULMONARY HYPERTENSION: ICD-10-CM

## 2025-05-26 DIAGNOSIS — R79.89 ELEVATED D-DIMER: ICD-10-CM

## 2025-05-26 DIAGNOSIS — E78.49 OTHER HYPERLIPIDEMIA: ICD-10-CM

## 2025-05-26 DIAGNOSIS — U07.1 COVID-19: ICD-10-CM

## 2025-05-26 DIAGNOSIS — I12.9 PARENCHYMAL RENAL HYPERTENSION, STAGE 1 THROUGH STAGE 4 OR UNSPECIFIED CHRONIC KIDNEY DISEASE: ICD-10-CM

## 2025-05-26 DIAGNOSIS — I48.0 PAROXYSMAL ATRIAL FIBRILLATION: ICD-10-CM

## 2025-05-26 DIAGNOSIS — E66.01 SEVERE OBESITY: ICD-10-CM

## 2025-05-26 DIAGNOSIS — I50.32 CHRONIC DIASTOLIC CONGESTIVE HEART FAILURE: ICD-10-CM

## 2025-05-26 LAB
AORTIC ROOT ANNULUS: 2.8 CM
AORTIC SIZE INDEX (SOV): 1.6 CM/M2
AORTIC SIZE INDEX: 1.6 CM/M2
ASCENDING AORTA: 2.9 CM
AV INDEX (PROSTH): 0.65
AV MEAN GRADIENT: 6 MMHG
AV PEAK GRADIENT: 12 MMHG
AV VALVE AREA BY VELOCITY RATIO: 2 CM²
AV VALVE AREA: 2 CM²
AV VELOCITY RATIO: 0.65
BSA FOR ECHO PROCEDURE: 1.88 M2
CV ECHO LV RWT: 0.69 CM
CV STRESS BASE HR: 60 BPM
DIASTOLIC BLOOD PRESSURE: 88 MMHG
DOP CALC AO PEAK VEL: 1.7 M/S
DOP CALC AO VTI: 40.9 CM
DOP CALC LVOT AREA: 3.1 CM2
DOP CALC LVOT DIAMETER: 2 CM
DOP CALC LVOT PEAK VEL: 1.1 M/S
DOP CALC LVOT STROKE VOLUME: 83.2 CM3
DOP CALC RVOT PEAK VEL: 0.45 M/S
DOP CALC RVOT VTI: 12 CM
DOP CALCLVOT PEAK VEL VTI: 26.5 CM
E WAVE DECELERATION TIME: 333 MSEC
E/A RATIO: 0.65
E/E' RATIO: 16 M/S
ECHO LV POSTERIOR WALL: 1.1 CM (ref 0.6–1.1)
EJECTION FRACTION: 60 %
FRACTIONAL SHORTENING: 37.5 % (ref 28–44)
INTERVENTRICULAR SEPTUM: 1.2 CM (ref 0.6–1.1)
IVC DIAMETER: 2.37 CM
IVRT: 133 MSEC
LA MAJOR: 4.9 CM
LA MINOR: 5.6 CM
LA WIDTH: 3.9 CM
LEFT ATRIUM AREA SYSTOLIC (APICAL 2 CHAMBER): 24.77 CM2
LEFT ATRIUM AREA SYSTOLIC (APICAL 4 CHAMBER): 20.15 CM2
LEFT ATRIUM SIZE: 3.3 CM
LEFT ATRIUM VOLUME INDEX MOD: 38 ML/M2
LEFT ATRIUM VOLUME INDEX: 32 ML/M2
LEFT ATRIUM VOLUME MOD: 69 ML
LEFT ATRIUM VOLUME: 57 CM3
LEFT INTERNAL DIMENSION IN SYSTOLE: 2 CM (ref 2.1–4)
LEFT VENTRICLE DIASTOLIC VOLUME INDEX: 22.1 ML/M2
LEFT VENTRICLE DIASTOLIC VOLUME: 40 ML
LEFT VENTRICLE END SYSTOLIC VOLUME APICAL 2 CHAMBER: 84.68 ML
LEFT VENTRICLE END SYSTOLIC VOLUME APICAL 4 CHAMBER: 55.27 ML
LEFT VENTRICLE MASS INDEX: 61.7 G/M2
LEFT VENTRICLE SYSTOLIC VOLUME INDEX: 7.2 ML/M2
LEFT VENTRICLE SYSTOLIC VOLUME: 13 ML
LEFT VENTRICULAR INTERNAL DIMENSION IN DIASTOLE: 3.2 CM (ref 3.5–6)
LEFT VENTRICULAR MASS: 111.8 G
LV LATERAL E/E' RATIO: 12.6 M/S
LV SEPTAL E/E' RATIO: 21 M/S
LVED V (TEICH): 40.47 ML
LVES V (TEICH): 12.62 ML
LVOT MG: 2.63 MMHG
LVOT MV: 0.75 CM/S
MV PEAK A VEL: 0.97 M/S
MV PEAK E VEL: 0.63 M/S
NUC REST EJECTION FRACTION: 53
NUC STRESS EJECTION FRACTION: 64 %
OHS CV CPX 85 PERCENT MAX PREDICTED HEART RATE MALE: 129
OHS CV CPX ESTIMATED METS: 1
OHS CV CPX MAX PREDICTED HEART RATE: 152
OHS CV CPX PATIENT IS FEMALE: 1
OHS CV CPX PATIENT IS MALE: 0
OHS CV CPX PEAK DIASTOLIC BLOOD PRESSURE: 76 MMHG
OHS CV CPX PEAK HEAR RATE: 79 BPM
OHS CV CPX PEAK RATE PRESSURE PRODUCT: NORMAL
OHS CV CPX PEAK SYSTOLIC BLOOD PRESSURE: 155 MMHG
OHS CV CPX PERCENT MAX PREDICTED HEART RATE ACHIEVED: 54
OHS CV CPX RATE PRESSURE PRODUCT PRESENTING: 7380
OHS CV INITIAL DOSE: 9.5 MCG/KG/MIN
OHS CV PEAK DOSE: 28.7 MCG/KG/MIN
OHS CV RV/LV RATIO: 1.06 CM
PV MEAN GRADIENT: 0 MMHG
RA MAJOR: 4.1 CM
RA PRESSURE ESTIMATED: 3 MMHG
RA WIDTH: 3.41 CM
RIGHT VENTRICLE DIASTOLIC BASEL DIMENSION: 3.4 CM
RIGHT VENTRICULAR END-DIASTOLIC DIMENSION: 3.41 CM
SINUS: 2.83 CM
STJ: 2.6 CM
STRESS ECHO POST EXERCISE DUR MIN: 1 MINUTES
STRESS ECHO POST EXERCISE DUR SEC: 7 SECONDS
SYSTOLIC BLOOD PRESSURE: 123 MMHG
TDI LATERAL: 0.05 M/S
TDI SEPTAL: 0.03 M/S
TDI: 0.04 M/S
TRICUSPID ANNULAR PLANE SYSTOLIC EXCURSION: 2.7 CM
Z-SCORE OF LEFT VENTRICULAR DIMENSION IN END DIASTOLE: -4.48
Z-SCORE OF LEFT VENTRICULAR DIMENSION IN END SYSTOLE: -3.45

## 2025-05-26 PROCEDURE — 93017 CV STRESS TEST TRACING ONLY: CPT

## 2025-05-26 PROCEDURE — 78452 HT MUSCLE IMAGE SPECT MULT: CPT | Mod: 26,,, | Performed by: INTERNAL MEDICINE

## 2025-05-26 PROCEDURE — A9502 TC99M TETROFOSMIN: HCPCS | Performed by: INTERNAL MEDICINE

## 2025-05-26 PROCEDURE — 93018 CV STRESS TEST I&R ONLY: CPT | Mod: ,,, | Performed by: INTERNAL MEDICINE

## 2025-05-26 PROCEDURE — 93016 CV STRESS TEST SUPVJ ONLY: CPT | Mod: ,,, | Performed by: INTERNAL MEDICINE

## 2025-05-26 PROCEDURE — 93306 TTE W/DOPPLER COMPLETE: CPT | Mod: 26,,, | Performed by: INTERNAL MEDICINE

## 2025-05-26 PROCEDURE — 93306 TTE W/DOPPLER COMPLETE: CPT

## 2025-05-26 PROCEDURE — 78452 HT MUSCLE IMAGE SPECT MULT: CPT

## 2025-05-26 PROCEDURE — 63600175 PHARM REV CODE 636 W HCPCS: Performed by: INTERNAL MEDICINE

## 2025-05-26 RX ORDER — REGADENOSON 0.08 MG/ML
0.4 INJECTION, SOLUTION INTRAVENOUS
Status: COMPLETED | OUTPATIENT
Start: 2025-05-26 | End: 2025-05-26

## 2025-05-26 RX ADMIN — REGADENOSON 0.4 MG: 0.08 INJECTION, SOLUTION INTRAVENOUS at 02:05

## 2025-05-26 RX ADMIN — TETROFOSMIN 28.7 MILLICURIE: 1.38 INJECTION, POWDER, LYOPHILIZED, FOR SOLUTION INTRAVENOUS at 02:05

## 2025-05-26 RX ADMIN — TETROFOSMIN 9.5 MILLICURIE: 1.38 INJECTION, POWDER, LYOPHILIZED, FOR SOLUTION INTRAVENOUS at 12:05

## 2025-05-27 ENCOUNTER — TELEPHONE (OUTPATIENT)
Dept: CARDIOLOGY | Facility: HOSPITAL | Age: 70
End: 2025-05-27
Payer: MEDICARE

## 2025-05-27 ENCOUNTER — RESULTS FOLLOW-UP (OUTPATIENT)
Dept: CARDIOLOGY | Facility: CLINIC | Age: 70
End: 2025-05-27

## 2025-05-27 RX ORDER — NITROGLYCERIN 0.4 MG/1
0.4 TABLET SUBLINGUAL EVERY 5 MIN PRN
Qty: 30 TABLET | Refills: 0 | Status: SHIPPED | OUTPATIENT
Start: 2025-05-27 | End: 2026-05-27

## 2025-05-27 RX ORDER — ISOSORBIDE MONONITRATE 30 MG/1
30 TABLET, EXTENDED RELEASE ORAL NIGHTLY
Qty: 90 TABLET | Refills: 1 | Status: SHIPPED | OUTPATIENT
Start: 2025-05-27 | End: 2026-05-27

## 2025-05-27 NOTE — TELEPHONE ENCOUNTER
I have attempted without success to contact this patient by phone to review results. Pt was instructed via  to return call to clinic.           ----- Message from Marcos Ramos MD sent at 5/27/2025  7:45 AM CDT -----  Please contact the patient and let them know that their results of nuclear stress test reveal an area of mild to moderate blockage of the heart - will continue medical treatment - need to make sure   to have good BP control and low salt diet, need to make sure to be taking Imdur 30mg which will also help the heart arteries. I will also send nitroglycerin tablets to take for any severe chest pain,   if chest pain is unrelieved recommend ER bakari. Follow up with me as scheduled or sooner to discuss.   ----- Message -----  From: Bandar Garcia MD  Sent: 5/26/2025   4:06 PM CDT  To: Marcos Ramos MD

## 2025-05-27 NOTE — TELEPHONE ENCOUNTER
----- Message from Marcos Ramos MD sent at 5/27/2025  7:45 AM CDT -----  Please contact the patient and let them know that their results of nuclear stress test reveal an area of mild to moderate blockage of the heart - will continue medical treatment - need to make sure   to have good BP control and low salt diet, need to make sure to be taking Imdur 30mg which will also help the heart arteries. I will also send nitroglycerin tablets to take for any severe chest pain,   if chest pain is unrelieved recommend MARIA ISABEL villegas. Follow up with me as scheduled or sooner to discuss.   ----- Message -----  From: Bandar Garcia MD  Sent: 5/26/2025   4:06 PM CDT  To: Marcos Ramos MD

## 2025-06-24 ENCOUNTER — OFFICE VISIT (OUTPATIENT)
Dept: OPHTHALMOLOGY | Facility: CLINIC | Age: 70
End: 2025-06-24
Payer: MEDICARE

## 2025-06-24 DIAGNOSIS — E11.3413 SEVERE NONPROLIFERATIVE DIABETIC RETINOPATHY OF BOTH EYES WITH MACULAR EDEMA ASSOCIATED WITH TYPE 2 DIABETES MELLITUS: Primary | ICD-10-CM

## 2025-06-24 DIAGNOSIS — Z96.1 PSEUDOPHAKIA OF BOTH EYES: ICD-10-CM

## 2025-06-24 PROCEDURE — 1160F RVW MEDS BY RX/DR IN RCRD: CPT | Mod: CPTII,S$GLB,, | Performed by: OPHTHALMOLOGY

## 2025-06-24 PROCEDURE — 1157F ADVNC CARE PLAN IN RCRD: CPT | Mod: CPTII,S$GLB,, | Performed by: OPHTHALMOLOGY

## 2025-06-24 PROCEDURE — 3044F HG A1C LEVEL LT 7.0%: CPT | Mod: CPTII,S$GLB,, | Performed by: OPHTHALMOLOGY

## 2025-06-24 PROCEDURE — 1159F MED LIST DOCD IN RCRD: CPT | Mod: CPTII,S$GLB,, | Performed by: OPHTHALMOLOGY

## 2025-06-24 PROCEDURE — 2022F DILAT RTA XM EVC RTNOPTHY: CPT | Mod: CPTII,S$GLB,, | Performed by: OPHTHALMOLOGY

## 2025-06-24 PROCEDURE — 92134 CPTRZ OPH DX IMG PST SGM RTA: CPT | Mod: S$GLB,,, | Performed by: OPHTHALMOLOGY

## 2025-06-24 PROCEDURE — 99999 PR PBB SHADOW E&M-EST. PATIENT-LVL III: CPT | Mod: PBBFAC,,, | Performed by: OPHTHALMOLOGY

## 2025-06-24 PROCEDURE — 99214 OFFICE O/P EST MOD 30 MIN: CPT | Mod: S$GLB,,, | Performed by: OPHTHALMOLOGY

## 2025-06-24 NOTE — PROGRESS NOTES
===============================  Date today is 6/24/2025  Diamond Das is a 70 y.o. female  Last visit [facility]: :6/18/2024   Last visit eye dept. Visit date not found    Uncorrected distance visual acuity was 20/50 -2 in the right eye and 20/70 in the left eye.  Tonometry       Tonometry ([facility], 1:20 PM)         Right Left    Pressure 10 12                  Not recorded       Not recorded       Not recorded       Chief Complaint   Patient presents with    Annual Exam     yearly PDR     HPI     Annual Exam            Comments: yearly PDR          Comments    Pt reports for yearly PDR. No pain or irritation. Va stable.    DM  PDR OU  PRH OS  PRP OS 5/12/2021 - 6/3/2021 - 6/23/2021               Last edited by Leslye Castillo on 6/24/2025  1:19 PM.      Problem List Items Addressed This Visit    None  Visit Diagnoses         Severe nonproliferative diabetic retinopathy of both eyes with macular edema associated with type 2 diabetes mellitus    -  Primary    Relevant Orders    Posterior Segment OCT Retina-Both eyes (Completed)      Pseudophakia of both eyes              Instructed to call 24/7 for any worsening of vision, visual distortion or pain.  Check OU independently daily.    Gave my office and personal cell phone number.  ________________  6/24/2025 today  Diamond Das    DM stable retinopathy  OCT stable  PCIOL OU with PCM OS  C/d OD 0.3  Good LP  Mild but few fat DBH superiorly OD  No NV  OS post PRP  No NVD  Discussed YAG OS- refer to Dr. Singh next available    RTC 6 months  Instructed to call 24/7 for any worsening of vision or symptoms. Check OU daily.   Gave my office and cell phone number.    =============================

## (undated) DEVICE — PACK ANGIOPLASTY ACCESS PLUS

## (undated) DEVICE — CATH GLIDE ANGLED 5FR 65CM

## (undated) DEVICE — PATCH VASC CLOSURE THROMBIX

## (undated) DEVICE — KIT INTRODUCER STIFFEN MICRO

## (undated) DEVICE — CATH TORCON NB C2 5F 65CM

## (undated) DEVICE — CATH SIZING 5F 70CM W/20CM SEG

## (undated) DEVICE — GUIDEWIRE ANGLED .035 150CM

## (undated) DEVICE — CATH MOTARJEME 5FR 65CM

## (undated) DEVICE — DEVICE INFLATION HI PRESS

## (undated) DEVICE — CATH ULTRAVERSE 035 8X60X75

## (undated) DEVICE — ACCESS SET RNG INTRAHEPATIC 10

## (undated) DEVICE — DILATOR 8FR

## (undated) DEVICE — CONTRAST OMNIPAQUE 240 150ML

## (undated) DEVICE — PACK CATH LAB CUSTOM BR